# Patient Record
Sex: FEMALE | Race: WHITE | NOT HISPANIC OR LATINO
[De-identification: names, ages, dates, MRNs, and addresses within clinical notes are randomized per-mention and may not be internally consistent; named-entity substitution may affect disease eponyms.]

---

## 2017-01-23 ENCOUNTER — APPOINTMENT (OUTPATIENT)
Dept: ORTHOPEDIC SURGERY | Facility: CLINIC | Age: 77
End: 2017-01-23

## 2017-01-23 VITALS
SYSTOLIC BLOOD PRESSURE: 158 MMHG | HEART RATE: 90 BPM | HEIGHT: 66 IN | WEIGHT: 138 LBS | DIASTOLIC BLOOD PRESSURE: 69 MMHG | BODY MASS INDEX: 22.18 KG/M2

## 2017-01-23 DIAGNOSIS — Z00.00 ENCOUNTER FOR GENERAL ADULT MEDICAL EXAMINATION W/OUT ABNORMAL FINDINGS: ICD-10-CM

## 2017-01-23 DIAGNOSIS — M48.07 SPINAL STENOSIS, LUMBOSACRAL REGION: ICD-10-CM

## 2017-02-06 ENCOUNTER — RECORD ABSTRACTING (OUTPATIENT)
Age: 77
End: 2017-02-06

## 2017-02-07 ENCOUNTER — APPOINTMENT (OUTPATIENT)
Dept: NEUROLOGY | Facility: CLINIC | Age: 77
End: 2017-02-07

## 2017-02-07 VITALS
WEIGHT: 138 LBS | HEIGHT: 66 IN | SYSTOLIC BLOOD PRESSURE: 150 MMHG | HEART RATE: 73 BPM | DIASTOLIC BLOOD PRESSURE: 77 MMHG | BODY MASS INDEX: 22.18 KG/M2

## 2017-02-07 VITALS
SYSTOLIC BLOOD PRESSURE: 151 MMHG | HEIGHT: 66 IN | DIASTOLIC BLOOD PRESSURE: 77 MMHG | WEIGHT: 138 LBS | HEART RATE: 73 BPM | BODY MASS INDEX: 22.18 KG/M2

## 2017-02-07 RX ORDER — LORATADINE 10 MG
17 TABLET,DISINTEGRATING ORAL
Refills: 0 | Status: ACTIVE | COMMUNITY
Start: 2017-02-07

## 2017-07-10 ENCOUNTER — RX RENEWAL (OUTPATIENT)
Age: 77
End: 2017-07-10

## 2017-09-08 ENCOUNTER — RECORD ABSTRACTING (OUTPATIENT)
Age: 77
End: 2017-09-08

## 2017-09-18 ENCOUNTER — APPOINTMENT (OUTPATIENT)
Dept: NEUROLOGY | Facility: CLINIC | Age: 77
End: 2017-09-18
Payer: MEDICARE

## 2017-09-18 VITALS
SYSTOLIC BLOOD PRESSURE: 137 MMHG | BODY MASS INDEX: 22.18 KG/M2 | DIASTOLIC BLOOD PRESSURE: 77 MMHG | WEIGHT: 138 LBS | HEIGHT: 66 IN | HEART RATE: 87 BPM

## 2017-09-18 VITALS
HEIGHT: 66 IN | DIASTOLIC BLOOD PRESSURE: 77 MMHG | BODY MASS INDEX: 22.18 KG/M2 | HEART RATE: 87 BPM | SYSTOLIC BLOOD PRESSURE: 137 MMHG | WEIGHT: 138 LBS

## 2017-09-18 PROCEDURE — 99215 OFFICE O/P EST HI 40 MIN: CPT

## 2017-09-18 RX ORDER — BRIMONIDINE TARTRATE 1 MG/ML
0.1 SOLUTION/ DROPS OPHTHALMIC EVERY 8 HOURS
Refills: 0 | Status: ACTIVE | COMMUNITY

## 2017-11-28 ENCOUNTER — RECORD ABSTRACTING (OUTPATIENT)
Age: 77
End: 2017-11-28

## 2017-12-12 ENCOUNTER — APPOINTMENT (OUTPATIENT)
Dept: NEUROLOGY | Facility: CLINIC | Age: 77
End: 2017-12-12

## 2018-05-01 ENCOUNTER — APPOINTMENT (OUTPATIENT)
Dept: NEUROLOGY | Facility: CLINIC | Age: 78
End: 2018-05-01
Payer: MEDICARE

## 2018-05-01 VITALS
HEIGHT: 66 IN | HEART RATE: 61 BPM | BODY MASS INDEX: 22.18 KG/M2 | SYSTOLIC BLOOD PRESSURE: 126 MMHG | WEIGHT: 138 LBS | DIASTOLIC BLOOD PRESSURE: 72 MMHG

## 2018-05-01 VITALS
BODY MASS INDEX: 22.18 KG/M2 | HEIGHT: 66 IN | WEIGHT: 138 LBS | HEART RATE: 61 BPM | SYSTOLIC BLOOD PRESSURE: 125 MMHG | DIASTOLIC BLOOD PRESSURE: 70 MMHG

## 2018-05-01 PROCEDURE — 93892 TCD EMBOLI DETECT W/O INJ: CPT

## 2018-05-01 PROCEDURE — 93886 INTRACRANIAL COMPLETE STUDY: CPT

## 2018-05-01 PROCEDURE — 93880 EXTRACRANIAL BILAT STUDY: CPT

## 2018-05-01 PROCEDURE — 99215 OFFICE O/P EST HI 40 MIN: CPT

## 2018-10-02 ENCOUNTER — RX RENEWAL (OUTPATIENT)
Age: 78
End: 2018-10-02

## 2018-10-02 RX ORDER — LEVETIRACETAM 250 MG/1
250 TABLET, FILM COATED ORAL
Qty: 360 | Refills: 3 | Status: ACTIVE | COMMUNITY
Start: 2017-09-18 | End: 1900-01-01

## 2018-11-12 ENCOUNTER — APPOINTMENT (OUTPATIENT)
Dept: NEUROLOGY | Facility: CLINIC | Age: 78
End: 2018-11-12

## 2018-12-07 ENCOUNTER — MEDICATION RENEWAL (OUTPATIENT)
Age: 78
End: 2018-12-07

## 2018-12-07 RX ORDER — LEVOTHYROXINE, LIOTHYRONINE 19; 4.5 UG/1; UG/1
30 TABLET ORAL DAILY
Qty: 90 | Refills: 3 | Status: ACTIVE | COMMUNITY
Start: 1900-01-01 | End: 1900-01-01

## 2018-12-10 ENCOUNTER — APPOINTMENT (OUTPATIENT)
Dept: NEUROLOGY | Facility: CLINIC | Age: 78
End: 2018-12-10
Payer: MEDICARE

## 2018-12-10 VITALS
DIASTOLIC BLOOD PRESSURE: 95 MMHG | SYSTOLIC BLOOD PRESSURE: 165 MMHG | BODY MASS INDEX: 21.69 KG/M2 | HEART RATE: 71 BPM | HEIGHT: 66 IN | WEIGHT: 135 LBS

## 2018-12-10 DIAGNOSIS — M48.00 SPINAL STENOSIS, SITE UNSPECIFIED: ICD-10-CM

## 2018-12-10 DIAGNOSIS — G95.19 OTHER VASCULAR MYELOPATHIES: ICD-10-CM

## 2018-12-10 PROCEDURE — 99214 OFFICE O/P EST MOD 30 MIN: CPT

## 2019-01-02 ENCOUNTER — LABORATORY RESULT (OUTPATIENT)
Age: 79
End: 2019-01-02

## 2019-01-03 LAB
24R-OH-CALCIDIOL SERPL-MCNC: 39.7 PG/ML
25(OH)D3 SERPL-MCNC: 24.3 NG/ML
DEPRECATED KAPPA LC FREE/LAMBDA SER: 0.83 RATIO
FOLATE SERPL-MCNC: 4.9 NG/ML
HAV IGM SER QL: NONREACTIVE
HBA1C MFR BLD HPLC: 5.5 %
HBV CORE IGM SER QL: NONREACTIVE
HBV SURFACE AG SER QL: NONREACTIVE
HCV AB SER QL: NONREACTIVE
HCV S/CO RATIO: 0.11 S/CO
KAPPA LC CSF-MCNC: 1.63 MG/DL
KAPPA LC SERPL-MCNC: 1.35 MG/DL
T4 SERPL-MCNC: 6.4 UG/DL
TSH SERPL-ACNC: 4.03 UIU/ML
VIT B12 SERPL-MCNC: 469 PG/ML

## 2019-01-04 LAB
ALBUMIN MFR SERPL ELPH: 63.1 %
ALBUMIN SERPL-MCNC: 4 G/DL
ALBUMIN/GLOB SERPL: 1.7 RATIO
ALPHA1 GLOB MFR SERPL ELPH: 5.2 %
ALPHA1 GLOB SERPL ELPH-MCNC: 0.3 G/DL
ALPHA2 GLOB MFR SERPL ELPH: 11.2 %
ALPHA2 GLOB SERPL ELPH-MCNC: 0.7 G/DL
B-GLOBULIN MFR SERPL ELPH: 10.7 %
B-GLOBULIN SERPL ELPH-MCNC: 0.7 G/DL
GAMMA GLOB FLD ELPH-MCNC: 0.6 G/DL
GAMMA GLOB MFR SERPL ELPH: 9.8 %
INTERPRETATION SERPL IEP-IMP: NORMAL
M PROTEIN MFR SERPL ELPH: 3.3 %
M PROTEIN SPEC IFE-MCNC: NORMAL
MONOCLON BAND OBS SERPL: 0.2 G/DL
PROT SERPL-MCNC: 6.3 G/DL
PROT SERPL-MCNC: 6.3 G/DL

## 2019-01-07 LAB
COPPER SERPL-MCNC: 106 UG/DL
VIT B6 SERPL-MCNC: 4.2 UG/L

## 2019-01-08 LAB
ALBUPE: 17.2 %
ALPHA1UPE: 39.6 %
ALPHA2UPE: 24.8 %
BETAUPE: 17 %
CREAT 24H UR-MCNC: NORMAL G/24 H
CREATININE UR (MAYO): 100 MG/DL
GAMMAUPE: 23.8 %
IGA 24H UR QL IFE: NORMAL
KAPPA LC 24H UR QL: PRESENT
PROT PATTERN 24H UR ELPH-IMP: NORMAL
PROT UR-MCNC: 22 MG/DL
PROT UR-MCNC: 22 MG/DL
SPECIMEN VOL 24H UR: NORMAL

## 2019-01-09 LAB — SULFATIDE AB SER QL: NORMAL

## 2019-01-14 LAB — MAG AB SER QL: POSITIVE

## 2019-01-20 ENCOUNTER — RECORD ABSTRACTING (OUTPATIENT)
Age: 79
End: 2019-01-20

## 2019-01-22 ENCOUNTER — MEDICATION RENEWAL (OUTPATIENT)
Age: 79
End: 2019-01-22

## 2019-01-24 ENCOUNTER — OUTPATIENT (OUTPATIENT)
Dept: OUTPATIENT SERVICES | Facility: HOSPITAL | Age: 79
LOS: 1 days | Discharge: HOME | End: 2019-01-24

## 2019-02-04 ENCOUNTER — APPOINTMENT (OUTPATIENT)
Dept: HEMATOLOGY ONCOLOGY | Facility: CLINIC | Age: 79
End: 2019-02-04
Payer: MEDICARE

## 2019-02-04 VITALS
HEIGHT: 66 IN | HEART RATE: 67 BPM | OXYGEN SATURATION: 97 % | RESPIRATION RATE: 16 BRPM | WEIGHT: 137.57 LBS | BODY MASS INDEX: 22.11 KG/M2 | DIASTOLIC BLOOD PRESSURE: 79 MMHG | TEMPERATURE: 98.8 F | SYSTOLIC BLOOD PRESSURE: 143 MMHG

## 2019-02-04 DIAGNOSIS — Z87.891 PERSONAL HISTORY OF NICOTINE DEPENDENCE: ICD-10-CM

## 2019-02-04 PROCEDURE — 99205 OFFICE O/P NEW HI 60 MIN: CPT

## 2019-02-04 RX ORDER — DORZOLAMIDE HYDROCHLORIDE AND TIMOLOL MALEATE 20; 5 MG/ML; MG/ML
22.3-6.8 SOLUTION/ DROPS OPHTHALMIC
Refills: 0 | Status: ACTIVE | COMMUNITY

## 2019-02-04 NOTE — ASSESSMENT
[Palliative Care Plan] : not applicable at this time [FreeTextEntry1] : Marina Allred is a 78 year old female with an extensive past medical history presenting to the office for hematologic evaluation of abnormal protein electrophoresis (rule out MGUS). \par I discussed possibilities of protein elevation as seen in monoclonal gammopathy of uncertain significance, para proteins associated with rheumatology diseases, neurology autoimmune diseases and multiple myeloma. She does not meet the clinical criteria for a diagnosis of multiple myeloma.\par Her  asked me questions about thalassemia was recommended to complete lab blood and urine studies but she did not wish to pursue this at this time because it would not help address nor treat her peripheral neuropathy. Patient did not want to complete venipuncture and preferred to repeat additional lab work with her PCP in July 2019; a prescription was provided to patient.   \par I have seen this patient seen with A Reece WAGGONER

## 2019-02-04 NOTE — PHYSICAL EXAM
[Capable of only limited self care, confined to bed or chair more than 50% of waking hours] : Status 3- Capable of only limited self care, confined to bed or chair more than 50% of waking hours [Normal] : affect appropriate

## 2019-02-04 NOTE — REASON FOR VISIT
[Initial Consultation] : an initial consultation for [Blood Count Assessment] : blood count assessment [Other: _____] : [unfilled] [Spouse] : spouse [FreeTextEntry2] : I am here to discuss the blood protein and my neuropathy

## 2019-02-04 NOTE — HISTORY OF PRESENT ILLNESS
[Treatment Protocol] : Treatment Protocol [Cardiovascular] : Cardiovascular [Constitutional] : Constitutional [ENT] : ENT [Dermatologic] : Dermatologic [Endocrine] : Endocrine [Gastrointestinal] : Gastrointestinal [Genitourinary] : Genitourinary [Gynecologic] : Gynecologic [Infectious] : Infectious [Musculoskeletal] : Musculoskeletal [Neurologic] : Neurologic [Pain] : Pain [Pulmonary] : Pulmonary [Hematologic] : Hematologic [Disease:__________________________] : Disease: [unfilled] [de-identified] : Marina Allred is a 78 year old female presenting to the office for hematologic evaluation. She is referred here by Dr. Jose Ramirez (neurologist). Patient had lab studies done in 2019 that noted an abnormal protein electrophoresis in her urine. \par Past medical history includes TIA (approximately 25 years ago), idiopathic peripheral neuropathy, hypothyroidism, hypertension. \par Past surgical history includes tonsillectomy, appendectomy, and uterine fibroid removal. \par Social history: former cigarette smoker, former alcohol user (stopped in approximately ). \par Family history: MI (father  of at age 36), CVA (mother at age 75). [FreeTextEntry1] : initial evaluation

## 2019-02-04 NOTE — CONSULT LETTER
[Dear  ___] : Dear  [unfilled], [Consult Letter:] : I had the pleasure of evaluating your patient, [unfilled]. [Please see my note below.] : Please see my note below. [Consult Closing:] : Thank you very much for allowing me to participate in the care of this patient.  If you have any questions, please do not hesitate to contact me. [Sincerely,] : Sincerely, [DrTaisha  ___] : Dr. BARBA [DrTaisha ___] : Dr. BARBA [FreeTextEntry2] : Jose Ramirez MD\par [address] [facility], [address]\[place] [address] [FreeTextEntry3] : Ace Rajan MD

## 2019-02-04 NOTE — REVIEW OF SYSTEMS
[Fatigue] : fatigue [Difficulty Walking] : difficulty walking [Negative] : Allergic/Immunologic [Fever] : no fever [Chills] : no chills [Night Sweats] : no night sweats [Recent Change In Weight] : ~T no recent weight change [Eye Pain] : no eye pain [Red Eyes] : eyes not red [Dry Eyes] : no dryness of the eyes [Vision Problems] : no vision problems [Dysphagia] : no dysphagia [Loss of Hearing] : no loss of hearing [Nosebleeds] : no nosebleeds [Hoarseness] : no hoarseness [Odynophagia] : no odynophagia [Mucosal Pain] : no mucosal pain [Chest Pain] : no chest pain [Palpitations] : no palpitations [Leg Claudication] : no intermittent leg claudication [Lower Ext Edema] : no lower extremity edema [Shortness Of Breath] : no shortness of breath [Wheezing] : no wheezing [Cough] : no cough [SOB on Exertion] : no shortness of breath during exertion [Abdominal Pain] : no abdominal pain [Vomiting] : no vomiting [Constipation] : no constipation [Diarrhea] : no diarrhea [Dysuria] : no dysuria [Incontinence] : no incontinence [Vaginal Discharge] : no vaginal discharge [Dysmenorrhea/Abn Vaginal Bleeding] : no dysmenorrhea/abnormal vaginal bleeding [Joint Pain] : no joint pain [Joint Stiffness] : no joint stiffness [Muscle Pain] : no muscle pain [Skin Rash] : no skin rash [Skin Wound] : no skin wound [Confused] : no confusion [Dizziness] : no dizziness [Fainting] : no fainting [Suicidal] : not suicidal [Insomnia] : no insomnia [Anxiety] : no anxiety [Depression] : no depression [Proptosis] : no proptosis [Hot Flashes] : no hot flashes [Muscle Weakness] : no muscle weakness [Deepening Of The Voice] : no deepening of the voice [Easy Bleeding] : no tendency for easy bleeding [Easy Bruising] : no tendency for easy bruising [Swollen Glands] : no swollen glands

## 2019-03-07 ENCOUNTER — APPOINTMENT (OUTPATIENT)
Dept: NEUROLOGY | Facility: CLINIC | Age: 79
End: 2019-03-07

## 2019-03-12 ENCOUNTER — APPOINTMENT (OUTPATIENT)
Dept: NEUROLOGY | Facility: CLINIC | Age: 79
End: 2019-03-12
Payer: MEDICARE

## 2019-03-12 VITALS
SYSTOLIC BLOOD PRESSURE: 136 MMHG | WEIGHT: 134 LBS | HEIGHT: 66 IN | BODY MASS INDEX: 21.53 KG/M2 | DIASTOLIC BLOOD PRESSURE: 77 MMHG | HEART RATE: 80 BPM

## 2019-03-12 VITALS
SYSTOLIC BLOOD PRESSURE: 135 MMHG | DIASTOLIC BLOOD PRESSURE: 77 MMHG | WEIGHT: 134 LBS | HEIGHT: 66 IN | BODY MASS INDEX: 21.53 KG/M2 | HEART RATE: 80 BPM

## 2019-03-12 PROCEDURE — 93880 EXTRACRANIAL BILAT STUDY: CPT

## 2019-03-12 PROCEDURE — 99215 OFFICE O/P EST HI 40 MIN: CPT

## 2019-03-12 PROCEDURE — 93886 INTRACRANIAL COMPLETE STUDY: CPT

## 2019-03-12 PROCEDURE — 93892 TCD EMBOLI DETECT W/O INJ: CPT

## 2019-03-12 NOTE — REVIEW OF SYSTEMS
[Negative] : Musculoskeletal [FreeTextEntry3] : glaucoma and retinal problems which are being followed

## 2019-03-12 NOTE — HISTORY OF PRESENT ILLNESS
[FreeTextEntry1] :  78 year-old right handed white lady first evaluated on 12/8/05 for evaluation of an episode of probable transient aphasia in 11/05. \par \par Prior hx.\par  In late 10/04, she underwent resection of uterine fibroids.  Around that time, her blood pressure was probably low.  She might have had some fluctuating speech disturbance for a couple of days.  She then developed paresthesias affecting her right hand, left monocular visual disturbance, perhaps lasting one hour.  She was admitted to [facility] and diagnosed with "TIA".  MRI brain (10/04) was reportedly negative.  MRA and CTA neck (10/04) as well as carotid Doppler (10/04) (outside lab) all suggested left ICA occlusion.  She was evaluated by Dr. Wang Cleveland ([facility]) who considered her a candidate for the Carotid Occlusion Study (KALIE), a randomized trial of extracranial -- intracranial bypass versus medical management.  She declined participation.  On 11/8/05 she got out of bed during the night to go to the bathroom.  She felt "sick", diaphoretic, and was unable to speak for perhaps half an hour.  In addition to that episode, she feels that when her blood pressure is below 120 systolic, her left eye "goes wonky".  Repeat Carotid Doppler (11/11/05) done at an outside lab reportedly showed right carotid stenosis in the low range of 50 -- 70%, with the left ICA showing probable complete occlusion. Repeat Carotid Doppler (12/8/05) showed mild heterogeneous plaque on the right with approximately 45% stenosis, and mild increase in velocity.  On the left there was no flow in the ICA consistent with her known probable occlusion.  The extracranial vertebral arteries showed anterograde flow with normal resistance.  Transcranial Doppler hyperventilation study (12/8/05) showed a moderately impaired response to hyperventilation on the left (27%) compared to the right side (44%) consistent with impaired vasoreactivity.  She was reevaluated by Dr. Wang Cleveland.  Cerebral PET scan (2/06) did not show increased oxygen extraction fraction on the left side, and therefore she was not considered eligible for KALIE. She was then evaluated on 6/22/06, complaining of intermittent paresthesias affecting both of her hands, and episodic left monocular visual disturbance (fuzziness) on exposure to bright light.  She was then evaluated on 6/19/07 for an episode of loss of consciousness associated with hypotension and possible hypovolemia due to diarrhea. Repeat carotid Doppler (6/19/07) showed mild -- moderate heterogeneous plaque on the right with 40 -- 60% stenosis by velocity criteria, 45% diameter reduction. On the left there was no flow in the ICA consistent with her known probable occlusion.  This Doppler showed minimal progression of her right carotid stenosis compared to her study of 12/06.  Next evaluated on 12/10/08 complaining only of occasional paresthesias affecting her hands bilaterally (probably reflecting peripheral nerve entrapments or cervical radiculopathies).    Repeat carotid Doppler (12/10/07) showed mild -- moderate heterogeneous plaque on the right with 40 -- 60% stenosis by velocity criteria, 45% diameter reduction.. On the left there was no flow in the ICA consistent with her known probable occlusion.  This Doppler showed no significant change compared to her study of 6/07. Next evaluated on 6/30/08 reporting no new or recurrent focal neurologic symptoms.  Repeat carotid Doppler (6/30/08) showed mild heterogeneous plaque on the right with approximately 40% diameter reduction.. On the left there was no flow in the ICA consistent with her known probable occlusion.  Allowing for differences in operators, this Doppler showed no significant change compared to her study of.  12/07. \par \par Late 2013. She reports bilateral leg numbness. Repeat carotid Doppler (9/30/13) showed mild calcified plaque on the right with approximately 45% stenosis. On the left there was no flow in the ICA consistent with occlusion. Heart rate was noted to be irregular. Incidentally noted were bilateral thyroid nodules, the left sided nodule being complex. This Doppler showed no significant change compared to her study of 6/08, except that an irregular heart rate and bilateral thyroid nodules were detected on the current exam. Transcranial Doppler (9/30/13) showed bidirectional flow in the left ophthalmic artery consistent with collateral from the left ECA to the left ICA circulation. There was reversed flow in the left MAYA consistent with ACOM collateral from right to left side. There was increased velocity in the right MCA, possibly reflecting collateral high flow from right to left but stenosis could not be ruled out. The remainder of the [place] was normal with no sign of stenosis but the study was highly technically difficult. A brief interval of an irregular heart rate was detected.\par \par  9/8/14 .  Beginning late 6/14, she's been having episodes in which her R arm "twitches" and "spasms" involuntarily and then sometimes becomes weak for a minute or so. Mostly occurs when she stands up, and resolves quickly when she sits down. Her blood pressure fluctuates significantly during the day when she checks it at home, but she does not know whether her symptoms correlate with relative hypotension. Her last episode of right arm shaking occurred about 2 weeks ago and was very brief. She has also had an unsteady gait for some time, believed to be a sensory ataxia due to perhaps peripheral neuropathy  by Dr. Luna Magallanes.\par  Repeat MRI brain (7/21/14) to my eye showed small foci of hyperintensity in the subcortical white matter, probably consistent with mild chronic ischemic changes. Repeat MRA neck (7/21/14) showed no flow signal in the left ICA.  Repeat MRA head (7/21/14) to my eye showed no flow signal in the left petrous and cavernous ICA. There was distal right M1 stenosis, and right P1 and P2 stenoses. , MR NOVA (7/21/14) showed left MCA flow of 108 mL/minute, with right MCA flow being 126 mL/minute. The left MCA was applied by an aberrant vessel. There was no measurable flow in the left MAYA., MR perfusion study ()-not tolerated., Brain  SPECT  without Diamox (7/21/14) showed decreased uptake in the left frontoparietal and temporal lobes. Brain SPECT with Diamox (7/23/14) showed that the decreased uptake in the left inferior parietal and left temporal lobes was less prominent and there was preservation of uptake in the left high parietal region. EEG ().  Repeat  Carotid Doppler (8/25/14) showed mild calcified and heterogeneous plaque on the right with approximately 45% stenosis. On the left there was no flow in the ICA consistent with occlusion. Incidentally noted again were bilateral thyroid nodules, the left sided nodule being complex. Repeat transcranial Doppler (8/25/14) showed bidirectional flow in the left ophthalmic artery consistent with collateral from the left ECA to the left ICA circulation. There was reversed flow in the left MAYA consistent with ACOM collateral from right to left side. There was increased velocity in the right MCA and right PCA , consistent with stenosist. The remainder of the [place] was normal with no sign of stenosis. This TCD showed no significant change compared to her study of 9/13 except that right PCA stenosis  was also detected on the current exam.\par \par  10/6/14   EEG (10/2/14) was normal.\par \par 10/10/14. she called about EEG results. Called her back. Explained  EEG results. Encouraged her to pursue additional opinion with Dr. Cleveland regarding possible bypass surgery. She stated that she has not pursued this second opinion because she has made up her mind that she would not have surgery in any case. I encouraged her nonetheless to obtain Dr. Cleveland's opinion.\par \par 11/17/15. Since Her Last Visit she has had no new or recurrent focal neurologic symptoms. Her right-sided limb shaking resolved after about one month.\par \par 11/17/15. Patient called. (Message: "She is having tremors in her right arm"). Called patient. [phone]. \latanya Has had R arm shaking//tremor episodes for about 10 days this month. She thinks that her blood pressure runs around 140/80, sometimes a bit lower, with or without these episodes. She is taking amlodipine 5 mg daily.\par .\par 12/17/15. she called about "her bp meds". called her. [phone]. In the morning, her BP is lower, eg -130 and she has R arm shaking (limb shaking TIA). During afternoon and night, BP increases , eg up to 195/100 and she never has the episodes when her BP is elevated. When Her BP is that elevated, she takes clonidine 0.1 mg which lowers her blood pressure somewhat but without precipitating additional limb shaking episodes.\par \par 1/7/16. She called (message: "She has been having right arm shaking, primarily in the morning"). Called patient. [phone]. She continues to have right-sided shaking, promarily in the morning, while standing. When she feels an episode beginning, if she sits or lies down immediately, the limb shaking is frequently aborted.\par \par 7/1/16. Consultation report from Dr. Wang Cleveland (6/16/16). \par His impression. Twitching episodes could still be focal seizure versus limb shaking TIAs. She has been relatively asymptomatic recently and so an EEG would be of low yield (note-EEG in 2014 was negative for seizures). His suggestion. Try low dose amlodipine 2.5 mg daily in order to "gain some control of the blood pressure"; if symptoms recur, we'll then stop amlodipine; do ambulatory blood pressure monitoring in order to document blood pressure during the symptoms; start low dose trial of anticonvulsant such as levetiracetam 250 mg b.i.d. Note that flow failure could potentially be treated with relatively low risk EDAS (indirect bypass) but she was uncomfortable with respect to invasive procedures.\par \par 7/25/16. She Came to the Office Today accompanied by her . After several months of being asymptomatic, a right arm shaking episodes have recurred  but "milder and less frequently" than before. The episodes occur once again when she changes position, usually getting up from a seated position and last 30-60 seconds. She continues to refuse any type of cerebrovascular bypass procedure. \par \par She also complains of increasing gait difficulty which she attributes to sensory disturbances in her feet are "neuropathy".\par \par Repeat carotid Doppler (7/25/16) showed mild heterogeneous plaque in the CCA and mild heterogeneous and calcified plaque in the bulb and the ICA with approximately 45% stenosis. On the left there was no flow imaged in the left ICA consistent with occlusion. Incidentally noted again were bilateral thyroid nodules, but the right-sided nodule had become larger and complex. This Doppler showed no significant change compared to the study of 8/15, except that the right-sided thyroid nodule had increased in size and become complex.\par \par Repeat transcranial Doppler (7/25/16) showed bidirectional flow in the left ophthalmic artery consistent with collateral from the left ECA to the left ICA circulation. There was reversed flow in the left MAYA consistent with ACOM collateral from right to left side. The remainder of the [place] was normal with no sign of stenosis. This TCD showed no significant change compared to her study of 8/15.\par \par 10/10/16. Her R arm shaking has stopped on increased levetiracetam 500 mg BID. \par \par She has been evaluated by Dr. Benitez. She probably has severe spinal stenosis with or without peripheral neuropathy. She refuses, however, EMG because of fear of pain causing an increase in her blood pressure and the fear that she will have "another stroke". \par \par 2/7/17. She came to the office today. She stated that her ophthalmologist saw "a lesion on my left optic nerve". The nature of this is uncertain, but might possibly be ischemic and related to her left carotid occlusion. Despite this, she has only occasional brief episodes of right arm shaking, perhaps once every few weeks.\par She continues to complain of low back pain with gait difficulty and imbalance.\par \par 7/5/17. Called patient.  For the past year, she has had only a small number of limbs shaking episodes. For the past 2 weeks, the episodes have increased in frequency, 1-2 per day, usually in the morning, sporadic, not associated with postural changes. On Keppra 500 mg b.i.d.\par Impression. She has had an increased frequency of limb shaking episodes, reason unclear. Over her long history, she has had waxing and waning of these episodes, seemingly spontaneously. We'll therefore wait another week or 2. If the episodes persist or increase in frequency, will increase Keppra to 750 mg q.a.m.-500 mg q.h.s.\par \par Orthopedics consult (Dr. Dyson, 1/23/17) was consistent with lumbar spinal stenosis with neurogenic claudication.\par \par 9/18/17. She came to the office today accompanied by her . Over the past many weeks, she has had minimal or no right-sided limb shaking, although yesterday the  episodes recurred. 5 weeks ago, she fell without head trauma. Since then, she feels that her gait has slightly worsened.\par \par 5/1/18. She came to the office today accompanied by her . She notes only rare, brief episodes of right arm shaking. She has chronic difficulty with her gait and balance.\par \par For the past couple of months, she has noted moderate-severe bilateral proximal arm pain. This prompted stopping of her Livalo yesterday.\par \par Repeat carotid Doppler (5/1/18) showed moderate calcified heterogeneous plaque on the right with 40-60% stenosis by velocity criteria, 50% diameter reduction. On the left there was no flow imaged in the left ICA consistent with occlusion. Incidentally noted again were bilateral thyroid nodules, but the right-sided nodule had become larger. This Doppler showed mild progression of right carotid stenosis compared to her study of 7/16. In addition, the right-sided complex thyroid nodule had enlarged. Heart rate was noted to be irregular.\par \par Repeat transcranial Doppler (5/1/18) showed bidirectional flow in the left ophthalmic artery consistent with collateral from the left ECA to the left ICA circulation. There was reversed flow in the left MAYA consistent with ACOM collateral from right to left side. The remainder of the [place] was normal with no sign of stenosis. Heart Rate was noted to be irregular  This TCD showed no significant change compared to her study of  7/16, except that an irregular heart rate was noted..\par \par 3/12/19. She Came to the Office Today accompanied by her . She has persistent gait and balance difficulty, and uses a walker outside or at night. No New Focal Neurologic Symptoms.\par \par She was evaluated by Dr. 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 This Is Being followed up by Dr. Rajan (hematology).\par \par Repeat carotid Doppler (3/12/19) showed moderate calcified heterogeneous plaque on the right with 40-60% stenosis by velocity criteria, 50% diameter reduction. On the left there was no flow imaged in the left ICA consistent with occlusion. Incidentally noted again were bilateral thyroid nodules, Heart rate was noted to be irregular.. This Doppler showed no significant change compared to the prior study of 5/18. \par \par Repeat transcranial Doppler (3/12/19) showed bidirectional flow in the left ophthalmic artery consistent with collateral from the left ECA to the left ICA circulation. There was reversed flow in the left MAYA consistent with ACOM collateral from right to left side. The remainder of the [place] was normal with no sign of stenosis. Heart Rate was noted to be irregular  This TCD showed no significant change compared to her study of  5/18.\par \par \par \par \par \par \par

## 2019-03-12 NOTE — DISCUSSION/SUMMARY
[FreeTextEntry1] : Previous Summary.\par  Overall she is neurologically intact. In late 10/04 she had an episode of speech disturbance or aphasia, paresthesias of the right hand, and left monocular visual disturbance.  She was found to have left carotid occlusion.  On 11/8/05, when she developed an episode of inability to speak, probably aphasia, for about half an hour.  She believes that when her blood pressure is low, she develops fleeting left monocular visual symptoms.  Most likely, she had recurrent left hemispheric or retinal perfusion insufficiency due to her left carotid occlusion. Her  PET scan did not show increased oxygen extraction fraction on the left side and therefore she was not eligible for the Carotid Occlusion Study of extracranial -- intracranial bypass versus medical management. She also continued to have episodes of partial left monocular blindness with exposure to sunlight, most likely reflecting "retinal claudication", due to perfusion insufficiency from her occluded left carotid artery.  In late 2013 she had bilateral leg numbness.Lumbosacral radiculopathies versus peripheral neuropathy. Doubt myelopathy. \par \par  9/8/14. Beginning in  7/14 she had episodic involuntary of her R arm,. Some episodes appear to be related to postural changes such as getting out of bed.   Suspect limb-shaking TIAs due to perfusion insufficiency related to LICA occlusion.  The question of possible benefit of EC-IC bypass, especially considering the negative results of the  KALIE study, therefore, remains doubtful . She stated that her blood pressure tends to fluctuate significantly during the day. The KALIE data indicated that in the long term, patients with blood pressure less than 135/80 had a lower risk of stroke than did patients with higher blood pressures.  While I don't think that her right arm shaking is due to focal seizures, I again encouraged her to pursue an EEG.  She also now has right PCA stenosis which is asymptomatic, but may suggest some ongoing occlusive process, presumably atherosclerotic. \par \par  EEG (10/2/14) was normal. while of course not definitive in ruling out focal seizures , in conjunction with the clinical picture, this supports the diagnosis that her right-sided shaking or abnormal movements represent limb shaking TIAs rather than focal seizures.\par \par  She also has an unsteady gait possibly related to peripheral neuropathy (versus lumbosacral radiculopathies) causing sensory ataxia. The cause of peripheral neuropathy is uncertain,  althogh she has a remote history of alcohol abuse as well as 2 episodes of Lyme disease.\par \par 7/2/15.  She has been stable and doing well from the neurovascular standpoint.  Her Neurologic Exam is stable with postural instability and sensory deficits in her legs, presumably due to peripheral neuropathy.  She also complains of what sounds like  claudication.\par \par 11/17/15 She has been having R arm shaking//tremor episodes for about 10 days this month. She thinks that her blood pressure runs around 140/80, sometimes a bit lower, with or without these episodes. Almost Definitely these episodes represent recurrent limb shaking TIAs, related to perfusion insufficiency due to her left carotid occlusion .\par Plan. Hold  amlodipine for at least the next week and observe what happens to her episodes,  then may allow her blood pressure to be on the high side, perhaps indefinitely, or at least for the next few months.\par \par 12/17/15. She has been having recurrent right-sided limb shaking TIAs associated with relative hypotension, consistent with perfusion insufficiency.  This has been occurring despite having stopped amlodipine. \par Plan. Continue to hold amlodipine for another 2 months or so.\par \par 1/7/16.  She continues to have right-sided limb shaking TIAs in the morning, despite being off amlodipine, and taking clonidine at night. The episodes can be aborted by quickly sitting or lying down.\par Plan.  She will try to take a salt supplement in the mornings. I suggested another consultation with Dr. Wang Cleveland (vascular neurology at [facility]).\par \par 7/25/16. Overall she is neurologically stable, although now using a cane with increasing gait difficulty which she blames on her "neuropathy".\par After several months of being asymptomatic, her right-sided shaking episodes have recurred, albeit less frequently  while taking levetiracetam 250 mg b.i.d. While I believe that these episodes represent limb shaking TIAs, Dr. Cleveland raise the possibility of focal seizures being precipitated by hypoperfusion. I therefore suggested that she increase levetiracetam to 500 mg b.i.d. \par \par She complains of worsening gait disturbance, which she attributes to a "neuropathy" of uncertain cause.  For further evaluation, I referred her to Dr. Benitez (neuromuscular specialist).\par \par 10/10/16.\par  Her R arm shaking has stopped on increased levetiracetam 500 mg BID. This may suggest that her limb shaking might have have been due to focal seizures, possibly precipitated by ishemia related to LICA occlusion, as had been suggested by Dr. Cleveland. \par \par She has been evaluated by Dr. Benitez. She probably has severe spinal stenosis with or without peripheral neuropathy. She refuses, however, EMG because of fear of pain causing an increase in her blood pressure and the fear that she will have "another stroke".\par \par 2/7/17. She is neurologically stable. She has only rare, brief episodes of right arm shaking, and whether this improvement  is due to Keppra or to the natural history of her left carotid occlusion remains uncertain. 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 Overall she is neurologically stable. In general, her right-sided limb shaking appears to be adequately controlled on Keppra 500 mg b.i.d. Her mild bradykinesia may be due to Abilify but I am uncertain of this.\par \par 5/1/18. Overall she is neurologically stable.\par \par She has had some progression of asymptomatic right carotid stenosis, now in roughly the 50% range. This will have to be monitored carefully in the setting of her left carotid occlusion.\par \par She has had proximal arm pain for the past couple of months, thought possibly to be due to to her Livalo and the statin was stopped. Given some progression of right carotid stenosis, it may certainly be helpful to restart a statin if possible, or, as you had suggested to her, perhaps a PCSK9 inhibitor. I defer to your judgment as to whether the proximal arm pain might possibly represent polymyalgia rheumatica rather than a statin effect.\par \par She has persistent, severe low back pain. She may consider consultation with Dr. Erik Velarde for pain management.\par \par I defer to your judgment as to whether her irregular heart rate, as well as enlargement of her right thyroid nodule, both detected incidentally on Doppler, warrant further investigation.\par \par 3/12/19. Overall she is neurologically stable. She has mild bilateral foot drops, which I would assume are related to either peripheral neuropathy or L4/5 radiculopathies.\par \par She Has stable, moderate, asymptomatic right carotid stenosis, in the setting of her occasionally symptomatic left carotid occlusion.\par \par It's Unclear to Me whether she would benefit from psychiatric consultation for anxiety/depression.\par \par She can followup with me in approximately 6 months. I hope that she remains free of further serious trouble.\par \par

## 2019-03-25 ENCOUNTER — APPOINTMENT (OUTPATIENT)
Dept: NEUROLOGY | Facility: CLINIC | Age: 79
End: 2019-03-25
Payer: MEDICARE

## 2019-03-25 VITALS
DIASTOLIC BLOOD PRESSURE: 74 MMHG | WEIGHT: 134 LBS | BODY MASS INDEX: 21.53 KG/M2 | HEART RATE: 66 BPM | SYSTOLIC BLOOD PRESSURE: 159 MMHG | HEIGHT: 66 IN

## 2019-03-25 DIAGNOSIS — R80.9 PROTEINURIA, UNSPECIFIED: ICD-10-CM

## 2019-03-25 PROCEDURE — 99214 OFFICE O/P EST MOD 30 MIN: CPT

## 2019-03-25 NOTE — HISTORY OF PRESENT ILLNESS
[FreeTextEntry1] : 78 W who has seen Dr. Keenan in 2016 is here for first visit with me for numbness below her knees. She states this was a recent development. SHe initially had it in her feet and now it's progressing. Review of his last note reveals that her exam was significant for decreased pinprick to the mid calf. Now it's at the knee region. She was also noted to have romberg sign. She denies any exposure to chemo agents. Dr. SMITH had recommended repeat MRI of l spine which she had and she saw Dr. Dyson who recommended EMG. She declined since she thought the pain from the EMG may cause another TIA. We had a long conversation regarding this. She is still hesitant to scheduling an EMG.\par \par interval history: she saw Dr. Rajan but she didn't get the further studies that were ordered for her. I advised her to get that done right away. She made an appt from EMG but cancelled it. She wanted to talk first. We went over the reasons why I think further evaluation is necessary. She is "sick of all the tests." She was advised that without the evaluation, I'm not able to tell her the cause of her neuropathy.

## 2019-03-25 NOTE — DISCUSSION/SUMMARY
[FreeTextEntry1] : 78 W who is here for followup. She was advised to complete workup for Dr. Rajan. She was advised to return for EMG (LP was discussed previously and she refused). She will think about it. She was advised not to delay as once we have dx, we can work on treatment. She requested physical therapy for her neuropathy as she is having trouble with walking. \par \par Patient was advised to continue to monitor for neurologic symptoms and to notify my office or go to the nearest emergency room if there are any changes. Neurologic issues were discussed with the patient. All questions were answered to complete satisfaction. Education was provided to the patient during this encounter.\par Side effects of the above medications were discussed in detail including but not limited to applicable black box warning and teratogenicity as appropriate. \par Patient was advised to bring previous records to my office. \par \par

## 2019-03-25 NOTE — PHYSICAL EXAM
[General Appearance - Alert] : alert [Oriented To Time, Place, And Person] : oriented to person, place, and time [Person] : oriented to person [Place] : oriented to place [Time] : oriented to time [Span Intact] : the attention span was normal [Naming Objects] : no difficulty naming common objects [Fluency] : fluency intact [Current Events] : adequate knowledge of current events [Cranial Nerves Optic (II)] : visual acuity intact bilaterally,  visual fields full to confrontation, pupils equal round and reactive to light [Cranial Nerves Oculomotor (III)] : extraocular motion intact [Cranial Nerves Trigeminal (V)] : facial sensation intact symmetrically [Cranial Nerves Facial (VII)] : face symmetrical [Cranial Nerves Vestibulocochlear (VIII)] : hearing was intact bilaterally [Cranial Nerves Glossopharyngeal (IX)] : tongue and palate midline [Cranial Nerves Accessory (XI - Cranial And Spinal)] : head turning and shoulder shrug symmetric [Cranial Nerves Hypoglossal (XII)] : there was no tongue deviation with protrusion [Motor Strength] : muscle strength was normal in all four extremities [Romberg's Sign] : a positive Romberg's sign was present [1+] : Patella left 1+ [Extraocular Movements] : extraocular movements were intact [Hearing Threshold Finger Rub Not [place]] : hearing was normal [Full Pulse] : the pedal pulses are present [Motor Tone] : muscle strength and tone were normal [] : no rash [Coordination - Dysmetria Impaired Finger-to-Nose Bilateral] : not present [FreeTextEntry7] : decreased to the knees bilaterally.  [FreeTextEntry8] : wide based gait with her walker.

## 2019-05-20 ENCOUNTER — MEDICATION RENEWAL (OUTPATIENT)
Age: 79
End: 2019-05-20

## 2019-06-03 ENCOUNTER — MEDICATION RENEWAL (OUTPATIENT)
Age: 79
End: 2019-06-03

## 2019-06-24 ENCOUNTER — NON-APPOINTMENT (OUTPATIENT)
Age: 79
End: 2019-06-24

## 2019-06-24 ENCOUNTER — APPOINTMENT (OUTPATIENT)
Dept: INTERNAL MEDICINE | Facility: CLINIC | Age: 79
End: 2019-06-24
Payer: MEDICARE

## 2019-06-24 ENCOUNTER — RECORD ABSTRACTING (OUTPATIENT)
Age: 79
End: 2019-06-24

## 2019-06-24 VITALS
SYSTOLIC BLOOD PRESSURE: 150 MMHG | RESPIRATION RATE: 16 BRPM | BODY MASS INDEX: 21.53 KG/M2 | WEIGHT: 134 LBS | HEART RATE: 73 BPM | DIASTOLIC BLOOD PRESSURE: 75 MMHG | HEIGHT: 66 IN | OXYGEN SATURATION: 97 %

## 2019-06-24 DIAGNOSIS — I65.29 OCCLUSION AND STENOSIS OF UNSPECIFIED CAROTID ARTERY: ICD-10-CM

## 2019-06-24 DIAGNOSIS — E55.9 VITAMIN D DEFICIENCY, UNSPECIFIED: ICD-10-CM

## 2019-06-24 DIAGNOSIS — G90.9 DISORDER OF THE AUTONOMIC NERVOUS SYSTEM, UNSPECIFIED: ICD-10-CM

## 2019-06-24 DIAGNOSIS — M54.9 DORSALGIA, UNSPECIFIED: ICD-10-CM

## 2019-06-24 DIAGNOSIS — R26.9 UNSPECIFIED ABNORMALITIES OF GAIT AND MOBILITY: ICD-10-CM

## 2019-06-24 DIAGNOSIS — G62.9 POLYNEUROPATHY, UNSPECIFIED: ICD-10-CM

## 2019-06-24 DIAGNOSIS — Z87.440 PERSONAL HISTORY OF URINARY (TRACT) INFECTIONS: ICD-10-CM

## 2019-06-24 DIAGNOSIS — F41.9 ANXIETY DISORDER, UNSPECIFIED: ICD-10-CM

## 2019-06-24 PROCEDURE — 93000 ELECTROCARDIOGRAM COMPLETE: CPT

## 2019-06-24 PROCEDURE — 99214 OFFICE O/P EST MOD 30 MIN: CPT | Mod: 25

## 2019-06-24 PROCEDURE — 36415 COLL VENOUS BLD VENIPUNCTURE: CPT

## 2019-06-24 NOTE — PLAN
[FreeTextEntry1] : Titrate down on the Wellbutrin\par Add escitalopram 10 with plan to increase to 20 on RV in 4 w\par Continue Abilify in low dose \par R V  4 w\par \par Call in 2 d for BT reports

## 2019-06-24 NOTE — HISTORY OF PRESENT ILLNESS
[Spouse] : spouse [FreeTextEntry1] : Major limitatons with the neuro dysfunction\par Has become depressed and withdrawn with limited social contacts and restricted activitiews\par Feeling depressed more and more\par Using walker F/T\par Unable to comfortably do ADLs\par H a help

## 2019-06-25 ENCOUNTER — MEDICATION RENEWAL (OUTPATIENT)
Age: 79
End: 2019-06-25

## 2019-06-25 LAB
25(OH)D3 SERPL-MCNC: 34.2 NG/ML
ALBUMIN SERPL ELPH-MCNC: 4.2 G/DL
ALP BLD-CCNC: 98 U/L
ALT SERPL-CCNC: 11 U/L
ANION GAP SERPL CALC-SCNC: 12 MMOL/L
AST SERPL-CCNC: 11 U/L
BASOPHILS # BLD AUTO: 0.04 K/UL
BASOPHILS NFR BLD AUTO: 0.7 %
BILIRUB SERPL-MCNC: 0.2 MG/DL
BUN SERPL-MCNC: 13 MG/DL
CALCIUM SERPL-MCNC: 9.5 MG/DL
CHLORIDE SERPL-SCNC: 106 MMOL/L
CHOLEST SERPL-MCNC: 196 MG/DL
CHOLEST/HDLC SERPL: 2.3 RATIO
CO2 SERPL-SCNC: 26 MMOL/L
CREAT SERPL-MCNC: 1.02 MG/DL
EOSINOPHIL # BLD AUTO: 0.13 K/UL
EOSINOPHIL NFR BLD AUTO: 2.4 %
ESTIMATED AVERAGE GLUCOSE: 117 MG/DL
GLUCOSE SERPL-MCNC: 94 MG/DL
HBA1C MFR BLD HPLC: 5.7 %
HCT VFR BLD CALC: 43.3 %
HDLC SERPL-MCNC: 84 MG/DL
HGB BLD-MCNC: 13.2 G/DL
IMM GRANULOCYTES NFR BLD AUTO: 0.2 %
LDLC SERPL CALC-MCNC: 98 MG/DL
LYMPHOCYTES # BLD AUTO: 1.48 K/UL
LYMPHOCYTES NFR BLD AUTO: 26.8 %
MAN DIFF?: NORMAL
MCHC RBC-ENTMCNC: 28.6 PG
MCHC RBC-ENTMCNC: 30.5 GM/DL
MCV RBC AUTO: 93.9 FL
MONOCYTES # BLD AUTO: 0.43 K/UL
MONOCYTES NFR BLD AUTO: 7.8 %
NEUTROPHILS # BLD AUTO: 3.43 K/UL
NEUTROPHILS NFR BLD AUTO: 62.1 %
PLATELET # BLD AUTO: 381 K/UL
POTASSIUM SERPL-SCNC: 4 MMOL/L
PROT SERPL-MCNC: 6.2 G/DL
RBC # BLD: 4.61 M/UL
RBC # FLD: 13.3 %
SODIUM SERPL-SCNC: 144 MMOL/L
T4 FREE SERPL-MCNC: 1 NG/DL
TRIGL SERPL-MCNC: 72 MG/DL
TSH SERPL-ACNC: 3.19 UIU/ML
WBC # FLD AUTO: 5.52 K/UL

## 2019-07-01 ENCOUNTER — RX RENEWAL (OUTPATIENT)
Age: 79
End: 2019-07-01

## 2019-07-14 ENCOUNTER — RECORD ABSTRACTING (OUTPATIENT)
Age: 79
End: 2019-07-14

## 2019-07-14 NOTE — PHYSICAL EXAM
[General Appearance - Alert] : alert [General Appearance - In No Acute Distress] : in no acute distress [Oriented To Time, Place, And Person] : oriented to person, place, and time [Affect] : the affect was normal [Impaired Insight] : insight and judgment were intact [PERRL With Normal Accommodation] : pupils were equal in size, round, reactive to light, with normal accommodation [Sclera] : the sclera and conjunctiva were normal [Outer Ear] : the ears and nose were normal in appearance [Extraocular Movements] : extraocular movements were intact [Oropharynx] : the oropharynx was normal [Neck Appearance] : the appearance of the neck was normal [Neck Cervical Mass (___cm)] : no neck mass was observed [Jugular Venous Distention Increased] : there was no jugular-venous distention [Thyroid Nodule] : there were no palpable thyroid nodules [Thyroid Diffuse Enlargement] : the thyroid was not enlarged [Auscultation Breath Sounds / Voice Sounds] : lungs were clear to auscultation bilaterally [Heart Rate And Rhythm] : heart rate was normal and rhythm regular [Heart Sounds] : normal S1 and S2 [Heart Sounds Gallop] : no gallops [Heart Sounds Pericardial Friction Rub] : no pericardial rub [Edema] : there was no peripheral edema [Veins - Varicosity Changes] : there were no varicosital changes [No CVA Tenderness] : no ~M costovertebral angle tenderness [No Spinal Tenderness] : no spinal tenderness [Abnormal Walk] : normal gait [Musculoskeletal - Swelling] : no joint swelling seen [Nail Clubbing] : no clubbing  or cyanosis of the fingernails [Skin Color & Pigmentation] : normal skin color and pigmentation [Motor Tone] : muscle strength and tone were normal [Skin Turgor] : normal skin turgor [] : no rash [FreeTextEntry1] : soft right carotid bruit

## 2019-07-14 NOTE — HISTORY OF PRESENT ILLNESS
[FreeTextEntry1] :  79 year-old right handed white lady first evaluated on 12/8/05 for evaluation of an episode of probable transient aphasia in 11/05. \par \par Prior hx.\par  In late 10/04, she underwent resection of uterine fibroids.  Around that time, her blood pressure was probably low.  She might have had some fluctuating speech disturbance for a couple of days.  She then developed paresthesias affecting her right hand, left monocular visual disturbance, perhaps lasting one hour.  She was admitted to [facility] and diagnosed with "TIA".  MRI brain (10/04) was reportedly negative.  MRA and CTA neck (10/04) as well as carotid Doppler (10/04) (outside lab) all suggested left ICA occlusion.  She was evaluated by Dr. Wang Cleveland ([facility]) who considered her a candidate for the Carotid Occlusion Study (KALIE), a randomized trial of extracranial -- intracranial bypass versus medical management.  She declined participation.  On 11/8/05 she got out of bed during the night to go to the bathroom.  She felt "sick", diaphoretic, and was unable to speak for perhaps half an hour.  In addition to that episode, she feels that when her blood pressure is below 120 systolic, her left eye "goes wonky".  Repeat Carotid Doppler (11/11/05) done at an outside lab reportedly showed right carotid stenosis in the low range of 50 -- 70%, with the left ICA showing probable complete occlusion. Repeat Carotid Doppler (12/8/05) showed mild heterogeneous plaque on the right with approximately 45% stenosis, and mild increase in velocity.  On the left there was no flow in the ICA consistent with her known probable occlusion.  The extracranial vertebral arteries showed anterograde flow with normal resistance.  Transcranial Doppler hyperventilation study (12/8/05) showed a moderately impaired response to hyperventilation on the left (27%) compared to the right side (44%) consistent with impaired vasoreactivity.  She was reevaluated by Dr. Wang Cleveland.  Cerebral PET scan (2/06) did not show increased oxygen extraction fraction on the left side, and therefore she was not considered eligible for KALIE. She was then evaluated on 6/22/06, complaining of intermittent paresthesias affecting both of her hands, and episodic left monocular visual disturbance (fuzziness) on exposure to bright light.  She was then evaluated on 6/19/07 for an episode of loss of consciousness associated with hypotension and possible hypovolemia due to diarrhea. Repeat carotid Doppler (6/19/07) showed mild -- moderate heterogeneous plaque on the right with 40 -- 60% stenosis by velocity criteria, 45% diameter reduction. On the left there was no flow in the ICA consistent with her known probable occlusion.  This Doppler showed minimal progression of her right carotid stenosis compared to her study of 12/06.  Next evaluated on 12/10/08 complaining only of occasional paresthesias affecting her hands bilaterally (probably reflecting peripheral nerve entrapments or cervical radiculopathies).    Repeat carotid Doppler (12/10/07) showed mild -- moderate heterogeneous plaque on the right with 40 -- 60% stenosis by velocity criteria, 45% diameter reduction.. On the left there was no flow in the ICA consistent with her known probable occlusion.  This Doppler showed no significant change compared to her study of 6/07. Next evaluated on 6/30/08 reporting no new or recurrent focal neurologic symptoms.  Repeat carotid Doppler (6/30/08) showed mild heterogeneous plaque on the right with approximately 40% diameter reduction.. On the left there was no flow in the ICA consistent with her known probable occlusion.  Allowing for differences in operators, this Doppler showed no significant change compared to her study of.  12/07. \par \par Late 2013. She reports bilateral leg numbness. Repeat carotid Doppler (9/30/13) showed mild calcified plaque on the right with approximately 45% stenosis. On the left there was no flow in the ICA consistent with occlusion. Heart rate was noted to be irregular. Incidentally noted were bilateral thyroid nodules, the left sided nodule being complex. This Doppler showed no significant change compared to her study of 6/08, except that an irregular heart rate and bilateral thyroid nodules were detected on the current exam. Transcranial Doppler (9/30/13) showed bidirectional flow in the left ophthalmic artery consistent with collateral from the left ECA to the left ICA circulation. There was reversed flow in the left MAYA consistent with ACOM collateral from right to left side. There was increased velocity in the right MCA, possibly reflecting collateral high flow from right to left but stenosis could not be ruled out. The remainder of the [place] was normal with no sign of stenosis but the study was highly technically difficult. A brief interval of an irregular heart rate was detected.\par \par  9/8/14 .  Beginning late 6/14, she's been having episodes in which her R arm "twitches" and "spasms" involuntarily and then sometimes becomes weak for a minute or so. Mostly occurs when she stands up, and resolves quickly when she sits down. Her blood pressure fluctuates significantly during the day when she checks it at home, but she does not know whether her symptoms correlate with relative hypotension. Her last episode of right arm shaking occurred about 2 weeks ago and was very brief. She has also had an unsteady gait for some time, believed to be a sensory ataxia due to perhaps peripheral neuropathy  by Dr. Luna Magallanes.\par  Repeat MRI brain (7/21/14) to my eye showed small foci of hyperintensity in the subcortical white matter, probably consistent with mild chronic ischemic changes. Repeat MRA neck (7/21/14) showed no flow signal in the left ICA.  Repeat MRA head (7/21/14) to my eye showed no flow signal in the left petrous and cavernous ICA. There was distal right M1 stenosis, and right P1 and P2 stenoses. , MR NOVA (7/21/14) showed left MCA flow of 108 mL/minute, with right MCA flow being 126 mL/minute. The left MCA was applied by an aberrant vessel. There was no measurable flow in the left MAYA., MR perfusion study ()-not tolerated., Brain  SPECT  without Diamox (7/21/14) showed decreased uptake in the left frontoparietal and temporal lobes. Brain SPECT with Diamox (7/23/14) showed that the decreased uptake in the left inferior parietal and left temporal lobes was less prominent and there was preservation of uptake in the left high parietal region. EEG ().  Repeat  Carotid Doppler (8/25/14) showed mild calcified and heterogeneous plaque on the right with approximately 45% stenosis. On the left there was no flow in the ICA consistent with occlusion. Incidentally noted again were bilateral thyroid nodules, the left sided nodule being complex. Repeat transcranial Doppler (8/25/14) showed bidirectional flow in the left ophthalmic artery consistent with collateral from the left ECA to the left ICA circulation. There was reversed flow in the left MAYA consistent with ACOM collateral from right to left side. There was increased velocity in the right MCA and right PCA , consistent with stenosist. The remainder of the [place] was normal with no sign of stenosis. This TCD showed no significant change compared to her study of 9/13 except that right PCA stenosis  was also detected on the current exam.\par \par  10/6/14   EEG (10/2/14) was normal.\par \par 10/10/14. she called about EEG results. Called her back. Explained  EEG results. Encouraged her to pursue additional opinion with Dr. Cleveland regarding possible bypass surgery. She stated that she has not pursued this second opinion because she has made up her mind that she would not have surgery in any case. I encouraged her nonetheless to obtain Dr. Cleveland's opinion.\par \par 11/17/15. Since Her Last Visit she has had no new or recurrent focal neurologic symptoms. Her right-sided limb shaking resolved after about one month.\par \par 11/17/15. Patient called. (Message: "She is having tremors in her right arm"). Called patient. [phone]. \latanya Has had R arm shaking//tremor episodes for about 10 days this month. She thinks that her blood pressure runs around 140/80, sometimes a bit lower, with or without these episodes. She is taking amlodipine 5 mg daily.\par .\par 12/17/15. she called about "her bp meds". called her. [phone]. In the morning, her BP is lower, eg -130 and she has R arm shaking (limb shaking TIA). During afternoon and night, BP increases , eg up to 195/100 and she never has the episodes when her BP is elevated. When Her BP is that elevated, she takes clonidine 0.1 mg which lowers her blood pressure somewhat but without precipitating additional limb shaking episodes.\par \par 1/7/16. She called (message: "She has been having right arm shaking, primarily in the morning"). Called patient. [phone]. She continues to have right-sided shaking, promarily in the morning, while standing. When she feels an episode beginning, if she sits or lies down immediately, the limb shaking is frequently aborted.\par \par 7/1/16. Consultation report from Dr. Wang Cleveland (6/16/16). \par His impression. Twitching episodes could still be focal seizure versus limb shaking TIAs. She has been relatively asymptomatic recently and so an EEG would be of low yield (note-EEG in 2014 was negative for seizures). His suggestion. Try low dose amlodipine 2.5 mg daily in order to "gain some control of the blood pressure"; if symptoms recur, we'll then stop amlodipine; do ambulatory blood pressure monitoring in order to document blood pressure during the symptoms; start low dose trial of anticonvulsant such as levetiracetam 250 mg b.i.d. Note that flow failure could potentially be treated with relatively low risk EDAS (indirect bypass) but she was uncomfortable with respect to invasive procedures.\par \par 7/25/16. She Came to the Office Today accompanied by her . After several months of being asymptomatic, a right arm shaking episodes have recurred  but "milder and less frequently" than before. The episodes occur once again when she changes position, usually getting up from a seated position and last 30-60 seconds. She continues to refuse any type of cerebrovascular bypass procedure. \par \par She also complains of increasing gait difficulty which she attributes to sensory disturbances in her feet are "neuropathy".\par \par Repeat carotid Doppler (7/25/16) showed mild heterogeneous plaque in the CCA and mild heterogeneous and calcified plaque in the bulb and the ICA with approximately 45% stenosis. On the left there was no flow imaged in the left ICA consistent with occlusion. Incidentally noted again were bilateral thyroid nodules, but the right-sided nodule had become larger and complex. This Doppler showed no significant change compared to the study of 8/15, except that the right-sided thyroid nodule had increased in size and become complex.\par \par Repeat transcranial Doppler (7/25/16) showed bidirectional flow in the left ophthalmic artery consistent with collateral from the left ECA to the left ICA circulation. There was reversed flow in the left MAYA consistent with ACOM collateral from right to left side. The remainder of the [place] was normal with no sign of stenosis. This TCD showed no significant change compared to her study of 8/15.\par \par 10/10/16. Her R arm shaking has stopped on increased levetiracetam 500 mg BID. \par \par She has been evaluated by Dr. Benitze. She probably has severe spinal stenosis with or without peripheral neuropathy. She refuses, however, EMG because of fear of pain causing an increase in her blood pressure and the fear that she will have "another stroke". \par \par 2/7/17. She came to the office today. She stated that her ophthalmologist saw "a lesion on my left optic nerve". The nature of this is uncertain, but might possibly be ischemic and related to her left carotid occlusion. Despite this, she has only occasional brief episodes of right arm shaking, perhaps once every few weeks.\par She continues to complain of low back pain with gait difficulty and imbalance.\par \par 7/5/17. Called patient.  For the past year, she has had only a small number of limbs shaking episodes. For the past 2 weeks, the episodes have increased in frequency, 1-2 per day, usually in the morning, sporadic, not associated with postural changes. On Keppra 500 mg b.i.d.\par Impression. She has had an increased frequency of limb shaking episodes, reason unclear. Over her long history, she has had waxing and waning of these episodes, seemingly spontaneously. We'll therefore wait another week or 2. If the episodes persist or increase in frequency, will increase Keppra to 750 mg q.a.m.-500 mg q.h.s.\par \par Orthopedics consult (Dr. Dyson, 1/23/17) was consistent with lumbar spinal stenosis with neurogenic claudication.\par \par 9/18/17. She came to the office today accompanied by her . Over the past many weeks, she has had minimal or no right-sided limb shaking, although yesterday the  episodes recurred. 5 weeks ago, she fell without head trauma. Since then, she feels that her gait has slightly worsened.\par \par 5/1/18. She came to the office today accompanied by her . She notes only rare, brief episodes of right arm shaking. She has chronic difficulty with her gait and balance.\par \par For the past couple of months, she has noted moderate-severe bilateral proximal arm pain. This prompted stopping of her Livalo yesterday.\par \par Repeat carotid Doppler (5/1/18) showed moderate calcified heterogeneous plaque on the right with 40-60% stenosis by velocity criteria, 50% diameter reduction. On the left there was no flow imaged in the left ICA consistent with occlusion. Incidentally noted again were bilateral thyroid nodules, but the right-sided nodule had become larger. This Doppler showed mild progression of right carotid stenosis compared to her study of 7/16. In addition, the right-sided complex thyroid nodule had enlarged. Heart rate was noted to be irregular.\par \par Repeat transcranial Doppler (5/1/18) showed bidirectional flow in the left ophthalmic artery consistent with collateral from the left ECA to the left ICA circulation. There was reversed flow in the left MAYA consistent with ACOM collateral from right to left side. The remainder of the [place] was normal with no sign of stenosis. Heart Rate was noted to be irregular  This TCD showed no significant change compared to her study of  7/16, except that an irregular heart rate was noted..\par \par 3/12/19. She Came to the Office Today accompanied by her . She has persistent gait and balance difficulty, and uses a walker outside or at night. No New Focal Neurologic Symptoms.\par \par She was evaluated by Dr. 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 This Is Being followed up by Dr. Rajan (hematology).\par \par Repeat carotid Doppler (3/12/19) showed moderate calcified heterogeneous plaque on the right with 40-60% stenosis by velocity criteria, 50% diameter reduction. On the left there was no flow imaged in the left ICA consistent with occlusion. Incidentally noted again were bilateral thyroid nodules, Heart rate was noted to be irregular.. This Doppler showed no significant change compared to the prior study of 5/18. \par \par Repeat transcranial Doppler (3/12/19) showed bidirectional flow in the left ophthalmic artery consistent with collateral from the left ECA to the left ICA circulation. There was reversed flow in the left MAYA consistent with ACOM collateral from right to left side. The remainder of the [place] was normal with no sign of stenosis. Heart Rate was noted to be irregular  This TCD showed no significant change compared to her study of  5/18.\par \par 9/24/19.\par \par She Has been reevaluated by Dr. Ramirez who again suggested EMG and LP-refused.\par \par \par \par \par \par

## 2019-07-14 NOTE — REVIEW OF SYSTEMS
[As Noted in HPI] : as noted in HPI [Depression] : depression [Negative] : Heme/Lymph [FreeTextEntry3] : glaucoma and retinal problems which are being followed

## 2019-07-14 NOTE — DISCUSSION/SUMMARY
[FreeTextEntry1] : Previous Summary.\par  Overall she is neurologically intact. In late 10/04 she had an episode of speech disturbance or aphasia, paresthesias of the right hand, and left monocular visual disturbance.  She was found to have left carotid occlusion.  On 11/8/05, when she developed an episode of inability to speak, probably aphasia, for about half an hour.  She believes that when her blood pressure is low, she develops fleeting left monocular visual symptoms.  Most likely, she had recurrent left hemispheric or retinal perfusion insufficiency due to her left carotid occlusion. Her  PET scan did not show increased oxygen extraction fraction on the left side and therefore she was not eligible for the Carotid Occlusion Study of extracranial -- intracranial bypass versus medical management. She also continued to have episodes of partial left monocular blindness with exposure to sunlight, most likely reflecting "retinal claudication", due to perfusion insufficiency from her occluded left carotid artery.  In late 2013 she had bilateral leg numbness.Lumbosacral radiculopathies versus peripheral neuropathy. Doubt myelopathy. \par \par  9/8/14. Beginning in  7/14 she had episodic involuntary of her R arm,. Some episodes appear to be related to postural changes such as getting out of bed.   Suspect limb-shaking TIAs due to perfusion insufficiency related to LICA occlusion.  The question of possible benefit of EC-IC bypass, especially considering the negative results of the  KALIE study, therefore, remains doubtful . She stated that her blood pressure tends to fluctuate significantly during the day. The KALIE data indicated that in the long term, patients with blood pressure cisco the normal range  had a lower risk of stroke. While I don't think that her right arm shaking is due to focal seizures, I again encouraged her to pursue an EEG.  She also now has right PCA stenosis which is asymptomatic, but may suggest some ongoing occlusive process, presumably atherosclerotic. \par \par  EEG (10/2/14) was normal. while of course not definitive in ruling out focal seizures , in conjunction with the clinical picture, this supports the diagnosis that her right-sided shaking or abnormal movements represent limb shaking TIAs rather than focal seizures.\par \par  She also has an unsteady gait possibly related to peripheral neuropathy (versus lumbosacral radiculopathies) causing sensory ataxia. The cause of peripheral neuropathy is uncertain,  althogh she has a remote history of alcohol abuse as well as 2 episodes of Lyme disease.\par \par 7/2/15.  She has been stable and doing well from the neurovascular standpoint.  Her Neurologic Exam is stable with postural instability and sensory deficits in her legs, presumably due to peripheral neuropathy.  She also complains of what sounds like  claudication.\par \par 11/17/15 She has been having R arm shaking//tremor episodes for about 10 days this month. She thinks that her blood pressure runs around 140/80, sometimes a bit lower, with or without these episodes. Almost Definitely these episodes represent recurrent limb shaking TIAs, related to perfusion insufficiency due to her left carotid occlusion .\par Plan. Hold  amlodipine for at least the next week and observe what happens to her episodes,  then may allow her blood pressure to be on the high side, perhaps indefinitely, or at least for the next few months.\par \par 12/17/15. She has been having recurrent right-sided limb shaking TIAs associated with relative hypotension, consistent with perfusion insufficiency.  This has been occurring despite having stopped amlodipine. \par Plan. Continue to hold amlodipine for another 2 months or so.\par \par 1/7/16.  She continues to have right-sided limb shaking TIAs in the morning, despite being off amlodipine, and taking clonidine at night. The episodes can be aborted by quickly sitting or lying down.\par Plan.  She will try to take a salt supplement in the mornings. I suggested another consultation with Dr. Wang Cleveland (vascular neurology at [place]).\par \par 7/25/16. Overall she is neurologically stable, although now using a cane with increasing gait difficulty which she blames on her "neuropathy".\par After several months of being asymptomatic, her right-sided shaking episodes have recurred, albeit less frequently  while taking levetiracetam 250 mg b.i.d. While I believe that these episodes represent limb shaking TIAs, Dr. Cleveland raise the possibility of focal seizures being precipitated by hypoperfusion. Isuggested that she increase levetiracetam to 500 mg b.i.d. \par \par She complains of worsening gait disturbance, which she attributes to a "neuropathy" of uncertain cause.  For further evaluation, I referred her to Dr. Benitez (neuromuscular specialist).\par \par 10/10/16.\par  Her R arm shaking has stopped on increased levetiracetam 500 mg BID. This may suggest that her limb shaking might have have been due to focal seizures, possibly precipitated by ishemia related to LICA occlusion, as had been suggested by Dr. Cleveland. \par \par She has been evaluated by Dr. Benitez. She probably has severe spinal stenosis with or without peripheral neuropathy. She refuses, however, EMG because of fear of pain causing "another stroke".\par \par 2/7/17. She is neurologically stable. She has only rare, brief episodes of right arm shaking, and whether this improvement  is due to Keppra or to the natural history of her left carotid occlusion remains uncertain. 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 Overall she is neurologically stable. In general, her right-sided limb shaking appears to be adequately controlled on Keppra 500 mg b.i.d. Her mild bradykinesia may be due to Abilify but I am uncertain of this.\par \par 5/1/18. Overall she is neurologically stable.\par \par She has had some progression of asymptomatic right carotid stenosis, now in roughly the 50% range. This will have to be monitored carefully in the setting of her left carotid occlusion.\par \par She has had proximal arm pain for the past couple of months, thought possibly to be due to to her Livalo and the statin was stopped. Given some progression of right carotid stenosis, it may certainly be helpful to restart a statin if possible, or, perhaps a PCSK9 inhibitor. I defer to your judgment as to whether the proximal arm pain might possibly represent polymyalgia rheumatica rather than a statin effect.\par \par She has persistent, severe low back pain. She may consider consultation with Dr. Erik Vealrde for pain management.\par \par I defer to your judgment as to whether her irregular heart rate, as well as enlargement of her right thyroid nodule, both detected incidentally on Doppler, warrant further investigation.\par \par 3/12/19. Overall she is neurologically stable. She has mild bilateral foot drops, which I would assume are related to either peripheral neuropathy or L4/5 radiculopathies.\par \par She Has stable, moderate, asymptomatic right carotid stenosis, in the setting of her occasionally symptomatic left carotid occlusion.\par \par It's Unclear to Me whether she would benefit from psychiatric consultation for anxiety/depression.\par \par 9/24/19.\par \par She can followup with me \par in approximately 6 months. I hope that she remains free of further serious trouble.\par \par

## 2019-07-14 NOTE — CONSULT LETTER
[Dear  ___] : Dear  [unfilled], [Please see my note below.] : Please see my note below. [Consult Letter:] : I had the pleasure of evaluating your patient, [unfilled]. [Consult Closing:] : Thank you very much for allowing me to participate in the care of this patient.  If you have any questions, please do not hesitate to contact me. [DrTaisha  ___] : Dr. BARBA [___] : [unfilled] [Chief, Vascular Neurology] : Chief, Vascular Neurology [Richard B. Libman, MD, FRCP(C)] : Richard B. Libman, MD, FRCP(C) [Director of Stroke Service, [facility]] : Director of Stroke Service, [facility] [Professor of Neurology] : Professor of Neurology [[facility] [employer] [facility]] : [facility] [FreeTextEntry3] : Best Personal Regards [FreeTextEntry2] : Alvin Caruso M.D.

## 2019-07-23 ENCOUNTER — MEDICATION RENEWAL (OUTPATIENT)
Age: 79
End: 2019-07-23

## 2019-07-31 ENCOUNTER — MEDICATION RENEWAL (OUTPATIENT)
Age: 79
End: 2019-07-31

## 2019-07-31 ENCOUNTER — APPOINTMENT (OUTPATIENT)
Dept: INTERNAL MEDICINE | Facility: CLINIC | Age: 79
End: 2019-07-31
Payer: MEDICARE

## 2019-07-31 VITALS
RESPIRATION RATE: 16 BRPM | BODY MASS INDEX: 21.69 KG/M2 | OXYGEN SATURATION: 96 % | HEIGHT: 66 IN | HEART RATE: 75 BPM | DIASTOLIC BLOOD PRESSURE: 74 MMHG | WEIGHT: 135 LBS | SYSTOLIC BLOOD PRESSURE: 124 MMHG

## 2019-07-31 DIAGNOSIS — G60.9 HEREDITARY AND IDIOPATHIC NEUROPATHY, UNSPECIFIED: ICD-10-CM

## 2019-07-31 DIAGNOSIS — R06.00 DYSPNEA, UNSPECIFIED: ICD-10-CM

## 2019-07-31 DIAGNOSIS — F32.9 MAJOR DEPRESSIVE DISORDER, SINGLE EPISODE, UNSPECIFIED: ICD-10-CM

## 2019-07-31 DIAGNOSIS — E78.00 PURE HYPERCHOLESTEROLEMIA, UNSPECIFIED: ICD-10-CM

## 2019-07-31 DIAGNOSIS — E03.9 HYPOTHYROIDISM, UNSPECIFIED: ICD-10-CM

## 2019-07-31 PROCEDURE — 99214 OFFICE O/P EST MOD 30 MIN: CPT

## 2019-07-31 RX ORDER — ESCITALOPRAM OXALATE 20 MG/1
20 TABLET ORAL DAILY
Qty: 90 | Refills: 3 | Status: ACTIVE | COMMUNITY
Start: 2019-06-24 | End: 1900-01-01

## 2019-07-31 RX ORDER — BUPROPION HYDROCHLORIDE 150 MG/1
150 TABLET, EXTENDED RELEASE ORAL DAILY
Qty: 90 | Refills: 0 | Status: DISCONTINUED | COMMUNITY
Start: 2019-06-24 | End: 2019-07-31

## 2019-07-31 NOTE — PHYSICAL EXAM
[Well Nourished] : well nourished [No Acute Distress] : no acute distress [Well Developed] : well developed [Well-Appearing] : well-appearing [Normal Sclera/Conjunctiva] : normal sclera/conjunctiva [PERRL] : pupils equal round and reactive to light [EOMI] : extraocular movements intact [Normal Oropharynx] : the oropharynx was normal [Normal Outer Ear/Nose] : the outer ears and nose were normal in appearance [Supple] : supple [No JVD] : no jugular venous distention [No Lymphadenopathy] : no lymphadenopathy [Thyroid Normal, No Nodules] : the thyroid was normal and there were no nodules present [No Respiratory Distress] : no respiratory distress  [No Accessory Muscle Use] : no accessory muscle use [Clear to Auscultation] : lungs were clear to auscultation bilaterally [Regular Rhythm] : with a regular rhythm [Normal Rate] : normal rate  [No Murmur] : no murmur heard [No Carotid Bruits] : no carotid bruits [Normal S1, S2] : normal S1 and S2 [No Abdominal Bruit] : a ~M bruit was not heard ~T in the abdomen [No Varicosities] : no varicosities [No Edema] : there was no peripheral edema [Pedal Pulses Present] : the pedal pulses are present [No Palpable Aorta] : no palpable aorta [Soft] : abdomen soft [No Extremity Clubbing/Cyanosis] : no extremity clubbing/cyanosis [Non-distended] : non-distended [Non Tender] : non-tender [No HSM] : no HSM [Normal Bowel Sounds] : normal bowel sounds [No Masses] : no abdominal mass palpated [Normal Posterior Cervical Nodes] : no posterior cervical lymphadenopathy [Normal Anterior Cervical Nodes] : no anterior cervical lymphadenopathy [No CVA Tenderness] : no CVA  tenderness [No Spinal Tenderness] : no spinal tenderness [Grossly Normal Strength/Tone] : grossly normal strength/tone [No Joint Swelling] : no joint swelling [Coordination Grossly Intact] : coordination grossly intact [No Rash] : no rash [Normal Gait] : normal gait [No Focal Deficits] : no focal deficits [Normal Affect] : the affect was normal [Normal Insight/Judgement] : insight and judgment were intact [Deep Tendon Reflexes (DTR)] : deep tendon reflexes were 2+ and symmetric

## 2019-07-31 NOTE — HISTORY OF PRESENT ILLNESS
[FreeTextEntry1] : F/U on antidepressant treatment [de-identified] : Tolerating well\par ? any response / improvement\par \par Using walker F/T\par Neuro to see again\par \par Requiring progressively more assistance in daily functions

## 2019-09-23 ENCOUNTER — INPATIENT (INPATIENT)
Facility: HOSPITAL | Age: 79
LOS: 2 days | Discharge: NURSING, REHAB OR LONG-TERM CARE | End: 2019-09-26
Attending: FAMILY MEDICINE | Admitting: FAMILY MEDICINE
Payer: MEDICARE

## 2019-09-23 VITALS
HEART RATE: 62 BPM | HEIGHT: 66 IN | OXYGEN SATURATION: 98 % | RESPIRATION RATE: 18 BRPM | DIASTOLIC BLOOD PRESSURE: 55 MMHG | SYSTOLIC BLOOD PRESSURE: 102 MMHG | WEIGHT: 134.92 LBS | TEMPERATURE: 99 F

## 2019-09-23 DIAGNOSIS — H40.89 OTHER SPECIFIED GLAUCOMA: ICD-10-CM

## 2019-09-23 DIAGNOSIS — G40.909 EPILEPSY, UNSPECIFIED, NOT INTRACTABLE, WITHOUT STATUS EPILEPTICUS: ICD-10-CM

## 2019-09-23 DIAGNOSIS — S92.901A UNSPECIFIED FRACTURE OF RIGHT FOOT, INITIAL ENCOUNTER FOR CLOSED FRACTURE: ICD-10-CM

## 2019-09-23 DIAGNOSIS — E78.2 MIXED HYPERLIPIDEMIA: ICD-10-CM

## 2019-09-23 DIAGNOSIS — Z29.9 ENCOUNTER FOR PROPHYLACTIC MEASURES, UNSPECIFIED: ICD-10-CM

## 2019-09-23 DIAGNOSIS — I10 ESSENTIAL (PRIMARY) HYPERTENSION: ICD-10-CM

## 2019-09-23 DIAGNOSIS — E03.9 HYPOTHYROIDISM, UNSPECIFIED: ICD-10-CM

## 2019-09-23 DIAGNOSIS — F32.89 OTHER SPECIFIED DEPRESSIVE EPISODES: ICD-10-CM

## 2019-09-23 LAB
ALBUMIN SERPL ELPH-MCNC: 3.2 G/DL — LOW (ref 3.3–5)
ALP SERPL-CCNC: 90 U/L — SIGNIFICANT CHANGE UP (ref 40–120)
ALT FLD-CCNC: 15 U/L — SIGNIFICANT CHANGE UP (ref 12–78)
ANION GAP SERPL CALC-SCNC: 10 MMOL/L — SIGNIFICANT CHANGE UP (ref 5–17)
APTT BLD: 30.8 SEC — SIGNIFICANT CHANGE UP (ref 27.5–36.3)
AST SERPL-CCNC: 11 U/L — LOW (ref 15–37)
BASOPHILS # BLD AUTO: 0.03 K/UL — SIGNIFICANT CHANGE UP (ref 0–0.2)
BASOPHILS NFR BLD AUTO: 0.4 % — SIGNIFICANT CHANGE UP (ref 0–2)
BILIRUB SERPL-MCNC: 0.6 MG/DL — SIGNIFICANT CHANGE UP (ref 0.2–1.2)
BUN SERPL-MCNC: 23 MG/DL — SIGNIFICANT CHANGE UP (ref 7–23)
CALCIUM SERPL-MCNC: 8.4 MG/DL — LOW (ref 8.5–10.1)
CHLORIDE SERPL-SCNC: 108 MMOL/L — SIGNIFICANT CHANGE UP (ref 96–108)
CO2 SERPL-SCNC: 25 MMOL/L — SIGNIFICANT CHANGE UP (ref 22–31)
CREAT SERPL-MCNC: 0.74 MG/DL — SIGNIFICANT CHANGE UP (ref 0.5–1.3)
EOSINOPHIL # BLD AUTO: 0.13 K/UL — SIGNIFICANT CHANGE UP (ref 0–0.5)
EOSINOPHIL NFR BLD AUTO: 1.5 % — SIGNIFICANT CHANGE UP (ref 0–6)
GLUCOSE SERPL-MCNC: 97 MG/DL — SIGNIFICANT CHANGE UP (ref 70–99)
HCT VFR BLD CALC: 37.6 % — SIGNIFICANT CHANGE UP (ref 34.5–45)
HGB BLD-MCNC: 12.2 G/DL — SIGNIFICANT CHANGE UP (ref 11.5–15.5)
IMM GRANULOCYTES NFR BLD AUTO: 0.4 % — SIGNIFICANT CHANGE UP (ref 0–1.5)
INR BLD: 1.09 RATIO — SIGNIFICANT CHANGE UP (ref 0.88–1.16)
LYMPHOCYTES # BLD AUTO: 1.25 K/UL — SIGNIFICANT CHANGE UP (ref 1–3.3)
LYMPHOCYTES # BLD AUTO: 14.8 % — SIGNIFICANT CHANGE UP (ref 13–44)
MCHC RBC-ENTMCNC: 29.6 PG — SIGNIFICANT CHANGE UP (ref 27–34)
MCHC RBC-ENTMCNC: 32.4 GM/DL — SIGNIFICANT CHANGE UP (ref 32–36)
MCV RBC AUTO: 91.3 FL — SIGNIFICANT CHANGE UP (ref 80–100)
MONOCYTES # BLD AUTO: 0.74 K/UL — SIGNIFICANT CHANGE UP (ref 0–0.9)
MONOCYTES NFR BLD AUTO: 8.7 % — SIGNIFICANT CHANGE UP (ref 2–14)
NEUTROPHILS # BLD AUTO: 6.29 K/UL — SIGNIFICANT CHANGE UP (ref 1.8–7.4)
NEUTROPHILS NFR BLD AUTO: 74.2 % — SIGNIFICANT CHANGE UP (ref 43–77)
NRBC # BLD: 0 /100 WBCS — SIGNIFICANT CHANGE UP (ref 0–0)
PLATELET # BLD AUTO: 327 K/UL — SIGNIFICANT CHANGE UP (ref 150–400)
POTASSIUM SERPL-MCNC: 3.4 MMOL/L — LOW (ref 3.5–5.3)
POTASSIUM SERPL-SCNC: 3.4 MMOL/L — LOW (ref 3.5–5.3)
PROT SERPL-MCNC: 6.6 GM/DL — SIGNIFICANT CHANGE UP (ref 6–8.3)
PROTHROM AB SERPL-ACNC: 12.2 SEC — SIGNIFICANT CHANGE UP (ref 10–12.9)
RBC # BLD: 4.12 M/UL — SIGNIFICANT CHANGE UP (ref 3.8–5.2)
RBC # FLD: 12.7 % — SIGNIFICANT CHANGE UP (ref 10.3–14.5)
SODIUM SERPL-SCNC: 143 MMOL/L — SIGNIFICANT CHANGE UP (ref 135–145)
WBC # BLD: 8.47 K/UL — SIGNIFICANT CHANGE UP (ref 3.8–10.5)
WBC # FLD AUTO: 8.47 K/UL — SIGNIFICANT CHANGE UP (ref 3.8–10.5)

## 2019-09-23 PROCEDURE — 93010 ELECTROCARDIOGRAM REPORT: CPT

## 2019-09-23 PROCEDURE — 73630 X-RAY EXAM OF FOOT: CPT | Mod: 26,RT

## 2019-09-23 PROCEDURE — 99222 1ST HOSP IP/OBS MODERATE 55: CPT | Mod: AI

## 2019-09-23 PROCEDURE — 73700 CT LOWER EXTREMITY W/O DYE: CPT | Mod: 26,RT

## 2019-09-23 PROCEDURE — 71045 X-RAY EXAM CHEST 1 VIEW: CPT | Mod: 26

## 2019-09-23 PROCEDURE — 99285 EMERGENCY DEPT VISIT HI MDM: CPT

## 2019-09-23 PROCEDURE — 76377 3D RENDER W/INTRP POSTPROCES: CPT | Mod: 26

## 2019-09-23 PROCEDURE — 73610 X-RAY EXAM OF ANKLE: CPT | Mod: 26,RT

## 2019-09-23 PROCEDURE — 73562 X-RAY EXAM OF KNEE 3: CPT | Mod: 26,50

## 2019-09-23 RX ORDER — DORZOLAMIDE HYDROCHLORIDE TIMOLOL MALEATE 20; 5 MG/ML; MG/ML
1 SOLUTION/ DROPS OPHTHALMIC
Refills: 0 | Status: DISCONTINUED | OUTPATIENT
Start: 2019-09-23 | End: 2019-09-26

## 2019-09-23 RX ORDER — ESCITALOPRAM OXALATE 10 MG/1
20 TABLET, FILM COATED ORAL DAILY
Refills: 0 | Status: DISCONTINUED | OUTPATIENT
Start: 2019-09-23 | End: 2019-09-26

## 2019-09-23 RX ORDER — SODIUM CHLORIDE 9 MG/ML
3 INJECTION INTRAMUSCULAR; INTRAVENOUS; SUBCUTANEOUS EVERY 8 HOURS
Refills: 0 | Status: DISCONTINUED | OUTPATIENT
Start: 2019-09-23 | End: 2019-09-26

## 2019-09-23 RX ORDER — DORZOLAMIDE HYDROCHLORIDE TIMOLOL MALEATE 20; 5 MG/ML; MG/ML
1 SOLUTION/ DROPS OPHTHALMIC EVERY 12 HOURS
Refills: 0 | Status: DISCONTINUED | OUTPATIENT
Start: 2019-09-23 | End: 2019-09-23

## 2019-09-23 RX ORDER — LEVETIRACETAM 250 MG/1
500 TABLET, FILM COATED ORAL
Refills: 0 | Status: DISCONTINUED | OUTPATIENT
Start: 2019-09-23 | End: 2019-09-26

## 2019-09-23 RX ORDER — ASPIRIN/CALCIUM CARB/MAGNESIUM 324 MG
81 TABLET ORAL DAILY
Refills: 0 | Status: DISCONTINUED | OUTPATIENT
Start: 2019-09-23 | End: 2019-09-26

## 2019-09-23 RX ORDER — AMLODIPINE BESYLATE 2.5 MG/1
10 TABLET ORAL DAILY
Refills: 0 | Status: DISCONTINUED | OUTPATIENT
Start: 2019-09-23 | End: 2019-09-26

## 2019-09-23 RX ORDER — POTASSIUM CHLORIDE 20 MEQ
40 PACKET (EA) ORAL ONCE
Refills: 0 | Status: COMPLETED | OUTPATIENT
Start: 2019-09-23 | End: 2019-09-23

## 2019-09-23 RX ADMIN — LEVETIRACETAM 500 MILLIGRAM(S): 250 TABLET, FILM COATED ORAL at 19:01

## 2019-09-23 RX ADMIN — DORZOLAMIDE HYDROCHLORIDE TIMOLOL MALEATE 1 DROP(S): 20; 5 SOLUTION/ DROPS OPHTHALMIC at 19:01

## 2019-09-23 RX ADMIN — ESCITALOPRAM OXALATE 20 MILLIGRAM(S): 10 TABLET, FILM COATED ORAL at 19:01

## 2019-09-23 RX ADMIN — Medication 40 MILLIEQUIVALENT(S): at 20:30

## 2019-09-23 NOTE — ED PROVIDER NOTE - PHYSICAL EXAMINATION
Gen: Alert, NAD, well appearing, not toxic  Head: NC, AT, PERRL, EOMI, normal lids/conjunctiva  ENT: B TM WNL, normal hearing, patent oropharynx without erythema/exudate, uvula midline  Neck: +supple, no tenderness/meningismus/JVD, +Trachea midline  Pulm: Bilateral BS, normal resp effort, no wheeze/stridor/retractions  CV: RRR, no M/R/G, +dist pulses  Abd: soft, NT/ND, +BS, no hepatosplenomegaly  Mskel: no erythema/cyanosis +tenderness to dorsal forefoot with ecchymosis, limited rom due to pain, compartments soft, pos pulses, skin intact  Skin: no rash  Neuro: AAOx3, no sensory/motor deficits, CN 2-12 intact Gen: Alert, NAD, well appearing, not toxic  Head: NC, AT, PERRL, EOMI, normal lids/conjunctiva  ENT: B TM WNL, normal hearing, patent oropharynx without erythema/exudate, uvula midline  Neck: +supple, no tenderness/meningismus/JVD, +Trachea midline  Pulm: Bilateral BS, normal resp effort, no wheeze/stridor/retractions  CV: RRR, no M/R/G, +dist pulses  Abd: soft, NT/ND, +BS, no hepatosplenomegaly  Mskel: no erythema/cyanosis +tenderness to dorsal midfoot with ecchymosis and swelling, limited rom due to pain, compartments soft, pos pulses, skin intact, dec sensations (pt states this is baseline for her), no shortening, no hip tenderness,    Skin: no rash  Neuro: AAOx3, no sensory/motor deficits, CN 2-12 intact

## 2019-09-23 NOTE — ED PROVIDER NOTE - OBJECTIVE STATEMENT
78 y/o female with PMH HTN, HLD, glaucoma, hypothyoridism, depression, seizures, neuropathy, on daily ASA 81mg here c/o R foot pain and swelling s/p mechanical fall x last night at 9pm. pt states as she was walking from the bedroom to the bathroom, she tripped and fell onto her R foot. denies head strike or loc. pt's friend helped her up, placed ice and pt went to bed. this am pt was still in pain despite taking tylenol and she was unable to bear weight, so ems was called. pt states she occasionally walks with a walker, but wasn't using it at time of fall. no hip pain, no cp or sob. no n/v, dizziness    ROS: No fever/chills. No eye pain/changes in vision, No ear pain/sore throat/dysphagia, No chest pain/palpitations. No SOB/cough/. No abdominal pain, N/V/D, no black/bloody bm. No dysuria/frequency/discharge, No headache. No Dizziness.    No rashes or breaks in skin. No numbness/tingling/weakness.

## 2019-09-23 NOTE — CONSULT NOTE ADULT - ASSESSMENT
78yo F w/ right Lisfranc fx lateral deviation 2nd metatarsal off cuneiform  ·	Pt seen & evaluated  ·	Neurovascularly intact, although mild concern for PAD based off clinical appearance and 1/4 pulses  ·	Ordered CHADD to assess flow (may remove splint to perform)  ·	CT pending to observe intraosseous/articular extent  ·	Recommend admission for rehab placement, will likely need to perform procedure once swelling diminishes in 7-10 days, likely will perform as outpatient from rehab facility  ·	OR planning pending above workup for ORIF, please document medical optimization while admitted  ·	Pt with neuropathy 2/2 spinal issue (no hx of trauma?) is mildly sensate although diminished at baseline  ·	Will follow up digital appearance  ·	Posterior splint w/ webril applied, strict non weight bearing  ·	d/w attending

## 2019-09-23 NOTE — ED ADULT TRIAGE NOTE - CHIEF COMPLAINT QUOTE
pt BIBEMS with PMH of neuropathy complaining of mechanical fall yesterday @9pm with right foot swelling and pain, pt on ASA 81mg no LOC, did not hit her head during the fall, no headache, dizziness, or SOB.

## 2019-09-23 NOTE — ED PROVIDER NOTE - ATTENDING CONTRIBUTION TO CARE
78 yo female presents to ED for evaluation right foot pain s/p mechanical fall last night. Patient denies any other injury, head injury, LOC. Reports she is unable to bear weight.   On exam, +ttp over midfoot, decreased ROM, 1+DP pulses, no skin breaks  MDM - imaging, labs, consult podiatry

## 2019-09-23 NOTE — H&P ADULT - HISTORY OF PRESENT ILLNESS
78 y/o female with PMH HTN, HLD, glaucoma, hypothyoridism, depression, seizures, neuropathy, on daily ASA 81mg here c/o R foot pain and swelling s/p mechanical fall x last night at 9pm. pt states as she was walking from the bedroom to the bathroom, she tripped and fell onto her R foot. denies head strike or loc. pt's friend helped her up, placed ice and pt went to bed. this am pt was still in pain despite taking tylenol and she was unable to bear weight, so ems was called. pt states she occasionally walks with a walker, but wasn't using it at time of fall. no hip pain, no cp or sob. no n/v, dizziness    	ROS: No fever/chills. No eye pain/changes in vision, No ear pain/sore throat/dysphagia, No chest pain/palpitations. No SOB/cough/. No abdominal pain, N/V/D, no black/bloody bm. No dysuria/frequency/discharge, No headache. No Dizziness.    No rashes or breaks in skin. No numbness/tingling/weakness. 80 y/o female with PMH HTN, HLD, glaucoma, hypothyoridism, depression, seizures, neuropathy, on daily ASA 81mg here c/o R foot pain and swelling s/p mechanical fall x last night at 9pm. pt states as she was walking from the bedroom to the bathroom, she tripped and fell onto her R foot. denies head strike or loc. pt's friend helped her up, placed ice and pt went to bed. this am pt was still in pain despite taking tylenol and she was unable to bear weight, so ems was called. pt states she occasionally walks with a walker, but wasn't using it at time of fall. no hip pain, no cp or sob. no n/v, dizziness

## 2019-09-23 NOTE — H&P ADULT - NSHPREVIEWOFSYSTEMS_GEN_ALL_CORE
ROS: No fever/chills. No eye pain/changes in vision, No ear pain/sore throat/dysphagia, No chest pain/palpitations. No SOB/cough/. No abdominal pain, N/V/D, no black/bloody bm. No dysuria/frequency/discharge, No headache. No Dizziness.    No rashes or breaks in skin. No numbness/tingling/weakness.

## 2019-09-23 NOTE — H&P ADULT - NSHPLABSRESULTS_GEN_ALL_CORE
Vital Signs Last 24 Hrs  T(C): 37.1 (23 Sep 2019 12:39), Max: 37.1 (23 Sep 2019 12:39)  T(F): 98.7 (23 Sep 2019 12:39), Max: 98.7 (23 Sep 2019 12:39)  HR: 62 (23 Sep 2019 17:08) (62 - 62)  BP: 122/67 (23 Sep 2019 17:08) (102/55 - 122/67)  BP(mean): --  RR: 18 (23 Sep 2019 17:08) (18 - 18)  SpO2: 95% (23 Sep 2019 17:08) (95% - 98%)              LABS:                        12.2   8.47  )-----------( 327      ( 23 Sep 2019 15:34 )             37.6     09-23    143  |  108  |  23  ----------------------------<  97  3.4<L>   |  25  |  0.74    Ca    8.4<L>      23 Sep 2019 15:34    TPro  6.6  /  Alb  3.2<L>  /  TBili  0.6  /  DBili  x   /  AST  11<L>  /  ALT  15  /  AlkPhos  90  09-23    PT/INR - ( 23 Sep 2019 15:34 )   PT: 12.2 sec;   INR: 1.09 ratio         PTT - ( 23 Sep 2019 15:34 )  PTT:30.8 sec

## 2019-09-23 NOTE — H&P ADULT - PROBLEM SELECTOR PLAN 1
CHADD to assess flow (may remove splint to perform) as per podiatry  Admission for rehab placement, will likely need to perform procedure once swelling diminishes in 7-10 days, podiatry likely will perform as outpatient from rehab facility  OR planning pending above workup for ORIF  Medical optimization once OR date set.  Posterior splint w/ webril applied, strict non weight bearing

## 2019-09-23 NOTE — CONSULT NOTE ADULT - SUBJECTIVE AND OBJECTIVE BOX
Patient is a 79y old  Female who presents with a chief complaint of     HPI: 80 y/o female with PMH HTN, HLD, glaucoma, hypothyoridism, depression, seizures, neuropathy, on daily ASA 81mg here c/o R foot pain and swelling s/p mechanical fall x last night at 9pm. pt states as she was walking from the bedroom to the bathroom, she tripped and fell onto her R foot. denies head strike or loc. pt's friend helped her up, placed ice and pt went to bed. this am pt was still in pain despite taking tylenol and she was unable to bear weight, so ems was called. pt states she occasionally walks with a walker, but wasn't using it at time of fall. no hip pain, no cp or sob. no n/v, dizziness    	ROS: No fever/chills. No eye pain/changes in vision, No ear pain/sore throat/dysphagia, No chest pain/palpitations. No SOB/cough/. No abdominal pain, N/V/D, no black/bloody bm. No dysuria/frequency/discharge, No headache. No Dizziness.    No rashes or breaks in skin. No numbness/tingling/weakness.      PAST MEDICAL & SURGICAL HISTORY:      MEDICATIONS  (STANDING):    MEDICATIONS  (PRN):      Allergies    No Known Allergies    Intolerances        VITALS:    Vital Signs Last 24 Hrs  T(C): 37.1 (23 Sep 2019 12:39), Max: 37.1 (23 Sep 2019 12:39)  T(F): 98.7 (23 Sep 2019 12:39), Max: 98.7 (23 Sep 2019 12:39)  HR: 62 (23 Sep 2019 12:39) (62 - 62)  BP: 102/55 (23 Sep 2019 12:39) (102/55 - 102/55)  BP(mean): --  RR: 18 (23 Sep 2019 12:39) (18 - 18)  SpO2: 98% (23 Sep 2019 12:39) (98% - 98%)    LABS:                CAPILLARY BLOOD GLUCOSE              LOWER EXTREMITY PHYSICAL EXAM:    Vascular: DP/PT 1/4, B/L, CFT <3 seconds B/L, Temperature gradient warm to cool, B/L.   Neuro: Epicritic sensation diminished to forefoot b/l, sensation to digits protective sensation equivalent b/l although slightly diminished (hx of neuropathy)  Musculoskeletal/Ortho: mild pain with palpation midfoot @ area of chief complaint  Skin: diffuse ecchymosis & swelling to forefoot, no open lesions noted, no erythema, nonpitting edema      RADIOLOGY & ADDITIONAL STUDIES:

## 2019-09-23 NOTE — ED PROVIDER NOTE - CLINICAL SUMMARY MEDICAL DECISION MAKING FREE TEXT BOX
pt here with R foot pain s/p mechanical fall last night, no head strike, xray with fracture medial cuneiform and lisfranc injury, d/w podiatry, recommended ct foot and they will come see pt

## 2019-09-23 NOTE — H&P ADULT - NSHPPHYSICALEXAM_GEN_ALL_CORE
GENERAL: NAD, well-groomed, well-developed  HEAD:  Atraumatic, Normocephalic  EYES: EOMI, PERRLA, conjunctiva and sclera clear  ENMT: No tonsillar erythema, exudates, or enlargement; Moist mucous membranes, No lesions  NECK: Supple, No JVD, Normal thyroid  NERVOUS SYSTEM:  Alert & Oriented X3, Good concentration  CHEST/LUNG: Clear to percussion bilaterally; No rales, rhonchi, wheezing, or rubs  HEART: Regular rate and rhythm; No murmurs, rubs, or gallops  ABDOMEN: Soft, Nontender, Nondistended; Bowel sounds present  EXTREMITIES: LE limited mobility due to pain  SKIN: no rashes or lesions  PSYCH: normal affect and behavior

## 2019-09-24 ENCOUNTER — TRANSCRIPTION ENCOUNTER (OUTPATIENT)
Age: 79
End: 2019-09-24

## 2019-09-24 ENCOUNTER — APPOINTMENT (OUTPATIENT)
Dept: NEUROLOGY | Facility: CLINIC | Age: 79
End: 2019-09-24

## 2019-09-24 LAB
ALBUMIN SERPL ELPH-MCNC: 2.9 G/DL — LOW (ref 3.3–5)
ALP SERPL-CCNC: 89 U/L — SIGNIFICANT CHANGE UP (ref 40–120)
ALT FLD-CCNC: 14 U/L — SIGNIFICANT CHANGE UP (ref 12–78)
ANION GAP SERPL CALC-SCNC: 10 MMOL/L — SIGNIFICANT CHANGE UP (ref 5–17)
AST SERPL-CCNC: 13 U/L — LOW (ref 15–37)
BASOPHILS # BLD AUTO: 0.03 K/UL — SIGNIFICANT CHANGE UP (ref 0–0.2)
BASOPHILS NFR BLD AUTO: 0.4 % — SIGNIFICANT CHANGE UP (ref 0–2)
BILIRUB SERPL-MCNC: 0.6 MG/DL — SIGNIFICANT CHANGE UP (ref 0.2–1.2)
BUN SERPL-MCNC: 14 MG/DL — SIGNIFICANT CHANGE UP (ref 7–23)
CALCIUM SERPL-MCNC: 8.7 MG/DL — SIGNIFICANT CHANGE UP (ref 8.5–10.1)
CHLORIDE SERPL-SCNC: 111 MMOL/L — HIGH (ref 96–108)
CO2 SERPL-SCNC: 22 MMOL/L — SIGNIFICANT CHANGE UP (ref 22–31)
CREAT SERPL-MCNC: 0.63 MG/DL — SIGNIFICANT CHANGE UP (ref 0.5–1.3)
EOSINOPHIL # BLD AUTO: 0.13 K/UL — SIGNIFICANT CHANGE UP (ref 0–0.5)
EOSINOPHIL NFR BLD AUTO: 1.7 % — SIGNIFICANT CHANGE UP (ref 0–6)
GLUCOSE SERPL-MCNC: 72 MG/DL — SIGNIFICANT CHANGE UP (ref 70–99)
HCT VFR BLD CALC: 36.9 % — SIGNIFICANT CHANGE UP (ref 34.5–45)
HGB BLD-MCNC: 11.9 G/DL — SIGNIFICANT CHANGE UP (ref 11.5–15.5)
IMM GRANULOCYTES NFR BLD AUTO: 0.4 % — SIGNIFICANT CHANGE UP (ref 0–1.5)
LYMPHOCYTES # BLD AUTO: 1.53 K/UL — SIGNIFICANT CHANGE UP (ref 1–3.3)
LYMPHOCYTES # BLD AUTO: 20.5 % — SIGNIFICANT CHANGE UP (ref 13–44)
MCHC RBC-ENTMCNC: 29.5 PG — SIGNIFICANT CHANGE UP (ref 27–34)
MCHC RBC-ENTMCNC: 32.2 GM/DL — SIGNIFICANT CHANGE UP (ref 32–36)
MCV RBC AUTO: 91.3 FL — SIGNIFICANT CHANGE UP (ref 80–100)
MONOCYTES # BLD AUTO: 0.64 K/UL — SIGNIFICANT CHANGE UP (ref 0–0.9)
MONOCYTES NFR BLD AUTO: 8.6 % — SIGNIFICANT CHANGE UP (ref 2–14)
NEUTROPHILS # BLD AUTO: 5.12 K/UL — SIGNIFICANT CHANGE UP (ref 1.8–7.4)
NEUTROPHILS NFR BLD AUTO: 68.4 % — SIGNIFICANT CHANGE UP (ref 43–77)
NRBC # BLD: 0 /100 WBCS — SIGNIFICANT CHANGE UP (ref 0–0)
PLATELET # BLD AUTO: 315 K/UL — SIGNIFICANT CHANGE UP (ref 150–400)
POTASSIUM SERPL-MCNC: 3.6 MMOL/L — SIGNIFICANT CHANGE UP (ref 3.5–5.3)
POTASSIUM SERPL-SCNC: 3.6 MMOL/L — SIGNIFICANT CHANGE UP (ref 3.5–5.3)
PROT SERPL-MCNC: 6.1 GM/DL — SIGNIFICANT CHANGE UP (ref 6–8.3)
RBC # BLD: 4.04 M/UL — SIGNIFICANT CHANGE UP (ref 3.8–5.2)
RBC # FLD: 12.7 % — SIGNIFICANT CHANGE UP (ref 10.3–14.5)
SODIUM SERPL-SCNC: 143 MMOL/L — SIGNIFICANT CHANGE UP (ref 135–145)
WBC # BLD: 7.48 K/UL — SIGNIFICANT CHANGE UP (ref 3.8–10.5)
WBC # FLD AUTO: 7.48 K/UL — SIGNIFICANT CHANGE UP (ref 3.8–10.5)

## 2019-09-24 PROCEDURE — 99232 SBSQ HOSP IP/OBS MODERATE 35: CPT

## 2019-09-24 PROCEDURE — 93923 UPR/LXTR ART STDY 3+ LVLS: CPT | Mod: 26

## 2019-09-24 RX ADMIN — ESCITALOPRAM OXALATE 20 MILLIGRAM(S): 10 TABLET, FILM COATED ORAL at 11:49

## 2019-09-24 RX ADMIN — SODIUM CHLORIDE 3 MILLILITER(S): 9 INJECTION INTRAMUSCULAR; INTRAVENOUS; SUBCUTANEOUS at 13:24

## 2019-09-24 RX ADMIN — AMLODIPINE BESYLATE 10 MILLIGRAM(S): 2.5 TABLET ORAL at 06:22

## 2019-09-24 RX ADMIN — DORZOLAMIDE HYDROCHLORIDE TIMOLOL MALEATE 1 DROP(S): 20; 5 SOLUTION/ DROPS OPHTHALMIC at 06:23

## 2019-09-24 RX ADMIN — Medication 81 MILLIGRAM(S): at 11:49

## 2019-09-24 RX ADMIN — LEVETIRACETAM 500 MILLIGRAM(S): 250 TABLET, FILM COATED ORAL at 06:22

## 2019-09-24 NOTE — DISCHARGE NOTE PROVIDER - NSDCFUSCHEDAPPT_GEN_ALL_CORE_FT
JUAREZ GTZ ; 10/31/2019 ; [facility] [address] JUAREZ GTZ ; 10/31/2019 ; [facility] [address]

## 2019-09-24 NOTE — DISCHARGE NOTE PROVIDER - CARE PROVIDER_API CALL
Cora Schrodeer (DPSTEPHANIE)  Foot and Ankle Surgery; Podiatric Medicine  [address], [address]  [place] [address]  Phone: [phone]  Fax: [phone]  Follow Up Time: 1 week

## 2019-09-24 NOTE — DISCHARGE NOTE PROVIDER - HOSPITAL COURSE
HPI as per admission:    78 y/o female with PMH HTN, HLD, glaucoma, hypothyoridism, depression, seizures, neuropathy, on daily ASA 81mg here c/o R foot pain and swelling s/p mechanical fall x last night at 9pm. pt states as she was walking from the bedroom to the bathroom, she tripped and fell onto her R foot. denies head strike or loc. pt's friend helped her up, placed ice and pt went to bed. this am pt was still in pain despite taking tylenol and she was unable to bear weight, so ems was called. pt states she occasionally walks with a walker, but wasn't using it at time of fall. no hip pain, no cp or sob. no n/v, dizziness.        Pt admitted to hospital, CHADD done, normal.  Podiatry consult appreciated.    Patient to be discharge to [facility] for procedure as per podiatry.        As per podiatry recommendation:    Recommend admission for rehab placement, will likely need to perform procedure once swelling diminishes in 7-10 days from admission, likely will perform as outpatient from rehab facility

## 2019-09-25 LAB
ANION GAP SERPL CALC-SCNC: 7 MMOL/L — SIGNIFICANT CHANGE UP (ref 5–17)
BASOPHILS # BLD AUTO: 0.03 K/UL — SIGNIFICANT CHANGE UP (ref 0–0.2)
BASOPHILS NFR BLD AUTO: 0.5 % — SIGNIFICANT CHANGE UP (ref 0–2)
BUN SERPL-MCNC: 16 MG/DL — SIGNIFICANT CHANGE UP (ref 7–23)
CALCIUM SERPL-MCNC: 8.1 MG/DL — LOW (ref 8.5–10.1)
CHLORIDE SERPL-SCNC: 111 MMOL/L — HIGH (ref 96–108)
CO2 SERPL-SCNC: 24 MMOL/L — SIGNIFICANT CHANGE UP (ref 22–31)
CREAT SERPL-MCNC: 0.56 MG/DL — SIGNIFICANT CHANGE UP (ref 0.5–1.3)
EOSINOPHIL # BLD AUTO: 0.15 K/UL — SIGNIFICANT CHANGE UP (ref 0–0.5)
EOSINOPHIL NFR BLD AUTO: 2.5 % — SIGNIFICANT CHANGE UP (ref 0–6)
GLUCOSE SERPL-MCNC: 95 MG/DL — SIGNIFICANT CHANGE UP (ref 70–99)
HCT VFR BLD CALC: 36.7 % — SIGNIFICANT CHANGE UP (ref 34.5–45)
HGB BLD-MCNC: 11.6 G/DL — SIGNIFICANT CHANGE UP (ref 11.5–15.5)
IMM GRANULOCYTES NFR BLD AUTO: 0.3 % — SIGNIFICANT CHANGE UP (ref 0–1.5)
LYMPHOCYTES # BLD AUTO: 1.43 K/UL — SIGNIFICANT CHANGE UP (ref 1–3.3)
LYMPHOCYTES # BLD AUTO: 23.4 % — SIGNIFICANT CHANGE UP (ref 13–44)
MCHC RBC-ENTMCNC: 28.6 PG — SIGNIFICANT CHANGE UP (ref 27–34)
MCHC RBC-ENTMCNC: 31.6 GM/DL — LOW (ref 32–36)
MCV RBC AUTO: 90.6 FL — SIGNIFICANT CHANGE UP (ref 80–100)
MONOCYTES # BLD AUTO: 0.53 K/UL — SIGNIFICANT CHANGE UP (ref 0–0.9)
MONOCYTES NFR BLD AUTO: 8.7 % — SIGNIFICANT CHANGE UP (ref 2–14)
NEUTROPHILS # BLD AUTO: 3.94 K/UL — SIGNIFICANT CHANGE UP (ref 1.8–7.4)
NEUTROPHILS NFR BLD AUTO: 64.6 % — SIGNIFICANT CHANGE UP (ref 43–77)
NRBC # BLD: 0 /100 WBCS — SIGNIFICANT CHANGE UP (ref 0–0)
PLATELET # BLD AUTO: 315 K/UL — SIGNIFICANT CHANGE UP (ref 150–400)
POTASSIUM SERPL-MCNC: 3.4 MMOL/L — LOW (ref 3.5–5.3)
POTASSIUM SERPL-SCNC: 3.4 MMOL/L — LOW (ref 3.5–5.3)
RBC # BLD: 4.05 M/UL — SIGNIFICANT CHANGE UP (ref 3.8–5.2)
RBC # FLD: 12.5 % — SIGNIFICANT CHANGE UP (ref 10.3–14.5)
SODIUM SERPL-SCNC: 142 MMOL/L — SIGNIFICANT CHANGE UP (ref 135–145)
WBC # BLD: 6.1 K/UL — SIGNIFICANT CHANGE UP (ref 3.8–10.5)
WBC # FLD AUTO: 6.1 K/UL — SIGNIFICANT CHANGE UP (ref 3.8–10.5)

## 2019-09-25 PROCEDURE — 99232 SBSQ HOSP IP/OBS MODERATE 35: CPT

## 2019-09-25 RX ORDER — POTASSIUM CHLORIDE 20 MEQ
40 PACKET (EA) ORAL ONCE
Refills: 0 | Status: COMPLETED | OUTPATIENT
Start: 2019-09-25 | End: 2019-09-25

## 2019-09-25 RX ADMIN — ESCITALOPRAM OXALATE 20 MILLIGRAM(S): 10 TABLET, FILM COATED ORAL at 13:04

## 2019-09-25 RX ADMIN — SODIUM CHLORIDE 3 MILLILITER(S): 9 INJECTION INTRAMUSCULAR; INTRAVENOUS; SUBCUTANEOUS at 14:15

## 2019-09-25 RX ADMIN — Medication 40 MILLIEQUIVALENT(S): at 09:56

## 2019-09-25 RX ADMIN — DORZOLAMIDE HYDROCHLORIDE TIMOLOL MALEATE 1 DROP(S): 20; 5 SOLUTION/ DROPS OPHTHALMIC at 06:29

## 2019-09-25 RX ADMIN — LEVETIRACETAM 500 MILLIGRAM(S): 250 TABLET, FILM COATED ORAL at 06:30

## 2019-09-25 RX ADMIN — Medication 81 MILLIGRAM(S): at 13:04

## 2019-09-25 RX ADMIN — DORZOLAMIDE HYDROCHLORIDE TIMOLOL MALEATE 1 DROP(S): 20; 5 SOLUTION/ DROPS OPHTHALMIC at 18:06

## 2019-09-25 RX ADMIN — LEVETIRACETAM 500 MILLIGRAM(S): 250 TABLET, FILM COATED ORAL at 18:06

## 2019-09-25 NOTE — PHYSICAL THERAPY INITIAL EVALUATION ADULT - ADDITIONAL COMMENTS
Pt lives in an apt c 5 steps, jose carlos rails,  far apart and unable to be reached simultaneously, to get into the building. There is elevator in the building and no need for stairs negotiation. Pt also has a straight cane but she needed cg to ambulate c a straight cane prior to L foot fx.

## 2019-09-25 NOTE — PHYSICAL THERAPY INITIAL EVALUATION ADULT - BALANCE TRAINING, PT EVAL
Pt will increase static/dynamic sitting balance to good and static/dynamic standing balance to good to perform all functional mobility without LOB, c NWB to RLE, by 2weeks.

## 2019-09-25 NOTE — PHYSICAL THERAPY INITIAL EVALUATION ADULT - TRANSFER SAFETY CONCERNS NOTED: SIT/STAND, REHAB EVAL
decreased sequencing ability/inability to maintain weight-bearing restrictions w/o assist/decreased weight-shifting ability

## 2019-09-25 NOTE — PHYSICAL THERAPY INITIAL EVALUATION ADULT - GENERAL OBSERVATIONS, REHAB EVAL
Pt was seen in supine c R lower leg splint and ace bandage donned, alert and Ox4. Pt was instructed strict  NWB to RLE and pt c verbal understanding. Sensation was impaired in jose carlos hands, jose carlos lower legs, L dorsal of the foot and absent at the L bottom of the foot.

## 2019-09-25 NOTE — PHYSICAL THERAPY INITIAL EVALUATION ADULT - TRANSFER TRAINING, PT EVAL
Pt will independently perform sit to stand to sit transfers without LOB using rolling walker by 2 weeks c NWB to RLE.

## 2019-09-25 NOTE — PHYSICAL THERAPY INITIAL EVALUATION ADULT - IMPAIRED TRANSFERS: SIT/STAND, REHAB EVAL
decreased sensation/decreased flexibility/impaired postural control/impaired sensory feedback/decreased strength/impaired balance

## 2019-09-25 NOTE — PHYSICAL THERAPY INITIAL EVALUATION ADULT - GROSSLY INTACT, SENSORY
Sensation was impaired in jose carlos hands, jose carlos lower legs, L dorsal of the foot and absent at the L bottom of the foot.

## 2019-09-25 NOTE — PHYSICAL THERAPY INITIAL EVALUATION ADULT - CRITERIA FOR SKILLED THERAPEUTIC INTERVENTIONS
predicted duration of therapy intervention/anticipated discharge recommendation/anticipated equipment needs at discharge/functional limitations in following categories/risk reduction/prevention/therapy frequency/rehab potential/impairments found

## 2019-09-26 ENCOUNTER — TRANSCRIPTION ENCOUNTER (OUTPATIENT)
Age: 79
End: 2019-09-26

## 2019-09-26 VITALS
DIASTOLIC BLOOD PRESSURE: 70 MMHG | TEMPERATURE: 98 F | OXYGEN SATURATION: 95 % | RESPIRATION RATE: 17 BRPM | SYSTOLIC BLOOD PRESSURE: 129 MMHG | HEART RATE: 71 BPM

## 2019-09-26 PROCEDURE — 99238 HOSP IP/OBS DSCHRG MGMT 30/<: CPT

## 2019-09-26 RX ORDER — ASPIRIN/CALCIUM CARB/MAGNESIUM 324 MG
1 TABLET ORAL
Qty: 0 | Refills: 0 | DISCHARGE
Start: 2019-09-26

## 2019-09-26 RX ORDER — DORZOLAMIDE HYDROCHLORIDE TIMOLOL MALEATE 20; 5 MG/ML; MG/ML
1 SOLUTION/ DROPS OPHTHALMIC
Qty: 0 | Refills: 0 | DISCHARGE
Start: 2019-09-26

## 2019-09-26 RX ORDER — THYROID 120 MG
0 TABLET ORAL
Qty: 0 | Refills: 0 | DISCHARGE

## 2019-09-26 RX ORDER — ESCITALOPRAM OXALATE 10 MG/1
1 TABLET, FILM COATED ORAL
Qty: 0 | Refills: 0 | DISCHARGE
Start: 2019-09-26

## 2019-09-26 RX ORDER — LEVETIRACETAM 250 MG/1
1 TABLET, FILM COATED ORAL
Qty: 0 | Refills: 0 | DISCHARGE

## 2019-09-26 RX ORDER — ASPIRIN/CALCIUM CARB/MAGNESIUM 324 MG
1 TABLET ORAL
Qty: 0 | Refills: 0 | DISCHARGE

## 2019-09-26 RX ORDER — DORZOLAMIDE HYDROCHLORIDE TIMOLOL MALEATE 20; 5 MG/ML; MG/ML
1 SOLUTION/ DROPS OPHTHALMIC
Qty: 0 | Refills: 0 | DISCHARGE

## 2019-09-26 RX ORDER — LEVETIRACETAM 250 MG/1
1 TABLET, FILM COATED ORAL
Qty: 0 | Refills: 0 | DISCHARGE
Start: 2019-09-26

## 2019-09-26 RX ORDER — ESCITALOPRAM OXALATE 10 MG/1
0 TABLET, FILM COATED ORAL
Qty: 0 | Refills: 0 | DISCHARGE

## 2019-09-26 RX ORDER — AMLODIPINE BESYLATE 2.5 MG/1
1 TABLET ORAL
Qty: 0 | Refills: 0 | DISCHARGE
Start: 2019-09-26

## 2019-09-26 RX ADMIN — Medication 81 MILLIGRAM(S): at 12:47

## 2019-09-26 RX ADMIN — DORZOLAMIDE HYDROCHLORIDE TIMOLOL MALEATE 1 DROP(S): 20; 5 SOLUTION/ DROPS OPHTHALMIC at 05:39

## 2019-09-26 RX ADMIN — AMLODIPINE BESYLATE 10 MILLIGRAM(S): 2.5 TABLET ORAL at 05:40

## 2019-09-26 RX ADMIN — LEVETIRACETAM 500 MILLIGRAM(S): 250 TABLET, FILM COATED ORAL at 05:40

## 2019-09-26 RX ADMIN — LEVETIRACETAM 500 MILLIGRAM(S): 250 TABLET, FILM COATED ORAL at 17:13

## 2019-09-26 RX ADMIN — DORZOLAMIDE HYDROCHLORIDE TIMOLOL MALEATE 1 DROP(S): 20; 5 SOLUTION/ DROPS OPHTHALMIC at 17:09

## 2019-09-26 RX ADMIN — ESCITALOPRAM OXALATE 20 MILLIGRAM(S): 10 TABLET, FILM COATED ORAL at 12:47

## 2019-09-26 NOTE — PROGRESS NOTE ADULT - PROBLEM SELECTOR PROBLEM 1
Foot fracture, right, closed, initial encounter

## 2019-09-26 NOTE — PROGRESS NOTE ADULT - ASSESSMENT
78 y/o female with PMH HTN, HLD, glaucoma, hypothyoridism, depression, seizures, neuropathy, on daily ASA 81mg here c/o R foot pain and swelling s/p mechanical fall.  Pt admitted for further management.
78yo F w/ right Lisfranc fx lateral deviation 2nd metatarsal off cuneiform  ·	Pt seen & evaluated  ·	CFT to toes < 5 s, toes are warm  ·	Recommend admission for rehab placement, will need surgery in 7-14 days, as outpatient from rehab facility  ·	No podiatric plan this admission  ·	Pt will need to follow up with Dr. Schroeder or Dr. Alexandre w/i 1 week to begin pre-op planning, call [phone]  ·	Posterior splint w/ webril applied, strict non weight bearing  ·	d/w attending
80 y/o female with PMH HTN, HLD, glaucoma, hypothyoridism, depression, seizures, neuropathy, on daily ASA 81mg here c/o R foot pain and swelling s/p mechanical fall.  Pt admitted for further management.
80yo F w/ right Lisfranc fx lateral deviation 2nd metatarsal off cuneiform  ·	Pt seen & evaluated  ·	CFT to toes < 5 s, toes are warm  ·	Planning for d/c to rehab today  ·	Will follow up w/ pt to book for Lisfranc repair as outpt  ·	No podiatric plan this admission  ·	Pt will need to follow up with Dr. Schroeder or Dr. Alexandre w/i 1 week to begin pre-op planning, call [phone]  ·	Posterior splint w/ webril applied, strict non weight bearing  ·	Seen w/ attending
80 y/o female with PMH HTN, HLD, glaucoma, hypothyoridism, depression, seizures, neuropathy, on daily ASA 81mg here c/o R foot pain and swelling s/p mechanical fall.  Pt admitted for further management.

## 2019-09-26 NOTE — DISCHARGE NOTE NURSING/CASE MANAGEMENT/SOCIAL WORK - NSDCPEEMAIL_GEN_ALL_CORE
[facility] email [email or web address]

## 2019-09-26 NOTE — PROGRESS NOTE ADULT - SUBJECTIVE AND OBJECTIVE BOX
80 y/o female with PMH HTN, HLD, glaucoma, hypothyoridism, depression, seizures, neuropathy, on daily ASA 81mg here c/o R foot pain and swelling s/p mechanical fall.  Pt admitted for further management.    Overnight:  No acute events.  CHADD unremarkable.        REVIEW OF SYSTEMS:  + right foot pain      MEDICATIONS  (STANDING):  amLODIPine   Tablet 10 milliGRAM(s) Oral daily  aspirin  chewable 81 milliGRAM(s) Oral daily  dorzolamide 2%/timolol 0.5% Ophthalmic Solution 1 Drop(s) Both EYES two times a day  escitalopram 20 milliGRAM(s) Oral daily  levETIRAcetam 500 milliGRAM(s) Oral two times a day  sodium chloride 0.9% lock flush 3 milliLiter(s) IV Push every 8 hours    MEDICATIONS  (PRN):      Allergies    No Known Allergies      Vital Signs Last 24 Hrs  T(C): 36.9 (24 Sep 2019 11:38), Max: 37.2 (23 Sep 2019 23:50)  T(F): 98.5 (24 Sep 2019 11:38), Max: 98.9 (23 Sep 2019 23:50)  HR: 77 (24 Sep 2019 11:38) (62 - 79)  BP: 114/55 (24 Sep 2019 11:38) (114/55 - 157/64)  BP(mean): --  RR: 18 (24 Sep 2019 11:38) (18 - 18)  SpO2: 97% (24 Sep 2019 11:38) (95% - 99%)    PHYSICAL EXAM:    GENERAL: NAD, well-groomed, well-developed  HEAD:  Atraumatic, Normocephalic  EYES: EOMI, PERRLA, conjunctiva and sclera clear  ENMT: No tonsillar erythema, exudates, or enlargement; Moist mucous membranes, Good dentition, No lesions  NECK: Supple, No JVD, Normal thyroid  NERVOUS SYSTEM:  Alert & Oriented X3, Good concentration  CHEST/LUNG: Clear to percussion bilaterally; No rales, rhonchi, wheezing, or rubs  HEART: Regular rate and rhythm; No murmurs, rubs, or gallops  ABDOMEN: Soft, Nontender, Nondistended; Bowel sounds present  EXTREMITIES:  right LE bandaged      LABS:                        11.9   7.48  )-----------( 315      ( 24 Sep 2019 08:22 )             36.9     09-24    143  |  111<H>  |  14  ----------------------------<  72  3.6   |  22  |  0.63    Ca    8.7      24 Sep 2019 08:22    TPro  6.1  /  Alb  2.9<L>  /  TBili  0.6  /  DBili  x   /  AST  13<L>  /  ALT  14  /  AlkPhos  89  09-24    PT/INR - ( 23 Sep 2019 15:34 )   PT: 12.2 sec;   INR: 1.09 ratio         PTT - ( 23 Sep 2019 15:34 )  PTT:30.8 sec
INTERVAL HPI/OVERNIGHT EVENTS:   Patient seen and examined. c/o rt foot pain    MEDICATIONS  (STANDING):  amLODIPine   Tablet 10 milliGRAM(s) Oral daily  aspirin  chewable 81 milliGRAM(s) Oral daily  dorzolamide 2%/timolol 0.5% Ophthalmic Solution 1 Drop(s) Both EYES two times a day  escitalopram 20 milliGRAM(s) Oral daily  levETIRAcetam 500 milliGRAM(s) Oral two times a day  sodium chloride 0.9% lock flush 3 milliLiter(s) IV Push every 8 hours    MEDICATIONS  (PRN):      REVIEW OF SYSTEMS:  See HPI,  all others negative    PHYSICAL EXAM:  Vital Signs Last 24 Hrs  T(C): 36.8 (25 Sep 2019 11:53), Max: 37.3 (24 Sep 2019 17:35)  T(F): 98.2 (25 Sep 2019 11:53), Max: 99.1 (24 Sep 2019 17:35)  HR: 74 (25 Sep 2019 11:53) (73 - 79)  BP: 107/58 (25 Sep 2019 11:53) (106/57 - 118/62)  BP(mean): --  RR: 16 (25 Sep 2019 11:53) (16 - 18)  SpO2: 96% (25 Sep 2019 11:53) (95% - 97%)    GENERAL: NAD, well-groomed, well-developed, awake, alert, oriented x 3, fluent and coherent speech  HEAD:  Atraumatic, Normocephalic  EYES: EOMI, PERRLA, conjunctiva and sclera clear  ENMT: No tonsillar erythema, exudates, or enlargement; Moist mucous membranes, Good dentition, No lesions  NECK: Supple, No JVD, No Cervical LAD, No thyromegaly, No thyroid nodules felt  NERVOUS SYSTEM:  Good concentration; Moving all 4 extremities; No gross sensory deficits, No facial droop  CHEST WALL: No masses  CHEST/LUNG: Clear to auscultation bilaterally; No rales, rhonchi, wheezing, or rubs  HEART: Regular rate and rhythm; No murmurs, rubs, or gallops  ABDOMEN: Soft, Nontender, Nondistended, Bowel sounds present, No palpable masses or organomegaly, No bruits  EXTREMITIES:  rt foot pain  LYMPH: No lymphadenopathy  SKIN: No rashes or lesions    LABS:                        11.6   6.10  )-----------( 315      ( 25 Sep 2019 08:12 )             36.7     25 Sep 2019 08:12    142    |  111    |  16     ----------------------------<  95     3.4     |  24     |  0.56     Ca    8.1        25 Sep 2019 08:12      PT/INR - ( 23 Sep 2019 15:34 )   PT: 12.2 sec;   INR: 1.09 ratio         PTT - ( 23 Sep 2019 15:34 )  PTT:30.8 sec       RADIOLOGY & ADDITIONAL TESTS:
Patient is a 79y old  Female who presents with a chief complaint of foot fracture (24 Sep 2019 15:18)       INTERVAL HPI/OVERNIGHT EVENTS:  Patient seen and evaluated at bedside.  Pt is resting comfortable in NAD. Denies N/V/F/C.    Allergies    No Known Allergies    Intolerances        Vital Signs Last 24 Hrs  T(C): 36.9 (24 Sep 2019 11:38), Max: 37.2 (23 Sep 2019 23:50)  T(F): 98.5 (24 Sep 2019 11:38), Max: 98.9 (23 Sep 2019 23:50)  HR: 77 (24 Sep 2019 11:38) (62 - 79)  BP: 114/55 (24 Sep 2019 11:38) (114/55 - 157/64)  BP(mean): --  RR: 18 (24 Sep 2019 11:38) (18 - 18)  SpO2: 97% (24 Sep 2019 11:38) (95% - 99%)    LABS:                        11.9   7.48  )-----------( 315      ( 24 Sep 2019 08:22 )             36.9     09-24    143  |  111<H>  |  14  ----------------------------<  72  3.6   |  22  |  0.63    Ca    8.7      24 Sep 2019 08:22    TPro  6.1  /  Alb  2.9<L>  /  TBili  0.6  /  DBili  x   /  AST  13<L>  /  ALT  14  /  AlkPhos  89  09-24    PT/INR - ( 23 Sep 2019 15:34 )   PT: 12.2 sec;   INR: 1.09 ratio         PTT - ( 23 Sep 2019 15:34 )  PTT:30.8 sec    CAPILLARY BLOOD GLUCOSE          Lower Extremity Physical Exam:    Vascular: DP/PT 1/4, B/L, CFT <3 seconds B/L, Temperature gradient warm to cool, B/L.   Neuro: Epicritic sensation diminished to forefoot b/l, sensation to digits protective sensation equivalent b/l although slightly diminished (hx of neuropathy)  Musculoskeletal/Ortho: mild pain with palpation midfoot @ area of chief complaint  Skin: diffuse ecchymosis & swelling to forefoot, no open lesions noted, no erythema, nonpitting edema    RADIOLOGY & ADDITIONAL TESTS:  < from: CT Foot No Cont, Right (09.23.19 @ 16:25) >  EXAM:  CT FOOT ONLY RT                          EXAM:  CT 3D RECONSTRUCT W SYLVIA                            PROCEDURE DATE:  09/23/2019          INTERPRETATION:  HISTORY: Status post fall with midfoot fracture.    Helical CT imaging of the right foot was performed without intravenous   contrast. Sagittal and coronal reformats were provided. 3-D reformats   were performed on a separate workstation.    Correlation is made with radiographs from same day.    Findings:    Studies limited by diffuse osseous demineralization.    There is an acute comminuted fracture involving the distal aspect of the   medial cuneiform with intra-articular extension. There is avulsion along   the lateral aspect of the joint in the region of the Lisfranc ligamentous   insertion consistent with Lisfranc injury. There is plantar subluxation   of the first metatarsal relative to the medial cuneiform.    There are acute avulsion fractures about the second tarsometatarsal   articulation with avulsion fractures along the dorsal joint capsule of   the middle cuneiform and second metatarsal. The second metatarsal is   subluxed dorsally and laterally.    There are acute avulsion fractures about the third tarsometatarsal   articulation with avulsion along the dorsal lateral aspect of the lateral   cuneiform. There is slight dorsal subluxation of the third metatarsal   relative to the lateral cuneiform.    There is an acute curvilinear avulsion fracture along the lateral wall of   the cuboid which spans the entire anteroposterior extent. This fragment   is displaced laterally by approximately 0.7 cm. There is an impacted   fracture at the base of the fourth metatarsal. The fourth metatarsal is   subluxed laterally and dorsally. The fifth metatarsal is subluxed   laterally.    There is evidence of a healed chronic tibial plafond fracture with mild   offset along the subchondral bone plate and posterior medical subchondral   cystic change related to overlying chondral wear.    There is diffuse subcutaneous edema noted about the dorsum of the foot   which is asymmetric to the lateral aspect. There is diffuse fatty   infiltration of the visualized musculature.    Impression:    Acute Lisfranc fracture involving the first through fifth tarsometatarsal   articulations as outlined above. There is an avulsion along the distal   lateral aspect of the medial cuneiform at the expected insertion of the   Lisfranc ligament consistent with ligamentous avulsion.                LOUISA HAMEED M.D., ATTENDING RADIOLOGIST  This document has been electronically signed. Sep 23 2019  4:51PM        < end of copied text >
Patient is a 79y old  Female who presents with a chief complaint of right foot fracture (26 Sep 2019 13:39)       INTERVAL HPI/OVERNIGHT EVENTS:  Patient seen and evaluated at bedside.  Pt is resting comfortable in NAD. Denies N/V/F/C.     Allergies    No Known Allergies    Intolerances        Vital Signs Last 24 Hrs  T(C): 36.6 (26 Sep 2019 11:10), Max: 37 (25 Sep 2019 18:14)  T(F): 97.8 (26 Sep 2019 11:10), Max: 98.6 (25 Sep 2019 18:14)  HR: 76 (26 Sep 2019 11:10) (68 - 78)  BP: 92/51 (26 Sep 2019 11:10) (92/51 - 141/62)  BP(mean): --  RR: 18 (26 Sep 2019 11:10) (17 - 18)  SpO2: 99% (26 Sep 2019 11:10) (97% - 99%)    LABS:                        11.6   6.10  )-----------( 315      ( 25 Sep 2019 08:12 )             36.7     09-25    142  |  111<H>  |  16  ----------------------------<  95  3.4<L>   |  24  |  0.56    Ca    8.1<L>      25 Sep 2019 08:12          CAPILLARY BLOOD GLUCOSE          Lower Extremity Physical Exam:  R posterior splint left intact
INTERVAL HPI/OVERNIGHT EVENTS:   Patient seen and examined. doingbetter. shonda placement pending.    MEDICATIONS  (STANDING):  amLODIPine   Tablet 10 milliGRAM(s) Oral daily  aspirin  chewable 81 milliGRAM(s) Oral daily  dorzolamide 2%/timolol 0.5% Ophthalmic Solution 1 Drop(s) Both EYES two times a day  escitalopram 20 milliGRAM(s) Oral daily  levETIRAcetam 500 milliGRAM(s) Oral two times a day  sodium chloride 0.9% lock flush 3 milliLiter(s) IV Push every 8 hours    MEDICATIONS  (PRN):      REVIEW OF SYSTEMS:  See HPI,  all others negative    PHYSICAL EXAM:  Vital Signs Last 24 Hrs  T(C): 36.6 (26 Sep 2019 11:10), Max: 37 (25 Sep 2019 18:14)  T(F): 97.8 (26 Sep 2019 11:10), Max: 98.6 (25 Sep 2019 18:14)  HR: 76 (26 Sep 2019 11:10) (68 - 78)  BP: 92/51 (26 Sep 2019 11:10) (92/51 - 141/62)  BP(mean): --  RR: 18 (26 Sep 2019 11:10) (17 - 18)  SpO2: 99% (26 Sep 2019 11:10) (97% - 99%)    GENERAL: NAD, well-groomed, well-developed, awake, alert, oriented x 3, fluent and coherent speech  HEAD:  Atraumatic, Normocephalic  EYES: EOMI, PERRLA, conjunctiva and sclera clear  ENMT: No tonsillar erythema, exudates, or enlargement; Moist mucous membranes, Good dentition, No lesions  NECK: Supple, No JVD, No Cervical LAD, No thyromegaly, No thyroid nodules felt  NERVOUS SYSTEM:  Good concentration; Moving all 4 extremities; No gross sensory deficits, No facial droop  CHEST WALL: No masses  CHEST/LUNG: Clear to auscultation bilaterally; No rales, rhonchi, wheezing, or rubs  HEART: Regular rate and rhythm; No murmurs, rubs, or gallops  ABDOMEN: Soft, Nontender, Nondistended, Bowel sounds present, No palpable masses or organomegaly, No bruits  EXTREMITIES: rt foot pain  LYMPH: No lymphadenopathy  SKIN: No rashes or lesions    LABS:      Ca    8.1        25 Sep 2019 08:12             RADIOLOGY & ADDITIONAL TESTS:

## 2019-09-26 NOTE — DISCHARGE NOTE NURSING/CASE MANAGEMENT/SOCIAL WORK - NSDCPEWEB_GEN_ALL_CORE
[place] website --- [email or web address] Tobacco [email or web address] website --- [email or web address]

## 2019-09-26 NOTE — PROGRESS NOTE ADULT - PROBLEM SELECTOR PLAN 6
Maritza, patient will need to bring own

## 2019-09-26 NOTE — PROGRESS NOTE ADULT - PROBLEM SELECTOR PLAN 8
Heparin 5000 units SC q8h for DVT prophylaxis  General precautions

## 2019-09-26 NOTE — PROGRESS NOTE ADULT - PROBLEM SELECTOR PLAN 1
Admission for rehab placement, podiatry will likely need to perform procedure once swelling diminishes in 7-10 days, podiatry likely will be performed as outpatient from rehab facility  OR planning pending above workup for ORIF  Medical optimization once OR date set.  Posterior splint w/ webril applied, strict non weight bearing

## 2019-09-26 NOTE — DISCHARGE NOTE NURSING/CASE MANAGEMENT/SOCIAL WORK - PATIENT PORTAL LINK FT
You can access the FollowMyHealth Patient Portal offered by [facility] by registering at the following website: [email or web address]. By joining [email or web address] FollowMyHealth portal, you will also be able to view your health information using other applications (apps) compatible with our system.

## 2019-09-26 NOTE — PROGRESS NOTE ADULT - PROBLEM SELECTOR PLAN 3
On [place] thyroid, will need to bring home meds, discussed with patient
On [place] thyroid, will need to bring home meds, discussed with patient
On [place] thyroid, will need to bring home meds, discussed with patient

## 2019-09-26 NOTE — PROGRESS NOTE ADULT - PROBLEM SELECTOR PROBLEM 7
Other specified glaucoma, unspecified laterality

## 2019-09-30 ENCOUNTER — OUTPATIENT (OUTPATIENT)
Dept: OUTPATIENT SERVICES | Facility: HOSPITAL | Age: 79
LOS: 1 days | Discharge: HOME | End: 2019-09-30
Payer: MEDICARE

## 2019-09-30 DIAGNOSIS — H40.9 UNSPECIFIED GLAUCOMA: ICD-10-CM
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
## 2019-09-30 DIAGNOSIS — F32.9 MAJOR DEPRESSIVE DISORDER, SINGLE EPISODE, UNSPECIFIED: ICD-10-CM

## 2019-09-30 DIAGNOSIS — E78.2 MIXED HYPERLIPIDEMIA: ICD-10-CM

## 2019-09-30 DIAGNOSIS — S92.901A UNSPECIFIED FRACTURE OF RIGHT FOOT, INITIAL ENCOUNTER FOR CLOSED FRACTURE: ICD-10-CM

## 2019-09-30 DIAGNOSIS — G40.909 EPILEPSY, UNSPECIFIED, NOT INTRACTABLE, WITHOUT STATUS EPILEPTICUS: ICD-10-CM

## 2019-09-30 DIAGNOSIS — I10 ESSENTIAL (PRIMARY) HYPERTENSION: ICD-10-CM

## 2019-09-30 DIAGNOSIS — S92.321A DISPLACED FRACTURE OF SECOND METATARSAL BONE, RIGHT FOOT, INITIAL ENCOUNTER FOR CLOSED FRACTURE: ICD-10-CM
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
## 2019-09-30 DIAGNOSIS — E03.9 HYPOTHYROIDISM, UNSPECIFIED: ICD-10-CM

## 2019-09-30 DIAGNOSIS — G62.9 POLYNEUROPATHY, UNSPECIFIED: ICD-10-CM

## 2019-09-30 PROCEDURE — 71045 X-RAY EXAM CHEST 1 VIEW: CPT | Mod: 26

## 2019-10-02 ENCOUNTER — TRANSCRIPTION ENCOUNTER (OUTPATIENT)
Age: 79
End: 2019-10-02

## 2019-10-03 ENCOUNTER — OUTPATIENT (OUTPATIENT)
Dept: OUTPATIENT SERVICES | Facility: HOSPITAL | Age: 79
LOS: 1 days | Discharge: HOME | End: 2019-10-03

## 2019-10-03 VITALS
OXYGEN SATURATION: 98 % | WEIGHT: 134.92 LBS | DIASTOLIC BLOOD PRESSURE: 74 MMHG | HEIGHT: 66 IN | TEMPERATURE: 98 F | SYSTOLIC BLOOD PRESSURE: 146 MMHG

## 2019-10-03 VITALS
HEART RATE: 75 BPM | SYSTOLIC BLOOD PRESSURE: 128 MMHG | RESPIRATION RATE: 14 BRPM | OXYGEN SATURATION: 97 % | DIASTOLIC BLOOD PRESSURE: 72 MMHG | TEMPERATURE: 98 F

## 2019-10-03 DIAGNOSIS — Z90.49 ACQUIRED ABSENCE OF OTHER SPECIFIED PARTS OF DIGESTIVE TRACT: Chronic | ICD-10-CM

## 2019-10-03 RX ORDER — ACETAMINOPHEN 500 MG
1000 TABLET ORAL ONCE
Refills: 0 | Status: COMPLETED | OUTPATIENT
Start: 2019-10-03 | End: 2019-10-03

## 2019-10-03 RX ORDER — HYDROMORPHONE HYDROCHLORIDE 2 MG/ML
1 INJECTION INTRAMUSCULAR; INTRAVENOUS; SUBCUTANEOUS
Refills: 0 | Status: DISCONTINUED | OUTPATIENT
Start: 2019-10-03 | End: 2019-10-03

## 2019-10-03 RX ORDER — ONDANSETRON 8 MG/1
4 TABLET, FILM COATED ORAL ONCE
Refills: 0 | Status: DISCONTINUED | OUTPATIENT
Start: 2019-10-03 | End: 2019-10-03

## 2019-10-03 RX ORDER — HYDROMORPHONE HYDROCHLORIDE 2 MG/ML
0.5 INJECTION INTRAMUSCULAR; INTRAVENOUS; SUBCUTANEOUS
Refills: 0 | Status: DISCONTINUED | OUTPATIENT
Start: 2019-10-03 | End: 2019-10-03

## 2019-10-03 RX ADMIN — Medication 1000 MILLIGRAM(S): at 19:03

## 2019-10-03 RX ADMIN — HYDROMORPHONE HYDROCHLORIDE 0.5 MILLIGRAM(S): 2 INJECTION INTRAMUSCULAR; INTRAVENOUS; SUBCUTANEOUS at 18:45

## 2019-10-03 RX ADMIN — HYDROMORPHONE HYDROCHLORIDE 0.5 MILLIGRAM(S): 2 INJECTION INTRAMUSCULAR; INTRAVENOUS; SUBCUTANEOUS at 19:03

## 2019-10-03 RX ADMIN — Medication 400 MILLIGRAM(S): at 18:33

## 2019-10-03 NOTE — ASU DISCHARGE PLAN (ADULT/PEDIATRIC) - ASU DC SPECIAL INSTRUCTIONSFT
- keep dressing clean dry and intact   - non weight bear to the right foot in cast   - follow up with Dr. Alexandre within one week

## 2019-10-03 NOTE — ASU PATIENT PROFILE, ADULT - PMH
Blood clot of neck vein  left side  Glaucoma    HTN (hypertension)    Seizure    Thyroid disease    TIA (transient ischemic attack)  20 years ago

## 2019-10-03 NOTE — ASU DISCHARGE PLAN (ADULT/PEDIATRIC) - CARE PROVIDER_API CALL
Wiliam Alexandre (DPM)  Surgery  [address] [place] [address]  Phone: [phone]  Fax: [phone]  Follow Up Time:

## 2019-10-03 NOTE — ASU DISCHARGE PLAN (ADULT/PEDIATRIC) - CALL YOUR DOCTOR IF YOU HAVE ANY OF THE FOLLOWING:
Pain not relieved by Medications/Bleeding that does not stop/Nausea and vomiting that does not stop/Swelling that gets worse/Fever greater than (need to indicate Fahrenheit or Celsius)/Inability to tolerate liquids or foods Fever greater than (need to indicate Fahrenheit or Celsius)/Pain not relieved by Medications/Numbness, tingling, color or temperature change to extremity/Inability to tolerate liquids or foods/Nausea and vomiting that does not stop/Bleeding that does not stop/Swelling that gets worse

## 2019-10-03 NOTE — H&P PST ADULT - NSICDXPASTMEDICALHX_GEN_ALL_CORE_FT
PAST MEDICAL HISTORY:  Blood clot of neck vein left side    Glaucoma     HTN (hypertension)     Seizure     Thyroid disease     TIA (transient ischemic attack) 20 years ago

## 2019-10-03 NOTE — H&P PST ADULT - ASSESSMENT
Lisfranc and TMTJ dislocations Lisfranc and TMTJ dislocations    CXR at 9/30 reviewed with negative cardiothoracic abnormality, medically optimized by medicine/[facility] facility documented in chart provided by rehab on 10/2
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
## 2019-10-15 DIAGNOSIS — E03.9 HYPOTHYROIDISM, UNSPECIFIED: ICD-10-CM

## 2019-10-15 DIAGNOSIS — G40.909 EPILEPSY, UNSPECIFIED, NOT INTRACTABLE, WITHOUT STATUS EPILEPTICUS: ICD-10-CM

## 2019-10-15 DIAGNOSIS — I10 ESSENTIAL (PRIMARY) HYPERTENSION: ICD-10-CM

## 2019-10-15 DIAGNOSIS — F32.9 MAJOR DEPRESSIVE DISORDER, SINGLE EPISODE, UNSPECIFIED: ICD-10-CM

## 2019-10-15 DIAGNOSIS — Z86.73 PERSONAL HISTORY OF TRANSIENT ISCHEMIC ATTACK (TIA), AND CEREBRAL INFARCTION WITHOUT RESIDUAL DEFICITS: ICD-10-CM

## 2019-10-15 DIAGNOSIS — S93.326A DISLOCATION OF TARSOMETATARSAL JOINT OF UNSPECIFIED FOOT, INITIAL ENCOUNTER: ICD-10-CM

## 2019-10-15 DIAGNOSIS — Z79.4 LONG TERM (CURRENT) USE OF INSULIN: ICD-10-CM
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
## 2019-10-31 ENCOUNTER — APPOINTMENT (OUTPATIENT)
Dept: INTERNAL MEDICINE | Facility: CLINIC | Age: 79
End: 2019-10-31

## 2019-12-05 PROBLEM — I82.90 ACUTE EMBOLISM AND THROMBOSIS OF UNSPECIFIED VEIN: Chronic | Status: ACTIVE | Noted: 2019-10-03

## 2019-12-05 PROBLEM — G45.9 TRANSIENT CEREBRAL ISCHEMIC ATTACK, UNSPECIFIED: Chronic | Status: ACTIVE | Noted: 2019-10-03

## 2019-12-05 PROBLEM — R56.9 UNSPECIFIED CONVULSIONS: Chronic | Status: ACTIVE | Noted: 2019-10-03

## 2019-12-05 PROBLEM — H40.9 UNSPECIFIED GLAUCOMA: Chronic | Status: ACTIVE | Noted: 2019-10-03

## 2019-12-05 PROBLEM — I10 ESSENTIAL (PRIMARY) HYPERTENSION: Chronic | Status: ACTIVE | Noted: 2019-10-03

## 2020-01-10 ENCOUNTER — RX RENEWAL (OUTPATIENT)
Age: 80
End: 2020-01-10

## 2020-01-10 RX ORDER — LEVETIRACETAM 250 MG/1
250 TABLET, FILM COATED ORAL TWICE DAILY
Qty: 120 | Refills: 1 | Status: DISCONTINUED | COMMUNITY
Start: 2020-01-10 | End: 2020-01-10

## 2020-01-14 ENCOUNTER — APPOINTMENT (OUTPATIENT)
Dept: VASCULAR SURGERY | Facility: CLINIC | Age: 80
End: 2020-01-14
Payer: MEDICARE

## 2020-01-14 VITALS
HEART RATE: 75 BPM | WEIGHT: 140 LBS | HEIGHT: 66 IN | OXYGEN SATURATION: 98 % | DIASTOLIC BLOOD PRESSURE: 65 MMHG | BODY MASS INDEX: 22.5 KG/M2 | SYSTOLIC BLOOD PRESSURE: 126 MMHG | TEMPERATURE: 98.1 F

## 2020-01-14 DIAGNOSIS — R60.9 EDEMA, UNSPECIFIED: ICD-10-CM

## 2020-01-14 DIAGNOSIS — L85.3 XEROSIS CUTIS: ICD-10-CM

## 2020-01-14 PROCEDURE — 93970 EXTREMITY STUDY: CPT

## 2020-01-14 PROCEDURE — 99203 OFFICE O/P NEW LOW 30 MIN: CPT

## 2020-01-14 RX ORDER — AMMONIUM LACTATE 12 %
12 CREAM (GRAM) TOPICAL TWICE DAILY
Qty: 280 | Refills: 6 | Status: ACTIVE | COMMUNITY
Start: 2020-01-14 | End: 1900-01-01

## 2020-01-23 ENCOUNTER — RX RENEWAL (OUTPATIENT)
Age: 80
End: 2020-01-23

## 2020-05-22 RX ORDER — LEVETIRACETAM 500 MG/1
500 TABLET, FILM COATED ORAL TWICE DAILY
Qty: 180 | Refills: 3 | Status: ACTIVE | COMMUNITY
Start: 2020-01-10 | End: 1900-01-01

## 2020-05-24 ENCOUNTER — RX RENEWAL (OUTPATIENT)
Age: 80
End: 2020-05-24

## 2020-07-23 ENCOUNTER — RX RENEWAL (OUTPATIENT)
Age: 80
End: 2020-07-23

## 2020-10-20 RX ORDER — EZETIMIBE 10 MG/1
10 TABLET ORAL DAILY
Qty: 90 | Refills: 0 | Status: ACTIVE | COMMUNITY
Start: 1900-01-01 | End: 1900-01-01

## 2020-10-20 RX ORDER — ESCITALOPRAM OXALATE 10 MG/1
10 TABLET ORAL
Qty: 90 | Refills: 0 | Status: ACTIVE | COMMUNITY
Start: 2020-01-23 | End: 1900-01-01

## 2021-05-16 NOTE — H&P PST ADULT - ALCOHOL USE HISTORY SINGLE SELECT
Patient Seen in: [facility] Emergency Department      History   Patient presents with:  Abdomen/Flank Pain    Stated Complaint: From [facility], [facility] pain x2 weeks     HPI/Subjective:   HPI    68-year-old patient presents to emergency room from [facility] Procedure Laterality Date   • ADENOIDECTOMY     • BENIGN TUMOR EXC > 1.25 CM  back   • REMV PILONIDAL LESION SIMPLE                  Social History    Tobacco Use      Smoking status: Current Every Day Smoker        Packs/day: 1.00        Years: 1.00 no rigidity, no guarding. no pulsatile masses. No CVA tenderness  EXTREMITIES: No peripheral edema, no calf tenderness, dorsal pedal pulses present and equal bilaterally. SKIN: Warm, dry, intact, no rashes.   NEUROLOGIC EXAM: Tongue midline, no facial droop pancreatitis, and other. Ultrasound was unremarkable other than fatty infiltration of the liver. Chest x-ray performed no acute cardiopulmonary process, no acute findings.   Urinalysis did reveal large leukocyte esterase with 2050 WBCs urine pregnant test never

## 2021-12-22 ENCOUNTER — INPATIENT (INPATIENT)
Facility: HOSPITAL | Age: 81
LOS: 50 days | Discharge: NURSING, REHAB OR LONG-TERM CARE | End: 2022-02-11
Attending: HOSPITALIST | Admitting: HOSPITALIST
Payer: MEDICARE

## 2021-12-22 VITALS
WEIGHT: 169.98 LBS | SYSTOLIC BLOOD PRESSURE: 161 MMHG | RESPIRATION RATE: 20 BRPM | HEIGHT: 66 IN | HEART RATE: 88 BPM | OXYGEN SATURATION: 94 % | DIASTOLIC BLOOD PRESSURE: 75 MMHG | TEMPERATURE: 99 F
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
## 2021-12-22 DIAGNOSIS — Z90.49 ACQUIRED ABSENCE OF OTHER SPECIFIED PARTS OF DIGESTIVE TRACT: Chronic | ICD-10-CM

## 2021-12-22 LAB
ALBUMIN SERPL ELPH-MCNC: 3.3 G/DL — SIGNIFICANT CHANGE UP (ref 3.3–5)
ALP SERPL-CCNC: 106 U/L — SIGNIFICANT CHANGE UP (ref 40–120)
ALT FLD-CCNC: 21 U/L — SIGNIFICANT CHANGE UP (ref 12–78)
ANION GAP SERPL CALC-SCNC: 12 MMOL/L — SIGNIFICANT CHANGE UP (ref 5–17)
ANISOCYTOSIS BLD QL: SLIGHT — SIGNIFICANT CHANGE UP
APPEARANCE UR: CLEAR — SIGNIFICANT CHANGE UP
AST SERPL-CCNC: 23 U/L — SIGNIFICANT CHANGE UP (ref 15–37)
BACTERIA # UR AUTO: ABNORMAL
BASE EXCESS BLDV CALC-SCNC: 0.5 MMOL/L — SIGNIFICANT CHANGE UP (ref -2–3)
BASOPHILS # BLD AUTO: 0 K/UL — SIGNIFICANT CHANGE UP (ref 0–0.2)
BASOPHILS NFR BLD AUTO: 0 % — SIGNIFICANT CHANGE UP (ref 0–2)
BILIRUB SERPL-MCNC: 0.6 MG/DL — SIGNIFICANT CHANGE UP (ref 0.2–1.2)
BILIRUB UR-MCNC: NEGATIVE — SIGNIFICANT CHANGE UP
BLOOD GAS COMMENTS, VENOUS: SIGNIFICANT CHANGE UP
BLOOD GAS COMMENTS, VENOUS: SIGNIFICANT CHANGE UP
BUN SERPL-MCNC: 23 MG/DL — SIGNIFICANT CHANGE UP (ref 7–23)
CALCIUM SERPL-MCNC: 9.2 MG/DL — SIGNIFICANT CHANGE UP (ref 8.5–10.1)
CHLORIDE BLDV-SCNC: 103 MMOL/L — SIGNIFICANT CHANGE UP (ref 98–107)
CHLORIDE SERPL-SCNC: 100 MMOL/L — SIGNIFICANT CHANGE UP (ref 96–108)
CO2 BLDV-SCNC: 27 MMOL/L — HIGH (ref 22–26)
CO2 SERPL-SCNC: 25 MMOL/L — SIGNIFICANT CHANGE UP (ref 22–31)
COLOR SPEC: YELLOW — SIGNIFICANT CHANGE UP
CREAT SERPL-MCNC: 0.83 MG/DL — SIGNIFICANT CHANGE UP (ref 0.5–1.3)
DIFF PNL FLD: ABNORMAL
EOSINOPHIL # BLD AUTO: 0 K/UL — SIGNIFICANT CHANGE UP (ref 0–0.5)
EOSINOPHIL NFR BLD AUTO: 0 % — SIGNIFICANT CHANGE UP (ref 0–6)
EPI CELLS # UR: SIGNIFICANT CHANGE UP
FLUAV AG NPH QL: SIGNIFICANT CHANGE UP
FLUBV AG NPH QL: SIGNIFICANT CHANGE UP
GAS PNL BLDV: 134 MMOL/L — LOW (ref 136–145)
GAS PNL BLDV: SIGNIFICANT CHANGE UP
GLUCOSE BLDV-MCNC: 175 MG/DL — HIGH (ref 65–95)
GLUCOSE SERPL-MCNC: 154 MG/DL — HIGH (ref 70–99)
GLUCOSE UR QL: NEGATIVE MG/DL — SIGNIFICANT CHANGE UP
HCO3 BLDV-SCNC: 25 MMOL/L — SIGNIFICANT CHANGE UP (ref 22–28)
HCT VFR BLD CALC: 39.3 % — SIGNIFICANT CHANGE UP (ref 34.5–45)
HCT VFR BLDA CALC: 37 % — SIGNIFICANT CHANGE UP (ref 37–47)
HGB BLD-MCNC: 13 G/DL — SIGNIFICANT CHANGE UP (ref 11.5–15.5)
KETONES UR-MCNC: ABNORMAL
LACTATE BLDV-MCNC: 1 MMOL/L — SIGNIFICANT CHANGE UP (ref 0.56–1.39)
LEUKOCYTE ESTERASE UR-ACNC: ABNORMAL
LIDOCAIN IGE QN: 61 U/L — LOW (ref 73–393)
LYMPHOCYTES # BLD AUTO: 1.74 K/UL — SIGNIFICANT CHANGE UP (ref 1–3.3)
LYMPHOCYTES # BLD AUTO: 7 % — LOW (ref 13–44)
MAGNESIUM SERPL-MCNC: 2.4 MG/DL — SIGNIFICANT CHANGE UP (ref 1.6–2.6)
MANUAL SMEAR VERIFICATION: SIGNIFICANT CHANGE UP
MCHC RBC-ENTMCNC: 29.1 PG — SIGNIFICANT CHANGE UP (ref 27–34)
MCHC RBC-ENTMCNC: 33.1 GM/DL — SIGNIFICANT CHANGE UP (ref 32–36)
MCV RBC AUTO: 87.9 FL — SIGNIFICANT CHANGE UP (ref 80–100)
MONOCYTES # BLD AUTO: 0.5 K/UL — SIGNIFICANT CHANGE UP (ref 0–0.9)
MONOCYTES NFR BLD AUTO: 2 % — SIGNIFICANT CHANGE UP (ref 2–14)
NEUTROPHILS # BLD AUTO: 22.6 K/UL — HIGH (ref 1.8–7.4)
NEUTROPHILS NFR BLD AUTO: 88 % — HIGH (ref 43–77)
NEUTS BAND # BLD: 3 % — SIGNIFICANT CHANGE UP (ref 0–8)
NITRITE UR-MCNC: NEGATIVE — SIGNIFICANT CHANGE UP
NRBC # BLD: 0 /100 — SIGNIFICANT CHANGE UP (ref 0–0)
NRBC # BLD: SIGNIFICANT CHANGE UP /100 WBCS (ref 0–0)
PCO2 BLDV: 41 MMHG — LOW (ref 42–55)
PH BLDV: 7.4 — SIGNIFICANT CHANGE UP (ref 7.32–7.43)
PH UR: 6.5 — SIGNIFICANT CHANGE UP (ref 5–8)
PLAT MORPH BLD: NORMAL — SIGNIFICANT CHANGE UP
PLATELET # BLD AUTO: 425 K/UL — HIGH (ref 150–400)
PO2 BLDV: 81 MMHG — HIGH (ref 25–45)
POTASSIUM BLDV-SCNC: 3.6 MMOL/L — SIGNIFICANT CHANGE UP (ref 3.5–5.1)
POTASSIUM SERPL-MCNC: 2.8 MMOL/L — CRITICAL LOW (ref 3.5–5.3)
POTASSIUM SERPL-SCNC: 2.8 MMOL/L — CRITICAL LOW (ref 3.5–5.3)
PROT SERPL-MCNC: 7.7 GM/DL — SIGNIFICANT CHANGE UP (ref 6–8.3)
PROT UR-MCNC: 30 MG/DL
RBC # BLD: 4.47 M/UL — SIGNIFICANT CHANGE UP (ref 3.8–5.2)
RBC # FLD: 13.5 % — SIGNIFICANT CHANGE UP (ref 10.3–14.5)
RBC BLD AUTO: ABNORMAL
RBC CASTS # UR COMP ASSIST: ABNORMAL /HPF (ref 0–4)
SARS-COV-2 RNA SPEC QL NAA+PROBE: SIGNIFICANT CHANGE UP
SODIUM SERPL-SCNC: 137 MMOL/L — SIGNIFICANT CHANGE UP (ref 135–145)
SP GR SPEC: 1.01 — SIGNIFICANT CHANGE UP (ref 1.01–1.02)
UROBILINOGEN FLD QL: NEGATIVE MG/DL — SIGNIFICANT CHANGE UP
WBC # BLD: 24.83 K/UL — HIGH (ref 3.8–10.5)
WBC # FLD AUTO: 24.83 K/UL — HIGH (ref 3.8–10.5)
WBC UR QL: ABNORMAL

## 2021-12-22 PROCEDURE — 71045 X-RAY EXAM CHEST 1 VIEW: CPT | Mod: 26

## 2021-12-22 PROCEDURE — 93010 ELECTROCARDIOGRAM REPORT: CPT

## 2021-12-22 PROCEDURE — 74177 CT ABD & PELVIS W/CONTRAST: CPT | Mod: 26,MA

## 2021-12-22 PROCEDURE — 99285 EMERGENCY DEPT VISIT HI MDM: CPT

## 2021-12-22 RX ORDER — SODIUM CHLORIDE 9 MG/ML
1000 INJECTION INTRAMUSCULAR; INTRAVENOUS; SUBCUTANEOUS ONCE
Refills: 0 | Status: COMPLETED | OUTPATIENT
Start: 2021-12-22 | End: 2021-12-22

## 2021-12-22 RX ORDER — POTASSIUM CHLORIDE 20 MEQ
10 PACKET (EA) ORAL
Refills: 0 | Status: COMPLETED | OUTPATIENT
Start: 2021-12-22 | End: 2021-12-22

## 2021-12-22 RX ORDER — MORPHINE SULFATE 50 MG/1
4 CAPSULE, EXTENDED RELEASE ORAL ONCE
Refills: 0 | Status: DISCONTINUED | OUTPATIENT
Start: 2021-12-22 | End: 2021-12-22

## 2021-12-22 RX ORDER — PIPERACILLIN AND TAZOBACTAM 4; .5 G/20ML; G/20ML
3.38 INJECTION, POWDER, LYOPHILIZED, FOR SOLUTION INTRAVENOUS ONCE
Refills: 0 | Status: COMPLETED | OUTPATIENT
Start: 2021-12-22 | End: 2021-12-22

## 2021-12-22 RX ORDER — ENOXAPARIN SODIUM 100 MG/ML
40 INJECTION SUBCUTANEOUS DAILY
Refills: 0 | Status: DISCONTINUED | OUTPATIENT
Start: 2021-12-22 | End: 2021-12-24

## 2021-12-22 RX ORDER — ONDANSETRON 8 MG/1
4 TABLET, FILM COATED ORAL ONCE
Refills: 0 | Status: COMPLETED | OUTPATIENT
Start: 2021-12-22 | End: 2021-12-22

## 2021-12-22 RX ADMIN — Medication 100 MILLIEQUIVALENT(S): at 22:29

## 2021-12-22 RX ADMIN — PIPERACILLIN AND TAZOBACTAM 200 GRAM(S): 4; .5 INJECTION, POWDER, LYOPHILIZED, FOR SOLUTION INTRAVENOUS at 19:28

## 2021-12-22 RX ADMIN — ONDANSETRON 4 MILLIGRAM(S): 8 TABLET, FILM COATED ORAL at 17:13

## 2021-12-22 RX ADMIN — MORPHINE SULFATE 4 MILLIGRAM(S): 50 CAPSULE, EXTENDED RELEASE ORAL at 17:37

## 2021-12-22 RX ADMIN — SODIUM CHLORIDE 1000 MILLILITER(S): 9 INJECTION INTRAMUSCULAR; INTRAVENOUS; SUBCUTANEOUS at 17:13

## 2021-12-22 RX ADMIN — Medication 100 MILLIEQUIVALENT(S): at 21:23

## 2021-12-22 RX ADMIN — MORPHINE SULFATE 4 MILLIGRAM(S): 50 CAPSULE, EXTENDED RELEASE ORAL at 17:13

## 2021-12-22 RX ADMIN — Medication 100 MILLIEQUIVALENT(S): at 20:13

## 2021-12-22 RX ADMIN — Medication 10 MILLIEQUIVALENT(S): at 21:00

## 2021-12-22 RX ADMIN — MORPHINE SULFATE 4 MILLIGRAM(S): 50 CAPSULE, EXTENDED RELEASE ORAL at 21:22

## 2021-12-22 NOTE — ED PROVIDER NOTE - NS ED ROS FT
ROS:  GENERAL: No fever, no chills  EYES: no change in vision  HEENT: no trouble swallowing, no trouble speaking  CARDIAC: no chest pain  PULMONARY: no cough, no shortness of breath  GI: +abdominal pain, + nausea, + vomiting, no diarrhea, no constipation  : No dysuria, no frequency, no change in appearance, or odor of urine  SKIN: no rashes  NEURO: no headache, no weakness  MSK: No joint pain  ~Victorino Almaraz D.O. -ED Attending

## 2021-12-22 NOTE — ED PROVIDER NOTE - PHYSICAL EXAMINATION
Physical Exam:  Gen: NAD, AOx3, non-toxic appearing  Head: normal appearing  HEENT: normal conjunctiva, oral mucosa moist  Lung:  no respiratory distress, speaking in full sentences, clear to ascultation bilaterally     CV: regular rate and rhythm   Abd: soft, mild distention diffusely ttp with most significant ttp in rlq llq, no rt no murphys no cva ttp   MSK: no visible deformities  Neuro: No focal deficits  Skin: Warm  Psych: normal affect  ~Victorino Almaraz D.O. -ED Attending

## 2021-12-22 NOTE — ED ADULT NURSE NOTE - ED STAT RN HANDOFF DETAILS 2
Report endorsed to hold Juan RN. Safety checks completed this shift. Safety rounds completed hourly.  IV sites checked Q2+remains WDL. Medications administered as ordered with no signs/symptoms of adverse reactions. Fall & skin precautions in place. Any issues endorsed to oncoming RN for follow up.

## 2021-12-22 NOTE — ED PROVIDER NOTE - OBJECTIVE STATEMENT
pettet attending- 80yo f PMHx HTN, HLD, glaucoma, hypothyroidism, depression, seizures, neuropathy, on daily ASA 81mg pshx appendectomy pw rlq abd pain diarrhea x2 days. reports diffuse abd pain now rlq. some nausea and 1 episode of vomiting pta. Denies recent trauma, fevers, chills, headache, dizziness, dysuria, freq, hematuria, diarrhea, constipation, chest pain, shortness of breath, cough.

## 2021-12-22 NOTE — ED PROVIDER NOTE - CLINICAL SUMMARY MEDICAL DECISION MAKING FREE TEXT BOX
pettet attending- 82yo f PMHx HTN, HLD, glaucoma, hypothyroidism, depression, seizures, neuropathy, on daily ASA 81mg pshx appendectomy pw rlq abd pain diarrhea x2 days. concern for significant ttp on my exam otherwise vs wnl, will obtain screening labs, ctap r/o perf analgesia, antiemetics, less likely sbo with diarrhea possible gastroenteritis, dispo pending w/u and reeval

## 2021-12-23 ENCOUNTER — TRANSCRIPTION ENCOUNTER (OUTPATIENT)
Age: 81
End: 2021-12-23

## 2021-12-23 DIAGNOSIS — Z29.9 ENCOUNTER FOR PROPHYLACTIC MEASURES, UNSPECIFIED: ICD-10-CM

## 2021-12-23 DIAGNOSIS — F32.9 MAJOR DEPRESSIVE DISORDER, SINGLE EPISODE, UNSPECIFIED: ICD-10-CM

## 2021-12-23 DIAGNOSIS — K57.92 DIVERTICULITIS OF INTESTINE, PART UNSPECIFIED, WITHOUT PERFORATION OR ABSCESS WITHOUT BLEEDING: ICD-10-CM

## 2021-12-23 DIAGNOSIS — G40.909 EPILEPSY, UNSPECIFIED, NOT INTRACTABLE, WITHOUT STATUS EPILEPTICUS: ICD-10-CM

## 2021-12-23 DIAGNOSIS — E87.6 HYPOKALEMIA: ICD-10-CM

## 2021-12-23 DIAGNOSIS — E78.5 HYPERLIPIDEMIA, UNSPECIFIED: ICD-10-CM

## 2021-12-23 LAB
ANION GAP SERPL CALC-SCNC: 10 MMOL/L — SIGNIFICANT CHANGE UP (ref 5–17)
BUN SERPL-MCNC: 18 MG/DL — SIGNIFICANT CHANGE UP (ref 7–23)
CALCIUM SERPL-MCNC: 8.5 MG/DL — SIGNIFICANT CHANGE UP (ref 8.5–10.1)
CHLORIDE SERPL-SCNC: 98 MMOL/L — SIGNIFICANT CHANGE UP (ref 96–108)
CO2 SERPL-SCNC: 25 MMOL/L — SIGNIFICANT CHANGE UP (ref 22–31)
CREAT SERPL-MCNC: 0.9 MG/DL — SIGNIFICANT CHANGE UP (ref 0.5–1.3)
GLUCOSE SERPL-MCNC: 135 MG/DL — HIGH (ref 70–99)
HCT VFR BLD CALC: 37.2 % — SIGNIFICANT CHANGE UP (ref 34.5–45)
HGB BLD-MCNC: 12.1 G/DL — SIGNIFICANT CHANGE UP (ref 11.5–15.5)
MAGNESIUM SERPL-MCNC: 2.4 MG/DL — SIGNIFICANT CHANGE UP (ref 1.6–2.6)
MCHC RBC-ENTMCNC: 29.1 PG — SIGNIFICANT CHANGE UP (ref 27–34)
MCHC RBC-ENTMCNC: 32.5 GM/DL — SIGNIFICANT CHANGE UP (ref 32–36)
MCV RBC AUTO: 89.4 FL — SIGNIFICANT CHANGE UP (ref 80–100)
NRBC # BLD: 0 /100 WBCS — SIGNIFICANT CHANGE UP (ref 0–0)
PHOSPHATE SERPL-MCNC: 1.6 MG/DL — LOW (ref 2.5–4.5)
PLATELET # BLD AUTO: 378 K/UL — SIGNIFICANT CHANGE UP (ref 150–400)
POTASSIUM SERPL-MCNC: 2.8 MMOL/L — CRITICAL LOW (ref 3.5–5.3)
POTASSIUM SERPL-SCNC: 2.8 MMOL/L — CRITICAL LOW (ref 3.5–5.3)
RBC # BLD: 4.16 M/UL — SIGNIFICANT CHANGE UP (ref 3.8–5.2)
RBC # FLD: 13.7 % — SIGNIFICANT CHANGE UP (ref 10.3–14.5)
SODIUM SERPL-SCNC: 133 MMOL/L — LOW (ref 135–145)
WBC # BLD: 28.24 K/UL — HIGH (ref 3.8–10.5)
WBC # FLD AUTO: 28.24 K/UL — HIGH (ref 3.8–10.5)

## 2021-12-23 PROCEDURE — 99222 1ST HOSP IP/OBS MODERATE 55: CPT

## 2021-12-23 RX ORDER — SODIUM CHLORIDE 9 MG/ML
1000 INJECTION, SOLUTION INTRAVENOUS
Refills: 0 | Status: DISCONTINUED | OUTPATIENT
Start: 2021-12-23 | End: 2021-12-24

## 2021-12-23 RX ORDER — AMLODIPINE BESYLATE 2.5 MG/1
10 TABLET ORAL DAILY
Refills: 0 | Status: DISCONTINUED | OUTPATIENT
Start: 2021-12-23 | End: 2021-12-24

## 2021-12-23 RX ORDER — ASPIRIN/CALCIUM CARB/MAGNESIUM 324 MG
81 TABLET ORAL DAILY
Refills: 0 | Status: DISCONTINUED | OUTPATIENT
Start: 2021-12-23 | End: 2021-12-24

## 2021-12-23 RX ORDER — SODIUM CHLORIDE 9 MG/ML
1000 INJECTION, SOLUTION INTRAVENOUS
Refills: 0 | Status: COMPLETED | OUTPATIENT
Start: 2021-12-23 | End: 2021-12-23

## 2021-12-23 RX ORDER — METRONIDAZOLE 500 MG
500 TABLET ORAL EVERY 8 HOURS
Refills: 0 | Status: DISCONTINUED | OUTPATIENT
Start: 2021-12-23 | End: 2021-12-23

## 2021-12-23 RX ORDER — PIPERACILLIN AND TAZOBACTAM 4; .5 G/20ML; G/20ML
3.38 INJECTION, POWDER, LYOPHILIZED, FOR SOLUTION INTRAVENOUS EVERY 8 HOURS
Refills: 0 | Status: DISCONTINUED | OUTPATIENT
Start: 2021-12-23 | End: 2021-12-23

## 2021-12-23 RX ORDER — PIPERACILLIN AND TAZOBACTAM 4; .5 G/20ML; G/20ML
3.38 INJECTION, POWDER, LYOPHILIZED, FOR SOLUTION INTRAVENOUS EVERY 8 HOURS
Refills: 0 | Status: DISCONTINUED | OUTPATIENT
Start: 2021-12-24 | End: 2021-12-24

## 2021-12-23 RX ORDER — POTASSIUM CHLORIDE 20 MEQ
40 PACKET (EA) ORAL EVERY 4 HOURS
Refills: 0 | Status: COMPLETED | OUTPATIENT
Start: 2021-12-23 | End: 2021-12-23

## 2021-12-23 RX ORDER — POTASSIUM PHOSPHATE, MONOBASIC POTASSIUM PHOSPHATE, DIBASIC 236; 224 MG/ML; MG/ML
15 INJECTION, SOLUTION INTRAVENOUS ONCE
Refills: 0 | Status: COMPLETED | OUTPATIENT
Start: 2021-12-23 | End: 2021-12-23

## 2021-12-23 RX ORDER — SIMVASTATIN 20 MG/1
20 TABLET, FILM COATED ORAL AT BEDTIME
Refills: 0 | Status: DISCONTINUED | OUTPATIENT
Start: 2021-12-23 | End: 2021-12-24

## 2021-12-23 RX ORDER — LEVETIRACETAM 250 MG/1
500 TABLET, FILM COATED ORAL
Refills: 0 | Status: DISCONTINUED | OUTPATIENT
Start: 2021-12-23 | End: 2021-12-24

## 2021-12-23 RX ORDER — MORPHINE SULFATE 50 MG/1
2 CAPSULE, EXTENDED RELEASE ORAL EVERY 6 HOURS
Refills: 0 | Status: DISCONTINUED | OUTPATIENT
Start: 2021-12-23 | End: 2021-12-24

## 2021-12-23 RX ORDER — PIPERACILLIN AND TAZOBACTAM 4; .5 G/20ML; G/20ML
3.38 INJECTION, POWDER, LYOPHILIZED, FOR SOLUTION INTRAVENOUS ONCE
Refills: 0 | Status: COMPLETED | OUTPATIENT
Start: 2021-12-23 | End: 2021-12-23

## 2021-12-23 RX ORDER — ARIPIPRAZOLE 15 MG/1
2 TABLET ORAL DAILY
Refills: 0 | Status: DISCONTINUED | OUTPATIENT
Start: 2021-12-23 | End: 2021-12-23

## 2021-12-23 RX ORDER — ESCITALOPRAM OXALATE 10 MG/1
20 TABLET, FILM COATED ORAL DAILY
Refills: 0 | Status: DISCONTINUED | OUTPATIENT
Start: 2021-12-23 | End: 2021-12-24

## 2021-12-23 RX ORDER — POTASSIUM CHLORIDE 20 MEQ
10 PACKET (EA) ORAL ONCE
Refills: 0 | Status: COMPLETED | OUTPATIENT
Start: 2021-12-23 | End: 2021-12-23

## 2021-12-23 RX ORDER — POTASSIUM CHLORIDE 20 MEQ
10 PACKET (EA) ORAL
Refills: 0 | Status: DISCONTINUED | OUTPATIENT
Start: 2021-12-23 | End: 2021-12-23

## 2021-12-23 RX ORDER — CIPROFLOXACIN LACTATE 400MG/40ML
400 VIAL (ML) INTRAVENOUS EVERY 12 HOURS
Refills: 0 | Status: DISCONTINUED | OUTPATIENT
Start: 2021-12-23 | End: 2021-12-23

## 2021-12-23 RX ORDER — LEVOTHYROXINE SODIUM 125 MCG
50 TABLET ORAL DAILY
Refills: 0 | Status: DISCONTINUED | OUTPATIENT
Start: 2021-12-23 | End: 2021-12-24

## 2021-12-23 RX ADMIN — Medication 40 MILLIEQUIVALENT(S): at 18:45

## 2021-12-23 RX ADMIN — LEVETIRACETAM 500 MILLIGRAM(S): 250 TABLET, FILM COATED ORAL at 17:12

## 2021-12-23 RX ADMIN — PIPERACILLIN AND TAZOBACTAM 25 GRAM(S): 4; .5 INJECTION, POWDER, LYOPHILIZED, FOR SOLUTION INTRAVENOUS at 07:01

## 2021-12-23 RX ADMIN — SODIUM CHLORIDE 75 MILLILITER(S): 9 INJECTION, SOLUTION INTRAVENOUS at 01:03

## 2021-12-23 RX ADMIN — MORPHINE SULFATE 2 MILLIGRAM(S): 50 CAPSULE, EXTENDED RELEASE ORAL at 06:53

## 2021-12-23 RX ADMIN — PIPERACILLIN AND TAZOBACTAM 200 GRAM(S): 4; .5 INJECTION, POWDER, LYOPHILIZED, FOR SOLUTION INTRAVENOUS at 17:12

## 2021-12-23 RX ADMIN — SIMVASTATIN 20 MILLIGRAM(S): 20 TABLET, FILM COATED ORAL at 21:20

## 2021-12-23 RX ADMIN — Medication 100 MILLIEQUIVALENT(S): at 18:45

## 2021-12-23 RX ADMIN — Medication 81 MILLIGRAM(S): at 11:30

## 2021-12-23 RX ADMIN — ENOXAPARIN SODIUM 40 MILLIGRAM(S): 100 INJECTION SUBCUTANEOUS at 11:30

## 2021-12-23 RX ADMIN — Medication 100 MILLIGRAM(S): at 15:02

## 2021-12-23 RX ADMIN — Medication 200 MILLIGRAM(S): at 13:31

## 2021-12-23 RX ADMIN — MORPHINE SULFATE 2 MILLIGRAM(S): 50 CAPSULE, EXTENDED RELEASE ORAL at 03:53

## 2021-12-23 RX ADMIN — MORPHINE SULFATE 2 MILLIGRAM(S): 50 CAPSULE, EXTENDED RELEASE ORAL at 19:47

## 2021-12-23 RX ADMIN — MORPHINE SULFATE 2 MILLIGRAM(S): 50 CAPSULE, EXTENDED RELEASE ORAL at 10:38

## 2021-12-23 RX ADMIN — MORPHINE SULFATE 2 MILLIGRAM(S): 50 CAPSULE, EXTENDED RELEASE ORAL at 10:55

## 2021-12-23 RX ADMIN — ARIPIPRAZOLE 2 MILLIGRAM(S): 15 TABLET ORAL at 11:30

## 2021-12-23 RX ADMIN — Medication 50 MICROGRAM(S): at 07:01

## 2021-12-23 RX ADMIN — ESCITALOPRAM OXALATE 20 MILLIGRAM(S): 10 TABLET, FILM COATED ORAL at 11:30

## 2021-12-23 RX ADMIN — AMLODIPINE BESYLATE 10 MILLIGRAM(S): 2.5 TABLET ORAL at 07:01

## 2021-12-23 RX ADMIN — Medication 40 MILLIEQUIVALENT(S): at 21:25

## 2021-12-23 RX ADMIN — LEVETIRACETAM 500 MILLIGRAM(S): 250 TABLET, FILM COATED ORAL at 07:01

## 2021-12-23 RX ADMIN — MORPHINE SULFATE 2 MILLIGRAM(S): 50 CAPSULE, EXTENDED RELEASE ORAL at 20:18

## 2021-12-23 NOTE — CONSULT NOTE ADULT - ASSESSMENT
HPI:  Patient is a 81F with a PMH of HTN, HLD, hypothyroidism, depression, seizures who presents to the ED for abd pain.  Patient states that over the last two days she had developed significant abdominal pain and multiple episodes of watery diarrhea.  Reports one episode of nausea and vomiting earlier today.  Patient has no other complaints.  Vitals stable,  labs show leukocytosis and hypokalemia.  CT abdomen significant for diverticulitis.  Will admit to med surg.     (23 Dec 2021 00:17)  ---- As Above ---- Patient is a poor historian. Son's phone number not available ( Aid / patient does not have the number )  The patient appears sluggish at the present time. Presents with N/V, abdominal pain, and diarrhea. Started a few days ago  Patient denies having a colonoscopy or diverticulitis. Patient is unsure if she is getting better.     Patient has a tray of solid food but does not want to eat it ( no appetite )      Diverticulitis - Patient appears sluggish ( baseline ? ) Never had a colonoscopy. See CT scan . WBC 24.83 HPI:  Patient is a 81F with a PMH of HTN, HLD, hypothyroidism, depression, seizures who presents to the ED for abd pain.  Patient states that over the last two days she had developed significant abdominal pain and multiple episodes of watery diarrhea.  Reports one episode of nausea and vomiting earlier today.  Patient has no other complaints.  Vitals stable,  labs show leukocytosis and hypokalemia.  CT abdomen significant for diverticulitis.  Will admit to med surg.     (23 Dec 2021 00:17)  ---- As Above ---- Patient is a poor historian. Son's phone number not available ( Aid / patient does not have the number )  The patient appears sluggish at the present time. Presents with N/V, abdominal pain, and diarrhea. Started a few days ago  Patient denies having a colonoscopy or diverticulitis. Patient is unsure if she is getting better.     Patient has a tray of solid food but does not want to eat it ( no appetite )      Diverticulitis - Patient appears sluggish ( baseline ? ) Never had a colonoscopy. See CT scan . WBC 24.83 On Cipro / Flagyl 1) Clear liquid diet 2) F/U WBC 3) CRP 4) IV Fluids 5) Colonoscopy contraindicated at the present time.    ==== Asked patient to tell her son the next time he calls to give his number to the nurse ( Spoke with nurse ) HPI:  Patient is a 81F with a PMH of HTN, HLD, hypothyroidism, depression, seizures who presents to the ED for abd pain.  Patient states that over the last two days she had developed significant abdominal pain and multiple episodes of watery diarrhea.  Reports one episode of nausea and vomiting earlier today.  Patient has no other complaints.  Vitals stable,  labs show leukocytosis and hypokalemia.  CT abdomen significant for diverticulitis.  Will admit to med surg.     (23 Dec 2021 00:17)  ---- As Above ---- Patient is a poor historian. Son's phone number not available ( Aid / patient does not have the number )  The patient appears sluggish at the present time. Presents with N/V, abdominal pain, and diarrhea. Started a few days ago  Patient denies having a colonoscopy or diverticulitis. Patient is unsure if she is getting better.     Patient has a tray of solid food but does not want to eat it ( no appetite )      Diverticulitis - Patient appears sluggish ( baseline ? ) Never had a colonoscopy. See CT scan . WBC 24.83 On Cipro / Flagyl 1) Clear liquid diet 2) F/U WBC 3) CRP 4) IV Fluids 5) Colonoscopy contraindicated at the present time.    ==== Asked patient to tell her son the next time he calls to give his number to the nurse ( Spoke with nurse )    Addendum : Spoke  with son  ( [phone] ) and nurse 1) Patients MS is usually much better than what she is revealing right now. 2) No history of colonoscopy  3) Patient experiencing constipation / diarrhea , lower abdominal pain, nausea for 3 days 4) History of alternating constipation / diarrhea in the past

## 2021-12-23 NOTE — PATIENT PROFILE ADULT - FALL HARM RISK - HARM RISK INTERVENTIONS

## 2021-12-23 NOTE — PATIENT PROFILE ADULT - HAS THE PATIENT RECEIVED THE INFLUENZA VACCINE THIS SEASON?
Interval History:feels much better,  right flank pain improved ,  c/o pain with urination but improved, some bleeding in urine   Review of Systems   Constitutional: Negative for chills and fever.   HENT: Negative for sinus pain and trouble swallowing.    Respiratory: Negative for cough and shortness of breath.    Cardiovascular: Negative for chest pain and leg swelling.   Gastrointestinal: Negative for abdominal pain, blood in stool, constipation, diarrhea, nausea and vomiting.   Genitourinary: Positive for flank pain and hematuria. Negative for dysuria.   Musculoskeletal: Negative for gait problem.   Skin: Negative for rash.   Neurological: Negative for headaches.   Psychiatric/Behavioral: Negative for confusion.     Objective:     Vital Signs (Most Recent):  Temp: 98.3 °F (36.8 °C) (12/12/19 1507)  Pulse: 102 (12/12/19 1507)  Resp: 18 (12/12/19 1507)  BP: 115/75 (12/12/19 1507)  SpO2: 98 % (12/12/19 1507) Vital Signs (24h Range):  Temp:  [97.7 °F (36.5 °C)-98.5 °F (36.9 °C)] 98.3 °F (36.8 °C)  Pulse:  [] 102  Resp:  [18-20] 18  SpO2:  [96 %-98 %] 98 %  BP: (103-118)/(60-76) 115/75     Weight: 56.7 kg (125 lb)  Body mass index is 22.14 kg/m².    Intake/Output Summary (Last 24 hours) at 12/12/2019 1630  Last data filed at 12/12/2019 0600  Gross per 24 hour   Intake 3290 ml   Output 1400 ml   Net 1890 ml      Physical Exam   Constitutional: She is oriented to person, place, and time. No distress.   HENT:   Head: Normocephalic and atraumatic.   Eyes: Pupils are equal, round, and reactive to light. EOM are normal.   Neck: Normal range of motion. Neck supple.   Cardiovascular: Normal rate and regular rhythm.   Pulmonary/Chest: Effort normal and breath sounds normal. No respiratory distress.   Abdominal: Soft. Bowel sounds are normal. She exhibits no distension.   Mild Right cva tenderness   Musculoskeletal: Normal range of motion. She exhibits no edema.   Neurological: She is alert and oriented to person, place,  and time. No cranial nerve deficit.   Skin: Skin is warm.   Psychiatric: She has a normal mood and affect.   Vitals reviewed.      Significant Labs:   BMP:   Recent Labs   Lab 12/12/19 0419   GLU 95      K 3.2*   *   CO2 22*   BUN 10   CREATININE 0.6   CALCIUM 7.5*   MG 1.8     CBC:   Recent Labs   Lab 12/11/19 0417 12/12/19 0419   WBC 19.86*  19.86* 10.40   HGB 11.2*  11.2* 9.6*   HCT 32.8*  32.8* 28.7*     194 155       Significant Imaging: I have reviewed all pertinent imaging results/findings within the past 24 hours.   yes...

## 2021-12-23 NOTE — H&P ADULT - ATTENDING COMMENTS
12/24: Attending Surgeon  Pt noted to be in critical condition with sepsis from perforated sigmoid diverticulitis.  Pt will need to go to the operating room emergently for hartmans procedure and washout. Pt is in high risk for morbidity and mortality from this operative intervention. The health care proxy was contacted and made aware of the risk.

## 2021-12-23 NOTE — CONSULT NOTE ADULT - SUBJECTIVE AND OBJECTIVE BOX
HPI:  Patient is a 81F with a PMH of HTN, HLD, hypothyroidism, depression, seizures who presents to the ED for abd pain.  Patient states that over the last two days she had developed significant abdominal pain and multiple episodes of watery diarrhea.  Reports one episode of nausea and vomiting earlier today.  Patient has no other complaints.  Vitals stable,  labs show leukocytosis and hypokalemia.  CT abdomen significant for diverticulitis.  Will admit to med surg.     (23 Dec 2021 00:17)  ---- As Above ---- Patient is a poor historian      PAST MEDICAL & SURGICAL HISTORY:  Seizure    HTN (hypertension)    Glaucoma    Thyroid disease    Blood clot of neck vein  left side    TIA (transient ischemic attack)  20 years ago    S/P appendectomy        MEDICATIONS  (STANDING):  amLODIPine   Tablet 10 milliGRAM(s) Oral daily  ARIPiprazole 2 milliGRAM(s) Oral daily  aspirin  chewable 81 milliGRAM(s) Oral daily  ciprofloxacin   IVPB 400 milliGRAM(s) IV Intermittent every 12 hours  enoxaparin Injectable 40 milliGRAM(s) SubCutaneous daily  escitalopram 20 milliGRAM(s) Oral daily  levETIRAcetam 500 milliGRAM(s) Oral two times a day  levothyroxine 50 MICROGram(s) Oral daily  metroNIDAZOLE  IVPB 500 milliGRAM(s) IV Intermittent every 8 hours  simvastatin 20 milliGRAM(s) Oral at bedtime    MEDICATIONS  (PRN):  morphine  - Injectable 2 milliGRAM(s) IV Push every 6 hours PRN Moderate Pain (4 - 6)      Allergies    No Known Allergies    Intolerances        FAMILY HISTORY:  FH: HTN (hypertension)        REVIEW OF SYSTEMS:    CONSTITUTIONAL: No fever, weight loss, or fatigue  EYES: No eye pain, visual disturbances, or discharge  ENMT:  No difficulty hearing, tinnitus, vertigo; No sinus or throat pain  NECK: No pain or stiffness  BREASTS: No pain, masses, or nipple discharge  RESPIRATORY: No cough, wheezing, chills or hemoptysis; No shortness of breath  CARDIOVASCULAR: No chest pain, palpitations, dizziness, or leg swelling  GASTROINTESTINAL: No abdominal or epigastric pain. No nausea, vomiting, or hematemesis; No diarrhea or constipation. No melena or hematochezia.  GENITOURINARY: No dysuria, frequency, hematuria, or incontinence  NEUROLOGICAL: No headaches, memory loss, loss of strength, numbness, or tremors  SKIN: No itching, burning, rashes, or lesions   LYMPH NODES: No enlarged glands  ENDOCRINE: No heat or cold intolerance; No hair loss  MUSCULOSKELETAL: No joint pain or swelling; No muscle, back, or extremity pain  PSYCHIATRIC: No depression, anxiety, mood swings, or difficulty sleeping  HEME/LYMPH: No easy bruising, or bleeding gums  ALLERGY AND IMMUNOLOGIC: No hives or eczema          SOCIAL HISTORY:    FAMILY HISTORY:  FH: HTN (hypertension)        Vital Signs Last 24 Hrs  T(C): 37.3 (23 Dec 2021 12:40), Max: 38.2 (23 Dec 2021 01:53)  T(F): 99.1 (23 Dec 2021 12:40), Max: 100.7 (23 Dec 2021 01:53)  HR: 99 (23 Dec 2021 12:40) (85 - 99)  BP: 160/71 (23 Dec 2021 12:40) (139/79 - 161/75)  BP(mean): --  RR: 17 (23 Dec 2021 12:40) (15 - 22)  SpO2: 92% (23 Dec 2021 12:40) (88% - 96%)    PHYSICAL EXAM:    GENERAL: NAD, well-groomed, well-developed  HEAD:  Atraumatic, Normocephalic  EYES: EOMI, PERRLA, conjunctiva and sclera clear  ENMT: No tonsillar erythema, exudates, or enlargement; Moist mucous membranes, Good dentition, No lesions  NECK: Supple, No JVD, Normal thyroid  NERVOUS SYSTEM:  Alert & Oriented X3, Good concentration; Motor Strength 5/5 B/L upper and lower extremities; DTRs 2+ intact and symmetric  CHEST/LUNG: Clear to percussion bilaterally; No rales, rhonchi, wheezing, or rubs  HEART: Regular rate and rhythm; No murmurs, rubs, or gallops  ABDOMEN: Soft, Nontender, Nondistended; Bowel sounds present  EXTREMITIES:  2+ Peripheral Pulses, No clubbing, cyanosis, or edema  LYMPH: No lymphadenopathy noted   RECTAL: No masses, prostate normal size and smooth, Guaiaci negative   BREAST: No palpable masses, skin no lesions no retractions, no discharges. adnexal no palpable masses noted   GYN: uterus normal size, adnexal, no palpable masses, no CMT, no uterine discharge   SKIN: No rashes or lesions    LABS:                        13.0   24.83 )-----------( 425      ( 22 Dec 2021 17:43 )             39.3       CBC:   @ 17:43  WBC  24.83  HGB 13.0  HCT 39.3 Plate 425  MCV 87.9           22 Dec 2021 17:43    137    |  100    |  23     ----------------------------<  154    2.8     |  25     |  0.83     Ca    9.2        22 Dec 2021 17:43  Mg     2.4       22 Dec 2021 22:54    TPro  7.7    /  Alb  3.3    /  TBili  0.6    /  DBili  x      /  AST  23     /  ALT  21     /  AlkPhos  106    22 Dec 2021 17:43      Urinalysis Basic - ( 22 Dec 2021 20:35 )    Color: Yellow / Appearance: Clear / S.010 / pH: x  Gluc: x / Ketone: Trace  / Bili: Negative / Urobili: Negative mg/dL   Blood: x / Protein: 30 mg/dL / Nitrite: Negative   Leuk Esterase: Trace / RBC: 6-10 /HPF / WBC 6-10   Sq Epi: x / Non Sq Epi: Few / Bacteria: Few          RADIOLOGY & ADDITIONAL STUDIES: HPI:  Patient is a 81F with a PMH of HTN, HLD, hypothyroidism, depression, seizures who presents to the ED for abd pain.  Patient states that over the last two days she had developed significant abdominal pain and multiple episodes of watery diarrhea.  Reports one episode of nausea and vomiting earlier today.  Patient has no other complaints.  Vitals stable,  labs show leukocytosis and hypokalemia.  CT abdomen significant for diverticulitis.  Will admit to med surg.     (23 Dec 2021 00:17)  ---- As Above ---- Patient is a poor historian. Son's phone number not available ( Aid / patient does not have the number )  The patient appears sluggish at the present time. Presents with N/V, abdominal pain, and diarrhea. Started a few days ago  Patient denies having a colonoscopy or diverticulitis. Patient is unsure if she is getting better.     Patient has a tray of solid food but does not want to eat it ( no appetite )      PAST MEDICAL & SURGICAL HISTORY:  Seizure    HTN (hypertension)    Glaucoma    Thyroid disease    Blood clot of neck vein  left side    TIA (transient ischemic attack)  20 years ago    S/P appendectomy        MEDICATIONS  (STANDING):  amLODIPine   Tablet 10 milliGRAM(s) Oral daily  ARIPiprazole 2 milliGRAM(s) Oral daily  aspirin  chewable 81 milliGRAM(s) Oral daily  ciprofloxacin   IVPB 400 milliGRAM(s) IV Intermittent every 12 hours  enoxaparin Injectable 40 milliGRAM(s) SubCutaneous daily  escitalopram 20 milliGRAM(s) Oral daily  levETIRAcetam 500 milliGRAM(s) Oral two times a day  levothyroxine 50 MICROGram(s) Oral daily  metroNIDAZOLE  IVPB 500 milliGRAM(s) IV Intermittent every 8 hours  simvastatin 20 milliGRAM(s) Oral at bedtime    MEDICATIONS  (PRN):  morphine  - Injectable 2 milliGRAM(s) IV Push every 6 hours PRN Moderate Pain (4 - 6)      Allergies    No Known Allergies    Intolerances        FAMILY HISTORY:  FH: HTN (hypertension)        REVIEW OF SYSTEMS:    CONSTITUTIONAL: No fever, weight loss, or fatigue  EYES: No eye pain, visual disturbances, or discharge  ENMT:  No difficulty hearing, tinnitus, vertigo; No sinus or throat pain  NECK: No pain or stiffness  BREASTS: No pain, masses, or nipple discharge  RESPIRATORY: No cough, wheezing, chills or hemoptysis; No shortness of breath  CARDIOVASCULAR: No chest pain, palpitations, dizziness, or leg swelling  GASTROINTESTINAL: See above   GENITOURINARY: No dysuria, frequency, hematuria, or incontinence  NEUROLOGICAL: No headaches,  numbness, or tremors  SKIN: No itching, burning, rashes, or lesions   LYMPH NODES: No enlarged glands  ENDOCRINE: No heat or cold intolerance; No hair loss  MUSCULOSKELETAL: No joint pain or swelling; No muscle, back, or extremity pain  PSYCHIATRIC: No depression, anxiety, mood swings, or difficulty sleeping  HEME/LYMPH: No easy bruising, or bleeding gums  ALLERGY AND IMMUNOLOGIC: No hives or eczema          SOCIAL HISTORY:    FAMILY HISTORY:  FH: HTN (hypertension)        Vital Signs Last 24 Hrs  T(C): 37.3 (23 Dec 2021 12:40), Max: 38.2 (23 Dec 2021 01:53)  T(F): 99.1 (23 Dec 2021 12:40), Max: 100.7 (23 Dec 2021 01:53)  HR: 99 (23 Dec 2021 12:40) (85 - 99)  BP: 160/71 (23 Dec 2021 12:40) (139/79 - 161/75)  BP(mean): --  RR: 17 (23 Dec 2021 12:40) (15 - 22)  SpO2: 92% (23 Dec 2021 12:40) (88% - 96%)    PHYSICAL EXAM:    GENERAL: NAD, well-groomed, well-developed  HEAD:  Atraumatic, Normocephalic  EYES: EOMI, PERRLA, conjunctiva and sclera clear  NECK: Supple, No JVD, Normal thyroid  NERVOUS SYSTEM:  Alert & Oriented X1, Fair concentration;   CHEST/LUNG: Clear to percussion bilaterally; No rales, rhonchi, wheezing, or rubs  HEART: Regular rate and rhythm; No murmurs, rubs, or gallops  ABDOMEN: Soft, Mild RLQ tenderness (+), Nondistended; Bowel sounds present  EXTREMITIES:  2+ Peripheral Pulses, No clubbing, cyanosis, or edema  LYMPH: No lymphadenopathy noted   RECTAL: Deferred   SKIN: No rashes or lesions    LABS:                        13.0   24.83 )-----------( 425      ( 22 Dec 2021 17:43 )             39.3       CBC:   @ 17:43  WBC  24.83  HGB 13.0  HCT 39.3 Plate 425  MCV 87.9           22 Dec 2021 17:43    137    |  100    |  23     ----------------------------<  154    2.8     |  25     |  0.83     Ca    9.2        22 Dec 2021 17:43  Mg     2.4       22 Dec 2021 22:54    TPro  7.7    /  Alb  3.3    /  TBili  0.6    /  DBili  x      /  AST  23     /  ALT  21     /  AlkPhos  106    22 Dec 2021 17:43      Urinalysis Basic - ( 22 Dec 2021 20:35 )    Color: Yellow / Appearance: Clear / S.010 / pH: x  Gluc: x / Ketone: Trace  / Bili: Negative / Urobili: Negative mg/dL   Blood: x / Protein: 30 mg/dL / Nitrite: Negative   Leuk Esterase: Trace / RBC: 6-10 /HPF / WBC 6-10   Sq Epi: x / Non Sq Epi: Few / Bacteria: Few          RADIOLOGY & ADDITIONAL STUDIES:  < from: CT Abdomen and Pelvis w/ IV Cont (21 @ 19:19) >    ACC: [id number] EXAM:  CT ABDOMEN AND PELVIS IC                          PROCEDURE DATE:  2021          INTERPRETATION:  CLINICAL INFORMATION: Diffuse abdominal pain and   diarrhea. History of appendectomy.    COMPARISON: None.    CONTRAST/COMPLICATIONS:  IV Contrast: Omnipaque 350  95 cc administered   5 cc discarded  Oral Contrast: NONE  Complications: None reported at time of study completion    PROCEDURE:  CT of the Abdomen and Pelvis was performed.  Sagittal and coronal reformats were performed.    FINDINGS:  LOWER CHEST: Small bilateral atelectasis.    LIVER: Within normal limits.  BILE DUCTS: Normal caliber.  GALLBLADDER: Small gallstones in the distended gallbladder. No evidence   for thickened gallbladder wall or pericholecystic fluid.  SPLEEN: Within normal limits.  PANCREAS: Within normal limits.  ADRENALS: Within normal limits.  KIDNEYS/URETERS: Within normal limits. No evidence for a ureteral   calculus. No hydronephrosis.    BLADDER: Within normal limits.  REPRODUCTIVEORGANS: A few small enhancing lesions and a small   calcification in the uterus, likely representing fibroids. The adnexa   appear grossly unremarkable.    BOWEL: No bowel obstruction. Appendix surgically absent. Sigmoid   diverticulosis with adjacent stranding, compatible with diverticulitis.   No associated abscess is identified. Trace adjacent pelvic free fluid.   Small hiatal hernia.  PERITONEUM: No free air.  VESSELS: Calcified atherosclerotic disease.  RETROPERITONEUM/LYMPH NODES: No lymphadenopathy.  ABDOMINAL WALL: Small fat-containing umbilical hernia.  BONES: Degenerative spondylosis. Grade 1 anterolisthesis at L4-5 and L3-4.    IMPRESSION:  Sigmoid diverticulosis with adjacent stranding, compatible with   diverticulitis. No associated abscess is identified. Trace adjacent   pelvic free fluid. If clinically indicated, follow-up colonoscopy after   treatment/resolution of acute disease may be pursued to rule out colon   cancer.    Small gallstones in the distended gallbladder. Noevidence for thickened   gallbladder wall or pericholecystic fluid.. If there is a clinical   suspicion for acute cholecystitis, gallbladder ultrasound may be pursued   for further evaluation.    --- End of Report ---            RJ JESSICA MD; Attending Radiologist  This document has been electronically signed. Dec 22 2021  7:42PM    < end of copied text >

## 2021-12-23 NOTE — H&P ADULT - ASSESSMENT
Patient is a 81F with a PMH of HTN, HLD, hypothyroidism, depression, seizures who presents to the ED for abd pain.  Patient states that over the last two days she had developed significant abdominal pain and multiple episodes of watery diarrhea.  Reports one episode of nuasea and vomiting earlier today.  Patient has no other complaints.  Vitals stable, (hypoxix?) labs show leukocytosis and hypokalemia.  CT abdomen significant for diverticulitis.  Will admit to med surg.      IMPROVE VTE Individual Risk Assessment          RISK                                                          Points  [  ] Previous VTE                                                3  [  ] Thrombophilia                                             2  [  ] Lower limb paralysis                                    2        (unable to hold up >15 seconds)    [  ] Current Cancer                                             2         (within 6 months)  [  ] Immobilization > 24 hrs                              1  [  ] ICU/CCU stay > 24 hours                            1  [  ] Age > 60                                                    1    IMPROVE VTE Score - 1

## 2021-12-23 NOTE — PROGRESS NOTE ADULT - SUBJECTIVE AND OBJECTIVE BOX
HPI:  Patient is a 81F with a PMH of HTN, HLD, hypothyroidism, depression, seizures who presents to the ED for abd pain.  Patient states that over the last two days she had developed significant abdominal pain and multiple episodes of watery diarrhea.  Reports one episode of nausea and vomiting earlier today.  Patient has no other complaints.  Vitals stable,  labs show leukocytosis and hypokalemia.  CT abdomen significant for diverticulitis.  Will admit to med surg.     (23 Dec 2021 00:17)      SUBJECTIVE & OBJECTIVE:   Pt seen and examined at bedside.   no overnight events.   patient slightly confused , per staff MS was better this AM  complaining of abd pain     Denies fever, chills, N/V, dizziness, HA, cough, CP, palpitations, SOB,  dysuria, diarrhea, constipation.         PHYSICAL EXAM:  T(C): 36.8 (21 @ 18:50), Max: 38.2 (21 @ 01:53)  HR: 98 (21 @ 22:11) (88 - 109)  BP: 158/80 (21 @ 18:50) (139/79 - 160/71)  RR: 19 (21 @ 22:11) (17 - 20)  SpO2: 94% (21 @ 22:11) (92% - 96%)  Wt(kg): --   Weight (kg): 81 ( @ 03:44)  I&O's Detail    23 Dec 2021 07:01  -  23 Dec 2021 23:43  --------------------------------------------------------  IN:    Oral Fluid: 790 mL  Total IN: 790 mL    OUT:    Voided (mL): 650 mL  Total OUT: 650 mL    Total NET: 140 mL        GENERAL: NAD,  no increased WOB  HEAD:  Atraumatic, Normocephalic  EYES: EOMI, PERRLA, conjunctiva and sclera clear  ENMT: Moist mucous membranes  NECK: Supple, No JVD  NERVOUS SYSTEM:  Alert & Oriented X2, no focal neuro deficits , intermittently confused   CHEST/LUNG: Clear to auscultation bilaterally; No rales, rhonchi, wheezing, or rubs  HEART: Regular rate and rhythm; No murmurs, rubs, or gallops  ABDOMEN: Soft, TTP mostly RLQ; Bowel sounds present, no guarding or rigidity   EXTREMITIES:  2+ Peripheral Pulses b/l, No clubbing, cyanosis, calf tenderness or edema b/l    MEDICATIONS  (STANDING):  amLODIPine   Tablet 10 milliGRAM(s) Oral daily  ARIPiprazole 2 milliGRAM(s) Oral daily  aspirin  chewable 81 milliGRAM(s) Oral daily  enoxaparin Injectable 40 milliGRAM(s) SubCutaneous daily  escitalopram 20 milliGRAM(s) Oral daily  lactated ringers. 1000 milliLiter(s) (75 mL/Hr) IV Continuous <Continuous>  levETIRAcetam 500 milliGRAM(s) Oral two times a day  levothyroxine 50 MICROGram(s) Oral daily  piperacillin/tazobactam IVPB.. 3.375 Gram(s) IV Intermittent every 8 hours  potassium phosphate IVPB 15 milliMole(s) IV Intermittent once  simvastatin 20 milliGRAM(s) Oral at bedtime    MEDICATIONS  (PRN):  morphine  - Injectable 2 milliGRAM(s) IV Push every 6 hours PRN Moderate Pain (4 - 6)      LABS:                        12.1   28.24 )-----------( 378      ( 23 Dec 2021 17:33 )             37.2         133<L>  |  98  |  18  ----------------------------<  135<H>  2.8<LL>   |  25  |  0.90    Ca    8.5      23 Dec 2021 17:33  Phos  1.6       Mg     2.4         TPro  7.7  /  Alb  3.3  /  TBili  0.6  /  DBili  x   /  AST  23  /  ALT  21  /  AlkPhos  106  12-      Urinalysis Basic - ( 22 Dec 2021 20:35 )    Color: Yellow / Appearance: Clear / S.010 / pH: x  Gluc: x / Ketone: Trace  / Bili: Negative / Urobili: Negative mg/dL   Blood: x / Protein: 30 mg/dL / Nitrite: Negative   Leuk Esterase: Trace / RBC: 6-10 /HPF / WBC 6-10   Sq Epi: x / Non Sq Epi: Few / Bacteria: Few      Magnesium, Serum: 2.4 mg/dL ( @ 17:33)    CAPILLARY BLOOD GLUCOSE                RECENT CULTURES:      RADIOLOGY & ADDITIONAL TESTS:  < from: CT Abdomen and Pelvis w/ IV Cont (21 @ 19:19) >    ACC: [id number] EXAM:  CT ABDOMEN AND PELVIS IC                          PROCEDURE DATE:  2021          INTERPRETATION:  CLINICAL INFORMATION: Diffuse abdominal pain and   diarrhea. History of appendectomy.    COMPARISON: None.    CONTRAST/COMPLICATIONS:  IV Contrast: Omnipaque 350  95 cc administered   5 cc discarded  Oral Contrast: NONE  Complications: None reported at time of study completion    PROCEDURE:  CT of the Abdomen and Pelvis was performed.  Sagittal and coronal reformats were performed.    FINDINGS:  LOWER CHEST: Small bilateral atelectasis.    LIVER: Within normal limits.  BILE DUCTS: Normal caliber.  GALLBLADDER: Small gallstones in the distended gallbladder. No evidence   for thickened gallbladder wall or pericholecystic fluid.  SPLEEN: Within normal limits.  PANCREAS: Within normal limits.  ADRENALS: Within normal limits.  KIDNEYS/URETERS: Within normal limits. No evidence for a ureteral   calculus. No hydronephrosis.    BLADDER: Within normal limits.  REPRODUCTIVEORGANS: A few small enhancing lesions and a small   calcification in the uterus, likely representing fibroids. The adnexa   appear grossly unremarkable.    BOWEL: No bowel obstruction. Appendix surgically absent. Sigmoid   diverticulosis with adjacent stranding, compatible with diverticulitis.   No associated abscess is identified. Trace adjacent pelvic free fluid.   Small hiatal hernia.  PERITONEUM: No free air.  VESSELS: Calcified atherosclerotic disease.  RETROPERITONEUM/LYMPH NODES: No lymphadenopathy.  ABDOMINAL WALL: Small fat-containing umbilical hernia.  BONES: Degenerative spondylosis. Grade 1 anterolisthesis at L4-5 and L3-4.    IMPRESSION:  Sigmoid diverticulosis with adjacent stranding, compatible with   diverticulitis. No associated abscess is identified. Trace adjacent   pelvic free fluid. If clinically indicated, follow-up colonoscopy after   treatment/resolution of acute disease may be pursued to rule out colon   cancer.    Small gallstones in the distended gallbladder. Noevidence for thickened   gallbladder wall or pericholecystic fluid.. If there is a clinical   suspicion for acute cholecystitis, gallbladder ultrasound may be pursued   for further evaluation.    --- End of Report ---            Imaging Personally Reviewed:  [ ] YES  [ ] NO    Consultant(s) Notes Reviewed:  [x ] YES  [ ] NO    Care Discussed with Consultants/Other Providers [x ] YES  [ ] NO  Care discussed in detail with patient.  All questions and concerns addressed

## 2021-12-23 NOTE — H&P ADULT - NSHPREVIEWOFSYSTEMS_GEN_ALL_CORE
Constitutional: no fever, chills, night sweats  Ears: no hearing changes or ear pain,   Nose: no nasal congestion, sinus pain, or rhinorrhea  Cardio: no chest pain, orthopnea, edema, or palpitations  Resp: no dyspnea, cough, wheezing  GI: nausea, vomiting, diarrhea positive.  No constipation, hematochezia, or melena  : no dysuria, urinary frequency, hematuria  MSK: no back pain, neck pain  Skin: no rash, pruritis   Neuro: no weakness, dizziness, lightheadedness, syncope   Heme/Lymph: no bruising or bleeding

## 2021-12-23 NOTE — H&P ADULT - NSHPPHYSICALEXAM_GEN_ALL_CORE
Physical exam:  General: patient in no acute distress, resting comfortably  Head:  Atraumatic, Normocephalic  Eyes: EOMI, PERRLA, clear sclera  Neck: Supple, thyroid nontender, non enlarged  Cardio: S1/S2 +ve, regular rate and rhythm, no M/G/R  Resp: clear to ausculation bilaterally, no rales or wheezes  GI: abdomen soft, mild tenderness to RLQ, non distended, no guarding, BS +ve x 4  Ext: no significant pedal edema  Neuro: CN 2-12 intact, no significant motor or sensory deficits.  Skin: No rashes or lesions

## 2021-12-23 NOTE — H&P ADULT - HISTORY OF PRESENT ILLNESS
Patient is a 81F with a PMH of HTN, HLD, hypothyroidism, depression, seizures who presents to the ED for abd pain.  Patient states that over the last two days she had developed significant abdominal pain and multiple episodes of watery diarrhea.  Reports one episode of nausea and vomiting earlier today.  Patient has no other complaints.  Vitals stable,  labs show leukocytosis and hypokalemia.  CT abdomen significant for diverticulitis.  Will admit to med surg.

## 2021-12-23 NOTE — H&P ADULT - NSHPLABSRESULTS_GEN_ALL_CORE
Recent Vitals  T(C): 36.9 (21 @ 20:01), Max: 37.1 (21 @ 16:00)  HR: 91 (21 @ 22:17) (85 - 91)  BP: 159/79 (21 @ 22:17) (153/75 - 161/75)  RR: 22 (21 @ 22:17) (15 - 22)  SpO2: 91% (21 @ 22:17) (88% - 95%)                        13.0   24.83 )-----------( 425      ( 22 Dec 2021 17:43 )             39.3         137  |  100  |  23  ----------------------------<  154<H>  2.8<LL>   |  25  |  0.83    Ca    9.2      22 Dec 2021 17:43  Mg     2.4         TPro  7.7  /  Alb  3.3  /  TBili  0.6  /  DBili  x   /  AST  23  /  ALT  21  /  AlkPhos  106        LIVER FUNCTIONS - ( 22 Dec 2021 17:43 )  Alb: 3.3 g/dL / Pro: 7.7 gm/dL / ALK PHOS: 106 U/L / ALT: 21 U/L / AST: 23 U/L / GGT: x           Urinalysis Basic - ( 22 Dec 2021 20:35 )    Color: Yellow / Appearance: Clear / S.010 / pH: x  Gluc: x / Ketone: Trace  / Bili: Negative / Urobili: Negative mg/dL   Blood: x / Protein: 30 mg/dL / Nitrite: Negative   Leuk Esterase: Trace / RBC: 6-10 /HPF / WBC 6-10   Sq Epi: x / Non Sq Epi: Few / Bacteria: Few        Home Medications:  amLODIPine 10 mg oral tablet: 1 tab(s) orally once a day (22 Dec 2021 16:46)  aspirin 81 mg oral tablet, chewable: 1 tab(s) orally once a day (22 Dec 2021 16:46)  escitalopram 20 mg oral tablet: 1 tab(s) orally once a day (22 Dec 2021 16:46)  keppera:  (22 Dec 2021 16:46)  levETIRAcetam 500 mg oral tablet: 1 tab(s) orally 2 times a day (22 Dec 2021 16:46)  timolol-dorzolamide 0.5%-2% preservative-free ophthalmic solution: 1 drop(s) to each affected eye 2 times a day (22 Dec 2021 16:46)  Zetia 10 mg oral tablet: 1 tab(s) orally once a day (22 Dec 2021 16:46)

## 2021-12-23 NOTE — PROGRESS NOTE ADULT - ASSESSMENT
81F with a PMH of HTN, HLD, hypothyroidism, depression, seizures p/w abd pain, found to have acute diverticulitis .     Assessment and Plan:   #Acute diverticulitis   acute metabolic encephalopathy sec to above   GI following   abx changed to zosyn   follow cultures       #: Hypokalemia, hypophosphatemia   -  likely related to diarrhea,  replace and monitor     # Major depression.   denies SI/HI   lexapro     # Hyperlipidemia.   ·  Plan: simvastatin.    # Seizure disorder.   continue with keppra.    #Hypothyroidism --c/w synthroid     Microscopic hematuria   patient denies any urinary complaints   will need outpatient repeat UA and follow-up     # Preventive measure.    DVT prop: Lovenox 40 daily   fall precautions   PT

## 2021-12-24 LAB
A1C WITH ESTIMATED AVERAGE GLUCOSE RESULT: 5.6 % — SIGNIFICANT CHANGE UP (ref 4–5.6)
ALBUMIN SERPL ELPH-MCNC: 2.4 G/DL — LOW (ref 3.3–5)
ALP SERPL-CCNC: 132 U/L — HIGH (ref 40–120)
ALT FLD-CCNC: 32 U/L — SIGNIFICANT CHANGE UP (ref 12–78)
ANION GAP SERPL CALC-SCNC: 11 MMOL/L — SIGNIFICANT CHANGE UP (ref 5–17)
ANION GAP SERPL CALC-SCNC: 12 MMOL/L — SIGNIFICANT CHANGE UP (ref 5–17)
APTT BLD: 34.2 SEC — SIGNIFICANT CHANGE UP (ref 27.5–35.5)
AST SERPL-CCNC: 38 U/L — HIGH (ref 15–37)
BASE EXCESS BLDA CALC-SCNC: -5.5 MMOL/L — LOW (ref -2–3)
BILIRUB DIRECT SERPL-MCNC: 0.3 MG/DL — SIGNIFICANT CHANGE UP (ref 0–0.3)
BILIRUB INDIRECT FLD-MCNC: 0.4 MG/DL — SIGNIFICANT CHANGE UP (ref 0.2–1)
BILIRUB SERPL-MCNC: 0.7 MG/DL — SIGNIFICANT CHANGE UP (ref 0.2–1.2)
BLD GP AB SCN SERPL QL: SIGNIFICANT CHANGE UP
BLOOD GAS COMMENTS: SIGNIFICANT CHANGE UP
BLOOD GAS COMMENTS: SIGNIFICANT CHANGE UP
BLOOD GAS SOURCE: SIGNIFICANT CHANGE UP
BUN SERPL-MCNC: 15 MG/DL — SIGNIFICANT CHANGE UP (ref 7–23)
BUN SERPL-MCNC: 15 MG/DL — SIGNIFICANT CHANGE UP (ref 7–23)
CALCIUM SERPL-MCNC: 7.7 MG/DL — LOW (ref 8.5–10.1)
CALCIUM SERPL-MCNC: 8.5 MG/DL — SIGNIFICANT CHANGE UP (ref 8.5–10.1)
CHLORIDE SERPL-SCNC: 97 MMOL/L — SIGNIFICANT CHANGE UP (ref 96–108)
CHLORIDE SERPL-SCNC: 98 MMOL/L — SIGNIFICANT CHANGE UP (ref 96–108)
CK SERPL-CCNC: 237 U/L — HIGH (ref 26–192)
CK SERPL-CCNC: 237 U/L — HIGH (ref 26–192)
CO2 BLDA-SCNC: 18 MMOL/L — LOW (ref 19–24)
CO2 SERPL-SCNC: 19 MMOL/L — LOW (ref 22–31)
CO2 SERPL-SCNC: 20 MMOL/L — LOW (ref 22–31)
CREAT SERPL-MCNC: 0.8 MG/DL — SIGNIFICANT CHANGE UP (ref 0.5–1.3)
CREAT SERPL-MCNC: 0.86 MG/DL — SIGNIFICANT CHANGE UP (ref 0.5–1.3)
CRP SERPL-MCNC: 369 MG/L — HIGH
D DIMER BLD IA.RAPID-MCNC: 788 NG/ML DDU — HIGH
ERYTHROCYTE [SEDIMENTATION RATE] IN BLOOD: 77 MM/HR — HIGH (ref 0–20)
ESTIMATED AVERAGE GLUCOSE: 114 MG/DL — SIGNIFICANT CHANGE UP (ref 68–114)
GLUCOSE SERPL-MCNC: 133 MG/DL — HIGH (ref 70–99)
GLUCOSE SERPL-MCNC: 142 MG/DL — HIGH (ref 70–99)
HCO3 BLDA-SCNC: 18 MMOL/L — LOW (ref 21–28)
HCT VFR BLD CALC: 38 % — SIGNIFICANT CHANGE UP (ref 34.5–45)
HCT VFR BLD CALC: 40.6 % — SIGNIFICANT CHANGE UP (ref 34.5–45)
HGB BLD-MCNC: 12.6 G/DL — SIGNIFICANT CHANGE UP (ref 11.5–15.5)
HGB BLD-MCNC: 13.3 G/DL — SIGNIFICANT CHANGE UP (ref 11.5–15.5)
HOROWITZ INDEX BLDA+IHG-RTO: 36 — SIGNIFICANT CHANGE UP
INR BLD: 1.37 RATIO — HIGH (ref 0.88–1.16)
LACTATE SERPL-SCNC: 1.7 MMOL/L — SIGNIFICANT CHANGE UP (ref 0.7–2)
LACTATE SERPL-SCNC: 2.2 MMOL/L — HIGH (ref 0.7–2)
LACTATE SERPL-SCNC: 2.6 MMOL/L — HIGH (ref 0.7–2)
MAGNESIUM SERPL-MCNC: 1.8 MG/DL — SIGNIFICANT CHANGE UP (ref 1.6–2.6)
MAGNESIUM SERPL-MCNC: 2.1 MG/DL — SIGNIFICANT CHANGE UP (ref 1.6–2.6)
MCHC RBC-ENTMCNC: 28.4 PG — SIGNIFICANT CHANGE UP (ref 27–34)
MCHC RBC-ENTMCNC: 29.1 PG — SIGNIFICANT CHANGE UP (ref 27–34)
MCHC RBC-ENTMCNC: 32.8 GM/DL — SIGNIFICANT CHANGE UP (ref 32–36)
MCHC RBC-ENTMCNC: 33.2 GM/DL — SIGNIFICANT CHANGE UP (ref 32–36)
MCV RBC AUTO: 86.8 FL — SIGNIFICANT CHANGE UP (ref 80–100)
MCV RBC AUTO: 87.8 FL — SIGNIFICANT CHANGE UP (ref 80–100)
NRBC # BLD: 0 /100 WBCS — SIGNIFICANT CHANGE UP (ref 0–0)
NRBC # BLD: 0 /100 WBCS — SIGNIFICANT CHANGE UP (ref 0–0)
PCO2 BLDA: 27 MMHG — LOW (ref 32–46)
PH BLD: 7.42 — SIGNIFICANT CHANGE UP (ref 7.35–7.45)
PHOSPHATE SERPL-MCNC: 1.9 MG/DL — LOW (ref 2.5–4.5)
PHOSPHATE SERPL-MCNC: 2.2 MG/DL — LOW (ref 2.5–4.5)
PLATELET # BLD AUTO: 400 K/UL — SIGNIFICANT CHANGE UP (ref 150–400)
PLATELET # BLD AUTO: 449 K/UL — HIGH (ref 150–400)
PO2 BLDA: 60 MMHG — LOW (ref 83–108)
POTASSIUM SERPL-MCNC: 2.9 MMOL/L — CRITICAL LOW (ref 3.5–5.3)
POTASSIUM SERPL-MCNC: 3.3 MMOL/L — LOW (ref 3.5–5.3)
POTASSIUM SERPL-SCNC: 2.9 MMOL/L — CRITICAL LOW (ref 3.5–5.3)
POTASSIUM SERPL-SCNC: 3.3 MMOL/L — LOW (ref 3.5–5.3)
PROCALCITONIN SERPL-MCNC: 1.97 NG/ML — HIGH (ref 0.02–0.1)
PROT SERPL-MCNC: 6.8 GM/DL — SIGNIFICANT CHANGE UP (ref 6–8.3)
PROTHROM AB SERPL-ACNC: 15.7 SEC — HIGH (ref 10.6–13.6)
RAPID RVP RESULT: SIGNIFICANT CHANGE UP
RAPID RVP RESULT: SIGNIFICANT CHANGE UP
RBC # BLD: 4.33 M/UL — SIGNIFICANT CHANGE UP (ref 3.8–5.2)
RBC # BLD: 4.68 M/UL — SIGNIFICANT CHANGE UP (ref 3.8–5.2)
RBC # FLD: 13.4 % — SIGNIFICANT CHANGE UP (ref 10.3–14.5)
RBC # FLD: 13.6 % — SIGNIFICANT CHANGE UP (ref 10.3–14.5)
SAO2 % BLDA: 92.3 % — LOW (ref 94–98)
SARS-COV-2 RNA SPEC QL NAA+PROBE: SIGNIFICANT CHANGE UP
SARS-COV-2 RNA SPEC QL NAA+PROBE: SIGNIFICANT CHANGE UP
SODIUM SERPL-SCNC: 127 MMOL/L — LOW (ref 135–145)
SODIUM SERPL-SCNC: 130 MMOL/L — LOW (ref 135–145)
TROPONIN I, HIGH SENSITIVITY RESULT: 42.7 NG/L — SIGNIFICANT CHANGE UP
WBC # BLD: 28.13 K/UL — HIGH (ref 3.8–10.5)
WBC # BLD: 7.29 K/UL — SIGNIFICANT CHANGE UP (ref 3.8–10.5)
WBC # FLD AUTO: 28.13 K/UL — HIGH (ref 3.8–10.5)
WBC # FLD AUTO: 7.29 K/UL — SIGNIFICANT CHANGE UP (ref 3.8–10.5)

## 2021-12-24 PROCEDURE — 74177 CT ABD & PELVIS W/CONTRAST: CPT | Mod: 26

## 2021-12-24 PROCEDURE — 71045 X-RAY EXAM CHEST 1 VIEW: CPT | Mod: 26

## 2021-12-24 PROCEDURE — 99223 1ST HOSP IP/OBS HIGH 75: CPT

## 2021-12-24 PROCEDURE — 71260 CT THORAX DX C+: CPT | Mod: 26

## 2021-12-24 PROCEDURE — 93010 ELECTROCARDIOGRAM REPORT: CPT | Mod: 76

## 2021-12-24 PROCEDURE — 99233 SBSQ HOSP IP/OBS HIGH 50: CPT

## 2021-12-24 PROCEDURE — 44143 PARTIAL REMOVAL OF COLON: CPT | Mod: 80

## 2021-12-24 PROCEDURE — 44143 PARTIAL REMOVAL OF COLON: CPT

## 2021-12-24 RX ORDER — POTASSIUM CHLORIDE 20 MEQ
40 PACKET (EA) ORAL ONCE
Refills: 0 | Status: COMPLETED | OUTPATIENT
Start: 2021-12-24 | End: 2021-12-24

## 2021-12-24 RX ORDER — METOPROLOL TARTRATE 50 MG
5 TABLET ORAL ONCE
Refills: 0 | Status: COMPLETED | OUTPATIENT
Start: 2021-12-24 | End: 2021-12-24

## 2021-12-24 RX ORDER — ACETAMINOPHEN 500 MG
1000 TABLET ORAL ONCE
Refills: 0 | Status: COMPLETED | OUTPATIENT
Start: 2021-12-24 | End: 2021-12-24

## 2021-12-24 RX ORDER — POTASSIUM CHLORIDE 20 MEQ
10 PACKET (EA) ORAL
Refills: 0 | Status: DISCONTINUED | OUTPATIENT
Start: 2021-12-24 | End: 2021-12-24

## 2021-12-24 RX ORDER — ACETAMINOPHEN 500 MG
650 TABLET ORAL EVERY 8 HOURS
Refills: 0 | Status: DISCONTINUED | OUTPATIENT
Start: 2021-12-24 | End: 2021-12-24

## 2021-12-24 RX ORDER — VANCOMYCIN HCL 1 G
1000 VIAL (EA) INTRAVENOUS EVERY 12 HOURS
Refills: 0 | Status: DISCONTINUED | OUTPATIENT
Start: 2021-12-24 | End: 2021-12-24

## 2021-12-24 RX ORDER — SODIUM CHLORIDE 9 MG/ML
1000 INJECTION INTRAMUSCULAR; INTRAVENOUS; SUBCUTANEOUS
Refills: 0 | Status: DISCONTINUED | OUTPATIENT
Start: 2021-12-24 | End: 2021-12-24

## 2021-12-24 RX ORDER — FUROSEMIDE 40 MG
40 TABLET ORAL ONCE
Refills: 0 | Status: COMPLETED | OUTPATIENT
Start: 2021-12-24 | End: 2021-12-24

## 2021-12-24 RX ORDER — ACETAMINOPHEN 500 MG
1000 TABLET ORAL ONCE
Refills: 0 | Status: DISCONTINUED | OUTPATIENT
Start: 2021-12-24 | End: 2021-12-24

## 2021-12-24 RX ORDER — SODIUM CHLORIDE 9 MG/ML
1000 INJECTION INTRAMUSCULAR; INTRAVENOUS; SUBCUTANEOUS ONCE
Refills: 0 | Status: COMPLETED | OUTPATIENT
Start: 2021-12-24 | End: 2021-12-24

## 2021-12-24 RX ORDER — SODIUM CHLORIDE 9 MG/ML
1000 INJECTION, SOLUTION INTRAVENOUS ONCE
Refills: 0 | Status: COMPLETED | OUTPATIENT
Start: 2021-12-24 | End: 2021-12-24

## 2021-12-24 RX ORDER — POTASSIUM PHOSPHATE, MONOBASIC POTASSIUM PHOSPHATE, DIBASIC 236; 224 MG/ML; MG/ML
30 INJECTION, SOLUTION INTRAVENOUS ONCE
Refills: 0 | Status: COMPLETED | OUTPATIENT
Start: 2021-12-24 | End: 2021-12-24

## 2021-12-24 RX ORDER — POTASSIUM PHOSPHATE, MONOBASIC POTASSIUM PHOSPHATE, DIBASIC 236; 224 MG/ML; MG/ML
15 INJECTION, SOLUTION INTRAVENOUS ONCE
Refills: 0 | Status: COMPLETED | OUTPATIENT
Start: 2021-12-24 | End: 2021-12-24

## 2021-12-24 RX ADMIN — PIPERACILLIN AND TAZOBACTAM 25 GRAM(S): 4; .5 INJECTION, POWDER, LYOPHILIZED, FOR SOLUTION INTRAVENOUS at 00:10

## 2021-12-24 RX ADMIN — POTASSIUM PHOSPHATE, MONOBASIC POTASSIUM PHOSPHATE, DIBASIC 62.5 MILLIMOLE(S): 236; 224 INJECTION, SOLUTION INTRAVENOUS at 00:09

## 2021-12-24 RX ADMIN — LEVETIRACETAM 500 MILLIGRAM(S): 250 TABLET, FILM COATED ORAL at 17:35

## 2021-12-24 RX ADMIN — SODIUM CHLORIDE 1000 MILLILITER(S): 9 INJECTION INTRAMUSCULAR; INTRAVENOUS; SUBCUTANEOUS at 19:51

## 2021-12-24 RX ADMIN — Medication 40 MILLIGRAM(S): at 11:56

## 2021-12-24 RX ADMIN — Medication 400 MILLIGRAM(S): at 12:32

## 2021-12-24 RX ADMIN — SODIUM CHLORIDE 75 MILLILITER(S): 9 INJECTION INTRAMUSCULAR; INTRAVENOUS; SUBCUTANEOUS at 21:02

## 2021-12-24 RX ADMIN — Medication 50 MICROGRAM(S): at 05:33

## 2021-12-24 RX ADMIN — SODIUM CHLORIDE 75 MILLILITER(S): 9 INJECTION, SOLUTION INTRAVENOUS at 00:08

## 2021-12-24 RX ADMIN — AMLODIPINE BESYLATE 10 MILLIGRAM(S): 2.5 TABLET ORAL at 05:33

## 2021-12-24 RX ADMIN — SODIUM CHLORIDE 1000 MILLILITER(S): 9 INJECTION, SOLUTION INTRAVENOUS at 19:07

## 2021-12-24 RX ADMIN — LEVETIRACETAM 500 MILLIGRAM(S): 250 TABLET, FILM COATED ORAL at 05:33

## 2021-12-24 RX ADMIN — Medication 100 MILLIEQUIVALENT(S): at 19:49

## 2021-12-24 RX ADMIN — SODIUM CHLORIDE 1000 MILLILITER(S): 9 INJECTION INTRAMUSCULAR; INTRAVENOUS; SUBCUTANEOUS at 15:09

## 2021-12-24 RX ADMIN — POTASSIUM PHOSPHATE, MONOBASIC POTASSIUM PHOSPHATE, DIBASIC 62.5 MILLIMOLE(S): 236; 224 INJECTION, SOLUTION INTRAVENOUS at 12:31

## 2021-12-24 RX ADMIN — ENOXAPARIN SODIUM 40 MILLIGRAM(S): 100 INJECTION SUBCUTANEOUS at 11:55

## 2021-12-24 RX ADMIN — Medication 40 MILLIEQUIVALENT(S): at 11:56

## 2021-12-24 RX ADMIN — PIPERACILLIN AND TAZOBACTAM 25 GRAM(S): 4; .5 INJECTION, POWDER, LYOPHILIZED, FOR SOLUTION INTRAVENOUS at 08:13

## 2021-12-24 RX ADMIN — PIPERACILLIN AND TAZOBACTAM 25 GRAM(S): 4; .5 INJECTION, POWDER, LYOPHILIZED, FOR SOLUTION INTRAVENOUS at 17:34

## 2021-12-24 RX ADMIN — Medication 250 MILLIGRAM(S): at 17:29

## 2021-12-24 RX ADMIN — Medication 5 MILLIGRAM(S): at 20:23

## 2021-12-24 RX ADMIN — Medication 650 MILLIGRAM(S): at 12:31

## 2021-12-24 RX ADMIN — ESCITALOPRAM OXALATE 20 MILLIGRAM(S): 10 TABLET, FILM COATED ORAL at 11:56

## 2021-12-24 RX ADMIN — POTASSIUM PHOSPHATE, MONOBASIC POTASSIUM PHOSPHATE, DIBASIC 83.33 MILLIMOLE(S): 236; 224 INJECTION, SOLUTION INTRAVENOUS at 21:01

## 2021-12-24 RX ADMIN — Medication 81 MILLIGRAM(S): at 11:56

## 2021-12-24 NOTE — CONSULT NOTE ADULT - ASSESSMENT
80yo F with PMH of HTN, HLD, hypothyroidism, depression, and seizures, presented to ED yesterday with abdominal pain and diarrhea, admitted with acute sigmoid diverticulitis without perforation or abscess. Pt with worsening clinical condition last night; repeat CT A/P today with interval perforated sigmoid diverticulitis with developing abscess. Plan for OR shortly for ex lap, possible Rosales's. ICU consulted for worsening clinical condition.    Acute sigmoid diverticulitis with interval perforation and developing abscess  Afib with RVR  Hypokalemia  Lactic acidosis    -Neuro: Per floor team, pt more confused now; talking about her  Jeffrey who is not here. Likely metabolic encephalopathy in setting of sepsis. ? component of dementia. Continue home keppra.   -Cardio: Tachycardic to 150-160s on exam; EKG at bedside shows AFib with RVR; pt with no history of afib. Given Lopressor IVP. Rate control prn. Hold home antihypertensives for now.   -Resp: On 100% NRB, saturating well. No signs of respiratory distress. Intubation for OR.   -GI: Perforated sigmoid diverticulitis with developing abscess. OR today for ex lap with Rosales's. NPO, monitor for bowel function. Trend lactate. PPI for GI prophylaxis. GI and surgery following.   -Renal: Normal renal function, mc for OR. Trend BUN/Cr, monitor UO. Hypokalemia, likely 2/2 diarrhea. IV repletions given, f/u BMP.   -ID: Leukocytosis, sepsis 2/2 perforated sigmoid diverticulitis. Continue empiric vancomycin and zosyn, will add antifungal coverage. Blood and urine cultures pending.   -Heme: Lovenox for DVT ppx. Trend H/H; transfuse prn to maintain Hgb >7.   -Endocrine: Monitor FS q6 while NPO. Normal A1C. Continue synthroid for hypothyroid.   -Dispo: Transfer to ICU post-op    Discussed with eICU attending Dr. Gorman.  82yo F with PMH of HTN, HLD, hypothyroidism, depression, and seizures, presented to ED yesterday with abdominal pain and diarrhea, admitted with acute sigmoid diverticulitis without perforation or abscess. Pt with worsening clinical condition last night; repeat CT A/P today with interval perforated sigmoid diverticulitis with developing abscess. Plan for OR shortly for ex lap, possible Rosales's. ICU consulted for worsening clinical condition.    Acute sigmoid diverticulitis with interval perforation and developing abscess  Afib with RVR  Hypokalemia, hypophosphatemia  Lactic acidosis    -Neuro: Per floor team, pt more confused now; talking about her  Jeffrey who is not here. Likely metabolic encephalopathy in setting of sepsis. ? component of dementia. Continue home keppra.   -Cardio: Tachycardic to 150-160s on exam; EKG at bedside shows AFib with RVR; pt with no history of afib. Given Lopressor IVP. F/u TTE. Hold home antihypertensives for now.   -Resp: On 100% NRB, saturating well. No signs of respiratory distress. Intubation for OR.   -GI: Perforated sigmoid diverticulitis with developing abscess. OR today for ex lap with Rosales's. NPO, monitor for bowel function. Trend lactate. PPI for GI prophylaxis. GI and surgery following.   -Renal: Normal renal function, mc for OR. Trend BUN/Cr, monitor UO. Hypokalemia, likely 2/2 diarrhea. IV repletions given, f/u BMP.   -ID: Leukocytosis, sepsis 2/2 perforated sigmoid diverticulitis. Continue empiric vancomycin and zosyn, will add antifungal coverage. Blood and urine cultures pending.   -Heme: Lovenox for DVT ppx. Trend H/H; transfuse prn to maintain Hgb >7.   -Endocrine: Monitor FS q6 while NPO. Normal A1C. Continue synthroid for hypothyroid.   -Dispo: Transfer to ICU post-op    Discussed with eICU attending Dr. Gorman.  80yo F with PMH of HTN, HLD, hypothyroidism, depression, and seizures, presented to ED yesterday with abdominal pain and diarrhea, admitted with acute sigmoid diverticulitis without perforation or abscess. Pt with worsening clinical condition last night; repeat CT A/P today with interval perforated sigmoid diverticulitis with developing abscess. Plan for OR shortly for ex lap, possible Rosales's. ICU consulted for worsening clinical condition.    Acute sigmoid diverticulitis with interval perforation and developing abscess  Afib with RVR  Hypokalemia, hypophosphatemia  Lactic acidosis    -Neuro: Per floor team, pt more confused now; talking about her  Jeffrey who is not here. Likely metabolic encephalopathy in setting of sepsis. ? component of dementia. Continue home keppra.   -Cardio: Tachycardic to 150-160s on exam; EKG at bedside shows AFib with RVR; pt with no history of afib. Given Lopressor IVP. F/u TTE. Hold home antihypertensives for now. Cont IVF  -Resp: On 100% NRB, saturating well. No signs of respiratory distress. Intubation for OR.   -GI: Perforated sigmoid diverticulitis with developing abscess. OR today for ex lap with Rosales's. NPO, monitor for bowel function. Trend lactate. PPI for GI prophylaxis. GI and surgery following.   -Renal: Normal renal function, mc for OR. Trend BUN/Cr, monitor UO. Hypokalemia, likely 2/2 diarrhea. IV repletions given, f/u BMP.   -ID: Leukocytosis, sepsis 2/2 perforated sigmoid diverticulitis. Continue empiric vancomycin and zosyn, will add antifungal coverage. Blood and urine cultures pending.   -Heme: Lovenox for DVT ppx. Trend H/H; transfuse prn to maintain Hgb >7.   -Endocrine: Monitor FS q6 while NPO. Normal A1C. Continue synthroid for hypothyroid.   -Dispo: Transfer to ICU post-op    Discussed with eICU attending Dr. Gorman.

## 2021-12-24 NOTE — PROGRESS NOTE ADULT - ASSESSMENT
81F with a PMH of HTN, HLD, hypothyroidism, depression, seizures p/w abd pain, found to have acute diverticulitis .     Assessment and Plan:   #Acute diverticulitis   acute metabolic encephalopathy sec to above   GI following   continue with zosyn   labs noted   ABG noted consistent with tachypnea   CXR clear   EKG: sinus tachy     replace electrolytes and monitor   obtain lactate, repeat RVP  urgent CTAP and CT chest with IV contrast   upgrade to tele   place on venti for now to maintain SpO2 >92%  continue with IV abx   ID consult        #: Hypokalemia, hypophosphatemia   -  likely related to diarrhea,  replace and monitor     # Major depression.   denies SI/HI   lexapro     # Hyperlipidemia.   ·  Plan: simvastatin.    # Seizure disorder.   continue with keppra.    #Hypothyroidism --c/w synthroid     Microscopic hematuria   patient denies any urinary complaints   will need outpatient repeat UA and follow-up     # Preventive measure.    DVT prop: Lovenox 40 daily   fall precautions   PT      81F with a PMH of HTN, HLD, hypothyroidism, depression, seizures p/w abd pain, found to have acute diverticulitis .     Assessment and Plan:   #Acute diverticulitis   acute metabolic encephalopathy sec to above   GI following   continue with zosyn   labs noted   ABG noted consistent with tachypnea   CXR clear   EKG: sinus tachy     replace electrolytes and monitor   obtain lactate, repeat RVP  urgent CTAP and CT chest with IV contrast   upgrade to tele   place on venti for now to maintain SpO2 >92%  continue with IV abx   ID consult        #: Hypokalemia, hypophosphatemia   -  likely related to diarrhea,  replace and monitor     # Major depression.   denies SI/HI   lexapro     # Hyperlipidemia.   ·  Plan: simvastatin.    # Seizure disorder.   continue with keppra.    #Hypothyroidism --c/w synthroid     Microscopic hematuria   patient denies any urinary complaints   will need outpatient repeat UA and follow-up     # Preventive measure.    DVT prop: Lovenox 40 daily   fall precautions   PT     son lei updated of findings and care plan, all questions and concerns addressed

## 2021-12-24 NOTE — CONSULT NOTE ADULT - SUBJECTIVE AND OBJECTIVE BOX
JUAREZ GTZ  MRN-[id number]        Patient is a 81y old  Female who presents with a chief complaint of diverticulitis (24 Dec 2021 12:38)      HPI:  Patient is a 81F with a PMH of HTN, HLD, hypothyroidism, depression, seizures who presents to the ED for abd pain.  Patient states that over the last two days she had developed significant abdominal pain and multiple episodes of watery diarrhea.  Reports one episode of nausea and vomiting earlier today.  Patient has no other complaints.  Vitals stable,  labs show leukocytosis and hypokalemia.  CT abdomen significant for diverticulitis.  Will admit to med surg.     (23 Dec 2021 00:17)    patient admitted with diverticultits. now increased in wbc and requiring large amount of oxygen, has abdominal pain, febrile, reports feeling unwell   ID consulted for workup and antibiotic management     PAST MEDICAL & SURGICAL HISTORY:  Seizure    HTN (hypertension)    Glaucoma    Thyroid disease    Blood clot of neck vein  left side    TIA (transient ischemic attack)  20 years ago    S/P appendectomy        Allergies  No Known Allergies        ANTIMICROBIALS:  piperacillin/tazobactam IVPB.. 3.375 every 8 hours      MEDICATIONS  (STANDING):  ciprofloxacin   IVPB   200 mL/Hr IV Intermittent (21 @ 13:31)    metroNIDAZOLE  IVPB   100 mL/Hr IV Intermittent (21 @ 15:02)    piperacillin/tazobactam IVPB.   200 mL/Hr IV Intermittent (21 @ 17:12)    piperacillin/tazobactam IVPB..   25 mL/Hr IV Intermittent (21 @ 08:13)   25 mL/Hr IV Intermittent (21 @ 00:10)    piperacillin/tazobactam IVPB..   25 mL/Hr IV Intermittent (21 @ 07:01)    piperacillin/tazobactam IVPB...   200 mL/Hr IV Intermittent (21 @ 19:28)        OTHER MEDS: MEDICATIONS  (STANDING):  acetaminophen     Tablet .. 650 every 8 hours PRN  amLODIPine   Tablet 10 daily  aspirin  chewable 81 daily  enoxaparin Injectable 40 daily  escitalopram 20 daily  levETIRAcetam 500 two times a day  levothyroxine 50 daily  morphine  - Injectable 2 every 6 hours PRN  simvastatin 20 at bedtime      SOCIAL HISTORY:     former smoker    FAMILY HISTORY:  FH: HTN (hypertension)        REVIEW OF SYSTEMS  [  ] ROS unobtainable because:    [ X ] All other systems negative except as noted below:	    Constitutional:  [ X] fever [ ] chills  [ ] weight loss  [x ] weakness  Skin:  [ ] rash [ ] phlebitis	  Eyes: [ ] icterus [ ] pain  [ ] discharge	  ENMT: [ ] sore throat  [ ] thrush [ ] ulcers [ ] exudates  Respiratory: [x ] dyspnea [ ] hemoptysis [ ] cough [ ] sputum	  Cardiovascular:  [ ] chest pain [ ] palpitations [ ] edema	  Gastrointestinal:  [ ] nausea [ ] vomiting [ ] diarrhea [ ] constipation [ x] pain	  Genitourinary:  [ ] dysuria [ ] frequency [ ] hematuria [ ] discharge [ ] flank pain  [ ] incontinence  Musculoskeletal:  [ ] myalgias [ ] arthralgias [ ] arthritis  [ ] back pain  Neurological:  [ ] headache [ ] seizures  [ ] confusion/altered mental status  Psychiatric:  [ ] anxiety [ ] depression	  Hematology/Lymphatics:  [ ] lymphadenopathy  Endocrine:  [ ] adrenal [ ] thyroid  Allergic/Immunologic:	 [ ] transplant [ ] seasonal    Vital Signs Last 24 Hrs  T(F): 99 (21 @ 12:00), Max: 100.7 (21 @ 01:53)    Vital Signs Last 24 Hrs  HR: 117 (21 @ 12:00) (98 - 117)  BP: 155/88 (21 @ 12:00) (144/78 - 158/80)  RR: 20 (21 @ 12:00)  SpO2: 93% (21 @ 12:00) (90% - 95%)  Wt(kg): --    PHYSICAL EXAM:  Constitutional: non-toxic, no distress, on nasal cannula   HEAD/EYES: anicteric, no conjunctival injection  ENT:  supple, no thrush  Cardiovascular:   normal S1, S2, + murmur, 2+ edema lower extremity   Respiratory:  occasional rales BS bilaterally, no wheezes,  GI:  soft, +tender especially on the right side, normal bowel sounds  :  no mc, no CVA tenderness  Musculoskeletal:  no synovitis, decreased ROM   Neurologic: awake and alert, diminished strength, no focal findings  Skin:  no rash, no erythema, no phlebitis  Heme/Onc: no lymphadenopathy   Psychiatric:  awake, alert, appropriate mood          WBC Count: 28.13 K/uL ( @ 08:30)  WBC Count: 28.24 K/uL ( @ 17:33)  WBC Count: 24.83 K/uL ( @ 17:43)      Auto Neutrophil %: 88.0 % *H* (21 @ 17:43)  Auto Neutrophil #: 22.60 K/uL *H* (21 @ 17:43)                            12.6   28.13 )-----------( 400      ( 24 Dec 2021 08:30 )             38.0           127<L>  |  97  |  15  ----------------------------<  133<H>  3.3<L>   |  19<L>  |  0.80    Ca    8.5      24 Dec 2021 08:30  Phos  2.2       Mg     1.8         TPro  6.8  /  Alb  2.4<L>  /  TBili  0.7  /  DBili  0.3  /  AST  38<H>  /  ALT  32  /  AlkPhos  132<H>        Creatinine Trend: 0.80<--, 0.90<--, 0.83<--    Urinalysis Basic - ( 22 Dec 2021 20:35 )    Color: Yellow / Appearance: Clear / S.010 / pH: x  Gluc: x / Ketone: Trace  / Bili: Negative / Urobili: Negative mg/dL   Blood: x / Protein: 30 mg/dL / Nitrite: Negative   Leuk Esterase: Trace / RBC: 6-10 /HPF / WBC 6-10   Sq Epi: x / Non Sq Epi: Few / Bacteria: Few        MICROBIOLOGY:    SARS-CoV-2: NotDetec (24 Dec 2021 00:03)      Rapid RVP Result: NotDetec ( @ 00:03)      C-Reactive Protein, Serum: 369 ()        D-Dimer Assay, Quantitative: 788 ()    Procalcitonin, Serum: 1.97 (21 @ 10:38)        RADIOLOGY:  < from: CT Abdomen and Pelvis w/ IV Cont (21 @ 19:19) >  IMPRESSION:  Sigmoid diverticulosis with adjacent stranding, compatible with   diverticulitis. No associated abscess is identified. Trace adjacent   pelvic free fluid. If clinically indicated, follow-up colonoscopy after   treatment/resolution of acute disease may be pursued to rule out colon   cancer.    Small gallstones in the distended gallbladder. Noevidence for thickened   gallbladder wall or pericholecystic fluid.. If there is a clinical   suspicion for acute cholecystitis, gallbladder ultrasound may be pursued   for further evaluation    < end of copied text >

## 2021-12-24 NOTE — PROGRESS NOTE ADULT - SUBJECTIVE AND OBJECTIVE BOX
Patient is a 81y old  Female who presents with a chief complaint of diverticulitis (23 Dec 2021 23:43)      HPI:  Patient is a 81F with a PMH of HTN, HLD, hypothyroidism, depression, seizures who presents to the ED for abd pain.  Patient states that over the last two days she had developed significant abdominal pain and multiple episodes of watery diarrhea.  Reports one episode of nausea and vomiting earlier today.  Patient has no other complaints.  Vitals stable,  labs show leukocytosis and hypokalemia.  CT abdomen significant for diverticulitis.  Will admit to med surg.     (23 Dec 2021 00:17)      INTERVAL HPI/OVERNIGHT EVENTS: Patient seen earlier today  MS slightly better. C/O or RLQ pain. No N/V or diarrhea On clear liquids    MEDICATIONS  (STANDING):  amLODIPine   Tablet 10 milliGRAM(s) Oral daily  aspirin  chewable 81 milliGRAM(s) Oral daily  enoxaparin Injectable 40 milliGRAM(s) SubCutaneous daily  escitalopram 20 milliGRAM(s) Oral daily  levETIRAcetam 500 milliGRAM(s) Oral two times a day  levothyroxine 50 MICROGram(s) Oral daily  piperacillin/tazobactam IVPB.. 3.375 Gram(s) IV Intermittent every 8 hours  simvastatin 20 milliGRAM(s) Oral at bedtime    MEDICATIONS  (PRN):  acetaminophen     Tablet .. 650 milliGRAM(s) Oral every 8 hours PRN Temp greater or equal to 38.5C (101.3F), Mild Pain (1 - 3)  morphine  - Injectable 2 milliGRAM(s) IV Push every 6 hours PRN Moderate Pain (4 - 6)      FAMILY HISTORY:  FH: HTN (hypertension)        Allergies    No Known Allergies    Intolerances        PMH/PSH:  Seizure    HTN (hypertension)    Glaucoma    Thyroid disease    Blood clot of neck vein    TIA (transient ischemic attack)    S/P appendectomy          REVIEW OF SYSTEMS:  CONSTITUTIONAL: No fever, weight loss,   EYES: No eye pain, visual disturbances, or discharge  ENMT:  No difficulty hearing, tinnitus, vertigo; No sinus or throat pain  NECK: No pain or stiffness  BREASTS: No pain, masses, or nipple discharge  RESPIRATORY: No cough, wheezing, chills or hemoptysis; No shortness of breath  CARDIOVASCULAR: No chest pain, palpitations, dizziness, or leg swelling  GASTROINTESTINAL: See above  GENITOURINARY: No dysuria, frequency, hematuria, or incontinence  NEUROLOGICAL: No headaches, memory loss, loss of strength, numbness, or tremors  SKIN: No itching, burning, rashes, or lesions   LYMPH NODES: No enlarged glands  ENDOCRINE: No heat or cold intolerance; No hair loss  MUSCULOSKELETAL: No joint pain or swelling; No muscle, back, or extremity pain  PSYCHIATRIC: No depression, anxiety, mood swings, or difficulty sleeping  HEME/LYMPH: No easy bruising, or bleeding gums  ALLERGY AND IMMUNOLOGIC: No hives or eczema    Vital Signs Last 24 Hrs  T(C): 37.2 (24 Dec 2021 12:00), Max: 37.2 (24 Dec 2021 12:00)  T(F): 99 (24 Dec 2021 12:00), Max: 99 (24 Dec 2021 12:00)  HR: 117 (24 Dec 2021 12:00) (98 - 117)  BP: 155/88 (24 Dec 2021 12:00) (144/78 - 158/80)  BP(mean): --  RR: 20 (24 Dec 2021 12:00) (18 - 20)  SpO2: 93% (24 Dec 2021 12:00) (90% - 95%)    PHYSICAL EXAM:  GENERAL: NAD, well-groomed, well-developed  HEAD:  Atraumatic, Normocephalic  EYES: EOMI, PERRLA, conjunctiva and sclera clear  NECK: Supple, No JVD, Normal thyroid  NERVOUS SYSTEM:  Alert & Oriented x 1, Good concentration;   CHEST/LUNG: Clear to percussion bilaterally; No rales, rhonchi, wheezing, or rubs  HEART: Regular rate and rhythm; No murmurs, rubs, or gallops  ABDOMEN: Soft, Nontender, Nondistended; Bowel sounds present  EXTREMITIES:  2+ Peripheral Pulses, No clubbing, cyanosis, or edema  LYMPH: No lymphadenopathy noted  SKIN: No rashes or lesions    LAB   @ 17:43  amylase --   lipase 61                           12.6   28.13 )-----------( 400      ( 24 Dec 2021 08:30 )             38.0       CBC:   @ 08:30  WBC 28.13   Hgb 12.6   Hct 38.0   Plts 400  MCV 87.8   @ 17:33  WBC 28.24   Hgb 12.1   Hct 37.2   Plts 378  MCV 89.4   @ 17:43  WBC 24.83   Hgb 13.0   Hct 39.3   Plts 425  MCV 87.9      Chemistry:   @ 08:30  Na+ 127  K+ 3.3  Cl- 97  CO2 19  BUN 15  Cr 0.80      @ 17:33  Na+ 133  K+ 2.8  Cl- 98  CO2 25  BUN 18  Cr 0.90      17:43  Na+ 137  K+ 2.8  Cl- 100  CO2 25  BUN 23  Cr 0.83         Glucose, Serum: 133 mg/dL ( @ 08:30)  Glucose, Serum: 135 mg/dL ( @ 17:33)  Glucose, Serum: 154 mg/dL ( @ 17:43)      24 Dec 2021 08:30    127    |  97     |  15     ----------------------------<  133    3.3     |  19     |  0.80   23 Dec 2021 17:33    133    |  98     |  18     ----------------------------<  135    2.8     |  25     |  0.90   22 Dec 2021 17:43    137    |  100    |  23     ----------------------------<  154    2.8     |  25     |  0.83     Ca    8.5        24 Dec 2021 08:30  Ca    8.5        23 Dec 2021 17:33  Ca    9.2        22 Dec 2021 17:43  Phos  2.2       24 Dec 2021 08:30  Phos  1.6       23 Dec 2021 17:33  Mg     1.8       24 Dec 2021 08:30  Mg     2.4       23 Dec 2021 17:33  Mg     2.4       22 Dec 2021 22:54    TPro  6.8    /  Alb  2.4    /  TBili  0.7    /  DBili  0.3    /  AST  38     /  ALT  32     /  AlkPhos  132    24 Dec 2021 08:30  TPro  7.7    /  Alb  3.3    /  TBili  0.6    /  DBili  x      /  AST  23     /  ALT  21     /  AlkPhos  106    22 Dec 2021 17:43          Urinalysis Basic - ( 22 Dec 2021 20:35 )    Color: Yellow / Appearance: Clear / S.010 / pH: x  Gluc: x / Ketone: Trace  / Bili: Negative / Urobili: Negative mg/dL   Blood: x / Protein: 30 mg/dL / Nitrite: Negative   Leuk Esterase: Trace / RBC: 6-10 /HPF / WBC 6-10   Sq Epi: x / Non Sq Epi: Few / Bacteria: Few        CAPILLARY BLOOD GLUCOSE          C-Reactive Protein, Serum: 369 mg/L (12-24 @ 10:38)      RADIOLOGY & ADDITIONAL TESTS:    Imaging Personally Reviewed:  [ ] YES  [ ] NO    Consultant(s) Notes Reviewed:  [ ] YES  [ ] NO    Care Discussed with Consultants/Other Providers [ ] YES  [ ] NO Patient is a 81y old  Female who presents with a chief complaint of diverticulitis (23 Dec 2021 23:43)      HPI:  Patient is a 81F with a PMH of HTN, HLD, hypothyroidism, depression, seizures who presents to the ED for abd pain.  Patient states that over the last two days she had developed significant abdominal pain and multiple episodes of watery diarrhea.  Reports one episode of nausea and vomiting earlier today.  Patient has no other complaints.  Vitals stable,  labs show leukocytosis and hypokalemia.  CT abdomen significant for diverticulitis.  Will admit to med surg.     (23 Dec 2021 00:17)      INTERVAL HPI/OVERNIGHT EVENTS: Patient seen earlier today  MS slightly better. C/O or RLQ pain. No N/V or diarrhea On clear liquids    MEDICATIONS  (STANDING):  amLODIPine   Tablet 10 milliGRAM(s) Oral daily  aspirin  chewable 81 milliGRAM(s) Oral daily  enoxaparin Injectable 40 milliGRAM(s) SubCutaneous daily  escitalopram 20 milliGRAM(s) Oral daily  levETIRAcetam 500 milliGRAM(s) Oral two times a day  levothyroxine 50 MICROGram(s) Oral daily  piperacillin/tazobactam IVPB.. 3.375 Gram(s) IV Intermittent every 8 hours  simvastatin 20 milliGRAM(s) Oral at bedtime    MEDICATIONS  (PRN):  acetaminophen     Tablet .. 650 milliGRAM(s) Oral every 8 hours PRN Temp greater or equal to 38.5C (101.3F), Mild Pain (1 - 3)  morphine  - Injectable 2 milliGRAM(s) IV Push every 6 hours PRN Moderate Pain (4 - 6)      FAMILY HISTORY:  FH: HTN (hypertension)        Allergies    No Known Allergies    Intolerances        PMH/PSH:  Seizure    HTN (hypertension)    Glaucoma    Thyroid disease    Blood clot of neck vein    TIA (transient ischemic attack)    S/P appendectomy          REVIEW OF SYSTEMS:  CONSTITUTIONAL: No fever, weight loss,   EYES: No eye pain, visual disturbances, or discharge  ENMT:  No difficulty hearing, tinnitus, vertigo; No sinus or throat pain  NECK: No pain or stiffness  BREASTS: No pain, masses, or nipple discharge  RESPIRATORY: No cough, wheezing, chills or hemoptysis;   CARDIOVASCULAR: No chest pain, palpitations, dizziness, or leg swelling  GASTROINTESTINAL: See above  GENITOURINARY: No dysuria, frequency, hematuria, or incontinence  NEUROLOGICAL: No headaches, memory loss, loss of strength, numbness, or tremors  SKIN: No itching, burning, rashes, or lesions   LYMPH NODES: No enlarged glands  ENDOCRINE: No heat or cold intolerance; No hair loss  MUSCULOSKELETAL: No joint pain or swelling; No muscle, back, or extremity pain  PSYCHIATRIC: No depression, anxiety, mood swings, or difficulty sleeping  HEME/LYMPH: No easy bruising, or bleeding gums  ALLERGY AND IMMUNOLOGIC: No hives or eczema    Vital Signs Last 24 Hrs  T(C): 37.2 (24 Dec 2021 12:00), Max: 37.2 (24 Dec 2021 12:00)  T(F): 99 (24 Dec 2021 12:00), Max: 99 (24 Dec 2021 12:00)  HR: 117 (24 Dec 2021 12:) (98 - 117)  BP: 155/88 (24 Dec 2021 12:00) (144/78 - 158/80)  BP(mean): --  RR: 20 (24 Dec 2021 12:00) (18 - 20)  SpO2: 93% (24 Dec 2021 12:00) (90% - 95%)    PHYSICAL EXAM:  GENERAL: NAD, well-groomed, well-developed  HEAD:  Atraumatic, Normocephalic  EYES: EOMI, PERRLA, conjunctiva and sclera clear  NECK: Supple, No JVD, Normal thyroid  NERVOUS SYSTEM:  Alert & Oriented x 1, Good concentration;   CHEST/LUNG: Clear to percussion bilaterally; No rales, rhonchi, wheezing, or rubs  HEART: Regular rate and rhythm; No murmurs, rubs, or gallops  ABDOMEN: Soft, Minimal RLQ tenderness, Nondistended; Bowel sounds present  EXTREMITIES:  2+ Peripheral Pulses, No clubbing, cyanosis, or edema  LYMPH: No lymphadenopathy noted  SKIN: No rashes or lesions    LAB   @ 17:43  amylase --   lipase 61                           12.6   28.13 )-----------( 400      ( 24 Dec 2021 08:30 )             38.0       CBC:   @ 08:30  WBC 28.13   Hgb 12.6   Hct 38.0   Plts 400  MCV 87.8   @ 17:33  WBC 28.24   Hgb 12.1   Hct 37.2   Plts 378  MCV 89.4   @ 17:43  WBC 24.83   Hgb 13.0   Hct 39.3   Plts 425  MCV 87.9      Chemistry:   @ 08:30  Na+ 127  K+ 3.3  Cl- 97  CO2 19  BUN 15  Cr 0.80      @ 17:33  Na+ 133  K+ 2.8  Cl- 98  CO2 25  BUN 18  Cr 0.90      17:43  Na+ 137  K+ 2.8  Cl- 100  CO2 25  BUN 23  Cr 0.83         Glucose, Serum: 133 mg/dL ( @ 08:30)  Glucose, Serum: 135 mg/dL ( @ 17:33)  Glucose, Serum: 154 mg/dL ( @ 17:43)      24 Dec 2021 08:30    127    |  97     |  15     ----------------------------<  133    3.3     |  19     |  0.80   23 Dec 2021 17:33    133    |  98     |  18     ----------------------------<  135    2.8     |  25     |  0.90   22 Dec 2021 17:43    137    |  100    |  23     ----------------------------<  154    2.8     |  25     |  0.83     Ca    8.5        24 Dec 2021 08:30  Ca    8.5        23 Dec 2021 17:33  Ca    9.2        22 Dec 2021 17:43  Phos  2.2       24 Dec 2021 08:30  Phos  1.6       23 Dec 2021 17:33  Mg     1.8       24 Dec 2021 08:30  Mg     2.4       23 Dec 2021 17:33  Mg     2.4       22 Dec 2021 22:54    TPro  6.8    /  Alb  2.4    /  TBili  0.7    /  DBili  0.3    /  AST  38     /  ALT  32     /  AlkPhos  132    24 Dec 2021 08:30  TPro  7.7    /  Alb  3.3    /  TBili  0.6    /  DBili  x      /  AST  23     /  ALT  21     /  AlkPhos  106    22 Dec 2021 17:43          Urinalysis Basic - ( 22 Dec 2021 20:35 )    Color: Yellow / Appearance: Clear / S.010 / pH: x  Gluc: x / Ketone: Trace  / Bili: Negative / Urobili: Negative mg/dL   Blood: x / Protein: 30 mg/dL / Nitrite: Negative   Leuk Esterase: Trace / RBC: 6-10 /HPF / WBC 6-10   Sq Epi: x / Non Sq Epi: Few / Bacteria: Few        CAPILLARY BLOOD GLUCOSE          C-Reactive Protein, Serum: 369 mg/L (12-24 @ 10:38)      RADIOLOGY & ADDITIONAL TESTS:    Imaging Personally Reviewed:  [ ] YES  [ ] NO    Consultant(s) Notes Reviewed:  [ ] YES  [ ] NO    Care Discussed with Consultants/Other Providers [ ] YES  [ ] NO

## 2021-12-24 NOTE — PROGRESS NOTE ADULT - ASSESSMENT
HPI:  Patient is a 81F with a PMH of HTN, HLD, hypothyroidism, depression, seizures who presents to the ED for abd pain.  Patient states that over the last two days she had developed significant abdominal pain and multiple episodes of watery diarrhea.  Reports one episode of nausea and vomiting earlier today.  Patient has no other complaints.  Vitals stable,  labs show leukocytosis and hypokalemia.  CT abdomen significant for diverticulitis.  Will admit to med surg.     (23 Dec 2021 00:17)  ---- As Above ---- Patient is a poor historian. Son's phone number not available ( Aid / patient does not have the number )  The patient appears sluggish at the present time. Presents with N/V, abdominal pain, and diarrhea. Started a few days ago  Patient denies having a colonoscopy or diverticulitis. Patient is unsure if she is getting better.     Patient has a tray of solid food but does not want to eat it ( no appetite )    [id number]  ---------  Diverticulitis - MS appears slightly better today.  Never had a colonoscopy. See CT scan . Afebrile, minimal tenderness on exam, WBC 24.83 --> 28.13 On Zosyn  1) Continue Clear liquid diet 2) F/U WBC 3) CRP 4) IV Fluids 5) Check repeat CT scan 6) Colonoscopy contraindicated at the present time.      ( 12/23/21 ===Spoke  with son  ( [phone] ) and nurse 1) Patients MS is usually much better than what she is revealing right now. 2) No history of colonoscopy  3) Patient experiencing constipation / diarrhea , lower abdominal pain, nausea for 3 days 4) History of alternating constipation / diarrhea in the past )

## 2021-12-24 NOTE — CHART NOTE - NSCHARTNOTEFT_GEN_A_CORE
CT results noted, perforated diverticulitis with early abscess formation   spoke with surgery , consult placed   ID updated

## 2021-12-24 NOTE — CONSULT NOTE ADULT - SUBJECTIVE AND OBJECTIVE BOX
GENERAL SURGERY CONSULT NOTE    Patient is a 81y old  Female who presents with a chief complaint of diverticulitis (24 Dec 2021 15:39)    HPI:  Patient is a 81F with a PMH of HTN, HLD, hypothyroidism, depression, seizures who presents to the ED for abd pain.  Patient states that over the last two days she had developed significant abdominal pain and multiple episodes of watery diarrhea.  Reports one episode of nausea and vomiting earlier today.  Patient has no other complaints.  Vitals stable,  labs show leukocytosis and hypokalemia.  CT abdomen significant for diverticulitis.  Will admit to med surg.     (23 Dec 2021 00:17)      PAST MEDICAL & SURGICAL HISTORY:  Seizure  HTN (hypertension)  Glaucoma  Thyroid disease  Blood clot of neck vein  left side  TIA (transient ischemic attack)  20 years ago  S/P appendectomy    Review of Systems:  Contained within HPI.    MEDICATIONS  (STANDING):  amLODIPine   Tablet 10 milliGRAM(s) Oral daily  aspirin  chewable 81 milliGRAM(s) Oral daily  enoxaparin Injectable 40 milliGRAM(s) SubCutaneous daily  escitalopram 20 milliGRAM(s) Oral daily  levETIRAcetam 500 milliGRAM(s) Oral two times a day  levothyroxine 50 MICROGram(s) Oral daily  piperacillin/tazobactam IVPB.. 3.375 Gram(s) IV Intermittent every 8 hours  simvastatin 20 milliGRAM(s) Oral at bedtime  sodium chloride 0.9%. 1000 milliLiter(s) (75 mL/Hr) IV Continuous <Continuous>  vancomycin  IVPB 1000 milliGRAM(s) IV Intermittent every 12 hours    MEDICATIONS  (PRN):  acetaminophen     Tablet .. 650 milliGRAM(s) Oral every 8 hours PRN Temp greater or equal to 38.5C (101.3F), Mild Pain (1 - 3)  morphine  - Injectable 2 milliGRAM(s) IV Push every 6 hours PRN Moderate Pain (4 - 6)      Allergies  No Known Allergies    SOCIAL HISTORY   Denies tobacco, or etoh use.     FAMILY HISTORY:  FH: HTN (hypertension)    Vital Signs Last 24 Hrs  T(C): 36.4 (24 Dec 2021 16:00), Max: 37.2 (24 Dec 2021 12:00)  T(F): 97.6 (24 Dec 2021 16:00), Max: 99 (24 Dec 2021 12:00)  HR: 104 (24 Dec 2021 16:00) (98 - 117)  BP: 125/68 (24 Dec 2021 16:00) (125/68 - 158/80)  RR: 20 (24 Dec 2021 16:00) (18 - 20)  SpO2: 90% (24 Dec 2021 16:00) (90% - 95%)    Physical Exam:    General: NAD. Lethargic.  Eyes : ALDAIR  HENT: WNL, no JVD  Chest:  clear breath sounds b/l  Cardiovascular: Tachycardic, regular, +S1S2  Abdomen: Mildly distended, +BS, soft, +TTP to lower abdomen R>L and LUQ with voluntary guarding, no rebound   Extremities: Calf nontender b/l.   Skin:  No rash  Musculoskeletal:  normal strength  Neuro/Psych:  Alert, oriented to time, place and person       LABS:                        12.   28.13 )-----------( 400      ( 24 Dec 2021 08:30 )             38.0     12-24    127<L>  |  97  |  15  ----------------------------<  133<H>  3.3<L>   |  19<L>  |  0.80    Ca    8.5      24 Dec 2021 08:30  Phos  2.2     12-24  Mg     1.8     12-24    TPro  6.8  /  Alb  2.4<L>  /  TBili  0.7  /  DBili  0.3  /  AST  38<H>  /  ALT  32  /  AlkPhos  132<H>  12-24      Urinalysis Basic - ( 22 Dec 2021 20:35 )    Color: Yellow / Appearance: Clear / S.010 / pH: x  Gluc: x / Ketone: Trace  / Bili: Negative / Urobili: Negative mg/dL   Blood: x / Protein: 30 mg/dL / Nitrite: Negative   Leuk Esterase: Trace / RBC: 6-10 /HPF / WBC 6-10   Sq Epi: x / Non Sq Epi: Few / Bacteria: Few      RADIOLOGY & ADDITIONAL STUDIES:  < from: CT Abdomen and Pelvis w/ IV Cont (21 @ 14:44) >  IMPRESSION: Interval perforation of previously seen sigmoid colon   diverticulitis with localized fluid collection seen between the uterus   and the sigmoid colon in the pelvis suggesting early abscess formation.  Linear atelectasis versus infiltrates at the lungbases.  Minimal bilateral pleural effusions.  Distended gallbladder with gallstones without CT evidence of acute   cholecystitis  Results were discussed with the patient's nurse, Livan at 4:20 PM on   2021    --- End of Report ---      ROSAURA DOOLEY MD; Attending Radiologist  This document has been electronically signed. Dec 24 2021  4:21PM    < end of copied text >    < from: CT Chest w/ IV Cont (21 @ 14:44) >  PROCEDURE DATE:  2021          INTERPRETATION:  CLINICAL INFORMATION: Fever. Hypoxia. Follow-up    COMPARISON: 2021, CT scan of the abdomen and pelvis    CONTRAST/COMPLICATIONS:  IV Contrast: Omnipaque 350  90 cc administered   10 cc discarded  Oral Contrast: NONE  Complications: None reported at time of study completion    PROCEDURE:  CT of the Chest, Abdomen and Pelvis was performed.  Sagittal and coronal reformats were performed. Patient was unable to   raise the arms above the head for the study    FINDINGS:  CHEST:  LUNGS AND LARGE AIRWAYS: Patent central airways. No pulmonary nodules.   Bibasilar linear atelectasis versus infiltrates  PLEURA: Minimal bilateral pleural effusions.  VESSELS: Atherosclerotic changes of the aorta and coronary arteries.  HEART: Heart size is normal. No pericardial effusion.  MEDIASTINUM AND SARAH: No lymphadenopathy.  CHEST WALL AND LOWER NECK: Within normal limits.    ABDOMEN AND PELVIS:  LIVER: Within normal limits.  BILE DUCTS: Normal caliber.  GALLBLADDER: Distended with multiple small stones  SPLEEN: Within normal limits.  PANCREAS: Within normal limits.  ADRENALS: Within normal limits.  KIDNEYS/URETERS: Within normal limits.    BLADDER: Within normal limits.  REPRODUCTIVE ORGANS: Uterus and adnexa within normal limits.    BOWEL: No bowel obstruction. Appendix is not visualized. No evidence of   inflammation in the pericecal region.  PERITONEUM: Free intraperitoneal air is new from the prior study   suggesting perforation of diverticulitis. There is also a localized fluid   and air collection in the pelvis between the uterus and sigmoid colon   consistent with phlegmon/developing abscess measuring 6.5 cm transverse   by 4.0 cm in length by 6.9 cm AP. Inflammatory changes of the sigmoid   extend to adjacent small bowel loops in the right lower quadrant. Small   hiatal hernia is again seen  VESSELS:Atherosclerotic changes. Incidental note is made of retroaortic   left renal vein, a normal variant  RETROPERITONEUM/LYMPH NODES: No lymphadenopathy.  ABDOMINAL WALL: Tiny fat-containing umbilical hernia.  BONES: Degenerative changes.    IMPRESSION: Interval perforation of previously seen sigmoid colon   diverticulitis with localized fluid collection seen between the uterus   and the sigmoid colon in the pelvis suggesting early abscess formation.  Linear atelectasis versus infiltrates at the lungbases.  Minimal bilateral pleural effusions.  Distended gallbladder with gallstones without CT evidence of acute   cholecystitis  Results were discussed with the patient's nurseLivan at 4:20 PM on   2021    --- End of Report ---        ROSAURA DOOLEY MD; Attending Radiologist  This document has been electronically signed. Dec 24 2021  4:21PM    < end of copied text >

## 2021-12-24 NOTE — PROGRESS NOTE ADULT - SUBJECTIVE AND OBJECTIVE BOX
HPI:  Patient is a 81F with a PMH of HTN, HLD, hypothyroidism, depression, seizures who presents to the ED for abd pain.  Patient states that over the last two days she had developed significant abdominal pain and multiple episodes of watery diarrhea.  Reports one episode of nausea and vomiting earlier today.  Patient has no other complaints.  Vitals stable,  labs show leukocytosis and hypokalemia.  CT abdomen significant for diverticulitis.  Will admit to med surg.     (23 Dec 2021 00:17)      SUBJECTIVE & OBJECTIVE:   Pt seen and examined at bedside.   overnight patient became hypoxic, was placed on NRB by nursing staff   see chart note from : today       PHYSICAL EXAM:  HR: 117 (21 @ 12:00) (98 - 117)  BP: 155/88 (21 @ 12:00) (144/78 - 158/80)  RR: 20 (21 @ 12:00)  SpO2: 93% (21 @ 12:00) (90% - 95%)  Wt(kg): --        GENERAL: no increased WOB, not using accessory muscles, on 5L NC SpO2 91-92%   HEAD:  Atraumatic, Normocephalic  EYES: EOMI, PERRLA, conjunctiva and sclera clear  ENMT: Moist mucous membranes  NECK: Supple, No JVD  NERVOUS SYSTEM:  Alert & Oriented X1-2, moving all extremities, no facial droop, not slurring words , confused   CHEST/LUNG: Clear to auscultation bilaterally; No rales, rhonchi, wheezing, or rubs  HEART: Regular rate and rhythm; No murmurs, rubs, or gallops  ABDOMEN: Soft, TTP mostly RLQ; Bowel sounds present, no guarding or rigidity   EXTREMITIES:  2+ Peripheral Pulses b/l, No clubbing, cyanosis, calf tenderness or edema b/l    MEDICATIONS  (STANDING):  amLODIPine   Tablet 10 milliGRAM(s) Oral daily  ARIPiprazole 2 milliGRAM(s) Oral daily  aspirin  chewable 81 milliGRAM(s) Oral daily  enoxaparin Injectable 40 milliGRAM(s) SubCutaneous daily  escitalopram 20 milliGRAM(s) Oral daily  lactated ringers. 1000 milliLiter(s) (75 mL/Hr) IV Continuous <Continuous>  levETIRAcetam 500 milliGRAM(s) Oral two times a day  levothyroxine 50 MICROGram(s) Oral daily  piperacillin/tazobactam IVPB.. 3.375 Gram(s) IV Intermittent every 8 hours  potassium phosphate IVPB 15 milliMole(s) IV Intermittent once  simvastatin 20 milliGRAM(s) Oral at bedtime    MEDICATIONS  (PRN):  morphine  - Injectable 2 milliGRAM(s) IV Push every 6 hours PRN Moderate Pain (4 - 6)      LABS:                                     12.6   28.13 )-----------( 400      ( 24 Dec 2021 08:30 )             38.0       12-24    127<L>  |  97  |  15  ----------------------------<  133<H>  3.3<L>   |  19<L>  |  0.80    Ca    8.5      24 Dec 2021 08:30  Phos  2.2     12-24  Mg     1.8     12-24    TPro  6.8  /  Alb  2.4<L>  /  TBili  0.7  /  DBili  0.3  /  AST  38<H>  /  ALT  32  /  AlkPhos  132<H>  12-24            Urinalysis Basic - ( 22 Dec 2021 20:35 )    Color: Yellow / Appearance: Clear / S.010 / pH: x  Gluc: x / Ketone: Trace  / Bili: Negative / Urobili: Negative mg/dL   Blood: x / Protein: 30 mg/dL / Nitrite: Negative   Leuk Esterase: Trace / RBC: 6-10 /HPF / WBC 6-10   Sq Epi: x / Non Sq Epi: Few / Bacteria: Few      Magnesium, Serum: 2.4 mg/dL ( @ 17:33)    CAPILLARY BLOOD GLUCOSE                RECENT CULTURES:      RADIOLOGY & ADDITIONAL TESTS:  < from: CT Abdomen and Pelvis w/ IV Cont (21 @ 19:19) >    ACC: [id number] EXAM:  CT ABDOMEN AND PELVIS IC                          PROCEDURE DATE:  2021          INTERPRETATION:  CLINICAL INFORMATION: Diffuse abdominal pain and   diarrhea. History of appendectomy.    COMPARISON: None.    CONTRAST/COMPLICATIONS:  IV Contrast: Omnipaque 350  95 cc administered   5 cc discarded  Oral Contrast: NONE  Complications: None reported at time of study completion    PROCEDURE:  CT of the Abdomen and Pelvis was performed.  Sagittal and coronal reformats were performed.    FINDINGS:  LOWER CHEST: Small bilateral atelectasis.    LIVER: Within normal limits.  BILE DUCTS: Normal caliber.  GALLBLADDER: Small gallstones in the distended gallbladder. No evidence   for thickened gallbladder wall or pericholecystic fluid.  SPLEEN: Within normal limits.  PANCREAS: Within normal limits.  ADRENALS: Within normal limits.  KIDNEYS/URETERS: Within normal limits. No evidence for a ureteral   calculus. No hydronephrosis.    BLADDER: Within normal limits.  REPRODUCTIVEORGANS: A few small enhancing lesions and a small   calcification in the uterus, likely representing fibroids. The adnexa   appear grossly unremarkable.    BOWEL: No bowel obstruction. Appendix surgically absent. Sigmoid   diverticulosis with adjacent stranding, compatible with diverticulitis.   No associated abscess is identified. Trace adjacent pelvic free fluid.   Small hiatal hernia.  PERITONEUM: No free air.  VESSELS: Calcified atherosclerotic disease.  RETROPERITONEUM/LYMPH NODES: No lymphadenopathy.  ABDOMINAL WALL: Small fat-containing umbilical hernia.  BONES: Degenerative spondylosis. Grade 1 anterolisthesis at L4-5 and L3-4.    IMPRESSION:  Sigmoid diverticulosis with adjacent stranding, compatible with   diverticulitis. No associated abscess is identified. Trace adjacent   pelvic free fluid. If clinically indicated, follow-up colonoscopy after   treatment/resolution of acute disease may be pursued to rule out colon   cancer.    Small gallstones in the distended gallbladder. Noevidence for thickened   gallbladder wall or pericholecystic fluid.. If there is a clinical   suspicion for acute cholecystitis, gallbladder ultrasound may be pursued   for further evaluation.    --- End of Report ---            Imaging Personally Reviewed:  [ ] YES  [ ] NO    Consultant(s) Notes Reviewed:  [x ] YES  [ ] NO    Care Discussed with Consultants/Other Providers [x ] YES  [ ] NO  Care discussed in detail with patient.  All questions and concerns addressed

## 2021-12-24 NOTE — CHART NOTE - NSCHARTNOTEFT_GEN_A_CORE
Patient seen at bedside for a follow up. C/o abdominal pain and SOB. Denies nausea/vomiting, chest pain, dizziness.    Vitals:  Temp: 97.3  HR: 160-170s  BP: 126/78  RR: 20  91% on 6L NC    PHYSICAL EXAM:  GENERAL: NAD  HEAD:  Atraumatic, Normocephalic  CHEST/LUNG: Clear to ausculation, bilaterally   HEART: RRR S1S2  ABDOMEN: softly distended, hypoactive BS, soft, lower abdominal tenderness  : Boucher indwelling with clear yellow urine, 700cc since placement at around 1830  EXTREMITIES: calf soft, non tender b/l                          13.3  7.29 )-----------( 449      ( 24 Dec 2021 18:28 )             40.6     12-24    130<L>  |  98  |  15  ----------------------------<  142<H>  2.9<LL>   |  20<L>  |  0.86    Ca    7.7<L>      24 Dec 2021 18:28  Phos  1.9     12-24  Mg     2.1     12-24    TPro  6.8  /  Alb  2.4<L>  /  TBili  0.7  /  DBili  0.3  /  AST  38<H>  /  ALT  32  /  AlkPhos  132<H>  12-24    Lactate: 2.2      < from: CT Abdomen and Pelvis w/ IV Cont (12.24.21 @ 14:44) >  IMPRESSION: Interval perforation of previously seen sigmoid colon   diverticulitis with localized fluid collection seen between the uterus   and the sigmoid colon in the pelvis suggesting early abscess formation.  Linear atelectasis versus infiltrates at the lungbases.  Minimal bilateral pleural effusions.  Distended gallbladder with gallstones without CT evidence of acute   cholecystitis  < end of copied text >        A/P: 81F with PMH of HTN, HLD, hypothyroidism, depression, seizures who presents to the ED for abd pain, admitted with Acute Sigmoid Diverticulitis.  Surgery consulted for interval perforated sigmoid diverticulitis with developing abscess.     -Emergent OR booked with nursing supervisor  -Pre-op labs, NPO, NGT, IV hydration  -On Zosyn/Vanco  -Boucher  -GI ppx, DVT ppx  -2 units of PRBC on hold for the OR  -CXR, EKG  -ICU consulted  -Electrolytes repleted  -COVID negative  -Discussed with Dr. Castro Patient seen at bedside for a follow up. C/o abdominal pain and SOB. Denies nausea/vomiting, chest pain, dizziness.    Vitals:  Temp: 97.3  HR: 160-170s  BP: 126/78  RR: 20  91% on 6L NC    PHYSICAL EXAM:  GENERAL: NAD  HEAD:  Atraumatic, Normocephalic  CHEST/LUNG: Clear to ausculation, bilaterally   HEART: RRR S1S2  ABDOMEN: softly distended, hypoactive BS, soft, lower abdominal tenderness  : Boucher indwelling with clear yellow urine, 700cc since placement at around 1830  EXTREMITIES: calf soft, non tender b/l                          13.3  7.29 )-----------( 449      ( 24 Dec 2021 18:28 )             40.6     12-24    130<L>  |  98  |  15  ----------------------------<  142<H>  2.9<LL>   |  20<L>  |  0.86    Ca    7.7<L>      24 Dec 2021 18:28  Phos  1.9     12-24  Mg     2.1     12-24    TPro  6.8  /  Alb  2.4<L>  /  TBili  0.7  /  DBili  0.3  /  AST  38<H>  /  ALT  32  /  AlkPhos  132<H>  12-24    Lactate: 2.2      < from: CT Abdomen and Pelvis w/ IV Cont (12.24.21 @ 14:44) >  IMPRESSION: Interval perforation of previously seen sigmoid colon   diverticulitis with localized fluid collection seen between the uterus   and the sigmoid colon in the pelvis suggesting early abscess formation.  Linear atelectasis versus infiltrates at the lungbases.  Minimal bilateral pleural effusions.  Distended gallbladder with gallstones without CT evidence of acute   cholecystitis  < end of copied text >        A/P: 81F with PMH of HTN, HLD, hypothyroidism, depression, seizures who presents to the ED for abd pain, admitted with Acute Sigmoid Diverticulitis.  Surgery consulted for interval perforated sigmoid diverticulitis with developing abscess.     -Emergent OR booked with nursing supervisor  -Pre-op labs, NPO, NGT, IV hydration  -STAT EKG: Afib with RVR. Lopressor 5mg given  -On Zosyn/Vanco  -Boucher  -GI ppx, DVT ppx  -2 units of PRBC on hold for the OR  -CXR, EKG  -ICU consulted  -Electrolytes repleted  -COVID negative  -Discussed with pt's son Blake Allred ([phone]) and pt's significant other Reece ([phone])  -Discussed with Dr. Castro Patient seen at bedside for a follow up. C/o abdominal pain and SOB. Denies nausea/vomiting, chest pain, dizziness.    Vitals:  Temp: 97.3  HR: 160-170s  BP: 126/78  RR: 20  91% on 6L NC    PHYSICAL EXAM:  GENERAL: NAD  HEAD:  Atraumatic, Normocephalic  CHEST/LUNG: Clear to ausculation, bilaterally   HEART: RRR S1S2  ABDOMEN: softly distended, hypoactive BS, lower abdominal tenderness  : Boucher indwelling with clear yellow urine, 700cc since placement at around 1830  EXTREMITIES: calf soft, non tender b/l                          13.3  7.29 )-----------( 449      ( 24 Dec 2021 18:28 )             40.6     12-24    130<L>  |  98  |  15  ----------------------------<  142<H>  2.9<LL>   |  20<L>  |  0.86    Ca    7.7<L>      24 Dec 2021 18:28  Phos  1.9     12-24  Mg     2.1     12-24    TPro  6.8  /  Alb  2.4<L>  /  TBili  0.7  /  DBili  0.3  /  AST  38<H>  /  ALT  32  /  AlkPhos  132<H>  12-24    Lactate: 2.2      < from: CT Abdomen and Pelvis w/ IV Cont (12.24.21 @ 14:44) >  IMPRESSION: Interval perforation of previously seen sigmoid colon   diverticulitis with localized fluid collection seen between the uterus   and the sigmoid colon in the pelvis suggesting early abscess formation.  Linear atelectasis versus infiltrates at the lungbases.  Minimal bilateral pleural effusions.  Distended gallbladder with gallstones without CT evidence of acute   cholecystitis  < end of copied text >        A/P: 81F with PMH of HTN, HLD, hypothyroidism, depression, seizures who presents to the ED for abd pain, admitted with Acute Sigmoid Diverticulitis.  Surgery consulted for interval perforated sigmoid diverticulitis with developing abscess.     -Emergent OR booked with nursing supervisor  -Pre-op labs, NPO, NGT, IV hydration  -STAT EKG: Afib with RVR. Lopressor 5mg given  -On Zosyn/Vanco  -Boucher  -GI ppx, DVT ppx  -2 units of PRBC on hold for the OR  -CXR, EKG  -ICU consulted  -Electrolytes repleted  -COVID negative  -Discussed with pt's son Blake Allred ([phone]) and pt's significant other Reece ([phone])  -Discussed with Dr. Castro

## 2021-12-24 NOTE — CONSULT NOTE ADULT - SUBJECTIVE AND OBJECTIVE BOX
Patient is a 81y old  Female who presents with a chief complaint of diverticulitis (24 Dec 2021 17:12)    HPI:  Patient is a 81F with a PMH of HTN, HLD, hypothyroidism, depression, seizures who presents to the ED for abd pain.  Patient states that over the last two days she had developed significant abdominal pain and multiple episodes of watery diarrhea.  Reports one episode of nausea and vomiting earlier today.  Patient has no other complaints.  Vitals stable,  labs show leukocytosis and hypokalemia.  CT abdomen significant for diverticulitis.  Will admit to med surg.    (23 Dec 2021 00:17)    Allergies  No Known Allergies    MEDICATIONS  Meds: acetaminophen     Tablet .. 650 milliGRAM(s) Oral every 8 hours PRN Temp greater or equal to 38.5C (101.3F), Mild Pain (1 - 3)  acetaminophen   IVPB .. 1000 milliGRAM(s) IV Intermittent once PRN Moderate Pain (4 - 6)  escitalopram 20 milliGRAM(s) Oral daily  levETIRAcetam 500 milliGRAM(s) Oral two times a day  morphine  - Injectable 2 milliGRAM(s) IV Push every 6 hours PRN Moderate Pain (4 - 6)    RESPIRATORY  ABG - ( 24 Dec 2021 18:28 )  pH: x     /  pCO2: x     /  pO2: x     / HCO3: x     / Base Excess: x     /  SaO2: x       Lactate: 2.2      CARDIOVASCULAR  Meds: amLODIPine   Tablet 10 milliGRAM(s) Oral daily    GI/NUTRITION  Meds:   GENITOURINARY  Meds: potassium chloride  10 mEq/100 mL IVPB 10 milliEquivalent(s) IV Intermittent every 1 hour  potassium phosphate IVPB 30 milliMole(s) IV Intermittent once  sodium chloride 0.9%. 1000 milliLiter(s) IV Continuous <Continuous>    HEMATOLOGIC  Meds: aspirin  chewable 81 milliGRAM(s) Oral daily  enoxaparin Injectable 40 milliGRAM(s) SubCutaneous daily    [x] VTE Prophylaxis  INFECTIOUS DISEASES  Meds: piperacillin/tazobactam IVPB.. 3.375 Gram(s) IV Intermittent every 8 hours  vancomycin  IVPB 1000 milliGRAM(s) IV Intermittent every 12 hours    ENDOCRINE  CAPILLARY BLOOD GLUCOSE    Drug Dosing Weight  Height (cm): 167.6 (22 Dec 2021 16:00)  Weight (kg): 81 (23 Dec 2021 03:44)  BMI (kg/m2): 28.8 (23 Dec 2021 03:44)  BSA (m2): 1.91 (23 Dec 2021 03:44)    PAST MEDICAL & SURGICAL HISTORY:  Seizure  HTN (hypertension)  Glaucoma  Thyroid disease  Blood clot of neck vein, left side  TIA (transient ischemic attack) 20 years ago  S/P appendectomy    ADVANCE DIRECTIVES: Full Code    REVIEW OF SYSTEMS: Endorses abdominal pain. Endorses anxiety regarding OR. Denies CP, SOB, or other complaints.     Vital Signs Last 24 Hrs  T(C): 36.4 (24 Dec 2021 16:00), Max: 37.2 (24 Dec 2021 12:00)  T(F): 97.6 (24 Dec 2021 16:00), Max: 99 (24 Dec 2021 12:00)  HR: 104 (24 Dec 2021 16:00) (98 - 117)  BP: 125/68 (24 Dec 2021 16:00) (125/68 - 155/88)  RR: 20 (24 Dec 2021 16:00) (18 - 20)  SpO2: 90% (24 Dec 2021 16:00) (90% - 95%)    ABG - ( 24 Dec 2021 12:16 )  pH, Arterial: x     pH, Blood: 7.42  /  pCO2: 27    /  pO2: 60    / HCO3: 18    / Base Excess: -5.5  /  SaO2: 92.3      I&O's Detail    23 Dec 2021 07:01  -  24 Dec 2021 07:00  --------------------------------------------------------  IN:    Oral Fluid: 790 mL  Total IN: 790 mL    OUT:    Voided (mL): 650 mL  Total OUT: 650 mL    Total NET: 140 mL    24 Dec 2021 07:01  -  24 Dec 2021 21:01  --------------------------------------------------------  IN:    Oral Fluid: 360 mL  Total IN: 360 mL    OUT:    Voided (mL): 500 mL  Total OUT: 500 mL    Total NET: -140 mL    PHYSICAL EXAM  GENERAL: NAD, well-groomed, well-developed  HEAD:  Atraumatic, Normocephalic  EYES: EOMI, PERRL, conjunctiva and sclera clear  ENMT: No tonsillar erythema or exudates; Dry mucous membranes, normal dentition.  NECK: Supple, No JVD  NERVOUS SYSTEM: AAOx2, confusion (talking about her  who is not here). Moving all extremities, no focal neurological deficits.   CHEST/LUNG: CTABL, no wheezes/rales/rhonchi  HEART: Tachycardic, S1S2, no murmurs/rubs/gallops  ABDOMEN: Tender to palpation RLQ, no rigidity or guarding; hypoactive bowel sounds  EXTREMITIES:  2+ Peripheral Pulses, no clubbing/cyanosis/edema  SKIN: No rashes or lesions    LABS:                        13.3   7.29  )-----------( 449      ( 24 Dec 2021 18:28 )             40.6   12-24    130<L>  |  98  |  15  ----------------------------<  142<H>  2.9<LL>   |  20<L>  |  0.86    Ca    7.7<L>      24 Dec 2021 18:28  Phos  1.9     12-24  Mg     2.1     12-24    TPro  6.8  /  Alb  2.4<L>  /  TBili  0.7  /  DBili  0.3  /  AST  38<H>  /  ALT  32  /  AlkPhos  132<H>  12-24    PT/INR - ( 24 Dec 2021 18:28 )   PT: 15.7 sec;   INR: 1.37 ratio    PTT - ( 24 Dec 2021 18:28 )  PTT:34.2 sec    EKG: AFib RVR, , no STT changes    RADIOLOGY:  < from: CT Abdomen and Pelvis w/ IV Cont (12.22.21 @ 19:19) >  IMPRESSION:  Sigmoid diverticulosis with adjacent stranding, compatible with   diverticulitis. No associated abscess is identified. Trace adjacent   pelvic free fluid. If clinically indicated, follow-up colonoscopy after   treatment/resolution of acute disease may be pursued to rule out colon   cancer.  Small gallstones in the distended gallbladder. Noevidence for thickened   gallbladder wall or pericholecystic fluid.. If there is a clinical   suspicion for acute cholecystitis, gallbladder ultrasound may be pursued   for further evaluation.  < end of copied text >    < from: CT Abdomen and Pelvis w/ IV Cont (12.24.21 @ 14:44) >  IMPRESSION: Interval perforation of previously seen sigmoid colon   diverticulitis with localized fluid collection seen between the uterus   and the sigmoid colon in the pelvis suggesting early abscess formation.  Linear atelectasis versus infiltrates at the lungbases.  Minimal bilateral pleural effusions.  Distended gallbladder with gallstones without CT evidence of acute   cholecystitis  < end of copied text >

## 2021-12-24 NOTE — CONSULT NOTE ADULT - ASSESSMENT
A/P: 81F with PMH of HTN, HLD, hypothyroidism, depression, seizures who presents to the ED for abd pain, admitted with Acute Sigmoid Diverticulitis.  Surgery consulted for interval perforated sigmoid diverticulitis with developing abscess. WBC 24>28, Lactate 1.7>2.6. Tachycardic.    -IVF resuscitation, repeat lactate and labs STAT, replete lytes if needed  -Continue broad spectrum IV ABX  -Preop labs ordered, f/u blood cx x2  -Serial abd exams  -Boucher ordered for strict I/Os, monitor all urine output  -DVT ppx  -Medical optimization   -Low threshold for OR, Patient may need Ex rashawn, Hartmans tonight   -Discussed with Dr. Castro A/P: 81F with PMH of HTN, HLD, hypothyroidism, depression, seizures who presents to the ED for abd pain, admitted with Acute Sigmoid Diverticulitis.  Surgery consulted for interval perforated sigmoid diverticulitis with developing abscess. WBC 24>28, Lactate 1.7>2.6. Tachycardic.    -NPO, IVF resuscitation, repeat lactate and labs STAT, replete lytes if needed  -Continue broad spectrum IV ABX  -Preop labs ordered, f/u blood cx x2  -Serial abd exams  -Boucher ordered for strict I/Os, monitor all urine output  -DVT ppx  -Medical optimization   -Low threshold for OR, Patient may need Ex rashawn, Hartmans tonight   -Discussed with Dr. Castro

## 2021-12-24 NOTE — CONSULT NOTE ADULT - ASSESSMENT
HPI:  Patient is a 81F with a PMH of HTN, HLD, hypothyroidism, depression, seizures who presents to the ED for abd pain found to have diverticulitis   has rising leukocytosis   had fever yesterday   tachycardic and now hypoxic   CXR (I personally reviewed) low lung volumes   origincal CT (I personally reviewed) with diverticulitis   she has a lot of pain in the abdomen on exam       Plan:  continue zosyn  blood cultures x2  repeat covid swab   CT C/A/P with iv and contrast   o2 supplementation   chest PT   consider HIDA to look for acute ronni     Discussed with Dr. Jessica Dunn DO  Infectious Disease Attending  Pager [phone]  After 5pm/weekends please call [phone] for all inquiries and new consults

## 2021-12-24 NOTE — CHART NOTE - NSCHARTNOTEFT_GEN_A_CORE
Patient was evaluated at bedside.   patient was placed on NRB overnight 2/2 hypoxia and SOB    Vitals:   90% SpO2 on 5L NC   patient is AMS, protecting airway, opening eyes and responding but confused   not using accessory muscles  BP 140s/70s     Temp rectal 101F     No wheezing, crackles or rales b/l   no JVD   EOMI, PERRLA, conjunctiva clear   sinus tachy    abd obese, soft, mildly distended, TTP on RLQ, +BS, no guarding or rigidity   no edema or calf tenderness b/l     labs noted   ABG noted consistent with tachypnea   CXR clear   EKG: sinus tachy     replace electrolytes   obtain lactate, repeat RVP  CTAP and CT chest with IV contrast   upgrade to tele   place on venti for now   continue with IV abx   ID consult Patient was evaluated at bedside.   patient was placed on NRB overnight 2/2 hypoxia and SOB    Vitals:   90% SpO2 on 5L NC   patient is AMS, protecting airway, opening eyes and responding but confused   not using accessory muscles  BP 140s/70s     Temp rectal 101F     No wheezing, crackles or rales b/l   no JVD   EOMI, PERRLA, conjunctiva clear   sinus tachy    abd obese, soft, mildly distended, TTP on RLQ, +BS, no guarding or rigidity   no edema or calf tenderness b/l     labs noted   ABG noted consistent with tachypnea   CXR clear   EKG: sinus tachy     replace electrolytes   obtain lactate, repeat RVP  CTAP and CT chest with IV contrast   upgrade to tele   place on venti for now   continue with IV abx   ID consult    son--Blake  ( [phone] ) lives in [place]

## 2021-12-25 ENCOUNTER — TRANSCRIPTION ENCOUNTER (OUTPATIENT)
Age: 81
End: 2021-12-25

## 2021-12-25 LAB
ALBUMIN SERPL ELPH-MCNC: 1.1 G/DL — LOW (ref 3.3–5)
ALBUMIN SERPL ELPH-MCNC: 1.1 G/DL — LOW (ref 3.3–5)
ALP SERPL-CCNC: 63 U/L — SIGNIFICANT CHANGE UP (ref 40–120)
ALP SERPL-CCNC: 66 U/L — SIGNIFICANT CHANGE UP (ref 40–120)
ALT FLD-CCNC: 19 U/L — SIGNIFICANT CHANGE UP (ref 12–78)
ALT FLD-CCNC: 22 U/L — SIGNIFICANT CHANGE UP (ref 12–78)
ANION GAP SERPL CALC-SCNC: 9 MMOL/L — SIGNIFICANT CHANGE UP (ref 5–17)
ANION GAP SERPL CALC-SCNC: 9 MMOL/L — SIGNIFICANT CHANGE UP (ref 5–17)
APTT BLD: 35.4 SEC — SIGNIFICANT CHANGE UP (ref 27.5–35.5)
AST SERPL-CCNC: 21 U/L — SIGNIFICANT CHANGE UP (ref 15–37)
AST SERPL-CCNC: 22 U/L — SIGNIFICANT CHANGE UP (ref 15–37)
BASE EXCESS BLDA CALC-SCNC: -5 MMOL/L — LOW (ref -2–3)
BASE EXCESS BLDA CALC-SCNC: -8.3 MMOL/L — LOW (ref -2–3)
BILIRUB SERPL-MCNC: 0.3 MG/DL — SIGNIFICANT CHANGE UP (ref 0.2–1.2)
BILIRUB SERPL-MCNC: 0.4 MG/DL — SIGNIFICANT CHANGE UP (ref 0.2–1.2)
BLOOD GAS COMMENTS: SIGNIFICANT CHANGE UP
BLOOD GAS SOURCE: SIGNIFICANT CHANGE UP
BUN SERPL-MCNC: 13 MG/DL — SIGNIFICANT CHANGE UP (ref 7–23)
BUN SERPL-MCNC: 13 MG/DL — SIGNIFICANT CHANGE UP (ref 7–23)
CALCIUM SERPL-MCNC: 6.6 MG/DL — LOW (ref 8.5–10.1)
CALCIUM SERPL-MCNC: 7 MG/DL — LOW (ref 8.5–10.1)
CHLORIDE SERPL-SCNC: 102 MMOL/L — SIGNIFICANT CHANGE UP (ref 96–108)
CHLORIDE SERPL-SCNC: 106 MMOL/L — SIGNIFICANT CHANGE UP (ref 96–108)
CO2 BLDA-SCNC: 19 MMOL/L — SIGNIFICANT CHANGE UP (ref 19–24)
CO2 BLDA-SCNC: 20 MMOL/L — SIGNIFICANT CHANGE UP (ref 19–24)
CO2 SERPL-SCNC: 18 MMOL/L — LOW (ref 22–31)
CO2 SERPL-SCNC: 18 MMOL/L — LOW (ref 22–31)
CREAT ?TM UR-MCNC: 107 MG/DL — SIGNIFICANT CHANGE UP
CREAT SERPL-MCNC: 0.6 MG/DL — SIGNIFICANT CHANGE UP (ref 0.5–1.3)
CREAT SERPL-MCNC: 0.66 MG/DL — SIGNIFICANT CHANGE UP (ref 0.5–1.3)
CULTURE RESULTS: SIGNIFICANT CHANGE UP
GLUCOSE BLDC GLUCOMTR-MCNC: 109 MG/DL — HIGH (ref 70–99)
GLUCOSE BLDC GLUCOMTR-MCNC: 136 MG/DL — HIGH (ref 70–99)
GLUCOSE BLDC GLUCOMTR-MCNC: 153 MG/DL — HIGH (ref 70–99)
GLUCOSE SERPL-MCNC: 142 MG/DL — HIGH (ref 70–99)
GLUCOSE SERPL-MCNC: 149 MG/DL — HIGH (ref 70–99)
GRAM STN FLD: SIGNIFICANT CHANGE UP
HCO3 BLDA-SCNC: 18 MMOL/L — LOW (ref 21–28)
HCO3 BLDA-SCNC: 18 MMOL/L — LOW (ref 21–28)
HCT VFR BLD CALC: 35.2 % — SIGNIFICANT CHANGE UP (ref 34.5–45)
HCT VFR BLD CALC: 35.9 % — SIGNIFICANT CHANGE UP (ref 34.5–45)
HGB BLD-MCNC: 11.6 G/DL — SIGNIFICANT CHANGE UP (ref 11.5–15.5)
HGB BLD-MCNC: 11.9 G/DL — SIGNIFICANT CHANGE UP (ref 11.5–15.5)
HOROWITZ INDEX BLDA+IHG-RTO: 100 — SIGNIFICANT CHANGE UP
HOROWITZ INDEX BLDA+IHG-RTO: 100 — SIGNIFICANT CHANGE UP
INR BLD: 1.44 RATIO — HIGH (ref 0.88–1.16)
LACTATE SERPL-SCNC: 1.8 MMOL/L — SIGNIFICANT CHANGE UP (ref 0.7–2)
LACTATE SERPL-SCNC: 2.7 MMOL/L — HIGH (ref 0.7–2)
MAGNESIUM SERPL-MCNC: 1.2 MG/DL — LOW (ref 1.6–2.6)
MAGNESIUM SERPL-MCNC: 3.5 MG/DL — HIGH (ref 1.6–2.6)
MCHC RBC-ENTMCNC: 29.2 PG — SIGNIFICANT CHANGE UP (ref 27–34)
MCHC RBC-ENTMCNC: 29.2 PG — SIGNIFICANT CHANGE UP (ref 27–34)
MCHC RBC-ENTMCNC: 33 GM/DL — SIGNIFICANT CHANGE UP (ref 32–36)
MCHC RBC-ENTMCNC: 33.1 GM/DL — SIGNIFICANT CHANGE UP (ref 32–36)
MCV RBC AUTO: 88 FL — SIGNIFICANT CHANGE UP (ref 80–100)
MCV RBC AUTO: 88.7 FL — SIGNIFICANT CHANGE UP (ref 80–100)
NRBC # BLD: 0 /100 WBCS — SIGNIFICANT CHANGE UP (ref 0–0)
NRBC # BLD: 0 /100 WBCS — SIGNIFICANT CHANGE UP (ref 0–0)
OSMOLALITY UR: 517 MOSM/KG — SIGNIFICANT CHANGE UP (ref 50–1200)
PCO2 BLDA: 29 MMHG — LOW (ref 32–46)
PCO2 BLDA: 41 MMHG — SIGNIFICANT CHANGE UP (ref 32–46)
PH BLD: 7.26 — LOW (ref 7.35–7.45)
PH BLD: 7.41 — SIGNIFICANT CHANGE UP (ref 7.35–7.45)
PHOSPHATE SERPL-MCNC: 2.5 MG/DL — SIGNIFICANT CHANGE UP (ref 2.5–4.5)
PHOSPHATE SERPL-MCNC: 2.6 MG/DL — SIGNIFICANT CHANGE UP (ref 2.5–4.5)
PLATELET # BLD AUTO: 389 K/UL — SIGNIFICANT CHANGE UP (ref 150–400)
PLATELET # BLD AUTO: 426 K/UL — HIGH (ref 150–400)
PO2 BLDA: 194 MMHG — HIGH (ref 83–108)
PO2 BLDA: 72 MMHG — LOW (ref 83–108)
POTASSIUM SERPL-MCNC: 3.4 MMOL/L — LOW (ref 3.5–5.3)
POTASSIUM SERPL-MCNC: 4.3 MMOL/L — SIGNIFICANT CHANGE UP (ref 3.5–5.3)
POTASSIUM SERPL-SCNC: 3.4 MMOL/L — LOW (ref 3.5–5.3)
POTASSIUM SERPL-SCNC: 4.3 MMOL/L — SIGNIFICANT CHANGE UP (ref 3.5–5.3)
PROT SERPL-MCNC: 3.9 GM/DL — LOW (ref 6–8.3)
PROT SERPL-MCNC: 4.1 GM/DL — LOW (ref 6–8.3)
PROTHROM AB SERPL-ACNC: 16.4 SEC — HIGH (ref 10.6–13.6)
RBC # BLD: 3.97 M/UL — SIGNIFICANT CHANGE UP (ref 3.8–5.2)
RBC # BLD: 4.08 M/UL — SIGNIFICANT CHANGE UP (ref 3.8–5.2)
RBC # FLD: 13.7 % — SIGNIFICANT CHANGE UP (ref 10.3–14.5)
RBC # FLD: 13.8 % — SIGNIFICANT CHANGE UP (ref 10.3–14.5)
SAO2 % BLDA: 93.7 % — LOW (ref 94–98)
SAO2 % BLDA: 99.5 % — HIGH (ref 94–98)
SODIUM SERPL-SCNC: 129 MMOL/L — LOW (ref 135–145)
SODIUM SERPL-SCNC: 133 MMOL/L — LOW (ref 135–145)
SODIUM UR-SCNC: <20 MMOL/L — SIGNIFICANT CHANGE UP
SPECIMEN SOURCE: SIGNIFICANT CHANGE UP
SPECIMEN SOURCE: SIGNIFICANT CHANGE UP
UUN UR-MCNC: 489 MG/DL — SIGNIFICANT CHANGE UP
WBC # BLD: 11.48 K/UL — HIGH (ref 3.8–10.5)
WBC # BLD: 6.75 K/UL — SIGNIFICANT CHANGE UP (ref 3.8–10.5)
WBC # FLD AUTO: 11.48 K/UL — HIGH (ref 3.8–10.5)
WBC # FLD AUTO: 6.75 K/UL — SIGNIFICANT CHANGE UP (ref 3.8–10.5)

## 2021-12-25 PROCEDURE — 99291 CRITICAL CARE FIRST HOUR: CPT

## 2021-12-25 PROCEDURE — 93306 TTE W/DOPPLER COMPLETE: CPT | Mod: 26

## 2021-12-25 PROCEDURE — 99233 SBSQ HOSP IP/OBS HIGH 50: CPT

## 2021-12-25 PROCEDURE — 71045 X-RAY EXAM CHEST 1 VIEW: CPT | Mod: 26

## 2021-12-25 RX ORDER — MAGNESIUM SULFATE 500 MG/ML
2 VIAL (ML) INJECTION EVERY 4 HOURS
Refills: 0 | Status: COMPLETED | OUTPATIENT
Start: 2021-12-25 | End: 2021-12-25

## 2021-12-25 RX ORDER — DEXTROSE 50 % IN WATER 50 %
25 SYRINGE (ML) INTRAVENOUS ONCE
Refills: 0 | Status: DISCONTINUED | OUTPATIENT
Start: 2021-12-25 | End: 2021-12-26

## 2021-12-25 RX ORDER — PANTOPRAZOLE SODIUM 20 MG/1
40 TABLET, DELAYED RELEASE ORAL DAILY
Refills: 0 | Status: DISCONTINUED | OUTPATIENT
Start: 2021-12-25 | End: 2021-12-26

## 2021-12-25 RX ORDER — SODIUM CHLORIDE 9 MG/ML
1000 INJECTION, SOLUTION INTRAVENOUS
Refills: 0 | Status: DISCONTINUED | OUTPATIENT
Start: 2021-12-25 | End: 2021-12-26

## 2021-12-25 RX ORDER — CHLORHEXIDINE GLUCONATE 213 G/1000ML
1 SOLUTION TOPICAL
Refills: 0 | Status: DISCONTINUED | OUTPATIENT
Start: 2021-12-25 | End: 2021-12-26

## 2021-12-25 RX ORDER — POTASSIUM CHLORIDE 20 MEQ
10 PACKET (EA) ORAL
Refills: 0 | Status: COMPLETED | OUTPATIENT
Start: 2021-12-25 | End: 2021-12-25

## 2021-12-25 RX ORDER — LEVOTHYROXINE SODIUM 125 MCG
25 TABLET ORAL AT BEDTIME
Refills: 0 | Status: DISCONTINUED | OUTPATIENT
Start: 2021-12-25 | End: 2021-12-26

## 2021-12-25 RX ORDER — SODIUM CHLORIDE 9 MG/ML
1000 INJECTION, SOLUTION INTRAVENOUS ONCE
Refills: 0 | Status: COMPLETED | OUTPATIENT
Start: 2021-12-25 | End: 2021-12-25

## 2021-12-25 RX ORDER — DEXTROSE 50 % IN WATER 50 %
12.5 SYRINGE (ML) INTRAVENOUS ONCE
Refills: 0 | Status: DISCONTINUED | OUTPATIENT
Start: 2021-12-25 | End: 2021-12-26

## 2021-12-25 RX ORDER — GLUCAGON INJECTION, SOLUTION 0.5 MG/.1ML
1 INJECTION, SOLUTION SUBCUTANEOUS ONCE
Refills: 0 | Status: DISCONTINUED | OUTPATIENT
Start: 2021-12-25 | End: 2021-12-26

## 2021-12-25 RX ORDER — FLUCONAZOLE 150 MG/1
200 TABLET ORAL EVERY 24 HOURS
Refills: 0 | Status: DISCONTINUED | OUTPATIENT
Start: 2021-12-26 | End: 2021-12-26

## 2021-12-25 RX ORDER — PROPOFOL 10 MG/ML
10 INJECTION, EMULSION INTRAVENOUS
Qty: 1000 | Refills: 0 | Status: DISCONTINUED | OUTPATIENT
Start: 2021-12-25 | End: 2021-12-26

## 2021-12-25 RX ORDER — NOREPINEPHRINE BITARTRATE/D5W 8 MG/250ML
0.05 PLASTIC BAG, INJECTION (ML) INTRAVENOUS
Qty: 8 | Refills: 0 | Status: DISCONTINUED | OUTPATIENT
Start: 2021-12-25 | End: 2021-12-26

## 2021-12-25 RX ORDER — PHENYLEPHRINE HYDROCHLORIDE 10 MG/ML
0.1 INJECTION INTRAVENOUS
Qty: 40 | Refills: 0 | Status: DISCONTINUED | OUTPATIENT
Start: 2021-12-25 | End: 2021-12-26

## 2021-12-25 RX ORDER — FLUCONAZOLE 150 MG/1
400 TABLET ORAL ONCE
Refills: 0 | Status: COMPLETED | OUTPATIENT
Start: 2021-12-25 | End: 2021-12-25

## 2021-12-25 RX ORDER — VANCOMYCIN HCL 1 G
1250 VIAL (EA) INTRAVENOUS EVERY 12 HOURS
Refills: 0 | Status: DISCONTINUED | OUTPATIENT
Start: 2021-12-25 | End: 2021-12-25

## 2021-12-25 RX ORDER — ENOXAPARIN SODIUM 100 MG/ML
40 INJECTION SUBCUTANEOUS DAILY
Refills: 0 | Status: DISCONTINUED | OUTPATIENT
Start: 2021-12-25 | End: 2021-12-26

## 2021-12-25 RX ORDER — DORZOLAMIDE HYDROCHLORIDE TIMOLOL MALEATE 20; 5 MG/ML; MG/ML
1 SOLUTION/ DROPS OPHTHALMIC
Refills: 0 | Status: DISCONTINUED | OUTPATIENT
Start: 2021-12-25 | End: 2021-12-26

## 2021-12-25 RX ORDER — DEXTROSE 50 % IN WATER 50 %
15 SYRINGE (ML) INTRAVENOUS ONCE
Refills: 0 | Status: DISCONTINUED | OUTPATIENT
Start: 2021-12-25 | End: 2021-12-26

## 2021-12-25 RX ORDER — VANCOMYCIN HCL 1 G
1000 VIAL (EA) INTRAVENOUS EVERY 12 HOURS
Refills: 0 | Status: DISCONTINUED | OUTPATIENT
Start: 2021-12-25 | End: 2021-12-25

## 2021-12-25 RX ORDER — ALBUMIN HUMAN 25 %
100 VIAL (ML) INTRAVENOUS ONCE
Refills: 0 | Status: COMPLETED | OUTPATIENT
Start: 2021-12-25 | End: 2021-12-25

## 2021-12-25 RX ORDER — LEVETIRACETAM 250 MG/1
500 TABLET, FILM COATED ORAL EVERY 12 HOURS
Refills: 0 | Status: DISCONTINUED | OUTPATIENT
Start: 2021-12-25 | End: 2021-12-26

## 2021-12-25 RX ORDER — CHLORHEXIDINE GLUCONATE 213 G/1000ML
15 SOLUTION TOPICAL EVERY 12 HOURS
Refills: 0 | Status: DISCONTINUED | OUTPATIENT
Start: 2021-12-25 | End: 2021-12-26

## 2021-12-25 RX ORDER — FENTANYL CITRATE 50 UG/ML
0.5 INJECTION INTRAVENOUS
Qty: 2500 | Refills: 0 | Status: DISCONTINUED | OUTPATIENT
Start: 2021-12-25 | End: 2021-12-26

## 2021-12-25 RX ORDER — PIPERACILLIN AND TAZOBACTAM 4; .5 G/20ML; G/20ML
3.38 INJECTION, POWDER, LYOPHILIZED, FOR SOLUTION INTRAVENOUS EVERY 8 HOURS
Refills: 0 | Status: DISCONTINUED | OUTPATIENT
Start: 2021-12-25 | End: 2021-12-26

## 2021-12-25 RX ADMIN — Medication 25 GRAM(S): at 02:27

## 2021-12-25 RX ADMIN — PIPERACILLIN AND TAZOBACTAM 25 GRAM(S): 4; .5 INJECTION, POWDER, LYOPHILIZED, FOR SOLUTION INTRAVENOUS at 16:05

## 2021-12-25 RX ADMIN — Medication 25 GRAM(S): at 05:01

## 2021-12-25 RX ADMIN — Medication 166.67 MILLIGRAM(S): at 04:04

## 2021-12-25 RX ADMIN — SODIUM CHLORIDE 100 MILLILITER(S): 9 INJECTION, SOLUTION INTRAVENOUS at 16:18

## 2021-12-25 RX ADMIN — Medication 100 MILLIEQUIVALENT(S): at 03:01

## 2021-12-25 RX ADMIN — PANTOPRAZOLE SODIUM 40 MILLIGRAM(S): 20 TABLET, DELAYED RELEASE ORAL at 11:34

## 2021-12-25 RX ADMIN — Medication 100 MILLIEQUIVALENT(S): at 02:36

## 2021-12-25 RX ADMIN — ENOXAPARIN SODIUM 40 MILLIGRAM(S): 100 INJECTION SUBCUTANEOUS at 11:34

## 2021-12-25 RX ADMIN — PIPERACILLIN AND TAZOBACTAM 25 GRAM(S): 4; .5 INJECTION, POWDER, LYOPHILIZED, FOR SOLUTION INTRAVENOUS at 09:07

## 2021-12-25 RX ADMIN — PIPERACILLIN AND TAZOBACTAM 25 GRAM(S): 4; .5 INJECTION, POWDER, LYOPHILIZED, FOR SOLUTION INTRAVENOUS at 01:00

## 2021-12-25 RX ADMIN — CHLORHEXIDINE GLUCONATE 15 MILLILITER(S): 213 SOLUTION TOPICAL at 17:04

## 2021-12-25 RX ADMIN — CHLORHEXIDINE GLUCONATE 15 MILLILITER(S): 213 SOLUTION TOPICAL at 05:01

## 2021-12-25 RX ADMIN — Medication 1000 MILLIGRAM(S): at 10:27

## 2021-12-25 RX ADMIN — Medication 100 MILLIEQUIVALENT(S): at 02:27

## 2021-12-25 RX ADMIN — DORZOLAMIDE HYDROCHLORIDE TIMOLOL MALEATE 1 DROP(S): 20; 5 SOLUTION/ DROPS OPHTHALMIC at 17:04

## 2021-12-25 RX ADMIN — LEVETIRACETAM 420 MILLIGRAM(S): 250 TABLET, FILM COATED ORAL at 06:48

## 2021-12-25 RX ADMIN — CHLORHEXIDINE GLUCONATE 1 APPLICATION(S): 213 SOLUTION TOPICAL at 05:01

## 2021-12-25 RX ADMIN — SODIUM CHLORIDE 100 MILLILITER(S): 9 INJECTION, SOLUTION INTRAVENOUS at 02:29

## 2021-12-25 RX ADMIN — FLUCONAZOLE 100 MILLIGRAM(S): 150 TABLET ORAL at 00:33

## 2021-12-25 RX ADMIN — PROPOFOL 4.86 MICROGRAM(S)/KG/MIN: 10 INJECTION, EMULSION INTRAVENOUS at 00:34

## 2021-12-25 RX ADMIN — PROPOFOL 4.86 MICROGRAM(S)/KG/MIN: 10 INJECTION, EMULSION INTRAVENOUS at 13:54

## 2021-12-25 RX ADMIN — FENTANYL CITRATE 4.05 MICROGRAM(S)/KG/HR: 50 INJECTION INTRAVENOUS at 00:33

## 2021-12-25 RX ADMIN — PHENYLEPHRINE HYDROCHLORIDE 3.04 MICROGRAM(S)/KG/MIN: 10 INJECTION INTRAVENOUS at 02:27

## 2021-12-25 RX ADMIN — Medication 7.59 MICROGRAM(S)/KG/MIN: at 11:01

## 2021-12-25 RX ADMIN — SODIUM CHLORIDE 1000 MILLILITER(S): 9 INJECTION, SOLUTION INTRAVENOUS at 00:40

## 2021-12-25 RX ADMIN — Medication 25 MICROGRAM(S): at 21:13

## 2021-12-25 RX ADMIN — Medication 50 MILLILITER(S): at 11:01

## 2021-12-25 RX ADMIN — FENTANYL CITRATE 4.05 MICROGRAM(S)/KG/HR: 50 INJECTION INTRAVENOUS at 16:12

## 2021-12-25 RX ADMIN — Medication 650 MILLIGRAM(S): at 09:08

## 2021-12-25 RX ADMIN — LEVETIRACETAM 420 MILLIGRAM(S): 250 TABLET, FILM COATED ORAL at 17:04

## 2021-12-25 NOTE — CHART NOTE - NSCHARTNOTEFT_GEN_A_CORE
Patient is booked for abdominal wound closure and creation of colostomy tomorrow at 8am with Dr Castro and Dr Fairbanks.

## 2021-12-25 NOTE — PROGRESS NOTE ADULT - ASSESSMENT
82yo F with PMH of HTN, HLD, hypothyroidism, depression, and seizures, presented to ED 12/23 with abdominal pain and diarrhea, admitted with acute sigmoid diverticulitis. Pt with worsening clinical condition yesterday; repeat CT A/P with interval perforated sigmoid diverticulitis with developing abscess. Now POD #0 s/p ex lap with large bowel resection, abdominal washout and wound vac placement.     Acute sigmoid diverticulitis with interval perforation and developing abscess  Severe sepsis with septic shock  Afib with RVR  Hypokalemia, hypophosphatemia  Lactic acidosis    -Neuro: Encephalopathic prior to OR, likely toxic/metabolic encephalopathy in setting of sepsis. Now intubated, continue fentanyl and propofol for analgesia and sedation; titrate to RASS 0 to -1. Continue home keppra.   -Cardio: AFib with RVR prior to operative procedure; received lopressor 10 for rate control. Now sinus rhythm with HR 80s. Hypotensive requiring pressors intra-op, likely septic shock. Continue phenylephrine, maintain MAP>65. Will give additional 1L LR bolus. F/u TTE. Hold home antihypertensives for now.   -Resp: Intubated for OR; continue mechanical ventilation. F/u ABG and CXR.  -GI: Perforated sigmoid diverticulitis with developing abscess, POD#0 s/p ex lap with LBR, washout and wound vac. Plan for RTOR on 12/27 for washout and closure. NPO, monitor for bowel function. Will repeat lactate now. PPI for GI prophylaxis. GI and surgery following.   -Renal: Repeat BMP now. Trend BUN/Cr, monitor UO. Monitor lytes, hypokalemia likely 2/2 diarrhea.   -ID: Sepsis 2/2 perforated sigmoid diverticulitis. Continue empiric vancomycin and zosyn, will give diflucan x1 dose now. Blood and urine cultures pending. ID following.   -Heme: Lovenox for DVT ppx. EBL 100cc; will repeat CBC and coags now. Trend H/H; transfuse prn to maintain Hgb >7.   -Endocrine: FS q6 while NPO. Normal A1C. Continue synthroid for hypothyroid.   -Dispo: ICU   80yo F with PMH of HTN, HLD, hypothyroidism, depression, and seizures, presented to ED 12/23 with abdominal pain and diarrhea, admitted with acute sigmoid diverticulitis. Pt with worsening clinical condition yesterday; repeat CT A/P with interval perforated sigmoid diverticulitis with developing abscess. Now POD #0 s/p ex lap with large bowel resection, abdominal washout and wound vac placement.     Acute sigmoid diverticulitis with interval perforation and developing abscess  Severe sepsis with septic shock  Afib with RVR  Hypokalemia, hypophosphatemia  Lactic acidosis    -Neuro: Encephalopathic prior to OR, likely toxic/metabolic encephalopathy in setting of sepsis. Now intubated, continue fentanyl and propofol for analgesia and sedation; titrate to RASS 0 to -1. Continue home keppra.   -Cardio: AFib with RVR prior to operative procedure; received lopressor 10 for rate control. Now sinus rhythm with HR 80s. Hypotensive requiring pressors intra-op, likely septic shock. Continue phenylephrine, maintain MAP>65. Will give additional 1L LR bolus. F/u TTE. Hold home antihypertensives for now.   -Resp: Intubated for OR; continue mechanical ventilation. F/u ABG and CXR.  -GI: Perforated sigmoid diverticulitis with developing abscess, POD#0 s/p ex lap with LBR, washout and wound vac. Plan for RTOR on 12/27 for washout and closure. NPO, monitor for bowel function. Will repeat lactate now. PPI for GI prophylaxis. GI and surgery following.   -Renal: Repeat BMP now. Trend BUN/Cr, monitor UO. Monitor lytes, hypokalemia likely 2/2 diarrhea.   -ID: Sepsis 2/2 perforated sigmoid diverticulitis. Continue empiric vancomycin and zosyn, will give diflucan x1 dose now. F/u blood, urine and surgical cultures. ID following.   -Heme: Lovenox for DVT ppx. EBL 100cc; will repeat CBC and coags now. Trend H/H; transfuse prn to maintain Hgb >7.   -Endocrine: FS q6 while NPO. Normal A1C. Continue synthroid for hypothyroid.   -Dispo: ICU

## 2021-12-25 NOTE — CHART NOTE - NSCHARTNOTEFT_GEN_A_CORE
Limited TTE showed what appeared to be overall normal LV function, normal LV/RV size ratio, no pericardial effusions. Unable to visualize IVC in post-op state. Shock state likely vasoplegic / distributive. Continue w/ IVF for now and titrate norepi to MAP>/65

## 2021-12-25 NOTE — PROGRESS NOTE ADULT - SUBJECTIVE AND OBJECTIVE BOX
JUAREZ GTZ  MRN-[id number]      Follow Up:      Interval History:    ROS:    [ ] Unobtainable because:  [ ] All other systems negative except as noted    Constitutional: no fever, no chills  Head: no trauma  Eyes: no vision changes, no eye pain  ENT:  no sore throat, no rhinorrhea  Cardiovascular:  no chest pain, no palpitation  Respiratory:  no SOB, no cough  GI:  no abd pain, no vomiting, no diarrhea  urinary: no dysuria, no hematuria, no flank pain  musculoskeletal:  no joint pain, no joint swelling  skin:  no rash  neurology:  no headache, no seizure, no change in mental status  psych: no anxiety, no depression         Allergies  No Known Allergies        ANTIMICROBIALS:  piperacillin/tazobactam IVPB.. 3.375 every 8 hours      OTHER MEDS:  chlorhexidine 0.12% Liquid 15 milliLiter(s) Oral Mucosa every 12 hours  chlorhexidine 2% Cloths 1 Application(s) Topical <User Schedule>  dextrose 40% Gel 15 Gram(s) Oral once  dextrose 5%. 1000 milliLiter(s) IV Continuous <Continuous>  dextrose 5%. 1000 milliLiter(s) IV Continuous <Continuous>  dextrose 50% Injectable 25 Gram(s) IV Push once  dextrose 50% Injectable 12.5 Gram(s) IV Push once  dextrose 50% Injectable 25 Gram(s) IV Push once  dorzolamide 2%/timolol 0.5% Ophthalmic Solution 1 Drop(s) Both EYES two times a day  enoxaparin Injectable 40 milliGRAM(s) SubCutaneous daily  fentaNYL   Infusion. 0.5 MICROgram(s)/kG/Hr IV Continuous <Continuous>  glucagon  Injectable 1 milliGRAM(s) IntraMuscular once  lactated ringers. 1000 milliLiter(s) IV Continuous <Continuous>  levETIRAcetam  IVPB 500 milliGRAM(s) IV Intermittent every 12 hours  levothyroxine Injectable 25 MICROGram(s) IV Push at bedtime  norepinephrine Infusion 0.05 MICROgram(s)/kG/Min IV Continuous <Continuous>  pantoprazole  Injectable 40 milliGRAM(s) IV Push daily  phenylephrine    Infusion 0.1 MICROgram(s)/kG/Min IV Continuous <Continuous>  propofol Infusion 10 MICROgram(s)/kG/Min IV Continuous <Continuous>      Physical Exam:  Vital Signs Last 24 Hrs  T(C): 36.6 (25 Dec 2021 16:00), Max: 36.7 (25 Dec 2021 05:00)  T(F): 97.9 (25 Dec 2021 16:00), Max: 98.1 (25 Dec 2021 12:00)  HR: 88 (25 Dec 2021 21:18) (84 - 101)  BP: 119/51 (25 Dec 2021 00:31) (119/51 - 119/51)  BP(mean): --  RR: 18 (25 Dec 2021 21:00) (17 - 22)  SpO2: 95% (25 Dec 2021 21:18) (93% - 97%)    General:    NAD,  non toxic  Head: atraumatic, normocephalic  Eye: normal sclera and conjunctiva  ENT:    no oral lesions, neck supple  Cardio:     regular S1, S2,  no murmur  Respiratory:    clear b/l,    no wheezing  abd:     soft,   BS +,   no tenderness  :   no CVAT,  no suprapubic tenderness,   no  mc  Musculoskeletal:   no joint swelling,   no edema  vascular: no central lines, +PIV   Skin:    no rash  Neurologic:     no focal deficit  psych: normal affect    WBC Count: 11.48 K/uL (12-25 @ 07:48)  WBC Count: 6.75 K/uL (12-25 @ 00:52)  WBC Count: 7.29 K/uL (12-24 @ 18:28)  WBC Count: 28.13 K/uL (12-24 @ 08:30)  WBC Count: 28.24 K/uL (12-23 @ 17:33)  WBC Count: 24.83 K/uL (12-22 @ 17:43)                            11.9   11.48 )-----------( 389      ( 25 Dec 2021 07:48 )             35.9       12-25    129<L>  |  102  |  13  ----------------------------<  149<H>  4.3   |  18<L>  |  0.66    Ca    7.0<L>      25 Dec 2021 07:48  Phos  2.6     12-25  Mg     3.5     12-25    TPro  4.1<L>  /  Alb  1.1<L>  /  TBili  0.3  /  DBili  x   /  AST  22  /  ALT  19  /  AlkPhos  63  12-25          Creatinine Trend: 0.66<--, 0.60<--, 0.86<--, 0.80<--, 0.90<--, 0.83<--  Lactate, Blood: 1.8 mmol/L (12-25-21 @ 07:48)  Lactate, Blood: 2.7 mmol/L (12-25-21 @ 01:39)  Lactate, Blood: 2.2 mmol/L (12-24-21 @ 18:28)  Lactate, Blood: 2.6 mmol/L (12-24-21 @ 12:39)  Lactate, Blood: 1.7 mmol/L (12-24-21 @ 08:30)      MICROBIOLOGY:  v  .Body Fluid INTRA -ABDOMINAL FLUID FOR CULTURE  12-25-21 --  --    Few polymorphonuclear leukocytes per low power field  Rare Gram Variable Rods per oil power field      .Blood Blood-Peripheral  12-24-21   No growth to date.  --  --      Rapid RVP Result: NotDetec (12-24 @ 14:38)  Rapid RVP Result: NotDetec (12-24 @ 00:03)        C-Reactive Protein, Serum: 369 (12-24)        D-Dimer Assay, Quantitative: 788 (12-24)    Procalcitonin, Serum: 1.97 (12-24-21 @ 10:38)        RADIOLOGY:  Xray Chest 1 View- PORTABLE-Urgent (Xray Chest 1 View- PORTABLE-Urgent .) (12.25.21 @ 01:27)   Findings/  Impression: ETT tip above alex. NG tip below diaphragm. Trace left   pleural effusion with left lower lobe opacity, representing pneumonia   versus atelectasis.    < from: CT Chest w/ IV Cont (12.24.21 @ 14:44) >  IMPRESSION: Interval perforation of previously seen sigmoid colon   diverticulitis with localized fluid collection seen between the uterus   and the sigmoid colon in the pelvis suggesting early abscess formation.  Linear atelectasis versus infiltrates at the lungbases.  Minimal bilateral pleural effusions.  Distended gallbladder with gallstones without CT evidence of acute   cholecystitis  Results were discussed with the patient's nurseLivan at 4:20 PM on   12/24/2021    < end of copied text >

## 2021-12-25 NOTE — PROGRESS NOTE ADULT - ASSESSMENT
HPI:  Patient is a 81F with a PMH of HTN, HLD, hypothyroidism, depression, seizures who presents to the ED for abd pain.  Patient states that over the last two days she had developed significant abdominal pain and multiple episodes of watery diarrhea.  Reports one episode of nausea and vomiting earlier today.  Patient has no other complaints.  Vitals stable,  labs show leukocytosis and hypokalemia.  CT abdomen significant for diverticulitis.  Will admit to med surg.     (23 Dec 2021 00:17)  ---- As Above ---- Patient is a poor historian. Son's phone number not available ( Aid / patient does not have the number )  The patient appears sluggish at the present time. Presents with N/V, abdominal pain, and diarrhea. Started a few days ago  Patient denies having a colonoscopy or diverticulitis. Patient is unsure if she is getting better.     Patient has a tray of solid food but does not want to eat it ( no appetite )    [id number]  ---------  Diverticulitis - Repeat Ct scan reveals perforation s/p surgery.  ( Never had a colonoscopy. See CT scan . Afebrile, minimal tenderness on exam, WBC 24.83 --> 28.13 --> 11.48  On Zosyn  1) NPO 2) F/U WBC 3) CRP 4) IV Fluids 5) May need TPN in very near future   Son  ( [phone] )

## 2021-12-25 NOTE — PROGRESS NOTE ADULT - SUBJECTIVE AND OBJECTIVE BOX
Patient is a 81y old  Female who presents with a chief complaint of diverticulitis (25 Dec 2021 04:35)      HPI:  Patient is a 81F with a PMH of HTN, HLD, hypothyroidism, depression, seizures who presents to the ED for abd pain.  Patient states that over the last two days she had developed significant abdominal pain and multiple episodes of watery diarrhea.  Reports one episode of nausea and vomiting earlier today.  Patient has no other complaints.  Vitals stable,  labs show leukocytosis and hypokalemia.  CT abdomen significant for diverticulitis.  Will admit to med surg.     (23 Dec 2021 00:17)      INTERVAL HPI/OVERNIGHT EVENTS: Patient seen earlier today. ICU #6. intubated  Repeat CT scan revealed perforation s/p surgery.       MEDICATIONS  (STANDING):  chlorhexidine 0.12% Liquid 15 milliLiter(s) Oral Mucosa every 12 hours  chlorhexidine 2% Cloths 1 Application(s) Topical <User Schedule>  dextrose 40% Gel 15 Gram(s) Oral once  dextrose 5%. 1000 milliLiter(s) (50 mL/Hr) IV Continuous <Continuous>  dextrose 5%. 1000 milliLiter(s) (100 mL/Hr) IV Continuous <Continuous>  dextrose 50% Injectable 25 Gram(s) IV Push once  dextrose 50% Injectable 12.5 Gram(s) IV Push once  dextrose 50% Injectable 25 Gram(s) IV Push once  dorzolamide 2%/timolol 0.5% Ophthalmic Solution 1 Drop(s) Both EYES two times a day  enoxaparin Injectable 40 milliGRAM(s) SubCutaneous daily  fentaNYL   Infusion. 0.5 MICROgram(s)/kG/Hr (4.05 mL/Hr) IV Continuous <Continuous>  glucagon  Injectable 1 milliGRAM(s) IntraMuscular once  lactated ringers. 1000 milliLiter(s) (100 mL/Hr) IV Continuous <Continuous>  levETIRAcetam  IVPB 500 milliGRAM(s) IV Intermittent every 12 hours  levothyroxine Injectable 25 MICROGram(s) IV Push at bedtime  norepinephrine Infusion 0.05 MICROgram(s)/kG/Min (7.59 mL/Hr) IV Continuous <Continuous>  pantoprazole  Injectable 40 milliGRAM(s) IV Push daily  phenylephrine    Infusion 0.1 MICROgram(s)/kG/Min (3.04 mL/Hr) IV Continuous <Continuous>  piperacillin/tazobactam IVPB.. 3.375 Gram(s) IV Intermittent every 8 hours  propofol Infusion 10 MICROgram(s)/kG/Min (4.86 mL/Hr) IV Continuous <Continuous>    MEDICATIONS  (PRN):      FAMILY HISTORY:  FH: HTN (hypertension)        Allergies    No Known Allergies    Intolerances        PMH/PSH:  Seizure    HTN (hypertension)    Glaucoma    Thyroid disease    Blood clot of neck vein    TIA (transient ischemic attack)    S/P appendectomy          REVIEW OF SYSTEMS:  Uncommunicative   CONSTITUTIONAL: No fever, weight loss, or fatigue  EYES: No eye pain, visual disturbances, or discharge  ENMT:  No difficulty hearing, tinnitus, vertigo; No sinus or throat pain  NECK: No pain or stiffness  BREASTS: No pain, masses, or nipple discharge  RESPIRATORY: No cough, wheezing, chills or hemoptysis; No shortness of breath  CARDIOVASCULAR: No chest pain, palpitations, dizziness, or leg swelling  GASTROINTESTINAL: See above   GENITOURINARY: No dysuria, frequency, hematuria, or incontinence  NEUROLOGICAL: Uncommunicative   SKIN: No itching, burning, rashes, or lesions   LYMPH NODES: No enlarged glands  ENDOCRINE: No heat or cold intolerance; No hair loss  MUSCULOSKELETAL: No joint pain or swelling; No muscle, back, or extremity pain  PSYCHIATRIC: No depression, anxiety, mood swings, or difficulty sleeping  HEME/LYMPH: No easy bruising, or bleeding gums  ALLERGY AND IMMUNOLOGIC: No hives or eczema    Vital Signs Last 24 Hrs  T(C): 36.6 (25 Dec 2021 16:00), Max: 36.7 (25 Dec 2021 05:00)  T(F): 97.9 (25 Dec 2021 16:00), Max: 98.1 (25 Dec 2021 12:00)  HR: 87 (25 Dec 2021 19:01) (84 - 158)  BP: 119/51 (25 Dec 2021 00:31) (119/51 - 126/78)  BP(mean): --  RR: 18 (25 Dec 2021 19:01) (17 - 22)  SpO2: 95% (25 Dec 2021 19:01) (92% - 97%)    PHYSICAL EXAM:  GENERAL: NAD, well-groomed, well-developed  HEAD:  Atraumatic, Normocephalic  EYES: EOMI, PERRLA, conjunctiva and sclera clear  NECK: Supple, No JVD, Normal thyroid  NERVOUS SYSTEM:  Uncommunicative   CHEST/LUNG: Clear to percussion bilaterally; No rales, rhonchi, wheezing, or rubs  HEART: Regular rate and rhythm; No murmurs, rubs, or gallops  ABDOMEN: Soft, Nontender, Nondistended; Bowel sounds present  EXTREMITIES:  2+ Peripheral Pulses, No clubbing, cyanosis, or edema  LYMPH: No lymphadenopathy noted  SKIN: No rashes or lesions    LAB  12-22 @ 17:43  amylase --   lipase 61                           11.9   11.48 )-----------( 389      ( 25 Dec 2021 07:48 )             35.9       CBC:  12-25 @ 07:48  WBC 11.48   Hgb 11.9   Hct 35.9   Plts 389  MCV 88.0  12-25 @ 00:52  WBC 6.75   Hgb 11.6   Hct 35.2   Plts 426  MCV 88.7  12-24 @ 18:28  WBC 7.29   Hgb 13.3   Hct 40.6   Plts 449  MCV 86.8  12-24 @ 08:30  WBC 28.13   Hgb 12.6   Hct 38.0   Plts 400  MCV 87.8  12-23 @ 17:33  WBC 28.24   Hgb 12.1   Hct 37.2   Plts 378  MCV 89.4  12-22 @ 17:43  WBC 24.83   Hgb 13.0   Hct 39.3   Plts 425  MCV 87.9      Chemistry:  12-25 @ 07:48  Na+ 129  K+ 4.3  Cl- 102  CO2 18  BUN 13  Cr 0.66     12-25 @ 00:52  Na+ 133  K+ 3.4  Cl- 106  CO2 18  BUN 13  Cr 0.60     12-24 @ 18:28  Na+ 130  K+ 2.9  Cl- 98  CO2 20  BUN 15  Cr 0.86     12-24 @ 08:30  Na+ 127  K+ 3.3  Cl- 97  CO2 19  BUN 15  Cr 0.80     12-23 @ 17:33  Na+ 133  K+ 2.8  Cl- 98  CO2 25  BUN 18  Cr 0.90     12-22 @ 17:43  Na+ 137  K+ 2.8  Cl- 100  CO2 25  BUN 23  Cr 0.83         Glucose, Serum: 149 mg/dL (12-25 @ 07:48)  Glucose, Serum: 142 mg/dL (12-25 @ 00:52)  Glucose, Serum: 142 mg/dL (12-24 @ 18:28)  Glucose, Serum: 133 mg/dL (12-24 @ 08:30)  Glucose, Serum: 135 mg/dL (12-23 @ 17:33)  Glucose, Serum: 154 mg/dL (12-22 @ 17:43)      25 Dec 2021 07:48    129    |  102    |  13     ----------------------------<  149    4.3     |  18     |  0.66   25 Dec 2021 00:52    133    |  106    |  13     ----------------------------<  142    3.4     |  18     |  0.60   24 Dec 2021 18:28    130    |  98     |  15     ----------------------------<  142    2.9     |  20     |  0.86   24 Dec 2021 08:30    127    |  97     |  15     ----------------------------<  133    3.3     |  19     |  0.80   23 Dec 2021 17:33    133    |  98     |  18     ----------------------------<  135    2.8     |  25     |  0.90   22 Dec 2021 17:43    137    |  100    |  23     ----------------------------<  154    2.8     |  25     |  0.83     Ca    7.0        25 Dec 2021 07:48  Ca    6.6        25 Dec 2021 00:52  Ca    7.7        24 Dec 2021 18:28  Ca    8.5        24 Dec 2021 08:30  Ca    8.5        23 Dec 2021 17:33  Ca    9.2        22 Dec 2021 17:43  Phos  2.6       25 Dec 2021 07:48  Phos  2.5       25 Dec 2021 00:52  Phos  1.9       24 Dec 2021 18:28  Phos  2.2       24 Dec 2021 08:30  Phos  1.6       23 Dec 2021 17:33  Mg     3.5       25 Dec 2021 07:48  Mg     1.2       25 Dec 2021 00:52  Mg     2.1       24 Dec 2021 18:28  Mg     1.8       24 Dec 2021 08:30  Mg     2.4       23 Dec 2021 17:33  Mg     2.4       22 Dec 2021 22:54    TPro  4.1    /  Alb  1.1    /  TBili  0.3    /  DBili  x      /  AST  22     /  ALT  19     /  AlkPhos  63     25 Dec 2021 07:48  TPro  3.9    /  Alb  1.1    /  TBili  0.4    /  DBili  x      /  AST  21     /  ALT  22     /  AlkPhos  66     25 Dec 2021 00:52  TPro  6.8    /  Alb  2.4    /  TBili  0.7    /  DBili  0.3    /  AST  38     /  ALT  32     /  AlkPhos  132    24 Dec 2021 08:30  TPro  7.7    /  Alb  3.3    /  TBili  0.6    /  DBili  x      /  AST  23     /  ALT  21     /  AlkPhos  106    22 Dec 2021 17:43      PT/INR - ( 25 Dec 2021 00:52 )   PT: 16.4 sec;   INR: 1.44 ratio         PTT - ( 25 Dec 2021 00:52 )  PTT:35.4 sec        CAPILLARY BLOOD GLUCOSE      POCT Blood Glucose.: 109 mg/dL (25 Dec 2021 18:52)  POCT Blood Glucose.: 153 mg/dL (25 Dec 2021 11:45)  POCT Blood Glucose.: 136 mg/dL (25 Dec 2021 05:18)      C-Reactive Protein, Serum: 369 mg/L (12-24 @ 10:38)      RADIOLOGY & ADDITIONAL TESTS:    Imaging Personally Reviewed:  [ ] YES  [ ] NO    Consultant(s) Notes Reviewed:  [ ] YES  [ ] NO    Care Discussed with Consultants/Other Providers [ ] YES  [ ] NO

## 2021-12-25 NOTE — PROGRESS NOTE ADULT - SUBJECTIVE AND OBJECTIVE BOX
Post-op check    S/P ex lap, sigmoid resection, peritoneal lavage POD#0  81 year old Female seen and examined at bedside resting comfortably in the ICU, intubated.    Vital Signs Last 24 Hrs  T(F): 97.8 (12-25-21 @ 00:31), Max: 99 (12-24-21 @ 12:00)  HR: 86 (12-25-21 @ 03:00)  BP: 119/51 (12-25-21 @ 00:31)  RR: 18 (12-25-21 @ 03:00)  SpO2: 95% (12-25-21 @ 03:00)      GENERAL: NAD  HEENT: NGT in place  CHEST/LUNG: Clear to auscultation bilaterally, respirations nonlabored  HEART: S1S2, Regular rate and rhythm  ABDOMEN: Abthera vac intact and functioning well with good seal with serosanguinous output, softly distended, hypoactive bowel sounds, soft, nontender  : Boucher indwelling with clear yellow urine  EXTREMITIES: no calf tenderness, No edema, intermittent compression devices in place bilaterally                          11.6   6.75  )-----------( 426      ( 25 Dec 2021 00:52 )             35.2   12-25    133<L>  |  106  |  13  ----------------------------<  142<H>  3.4<L>   |  18<L>  |  0.60    Ca    6.6<L>      25 Dec 2021 00:52  Phos  2.5     12-25  Mg     1.2     12-25    TPro  3.9<L>  /  Alb  1.1<L>  /  TBili  0.4  /  DBili  x   /  AST  21  /  ALT  22  /  AlkPhos  66  12-25      Assessment: 81F with PMH of HTN, HLD, hypothyroidism, depression, seizures who presents to the ED for abd pain, admitted with Acute Sigmoid Diverticulitis.  Surgery consulted for interval perforated sigmoid diverticulitis with developing abscess. S/P ex lap, sigmoid resection, peritoneal lavage POD#0  Intubated, on phenylephrine.    Plan:  - RTOR Sunday or Monday  - NPO, NGT, IVF  - local wound care with abthera vac  - DVT prophylaxis, GI ppx, pain control  - Continue antibiotics per ID  - Boucher, monitor urine output  - f/u AM labs. Follow up OR cx  - continue current management per ICU  - will discuss with surgical attending

## 2021-12-25 NOTE — PROGRESS NOTE ADULT - SUBJECTIVE AND OBJECTIVE BOX
INTERVAL HPI/OVERNIGHT EVENTS: Received from OR s/p ex lap with large bowel resection and abthera wound vac placement. Given lopressor 10 in OR for Afib with RVR. 5L IVF given intra-op. EBL 100cc. Received intubated, sedated, on phenylephrine.      CENTRAL LINE: [ ] YES [x] NO  LOCATION:       [place]: [x] YES [ ] NO        A-LINE:  [x] YES [ ] NO  LOCATION: Lt radial    GLOBAL ISSUE/BEST PRACTICE  Analgesia: Fentanyl  Sedation: Propofol  HOB elevation: yes  Stress ulcer prophylaxis: Protonix  VTE prophylaxis: Lovenox  Oral Care: Chlorhexidine  Glycemic control: FS q6h  Nutrition: NPO    REVIEW OF SYSTEMS: [] Unable to obtain because: intubated & sedated    PHYSICAL EXAM  General: No acute distress, intubated  Lungs: CTABL  Heart: RRR, S1S2, no m/r/g  Abdomen: Distended, midline incision with wound vac in place, no drainage  Neuro: Sedated  Extremities: Cool to touch, 2+ pulses    Vital Signs Last 24 Hrs  T(C): 36.3 (24 Dec 2021 21:08), Max: 37.2 (24 Dec 2021 12:00)  T(F): 97.3 (24 Dec 2021 21:08), Max: 99 (24 Dec 2021 12:00)  HR: 158 (24 Dec 2021 21:08) (102 - 158)  BP: 126/78 (24 Dec 2021 21:08) (125/68 - 155/88)  RR: 18 (24 Dec 2021 21:08) (18 - 20)  SpO2: 92% (24 Dec 2021 21:08) (90% - 95%)    I&O's Detail    23 Dec 2021 07:01  -  24 Dec 2021 07:00  --------------------------------------------------------  IN:    Oral Fluid: 790 mL  Total IN: 790 mL    OUT:    Voided (mL): 650 mL  Total OUT: 650 mL    Total NET: 140 mL      24 Dec 2021 07:01  -  25 Dec 2021 00:26  --------------------------------------------------------  IN:    Oral Fluid: 360 mL  Total IN: 360 mL    OUT:    Voided (mL): 500 mL  Total OUT: 500 mL    Total NET: -140 mL    MEDICATIONS  NEURO  Meds: fentaNYL   Infusion. 0.5 MICROgram(s)/kG/Hr (4.05 mL/Hr) IV Continuous <Continuous>  propofol Infusion 10 MICROgram(s)/kG/Min (4.86 mL/Hr) IV Continuous <Continuous>    RESPIRATORY  ABG - ( 24 Dec 2021 18:28 )  pH: x     /  pCO2: x     /  pO2: x     / HCO3: x     / Base Excess: x     /  SaO2: x       Lactate: 2.2      Meds:   CARDIOVASCULAR  Meds: phenylephrine    Infusion 0.1 MICROgram(s)/kG/Min (3.04 mL/Hr) IV Continuous <Continuous>    Meds:   [x] VTE Prophylaxis  INFECTIOUS DISEASES  Meds: fluconAZOLE IVPB 400 milliGRAM(s) IV Intermittent once    ENDOCRINE  CAPILLARY BLOOD GLUCOSE    Meds:   OTHER MEDICATIONS:  chlorhexidine 0.12% Liquid 15 milliLiter(s) Oral Mucosa every 12 hours  chlorhexidine 2% Cloths 1 Application(s) Topical <User Schedule>    LABS:                        13.3   7.29  )-----------( 449      ( 24 Dec 2021 18:28 )             40.6      12-24    130<L>  |  98  |  15  ----------------------------<  142<H>  2.9<LL>   |  20<L>  |  0.86    Ca    7.7<L>      24 Dec 2021 18:28  Phos  1.9     12-24  Mg     2.1     12-24    TPro  6.8  /  Alb  2.4<L>  /  TBili  0.7  /  DBili  0.3  /  AST  38<H>  /  ALT  32  /  AlkPhos  132<H>  12-24    PT/INR - ( 24 Dec 2021 18:28 )   PT: 15.7 sec;   INR: 1.37 ratio    PTT - ( 24 Dec 2021 18:28 )  PTT:34.2 sec    D-Dimer Assay, Quantitative: 788 ng/mL DDU (12-24-21 @ 12:39)  Procalcitonin, Serum: 1.97 ng/mL (12-24-21 @ 10:38)  C-Reactive Protein, Serum: 369 mg/L (12-24-21 @ 10:38)  Sedimentation Rate, Erythrocyte: 77 mm/hr (12-24-21 @ 08:30)    Lactate, Blood (12.24.21 @ 18:28)    Lactate, Blood: 2.2

## 2021-12-25 NOTE — PROGRESS NOTE ADULT - ASSESSMENT
Patient is a 81F with a PMH of HTN, HLD, hypothyroidism, depression, seizures who presents to the ED for abd pain found to have diverticulitis   has rising leukocytosis   had fever yesterday   tachycardic and now hypoxic   CXR (I personally reviewed) low lung volumes   origincal CT (I personally reviewed) with diverticulitis   she had a lot of pain in the abdomen on exam     12/25: s/p surgery for diverticulitis with perforation and abscess and went to the OR. intubated in ICU with vac and needs to go back to the OR, currently on zosyn, s/p one dose of diflucan last night     Perforated Diverticulitis   ARF requiring intubation   hyponatremia   Fever    Plan:  continue zosyn  add fluconazole   follow all cultures   f/u blood cultures  covid swab negative   RTOR for wound closure   wean off ventilator post surgery   frequent turns, offloading, and nutrition optimization to avoid bedsores  trend wbc   correct hyponatremia      D/W ICU NP    Manolo Dunn DO  Infectious Disease Attending  Pager [phone]  After 5pm/weekends please call [phone] for all inquiries and new consults

## 2021-12-26 ENCOUNTER — RESULT REVIEW (OUTPATIENT)
Age: 81
End: 2021-12-26

## 2021-12-26 LAB
ALBUMIN SERPL ELPH-MCNC: 1.2 G/DL — LOW (ref 3.3–5)
ALP SERPL-CCNC: 67 U/L — SIGNIFICANT CHANGE UP (ref 40–120)
ALT FLD-CCNC: 16 U/L — SIGNIFICANT CHANGE UP (ref 12–78)
ANION GAP SERPL CALC-SCNC: 8 MMOL/L — SIGNIFICANT CHANGE UP (ref 5–17)
AST SERPL-CCNC: 22 U/L — SIGNIFICANT CHANGE UP (ref 15–37)
BASE EXCESS BLDA CALC-SCNC: -4.9 MMOL/L — LOW (ref -2–3)
BILIRUB SERPL-MCNC: 0.2 MG/DL — SIGNIFICANT CHANGE UP (ref 0.2–1.2)
BLOOD GAS COMMENTS: SIGNIFICANT CHANGE UP
BLOOD GAS COMMENTS: SIGNIFICANT CHANGE UP
BUN SERPL-MCNC: 15 MG/DL — SIGNIFICANT CHANGE UP (ref 7–23)
CALCIUM SERPL-MCNC: 7.1 MG/DL — LOW (ref 8.5–10.1)
CHLORIDE SERPL-SCNC: 103 MMOL/L — SIGNIFICANT CHANGE UP (ref 96–108)
CO2 BLDA-SCNC: 22 MMOL/L — SIGNIFICANT CHANGE UP (ref 19–24)
CO2 SERPL-SCNC: 20 MMOL/L — LOW (ref 22–31)
CREAT SERPL-MCNC: 0.7 MG/DL — SIGNIFICANT CHANGE UP (ref 0.5–1.3)
GLUCOSE BLDC GLUCOMTR-MCNC: 78 MG/DL — SIGNIFICANT CHANGE UP (ref 70–99)
GLUCOSE BLDC GLUCOMTR-MCNC: 78 MG/DL — SIGNIFICANT CHANGE UP (ref 70–99)
GLUCOSE BLDC GLUCOMTR-MCNC: 86 MG/DL — SIGNIFICANT CHANGE UP (ref 70–99)
GLUCOSE BLDC GLUCOMTR-MCNC: 89 MG/DL — SIGNIFICANT CHANGE UP (ref 70–99)
GLUCOSE SERPL-MCNC: 88 MG/DL — SIGNIFICANT CHANGE UP (ref 70–99)
HCO3 BLDA-SCNC: 21 MMOL/L — SIGNIFICANT CHANGE UP (ref 21–28)
HCT VFR BLD CALC: 28 % — LOW (ref 34.5–45)
HCT VFR BLD CALC: 30.7 % — LOW (ref 34.5–45)
HGB BLD-MCNC: 10.1 G/DL — LOW (ref 11.5–15.5)
HGB BLD-MCNC: 9.2 G/DL — LOW (ref 11.5–15.5)
HOROWITZ INDEX BLDA+IHG-RTO: 40 — SIGNIFICANT CHANGE UP
INR BLD: 1.28 RATIO — HIGH (ref 0.88–1.16)
MAGNESIUM SERPL-MCNC: 2.8 MG/DL — HIGH (ref 1.6–2.6)
MCHC RBC-ENTMCNC: 28.9 PG — SIGNIFICANT CHANGE UP (ref 27–34)
MCHC RBC-ENTMCNC: 29 PG — SIGNIFICANT CHANGE UP (ref 27–34)
MCHC RBC-ENTMCNC: 32.9 GM/DL — SIGNIFICANT CHANGE UP (ref 32–36)
MCHC RBC-ENTMCNC: 32.9 GM/DL — SIGNIFICANT CHANGE UP (ref 32–36)
MCV RBC AUTO: 88.1 FL — SIGNIFICANT CHANGE UP (ref 80–100)
MCV RBC AUTO: 88.2 FL — SIGNIFICANT CHANGE UP (ref 80–100)
NRBC # BLD: 0 /100 WBCS — SIGNIFICANT CHANGE UP (ref 0–0)
NRBC # BLD: 0 /100 WBCS — SIGNIFICANT CHANGE UP (ref 0–0)
PCO2 BLDA: 39 MMHG — SIGNIFICANT CHANGE UP (ref 32–46)
PH BLD: 7.33 — LOW (ref 7.35–7.45)
PHOSPHATE SERPL-MCNC: 2.4 MG/DL — LOW (ref 2.5–4.5)
PLATELET # BLD AUTO: 282 K/UL — SIGNIFICANT CHANGE UP (ref 150–400)
PLATELET # BLD AUTO: 302 K/UL — SIGNIFICANT CHANGE UP (ref 150–400)
PO2 BLDA: 63 MMHG — LOW (ref 83–108)
POTASSIUM SERPL-MCNC: 3.8 MMOL/L — SIGNIFICANT CHANGE UP (ref 3.5–5.3)
POTASSIUM SERPL-SCNC: 3.8 MMOL/L — SIGNIFICANT CHANGE UP (ref 3.5–5.3)
PROT SERPL-MCNC: 4.4 GM/DL — LOW (ref 6–8.3)
PROTHROM AB SERPL-ACNC: 14.7 SEC — HIGH (ref 10.6–13.6)
RBC # BLD: 3.18 M/UL — LOW (ref 3.8–5.2)
RBC # BLD: 3.48 M/UL — LOW (ref 3.8–5.2)
RBC # FLD: 14.1 % — SIGNIFICANT CHANGE UP (ref 10.3–14.5)
RBC # FLD: 14.4 % — SIGNIFICANT CHANGE UP (ref 10.3–14.5)
SAO2 % BLDA: 92.1 % — LOW (ref 94–98)
SODIUM SERPL-SCNC: 131 MMOL/L — LOW (ref 135–145)
WBC # BLD: 12.37 K/UL — HIGH (ref 3.8–10.5)
WBC # BLD: 16.51 K/UL — HIGH (ref 3.8–10.5)
WBC # FLD AUTO: 12.37 K/UL — HIGH (ref 3.8–10.5)
WBC # FLD AUTO: 16.51 K/UL — HIGH (ref 3.8–10.5)

## 2021-12-26 PROCEDURE — 88307 TISSUE EXAM BY PATHOLOGIST: CPT | Mod: 26

## 2021-12-26 PROCEDURE — 99291 CRITICAL CARE FIRST HOUR: CPT

## 2021-12-26 PROCEDURE — 44320 COLOSTOMY: CPT | Mod: 58

## 2021-12-26 RX ORDER — DEXTROSE 50 % IN WATER 50 %
12.5 SYRINGE (ML) INTRAVENOUS ONCE
Refills: 0 | Status: DISCONTINUED | OUTPATIENT
Start: 2021-12-26 | End: 2022-01-07

## 2021-12-26 RX ORDER — LEVETIRACETAM 250 MG/1
500 TABLET, FILM COATED ORAL EVERY 12 HOURS
Refills: 0 | Status: DISCONTINUED | OUTPATIENT
Start: 2021-12-26 | End: 2022-01-07

## 2021-12-26 RX ORDER — DORZOLAMIDE HYDROCHLORIDE TIMOLOL MALEATE 20; 5 MG/ML; MG/ML
1 SOLUTION/ DROPS OPHTHALMIC
Refills: 0 | Status: DISCONTINUED | OUTPATIENT
Start: 2021-12-26 | End: 2022-01-07

## 2021-12-26 RX ORDER — LEVOTHYROXINE SODIUM 125 MCG
25 TABLET ORAL AT BEDTIME
Refills: 0 | Status: DISCONTINUED | OUTPATIENT
Start: 2021-12-26 | End: 2022-01-07

## 2021-12-26 RX ORDER — SODIUM CHLORIDE 9 MG/ML
1000 INJECTION, SOLUTION INTRAVENOUS
Refills: 0 | Status: DISCONTINUED | OUTPATIENT
Start: 2021-12-26 | End: 2022-01-07

## 2021-12-26 RX ORDER — PANTOPRAZOLE SODIUM 20 MG/1
40 TABLET, DELAYED RELEASE ORAL DAILY
Refills: 0 | Status: DISCONTINUED | OUTPATIENT
Start: 2021-12-26 | End: 2022-01-07

## 2021-12-26 RX ORDER — GLUCAGON INJECTION, SOLUTION 0.5 MG/.1ML
1 INJECTION, SOLUTION SUBCUTANEOUS ONCE
Refills: 0 | Status: DISCONTINUED | OUTPATIENT
Start: 2021-12-26 | End: 2022-01-07

## 2021-12-26 RX ORDER — FENTANYL CITRATE 50 UG/ML
0.5 INJECTION INTRAVENOUS
Qty: 2500 | Refills: 0 | Status: DISCONTINUED | OUTPATIENT
Start: 2021-12-26 | End: 2021-12-31

## 2021-12-26 RX ORDER — FLUCONAZOLE 150 MG/1
200 TABLET ORAL EVERY 24 HOURS
Refills: 0 | Status: DISCONTINUED | OUTPATIENT
Start: 2021-12-26 | End: 2022-01-06

## 2021-12-26 RX ORDER — PROPOFOL 10 MG/ML
10 INJECTION, EMULSION INTRAVENOUS
Qty: 1000 | Refills: 0 | Status: DISCONTINUED | OUTPATIENT
Start: 2021-12-26 | End: 2022-01-01

## 2021-12-26 RX ORDER — CHLORHEXIDINE GLUCONATE 213 G/1000ML
1 SOLUTION TOPICAL
Refills: 0 | Status: DISCONTINUED | OUTPATIENT
Start: 2021-12-26 | End: 2022-01-07

## 2021-12-26 RX ORDER — POTASSIUM PHOSPHATE, MONOBASIC POTASSIUM PHOSPHATE, DIBASIC 236; 224 MG/ML; MG/ML
15 INJECTION, SOLUTION INTRAVENOUS ONCE
Refills: 0 | Status: COMPLETED | OUTPATIENT
Start: 2021-12-26 | End: 2021-12-26

## 2021-12-26 RX ORDER — PIPERACILLIN AND TAZOBACTAM 4; .5 G/20ML; G/20ML
3.38 INJECTION, POWDER, LYOPHILIZED, FOR SOLUTION INTRAVENOUS EVERY 8 HOURS
Refills: 0 | Status: COMPLETED | OUTPATIENT
Start: 2021-12-26 | End: 2022-01-01

## 2021-12-26 RX ORDER — DEXTROSE 50 % IN WATER 50 %
15 SYRINGE (ML) INTRAVENOUS ONCE
Refills: 0 | Status: DISCONTINUED | OUTPATIENT
Start: 2021-12-26 | End: 2022-01-07

## 2021-12-26 RX ORDER — DEXTROSE 50 % IN WATER 50 %
25 SYRINGE (ML) INTRAVENOUS ONCE
Refills: 0 | Status: DISCONTINUED | OUTPATIENT
Start: 2021-12-26 | End: 2022-01-07

## 2021-12-26 RX ORDER — CHLORHEXIDINE GLUCONATE 213 G/1000ML
15 SOLUTION TOPICAL EVERY 12 HOURS
Refills: 0 | Status: DISCONTINUED | OUTPATIENT
Start: 2021-12-26 | End: 2022-01-07

## 2021-12-26 RX ORDER — ENOXAPARIN SODIUM 100 MG/ML
40 INJECTION SUBCUTANEOUS DAILY
Refills: 0 | Status: DISCONTINUED | OUTPATIENT
Start: 2021-12-26 | End: 2022-01-07

## 2021-12-26 RX ORDER — SODIUM CHLORIDE 9 MG/ML
1000 INJECTION, SOLUTION INTRAVENOUS
Refills: 0 | Status: DISCONTINUED | OUTPATIENT
Start: 2021-12-26 | End: 2021-12-29

## 2021-12-26 RX ADMIN — PIPERACILLIN AND TAZOBACTAM 25 GRAM(S): 4; .5 INJECTION, POWDER, LYOPHILIZED, FOR SOLUTION INTRAVENOUS at 05:30

## 2021-12-26 RX ADMIN — LEVETIRACETAM 420 MILLIGRAM(S): 250 TABLET, FILM COATED ORAL at 17:31

## 2021-12-26 RX ADMIN — POTASSIUM PHOSPHATE, MONOBASIC POTASSIUM PHOSPHATE, DIBASIC 62.5 MILLIMOLE(S): 236; 224 INJECTION, SOLUTION INTRAVENOUS at 07:18

## 2021-12-26 RX ADMIN — FLUCONAZOLE 100 MILLIGRAM(S): 150 TABLET ORAL at 00:57

## 2021-12-26 RX ADMIN — PANTOPRAZOLE SODIUM 40 MILLIGRAM(S): 20 TABLET, DELAYED RELEASE ORAL at 12:21

## 2021-12-26 RX ADMIN — PIPERACILLIN AND TAZOBACTAM 25 GRAM(S): 4; .5 INJECTION, POWDER, LYOPHILIZED, FOR SOLUTION INTRAVENOUS at 17:29

## 2021-12-26 RX ADMIN — ENOXAPARIN SODIUM 40 MILLIGRAM(S): 100 INJECTION SUBCUTANEOUS at 12:21

## 2021-12-26 RX ADMIN — FENTANYL CITRATE 4.05 MICROGRAM(S)/KG/HR: 50 INJECTION INTRAVENOUS at 10:52

## 2021-12-26 RX ADMIN — LEVETIRACETAM 420 MILLIGRAM(S): 250 TABLET, FILM COATED ORAL at 05:58

## 2021-12-26 RX ADMIN — DORZOLAMIDE HYDROCHLORIDE TIMOLOL MALEATE 1 DROP(S): 20; 5 SOLUTION/ DROPS OPHTHALMIC at 05:58

## 2021-12-26 RX ADMIN — FENTANYL CITRATE 4.05 MICROGRAM(S)/KG/HR: 50 INJECTION INTRAVENOUS at 23:49

## 2021-12-26 RX ADMIN — DORZOLAMIDE HYDROCHLORIDE TIMOLOL MALEATE 1 DROP(S): 20; 5 SOLUTION/ DROPS OPHTHALMIC at 17:30

## 2021-12-26 RX ADMIN — SODIUM CHLORIDE 100 MILLILITER(S): 9 INJECTION, SOLUTION INTRAVENOUS at 14:23

## 2021-12-26 RX ADMIN — CHLORHEXIDINE GLUCONATE 15 MILLILITER(S): 213 SOLUTION TOPICAL at 05:57

## 2021-12-26 RX ADMIN — CHLORHEXIDINE GLUCONATE 15 MILLILITER(S): 213 SOLUTION TOPICAL at 17:29

## 2021-12-26 RX ADMIN — FLUCONAZOLE 100 MILLIGRAM(S): 150 TABLET ORAL at 23:49

## 2021-12-26 RX ADMIN — Medication 25 MICROGRAM(S): at 21:26

## 2021-12-26 RX ADMIN — CHLORHEXIDINE GLUCONATE 1 APPLICATION(S): 213 SOLUTION TOPICAL at 12:18

## 2021-12-26 RX ADMIN — PROPOFOL 4.86 MICROGRAM(S)/KG/MIN: 10 INJECTION, EMULSION INTRAVENOUS at 12:21

## 2021-12-26 NOTE — PROGRESS NOTE ADULT - ASSESSMENT
81F w/ HTN, HLD, hypothyroidism, depression, seizures. Presented w/ abdominal pain found to have acute sigmoid diverticulitis. Complicated by perforated sigmoid diverticulitis w/ abscess s/p ex-lap w/ large bowel resection, abdominal washout and wound vac placement. Now s/p RTOR for abdominal closure and ostomy creation    #Neuro - sedated w/ propofol and fentanyl; lighten sedation post-op; continue w/ keppra IV; anti-depressants on hold as pt is NPO  #CV - off pressors, with acceptable BP; pt had episode of afib w/ RVR prior to OR but no recurrence; TTE within normal limits   #Pulm - remains intubated post-op, titrate down FiO2 as tolerated; weaning post-op and try to extubate  #ID- abdominal cultures growing e.coli and candida continue w/ zosyn and diflucan; all other cx NGTD; ID following  #Renal/metabolic - normal renal function, acceptable electrolytes and UOP, lactate cleared; check urine lytes for mild hypoNa; LR @ 100 cc/hr today, will give albumin x1 today as well  #GI- POD1 for perforated sigmoid diverticulitis s/p ex-lap w/ large bowel resection, abdominal washout and wound vac placement and now POD0 for closure of abdomen and ostomy creation; monitor BABAK drain output - initially seen to be serosanginuous; NGT to ILWS; NPO; protonix  #Heme -hgb dropped post-op but no active bleeding; check CBC this afternoon, keep active type and screen  #Endo - continue w/ levothyroxine, FS q6 while NPO  #PPx - lovenox for ppx  #Dispo- remains intubated in ICU; prognosis guarded; full code .

## 2021-12-26 NOTE — PROGRESS NOTE ADULT - SUBJECTIVE AND OBJECTIVE BOX
Patient is a 81y old  Female who presents with a chief complaint of diverticulitis (26 Dec 2021 07:47)      HPI:  Patient is a 81F with a PMH of HTN, HLD, hypothyroidism, depression, seizures who presents to the ED for abd pain.  Patient states that over the last two days she had developed significant abdominal pain and multiple episodes of watery diarrhea.  Reports one episode of nausea and vomiting earlier today.  Patient has no other complaints.  Vitals stable,  labs show leukocytosis and hypokalemia.  CT abdomen significant for diverticulitis.  Will admit to med surg.     (23 Dec 2021 00:17)      INTERVAL HPI/OVERNIGHT EVENTS: ICU #6, Intubated  S/P Repair  / completion of colonic perforation     MEDICATIONS  (STANDING):  chlorhexidine 0.12% Liquid 15 milliLiter(s) Oral Mucosa every 12 hours  chlorhexidine 2% Cloths 1 Application(s) Topical <User Schedule>  dextrose 40% Gel 15 Gram(s) Oral once  dextrose 5%. 1000 milliLiter(s) (50 mL/Hr) IV Continuous <Continuous>  dextrose 5%. 1000 milliLiter(s) (100 mL/Hr) IV Continuous <Continuous>  dextrose 50% Injectable 25 Gram(s) IV Push once  dextrose 50% Injectable 12.5 Gram(s) IV Push once  dextrose 50% Injectable 25 Gram(s) IV Push once  dorzolamide 2%/timolol 0.5% Ophthalmic Solution 1 Drop(s) Both EYES two times a day  enoxaparin Injectable 40 milliGRAM(s) SubCutaneous daily  fentaNYL   Infusion. 0.5 MICROgram(s)/kG/Hr (4.05 mL/Hr) IV Continuous <Continuous>  fluconAZOLE IVPB 200 milliGRAM(s) IV Intermittent every 24 hours  glucagon  Injectable 1 milliGRAM(s) IntraMuscular once  lactated ringers. 1000 milliLiter(s) (100 mL/Hr) IV Continuous <Continuous>  levETIRAcetam  IVPB 500 milliGRAM(s) IV Intermittent every 12 hours  levothyroxine Injectable 25 MICROGram(s) IV Push at bedtime  pantoprazole  Injectable 40 milliGRAM(s) IV Push daily  piperacillin/tazobactam IVPB.. 3.375 Gram(s) IV Intermittent every 8 hours  propofol Infusion 10 MICROgram(s)/kG/Min (4.86 mL/Hr) IV Continuous <Continuous>    MEDICATIONS  (PRN):      FAMILY HISTORY:  FH: HTN (hypertension)        Allergies    No Known Allergies    Intolerances        PMH/PSH:  Seizure    HTN (hypertension)    Glaucoma    Thyroid disease    Blood clot of neck vein    TIA (transient ischemic attack)    S/P appendectomy          REVIEW OF SYSTEMS:  Intubated (+)  Unresponsive   CONSTITUTIONAL: No fever, weight loss,   RESPIRATORY: No cough, wheezing, chills or hemoptysis;   CARDIOVASCULAR: No chest pain, palpitations, dizziness, or leg swelling  GASTROINTESTINAL: See above       Vital Signs Last 24 Hrs  T(C): 37 (26 Dec 2021 11:00), Max: 37 (26 Dec 2021 11:00)  T(F): 98.6 (26 Dec 2021 11:00), Max: 98.6 (26 Dec 2021 11:00)  HR: 92 (26 Dec 2021 11:00) (81 - 101)  BP: --  BP(mean): --  RR: 18 (26 Dec 2021 07:24) (17 - 18)  SpO2: 92% (26 Dec 2021 10:40) (92% - 96%)    PHYSICAL EXAM: Intubated (+)  GENERAL: NAD, well-groomed, well-developed  CHEST/LUNG: Clear to percussion bilaterally; No rales, rhonchi, wheezing, or rubs  HEART: Regular rate and rhythm; No murmurs, rubs, or gallops  ABDOMEN: Soft, Nontender, Nondistended; Bowel sounds absent       LAB  12-22 @ 17:43  amylase --   lipase 61                           9.2    12.37 )-----------( 282      ( 26 Dec 2021 05:40 )             28.0       CBC:  12-26 @ 05:40  WBC 12.37   Hgb 9.2   Hct 28.0   Plts 282  MCV 88.1  12-25 @ 07:48  WBC 11.48   Hgb 11.9   Hct 35.9   Plts 389  MCV 88.0  12-25 @ 00:52  WBC 6.75   Hgb 11.6   Hct 35.2   Plts 426  MCV 88.7  12-24 @ 18:28  WBC 7.29   Hgb 13.3   Hct 40.6   Plts 449  MCV 86.8  12-24 @ 08:30  WBC 28.13   Hgb 12.6   Hct 38.0   Plts 400  MCV 87.8  12-23 @ 17:33  WBC 28.24   Hgb 12.1   Hct 37.2   Plts 378  MCV 89.4  12-22 @ 17:43  WBC 24.83   Hgb 13.0   Hct 39.3   Plts 425  MCV 87.9      Chemistry:  12-26 @ 05:40  Na+ 131  K+ 3.8  Cl- 103  CO2 20  BUN 15  Cr 0.70     12-25 @ 07:48  Na+ 129  K+ 4.3  Cl- 102  CO2 18  BUN 13  Cr 0.66     12-25 @ 00:52  Na+ 133  K+ 3.4  Cl- 106  CO2 18  BUN 13  Cr 0.60     12-24 @ 18:28  Na+ 130  K+ 2.9  Cl- 98  CO2 20  BUN 15  Cr 0.86     12-24 @ 08:30  Na+ 127  K+ 3.3  Cl- 97  CO2 19  BUN 15  Cr 0.80     12-23 @ 17:33  Na+ 133  K+ 2.8  Cl- 98  CO2 25  BUN 18  Cr 0.90     12-22 @ 17:43  Na+ 137  K+ 2.8  Cl- 100  CO2 25  BUN 23  Cr 0.83         Glucose, Serum: 88 mg/dL (12-26 @ 05:40)  Glucose, Serum: 149 mg/dL (12-25 @ 07:48)  Glucose, Serum: 142 mg/dL (12-25 @ 00:52)  Glucose, Serum: 142 mg/dL (12-24 @ 18:28)  Glucose, Serum: 133 mg/dL (12-24 @ 08:30)  Glucose, Serum: 135 mg/dL (12-23 @ 17:33)  Glucose, Serum: 154 mg/dL (12-22 @ 17:43)      26 Dec 2021 05:40    131    |  103    |  15     ----------------------------<  88     3.8     |  20     |  0.70   25 Dec 2021 07:48    129    |  102    |  13     ----------------------------<  149    4.3     |  18     |  0.66   25 Dec 2021 00:52    133    |  106    |  13     ----------------------------<  142    3.4     |  18     |  0.60   24 Dec 2021 18:28    130    |  98     |  15     ----------------------------<  142    2.9     |  20     |  0.86   24 Dec 2021 08:30    127    |  97     |  15     ----------------------------<  133    3.3     |  19     |  0.80   23 Dec 2021 17:33    133    |  98     |  18     ----------------------------<  135    2.8     |  25     |  0.90   22 Dec 2021 17:43    137    |  100    |  23     ----------------------------<  154    2.8     |  25     |  0.83     Ca    7.1        26 Dec 2021 05:40  Ca    7.0        25 Dec 2021 07:48  Ca    6.6        25 Dec 2021 00:52  Ca    7.7        24 Dec 2021 18:28  Ca    8.5        24 Dec 2021 08:30  Ca    8.5        23 Dec 2021 17:33  Ca    9.2        22 Dec 2021 17:43  Phos  2.4       26 Dec 2021 05:40  Phos  2.6       25 Dec 2021 07:48  Phos  2.5       25 Dec 2021 00:52  Phos  1.9       24 Dec 2021 18:28  Phos  2.2       24 Dec 2021 08:30  Phos  1.6       23 Dec 2021 17:33  Mg     2.8       26 Dec 2021 05:40  Mg     3.5       25 Dec 2021 07:48  Mg     1.2       25 Dec 2021 00:52  Mg     2.1       24 Dec 2021 18:28  Mg     1.8       24 Dec 2021 08:30  Mg     2.4       23 Dec 2021 17:33  Mg     2.4       22 Dec 2021 22:54    TPro  4.4    /  Alb  1.2    /  TBili  0.2    /  DBili  x      /  AST  22     /  ALT  16     /  AlkPhos  67     26 Dec 2021 05:40  TPro  4.1    /  Alb  1.1    /  TBili  0.3    /  DBili  x      /  AST  22     /  ALT  19     /  AlkPhos  63     25 Dec 2021 07:48  TPro  3.9    /  Alb  1.1    /  TBili  0.4    /  DBili  x      /  AST  21     /  ALT  22     /  AlkPhos  66     25 Dec 2021 00:52  TPro  6.8    /  Alb  2.4    /  TBili  0.7    /  DBili  0.3    /  AST  38     /  ALT  32     /  AlkPhos  132    24 Dec 2021 08:30  TPro  7.7    /  Alb  3.3    /  TBili  0.6    /  DBili  x      /  AST  23     /  ALT  21     /  AlkPhos  106    22 Dec 2021 17:43      PT/INR - ( 26 Dec 2021 05:40 )   PT: 14.7 sec;   INR: 1.28 ratio         PTT - ( 25 Dec 2021 00:52 )  PTT:35.4 sec        CAPILLARY BLOOD GLUCOSE      POCT Blood Glucose.: 86 mg/dL (26 Dec 2021 07:20)  POCT Blood Glucose.: 109 mg/dL (25 Dec 2021 18:52)      C-Reactive Protein, Serum: 369 mg/L (12-24 @ 10:38)      RADIOLOGY & ADDITIONAL TESTS:    Imaging Personally Reviewed:  [ ] YES  [ ] NO    Consultant(s) Notes Reviewed:  [ ] YES  [ ] NO    Care Discussed with Consultants/Other Providers [ ] YES  [ ] NO

## 2021-12-26 NOTE — PROGRESS NOTE ADULT - SUBJECTIVE AND OBJECTIVE BOX
Patient seen for post operative check  POD #0 s/p Irrigation of abdominal cavity with closure and creation of colostomy.    Patient seen and examined at bedside in ICU, intubated on vent      MEDICATIONS  (STANDING):  chlorhexidine 0.12% Liquid 15 milliLiter(s) Oral Mucosa every 12 hours  chlorhexidine 2% Cloths 1 Application(s) Topical <User Schedule>  dextrose 40% Gel 15 Gram(s) Oral once  dextrose 5%. 1000 milliLiter(s) (50 mL/Hr) IV Continuous <Continuous>  dextrose 5%. 1000 milliLiter(s) (100 mL/Hr) IV Continuous <Continuous>  dextrose 50% Injectable 25 Gram(s) IV Push once  dextrose 50% Injectable 12.5 Gram(s) IV Push once  dextrose 50% Injectable 25 Gram(s) IV Push once  dorzolamide 2%/timolol 0.5% Ophthalmic Solution 1 Drop(s) Both EYES two times a day  enoxaparin Injectable 40 milliGRAM(s) SubCutaneous daily  fentaNYL   Infusion. 0.5 MICROgram(s)/kG/Hr (4.05 mL/Hr) IV Continuous <Continuous>  fluconAZOLE IVPB 200 milliGRAM(s) IV Intermittent every 24 hours  glucagon  Injectable 1 milliGRAM(s) IntraMuscular once  lactated ringers. 1000 milliLiter(s) (100 mL/Hr) IV Continuous <Continuous>  levETIRAcetam  IVPB 500 milliGRAM(s) IV Intermittent every 12 hours  levothyroxine Injectable 25 MICROGram(s) IV Push at bedtime  pantoprazole  Injectable 40 milliGRAM(s) IV Push daily  piperacillin/tazobactam IVPB.. 3.375 Gram(s) IV Intermittent every 8 hours  propofol Infusion 10 MICROgram(s)/kG/Min (4.86 mL/Hr) IV Continuous <Continuous>    Vital Signs Last 24 Hrs  T(C): 37.2 (26 Dec 2021 14:00), Max: 37.2 (26 Dec 2021 14:00)  T(F): 99 (26 Dec 2021 14:00), Max: 99 (26 Dec 2021 14:00)  HR: 91 (26 Dec 2021 19:00) (81 - 99)  RR: 18 (26 Dec 2021 19:00) (18 - 20)  SpO2: 93% (26 Dec 2021 19:00) (92% - 96%)    PHYSICAL EXAM:  General: NAD, intubated on vent  Neuro:  Sedated  HEENT: NCAT, EOMI, conjunctiva clear  CV: +S1+S2 regular rate and rhythm  Lung: clear to ausculation bilaterally, respirations nonlabored, good inspiratory effort  Abdomen: Dressing in place C/D/I. BABAK with serous output. Ostomy pink and viable  : Boucher in place, clear yellow urine  Extremities: no pedal edema or calf tenderness noted     I&O's Detail    25 Dec 2021 07:01  -  26 Dec 2021 07:00  --------------------------------------------------------  IN:    FentaNYL: 390.6 mL    IV PiggyBack: 400 mL    Lactated Ringers: 2400 mL    Norepinephrine: 17.5 mL    Phenylephrine: 18.3 mL    Propofol: 115 mL  Total IN: 3341.4 mL    OUT:    Indwelling Catheter - Urethral (mL): 555 mL    VAC (Vacuum Assisted Closure) System (mL): 375 mL  Total OUT: 930 mL    Total NET: 2411.4 mL      26 Dec 2021 07:01  -  26 Dec 2021 19:28  --------------------------------------------------------  IN:    FentaNYL: 186.4 mL    IV PiggyBack: 100 mL    IV PiggyBack: 355 mL    Lactated Ringers: 900 mL    Propofol: 29.2 mL  Total IN: 1570.6 mL    OUT:    Bulb (mL): 160 mL    Bulb (mL): 120 mL    Nasogastric/Oral tube (mL): 5 mL    Ureteral Catheter (mL): 455 mL  Total OUT: 740 mL    Total NET: 830.6 mL          LABS:                        10.1   16.51 )-----------( 302      ( 26 Dec 2021 14:46 )             30.7     12-26    131<L>  |  103  |  15  ----------------------------<  88  3.8   |  20<L>  |  0.70    Ca    7.1<L>      26 Dec 2021 05:40  Phos  2.4     12-26  Mg     2.8     12-26    TPro  4.4<L>  /  Alb  1.2<L>  /  TBili  0.2  /  DBili  x   /  AST  22  /  ALT  16  /  AlkPhos  67  12-26    PT/INR - ( 26 Dec 2021 05:40 )   PT: 14.7 sec;   INR: 1.28 ratio         PTT - ( 25 Dec 2021 00:52 )  PTT:35.4 sec

## 2021-12-26 NOTE — PROGRESS NOTE ADULT - SUBJECTIVE AND OBJECTIVE BOX
Pre-operative Note    - Pre-operative Diagnosis: Perforated sigmoid diverticulitis, open abdominal wound    - Procedure: Closure of abdominal wound, possible creation of colostomy     - Labs:                        11.9   11.48 )-----------( 389      ( 25 Dec 2021 07:48 )             35.9     12-25    129<L>  |  102  |  13  ----------------------------<  149<H>  4.3   |  18<L>  |  0.66    Ca    7.0<L>      25 Dec 2021 07:48  Phos  2.6     12-25  Mg     3.5     12-25    TPro  4.1<L>  /  Alb  1.1<L>  /  TBili  0.3  /  DBili  x   /  AST  22  /  ALT  19  /  AlkPhos  63  12-25    PT/INR - ( 25 Dec 2021 00:52 )   PT: 16.4 sec;   INR: 1.44 ratio      PTT - ( 25 Dec 2021 00:52 )  PTT:35.4 sec    Type & Screen: A POS    - Orders:  > NPO   > On zosyn and diflucan  > Morning Labs: CBC, BMP, coags, type & screen    - Permits:  > Unable to obtain consent from family, will re-attempt   > Case scheduled with OR  > Discussed with ICU PA

## 2021-12-26 NOTE — PROGRESS NOTE ADULT - ASSESSMENT
81F with PMH of HTN, HLD, hypothyroidism, depression, seizures who presents to the ED for abd pain, admitted with Acute Sigmoid Diverticulitis.  Surgery consulted for interval perforated sigmoid diverticulitis with developing abscess. S/P ex lap, sigmoid resection, peritoneal lavage 12/25 and RTOR 12/26 for Irrigation of abdominal cavity with closure and creation of colostomy. Intubated    - Continue local wound care  - Ostomy/Boucher care  - Antibiotics per ID  - Follow up AM labs  - DVT prophylaxis, pain control  - Wean off ventilator per ICU  - Discussed with Dr. Fairbanks

## 2021-12-26 NOTE — PROGRESS NOTE ADULT - SUBJECTIVE AND OBJECTIVE BOX
CHIEF COMPLAINT:    Interval Events:    REVIEW OF SYSTEMS:  Constitutional: [ ] fevers [ ] chills [ ] weight loss [ ] weight gain  HEENT: [ ] dry eyes [ ] eye irritation [ ] postnasal drip [ ] nasal congestion  CV: [ ] chest pain [ ] orthopnea [ ] palpitations [ ] murmur  Resp: [ ] cough [ ] shortness of breath [ ] dyspnea [ ] wheezing [ ] sputum [ ] hemoptysis  GI: [ ] nausea [ ] vomiting [ ] diarrhea [ ] constipation [ ] abd pain [ ] dysphagia   : [ ] dysuria [ ] nocturia [ ] hematuria [ ] increased urinary frequency  Musculoskeletal: [ ] back pain [ ] myalgias [ ] arthralgias [ ] fracture  Skin: [ ] rash [ ] itch  Neurological: [ ] headache [ ] dizziness [ ] syncope [ ] weakness [ ] numbness  Hematologic/Lymphatic: [ ] anemia [ ] bleeding problem  Allergic/Immunologic: [ ] itchy eyes [ ] nasal discharge [ ] hives [ ] angioedema  [ ] All other systems negative  [ ] Unable to assess ROS because ________    OBJECTIVE:  ICU Vital Signs Last 24 Hrs  T(C): 36.8 (26 Dec 2021 05:15), Max: 36.8 (26 Dec 2021 05:15)  T(F): 98.3 (26 Dec 2021 05:15), Max: 98.3 (26 Dec 2021 05:15)  HR: 81 (26 Dec 2021 07:24) (81 - 101)  BP: --  BP(mean): --  ABP: 103/42 (26 Dec 2021 07:24) (90/78 - 135/56)  ABP(mean): 63 (26 Dec 2021 07:24) (63 - 86)  RR: 18 (26 Dec 2021 07:24) (17 - 19)  SpO2: 95% (26 Dec 2021 07:24) (93% - 96%)    Mode: AC/ CMV (Assist Control/ Continuous Mandatory Ventilation), RR (machine): 18, TV (machine): 150, FiO2: 40, PEEP: 8, ITime: 0.9, MAP: 12, PIP: 26    12-25 @ 07:01  -  12-26 @ 07:00  --------------------------------------------------------  IN: 3341.4 mL / OUT: 930 mL / NET: 2411.4 mL      CAPILLARY BLOOD GLUCOSE      POCT Blood Glucose.: 86 mg/dL (26 Dec 2021 07:20)      PHYSICAL EXAM:  General:   HEENT:   Neck:   Respiratory:   Cardiovascular:   Abdomen:   Extremities:   Skin:   Neurological:  Psychiatry:    LINES:    HOSPITAL MEDICATIONS:  MEDICATIONS  (STANDING):  chlorhexidine 0.12% Liquid 15 milliLiter(s) Oral Mucosa every 12 hours  chlorhexidine 2% Cloths 1 Application(s) Topical <User Schedule>  dextrose 40% Gel 15 Gram(s) Oral once  dextrose 5%. 1000 milliLiter(s) (50 mL/Hr) IV Continuous <Continuous>  dextrose 5%. 1000 milliLiter(s) (100 mL/Hr) IV Continuous <Continuous>  dextrose 50% Injectable 25 Gram(s) IV Push once  dextrose 50% Injectable 12.5 Gram(s) IV Push once  dextrose 50% Injectable 25 Gram(s) IV Push once  dorzolamide 2%/timolol 0.5% Ophthalmic Solution 1 Drop(s) Both EYES two times a day  enoxaparin Injectable 40 milliGRAM(s) SubCutaneous daily  fentaNYL   Infusion. 0.5 MICROgram(s)/kG/Hr (4.05 mL/Hr) IV Continuous <Continuous>  fluconAZOLE IVPB 200 milliGRAM(s) IV Intermittent every 24 hours  glucagon  Injectable 1 milliGRAM(s) IntraMuscular once  lactated ringers. 1000 milliLiter(s) (100 mL/Hr) IV Continuous <Continuous>  levETIRAcetam  IVPB 500 milliGRAM(s) IV Intermittent every 12 hours  levothyroxine Injectable 25 MICROGram(s) IV Push at bedtime  pantoprazole  Injectable 40 milliGRAM(s) IV Push daily  phenylephrine    Infusion 0.1 MICROgram(s)/kG/Min (3.04 mL/Hr) IV Continuous <Continuous>  piperacillin/tazobactam IVPB.. 3.375 Gram(s) IV Intermittent every 8 hours  propofol Infusion 10 MICROgram(s)/kG/Min (4.86 mL/Hr) IV Continuous <Continuous>    MEDICATIONS  (PRN):      LABS:                        9.2    12.37 )-----------( 282      ( 26 Dec 2021 05:40 )             28.0     Hgb Trend: 9.2<--, 11.9<--, 11.6<--, 13.3<--, 12.6<--  12-26    131<L>  |  103  |  15  ----------------------------<  88  3.8   |  20<L>  |  0.70    Ca    7.1<L>      26 Dec 2021 05:40  Phos  2.4     12-26  Mg     2.8     12-26    TPro  4.4<L>  /  Alb  1.2<L>  /  TBili  0.2  /  DBili  x   /  AST  22  /  ALT  16  /  AlkPhos  67  12-26    PT/INR - ( 26 Dec 2021 05:40 )   PT: 14.7 sec;   INR: 1.28 ratio         PTT - ( 25 Dec 2021 00:52 )  PTT:35.4 sec    Arterial Blood Gas:  12-25 @ 07:54  --/29/194/18/99.5/-5.0  ABG lactate: --  Arterial Blood Gas:  12-25 @ 00:23  --/41/72/18/93.7/-8.3  ABG lactate: --  Arterial Blood Gas:  12-24 @ 12:16  --/27/60/18/92.3/-5.5  ABG lactate: --        MICROBIOLOGY:     RADIOLOGY:  [ ] Reviewed and interpreted by me CHIEF COMPLAINT:    Interval Events:  s/p OR this morning    REVIEW OF SYSTEMS:  [x ] Unable to assess ROS because intubated/sedated    OBJECTIVE:  ICU Vital Signs Last 24 Hrs  T(C): 36.8 (26 Dec 2021 05:15), Max: 36.8 (26 Dec 2021 05:15)  T(F): 98.3 (26 Dec 2021 05:15), Max: 98.3 (26 Dec 2021 05:15)  HR: 81 (26 Dec 2021 07:24) (81 - 101)  BP: --  BP(mean): --  ABP: 103/42 (26 Dec 2021 07:24) (90/78 - 135/56)  ABP(mean): 63 (26 Dec 2021 07:24) (63 - 86)  RR: 18 (26 Dec 2021 07:24) (17 - 19)  SpO2: 95% (26 Dec 2021 07:24) (93% - 96%)    Mode: AC/ CMV (Assist Control/ Continuous Mandatory Ventilation), RR (machine): 18, TV (machine): 150, FiO2: 40, PEEP: 8, ITime: 0.9, MAP: 12, PIP: 26    12-25 @ 07:01  -  12-26 @ 07:00  --------------------------------------------------------  IN: 3341.4 mL / OUT: 930 mL / NET: 2411.4 mL      CAPILLARY BLOOD GLUCOSE      POCT Blood Glucose.: 86 mg/dL (26 Dec 2021 07:20)      PHYSICAL EXAM:  General: NAD, non toxic appearing  HEENT: ETT and NGT in place  Neck: supple  Respiratory: mechanical BS b/l  Cardiovascular: s1s2 RRR  Abdomen: soft, non distended, +ostomy, +BABAK drains x2  Extremities: warm, trace edema b/l  Skin: midline incision - dressing CDI  Neurological: unable to assess    LINES:  PIV    HOSPITAL MEDICATIONS:  MEDICATIONS  (STANDING):  chlorhexidine 0.12% Liquid 15 milliLiter(s) Oral Mucosa every 12 hours  chlorhexidine 2% Cloths 1 Application(s) Topical <User Schedule>  dextrose 40% Gel 15 Gram(s) Oral once  dextrose 5%. 1000 milliLiter(s) (50 mL/Hr) IV Continuous <Continuous>  dextrose 5%. 1000 milliLiter(s) (100 mL/Hr) IV Continuous <Continuous>  dextrose 50% Injectable 25 Gram(s) IV Push once  dextrose 50% Injectable 12.5 Gram(s) IV Push once  dextrose 50% Injectable 25 Gram(s) IV Push once  dorzolamide 2%/timolol 0.5% Ophthalmic Solution 1 Drop(s) Both EYES two times a day  enoxaparin Injectable 40 milliGRAM(s) SubCutaneous daily  fentaNYL   Infusion. 0.5 MICROgram(s)/kG/Hr (4.05 mL/Hr) IV Continuous <Continuous>  fluconAZOLE IVPB 200 milliGRAM(s) IV Intermittent every 24 hours  glucagon  Injectable 1 milliGRAM(s) IntraMuscular once  lactated ringers. 1000 milliLiter(s) (100 mL/Hr) IV Continuous <Continuous>  levETIRAcetam  IVPB 500 milliGRAM(s) IV Intermittent every 12 hours  levothyroxine Injectable 25 MICROGram(s) IV Push at bedtime  pantoprazole  Injectable 40 milliGRAM(s) IV Push daily  phenylephrine    Infusion 0.1 MICROgram(s)/kG/Min (3.04 mL/Hr) IV Continuous <Continuous>  piperacillin/tazobactam IVPB.. 3.375 Gram(s) IV Intermittent every 8 hours  propofol Infusion 10 MICROgram(s)/kG/Min (4.86 mL/Hr) IV Continuous <Continuous>    MEDICATIONS  (PRN):      LABS:                        9.2    12.37 )-----------( 282      ( 26 Dec 2021 05:40 )             28.0     Hgb Trend: 9.2<--, 11.9<--, 11.6<--, 13.3<--, 12.6<--  12-26    131<L>  |  103  |  15  ----------------------------<  88  3.8   |  20<L>  |  0.70    Ca    7.1<L>      26 Dec 2021 05:40  Phos  2.4     12-26  Mg     2.8     12-26    TPro  4.4<L>  /  Alb  1.2<L>  /  TBili  0.2  /  DBili  x   /  AST  22  /  ALT  16  /  AlkPhos  67  12-26    PT/INR - ( 26 Dec 2021 05:40 )   PT: 14.7 sec;   INR: 1.28 ratio         PTT - ( 25 Dec 2021 00:52 )  PTT:35.4 sec    Arterial Blood Gas:  12-25 @ 07:54  --/29/194/18/99.5/-5.0  ABG lactate: --  Arterial Blood Gas:  12-25 @ 00:23  --/41/72/18/93.7/-8.3  ABG lactate: --  Arterial Blood Gas:  12-24 @ 12:16  --/27/60/18/92.3/-5.5  ABG lactate: --    < from: TTE Echo Complete w/o Contrast w/ Doppler (12.25.21 @ 10:02) >  Summary:   1. Left ventricular ejection fraction, by visual estimation, is 55 to   60%.   2. Normal global left ventricular systolic function.   3. Trace mitral valve regurgitation.   4. Consider contrast echocardiogram, if clinically warranted,to better   evauate left ventricular function and wall motion.    < end of copied text >      MICROBIOLOGY:     Culture - Body Fluid with Gram Stain (12.25.21 @ 12:31)    Gram Stain:   Few polymorphonuclear leukocytes per low power field  Rare Gram Variable Rods per oil power field    Specimen Source: .Body Fluid INTRA -ABDOMINAL FLUID FOR CULTURE    Culture Results:   Few Escherichia coli Susceptibility to follow.  Few Francy albicans "Susceptibilities not performed"        RADIOLOGY:  [ ] Reviewed and interpreted by me

## 2021-12-26 NOTE — PROGRESS NOTE ADULT - ASSESSMENT
HPI:  Patient is a 81F with a PMH of HTN, HLD, hypothyroidism, depression, seizures who presents to the ED for abd pain.  Patient states that over the last two days she had developed significant abdominal pain and multiple episodes of watery diarrhea.  Reports one episode of nausea and vomiting earlier today.  Patient has no other complaints.  Vitals stable,  labs show leukocytosis and hypokalemia.  CT abdomen significant for diverticulitis.  Will admit to med surg.     (23 Dec 2021 00:17)  ---- As Above ---- Patient is a poor historian. Son's phone number not available ( Aid / patient does not have the number )  The patient appears sluggish at the present time. Presents with N/V, abdominal pain, and diarrhea. Started a few days ago  Patient denies having a colonoscopy or diverticulitis. Patient is unsure if she is getting better.     Patient has a tray of solid food but does not want to eat it ( no appetite )    [id number]  ---------  Diverticulitis - Repeat Ct scan reveals perforation s/p surgery.  ( Never had a colonoscopy. See CT scan . Afebrile, minimal tenderness on exam, WBC 24.83 --> 28.13 --> 11.48  On Zosyn  1) NPO 2) F/U WBC 3) CRP 4) IV Fluids 5) May need TPN in very near future   Son  ( [phone] )     [id number]  -----------  S/P repair w/ colostomy. - Continue as per surgery. Too early to implement TPN

## 2021-12-27 LAB
-  AMIKACIN: SIGNIFICANT CHANGE UP
-  AMIKACIN: SIGNIFICANT CHANGE UP
-  AMOXICILLIN/CLAVULANIC ACID: SIGNIFICANT CHANGE UP
-  AMOXICILLIN/CLAVULANIC ACID: SIGNIFICANT CHANGE UP
-  AMPICILLIN/SULBACTAM: SIGNIFICANT CHANGE UP
-  AMPICILLIN/SULBACTAM: SIGNIFICANT CHANGE UP
-  AMPICILLIN: SIGNIFICANT CHANGE UP
-  AMPICILLIN: SIGNIFICANT CHANGE UP
-  AZTREONAM: SIGNIFICANT CHANGE UP
-  AZTREONAM: SIGNIFICANT CHANGE UP
-  CEFAZOLIN: SIGNIFICANT CHANGE UP
-  CEFAZOLIN: SIGNIFICANT CHANGE UP
-  CEFEPIME: SIGNIFICANT CHANGE UP
-  CEFEPIME: SIGNIFICANT CHANGE UP
-  CEFOXITIN: SIGNIFICANT CHANGE UP
-  CEFOXITIN: SIGNIFICANT CHANGE UP
-  CEFTRIAXONE: SIGNIFICANT CHANGE UP
-  CEFTRIAXONE: SIGNIFICANT CHANGE UP
-  CIPROFLOXACIN: SIGNIFICANT CHANGE UP
-  CIPROFLOXACIN: SIGNIFICANT CHANGE UP
-  ERTAPENEM: SIGNIFICANT CHANGE UP
-  ERTAPENEM: SIGNIFICANT CHANGE UP
-  GENTAMICIN: SIGNIFICANT CHANGE UP
-  GENTAMICIN: SIGNIFICANT CHANGE UP
-  IMIPENEM: SIGNIFICANT CHANGE UP
-  IMIPENEM: SIGNIFICANT CHANGE UP
-  LEVOFLOXACIN: SIGNIFICANT CHANGE UP
-  LEVOFLOXACIN: SIGNIFICANT CHANGE UP
-  MEROPENEM: SIGNIFICANT CHANGE UP
-  MEROPENEM: SIGNIFICANT CHANGE UP
-  PIPERACILLIN/TAZOBACTAM: SIGNIFICANT CHANGE UP
-  PIPERACILLIN/TAZOBACTAM: SIGNIFICANT CHANGE UP
-  TOBRAMYCIN: SIGNIFICANT CHANGE UP
-  TOBRAMYCIN: SIGNIFICANT CHANGE UP
-  TRIMETHOPRIM/SULFAMETHOXAZOLE: SIGNIFICANT CHANGE UP
-  TRIMETHOPRIM/SULFAMETHOXAZOLE: SIGNIFICANT CHANGE UP
ALBUMIN SERPL ELPH-MCNC: 1 G/DL — LOW (ref 3.3–5)
ALP SERPL-CCNC: 71 U/L — SIGNIFICANT CHANGE UP (ref 40–120)
ALT FLD-CCNC: 18 U/L — SIGNIFICANT CHANGE UP (ref 12–78)
ANION GAP SERPL CALC-SCNC: 8 MMOL/L — SIGNIFICANT CHANGE UP (ref 5–17)
AST SERPL-CCNC: 27 U/L — SIGNIFICANT CHANGE UP (ref 15–37)
BILIRUB SERPL-MCNC: 0.2 MG/DL — SIGNIFICANT CHANGE UP (ref 0.2–1.2)
BUN SERPL-MCNC: 16 MG/DL — SIGNIFICANT CHANGE UP (ref 7–23)
CALCIUM SERPL-MCNC: 7 MG/DL — LOW (ref 8.5–10.1)
CHLORIDE SERPL-SCNC: 104 MMOL/L — SIGNIFICANT CHANGE UP (ref 96–108)
CO2 SERPL-SCNC: 20 MMOL/L — LOW (ref 22–31)
CREAT SERPL-MCNC: 0.66 MG/DL — SIGNIFICANT CHANGE UP (ref 0.5–1.3)
CULTURE RESULTS: SIGNIFICANT CHANGE UP
GLUCOSE BLDC GLUCOMTR-MCNC: 103 MG/DL — HIGH (ref 70–99)
GLUCOSE BLDC GLUCOMTR-MCNC: 108 MG/DL — HIGH (ref 70–99)
GLUCOSE BLDC GLUCOMTR-MCNC: 80 MG/DL — SIGNIFICANT CHANGE UP (ref 70–99)
GLUCOSE BLDC GLUCOMTR-MCNC: 97 MG/DL — SIGNIFICANT CHANGE UP (ref 70–99)
GLUCOSE SERPL-MCNC: 73 MG/DL — SIGNIFICANT CHANGE UP (ref 70–99)
GRAM STN FLD: SIGNIFICANT CHANGE UP
HCT VFR BLD CALC: 29.5 % — LOW (ref 34.5–45)
HGB BLD-MCNC: 9.5 G/DL — LOW (ref 11.5–15.5)
MAGNESIUM SERPL-MCNC: 2.8 MG/DL — HIGH (ref 1.6–2.6)
MCHC RBC-ENTMCNC: 28.6 PG — SIGNIFICANT CHANGE UP (ref 27–34)
MCHC RBC-ENTMCNC: 32.2 GM/DL — SIGNIFICANT CHANGE UP (ref 32–36)
MCV RBC AUTO: 88.9 FL — SIGNIFICANT CHANGE UP (ref 80–100)
METHOD TYPE: SIGNIFICANT CHANGE UP
METHOD TYPE: SIGNIFICANT CHANGE UP
NRBC # BLD: 0 /100 WBCS — SIGNIFICANT CHANGE UP (ref 0–0)
ORGANISM # SPEC MICROSCOPIC CNT: SIGNIFICANT CHANGE UP
PHOSPHATE SERPL-MCNC: 3 MG/DL — SIGNIFICANT CHANGE UP (ref 2.5–4.5)
PLATELET # BLD AUTO: 282 K/UL — SIGNIFICANT CHANGE UP (ref 150–400)
POTASSIUM SERPL-MCNC: 3.6 MMOL/L — SIGNIFICANT CHANGE UP (ref 3.5–5.3)
POTASSIUM SERPL-SCNC: 3.6 MMOL/L — SIGNIFICANT CHANGE UP (ref 3.5–5.3)
PROT SERPL-MCNC: 4.3 GM/DL — LOW (ref 6–8.3)
RBC # BLD: 3.32 M/UL — LOW (ref 3.8–5.2)
RBC # FLD: 14.3 % — SIGNIFICANT CHANGE UP (ref 10.3–14.5)
SODIUM SERPL-SCNC: 132 MMOL/L — LOW (ref 135–145)
SPECIMEN SOURCE: SIGNIFICANT CHANGE UP
WBC # BLD: 19.48 K/UL — HIGH (ref 3.8–10.5)
WBC # FLD AUTO: 19.48 K/UL — HIGH (ref 3.8–10.5)

## 2021-12-27 PROCEDURE — 99233 SBSQ HOSP IP/OBS HIGH 50: CPT

## 2021-12-27 PROCEDURE — 99291 CRITICAL CARE FIRST HOUR: CPT

## 2021-12-27 RX ORDER — POTASSIUM CHLORIDE 20 MEQ
10 PACKET (EA) ORAL
Refills: 0 | Status: COMPLETED | OUTPATIENT
Start: 2021-12-27 | End: 2021-12-27

## 2021-12-27 RX ORDER — AMIODARONE HYDROCHLORIDE 400 MG/1
1 TABLET ORAL
Qty: 900 | Refills: 0 | Status: DISCONTINUED | OUTPATIENT
Start: 2021-12-27 | End: 2021-12-30

## 2021-12-27 RX ORDER — FUROSEMIDE 40 MG
40 TABLET ORAL ONCE
Refills: 0 | Status: COMPLETED | OUTPATIENT
Start: 2021-12-27 | End: 2021-12-27

## 2021-12-27 RX ORDER — AMIODARONE HYDROCHLORIDE 400 MG/1
0.5 TABLET ORAL
Qty: 900 | Refills: 0 | Status: DISCONTINUED | OUTPATIENT
Start: 2021-12-27 | End: 2021-12-30

## 2021-12-27 RX ORDER — AMIODARONE HYDROCHLORIDE 400 MG/1
150 TABLET ORAL ONCE
Refills: 0 | Status: COMPLETED | OUTPATIENT
Start: 2021-12-27 | End: 2021-12-27

## 2021-12-27 RX ADMIN — PIPERACILLIN AND TAZOBACTAM 25 GRAM(S): 4; .5 INJECTION, POWDER, LYOPHILIZED, FOR SOLUTION INTRAVENOUS at 17:28

## 2021-12-27 RX ADMIN — Medication 100 MILLIEQUIVALENT(S): at 06:06

## 2021-12-27 RX ADMIN — DORZOLAMIDE HYDROCHLORIDE TIMOLOL MALEATE 1 DROP(S): 20; 5 SOLUTION/ DROPS OPHTHALMIC at 05:24

## 2021-12-27 RX ADMIN — Medication 100 MILLIEQUIVALENT(S): at 07:00

## 2021-12-27 RX ADMIN — CHLORHEXIDINE GLUCONATE 15 MILLILITER(S): 213 SOLUTION TOPICAL at 17:28

## 2021-12-27 RX ADMIN — FENTANYL CITRATE 4.05 MICROGRAM(S)/KG/HR: 50 INJECTION INTRAVENOUS at 13:35

## 2021-12-27 RX ADMIN — PANTOPRAZOLE SODIUM 40 MILLIGRAM(S): 20 TABLET, DELAYED RELEASE ORAL at 11:16

## 2021-12-27 RX ADMIN — PIPERACILLIN AND TAZOBACTAM 25 GRAM(S): 4; .5 INJECTION, POWDER, LYOPHILIZED, FOR SOLUTION INTRAVENOUS at 08:50

## 2021-12-27 RX ADMIN — Medication 40 MILLIGRAM(S): at 13:32

## 2021-12-27 RX ADMIN — LEVETIRACETAM 420 MILLIGRAM(S): 250 TABLET, FILM COATED ORAL at 05:24

## 2021-12-27 RX ADMIN — SODIUM CHLORIDE 100 MILLILITER(S): 9 INJECTION, SOLUTION INTRAVENOUS at 12:03

## 2021-12-27 RX ADMIN — PROPOFOL 4.86 MICROGRAM(S)/KG/MIN: 10 INJECTION, EMULSION INTRAVENOUS at 19:05

## 2021-12-27 RX ADMIN — CHLORHEXIDINE GLUCONATE 15 MILLILITER(S): 213 SOLUTION TOPICAL at 05:04

## 2021-12-27 RX ADMIN — Medication 25 MICROGRAM(S): at 21:19

## 2021-12-27 RX ADMIN — AMIODARONE HYDROCHLORIDE 33.3 MG/MIN: 400 TABLET ORAL at 15:44

## 2021-12-27 RX ADMIN — AMIODARONE HYDROCHLORIDE 618 MILLIGRAM(S): 400 TABLET ORAL at 14:55

## 2021-12-27 RX ADMIN — AMIODARONE HYDROCHLORIDE 16.7 MG/MIN: 400 TABLET ORAL at 21:44

## 2021-12-27 RX ADMIN — ENOXAPARIN SODIUM 40 MILLIGRAM(S): 100 INJECTION SUBCUTANEOUS at 11:16

## 2021-12-27 RX ADMIN — DORZOLAMIDE HYDROCHLORIDE TIMOLOL MALEATE 1 DROP(S): 20; 5 SOLUTION/ DROPS OPHTHALMIC at 17:28

## 2021-12-27 RX ADMIN — PIPERACILLIN AND TAZOBACTAM 25 GRAM(S): 4; .5 INJECTION, POWDER, LYOPHILIZED, FOR SOLUTION INTRAVENOUS at 02:52

## 2021-12-27 RX ADMIN — Medication 100 MILLIEQUIVALENT(S): at 05:04

## 2021-12-27 RX ADMIN — CHLORHEXIDINE GLUCONATE 1 APPLICATION(S): 213 SOLUTION TOPICAL at 05:04

## 2021-12-27 RX ADMIN — LEVETIRACETAM 420 MILLIGRAM(S): 250 TABLET, FILM COATED ORAL at 17:27

## 2021-12-27 NOTE — PROGRESS NOTE ADULT - SUBJECTIVE AND OBJECTIVE BOX
24 hr events:  sedated on mechanical ventilation  failed weaning today: immediately became hypoxic to 83% within seconds of being placed in pressure support  s/p RTOR yesterday for abdominal closure and colstomy  this afternoon with a-fib RVR   remains off vasopressor support off levo    ## ROS:  [x ] unable to obtain due to intubation/sedation     ## Labs:  CBC:                        9.5    19.48 )-----------( 282      ( 27 Dec 2021 03:26 )             29.5     Chem:  12-27    132<L>  |  104  |  16  ----------------------------<  73  3.6   |  20<L>  |  0.66    Ca    7.0<L>      27 Dec 2021 03:26  Phos  3.0     12-27  Mg     2.8     12-27    TPro  4.3<L>  /  Alb  1.0<L>  /  TBili  0.2  /  DBili  x   /  AST  27  /  ALT  18  /  AlkPhos  71  12-27    Coags:  PT/INR - ( 26 Dec 2021 05:40 )   PT: 14.7 sec;   INR: 1.28 ratio         Culture - Body Fluid with Gram Stain (12.25.21 @ 12:31)    Specimen Source: .Body Fluid INTRA -ABDOMINAL FLUID FOR CULTURE    Culture Results: Few Escherichia coli                           Rare Escherichia coli #2                           Few Francy albicans "Susceptibilities not performed"                           Moderate Bacteroides thetaiotamcron group "Susceptibilities not performed"                           Moderate Bacteroides uniformis "Susceptibilities not performed"      Culture - Blood (12.25.21 @ 00:40)    Specimen Source: .Blood Blood-Peripheral    Culture Results: No growth to date.    Culture - Blood (12.24.21 @ 09:03)    Specimen Source: .Blood Blood-Peripheral    Culture Results: No growth to date.    Culture - Urine (12.23.21 @ 08:59)    Specimen Source: Clean Catch Clean Catch (Midstream)    Culture Results: Normal Urogenital yoly present      ## Imaging:  CXR < from: Xray Chest 1 View- PORTABLE-Urgent (Xray Chest 1 View- PORTABLE-Urgent .) (12.25.21 @ 01:27) >  ETT tip above alex. NG tip below diaphragm. Trace left pleural effusion with left lower lobe opacity, representing pneumonia   versus atelectasis.    CT abdomen < from: CT Abdomen and Pelvis w/ IV Cont (12.24.21 @ 14:44) >  CHEST:  LUNGS AND LARGE AIRWAYS: Patent central airways. No pulmonary nodules.   Bibasilar linear atelectasis versus infiltrates  PLEURA: Minimal bilateral pleural effusions.  VESSELS: Atherosclerotic changes of the aorta and coronary arteries.  HEART: Heart size is normal. No pericardial effusion.  MEDIASTINUM AND SARAH: No lymphadenopathy.  CHEST WALL AND LOWER NECK: Within normal limits.    ABDOMEN AND PELVIS:  LIVER: Within normal limits.  BILE DUCTS: Normal caliber.  GALLBLADDER: Distended with multiple small stones  SPLEEN: Within normal limits.  PANCREAS: Within normal limits.  ADRENALS: Within normal limits.  KIDNEYS/URETERS: Within normal limits.    BLADDER: Within normal limits.  REPRODUCTIVE ORGANS: Uterus and adnexa within normal limits.    BOWEL: No bowel obstruction. Appendix is not visualized. No evidence of   inflammation in the pericecal region.  PERITONEUM: Free intraperitoneal air is new from the prior study   suggesting perforation of diverticulitis. There is also a localized fluid   and air collection in the pelvis between the uterus and sigmoid colon   consistent with phlegmon/developing abscess measuring 6.5 cm transverse   by 4.0 cm in length by 6.9 cm AP. Inflammatory changes of the sigmoid   extend to adjacent small bowel loops in the right lower quadrant. Small   hiatal hernia is again seen  VESSELS:Atherosclerotic changes. Incidental note is made of retroaortic   left renal vein, a normal variant  RETROPERITONEUM/LYMPH NODES: No lymphadenopathy.  ABDOMINAL WALL: Tiny fat-containing umbilical hernia.  BONES: Degenerative changes.    IMPRESSION: Interval perforation of previously seen sigmoid colon   diverticulitis with localized fluid collection seen between the uterus   and the sigmoid colon in the pelvis suggesting early abscess formation.  Linear atelectasis versus infiltrates at the lungbases.  Minimal bilateral pleural effusions.  Distended gallbladder with gallstones without CT evidence of acute   cholecystitis          ## Medications:  fluconAZOLE IVPB 200 milliGRAM(s) IV Intermittent every 24 hours  piperacillin/tazobactam IVPB.. 3.375 Gram(s) IV Intermittent every 8 hours    aMIOdarone Infusion 1 mG/Min IV Continuous <Continuous>  aMIOdarone Infusion 0.5 mG/Min IV Continuous <Continuous>      dextrose 40% Gel 15 Gram(s) Oral once  dextrose 50% Injectable 25 Gram(s) IV Push once  dextrose 50% Injectable 25 Gram(s) IV Push once  dextrose 50% Injectable 12.5 Gram(s) IV Push once  glucagon  Injectable 1 milliGRAM(s) IntraMuscular once  levothyroxine Injectable 25 MICROGram(s) IV Push at bedtime    enoxaparin Injectable 40 milliGRAM(s) SubCutaneous daily    pantoprazole  Injectable 40 milliGRAM(s) IV Push daily    fentaNYL   Infusion. 0.5 MICROgram(s)/kG/Hr IV Continuous <Continuous>  levETIRAcetam  IVPB 500 milliGRAM(s) IV Intermittent every 12 hours  propofol Infusion 10 MICROgram(s)/kG/Min IV Continuous <Continuous>      ## Vitals:  T(C): 36.8 (12-27-21 @ 12:00), Max: 37.1 (12-27-21 @ 04:00)  HR: 80 (12-27-21 @ 22:00) (80 - 135)  BP: 104/42  RR: 18 (12-27-21 @ 22:00) (16 - 20)  SpO2: 99% (12-27-21 @ 22:00) (91% - 100%)    Vent: Mode: AC/ CMV (Assist Control/ Continuous Mandatory Ventilation), RR (machine): 18, RR (patient): 18, TV (machine): 450, FiO2: 40, PEEP: 8, PIP: 32    ABG: ABG - ( 26 Dec 2021 11:06 )  pH, Arterial: x     pH, Blood: 7.33  /  pCO2: 39    /  pO2: 63    / HCO3: 21    / Base Excess: -4.9  /  SaO2: 92.1          12-26 @ 07:01  -  12-27 @ 07:00  --------------------------------------------------------  IN: 3293.8 mL / OUT: 1290 mL / NET: 2003.8 mL    12-27 @ 07:01  -  12-27 @ 22:54  --------------------------------------------------------  IN: 1964.2 mL / OUT: 1240 mL / NET: 724.2 mL          ## P/E:  Gen: lying comfortably in bed in no apparent distress, sedated, orally intubated  HEENT: ETT in place  Resp: CTA B/L , mechanical breath sounds  CVS: RRR  Abd: soft ND -BS, bilateral abdominal BABAK drain with serous drainage, ileostomy with serosanguinous drainage, dressing over abdominal incision site clean and dry/intact  Ext: bilateral upper and lower extremity pitting edema  Neuro: sedated    CENTRAL LINE: [ ] YES [ ] NO  LOCATION:   DATE INSERTED:  REMOVE: [ ] YES [ ] NO      BARKLEY: [x ] YES [ ] NO    DATE INSERTED:  REMOVE:  [ ] YES [ ] NO      A-LINE:  [x ] YES [ ] NO  LOCATION:   DATE INSERTED:  REMOVE:  [ ] YES [ ] NO  EXPLAIN:    GLOBAL ISSUE/BEST PRACTICE:  Analgesia: fentanyl  Sedation: fent/propofol  HOB elevation: yes  Stress ulcer prophylaxis: protonix  VTE prophylaxis: lovenox  Oral Care: chlorhexidine   Glycemic control: n/a  Nutrition: npo await bowel function recovery    CODE STATUS: [x ] full code  [ ] DNR  [ ] DNI  [ ] MOLST  Goals of care discussion: [ ] yes

## 2021-12-27 NOTE — DIETITIAN INITIAL EVALUATION ADULT. - PERTINENT MEDS FT
MEDICATIONS  (STANDING):  chlorhexidine 0.12% Liquid 15 milliLiter(s) Oral Mucosa every 12 hours  chlorhexidine 2% Cloths 1 Application(s) Topical <User Schedule>  dextrose 40% Gel 15 Gram(s) Oral once  dextrose 5%. 1000 milliLiter(s) (50 mL/Hr) IV Continuous <Continuous>  dextrose 5%. 1000 milliLiter(s) (100 mL/Hr) IV Continuous <Continuous>  dextrose 50% Injectable 25 Gram(s) IV Push once  dextrose 50% Injectable 12.5 Gram(s) IV Push once  dextrose 50% Injectable 25 Gram(s) IV Push once  dorzolamide 2%/timolol 0.5% Ophthalmic Solution 1 Drop(s) Both EYES two times a day  enoxaparin Injectable 40 milliGRAM(s) SubCutaneous daily  fentaNYL   Infusion. 0.5 MICROgram(s)/kG/Hr (4.05 mL/Hr) IV Continuous <Continuous>  fluconAZOLE IVPB 200 milliGRAM(s) IV Intermittent every 24 hours  glucagon  Injectable 1 milliGRAM(s) IntraMuscular once  lactated ringers. 1000 milliLiter(s) (75 mL/Hr) IV Continuous <Continuous>  levETIRAcetam  IVPB 500 milliGRAM(s) IV Intermittent every 12 hours  levothyroxine Injectable 25 MICROGram(s) IV Push at bedtime  pantoprazole  Injectable 40 milliGRAM(s) IV Push daily  piperacillin/tazobactam IVPB.. 3.375 Gram(s) IV Intermittent every 8 hours  propofol Infusion 10 MICROgram(s)/kG/Min (4.86 mL/Hr) IV Continuous <Continuous>    MEDICATIONS  (PRN):

## 2021-12-27 NOTE — PROGRESS NOTE ADULT - SUBJECTIVE AND OBJECTIVE BOX
JUAREZ GTZ  MRN-[id number]      Follow Up:  perf diverticulitis     Interval History: patient seen and examined. s/p repair in the OR with ostomy placement, remains intubated, wbc elevated    ROS:    [ X] Unobtainable because:  [ ] All other systems negative except as noted    Constitutional: no fever, no chills  Head: no trauma  Eyes: no vision changes, no eye pain  ENT:  no sore throat, no rhinorrhea  Cardiovascular:  no chest pain, no palpitation  Respiratory:  no SOB, no cough  GI:  no abd pain, no vomiting, no diarrhea  urinary: no dysuria, no hematuria, no flank pain  musculoskeletal:  no joint pain, no joint swelling  skin:  no rash  neurology:  no headache, no seizure, no change in mental status  psych: no anxiety, no depression         Allergies  No Known Allergies        ANTIMICROBIALS:   fluconAZOLE IVPB 200 every 24 hours  piperacillin/tazobactam IVPB.. 3.375 every 8 hours      MEDICATIONS  (STANDING):  aMIOdarone Infusion 1 <Continuous>  aMIOdarone Infusion 0.5 <Continuous>  dextrose 40% Gel 15 once  dextrose 50% Injectable 25 once  dextrose 50% Injectable 25 once  dextrose 50% Injectable 12.5 once  enoxaparin Injectable 40 daily  fentaNYL   Infusion. 0.5 <Continuous>  glucagon  Injectable 1 once  levETIRAcetam  IVPB 500 every 12 hours  levothyroxine Injectable 25 at bedtime  pantoprazole  Injectable 40 daily  propofol Infusion 10 <Continuous>      PRN      Physical Exam:  Vital Signs Last 24 Hrs  T(F): 98.2 (12-27-21 @ 12:00), Max: 98.8 (12-27-21 @ 04:00)  HR: 80 (12-27-21 @ 22:00)  BP: --  RR: 18 (12-27-21 @ 22:00)  SpO2: 99% (12-27-21 @ 22:00) (91% - 100%)  Wt(kg): --    General:    NAD,  non toxic, intubated   Head: atraumatic, normocephalic  Eye: normal sclera and conjunctiva  ENT:    no oral lesions, , +ett   Cardio:     regular S1, S2,  no murmur  Respiratory:    clear b/l,    no wheezing  abd:     soft,   BS +,   hard to assess tenderness, new ostomy   :   no CVAT,  no suprapubic tenderness,    Musculoskeletal:   no joint swelling,   no edema  vascular: no central lines, +PIV   Skin:    no rash  Neurologic:     intubated and sedated  psych: sedated     WBC Count: 19.48 K/uL (12-27 @ 03:26)  WBC Count: 16.51 K/uL (12-26 @ 14:46)  WBC Count: 12.37 K/uL (12-26 @ 05:40)  WBC Count: 11.48 K/uL (12-25 @ 07:48)  WBC Count: 6.75 K/uL (12-25 @ 00:52)  WBC Count: 7.29 K/uL (12-24 @ 18:28)  WBC Count: 28.13 K/uL (12-24 @ 08:30)                            9.5    19.48 )-----------( 282      ( 27 Dec 2021 03:26 )             29.5       12-27    132<L>  |  104  |  16  ----------------------------<  73  3.6   |  20<L>  |  0.66    Ca    7.0<L>      27 Dec 2021 03:26  Phos  3.0     12-27  Mg     2.8     12-27    TPro  4.3<L>  /  Alb  1.0<L>  /  TBili  0.2  /  DBili  x   /  AST  27  /  ALT  18  /  AlkPhos  71  12-27      Creatinine Trend: 0.66<--, 0.70<--, 0.66<--, 0.60<--, 0.86<--, 0.80<--        MICROBIOLOGY:  Culture - Body Fluid with Gram Stain (12.25.21 @ 12:31)    Gram Stain:   Few polymorphonuclear leukocytes per low power field  Rare Gram Variable Rods per oil power field    -  Amikacin: S <=16    -  Amikacin: S <=16    -  Amoxicillin/Clavulanic Acid: S <=8/4    -  Amoxicillin/Clavulanic Acid: S <=8/4    -  Ampicillin: R >16 These ampicillin results predict results for amoxicillin    -  Ampicillin: S <=8 These ampicillin results predict results for amoxicillin    -  Ampicillin/Sulbactam: S <=4/2 Enterobacter, Klebsiella aerogenes, Citrobacter, and Serratia may develop resistance during prolonged therapy (3-4 days)    -  Ampicillin/Sulbactam: I 16/8 Enterobacter, Klebsiella aerogenes, Citrobacter, and Serratia may develop resistance during prolonged therapy (3-4 days)    -  Aztreonam: S <=4    -  Aztreonam: S <=4    -  Cefazolin: S <=2 Enterobacter, Klebsiella aerogenes, Citrobacter, and Serratia may develop resistance during prolonged therapy (3-4 days)    -  Cefazolin: I 4 Enterobacter, Klebsiella aerogenes, Citrobacter, and Serratia may develop resistance during prolonged therapy (3-4 days)    -  Cefepime: S <=2    -  Cefepime: S <=2    -  Cefoxitin: S <=8    -  Cefoxitin: S <=8    -  Ceftriaxone: S <=1 Enterobacter, Klebsiella aerogenes, Citrobacter, and Serratia may develop resistance during prolonged therapy    -  Ceftriaxone: S <=1 Enterobacter, Klebsiella aerogenes, Citrobacter, and Serratia may develop resistance during prolonged therapy    -  Ciprofloxacin: S <=0.25    -  Ciprofloxacin: S <=0.25    -  Ertapenem: S <=0.5    -  Ertapenem: S <=0.5    -  Gentamicin: S <=2    -  Gentamicin: S <=2    -  Imipenem: S <=1    -  Imipenem: S <=1    -  Levofloxacin: S <=0.5    -  Levofloxacin: S <=0.5    -  Meropenem: S <=1    -  Meropenem: S <=1    -  Piperacillin/Tazobactam: S <=8    -  Piperacillin/Tazobactam: S <=8    -  Tobramycin: S <=2    -  Tobramycin: S <=2    -  Trimethoprim/Sulfamethoxazole: R >2/38    -  Trimethoprim/Sulfamethoxazole: S <=0.5/9.5    Specimen Source: .Body Fluid INTRA -ABDOMINAL FLUID FOR CULTURE    Culture Results:   Few Escherichia coli  Rare Escherichia coli #2  Few Candida albicans "Susceptibilities not performed"  Moderate Bacteroides thetaiotamcron group "Susceptibilities not performed"  Moderate Bacteroides uniformis "Susceptibilities not performed"    Organism Identification: Escherichia coli  Escherichia coli    Organism: Escherichia coli    Organism: Escherichia coli    Method Type: TONYA    Method Type: TONYA      .Blood Blood-Peripheral  12-24-21   No growth to date.  --  --      Rapid RVP Result: Ginote (12-24 @ 14:38)  Rapid RVP Result: NotDetec (12-24 @ 00:03)        C-Reactive Protein, Serum: 369 (12-24)        D-Dimer Assay, Quantitative: 788 (12-24)    Procalcitonin, Serum: 1.97 (12-24-21 @ 10:38)        RADIOLOGY:  Xray Chest 1 View- PORTABLE-Urgent (Xray Chest 1 View- PORTABLE-Urgent .) (12.25.21 @ 01:27)   Findings/  Impression: ETT tip above alex. NG tip below diaphragm. Trace left   pleural effusion with left lower lobe opacity, representing pneumonia   versus atelectasis.    < from: CT Chest w/ IV Cont (12.24.21 @ 14:44) >  IMPRESSION: Interval perforation of previously seen sigmoid colon   diverticulitis with localized fluid collection seen between the uterus   and the sigmoid colon in the pelvis suggesting early abscess formation.  Linear atelectasis versus infiltrates at the lungbases.  Minimal bilateral pleural effusions.  Distended gallbladder with gallstones without CT evidence of acute   cholecystitis  Results were discussed with the patient's nurseLivan at 4:20 PM on   12/24/2021    < end of copied text >

## 2021-12-27 NOTE — PROGRESS NOTE ADULT - ASSESSMENT
HPI:  Patient is a 81F with a PMH of HTN, HLD, hypothyroidism, depression, seizures who presents to the ED for abd pain.  Patient states that over the last two days she had developed significant abdominal pain and multiple episodes of watery diarrhea.  Reports one episode of nausea and vomiting earlier today.  Patient has no other complaints.  Vitals stable,  labs show leukocytosis and hypokalemia.  CT abdomen significant for diverticulitis.  Will admit to med surg.     (23 Dec 2021 00:17)  ---- As Above ---- Patient is a poor historian. Son's phone number not available ( Aid / patient does not have the number )  The patient appears sluggish at the present time. Presents with N/V, abdominal pain, and diarrhea. Started a few days ago  Patient denies having a colonoscopy or diverticulitis. Patient is unsure if she is getting better.     Patient has a tray of solid food but does not want to eat it ( no appetite )    [id number]  ---------  Diverticulitis - Repeat Ct scan reveals perforation s/p surgery.  ( Never had a colonoscopy. See CT scan . Afebrile, minimal tenderness on exam, WBC 24.83 --> 28.13 --> 11.48  On Zosyn  1) NPO 2) F/U WBC 3) CRP 4) IV Fluids 5) May need TPN in very near future   Son  ( [phone] )     [id number]  -----------  S/P repair w/ colostomy. - Continue as per surgery. Too early to implement TPN    [id number]  -----------  S/P repair w/ colostomy. - Continue as per surgery. Patient NPO / clear liquids since 12/23/21. Will reassess in AM

## 2021-12-27 NOTE — DIETITIAN INITIAL EVALUATION ADULT. - OTHER INFO
Pt adm w/diverticulitis. Pt s/p Ex Lap, open sigmoid colon resection (12/25). Pt s/p Irrigation of abdominal cavity w/closure; creation of colostomy (12/26). Currently, pt is sedated (Propofol 12/27= 58 ml= 63.8 kcal), on vent. Pt is NPO/Clear fluids x 4 days, since (12/23). +NGT--LWS. Per GI note pt may need TPN. Per chart review PTA pt had significant abdominal pain & multiple water diarrhea; +N/V. No previous adm records found; no weight/nutritional histories available.

## 2021-12-27 NOTE — DIETITIAN INITIAL EVALUATION ADULT. - PERTINENT LABORATORY DATA
12-27 Na132 mmol/L<L> Glu 73 mg/dL K+ 3.6 mmol/L Cr  0.66 mg/dL BUN 16 mg/dL 12-27 Phos 3.0 mg/dL 12-27 Alb 1.0 g/dL<L>    12-24-21 @ 10:29A1C 5.6

## 2021-12-27 NOTE — PROGRESS NOTE ADULT - SUBJECTIVE AND OBJECTIVE BOX
Patient is a 81y old  Female who presents with a chief complaint of diverticulitis (27 Dec 2021 13:45)      HPI:  Patient is a 81F with a PMH of HTN, HLD, hypothyroidism, depression, seizures who presents to the ED for abd pain.  Patient states that over the last two days she had developed significant abdominal pain and multiple episodes of watery diarrhea.  Reports one episode of nausea and vomiting earlier today.  Patient has no other complaints.  Vitals stable,  labs show leukocytosis and hypokalemia.  CT abdomen significant for diverticulitis.  Will admit to med surg.     (23 Dec 2021 00:17)      INTERVAL HPI/OVERNIGHT EVENTS: ICU #6, Intubated (+)   NPO. No new GI issues.    MEDICATIONS  (STANDING):  aMIOdarone Infusion 1 mG/Min (33.3 mL/Hr) IV Continuous <Continuous>  aMIOdarone Infusion 0.5 mG/Min (16.7 mL/Hr) IV Continuous <Continuous>  chlorhexidine 0.12% Liquid 15 milliLiter(s) Oral Mucosa every 12 hours  chlorhexidine 2% Cloths 1 Application(s) Topical <User Schedule>  dextrose 40% Gel 15 Gram(s) Oral once  dextrose 5%. 1000 milliLiter(s) (50 mL/Hr) IV Continuous <Continuous>  dextrose 5%. 1000 milliLiter(s) (100 mL/Hr) IV Continuous <Continuous>  dextrose 50% Injectable 25 Gram(s) IV Push once  dextrose 50% Injectable 12.5 Gram(s) IV Push once  dextrose 50% Injectable 25 Gram(s) IV Push once  dorzolamide 2%/timolol 0.5% Ophthalmic Solution 1 Drop(s) Both EYES two times a day  enoxaparin Injectable 40 milliGRAM(s) SubCutaneous daily  fentaNYL   Infusion. 0.5 MICROgram(s)/kG/Hr (4.05 mL/Hr) IV Continuous <Continuous>  fluconAZOLE IVPB 200 milliGRAM(s) IV Intermittent every 24 hours  glucagon  Injectable 1 milliGRAM(s) IntraMuscular once  lactated ringers. 1000 milliLiter(s) (75 mL/Hr) IV Continuous <Continuous>  levETIRAcetam  IVPB 500 milliGRAM(s) IV Intermittent every 12 hours  levothyroxine Injectable 25 MICROGram(s) IV Push at bedtime  pantoprazole  Injectable 40 milliGRAM(s) IV Push daily  piperacillin/tazobactam IVPB.. 3.375 Gram(s) IV Intermittent every 8 hours  propofol Infusion 10 MICROgram(s)/kG/Min (4.86 mL/Hr) IV Continuous <Continuous>    MEDICATIONS  (PRN):      FAMILY HISTORY:  FH: HTN (hypertension)        Allergies    No Known Allergies    Intolerances        PMH/PSH:  Seizure    HTN (hypertension)    Glaucoma    Thyroid disease    Blood clot of neck vein    TIA (transient ischemic attack)    S/P appendectomy          REVIEW OF SYSTEMS:  CONSTITUTIONAL: No fever, weight loss,   EYES: No eye pain, visual disturbances, or discharge  ENMT:  No difficulty hearing, tinnitus, vertigo; No sinus or throat pain  NECK: No pain or stiffness  BREASTS: No pain, masses, or nipple discharge  RESPIRATORY: No cough, wheezing, chills or hemoptysis;   CARDIOVASCULAR: No chest pain, palpitations, dizziness, or leg swelling  GASTROINTESTINAL:  see above   GENITOURINARY: No dysuria, frequency, hematuria, or incontinence  NEUROLOGICAL: No headaches, memory loss, loss of strength, numbness, or tremors  SKIN: No itching, burning, rashes, or lesions   LYMPH NODES: No enlarged glands  ENDOCRINE: No heat or cold intolerance; No hair loss  MUSCULOSKELETAL: No joint pain or swelling; No muscle, back, or extremity pain  PSYCHIATRIC: No depression, anxiety, mood swings, or difficulty sleeping  HEME/LYMPH: No easy bruising, or bleeding gums  ALLERGY AND IMMUNOLOGIC: No hives or eczema    Vital Signs Last 24 Hrs  T(C): 36.8 (27 Dec 2021 12:00), Max: 37.1 (27 Dec 2021 04:00)  T(F): 98.2 (27 Dec 2021 12:00), Max: 98.8 (27 Dec 2021 04:00)  HR: 117 (27 Dec 2021 16:08) (83 - 135)  BP: --  BP(mean): --  RR: 17 (27 Dec 2021 16:00) (16 - 20)  SpO2: 96% (27 Dec 2021 16:08) (91% - 98%)    PHYSICAL EXAM:  GENERAL: NAD, well-groomed, well-developed  HEAD:  Atraumatic, Normocephalic  EYES: EOMI, PERRLA, conjunctiva and sclera clear  NECK: Supple, No JVD, Normal thyroid  NERVOUS SYSTEM:  Uncommunicative   CHEST/LUNG: Clear to percussion bilaterally; No rales, rhonchi, wheezing, or rubs  HEART: Regular rate and rhythm; No murmurs, rubs, or gallops  ABDOMEN: Soft, Nontender, Nondistended; Bowel sounds diminished  EXTREMITIES:  2+ Peripheral Pulses, No clubbing, cyanosis, or edema  LYMPH: No lymphadenopathy noted  SKIN: No rashes or lesions    LAB  12-22 @ 17:43  amylase --   lipase 61                           9.5    19.48 )-----------( 282      ( 27 Dec 2021 03:26 )             29.5       CBC:  12-27 @ 03:26  WBC 19.48   Hgb 9.5   Hct 29.5   Plts 282  MCV 88.9  12-26 @ 14:46  WBC 16.51   Hgb 10.1   Hct 30.7   Plts 302  MCV 88.2  12-26 @ 05:40  WBC 12.37   Hgb 9.2   Hct 28.0   Plts 282  MCV 88.1  12-25 @ 07:48  WBC 11.48   Hgb 11.9   Hct 35.9   Plts 389  MCV 88.0  12-25 @ 00:52  WBC 6.75   Hgb 11.6   Hct 35.2   Plts 426  MCV 88.7  12-24 @ 18:28  WBC 7.29   Hgb 13.3   Hct 40.6   Plts 449  MCV 86.8  12-24 @ 08:30  WBC 28.13   Hgb 12.6   Hct 38.0   Plts 400  MCV 87.8  12-23 @ 17:33  WBC 28.24   Hgb 12.1   Hct 37.2   Plts 378  MCV 89.4  12-22 @ 17:43  WBC 24.83   Hgb 13.0   Hct 39.3   Plts 425  MCV 87.9      Chemistry:  12-27 @ 03:26  Na+ 132  K+ 3.6  Cl- 104  CO2 20  BUN 16  Cr 0.66     12-26 @ 05:40  Na+ 131  K+ 3.8  Cl- 103  CO2 20  BUN 15  Cr 0.70     12-25 @ 07:48  Na+ 129  K+ 4.3  Cl- 102  CO2 18  BUN 13  Cr 0.66     12-25 @ 00:52  Na+ 133  K+ 3.4  Cl- 106  CO2 18  BUN 13  Cr 0.60     12-24 @ 18:28  Na+ 130  K+ 2.9  Cl- 98  CO2 20  BUN 15  Cr 0.86     12-24 @ 08:30  Na+ 127  K+ 3.3  Cl- 97  CO2 19  BUN 15  Cr 0.80     12-23 @ 17:33  Na+ 133  K+ 2.8  Cl- 98  CO2 25  BUN 18  Cr 0.90     12-22 @ 17:43  Na+ 137  K+ 2.8  Cl- 100  CO2 25  BUN 23  Cr 0.83         Glucose, Serum: 73 mg/dL (12-27 @ 03:26)  Glucose, Serum: 88 mg/dL (12-26 @ 05:40)  Glucose, Serum: 149 mg/dL (12-25 @ 07:48)  Glucose, Serum: 142 mg/dL (12-25 @ 00:52)  Glucose, Serum: 142 mg/dL (12-24 @ 18:28)  Glucose, Serum: 133 mg/dL (12-24 @ 08:30)  Glucose, Serum: 135 mg/dL (12-23 @ 17:33)  Glucose, Serum: 154 mg/dL (12-22 @ 17:43)      27 Dec 2021 03:26    132    |  104    |  16     ----------------------------<  73     3.6     |  20     |  0.66   26 Dec 2021 05:40    131    |  103    |  15     ----------------------------<  88     3.8     |  20     |  0.70   25 Dec 2021 07:48    129    |  102    |  13     ----------------------------<  149    4.3     |  18     |  0.66   25 Dec 2021 00:52    133    |  106    |  13     ----------------------------<  142    3.4     |  18     |  0.60   24 Dec 2021 18:28    130    |  98     |  15     ----------------------------<  142    2.9     |  20     |  0.86   24 Dec 2021 08:30    127    |  97     |  15     ----------------------------<  133    3.3     |  19     |  0.80   23 Dec 2021 17:33    133    |  98     |  18     ----------------------------<  135    2.8     |  25     |  0.90     Ca    7.0        27 Dec 2021 03:26  Ca    7.1        26 Dec 2021 05:40  Ca    7.0        25 Dec 2021 07:48  Ca    6.6        25 Dec 2021 00:52  Ca    7.7        24 Dec 2021 18:28  Ca    8.5        24 Dec 2021 08:30  Ca    8.5        23 Dec 2021 17:33  Phos  3.0       27 Dec 2021 03:26  Phos  2.4       26 Dec 2021 05:40  Phos  2.6       25 Dec 2021 07:48  Phos  2.5       25 Dec 2021 00:52  Phos  1.9       24 Dec 2021 18:28  Phos  2.2       24 Dec 2021 08:30  Phos  1.6       23 Dec 2021 17:33  Mg     2.8       27 Dec 2021 03:26  Mg     2.8       26 Dec 2021 05:40  Mg     3.5       25 Dec 2021 07:48  Mg     1.2       25 Dec 2021 00:52  Mg     2.1       24 Dec 2021 18:28  Mg     1.8       24 Dec 2021 08:30  Mg     2.4       23 Dec 2021 17:33    TPro  4.3    /  Alb  1.0    /  TBili  0.2    /  DBili  x      /  AST  27     /  ALT  18     /  AlkPhos  71     27 Dec 2021 03:26  TPro  4.4    /  Alb  1.2    /  TBili  0.2    /  DBili  x      /  AST  22     /  ALT  16     /  AlkPhos  67     26 Dec 2021 05:40  TPro  4.1    /  Alb  1.1    /  TBili  0.3    /  DBili  x      /  AST  22     /  ALT  19     /  AlkPhos  63     25 Dec 2021 07:48  TPro  3.9    /  Alb  1.1    /  TBili  0.4    /  DBili  x      /  AST  21     /  ALT  22     /  AlkPhos  66     25 Dec 2021 00:52  TPro  6.8    /  Alb  2.4    /  TBili  0.7    /  DBili  0.3    /  AST  38     /  ALT  32     /  AlkPhos  132    24 Dec 2021 08:30  TPro  7.7    /  Alb  3.3    /  TBili  0.6    /  DBili  x      /  AST  23     /  ALT  21     /  AlkPhos  106    22 Dec 2021 17:43      PT/INR - ( 26 Dec 2021 05:40 )   PT: 14.7 sec;   INR: 1.28 ratio                 CAPILLARY BLOOD GLUCOSE      POCT Blood Glucose.: 80 mg/dL (27 Dec 2021 11:37)  POCT Blood Glucose.: 97 mg/dL (27 Dec 2021 05:09)  POCT Blood Glucose.: 78 mg/dL (26 Dec 2021 22:28)  POCT Blood Glucose.: 78 mg/dL (26 Dec 2021 17:28)      C-Reactive Protein, Serum: 369 mg/L (12-24 @ 10:38)      RADIOLOGY & ADDITIONAL TESTS:    Imaging Personally Reviewed:  [ ] YES  [ ] NO    Consultant(s) Notes Reviewed:  [ ] YES  [ ] NO    Care Discussed with Consultants/Other Providers [ ] YES  [ ] NO

## 2021-12-27 NOTE — PROGRESS NOTE ADULT - ASSESSMENT
81F PMH HTN, HLD, hypothyroidism, depression, seizures presents for abdominal pain found to have acute sigmoid diverticulitis complicated by perforated sigmoid diverticulitis/peritonitis with abscess formation s/p ex-lap with large bowel resection / abdominal washout / wound vac placement 12/25. Brought back to OR yesterday 12/26 for abdominal closure and colostomy creation. Sepsis with septic shock requiring levophed. Remains intubated for post procedural acute hypoxic respiratory failure. Post op course with a-fib RVR.     NEURO  daily sedation vacation as tolerates    CV  out of shock state  this afternoon with a-fib RVR s/p amio bolus started on amio drip  converted back to sinus rhythm with improvement in HR  overall volume overloaded, approximately 6L net positive balance, anasarca with pitting edema on exam  will diurese with lasix 40mg IVP x1  will decrease IVF from 100cc to 75cc/hr as pt remains NPO    PULM  failed weaning today due to immediate hypoxemia  will diurese and today  attempt daily weaning as tolerates  repeat CXR in AM    GI  NPO  await bowel function recovery  POD #2 with RTOR for abdominal closure 12/26  retention sutures in place  surgical follow up appreciated  monitor I/O from BABAK drain and output from colostomy    ID  sepsis present on admission with septic shock due to GI source from perforated diverticulitis   cont with fluconazole and zosyn for abdominal cultures growing candida and bacteroides and e-coli  monitor WBC and fevers  blood cx negative to date    RENAL  Cr within normal limits  monitor UO  mc in place  monitor electrolytes    ENDO  cont synthroid IV for underlying hypothyroidism     GEN  full code  DVT prophylaxis: lovenox

## 2021-12-27 NOTE — PROGRESS NOTE ADULT - ASSESSMENT
Patient is a 81F with a PMH of HTN, HLD, hypothyroidism, depression, seizures who presents to the ED for abd pain found to have diverticulitis   has rising leukocytosis   had fever yesterday   tachycardic and now hypoxic   CXR (I personally reviewed) low lung volumes   origincal CT (I personally reviewed) with diverticulitis   she had a lot of pain in the abdomen on exam     12/25: s/p surgery for diverticulitis with perforation and abscess and went to the OR. intubated in ICU with vac and needs to go back to the OR, currently on zosyn, s/p one dose of diflucan last night   12/27: s/p repair and ostomy creation yesterday, wbc elevated, cultures sensitive to zosyn and candida albicans is notoriously sensitive to azoles such as fluconazole, wean of pressors and sedation, extubate when ready     Perforated Diverticulitis   ARF requiring intubation   hyponatremia   Fever    Plan:  continue (Zosyn) Piperacillin/tazobactam 3.375 grams every 8 hours   continue fluconazole 200mg q24hrs   follow all cultures   f/u blood cultures  covid swab negative   wean off ventilator post surgery when ready    frequent turns, offloading, and nutrition optimization to avoid bedsores  trend wbc   correct hyponatremia   nutrition/TPN per surgery and GI      D/W ICU team     Manolo Dunn DO  Infectious Disease Attending  Pager [phone]  After 5pm/weekends please call [phone] for all inquiries and new consults

## 2021-12-27 NOTE — PROGRESS NOTE ADULT - SUBJECTIVE AND OBJECTIVE BOX
SURGERY PROGRESS HPI:  Pt seen and examined at bedside resting comfortably in the ICU, intubated.       Vital Signs Last 24 Hrs  T(C): 37.1 (27 Dec 2021 04:00), Max: 37.2 (26 Dec 2021 14:00)  T(F): 98.8 (27 Dec 2021 04:00), Max: 99 (26 Dec 2021 14:00)  HR: 88 (27 Dec 2021 05:00) (81 - 99)  BP: --  BP(mean): --  RR: 17 (27 Dec 2021 05:00) (17 - 20)  SpO2: 92% (27 Dec 2021 05:00) (92% - 96%)      PHYSICAL EXAM:    GENERAL: NAD, intubated, sedated  HEENT: NGT in place with minimal gastric content  CHEST/LUNG: Intubated. Clear to ausculation, bilaterally   HEART: RRR S1S2  ABDOMEN: Ostomy pink and viable without any air/stool in the bag. Dressing clean/dry/intact, removed. Retention sutures intact, packing removed. Wound irrigated with NS. New iodoform packing and dressing placed. Pt tolerated well. JPs x 2 with serous output. non distended, hypoactive BS, soft, non tender, no guarding  : Boucher indwelling with clear yellow urine  EXTREMITIES:  calf soft, non tender b/l    I&O's Detail    25 Dec 2021 07:01  -  26 Dec 2021 07:00  --------------------------------------------------------  IN:    FentaNYL: 390.6 mL    IV PiggyBack: 400 mL    Lactated Ringers: 2400 mL    Norepinephrine: 17.5 mL    Phenylephrine: 18.3 mL    Propofol: 115 mL  Total IN: 3341.4 mL    OUT:    Indwelling Catheter - Urethral (mL): 555 mL    VAC (Vacuum Assisted Closure) System (mL): 375 mL  Total OUT: 930 mL    Total NET: 2411.4 mL      26 Dec 2021 07:01  -  27 Dec 2021 05:08  --------------------------------------------------------  IN:    FentaNYL: 316 mL    IV PiggyBack: 100 mL    IV PiggyBack: 355 mL    Lactated Ringers: 1700 mL    Propofol: 48.4 mL  Total IN: 2519.4 mL    OUT:    Bulb (mL): 160 mL    Bulb (mL): 120 mL    Nasogastric/Oral tube (mL): 5 mL    Ureteral Catheter (mL): 455 mL  Total OUT: 740 mL    Total NET: 1779.4 mL      LABS:                        9.5    19.48 )-----------( 282      ( 27 Dec 2021 03:26 )             29.5     12-27    132<L>  |  104  |  16  ----------------------------<  73  3.6   |  20<L>  |  0.66    Ca    7.0<L>      27 Dec 2021 03:26  Phos  3.0     12-27  Mg     2.8     12-27    TPro  4.3<L>  /  Alb  1.0<L>  /  TBili  0.2  /  DBili  x   /  AST  27  /  ALT  18  /  AlkPhos  71  12-27    PT/INR - ( 26 Dec 2021 05:40 )   PT: 14.7 sec;   INR: 1.28 ratio      Culture Results:   Few Escherichia coli Susceptibility to follow.  Few Francy albicans "Susceptibilities not performed" (12-25 @ 12:31)  Culture Results:   No growth to date. (12-25 @ 00:40)  Culture Results:   No growth to date. (12-25 @ 00:40)  Culture Results:   No growth to date. (12-24 @ 09:03)  Culture Results:   No growth to date. (12-24 @ 09:03)      81F with PMH of HTN, HLD, hypothyroidism, depression, seizures admitted with Acute Sigmoid Diverticulitis. Surgery consulted for interval perforated sigmoid diverticulitis. S/P ex lap, sigmoid resection, peritoneal lavage 12/25 and RTOR 12/26 for Irrigation of abdominal cavity with closure and creation of colostomy. Intubated. Off pressors.    - NPO, NGT to LWS, IV hydration  - Continue local wound care, iodoform packing  - Ostomy care, monitor output  - BABAK care, monitor outputs  - Antibiotics per ID  - Boucher, monitor urine output  - DVT prophylaxis, pain control  - Wean off ventilator per ICU  - Will d/w attending

## 2021-12-28 LAB
ALBUMIN SERPL ELPH-MCNC: 0.9 G/DL — LOW (ref 3.3–5)
ALP SERPL-CCNC: 78 U/L — SIGNIFICANT CHANGE UP (ref 40–120)
ALT FLD-CCNC: 18 U/L — SIGNIFICANT CHANGE UP (ref 12–78)
ANION GAP SERPL CALC-SCNC: 10 MMOL/L — SIGNIFICANT CHANGE UP (ref 5–17)
AST SERPL-CCNC: 27 U/L — SIGNIFICANT CHANGE UP (ref 15–37)
BILIRUB SERPL-MCNC: 0.3 MG/DL — SIGNIFICANT CHANGE UP (ref 0.2–1.2)
BUN SERPL-MCNC: 17 MG/DL — SIGNIFICANT CHANGE UP (ref 7–23)
CALCIUM SERPL-MCNC: 7.2 MG/DL — LOW (ref 8.5–10.1)
CHLORIDE SERPL-SCNC: 104 MMOL/L — SIGNIFICANT CHANGE UP (ref 96–108)
CO2 SERPL-SCNC: 19 MMOL/L — LOW (ref 22–31)
CREAT SERPL-MCNC: 0.74 MG/DL — SIGNIFICANT CHANGE UP (ref 0.5–1.3)
GLUCOSE BLDC GLUCOMTR-MCNC: 108 MG/DL — HIGH (ref 70–99)
GLUCOSE BLDC GLUCOMTR-MCNC: 115 MG/DL — HIGH (ref 70–99)
GLUCOSE BLDC GLUCOMTR-MCNC: 125 MG/DL — HIGH (ref 70–99)
GLUCOSE SERPL-MCNC: 109 MG/DL — HIGH (ref 70–99)
GRAM STN FLD: SIGNIFICANT CHANGE UP
HCT VFR BLD CALC: 27.4 % — LOW (ref 34.5–45)
HGB BLD-MCNC: 8.9 G/DL — LOW (ref 11.5–15.5)
MAGNESIUM SERPL-MCNC: 2.7 MG/DL — HIGH (ref 1.6–2.6)
MCHC RBC-ENTMCNC: 28.8 PG — SIGNIFICANT CHANGE UP (ref 27–34)
MCHC RBC-ENTMCNC: 32.5 GM/DL — SIGNIFICANT CHANGE UP (ref 32–36)
MCV RBC AUTO: 88.7 FL — SIGNIFICANT CHANGE UP (ref 80–100)
NRBC # BLD: 0 /100 WBCS — SIGNIFICANT CHANGE UP (ref 0–0)
PHOSPHATE SERPL-MCNC: 3.2 MG/DL — SIGNIFICANT CHANGE UP (ref 2.5–4.5)
PLATELET # BLD AUTO: 311 K/UL — SIGNIFICANT CHANGE UP (ref 150–400)
POTASSIUM SERPL-MCNC: 3.5 MMOL/L — SIGNIFICANT CHANGE UP (ref 3.5–5.3)
POTASSIUM SERPL-SCNC: 3.5 MMOL/L — SIGNIFICANT CHANGE UP (ref 3.5–5.3)
PROT SERPL-MCNC: 4.7 GM/DL — LOW (ref 6–8.3)
RBC # BLD: 3.09 M/UL — LOW (ref 3.8–5.2)
RBC # FLD: 14.6 % — HIGH (ref 10.3–14.5)
SODIUM SERPL-SCNC: 133 MMOL/L — LOW (ref 135–145)
SPECIMEN SOURCE: SIGNIFICANT CHANGE UP
WBC # BLD: 28.04 K/UL — HIGH (ref 3.8–10.5)
WBC # FLD AUTO: 28.04 K/UL — HIGH (ref 3.8–10.5)

## 2021-12-28 PROCEDURE — 99291 CRITICAL CARE FIRST HOUR: CPT

## 2021-12-28 PROCEDURE — 71045 X-RAY EXAM CHEST 1 VIEW: CPT | Mod: 26

## 2021-12-28 PROCEDURE — 99233 SBSQ HOSP IP/OBS HIGH 50: CPT

## 2021-12-28 RX ORDER — FUROSEMIDE 40 MG
40 TABLET ORAL ONCE
Refills: 0 | Status: COMPLETED | OUTPATIENT
Start: 2021-12-28 | End: 2021-12-28

## 2021-12-28 RX ORDER — ACETAMINOPHEN 500 MG
1000 TABLET ORAL ONCE
Refills: 0 | Status: COMPLETED | OUTPATIENT
Start: 2021-12-28 | End: 2021-12-28

## 2021-12-28 RX ORDER — POTASSIUM CHLORIDE 20 MEQ
10 PACKET (EA) ORAL
Refills: 0 | Status: COMPLETED | OUTPATIENT
Start: 2021-12-28 | End: 2021-12-28

## 2021-12-28 RX ADMIN — Medication 40 MILLIGRAM(S): at 10:24

## 2021-12-28 RX ADMIN — Medication 100 MILLIEQUIVALENT(S): at 06:20

## 2021-12-28 RX ADMIN — SODIUM CHLORIDE 75 MILLILITER(S): 9 INJECTION, SOLUTION INTRAVENOUS at 01:14

## 2021-12-28 RX ADMIN — PROPOFOL 4.86 MICROGRAM(S)/KG/MIN: 10 INJECTION, EMULSION INTRAVENOUS at 15:28

## 2021-12-28 RX ADMIN — LEVETIRACETAM 420 MILLIGRAM(S): 250 TABLET, FILM COATED ORAL at 05:03

## 2021-12-28 RX ADMIN — SODIUM CHLORIDE 75 MILLILITER(S): 9 INJECTION, SOLUTION INTRAVENOUS at 05:03

## 2021-12-28 RX ADMIN — DORZOLAMIDE HYDROCHLORIDE TIMOLOL MALEATE 1 DROP(S): 20; 5 SOLUTION/ DROPS OPHTHALMIC at 17:35

## 2021-12-28 RX ADMIN — FLUCONAZOLE 100 MILLIGRAM(S): 150 TABLET ORAL at 00:50

## 2021-12-28 RX ADMIN — LEVETIRACETAM 420 MILLIGRAM(S): 250 TABLET, FILM COATED ORAL at 17:34

## 2021-12-28 RX ADMIN — Medication 25 MICROGRAM(S): at 21:49

## 2021-12-28 RX ADMIN — ENOXAPARIN SODIUM 40 MILLIGRAM(S): 100 INJECTION SUBCUTANEOUS at 11:37

## 2021-12-28 RX ADMIN — DORZOLAMIDE HYDROCHLORIDE TIMOLOL MALEATE 1 DROP(S): 20; 5 SOLUTION/ DROPS OPHTHALMIC at 05:03

## 2021-12-28 RX ADMIN — Medication 100 MILLIEQUIVALENT(S): at 05:03

## 2021-12-28 RX ADMIN — CHLORHEXIDINE GLUCONATE 15 MILLILITER(S): 213 SOLUTION TOPICAL at 17:34

## 2021-12-28 RX ADMIN — Medication 1000 MILLIGRAM(S): at 11:41

## 2021-12-28 RX ADMIN — Medication 100 MILLIEQUIVALENT(S): at 07:42

## 2021-12-28 RX ADMIN — PIPERACILLIN AND TAZOBACTAM 25 GRAM(S): 4; .5 INJECTION, POWDER, LYOPHILIZED, FOR SOLUTION INTRAVENOUS at 17:35

## 2021-12-28 RX ADMIN — CHLORHEXIDINE GLUCONATE 15 MILLILITER(S): 213 SOLUTION TOPICAL at 06:19

## 2021-12-28 RX ADMIN — PIPERACILLIN AND TAZOBACTAM 25 GRAM(S): 4; .5 INJECTION, POWDER, LYOPHILIZED, FOR SOLUTION INTRAVENOUS at 01:14

## 2021-12-28 RX ADMIN — CHLORHEXIDINE GLUCONATE 1 APPLICATION(S): 213 SOLUTION TOPICAL at 09:14

## 2021-12-28 RX ADMIN — FLUCONAZOLE 100 MILLIGRAM(S): 150 TABLET ORAL at 23:00

## 2021-12-28 RX ADMIN — PIPERACILLIN AND TAZOBACTAM 25 GRAM(S): 4; .5 INJECTION, POWDER, LYOPHILIZED, FOR SOLUTION INTRAVENOUS at 08:06

## 2021-12-28 RX ADMIN — PANTOPRAZOLE SODIUM 40 MILLIGRAM(S): 20 TABLET, DELAYED RELEASE ORAL at 11:36

## 2021-12-28 RX ADMIN — SODIUM CHLORIDE 75 MILLILITER(S): 9 INJECTION, SOLUTION INTRAVENOUS at 14:39

## 2021-12-28 RX ADMIN — FENTANYL CITRATE 4.05 MICROGRAM(S)/KG/HR: 50 INJECTION INTRAVENOUS at 06:45

## 2021-12-28 RX ADMIN — Medication 400 MILLIGRAM(S): at 10:24

## 2021-12-28 NOTE — PROGRESS NOTE ADULT - SUBJECTIVE AND OBJECTIVE BOX
[facility]  Division of Infectious Diseases  [phone]    Name: JUAREZ GTZ  Age: 81y  Gender: Female  MRN: [id number]    Interval History--  Notes reviewed.     Past Medical History--  Seizure    HTN (hypertension)    Glaucoma    Thyroid disease    Blood clot of neck vein    TIA (transient ischemic attack)    S/P appendectomy        For details regarding the patient's social history, family history, and other miscellaneous elements, please refer the initial infectious diseases consultation and/or the admitting history and physical examination for this admission.    Allergies    No Known Allergies    Intolerances        Medications--  Antibiotics:  fluconAZOLE IVPB 200 milliGRAM(s) IV Intermittent every 24 hours  piperacillin/tazobactam IVPB.. 3.375 Gram(s) IV Intermittent every 8 hours    Immunologic:    Other:  aMIOdarone Infusion  aMIOdarone Infusion  chlorhexidine 0.12% Liquid  chlorhexidine 2% Cloths  dextrose 40% Gel  dextrose 5%.  dextrose 5%.  dextrose 50% Injectable  dextrose 50% Injectable  dextrose 50% Injectable  dorzolamide 2%/timolol 0.5% Ophthalmic Solution  enoxaparin Injectable  fentaNYL   Infusion.  glucagon  Injectable  lactated ringers.  levETIRAcetam  IVPB  levothyroxine Injectable  pantoprazole  Injectable  propofol Infusion      Review of Systems--  A 10-point review of systems was obtained.     Pertinent positives and negatives--  Constitutional: No fevers. No Chills. No Rigors.   Cardiovascular: No chest pain. No palpitations.  Respiratory: No shortness of breath. No cough.  Gastrointestinal: No nausea or vomiting. No diarrhea or constipation.   Psychiatric: Pleasant. Appropriate affect.    Review of systems otherwise negative except as previously noted.    Physical Examination--  Vital Signs: T(F): 99.8 (12-28-21 @ 08:00), Max: 99.8 (12-28-21 @ 08:00)  HR: 79 (12-28-21 @ 08:30)  BP: --  RR: 14 (12-28-21 @ 08:00)  SpO2: 99% (12-28-21 @ 08:30)  Wt(kg): --  General: Nontoxic-appearing Female in no acute distress.  HEENT: AT/NC. PERRL. EOMI. Anicteric. Conjunctiva pink and moist. Oropharynx clear. Dentition fair.  Neck: Not rigid. No sense of mass.  Nodes: None palpable.  Lungs: Clear bilaterally without rales, wheezing or rhonchi  Heart: Regular rate and rhythm. No Murmur. No rub. No gallop. No palpable thrill.  Abdomen: Bowel sounds present and normoactive. Soft. Nondistended. Nontender. No sense of mass. No organomegaly.  Back: No spinal tenderness. No costovertebral angle tenderness.   Extremities: No cyanosis or clubbing. No edema.   Skin: Warm. Dry. Good turgor. No rash. No vasculitic stigmata.  Psychiatric: Appropriate affect and mood for situation.         Laboratory Studies--  CBC                        8.9    28.04 )-----------( 311      ( 28 Dec 2021 03:36 )             27.4       Chemistries  12-28    133<L>  |  104  |  17  ----------------------------<  109<H>  3.5   |  19<L>  |  0.74    Ca    7.2<L>      28 Dec 2021 03:36  Phos  3.2     12-28  Mg     2.7     12-28    TPro  4.7<L>  /  Alb  0.9<L>  /  TBili  0.3  /  DBili  x   /  AST  27  /  ALT  18  /  AlkPhos  78  12-28      Culture Data    Culture - Body Fluid with Gram Stain (collected 25 Dec 2021 12:31)  Source: .Body Fluid INTRA -ABDOMINAL FLUID FOR CULTURE  Gram Stain (27 Dec 2021 09:49):    Few polymorphonuclear leukocytes per low power field    Rare Gram Variable Rods per oil power field  Final Report (27 Dec 2021 09:49):    Few Escherichia coli    Rare Escherichia coli #2    Few Candida albicans "Susceptibilities not performed"    Moderate Bacteroides thetaiotamcron group "Susceptibilities not performed"    Moderate Bacteroides uniformis "Susceptibilities not performed"  Organism: Escherichia coli  Escherichia coli (27 Dec 2021 09:49)  Organism: Escherichia coli (27 Dec 2021 09:49)  Organism: Escherichia coli (27 Dec 2021 09:49)    Culture - Blood (collected 25 Dec 2021 00:40)  Source: .Blood Blood-Peripheral  Preliminary Report (26 Dec 2021 01:02):    No growth to date.    Culture - Blood (collected 25 Dec 2021 00:40)  Source: .Blood Blood-Peripheral  Preliminary Report (26 Dec 2021 01:02):    No growth to date.    Culture - Blood (collected 24 Dec 2021 09:03)  Source: .Blood Blood-Peripheral  Preliminary Report (25 Dec 2021 10:01):    No growth to date.    Culture - Blood (collected 24 Dec 2021 09:03)  Source: .Blood Blood-Peripheral  Preliminary Report (25 Dec 2021 10:01):    No growth to date.    Culture - Urine (collected 23 Dec 2021 08:59)  Source: Clean Catch Clean Catch (Midstream)  Final Report (25 Dec 2021 22:00):    Normal Urogenital yoly present             [facility]  Division of Infectious Diseases  [phone]    Name: JUAREZ GTZ  Age: 81y  Gender: Female  MRN: [id number]    Interval History--  Notes reviewed. Seen earlier today.  Case discussed with the critical care team.  Status post washout and closure on 12/26.  Patient doing well with less sedation.  Still has pain, managed with IV fentanyl drip.  Remains intubated in ICU.    Past Medical History--  Seizure    HTN (hypertension)    Glaucoma    Thyroid disease    Blood clot of neck vein    TIA (transient ischemic attack)    S/P appendectomy        For details regarding the patient's social history, family history, and other miscellaneous elements, please refer the initial infectious diseases consultation and/or the admitting history and physical examination for this admission.    Allergies    No Known Allergies    Intolerances        Medications--  Antibiotics:  fluconAZOLE IVPB 200 milliGRAM(s) IV Intermittent every 24 hours  piperacillin/tazobactam IVPB.. 3.375 Gram(s) IV Intermittent every 8 hours    Immunologic:    Other:  aMIOdarone Infusion  aMIOdarone Infusion  chlorhexidine 0.12% Liquid  chlorhexidine 2% Cloths  dextrose 40% Gel  dextrose 5%.  dextrose 5%.  dextrose 50% Injectable  dextrose 50% Injectable  dextrose 50% Injectable  dorzolamide 2%/timolol 0.5% Ophthalmic Solution  enoxaparin Injectable  fentaNYL   Infusion.  glucagon  Injectable  lactated ringers.  levETIRAcetam  IVPB  levothyroxine Injectable  pantoprazole  Injectable  propofol Infusion      Review of Systems--  A 10-point review of systems was obtained.   Review of systems otherwise negative except as previously noted.    Physical Examination--  Vital Signs: T(F): 99.8 (12-28-21 @ 08:00), Max: 99.8 (12-28-21 @ 08:00)  HR: 79 (12-28-21 @ 08:30)  BP: --  RR: 14 (12-28-21 @ 08:00)  SpO2: 99% (12-28-21 @ 08:30)  Wt(kg): --  General: Nontoxic-appearing Female in no acute distress.  HEENT: AT/NC. Pupils/EOM unable secondary to patient compliance. Oropharynx/dentition unable secondary to patient compliance. OETT.   Neck: Not rigid. No sense of mass.  Nodes: None palpable.  Lungs: Diminished BS bilaterally without rales, wheezing or rhonchi  Heart: Regular rate and rhythm. No Murmur. No rub. No gallop. No palpable thrill.  Abdomen: Bowel sounds absent.  Soft. Mildly distended.  Incision dressed.  Ostomy pink with reddish effluent.  No stool or gas in bag.  No reaction to palpation.  Drains x2 serous fluid.  Back: Unable  Extremities: No cyanosis or clubbing. 3+ pitting edema.   Skin: Warm. Dry. Good turgor. No rash. No vasculitic stigmata.  Psychiatric: Unable        Laboratory Studies--  CBC                        8.9    28.04 )-----------( 311      ( 28 Dec 2021 03:36 )             27.4     WBC Count: 19.48 K/uL (12-27-21 @ 03:26)  WBC Count: 16.51 K/uL (12-26-21 @ 14:46)  WBC Count: 12.37 K/uL (12-26-21 @ 05:40)  WBC Count: 11.48 K/uL (12-25-21 @ 07:48)  WBC Count: 6.75 K/uL (12-25-21 @ 00:52)  WBC Count: 7.29 K/uL (12-24-21 @ 18:28)  WBC Count: 28.13 K/uL (12-24-21 @ 08:30)  WBC Count: 28.24 K/uL (12-23-21 @ 17:33)    Chemistries  12-28    133<L>  |  104  |  17  ----------------------------<  109<H>  3.5   |  19<L>  |  0.74    Ca    7.2<L>      28 Dec 2021 03:36  Phos  3.2     12-28  Mg     2.7     12-28    TPro  4.7<L>  /  Alb  0.9<L>  /  TBili  0.3  /  DBili  x   /  AST  27  /  ALT  18  /  AlkPhos  78  12-28      Culture Data    Culture - Body Fluid with Gram Stain (collected 25 Dec 2021 12:31)  Source: .Body Fluid INTRA -ABDOMINAL FLUID FOR CULTURE  Gram Stain (27 Dec 2021 09:49):    Few polymorphonuclear leukocytes per low power field    Rare Gram Variable Rods per oil power field  Final Report (27 Dec 2021 09:49):    Few Escherichia coli    Rare Escherichia coli #2    Few Candida albicans "Susceptibilities not performed"    Moderate Bacteroides thetaiotamcron group "Susceptibilities not performed"    Moderate Bacteroides uniformis "Susceptibilities not performed"  Organism: Escherichia coli  Escherichia coli (27 Dec 2021 09:49)  Organism: Escherichia coli (27 Dec 2021 09:49)  Organism: Escherichia coli (27 Dec 2021 09:49)    Culture - Blood (collected 25 Dec 2021 00:40)  Source: .Blood Blood-Peripheral  Preliminary Report (26 Dec 2021 01:02):    No growth to date.    Culture - Blood (collected 25 Dec 2021 00:40)  Source: .Blood Blood-Peripheral  Preliminary Report (26 Dec 2021 01:02):    No growth to date.    Culture - Blood (collected 24 Dec 2021 09:03)  Source: .Blood Blood-Peripheral  Preliminary Report (25 Dec 2021 10:01):    No growth to date.    Culture - Blood (collected 24 Dec 2021 09:03)  Source: .Blood Blood-Peripheral  Preliminary Report (25 Dec 2021 10:01):    No growth to date.    Culture - Urine (collected 23 Dec 2021 08:59)  Source: Clean Catch Clean Catch (Midstream)  Final Report (25 Dec 2021 22:00):    Normal Urogenital yoly present

## 2021-12-28 NOTE — PROGRESS NOTE ADULT - ASSESSMENT
81F PMH HTN, HLD, hypothyroidism, depression, seizures presents for abdominal pain found to have acute sigmoid diverticulitis complicated by perforated sigmoid diverticulitis/peritonitis with abscess formation s/p ex-lap with large bowel resection / abdominal washout / wound vac placement 12/25. Brought back to OR yesterday 12/26 for abdominal closure and colostomy creation. Sepsis with septic shock requiring levophed. Remains intubated for post procedural acute hypoxic respiratory failure. Post op course with a-fib RVR, volume overload    NEURO  daily sedation vacation as tolerates    CV  out of shock state  a-fib RVR s/p amio converted to sinus rhythm  can hold on further amio as pt back in sinus rhythm    overall volume overloaded, 6L net positive balance, anasarca with pitting edema on exam  will diurese with lasix again today  IVF decreased yesterday from 100cc to 75cc/hr as pt remains NPO    PULM  tolerating weaning today for first time   cont daily wean as tolerates    GI  NPO  await bowel function recovery  POD #3 with RTOR for abdominal closure 12/26  retention sutures in place  surgical follow up appreciated  monitor I/O from BABAK drain and output from colostomy    ID  sepsis present on admission with septic shock due to GI source from perforated diverticulitis   cont with fluconazole and zosyn for abdominal cultures growing candida and bacteroides and e-coli  monitor WBC and fevers  elevate WBC ?reactive due to recent surgery vs brewing infection  check blood cx and sputum cx  blood cx negative to date    RENAL  Cr within normal limits  monitor UO  mc in place  monitor electrolytes    ENDO  cont synthroid IV for underlying hypothyroidism     GEN  full code  DVT prophylaxis: lovenox

## 2021-12-28 NOTE — PROGRESS NOTE ADULT - ASSESSMENT
Patient is a 81F with a PMH of HTN, HLD, hypothyroidism, depression, seizures who presents to the ED for abd pain found to have diverticulitis   has rising leukocytosis   had fever yesterday   tachycardic and now hypoxic   CXR (I personally reviewed) low lung volumes   origincal CT (I personally reviewed) with diverticulitis   she had a lot of pain in the abdomen on exam     12/25: s/p surgery for diverticulitis with perforation and abscess and went to the OR. intubated in ICU with vac and needs to go back to the OR, currently on zosyn, s/p one dose of diflucan last night   12/27: s/p repair and ostomy creation yesterday, wbc elevated, cultures sensitive to zosyn and candida albicans is notoriously sensitive to azoles such as fluconazole, wean of pressors and sedation, extubate when ready   12/28: Further increase in white blood cell count but overall the patient appears to be reasonably stable.  No concern of C. difficile at this juncture.  Current antibiotics are reasonable.  Leukocytosis like related to recent surgery, no concern of other superimposed process on the basis of exam.  I do not see a role to alter her antimicrobials.    Plan:  Trend CBC  Continue Zosyn  Continue Diflucan  Follow-up culture data  Await return of bowel function  Remove catheters/TPN as able  Enteral feeding when able  Diuresis per critical care  Offloading     Won Hector MD  Attending Physician  [facility]  Division of Infectious Diseases  [phone]    > 35 mins total time spent

## 2021-12-28 NOTE — PROGRESS NOTE ADULT - SUBJECTIVE AND OBJECTIVE BOX
POD #1  Patient seen and examined at bedside, resting comfortably in ICU, intubated and sedated.      MEDICATIONS  (STANDING):  aMIOdarone Infusion 1 mG/Min (33.3 mL/Hr) IV Continuous <Continuous>  aMIOdarone Infusion 0.5 mG/Min (16.7 mL/Hr) IV Continuous <Continuous>  chlorhexidine 0.12% Liquid 15 milliLiter(s) Oral Mucosa every 12 hours  chlorhexidine 2% Cloths 1 Application(s) Topical <User Schedule>  dextrose 40% Gel 15 Gram(s) Oral once  dextrose 5%. 1000 milliLiter(s) (50 mL/Hr) IV Continuous <Continuous>  dextrose 5%. 1000 milliLiter(s) (100 mL/Hr) IV Continuous <Continuous>  dextrose 50% Injectable 25 Gram(s) IV Push once  dextrose 50% Injectable 25 Gram(s) IV Push once  dextrose 50% Injectable 12.5 Gram(s) IV Push once  dorzolamide 2%/timolol 0.5% Ophthalmic Solution 1 Drop(s) Both EYES two times a day  enoxaparin Injectable 40 milliGRAM(s) SubCutaneous daily  fentaNYL   Infusion. 0.5 MICROgram(s)/kG/Hr (4.05 mL/Hr) IV Continuous <Continuous>  fluconAZOLE IVPB 200 milliGRAM(s) IV Intermittent every 24 hours  glucagon  Injectable 1 milliGRAM(s) IntraMuscular once  lactated ringers. 1000 milliLiter(s) (75 mL/Hr) IV Continuous <Continuous>  levETIRAcetam  IVPB 500 milliGRAM(s) IV Intermittent every 12 hours  levothyroxine Injectable 25 MICROGram(s) IV Push at bedtime  pantoprazole  Injectable 40 milliGRAM(s) IV Push daily  piperacillin/tazobactam IVPB.. 3.375 Gram(s) IV Intermittent every 8 hours  potassium chloride  10 mEq/100 mL IVPB 10 milliEquivalent(s) IV Intermittent every 1 hour  propofol Infusion 10 MICROgram(s)/kG/Min (4.86 mL/Hr) IV Continuous <Continuous>      Vital Signs Last 24 Hrs  T(C): 36.9 (28 Dec 2021 04:12), Max: 36.9 (28 Dec 2021 04:12)  T(F): 98.5 (28 Dec 2021 04:12), Max: 98.5 (28 Dec 2021 04:12)  HR: 77 (28 Dec 2021 04:51) (77 - 135)  BP: --  RR: 18 (28 Dec 2021 04:00) (16 - 20)  SpO2: 100% (28 Dec 2021 04:51) (91% - 100%)    PHYSICAL EXAM:  GENERAL: NAD, intubated, sedated  HEENT: NGT in place with minimal gastric content  CHEST/LUNG: Intubated. Clear to ausculation, bilaterally   HEART: RRR S1S2  ABDOMEN: Ostomy pink and viable without any air/stool in the bag. Dressing clean/dry/intact, removed. Retention sutures intact, packing removed. Wound irrigated with NS. New iodoform packing and dressing placed. Pt tolerated well. JPs x 2 with serous output. non distended, hypoactive BS, soft, non tender, no guarding  : Boucher indwelling with clear yellow urine  EXTREMITIES:  calf soft, non tender b/l      I&O's Detail    26 Dec 2021 07:01  -  27 Dec 2021 07:00  --------------------------------------------------------  IN:    FentaNYL: 380.8 mL    IV PiggyBack: 655 mL    IV PiggyBack: 100 mL    Lactated Ringers: 2100 mL    Propofol: 58 mL  Total IN: 3293.8 mL    OUT:    Bulb (mL): 170 mL    Bulb (mL): 210 mL    Indwelling Catheter - Urethral (mL): 350 mL    Nasogastric/Oral tube (mL): 105 mL    Ureteral Catheter (mL): 455 mL  Total OUT: 1290 mL    Total NET: 2003.8 mL      27 Dec 2021 07:01  -  28 Dec 2021 06:13  --------------------------------------------------------  IN:    Amiodarone: 150 mL    Amiodarone: 283 mL    FentaNYL: 275.8 mL    IV PiggyBack: 100 mL    Lactated Ringers: 1525 mL    Propofol: 65.6 mL  Total IN: 2399.4 mL    OUT:    Bulb (mL): 25 mL    Bulb (mL): 60 mL    Indwelling Catheter - Urethral (mL): 1435 mL  Total OUT: 1520 mL    Total NET: 879.4 mL          LABS:                        8.9    28.04 )-----------( 311      ( 28 Dec 2021 03:36 )             27.4     12-28    133<L>  |  104  |  17  ----------------------------<  109<H>  3.5   |  19<L>  |  0.74    Ca    7.2<L>      28 Dec 2021 03:36  Phos  3.2     12-28  Mg     2.7     12-28    TPro  4.7<L>  /  Alb  0.9<L>  /  TBili  0.3  /  DBili  x   /  AST  27  /  ALT  18  /  AlkPhos  78  12-28

## 2021-12-28 NOTE — PROGRESS NOTE ADULT - ASSESSMENT
81F with PMH of HTN, HLD, hypothyroidism, depression, seizures admitted with Acute Sigmoid Diverticulitis. Surgery consulted for interval perforated sigmoid diverticulitis. S/P ex lap, sigmoid resection, peritoneal lavage 12/25 and RTOR 12/26 for Irrigation of abdominal cavity with closure and creation of colostomy. Intubated. Off pressors. WBC increased to 28    - NPO, NGT to LWS, IV hydration  - Continue local wound care, iodoform packing  - Ostomy care, monitor output  - BABAK care, monitor outputs  - Antibiotics per ID  - Boucher, monitor urine output  - DVT prophylaxis, pain control  - Wean off ventilator per ICU  - Will d/w attending

## 2021-12-28 NOTE — PROGRESS NOTE ADULT - SUBJECTIVE AND OBJECTIVE BOX
24 hr events:  diuresed well with lasix yesterday  failed wean yesterday due to hypoxia  tolerating weaning today pressure support 10/8  a-fib RVR yesterday started on amio drip which is to complete this afternoon  back in sinus rhythm      ## ROS:  [ x] unable to obtain      ## Labs:  CBC:                        8.9    28.04 )-----------( 311      ( 28 Dec 2021 03:36 )             27.4     Chem:  12-28    133<L>  |  104  |  17  ----------------------------<  109<H>  3.5   |  19<L>  |  0.74    Ca    7.2<L>      28 Dec 2021 03:36  Phos  3.2     12-28  Mg     2.7     12-28    TPro  4.7<L>  /  Alb  0.9<L>  /  TBili  0.3  /  DBili  x   /  AST  27  /  ALT  18  /  AlkPhos  78  12-28    Culture - Body Fluid with Gram Stain (12.25.21 @ 12:31)    Specimen Source: .Body Fluid INTRA -ABDOMINAL FLUID FOR CULTURE    Culture Results:   Few Escherichia coli  Rare Escherichia coli #2  Few Candida albicans "Susceptibilities not performed"  Moderate Bacteroides thetaiotamcron group "Susceptibilities not performed"  Moderate Bacteroides uniformis "Susceptibilities not performed"        ## Imaging:  CXR (my read)  no significant change from prior film 12/25, still with L pl effusion      ## Medications:  fluconAZOLE IVPB 200 milliGRAM(s) IV Intermittent every 24 hours  piperacillin/tazobactam IVPB.. 3.375 Gram(s) IV Intermittent every 8 hours    aMIOdarone Infusion 1 mG/Min IV Continuous <Continuous>  aMIOdarone Infusion 0.5 mG/Min IV Continuous <Continuous>      dextrose 40% Gel 15 Gram(s) Oral once  dextrose 50% Injectable 25 Gram(s) IV Push once  dextrose 50% Injectable 12.5 Gram(s) IV Push once  dextrose 50% Injectable 25 Gram(s) IV Push once  glucagon  Injectable 1 milliGRAM(s) IntraMuscular once  levothyroxine Injectable 25 MICROGram(s) IV Push at bedtime    enoxaparin Injectable 40 milliGRAM(s) SubCutaneous daily    pantoprazole  Injectable 40 milliGRAM(s) IV Push daily    fentaNYL   Infusion. 0.5 MICROgram(s)/kG/Hr IV Continuous <Continuous>  levETIRAcetam  IVPB 500 milliGRAM(s) IV Intermittent every 12 hours  propofol Infusion 10 MICROgram(s)/kG/Min IV Continuous <Continuous>      ## Vitals:  T(C): 36.9  Max: 37.7 (12-28-21 @ 08:00)  HR: 84 (75 - 88)  BP: 131/61  (118/53 - 140/59)  BP(mean): 83 (73 - 83)  RR: 19 (8 - 21)  SpO2: 97% (93% - 100%)    Vent: Mode: AC/ CMV (Assist Control/ Continuous Mandatory Ventilation), RR (machine): 18, RR (patient): 22, TV (machine): 450, FiO2: 40, PEEP: 5, PIP: 31         12-27 @ 07:01  -  12-28 @ 07:00  --------------------------------------------------------  IN: 2806.2 mL / OUT: 2150 mL / NET: 656.2 mL        ## P/E:  Gen: lying comfortably in bed in no apparent distress  HEENT: ETT in place, NGT in place  Resp: CTA B/L , mechanical breath sounds  CVS: RRR  Abd: soft, no BS, bilateral abdominal BABAK with serous drainage colostomy with serosanguinous drainage  Ext: anasarca  Neuro: arousable, attempting to follow commands    CENTRAL LINE: [ ] YES [x ] NO  LOCATION:   DATE INSERTED:  REMOVE: [ ] YES [ ] NO      BARKLEY: [x ] YES [ ] NO    DATE INSERTED:  REMOVE:  [ ] YES [ ] NO      A-LINE:  [x ] YES [ ] NO  LOCATION:   DATE INSERTED:  REMOVE:  [ ] YES [ ] NO  EXPLAIN:    GLOBAL ISSUE/BEST PRACTICE:  Analgesia: fentanyl  Sedation: propofol  HOB elevation: yes  Stress ulcer prophylaxis: protonix  VTE prophylaxis: lovenox  Oral Care: chlorhexidine   Glycemic control: n/a  Nutrition: npo, starting ppn    CODE STATUS: [x ] full code  [ ] DNR  [ ] DNI  [ ] MOLST  Goals of care discussion: [ ] yes

## 2021-12-28 NOTE — PROGRESS NOTE ADULT - ASSESSMENT
HPI:  Patient is a 81F with a PMH of HTN, HLD, hypothyroidism, depression, seizures who presents to the ED for abd pain.  Patient states that over the last two days she had developed significant abdominal pain and multiple episodes of watery diarrhea.  Reports one episode of nausea and vomiting earlier today.  Patient has no other complaints.  Vitals stable,  labs show leukocytosis and hypokalemia.  CT abdomen significant for diverticulitis.  Will admit to med surg.     (23 Dec 2021 00:17)  ---- As Above ---- Patient is a poor historian. Son's phone number not available ( Aid / patient does not have the number )  The patient appears sluggish at the present time. Presents with N/V, abdominal pain, and diarrhea. Started a few days ago  Patient denies having a colonoscopy or diverticulitis. Patient is unsure if she is getting better.     Patient has a tray of solid food but does not want to eat it ( no appetite )    [id number]  ---------  Diverticulitis - Repeat Ct scan reveals perforation s/p surgery.  ( Never had a colonoscopy. See CT scan . Afebrile, minimal tenderness on exam, W  On Zosyn  1) Will need PPN/ TPN starting tomorrow if remains NPO    Son  ( [phone] )     [id number]  -----------  S/P repair w/ colostomy. - Continue as per surgery. Too early to implement TPN    [id number]  -----------  S/P repair w/ colostomy. - Continue as per surgery. Patient NPO / clear liquids since 12/23/21. Will reassess in AM

## 2021-12-29 LAB
ALBUMIN SERPL ELPH-MCNC: 0.9 G/DL — LOW (ref 3.3–5)
ALP SERPL-CCNC: 114 U/L — SIGNIFICANT CHANGE UP (ref 40–120)
ALT FLD-CCNC: 19 U/L — SIGNIFICANT CHANGE UP (ref 12–78)
ANION GAP SERPL CALC-SCNC: 11 MMOL/L — SIGNIFICANT CHANGE UP (ref 5–17)
ANION GAP SERPL CALC-SCNC: 9 MMOL/L — SIGNIFICANT CHANGE UP (ref 5–17)
AST SERPL-CCNC: 20 U/L — SIGNIFICANT CHANGE UP (ref 15–37)
BILIRUB SERPL-MCNC: 0.4 MG/DL — SIGNIFICANT CHANGE UP (ref 0.2–1.2)
BUN SERPL-MCNC: 11 MG/DL — SIGNIFICANT CHANGE UP (ref 7–23)
BUN SERPL-MCNC: 11 MG/DL — SIGNIFICANT CHANGE UP (ref 7–23)
CALCIUM SERPL-MCNC: 7 MG/DL — LOW (ref 8.5–10.1)
CALCIUM SERPL-MCNC: 7.6 MG/DL — LOW (ref 8.5–10.1)
CHLORIDE SERPL-SCNC: 103 MMOL/L — SIGNIFICANT CHANGE UP (ref 96–108)
CHLORIDE SERPL-SCNC: 106 MMOL/L — SIGNIFICANT CHANGE UP (ref 96–108)
CO2 SERPL-SCNC: 23 MMOL/L — SIGNIFICANT CHANGE UP (ref 22–31)
CO2 SERPL-SCNC: 23 MMOL/L — SIGNIFICANT CHANGE UP (ref 22–31)
CREAT SERPL-MCNC: 0.54 MG/DL — SIGNIFICANT CHANGE UP (ref 0.5–1.3)
CREAT SERPL-MCNC: 0.63 MG/DL — SIGNIFICANT CHANGE UP (ref 0.5–1.3)
CULTURE RESULTS: SIGNIFICANT CHANGE UP
CULTURE RESULTS: SIGNIFICANT CHANGE UP
GLUCOSE BLDC GLUCOMTR-MCNC: 82 MG/DL — SIGNIFICANT CHANGE UP (ref 70–99)
GLUCOSE BLDC GLUCOMTR-MCNC: 84 MG/DL — SIGNIFICANT CHANGE UP (ref 70–99)
GLUCOSE BLDC GLUCOMTR-MCNC: 85 MG/DL — SIGNIFICANT CHANGE UP (ref 70–99)
GLUCOSE BLDC GLUCOMTR-MCNC: 86 MG/DL — SIGNIFICANT CHANGE UP (ref 70–99)
GLUCOSE SERPL-MCNC: 80 MG/DL — SIGNIFICANT CHANGE UP (ref 70–99)
GLUCOSE SERPL-MCNC: 88 MG/DL — SIGNIFICANT CHANGE UP (ref 70–99)
HCT VFR BLD CALC: 26.8 % — LOW (ref 34.5–45)
HGB BLD-MCNC: 8.8 G/DL — LOW (ref 11.5–15.5)
MAGNESIUM SERPL-MCNC: 2.1 MG/DL — SIGNIFICANT CHANGE UP (ref 1.6–2.6)
MCHC RBC-ENTMCNC: 29.1 PG — SIGNIFICANT CHANGE UP (ref 27–34)
MCHC RBC-ENTMCNC: 32.8 GM/DL — SIGNIFICANT CHANGE UP (ref 32–36)
MCV RBC AUTO: 88.7 FL — SIGNIFICANT CHANGE UP (ref 80–100)
NRBC # BLD: 0 /100 WBCS — SIGNIFICANT CHANGE UP (ref 0–0)
PHOSPHATE SERPL-MCNC: 2.4 MG/DL — LOW (ref 2.5–4.5)
PLATELET # BLD AUTO: 313 K/UL — SIGNIFICANT CHANGE UP (ref 150–400)
POTASSIUM SERPL-MCNC: 2.7 MMOL/L — CRITICAL LOW (ref 3.5–5.3)
POTASSIUM SERPL-MCNC: 3.6 MMOL/L — SIGNIFICANT CHANGE UP (ref 3.5–5.3)
POTASSIUM SERPL-SCNC: 2.7 MMOL/L — CRITICAL LOW (ref 3.5–5.3)
POTASSIUM SERPL-SCNC: 3.6 MMOL/L — SIGNIFICANT CHANGE UP (ref 3.5–5.3)
PROT SERPL-MCNC: 4.8 GM/DL — LOW (ref 6–8.3)
RBC # BLD: 3.02 M/UL — LOW (ref 3.8–5.2)
RBC # FLD: 14.7 % — HIGH (ref 10.3–14.5)
SODIUM SERPL-SCNC: 137 MMOL/L — SIGNIFICANT CHANGE UP (ref 135–145)
SODIUM SERPL-SCNC: 138 MMOL/L — SIGNIFICANT CHANGE UP (ref 135–145)
SPECIMEN SOURCE: SIGNIFICANT CHANGE UP
SPECIMEN SOURCE: SIGNIFICANT CHANGE UP
SURGICAL PATHOLOGY STUDY: SIGNIFICANT CHANGE UP
WBC # BLD: 32.37 K/UL — HIGH (ref 3.8–10.5)
WBC # FLD AUTO: 32.37 K/UL — HIGH (ref 3.8–10.5)

## 2021-12-29 PROCEDURE — 99233 SBSQ HOSP IP/OBS HIGH 50: CPT

## 2021-12-29 PROCEDURE — 99291 CRITICAL CARE FIRST HOUR: CPT

## 2021-12-29 RX ORDER — POTASSIUM CHLORIDE 20 MEQ
10 PACKET (EA) ORAL
Refills: 0 | Status: COMPLETED | OUTPATIENT
Start: 2021-12-29 | End: 2021-12-29

## 2021-12-29 RX ORDER — FUROSEMIDE 40 MG
40 TABLET ORAL ONCE
Refills: 0 | Status: COMPLETED | OUTPATIENT
Start: 2021-12-29 | End: 2021-12-29

## 2021-12-29 RX ORDER — ELECTROLYTE SOLUTION,INJ
1 VIAL (ML) INTRAVENOUS
Refills: 0 | Status: DISCONTINUED | OUTPATIENT
Start: 2021-12-29 | End: 2021-12-29

## 2021-12-29 RX ORDER — POTASSIUM PHOSPHATE, MONOBASIC POTASSIUM PHOSPHATE, DIBASIC 236; 224 MG/ML; MG/ML
15 INJECTION, SOLUTION INTRAVENOUS ONCE
Refills: 0 | Status: COMPLETED | OUTPATIENT
Start: 2021-12-29 | End: 2021-12-29

## 2021-12-29 RX ORDER — I.V. FAT EMULSION 20 G/100ML
0.62 EMULSION INTRAVENOUS
Qty: 50 | Refills: 0 | Status: DISCONTINUED | OUTPATIENT
Start: 2021-12-29 | End: 2021-12-29

## 2021-12-29 RX ADMIN — Medication 100 MILLIEQUIVALENT(S): at 21:54

## 2021-12-29 RX ADMIN — CHLORHEXIDINE GLUCONATE 15 MILLILITER(S): 213 SOLUTION TOPICAL at 05:18

## 2021-12-29 RX ADMIN — Medication 100 MILLIEQUIVALENT(S): at 05:18

## 2021-12-29 RX ADMIN — FENTANYL CITRATE 4.05 MICROGRAM(S)/KG/HR: 50 INJECTION INTRAVENOUS at 07:23

## 2021-12-29 RX ADMIN — LEVETIRACETAM 420 MILLIGRAM(S): 250 TABLET, FILM COATED ORAL at 05:19

## 2021-12-29 RX ADMIN — Medication 100 MILLIEQUIVALENT(S): at 06:08

## 2021-12-29 RX ADMIN — Medication 100 MILLIEQUIVALENT(S): at 10:03

## 2021-12-29 RX ADMIN — PIPERACILLIN AND TAZOBACTAM 25 GRAM(S): 4; .5 INJECTION, POWDER, LYOPHILIZED, FOR SOLUTION INTRAVENOUS at 08:38

## 2021-12-29 RX ADMIN — DORZOLAMIDE HYDROCHLORIDE TIMOLOL MALEATE 1 DROP(S): 20; 5 SOLUTION/ DROPS OPHTHALMIC at 17:01

## 2021-12-29 RX ADMIN — Medication 25 MICROGRAM(S): at 21:12

## 2021-12-29 RX ADMIN — CHLORHEXIDINE GLUCONATE 15 MILLILITER(S): 213 SOLUTION TOPICAL at 17:01

## 2021-12-29 RX ADMIN — CHLORHEXIDINE GLUCONATE 1 APPLICATION(S): 213 SOLUTION TOPICAL at 05:20

## 2021-12-29 RX ADMIN — Medication 100 MILLIEQUIVALENT(S): at 12:48

## 2021-12-29 RX ADMIN — Medication 40 MILLIGRAM(S): at 12:48

## 2021-12-29 RX ADMIN — Medication 100 MILLIEQUIVALENT(S): at 20:50

## 2021-12-29 RX ADMIN — Medication 100 MILLIEQUIVALENT(S): at 13:25

## 2021-12-29 RX ADMIN — SODIUM CHLORIDE 75 MILLILITER(S): 9 INJECTION, SOLUTION INTRAVENOUS at 03:46

## 2021-12-29 RX ADMIN — PIPERACILLIN AND TAZOBACTAM 25 GRAM(S): 4; .5 INJECTION, POWDER, LYOPHILIZED, FOR SOLUTION INTRAVENOUS at 00:18

## 2021-12-29 RX ADMIN — PIPERACILLIN AND TAZOBACTAM 25 GRAM(S): 4; .5 INJECTION, POWDER, LYOPHILIZED, FOR SOLUTION INTRAVENOUS at 16:31

## 2021-12-29 RX ADMIN — FENTANYL CITRATE 4.05 MICROGRAM(S)/KG/HR: 50 INJECTION INTRAVENOUS at 17:58

## 2021-12-29 RX ADMIN — Medication 100 MILLIEQUIVALENT(S): at 15:25

## 2021-12-29 RX ADMIN — Medication 100 MILLIEQUIVALENT(S): at 19:45

## 2021-12-29 RX ADMIN — POTASSIUM PHOSPHATE, MONOBASIC POTASSIUM PHOSPHATE, DIBASIC 62.5 MILLIMOLE(S): 236; 224 INJECTION, SOLUTION INTRAVENOUS at 05:25

## 2021-12-29 RX ADMIN — PANTOPRAZOLE SODIUM 40 MILLIGRAM(S): 20 TABLET, DELAYED RELEASE ORAL at 11:25

## 2021-12-29 RX ADMIN — PROPOFOL 4.86 MICROGRAM(S)/KG/MIN: 10 INJECTION, EMULSION INTRAVENOUS at 17:58

## 2021-12-29 RX ADMIN — PROPOFOL 4.86 MICROGRAM(S)/KG/MIN: 10 INJECTION, EMULSION INTRAVENOUS at 06:10

## 2021-12-29 RX ADMIN — DORZOLAMIDE HYDROCHLORIDE TIMOLOL MALEATE 1 DROP(S): 20; 5 SOLUTION/ DROPS OPHTHALMIC at 05:21

## 2021-12-29 RX ADMIN — LEVETIRACETAM 420 MILLIGRAM(S): 250 TABLET, FILM COATED ORAL at 17:01

## 2021-12-29 RX ADMIN — Medication 100 MILLIEQUIVALENT(S): at 09:06

## 2021-12-29 RX ADMIN — Medication 100 MILLIEQUIVALENT(S): at 08:32

## 2021-12-29 RX ADMIN — Medication 100 MILLIEQUIVALENT(S): at 08:36

## 2021-12-29 RX ADMIN — ENOXAPARIN SODIUM 40 MILLIGRAM(S): 100 INJECTION SUBCUTANEOUS at 11:25

## 2021-12-29 RX ADMIN — Medication 1 EACH: at 22:01

## 2021-12-29 RX ADMIN — FLUCONAZOLE 100 MILLIGRAM(S): 150 TABLET ORAL at 23:16

## 2021-12-29 RX ADMIN — I.V. FAT EMULSION 10.4 GM/KG/DAY: 20 EMULSION INTRAVENOUS at 17:01

## 2021-12-29 NOTE — PROGRESS NOTE ADULT - SUBJECTIVE AND OBJECTIVE BOX
SURGERY PROGRESS HPI:  Pt seen and examined at bedside resting comfortably in the ICU, intubated and sedated.       Vital Signs Last 24 Hrs  T(C): 36.9 (29 Dec 2021 00:20), Max: 37.7 (28 Dec 2021 08:00)  T(F): 98.5 (29 Dec 2021 00:20), Max: 99.8 (28 Dec 2021 08:00)  HR: 83 (29 Dec 2021 04:39) (75 - 88)  BP: 124/51 (29 Dec 2021 04:00) (118/53 - 140/59)  BP(mean): 73 (29 Dec 2021 04:00) (73 - 83)  RR: 19 (29 Dec 2021 04:00) (8 - 21)  SpO2: 100% (29 Dec 2021 04:39) (93% - 100%)      PHYSICAL EXAM:  GENERAL: NAD, intubated, sedated  HEENT: NGT in place with minimal gastric content  CHEST/LUNG: Intubated. Clear to ausculation, bilaterally   HEART: RRR S1S2  ABDOMEN: Ostomy pink and viable without any air/stool in the bag. Dressing clean/dry/intact, removed. Retention sutures intact, packing removed. Wound irrigated with NS. New iodoform packing and dressing placed. Pt tolerated well. JPs x 2 with serous output. non distended, hypoactive BS, soft, non tender, no guarding  : Boucher indwelling with clear yellow urine  EXTREMITIES:  calf soft, non tender b/l    I&O's Detail    27 Dec 2021 07:01  -  28 Dec 2021 07:00  --------------------------------------------------------  IN:    Amiodarone: 349.8 mL    Amiodarone: 150 mL    FentaNYL: 296.2 mL    IV PiggyBack: 100 mL    Lactated Ringers: 1825 mL    Propofol: 85.2 mL  Total IN: 2806.2 mL    OUT:    Bulb (mL): 85 mL    Bulb (mL): 40 mL    Indwelling Catheter - Urethral (mL): 1965 mL    Nasogastric/Oral tube (mL): 60 mL  Total OUT: 2150 mL    Total NET: 656.2 mL      28 Dec 2021 07:01  -  29 Dec 2021 05:08  --------------------------------------------------------  IN:    Amiodarone: 150.3 mL    FentaNYL: 211.1 mL    IV PiggyBack: 300 mL    Lactated Ringers: 1575 mL    Propofol: 78.1 mL  Total IN: 2314.5 mL    OUT:    Bulb (mL): 5 mL    Bulb (mL): 15 mL    Indwelling Catheter - Urethral (mL): 875 mL    Nasogastric/Oral tube (mL): 50 mL    Ureteral Catheter (mL): 2500 mL  Total OUT: 3445 mL    Total NET: -1130.5 mL          LABS:                        8.8    32.37 )-----------( 313      ( 29 Dec 2021 03:51 )             26.8     12-29    137  |  103  |  11  ----------------------------<  88  2.7<LL>   |  23  |  0.63    Ca    7.0<L>      29 Dec 2021 03:51  Phos  2.4     12-29  Mg     2.1     12-29    TPro  4.8<L>  /  Alb  0.9<L>  /  TBili  0.4  /  DBili  x   /  AST  20  /  ALT  19  /  AlkPhos  114  12-29        81F with PMH of HTN, HLD, hypothyroidism, depression, seizures admitted with Acute Sigmoid Diverticulitis. Surgery consulted for interval perforated sigmoid diverticulitis. S/P ex lap, sigmoid resection, peritoneal lavage 12/25 and RTOR 12/26 for Irrigation of abdominal cavity with closure and creation of colostomy. Intubated. Off pressors. A-fib RVR s/p amio converted to sinus rhythm. Leukocytosis increasing.    - NPO, NGT to LWS, IV hydration  - Continue local wound care, iodoform packing  - Ostomy care, monitor output  - BABAK care, monitor outputs  - Antibiotics per ID  - Boucher, monitor urine output  - DVT prophylaxis, pain control  - Wean off ventilator per ICU  - Will d/w attending

## 2021-12-29 NOTE — PROGRESS NOTE ADULT - SUBJECTIVE AND OBJECTIVE BOX
24 hr events:  weaning well today  still grossly volume overloaded  responding well with diuresis  more awake and alert today with sedation vacation    ## ROS:  [x ] unable to obtain due to intubation/sedation      ## Labs:  CBC:                        8.8    32.37 )-----------( 313      ( 29 Dec 2021 03:51 )             26.8     12-29    137  |  103  |  11  ----------------------------<  88  2.7<LL>   |  23  |  0.63    Ca    7.0<L>      29 Dec 2021 03:51  Phos  2.4     12-29  Mg     2.1     12-29    TPro  4.8<L>  /  Alb  0.9<L>  /  TBili  0.4  /  DBili  x   /  AST  20  /  ALT  19  /  AlkPhos  114  12-29                   Culture - Sputum . (12.28.21 @ 18:54)    Gram Stain:   Moderate polymorphonuclear leukocytes per low power field  Numerous Squamous epithelial cells per low power field  Few Gram positive cocci in pairs per oil power field  Rare Gram Positive Rods per oil power field  Rare Yeast like cells per oil power field  Results consistent with oropharyngeal contamination    Specimen Source: .Sputum Sputum    Culture - Blood (12.28.21 @ 15:04)    Specimen Source: .Blood Blood    Culture Results: No growth to date.      Culture - Body Fluid with Gram Stain (12.25.21 @ 12:31)    Specimen Source: .Body Fluid INTRA -ABDOMINAL FLUID FOR CULTURE    Culture Results:   Few Escherichia coli  Rare Escherichia coli #2  Few Candida albicans "Susceptibilities not performed"  Moderate Bacteroides thetaiotamcron group "Susceptibilities not performed"  Moderate Bacteroides uniformis "Susceptibilities not performed"       ## Imaging:  CXR < from: Xray Chest 1 View- PORTABLE-Routine (Xray Chest 1 View- PORTABLE-Routine in AM.) (12.28.21 @ 08:00) >  Small left pleural effusion, unchanged      ## Medications:  fluconAZOLE IVPB 200 milliGRAM(s) IV Intermittent every 24 hours  piperacillin/tazobactam IVPB.. 3.375 Gram(s) IV Intermittent every 8 hours    aMIOdarone Infusion 1 mG/Min IV Continuous <Continuous>  aMIOdarone Infusion 0.5 mG/Min IV Continuous <Continuous>      dextrose 40% Gel 15 Gram(s) Oral once  dextrose 50% Injectable 25 Gram(s) IV Push once  dextrose 50% Injectable 12.5 Gram(s) IV Push once  dextrose 50% Injectable 25 Gram(s) IV Push once  glucagon  Injectable 1 milliGRAM(s) IntraMuscular once  levothyroxine Injectable 25 MICROGram(s) IV Push at bedtime    enoxaparin Injectable 40 milliGRAM(s) SubCutaneous daily    pantoprazole  Injectable 40 milliGRAM(s) IV Push daily    fentaNYL   Infusion. 0.5 MICROgram(s)/kG/Hr IV Continuous <Continuous>  levETIRAcetam  IVPB 500 milliGRAM(s) IV Intermittent every 12 hours  propofol Infusion 10 MICROgram(s)/kG/Min IV Continuous <Continuous>      ## Vitals:  T(F): 99.6 (12-29-21 @ 23:18), Max: 99.7 (12-29-21 @ 04:00)  HR: 94 (12-30-21 @ 01:09)  BP: 150/55 (12-30-21 @ 00:00)  RR: 16 (12-30-21 @ 00:00)  SpO2: 100% (12-30-21 @ 01:09) (91% - 100%)    Vent: Mode: CPAP with PS, RR (patient): 18, FiO2: 40, PEEP: 5, PS: 5  ABG:         ## P/E:  Gen: lying comfortably in bed in no apparent distress  HEENT: ETT In place, NGT In place  Resp: CTA B/L , mechanical breath sounds  CVS: RRR  Abd: soft -BS bilateral BABAK drains with serous drainage, colostomy bag without air, serosanguinous drainage, incision with retention sutures and packing noted with a little purulence in serosanguinous drainage from bottom of incision  Ext: bilateral UE and LE edema  Neuro: arousable, more alert    CENTRAL LINE: [ ] YES [ ] NO  LOCATION:   DATE INSERTED:  REMOVE: [ ] YES [ ] NO      BARKLEY: [x ] YES [ ] NO    DATE INSERTED:  REMOVE:  [ ] YES [ ] NO      A-LINE:  [ ] YES [x ] NO  LOCATION:   DATE INSERTED:  REMOVE:  [ ] YES [ ] NO  EXPLAIN:    GLOBAL ISSUE/BEST PRACTICE:  Analgesia: fentanyl  Sedation: fentanyl  HOB elevation: yes  Stress ulcer prophylaxis: protonix  VTE prophylaxis: lovenox  Oral Care: yes  Glycemic control: yes  Nutrition: npo, to start PPN    CODE STATUS: [x ] full code  [ ] DNR  [ ] DNI  [ ] MOLST  Goals of care discussion: [ ] yes

## 2021-12-29 NOTE — PROGRESS NOTE ADULT - SUBJECTIVE AND OBJECTIVE BOX
[facility]  Division of Infectious Diseases  [phone]    Name: JUAREZ GTZ  Age: 81y  Gender: Female  MRN: [id number]    Interval History--  Notes reviewed. Seen earlier today.  Case discussed with the critical care team.   Remains intubated in ICU. still quite edematous and net positive, WBC climbing, small wound dehiscence at bottom of incision, plan for initiation of TPN today, remains on antibiotics      Past Medical History--  Seizure    HTN (hypertension)    Glaucoma    Thyroid disease    Blood clot of neck vein    TIA (transient ischemic attack)    S/P appendectomy        For details regarding the patient's social history, family history, and other miscellaneous elements, please refer the initial infectious diseases consultation and/or the admitting history and physical examination for this admission.    Allergies    No Known Allergies    Intolerances      Review of Systems--  A 10-point review of systems unable due to being intubated and sedated      Medications--  Antibiotics:  fluconAZOLE IVPB 200 every 24 hours  piperacillin/tazobactam IVPB.. 3.375 every 8 hours      MEDICATIONS  (STANDING):  aMIOdarone Infusion 1 <Continuous>  aMIOdarone Infusion 0.5 <Continuous>  dextrose 40% Gel 15 once  dextrose 50% Injectable 25 once  dextrose 50% Injectable 12.5 once  dextrose 50% Injectable 25 once  enoxaparin Injectable 40 daily  fentaNYL   Infusion. 0.5 <Continuous>  glucagon  Injectable 1 once  levETIRAcetam  IVPB 500 every 12 hours  levothyroxine Injectable 25 at bedtime  pantoprazole  Injectable 40 daily  propofol Infusion 10 <Continuous>      PRN      Physical Exam:  Vital Signs Last 24 Hrs  T(F): 99.6 (12-29-21 @ 23:18), Max: 99.7 (12-29-21 @ 04:00)  HR: 94 (12-30-21 @ 01:09)  BP: 150/55 (12-30-21 @ 00:00)  RR: 16 (12-30-21 @ 00:00)  SpO2: 100% (12-30-21 @ 01:09) (91% - 100%)  Wt(kg): --      General: Nontoxic-appearing Female in no acute distress. intubated   HEENT: AT/NC. Pupils/EOM unable secondary to patient compliance. Oropharynx/dentition unable secondary to patient compliance. OETT.   Neck: Not rigid. No sense of mass.  Nodes: None palpable.  Lungs: Diminished BS bilaterally with rales, +wheezing   Heart: Regular rate and rhythm. No Murmur. No rub. No gallop. No palpable thrill.  Abdomen: Bowel sounds absent.  Soft. Mildly distended.  Incision dressed, distal incision with drainage and wound dehiscence  Ostomy pink with reddish effluent.  No stool or gas in bag.  No reaction to palpation.  Drains x2 serous fluid.  Back: Unable  Extremities: No cyanosis or clubbing. 3+ pitting edema.   Skin: Warm. Dry. Good turgor. No rash. No vasculitic stigmata.  Psychiatric: Unable        Laboratory Studies--  CBC                        8.9    28.04 )-----------( 311      ( 28 Dec 2021 03:36 )             27.4     WBC Count: 19.48 K/uL (12-27-21 @ 03:26)  WBC Count: 16.51 K/uL (12-26-21 @ 14:46)  WBC Count: 12.37 K/uL (12-26-21 @ 05:40)  WBC Count: 11.48 K/uL (12-25-21 @ 07:48)  WBC Count: 6.75 K/uL (12-25-21 @ 00:52)  WBC Count: 7.29 K/uL (12-24-21 @ 18:28)  WBC Count: 28.13 K/uL (12-24-21 @ 08:30)  WBC Count: 28.24 K/uL (12-23-21 @ 17:33)    Chemistries  12-28    133<L>  |  104  |  17  ----------------------------<  109<H>  3.5   |  19<L>  |  0.74    Ca    7.2<L>      28 Dec 2021 03:36  Phos  3.2     12-28  Mg     2.7     12-28    TPro  4.7<L>  /  Alb  0.9<L>  /  TBili  0.3  /  DBili  x   /  AST  27  /  ALT  18  /  AlkPhos  78  12-28      Culture Data    Culture - Body Fluid with Gram Stain (collected 25 Dec 2021 12:31)  Source: .Body Fluid INTRA -ABDOMINAL FLUID FOR CULTURE  Gram Stain (27 Dec 2021 09:49):    Few polymorphonuclear leukocytes per low power field    Rare Gram Variable Rods per oil power field  Final Report (27 Dec 2021 09:49):    Few Escherichia coli    Rare Escherichia coli #2    Few Candida albicans "Susceptibilities not performed"    Moderate Bacteroides thetaiotamcron group "Susceptibilities not performed"    Moderate Bacteroides uniformis "Susceptibilities not performed"  Organism: Escherichia coli  Escherichia coli (27 Dec 2021 09:49)  Organism: Escherichia coli (27 Dec 2021 09:49)  Organism: Escherichia coli (27 Dec 2021 09:49)    Culture - Blood (collected 25 Dec 2021 00:40)  Source: .Blood Blood-Peripheral  Preliminary Report (26 Dec 2021 01:02):    No growth to date.    Culture - Blood (collected 25 Dec 2021 00:40)  Source: .Blood Blood-Peripheral  Preliminary Report (26 Dec 2021 01:02):    No growth to date.    Culture - Blood (collected 24 Dec 2021 09:03)  Source: .Blood Blood-Peripheral  Preliminary Report (25 Dec 2021 10:01):    No growth to date.    Culture - Blood (collected 24 Dec 2021 09:03)  Source: .Blood Blood-Peripheral  Preliminary Report (25 Dec 2021 10:01):    No growth to date.    Culture - Urine (collected 23 Dec 2021 08:59)  Source: Clean Catch Clean Catch (Midstream)  Final Report (25 Dec 2021 22:00):    Normal Urogenital yoly present

## 2021-12-29 NOTE — PROGRESS NOTE ADULT - ASSESSMENT
HPI:  Patient is a 81F with a PMH of HTN, HLD, hypothyroidism, depression, seizures who presents to the ED for abd pain.  Patient states that over the last two days she had developed significant abdominal pain and multiple episodes of watery diarrhea.  Reports one episode of nausea and vomiting earlier today.  Patient has no other complaints.  Vitals stable,  labs show leukocytosis and hypokalemia.  CT abdomen significant for diverticulitis.  Will admit to med surg.     (23 Dec 2021 00:17)  ---- As Above ---- Patient is a poor historian. Son's phone number not available ( Aid / patient does not have the number )  The patient appears sluggish at the present time. Presents with N/V, abdominal pain, and diarrhea. Started a few days ago  Patient denies having a colonoscopy or diverticulitis. Patient is unsure if she is getting better.     Patient has a tray of solid food but does not want to eat it ( no appetite )    [id number]  ---------  Diverticulitis - Repeat Ct scan reveals perforation s/p surgery.  ( Never had a colonoscopy. See CT scan . Afebrile, minimal tenderness on exam, W  On Zosyn  1) Will need PPN/ TPN starting tomorrow if remains NPO    Son  ( [phone] )     [id number]  -----------  S/P repair w/ colostomy. - Continue as per surgery. Too early to implement TPN    [id number]  -----------  S/P repair w/ colostomy. - Continue as per surgery. Patient NPO / clear liquids since 12/23/21. Will reassess in AM    [id number]  ----------  S/P repair w/ colostomy. - Continue as per surgery. Patient NPO / clear liquids since 12/23/21. Will start PPN @ 42 ml / hr

## 2021-12-29 NOTE — PROGRESS NOTE ADULT - ASSESSMENT
Patient is a 81F with a PMH of HTN, HLD, hypothyroidism, depression, seizures who presents to the ED for abd pain found to have diverticulitis   has rising leukocytosis   had fever yesterday   tachycardic and now hypoxic   CXR (I personally reviewed) low lung volumes   origincal CT (I personally reviewed) with diverticulitis   she had a lot of pain in the abdomen on exam     12/25: s/p surgery for diverticulitis with perforation and abscess and went to the OR. intubated in ICU with vac and needs to go back to the OR, currently on zosyn, s/p one dose of diflucan last night   12/27: s/p repair and ostomy creation yesterday, wbc elevated, cultures sensitive to zosyn and candida albicans is notoriously sensitive to azoles such as fluconazole, wean of pressors and sedation, extubate when ready   12/28: Further increase in white blood cell count but overall the patient appears to be reasonably stable.  No concern of C. difficile at this juncture.  Current antibiotics are reasonable.  Leukocytosis like related to recent surgery, no concern of other superimposed process on the basis of exam.  I do not see a role to alter her antimicrobials.  12/29:  Remains intubated in ICU. still quite edematous and net positive, WBC climbing, small wound dehiscence at bottom of incision, plan for initiation of TPN today, remains on antibiotics      Perforated diverticulitis   abdominal abscess  ARF requiring intubation   Leukocytosis     Plan:  Trend CBC  Continue Zosyn  Continue Diflucan  Follow-up culture data  Await return of bowel function  Remove catheters/TPN as able  Enteral feeding when able  Diuresis per critical care  if wbc continues to rise would suggest repeat CT abd/pelvis to ensure no leak, no abscess, no obstruction   frequent turns, offloading, and nutrition optimization to avoid bedsores-consider protective heel/leg protectors       D/W Dr. Ashley Dunn,   Infectious Disease Attending  Pager [phone]  After 5pm/weekends please call [phone] for all inquiries and new consults      > 35 mins total time spent

## 2021-12-29 NOTE — PROGRESS NOTE ADULT - ASSESSMENT
81F PMH HTN, HLD, hypothyroidism, depression, seizures presents for abdominal pain found to have acute sigmoid diverticulitis complicated by perforated sigmoid diverticulitis/peritonitis with abscess formation s/p ex-lap with large bowel resection / abdominal washout / wound vac placement 12/25. Brought back to OR yesterday 12/26 for abdominal closure and colostomy creation. Sepsis with septic shock requiring levophed. Remains intubated for post procedural acute hypoxic respiratory failure. Post op course with a-fib RVR, volume overload    NEURO  daily sedation vacation as tolerates    CV  out of shock state  a-fib RVR s/p amio converted to sinus rhythm  can hold on further amio as pt back in sinus rhythm    overall volume overloaded, 6L net positive balance, anasarca with pitting edema on exam  will diurese with lasix again today  goal to get pt to net 00 to 1L negative balance  d/c IVF     PULM  tolerating weaning today  no episodes of desaturation   cont daily wean as tolerates  cont to optimize and diurese before extubating    GI  NPO  await bowel function recovery  POD #4 with RTOR for abdominal closure 12/26  retention sutures in place  monitor I/O from BABAK drain and output from colostomy  mild drainage noted at bottom incision site  dressing changed  surgical follow up    ID  sepsis present on admission with septic shock due to GI source from perforated diverticulitis   cont with fluconazole and zosyn for abdominal cultures growing candida and bacteroides and e-coli  monitor WBC and fevers  elevate WBC ?reactive due to recent surgery vs brewing infection, WBC increasing  blood cx negative, sputum cx pending      RENAL  Cr within normal limits  monitor UO  mc in place  hypokalemia: likely from diuresis, supplement for low K and hypophosphatemia      ENDO  cont synthroid IV for underlying hypothyroidism     GEN  full code  DVT prophylaxis: lovenox

## 2021-12-29 NOTE — PROGRESS NOTE ADULT - SUBJECTIVE AND OBJECTIVE BOX
Patient is a 81y old  Female who presents with a chief complaint of diverticulitis (29 Dec 2021 05:08)      HPI:  Patient is a 81F with a PMH of HTN, HLD, hypothyroidism, depression, seizures who presents to the ED for abd pain.  Patient states that over the last two days she had developed significant abdominal pain and multiple episodes of watery diarrhea.  Reports one episode of nausea and vomiting earlier today.  Patient has no other complaints.  Vitals stable,  labs show leukocytosis and hypokalemia.  CT abdomen significant for diverticulitis.  Will admit to med surg.     (23 Dec 2021 00:17)      INTERVAL HPI/OVERNIGHT EVENTS: ICU #6 Intubated (+)  Unresponsive. No ostomy output except for a small amount of serosanguineous liquid. No N/V      MEDICATIONS  (STANDING):  aMIOdarone Infusion 1 mG/Min (33.3 mL/Hr) IV Continuous <Continuous>  aMIOdarone Infusion 0.5 mG/Min (16.7 mL/Hr) IV Continuous <Continuous>  chlorhexidine 0.12% Liquid 15 milliLiter(s) Oral Mucosa every 12 hours  chlorhexidine 2% Cloths 1 Application(s) Topical <User Schedule>  dextrose 40% Gel 15 Gram(s) Oral once  dextrose 5%. 1000 milliLiter(s) (50 mL/Hr) IV Continuous <Continuous>  dextrose 5%. 1000 milliLiter(s) (100 mL/Hr) IV Continuous <Continuous>  dextrose 50% Injectable 25 Gram(s) IV Push once  dextrose 50% Injectable 12.5 Gram(s) IV Push once  dextrose 50% Injectable 25 Gram(s) IV Push once  dorzolamide 2%/timolol 0.5% Ophthalmic Solution 1 Drop(s) Both EYES two times a day  enoxaparin Injectable 40 milliGRAM(s) SubCutaneous daily  fentaNYL   Infusion. 0.5 MICROgram(s)/kG/Hr (4.05 mL/Hr) IV Continuous <Continuous>  fluconAZOLE IVPB 200 milliGRAM(s) IV Intermittent every 24 hours  furosemide   Injectable 40 milliGRAM(s) IV Push once  glucagon  Injectable 1 milliGRAM(s) IntraMuscular once  levETIRAcetam  IVPB 500 milliGRAM(s) IV Intermittent every 12 hours  levothyroxine Injectable 25 MICROGram(s) IV Push at bedtime  pantoprazole  Injectable 40 milliGRAM(s) IV Push daily  piperacillin/tazobactam IVPB.. 3.375 Gram(s) IV Intermittent every 8 hours  potassium chloride  10 mEq/100 mL IVPB 10 milliEquivalent(s) IV Intermittent every 1 hour  propofol Infusion 10 MICROgram(s)/kG/Min (4.86 mL/Hr) IV Continuous <Continuous>    MEDICATIONS  (PRN):      FAMILY HISTORY:  FH: HTN (hypertension)        Allergies    No Known Allergies    Intolerances        PMH/PSH:  Seizure    HTN (hypertension)    Glaucoma    Thyroid disease    Blood clot of neck vein    TIA (transient ischemic attack)    S/P appendectomy          REVIEW OF SYSTEMS:  CONSTITUTIONAL: No fever, weight loss,  EYES: No eye pain, visual disturbances, or discharge  ENMT:  No difficulty hearing, tinnitus, vertigo; No sinus or throat pain  NECK: No pain or stiffness  BREASTS: No pain, masses, or nipple discharge  RESPIRATORY: No cough, wheezing, chills or hemoptysis;   CARDIOVASCULAR: No chest pain, palpitations, dizziness, or leg swelling  GASTROINTESTINAL: See above   GENITOURINARY: No dysuria, frequency, hematuria, or incontinence  NEUROLOGICAL: No headaches,  numbness, or tremors  SKIN: No itching, burning, rashes, or lesions   LYMPH NODES: No enlarged glands  ENDOCRINE: No heat or cold intolerance; No hair loss  MUSCULOSKELETAL: No joint pain or swelling; No muscle, back, or extremity pain  PSYCHIATRIC: No depression, anxiety, mood swings, or difficulty sleeping  HEME/LYMPH: No easy bruising, or bleeding gums  ALLERGY AND IMMUNOLOGIC: No hives or eczema    Vital Signs Last 24 Hrs  T(C): 36.5 (29 Dec 2021 08:00), Max: 37.6 (29 Dec 2021 04:00)  T(F): 97.7 (29 Dec 2021 08:00), Max: 99.7 (29 Dec 2021 04:00)  HR: 95 (29 Dec 2021 10:00) (76 - 103)  BP: 151/67 (29 Dec 2021 09:00) (118/53 - 153/72)  BP(mean): 95 (29 Dec 2021 10:00) (73 - 95)  RR: 20 (29 Dec 2021 10:00) (8 - 21)  SpO2: 98% (29 Dec 2021 10:00) (94% - 100%)    PHYSICAL EXAM:  GENERAL: NAD, well-groomed, well-developed  HEAD:  Atraumatic, Normocephalic  EYES: EOMI, PERRLA, conjunctiva and sclera clear  NECK: Supple, No JVD, Normal thyroid  NERVOUS SYSTEM:  Uncommunicative  CHEST/LUNG: Clear to percussion bilaterally; No rales, rhonchi, wheezing, or rubs  HEART: Regular rate and rhythm; No murmurs, rubs, or gallops  ABDOMEN: Soft, Nontender, Nondistended; Bowel sounds absent  EXTREMITIES:  2+ Peripheral Pulses, No clubbing, cyanosis, or edema  LYMPH: No lymphadenopathy noted  SKIN: No rashes or lesions    LAB                          8.8    32.37 )-----------( 313      ( 29 Dec 2021 03:51 )             26.8       CBC:  12-29 @ 03:51  WBC 32.37   Hgb 8.8   Hct 26.8   Plts 313  MCV 88.7  12-28 @ 03:36  WBC 28.04   Hgb 8.9   Hct 27.4   Plts 311  MCV 88.7  12-27 @ 03:26  WBC 19.48   Hgb 9.5   Hct 29.5   Plts 282  MCV 88.9  12-26 @ 14:46  WBC 16.51   Hgb 10.1   Hct 30.7   Plts 302  MCV 88.2  12-26 @ 05:40  WBC 12.37   Hgb 9.2   Hct 28.0   Plts 282  MCV 88.1  12-25 @ 07:48  WBC 11.48   Hgb 11.9   Hct 35.9   Plts 389  MCV 88.0  12-25 @ 00:52  WBC 6.75   Hgb 11.6   Hct 35.2   Plts 426  MCV 88.7  12-24 @ 18:28  WBC 7.29   Hgb 13.3   Hct 40.6   Plts 449  MCV 86.8  12-24 @ 08:30  WBC 28.13   Hgb 12.6   Hct 38.0   Plts 400  MCV 87.8  12-23 @ 17:33  WBC 28.24   Hgb 12.1   Hct 37.2   Plts 378  MCV 89.4      Chemistry:  12-29 @ 03:51  Na+ 137  K+ 2.7  Cl- 103  CO2 23  BUN 11  Cr 0.63     12-28 @ 03:36  Na+ 133  K+ 3.5  Cl- 104  CO2 19  BUN 17  Cr 0.74     12-27 @ 03:26  Na+ 132  K+ 3.6  Cl- 104  CO2 20  BUN 16  Cr 0.66     12-26 @ 05:40  Na+ 131  K+ 3.8  Cl- 103  CO2 20  BUN 15  Cr 0.70     12-25 @ 07:48  Na+ 129  K+ 4.3  Cl- 102  CO2 18  BUN 13  Cr 0.66     12-25 @ 00:52  Na+ 133  K+ 3.4  Cl- 106  CO2 18  BUN 13  Cr 0.60     12-24 @ 18:28  Na+ 130  K+ 2.9  Cl- 98  CO2 20  BUN 15  Cr 0.86     12-24 @ 08:30  Na+ 127  K+ 3.3  Cl- 97  CO2 19  BUN 15  Cr 0.80     12-23 @ 17:33  Na+ 133  K+ 2.8  Cl- 98  CO2 25  BUN 18  Cr 0.90     12-22 @ 17:43  Na+ 137  K+ 2.8  Cl- 100  CO2 25  BUN 23  Cr 0.83         Glucose, Serum: 88 mg/dL (12-29 @ 03:51)  Glucose, Serum: 109 mg/dL (12-28 @ 03:36)  Glucose, Serum: 73 mg/dL (12-27 @ 03:26)  Glucose, Serum: 88 mg/dL (12-26 @ 05:40)  Glucose, Serum: 149 mg/dL (12-25 @ 07:48)  Glucose, Serum: 142 mg/dL (12-25 @ 00:52)  Glucose, Serum: 142 mg/dL (12-24 @ 18:28)  Glucose, Serum: 133 mg/dL (12-24 @ 08:30)  Glucose, Serum: 135 mg/dL (12-23 @ 17:33)  Glucose, Serum: 154 mg/dL (12-22 @ 17:43)      29 Dec 2021 03:51    137    |  103    |  11     ----------------------------<  88     2.7     |  23     |  0.63   28 Dec 2021 03:36    133    |  104    |  17     ----------------------------<  109    3.5     |  19     |  0.74   27 Dec 2021 03:26    132    |  104    |  16     ----------------------------<  73     3.6     |  20     |  0.66   26 Dec 2021 05:40    131    |  103    |  15     ----------------------------<  88     3.8     |  20     |  0.70   25 Dec 2021 07:48    129    |  102    |  13     ----------------------------<  149    4.3     |  18     |  0.66   25 Dec 2021 00:52    133    |  106    |  13     ----------------------------<  142    3.4     |  18     |  0.60   24 Dec 2021 18:28    130    |  98     |  15     ----------------------------<  142    2.9     |  20     |  0.86     Ca    7.0        29 Dec 2021 03:51  Ca    7.2        28 Dec 2021 03:36  Ca    7.0        27 Dec 2021 03:26  Ca    7.1        26 Dec 2021 05:40  Ca    7.0        25 Dec 2021 07:48  Ca    6.6        25 Dec 2021 00:52  Ca    7.7        24 Dec 2021 18:28  Phos  2.4       29 Dec 2021 03:51  Phos  3.2       28 Dec 2021 03:36  Phos  3.0       27 Dec 2021 03:26  Phos  2.4       26 Dec 2021 05:40  Phos  2.6       25 Dec 2021 07:48  Phos  2.5       25 Dec 2021 00:52  Phos  1.9       24 Dec 2021 18:28  Mg     2.1       29 Dec 2021 03:51  Mg     2.7       28 Dec 2021 03:36  Mg     2.8       27 Dec 2021 03:26  Mg     2.8       26 Dec 2021 05:40  Mg     3.5       25 Dec 2021 07:48  Mg     1.2       25 Dec 2021 00:52  Mg     2.1       24 Dec 2021 18:28    TPro  4.8    /  Alb  0.9    /  TBili  0.4    /  DBili  x      /  AST  20     /  ALT  19     /  AlkPhos  114    29 Dec 2021 03:51  TPro  4.7    /  Alb  0.9    /  TBili  0.3    /  DBili  x      /  AST  27     /  ALT  18     /  AlkPhos  78     28 Dec 2021 03:36  TPro  4.3    /  Alb  1.0    /  TBili  0.2    /  DBili  x      /  AST  27     /  ALT  18     /  AlkPhos  71     27 Dec 2021 03:26  TPro  4.4    /  Alb  1.2    /  TBili  0.2    /  DBili  x      /  AST  22     /  ALT  16     /  AlkPhos  67     26 Dec 2021 05:40  TPro  4.1    /  Alb  1.1    /  TBili  0.3    /  DBili  x      /  AST  22     /  ALT  19     /  AlkPhos  63     25 Dec 2021 07:48  TPro  3.9    /  Alb  1.1    /  TBili  0.4    /  DBili  x      /  AST  21     /  ALT  22     /  AlkPhos  66     25 Dec 2021 00:52  TPro  6.8    /  Alb  2.4    /  TBili  0.7    /  DBili  0.3    /  AST  38     /  ALT  32     /  AlkPhos  132    24 Dec 2021 08:30              CAPILLARY BLOOD GLUCOSE      POCT Blood Glucose.: 82 mg/dL (29 Dec 2021 11:13)  POCT Blood Glucose.: 85 mg/dL (29 Dec 2021 05:35)  POCT Blood Glucose.: 86 mg/dL (28 Dec 2021 23:51)  POCT Blood Glucose.: 108 mg/dL (28 Dec 2021 17:32)      C-Reactive Protein, Serum: 369 mg/L (12-24 @ 10:38)      RADIOLOGY & ADDITIONAL TESTS:    Imaging Personally Reviewed:  [ ] YES  [ ] NO    Consultant(s) Notes Reviewed:  [ ] YES  [ ] NO    Care Discussed with Consultants/Other Providers [ ] YES  [ ] NO

## 2021-12-30 LAB
ALBUMIN SERPL ELPH-MCNC: 1 G/DL — LOW (ref 3.3–5)
ALP SERPL-CCNC: 131 U/L — HIGH (ref 40–120)
ALT FLD-CCNC: 16 U/L — SIGNIFICANT CHANGE UP (ref 12–78)
ANION GAP SERPL CALC-SCNC: 12 MMOL/L — SIGNIFICANT CHANGE UP (ref 5–17)
ANION GAP SERPL CALC-SCNC: 9 MMOL/L — SIGNIFICANT CHANGE UP (ref 5–17)
AST SERPL-CCNC: 22 U/L — SIGNIFICANT CHANGE UP (ref 15–37)
BILIRUB SERPL-MCNC: 0.3 MG/DL — SIGNIFICANT CHANGE UP (ref 0.2–1.2)
BUN SERPL-MCNC: 10 MG/DL — SIGNIFICANT CHANGE UP (ref 7–23)
BUN SERPL-MCNC: 10 MG/DL — SIGNIFICANT CHANGE UP (ref 7–23)
CA-I BLD-SCNC: 1 MMOL/L — LOW (ref 1.15–1.33)
CALCIUM SERPL-MCNC: 7.1 MG/DL — LOW (ref 8.5–10.1)
CALCIUM SERPL-MCNC: 7.2 MG/DL — LOW (ref 8.5–10.1)
CHLORIDE SERPL-SCNC: 101 MMOL/L — SIGNIFICANT CHANGE UP (ref 96–108)
CHLORIDE SERPL-SCNC: 104 MMOL/L — SIGNIFICANT CHANGE UP (ref 96–108)
CO2 SERPL-SCNC: 23 MMOL/L — SIGNIFICANT CHANGE UP (ref 22–31)
CO2 SERPL-SCNC: 24 MMOL/L — SIGNIFICANT CHANGE UP (ref 22–31)
CREAT SERPL-MCNC: 0.49 MG/DL — LOW (ref 0.5–1.3)
CREAT SERPL-MCNC: 0.52 MG/DL — SIGNIFICANT CHANGE UP (ref 0.5–1.3)
CULTURE RESULTS: SIGNIFICANT CHANGE UP
GLUCOSE BLDC GLUCOMTR-MCNC: 118 MG/DL — HIGH (ref 70–99)
GLUCOSE BLDC GLUCOMTR-MCNC: 129 MG/DL — HIGH (ref 70–99)
GLUCOSE BLDC GLUCOMTR-MCNC: 87 MG/DL — SIGNIFICANT CHANGE UP (ref 70–99)
GLUCOSE SERPL-MCNC: 105 MG/DL — HIGH (ref 70–99)
GLUCOSE SERPL-MCNC: 135 MG/DL — HIGH (ref 70–99)
GRAM STN FLD: SIGNIFICANT CHANGE UP
HCT VFR BLD CALC: 30.1 % — LOW (ref 34.5–45)
HGB BLD-MCNC: 9.9 G/DL — LOW (ref 11.5–15.5)
MAGNESIUM SERPL-MCNC: 1.5 MG/DL — LOW (ref 1.6–2.6)
MAGNESIUM SERPL-MCNC: 1.9 MG/DL — SIGNIFICANT CHANGE UP (ref 1.6–2.6)
MCHC RBC-ENTMCNC: 28.9 PG — SIGNIFICANT CHANGE UP (ref 27–34)
MCHC RBC-ENTMCNC: 32.9 GM/DL — SIGNIFICANT CHANGE UP (ref 32–36)
MCV RBC AUTO: 88 FL — SIGNIFICANT CHANGE UP (ref 80–100)
NRBC # BLD: 0 /100 WBCS — SIGNIFICANT CHANGE UP (ref 0–0)
PHOSPHATE SERPL-MCNC: 2.3 MG/DL — LOW (ref 2.5–4.5)
PHOSPHATE SERPL-MCNC: 2.4 MG/DL — LOW (ref 2.5–4.5)
PLATELET # BLD AUTO: 274 K/UL — SIGNIFICANT CHANGE UP (ref 150–400)
POTASSIUM SERPL-MCNC: 3.2 MMOL/L — LOW (ref 3.5–5.3)
POTASSIUM SERPL-MCNC: 3.3 MMOL/L — LOW (ref 3.5–5.3)
POTASSIUM SERPL-SCNC: 3.2 MMOL/L — LOW (ref 3.5–5.3)
POTASSIUM SERPL-SCNC: 3.3 MMOL/L — LOW (ref 3.5–5.3)
PROT SERPL-MCNC: 5.2 GM/DL — LOW (ref 6–8.3)
RBC # BLD: 3.42 M/UL — LOW (ref 3.8–5.2)
RBC # FLD: 14.6 % — HIGH (ref 10.3–14.5)
SODIUM SERPL-SCNC: 136 MMOL/L — SIGNIFICANT CHANGE UP (ref 135–145)
SODIUM SERPL-SCNC: 137 MMOL/L — SIGNIFICANT CHANGE UP (ref 135–145)
SPECIMEN SOURCE: SIGNIFICANT CHANGE UP
WBC # BLD: 36.58 K/UL — HIGH (ref 3.8–10.5)
WBC # FLD AUTO: 36.58 K/UL — HIGH (ref 3.8–10.5)

## 2021-12-30 PROCEDURE — 99291 CRITICAL CARE FIRST HOUR: CPT

## 2021-12-30 PROCEDURE — 71260 CT THORAX DX C+: CPT | Mod: 26

## 2021-12-30 PROCEDURE — 74177 CT ABD & PELVIS W/CONTRAST: CPT | Mod: 26

## 2021-12-30 PROCEDURE — 71045 X-RAY EXAM CHEST 1 VIEW: CPT | Mod: 26

## 2021-12-30 PROCEDURE — 99233 SBSQ HOSP IP/OBS HIGH 50: CPT

## 2021-12-30 RX ORDER — ELECTROLYTE SOLUTION,INJ
1 VIAL (ML) INTRAVENOUS
Refills: 0 | Status: DISCONTINUED | OUTPATIENT
Start: 2021-12-30 | End: 2021-12-30

## 2021-12-30 RX ORDER — MAGNESIUM SULFATE 500 MG/ML
2 VIAL (ML) INJECTION ONCE
Refills: 0 | Status: COMPLETED | OUTPATIENT
Start: 2021-12-30 | End: 2021-12-30

## 2021-12-30 RX ORDER — POTASSIUM PHOSPHATE, MONOBASIC POTASSIUM PHOSPHATE, DIBASIC 236; 224 MG/ML; MG/ML
15 INJECTION, SOLUTION INTRAVENOUS ONCE
Refills: 0 | Status: COMPLETED | OUTPATIENT
Start: 2021-12-30 | End: 2021-12-30

## 2021-12-30 RX ORDER — POTASSIUM CHLORIDE 20 MEQ
10 PACKET (EA) ORAL
Refills: 0 | Status: COMPLETED | OUTPATIENT
Start: 2021-12-30 | End: 2021-12-30

## 2021-12-30 RX ADMIN — CHLORHEXIDINE GLUCONATE 15 MILLILITER(S): 213 SOLUTION TOPICAL at 17:56

## 2021-12-30 RX ADMIN — FENTANYL CITRATE 4.05 MICROGRAM(S)/KG/HR: 50 INJECTION INTRAVENOUS at 08:33

## 2021-12-30 RX ADMIN — Medication 100 MILLIEQUIVALENT(S): at 05:18

## 2021-12-30 RX ADMIN — PANTOPRAZOLE SODIUM 40 MILLIGRAM(S): 20 TABLET, DELAYED RELEASE ORAL at 11:31

## 2021-12-30 RX ADMIN — LEVETIRACETAM 420 MILLIGRAM(S): 250 TABLET, FILM COATED ORAL at 17:56

## 2021-12-30 RX ADMIN — Medication 25 GRAM(S): at 15:04

## 2021-12-30 RX ADMIN — CHLORHEXIDINE GLUCONATE 15 MILLILITER(S): 213 SOLUTION TOPICAL at 05:19

## 2021-12-30 RX ADMIN — FLUCONAZOLE 100 MILLIGRAM(S): 150 TABLET ORAL at 23:09

## 2021-12-30 RX ADMIN — LEVETIRACETAM 420 MILLIGRAM(S): 250 TABLET, FILM COATED ORAL at 05:18

## 2021-12-30 RX ADMIN — DORZOLAMIDE HYDROCHLORIDE TIMOLOL MALEATE 1 DROP(S): 20; 5 SOLUTION/ DROPS OPHTHALMIC at 05:19

## 2021-12-30 RX ADMIN — PIPERACILLIN AND TAZOBACTAM 25 GRAM(S): 4; .5 INJECTION, POWDER, LYOPHILIZED, FOR SOLUTION INTRAVENOUS at 08:39

## 2021-12-30 RX ADMIN — Medication 100 MILLIEQUIVALENT(S): at 07:24

## 2021-12-30 RX ADMIN — POTASSIUM PHOSPHATE, MONOBASIC POTASSIUM PHOSPHATE, DIBASIC 62.5 MILLIMOLE(S): 236; 224 INJECTION, SOLUTION INTRAVENOUS at 05:00

## 2021-12-30 RX ADMIN — Medication 25 MICROGRAM(S): at 21:22

## 2021-12-30 RX ADMIN — PIPERACILLIN AND TAZOBACTAM 25 GRAM(S): 4; .5 INJECTION, POWDER, LYOPHILIZED, FOR SOLUTION INTRAVENOUS at 00:08

## 2021-12-30 RX ADMIN — CHLORHEXIDINE GLUCONATE 1 APPLICATION(S): 213 SOLUTION TOPICAL at 05:20

## 2021-12-30 RX ADMIN — DORZOLAMIDE HYDROCHLORIDE TIMOLOL MALEATE 1 DROP(S): 20; 5 SOLUTION/ DROPS OPHTHALMIC at 17:57

## 2021-12-30 RX ADMIN — Medication 1 EACH: at 22:01

## 2021-12-30 RX ADMIN — Medication 100 MILLIEQUIVALENT(S): at 06:10

## 2021-12-30 RX ADMIN — ENOXAPARIN SODIUM 40 MILLIGRAM(S): 100 INJECTION SUBCUTANEOUS at 11:31

## 2021-12-30 RX ADMIN — PIPERACILLIN AND TAZOBACTAM 25 GRAM(S): 4; .5 INJECTION, POWDER, LYOPHILIZED, FOR SOLUTION INTRAVENOUS at 17:56

## 2021-12-30 NOTE — CHART NOTE - NSCHARTNOTEFT_GEN_A_CORE
Assessment:  Pt seen on vent in ICU unit.  As per chart review, pt adm c dx of diverticulitis, perforated diverticulitis, s/p exploratory laparotomy, peritoneal lavage, colostomy, pt is tolerating weaning today, awaiting bowel function recovery, sepsis present on adm c septic shock due to GI source from perforated diverticulitis,   PMH include HTN, HLD, hypothyroidism, depression.    Factors impacting intake: [ ] none [ ] nausea  [ ] vomiting [ ] diarrhea [ ] constipation  [ ]chewing problems [ ] swallowing issues  [x ] other: acute illness, alteration in GI function as per above     Diet Prescription: Diet, NPO (12-26-21 @ 10:54)    Intake: NPO/Clear fluid x 7 days   PPN was initiated on 12/29.  Pt currently on 63 grams amino acid, 75 grams dextrose, 1512 ml @ 63 ml/hl=942 calories  + 20 % lipids, 250 ml 3 x/week= 500 calories/average of 214 calories   + received 113.3 calories on 12/29(from 103 ml propofol on 12/29)=Total of 843.4 calories daily     Current Weight: 12/30, 95.1 kg, 12/23, 81 kg, c wt. gain of 14.1 kg  % Weight Change: 17.4%  12/29, 2+edema of arms, 3+ edema of legs noted   I/O: 3281/4190(-909)    Pertinent Medications: MEDICATIONS  (STANDING):  aMIOdarone Infusion 1 mG/Min (33.3 mL/Hr) IV Continuous <Continuous>  aMIOdarone Infusion 0.5 mG/Min (16.7 mL/Hr) IV Continuous <Continuous>  chlorhexidine 0.12% Liquid 15 milliLiter(s) Oral Mucosa every 12 hours  chlorhexidine 2% Cloths 1 Application(s) Topical <User Schedule>  dextrose 40% Gel 15 Gram(s) Oral once  dextrose 5%. 1000 milliLiter(s) (50 mL/Hr) IV Continuous <Continuous>  dextrose 5%. 1000 milliLiter(s) (100 mL/Hr) IV Continuous <Continuous>  dextrose 50% Injectable 25 Gram(s) IV Push once  dextrose 50% Injectable 12.5 Gram(s) IV Push once  dextrose 50% Injectable 25 Gram(s) IV Push once  dorzolamide 2%/timolol 0.5% Ophthalmic Solution 1 Drop(s) Both EYES two times a day  enoxaparin Injectable 40 milliGRAM(s) SubCutaneous daily  fat emulsion (Plant Based) 20% Infusion 0.6173 Gm/kG/Day (10.4 mL/Hr) IV Continuous <Continuous>  fentaNYL   Infusion. 0.5 MICROgram(s)/kG/Hr (4.05 mL/Hr) IV Continuous <Continuous>  fluconAZOLE IVPB 200 milliGRAM(s) IV Intermittent every 24 hours  glucagon  Injectable 1 milliGRAM(s) IntraMuscular once  levETIRAcetam  IVPB 500 milliGRAM(s) IV Intermittent every 12 hours  levothyroxine Injectable 25 MICROGram(s) IV Push at bedtime  pantoprazole  Injectable 40 milliGRAM(s) IV Push daily  Parenteral Nutrition - Adult 1 Each (42 mL/Hr) TPN Continuous <Continuous>  Parenteral Nutrition - Adult 1 Each (63 mL/Hr) TPN Continuous <Continuous>  piperacillin/tazobactam IVPB.. 3.375 Gram(s) IV Intermittent every 8 hours  propofol Infusion 10 MICROgram(s)/kG/Min (4.86 mL/Hr) IV Continuous <Continuous>=103 ml    MEDICATIONS  (PRN):    Pertinent Labs: 12-30 Na137 mmol/L Glu 135 mg/dL<H> K+ 3.3 mmol/L<L> Cr  0.49 mg/dL<L> BUN 10 mg/dL 12-30 Phos 2.4 mg/dL<L> 12-30 Alb 1.0 g/dL<L>12-30 ALT 16 U/L AST 22 U/L Alkaline Phosphatase 131 U/L<H>  12-24-21 @ 10:29 a1c 5.6   CAPILLARY BLOOD GLUCOSE      POCT Blood Glucose.: 129 mg/dL (30 Dec 2021 11:13)  POCT Blood Glucose.: 118 mg/dL (30 Dec 2021 06:48)  POCT Blood Glucose.: 87 mg/dL (30 Dec 2021 00:14)  POCT Blood Glucose.: 84 mg/dL (29 Dec 2021 16:56)    Skin: WDL except swollen, surgical incision noted     Estimated Needs:   [x ] no change since previous assessment(12/27)  [ ] recalculated:     Previous Nutrition Diagnosis:   Inadequate Oral Intake.     Etiology decreased ability to consume sufficient energy.     Signs/Symptoms Pt is currently NPO.     Goal/Expected Outcome Pt will consume & tolerate >/=75% nutritional needs via tolerated route; not met     Nutrition Diagnosis is [x ] ongoing  [ ] resolved [ ] not applicable       New Nutrition Diagnosis: [x ] not applicable     Interventions:   Recommend  [ ] Change Diet To:  [ ] Nutrition Supplement  [x ] Nutrition Support; PPN as ordered @ present;   if NPO anticipated for prolonged periods of time, recommend TPN  [ ] Other:     Monitoring and Evaluation:   [ ] PO intake [ x ] Tolerance to diet prescription [ x ] weights [ x ] labs as per PPN/TPN protocol [ x ] follow up per protocol  [ ] other:

## 2021-12-30 NOTE — PROGRESS NOTE ADULT - ASSESSMENT
Patient is a 81F with a PMH of HTN, HLD, hypothyroidism, depression, seizures who presents to the ED for abd pain found to have diverticulitis   has rising leukocytosis   had fever yesterday   tachycardic and now hypoxic   CXR (I personally reviewed) low lung volumes   origincal CT (I personally reviewed) with diverticulitis   she had a lot of pain in the abdomen on exam     12/25: s/p surgery for diverticulitis with perforation and abscess and went to the OR. intubated in ICU with vac and needs to go back to the OR, currently on zosyn, s/p one dose of diflucan last night   12/27: s/p repair and ostomy creation yesterday, wbc elevated, cultures sensitive to zosyn and candida albicans is notoriously sensitive to azoles such as fluconazole, wean of pressors and sedation, extubate when ready   12/28: Further increase in white blood cell count but overall the patient appears to be reasonably stable.  No concern of C. difficile at this juncture.  Current antibiotics are reasonable.  Leukocytosis like related to recent surgery, no concern of other superimposed process on the basis of exam.  I do not see a role to alter her antimicrobials.  12/29:  Remains intubated in ICU. still quite edematous and net positive, WBC climbing, small wound dehiscence at bottom of incision, plan for initiation of TPN today, remains on antibiotics    12/30: rising WBC, ostomy not functioning yet, very edematous CT today, may be source control issue so for now can continue same antibiotics and antifungal but may need to change if findings on the CT are worrisome or change in hemodynamics      Perforated diverticulitis   abdominal abscess  ARF requiring intubation   Leukocytosis     Plan:  Trend CBC  Continue Zosyn for now   Continue Diflucan now   Follow-up culture data   consider repeating blood cultures again   Await return of bowel function  Remove catheters/TPN as able  Enteral feeding when able  Diuresis per critical care  repeat CT abd/pelvis to ensure no leak, no abscess, no obstruction etc   frequent turns, offloading, and nutrition optimization to avoid bedsores-consider protective heel/leg protectors       D/W Dr. Ramirez and team     Dr. Hector will be covering this weekend. To reach him, please call [phone]     Manolo Dunn DO  Infectious Disease Attending  Pager [phone]  After 5pm/weekends please call [phone] for all inquiries and new consults      > 35 mins total time spent

## 2021-12-30 NOTE — PROGRESS NOTE ADULT - SUBJECTIVE AND OBJECTIVE BOX
24 hr events:  more alert  profound generalized weakness  still with generalized anasarca  diuresing well last few days  tolerating weaning   remains with leukocytosis despite antibx  not on pressors      ## ROS:  [x ] unable to obtain due to intubation      ## Labs:  CBC:                        9.9    36.58 )-----------( 274      ( 30 Dec 2021 03:59 )             30.1     Chem:    136    |  101    |  10     ----------------------------<  105    3.2     |  23     |  0.52     Ca    7.1<L>      30 Dec 2021 13:23  Phos  2.4     12-30  Mg     1.5     12-30    TPro  5.2<L>  /  Alb  1.0<L>  /  TBili  0.3  /  DBili  x   /  AST  22  /  ALT  16  /  AlkPhos  131<H>  12-30    Culture - Sputum . (12.28.21 @ 18:54)    Specimen Source: .Sputum Sputum    Culture Results: ormal Respiratory Sendy present    Culture - Blood (12.28.21 @ 15:04)    Specimen Source: .Blood Blood    Culture Results: No growth to date.      Culture - Body Fluid with Gram Stain (12.25.21 @ 12:31)    Specimen Source: .Body Fluid INTRA -ABDOMINAL FLUID FOR CULTURE    Culture Results:   Few Escherichia coli  Rare Escherichia coli #2  Few Candida albicans "Susceptibilities not performed"  Moderate Bacteroides thetaiotamcron group "Susceptibilities not performed"  Moderate Bacteroides uniformis "Susceptibilities not performed"        ## Imaging:  CT chest/abdomen/pelvis < from: CT Abdomen and Pelvis w/ IV Cont (12.30.21 @ 15:35) >  Endotracheal tube enters right mainstem bronchus. Recommend repositioning   by pulling back the tube by approximately 2 to 3 cm with subsequent   confirmatory chest radiograph.    Enteric tube coiled within stomach, tip terminates within the fundus.   Consider repositioning    New multifocal small peripheral groundglass infiltrates suspicious for   pneumonia. Small pleural effusions.    Several small loculated fluid collections along the paracolic gutters and   lower pelvis with enhancement of the peritoneal reflections which may   indicate peritonitis.    Nonspecific mild diffuse gallbladder wall thickening with similar   distention      ## Medications:  aMIOdarone Infusion 1 mG/Min (33.3 mL/Hr) IV Continuous <Continuous>  aMIOdarone Infusion 0.5 mG/Min (16.7 mL/Hr) IV Continuous <Continuous>  chlorhexidine 0.12% Liquid 15 milliLiter(s) Oral Mucosa every 12 hours  chlorhexidine 2% Cloths 1 Application(s) Topical <User Schedule>  dextrose 40% Gel 15 Gram(s) Oral once  dextrose 5%. 1000 milliLiter(s) (50 mL/Hr) IV Continuous <Continuous>  dextrose 5%. 1000 milliLiter(s) (100 mL/Hr) IV Continuous <Continuous>  dextrose 50% Injectable 25 Gram(s) IV Push once  dextrose 50% Injectable 12.5 Gram(s) IV Push once  dorzolamide 2%/timolol 0.5% Ophthalmic Solution 1 Drop(s) Both EYES two times a day  enoxaparin Injectable 40 milliGRAM(s) SubCutaneous daily  fat emulsion (Plant Based) 20% Infusion 0.6173 Gm/kG/Day (10.4 mL/Hr) IV Continuous <Continuous>  fentaNYL   Infusion. 0.5 MICROgram(s)/kG/Hr (4.05 mL/Hr) IV Continuous <Continuous>  fluconAZOLE IVPB 200 milliGRAM(s) IV Intermittent every 24 hours  glucagon  Injectable 1 milliGRAM(s) IntraMuscular once  levETIRAcetam  IVPB 500 milliGRAM(s) IV Intermittent every 12 hours  levothyroxine Injectable 25 MICROGram(s) IV Push at bedtime  pantoprazole  Injectable 40 milliGRAM(s) IV Push daily  Parenteral Nutrition - Adult 1 Each (42 mL/Hr) TPN Continuous <Continuous>  piperacillin/tazobactam IVPB.. 3.375 Gram(s) IV Intermittent every 8 hours  propofol Infusion 10 MICROgram(s)/kG/Min (4.86 mL/Hr) IV Continuous <Continuous>          ## Vitals:  T(F): 99.9 (12-30-21 @ 15:49), Max: 99.9 (12-30-21 @ 15:49)  HR: 102 (12-30-21 @ 12:28)  BP: 155/67 (12-30-21 @ 12:00)  RR: 25 (12-30-21 @ 12:00)  SpO2: 100% (12-30-21 @ 12:28) (91% - 100%)    Vent: Mode: AC/ CMV (Assist Control/ Continuous Mandatory Ventilation), RR (machine): 18, RR (patient): 21, TV (machine): 450, FiO2: 35, PEEP: 5, PIP: 13  ABG:         ## P/E:  Gen: lying comfortably in bed in no apparent distress, orally intubated  HEENT: ETT in place, NGT in place  Resp: CTA B/L , mechanical breath sounds  CVS: RRR  Abd: soft ND -BS, bilateral BABAK drain with serous output, colostomy with serosanguinous output (no gas or stool in ostomy bag) retention sutures in place, incision with packing and dressing intact  Ext: anasarca, generalized body pitting edema  Neuro: more arousable, significant generalized weakness, opens eyes to name call, blinks on command    CENTRAL LINE: [ ] YES [x ] NO  LOCATION:   DATE INSERTED:  REMOVE: [ ] YES [ ] NO      BARKLEY: [x ] YES [ ] NO    DATE INSERTED:  REMOVE:  [ ] YES [ ] NO      A-LINE:  [ ] YES [x ] NO  LOCATION:   DATE INSERTED:  REMOVE:  [ ] YES [ ] NO  EXPLAIN:    GLOBAL ISSUE/BEST PRACTICE:  Analgesia: fentanyl  Sedation: yes  HOB elevation: yes  Stress ulcer prophylaxis: protonix  VTE prophylaxis: lovenox  Oral Care: yes  Glycemic control: yes  Nutrition: PPN    CODE STATUS: [x ] full code  [ ] DNR  [ ] DNI  [ ] [facility]  Goals of care discussion: [ ] yes

## 2021-12-30 NOTE — PROGRESS NOTE ADULT - SUBJECTIVE AND OBJECTIVE BOX
Patient is a 81y old  Female who presents with a chief complaint of diverticulitis (30 Dec 2021 04:58)      HPI:  Patient is a 81F with a PMH of HTN, HLD, hypothyroidism, depression, seizures who presents to the ED for abd pain.  Patient states that over the last two days she had developed significant abdominal pain and multiple episodes of watery diarrhea.  Reports one episode of nausea and vomiting earlier today.  Patient has no other complaints.  Vitals stable,  labs show leukocytosis and hypokalemia.  CT abdomen significant for diverticulitis.  Will admit to med surg.     (23 Dec 2021 00:17)      INTERVAL HPI/OVERNIGHT EVENTS:  ICU #6 , Intubated (+)  Small amount of serosanguinous from ostomy. No N/V. Good bowel sounds    MEDICATIONS  (STANDING):  aMIOdarone Infusion 1 mG/Min (33.3 mL/Hr) IV Continuous <Continuous>  aMIOdarone Infusion 0.5 mG/Min (16.7 mL/Hr) IV Continuous <Continuous>  chlorhexidine 0.12% Liquid 15 milliLiter(s) Oral Mucosa every 12 hours  chlorhexidine 2% Cloths 1 Application(s) Topical <User Schedule>  dextrose 40% Gel 15 Gram(s) Oral once  dextrose 5%. 1000 milliLiter(s) (50 mL/Hr) IV Continuous <Continuous>  dextrose 5%. 1000 milliLiter(s) (100 mL/Hr) IV Continuous <Continuous>  dextrose 50% Injectable 25 Gram(s) IV Push once  dextrose 50% Injectable 12.5 Gram(s) IV Push once  dextrose 50% Injectable 25 Gram(s) IV Push once  dorzolamide 2%/timolol 0.5% Ophthalmic Solution 1 Drop(s) Both EYES two times a day  enoxaparin Injectable 40 milliGRAM(s) SubCutaneous daily  fat emulsion (Plant Based) 20% Infusion 0.6173 Gm/kG/Day (10.4 mL/Hr) IV Continuous <Continuous>  fentaNYL   Infusion. 0.5 MICROgram(s)/kG/Hr (4.05 mL/Hr) IV Continuous <Continuous>  fluconAZOLE IVPB 200 milliGRAM(s) IV Intermittent every 24 hours  glucagon  Injectable 1 milliGRAM(s) IntraMuscular once  levETIRAcetam  IVPB 500 milliGRAM(s) IV Intermittent every 12 hours  levothyroxine Injectable 25 MICROGram(s) IV Push at bedtime  pantoprazole  Injectable 40 milliGRAM(s) IV Push daily  Parenteral Nutrition - Adult 1 Each (42 mL/Hr) TPN Continuous <Continuous>  piperacillin/tazobactam IVPB.. 3.375 Gram(s) IV Intermittent every 8 hours  propofol Infusion 10 MICROgram(s)/kG/Min (4.86 mL/Hr) IV Continuous <Continuous>    MEDICATIONS  (PRN):      FAMILY HISTORY:  FH: HTN (hypertension)        Allergies    No Known Allergies    Intolerances        PMH/PSH:  Seizure    HTN (hypertension)    Glaucoma    Thyroid disease    Blood clot of neck vein    TIA (transient ischemic attack)    S/P appendectomy          REVIEW OF SYSTEMS: Uncommunicative   CONSTITUTIONAL: No fever, weight loss, or fatigue  EYES: No eye pain, visual disturbances, or discharge  ENMT:  No difficulty hearing, tinnitus, vertigo; No sinus or throat pain  NECK: No pain or stiffness  BREASTS: No pain, masses, or nipple discharge  RESPIRATORY: No cough, wheezing, chills or hemoptysis;   CARDIOVASCULAR: No chest pain, palpitations, dizziness, or leg swelling  GASTROINTESTINAL:  See above   GENITOURINARY: No dysuria, frequency, hematuria, or incontinence  NEUROLOGICAL: No headaches, memory loss, loss of strength, numbness, or tremors  SKIN: No itching, burning, rashes, or lesions   LYMPH NODES: No enlarged glands  ENDOCRINE: No heat or cold intolerance; No hair loss  MUSCULOSKELETAL: No joint pain or swelling; No muscle, back, or extremity pain  PSYCHIATRIC: No depression, anxiety, mood swings, or difficulty sleeping  HEME/LYMPH: No easy bruising, or bleeding gums  ALLERGY AND IMMUNOLOGIC: No hives or eczema    Vital Signs Last 24 Hrs  T(C): 37.3 (30 Dec 2021 08:01), Max: 37.6 (29 Dec 2021 23:18)  T(F): 99.1 (30 Dec 2021 08:01), Max: 99.6 (29 Dec 2021 23:18)  HR: 104 (30 Dec 2021 09:00) (82 - 110)  BP: 159/71 (30 Dec 2021 09:00) (109/52 - 183/78)  BP(mean): 97 (30 Dec 2021 09:00) (68 - 109)  RR: 24 (30 Dec 2021 09:00) (11 - 30)  SpO2: 96% (30 Dec 2021 09:00) (91% - 100%)    PHYSICAL EXAM: Uncommunicative   GENERAL: NAD, well-groomed, well-developed  HEAD:  Atraumatic, Normocephalic  EYES: EOMI, PERRLA, conjunctiva and sclera clear  NECK: Supple, No JVD, Normal thyroid  NERVOUS SYSTEM:  Uncommunicative  CHEST/LUNG: Clear to percussion bilaterally; No rales, rhonchi, wheezing, or rubs  HEART: Regular rate and rhythm; No murmurs, rubs, or gallops  ABDOMEN: Soft, Nontender, Nondistended; Bowel sounds present  EXTREMITIES:  2+ Peripheral Pulses, No clubbing, cyanosis. Edema (+)  LYMPH: No lymphadenopathy noted  SKIN: No rashes or lesions    LAB                          9.9    36.58 )-----------( 274      ( 30 Dec 2021 03:59 )             30.1       CBC:  12-30 @ 03:59  WBC 36.58   Hgb 9.9   Hct 30.1   Plts 274  MCV 88.0  12-29 @ 03:51  WBC 32.37   Hgb 8.8   Hct 26.8   Plts 313  MCV 88.7  12-28 @ 03:36  WBC 28.04   Hgb 8.9   Hct 27.4   Plts 311  MCV 88.7  12-27 @ 03:26  WBC 19.48   Hgb 9.5   Hct 29.5   Plts 282  MCV 88.9  12-26 @ 14:46  WBC 16.51   Hgb 10.1   Hct 30.7   Plts 302  MCV 88.2  12-26 @ 05:40  WBC 12.37   Hgb 9.2   Hct 28.0   Plts 282  MCV 88.1  12-25 @ 07:48  WBC 11.48   Hgb 11.9   Hct 35.9   Plts 389  MCV 88.0  12-25 @ 00:52  WBC 6.75   Hgb 11.6   Hct 35.2   Plts 426  MCV 88.7  12-24 @ 18:28  WBC 7.29   Hgb 13.3   Hct 40.6   Plts 449  MCV 86.8  12-24 @ 08:30  WBC 28.13   Hgb 12.6   Hct 38.0   Plts 400  MCV 87.8      Chemistry:  12-30 @ 03:59  Na+ 136  K+ 3.2  Cl- 101  CO2 23  BUN 10  Cr 0.52     12-29 @ 12:28  Na+ 138  K+ 3.6  Cl- 106  CO2 23  BUN 11  Cr 0.54     12-29 @ 03:51  Na+ 137  K+ 2.7  Cl- 103  CO2 23  BUN 11  Cr 0.63     12-28 @ 03:36  Na+ 133  K+ 3.5  Cl- 104  CO2 19  BUN 17  Cr 0.74     12-27 @ 03:26  Na+ 132  K+ 3.6  Cl- 104  CO2 20  BUN 16  Cr 0.66     12-26 @ 05:40  Na+ 131  K+ 3.8  Cl- 103  CO2 20  BUN 15  Cr 0.70     12-25 @ 07:48  Na+ 129  K+ 4.3  Cl- 102  CO2 18  BUN 13  Cr 0.66     12-25 @ 00:52  Na+ 133  K+ 3.4  Cl- 106  CO2 18  BUN 13  Cr 0.60     12-24 @ 18:28  Na+ 130  K+ 2.9  Cl- 98  CO2 20  BUN 15  Cr 0.86     12-24 @ 08:30  Na+ 127  K+ 3.3  Cl- 97  CO2 19  BUN 15  Cr 0.80         Glucose, Serum: 105 mg/dL (12-30 @ 03:59)  Glucose, Serum: 80 mg/dL (12-29 @ 12:28)  Glucose, Serum: 88 mg/dL (12-29 @ 03:51)  Glucose, Serum: 109 mg/dL (12-28 @ 03:36)  Glucose, Serum: 73 mg/dL (12-27 @ 03:26)  Glucose, Serum: 88 mg/dL (12-26 @ 05:40)  Glucose, Serum: 149 mg/dL (12-25 @ 07:48)  Glucose, Serum: 142 mg/dL (12-25 @ 00:52)  Glucose, Serum: 142 mg/dL (12-24 @ 18:28)  Glucose, Serum: 133 mg/dL (12-24 @ 08:30)      30 Dec 2021 03:59    136    |  101    |  10     ----------------------------<  105    3.2     |  23     |  0.52   29 Dec 2021 12:28    138    |  106    |  11     ----------------------------<  80     3.6     |  23     |  0.54   29 Dec 2021 03:51    137    |  103    |  11     ----------------------------<  88     2.7     |  23     |  0.63   28 Dec 2021 03:36    133    |  104    |  17     ----------------------------<  109    3.5     |  19     |  0.74   27 Dec 2021 03:26    132    |  104    |  16     ----------------------------<  73     3.6     |  20     |  0.66   26 Dec 2021 05:40    131    |  103    |  15     ----------------------------<  88     3.8     |  20     |  0.70   25 Dec 2021 07:48    129    |  102    |  13     ----------------------------<  149    4.3     |  18     |  0.66     Ca    7.2        30 Dec 2021 03:59  Ca    7.6        29 Dec 2021 12:28  Ca    7.0        29 Dec 2021 03:51  Ca    7.2        28 Dec 2021 03:36  Ca    7.0        27 Dec 2021 03:26  Ca    7.1        26 Dec 2021 05:40  Ca    7.0        25 Dec 2021 07:48  Phos  2.3       30 Dec 2021 03:59  Phos  2.4       29 Dec 2021 03:51  Phos  3.2       28 Dec 2021 03:36  Phos  3.0       27 Dec 2021 03:26  Phos  2.4       26 Dec 2021 05:40  Phos  2.6       25 Dec 2021 07:48  Phos  2.5       25 Dec 2021 00:52  Mg     1.9       30 Dec 2021 03:59  Mg     2.1       29 Dec 2021 03:51  Mg     2.7       28 Dec 2021 03:36  Mg     2.8       27 Dec 2021 03:26  Mg     2.8       26 Dec 2021 05:40  Mg     3.5       25 Dec 2021 07:48  Mg     1.2       25 Dec 2021 00:52    TPro  5.2    /  Alb  1.0    /  TBili  0.3    /  DBili  x      /  AST  22     /  ALT  16     /  AlkPhos  131    30 Dec 2021 03:59  TPro  4.8    /  Alb  0.9    /  TBili  0.4    /  DBili  x      /  AST  20     /  ALT  19     /  AlkPhos  114    29 Dec 2021 03:51  TPro  4.7    /  Alb  0.9    /  TBili  0.3    /  DBili  x      /  AST  27     /  ALT  18     /  AlkPhos  78     28 Dec 2021 03:36  TPro  4.3    /  Alb  1.0    /  TBili  0.2    /  DBili  x      /  AST  27     /  ALT  18     /  AlkPhos  71     27 Dec 2021 03:26  TPro  4.4    /  Alb  1.2    /  TBili  0.2    /  DBili  x      /  AST  22     /  ALT  16     /  AlkPhos  67     26 Dec 2021 05:40  TPro  4.1    /  Alb  1.1    /  TBili  0.3    /  DBili  x      /  AST  22     /  ALT  19     /  AlkPhos  63     25 Dec 2021 07:48  TPro  3.9    /  Alb  1.1    /  TBili  0.4    /  DBili  x      /  AST  21     /  ALT  22     /  AlkPhos  66     25 Dec 2021 00:52              CAPILLARY BLOOD GLUCOSE      POCT Blood Glucose.: 118 mg/dL (30 Dec 2021 06:48)  POCT Blood Glucose.: 87 mg/dL (30 Dec 2021 00:14)  POCT Blood Glucose.: 84 mg/dL (29 Dec 2021 16:56)  POCT Blood Glucose.: 82 mg/dL (29 Dec 2021 11:13)      C-Reactive Protein, Serum: 369 mg/L (12-24 @ 10:38)      RADIOLOGY & ADDITIONAL TESTS:    Imaging Personally Reviewed:  [ ] YES  [ ] NO    Consultant(s) Notes Reviewed:  [ ] YES  [ ] NO    Care Discussed with Consultants/Other Providers [ ] YES  [ ] NO

## 2021-12-30 NOTE — PROGRESS NOTE ADULT - ASSESSMENT
81F PMH HTN, HLD, hypothyroidism, depression, seizures presents for abdominal pain found to have acute sigmoid diverticulitis complicated by perforated sigmoid diverticulitis/peritonitis with abscess formation s/p ex-lap with large bowel resection / abdominal washout / wound vac placement 12/25. Brought back to OR yesterday 12/26 for abdominal closure and colostomy creation. Sepsis with septic shock requiring levophed. Remains intubated for post procedural acute hypoxic respiratory failure. Post op course with a-fib RVR, volume overload, persistent leukocytosis.    NEURO  daily sedation vacation as tolerates  significant generalized weakness    CV  out of shock state  a-fib RVR s/p amio converted to sinus rhythm  can hold on further amio as pt back in sinus rhythm    overall volume overloaded, anasarca with pitting edema on exam  has been getting disuresed with lasix daily for last 3 days  will diurese with lasix again today with albumin  goal to get pt to net 500mL to 1L negative balance       PULM  tolerating weaning   no episodes of desaturation   cont daily wean as tolerates  cont to optimize and diurese before extubating    GI  NPO  await bowel function recovery  started on PPN  POD #5 with RTOR for abdominal closure 12/26  retention sutures in place  monitor I/O from BABAK drain and output from colostomy  mild drainage noted at bottom incision site yesterday, none today  with persistent leukocytosis pt was sent for CT chest/abd/pelvis today  CT abdomen with 6 to 10 small fluid collection in the paracolic gutter per verbal discussion with radiologist along with findings suspicious for peritonitis with enhancement of peritoneum. Collections are small and likely not amendable for IR  CT results d/w surgical team, no surgical intervention at present time  surgical follow up appreciated    ID  sepsis present on admission with septic shock due to GI source from perforated diverticulitis   cont with fluconazole and zosyn for abdominal cultures growing candida and bacteroides and e-coli  CT chest also with bilateral opacities likely PNA, however antibx zosyn should be sufficient coverage for PNA  monitor WBC and fevers  repeat blood cx negative  sputum cx with normal respiratory yoly  ID follow up      RENAL  Cr within normal limits  monitor UO  mc in place  hypokalemia: likely from diuresis, supplement for low K and hypophosphatemia and hypomagnesemia      ENDO  cont synthroid IV for underlying hypothyroidism     GEN  full code  DVT prophylaxis: lovenox

## 2021-12-30 NOTE — PROGRESS NOTE ADULT - SUBJECTIVE AND OBJECTIVE BOX
[facility]  Division of Infectious Diseases  [phone]    Name: JUAREZ GTZ  Age: 81y  Gender: Female  MRN: [id number]    Interval History--  Notes reviewed. Seen earlier today.  Case discussed with the critical care team.   Remains intubated in ICU. still quite edematous, WBC climbing, small wound dehiscence at bottom of incision, on TPN , remains on antibiotics , going for CT today    Past Medical History--  Seizure    HTN (hypertension)    Glaucoma    Thyroid disease    Blood clot of neck vein    TIA (transient ischemic attack)    S/P appendectomy        For details regarding the patient's social history, family history, and other miscellaneous elements, please refer the initial infectious diseases consultation and/or the admitting history and physical examination for this admission.    Allergies    No Known Allergies    Intolerances      Review of Systems--  A 10-point review of systems unable due to being intubated and sedated      Medications--  Antibiotics:  fluconAZOLE IVPB 200 every 24 hours  piperacillin/tazobactam IVPB.. 3.375 every 8 hours      MEDICATIONS  (STANDING):  dextrose 40% Gel 15 once  dextrose 50% Injectable 25 once  dextrose 50% Injectable 12.5 once  dextrose 50% Injectable 25 once  enoxaparin Injectable 40 daily  fentaNYL   Infusion. 0.5 <Continuous>  glucagon  Injectable 1 once  levETIRAcetam  IVPB 500 every 12 hours  levothyroxine Injectable 25 at bedtime  pantoprazole  Injectable 40 daily  propofol Infusion 10 <Continuous>      PRN      Physical Exam:  Vital Signs Last 24 Hrs  T(F): 99.9 (12-30-21 @ 15:49), Max: 99.9 (12-30-21 @ 15:49)  HR: 102 (12-30-21 @ 12:28)  BP: 155/67 (12-30-21 @ 12:00)  RR: 25 (12-30-21 @ 12:00)  SpO2: 100% (12-30-21 @ 12:28) (91% - 100%)  Wt(kg): --      General: Nontoxic-appearing Female in no acute distress. intubated   HEENT: AT/NC. Pupils/EOM unable secondary to patient compliance. Oropharynx/dentition unable secondary to patient compliance. OETT.   Neck: Not rigid. No sense of mass.  Nodes: None palpable.  Lungs: Diminished BS bilaterally with rales, +wheezing   Heart: Regular rate and rhythm. No Murmur. No rub. No gallop. No palpable thrill.  Abdomen: Bowel sounds mostly absent.  Soft. Mildly distended.  Incision dressed, distal incision with drainage and wound dehiscence  Ostomy pink with reddish effluent.  No stool or gas in bag.  No reaction to palpation.  Drains x2 serous fluid.  Back: Unable  Extremities: No cyanosis or clubbing. 3+ pitting edema/anasarca  Skin: Warm. Dry. Good turgor. No rash. No vasculitic stigmata.  Psychiatric: Unable        Laboratory Studies--  WBC Count: 36.58 K/uL (12-30 @ 03:59)  WBC Count: 32.37 K/uL (12-29 @ 03:51)  WBC Count: 28.04 K/uL (12-28 @ 03:36)  WBC Count: 19.48 K/uL (12-27 @ 03:26)  WBC Count: 16.51 K/uL (12-26 @ 14:46)  WBC Count: 12.37 K/uL (12-26 @ 05:40)  WBC Count: 11.48 K/uL (12-25 @ 07:48)                            9.9    36.58 )-----------( 274      ( 30 Dec 2021 03:59 )             30.1       12-30    137  |  104  |  10  ----------------------------<  135<H>  3.3<L>   |  24  |  0.49<L>    Ca    7.1<L>      30 Dec 2021 13:23  Phos  2.4     12-30  Mg     1.5     12-30    TPro  5.2<L>  /  Alb  1.0<L>  /  TBili  0.3  /  DBili  x   /  AST  22  /  ALT  16  /  AlkPhos  131<H>  12-30      Creatinine Trend: 0.49<--, 0.52<--, 0.54<--, 0.63<--, 0.74<--, 0.66<--        Culture Data    Culture - Body Fluid with Gram Stain (collected 25 Dec 2021 12:31)  Source: .Body Fluid INTRA -ABDOMINAL FLUID FOR CULTURE  Gram Stain (27 Dec 2021 09:49):    Few polymorphonuclear leukocytes per low power field    Rare Gram Variable Rods per oil power field  Final Report (27 Dec 2021 09:49):    Few Escherichia coli    Rare Escherichia coli #2    Few Candida albicans "Susceptibilities not performed"    Moderate Bacteroides thetaiotamcron group "Susceptibilities not performed"    Moderate Bacteroides uniformis "Susceptibilities not performed"  Organism: Escherichia coli  Escherichia coli (27 Dec 2021 09:49)  Organism: Escherichia coli (27 Dec 2021 09:49)  Organism: Escherichia coli (27 Dec 2021 09:49)    Culture - Blood (collected 25 Dec 2021 00:40)  Source: .Blood Blood-Peripheral  Preliminary Report (26 Dec 2021 01:02):    No growth to date.    Culture - Blood (collected 25 Dec 2021 00:40)  Source: .Blood Blood-Peripheral  Preliminary Report (26 Dec 2021 01:02):    No growth to date.    Culture - Blood (collected 24 Dec 2021 09:03)  Source: .Blood Blood-Peripheral  Preliminary Report (25 Dec 2021 10:01):    No growth to date.    Culture - Blood (collected 24 Dec 2021 09:03)  Source: .Blood Blood-Peripheral  Preliminary Report (25 Dec 2021 10:01):    No growth to date.    Culture - Urine (collected 23 Dec 2021 08:59)  Source: Clean Catch Clean Catch (Midstream)  Final Report (25 Dec 2021 22:00):    Normal Urogenital yoly present

## 2021-12-30 NOTE — PROGRESS NOTE ADULT - ASSESSMENT
HPI:  Patient is a 81F with a PMH of HTN, HLD, hypothyroidism, depression, seizures who presents to the ED for abd pain.  Patient states that over the last two days she had developed significant abdominal pain and multiple episodes of watery diarrhea.  Reports one episode of nausea and vomiting earlier today.  Patient has no other complaints.  Vitals stable,  labs show leukocytosis and hypokalemia.  CT abdomen significant for diverticulitis.  Will admit to med surg.     (23 Dec 2021 00:17)  ---- As Above ---- Patient is a poor historian. Son's phone number not available ( Aid / patient does not have the number )  The patient appears sluggish at the present time. Presents with N/V, abdominal pain, and diarrhea. Started a few days ago  Patient denies having a colonoscopy or diverticulitis. Patient is unsure if she is getting better.     Patient has a tray of solid food but does not want to eat it ( no appetite )    [id number]  ---------  Diverticulitis - Repeat Ct scan reveals perforation s/p surgery.  ( Never had a colonoscopy. See CT scan . Afebrile, minimal tenderness on exam, W  On Zosyn  1) Will need PPN/ TPN starting tomorrow if remains NPO    Son  ( [phone] )     [id number]  -----------  S/P repair w/ colostomy. - Continue as per surgery. Too early to implement TPN    [id number]  -----------  S/P repair w/ colostomy. - Continue as per surgery. Patient NPO / clear liquids since 12/23/21. Will reassess in AM    [id number]  ----------  S/P repair w/ colostomy. - Continue as per surgery. Patient NPO / clear liquids since 12/23/21. Will start PPN @ 42 ml / hr    [id number]  ----------  S/P repair w/ colostomy. - Continue as per surgery. Patient NPO / clear liquids since 12/23/21. Will increase PPN to 63 ml / hr. Will change to TPN once patient had dedicated PICC line / central line

## 2021-12-30 NOTE — PROGRESS NOTE ADULT - SUBJECTIVE AND OBJECTIVE BOX
SURGERY PROGRESS HPI:  Pt seen and examined at bedside resting comfortably in the ICU, intubated and sedated.     Vital Signs Last 24 Hrs  T(C): 37.6 (29 Dec 2021 23:18), Max: 37.6 (29 Dec 2021 23:18)  T(F): 99.6 (29 Dec 2021 23:18), Max: 99.6 (29 Dec 2021 23:18)  HR: 101 (30 Dec 2021 04:00) (82 - 110)  BP: 147/66 (30 Dec 2021 04:00) (118/53 - 183/78)  BP(mean): 87 (30 Dec 2021 04:00) (72 - 109)  RR: 22 (30 Dec 2021 04:00) (11 - 30)  SpO2: 99% (30 Dec 2021 04:00) (91% - 100%)      PHYSICAL EXAM:  GENERAL: NAD, intubated, sedated  HEENT: NGT in place with minimal bilious content  CHEST/LUNG: Intubated. Clear to ausculation, bilaterally   HEART: RRR S1S2  ABDOMEN: Ostomy pink and viable without any air/stool in the bag. Dressing clean/dry/intact, removed. Retention sutures intact, packing removed. Wound irrigated with NS. New iodoform packing and dressing placed. Pt tolerated well. JPs x 2 with serous output. non distended, hypoactive BS, soft, non tender, no guarding  : Boucher indwelling with clear yellow urine  EXTREMITIES:  calf soft, non tender b/l    I&O's Detail    28 Dec 2021 07:01  -  29 Dec 2021 07:00  --------------------------------------------------------  IN:    Amiodarone: 150.3 mL    FentaNYL: 217.1 mL    IV PiggyBack: 600 mL    Lactated Ringers: 1800 mL    Propofol: 92.8 mL  Total IN: 2860.2 mL    OUT:    Bulb (mL): 40 mL    Bulb (mL): 25 mL    Indwelling Catheter - Urethral (mL): 1100 mL    Nasogastric/Oral tube (mL): 100 mL    Ureteral Catheter (mL): 2500 mL  Total OUT: 3765 mL    Total NET: -904.8 mL      29 Dec 2021 07:01  -  30 Dec 2021 04:59  --------------------------------------------------------  IN:    Fat Emulsion (Plant Based) 20%: 135.2 mL    FentaNYL: 369.5 mL    IV PiggyBack: 1400 mL    Lactated Ringers: 375 mL    PPN (Peripheral Parenteral Nutrition): 336 mL    Propofol: 102.6 mL  Total IN: 2718.3 mL    OUT:    Indwelling Catheter - Urethral (mL): 825 mL    Ureteral Catheter (mL): 3090 mL  Total OUT: 3915 mL    Total NET: -1196.7 mL          LABS:                        9.9    36.58 )-----------( 274      ( 30 Dec 2021 03:59 )             30.1     12-30    136  |  101  |  10  ----------------------------<  105<H>  3.2<L>   |  23  |  0.52    Ca    7.2<L>      30 Dec 2021 03:59  Phos  2.3     12-30  Mg     1.9     12-30    TPro  5.2<L>  /  Alb  1.0<L>  /  TBili  0.3  /  DBili  x   /  AST  22  /  ALT  16  /  AlkPhos  131<H>  12-30      Culture Results:   Normal Respiratory Sendy present (12-28 @ 18:54)  Culture Results:   No growth to date. (12-28 @ 15:04)  Culture Results:   No growth to date. (12-28 @ 15:04)        81F with PMH of HTN, HLD, hypothyroidism, depression, seizures admitted with Acute Sigmoid Diverticulitis. Surgery consulted for interval perforated sigmoid diverticulitis. S/P ex lap, sigmoid resection, peritoneal lavage 12/25 and RTOR 12/26 for Irrigation of abdominal cavity with closure and creation of colostomy. Intubated. Off pressors. A-fib RVR s/p amio converted to sinus rhythm. Leukocytosis increasing.    - NPO, NGT to LWS, IV hydration  - Continue local wound care, iodoform packing  - Ostomy care, monitor output  - BABAK care, monitor outputs  - Antibiotics per ID  - Boucher, monitor urine output  - DVT prophylaxis, pain control  - PPN per GI  - Wean off ventilator per ICU  - Possible CT today. Will d/w attending SURGERY PROGRESS HPI:  Pt seen and examined at bedside resting comfortably in the ICU, intubated and sedated.     Vital Signs Last 24 Hrs  T(C): 37.6 (29 Dec 2021 23:18), Max: 37.6 (29 Dec 2021 23:18)  T(F): 99.6 (29 Dec 2021 23:18), Max: 99.6 (29 Dec 2021 23:18)  HR: 101 (30 Dec 2021 04:00) (82 - 110)  BP: 147/66 (30 Dec 2021 04:00) (118/53 - 183/78)  BP(mean): 87 (30 Dec 2021 04:00) (72 - 109)  RR: 22 (30 Dec 2021 04:00) (11 - 30)  SpO2: 99% (30 Dec 2021 04:00) (91% - 100%)      PHYSICAL EXAM:  GENERAL: NAD, intubated, sedated  HEENT: NGT in place with minimal bilious content  CHEST/LUNG: Intubated. Clear to ausculation, bilaterally   HEART: RRR S1S2  ABDOMEN: Ostomy pink and viable without any air/stool in the bag. Dressing clean/dry/intact, removed. Retention sutures intact, packing removed. Wound irrigated with NS. New iodoform packing and dressing placed. Pt tolerated well. JPs x 2 with serous output. non distended, hypoactive BS, soft, non tender, no guarding  : Boucher indwelling with clear yellow urine  EXTREMITIES:  calf soft, non tender b/l    I&O's Detail    28 Dec 2021 07:01  -  29 Dec 2021 07:00  --------------------------------------------------------  IN:    Amiodarone: 150.3 mL    FentaNYL: 217.1 mL    IV PiggyBack: 600 mL    Lactated Ringers: 1800 mL    Propofol: 92.8 mL  Total IN: 2860.2 mL    OUT:    Bulb (mL): 40 mL    Bulb (mL): 25 mL    Indwelling Catheter - Urethral (mL): 1100 mL    Nasogastric/Oral tube (mL): 100 mL    Ureteral Catheter (mL): 2500 mL  Total OUT: 3765 mL    Total NET: -904.8 mL      29 Dec 2021 07:01  -  30 Dec 2021 04:59  --------------------------------------------------------  IN:    Fat Emulsion (Plant Based) 20%: 135.2 mL    FentaNYL: 369.5 mL    IV PiggyBack: 1400 mL    Lactated Ringers: 375 mL    PPN (Peripheral Parenteral Nutrition): 336 mL    Propofol: 102.6 mL  Total IN: 2718.3 mL    OUT:    Indwelling Catheter - Urethral (mL): 825 mL    Ureteral Catheter (mL): 3090 mL  Total OUT: 3915 mL    Total NET: -1196.7 mL          LABS:                        9.9    36.58 )-----------( 274      ( 30 Dec 2021 03:59 )             30.1     12-30    136  |  101  |  10  ----------------------------<  105<H>  3.2<L>   |  23  |  0.52    Ca    7.2<L>      30 Dec 2021 03:59  Phos  2.3     12-30  Mg     1.9     12-30    TPro  5.2<L>  /  Alb  1.0<L>  /  TBili  0.3  /  DBili  x   /  AST  22  /  ALT  16  /  AlkPhos  131<H>  12-30      Culture Results:   Normal Respiratory Sendy present (12-28 @ 18:54)  Culture Results:   No growth to date. (12-28 @ 15:04)  Culture Results:   No growth to date. (12-28 @ 15:04)        81F with PMH of HTN, HLD, hypothyroidism, depression, seizures admitted with Acute Sigmoid Diverticulitis. Surgery consulted for interval perforated sigmoid diverticulitis. S/P ex lap, sigmoid resection, peritoneal lavage 12/25 and RTOR 12/26 for Irrigation of abdominal cavity with closure and creation of colostomy. Intubated. Off pressors. A-fib RVR s/p amio converted to sinus rhythm. Leukocytosis increasing.    - NPO, NGT to LWS, IV hydration  - Continue local wound care, iodoform packing  - Ostomy care, monitor output  - BABAK care, monitor outputs  - Antibiotics per ID  - Boucher, monitor urine output  - DVT prophylaxis, pain control  - PPN per GI  - Wean off ventilator per ICU  - Possible CT. Will d/w attending

## 2021-12-31 LAB
ALBUMIN SERPL ELPH-MCNC: 1 G/DL — LOW (ref 3.3–5)
ALP SERPL-CCNC: 118 U/L — SIGNIFICANT CHANGE UP (ref 40–120)
ALT FLD-CCNC: 17 U/L — SIGNIFICANT CHANGE UP (ref 12–78)
ANION GAP SERPL CALC-SCNC: 7 MMOL/L — SIGNIFICANT CHANGE UP (ref 5–17)
AST SERPL-CCNC: 26 U/L — SIGNIFICANT CHANGE UP (ref 15–37)
BILIRUB SERPL-MCNC: 0.3 MG/DL — SIGNIFICANT CHANGE UP (ref 0.2–1.2)
BUN SERPL-MCNC: 10 MG/DL — SIGNIFICANT CHANGE UP (ref 7–23)
CALCIUM SERPL-MCNC: 7.1 MG/DL — LOW (ref 8.5–10.1)
CHLORIDE SERPL-SCNC: 101 MMOL/L — SIGNIFICANT CHANGE UP (ref 96–108)
CO2 SERPL-SCNC: 27 MMOL/L — SIGNIFICANT CHANGE UP (ref 22–31)
CREAT SERPL-MCNC: 0.49 MG/DL — LOW (ref 0.5–1.3)
GLUCOSE BLDC GLUCOMTR-MCNC: 127 MG/DL — HIGH (ref 70–99)
GLUCOSE BLDC GLUCOMTR-MCNC: 135 MG/DL — HIGH (ref 70–99)
GLUCOSE BLDC GLUCOMTR-MCNC: 138 MG/DL — HIGH (ref 70–99)
GLUCOSE BLDC GLUCOMTR-MCNC: 163 MG/DL — HIGH (ref 70–99)
GLUCOSE SERPL-MCNC: 127 MG/DL — HIGH (ref 70–99)
HCT VFR BLD CALC: 30 % — LOW (ref 34.5–45)
HGB BLD-MCNC: 10.4 G/DL — LOW (ref 11.5–15.5)
MAGNESIUM SERPL-MCNC: 1.8 MG/DL — SIGNIFICANT CHANGE UP (ref 1.6–2.6)
MCHC RBC-ENTMCNC: 29.7 PG — SIGNIFICANT CHANGE UP (ref 27–34)
MCHC RBC-ENTMCNC: 34.7 GM/DL — SIGNIFICANT CHANGE UP (ref 32–36)
MCV RBC AUTO: 85.7 FL — SIGNIFICANT CHANGE UP (ref 80–100)
NRBC # BLD: 0 /100 WBCS — SIGNIFICANT CHANGE UP (ref 0–0)
PHOSPHATE SERPL-MCNC: 2.2 MG/DL — LOW (ref 2.5–4.5)
PLATELET # BLD AUTO: 369 K/UL — SIGNIFICANT CHANGE UP (ref 150–400)
POTASSIUM SERPL-MCNC: 3.1 MMOL/L — LOW (ref 3.5–5.3)
POTASSIUM SERPL-SCNC: 3.1 MMOL/L — LOW (ref 3.5–5.3)
PROT SERPL-MCNC: 5.1 GM/DL — LOW (ref 6–8.3)
RBC # BLD: 3.5 M/UL — LOW (ref 3.8–5.2)
RBC # FLD: 14.5 % — SIGNIFICANT CHANGE UP (ref 10.3–14.5)
SODIUM SERPL-SCNC: 135 MMOL/L — SIGNIFICANT CHANGE UP (ref 135–145)
TRIGL SERPL-MCNC: 343 MG/DL — HIGH
WBC # BLD: 28.69 K/UL — HIGH (ref 3.8–10.5)
WBC # FLD AUTO: 28.69 K/UL — HIGH (ref 3.8–10.5)

## 2021-12-31 PROCEDURE — 99291 CRITICAL CARE FIRST HOUR: CPT

## 2021-12-31 PROCEDURE — 99233 SBSQ HOSP IP/OBS HIGH 50: CPT

## 2021-12-31 RX ORDER — MAGNESIUM SULFATE 500 MG/ML
2 VIAL (ML) INJECTION ONCE
Refills: 0 | Status: COMPLETED | OUTPATIENT
Start: 2021-12-31 | End: 2021-12-31

## 2021-12-31 RX ORDER — FUROSEMIDE 40 MG
40 TABLET ORAL ONCE
Refills: 0 | Status: COMPLETED | OUTPATIENT
Start: 2021-12-31 | End: 2021-12-31

## 2021-12-31 RX ORDER — FENTANYL CITRATE 50 UG/ML
50 INJECTION INTRAVENOUS EVERY 4 HOURS
Refills: 0 | Status: DISCONTINUED | OUTPATIENT
Start: 2021-12-31 | End: 2022-01-04

## 2021-12-31 RX ORDER — ELECTROLYTE SOLUTION,INJ
1 VIAL (ML) INTRAVENOUS
Refills: 0 | Status: DISCONTINUED | OUTPATIENT
Start: 2021-12-31 | End: 2021-12-31

## 2021-12-31 RX ORDER — ACETAMINOPHEN 500 MG
1000 TABLET ORAL ONCE
Refills: 0 | Status: COMPLETED | OUTPATIENT
Start: 2021-12-31 | End: 2021-12-31

## 2021-12-31 RX ORDER — POTASSIUM PHOSPHATE, MONOBASIC POTASSIUM PHOSPHATE, DIBASIC 236; 224 MG/ML; MG/ML
15 INJECTION, SOLUTION INTRAVENOUS ONCE
Refills: 0 | Status: COMPLETED | OUTPATIENT
Start: 2021-12-31 | End: 2021-12-31

## 2021-12-31 RX ORDER — I.V. FAT EMULSION 20 G/100ML
0.55 EMULSION INTRAVENOUS
Qty: 50 | Refills: 0 | Status: DISCONTINUED | OUTPATIENT
Start: 2021-12-31 | End: 2021-12-31

## 2021-12-31 RX ORDER — POTASSIUM CHLORIDE 20 MEQ
10 PACKET (EA) ORAL
Refills: 0 | Status: COMPLETED | OUTPATIENT
Start: 2021-12-31 | End: 2021-12-31

## 2021-12-31 RX ORDER — FENTANYL CITRATE 50 UG/ML
25 INJECTION INTRAVENOUS EVERY 4 HOURS
Refills: 0 | Status: DISCONTINUED | OUTPATIENT
Start: 2021-12-31 | End: 2022-01-04

## 2021-12-31 RX ADMIN — CHLORHEXIDINE GLUCONATE 15 MILLILITER(S): 213 SOLUTION TOPICAL at 18:19

## 2021-12-31 RX ADMIN — FENTANYL CITRATE 50 MICROGRAM(S): 50 INJECTION INTRAVENOUS at 18:13

## 2021-12-31 RX ADMIN — Medication 1 EACH: at 22:01

## 2021-12-31 RX ADMIN — Medication 100 MILLIEQUIVALENT(S): at 03:55

## 2021-12-31 RX ADMIN — DORZOLAMIDE HYDROCHLORIDE TIMOLOL MALEATE 1 DROP(S): 20; 5 SOLUTION/ DROPS OPHTHALMIC at 05:47

## 2021-12-31 RX ADMIN — I.V. FAT EMULSION 9.28 GM/KG/DAY: 20 EMULSION INTRAVENOUS at 17:01

## 2021-12-31 RX ADMIN — LEVETIRACETAM 420 MILLIGRAM(S): 250 TABLET, FILM COATED ORAL at 05:45

## 2021-12-31 RX ADMIN — PIPERACILLIN AND TAZOBACTAM 25 GRAM(S): 4; .5 INJECTION, POWDER, LYOPHILIZED, FOR SOLUTION INTRAVENOUS at 00:13

## 2021-12-31 RX ADMIN — FLUCONAZOLE 100 MILLIGRAM(S): 150 TABLET ORAL at 23:09

## 2021-12-31 RX ADMIN — PANTOPRAZOLE SODIUM 40 MILLIGRAM(S): 20 TABLET, DELAYED RELEASE ORAL at 11:03

## 2021-12-31 RX ADMIN — FENTANYL CITRATE 4.05 MICROGRAM(S)/KG/HR: 50 INJECTION INTRAVENOUS at 07:51

## 2021-12-31 RX ADMIN — DORZOLAMIDE HYDROCHLORIDE TIMOLOL MALEATE 1 DROP(S): 20; 5 SOLUTION/ DROPS OPHTHALMIC at 18:18

## 2021-12-31 RX ADMIN — Medication 100 MILLIEQUIVALENT(S): at 05:45

## 2021-12-31 RX ADMIN — FENTANYL CITRATE 25 MICROGRAM(S): 50 INJECTION INTRAVENOUS at 15:55

## 2021-12-31 RX ADMIN — FENTANYL CITRATE 25 MICROGRAM(S): 50 INJECTION INTRAVENOUS at 15:40

## 2021-12-31 RX ADMIN — PIPERACILLIN AND TAZOBACTAM 25 GRAM(S): 4; .5 INJECTION, POWDER, LYOPHILIZED, FOR SOLUTION INTRAVENOUS at 08:51

## 2021-12-31 RX ADMIN — Medication 1000 MILLIGRAM(S): at 17:14

## 2021-12-31 RX ADMIN — POTASSIUM PHOSPHATE, MONOBASIC POTASSIUM PHOSPHATE, DIBASIC 62.5 MILLIMOLE(S): 236; 224 INJECTION, SOLUTION INTRAVENOUS at 04:30

## 2021-12-31 RX ADMIN — PIPERACILLIN AND TAZOBACTAM 25 GRAM(S): 4; .5 INJECTION, POWDER, LYOPHILIZED, FOR SOLUTION INTRAVENOUS at 17:00

## 2021-12-31 RX ADMIN — CHLORHEXIDINE GLUCONATE 15 MILLILITER(S): 213 SOLUTION TOPICAL at 05:46

## 2021-12-31 RX ADMIN — Medication 40 MILLIGRAM(S): at 11:02

## 2021-12-31 RX ADMIN — ENOXAPARIN SODIUM 40 MILLIGRAM(S): 100 INJECTION SUBCUTANEOUS at 11:03

## 2021-12-31 RX ADMIN — LEVETIRACETAM 420 MILLIGRAM(S): 250 TABLET, FILM COATED ORAL at 15:58

## 2021-12-31 RX ADMIN — Medication 25 GRAM(S): at 03:54

## 2021-12-31 RX ADMIN — CHLORHEXIDINE GLUCONATE 1 APPLICATION(S): 213 SOLUTION TOPICAL at 05:46

## 2021-12-31 RX ADMIN — FENTANYL CITRATE 50 MICROGRAM(S): 50 INJECTION INTRAVENOUS at 18:28

## 2021-12-31 RX ADMIN — Medication 400 MILLIGRAM(S): at 16:59

## 2021-12-31 RX ADMIN — Medication 100 MILLIEQUIVALENT(S): at 04:32

## 2021-12-31 RX ADMIN — Medication 25 MICROGRAM(S): at 21:38

## 2021-12-31 NOTE — PROGRESS NOTE ADULT - SUBJECTIVE AND OBJECTIVE BOX
Patient is a 81y old  Female who presents with a chief complaint of diverticulitis (31 Dec 2021 09:34)      HPI:  Patient is a 81F with a PMH of HTN, HLD, hypothyroidism, depression, seizures who presents to the ED for abd pain.  Patient states that over the last two days she had developed significant abdominal pain and multiple episodes of watery diarrhea.  Reports one episode of nausea and vomiting earlier today.  Patient has no other complaints.  Vitals stable,  labs show leukocytosis and hypokalemia.  CT abdomen significant for diverticulitis.  Will admit to med surg.     (23 Dec 2021 00:17)      INTERVAL HPI/OVERNIGHT EVENTS: patient sen earlier today ICU #6, Intubated (+)  No N/V. NPO. Tolerating TPN      MEDICATIONS  (STANDING):  chlorhexidine 0.12% Liquid 15 milliLiter(s) Oral Mucosa every 12 hours  chlorhexidine 2% Cloths 1 Application(s) Topical <User Schedule>  dextrose 40% Gel 15 Gram(s) Oral once  dextrose 5%. 1000 milliLiter(s) (50 mL/Hr) IV Continuous <Continuous>  dextrose 5%. 1000 milliLiter(s) (100 mL/Hr) IV Continuous <Continuous>  dextrose 50% Injectable 25 Gram(s) IV Push once  dextrose 50% Injectable 12.5 Gram(s) IV Push once  dextrose 50% Injectable 25 Gram(s) IV Push once  dorzolamide 2%/timolol 0.5% Ophthalmic Solution 1 Drop(s) Both EYES two times a day  enoxaparin Injectable 40 milliGRAM(s) SubCutaneous daily  fat emulsion (Plant Based) 20% Infusion 0.55 Gm/kG/Day (9.28 mL/Hr) IV Continuous <Continuous>  fentaNYL   Infusion. 0.5 MICROgram(s)/kG/Hr (4.05 mL/Hr) IV Continuous <Continuous>  fluconAZOLE IVPB 200 milliGRAM(s) IV Intermittent every 24 hours  glucagon  Injectable 1 milliGRAM(s) IntraMuscular once  levETIRAcetam  IVPB 500 milliGRAM(s) IV Intermittent every 12 hours  levothyroxine Injectable 25 MICROGram(s) IV Push at bedtime  pantoprazole  Injectable 40 milliGRAM(s) IV Push daily  Parenteral Nutrition - Adult 1 Each (63 mL/Hr) TPN Continuous <Continuous>  Parenteral Nutrition - Adult 1 Each (63 mL/Hr) TPN Continuous <Continuous>  piperacillin/tazobactam IVPB.. 3.375 Gram(s) IV Intermittent every 8 hours  propofol Infusion 10 MICROgram(s)/kG/Min (4.86 mL/Hr) IV Continuous <Continuous>    MEDICATIONS  (PRN):      FAMILY HISTORY:  FH: HTN (hypertension)        Allergies    No Known Allergies    Intolerances        PMH/PSH:  Seizure    HTN (hypertension)    Glaucoma    Thyroid disease    Blood clot of neck vein    TIA (transient ischemic attack)    S/P appendectomy          REVIEW OF SYSTEMS: Uncommunicative  CONSTITUTIONAL: No fever, weight loss,   EYES: No eye pain, visual disturbances, or discharge  ENMT:  No difficulty hearing, tinnitus, vertigo; No sinus or throat pain  NECK: No pain or stiffness  BREASTS: No pain, masses, or nipple discharge  RESPIRATORY: No cough, wheezing, chills or hemoptysis; No shortness of breath  CARDIOVASCULAR: No chest pain, palpitations, dizziness, or leg swelling  GASTROINTESTINAL:  see above   GENITOURINARY: No dysuria, frequency, hematuria, or incontinence  NEUROLOGICAL: No headaches,  numbness, or tremors  SKIN: No itching, burning, rashes, or lesions   LYMPH NODES: No enlarged glands  ENDOCRINE: No heat or cold intolerance; No hair loss  MUSCULOSKELETAL: No joint pain or swelling; No muscle, back, or extremity pain  PSYCHIATRIC: No depression, anxiety, mood swings, or difficulty sleeping  HEME/LYMPH: No easy bruising, or bleeding gums  ALLERGY AND IMMUNOLOGIC: No hives or eczema    Vital Signs Last 24 Hrs  T(C): 37.3 (31 Dec 2021 12:00), Max: 37.7 (30 Dec 2021 15:49)  T(F): 99.2 (31 Dec 2021 12:00), Max: 99.9 (30 Dec 2021 15:49)  HR: 89 (31 Dec 2021 12:38) (71 - 102)  BP: 103/93 (31 Dec 2021 12:00) (103/93 - 179/62)  BP(mean): 98 (31 Dec 2021 12:00) (66 - 102)  RR: 21 (31 Dec 2021 12:00) (18 - 27)  SpO2: 100% (31 Dec 2021 12:38) (94% - 100%)    PHYSICAL EXAM:  GENERAL: NAD, well-groomed, well-developed  HEAD:  Atraumatic, Normocephalic  EYES: EOMI, PERRLA, conjunctiva and sclera clear  NECK: Supple, No JVD, Normal thyroid  NERVOUS SYSTEM:  Unresponsive  CHEST/LUNG: Clear to percussion bilaterally; No rales, rhonchi, wheezing, or rubs  HEART: Regular rate and rhythm; No murmurs, rubs, or gallops  ABDOMEN: Soft, Nontender, Nondistended; Bowel sounds absent. Serosanguineous liquid in colostomy bag  EXTREMITIES:  2+ Peripheral Pulses, No clubbing, cyanosis, or edema  LYMPH: No lymphadenopathy noted  SKIN: No rashes or lesions    LAB                          10.4   28.69 )-----------( 369      ( 31 Dec 2021 02:30 )             30.0       CBC:  12-31 @ 02:30  WBC 28.69   Hgb 10.4   Hct 30.0   Plts 369  MCV 85.7  12-30 @ 03:59  WBC 36.58   Hgb 9.9   Hct 30.1   Plts 274  MCV 88.0  12-29 @ 03:51  WBC 32.37   Hgb 8.8   Hct 26.8   Plts 313  MCV 88.7  12-28 @ 03:36  WBC 28.04   Hgb 8.9   Hct 27.4   Plts 311  MCV 88.7  12-27 @ 03:26  WBC 19.48   Hgb 9.5   Hct 29.5   Plts 282  MCV 88.9  12-26 @ 14:46  WBC 16.51   Hgb 10.1   Hct 30.7   Plts 302  MCV 88.2  12-26 @ 05:40  WBC 12.37   Hgb 9.2   Hct 28.0   Plts 282  MCV 88.1  12-25 @ 07:48  WBC 11.48   Hgb 11.9   Hct 35.9   Plts 389  MCV 88.0  12-25 @ 00:52  WBC 6.75   Hgb 11.6   Hct 35.2   Plts 426  MCV 88.7  12-24 @ 18:28  WBC 7.29   Hgb 13.3   Hct 40.6   Plts 449  MCV 86.8      Chemistry:  12-31 @ 02:30  Na+ 135  K+ 3.1  Cl- 101  CO2 27  BUN 10  Cr 0.49     12-30 @ 13:23  Na+ 137  K+ 3.3  Cl- 104  CO2 24  BUN 10  Cr 0.49     12-30 @ 03:59  Na+ 136  K+ 3.2  Cl- 101  CO2 23  BUN 10  Cr 0.52     12-29 @ 12:28  Na+ 138  K+ 3.6  Cl- 106  CO2 23  BUN 11  Cr 0.54     12-29 @ 03:51  Na+ 137  K+ 2.7  Cl- 103  CO2 23  BUN 11  Cr 0.63     12-28 @ 03:36  Na+ 133  K+ 3.5  Cl- 104  CO2 19  BUN 17  Cr 0.74     12-27 @ 03:26  Na+ 132  K+ 3.6  Cl- 104  CO2 20  BUN 16  Cr 0.66     12-26 @ 05:40  Na+ 131  K+ 3.8  Cl- 103  CO2 20  BUN 15  Cr 0.70     12-25 @ 07:48  Na+ 129  K+ 4.3  Cl- 102  CO2 18  BUN 13  Cr 0.66     12-25 @ 00:52  Na+ 133  K+ 3.4  Cl- 106  CO2 18  BUN 13  Cr 0.60         Glucose, Serum: 127 mg/dL (12-31 @ 02:30)  Glucose, Serum: 135 mg/dL (12-30 @ 13:23)  Glucose, Serum: 105 mg/dL (12-30 @ 03:59)  Glucose, Serum: 80 mg/dL (12-29 @ 12:28)  Glucose, Serum: 88 mg/dL (12-29 @ 03:51)  Glucose, Serum: 109 mg/dL (12-28 @ 03:36)  Glucose, Serum: 73 mg/dL (12-27 @ 03:26)  Glucose, Serum: 88 mg/dL (12-26 @ 05:40)  Glucose, Serum: 149 mg/dL (12-25 @ 07:48)  Glucose, Serum: 142 mg/dL (12-25 @ 00:52)      31 Dec 2021 02:30    135    |  101    |  10     ----------------------------<  127    3.1     |  27     |  0.49   30 Dec 2021 13:23    137    |  104    |  10     ----------------------------<  135    3.3     |  24     |  0.49   30 Dec 2021 03:59    136    |  101    |  10     ----------------------------<  105    3.2     |  23     |  0.52   29 Dec 2021 12:28    138    |  106    |  11     ----------------------------<  80     3.6     |  23     |  0.54   29 Dec 2021 03:51    137    |  103    |  11     ----------------------------<  88     2.7     |  23     |  0.63   28 Dec 2021 03:36    133    |  104    |  17     ----------------------------<  109    3.5     |  19     |  0.74   27 Dec 2021 03:26    132    |  104    |  16     ----------------------------<  73     3.6     |  20     |  0.66     Ca    7.1        31 Dec 2021 02:30  Ca    7.1        30 Dec 2021 13:23  Ca    7.2        30 Dec 2021 03:59  Ca    7.6        29 Dec 2021 12:28  Ca    7.0        29 Dec 2021 03:51  Ca    7.2        28 Dec 2021 03:36  Ca    7.0        27 Dec 2021 03:26  Phos  2.2       31 Dec 2021 02:30  Phos  2.4       30 Dec 2021 13:23  Phos  2.3       30 Dec 2021 03:59  Phos  2.4       29 Dec 2021 03:51  Phos  3.2       28 Dec 2021 03:36  Phos  3.0       27 Dec 2021 03:26  Phos  2.4       26 Dec 2021 05:40  Mg     1.8       31 Dec 2021 02:30  Mg     1.5       30 Dec 2021 13:23  Mg     1.9       30 Dec 2021 03:59  Mg     2.1       29 Dec 2021 03:51  Mg     2.7       28 Dec 2021 03:36  Mg     2.8       27 Dec 2021 03:26  Mg     2.8       26 Dec 2021 05:40    TPro  5.1    /  Alb  1.0    /  TBili  0.3    /  DBili  x      /  AST  26     /  ALT  17     /  AlkPhos  118    31 Dec 2021 02:30  TPro  5.2    /  Alb  1.0    /  TBili  0.3    /  DBili  x      /  AST  22     /  ALT  16     /  AlkPhos  131    30 Dec 2021 03:59  TPro  4.8    /  Alb  0.9    /  TBili  0.4    /  DBili  x      /  AST  20     /  ALT  19     /  AlkPhos  114    29 Dec 2021 03:51  TPro  4.7    /  Alb  0.9    /  TBili  0.3    /  DBili  x      /  AST  27     /  ALT  18     /  AlkPhos  78     28 Dec 2021 03:36  TPro  4.3    /  Alb  1.0    /  TBili  0.2    /  DBili  x      /  AST  27     /  ALT  18     /  AlkPhos  71     27 Dec 2021 03:26  TPro  4.4    /  Alb  1.2    /  TBili  0.2    /  DBili  x      /  AST  22     /  ALT  16     /  AlkPhos  67     26 Dec 2021 05:40  TPro  4.1    /  Alb  1.1    /  TBili  0.3    /  DBili  x      /  AST  22     /  ALT  19     /  AlkPhos  63     25 Dec 2021 07:48              CAPILLARY BLOOD GLUCOSE      POCT Blood Glucose.: 138 mg/dL (31 Dec 2021 11:30)  POCT Blood Glucose.: 127 mg/dL (31 Dec 2021 05:53)  POCT Blood Glucose.: 163 mg/dL (31 Dec 2021 00:41)          RADIOLOGY & ADDITIONAL TESTS:    Imaging Personally Reviewed:  [ ] YES  [ ] NO    Consultant(s) Notes Reviewed:  [ ] YES  [ ] NO    Care Discussed with Consultants/Other Providers [ ] YES  [ ] NO

## 2021-12-31 NOTE — PROGRESS NOTE ADULT - SUBJECTIVE AND OBJECTIVE BOX
[facility]  Division of Infectious Diseases  [phone]    Name: JUAREZ GTZ  Age: 81y  Gender: Female  MRN: [id number]    Interval History--  Notes reviewed.     Past Medical History--  Seizure    HTN (hypertension)    Glaucoma    Thyroid disease    Blood clot of neck vein    TIA (transient ischemic attack)    S/P appendectomy        For details regarding the patient's social history, family history, and other miscellaneous elements, please refer the initial infectious diseases consultation and/or the admitting history and physical examination for this admission.    Allergies    No Known Allergies    Intolerances        Medications--  Antibiotics:  fluconAZOLE IVPB 200 milliGRAM(s) IV Intermittent every 24 hours  piperacillin/tazobactam IVPB.. 3.375 Gram(s) IV Intermittent every 8 hours    Immunologic:    Other:  chlorhexidine 0.12% Liquid  chlorhexidine 2% Cloths  dextrose 40% Gel  dextrose 5%.  dextrose 5%.  dextrose 50% Injectable  dextrose 50% Injectable  dextrose 50% Injectable  dorzolamide 2%/timolol 0.5% Ophthalmic Solution  enoxaparin Injectable  fentaNYL   Infusion.  glucagon  Injectable  levETIRAcetam  IVPB  levothyroxine Injectable  pantoprazole  Injectable  Parenteral Nutrition - Adult  propofol Infusion      Review of Systems--  A 10-point review of systems was obtained.     Pertinent positives and negatives--  Constitutional: No fevers. No Chills. No Rigors.   Cardiovascular: No chest pain. No palpitations.  Respiratory: No shortness of breath. No cough.  Gastrointestinal: No nausea or vomiting. No diarrhea or constipation.   Psychiatric: Pleasant. Appropriate affect.    Review of systems otherwise negative except as previously noted.    Physical Examination--  Vital Signs: T(F): 99.6 (12-31-21 @ 03:00), Max: 99.9 (12-30-21 @ 15:49)  HR: 83 (12-31-21 @ 08:03)  BP: 133/79 (12-31-21 @ 08:00)  RR: 26 (12-31-21 @ 08:00)  SpO2: 98% (12-31-21 @ 08:03)  Wt(kg): --  General: Nontoxic-appearing Female in no acute distress.  HEENT: AT/NC. PERRL. EOMI. Anicteric. Conjunctiva pink and moist. Oropharynx clear. Dentition fair.  Neck: Not rigid. No sense of mass.  Nodes: None palpable.  Lungs: Clear bilaterally without rales, wheezing or rhonchi  Heart: Regular rate and rhythm. No Murmur. No rub. No gallop. No palpable thrill.  Abdomen: Bowel sounds present and normoactive. Soft. Nondistended. Nontender. No sense of mass. No organomegaly.  Back: No spinal tenderness. No costovertebral angle tenderness.   Extremities: No cyanosis or clubbing. No edema.   Skin: Warm. Dry. Good turgor. No rash. No vasculitic stigmata.  Psychiatric: Appropriate affect and mood for situation.         Laboratory Studies--  CBC                        10.4   28.69 )-----------( 369      ( 31 Dec 2021 02:30 )             30.0       Chemistries  12-31    135  |  101  |  10  ----------------------------<  127<H>  3.1<L>   |  27  |  0.49<L>    Ca    7.1<L>      31 Dec 2021 02:30  Phos  2.2     12-31  Mg     1.8     12-31    TPro  5.1<L>  /  Alb  1.0<L>  /  TBili  0.3  /  DBili  x   /  AST  26  /  ALT  17  /  AlkPhos  118  12-31      Culture Data    Culture - Sputum (collected 28 Dec 2021 18:54)  Source: .Sputum Sputum  Gram Stain (30 Dec 2021 18:49):    Moderate polymorphonuclear leukocytes per low power field    Numerous Squamous epithelial cells per low power field    Few Gram positive cocci in pairs per oil power field    Rare Gram Positive Rods per oil power field    Rare Yeast like cells per oil power field    Results consistent with oropharyngeal contamination  Final Report (30 Dec 2021 18:49):    Normal Respiratory Sendy present    Culture - Blood (collected 28 Dec 2021 15:04)  Source: .Blood Blood  Preliminary Report (29 Dec 2021 15:06):    No growth to date.    Culture - Blood (collected 28 Dec 2021 15:04)  Source: .Blood Blood  Preliminary Report (29 Dec 2021 15:06):    No growth to date.    Culture - Body Fluid with Gram Stain (collected 25 Dec 2021 12:31)  Source: .Body Fluid INTRA -ABDOMINAL FLUID FOR CULTURE  Gram Stain (27 Dec 2021 09:49):    Few polymorphonuclear leukocytes per low power field    Rare Gram Variable Rods per oil power field  Final Report (27 Dec 2021 09:49):    Few Escherichia coli    Rare Escherichia coli #2    Few Candida albicans "Susceptibilities not performed"    Moderate Bacteroides thetaiotamcron group "Susceptibilities not performed"    Moderate Bacteroides uniformis "Susceptibilities not performed"  Organism: Escherichia coli  Escherichia coli (27 Dec 2021 09:49)  Organism: Escherichia coli (27 Dec 2021 09:49)  Organism: Escherichia coli (27 Dec 2021 09:49)    Culture - Blood (collected 25 Dec 2021 00:40)  Source: .Blood Blood-Peripheral  Final Report (30 Dec 2021 01:00):    No Growth Final    Culture - Blood (collected 25 Dec 2021 00:40)  Source: .Blood Blood-Peripheral  Final Report (30 Dec 2021 01:00):    No Growth Final             [facility]  Division of Infectious Diseases  [phone]    Name: JUAREZ GTZ  Age: 81y  Gender: Female  MRN: [id number]    Interval History--  Notes reviewed. Seen earlier today.  Remains on ventilator, but on pressure support 5.  Still on TPN still with leukocytosis, but off pressors.  Sedated.     Past Medical History--  Seizure    HTN (hypertension)    Glaucoma    Thyroid disease    Blood clot of neck vein    TIA (transient ischemic attack)    S/P appendectomy        For details regarding the patient's social history, family history, and other miscellaneous elements, please refer the initial infectious diseases consultation and/or the admitting history and physical examination for this admission.    Allergies    No Known Allergies    Intolerances        Medications--  Antibiotics:  fluconAZOLE IVPB 200 milliGRAM(s) IV Intermittent every 24 hours  piperacillin/tazobactam IVPB.. 3.375 Gram(s) IV Intermittent every 8 hours    Immunologic:    Other:  chlorhexidine 0.12% Liquid  chlorhexidine 2% Cloths  dextrose 40% Gel  dextrose 5%.  dextrose 5%.  dextrose 50% Injectable  dextrose 50% Injectable  dextrose 50% Injectable  dorzolamide 2%/timolol 0.5% Ophthalmic Solution  enoxaparin Injectable  fentaNYL   Infusion.  glucagon  Injectable  levETIRAcetam  IVPB  levothyroxine Injectable  pantoprazole  Injectable  Parenteral Nutrition - Adult  propofol Infusion      Review of Systems--  Review of systems unable secondary to clinical condition.     Physical Examination--  Vital Signs: T(F): 99.6 (12-31-21 @ 03:00), Max: 99.9 (12-30-21 @ 15:49)  HR: 83 (12-31-21 @ 08:03)  BP: 133/79 (12-31-21 @ 08:00)  RR: 26 (12-31-21 @ 08:00)  SpO2: 98% (12-31-21 @ 08:03)  Wt(kg): --  General: Nontoxic-appearing Female in no acute distress.  HEENT: AT/NC. Pupils/EOM unable secondary to patient compliance. Oropharynx/dentition unable secondary to patient compliance. OETT.   Neck: Not rigid. No sense of mass.  Nodes: None palpable.  Lungs: Diminished BS bilaterally without rales, wheezing or rhonchi  Heart: Regular rate and rhythm. No Murmur. No rub. No gallop. No palpable thrill.  Abdomen: Bowel sounds absent.  Soft. Mildly distended.  Incision dressed.  Ostomy pink. No reaction to palpation.    Extremities: No cyanosis or clubbing. 3+ pitting edema.   Skin: Warm. Dry. Good turgor. No rash. No vasculitic stigmata.  Psychiatric: Unable        Laboratory Studies--  CBC                        10.4   28.69 )-----------( 369      ( 31 Dec 2021 02:30 )             30.0     WBC trend  WBC Count: 28.69 K/uL (12-31-21 @ 02:30)  WBC Count: 36.58 K/uL (12-30-21 @ 03:59)  WBC Count: 32.37 K/uL (12-29-21 @ 03:51)  WBC Count: 28.04 K/uL (12-28-21 @ 03:36)  WBC Count: 19.48 K/uL (12-27-21 @ 03:26)  WBC Count: 16.51 K/uL (12-26-21 @ 14:46)  WBC Count: 12.37 K/uL (12-26-21 @ 05:40)  WBC Count: 11.48 K/uL (12-25-21 @ 07:48)  WBC Count: 6.75 K/uL (12-25-21 @ 00:52)  WBC Count: 7.29 K/uL (12-24-21 @ 18:28)  WBC Count: 28.13 K/uL (12-24-21 @ 08:30)  WBC Count: 28.24 K/uL (12-23-21 @ 17:33)  WBC Count: 24.83 K/uL (12-22-21 @ 17:43)      Chemistries  12-31    135  |  101  |  10  ----------------------------<  127<H>  3.1<L>   |  27  |  0.49<L>    Ca    7.1<L>      31 Dec 2021 02:30  Phos  2.2     12-31  Mg     1.8     12-31    TPro  5.1<L>  /  Alb  1.0<L>  /  TBili  0.3  /  DBili  x   /  AST  26  /  ALT  17  /  AlkPhos  118  12-31      Culture Data    Culture - Sputum (collected 28 Dec 2021 18:54)  Source: .Sputum Sputum  Gram Stain (30 Dec 2021 18:49):    Moderate polymorphonuclear leukocytes per low power field    Numerous Squamous epithelial cells per low power field    Few Gram positive cocci in pairs per oil power field    Rare Gram Positive Rods per oil power field    Rare Yeast like cells per oil power field    Results consistent with oropharyngeal contamination  Final Report (30 Dec 2021 18:49):    Normal Respiratory Sendy present    Culture - Blood (collected 28 Dec 2021 15:04)  Source: .Blood Blood  Preliminary Report (29 Dec 2021 15:06):    No growth to date.    Culture - Blood (collected 28 Dec 2021 15:04)  Source: .Blood Blood  Preliminary Report (29 Dec 2021 15:06):    No growth to date.    Culture - Body Fluid with Gram Stain (collected 25 Dec 2021 12:31)  Source: .Body Fluid INTRA -ABDOMINAL FLUID FOR CULTURE  Gram Stain (27 Dec 2021 09:49):    Few polymorphonuclear leukocytes per low power field    Rare Gram Variable Rods per oil power field  Final Report (27 Dec 2021 09:49):    Few Escherichia coli    Rare Escherichia coli #2    Few Candida albicans "Susceptibilities not performed"    Moderate Bacteroides thetaiotamcron group "Susceptibilities not performed"    Moderate Bacteroides uniformis "Susceptibilities not performed"  Organism: Escherichia coli  Escherichia coli (27 Dec 2021 09:49)  Organism: Escherichia coli (27 Dec 2021 09:49)  Organism: Escherichia coli (27 Dec 2021 09:49)    Culture - Blood (collected 25 Dec 2021 00:40)  Source: .Blood Blood-Peripheral  Final Report (30 Dec 2021 01:00):    No Growth Final    Culture - Blood (collected 25 Dec 2021 00:40)  Source: .Blood Blood-Peripheral  Final Report (30 Dec 2021 01:00):    No Growth Final    < from: CT Abdomen and Pelvis w/ IV Cont (12.30.21 @ 15:35) >    ACC: [id number] EXAM:  CT CHEST IC                        ACC: [id number] EXAM:  CT ABDOMEN AND PELVIS IC                          PROCEDURE DATE:  12/30/2021          INTERPRETATION:  CLINICAL INFORMATION: Leukocytosis    COMPARISON: CT chest abdomen and pelvis 12/24/2021    CONTRAST/COMPLICATIONS:  IV Contrast: IV contrast documented in associated exam (accession   [id number]), Omnipaque 350 (accession [id number])  90 cc administered   10 cc   discarded  Oral Contrast: NONE  Complications: None reported at time of study completion    PROCEDURE:  CT of the Chest, Abdomen and Pelvis was performed.  Sagittal and coronal reformats were performed.    FINDINGS: Some images are degraded by artifacts from the patient's arms   within the scanning field ofview.  CHEST:  LUNGS AND LARGE AIRWAYS: Endotracheal tube enters right mainstem bronchus.  Multifocal peripheral mostly groundglass infiltrates throughout the upper   and midlung fields bilaterally.  PLEURA: Small pleural effusions with complete leftlower lobe and partial   right lower lobe atelectasis.  VESSELS: Mild calcified arch atherosclerosis.  HEART: Heart size is normal. No pericardial effusion. Aortic valve and   multivessel coronary artery calcifications  MEDIASTINUM AND SARAH: No lymphadenopathy.  CHEST WALL AND LOWER NECK: Within normal limits.    ABDOMEN AND PELVIS:  LIVER: Trace volume loculated perihepatic fluid  BILE DUCTS: Normal caliber.  GALLBLADDER: Distended probably secondary to prolonged n.p.o. status.   Nonspecific mild diffuse wall thickening. Multiple small dependent   calcified gallstones  SPLEEN: Trace perisplenic fluid  PANCREAS: Fatty infiltrated. No duct dilation  ADRENALS: Within normal limits.  KIDNEYS/URETERS: Small areas of cortical scarring bilaterally.Symmetric   renal enhancement without hydronephrosis.    BLADDER: Decompressed. Contains air and small volume of excreted   contrast. Indwelling Boucher catheter  REPRODUCTIVE ORGANS: Leiomyomatous uterus. No adnexal mass    BOWEL: Enteric tube is looped within the stomach, tip terminates within   fundus  Rectal and distal sigmoid wall thickening. Partial sigmoidectomy with   left pelvic end colostomy.  Mild dilation of small bowel containing air fluid levels likely secondary   to prolonged postoperative ileus.  PERITONEUM: Small volume loculated fluid along both paracolic gutters,   greater on the right with largest pocket measuring 5 x 5 x 2.4 cm (5, 116)  Loculated small pockets of fluid along the cecum and ascending colon.  Small bowel mesenteric edema. Thickened enhancing pelvic peritoneal   reflection with small volume of loculated pelvic fluid. Resolved   previously seen pelvic abscess. Presacral edema.  Right abdominal surgical drain loops within the anterior abdomen and   pelvis.  Left abdominal surgical drain loops inferiorly within the pelvis  VESSELS: Moderate calcified atherosclerosis. Normal caliber aorta.   Flattened IVC.  RETROPERITONEUM/LYMPH NODES: No lymphadenopathy.  ABDOMINAL WALL: Anasarca. Partially opened ventralabdominal incision   contains packing material  BONES: Multilevel degenerative changes throughout the spine including   mild multilevel malalignment of the lower lumbar spine.    IMPRESSION:    Endotracheal tube enters right mainstem bronchus. Recommend repositioning   by pulling back the tube by approximately 2 to 3 cm with subsequent   confirmatory chest radiograph.    Enteric tube coiled within stomach, tip terminates within the fundus.   Consider repositioning    New multifocal small peripheral groundglass infiltrates suspicious for   pneumonia. Small pleural effusions.    Several small loculated fluid collections along the paracolic gutters and   lower pelvis with enhancement of the peritoneal reflections which may   indicate peritonitis.    Nonspecific mild diffuse gallbladder wall thickening with similar   distention    Findings were discussed with  Dr Ramirez 12/30/2021 4:41 PM by Dr. Chacon   with read back confirmation.    --- End of Report ---    JESS CHACON MD; AttendingRadiologist  This document has been electronically signed. Dec 30 2021  4:48PM    < end of copied text >

## 2021-12-31 NOTE — PROGRESS NOTE ADULT - SUBJECTIVE AND OBJECTIVE BOX
HPI:  Patient is a 81F with a PMH of HTN, HLD, hypothyroidism, depression, seizures who presents to the ED for abd pain.  Patient states that over the last two days she had developed significant abdominal pain and multiple episodes of watery diarrhea.  Reports one episode of nausea and vomiting earlier today.  Patient has no other complaints.  Vitals stable,  labs show leukocytosis and hypokalemia.  CT abdomen significant for diverticulitis.  Will admit to med surg.     (23 Dec 2021 00:17)      24 hr events:  remains on ps trial  mild secretions    ## Labs:  CBC:                        10.4   28.69 )-----------( 369      ( 31 Dec 2021 02:30 )             30.0     Chem:      135  |  101  |  10  ----------------------------<  127<H>  3.1<L>   |  27  |  0.49<L>    Ca    7.1<L>      31 Dec 2021 02:30  Phos  2.2       Mg     1.8         TPro  5.1<L>  /  Alb  1.0<L>  /  TBili  0.3  /  DBili  x   /  AST  26  /  ALT  17  /  AlkPhos  118              ## Medications:  fluconAZOLE IVPB 200 milliGRAM(s) IV Intermittent every 24 hours  piperacillin/tazobactam IVPB.. 3.375 Gram(s) IV Intermittent every 8 hours        dextrose 40% Gel 15 Gram(s) Oral once  dextrose 50% Injectable 25 Gram(s) IV Push once  dextrose 50% Injectable 12.5 Gram(s) IV Push once  dextrose 50% Injectable 25 Gram(s) IV Push once  glucagon  Injectable 1 milliGRAM(s) IntraMuscular once  levothyroxine Injectable 25 MICROGram(s) IV Push at bedtime    enoxaparin Injectable 40 milliGRAM(s) SubCutaneous daily    pantoprazole  Injectable 40 milliGRAM(s) IV Push daily    fentaNYL    Injectable 25 MICROGram(s) IV Push every 4 hours PRN  levETIRAcetam  IVPB 500 milliGRAM(s) IV Intermittent every 12 hours  propofol Infusion 10 MICROgram(s)/kG/Min IV Continuous <Continuous>      ## Vitals:  T(C): 37.6 (21 @ 15:35), Max: 37.6 (21 @ 03:00)  HR: 97 (21 @ 16:00) (71 - 98)  BP: 161/91 (21 @ 16:00) (103/93 - 179/62)  BP(mean): 112 (21 @ 16:00) (66 - 112)  RR: 23 (21 @ 16:00) (18 - 27)  SpO2: 100% (21 @ 16:00) (94% - 100%)  Wt(kg): --  Vent: Mode: CPAP with PS, RR (patient): 20, FiO2: 35, PEEP: 5, PIP: 11  AB-30 @ 07:01  -   @ 07:00  --------------------------------------------------------  IN: 2631.4 mL / OUT: 2765 mL / NET: -133.6 mL     @ 07:01  -   @ 16:28  --------------------------------------------------------  IN: 870.3 mL / OUT: 1675 mL / NET: -804.7 mL          ## P/E:  Gen: NAD, intubated  Mouth: mmm  Neck: no accessory muscle use  Lungs: b/l rhonchi  Heart: s1s2 reg no murmur  Abd: +BS soft nontender, midline incision, +JPs  Ext: +edema LE  Neuro: following commands appropriately, weakness equally to all extremities      < from: Xray Chest 1 View- PORTABLE-Urgent (Xray Chest 1 View- PORTABLE-Urgent .) (21 @ 18:43) >    ACC: [id number] EXAM:  XR CHEST PORTABLE URGENT 1V                          PROCEDURE DATE:  2021          INTERPRETATION:  Portable chest radiograph    CLINICAL INFORMATION: ET tube assessment    TECHNIQUE:  Portable  AP view of the chest.    COMPARISON: 2021 chest available for review.    FINDINGS: ET tube tip above tracheal bifurcation.  NG tube tip beyond GE junction.       LEFT greater than RIGHT pleural effusions and/or LEFT basilar airspace   disease obscuring LEFT diaphragm contour. Upper zones clear.  Plate.   No pneumothorax.    The heart and mediastinum size and configuration are within normal limits.    Visualized osseous structures are intact.    IMPRESSION:   No significant interval change.    --- End of Report ---            DAR NAVARRO MD; Attending Radiologist  This document has been electronically signed. Dec 31 2021 12:55PM    < end of copied text >

## 2021-12-31 NOTE — PROGRESS NOTE ADULT - ASSESSMENT
HPI:  Patient is a 81F with a PMH of HTN, HLD, hypothyroidism, depression, seizures who presents to the ED for abd pain.  Patient states that over the last two days she had developed significant abdominal pain and multiple episodes of watery diarrhea.  Reports one episode of nausea and vomiting earlier today.  Patient has no other complaints.  Vitals stable,  labs show leukocytosis and hypokalemia.  CT abdomen significant for diverticulitis.  Will admit to med surg.     (23 Dec 2021 00:17)  ---- As Above ---- Patient is a poor historian. Son's phone number not available ( Aid / patient does not have the number )  The patient appears sluggish at the present time. Presents with N/V, abdominal pain, and diarrhea. Started a few days ago  Patient denies having a colonoscopy or diverticulitis. Patient is unsure if she is getting better.     Patient has a tray of solid food but does not want to eat it ( no appetite )    [id number]  ---------  Diverticulitis - Repeat Ct scan reveals perforation s/p surgery.  ( Never had a colonoscopy. See CT scan . Afebrile, minimal tenderness on exam, W  On Zosyn  1) Will need PPN/ TPN starting tomorrow if remains NPO    Son  ( [phone] )     [id number]  -----------  S/P repair w/ colostomy. - Continue as per surgery. Too early to implement TPN    [id number]  -----------  S/P repair w/ colostomy. - Continue as per surgery. Patient NPO / clear liquids since 12/23/21. Will reassess in AM    [id number]  ----------  S/P repair w/ colostomy. - Continue as per surgery. Patient NPO / clear liquids since 12/23/21. Will start PPN @ 42 ml / hr    [id number]  ----------  S/P repair w/ colostomy. - Continue as per surgery. Patient NPO / clear liquids since 12/23/21. Will increase PPN to 63 ml / hr. Will change to TPN once patient had dedicated PICC line / central line     [id number]  ----------  S/P repair w/ colostomy. - Continue as per surgery. Patient NPO / clear liquids since 12/23/21. Will increase PPN to 63 ml / hr. Will change to TPN once patient had dedicated PICC line / central line     [id number]  ----------  S/P repair w/ colostomy. - Continue as per surgery. Patient NPO / clear liquids since 12/23/21. Will maintain PPN @ 63 ml / hr. Will change to TPN once patient had dedicated PICC line / central line

## 2021-12-31 NOTE — PROGRESS NOTE ADULT - ASSESSMENT
Patient is a 81F with a PMH of HTN, HLD, hypothyroidism, depression, seizures who presents to the ED for abd pain found to have diverticulitis   has rising leukocytosis   had fever yesterday   tachycardic and now hypoxic   CXR (I personally reviewed) low lung volumes   origincal CT (I personally reviewed) with diverticulitis   she had a lot of pain in the abdomen on exam     12/25: s/p surgery for diverticulitis with perforation and abscess and went to the OR. intubated in ICU with vac and needs to go back to the OR, currently on zosyn, s/p one dose of diflucan last night   12/27: s/p repair and ostomy creation yesterday, wbc elevated, cultures sensitive to zosyn and candida albicans is notoriously sensitive to azoles such as fluconazole, wean of pressors and sedation, extubate when ready   12/28: Further increase in white blood cell count but overall the patient appears to be reasonably stable.  No concern of C. difficile at this juncture.  Current antibiotics are reasonable.  Leukocytosis like related to recent surgery, no concern of other superimposed process on the basis of exam.  I do not see a role to alter her antimicrobials.  12/29:  Remains intubated in ICU. still quite edematous and net positive, WBC climbing, small wound dehiscence at bottom of incision, plan for initiation of TPN today, remains on antibiotics    12/30: rising WBC, ostomy not functioning yet, very edematous CT today, may be source control issue so for now can continue same antibiotics and antifungal but may need to change if findings on the CT are worrisome or change in hemodynamics  12/31:Leukocytosis, with collections noted on CT.  However this is being done postoperative day 5, there is some possibility that this could represent postop changes but given the leukocytosis, concur with plans for attempts at percutaneous drainage.  White blood cell count down slightly compared to prior peak, will need to be trended.  Gallbladder is also distended.  Abdominal exam was benign this morning, but a calculus cholecystitis could be the differential diagnosis here as well (though n.p.o. status certainly could explain distended gallbladder, there is also report of gallbladder wall thickening).  Would modify antibiotics based on cultures from drainage, I do not believe that antibiotics and other the cells would be adequate here.  Fortunately she is off pressors, with preserved renal function, and tolerating pressure support.    Suggestions  Trend CBC  Concur with plans for IR drainage  Please send for routine culture Gram stain  Once cultures obtained, may consider altering Zosyn and fluconazole  Would send surveillance blood cultures, patient is at high risk for fungal infection given central catheter, TPN, abdominal process  Enteral feeding when able  Diuresis as able    If any ID input is needed over the long weekend, please call [phone]. Thanks.    Won Hector MD  Attending Physician  [facility]  Division of Infectious Diseases  [phone]

## 2021-12-31 NOTE — PROGRESS NOTE ADULT - SUBJECTIVE AND OBJECTIVE BOX
Patient seen and examined at bedside resting. No acute events overnight.  Remains intubated, opens eyes to name.   Afebrile.    Vital Signs Last 24 Hrs  T(F): 99.6 (12-31-21 @ 03:00), Max: 99.9 (12-30-21 @ 15:49)  HR: 83 (12-31-21 @ 08:03)  BP: 105/49 (12-31-21 @ 07:00)  RR: 19 (12-31-21 @ 07:00)  SpO2: 98% (12-31-21 @ 08:03)  POCT Blood Glucose.: 127 mg/dL (31 Dec 2021 05:53)    PHYSICAL EXAM:  GENERAL: intubated, opens eyes to name  CHEST/LUNG: intubated, breathing synchronous with vent  HEART: Regular rate and rhythm; S1 & S2 appreciated  ABDOMEN: soft, non tender, non distended. Ostomy pink and viable, no function as of yet. Midline incision with retention sutures, tissue pink with no active drainage. Repacked with iodoform and covered with gauze. BABAK x2 with serous output  EXTREMITIES:  no calf tenderness, 3+ pitting edema    I&O's Detail    30 Dec 2021 07:01  -  31 Dec 2021 07:00  --------------------------------------------------------  IN:    Fat Emulsion (Plant Based) 20%: 93.6 mL    FentaNYL: 129.8 mL    IV PiggyBack: 800 mL    IV PiggyBack: 600 mL    PPN (Peripheral Parenteral Nutrition): 1008 mL  Total IN: 2631.4 mL    OUT:    Amiodarone: 0 mL    Amiodarone: 0 mL    Bulb (mL): 45 mL    Bulb (mL): 15 mL    Indwelling Catheter - Urethral (mL): 2675 mL    Nasogastric/Oral tube (mL): 30 mL    Propofol: 0 mL  Total OUT: 2765 mL    Total NET: -133.6 mL      31 Dec 2021 07:01  -  31 Dec 2021 08:35  --------------------------------------------------------  IN:    FentaNYL: 8.1 mL    PPN (Peripheral Parenteral Nutrition): 42 mL  Total IN: 50.1 mL    OUT:    Indwelling Catheter - Urethral (mL): 100 mL  Total OUT: 100 mL    Total NET: -49.9 mL    LABS:                        10.4   28.69 )-----------( 369      ( 31 Dec 2021 02:30 )             30.0     12-31    135  |  101  |  10  ----------------------------<  127<H>  3.1<L>   |  27  |  0.49<L>    Ca    7.1<L>      31 Dec 2021 02:30  Phos  2.2     12-31  Mg     1.8     12-31    TPro  5.1<L>  /  Alb  1.0<L>  /  TBili  0.3  /  DBili  x   /  AST  26  /  ALT  17  /  AlkPhos  118  12-31    RADIOLOGY & ADDITIONAL STUDIES:  < from: CT Abdomen and Pelvis w/ IV Cont (12.30.21 @ 15:35) >    ACC: [id number] EXAM:  CT CHEST IC                        ACC: [id number] EXAM:  CT ABDOMEN AND PELVIS IC                          PROCEDURE DATE:  12/30/2021          INTERPRETATION:  CLINICAL INFORMATION: Leukocytosis    COMPARISON: CT chest abdomen and pelvis 12/24/2021    CONTRAST/COMPLICATIONS:  IV Contrast: IV contrast documented in associated exam (accession   [id number]), Omnipaque 350 (accession [id number])  90 cc administered   10 cc   discarded  Oral Contrast: NONE  Complications: None reported at time of study completion    PROCEDURE:  CT of the Chest, Abdomen and Pelvis was performed.  Sagittal and coronal reformats were performed.    FINDINGS: Some images are degraded by artifacts from the patient's arms   within the scanning field ofview.  CHEST:  LUNGS AND LARGE AIRWAYS: Endotracheal tube enters right mainstem bronchus.  Multifocal peripheral mostly groundglass infiltrates throughout the upper   and midlung fields bilaterally.  PLEURA: Small pleural effusions with complete leftlower lobe and partial   right lower lobe atelectasis.  VESSELS: Mild calcified arch atherosclerosis.  HEART: Heart size is normal. No pericardial effusion. Aortic valve and   multivessel coronary artery calcifications  MEDIASTINUM AND SARAH: No lymphadenopathy.  CHEST WALL AND LOWER NECK: Within normal limits.    ABDOMEN AND PELVIS:  LIVER: Trace volume loculated perihepatic fluid  BILE DUCTS: Normal caliber.  GALLBLADDER: Distended probably secondary to prolonged n.p.o. status.   Nonspecific mild diffuse wall thickening. Multiple small dependent   calcified gallstones  SPLEEN: Trace perisplenic fluid  PANCREAS: Fatty infiltrated. No duct dilation  ADRENALS: Within normal limits.  KIDNEYS/URETERS: Small areas of cortical scarring bilaterally.Symmetric   renal enhancement without hydronephrosis.    BLADDER: Decompressed. Contains air and small volume of excreted   contrast. Indwelling Boucher catheter  REPRODUCTIVE ORGANS: Leiomyomatous uterus. No adnexal mass    BOWEL: Enteric tube is looped within the stomach, tip terminates within   fundus  Rectal and distal sigmoid wall thickening. Partial sigmoidectomy with   left pelvic end colostomy.  Mild dilation of small bowel containing air fluid levels likely secondary   to prolonged postoperative ileus.  PERITONEUM: Small volume loculated fluid along both paracolic gutters,   greater on the right with largest pocket measuring 5 x 5 x 2.4 cm (5, 116)  Loculated small pockets of fluid along the cecum and ascending colon.  Small bowel mesenteric edema. Thickened enhancing pelvic peritoneal   reflection with small volume of loculated pelvic fluid. Resolved   previously seen pelvic abscess. Presacral edema.  Right abdominal surgical drain loops within the anterior abdomen and   pelvis.  Left abdominal surgical drain loops inferiorly within the pelvis  VESSELS: Moderate calcified atherosclerosis. Normal caliber aorta.   Flattened IVC.  RETROPERITONEUM/LYMPH NODES: No lymphadenopathy.  ABDOMINAL WALL: Anasarca. Partially opened ventralabdominal incision   contains packing material  BONES: Multilevel degenerative changes throughout the spine including   mild multilevel malalignment of the lower lumbar spine.    IMPRESSION:    Endotracheal tube enters right mainstem bronchus. Recommend repositioning   by pulling back the tube by approximately 2 to 3 cm with subsequent   confirmatory chest radiograph.    Enteric tube coiled within stomach, tip terminates within the fundus.   Consider repositioning    New multifocal small peripheral groundglass infiltrates suspicious for   pneumonia. Small pleural effusions.    Several small loculated fluid collections along the paracolic gutters and   lower pelvis with enhancement of the peritoneal reflections which may   indicate peritonitis.    Nonspecific mild diffuse gallbladder wall thickening with similar   distention    Findings were discussed with  Dr Ramirez 12/30/2021 4:41 PM by Dr. Chacon   with read back confirmation.    A/P  81F with PMH of HTN, HLD, hypothyroidism, depression, seizures admitted with Acute Sigmoid Diverticulitis. Surgery consulted for interval perforated sigmoid diverticulitis. S/P ex lap, sigmoid resection, peritoneal lavage 12/25 and RTOR 12/26 for Irrigation of abdominal cavity with closure and creation of colostomy. Intubated. Off pressors. A-fib RVR s/p amio converted to sinus rhythm.   CT C/A/P 12/30: Pneumonia and small pleural effusions, several loculated fluid collections along paracolic gutters and lower pelvis, enhancement of peritoneal reflections which may indication peritonitis  Leukocytosis downtrending    - NPO, PPN per GI  - NGT to LCWS  - analgesia prn  - local wound care per surgery  - monitor I&Os, BABAK care  - abx per ID  - consider IR eval for 5 x 5 x 2.4cm loculated fluid collections  - continue DVT ppx  - wean vent as tolerated per ICU  - continue ICU management and supportive care  - no indication to return to OR at this present time  - d/w surgical attending

## 2022-01-01 DIAGNOSIS — D72.829 ELEVATED WHITE BLOOD CELL COUNT, UNSPECIFIED: ICD-10-CM

## 2022-01-01 DIAGNOSIS — K65.9 PERITONITIS, UNSPECIFIED: ICD-10-CM

## 2022-01-01 DIAGNOSIS — R60.0 LOCALIZED EDEMA: ICD-10-CM

## 2022-01-01 DIAGNOSIS — K57.20 DIVERTICULITIS OF LARGE INTESTINE WITH PERFORATION AND ABSCESS WITHOUT BLEEDING: ICD-10-CM

## 2022-01-01 LAB
ALBUMIN SERPL ELPH-MCNC: 1 G/DL — LOW (ref 3.3–5)
ALP SERPL-CCNC: 120 U/L — SIGNIFICANT CHANGE UP (ref 40–120)
ALT FLD-CCNC: 19 U/L — SIGNIFICANT CHANGE UP (ref 12–78)
ANION GAP SERPL CALC-SCNC: 10 MMOL/L — SIGNIFICANT CHANGE UP (ref 5–17)
AST SERPL-CCNC: 32 U/L — SIGNIFICANT CHANGE UP (ref 15–37)
BILIRUB SERPL-MCNC: 0.3 MG/DL — SIGNIFICANT CHANGE UP (ref 0.2–1.2)
BUN SERPL-MCNC: 12 MG/DL — SIGNIFICANT CHANGE UP (ref 7–23)
CALCIUM SERPL-MCNC: 7 MG/DL — LOW (ref 8.5–10.1)
CHLORIDE SERPL-SCNC: 100 MMOL/L — SIGNIFICANT CHANGE UP (ref 96–108)
CO2 SERPL-SCNC: 25 MMOL/L — SIGNIFICANT CHANGE UP (ref 22–31)
CREAT SERPL-MCNC: 0.45 MG/DL — LOW (ref 0.5–1.3)
GLUCOSE BLDC GLUCOMTR-MCNC: 123 MG/DL — HIGH (ref 70–99)
GLUCOSE BLDC GLUCOMTR-MCNC: 135 MG/DL — HIGH (ref 70–99)
GLUCOSE SERPL-MCNC: 123 MG/DL — HIGH (ref 70–99)
HCT VFR BLD CALC: 31.9 % — LOW (ref 34.5–45)
HGB BLD-MCNC: 10.4 G/DL — LOW (ref 11.5–15.5)
MAGNESIUM SERPL-MCNC: 1.8 MG/DL — SIGNIFICANT CHANGE UP (ref 1.6–2.6)
MCHC RBC-ENTMCNC: 28.6 PG — SIGNIFICANT CHANGE UP (ref 27–34)
MCHC RBC-ENTMCNC: 32.6 GM/DL — SIGNIFICANT CHANGE UP (ref 32–36)
MCV RBC AUTO: 87.6 FL — SIGNIFICANT CHANGE UP (ref 80–100)
NRBC # BLD: 0 /100 WBCS — SIGNIFICANT CHANGE UP (ref 0–0)
PHOSPHATE SERPL-MCNC: 2.5 MG/DL — SIGNIFICANT CHANGE UP (ref 2.5–4.5)
PLATELET # BLD AUTO: 365 K/UL — SIGNIFICANT CHANGE UP (ref 150–400)
POTASSIUM SERPL-MCNC: 3.2 MMOL/L — LOW (ref 3.5–5.3)
POTASSIUM SERPL-SCNC: 3.2 MMOL/L — LOW (ref 3.5–5.3)
PROT SERPL-MCNC: 4.9 GM/DL — LOW (ref 6–8.3)
RBC # BLD: 3.64 M/UL — LOW (ref 3.8–5.2)
RBC # FLD: 14.6 % — HIGH (ref 10.3–14.5)
SODIUM SERPL-SCNC: 135 MMOL/L — SIGNIFICANT CHANGE UP (ref 135–145)
WBC # BLD: 23.24 K/UL — HIGH (ref 3.8–10.5)
WBC # FLD AUTO: 23.24 K/UL — HIGH (ref 3.8–10.5)

## 2022-01-01 PROCEDURE — 99233 SBSQ HOSP IP/OBS HIGH 50: CPT

## 2022-01-01 PROCEDURE — 99291 CRITICAL CARE FIRST HOUR: CPT

## 2022-01-01 RX ORDER — FUROSEMIDE 40 MG
40 TABLET ORAL ONCE
Refills: 0 | Status: COMPLETED | OUTPATIENT
Start: 2022-01-01 | End: 2022-01-01

## 2022-01-01 RX ORDER — POTASSIUM CHLORIDE 20 MEQ
10 PACKET (EA) ORAL
Refills: 0 | Status: COMPLETED | OUTPATIENT
Start: 2022-01-01 | End: 2022-01-01

## 2022-01-01 RX ORDER — MAGNESIUM SULFATE 500 MG/ML
2 VIAL (ML) INJECTION ONCE
Refills: 0 | Status: COMPLETED | OUTPATIENT
Start: 2022-01-01 | End: 2022-01-01

## 2022-01-01 RX ORDER — PIPERACILLIN AND TAZOBACTAM 4; .5 G/20ML; G/20ML
3.38 INJECTION, POWDER, LYOPHILIZED, FOR SOLUTION INTRAVENOUS ONCE
Refills: 0 | Status: COMPLETED | OUTPATIENT
Start: 2022-01-02 | End: 2022-01-02

## 2022-01-01 RX ORDER — DEXMEDETOMIDINE HYDROCHLORIDE IN 0.9% SODIUM CHLORIDE 4 UG/ML
0.2 INJECTION INTRAVENOUS
Qty: 200 | Refills: 0 | Status: DISCONTINUED | OUTPATIENT
Start: 2022-01-01 | End: 2022-01-07

## 2022-01-01 RX ORDER — ELECTROLYTE SOLUTION,INJ
1 VIAL (ML) INTRAVENOUS
Refills: 0 | Status: DISCONTINUED | OUTPATIENT
Start: 2022-01-01 | End: 2022-01-01

## 2022-01-01 RX ADMIN — CHLORHEXIDINE GLUCONATE 15 MILLILITER(S): 213 SOLUTION TOPICAL at 17:35

## 2022-01-01 RX ADMIN — Medication 40 MILLIGRAM(S): at 11:19

## 2022-01-01 RX ADMIN — Medication 100 MILLIEQUIVALENT(S): at 08:06

## 2022-01-01 RX ADMIN — Medication 100 MILLIEQUIVALENT(S): at 09:21

## 2022-01-01 RX ADMIN — Medication 100 MILLIEQUIVALENT(S): at 07:01

## 2022-01-01 RX ADMIN — PIPERACILLIN AND TAZOBACTAM 25 GRAM(S): 4; .5 INJECTION, POWDER, LYOPHILIZED, FOR SOLUTION INTRAVENOUS at 08:06

## 2022-01-01 RX ADMIN — FENTANYL CITRATE 50 MICROGRAM(S): 50 INJECTION INTRAVENOUS at 11:19

## 2022-01-01 RX ADMIN — LEVETIRACETAM 420 MILLIGRAM(S): 250 TABLET, FILM COATED ORAL at 17:34

## 2022-01-01 RX ADMIN — LEVETIRACETAM 420 MILLIGRAM(S): 250 TABLET, FILM COATED ORAL at 04:59

## 2022-01-01 RX ADMIN — DEXMEDETOMIDINE HYDROCHLORIDE IN 0.9% SODIUM CHLORIDE 4.05 MICROGRAM(S)/KG/HR: 4 INJECTION INTRAVENOUS at 14:35

## 2022-01-01 RX ADMIN — DORZOLAMIDE HYDROCHLORIDE TIMOLOL MALEATE 1 DROP(S): 20; 5 SOLUTION/ DROPS OPHTHALMIC at 17:36

## 2022-01-01 RX ADMIN — PANTOPRAZOLE SODIUM 40 MILLIGRAM(S): 20 TABLET, DELAYED RELEASE ORAL at 11:20

## 2022-01-01 RX ADMIN — CHLORHEXIDINE GLUCONATE 15 MILLILITER(S): 213 SOLUTION TOPICAL at 04:59

## 2022-01-01 RX ADMIN — ENOXAPARIN SODIUM 40 MILLIGRAM(S): 100 INJECTION SUBCUTANEOUS at 11:19

## 2022-01-01 RX ADMIN — CHLORHEXIDINE GLUCONATE 1 APPLICATION(S): 213 SOLUTION TOPICAL at 04:22

## 2022-01-01 RX ADMIN — PIPERACILLIN AND TAZOBACTAM 25 GRAM(S): 4; .5 INJECTION, POWDER, LYOPHILIZED, FOR SOLUTION INTRAVENOUS at 00:10

## 2022-01-01 RX ADMIN — DORZOLAMIDE HYDROCHLORIDE TIMOLOL MALEATE 1 DROP(S): 20; 5 SOLUTION/ DROPS OPHTHALMIC at 05:13

## 2022-01-01 RX ADMIN — Medication 25 MICROGRAM(S): at 21:35

## 2022-01-01 RX ADMIN — Medication 1 EACH: at 22:01

## 2022-01-01 RX ADMIN — PIPERACILLIN AND TAZOBACTAM 25 GRAM(S): 4; .5 INJECTION, POWDER, LYOPHILIZED, FOR SOLUTION INTRAVENOUS at 17:35

## 2022-01-01 RX ADMIN — Medication 50 GRAM(S): at 07:01

## 2022-01-01 RX ADMIN — FENTANYL CITRATE 50 MICROGRAM(S): 50 INJECTION INTRAVENOUS at 12:00

## 2022-01-01 NOTE — PROGRESS NOTE ADULT - ASSESSMENT
Patient is a 81F with a PMH of HTN, HLD, hypothyroidism, depression, seizures who presents to the ED for abd pain found to have diverticulitis   has rising leukocytosis   had fever yesterday   tachycardic and now hypoxic   CXR (I personally reviewed) low lung volumes   origincal CT (I personally reviewed) with diverticulitis   she had a lot of pain in the abdomen on exam     12/25: s/p surgery for diverticulitis with perforation and abscess and went to the OR. intubated in ICU with vac and needs to go back to the OR, currently on zosyn, s/p one dose of diflucan last night   12/27: s/p repair and ostomy creation yesterday, wbc elevated, cultures sensitive to zosyn and candida albicans is notoriously sensitive to azoles such as fluconazole, wean of pressors and sedation, extubate when ready   12/28: Further increase in white blood cell count but overall the patient appears to be reasonably stable.  No concern of C. difficile at this juncture.  Current antibiotics are reasonable.  Leukocytosis like related to recent surgery, no concern of other superimposed process on the basis of exam.  I do not see a role to alter her antimicrobials.  12/29:  Remains intubated in ICU. still quite edematous and net positive, WBC climbing, small wound dehiscence at bottom of incision, plan for initiation of TPN today, remains on antibiotics    12/30: rising WBC, ostomy not functioning yet, very edematous CT today, may be source control issue so for now can continue same antibiotics and antifungal but may need to change if findings on the CT are worrisome or change in hemodynamics  12/31:Leukocytosis, with collections noted on CT.  However this is being done postoperative day 5, there is some possibility that this could represent postop changes but given the leukocytosis, concur with plans for attempts at percutaneous drainage.  White blood cell count down slightly compared to prior peak, will need to be trended.  Gallbladder is also distended.  Abdominal exam was benign this morning, but a calculus cholecystitis could be the differential diagnosis here as well (though n.p.o. status certainly could explain distended gallbladder, there is also report of gallbladder wall thickening).  Would modify antibiotics based on cultures from drainage, I do not believe that antibiotics and other the cells would be adequate here.  Fortunately she is off pressors, with preserved renal function, and tolerating pressure support.  1/1/22: Postop day 6.  I have some concerns with the appearance of the ostomy, though the abdominal exam overall is otherwise unchanged.  White blood cell count remains elevated, but is lower compared with prior values.  This is despite no change in antibiotic therapy.  Patient also has asymmetric edema of the upper extremities, right greater than left.  Low threshold for duplex ultrasound to exclude DVT.No new surveillance culture data.

## 2022-01-01 NOTE — PROGRESS NOTE ADULT - SUBJECTIVE AND OBJECTIVE BOX
[facility]  Division of Infectious Diseases  [phone]    Name: JUAREZ GTZ  Age: 81y  Gender: Female  MRN: [id number]    Covering Dr. NIDIA Dunn.     Interval History--  Notes reviewed. Remains intubated in ICU, less sedated, still not interactive.     Past Medical History--  Seizure    HTN (hypertension)    Glaucoma    Thyroid disease    Blood clot of neck vein    TIA (transient ischemic attack)    S/P appendectomy        For details regarding the patient's social history, family history, and other miscellaneous elements, please refer the initial infectious diseases consultation and/or the admitting history and physical examination for this admission.    Allergies    No Known Allergies    Intolerances        Medications--  Antibiotics:  fluconAZOLE IVPB 200 milliGRAM(s) IV Intermittent every 24 hours  piperacillin/tazobactam IVPB.. 3.375 Gram(s) IV Intermittent every 8 hours    Immunologic:    Other:  chlorhexidine 0.12% Liquid  chlorhexidine 2% Cloths  dextrose 40% Gel  dextrose 5%.  dextrose 5%.  dextrose 50% Injectable  dextrose 50% Injectable  dextrose 50% Injectable  dorzolamide 2%/timolol 0.5% Ophthalmic Solution  enoxaparin Injectable  fat emulsion (Plant Based) 20% Infusion  fentaNYL    Injectable PRN  fentaNYL    Injectable PRN  glucagon  Injectable  levETIRAcetam  IVPB  levothyroxine Injectable  pantoprazole  Injectable  Parenteral Nutrition - Adult  propofol Infusion      Review of Systems--  Review of systems unable secondary to clinical condition.     Physical Examination--  Vital Signs: T(F): 99.2 (01-01-22 @ 08:01), Max: 99.6 (12-31-21 @ 15:35)  HR: 89 (01-01-22 @ 08:50)  BP: 153/68 (01-01-22 @ 07:00)  RR: 25 (01-01-22 @ 07:15)  SpO2: 100% (01-01-22 @ 08:50)  Wt(kg): --  General: Nontoxic-appearing Female in no acute distress.  HEENT: AT/NC. Pupils/EOM unable secondary to patient compliance. Oropharynx/dentition unable secondary to patient compliance. OETT.   Neck: Not rigid. No sense of mass.  Nodes: None palpable.  Lungs: Diminished BS bilaterally without rales, wheezing or rhonchi  Heart: Regular rate and rhythm. No Murmur. No rub. No gallop. No palpable thrill.  Abdomen: Bowel sounds absent.  Soft. Mildly distended.  Incision dressed.  Ostomy retracted, tannish looking- not clear that this is fibrinous exudate overlying. Reddish effluent in bag. Drains scant output, serous x2. . No reaction to palpation.    Extremities: No cyanosis or clubbing. 2-3+ pitting edema. LE less edematous. Asymmetric edema UE, R >> L. PIV's ok.  Skin: Warm. Dry. Good turgor. No rash. No vasculitic stigmata.  Psychiatric: Unable        Laboratory Studies--  CBC                        10.4   23.24 )-----------( 365      ( 01 Jan 2022 05:27 )             31.9     WBC Count: 23.24 K/uL (01-01-22 @ 05:27)  WBC Count: 28.69 K/uL (12-31-21 @ 02:30)  WBC Count: 36.58 K/uL (12-30-21 @ 03:59)  WBC Count: 32.37 K/uL (12-29-21 @ 03:51)  WBC Count: 28.04 K/uL (12-28-21 @ 03:36)  WBC Count: 19.48 K/uL (12-27-21 @ 03:26)  WBC Count: 16.51 K/uL (12-26-21 @ 14:46)  WBC Count: 12.37 K/uL (12-26-21 @ 05:40)  WBC Count: 11.48 K/uL (12-25-21 @ 07:48)  WBC Count: 6.75 K/uL (12-25-21 @ 00:52)  WBC Count: 7.29 K/uL (12-24-21 @ 18:28)  WBC Count: 28.13 K/uL (12-24-21 @ 08:30)  WBC Count: 28.24 K/uL (12-23-21 @ 17:33)  WBC Count: 24.83 K/uL (12-22-21 @ 17:43)      Chemistries  01-01    135  |  100  |  12  ----------------------------<  123<H>  3.2<L>   |  25  |  0.45<L>    Ca    7.0<L>      01 Jan 2022 05:27  Phos  2.5     01-01  Mg     1.8     01-01    TPro  4.9<L>  /  Alb  1.0<L>  /  TBili  0.3  /  DBili  x   /  AST  32  /  ALT  19  /  AlkPhos  120  01-01      Culture Data    Culture - Sputum (collected 28 Dec 2021 18:54)  Source: .Sputum Sputum  Gram Stain (30 Dec 2021 18:49):    Moderate polymorphonuclear leukocytes per low power field    Numerous Squamous epithelial cells per low power field    Few Gram positive cocci in pairs per oil power field    Rare Gram Positive Rods per oil power field    Rare Yeast like cells per oil power field    Results consistent with oropharyngeal contamination  Final Report (30 Dec 2021 18:49):    Normal Respiratory Sendy present    Culture - Blood (collected 28 Dec 2021 15:04)  Source: .Blood Blood  Preliminary Report (29 Dec 2021 15:06):    No growth to date.    Culture - Blood (collected 28 Dec 2021 15:04)  Source: .Blood Blood  Preliminary Report (29 Dec 2021 15:06):    No growth to date.    Culture - Body Fluid with Gram Stain (collected 25 Dec 2021 12:31)  Source: .Body Fluid INTRA -ABDOMINAL FLUID FOR CULTURE  Gram Stain (27 Dec 2021 09:49):    Few polymorphonuclear leukocytes per low power field    Rare Gram Variable Rods per oil power field  Final Report (27 Dec 2021 09:49):    Few Escherichia coli    Rare Escherichia coli #2    Few Candida albicans "Susceptibilities not performed"    Moderate Bacteroides thetaiotamcron group "Susceptibilities not performed"    Moderate Bacteroides uniformis "Susceptibilities not performed"  Organism: Escherichia coli  Escherichia coli (27 Dec 2021 09:49)  Organism: Escherichia coli (27 Dec 2021 09:49)  Organism: Escherichia coli (27 Dec 2021 09:49)

## 2022-01-01 NOTE — PROGRESS NOTE ADULT - SUBJECTIVE AND OBJECTIVE BOX
Patient seen and examined bedside in ICU  No overnight events  Remains on full vent support.  PPN running    T(F): 98.4 (01-01-22 @ 04:00), Max: 99.6 (12-31-21 @ 15:35)  HR: 95 (01-01-22 @ 07:00) (68 - 97)  BP: 138/56 (01-01-22 @ 06:00) (92/49 - 170/84)  RR: 26 (01-01-22 @ 07:00) (18 - 28)  SpO2: 99% (01-01-22 @ 07:00) (94% - 100%)  Wt(kg): --      POCT Blood Glucose.: 123 mg/dL (01 Jan 2022 05:54)  POCT Blood Glucose.: 135 mg/dL (01 Jan 2022 00:23)  POCT Blood Glucose.: 135 mg/dL (31 Dec 2021 17:23)  POCT Blood Glucose.: 138 mg/dL (31 Dec 2021 11:30)      PHYSICAL EXAM:  General: awake  HEENT: orally intubated, NGT to suction with minimal bilious output  CV: +S1+S2   Lung: mechanically vented  Abdomen: soft, midline wound retention sutures intact, skin openings irrigated with saline and repacked with iodoform packing, dry dressing applied. Granulating. No gross drainage. Inferior aspect of wound with moderate adherent slough. Colostomy pink and viable, no air or stool in bag, minimal bowel sweat noted. B/l BABAK drains with scant serous drainage. Dressings changed.  Extremities: B/l LE SCDs. RUE edematous, PPN via PIV R upper arm. Trace LE edema  : mc catheter with clear urine output, 4725cc    LABS:                        10.4   23.24 )-----------( 365      ( 01 Jan 2022 05:27 )             31.9     01-01    135  |  100  |  12  ----------------------------<  123<H>  3.2<L>   |  25  |  0.45<L>    Ca    7.0<L>      01 Jan 2022 05:27  Phos  2.5     01-01  Mg     1.8     01-01    TPro  4.9<L>  /  Alb  1.0<L>  /  TBili  0.3  /  DBili  x   /  AST  32  /  ALT  19  /  AlkPhos  120  01-01        Culture Results:   Normal Respiratory Sendy present (12-28 @ 18:54)  Culture Results:   No growth to date. (12-28 @ 15:04)  Culture Results:   No growth to date. (12-28 @ 15:04)  Culture Results:   Few Escherichia coli  Rare Escherichia coli #2  Few Candida albicans "Susceptibilities not performed"  Moderate Bacteroides thetaiotamcron group "Susceptibilities not performed"  Moderate Bacteroides uniformis "Susceptibilities not performed" (12-25 @ 12:31)      A/P: 81F PMH HTN, HLD, hypothyroidism, depression, seizures admitted with Acute Sigmoid Diverticulitis, Surgery consulted for interval perforated sigmoid diverticulitis. S/P ex lap, sigmoid resection, peritoneal lavage 12/25 and RTOR 12/26 for Irrigation of abdominal cavity with closure and creation of colostomy. Intubated. Off pressors. A-fib RVR s/p amio converted to sinus rhythm. Leukocytosis.   CT 12/30 shows several loculated IA fluid collections  - continue ICU management and vent support  - NPO, PPN per GI, NGT to LCWS  - analgesia prn  - local wound care per surgery, local drain care  - continue abx per ID  - for IR consult for drainage of collection

## 2022-01-01 NOTE — PROGRESS NOTE ADULT - SUBJECTIVE AND OBJECTIVE BOX
HPI:  Patient is a 81F with a PMH of HTN, HLD, hypothyroidism, depression, seizures who presents to the ED for abd pain.  Patient states that over the last two days she had developed significant abdominal pain and multiple episodes of watery diarrhea.  Reports one episode of nausea and vomiting earlier today.  Patient has no other complaints.  Vitals stable,  labs show leukocytosis and hypokalemia.  CT abdomen significant for diverticulitis.  Will admit to med surg.     (23 Dec 2021 00:17)      24 hr events:  doing ok on PS trial occ having periods of dyspnea and tachypnea  mild secretions    ## Labs:                         10.4   23.24 )-----------( 365      ( 01 Jan 2022 05:27 )             31.9   01-01    135  |  100  |  12  ----------------------------<  123<H>  3.2<L>   |  25  |  0.45<L>    Ca    7.0<L>      01 Jan 2022 05:27  Phos  2.5     01-01  Mg     1.8     01-01    TPro  4.9<L>  /  Alb  1.0<L>  /  TBili  0.3  /  DBili  x   /  AST  32  /  ALT  19  /  AlkPhos  120  01-01      MEDICATIONS  (STANDING):  chlorhexidine 0.12% Liquid 15 milliLiter(s) Oral Mucosa every 12 hours  chlorhexidine 2% Cloths 1 Application(s) Topical <User Schedule>  dexMEDEtomidine Infusion 0.2 MICROgram(s)/kG/Hr (4.05 mL/Hr) IV Continuous <Continuous>  dextrose 40% Gel 15 Gram(s) Oral once  dextrose 5%. 1000 milliLiter(s) (100 mL/Hr) IV Continuous <Continuous>  dextrose 5%. 1000 milliLiter(s) (50 mL/Hr) IV Continuous <Continuous>  dextrose 50% Injectable 25 Gram(s) IV Push once  dextrose 50% Injectable 12.5 Gram(s) IV Push once  dextrose 50% Injectable 25 Gram(s) IV Push once  dorzolamide 2%/timolol 0.5% Ophthalmic Solution 1 Drop(s) Both EYES two times a day  enoxaparin Injectable 40 milliGRAM(s) SubCutaneous daily  fat emulsion (Plant Based) 20% Infusion 0.55 Gm/kG/Day (9.28 mL/Hr) IV Continuous <Continuous>  fluconAZOLE IVPB 200 milliGRAM(s) IV Intermittent every 24 hours  glucagon  Injectable 1 milliGRAM(s) IntraMuscular once  levETIRAcetam  IVPB 500 milliGRAM(s) IV Intermittent every 12 hours  levothyroxine Injectable 25 MICROGram(s) IV Push at bedtime  pantoprazole  Injectable 40 milliGRAM(s) IV Push daily  Parenteral Nutrition - Adult 1 Each (63 mL/Hr) TPN Continuous <Continuous>  Parenteral Nutrition - Adult 1 Each (63 mL/Hr) TPN Continuous <Continuous>  piperacillin/tazobactam IVPB.. 3.375 Gram(s) IV Intermittent every 8 hours      ICU Vital Signs Last 24 Hrs  T(C): 37.3 (01 Jan 2022 08:01), Max: 37.3 (31 Dec 2021 23:31)  T(F): 99.2 (01 Jan 2022 08:01), Max: 99.2 (01 Jan 2022 08:01)  HR: 91 (01 Jan 2022 13:00) (68 - 98)  BP: 165/84 (01 Jan 2022 13:00) (92/49 - 172/133)  BP(mean): 100 (01 Jan 2022 13:00) (63 - 147)  ABP: --  ABP(mean): --  RR: 21 (01 Jan 2022 13:00) (18 - 29)  SpO2: 97% (01 Jan 2022 13:00) (95% - 100%)        ## P/E:  Gen: NAD, intubated  Mouth: mmm  Neck: no accessory muscle use  Lungs: b/l rhonchi  Heart: s1s2 reg no murmur  Abd: +BS soft nontender, midline incision, +JPs  Ext: +edema LE, +LUE swelling  Neuro: following commands appropriately, weakness equally to all extremities but improved from yesterday now 3/5      < from: Xray Chest 1 View- PORTABLE-Urgent (Xray Chest 1 View- PORTABLE-Urgent .) (12.30.21 @ 18:43) >    ACC: [id number] EXAM:  XR CHEST PORTABLE URGENT 1V                          PROCEDURE DATE:  12/30/2021          INTERPRETATION:  Portable chest radiograph    CLINICAL INFORMATION: ET tube assessment    TECHNIQUE:  Portable  AP view of the chest.    COMPARISON: 12/28/2021 chest available for review.    FINDINGS: ET tube tip above tracheal bifurcation.  NG tube tip beyond GE junction.       LEFT greater than RIGHT pleural effusions and/or LEFT basilar airspace   disease obscuring LEFT diaphragm contour. Upper zones clear.  Plate.   No pneumothorax.    The heart and mediastinum size and configuration are within normal limits.    Visualized osseous structures are intact.    IMPRESSION:   No significant interval change.    --- End of Report ---            DAR NAVARRO MD; Attending Radiologist  This document has been electronically signed. Dec 31 2021 12:55PM    < end of copied text >

## 2022-01-01 NOTE — PROGRESS NOTE ADULT - ASSESSMENT
81F PMH HTN, HLD, hypothyroidism, depression, seizures presents for abdominal pain found to have acute sigmoid diverticulitis complicated by perforated sigmoid diverticulitis/peritonitis with abscess formation s/p ex-lap with large bowel resection / abdominal washout / wound vac placement 12/25. Brought back to OR yesterday 12/26 for abdominal closure and colostomy creation. Sepsis with septic shock requiring levophed. Remains intubated for post procedural acute hypoxic respiratory failure. Post op course with a-fib RVR     NEURO  maintain off sedation as tolerated  cont pain control for postop state  remains severely weak to all extremities but is improved moderately today    CV  has not been on pressors for past 3 days  maintain map>65  a-fib RVR s/p amio converted to sinus rhythm  cont diurese with lasix again today     PULM  tolerating weaning today, cont daily PS trial  episodes of dyspnea/tachypnea during trial  LUE duplex pending      GI  NPO  await bowel function recovery  s/p abdominal closure    monitor I/O from BABAK drain and output from colostomy  minimal drainage so far  cont PPN  surgical follow up appreciated    ID  cont with fluconazole and zosyn   abd cx+ candida and bacteroides and e-coli  f/u ID reccs      ENDO  cont synthroid IV for underlying hypothyroidism     GEN  full code  DVT prophylaxis: lovenox

## 2022-01-02 LAB
ALBUMIN SERPL ELPH-MCNC: 1 G/DL — LOW (ref 3.3–5)
ALP SERPL-CCNC: 118 U/L — SIGNIFICANT CHANGE UP (ref 40–120)
ALT FLD-CCNC: 20 U/L — SIGNIFICANT CHANGE UP (ref 12–78)
ANION GAP SERPL CALC-SCNC: 7 MMOL/L — SIGNIFICANT CHANGE UP (ref 5–17)
AST SERPL-CCNC: 40 U/L — HIGH (ref 15–37)
BILIRUB SERPL-MCNC: 0.4 MG/DL — SIGNIFICANT CHANGE UP (ref 0.2–1.2)
BUN SERPL-MCNC: 16 MG/DL — SIGNIFICANT CHANGE UP (ref 7–23)
CALCIUM SERPL-MCNC: 7 MG/DL — LOW (ref 8.5–10.1)
CHLORIDE SERPL-SCNC: 103 MMOL/L — SIGNIFICANT CHANGE UP (ref 96–108)
CO2 SERPL-SCNC: 26 MMOL/L — SIGNIFICANT CHANGE UP (ref 22–31)
CREAT SERPL-MCNC: 0.56 MG/DL — SIGNIFICANT CHANGE UP (ref 0.5–1.3)
CULTURE RESULTS: SIGNIFICANT CHANGE UP
CULTURE RESULTS: SIGNIFICANT CHANGE UP
GLUCOSE BLDC GLUCOMTR-MCNC: 121 MG/DL — HIGH (ref 70–99)
GLUCOSE BLDC GLUCOMTR-MCNC: 132 MG/DL — HIGH (ref 70–99)
GLUCOSE BLDC GLUCOMTR-MCNC: 133 MG/DL — HIGH (ref 70–99)
GLUCOSE BLDC GLUCOMTR-MCNC: 137 MG/DL — HIGH (ref 70–99)
GLUCOSE SERPL-MCNC: 114 MG/DL — HIGH (ref 70–99)
HCT VFR BLD CALC: 25.6 % — LOW (ref 34.5–45)
HGB BLD-MCNC: 8.6 G/DL — LOW (ref 11.5–15.5)
MAGNESIUM SERPL-MCNC: 2.5 MG/DL — SIGNIFICANT CHANGE UP (ref 1.6–2.6)
MCHC RBC-ENTMCNC: 29.2 PG — SIGNIFICANT CHANGE UP (ref 27–34)
MCHC RBC-ENTMCNC: 33.6 GM/DL — SIGNIFICANT CHANGE UP (ref 32–36)
MCV RBC AUTO: 86.8 FL — SIGNIFICANT CHANGE UP (ref 80–100)
NRBC # BLD: 0 /100 WBCS — SIGNIFICANT CHANGE UP (ref 0–0)
PHOSPHATE SERPL-MCNC: 3.4 MG/DL — SIGNIFICANT CHANGE UP (ref 2.5–4.5)
PLATELET # BLD AUTO: 341 K/UL — SIGNIFICANT CHANGE UP (ref 150–400)
POTASSIUM SERPL-MCNC: 4 MMOL/L — SIGNIFICANT CHANGE UP (ref 3.5–5.3)
POTASSIUM SERPL-SCNC: 4 MMOL/L — SIGNIFICANT CHANGE UP (ref 3.5–5.3)
PROT SERPL-MCNC: 5.2 GM/DL — LOW (ref 6–8.3)
RBC # BLD: 2.95 M/UL — LOW (ref 3.8–5.2)
RBC # FLD: 14.5 % — SIGNIFICANT CHANGE UP (ref 10.3–14.5)
SODIUM SERPL-SCNC: 136 MMOL/L — SIGNIFICANT CHANGE UP (ref 135–145)
SPECIMEN SOURCE: SIGNIFICANT CHANGE UP
SPECIMEN SOURCE: SIGNIFICANT CHANGE UP
WBC # BLD: 22.98 K/UL — HIGH (ref 3.8–10.5)
WBC # FLD AUTO: 22.98 K/UL — HIGH (ref 3.8–10.5)

## 2022-01-02 PROCEDURE — 99232 SBSQ HOSP IP/OBS MODERATE 35: CPT

## 2022-01-02 PROCEDURE — 93931 UPPER EXTREMITY STUDY: CPT | Mod: 26

## 2022-01-02 PROCEDURE — 99291 CRITICAL CARE FIRST HOUR: CPT

## 2022-01-02 RX ORDER — PIPERACILLIN AND TAZOBACTAM 4; .5 G/20ML; G/20ML
3.38 INJECTION, POWDER, LYOPHILIZED, FOR SOLUTION INTRAVENOUS EVERY 8 HOURS
Refills: 0 | Status: DISCONTINUED | OUTPATIENT
Start: 2022-01-02 | End: 2022-01-06

## 2022-01-02 RX ORDER — FUROSEMIDE 40 MG
40 TABLET ORAL ONCE
Refills: 0 | Status: COMPLETED | OUTPATIENT
Start: 2022-01-02 | End: 2022-01-02

## 2022-01-02 RX ORDER — ELECTROLYTE SOLUTION,INJ
1 VIAL (ML) INTRAVENOUS
Refills: 0 | Status: DISCONTINUED | OUTPATIENT
Start: 2022-01-02 | End: 2022-01-02

## 2022-01-02 RX ADMIN — CHLORHEXIDINE GLUCONATE 15 MILLILITER(S): 213 SOLUTION TOPICAL at 05:22

## 2022-01-02 RX ADMIN — PANTOPRAZOLE SODIUM 40 MILLIGRAM(S): 20 TABLET, DELAYED RELEASE ORAL at 11:28

## 2022-01-02 RX ADMIN — FENTANYL CITRATE 50 MICROGRAM(S): 50 INJECTION INTRAVENOUS at 17:55

## 2022-01-02 RX ADMIN — DORZOLAMIDE HYDROCHLORIDE TIMOLOL MALEATE 1 DROP(S): 20; 5 SOLUTION/ DROPS OPHTHALMIC at 17:14

## 2022-01-02 RX ADMIN — PIPERACILLIN AND TAZOBACTAM 25 GRAM(S): 4; .5 INJECTION, POWDER, LYOPHILIZED, FOR SOLUTION INTRAVENOUS at 20:23

## 2022-01-02 RX ADMIN — Medication 25 MICROGRAM(S): at 21:26

## 2022-01-02 RX ADMIN — FLUCONAZOLE 100 MILLIGRAM(S): 150 TABLET ORAL at 01:44

## 2022-01-02 RX ADMIN — DORZOLAMIDE HYDROCHLORIDE TIMOLOL MALEATE 1 DROP(S): 20; 5 SOLUTION/ DROPS OPHTHALMIC at 05:22

## 2022-01-02 RX ADMIN — FENTANYL CITRATE 50 MICROGRAM(S): 50 INJECTION INTRAVENOUS at 22:58

## 2022-01-02 RX ADMIN — FENTANYL CITRATE 50 MICROGRAM(S): 50 INJECTION INTRAVENOUS at 17:29

## 2022-01-02 RX ADMIN — CHLORHEXIDINE GLUCONATE 1 APPLICATION(S): 213 SOLUTION TOPICAL at 05:15

## 2022-01-02 RX ADMIN — DEXMEDETOMIDINE HYDROCHLORIDE IN 0.9% SODIUM CHLORIDE 4.05 MICROGRAM(S)/KG/HR: 4 INJECTION INTRAVENOUS at 11:29

## 2022-01-02 RX ADMIN — Medication 1 EACH: at 22:01

## 2022-01-02 RX ADMIN — FENTANYL CITRATE 50 MICROGRAM(S): 50 INJECTION INTRAVENOUS at 23:50

## 2022-01-02 RX ADMIN — LEVETIRACETAM 420 MILLIGRAM(S): 250 TABLET, FILM COATED ORAL at 05:22

## 2022-01-02 RX ADMIN — Medication 40 MILLIGRAM(S): at 10:41

## 2022-01-02 RX ADMIN — CHLORHEXIDINE GLUCONATE 15 MILLILITER(S): 213 SOLUTION TOPICAL at 17:14

## 2022-01-02 RX ADMIN — PIPERACILLIN AND TAZOBACTAM 25 GRAM(S): 4; .5 INJECTION, POWDER, LYOPHILIZED, FOR SOLUTION INTRAVENOUS at 08:59

## 2022-01-02 RX ADMIN — LEVETIRACETAM 420 MILLIGRAM(S): 250 TABLET, FILM COATED ORAL at 16:39

## 2022-01-02 RX ADMIN — ENOXAPARIN SODIUM 40 MILLIGRAM(S): 100 INJECTION SUBCUTANEOUS at 11:28

## 2022-01-02 RX ADMIN — FLUCONAZOLE 100 MILLIGRAM(S): 150 TABLET ORAL at 23:15

## 2022-01-02 RX ADMIN — DEXMEDETOMIDINE HYDROCHLORIDE IN 0.9% SODIUM CHLORIDE 4.05 MICROGRAM(S)/KG/HR: 4 INJECTION INTRAVENOUS at 19:26

## 2022-01-02 NOTE — PROGRESS NOTE ADULT - ASSESSMENT
Patient is a 81F with a PMH of HTN, HLD, hypothyroidism, depression, seizures who presents to the ED for abd pain found to have diverticulitis   has rising leukocytosis   had fever yesterday   tachycardic and now hypoxic   CXR (I personally reviewed) low lung volumes   origincal CT (I personally reviewed) with diverticulitis   she had a lot of pain in the abdomen on exam     12/25: s/p surgery for diverticulitis with perforation and abscess and went to the OR. intubated in ICU with vac and needs to go back to the OR, currently on zosyn, s/p one dose of diflucan last night   12/27: s/p repair and ostomy creation yesterday, wbc elevated, cultures sensitive to zosyn and candida albicans is notoriously sensitive to azoles such as fluconazole, wean of pressors and sedation, extubate when ready   12/28: Further increase in white blood cell count but overall the patient appears to be reasonably stable.  No concern of C. difficile at this juncture.  Current antibiotics are reasonable.  Leukocytosis like related to recent surgery, no concern of other superimposed process on the basis of exam.  I do not see a role to alter her antimicrobials.  12/29:  Remains intubated in ICU. still quite edematous and net positive, WBC climbing, small wound dehiscence at bottom of incision, plan for initiation of TPN today, remains on antibiotics    12/30: rising WBC, ostomy not functioning yet, very edematous CT today, may be source control issue so for now can continue same antibiotics and antifungal but may need to change if findings on the CT are worrisome or change in hemodynamics  12/31:Leukocytosis, with collections noted on CT.  However this is being done postoperative day 5, there is some possibility that this could represent postop changes but given the leukocytosis, concur with plans for attempts at percutaneous drainage.  White blood cell count down slightly compared to prior peak, will need to be trended.  Gallbladder is also distended.  Abdominal exam was benign this morning, but a calculus cholecystitis could be the differential diagnosis here as well (though n.p.o. status certainly could explain distended gallbladder, there is also report of gallbladder wall thickening).  Would modify antibiotics based on cultures from drainage, I do not believe that antibiotics and other the cells would be adequate here.  Fortunately she is off pressors, with preserved renal function, and tolerating pressure support.  1/1/22: Postop day 6.  I have some concerns with the appearance of the ostomy, though the abdominal exam overall is otherwise unchanged.  White blood cell count remains elevated, but is lower compared with prior values.  This is despite no change in antibiotic therapy.  Patient also has asymmetric edema of the upper extremities, right greater than left.  Low threshold for duplex ultrasound to exclude DVT.No new surveillance culture data.  1/2: POD7 Ostomy looks better today, UE symmetric. WBC elevated, some slight downward trend overall. Temps <= 100F.

## 2022-01-02 NOTE — PROGRESS NOTE ADULT - ASSESSMENT
81F PMH HTN, HLD, hypothyroidism, depression, seizures presents for abdominal pain found to have acute sigmoid diverticulitis complicated by perforated sigmoid diverticulitis/peritonitis with abscess formation s/p ex-lap with large bowel resection / abdominal washout / wound vac placement 12/25. Brought back to OR yesterday 12/26 for abdominal closure and colostomy creation. Sepsis with septic shock requiring levophed. Remains intubated for post procedural acute hypoxic respiratory failure. Post op course with a-fib RVR   Doing well on PS trials but pt remains severely deconditioned and weak w/ hi risk for extubation failure. Cont daily reassessment    NEURO  maintain off sedation as tolerated  cont pain control for postop state  remains severely weak to all extremities but is improved moderately today  cont PT     CV  has not been on pressors for past 4 days  maintain map>65  a-fib RVR s/p amio converted to sinus rhythm  s/p diurese with lasix, monitor volume status for repeat lasix     PULM  tolerating weaning today, cont daily PS trial  episodes of dyspnea/tachypnea during trial  LUE duplex pending      GI  NPO  await bowel function recovery  monitor I/O from BABAK drain and output from colostomy, slight serosanguinous drainage  cont PPN  surgical follow up appreciated  recc for IR eval for loculation drainage -will discuss feasibility    ID  cont with fluconazole and zosyn   abd cx+ candida and bacteroides and e-coli  f/u ID reccs      ENDO  cont synthroid IV for underlying hypothyroidism     GEN  full code  DVT prophylaxis: lovenox

## 2022-01-02 NOTE — PROGRESS NOTE ADULT - SUBJECTIVE AND OBJECTIVE BOX
[facility]  Division of Infectious Diseases  [phone]    Name: JUAREZ GTZ  Age: 81y  Gender: Female  MRN: [id number]    Interval History--  Notes reviewed.     Past Medical History--  Seizure    HTN (hypertension)    Glaucoma    Thyroid disease    Blood clot of neck vein    TIA (transient ischemic attack)    S/P appendectomy        For details regarding the patient's social history, family history, and other miscellaneous elements, please refer the initial infectious diseases consultation and/or the admitting history and physical examination for this admission.    Allergies    No Known Allergies    Intolerances        Medications--  Antibiotics:  fluconAZOLE IVPB 200 milliGRAM(s) IV Intermittent every 24 hours    Immunologic:    Other:  chlorhexidine 0.12% Liquid  chlorhexidine 2% Cloths  dexMEDEtomidine Infusion  dextrose 40% Gel  dextrose 5%.  dextrose 5%.  dextrose 50% Injectable  dextrose 50% Injectable  dextrose 50% Injectable  dorzolamide 2%/timolol 0.5% Ophthalmic Solution  enoxaparin Injectable  fentaNYL    Injectable PRN  fentaNYL    Injectable PRN  glucagon  Injectable  levETIRAcetam  IVPB  levothyroxine Injectable  pantoprazole  Injectable  Parenteral Nutrition - Adult  Parenteral Nutrition - Adult      Review of Systems--  A 10-point review of systems was obtained.     Pertinent positives and negatives--  Constitutional: No fevers. No Chills. No Rigors.   Cardiovascular: No chest pain. No palpitations.  Respiratory: No shortness of breath. No cough.  Gastrointestinal: No nausea or vomiting. No diarrhea or constipation.   Psychiatric: Pleasant. Appropriate affect.    Review of systems otherwise negative except as previously noted.    Physical Examination--  Vital Signs: T(F): 100 (01-02-22 @ 08:00), Max: 100 (01-01-22 @ 15:45)  HR: 64 (01-02-22 @ 10:00)  BP: 108/47 (01-02-22 @ 10:00)  RR: 23 (01-02-22 @ 10:00)  SpO2: 98% (01-02-22 @ 10:00)  Wt(kg): --  General: Nontoxic-appearing Female in no acute distress.  HEENT: AT/NC. PERRL. EOMI. Anicteric. Conjunctiva pink and moist. Oropharynx clear. Dentition fair.  Neck: Not rigid. No sense of mass.  Nodes: None palpable.  Lungs: Clear bilaterally without rales, wheezing or rhonchi  Heart: Regular rate and rhythm. No Murmur. No rub. No gallop. No palpable thrill.  Abdomen: Bowel sounds present and normoactive. Soft. Nondistended. Nontender. No sense of mass. No organomegaly.  Back: No spinal tenderness. No costovertebral angle tenderness.   Extremities: No cyanosis or clubbing. No edema.   Skin: Warm. Dry. Good turgor. No rash. No vasculitic stigmata.  Psychiatric: Appropriate affect and mood for situation.         Laboratory Studies--  CBC                        8.6    22.98 )-----------( 341      ( 02 Jan 2022 02:11 )             25.6       Chemistries  01-02    136  |  103  |  16  ----------------------------<  114<H>  4.0   |  26  |  0.56    Ca    7.0<L>      02 Jan 2022 02:11  Phos  3.4     01-02  Mg     2.5     01-02    TPro  5.2<L>  /  Alb  1.0<L>  /  TBili  0.4  /  DBili  x   /  AST  40<H>  /  ALT  20  /  AlkPhos  118  01-02      Culture Data    Culture - Sputum (collected 28 Dec 2021 18:54)  Source: .Sputum Sputum  Gram Stain (30 Dec 2021 18:49):    Moderate polymorphonuclear leukocytes per low power field    Numerous Squamous epithelial cells per low power field    Few Gram positive cocci in pairs per oil power field    Rare Gram Positive Rods per oil power field    Rare Yeast like cells per oil power field    Results consistent with oropharyngeal contamination  Final Report (30 Dec 2021 18:49):    Normal Respiratory Sendy present    Culture - Blood (collected 28 Dec 2021 15:04)  Source: .Blood Blood  Preliminary Report (29 Dec 2021 15:06):    No growth to date.    Culture - Blood (collected 28 Dec 2021 15:04)  Source: .Blood Blood  Preliminary Report (29 Dec 2021 15:06):    No growth to date.             [facility]  Division of Infectious Diseases  [phone]    Name: JUAREZ GTZ  Age: 81y  Gender: Female  MRN: [id number]    Interval History--  Notes reviewed. Remains vent dependent in ICU, awake but not interactive.     Past Medical History--  Seizure    HTN (hypertension)    Glaucoma    Thyroid disease    Blood clot of neck vein    TIA (transient ischemic attack)    S/P appendectomy        For details regarding the patient's social history, family history, and other miscellaneous elements, please refer the initial infectious diseases consultation and/or the admitting history and physical examination for this admission.    Allergies    No Known Allergies    Intolerances        Medications--  Antibiotics:  fluconAZOLE IVPB 200 milliGRAM(s) IV Intermittent every 24 hours    Immunologic:    Other:  chlorhexidine 0.12% Liquid  chlorhexidine 2% Cloths  dexMEDEtomidine Infusion  dextrose 40% Gel  dextrose 5%.  dextrose 5%.  dextrose 50% Injectable  dextrose 50% Injectable  dextrose 50% Injectable  dorzolamide 2%/timolol 0.5% Ophthalmic Solution  enoxaparin Injectable  fentaNYL    Injectable PRN  fentaNYL    Injectable PRN  glucagon  Injectable  levETIRAcetam  IVPB  levothyroxine Injectable  pantoprazole  Injectable  Parenteral Nutrition - Adult  Parenteral Nutrition - Adult      Review of Systems--  Review of systems unable secondary to clinical condition.     Physical Examination--  Vital Signs: T(F): 100 (01-02-22 @ 08:00), Max: 100 (01-01-22 @ 15:45)  HR: 64 (01-02-22 @ 10:00)  BP: 108/47 (01-02-22 @ 10:00)  RR: 23 (01-02-22 @ 10:00)  SpO2: 98% (01-02-22 @ 10:00)  Wt(kg): --  General: Nontoxic-appearing Female in no acute distress.  HEENT: AT/NC. Pupils/EOM unable secondary to patient compliance. Oropharynx/dentition unable secondary to patient compliance. OETT.   Neck: Not rigid. No sense of mass.  Nodes: None palpable.  Lungs: Diminished BS bilaterally without rales, wheezing or rhonchi  Heart: Regular rate and rhythm. No Murmur. No rub. No gallop. No palpable thrill.  Abdomen: Bowel sounds absent.  Soft. Mildly distended.  Incision dressed.  Ostomy pink with stool output. Drains scant output, serous x2. . No reaction to palpation.    Extremities: No cyanosis or clubbing. 2-3+ pitting edema. UE fairly symmetric today.  Skin: Warm. Dry. Good turgor. No rash. No vasculitic stigmata.  Psychiatric: Unable      Laboratory Studies--  CBC                        8.6    22.98 )-----------( 341      ( 02 Jan 2022 02:11 )             25.6   WBC trend  WBC Count: 22.98 K/uL (01-02-22 @ 02:11)  WBC Count: 23.24 K/uL (01-01-22 @ 05:27)  WBC Count: 28.69 K/uL (12-31-21 @ 02:30)  WBC Count: 36.58 K/uL (12-30-21 @ 03:59)  WBC Count: 32.37 K/uL (12-29-21 @ 03:51)      Chemistries  01-02    136  |  103  |  16  ----------------------------<  114<H>  4.0   |  26  |  0.56    Ca    7.0<L>      02 Jan 2022 02:11  Phos  3.4     01-02  Mg     2.5     01-02    TPro  5.2<L>  /  Alb  1.0<L>  /  TBili  0.4  /  DBili  x   /  AST  40<H>  /  ALT  20  /  AlkPhos  118  01-02      Culture Data    Culture - Sputum (collected 28 Dec 2021 18:54)  Source: .Sputum Sputum  Gram Stain (30 Dec 2021 18:49):    Moderate polymorphonuclear leukocytes per low power field    Numerous Squamous epithelial cells per low power field    Few Gram positive cocci in pairs per oil power field    Rare Gram Positive Rods per oil power field    Rare Yeast like cells per oil power field    Results consistent with oropharyngeal contamination  Final Report (30 Dec 2021 18:49):    Normal Respiratory Sendy present    Culture - Blood (collected 28 Dec 2021 15:04)  Source: .Blood Blood  Preliminary Report (29 Dec 2021 15:06):    No growth to date.    Culture - Blood (collected 28 Dec 2021 15:04)  Source: .Blood Blood  Preliminary Report (29 Dec 2021 15:06):    No growth to date.

## 2022-01-02 NOTE — PROGRESS NOTE ADULT - SUBJECTIVE AND OBJECTIVE BOX
HPI:  Patient is a 81F with a PMH of HTN, HLD, hypothyroidism, depression, seizures who presents to the ED for abd pain.  Patient states that over the last two days she had developed significant abdominal pain and multiple episodes of watery diarrhea.  Reports one episode of nausea and vomiting earlier today.  Patient has no other complaints.  Vitals stable,  labs show leukocytosis and hypokalemia.  CT abdomen significant for diverticulitis.  Will admit to med surg.     (23 Dec 2021 00:17)      24 hr events:  on PS trial today  mild secretions    ## Labs:                                8.6    22.98 )-----------( 341      ( 02 Jan 2022 02:11 )             25.6   01-02    136  |  103  |  16  ----------------------------<  114<H>  4.0   |  26  |  0.56    Ca    7.0<L>      02 Jan 2022 02:11  Phos  3.4     01-02  Mg     2.5     01-02    TPro  5.2<L>  /  Alb  1.0<L>  /  TBili  0.4  /  DBili  x   /  AST  40<H>  /  ALT  20  /  AlkPhos  118  01-02     MEDICATIONS  (STANDING):  chlorhexidine 0.12% Liquid 15 milliLiter(s) Oral Mucosa every 12 hours  chlorhexidine 2% Cloths 1 Application(s) Topical <User Schedule>  dexMEDEtomidine Infusion 0.2 MICROgram(s)/kG/Hr (4.05 mL/Hr) IV Continuous <Continuous>  dextrose 40% Gel 15 Gram(s) Oral once  dextrose 5%. 1000 milliLiter(s) (50 mL/Hr) IV Continuous <Continuous>  dextrose 5%. 1000 milliLiter(s) (100 mL/Hr) IV Continuous <Continuous>  dextrose 50% Injectable 25 Gram(s) IV Push once  dextrose 50% Injectable 12.5 Gram(s) IV Push once  dextrose 50% Injectable 25 Gram(s) IV Push once  dorzolamide 2%/timolol 0.5% Ophthalmic Solution 1 Drop(s) Both EYES two times a day  enoxaparin Injectable 40 milliGRAM(s) SubCutaneous daily  fluconAZOLE IVPB 200 milliGRAM(s) IV Intermittent every 24 hours  glucagon  Injectable 1 milliGRAM(s) IntraMuscular once  levETIRAcetam  IVPB 500 milliGRAM(s) IV Intermittent every 12 hours  levothyroxine Injectable 25 MICROGram(s) IV Push at bedtime  pantoprazole  Injectable 40 milliGRAM(s) IV Push daily  Parenteral Nutrition - Adult 1 Each (63 mL/Hr) TPN Continuous <Continuous>  Parenteral Nutrition - Adult 1 Each (63 mL/Hr) TPN Continuous <Continuous>     ICU Vital Signs Last 24 Hrs  T(C): 37.8 (02 Jan 2022 12:00), Max: 37.8 (01 Jan 2022 15:45)  T(F): 100 (02 Jan 2022 12:00), Max: 100 (01 Jan 2022 15:45)  HR: 62 (02 Jan 2022 14:00) (58 - 94)  BP: 90/48 (02 Jan 2022 14:00) (85/36 - 159/71)  BP(mean): 60 (02 Jan 2022 14:00) (52 - 97)  ABP: --  ABP(mean): --  RR: 22 (02 Jan 2022 14:00) (17 - 25)  SpO2: 98% (02 Jan 2022 14:00) (93% - 100%)        ## P/E:  Gen: NAD, intubated  Mouth: mmm  Neck: no accessory muscle use  Lungs: b/l rhonchi  Heart: s1s2 reg no murmur  Abd: +BS soft nontender, midline incision, +JPs  Ext: +edema LE   Neuro: following commands appropriately, weakness equally to all extremities   now 3/5      < from: Xray Chest 1 View- PORTABLE-Urgent (Xray Chest 1 View- PORTABLE-Urgent .) (12.30.21 @ 18:43) >    ACC: [id number] EXAM:  XR CHEST PORTABLE URGENT 1V                          PROCEDURE DATE:  12/30/2021          INTERPRETATION:  Portable chest radiograph    CLINICAL INFORMATION: ET tube assessment    TECHNIQUE:  Portable  AP view of the chest.    COMPARISON: 12/28/2021 chest available for review.    FINDINGS: ET tube tip above tracheal bifurcation.  NG tube tip beyond GE junction.       LEFT greater than RIGHT pleural effusions and/or LEFT basilar airspace   disease obscuring LEFT diaphragm contour. Upper zones clear.  Plate.   No pneumothorax.    The heart and mediastinum size and configuration are within normal limits.    Visualized osseous structures are intact.    IMPRESSION:   No significant interval change.    --- End of Report ---            DAR NAVARRO MD; Attending Radiologist  This document has been electronically signed. Dec 31 2021 12:55PM    < end of copied text >

## 2022-01-02 NOTE — PROGRESS NOTE ADULT - SUBJECTIVE AND OBJECTIVE BOX
Chief Complaint:  Patient is a 81y old  Female who presents with a chief complaint of diverticulitis (2022 11:10)      Interval Events:   Remains intubated.  Small brown stool via ostomy.  ~150 cc via NGT in past 24 hours.    Allergies:  No Known Allergies      Hospital Medications:  chlorhexidine 0.12% Liquid 15 milliLiter(s) Oral Mucosa every 12 hours  chlorhexidine 2% Cloths 1 Application(s) Topical <User Schedule>  dexMEDEtomidine Infusion 0.2 MICROgram(s)/kG/Hr IV Continuous <Continuous>  dextrose 40% Gel 15 Gram(s) Oral once  dextrose 5%. 1000 milliLiter(s) IV Continuous <Continuous>  dextrose 5%. 1000 milliLiter(s) IV Continuous <Continuous>  dextrose 50% Injectable 25 Gram(s) IV Push once  dextrose 50% Injectable 12.5 Gram(s) IV Push once  dextrose 50% Injectable 25 Gram(s) IV Push once  dorzolamide 2%/timolol 0.5% Ophthalmic Solution 1 Drop(s) Both EYES two times a day  enoxaparin Injectable 40 milliGRAM(s) SubCutaneous daily  fentaNYL    Injectable 25 MICROGram(s) IV Push every 4 hours PRN  fentaNYL    Injectable 50 MICROGram(s) IV Push every 4 hours PRN  fluconAZOLE IVPB 200 milliGRAM(s) IV Intermittent every 24 hours  glucagon  Injectable 1 milliGRAM(s) IntraMuscular once  levETIRAcetam  IVPB 500 milliGRAM(s) IV Intermittent every 12 hours  levothyroxine Injectable 25 MICROGram(s) IV Push at bedtime  pantoprazole  Injectable 40 milliGRAM(s) IV Push daily  Parenteral Nutrition - Adult 1 Each TPN Continuous <Continuous>  Parenteral Nutrition - Adult 1 Each TPN Continuous <Continuous>      PMHX/PSHX:  Seizure    HTN (hypertension)    Glaucoma    Thyroid disease    Blood clot of neck vein    TIA (transient ischemic attack)    S/P appendectomy          ROS:   General:  No fevers, chills  Eyes:  Good vision  ENT:  No odynophagia, dysphagia  CV:  No pain, palpitations  Resp:  No dyspnea, cough, tachypnea, wheezing  GI:  See HPI  Muscle:  No pain, weakness  Skin:  No rash, edema    Vital Signs:  Vital Signs Last 24 Hrs  T(C): 37.8 (2022 08:00), Max: 37.8 (2022 15:45)  T(F): 100 (2022 08:00), Max: 100 (2022 15:45)  HR: 85 (2022 13:14) (58 - 94)  BP: 110/51 (2022 11:00) (85/36 - 161/91)  BP(mean): 69 (2022 11:00) (52 - 109)  RR: 23 (2022 11:00) (18 - 24)  SpO2: 94% (2022 13:14) (94% - 100%)  Daily     Daily Weight in k.5 (2022 05:00)    PHYSICAL EXAM:   GENERAL:  No distress  HEENT: conjunctivae clear  CHEST: intubated  HEART:  Regular rhythm  ABDOMEN:  Soft, ostomy with brown stool  EXTREMITIES:  2+ edema  SKIN:  Dry/warm  NEURO:  sedated      LABS:                        8.6    22.98 )-----------( 341      ( 2022 02:11 )             25.6     01-02    136  |  103  |  16  ----------------------------<  114<H>  4.0   |  26  |  0.56    Ca    7.0<L>      2022 02:11  Phos  3.4     01-02  Mg     2.5     01-02    TPro  5.2<L>  /  Alb  1.0<L>  /  TBili  0.4  /  DBili  x   /  AST  40<H>  /  ALT  20  /  AlkPhos  118  01-02    LIVER FUNCTIONS - ( 2022 02:11 )  Alb: 1.0 g/dL / Pro: 5.2 gm/dL / ALK PHOS: 118 U/L / ALT: 20 U/L / AST: 40 U/L / GGT: x                   Imaging:  < from: CT Abdomen and Pelvis w/ IV Cont (12.30.21 @ 15:35) >  IMPRESSION:    Endotracheal tube enters right mainstem bronchus. Recommend repositioning   by pulling back the tube by approximately 2 to 3 cm with subsequent   confirmatory chest radiograph.    Enteric tube coiled within stomach, tip terminates within the fundus.   Consider repositioning    New multifocal small peripheral groundglass infiltrates suspicious for   pneumonia. Small pleural effusions.    Several small loculated fluid collections along the paracolic gutters and   lower pelvis with enhancement of the peritoneal reflections which may   indicate peritonitis.    Nonspecific mild diffuse gallbladder wall thickening with similar   distention    Findings were discussed with  Dr Ramirez 2021 4:41 PM by Dr. Chacon   with read back confirmation.    --- End of Report ---            JESS CHACON MD; AttendingRadiologist  This document has been electronically signed. Dec 30 2021  4:48PM    < end of copied text >

## 2022-01-02 NOTE — PROGRESS NOTE ADULT - ASSESSMENT
A/P: 81F PMH HTN, HLD, hypothyroidism, depression, seizures admitted with Acute Sigmoid Diverticulitis, Surgery consulted for interval perforated sigmoid diverticulitis. S/P ex lap, sigmoid resection, peritoneal lavage 12/25 and RTOR 12/26 for Irrigation of abdominal cavity with closure and creation of colostomy. Intubated. Off pressors. A-fib RVR s/p amio converted to sinus rhythm. Leukocytosis, slowly improving   CT 12/30 shows several loculated IA fluid collections  - continue ICU management and vent support  - NPO, PPN per GI, NGT to LCWS  - analgesia prn  - local wound care per surgery, local drain care  - continue abx per ID  - for IR consult for drainage of collection

## 2022-01-02 NOTE — PROGRESS NOTE ADULT - ASSESSMENT
HPI:  Patient is a 81F with a PMH of HTN, HLD, hypothyroidism, depression, seizures who presents to the ED for abd pain.  Patient states that over the last two days she had developed significant abdominal pain and multiple episodes of watery diarrhea.  Reports one episode of nausea and vomiting earlier today.  Patient has no other complaints.  Vitals stable,  labs show leukocytosis and hypokalemia.  CT abdomen significant for diverticulitis.  Will admit to med surg.        1.  Complicated diverticulitis status post repair with colostomy    Recs:  - Monitor CBC, CMP, Mag, Phos  - Continue antibiotics per ID  - Continue PPN for prolonged NPO status, critical illness.

## 2022-01-02 NOTE — PROGRESS NOTE ADULT - SUBJECTIVE AND OBJECTIVE BOX
Patient seen and examined bedside in ICU  No overnight events  Intubated, Currently on pressure support  PPN running      MEDICATIONS  (STANDING):  chlorhexidine 0.12% Liquid 15 milliLiter(s) Oral Mucosa every 12 hours  chlorhexidine 2% Cloths 1 Application(s) Topical <User Schedule>  dexMEDEtomidine Infusion 0.2 MICROgram(s)/kG/Hr (4.05 mL/Hr) IV Continuous <Continuous>  dextrose 40% Gel 15 Gram(s) Oral once  dextrose 5%. 1000 milliLiter(s) (50 mL/Hr) IV Continuous <Continuous>  dextrose 5%. 1000 milliLiter(s) (100 mL/Hr) IV Continuous <Continuous>  dextrose 50% Injectable 25 Gram(s) IV Push once  dextrose 50% Injectable 12.5 Gram(s) IV Push once  dextrose 50% Injectable 25 Gram(s) IV Push once  dorzolamide 2%/timolol 0.5% Ophthalmic Solution 1 Drop(s) Both EYES two times a day  enoxaparin Injectable 40 milliGRAM(s) SubCutaneous daily  fluconAZOLE IVPB 200 milliGRAM(s) IV Intermittent every 24 hours  glucagon  Injectable 1 milliGRAM(s) IntraMuscular once  levETIRAcetam  IVPB 500 milliGRAM(s) IV Intermittent every 12 hours  levothyroxine Injectable 25 MICROGram(s) IV Push at bedtime  pantoprazole  Injectable 40 milliGRAM(s) IV Push daily  Parenteral Nutrition - Adult 1 Each (63 mL/Hr) TPN Continuous <Continuous>  piperacillin/tazobactam IVPB.. 3.375 Gram(s) IV Intermittent once    MEDICATIONS  (PRN):  fentaNYL    Injectable 25 MICROGram(s) IV Push every 4 hours PRN Moderate Pain (4 - 6)  fentaNYL    Injectable 50 MICROGram(s) IV Push every 4 hours PRN Severe Pain (7 - 10)      Vital Signs Last 24 Hrs  T(C): 37.4 (02 Jan 2022 05:00), Max: 37.8 (01 Jan 2022 15:45)  T(F): 99.3 (02 Jan 2022 05:00), Max: 100 (01 Jan 2022 15:45)  HR: 66 (02 Jan 2022 06:00) (60 - 98)  BP: 94/47 (02 Jan 2022 06:00) (85/36 - 172/133)  BP(mean): 61 (02 Jan 2022 06:00) (52 - 147)  RR: 18 (02 Jan 2022 06:00) (18 - 29)  SpO2: 99% (02 Jan 2022 06:00) (96% - 100%)    PHYSICAL EXAM:  General: NAD, WDWN  Neuro:  Alert & oriented x 3  HEENT: NCAT, EOMI, conjunctiva clear  CV: +S1+S2 regular rate and rhythm  Lung: clear to ausculation bilaterally, respirations nonlabored, good inspiratory effort  Abdomen: soft, midline wound retention sutures intact, skin openings irrigated with saline and repacked with iodoform packing, dry dressing applied. Granulating. No gross drainage. Inferior aspect of wound with moderate adherent slough. Colostomy pink and viable, +stool in bag, leaking around ostomy appliance. New appliance fitted and placed. B/l BABAK drains with scant serous drainage. Tubing stripped. Dressings changed.  Extremities: no pedal edema or calf tenderness noted     I&O's Detail    31 Dec 2021 07:01  -  01 Jan 2022 07:00  --------------------------------------------------------  IN:    Fat Emulsion (Plant Based) 20%: 139.5 mL    FentaNYL: 24.3 mL    IV PiggyBack: 150 mL    IV PiggyBack: 700 mL    PPN (Peripheral Parenteral Nutrition): 1491 mL  Total IN: 2504.8 mL    OUT:    Bulb (mL): 0 mL    Bulb (mL): 0 mL    Colostomy (mL): 30 mL    Indwelling Catheter - Urethral (mL): 5025 mL    Nasogastric/Oral tube (mL): 0 mL  Total OUT: 5055 mL    Total NET: -2550.2 mL      01 Jan 2022 07:01  -  02 Jan 2022 06:37  --------------------------------------------------------  IN:    Dexmedetomidine: 170 mL    Fat Emulsion (Plant Based) 20%: 148.8 mL    IV PiggyBack: 400 mL    IV PiggyBack: 110 mL    PPN (Peripheral Parenteral Nutrition): 831.6 mL  Total IN: 1660.4 mL    OUT:    Bulb (mL): 10 mL    Bulb (mL): 10 mL    Colostomy (mL): 50 mL    Indwelling Catheter - Urethral (mL): 2700 mL    Nasogastric/Oral tube (mL): 250 mL    Other (mL): 20 mL  Total OUT: 3040 mL    Total NET: -1379.6 mL          LABS:                        8.6    22.98 )-----------( 341      ( 02 Jan 2022 02:11 )             25.6     01-02    136  |  103  |  16  ----------------------------<  114<H>  4.0   |  26  |  0.56    Ca    7.0<L>      02 Jan 2022 02:11  Phos  3.4     01-02  Mg     2.5     01-02    TPro  5.2<L>  /  Alb  1.0<L>  /  TBili  0.4  /  DBili  x   /  AST  40<H>  /  ALT  20  /  AlkPhos  118  01-02

## 2022-01-03 LAB
ALBUMIN SERPL ELPH-MCNC: 1.1 G/DL — LOW (ref 3.3–5)
ALP SERPL-CCNC: 132 U/L — HIGH (ref 40–120)
ALT FLD-CCNC: 30 U/L — SIGNIFICANT CHANGE UP (ref 12–78)
ANION GAP SERPL CALC-SCNC: 8 MMOL/L — SIGNIFICANT CHANGE UP (ref 5–17)
AST SERPL-CCNC: 46 U/L — HIGH (ref 15–37)
BILIRUB SERPL-MCNC: 0.4 MG/DL — SIGNIFICANT CHANGE UP (ref 0.2–1.2)
BUN SERPL-MCNC: 19 MG/DL — SIGNIFICANT CHANGE UP (ref 7–23)
CALCIUM SERPL-MCNC: 7 MG/DL — LOW (ref 8.5–10.1)
CHLORIDE SERPL-SCNC: 104 MMOL/L — SIGNIFICANT CHANGE UP (ref 96–108)
CO2 SERPL-SCNC: 24 MMOL/L — SIGNIFICANT CHANGE UP (ref 22–31)
CREAT SERPL-MCNC: 0.6 MG/DL — SIGNIFICANT CHANGE UP (ref 0.5–1.3)
GLUCOSE BLDC GLUCOMTR-MCNC: 136 MG/DL — HIGH (ref 70–99)
GLUCOSE BLDC GLUCOMTR-MCNC: 153 MG/DL — HIGH (ref 70–99)
GLUCOSE BLDC GLUCOMTR-MCNC: 158 MG/DL — HIGH (ref 70–99)
GLUCOSE SERPL-MCNC: 118 MG/DL — HIGH (ref 70–99)
GRAM STN FLD: SIGNIFICANT CHANGE UP
HCT VFR BLD CALC: 23.9 % — LOW (ref 34.5–45)
HGB BLD-MCNC: 7.8 G/DL — LOW (ref 11.5–15.5)
MAGNESIUM SERPL-MCNC: 2.5 MG/DL — SIGNIFICANT CHANGE UP (ref 1.6–2.6)
MCHC RBC-ENTMCNC: 29 PG — SIGNIFICANT CHANGE UP (ref 27–34)
MCHC RBC-ENTMCNC: 32.6 GM/DL — SIGNIFICANT CHANGE UP (ref 32–36)
MCV RBC AUTO: 88.8 FL — SIGNIFICANT CHANGE UP (ref 80–100)
NRBC # BLD: 0 /100 WBCS — SIGNIFICANT CHANGE UP (ref 0–0)
PHOSPHATE SERPL-MCNC: 3 MG/DL — SIGNIFICANT CHANGE UP (ref 2.5–4.5)
PLATELET # BLD AUTO: 263 K/UL — SIGNIFICANT CHANGE UP (ref 150–400)
POTASSIUM SERPL-MCNC: 3.1 MMOL/L — LOW (ref 3.5–5.3)
POTASSIUM SERPL-SCNC: 3.1 MMOL/L — LOW (ref 3.5–5.3)
PROT SERPL-MCNC: 5.1 GM/DL — LOW (ref 6–8.3)
RBC # BLD: 2.69 M/UL — LOW (ref 3.8–5.2)
RBC # FLD: 14.6 % — HIGH (ref 10.3–14.5)
SODIUM SERPL-SCNC: 136 MMOL/L — SIGNIFICANT CHANGE UP (ref 135–145)
SPECIMEN SOURCE: SIGNIFICANT CHANGE UP
WBC # BLD: 20.29 K/UL — HIGH (ref 3.8–10.5)
WBC # FLD AUTO: 20.29 K/UL — HIGH (ref 3.8–10.5)

## 2022-01-03 PROCEDURE — 99221 1ST HOSP IP/OBS SF/LOW 40: CPT | Mod: 25

## 2022-01-03 PROCEDURE — 76942 ECHO GUIDE FOR BIOPSY: CPT | Mod: 26

## 2022-01-03 PROCEDURE — 99232 SBSQ HOSP IP/OBS MODERATE 35: CPT

## 2022-01-03 PROCEDURE — 10160 PNXR ASPIR ABSC HMTMA BULLA: CPT

## 2022-01-03 PROCEDURE — 99291 CRITICAL CARE FIRST HOUR: CPT

## 2022-01-03 RX ORDER — ELECTROLYTE SOLUTION,INJ
1 VIAL (ML) INTRAVENOUS
Refills: 0 | Status: DISCONTINUED | OUTPATIENT
Start: 2022-01-03 | End: 2022-01-03

## 2022-01-03 RX ORDER — POTASSIUM CHLORIDE 20 MEQ
10 PACKET (EA) ORAL
Refills: 0 | Status: COMPLETED | OUTPATIENT
Start: 2022-01-03 | End: 2022-01-03

## 2022-01-03 RX ORDER — POTASSIUM CHLORIDE 20 MEQ
10 PACKET (EA) ORAL ONCE
Refills: 0 | Status: COMPLETED | OUTPATIENT
Start: 2022-01-03 | End: 2022-01-03

## 2022-01-03 RX ORDER — FUROSEMIDE 40 MG
40 TABLET ORAL ONCE
Refills: 0 | Status: DISCONTINUED | OUTPATIENT
Start: 2022-01-03 | End: 2022-01-03

## 2022-01-03 RX ORDER — I.V. FAT EMULSION 20 G/100ML
0.62 EMULSION INTRAVENOUS
Qty: 50 | Refills: 0 | Status: DISCONTINUED | OUTPATIENT
Start: 2022-01-03 | End: 2022-01-03

## 2022-01-03 RX ORDER — ALBUMIN HUMAN 25 %
100 VIAL (ML) INTRAVENOUS ONCE
Refills: 0 | Status: COMPLETED | OUTPATIENT
Start: 2022-01-03 | End: 2022-01-03

## 2022-01-03 RX ORDER — FUROSEMIDE 40 MG
40 TABLET ORAL ONCE
Refills: 0 | Status: COMPLETED | OUTPATIENT
Start: 2022-01-03 | End: 2022-01-03

## 2022-01-03 RX ORDER — ACETAMINOPHEN 500 MG
650 TABLET ORAL ONCE
Refills: 0 | Status: COMPLETED | OUTPATIENT
Start: 2022-01-03 | End: 2022-01-03

## 2022-01-03 RX ADMIN — DORZOLAMIDE HYDROCHLORIDE TIMOLOL MALEATE 1 DROP(S): 20; 5 SOLUTION/ DROPS OPHTHALMIC at 17:34

## 2022-01-03 RX ADMIN — PANTOPRAZOLE SODIUM 40 MILLIGRAM(S): 20 TABLET, DELAYED RELEASE ORAL at 11:09

## 2022-01-03 RX ADMIN — CHLORHEXIDINE GLUCONATE 15 MILLILITER(S): 213 SOLUTION TOPICAL at 17:34

## 2022-01-03 RX ADMIN — DORZOLAMIDE HYDROCHLORIDE TIMOLOL MALEATE 1 DROP(S): 20; 5 SOLUTION/ DROPS OPHTHALMIC at 05:10

## 2022-01-03 RX ADMIN — LEVETIRACETAM 420 MILLIGRAM(S): 250 TABLET, FILM COATED ORAL at 03:18

## 2022-01-03 RX ADMIN — Medication 100 MILLIEQUIVALENT(S): at 10:49

## 2022-01-03 RX ADMIN — Medication 650 MILLIGRAM(S): at 23:37

## 2022-01-03 RX ADMIN — Medication 50 MILLILITER(S): at 12:37

## 2022-01-03 RX ADMIN — PIPERACILLIN AND TAZOBACTAM 25 GRAM(S): 4; .5 INJECTION, POWDER, LYOPHILIZED, FOR SOLUTION INTRAVENOUS at 03:34

## 2022-01-03 RX ADMIN — Medication 100 MILLIEQUIVALENT(S): at 12:36

## 2022-01-03 RX ADMIN — FENTANYL CITRATE 50 MICROGRAM(S): 50 INJECTION INTRAVENOUS at 06:38

## 2022-01-03 RX ADMIN — Medication 100 MILLIEQUIVALENT(S): at 08:32

## 2022-01-03 RX ADMIN — Medication 650 MILLIGRAM(S): at 23:28

## 2022-01-03 RX ADMIN — CHLORHEXIDINE GLUCONATE 15 MILLILITER(S): 213 SOLUTION TOPICAL at 05:09

## 2022-01-03 RX ADMIN — Medication 1 EACH: at 22:01

## 2022-01-03 RX ADMIN — Medication 100 MILLIEQUIVALENT(S): at 09:40

## 2022-01-03 RX ADMIN — DEXMEDETOMIDINE HYDROCHLORIDE IN 0.9% SODIUM CHLORIDE 4.05 MICROGRAM(S)/KG/HR: 4 INJECTION INTRAVENOUS at 18:22

## 2022-01-03 RX ADMIN — FLUCONAZOLE 100 MILLIGRAM(S): 150 TABLET ORAL at 23:28

## 2022-01-03 RX ADMIN — PIPERACILLIN AND TAZOBACTAM 25 GRAM(S): 4; .5 INJECTION, POWDER, LYOPHILIZED, FOR SOLUTION INTRAVENOUS at 11:09

## 2022-01-03 RX ADMIN — PIPERACILLIN AND TAZOBACTAM 25 GRAM(S): 4; .5 INJECTION, POWDER, LYOPHILIZED, FOR SOLUTION INTRAVENOUS at 19:53

## 2022-01-03 RX ADMIN — LEVETIRACETAM 420 MILLIGRAM(S): 250 TABLET, FILM COATED ORAL at 15:15

## 2022-01-03 RX ADMIN — Medication 100 MILLIEQUIVALENT(S): at 04:30

## 2022-01-03 RX ADMIN — FENTANYL CITRATE 50 MICROGRAM(S): 50 INJECTION INTRAVENOUS at 23:51

## 2022-01-03 RX ADMIN — DEXMEDETOMIDINE HYDROCHLORIDE IN 0.9% SODIUM CHLORIDE 4.05 MICROGRAM(S)/KG/HR: 4 INJECTION INTRAVENOUS at 09:39

## 2022-01-03 RX ADMIN — Medication 25 MICROGRAM(S): at 21:34

## 2022-01-03 RX ADMIN — Medication 100 MILLIEQUIVALENT(S): at 05:09

## 2022-01-03 RX ADMIN — DEXMEDETOMIDINE HYDROCHLORIDE IN 0.9% SODIUM CHLORIDE 4.05 MICROGRAM(S)/KG/HR: 4 INJECTION INTRAVENOUS at 00:02

## 2022-01-03 RX ADMIN — Medication 100 MILLIEQUIVALENT(S): at 03:35

## 2022-01-03 RX ADMIN — Medication 40 MILLIGRAM(S): at 13:28

## 2022-01-03 RX ADMIN — DEXMEDETOMIDINE HYDROCHLORIDE IN 0.9% SODIUM CHLORIDE 4.05 MICROGRAM(S)/KG/HR: 4 INJECTION INTRAVENOUS at 13:58

## 2022-01-03 RX ADMIN — I.V. FAT EMULSION 10.4 GM/KG/DAY: 20 EMULSION INTRAVENOUS at 17:34

## 2022-01-03 RX ADMIN — CHLORHEXIDINE GLUCONATE 1 APPLICATION(S): 213 SOLUTION TOPICAL at 05:09

## 2022-01-03 RX ADMIN — FENTANYL CITRATE 50 MICROGRAM(S): 50 INJECTION INTRAVENOUS at 07:00

## 2022-01-03 RX ADMIN — DEXMEDETOMIDINE HYDROCHLORIDE IN 0.9% SODIUM CHLORIDE 4.05 MICROGRAM(S)/KG/HR: 4 INJECTION INTRAVENOUS at 23:12

## 2022-01-03 NOTE — CONSULT NOTE ADULT - SUBJECTIVE AND OBJECTIVE BOX
Interventional Radiology    Evaluate for Procedure:     HPI:  Patient is a 81F with a PMH of HTN, HLD, hypothyroidism, depression, seizures who presents to the ED for abd pain.  Patient states that over the last two days she had developed significant abdominal pain and multiple episodes of watery diarrhea.  Reports one episode of nausea and vomiting earlier today.  Patient has no other complaints.  Vitals stable,  labs show leukocytosis and hypokalemia.  CT abdomen significant for diverticulitis.     (23 Dec 2021 00:17)    ROS: unable to obtain      PMHx  Seizure    HTN (hypertension)    Glaucoma    Thyroid disease    Blood clot of neck vein    TIA (transient ischemic attack)      Allergies  No Known Allergies    Medications  furosemide   Injectable, Routine  furosemide   Injectable, Routine  piperacillin/tazobactam IVPB.., 09:00  piperacillin/tazobactam IVPB.., Now    PHYSICAL EXAM:  T(C): 37.6, Max: 37.8 (01-02-22 @ 12:00)  HR: 69  BP: 110/71  RR: 23  SpO2: 100%    PHYSICAL EXAM:  General: NAD, WDWN  Neuro:  Alert  and responsive  HEENT: NCAT, EOMI, conjunctiva clear, orally intubated  CV: +S1+S2 regular rate and rhythm  Lung: clear to ausculation bilaterally, respirations nonlabored, good inspiratory effort  Abdomen: soft, midline wound retention sutures intact,  Colostomy pink and viable, +stool in bag,  Extremities: no pedal edema or calf tenderness noted     LABS:  WBC 20.29 / HgB 7.8 / Hct 23.9 / Plt 263  Na 136 / K 3.1 / CO2 24 / Cl 104 / BUN 19 / Cr 0.60 / Glucose 118  ALT 30 / 46 / Alk Phos 132 / TBili 0.4  PTT -- / PT -- / INR --    Radiology:

## 2022-01-03 NOTE — PROGRESS NOTE ADULT - SUBJECTIVE AND OBJECTIVE BOX
24 hr events:  s/p IR drainage of abdominal fluid collection  failed weaning trial x2 this morning: became tachypneic and SOB  placed on higher pressure support 15 this afternoon with 3rd attempt at weaning and tolerating wean a little better with high support  colostomy with brown stool output  remains on PPN  awake, responsive on sedation vacation, following commands  leukocytosis improving      ## ROS:  [ x] unable to obtain due to intubation      ## Labs:  CBC:                        7.8    20.29 )-----------( 263      ( 03 Jan 2022 02:20 )             23.9     Chem:  01-03    136  |  104  |  19  ----------------------------<  118<H>  3.1<L>   |  24  |  0.60    Ca    7.0<L>      03 Jan 2022 02:20  Phos  3.0     01-03  Mg     2.5     01-03    TPro  5.1<L>  /  Alb  1.1<L>  /  TBili  0.4  / AST  46<H>  /  ALT  30  /  AlkPhos  132<H>  01-03    Culture - Sputum . (12.28.21 @ 18:54)    Specimen Source: .Sputum Sputum    Culture Results: Normal Respiratory Sendy present    Culture - Blood (12.28.21 @ 15:04)    Specimen Source: .Blood Blood    Culture Results: No Growth Final      Culture - Body Fluid with Gram Stain (12.25.21 @ 12:31)    Specimen Source: .Body Fluid INTRA -ABDOMINAL FLUID FOR CULTURE    Culture Results:   Few Escherichia coli  Rare Escherichia coli #2  Few Candida albicans "Susceptibilities not performed"  Moderate Bacteroides thetaiotamcron group "Susceptibilities not performed"  Moderate Bacteroides uniformis "Susceptibilities not performed"         ## Imaging:  CT abdomen < from: CT Abdomen and Pelvis w/ IV Cont (12.30.21 @ 15:35) >  Endotracheal tube enters right mainstem bronchus. Recommend repositioning   by pulling back the tube by approximately 2 to 3 cm with subsequent   confirmatory chest radiograph.    Enteric tube coiled within stomach, tip terminates within the fundus.   Consider repositioning    New multifocal small peripheral groundglass infiltrates suspicious for   pneumonia. Small pleural effusions.    Several small loculated fluid collections along the paracolic gutters and   lower pelvis with collection greater on the right with largest pocket measuring   5 x 5 x 2.4 cm with enhancement of the peritoneal reflections which may   indicate peritonitis.     Nonspecific mild diffuse gallbladder wall thickening with similar   distention        ## Medications:  fluconAZOLE IVPB 200 milliGRAM(s) IV Intermittent every 24 hours  piperacillin/tazobactam IVPB.. 3.375 Gram(s) IV Intermittent every 8 hours    dextrose 40% Gel 15 Gram(s) Oral once  dextrose 50% Injectable 25 Gram(s) IV Push once  dextrose 50% Injectable 25 Gram(s) IV Push once  dextrose 50% Injectable 12.5 Gram(s) IV Push once  glucagon  Injectable 1 milliGRAM(s) IntraMuscular once  levothyroxine Injectable 25 MICROGram(s) IV Push at bedtime    enoxaparin Injectable 40 milliGRAM(s) SubCutaneous daily    pantoprazole  Injectable 40 milliGRAM(s) IV Push daily    dexMEDEtomidine Infusion 0.2 MICROgram(s)/kG/Hr IV Continuous <Continuous>  fentaNYL    Injectable 25 MICROGram(s) IV Push every 4 hours PRN  fentaNYL    Injectable 50 MICROGram(s) IV Push every 4 hours PRN  levETIRAcetam  IVPB 500 milliGRAM(s) IV Intermittent every 12 hours      ## Vitals:  T(C): 37.2 (01-03-22 @ 19:58), Max: 37.7 (01-03-22 @ 12:00)  HR: 77 (01-03-22 @ 21:02) (57 - 83)  BP: 111/53 (01-03-22 @ 20:00) (96/77 - 180/88)  BP(mean): 71 (01-03-22 @ 20:00) (56 - 118)  RR: 23 (01-03-22 @ 20:00) (17 - 27)  SpO2: 99% (01-03-22 @ 21:02) (94% - 100%)    Vent: Mode: AC/ CMV (Assist Control/ Continuous Mandatory Ventilation), RR (machine): 18, RR (patient): 24, TV (machine): 450, FiO2: 35, PEEP: 5, PIP: 15        01-02 @ 07:01  -  01-03 @ 07:00  --------------------------------------------------------  IN: 2595.9 mL / OUT: 2561 mL / NET: 34.9 mL    01-03 @ 07:01 - 01-03 @ 21:29  --------------------------------------------------------  IN: 1823.5 mL / OUT: 3103 mL / NET: -1279.5 mL          ## P/E:  Gen: lying comfortably in bed in no apparent distress  HEENT: ETT in place, NGT (bilious output)  Resp: bilateral mechanical breath sounds, no wheeze, no rhonchi  CVS: RRR  Abd: soft hypoactive bowel sounds, retention sutures and abdominal incision packing remains in place, bilateral abdominal BABAK drain with serous output, colostomy with brown stool  Ext: anasarca  Neuro: awake, follows commands, able to wiggle toes, flex ankle, perform hand grasp; generalized weakness    CENTRAL LINE: [ ] YES [x ] NO  LOCATION:   DATE INSERTED:  REMOVE: [ ] YES [ ] NO      BARKLEY: [x ] YES [ ] NO    DATE INSERTED:  REMOVE:  [ ] YES [ ] NO      A-LINE:  [ ] YES [x ] NO  LOCATION:   DATE INSERTED:  REMOVE:  [ ] YES [ ] NO  EXPLAIN:    GLOBAL ISSUE/BEST PRACTICE:  Analgesia: fentanyl  Sedation: yes  HOB elevation: yes  Stress ulcer prophylaxis: protonix  VTE prophylaxis: lovenox  Oral Care: yes  Glycemic control: insulin sliding scale coverage  Nutrition: start trickle tube feeds    CODE STATUS: [x ] full code  [ ] DNR  [ ] DNI  [ ] [facility]  Goals of care discussion: [ ] yes

## 2022-01-03 NOTE — PROGRESS NOTE ADULT - ASSESSMENT
HPI:  Patient is a 81F with a PMH of HTN, HLD, hypothyroidism, depression, seizures who presents to the ED for abd pain.  Patient states that over the last two days she had developed significant abdominal pain and multiple episodes of watery diarrhea.  Reports one episode of nausea and vomiting earlier today.  Patient has no other complaints.  Vitals stable,  labs show leukocytosis and hypokalemia.  CT abdomen significant for diverticulitis.  Will admit to med surg.        1.  Complicated diverticulitis status post repair with colostomy    Recs:  - Monitor CBC, CMP, Mag, Phos  - Continue antibiotics per ID  - Continue PPN. Will need TPN ( central line / PICC line )  for prolonged NPO status.

## 2022-01-03 NOTE — CONSULT NOTE ADULT - TIME BILLING
GI
Review of history, imaging, physical exam, procedural indications, discussion with the primary team and patient's family

## 2022-01-03 NOTE — PROGRESS NOTE ADULT - SUBJECTIVE AND OBJECTIVE BOX
Patient seen and examined bedside in ICU  Tachypneic with SBT attempt this am.  Colostomy functioning  PPN running    T(F): 99.6 (01-03-22 @ 08:16), Max: 100 (01-02-22 @ 12:00)  HR: 69 (01-03-22 @ 09:00) (56 - 94)  BP: 110/71 (01-03-22 @ 09:00) (85/34 - 159/71)  RR: 23 (01-03-22 @ 09:00) (17 - 27)  SpO2: 100% (01-03-22 @ 09:00) (93% - 100%)  POCT Blood Glucose.: 132 mg/dL (02 Jan 2022 23:51)  POCT Blood Glucose.: 137 mg/dL (02 Jan 2022 16:33)      PHYSICAL EXAM:  General: NAD  HEENT: orally intubated. NGT to LWS with bilious output, 150cc/24h, plus 50cc since 7am today  CV: +S1+S2   Lung: mechanically vented  Abdomen: soft, midline wound retention sutures intact, skin openings irrigated with saline and repacked with 2" iodoform packing, dry dressing applied. Granulating. No gross drainage. Inferior aspect of wound with moderate adherent slough. Colostomy pink and viable, formed stool in bag, 100cc output. Serous output noted from RLQ BABAK: 40cc and LLQ BABAK 1cc  Extremities: pitting edema  : mc catheter with clear urine output, 2270cc      LABS:                        7.8    20.29 )-----------( 263      ( 03 Jan 2022 02:20 )             23.9     01-03    136  |  104  |  19  ----------------------------<  118<H>  3.1<L>   |  24  |  0.60    Ca    7.0<L>      03 Jan 2022 02:20  Phos  3.0     01-03  Mg     2.5     01-03    TPro  5.1<L>  /  Alb  1.1<L>  /  TBili  0.4  /  DBili  x   /  AST  46<H>  /  ALT  30  /  AlkPhos  132<H>  01-03      Culture Results:   Normal Respiratory Sendy present (12-28 @ 18:54)  Culture Results:   No Growth Final (12-28 @ 15:04)  Culture Results:   No Growth Final (12-28 @ 15:04)      A/P: 81F admitted with Acute Sigmoid Diverticulitis, Surgery consulted for interval perforated sigmoid diverticulitis. S/P ex lap, sigmoid resection, peritoneal lavage 12/25 and RTOR 12/26 for Irrigation of abdominal cavity with closure and creation of colostomy. Intubated. Leukocytosis downtrending  CT 12/30 shows several loculated IA fluid collections  - continue ICU management and vent support, SBT  - Continue PPN per GI, NGT to LCWS. If output lessens/becomes less bilious consider initiating tube feeds  - analgesia prn  - local wound care per surgery, local drain care. ostomy management per RN  - continue abx per ID  - IR to drain collection, Follow up cultures  - discussed with Dr Castro

## 2022-01-03 NOTE — CONSULT NOTE ADULT - ASSESSMENT
A/P: 81F   S/P ex lap, sigmoid resection, peritoneal lavage 12/25 and RTOR 12/26 for Irrigation of abdominal cavity with closure and creation of colostomy    PMH HTN, HLD, hypothyroidism, depression, seizures admitted with Acute Sigmoid Diverticulitis,.     Intubated. Off pressors. A-fib RVR s/p amio converted to sinus rhythm. Leukocytosis, slowly improving     CT 12/30 shows several loculated IA fluid collections    -Keep patient NPO, continue to hold Lovenox  -Will obtain consent for bedside drainage of Intraabdominal collection  Discussed with Dr Hilario A/P: 81F   S/P ex lap, sigmoid resection, peritoneal lavage 12/25 and RTOR 12/26 for Irrigation of abdominal cavity with closure and creation of colostomy    PMH HTN, HLD, hypothyroidism, depression, seizures admitted with Acute Sigmoid Diverticulitis,.     Intubated. Off pressors. A-fib RVR s/p amio converted to sinus rhythm. Leukocytosis, slowly improving     CT 12/30 shows several loculated IA fluid collections    -Keep patient NPO, continue to hold Lovenox  -Will obtain consent for bedside aspiration of Intraabdominal collection  Discussed with Dr Hilario

## 2022-01-03 NOTE — PROGRESS NOTE ADULT - SUBJECTIVE AND OBJECTIVE BOX
Patient is a 81y old  Female who presents with a chief complaint of diverticulitis (03 Jan 2022 09:48)      HPI:  Patient is a 81F with a PMH of HTN, HLD, hypothyroidism, depression, seizures who presents to the ED for abd pain.  Patient states that over the last two days she had developed significant abdominal pain and multiple episodes of watery diarrhea.  Reports one episode of nausea and vomiting earlier today.  Patient has no other complaints.  Vitals stable,  labs show leukocytosis and hypokalemia.  CT abdomen significant for diverticulitis.  Will admit to med surg.     (23 Dec 2021 00:17)      INTERVAL HPI/OVERNIGHT EVENTS: ICU #6 , Intubated (+) Patient seen earlier today  Brown, very soft stool in ostomy bag. No N/V NGT (+)    MEDICATIONS  (STANDING):  chlorhexidine 0.12% Liquid 15 milliLiter(s) Oral Mucosa every 12 hours  chlorhexidine 2% Cloths 1 Application(s) Topical <User Schedule>  dexMEDEtomidine Infusion 0.2 MICROgram(s)/kG/Hr (4.05 mL/Hr) IV Continuous <Continuous>  dextrose 40% Gel 15 Gram(s) Oral once  dextrose 5%. 1000 milliLiter(s) (50 mL/Hr) IV Continuous <Continuous>  dextrose 5%. 1000 milliLiter(s) (100 mL/Hr) IV Continuous <Continuous>  dextrose 50% Injectable 25 Gram(s) IV Push once  dextrose 50% Injectable 25 Gram(s) IV Push once  dextrose 50% Injectable 12.5 Gram(s) IV Push once  dorzolamide 2%/timolol 0.5% Ophthalmic Solution 1 Drop(s) Both EYES two times a day  enoxaparin Injectable 40 milliGRAM(s) SubCutaneous daily  fat emulsion (Plant Based) 20% Infusion 0.6173 Gm/kG/Day (10.4 mL/Hr) IV Continuous <Continuous>  fluconAZOLE IVPB 200 milliGRAM(s) IV Intermittent every 24 hours  glucagon  Injectable 1 milliGRAM(s) IntraMuscular once  levETIRAcetam  IVPB 500 milliGRAM(s) IV Intermittent every 12 hours  levothyroxine Injectable 25 MICROGram(s) IV Push at bedtime  pantoprazole  Injectable 40 milliGRAM(s) IV Push daily  Parenteral Nutrition - Adult 1 Each (63 mL/Hr) TPN Continuous <Continuous>  Parenteral Nutrition - Adult 1 Each (63 mL/Hr) TPN Continuous <Continuous>  piperacillin/tazobactam IVPB.. 3.375 Gram(s) IV Intermittent every 8 hours    MEDICATIONS  (PRN):  fentaNYL    Injectable 25 MICROGram(s) IV Push every 4 hours PRN Moderate Pain (4 - 6)  fentaNYL    Injectable 50 MICROGram(s) IV Push every 4 hours PRN Severe Pain (7 - 10)      FAMILY HISTORY:  FH: HTN (hypertension)        Allergies    No Known Allergies    Intolerances        PMH/PSH:  Seizure    HTN (hypertension)    Glaucoma    Thyroid disease    Blood clot of neck vein    TIA (transient ischemic attack)    S/P appendectomy          REVIEW OF SYSTEMS: Patient's eyes are open and follows but does not communicate  CONSTITUTIONAL: No fever, weight loss,  EYES: No eye pain, visual disturbances, or discharge  ENMT:  No difficulty hearing, tinnitus, vertigo; No sinus or throat pain  NECK: No pain or stiffness  BREASTS: No pain, masses, or nipple discharge  RESPIRATORY: No cough, wheezing, chills or hemoptysis;  CARDIOVASCULAR: No chest pain, palpitations, dizziness, or leg swelling  GASTROINTESTINAL: See above   GENITOURINARY: No dysuria, frequency, hematuria, or incontinence  NEUROLOGICAL: No headaches, memory loss, , numbness, or tremors  SKIN: No itching, burning, rashes, or lesions   LYMPH NODES: No enlarged glands  ENDOCRINE: No heat or cold intolerance; No hair loss  MUSCULOSKELETAL: No joint pain or swelling; No muscle, back, or extremity pain  PSYCHIATRIC: No depression, anxiety, mood swings, or difficulty sleeping  HEME/LYMPH: No easy bruising, or bleeding gums  ALLERGY AND IMMUNOLOGIC: No hives or eczema    Vital Signs Last 24 Hrs  T(C): 37.7 (03 Jan 2022 17:00), Max: 37.7 (03 Jan 2022 12:00)  T(F): 99.9 (03 Jan 2022 17:00), Max: 99.9 (03 Jan 2022 17:00)  HR: 71 (03 Jan 2022 18:00) (56 - 83)  BP: 118/50 (03 Jan 2022 18:00) (91/38 - 180/88)  BP(mean): 71 (03 Jan 2022 18:00) (55 - 118)  RR: 20 (03 Jan 2022 18:00) (17 - 27)  SpO2: 100% (03 Jan 2022 18:00) (94% - 100%)    PHYSICAL EXAM:  GENERAL: NAD, well-groomed, well-developed  HEAD:  Atraumatic, Normocephalic  EYES: EOMI, PERRLA, conjunctiva and sclera clear  NECK: Supple, No JVD, Normal thyroid  NERVOUS SYSTEM:  more alert but does not communicate ( see above )  CHEST/LUNG: Clear to percussion bilaterally; No rales, rhonchi, wheezing, or rubs  HEART: Regular rate and rhythm; No murmurs, rubs, or gallops  ABDOMEN: Soft, Nontender, Nondistended; Bowel sounds present  EXTREMITIES:  2+ Peripheral Pulses, No clubbing, cyanosis, or edema  LYMPH: No lymphadenopathy noted  SKIN: No rashes or lesions    LAB                          7.8    20.29 )-----------( 263      ( 03 Jan 2022 02:20 )             23.9       CBC:  01-03 @ 02:20  WBC 20.29   Hgb 7.8   Hct 23.9   Plts 263  MCV 88.8  01-02 @ 02:11  WBC 22.98   Hgb 8.6   Hct 25.6   Plts 341  MCV 86.8  01-01 @ 05:27  WBC 23.24   Hgb 10.4   Hct 31.9   Plts 365  MCV 87.6  12-31 @ 02:30  WBC 28.69   Hgb 10.4   Hct 30.0   Plts 369  MCV 85.7  12-30 @ 03:59  WBC 36.58   Hgb 9.9   Hct 30.1   Plts 274  MCV 88.0  12-29 @ 03:51  WBC 32.37   Hgb 8.8   Hct 26.8   Plts 313  MCV 88.7  12-28 @ 03:36  WBC 28.04   Hgb 8.9   Hct 27.4   Plts 311  MCV 88.7      Chemistry:  01-03 @ 02:20  Na+ 136  K+ 3.1  Cl- 104  CO2 24  BUN 19  Cr 0.60     01-02 @ 02:11  Na+ 136  K+ 4.0  Cl- 103  CO2 26  BUN 16  Cr 0.56     01-01 @ 05:27  Na+ 135  K+ 3.2  Cl- 100  CO2 25  BUN 12  Cr 0.45     12-31 @ 02:30  Na+ 135  K+ 3.1  Cl- 101  CO2 27  BUN 10  Cr 0.49     12-30 @ 13:23  Na+ 137  K+ 3.3  Cl- 104  CO2 24  BUN 10  Cr 0.49     12-30 @ 03:59  Na+ 136  K+ 3.2  Cl- 101  CO2 23  BUN 10  Cr 0.52     12-29 @ 12:28  Na+ 138  K+ 3.6  Cl- 106  CO2 23  BUN 11  Cr 0.54     12-29 @ 03:51  Na+ 137  K+ 2.7  Cl- 103  CO2 23  BUN 11  Cr 0.63     12-28 @ 03:36  Na+ 133  K+ 3.5  Cl- 104  CO2 19  BUN 17  Cr 0.74         Glucose, Serum: 118 mg/dL (01-03 @ 02:20)  Glucose, Serum: 114 mg/dL (01-02 @ 02:11)  Glucose, Serum: 123 mg/dL (01-01 @ 05:27)  Glucose, Serum: 127 mg/dL (12-31 @ 02:30)  Glucose, Serum: 135 mg/dL (12-30 @ 13:23)  Glucose, Serum: 105 mg/dL (12-30 @ 03:59)  Glucose, Serum: 80 mg/dL (12-29 @ 12:28)  Glucose, Serum: 88 mg/dL (12-29 @ 03:51)  Glucose, Serum: 109 mg/dL (12-28 @ 03:36)      03 Jan 2022 02:20    136    |  104    |  19     ----------------------------<  118    3.1     |  24     |  0.60   02 Jan 2022 02:11    136    |  103    |  16     ----------------------------<  114    4.0     |  26     |  0.56   01 Jan 2022 05:27    135    |  100    |  12     ----------------------------<  123    3.2     |  25     |  0.45   31 Dec 2021 02:30    135    |  101    |  10     ----------------------------<  127    3.1     |  27     |  0.49   30 Dec 2021 13:23    137    |  104    |  10     ----------------------------<  135    3.3     |  24     |  0.49   30 Dec 2021 03:59    136    |  101    |  10     ----------------------------<  105    3.2     |  23     |  0.52   29 Dec 2021 12:28    138    |  106    |  11     ----------------------------<  80     3.6     |  23     |  0.54     Ca    7.0        03 Jan 2022 02:20  Ca    7.0        02 Jan 2022 02:11  Ca    7.0        01 Jan 2022 05:27  Ca    7.1        31 Dec 2021 02:30  Ca    7.1        30 Dec 2021 13:23  Ca    7.2        30 Dec 2021 03:59  Ca    7.6        29 Dec 2021 12:28  Phos  3.0       03 Jan 2022 02:20  Phos  3.4       02 Jan 2022 02:11  Phos  2.5       01 Jan 2022 05:27  Phos  2.2       31 Dec 2021 02:30  Phos  2.4       30 Dec 2021 13:23  Phos  2.3       30 Dec 2021 03:59  Phos  2.4       29 Dec 2021 03:51  Mg     2.5       03 Jan 2022 02:20  Mg     2.5       02 Jan 2022 02:11  Mg     1.8       01 Jan 2022 05:27  Mg     1.8       31 Dec 2021 02:30  Mg     1.5       30 Dec 2021 13:23  Mg     1.9       30 Dec 2021 03:59  Mg     2.1       29 Dec 2021 03:51    TPro  5.1    /  Alb  1.1    /  TBili  0.4    /  DBili  x      /  AST  46     /  ALT  30     /  AlkPhos  132    03 Jan 2022 02:20  TPro  5.2    /  Alb  1.0    /  TBili  0.4    /  DBili  x      /  AST  40     /  ALT  20     /  AlkPhos  118    02 Jan 2022 02:11  TPro  4.9    /  Alb  1.0    /  TBili  0.3    /  DBili  x      /  AST  32     /  ALT  19     /  AlkPhos  120    01 Jan 2022 05:27  TPro  5.1    /  Alb  1.0    /  TBili  0.3    /  DBili  x      /  AST  26     /  ALT  17     /  AlkPhos  118    31 Dec 2021 02:30  TPro  5.2    /  Alb  1.0    /  TBili  0.3    /  DBili  x      /  AST  22     /  ALT  16     /  AlkPhos  131    30 Dec 2021 03:59  TPro  4.8    /  Alb  0.9    /  TBili  0.4    /  DBili  x      /  AST  20     /  ALT  19     /  AlkPhos  114    29 Dec 2021 03:51  TPro  4.7    /  Alb  0.9    /  TBili  0.3    /  DBili  x      /  AST  27     /  ALT  18     /  AlkPhos  78     28 Dec 2021 03:36              CAPILLARY BLOOD GLUCOSE      POCT Blood Glucose.: 158 mg/dL (03 Jan 2022 17:08)  POCT Blood Glucose.: 136 mg/dL (03 Jan 2022 11:04)  POCT Blood Glucose.: 132 mg/dL (02 Jan 2022 23:51)          RADIOLOGY & ADDITIONAL TESTS:    Imaging Personally Reviewed:  [ ] YES  [ ] NO    Consultant(s) Notes Reviewed:  [ ] YES  [ ] NO    Care Discussed with Consultants/Other Providers [ ] YES  [ ] NO

## 2022-01-03 NOTE — PROGRESS NOTE ADULT - ASSESSMENT
81F PMH HTN, HLD, hypothyroidism, depression, seizures presents for abdominal pain found to have acute sigmoid diverticulitis complicated by perforated sigmoid diverticulitis/peritonitis with abscess formation s/p ex-lap with large bowel resection / abdominal washout / wound vac placement 12/25. Brought back to OR yesterday 12/26 for abdominal closure and colostomy creation. Sepsis with septic shock requiring levophed. Remains intubated for post procedural acute hypoxic respiratory failure. Post op course with a-fib RVR, volume overload, persistent leukocytosis. Found to have multiple abdominal fluid collections s/p IR drainage of R abdominal collection today 1/3/22. Failure to wean.     NEURO  daily sedation vacation as tolerates  mental status improved  overall strength improved however still with generalized weakness      CV  out of shock state not on vasopressor support  a-fib RVR post op, s/p amio load converted to sinus rhythm  overall volume overloaded, anasarca with pitting edema on exam  has been getting diuresed with lasix   will diurese with lasix and albumin today  goal to get pt to net 500mL to 1L negative balance       PULM  weaning but on higher support  cont daily wean as tolerates  if unable to wean, will need trach  will discuss with family/son    GI  has been NPO on PPN awaiting bowel function recovery  POD #9 with RTOR for abdominal closure 12/26  retention sutures in place  monitor I/O from BABAK drain and output from colostomy  leukocytosis improving  s/p IR drainage of largest R abdominal fluid collection today (pt with multiple fluid collections)  follow up abdominal collection/abscess culture  will clamp NGT today and unclamp to see if there is significant NGT output, if not will attempt initiation of trickle feeds   surgical follow up appreciated    ID  sepsis present on admission with septic shock due to GI source from perforated diverticulitis   cont with fluconazole and zosyn for abdominal cultures growing candida and bacteroides and e-coli  CT chest also with bilateral opacities likely PNA, however antibx zosyn should be sufficient coverage for PNA  monitor WBC and fevers  repeat blood cx negative  sputum cx with normal respiratory yoly  follow up abdominal drainage culture  ID follow up appreciated      RENAL  Cr within normal limits  monitor UO  mc in place  hypokalemia: supplement low K      ENDO  cont synthroid IV for underlying hypothyroidism     GEN  full code  DVT prophylaxis: lovenox

## 2022-01-03 NOTE — PROGRESS NOTE ADULT - SUBJECTIVE AND OBJECTIVE BOX
[facility]  Division of Infectious Diseases  [phone]    Name: JUAREZ GTZ  Age: 81y  Gender: Female  MRN: [id number]    Interval History--  Notes reviewed. patient seen and examined in the AM. Remains vent dependent in ICU, awake, IR planned for drainage of fluid collection    Past Medical History--  Seizure    HTN (hypertension)    Glaucoma    Thyroid disease    Blood clot of neck vein    TIA (transient ischemic attack)    S/P appendectomy      Review of Systems--  Review of systems unable secondary to clinical condition.       For details regarding the patient's social history, family history, and other miscellaneous elements, please refer the initial infectious diseases consultation and/or the admitting history and physical examination for this admission.    Allergies    No Known Allergies    Intolerances        Medications--  Antibiotics:  fluconAZOLE IVPB 200 every 24 hours  piperacillin/tazobactam IVPB.. 3.375 every 8 hours      MEDICATIONS  (STANDING):  dexMEDEtomidine Infusion 0.2 <Continuous>  dextrose 40% Gel 15 once  dextrose 50% Injectable 25 once  dextrose 50% Injectable 25 once  dextrose 50% Injectable 12.5 once  enoxaparin Injectable 40 daily  glucagon  Injectable 1 once  levETIRAcetam  IVPB 500 every 12 hours  levothyroxine Injectable 25 at bedtime  pantoprazole  Injectable 40 daily      PRN  fentaNYL    Injectable 25 MICROGram(s) IV Push every 4 hours PRN  fentaNYL    Injectable 50 MICROGram(s) IV Push every 4 hours PRN      Physical Exam:  Vital Signs Last 24 Hrs  T(F): 99 (01-03-22 @ 19:58), Max: 99.9 (01-03-22 @ 17:00)  HR: 77 (01-03-22 @ 21:02)  BP: 111/53 (01-03-22 @ 20:00)  RR: 23 (01-03-22 @ 20:00)  SpO2: 99% (01-03-22 @ 21:02) (94% - 100%)  Wt(kg): --        General: Nontoxic-appearing Female in no acute distress.  HEENT: AT/NC. Oropharynx/dentition unable secondary to patient compliance. OETT.   Neck: Not rigid. No sense of mass.  Nodes: None palpable.  Lungs: ventilatory BS bilaterally without rales, wheezing or rhonchi  Heart: Regular rate and rhythm. No Murmur. No rub. No gallop. No palpable thrill.  Abdomen: Bowel sounds now present.  Soft. Mildly distended.  Incision dressed.  Ostomy pink with stool output. Drains scant output, serous x2. . No reaction to palpation.  still small wound dehiscence   Extremities: No cyanosis or clubbing. 2-3+ pitting edema. UE fairly symmetric today.  Skin: Warm. Dry No rash. No vasculitic stigmata.  Psychiatric: Unable      Laboratory Studies--  WBC Count: 20.29 K/uL (01-03 @ 02:20)  WBC Count: 22.98 K/uL (01-02 @ 02:11)  WBC Count: 23.24 K/uL (01-01 @ 05:27)  WBC Count: 28.69 K/uL (12-31 @ 02:30)  WBC Count: 36.58 K/uL (12-30 @ 03:59)  WBC Count: 32.37 K/uL (12-29 @ 03:51)  WBC Count: 28.04 K/uL (12-28 @ 03:36)                            7.8    20.29 )-----------( 263      ( 03 Jan 2022 02:20 )             23.9       01-03    136  |  104  |  19  ----------------------------<  118<H>  3.1<L>   |  24  |  0.60    Ca    7.0<L>      03 Jan 2022 02:20  Phos  3.0     01-03  Mg     2.5     01-03    TPro  5.1<L>  /  Alb  1.1<L>  /  TBili  0.4  /  DBili  x   /  AST  46<H>  /  ALT  30  /  AlkPhos  132<H>  01-03      Creatinine Trend: 0.60<--, 0.56<--, 0.45<--, 0.49<--, 0.49<--, 0.52<--        Culture Data      Culture - Sputum (collected 28 Dec 2021 18:54)  Source: .Sputum Sputum  Gram Stain (30 Dec 2021 18:49):    Moderate polymorphonuclear leukocytes per low power field    Numerous Squamous epithelial cells per low power field    Few Gram positive cocci in pairs per oil power field    Rare Gram Positive Rods per oil power field    Rare Yeast like cells per oil power field    Results consistent with oropharyngeal contamination  Final Report (30 Dec 2021 18:49):    Normal Respiratory Sendy present    Culture - Blood (collected 28 Dec 2021 15:04)  Source: .Blood Blood  Preliminary Report (29 Dec 2021 15:06):    No growth to date.    Culture - Blood (collected 28 Dec 2021 15:04)  Source: .Blood Blood  Preliminary Report (29 Dec 2021 15:06):    No growth to date.

## 2022-01-03 NOTE — PROGRESS NOTE ADULT - ASSESSMENT
Patient is a 81F with a PMH of HTN, HLD, hypothyroidism, depression, seizures who presents to the ED for abd pain found to have diverticulitis   has rising leukocytosis   had fever yesterday   tachycardic and now hypoxic   CXR (I personally reviewed) low lung volumes   origincal CT (I personally reviewed) with diverticulitis   she had a lot of pain in the abdomen on exam     12/25: s/p surgery for diverticulitis with perforation and abscess and went to the OR. intubated in ICU with vac and needs to go back to the OR, currently on zosyn, s/p one dose of diflucan last night   12/27: s/p repair and ostomy creation yesterday, wbc elevated, cultures sensitive to zosyn and candida albicans is notoriously sensitive to azoles such as fluconazole, wean of pressors and sedation, extubate when ready   12/28: Further increase in white blood cell count but overall the patient appears to be reasonably stable.  No concern of C. difficile at this juncture.  Current antibiotics are reasonable.  Leukocytosis like related to recent surgery, no concern of other superimposed process on the basis of exam.  I do not see a role to alter her antimicrobials.  12/29:  Remains intubated in ICU. still quite edematous and net positive, WBC climbing, small wound dehiscence at bottom of incision, plan for initiation of TPN today, remains on antibiotics    12/30: rising WBC, ostomy not functioning yet, very edematous CT today, may be source control issue so for now can continue same antibiotics and antifungal but may need to change if findings on the CT are worrisome or change in hemodynamics  12/31:Leukocytosis, with collections noted on CT.  However this is being done postoperative day 5, there is some possibility that this could represent postop changes but given the leukocytosis, concur with plans for attempts at percutaneous drainage.  White blood cell count down slightly compared to prior peak, will need to be trended.  Gallbladder is also distended.  Abdominal exam was benign this morning, but a calculus cholecystitis could be the differential diagnosis here as well (though n.p.o. status certainly could explain distended gallbladder, there is also report of gallbladder wall thickening).  Would modify antibiotics based on cultures from drainage, I do not believe that antibiotics and other the cells would be adequate here.  Fortunately she is off pressors, with preserved renal function, and tolerating pressure support.  1/1/22: Postop day 6.  I have some concerns with the appearance of the ostomy, though the abdominal exam overall is otherwise unchanged.  White blood cell count remains elevated, but is lower compared with prior values.  This is despite no change in antibiotic therapy.  Patient also has asymmetric edema of the upper extremities, right greater than left.  Low threshold for duplex ultrasound to exclude DVT.No new surveillance culture data.  1/2: POD7 Ostomy looks better today, UE symmetric. WBC elevated, some slight downward trend overall. Temps <= 100F.   1/3: drainage of fluid collection today, continue zosyn and fluconazole for now, monitor wbc and temps, watch ostomy output, diuresis and address third spacing

## 2022-01-03 NOTE — CONSULT NOTE ADULT - ATTENDING COMMENTS
pt seen and examined at bedside. pt is decompensating with evidence of sepsis from perforated diverticulitis. will take to or for emergent laparotomy with hartmans procedure. pt is critical and is high risk for morbidity and mortality. pt's family was made aware of the critical care of the operation.  icu to evaluate pt and pt will need to go to icu post operatively.
81F   S/P ex lap, sigmoid resection, peritoneal lavage 12/25 and RTOR 12/26 for Irrigation of abdominal cavity with closure and creation of colostomy    PMH HTN, HLD, hypothyroidism, depression, seizures admitted with Acute Sigmoid Diverticulitis,.     Intubated. Off pressors. A-fib RVR s/p amio converted to sinus rhythm. Leukocytosis, slowly improving     CT 12/30 shows several loculated IA fluid collections    -Keep patient NPO, continue to hold Lovenox  -Will obtain consent for bedside aspiration of Intraabdominal collection

## 2022-01-04 LAB
ALBUMIN SERPL ELPH-MCNC: 1.6 G/DL — LOW (ref 3.3–5)
ALP SERPL-CCNC: 142 U/L — HIGH (ref 40–120)
ALT FLD-CCNC: 33 U/L — SIGNIFICANT CHANGE UP (ref 12–78)
ANION GAP SERPL CALC-SCNC: 9 MMOL/L — SIGNIFICANT CHANGE UP (ref 5–17)
AST SERPL-CCNC: 43 U/L — HIGH (ref 15–37)
BILIRUB SERPL-MCNC: 0.4 MG/DL — SIGNIFICANT CHANGE UP (ref 0.2–1.2)
BUN SERPL-MCNC: 19 MG/DL — SIGNIFICANT CHANGE UP (ref 7–23)
CALCIUM SERPL-MCNC: 7.4 MG/DL — LOW (ref 8.5–10.1)
CHLORIDE SERPL-SCNC: 106 MMOL/L — SIGNIFICANT CHANGE UP (ref 96–108)
CO2 SERPL-SCNC: 23 MMOL/L — SIGNIFICANT CHANGE UP (ref 22–31)
CREAT SERPL-MCNC: 0.61 MG/DL — SIGNIFICANT CHANGE UP (ref 0.5–1.3)
FLUAV AG NPH QL: SIGNIFICANT CHANGE UP
FLUBV AG NPH QL: SIGNIFICANT CHANGE UP
GLUCOSE BLDC GLUCOMTR-MCNC: 149 MG/DL — HIGH (ref 70–99)
GLUCOSE BLDC GLUCOMTR-MCNC: 152 MG/DL — HIGH (ref 70–99)
GLUCOSE BLDC GLUCOMTR-MCNC: 161 MG/DL — HIGH (ref 70–99)
GLUCOSE SERPL-MCNC: 132 MG/DL — HIGH (ref 70–99)
HCT VFR BLD CALC: 23.9 % — LOW (ref 34.5–45)
HGB BLD-MCNC: 7.7 G/DL — LOW (ref 11.5–15.5)
MAGNESIUM SERPL-MCNC: 2.5 MG/DL — SIGNIFICANT CHANGE UP (ref 1.6–2.6)
MCHC RBC-ENTMCNC: 28.7 PG — SIGNIFICANT CHANGE UP (ref 27–34)
MCHC RBC-ENTMCNC: 32.2 GM/DL — SIGNIFICANT CHANGE UP (ref 32–36)
MCV RBC AUTO: 89.2 FL — SIGNIFICANT CHANGE UP (ref 80–100)
NRBC # BLD: 0 /100 WBCS — SIGNIFICANT CHANGE UP (ref 0–0)
PHOSPHATE SERPL-MCNC: 2.5 MG/DL — SIGNIFICANT CHANGE UP (ref 2.5–4.5)
PLATELET # BLD AUTO: 444 K/UL — HIGH (ref 150–400)
POTASSIUM SERPL-MCNC: 3.3 MMOL/L — LOW (ref 3.5–5.3)
POTASSIUM SERPL-SCNC: 3.3 MMOL/L — LOW (ref 3.5–5.3)
PROT SERPL-MCNC: 5.5 GM/DL — LOW (ref 6–8.3)
RBC # BLD: 2.68 M/UL — LOW (ref 3.8–5.2)
RBC # FLD: 14.6 % — HIGH (ref 10.3–14.5)
SARS-COV-2 RNA SPEC QL NAA+PROBE: SIGNIFICANT CHANGE UP
SODIUM SERPL-SCNC: 138 MMOL/L — SIGNIFICANT CHANGE UP (ref 135–145)
WBC # BLD: 16.61 K/UL — HIGH (ref 3.8–10.5)
WBC # FLD AUTO: 16.61 K/UL — HIGH (ref 3.8–10.5)

## 2022-01-04 PROCEDURE — 99291 CRITICAL CARE FIRST HOUR: CPT

## 2022-01-04 PROCEDURE — 76937 US GUIDE VASCULAR ACCESS: CPT | Mod: 26

## 2022-01-04 PROCEDURE — 71045 X-RAY EXAM CHEST 1 VIEW: CPT | Mod: 26

## 2022-01-04 PROCEDURE — 36556 INSERT NON-TUNNEL CV CATH: CPT

## 2022-01-04 PROCEDURE — 99232 SBSQ HOSP IP/OBS MODERATE 35: CPT

## 2022-01-04 RX ORDER — ELECTROLYTE SOLUTION,INJ
1 VIAL (ML) INTRAVENOUS
Refills: 0 | Status: DISCONTINUED | OUTPATIENT
Start: 2022-01-04 | End: 2022-01-04

## 2022-01-04 RX ORDER — FENTANYL CITRATE 50 UG/ML
0.5 INJECTION INTRAVENOUS
Qty: 2500 | Refills: 0 | Status: DISCONTINUED | OUTPATIENT
Start: 2022-01-04 | End: 2022-01-07

## 2022-01-04 RX ORDER — SODIUM CHLORIDE 9 MG/ML
10 INJECTION INTRAMUSCULAR; INTRAVENOUS; SUBCUTANEOUS
Refills: 0 | Status: DISCONTINUED | OUTPATIENT
Start: 2022-01-04 | End: 2022-01-07

## 2022-01-04 RX ORDER — POTASSIUM CHLORIDE 20 MEQ
40 PACKET (EA) ORAL EVERY 4 HOURS
Refills: 0 | Status: COMPLETED | OUTPATIENT
Start: 2022-01-04 | End: 2022-01-04

## 2022-01-04 RX ORDER — ALBUMIN HUMAN 25 %
100 VIAL (ML) INTRAVENOUS ONCE
Refills: 0 | Status: COMPLETED | OUTPATIENT
Start: 2022-01-04 | End: 2022-01-04

## 2022-01-04 RX ORDER — SODIUM CHLORIDE 9 MG/ML
1000 INJECTION, SOLUTION INTRAVENOUS
Refills: 0 | Status: DISCONTINUED | OUTPATIENT
Start: 2022-01-04 | End: 2022-01-05

## 2022-01-04 RX ORDER — FUROSEMIDE 40 MG
40 TABLET ORAL ONCE
Refills: 0 | Status: COMPLETED | OUTPATIENT
Start: 2022-01-04 | End: 2022-01-04

## 2022-01-04 RX ADMIN — CHLORHEXIDINE GLUCONATE 15 MILLILITER(S): 213 SOLUTION TOPICAL at 05:18

## 2022-01-04 RX ADMIN — PIPERACILLIN AND TAZOBACTAM 25 GRAM(S): 4; .5 INJECTION, POWDER, LYOPHILIZED, FOR SOLUTION INTRAVENOUS at 21:50

## 2022-01-04 RX ADMIN — Medication 40 MILLIEQUIVALENT(S): at 05:18

## 2022-01-04 RX ADMIN — DEXMEDETOMIDINE HYDROCHLORIDE IN 0.9% SODIUM CHLORIDE 4.05 MICROGRAM(S)/KG/HR: 4 INJECTION INTRAVENOUS at 17:53

## 2022-01-04 RX ADMIN — Medication 25 MICROGRAM(S): at 21:38

## 2022-01-04 RX ADMIN — DEXMEDETOMIDINE HYDROCHLORIDE IN 0.9% SODIUM CHLORIDE 4.05 MICROGRAM(S)/KG/HR: 4 INJECTION INTRAVENOUS at 06:30

## 2022-01-04 RX ADMIN — DEXMEDETOMIDINE HYDROCHLORIDE IN 0.9% SODIUM CHLORIDE 4.05 MICROGRAM(S)/KG/HR: 4 INJECTION INTRAVENOUS at 21:49

## 2022-01-04 RX ADMIN — LEVETIRACETAM 420 MILLIGRAM(S): 250 TABLET, FILM COATED ORAL at 03:03

## 2022-01-04 RX ADMIN — PIPERACILLIN AND TAZOBACTAM 25 GRAM(S): 4; .5 INJECTION, POWDER, LYOPHILIZED, FOR SOLUTION INTRAVENOUS at 03:21

## 2022-01-04 RX ADMIN — CHLORHEXIDINE GLUCONATE 1 APPLICATION(S): 213 SOLUTION TOPICAL at 05:17

## 2022-01-04 RX ADMIN — DEXMEDETOMIDINE HYDROCHLORIDE IN 0.9% SODIUM CHLORIDE 4.05 MICROGRAM(S)/KG/HR: 4 INJECTION INTRAVENOUS at 02:51

## 2022-01-04 RX ADMIN — FLUCONAZOLE 100 MILLIGRAM(S): 150 TABLET ORAL at 23:39

## 2022-01-04 RX ADMIN — Medication 40 MILLIEQUIVALENT(S): at 10:57

## 2022-01-04 RX ADMIN — DORZOLAMIDE HYDROCHLORIDE TIMOLOL MALEATE 1 DROP(S): 20; 5 SOLUTION/ DROPS OPHTHALMIC at 05:18

## 2022-01-04 RX ADMIN — FENTANYL CITRATE 4.05 MICROGRAM(S)/KG/HR: 50 INJECTION INTRAVENOUS at 02:03

## 2022-01-04 RX ADMIN — PANTOPRAZOLE SODIUM 40 MILLIGRAM(S): 20 TABLET, DELAYED RELEASE ORAL at 11:06

## 2022-01-04 RX ADMIN — ENOXAPARIN SODIUM 40 MILLIGRAM(S): 100 INJECTION SUBCUTANEOUS at 11:06

## 2022-01-04 RX ADMIN — FENTANYL CITRATE 50 MICROGRAM(S): 50 INJECTION INTRAVENOUS at 00:47

## 2022-01-04 RX ADMIN — FENTANYL CITRATE 4.05 MICROGRAM(S)/KG/HR: 50 INJECTION INTRAVENOUS at 21:50

## 2022-01-04 RX ADMIN — DORZOLAMIDE HYDROCHLORIDE TIMOLOL MALEATE 1 DROP(S): 20; 5 SOLUTION/ DROPS OPHTHALMIC at 17:25

## 2022-01-04 RX ADMIN — Medication 40 MILLIGRAM(S): at 15:09

## 2022-01-04 RX ADMIN — LEVETIRACETAM 420 MILLIGRAM(S): 250 TABLET, FILM COATED ORAL at 15:09

## 2022-01-04 RX ADMIN — Medication 1 EACH: at 22:01

## 2022-01-04 RX ADMIN — DEXMEDETOMIDINE HYDROCHLORIDE IN 0.9% SODIUM CHLORIDE 4.05 MICROGRAM(S)/KG/HR: 4 INJECTION INTRAVENOUS at 10:44

## 2022-01-04 RX ADMIN — PIPERACILLIN AND TAZOBACTAM 25 GRAM(S): 4; .5 INJECTION, POWDER, LYOPHILIZED, FOR SOLUTION INTRAVENOUS at 11:06

## 2022-01-04 RX ADMIN — DEXMEDETOMIDINE HYDROCHLORIDE IN 0.9% SODIUM CHLORIDE 4.05 MICROGRAM(S)/KG/HR: 4 INJECTION INTRAVENOUS at 15:58

## 2022-01-04 RX ADMIN — Medication 50 MILLILITER(S): at 16:04

## 2022-01-04 RX ADMIN — CHLORHEXIDINE GLUCONATE 15 MILLILITER(S): 213 SOLUTION TOPICAL at 17:25

## 2022-01-04 NOTE — PROGRESS NOTE ADULT - SUBJECTIVE AND OBJECTIVE BOX
Pt seen bedside in ICU.    T(F): 97.9 (01-04-22 @ 03:56), Max: 100.5 (01-03-22 @ 23:33)  HR: 56 (01-04-22 @ 07:00) (56 - 94)  BP: 98/46 (01-04-22 @ 07:00) (98/46 - 180/88)  RR: 18 (01-04-22 @ 07:00) (16 - 34)  SpO2: 100% (01-04-22 @ 07:00) (97% - 100%)  Wt(kg): --      POCT Blood Glucose.: 149 mg/dL (04 Jan 2022 05:31)  POCT Blood Glucose.: 153 mg/dL (03 Jan 2022 23:35)  POCT Blood Glucose.: 158 mg/dL (03 Jan 2022 17:08)  POCT Blood Glucose.: 136 mg/dL (03 Jan 2022 11:04)      PHYSICAL EXAM:  General: Awake  HEENT: orally intubated.   CV: +S1+S2   Lung: mechanically vented  Abdomen: soft, nondistended. midline wound retention sutures intact, skin openings irrigated with saline and repacked with 2" iodoform packing, dry dressing applied. Granulating. No gross drainage. Inferior aspect of wound with moderate adherent slough. Colostomy pink and viable, formed stool in bag, 450cc output. Serous output noted from RLQ BABAK: 5cc and LLQ BABAK 1cc  Extremities: pitting edema  : mc catheter with clear urine output, 3500cc      LABS:                        7.7    16.61 )-----------( 444      ( 04 Jan 2022 02:41 )             23.9     01-04    138  |  106  |  19  ----------------------------<  132<H>  3.3<L>   |  23  |  0.61    Ca    7.4<L>      04 Jan 2022 02:41  Phos  2.5     01-04  Mg     2.5     01-04    TPro  5.5<L>  /  Alb  1.6<L>  /  TBili  0.4  /  DBili  x   /  AST  43<H>  /  ALT  33  /  AlkPhos  142<H>  01-04      Culture Results:   Normal Respiratory Sendy present (12-28 @ 18:54)  Culture Results:   No Growth Final (12-28 @ 15:04)  Culture Results:   No Growth Final (12-28 @ 15:04)      A/P: 81F admitted with Acute Sigmoid Diverticulitis, Surgery consulted for interval perforated sigmoid diverticulitis. S/P ex lap, sigmoid resection, peritoneal lavage 12/25 and RTOR 12/26 for Irrigation of abdominal cavity with closure and creation of colostomy. Intubated. Leukocytosis downtrending. S/p bedside IR aspiration of fluid collection 1/3. Tube feeds initiated. Ostomy functioning.   - continue ICU management and vent support, SBT  - continue tube feeds as tolerated, taper PPN per GI  - analgesia prn  - local wound care per surgery, local drain care. ostomy management   - continue abx per ID     Pt seen bedside in ICU.  Tube feeds running.  No overnight events.  Remains on vent    T(F): 97.9 (01-04-22 @ 03:56), Max: 100.5 (01-03-22 @ 23:33)  HR: 56 (01-04-22 @ 07:00) (56 - 94)  BP: 98/46 (01-04-22 @ 07:00) (98/46 - 180/88)  RR: 18 (01-04-22 @ 07:00) (16 - 34)  SpO2: 100% (01-04-22 @ 07:00) (97% - 100%)  Wt(kg): --      POCT Blood Glucose.: 149 mg/dL (04 Jan 2022 05:31)  POCT Blood Glucose.: 153 mg/dL (03 Jan 2022 23:35)  POCT Blood Glucose.: 158 mg/dL (03 Jan 2022 17:08)  POCT Blood Glucose.: 136 mg/dL (03 Jan 2022 11:04)      PHYSICAL EXAM:  General: Awake and responsive  HEENT: orally intubated.   CV: +S1+S2   Lung: mechanically vented  Abdomen: soft, nondistended. midline wound retention sutures intact, skin openings irrigated with saline and repacked with 2" iodoform packing, dry dressing applied. Granulating. No gross drainage. Colostomy pink and viable, formed stool in bag, 450cc output. Serous output noted from RLQ BABAK: 5cc and LLQ BABAK 1cc  Extremities: pitting edema  : mc catheter with clear urine output, 3500cc      LABS:                        7.7    16.61 )-----------( 444      ( 04 Jan 2022 02:41 )             23.9     01-04    138  |  106  |  19  ----------------------------<  132<H>  3.3<L>   |  23  |  0.61    Ca    7.4<L>      04 Jan 2022 02:41  Phos  2.5     01-04  Mg     2.5     01-04    TPro  5.5<L>  /  Alb  1.6<L>  /  TBili  0.4  /  DBili  x   /  AST  43<H>  /  ALT  33  /  AlkPhos  142<H>  01-04      Culture Results:   Normal Respiratory Sendy present (12-28 @ 18:54)  Culture Results:   No Growth Final (12-28 @ 15:04)  Culture Results:   No Growth Final (12-28 @ 15:04)      A/P: 81F admitted with Acute Sigmoid Diverticulitis, Surgery consulted for interval perforated sigmoid diverticulitis. S/P ex lap, sigmoid resection, peritoneal lavage 12/25 and RTOR 12/26 for Irrigation of abdominal cavity with closure and creation of colostomy. Intubated. Leukocytosis downtrending. S/p bedside IR aspiration of fluid collection 1/3. Tube feeds initiated. Ostomy functioning.   - continue ICU management and vent support, SBT  - continue tube feeds as tolerated, taper PPN per GI  - analgesia prn  - local wound care per surgery, local drain care. ostomy management   - continue abx per ID, Follow up culture results

## 2022-01-04 NOTE — PROGRESS NOTE ADULT - SUBJECTIVE AND OBJECTIVE BOX
24 hr events:  s/p IR drainage of abdominal abscess  L BABAK drain with dark brown output (had been serous output on previous days)  tolerating tube feeds  required high pressure support 15 to wean a bit yesterday  tolerated some weaning today with PS 12    ## ROS:  [x ] unable to obtain due to intubation      ## Labs:  CBC:                        7.7    16.61 )-----------( 444      ( 04 Jan 2022 02:41 )             23.9     Chem:  01-04    138  |  106  |  19  ----------------------------<  132<H>  3.3<L>   |  23  |  0.61    Ca    7.4<L>      04 Jan 2022 02:41  Phos  2.5     01-04  Mg     2.5     01-04    TPro  5.5<L>  /  Alb  1.6<L>  /  TBili  0.4  /  DBili  x   /  AST  43<H>  /  ALT  33  /  AlkPhos  142<H>  01-04    Culture - Body Fluid with Gram Stain (01.03.22 @ 18:18)    Specimen Source: .Body Fluid Abdominal Fluid    Culture Results: Few Escherichia coli    Culture - Sputum . (12.28.21 @ 18:54)    Specimen Source: .Sputum Sputum    Culture Results: Normal Respiratory Sendy present    Culture - Blood (12.28.21 @ 15:04)    Specimen Source: .Blood Blood    Culture Results: No Growth Final      Culture - Body Fluid with Gram Stain (12.25.21 @ 12:31)    Specimen Source: .Body Fluid INTRA -ABDOMINAL FLUID FOR CULTURE    Culture Results:   Few Escherichia coli  Rare Escherichia coli #2  Few Candida albicans "Susceptibilities not performed"  Moderate Bacteroides thetaiotamcron group "Susceptibilities not performed"  Moderate Bacteroides uniformis "Susceptibilities not performed"      ## Medications:  fluconAZOLE IVPB 200 milliGRAM(s) IV Intermittent every 24 hours  piperacillin/tazobactam IVPB.. 3.375 Gram(s) IV Intermittent every 8 hours        dextrose 40% Gel 15 Gram(s) Oral once  dextrose 50% Injectable 25 Gram(s) IV Push once  dextrose 50% Injectable 12.5 Gram(s) IV Push once  dextrose 50% Injectable 25 Gram(s) IV Push once  glucagon  Injectable 1 milliGRAM(s) IntraMuscular once  levothyroxine Injectable 25 MICROGram(s) IV Push at bedtime    enoxaparin Injectable 40 milliGRAM(s) SubCutaneous daily    pantoprazole  Injectable 40 milliGRAM(s) IV Push daily    dexMEDEtomidine Infusion 0.2 MICROgram(s)/kG/Hr IV Continuous <Continuous>  fentaNYL   Infusion. 0.5 MICROgram(s)/kG/Hr IV Continuous <Continuous>  levETIRAcetam  IVPB 500 milliGRAM(s) IV Intermittent every 12 hours      ## Vitals:  T(C): 37.1 (01-04-22 @ 23:26), Max: 37.6 (01-04-22 @ 08:00)  HR: 75  (54 - 86)  BP: 134/50  (94/58 - 151/64)  BP(mean): 76  (61 - 105)  RR: 27 (14 - 33)  SpO2: 98% (90% - 100%)    Vent: Mode: AC/ CMV (Assist Control/ Continuous Mandatory Ventilation), RR (machine): 18, RR (patient): 23, TV (machine): 450, FiO2: 35, PEEP: 5, PS: , PIP: 16        01-03 @ 07:01  -  01-04 @ 07:00  --------------------------------------------------------  IN: 2941.8 mL / OUT: 4056 mL / NET: -1114.2 mL      ## P/E:  Gen: lying comfortably in bed in no apparent distress  HEENT: ETT in place, NGT in place  Resp: CTA B/L , no wheeze, no rales, mechanical breath sounds  CVS: RRR  Abd: soft ND +BS + retention sutures, + colostomy with brown stool, L BABAK dark brown output, R BABAK with serous output  Ext: anasarca of body  Neuro: arousable, follows commands, generalized weakness    CENTRAL LINE: [ ] YES [x ] NO  LOCATION:   DATE INSERTED:  REMOVE: [ ] YES [ ] NO      BARKLEY: [x ] YES [ ] NO    DATE INSERTED:  REMOVE:  [ ] YES [ ] NO      A-LINE:  [ ] YES [x ] NO  LOCATION:   DATE INSERTED:  REMOVE:  [ ] YES [ ] NO  EXPLAIN:    GLOBAL ISSUE/BEST PRACTICE:  Analgesia: yes prn  Sedation: yes  HOB elevation: yes  Stress ulcer prophylaxis: protonix  VTE prophylaxis: lovenox sc  Oral Care: yes  Glycemic control: yes  Nutrition: tube feeds    CODE STATUS: [x ] full code  [ ] DNR  [ ] DNI  [ ] MOLST  Goals of care discussion: [x ] yes

## 2022-01-04 NOTE — PROGRESS NOTE ADULT - ASSESSMENT
81F PMH HTN, HLD, hypothyroidism, depression, seizures presents for abdominal pain found to have acute sigmoid diverticulitis complicated by perforated sigmoid diverticulitis/peritonitis with abscess formation s/p ex-lap with large bowel resection / abdominal washout / wound vac placement 12/25. Brought back to OR yesterday 12/26 for abdominal closure and colostomy creation. Sepsis with septic shock requiring levophed. Remains intubated for post procedural acute hypoxic respiratory failure. Post op course with a-fib RVR, volume overload, persistent leukocytosis. Found to have multiple abdominal fluid collections s/p IR drainage of R abdominal abscess 1/3/22 growing e-coli. Failure to wean.     NEURO  daily sedation vacation as tolerates  mental status improved  overall strength improved however still with generalized weakness      CV  out of shock state not on vasopressor support  a-fib RVR post op, s/p amio load converted to sinus rhythm  overall volume overloaded, anasarca with pitting edema on exam  has been getting diuresed with lasix; did well with lasix/albumin combo yesterday  will diurese with lasix and albumin again today  goal to get pt to net 500mL to 1L negative balance       PULM  weaning but on high support  cont daily wean as tolerates  will need trach  d/w son Blake over the phone today, pending family decision    GI  POD #10 with RTOR for abdominal closure 12/26  retention sutures in place  monitor I/O from BABAK drain and output from colostomy: L BABAK drain with color change in drainage today- surgical team made aware  leukocytosis improving  s/p IR drainage of largest R abdominal fluid collection / abscess yesterday 1/3 (pt with multiple fluid collections)  follow up abdominal abscess culture sensitivity (abd abscess cx with e-coli)  cont tube feeds as tolerates, tube feeds initiated yesterday 1/3  surgical follow up appreciated    ID  sepsis present on admission with septic shock due to GI source from perforated diverticulitis   cont with fluconazole and zosyn for abdominal cultures growing candida and bacteroides and e-coli  abd abscess with e-coli growing in cx  CT chest also with bilateral opacities likely PNA, however antibx zosyn should be sufficient coverage for PNA  monitor WBC and fevers  repeat blood cx negative  sputum cx with normal respiratory yoly  follow up abdominal drainage culture  ID follow up appreciated      RENAL  Cr within normal limits  monitor UO  mc in place  hypokalemia: supplement low K      ENDO  cont synthroid IV for underlying hypothyroidism     GEN  full code  DVT prophylaxis: lovenox  spoke to son Blake over the phone: pt's condition, plan of care and need for mechanical vent support discussed. Son would like a little time to discuss with pt's significant other. he is aware that if trached and still unable to wean off the vent then pt may need to go a nursing facility.

## 2022-01-04 NOTE — PROGRESS NOTE ADULT - SUBJECTIVE AND OBJECTIVE BOX
[facility]  Division of Infectious Diseases  [phone]    Name: JUAREZ GTZ  Age: 81y  Gender: Female  MRN: [id number]    Interval History--  Notes reviewed. patient seen and examined. Remains vent dependent in ICU, IR planned for drainage of fluid collection with fluid growing ecoli     Past Medical History--  Seizure    HTN (hypertension)    Glaucoma    Thyroid disease    Blood clot of neck vein    TIA (transient ischemic attack)    S/P appendectomy      Review of Systems--  Review of systems unable secondary to clinical condition.       For details regarding the patient's social history, family history, and other miscellaneous elements, please refer the initial infectious diseases consultation and/or the admitting history and physical examination for this admission.    Allergies    No Known Allergies    Intolerances        Medications--  Antibiotics:  WBC Count: 16.61 K/uL (01-04 @ 02:41)  WBC Count: 20.29 K/uL (01-03 @ 02:20)  WBC Count: 22.98 K/uL (01-02 @ 02:11)  WBC Count: 23.24 K/uL (01-01 @ 05:27)  WBC Count: 28.69 K/uL (12-31 @ 02:30)  WBC Count: 36.58 K/uL (12-30 @ 03:59)  WBC Count: 32.37 K/uL (12-29 @ 03:51)                            7.7    16.61 )-----------( 444      ( 04 Jan 2022 02:41 )             23.9       01-04    138  |  106  |  19  ----------------------------<  132<H>  3.3<L>   |  23  |  0.61    Ca    7.4<L>      04 Jan 2022 02:41  Phos  2.5     01-04  Mg     2.5     01-04    TPro  5.5<L>  /  Alb  1.6<L>  /  TBili  0.4  /  DBili  x   /  AST  43<H>  /  ALT  33  /  AlkPhos  142<H>  01-04      Creatinine Trend: 0.61<--, 0.60<--, 0.56<--, 0.45<--, 0.49<--, 0.49<--          General: Nontoxic-appearing Female in no acute distress.  HEENT: AT/NC. Oropharynx/dentition unable secondary to patient compliance. +ETT with NGT and started on feeds    Neck: Not rigid. No sense of mass.  Nodes: None palpable.  Lungs: ventilatory BS bilaterally without rales, wheezing or rhonchi  Heart: Regular rate and rhythm. No Murmur. No rub. No gallop. No palpable thrill.  Abdomen: Bowel sounds now present.  Soft. Mildly distended.  Incision dressed.  Ostomy with stool output. Drains scant output, serous x2. . No reaction to palpation.  still small wound dehiscence   Extremities: No cyanosis or clubbing. 2-3+ pitting edema  Skin: Warm. Dry No rash. No vasculitic stigmata.  Psychiatric: Unable      Laboratory Studies--  WBC Count: 16.61 K/uL (01-04 @ 02:41)  WBC Count: 20.29 K/uL (01-03 @ 02:20)  WBC Count: 22.98 K/uL (01-02 @ 02:11)  WBC Count: 23.24 K/uL (01-01 @ 05:27)  WBC Count: 28.69 K/uL (12-31 @ 02:30)  WBC Count: 36.58 K/uL (12-30 @ 03:59)  WBC Count: 32.37 K/uL (12-29 @ 03:51)                            7.7    16.61 )-----------( 444      ( 04 Jan 2022 02:41 )             23.9       01-04    138  |  106  |  19  ----------------------------<  132<H>  3.3<L>   |  23  |  0.61    Ca    7.4<L>      04 Jan 2022 02:41  Phos  2.5     01-04  Mg     2.5     01-04    TPro  5.5<L>  /  Alb  1.6<L>  /  TBili  0.4  /  DBili  x   /  AST  43<H>  /  ALT  33  /  AlkPhos  142<H>  01-04      Creatinine Trend: 0.61<--, 0.60<--, 0.56<--, 0.45<--, 0.49<--, 0.49<--        Culture Data      Culture - Sputum (collected 28 Dec 2021 18:54)  Source: .Sputum Sputum  Gram Stain (30 Dec 2021 18:49):    Moderate polymorphonuclear leukocytes per low power field    Numerous Squamous epithelial cells per low power field    Few Gram positive cocci in pairs per oil power field    Rare Gram Positive Rods per oil power field    Rare Yeast like cells per oil power field    Results consistent with oropharyngeal contamination  Final Report (30 Dec 2021 18:49):    Normal Respiratory Sendy present    Culture - Blood (collected 28 Dec 2021 15:04)  Source: .Blood Blood  Preliminary Report (29 Dec 2021 15:06):    No growth to date.    Culture - Blood (collected 28 Dec 2021 15:04)  Source: .Blood Blood  Preliminary Report (29 Dec 2021 15:06):    No growth to date.

## 2022-01-04 NOTE — PROGRESS NOTE ADULT - ASSESSMENT
HPI:  Patient is a 81F with a PMH of HTN, HLD, hypothyroidism, depression, seizures who presents to the ED for abd pain.  Patient states that over the last two days she had developed significant abdominal pain and multiple episodes of watery diarrhea.  Reports one episode of nausea and vomiting earlier today.  Patient has no other complaints.  Vitals stable,  labs show leukocytosis and hypokalemia.  CT abdomen significant for diverticulitis.  Will admit to med surg.        1.  Complicated diverticulitis status post repair with colostomy  2. tolerating Vital @ 20 ml / hr ( started 2:39 PM yesterday )    Recs:  - Monitor CBC, CMP, Mag, Phos  - Continue antibiotics per ID  -  taper and decrease PPN as NGT feeds are increased.

## 2022-01-04 NOTE — PROGRESS NOTE ADULT - SUBJECTIVE AND OBJECTIVE BOX
Patient is a 81y old  Female who presents with a chief complaint of diverticulitis (04 Jan 2022 08:01)      HPI:  Patient is a 81F with a PMH of HTN, HLD, hypothyroidism, depression, seizures who presents to the ED for abd pain.  Patient states that over the last two days she had developed significant abdominal pain and multiple episodes of watery diarrhea.  Reports one episode of nausea and vomiting earlier today.  Patient has no other complaints.  Vitals stable,  labs show leukocytosis and hypokalemia.  CT abdomen significant for diverticulitis.  Will admit to med surg.     (23 Dec 2021 00:17)      INTERVAL HPI/OVERNIGHT EVENTS: ICU #6 Intubated (+)  Tolerating Vital @ 20 ml / hr. Liquid brown stool in ostomy bag. No N/V. Opens eyes     MEDICATIONS  (STANDING):  chlorhexidine 0.12% Liquid 15 milliLiter(s) Oral Mucosa every 12 hours  chlorhexidine 2% Cloths 1 Application(s) Topical <User Schedule>  dexMEDEtomidine Infusion 0.2 MICROgram(s)/kG/Hr (4.05 mL/Hr) IV Continuous <Continuous>  dextrose 40% Gel 15 Gram(s) Oral once  dextrose 5%. 1000 milliLiter(s) (50 mL/Hr) IV Continuous <Continuous>  dextrose 5%. 1000 milliLiter(s) (100 mL/Hr) IV Continuous <Continuous>  dextrose 50% Injectable 25 Gram(s) IV Push once  dextrose 50% Injectable 12.5 Gram(s) IV Push once  dextrose 50% Injectable 25 Gram(s) IV Push once  dorzolamide 2%/timolol 0.5% Ophthalmic Solution 1 Drop(s) Both EYES two times a day  enoxaparin Injectable 40 milliGRAM(s) SubCutaneous daily  fat emulsion (Plant Based) 20% Infusion 0.6173 Gm/kG/Day (10.4 mL/Hr) IV Continuous <Continuous>  fentaNYL   Infusion. 0.5 MICROgram(s)/kG/Hr (4.05 mL/Hr) IV Continuous <Continuous>  fluconAZOLE IVPB 200 milliGRAM(s) IV Intermittent every 24 hours  glucagon  Injectable 1 milliGRAM(s) IntraMuscular once  levETIRAcetam  IVPB 500 milliGRAM(s) IV Intermittent every 12 hours  levothyroxine Injectable 25 MICROGram(s) IV Push at bedtime  pantoprazole  Injectable 40 milliGRAM(s) IV Push daily  Parenteral Nutrition - Adult 1 Each (63 mL/Hr) TPN Continuous <Continuous>  piperacillin/tazobactam IVPB.. 3.375 Gram(s) IV Intermittent every 8 hours    MEDICATIONS  (PRN):      FAMILY HISTORY:  FH: HTN (hypertension)        Allergies    No Known Allergies    Intolerances        PMH/PSH:  Seizure    HTN (hypertension)    Glaucoma    Thyroid disease    Blood clot of neck vein    TIA (transient ischemic attack)    S/P appendectomy          REVIEW OF SYSTEMS:  CONSTITUTIONAL: Febrile (+)  EYES: No eye pain, visual disturbances, or discharge  ENMT:  No difficulty hearing, tinnitus, vertigo; No sinus or throat pain  NECK: No pain or stiffness  BREASTS: No pain, masses, or nipple discharge  RESPIRATORY: No cough, wheezing, chills or hemoptysis;   CARDIOVASCULAR: No chest pain, palpitations, dizziness, or leg swelling  GASTROINTESTINAL: See above   GENITOURINARY: No dysuria, frequency, hematuria, or incontinence  NEUROLOGICAL: No headaches, numbness, or tremors  SKIN: No itching, burning, rashes, or lesions   LYMPH NODES: No enlarged glands  ENDOCRINE: No heat or cold intolerance; No hair loss  MUSCULOSKELETAL: No joint pain or swelling; No muscle, back, or extremity pain  PSYCHIATRIC: No depression, anxiety, mood swings, or difficulty sleeping  HEME/LYMPH: No easy bruising, or bleeding gums  ALLERGY AND IMMUNOLOGIC: No hives or eczema    Vital Signs Last 24 Hrs  T(C): 36.6 (04 Jan 2022 03:56), Max: 38.1 (03 Jan 2022 23:33)  T(F): 97.9 (04 Jan 2022 03:56), Max: 100.5 (03 Jan 2022 23:33)  HR: 55 (04 Jan 2022 09:00) (55 - 94)  BP: 98/46 (04 Jan 2022 07:00) (98/46 - 148/64)  BP(mean): 63 (04 Jan 2022 07:00) (58 - 103)  RR: 18 (04 Jan 2022 07:00) (16 - 34)  SpO2: 100% (04 Jan 2022 09:00) (97% - 100%)    PHYSICAL EXAM:  GENERAL: NAD, well-groomed, well-developed  HEAD:  Atraumatic, Normocephalic  EYES: EOMI, PERRLA, conjunctiva and sclera clear  NECK: Supple, No JVD, Normal thyroid  NERVOUS SYSTEM:  Opens eyes   CHEST/LUNG: Clear to percussion bilaterally; No rales, rhonchi, wheezing, or rubs  HEART: Regular rate and rhythm; No murmurs, rubs, or gallops  ABDOMEN: Soft, Nontender, Nondistended; Bowel sounds present. Ostomy with liquid brown stool  EXTREMITIES:  2+ Peripheral Pulses, No clubbing, cyanosis, or edema  LYMPH: No lymphadenopathy noted  SKIN: No rashes or lesions    LAB                          7.7    16.61 )-----------( 444      ( 04 Jan 2022 02:41 )             23.9       CBC:  01-04 @ 02:41  WBC 16.61   Hgb 7.7   Hct 23.9   Plts 444  MCV 89.2  01-03 @ 02:20  WBC 20.29   Hgb 7.8   Hct 23.9   Plts 263  MCV 88.8  01-02 @ 02:11  WBC 22.98   Hgb 8.6   Hct 25.6   Plts 341  MCV 86.8  01-01 @ 05:27  WBC 23.24   Hgb 10.4   Hct 31.9   Plts 365  MCV 87.6  12-31 @ 02:30  WBC 28.69   Hgb 10.4   Hct 30.0   Plts 369  MCV 85.7  12-30 @ 03:59  WBC 36.58   Hgb 9.9   Hct 30.1   Plts 274  MCV 88.0  12-29 @ 03:51  WBC 32.37   Hgb 8.8   Hct 26.8   Plts 313  MCV 88.7      Chemistry:  01-04 @ 02:41  Na+ 138  K+ 3.3  Cl- 106  CO2 23  BUN 19  Cr 0.61     01-03 @ 02:20  Na+ 136  K+ 3.1  Cl- 104  CO2 24  BUN 19  Cr 0.60     01-02 @ 02:11  Na+ 136  K+ 4.0  Cl- 103  CO2 26  BUN 16  Cr 0.56     01-01 @ 05:27  Na+ 135  K+ 3.2  Cl- 100  CO2 25  BUN 12  Cr 0.45     12-31 @ 02:30  Na+ 135  K+ 3.1  Cl- 101  CO2 27  BUN 10  Cr 0.49     12-30 @ 13:23  Na+ 137  K+ 3.3  Cl- 104  CO2 24  BUN 10  Cr 0.49     12-30 @ 03:59  Na+ 136  K+ 3.2  Cl- 101  CO2 23  BUN 10  Cr 0.52     12-29 @ 12:28  Na+ 138  K+ 3.6  Cl- 106  CO2 23  BUN 11  Cr 0.54     12-29 @ 03:51  Na+ 137  K+ 2.7  Cl- 103  CO2 23  BUN 11  Cr 0.63         Glucose, Serum: 132 mg/dL (01-04 @ 02:41)  Glucose, Serum: 118 mg/dL (01-03 @ 02:20)  Glucose, Serum: 114 mg/dL (01-02 @ 02:11)  Glucose, Serum: 123 mg/dL (01-01 @ 05:27)  Glucose, Serum: 127 mg/dL (12-31 @ 02:30)  Glucose, Serum: 135 mg/dL (12-30 @ 13:23)  Glucose, Serum: 105 mg/dL (12-30 @ 03:59)  Glucose, Serum: 80 mg/dL (12-29 @ 12:28)  Glucose, Serum: 88 mg/dL (12-29 @ 03:51)      04 Jan 2022 02:41    138    |  106    |  19     ----------------------------<  132    3.3     |  23     |  0.61   03 Jan 2022 02:20    136    |  104    |  19     ----------------------------<  118    3.1     |  24     |  0.60   02 Jan 2022 02:11    136    |  103    |  16     ----------------------------<  114    4.0     |  26     |  0.56   01 Jan 2022 05:27    135    |  100    |  12     ----------------------------<  123    3.2     |  25     |  0.45   31 Dec 2021 02:30    135    |  101    |  10     ----------------------------<  127    3.1     |  27     |  0.49   30 Dec 2021 13:23    137    |  104    |  10     ----------------------------<  135    3.3     |  24     |  0.49   30 Dec 2021 03:59    136    |  101    |  10     ----------------------------<  105    3.2     |  23     |  0.52     Ca    7.4        04 Jan 2022 02:41  Ca    7.0        03 Jan 2022 02:20  Ca    7.0        02 Jan 2022 02:11  Ca    7.0        01 Jan 2022 05:27  Ca    7.1        31 Dec 2021 02:30  Ca    7.1        30 Dec 2021 13:23  Ca    7.2        30 Dec 2021 03:59  Phos  2.5       04 Jan 2022 02:41  Phos  3.0       03 Jan 2022 02:20  Phos  3.4       02 Jan 2022 02:11  Phos  2.5       01 Jan 2022 05:27  Phos  2.2       31 Dec 2021 02:30  Phos  2.4       30 Dec 2021 13:23  Phos  2.3       30 Dec 2021 03:59  Mg     2.5       04 Jan 2022 02:41  Mg     2.5       03 Jan 2022 02:20  Mg     2.5       02 Jan 2022 02:11  Mg     1.8       01 Jan 2022 05:27  Mg     1.8       31 Dec 2021 02:30  Mg     1.5       30 Dec 2021 13:23  Mg     1.9       30 Dec 2021 03:59    TPro  5.5    /  Alb  1.6    /  TBili  0.4    /  DBili  x      /  AST  43     /  ALT  33     /  AlkPhos  142    04 Jan 2022 02:41  TPro  5.1    /  Alb  1.1    /  TBili  0.4    /  DBili  x      /  AST  46     /  ALT  30     /  AlkPhos  132    03 Jan 2022 02:20  TPro  5.2    /  Alb  1.0    /  TBili  0.4    /  DBili  x      /  AST  40     /  ALT  20     /  AlkPhos  118    02 Jan 2022 02:11  TPro  4.9    /  Alb  1.0    /  TBili  0.3    /  DBili  x      /  AST  32     /  ALT  19     /  AlkPhos  120    01 Jan 2022 05:27  TPro  5.1    /  Alb  1.0    /  TBili  0.3    /  DBili  x      /  AST  26     /  ALT  17     /  AlkPhos  118    31 Dec 2021 02:30  TPro  5.2    /  Alb  1.0    /  TBili  0.3    /  DBili  x      /  AST  22     /  ALT  16     /  AlkPhos  131    30 Dec 2021 03:59  TPro  4.8    /  Alb  0.9    /  TBili  0.4    /  DBili  x      /  AST  20     /  ALT  19     /  AlkPhos  114    29 Dec 2021 03:51              CAPILLARY BLOOD GLUCOSE      POCT Blood Glucose.: 149 mg/dL (04 Jan 2022 05:31)  POCT Blood Glucose.: 153 mg/dL (03 Jan 2022 23:35)  POCT Blood Glucose.: 158 mg/dL (03 Jan 2022 17:08)          RADIOLOGY & ADDITIONAL TESTS:    Imaging Personally Reviewed:  [ ] YES  [ ] NO    Consultant(s) Notes Reviewed:  [ ] YES  [ ] NO    Care Discussed with Consultants/Other Providers [ ] YES  [ ] NO

## 2022-01-04 NOTE — PROGRESS NOTE ADULT - ASSESSMENT
Patient is a 81F with a PMH of HTN, HLD, hypothyroidism, depression, seizures who presents to the ED for abd pain found to have diverticulitis   has rising leukocytosis   had fever yesterday   tachycardic and now hypoxic   CXR (I personally reviewed) low lung volumes   origincal CT (I personally reviewed) with diverticulitis   she had a lot of pain in the abdomen on exam     12/25: s/p surgery for diverticulitis with perforation and abscess and went to the OR. intubated in ICU with vac and needs to go back to the OR, currently on zosyn, s/p one dose of diflucan last night   12/27: s/p repair and ostomy creation yesterday, wbc elevated, cultures sensitive to zosyn and candida albicans is notoriously sensitive to azoles such as fluconazole, wean of pressors and sedation, extubate when ready   12/28: Further increase in white blood cell count but overall the patient appears to be reasonably stable.  No concern of C. difficile at this juncture.  Current antibiotics are reasonable.  Leukocytosis like related to recent surgery, no concern of other superimposed process on the basis of exam.  I do not see a role to alter her antimicrobials.  12/29:  Remains intubated in ICU. still quite edematous and net positive, WBC climbing, small wound dehiscence at bottom of incision, plan for initiation of TPN today, remains on antibiotics    12/30: rising WBC, ostomy not functioning yet, very edematous CT today, may be source control issue so for now can continue same antibiotics and antifungal but may need to change if findings on the CT are worrisome or change in hemodynamics  12/31:Leukocytosis, with collections noted on CT.  However this is being done postoperative day 5, there is some possibility that this could represent postop changes but given the leukocytosis, concur with plans for attempts at percutaneous drainage.  White blood cell count down slightly compared to prior peak, will need to be trended.  Gallbladder is also distended.  Abdominal exam was benign this morning, but a calculus cholecystitis could be the differential diagnosis here as well (though n.p.o. status certainly could explain distended gallbladder, there is also report of gallbladder wall thickening).  Would modify antibiotics based on cultures from drainage, I do not believe that antibiotics and other the cells would be adequate here.  Fortunately she is off pressors, with preserved renal function, and tolerating pressure support.  1/1/22: Postop day 6.  I have some concerns with the appearance of the ostomy, though the abdominal exam overall is otherwise unchanged.  White blood cell count remains elevated, but is lower compared with prior values.  This is despite no change in antibiotic therapy.  Patient also has asymmetric edema of the upper extremities, right greater than left.  Low threshold for duplex ultrasound to exclude DVT.No new surveillance culture data.  1/2: POD7 Ostomy looks better today, UE symmetric. WBC elevated, some slight downward trend overall. Temps <= 100F.   1/3: drainage of fluid collection today, continue zosyn and fluconazole for now, monitor wbc and temps, watch ostomy output, diuresis and address third spacing   1/4: s/p drainage yesterday now growing ecoli and awaiting for sensitivities, wbc is improving, started on tube feeds

## 2022-01-04 NOTE — PROCEDURE NOTE - NSINFORMCONSENT_GEN_A_CORE
patient's son/Benefits, risks, and possible complications of procedure explained to patient/caregiver who verbalized understanding and gave verbal consent.
This was an emergent procedure.

## 2022-01-05 LAB
-  AMIKACIN: SIGNIFICANT CHANGE UP
-  AMOXICILLIN/CLAVULANIC ACID: SIGNIFICANT CHANGE UP
-  AMPICILLIN/SULBACTAM: SIGNIFICANT CHANGE UP
-  AMPICILLIN: SIGNIFICANT CHANGE UP
-  AZTREONAM: SIGNIFICANT CHANGE UP
-  CEFAZOLIN: SIGNIFICANT CHANGE UP
-  CEFEPIME: SIGNIFICANT CHANGE UP
-  CEFOXITIN: SIGNIFICANT CHANGE UP
-  CEFTRIAXONE: SIGNIFICANT CHANGE UP
-  CIPROFLOXACIN: SIGNIFICANT CHANGE UP
-  ERTAPENEM: SIGNIFICANT CHANGE UP
-  GENTAMICIN: SIGNIFICANT CHANGE UP
-  IMIPENEM: SIGNIFICANT CHANGE UP
-  LEVOFLOXACIN: SIGNIFICANT CHANGE UP
-  MEROPENEM: SIGNIFICANT CHANGE UP
-  PIPERACILLIN/TAZOBACTAM: SIGNIFICANT CHANGE UP
-  TOBRAMYCIN: SIGNIFICANT CHANGE UP
-  TRIMETHOPRIM/SULFAMETHOXAZOLE: SIGNIFICANT CHANGE UP
ALBUMIN SERPL ELPH-MCNC: 2.2 G/DL — LOW (ref 3.3–5)
ALP SERPL-CCNC: 129 U/L — HIGH (ref 40–120)
ALT FLD-CCNC: 36 U/L — SIGNIFICANT CHANGE UP (ref 12–78)
ANION GAP SERPL CALC-SCNC: 7 MMOL/L — SIGNIFICANT CHANGE UP (ref 5–17)
AST SERPL-CCNC: 35 U/L — SIGNIFICANT CHANGE UP (ref 15–37)
BILIRUB SERPL-MCNC: 0.4 MG/DL — SIGNIFICANT CHANGE UP (ref 0.2–1.2)
BUN SERPL-MCNC: 18 MG/DL — SIGNIFICANT CHANGE UP (ref 7–23)
CALCIUM SERPL-MCNC: 7.7 MG/DL — LOW (ref 8.5–10.1)
CHLORIDE SERPL-SCNC: 105 MMOL/L — SIGNIFICANT CHANGE UP (ref 96–108)
CO2 SERPL-SCNC: 26 MMOL/L — SIGNIFICANT CHANGE UP (ref 22–31)
CREAT SERPL-MCNC: 0.72 MG/DL — SIGNIFICANT CHANGE UP (ref 0.5–1.3)
CULTURE RESULTS: SIGNIFICANT CHANGE UP
GLUCOSE BLDC GLUCOMTR-MCNC: 127 MG/DL — HIGH (ref 70–99)
GLUCOSE BLDC GLUCOMTR-MCNC: 163 MG/DL — HIGH (ref 70–99)
GLUCOSE BLDC GLUCOMTR-MCNC: 163 MG/DL — HIGH (ref 70–99)
GLUCOSE BLDC GLUCOMTR-MCNC: 169 MG/DL — HIGH (ref 70–99)
GLUCOSE BLDC GLUCOMTR-MCNC: 178 MG/DL — HIGH (ref 70–99)
GLUCOSE SERPL-MCNC: 160 MG/DL — HIGH (ref 70–99)
GRAM STN FLD: SIGNIFICANT CHANGE UP
HCT VFR BLD CALC: 23.4 % — LOW (ref 34.5–45)
HGB BLD-MCNC: 7.5 G/DL — LOW (ref 11.5–15.5)
MAGNESIUM SERPL-MCNC: 2.5 MG/DL — SIGNIFICANT CHANGE UP (ref 1.6–2.6)
MCHC RBC-ENTMCNC: 29.3 PG — SIGNIFICANT CHANGE UP (ref 27–34)
MCHC RBC-ENTMCNC: 32.1 GM/DL — SIGNIFICANT CHANGE UP (ref 32–36)
MCV RBC AUTO: 91.4 FL — SIGNIFICANT CHANGE UP (ref 80–100)
METHOD TYPE: SIGNIFICANT CHANGE UP
NRBC # BLD: 0 /100 WBCS — SIGNIFICANT CHANGE UP (ref 0–0)
ORGANISM # SPEC MICROSCOPIC CNT: SIGNIFICANT CHANGE UP
ORGANISM # SPEC MICROSCOPIC CNT: SIGNIFICANT CHANGE UP
PHOSPHATE SERPL-MCNC: 2.5 MG/DL — SIGNIFICANT CHANGE UP (ref 2.5–4.5)
PLATELET # BLD AUTO: 490 K/UL — HIGH (ref 150–400)
POTASSIUM SERPL-MCNC: 3.4 MMOL/L — LOW (ref 3.5–5.3)
POTASSIUM SERPL-SCNC: 3.4 MMOL/L — LOW (ref 3.5–5.3)
PROT SERPL-MCNC: 6 GM/DL — SIGNIFICANT CHANGE UP (ref 6–8.3)
RBC # BLD: 2.56 M/UL — LOW (ref 3.8–5.2)
RBC # FLD: 14.9 % — HIGH (ref 10.3–14.5)
SODIUM SERPL-SCNC: 138 MMOL/L — SIGNIFICANT CHANGE UP (ref 135–145)
SPECIMEN SOURCE: SIGNIFICANT CHANGE UP
WBC # BLD: 15.4 K/UL — HIGH (ref 3.8–10.5)
WBC # FLD AUTO: 15.4 K/UL — HIGH (ref 3.8–10.5)

## 2022-01-05 PROCEDURE — 99291 CRITICAL CARE FIRST HOUR: CPT

## 2022-01-05 PROCEDURE — 99232 SBSQ HOSP IP/OBS MODERATE 35: CPT

## 2022-01-05 RX ORDER — FENTANYL CITRATE 50 UG/ML
50 INJECTION INTRAVENOUS ONCE
Refills: 0 | Status: DISCONTINUED | OUTPATIENT
Start: 2022-01-05 | End: 2022-01-05

## 2022-01-05 RX ORDER — MIDAZOLAM HYDROCHLORIDE 1 MG/ML
2 INJECTION, SOLUTION INTRAMUSCULAR; INTRAVENOUS ONCE
Refills: 0 | Status: DISCONTINUED | OUTPATIENT
Start: 2022-01-05 | End: 2022-01-05

## 2022-01-05 RX ORDER — FUROSEMIDE 40 MG
20 TABLET ORAL ONCE
Refills: 0 | Status: COMPLETED | OUTPATIENT
Start: 2022-01-05 | End: 2022-01-05

## 2022-01-05 RX ORDER — ALBUMIN HUMAN 25 %
50 VIAL (ML) INTRAVENOUS ONCE
Refills: 0 | Status: COMPLETED | OUTPATIENT
Start: 2022-01-05 | End: 2022-01-05

## 2022-01-05 RX ORDER — POTASSIUM CHLORIDE 20 MEQ
40 PACKET (EA) ORAL ONCE
Refills: 0 | Status: COMPLETED | OUTPATIENT
Start: 2022-01-05 | End: 2022-01-05

## 2022-01-05 RX ADMIN — DORZOLAMIDE HYDROCHLORIDE TIMOLOL MALEATE 1 DROP(S): 20; 5 SOLUTION/ DROPS OPHTHALMIC at 05:33

## 2022-01-05 RX ADMIN — ENOXAPARIN SODIUM 40 MILLIGRAM(S): 100 INJECTION SUBCUTANEOUS at 11:05

## 2022-01-05 RX ADMIN — Medication 20 MILLIGRAM(S): at 01:48

## 2022-01-05 RX ADMIN — Medication 50 MILLILITER(S): at 01:49

## 2022-01-05 RX ADMIN — PIPERACILLIN AND TAZOBACTAM 25 GRAM(S): 4; .5 INJECTION, POWDER, LYOPHILIZED, FOR SOLUTION INTRAVENOUS at 19:44

## 2022-01-05 RX ADMIN — FENTANYL CITRATE 4.05 MICROGRAM(S)/KG/HR: 50 INJECTION INTRAVENOUS at 19:44

## 2022-01-05 RX ADMIN — CHLORHEXIDINE GLUCONATE 1 APPLICATION(S): 213 SOLUTION TOPICAL at 05:32

## 2022-01-05 RX ADMIN — CHLORHEXIDINE GLUCONATE 15 MILLILITER(S): 213 SOLUTION TOPICAL at 05:32

## 2022-01-05 RX ADMIN — LEVETIRACETAM 420 MILLIGRAM(S): 250 TABLET, FILM COATED ORAL at 15:26

## 2022-01-05 RX ADMIN — CHLORHEXIDINE GLUCONATE 15 MILLILITER(S): 213 SOLUTION TOPICAL at 18:02

## 2022-01-05 RX ADMIN — DEXMEDETOMIDINE HYDROCHLORIDE IN 0.9% SODIUM CHLORIDE 4.05 MICROGRAM(S)/KG/HR: 4 INJECTION INTRAVENOUS at 11:05

## 2022-01-05 RX ADMIN — DEXMEDETOMIDINE HYDROCHLORIDE IN 0.9% SODIUM CHLORIDE 4.05 MICROGRAM(S)/KG/HR: 4 INJECTION INTRAVENOUS at 19:15

## 2022-01-05 RX ADMIN — Medication 40 MILLIEQUIVALENT(S): at 05:32

## 2022-01-05 RX ADMIN — PIPERACILLIN AND TAZOBACTAM 25 GRAM(S): 4; .5 INJECTION, POWDER, LYOPHILIZED, FOR SOLUTION INTRAVENOUS at 03:13

## 2022-01-05 RX ADMIN — MIDAZOLAM HYDROCHLORIDE 2 MILLIGRAM(S): 1 INJECTION, SOLUTION INTRAMUSCULAR; INTRAVENOUS at 19:14

## 2022-01-05 RX ADMIN — PIPERACILLIN AND TAZOBACTAM 25 GRAM(S): 4; .5 INJECTION, POWDER, LYOPHILIZED, FOR SOLUTION INTRAVENOUS at 11:05

## 2022-01-05 RX ADMIN — Medication 25 MICROGRAM(S): at 22:12

## 2022-01-05 RX ADMIN — DEXMEDETOMIDINE HYDROCHLORIDE IN 0.9% SODIUM CHLORIDE 4.05 MICROGRAM(S)/KG/HR: 4 INJECTION INTRAVENOUS at 03:13

## 2022-01-05 RX ADMIN — LEVETIRACETAM 420 MILLIGRAM(S): 250 TABLET, FILM COATED ORAL at 03:09

## 2022-01-05 RX ADMIN — FENTANYL CITRATE 50 MICROGRAM(S): 50 INJECTION INTRAVENOUS at 09:52

## 2022-01-05 RX ADMIN — PANTOPRAZOLE SODIUM 40 MILLIGRAM(S): 20 TABLET, DELAYED RELEASE ORAL at 11:06

## 2022-01-05 RX ADMIN — DEXMEDETOMIDINE HYDROCHLORIDE IN 0.9% SODIUM CHLORIDE 4.05 MICROGRAM(S)/KG/HR: 4 INJECTION INTRAVENOUS at 15:25

## 2022-01-05 RX ADMIN — DORZOLAMIDE HYDROCHLORIDE TIMOLOL MALEATE 1 DROP(S): 20; 5 SOLUTION/ DROPS OPHTHALMIC at 18:02

## 2022-01-05 RX ADMIN — FLUCONAZOLE 100 MILLIGRAM(S): 150 TABLET ORAL at 23:34

## 2022-01-05 RX ADMIN — DEXMEDETOMIDINE HYDROCHLORIDE IN 0.9% SODIUM CHLORIDE 4.05 MICROGRAM(S)/KG/HR: 4 INJECTION INTRAVENOUS at 00:38

## 2022-01-05 RX ADMIN — FENTANYL CITRATE 50 MICROGRAM(S): 50 INJECTION INTRAVENOUS at 10:10

## 2022-01-05 RX ADMIN — DEXMEDETOMIDINE HYDROCHLORIDE IN 0.9% SODIUM CHLORIDE 4.05 MICROGRAM(S)/KG/HR: 4 INJECTION INTRAVENOUS at 23:29

## 2022-01-05 NOTE — PROGRESS NOTE ADULT - ASSESSMENT
81F PMH HTN, HLD, hypothyroidism, depression, seizures presents for abdominal pain found to have acute sigmoid diverticulitis complicated by perforated sigmoid diverticulitis/peritonitis with abscess formation s/p ex-lap with large bowel resection / abdominal washout / wound vac placement 12/25. Brought back to OR yesterday 12/26 for abdominal closure and colostomy creation. Sepsis with septic shock requiring levophed. Remains intubated for post procedural acute hypoxic respiratory failure. Post op course with a-fib RVR, volume overload, persistent leukocytosis. Found to have multiple abdominal fluid collections s/p IR drainage of R abdominal abscess 1/3/22 growing e-coli and bacteroides. Failure to wean.     NEURO  daily sedation vacation as tolerates  mental status improved  overall strength improved however still with generalized weakness      CV  out of shock state not on vasopressor support  post op a-fib RVR, s/p amio load converted to sinus rhythm  remains volume overloaded, anasarca with pitting edema on exam  has been getting diuresed with lasix; did well with lasix/albumin combo yesterday  diurese with lasix and albumin early this morning  goal to get pt to net 500mL to 1L negative balance       PULM  weaning but on high support  cont daily wean as tolerates  will need trach  d/w son Blake over the phone yesterday, he called back today and is in agreement with trach placement    GI  POD #11 with RTOR for abdominal closure 12/26  retention sutures in place  monitor output from BABAK drain and output from colostomy: L BABAK drain with murky purulent drainage- surgical team aware  s/p IR drainage of largest R abdominal fluid collection / abscess on 1/3 (pt with multiple fluid collections)  abd abscess culture with e-coli and bacteroides  cont tube feeds as tolerates  surgical follow up appreciated    ID  sepsis present on admission with septic shock due to GI source from perforated diverticulitis   on fluconazole and zosyn for abdominal cultures growing candida and bacteroides and e-coli  abd abscess now with e-coli and bacteroids growing   antibx changed today. zosyn d/mikey and changed to ceftriaxone as organisms sensitive to ceftriaxone, flagyl added  CT chest also with bilateral opacities likely PNA, however the antibx pt has been on should be sufficient coverage for PNA  monitor WBC and fevers  repeat blood cx negative  sputum cx with normal respiratory yoly  ID follow up appreciated      RENAL  Cr within normal limits  monitor UO  mc in place  hypokalemia: supplement low K      ENDO  cont synthroid IV for underlying hypothyroidism     GEN  full code  DVT prophylaxis: lovenox  surgery made aware that son in agreement for trach

## 2022-01-05 NOTE — PROGRESS NOTE ADULT - ASSESSMENT
HPI:  Patient is a 81F with a PMH of HTN, HLD, hypothyroidism, depression, seizures who presents to the ED for abd pain.  Patient states that over the last two days she had developed significant abdominal pain and multiple episodes of watery diarrhea.  Reports one episode of nausea and vomiting earlier today.  Patient has no other complaints.  Vitals stable,  labs show leukocytosis and hypokalemia.  CT abdomen significant for diverticulitis.  Will admit to med surg.        1.  Complicated diverticulitis status post repair with colostomy  2. tolerating Vital @ 50 ml / hr (    Recs:  - Monitor CBC, CMP, Mag, Phos  - Continue antibiotics per ID  -  taper and discontinue  PPN as NGT feeds reached goal levels.  === Will follow on a PRN basis for now

## 2022-01-05 NOTE — PROVIDER CONTACT NOTE (EICU) - SITUATION
Patient noted to be in pain S/P ex lap, sigmoid resection, peritoneal lavage 12/25 and RTOR 12/26 for Irrigation of abdominal cavity with closure and creation of colostomy.  Remains intubated on fentanyl for sedation and pain.      Will give additional push of fentanyl 50mcg IVP for break through pain.

## 2022-01-05 NOTE — PROGRESS NOTE ADULT - SUBJECTIVE AND OBJECTIVE BOX
[facility]  Division of Infectious Diseases  [phone]    Name: JUAREZ GTZ  Age: 81y  Gender: Female  MRN: [id number]    Interval History--  Notes reviewed. patient seen and examined. Remains vent dependent in ICU, awake and spent time on pressure support,     Past Medical History--  Seizure    HTN (hypertension)    Glaucoma    Thyroid disease    Blood clot of neck vein    TIA (transient ischemic attack)    S/P appendectomy      Review of Systems--  Review of systems unable secondary to clinical condition.       For details regarding the patient's social history, family history, and other miscellaneous elements, please refer the initial infectious diseases consultation and/or the admitting history and physical examination for this admission.    Allergies    No Known Allergies    Intolerances        Medications--  Antibiotics:  fluconAZOLE IVPB 200 every 24 hours  piperacillin/tazobactam IVPB.. 3.375 every 8 hours      MEDICATIONS  (STANDING):  dexMEDEtomidine Infusion 0.2 <Continuous>  dextrose 40% Gel 15 once  dextrose 50% Injectable 25 once  dextrose 50% Injectable 12.5 once  dextrose 50% Injectable 25 once  enoxaparin Injectable 40 daily  fentaNYL   Infusion. 0.5 <Continuous>  glucagon  Injectable 1 once  levETIRAcetam  IVPB 500 every 12 hours  levothyroxine Injectable 25 at bedtime  pantoprazole  Injectable 40 daily      PRN      Physical Exam:  Vital Signs Last 24 Hrs  T(F): 99.8 (01-05-22 @ 19:30), Max: 99.8 (01-05-22 @ 19:30)  HR: 69 (01-05-22 @ 21:03)  BP: 149/62 (01-05-22 @ 19:00)  RR: 24 (01-05-22 @ 19:00)  SpO2: 100% (01-05-22 @ 21:03) (90% - 100%)  Wt(kg): --        General: Nontoxic-appearing Female in no acute distress.  HEENT: AT/NC. Oropharynx/dentition unable secondary to patient compliance. +ETT with NGT and started on feeds    Neck: Not rigid. No sense of mass.  Nodes: None palpable.  Lungs: ventilatory BS bilaterally without rales, wheezing or rhonchi  Heart: Regular rate and rhythm. No Murmur. No rub. No gallop. No palpable thrill.  Abdomen: Bowel sounds now present.  Soft. Mildly distended.  Incision dressed.  Ostomy with stool output. Drains scant output, serous x2. . No reaction to palpation.  still small wound dehiscence   Extremities: No cyanosis or clubbing. 2-3+ pitting edema  Skin: Warm. Dry No rash. No vasculitic stigmata.  Psychiatric: Unable      Laboratory Studies--  WBC Count: 15.40 K/uL (01-05 @ 04:33)  WBC Count: 16.61 K/uL (01-04 @ 02:41)  WBC Count: 20.29 K/uL (01-03 @ 02:20)  WBC Count: 22.98 K/uL (01-02 @ 02:11)  WBC Count: 23.24 K/uL (01-01 @ 05:27)  WBC Count: 28.69 K/uL (12-31 @ 02:30)  WBC Count: 36.58 K/uL (12-30 @ 03:59)                            7.5    15.40 )-----------( 490      ( 05 Jan 2022 04:33 )             23.4       01-05    138  |  105  |  18  ----------------------------<  160<H>  3.4<L>   |  26  |  0.72    Ca    7.7<L>      05 Jan 2022 04:33  Phos  2.5     01-05  Mg     2.5     01-05    TPro  6.0  /  Alb  2.2<L>  /  TBili  0.4  /  DBili  x   /  AST  35  /  ALT  36  /  AlkPhos  129<H>  01-05      Creatinine Trend: 0.72<--, 0.61<--, 0.60<--, 0.56<--, 0.45<--, 0.49<--          Culture Data      Culture - Sputum (collected 28 Dec 2021 18:54)  Source: .Sputum Sputum  Gram Stain (30 Dec 2021 18:49):    Moderate polymorphonuclear leukocytes per low power field    Numerous Squamous epithelial cells per low power field    Few Gram positive cocci in pairs per oil power field    Rare Gram Positive Rods per oil power field    Rare Yeast like cells per oil power field    Results consistent with oropharyngeal contamination  Final Report (30 Dec 2021 18:49):    Normal Respiratory Sendy present    Culture - Blood (collected 28 Dec 2021 15:04)  Source: .Blood Blood  Preliminary Report (29 Dec 2021 15:06):    No growth to date.    Culture - Blood (collected 28 Dec 2021 15:04)  Source: .Blood Blood  Preliminary Report (29 Dec 2021 15:06):    No growth to date.

## 2022-01-05 NOTE — PROGRESS NOTE ADULT - SUBJECTIVE AND OBJECTIVE BOX
24 hr events  no acute events overnight  tolerating some weaning  remains on light sedation  awake and responsive on sedation vacation  R IJ TLC placed yesterday for access due to underlying anasarca  tolerating tube feeds  brown soft stool in colostomy  son consented to trach today    [x] medications reviewed  [x] vitals reviewed  [x] ROS unable to obtain due to intubation/sedation    ##LABS  Complete Blood Count in AM (01.05.22 @ 04:33)    Nucleated RBC: 0 /100 WBCs    WBC Count: 15.40 K/uL    RBC Count: 2.56 M/uL    Hemoglobin: 7.5 g/dL    Hematocrit: 23.4 %    Mean Cell Volume: 91.4 fl    Mean Cell Hemoglobin: 29.3 pg    Mean Cell Hemoglobin Conc: 32.1 gm/dL    Red Cell Distrib Width: 14.9 %    Platelet Count - Automated: 490 K/uL    Comprehensive Metabolic Panel in AM (01.05.22 @ 04:33)    Sodium, Serum: 138 mmol/L    Potassium, Serum: 3.4 mmol/L    Chloride, Serum: 105 mmol/L    Carbon Dioxide, Serum: 26 mmol/L    Anion Gap, Serum: 7 mmol/L    Blood Urea Nitrogen, Serum: 18 mg/dL    Creatinine, Serum: 0.72 mg/dL    Glucose, Serum: 160 mg/dL    Calcium, Total Serum: 7.7 mg/dL    Protein Total, Serum: 6.0 gm/dL    Albumin, Serum: 2.2 g/dL    Bilirubin Total, Serum: 0.4 mg/dL    Alkaline Phosphatase, Serum: 129 U/L    Aspartate Aminotransferase (AST/SGOT): 35 U/L    Alanine Aminotransferase (ALT/SGPT): 36 U/L       Culture - Body Fluid with Gram Stain (01.03.22 @ 18:18)    Specimen Source: .Body Fluid Abdominal Fluid    Culture Results:   Few Escherichia coli  Few Bacteroides ovatus group "Susceptibilities not performed"     Culture - Sputum . (12.28.21 @ 18:54)    Specimen Source: .Sputum Sputum    Culture Results: Normal Respiratory Sendy present      ##IMAGING  CXR < from: Xray Chest 1 View- PORTABLE-Urgent (Xray Chest 1 View- PORTABLE-Urgent .) (01.04.22 @ 21:19) >  ET tube tip appropriately positioned above the alex.  Enteric tube tip in stomach.  Right IJ central venous catheter with tip in SVC.  Mild decrease in right pleural effusion.  Stable trace right pleural effusion.  Partial clearing of bilateral airspace opacities.  No pneumothorax.    ##PE  GEN: NAD, arousable, orally intubated  HEENT: ETT in place, NGT in place  CV: RRR  PULM: bilateral mechanical breath sounds, scattered rhonchi  ABD: L BABAK drain with murky drainage, R BABAK drain serous drainage, colostomy with brown stool, retention sutures and packing in place  EXT: generalized anasarca

## 2022-01-05 NOTE — PROGRESS NOTE ADULT - SUBJECTIVE AND OBJECTIVE BOX
SURGERY PROGRESS HPI:  Pt seen and examined at bedside resting comfortably in the ICU.       Vital Signs Last 24 Hrs  T(C): 37.1 (04 Jan 2022 23:26), Max: 37.6 (04 Jan 2022 08:00)  T(F): 98.8 (04 Jan 2022 23:26), Max: 99.7 (04 Jan 2022 08:00)  HR: 75 (05 Jan 2022 02:00) (54 - 86)  BP: 134/50 (05 Jan 2022 02:00) (94/58 - 151/64)  BP(mean): 76 (05 Jan 2022 02:00) (61 - 105)  RR: 27 (05 Jan 2022 02:00) (14 - 33)  SpO2: 98% (05 Jan 2022 02:00) (90% - 100%)      PHYSICAL EXAM:  General: Awake and responsive  HEENT: NGT tube feeds at 40  CV: +S1+S2   Lung: Intubated. mechanically vented  Abdomen: nondistended, +BS, soft, appropriate incisional tenderness. Dressing clean/dry/intact. Midline wound retention sutures intact, skin openings irrigated with saline and repacked with 2" iodoform packing, dry dressing applied. No gross drainage. Colostomy pink and viable, stool in bag. Left BABAK with seropurulent output, R BABAK with serous output.  Extremities: pitting edema  : mc catheter with clear urine output    I&O's Detail    03 Jan 2022 07:01  -  04 Jan 2022 07:00  --------------------------------------------------------  IN:    Dexmedetomidine: 310.3 mL    Fat Emulsion (Plant Based) 20%: 156 mL    FentaNYL: 40.5 mL    IV PiggyBack: 500 mL    PPN (Peripheral Parenteral Nutrition): 1575 mL    Vital1.5: 360 mL  Total IN: 2941.8 mL    OUT:    Bulb (mL): 5 mL    Bulb (mL): 1 mL    Colostomy (mL): 450 mL    Indwelling Catheter - Urethral (mL): 3500 mL    Nasogastric/Oral tube (mL): 100 mL  Total OUT: 4056 mL    Total NET: -1114.2 mL      04 Jan 2022 07:01  -  05 Jan 2022 04:54  --------------------------------------------------------  IN:    Albumin 25%  -  50 mL: 150 mL    Dexmedetomidine: 244 mL    dextrose 5%: 300 mL    Fat Emulsion (Plant Based) 20%: 41.6 mL    FentaNYL: 98 mL    IV PiggyBack: 200 mL    IV PiggyBack: 100 mL    PPN (Peripheral Parenteral Nutrition): 483 mL    Vital1.5: 640 mL  Total IN: 2256.6 mL    OUT:    Bulb (mL): 5 mL    Bulb (mL): 5 mL    Indwelling Catheter - Urethral (mL): 1705 mL  Total OUT: 1715 mL    Total NET: 541.6 mL      LABS:                        7.5    15.40 )-----------( 490      ( 05 Jan 2022 04:33 )             23.4     01-04    138  |  106  |  19  ----------------------------<  132<H>  3.3<L>   |  23  |  0.61    Ca    7.4<L>      04 Jan 2022 02:41  Phos  2.5     01-04  Mg     2.5     01-04    TPro  5.5<L>  /  Alb  1.6<L>  /  TBili  0.4  /  DBili  x   /  AST  43<H>  /  ALT  33  /  AlkPhos  142<H>  01-04    Culture Results:   Few Escherichia coli (01-03 @ 18:18)      A/P: 81F admitted with Acute Sigmoid Diverticulitis, Surgery consulted for interval perforated sigmoid diverticulitis. S/P ex lap, sigmoid resection, peritoneal lavage 12/25 and RTOR 12/26 for Irrigation of abdominal cavity with closure and creation of colostomy. Intubated. Leukocytosis downtrending. S/p bedside IR aspiration of fluid collection 1/3. Tube feeds initiated. Ostomy functioning.   - continue ICU management and vent support, SBT  - continue tube feeds as tolerated, taper PPN per GI  - analgesia prn  - local wound care per surgery, local drain care. ostomy management   - continue abx per ID SURGERY PROGRESS HPI:  Pt seen and examined at bedside resting comfortably in the ICU.       Vital Signs Last 24 Hrs  T(C): 37.1 (04 Jan 2022 23:26), Max: 37.6 (04 Jan 2022 08:00)  T(F): 98.8 (04 Jan 2022 23:26), Max: 99.7 (04 Jan 2022 08:00)  HR: 75 (05 Jan 2022 02:00) (54 - 86)  BP: 134/50 (05 Jan 2022 02:00) (94/58 - 151/64)  BP(mean): 76 (05 Jan 2022 02:00) (61 - 105)  RR: 27 (05 Jan 2022 02:00) (14 - 33)  SpO2: 98% (05 Jan 2022 02:00) (90% - 100%)      PHYSICAL EXAM:  General: Awake and responsive  HEENT: NGT tube feeds at 40  CV: +S1+S2   Lung: Intubated. mechanically vented  Abdomen: nondistended, +BS, soft, appropriate incisional tenderness. Dressing clean/dry/intact. Midline wound retention sutures intact, skin openings irrigated with saline and repacked with 2" iodoform packing, dry dressing applied. No gross drainage. Colostomy pink and viable, stool in bag. Left BABAK with seropurulent output, R BABAK with serous output.  Extremities: pitting edema  : mc catheter with clear urine output    I&O's Detail    03 Jan 2022 07:01  -  04 Jan 2022 07:00  --------------------------------------------------------  IN:    Dexmedetomidine: 310.3 mL    Fat Emulsion (Plant Based) 20%: 156 mL    FentaNYL: 40.5 mL    IV PiggyBack: 500 mL    PPN (Peripheral Parenteral Nutrition): 1575 mL    Vital1.5: 360 mL  Total IN: 2941.8 mL    OUT:    Bulb (mL): 5 mL    Bulb (mL): 1 mL    Colostomy (mL): 450 mL    Indwelling Catheter - Urethral (mL): 3500 mL    Nasogastric/Oral tube (mL): 100 mL  Total OUT: 4056 mL    Total NET: -1114.2 mL      04 Jan 2022 07:01  -  05 Jan 2022 04:54  --------------------------------------------------------  IN:    Albumin 25%  -  50 mL: 150 mL    Dexmedetomidine: 244 mL    dextrose 5%: 300 mL    Fat Emulsion (Plant Based) 20%: 41.6 mL    FentaNYL: 98 mL    IV PiggyBack: 200 mL    IV PiggyBack: 100 mL    PPN (Peripheral Parenteral Nutrition): 483 mL    Vital1.5: 640 mL  Total IN: 2256.6 mL    OUT:    Bulb (mL): 5 mL    Bulb (mL): 5 mL    Indwelling Catheter - Urethral (mL): 1705 mL  Total OUT: 1715 mL    Total NET: 541.6 mL      LABS:                        7.5    15.40 )-----------( 490      ( 05 Jan 2022 04:33 )             23.4     01-04    138  |  106  |  19  ----------------------------<  132<H>  3.3<L>   |  23  |  0.61    Ca    7.4<L>      04 Jan 2022 02:41  Phos  2.5     01-04  Mg     2.5     01-04    TPro  5.5<L>  /  Alb  1.6<L>  /  TBili  0.4  /  DBili  x   /  AST  43<H>  /  ALT  33  /  AlkPhos  142<H>  01-04    Culture Results:   Few Escherichia coli (01-03 @ 18:18)      A/P: 81F admitted with Acute Sigmoid Diverticulitis, Surgery consulted for interval perforated sigmoid diverticulitis. S/P ex lap, sigmoid resection, peritoneal lavage 12/25 and RTOR 12/26 for Irrigation of abdominal cavity with closure and creation of colostomy. Intubated. Leukocytosis downtrending. S/p bedside IR aspiration of fluid collection 1/3. Tube feeds initiated. Ostomy functioning.   - continue ICU management and vent support, SBT  - continue tube feeds as tolerated, taper PPN per GI  - analgesia prn  - local wound care per surgery, local drain care. ostomy management   - continue abx per ID  - possible tracheostomy  - will d/w attending

## 2022-01-05 NOTE — PROGRESS NOTE ADULT - SUBJECTIVE AND OBJECTIVE BOX
Patient is a 81y old  Female who presents with a chief complaint of diverticulitis (05 Jan 2022 04:54)      HPI:  Patient is a 81F with a PMH of HTN, HLD, hypothyroidism, depression, seizures who presents to the ED for abd pain.  Patient states that over the last two days she had developed significant abdominal pain and multiple episodes of watery diarrhea.  Reports one episode of nausea and vomiting earlier today.  Patient has no other complaints.  Vitals stable,  labs show leukocytosis and hypokalemia.  CT abdomen significant for diverticulitis.  Will admit to med surg.     (23 Dec 2021 00:17)      INTERVAL HPI/OVERNIGHT EVENTS: ICU #6  Intubated (+)  Tolerating Vital @ 50 ml / hr. No N/V. Stool in ostomy bag    MEDICATIONS  (STANDING):  chlorhexidine 0.12% Liquid 15 milliLiter(s) Oral Mucosa every 12 hours  chlorhexidine 2% Cloths 1 Application(s) Topical <User Schedule>  dexMEDEtomidine Infusion 0.2 MICROgram(s)/kG/Hr (4.05 mL/Hr) IV Continuous <Continuous>  dextrose 40% Gel 15 Gram(s) Oral once  dextrose 5%. 1000 milliLiter(s) (50 mL/Hr) IV Continuous <Continuous>  dextrose 5%. 1000 milliLiter(s) (100 mL/Hr) IV Continuous <Continuous>  dextrose 50% Injectable 25 Gram(s) IV Push once  dextrose 50% Injectable 12.5 Gram(s) IV Push once  dextrose 50% Injectable 25 Gram(s) IV Push once  dorzolamide 2%/timolol 0.5% Ophthalmic Solution 1 Drop(s) Both EYES two times a day  enoxaparin Injectable 40 milliGRAM(s) SubCutaneous daily  fentaNYL   Infusion. 0.5 MICROgram(s)/kG/Hr (4.05 mL/Hr) IV Continuous <Continuous>  fluconAZOLE IVPB 200 milliGRAM(s) IV Intermittent every 24 hours  glucagon  Injectable 1 milliGRAM(s) IntraMuscular once  levETIRAcetam  IVPB 500 milliGRAM(s) IV Intermittent every 12 hours  levothyroxine Injectable 25 MICROGram(s) IV Push at bedtime  pantoprazole  Injectable 40 milliGRAM(s) IV Push daily  Parenteral Nutrition - Adult 1 Each (42 mL/Hr) TPN Continuous <Continuous>  piperacillin/tazobactam IVPB.. 3.375 Gram(s) IV Intermittent every 8 hours    MEDICATIONS  (PRN):  sodium chloride 0.9% lock flush 10 milliLiter(s) IV Push every 1 hour PRN Pre/post blood products, medications, blood draw, and to maintain line patency      FAMILY HISTORY:  FH: HTN (hypertension)        Allergies    No Known Allergies    Intolerances        PMH/PSH:  Seizure    HTN (hypertension)    Glaucoma    Thyroid disease    Blood clot of neck vein    TIA (transient ischemic attack)    S/P appendectomy          REVIEW OF SYSTEMS: Uncommunicative   CONSTITUTIONAL: No fever, weight loss, or fatigue  EYES: No eye pain, visual disturbances, or discharge  ENMT:  No difficulty hearing, tinnitus, vertigo; No sinus or throat pain  NECK: No pain or stiffness  BREASTS: No pain, masses, or nipple discharge  RESPIRATORY: No cough, wheezing, chills or hemoptysis;   CARDIOVASCULAR: No chest pain, palpitations, dizziness, or leg swelling  GASTROINTESTINAL: See above   GENITOURINARY: No dysuria, frequency, hematuria, or incontinence  NEUROLOGICAL: Uncommunicative   SKIN: No itching, burning, rashes, or lesions   LYMPH NODES: No enlarged glands  ENDOCRINE: No heat or cold intolerance; No hair loss  MUSCULOSKELETAL: No joint pain or swelling; No muscle, back, or extremity pain  PSYCHIATRIC: No depression, anxiety, mood swings, or difficulty sleeping  HEME/LYMPH: No easy bruising, or bleeding gums  ALLERGY AND IMMUNOLOGIC: No hives or eczema    Vital Signs Last 24 Hrs  T(C): 37.6 (05 Jan 2022 12:00), Max: 37.6 (05 Jan 2022 04:00)  T(F): 99.7 (05 Jan 2022 12:00), Max: 99.7 (05 Jan 2022 12:00)  HR: 100 (05 Jan 2022 12:36) (59 - 100)  BP: 107/49 (05 Jan 2022 12:00) (94/58 - 170/62)  BP(mean): 67 (05 Jan 2022 12:00) (61 - 93)  RR: 35 (05 Jan 2022 12:30) (14 - 35)  SpO2: 92% (05 Jan 2022 12:36) (90% - 100%)    PHYSICAL EXAM:  GENERAL: NAD, well-groomed, well-developed  HEAD:  Atraumatic, Normocephalic  EYES: EOMI, PERRLA, conjunctiva and sclera clear  NECK: Supple, No JVD, Normal thyroid  NERVOUS SYSTEM:  Uncommunicative   CHEST/LUNG: Clear to percussion bilaterally; No rales, rhonchi, wheezing, or rubs  HEART: Regular rate and rhythm; No murmurs, rubs, or gallops  ABDOMEN: Soft, Nontender, Nondistended; Bowel sounds present  EXTREMITIES:  2+ Peripheral Pulses, No clubbing, cyanosis, or edema  LYMPH: No lymphadenopathy noted  SKIN: No rashes or lesions    LAB                          7.5    15.40 )-----------( 490      ( 05 Jan 2022 04:33 )             23.4       CBC:  01-05 @ 04:33  WBC 15.40   Hgb 7.5   Hct 23.4   Plts 490  MCV 91.4  01-04 @ 02:41  WBC 16.61   Hgb 7.7   Hct 23.9   Plts 444  MCV 89.2  01-03 @ 02:20  WBC 20.29   Hgb 7.8   Hct 23.9   Plts 263  MCV 88.8  01-02 @ 02:11  WBC 22.98   Hgb 8.6   Hct 25.6   Plts 341  MCV 86.8  01-01 @ 05:27  WBC 23.24   Hgb 10.4   Hct 31.9   Plts 365  MCV 87.6  12-31 @ 02:30  WBC 28.69   Hgb 10.4   Hct 30.0   Plts 369  MCV 85.7  12-30 @ 03:59  WBC 36.58   Hgb 9.9   Hct 30.1   Plts 274  MCV 88.0      Chemistry:  01-05 @ 04:33  Na+ 138  K+ 3.4  Cl- 105  CO2 26  BUN 18  Cr 0.72     01-04 @ 02:41  Na+ 138  K+ 3.3  Cl- 106  CO2 23  BUN 19  Cr 0.61     01-03 @ 02:20  Na+ 136  K+ 3.1  Cl- 104  CO2 24  BUN 19  Cr 0.60     01-02 @ 02:11  Na+ 136  K+ 4.0  Cl- 103  CO2 26  BUN 16  Cr 0.56     01-01 @ 05:27  Na+ 135  K+ 3.2  Cl- 100  CO2 25  BUN 12  Cr 0.45     12-31 @ 02:30  Na+ 135  K+ 3.1  Cl- 101  CO2 27  BUN 10  Cr 0.49     12-30 @ 13:23  Na+ 137  K+ 3.3  Cl- 104  CO2 24  BUN 10  Cr 0.49     12-30 @ 03:59  Na+ 136  K+ 3.2  Cl- 101  CO2 23  BUN 10  Cr 0.52         Glucose, Serum: 160 mg/dL (01-05 @ 04:33)  Glucose, Serum: 132 mg/dL (01-04 @ 02:41)  Glucose, Serum: 118 mg/dL (01-03 @ 02:20)  Glucose, Serum: 114 mg/dL (01-02 @ 02:11)  Glucose, Serum: 123 mg/dL (01-01 @ 05:27)  Glucose, Serum: 127 mg/dL (12-31 @ 02:30)  Glucose, Serum: 135 mg/dL (12-30 @ 13:23)  Glucose, Serum: 105 mg/dL (12-30 @ 03:59)      05 Jan 2022 04:33    138    |  105    |  18     ----------------------------<  160    3.4     |  26     |  0.72   04 Jan 2022 02:41    138    |  106    |  19     ----------------------------<  132    3.3     |  23     |  0.61   03 Jan 2022 02:20    136    |  104    |  19     ----------------------------<  118    3.1     |  24     |  0.60   02 Jan 2022 02:11    136    |  103    |  16     ----------------------------<  114    4.0     |  26     |  0.56   01 Jan 2022 05:27    135    |  100    |  12     ----------------------------<  123    3.2     |  25     |  0.45   31 Dec 2021 02:30    135    |  101    |  10     ----------------------------<  127    3.1     |  27     |  0.49   30 Dec 2021 13:23    137    |  104    |  10     ----------------------------<  135    3.3     |  24     |  0.49     Ca    7.7        05 Jan 2022 04:33  Ca    7.4        04 Jan 2022 02:41  Ca    7.0        03 Jan 2022 02:20  Ca    7.0        02 Jan 2022 02:11  Ca    7.0        01 Jan 2022 05:27  Ca    7.1        31 Dec 2021 02:30  Ca    7.1        30 Dec 2021 13:23  Phos  2.5       05 Jan 2022 04:33  Phos  2.5       04 Jan 2022 02:41  Phos  3.0       03 Jan 2022 02:20  Phos  3.4       02 Jan 2022 02:11  Phos  2.5       01 Jan 2022 05:27  Phos  2.2       31 Dec 2021 02:30  Phos  2.4       30 Dec 2021 13:23  Mg     2.5       05 Jan 2022 04:33  Mg     2.5       04 Jan 2022 02:41  Mg     2.5       03 Jan 2022 02:20  Mg     2.5       02 Jan 2022 02:11  Mg     1.8       01 Jan 2022 05:27  Mg     1.8       31 Dec 2021 02:30  Mg     1.5       30 Dec 2021 13:23    TPro  6.0    /  Alb  2.2    /  TBili  0.4    /  DBili  x      /  AST  35     /  ALT  36     /  AlkPhos  129    05 Jan 2022 04:33  TPro  5.5    /  Alb  1.6    /  TBili  0.4    /  DBili  x      /  AST  43     /  ALT  33     /  AlkPhos  142    04 Jan 2022 02:41  TPro  5.1    /  Alb  1.1    /  TBili  0.4    /  DBili  x      /  AST  46     /  ALT  30     /  AlkPhos  132    03 Jan 2022 02:20  TPro  5.2    /  Alb  1.0    /  TBili  0.4    /  DBili  x      /  AST  40     /  ALT  20     /  AlkPhos  118    02 Jan 2022 02:11  TPro  4.9    /  Alb  1.0    /  TBili  0.3    /  DBili  x      /  AST  32     /  ALT  19     /  AlkPhos  120    01 Jan 2022 05:27  TPro  5.1    /  Alb  1.0    /  TBili  0.3    /  DBili  x      /  AST  26     /  ALT  17     /  AlkPhos  118    31 Dec 2021 02:30  TPro  5.2    /  Alb  1.0    /  TBili  0.3    /  DBili  x      /  AST  22     /  ALT  16     /  AlkPhos  131    30 Dec 2021 03:59              CAPILLARY BLOOD GLUCOSE      POCT Blood Glucose.: 169 mg/dL (05 Jan 2022 11:17)  POCT Blood Glucose.: 163 mg/dL (05 Jan 2022 06:45)  POCT Blood Glucose.: 163 mg/dL (05 Jan 2022 02:01)  POCT Blood Glucose.: 161 mg/dL (04 Jan 2022 17:43)          RADIOLOGY & ADDITIONAL TESTS:    Imaging Personally Reviewed:  [ ] YES  [ ] NO    Consultant(s) Notes Reviewed:  [ ] YES  [ ] NO    Care Discussed with Consultants/Other Providers [ ] YES  [ ] NO

## 2022-01-05 NOTE — PROGRESS NOTE ADULT - ASSESSMENT
Patient is a 81F with a PMH of HTN, HLD, hypothyroidism, depression, seizures who presents to the ED for abd pain found to have diverticulitis   has rising leukocytosis   had fever yesterday   tachycardic and now hypoxic   CXR (I personally reviewed) low lung volumes   origincal CT (I personally reviewed) with diverticulitis   she had a lot of pain in the abdomen on exam     12/25: s/p surgery for diverticulitis with perforation and abscess and went to the OR. intubated in ICU with vac and needs to go back to the OR, currently on zosyn, s/p one dose of diflucan last night   12/27: s/p repair and ostomy creation yesterday, wbc elevated, cultures sensitive to zosyn and candida albicans is notoriously sensitive to azoles such as fluconazole, wean of pressors and sedation, extubate when ready   12/28: Further increase in white blood cell count but overall the patient appears to be reasonably stable.  No concern of C. difficile at this juncture.  Current antibiotics are reasonable.  Leukocytosis like related to recent surgery, no concern of other superimposed process on the basis of exam.  I do not see a role to alter her antimicrobials.  12/29:  Remains intubated in ICU. still quite edematous and net positive, WBC climbing, small wound dehiscence at bottom of incision, plan for initiation of TPN today, remains on antibiotics    12/30: rising WBC, ostomy not functioning yet, very edematous CT today, may be source control issue so for now can continue same antibiotics and antifungal but may need to change if findings on the CT are worrisome or change in hemodynamics  12/31:Leukocytosis, with collections noted on CT.  However this is being done postoperative day 5, there is some possibility that this could represent postop changes but given the leukocytosis, concur with plans for attempts at percutaneous drainage.  White blood cell count down slightly compared to prior peak, will need to be trended.  Gallbladder is also distended.  Abdominal exam was benign this morning, but a calculus cholecystitis could be the differential diagnosis here as well (though n.p.o. status certainly could explain distended gallbladder, there is also report of gallbladder wall thickening).  Would modify antibiotics based on cultures from drainage, I do not believe that antibiotics and other the cells would be adequate here.  Fortunately she is off pressors, with preserved renal function, and tolerating pressure support.  1/1/22: Postop day 6.  I have some concerns with the appearance of the ostomy, though the abdominal exam overall is otherwise unchanged.  White blood cell count remains elevated, but is lower compared with prior values.  This is despite no change in antibiotic therapy.  Patient also has asymmetric edema of the upper extremities, right greater than left.  Low threshold for duplex ultrasound to exclude DVT.No new surveillance culture data.  1/2: POD7 Ostomy looks better today, UE symmetric. WBC elevated, some slight downward trend overall. Temps <= 100F.   1/3: drainage of fluid collection today, continue zosyn and fluconazole for now, monitor wbc and temps, watch ostomy output, diuresis and address third spacing   1/4: s/p drainage yesterday now growing ecoli and awaiting for sensitivities, wbc is improving, started on tube feeds    1/5: abscess growing ecoli and bacteroides, S to ceftriaxone, wbc 15, weaning trial today, pain management

## 2022-01-06 ENCOUNTER — TRANSCRIPTION ENCOUNTER (OUTPATIENT)
Age: 82
End: 2022-01-06

## 2022-01-06 LAB
ALBUMIN SERPL ELPH-MCNC: 1.8 G/DL — LOW (ref 3.3–5)
ALP SERPL-CCNC: 116 U/L — SIGNIFICANT CHANGE UP (ref 40–120)
ALT FLD-CCNC: 32 U/L — SIGNIFICANT CHANGE UP (ref 12–78)
ANION GAP SERPL CALC-SCNC: 7 MMOL/L — SIGNIFICANT CHANGE UP (ref 5–17)
AST SERPL-CCNC: 30 U/L — SIGNIFICANT CHANGE UP (ref 15–37)
BILIRUB SERPL-MCNC: 0.2 MG/DL — SIGNIFICANT CHANGE UP (ref 0.2–1.2)
BUN SERPL-MCNC: 22 MG/DL — SIGNIFICANT CHANGE UP (ref 7–23)
CALCIUM SERPL-MCNC: 7.8 MG/DL — LOW (ref 8.5–10.1)
CHLORIDE SERPL-SCNC: 110 MMOL/L — HIGH (ref 96–108)
CO2 SERPL-SCNC: 24 MMOL/L — SIGNIFICANT CHANGE UP (ref 22–31)
CREAT SERPL-MCNC: 0.62 MG/DL — SIGNIFICANT CHANGE UP (ref 0.5–1.3)
GLUCOSE BLDC GLUCOMTR-MCNC: 151 MG/DL — HIGH (ref 70–99)
GLUCOSE BLDC GLUCOMTR-MCNC: 160 MG/DL — HIGH (ref 70–99)
GLUCOSE BLDC GLUCOMTR-MCNC: 166 MG/DL — HIGH (ref 70–99)
GLUCOSE BLDC GLUCOMTR-MCNC: 169 MG/DL — HIGH (ref 70–99)
GLUCOSE SERPL-MCNC: 167 MG/DL — HIGH (ref 70–99)
HCT VFR BLD CALC: 23.9 % — LOW (ref 34.5–45)
HGB BLD-MCNC: 7.3 G/DL — LOW (ref 11.5–15.5)
MAGNESIUM SERPL-MCNC: 2.1 MG/DL — SIGNIFICANT CHANGE UP (ref 1.6–2.6)
MCHC RBC-ENTMCNC: 28.7 PG — SIGNIFICANT CHANGE UP (ref 27–34)
MCHC RBC-ENTMCNC: 30.5 GM/DL — LOW (ref 32–36)
MCV RBC AUTO: 94.1 FL — SIGNIFICANT CHANGE UP (ref 80–100)
NRBC # BLD: 0 /100 WBCS — SIGNIFICANT CHANGE UP (ref 0–0)
PHOSPHATE SERPL-MCNC: 2.6 MG/DL — SIGNIFICANT CHANGE UP (ref 2.5–4.5)
PLATELET # BLD AUTO: 525 K/UL — HIGH (ref 150–400)
POTASSIUM SERPL-MCNC: 3.8 MMOL/L — SIGNIFICANT CHANGE UP (ref 3.5–5.3)
POTASSIUM SERPL-SCNC: 3.8 MMOL/L — SIGNIFICANT CHANGE UP (ref 3.5–5.3)
PROT SERPL-MCNC: 5.6 GM/DL — LOW (ref 6–8.3)
RBC # BLD: 2.54 M/UL — LOW (ref 3.8–5.2)
RBC # FLD: 15.2 % — HIGH (ref 10.3–14.5)
SODIUM SERPL-SCNC: 141 MMOL/L — SIGNIFICANT CHANGE UP (ref 135–145)
WBC # BLD: 14.85 K/UL — HIGH (ref 3.8–10.5)
WBC # FLD AUTO: 14.85 K/UL — HIGH (ref 3.8–10.5)

## 2022-01-06 PROCEDURE — 99291 CRITICAL CARE FIRST HOUR: CPT

## 2022-01-06 PROCEDURE — 99232 SBSQ HOSP IP/OBS MODERATE 35: CPT

## 2022-01-06 RX ORDER — ACETAMINOPHEN 500 MG
650 TABLET ORAL EVERY 6 HOURS
Refills: 0 | Status: DISCONTINUED | OUTPATIENT
Start: 2022-01-06 | End: 2022-01-07

## 2022-01-06 RX ORDER — METRONIDAZOLE 500 MG
500 TABLET ORAL EVERY 8 HOURS
Refills: 0 | Status: DISCONTINUED | OUTPATIENT
Start: 2022-01-06 | End: 2022-01-07

## 2022-01-06 RX ORDER — METRONIDAZOLE 500 MG
500 TABLET ORAL ONCE
Refills: 0 | Status: COMPLETED | OUTPATIENT
Start: 2022-01-06 | End: 2022-01-06

## 2022-01-06 RX ORDER — METRONIDAZOLE 500 MG
TABLET ORAL
Refills: 0 | Status: DISCONTINUED | OUTPATIENT
Start: 2022-01-06 | End: 2022-01-07

## 2022-01-06 RX ORDER — CEFTRIAXONE 500 MG/1
1000 INJECTION, POWDER, FOR SOLUTION INTRAMUSCULAR; INTRAVENOUS EVERY 24 HOURS
Refills: 0 | Status: DISCONTINUED | OUTPATIENT
Start: 2022-01-06 | End: 2022-01-07

## 2022-01-06 RX ADMIN — ENOXAPARIN SODIUM 40 MILLIGRAM(S): 100 INJECTION SUBCUTANEOUS at 11:24

## 2022-01-06 RX ADMIN — PIPERACILLIN AND TAZOBACTAM 25 GRAM(S): 4; .5 INJECTION, POWDER, LYOPHILIZED, FOR SOLUTION INTRAVENOUS at 04:02

## 2022-01-06 RX ADMIN — Medication 650 MILLIGRAM(S): at 12:43

## 2022-01-06 RX ADMIN — Medication 100 MILLIGRAM(S): at 21:41

## 2022-01-06 RX ADMIN — CEFTRIAXONE 100 MILLIGRAM(S): 500 INJECTION, POWDER, FOR SOLUTION INTRAMUSCULAR; INTRAVENOUS at 14:39

## 2022-01-06 RX ADMIN — DEXMEDETOMIDINE HYDROCHLORIDE IN 0.9% SODIUM CHLORIDE 4.05 MICROGRAM(S)/KG/HR: 4 INJECTION INTRAVENOUS at 16:52

## 2022-01-06 RX ADMIN — CHLORHEXIDINE GLUCONATE 15 MILLILITER(S): 213 SOLUTION TOPICAL at 17:16

## 2022-01-06 RX ADMIN — Medication 25 MICROGRAM(S): at 21:40

## 2022-01-06 RX ADMIN — DORZOLAMIDE HYDROCHLORIDE TIMOLOL MALEATE 1 DROP(S): 20; 5 SOLUTION/ DROPS OPHTHALMIC at 05:09

## 2022-01-06 RX ADMIN — CHLORHEXIDINE GLUCONATE 15 MILLILITER(S): 213 SOLUTION TOPICAL at 05:09

## 2022-01-06 RX ADMIN — DORZOLAMIDE HYDROCHLORIDE TIMOLOL MALEATE 1 DROP(S): 20; 5 SOLUTION/ DROPS OPHTHALMIC at 17:16

## 2022-01-06 RX ADMIN — DEXMEDETOMIDINE HYDROCHLORIDE IN 0.9% SODIUM CHLORIDE 4.05 MICROGRAM(S)/KG/HR: 4 INJECTION INTRAVENOUS at 11:24

## 2022-01-06 RX ADMIN — PANTOPRAZOLE SODIUM 40 MILLIGRAM(S): 20 TABLET, DELAYED RELEASE ORAL at 11:24

## 2022-01-06 RX ADMIN — PIPERACILLIN AND TAZOBACTAM 25 GRAM(S): 4; .5 INJECTION, POWDER, LYOPHILIZED, FOR SOLUTION INTRAVENOUS at 11:24

## 2022-01-06 RX ADMIN — DEXMEDETOMIDINE HYDROCHLORIDE IN 0.9% SODIUM CHLORIDE 4.05 MICROGRAM(S)/KG/HR: 4 INJECTION INTRAVENOUS at 21:39

## 2022-01-06 RX ADMIN — Medication 100 MILLIGRAM(S): at 14:39

## 2022-01-06 RX ADMIN — CHLORHEXIDINE GLUCONATE 1 APPLICATION(S): 213 SOLUTION TOPICAL at 05:09

## 2022-01-06 RX ADMIN — LEVETIRACETAM 420 MILLIGRAM(S): 250 TABLET, FILM COATED ORAL at 04:02

## 2022-01-06 RX ADMIN — Medication 650 MILLIGRAM(S): at 13:01

## 2022-01-06 RX ADMIN — LEVETIRACETAM 420 MILLIGRAM(S): 250 TABLET, FILM COATED ORAL at 15:24

## 2022-01-06 RX ADMIN — FENTANYL CITRATE 4.05 MICROGRAM(S)/KG/HR: 50 INJECTION INTRAVENOUS at 12:59

## 2022-01-06 RX ADMIN — DEXMEDETOMIDINE HYDROCHLORIDE IN 0.9% SODIUM CHLORIDE 4.05 MICROGRAM(S)/KG/HR: 4 INJECTION INTRAVENOUS at 05:41

## 2022-01-06 NOTE — PROGRESS NOTE ADULT - SUBJECTIVE AND OBJECTIVE BOX
Patient is a 81y old  Female who presents with a chief complaint of diverticulitis (06 Jan 2022 16:11)      HPI:  Patient is a 81F with a PMH of HTN, HLD, hypothyroidism, depression, seizures who presents to the ED for abd pain.  Patient states that over the last two days she had developed significant abdominal pain and multiple episodes of watery diarrhea.  Reports one episode of nausea and vomiting earlier today.  Patient has no other complaints.  Vitals stable,  labs show leukocytosis and hypokalemia.  CT abdomen significant for diverticulitis.  Will admit to med surg.     (23 Dec 2021 00:17)      INTERVAL HPI/OVERNIGHT EVENTS: ICU #6 Intubated (+). Patient seen earlier  Tolerating NGT feeds at 50 ml / hr. Stool out put (+)     MEDICATIONS  (STANDING):  cefTRIAXone   IVPB 1000 milliGRAM(s) IV Intermittent every 24 hours  chlorhexidine 0.12% Liquid 15 milliLiter(s) Oral Mucosa every 12 hours  chlorhexidine 2% Cloths 1 Application(s) Topical <User Schedule>  dexMEDEtomidine Infusion 0.2 MICROgram(s)/kG/Hr (4.05 mL/Hr) IV Continuous <Continuous>  dextrose 40% Gel 15 Gram(s) Oral once  dextrose 5%. 1000 milliLiter(s) (50 mL/Hr) IV Continuous <Continuous>  dextrose 5%. 1000 milliLiter(s) (100 mL/Hr) IV Continuous <Continuous>  dextrose 50% Injectable 25 Gram(s) IV Push once  dextrose 50% Injectable 12.5 Gram(s) IV Push once  dextrose 50% Injectable 25 Gram(s) IV Push once  dorzolamide 2%/timolol 0.5% Ophthalmic Solution 1 Drop(s) Both EYES two times a day  enoxaparin Injectable 40 milliGRAM(s) SubCutaneous daily  fentaNYL   Infusion. 0.5 MICROgram(s)/kG/Hr (4.05 mL/Hr) IV Continuous <Continuous>  glucagon  Injectable 1 milliGRAM(s) IntraMuscular once  levETIRAcetam  IVPB 500 milliGRAM(s) IV Intermittent every 12 hours  levothyroxine Injectable 25 MICROGram(s) IV Push at bedtime  metroNIDAZOLE  IVPB      metroNIDAZOLE  IVPB 500 milliGRAM(s) IV Intermittent every 8 hours  pantoprazole  Injectable 40 milliGRAM(s) IV Push daily    MEDICATIONS  (PRN):  acetaminophen     Tablet .. 650 milliGRAM(s) Oral every 6 hours PRN Temp greater or equal to 38C (100.4F)  sodium chloride 0.9% lock flush 10 milliLiter(s) IV Push every 1 hour PRN Pre/post blood products, medications, blood draw, and to maintain line patency      FAMILY HISTORY:  FH: HTN (hypertension)        Allergies    No Known Allergies    Intolerances        PMH/PSH:  Seizure    HTN (hypertension)    Glaucoma    Thyroid disease    Blood clot of neck vein    TIA (transient ischemic attack)    S/P appendectomy          REVIEW OF SYSTEMS:  CONSTITUTIONAL: No weight loss, Febrile (+)  RESPIRATORY: No cough, wheezing, chills or hemoptysis;   CARDIOVASCULAR: No chest pain, palpitations, dizziness, or leg swelling  GASTROINTESTINAL: See above  GENITOURINARY: No dysuria, frequency, hematuria, or incontinence  NEUROLOGICAL: No headaches, , numbness, or tremors  SKIN: No itching, burning, rashes, or lesions   LYMPH NODES: No enlarged glands  ENDOCRINE: No heat or cold intolerance; No hair loss  MUSCULOSKELETAL: No joint pain or swelling; No muscle, back, or extremity pain  PSYCHIATRIC: No depression, anxiety, mood swings, or difficulty sleeping  HEME/LYMPH: No easy bruising, or bleeding gums  ALLERGY AND IMMUNOLOGIC: No hives or eczema    Vital Signs Last 24 Hrs  T(C): 37.7 (06 Jan 2022 15:15), Max: 38.3 (06 Jan 2022 12:37)  T(F): 99.8 (06 Jan 2022 15:15), Max: 100.9 (06 Jan 2022 12:37)  HR: 76 (06 Jan 2022 18:00) (60 - 102)  BP: 158/66 (06 Jan 2022 18:00) (95/44 - 163/62)  BP(mean): 89 (06 Jan 2022 18:00) (50 - 92)  RR: 30 (06 Jan 2022 18:00) (17 - 30)  SpO2: 94% (06 Jan 2022 18:00) (90% - 100%)    PHYSICAL EXAM:  GENERAL: NAD, well-groomed, well-developed  NERVOUS SYSTEM:  Intubated (+)  CHEST/LUNG: Clear to percussion bilaterally; No rales, rhonchi, wheezing, or rubs  HEART: Regular rate and rhythm; No murmurs, rubs, or gallops  ABDOMEN: Soft, Nontender, Nondistended; Bowel sounds present  EXTREMITIES:  2+ Peripheral Pulses, No clubbing, cyanosis, or edema  LYMPH: No lymphadenopathy noted  SKIN: No rashes or lesions    LAB                          7.3    14.85 )-----------( 525      ( 06 Jan 2022 03:49 )             23.9       CBC:  01-06 @ 03:49  WBC 14.85   Hgb 7.3   Hct 23.9   Plts 525  MCV 94.1  01-05 @ 04:33  WBC 15.40   Hgb 7.5   Hct 23.4   Plts 490  MCV 91.4  01-04 @ 02:41  WBC 16.61   Hgb 7.7   Hct 23.9   Plts 444  MCV 89.2  01-03 @ 02:20  WBC 20.29   Hgb 7.8   Hct 23.9   Plts 263  MCV 88.8  01-02 @ 02:11  WBC 22.98   Hgb 8.6   Hct 25.6   Plts 341  MCV 86.8  01-01 @ 05:27  WBC 23.24   Hgb 10.4   Hct 31.9   Plts 365  MCV 87.6  12-31 @ 02:30  WBC 28.69   Hgb 10.4   Hct 30.0   Plts 369  MCV 85.7      Chemistry:  01-06 @ 03:49  Na+ 141  K+ 3.8  Cl- 110  CO2 24  BUN 22  Cr 0.62     01-05 @ 04:33  Na+ 138  K+ 3.4  Cl- 105  CO2 26  BUN 18  Cr 0.72     01-04 @ 02:41  Na+ 138  K+ 3.3  Cl- 106  CO2 23  BUN 19  Cr 0.61     01-03 @ 02:20  Na+ 136  K+ 3.1  Cl- 104  CO2 24  BUN 19  Cr 0.60     01-02 @ 02:11  Na+ 136  K+ 4.0  Cl- 103  CO2 26  BUN 16  Cr 0.56     01-01 @ 05:27  Na+ 135  K+ 3.2  Cl- 100  CO2 25  BUN 12  Cr 0.45     12-31 @ 02:30  Na+ 135  K+ 3.1  Cl- 101  CO2 27  BUN 10  Cr 0.49         Glucose, Serum: 167 mg/dL (01-06 @ 03:49)  Glucose, Serum: 160 mg/dL (01-05 @ 04:33)  Glucose, Serum: 132 mg/dL (01-04 @ 02:41)  Glucose, Serum: 118 mg/dL (01-03 @ 02:20)  Glucose, Serum: 114 mg/dL (01-02 @ 02:11)  Glucose, Serum: 123 mg/dL (01-01 @ 05:27)  Glucose, Serum: 127 mg/dL (12-31 @ 02:30)      06 Jan 2022 03:49    141    |  110    |  22     ----------------------------<  167    3.8     |  24     |  0.62   05 Jan 2022 04:33    138    |  105    |  18     ----------------------------<  160    3.4     |  26     |  0.72   04 Jan 2022 02:41    138    |  106    |  19     ----------------------------<  132    3.3     |  23     |  0.61   03 Jan 2022 02:20    136    |  104    |  19     ----------------------------<  118    3.1     |  24     |  0.60   02 Jan 2022 02:11    136    |  103    |  16     ----------------------------<  114    4.0     |  26     |  0.56   01 Jan 2022 05:27    135    |  100    |  12     ----------------------------<  123    3.2     |  25     |  0.45   31 Dec 2021 02:30    135    |  101    |  10     ----------------------------<  127    3.1     |  27     |  0.49     Ca    7.8        06 Jan 2022 03:49  Ca    7.7        05 Jan 2022 04:33  Ca    7.4        04 Jan 2022 02:41  Ca    7.0        03 Jan 2022 02:20  Ca    7.0        02 Jan 2022 02:11  Ca    7.0        01 Jan 2022 05:27  Ca    7.1        31 Dec 2021 02:30  Phos  2.6       06 Jan 2022 03:49  Phos  2.5       05 Jan 2022 04:33  Phos  2.5       04 Jan 2022 02:41  Phos  3.0       03 Jan 2022 02:20  Phos  3.4       02 Jan 2022 02:11  Phos  2.5       01 Jan 2022 05:27  Phos  2.2       31 Dec 2021 02:30  Mg     2.1       06 Jan 2022 03:49  Mg     2.5       05 Jan 2022 04:33  Mg     2.5       04 Jan 2022 02:41  Mg     2.5       03 Jan 2022 02:20  Mg     2.5       02 Jan 2022 02:11  Mg     1.8       01 Jan 2022 05:27  Mg     1.8       31 Dec 2021 02:30    TPro  5.6    /  Alb  1.8    /  TBili  0.2    /  DBili  x      /  AST  30     /  ALT  32     /  AlkPhos  116    06 Jan 2022 03:49  TPro  6.0    /  Alb  2.2    /  TBili  0.4    /  DBili  x      /  AST  35     /  ALT  36     /  AlkPhos  129    05 Jan 2022 04:33  TPro  5.5    /  Alb  1.6    /  TBili  0.4    /  DBili  x      /  AST  43     /  ALT  33     /  AlkPhos  142    04 Jan 2022 02:41  TPro  5.1    /  Alb  1.1    /  TBili  0.4    /  DBili  x      /  AST  46     /  ALT  30     /  AlkPhos  132    03 Jan 2022 02:20  TPro  5.2    /  Alb  1.0    /  TBili  0.4    /  DBili  x      /  AST  40     /  ALT  20     /  AlkPhos  118    02 Jan 2022 02:11  TPro  4.9    /  Alb  1.0    /  TBili  0.3    /  DBili  x      /  AST  32     /  ALT  19     /  AlkPhos  120    01 Jan 2022 05:27  TPro  5.1    /  Alb  1.0    /  TBili  0.3    /  DBili  x      /  AST  26     /  ALT  17     /  AlkPhos  118    31 Dec 2021 02:30              CAPILLARY BLOOD GLUCOSE      POCT Blood Glucose.: 169 mg/dL (06 Jan 2022 17:12)  POCT Blood Glucose.: 151 mg/dL (06 Jan 2022 11:28)  POCT Blood Glucose.: 166 mg/dL (06 Jan 2022 05:10)  POCT Blood Glucose.: 127 mg/dL (05 Jan 2022 23:32)          RADIOLOGY & ADDITIONAL TESTS:    Imaging Personally Reviewed:  [ ] YES  [ ] NO    Consultant(s) Notes Reviewed:  [ ] YES  [ ] NO    Care Discussed with Consultants/Other Providers [ ] YES  [ ] NO

## 2022-01-06 NOTE — PROGRESS NOTE ADULT - ASSESSMENT
HPI:  Patient is a 81F with a PMH of HTN, HLD, hypothyroidism, depression, seizures who presents to the ED for abd pain.  Patient states that over the last two days she had developed significant abdominal pain and multiple episodes of watery diarrhea.  Reports one episode of nausea and vomiting earlier today.  Patient has no other complaints.  Vitals stable,  labs show leukocytosis and hypokalemia.  CT abdomen significant for diverticulitis.  Will admit to med surg.        1.  Complicated diverticulitis status post repair with colostomy  2. tolerating Vital @ 50 ml / hr (    Recs:  - Monitor CBC, CMP, Mag, Phos  - Continue antibiotics per ID  -  Off PPN as NGT feeds reached goal levels.  === Will follow on a PRN basis for now

## 2022-01-06 NOTE — PROGRESS NOTE ADULT - SUBJECTIVE AND OBJECTIVE BOX
[facility]  Division of Infectious Diseases  [phone]    Name: JUAREZ GTZ  Age: 81y  Gender: Female  MRN: [id number]    Interval History--  Notes reviewed. patient seen and examined. Remains vent dependent in ICU, awake and spent time on pressure support, plan for trach tomorrow     Past Medical History--  Seizure    HTN (hypertension)    Glaucoma    Thyroid disease    Blood clot of neck vein    TIA (transient ischemic attack)    S/P appendectomy      Review of Systems--  Review of systems unable secondary to clinical condition.       For details regarding the patient's social history, family history, and other miscellaneous elements, please refer the initial infectious diseases consultation and/or the admitting history and physical examination for this admission.    Allergies    No Known Allergies    Intolerances        Medications--  Antibiotics:  cefTRIAXone   IVPB 1000 every 24 hours  metroNIDAZOLE  IVPB    metroNIDAZOLE  IVPB 500 every 8 hours      MEDICATIONS  (STANDING):  dexMEDEtomidine Infusion 0.2 <Continuous>  dextrose 40% Gel 15 once  dextrose 50% Injectable 25 once  dextrose 50% Injectable 12.5 once  dextrose 50% Injectable 25 once  enoxaparin Injectable 40 daily  fentaNYL   Infusion. 0.5 <Continuous>  glucagon  Injectable 1 once  levETIRAcetam  IVPB 500 every 12 hours  levothyroxine Injectable 25 at bedtime  pantoprazole  Injectable 40 daily      PRN  acetaminophen     Tablet .. 650 milliGRAM(s) Oral every 6 hours PRN      Physical Exam:  Vital Signs Last 24 Hrs  T(F): 99.8 (01-06-22 @ 15:15), Max: 100.9 (01-06-22 @ 12:37)  HR: 91 (01-06-22 @ 15:00)  BP: 142/62 (01-06-22 @ 15:00)  RR: 28 (01-06-22 @ 15:00)  SpO2: 99% (01-06-22 @ 15:00) (90% - 100%)  Wt(kg): --        General: Nontoxic-appearing Female in no acute distress.  HEENT: AT/NC. Oropharynx/dentition unable secondary to patient compliance. +ETT with NGT and started on feeds    Neck: Not rigid. No sense of mass.  Nodes: None palpable.  Lungs: ventilatory BS bilaterally without rales, wheezing or rhonchi  Heart: Regular rate and rhythm. No Murmur. No rub. No gallop. No palpable thrill.  Abdomen: Bowel sounds now present.  Soft. Mildly distended.  Incision dressed.  Ostomy with stool output. Drains scant output, serous x2. . slight grimace to palpation.  still small wound dehiscence   Extremities: No cyanosis or clubbing. 2-3+ pitting edema  Skin: Warm. Dry No rash. No vasculitic stigmata.  Psychiatric: Unable      Laboratory Studies--  WBC Count: 14.85 K/uL (01-06 @ 03:49)  WBC Count: 15.40 K/uL (01-05 @ 04:33)  WBC Count: 16.61 K/uL (01-04 @ 02:41)  WBC Count: 20.29 K/uL (01-03 @ 02:20)  WBC Count: 22.98 K/uL (01-02 @ 02:11)  WBC Count: 23.24 K/uL (01-01 @ 05:27)  WBC Count: 28.69 K/uL (12-31 @ 02:30)                            7.3    14.85 )-----------( 525      ( 06 Jan 2022 03:49 )             23.9       01-06    141  |  110<H>  |  22  ----------------------------<  167<H>  3.8   |  24  |  0.62    Ca    7.8<L>      06 Jan 2022 03:49  Phos  2.6     01-06  Mg     2.1     01-06    TPro  5.6<L>  /  Alb  1.8<L>  /  TBili  0.2  /  DBili  x   /  AST  30  /  ALT  32  /  AlkPhos  116  01-06      Creatinine Trend: 0.62<--, 0.72<--, 0.61<--, 0.60<--, 0.56<--, 0.45<--              Culture Data  Culture - Body Fluid with Gram Stain (01.03.22 @ 18:18)    Gram Stain:   Few polymorphonuclear leukocytes per low power field  Few Gram Negative Rods per oil power field    -  Amikacin: S <=16    -  Amoxicillin/Clavulanic Acid: S <=8/4    -  Ampicillin: S <=8 These ampicillin results predict results for amoxicillin    -  Ampicillin/Sulbactam: S <=4/2 Enterobacter, Klebsiella aerogenes, Citrobacter, and Serratia may develop resistance during prolonged therapy (3-4 days)    -  Aztreonam: S <=4    -  Cefazolin: S <=2 Enterobacter, Klebsiella aerogenes, Citrobacter, and Serratia may develop resistance during prolonged therapy (3-4 days)    -  Cefepime: S <=2    -  Cefoxitin: S <=8    -  Ceftriaxone: S <=1 Enterobacter, Klebsiella aerogenes, Citrobacter, and Serratia may develop resistance during prolonged therapy    -  Ciprofloxacin: S <=0.25    -  Ertapenem: S <=0.5    -  Gentamicin: S <=2    -  Imipenem: S <=1    -  Levofloxacin: S <=0.5    -  Meropenem: S <=1    -  Piperacillin/Tazobactam: S <=8    -  Tobramycin: S <=2    -  Trimethoprim/Sulfamethoxazole: R >2/38    Specimen Source: .Body Fluid Abdominal Fluid    Culture Results:   Few Escherichia coli  Few Bacteroides ovatus group "Susceptibilities not performed"    Organism Identification: Escherichia coli    Organism: Escherichia coli    Method Type: TONYA        Culture - Sputum (collected 28 Dec 2021 18:54)  Source: .Sputum Sputum  Gram Stain (30 Dec 2021 18:49):    Moderate polymorphonuclear leukocytes per low power field    Numerous Squamous epithelial cells per low power field    Few Gram positive cocci in pairs per oil power field    Rare Gram Positive Rods per oil power field    Rare Yeast like cells per oil power field    Results consistent with oropharyngeal contamination  Final Report (30 Dec 2021 18:49):    Normal Respiratory Sendy present    Culture - Blood (collected 28 Dec 2021 15:04)  Source: .Blood Blood  Preliminary Report (29 Dec 2021 15:06):    No growth to date.    Culture - Blood (collected 28 Dec 2021 15:04)  Source: .Blood Blood  Preliminary Report (29 Dec 2021 15:06):    No growth to date.

## 2022-01-06 NOTE — PROGRESS NOTE ADULT - ASSESSMENT
81F PMH HTN, HLD, hypothyroidism, depression, seizures presents for abdominal pain found to have acute sigmoid diverticulitis complicated by perforated sigmoid diverticulitis/peritonitis with abscess formation s/p ex-lap with large bowel resection / abdominal washout / wound vac placement 12/25. Brought back to OR yesterday 12/26 for abdominal closure and colostomy creation. Sepsis with septic shock requiring levophed. Remains intubated for post procedural acute hypoxic respiratory failure. Post op course with a-fib RVR, volume overload, persistent leukocytosis. Found to have multiple abdominal fluid collections s/p IR drainage of R abdominal abscess 1/3/22 growing e-coli and bacteroides. Failure to wean.     NEURO  daily sedation vacation as tolerates  mental status improved: responsive  overall strength improved however still with generalized weakness  physical therapy      CV  remains out of shock state not on vasopressor support  post op a-fib RVR, s/p amio load converted to sinus rhythm  remains volume overloaded, anasarca with pitting edema improved  s/p diuresis with lasix/albumin  goal to maintain net negative balance 500cc to 1L       PULM  weaning but on higher support  cont daily wean as tolerates  for trach tomorrow  will keep NPO after MN for trach    GI  POD #12 with RTOR for abdominal closure 12/26  retention sutures in place  monitor output from BABAK drain and output from colostomy: L BABAK drain with murky purulent drainage- surgical team aware  s/p IR drainage of largest R abdominal fluid collection / abscess on 1/3 (pt with multiple fluid collections)  abd abscess culture with e-coli and bacteroides  cont antibx which was changed yesterday by ID to ceftriaxone and flagyl  cont tube feeds as tolerates  surgical follow up appreciated    ID  sepsis present on admission with septic shock due to GI source from perforated diverticulitis   on fluconazole as abdominal cultures growing candida  abd abscess now with e-coli and bacteroids growing similar to initial abdominal peritoneal culture which grew candida and ecoli/bacteroides   antibx deescalated yesterday from zosyn to ceftriaxone and flagyl   CT chest also with bilateral opacities likely PNA, however the antibx pt has been on should be sufficient coverage for PNA  monitor WBC and fevers  repeat blood cx negative  sputum cx with normal respiratory yoly  ID follow up appreciated      RENAL  Cr within normal limits  monitor UO  mc in place  monitor electrolytes       ENDO  cont synthroid IV for underlying hypothyroidism     GEN  full code  DVT prophylaxis with lovenox  for trach tomorrow with surgery

## 2022-01-06 NOTE — PROGRESS NOTE ADULT - ASSESSMENT
Patient is a 81F with a PMH of HTN, HLD, hypothyroidism, depression, seizures who presents to the ED for abd pain found to have diverticulitis   has rising leukocytosis   had fever yesterday   tachycardic and now hypoxic   CXR (I personally reviewed) low lung volumes   origincal CT (I personally reviewed) with diverticulitis   she had a lot of pain in the abdomen on exam     12/25: s/p surgery for diverticulitis with perforation and abscess and went to the OR. intubated in ICU with vac and needs to go back to the OR, currently on zosyn, s/p one dose of diflucan last night   12/27: s/p repair and ostomy creation yesterday, wbc elevated, cultures sensitive to zosyn and candida albicans is notoriously sensitive to azoles such as fluconazole, wean of pressors and sedation, extubate when ready   12/28: Further increase in white blood cell count but overall the patient appears to be reasonably stable.  No concern of C. difficile at this juncture.  Current antibiotics are reasonable.  Leukocytosis like related to recent surgery, no concern of other superimposed process on the basis of exam.  I do not see a role to alter her antimicrobials.  12/29:  Remains intubated in ICU. still quite edematous and net positive, WBC climbing, small wound dehiscence at bottom of incision, plan for initiation of TPN today, remains on antibiotics    12/30: rising WBC, ostomy not functioning yet, very edematous CT today, may be source control issue so for now can continue same antibiotics and antifungal but may need to change if findings on the CT are worrisome or change in hemodynamics  12/31:Leukocytosis, with collections noted on CT.  However this is being done postoperative day 5, there is some possibility that this could represent postop changes but given the leukocytosis, concur with plans for attempts at percutaneous drainage.  White blood cell count down slightly compared to prior peak, will need to be trended.  Gallbladder is also distended.  Abdominal exam was benign this morning, but a calculus cholecystitis could be the differential diagnosis here as well (though n.p.o. status certainly could explain distended gallbladder, there is also report of gallbladder wall thickening).  Would modify antibiotics based on cultures from drainage, I do not believe that antibiotics and other the cells would be adequate here.  Fortunately she is off pressors, with preserved renal function, and tolerating pressure support.  1/1/22: Postop day 6.  I have some concerns with the appearance of the ostomy, though the abdominal exam overall is otherwise unchanged.  White blood cell count remains elevated, but is lower compared with prior values.  This is despite no change in antibiotic therapy.  Patient also has asymmetric edema of the upper extremities, right greater than left.  Low threshold for duplex ultrasound to exclude DVT.No new surveillance culture data.  1/2: POD7 Ostomy looks better today, UE symmetric. WBC elevated, some slight downward trend overall. Temps <= 100F.   1/3: drainage of fluid collection today, continue zosyn and fluconazole for now, monitor wbc and temps, watch ostomy output, diuresis and address third spacing   1/4: s/p drainage yesterday now growing ecoli and awaiting for sensitivities, wbc is improving, started on tube feeds    1/5: abscess growing ecoli and bacteroides, S to ceftriaxone, wbc 15, weaning trial today, pain management    1/6: awake, lung excerise today, trach planned for tomorrow, ostomy output is good. will stop fluconazole today and change antibiotics to ceftriaxone and flagly. Unclear stop date yet

## 2022-01-06 NOTE — PROGRESS NOTE ADULT - SUBJECTIVE AND OBJECTIVE BOX
24 hr events  no acute events overnight  tolerating some weaning at times requires higher pressure support  remains on light sedation  awake and responsive on sedation vacation  tolerating tube feeds      [x] medications reviewed  [x] vitals reviewed  [x] ROS unable to obtain due to intubation/sedation    ##LABS  Complete Blood Count in AM (01.06.22 @ 03:49)    Nucleated RBC: 0 /100 WBCs    WBC Count: 14.85 K/uL    RBC Count: 2.54 M/uL    Hemoglobin: 7.3 g/dL    Hematocrit: 23.9 %    Mean Cell Volume: 94.1 fl    Mean Cell Hemoglobin: 28.7 pg    Mean Cell Hemoglobin Conc: 30.5 gm/dL    Red Cell Distrib Width: 15.2 %    Platelet Count - Automated: 525 K/uL     Comprehensive Metabolic Panel in AM (01.06.22 @ 03:49)    Sodium, Serum: 141 mmol/L    Potassium, Serum: 3.8 mmol/L    Chloride, Serum: 110 mmol/L    Carbon Dioxide, Serum: 24 mmol/L    Anion Gap, Serum: 7 mmol/L    Blood Urea Nitrogen, Serum: 22 mg/dL    Creatinine, Serum: 0.62 mg/dL    Glucose, Serum: 167 mg/dL    Calcium, Total Serum: 7.8 mg/dL    Protein Total, Serum: 5.6 gm/dL    Albumin, Serum: 1.8 g/dL    Bilirubin Total, Serum: 0.2 mg/dL    Alkaline Phosphatase, Serum: 116 U/L    Aspartate Aminotransferase (AST/SGOT): 30 U/L    Alanine Aminotransferase (ALT/SGPT): 32 U/L      Culture - Body Fluid with Gram Stain (01.03.22 @ 18:18)    Specimen Source: .Body Fluid Abdominal Fluid    Culture Results:     Few Escherichia coli    Few Bacteroides ovatus group "Susceptibilities not performed"           ##PE  GEN: NAD, arousable, orally intubated  HEENT: ETT in place, NGT in place  CV: RRR  PULM: bilateral mechanical breath sounds, no wheeze  ABD: L BABAK drain with murky purulent drainage, R BABAK drain serous drainage, colostomy with brown stool, retention sutures and packing in place  EXT: generalized anasarca improving

## 2022-01-06 NOTE — PROGRESS NOTE ADULT - SUBJECTIVE AND OBJECTIVE BOX
SURGERY PROGRESS HPI:  Pt seen and examined at bedside resting comfortably in the ICU.       Vital Signs Last 24 Hrs  T(C): 37.1 (05 Jan 2022 23:53), Max: 37.7 (05 Jan 2022 19:30)  T(F): 98.7 (05 Jan 2022 23:53), Max: 99.8 (05 Jan 2022 19:30)  HR: 92 (06 Jan 2022 01:00) (60 - 102)  BP: 163/62 (06 Jan 2022 01:00) (95/44 - 170/62)  BP(mean): 92 (06 Jan 2022 01:00) (50 - 93)  RR: 30 (06 Jan 2022 01:00) (13 - 36)  SpO2: 90% (06 Jan 2022 01:00) (90% - 100%)      PHYSICAL EXAM:  General: Awake, NAD  HEENT: NGT tube feeds at 50  CV: +S1+S2   Lung: Intubated. mechanically vented  Abdomen: nondistended, +BS, soft, appropriate incisional tenderness. Dressing clean/dry/intact. Midline wound retention sutures intact, skin openings irrigated with saline and repacked with 2" iodoform packing, dry dressing applied. No gross drainage. Colostomy pink and viable, stool in bag. Left BABAK with seropurulent output, R BABAK with serous output.  Extremities: pitting edema  : mc catheter with clear urine output    I&O's Detail    04 Jan 2022 07:01  -  05 Jan 2022 07:00  --------------------------------------------------------  IN:    Albumin 25%  -  50 mL: 150 mL    Dexmedetomidine: 292.8 mL    dextrose 5%: 300 mL    Enteral Tube Flush: 100 mL    Fat Emulsion (Plant Based) 20%: 41.6 mL    FentaNYL: 114.4 mL    IV PiggyBack: 400 mL    IV PiggyBack: 100 mL    PPN (Peripheral Parenteral Nutrition): 651 mL    Vital1.5: 830 mL  Total IN: 2979.8 mL    OUT:    Bulb (mL): 5 mL    Bulb (mL): 10 mL    Colostomy (mL): 150 mL    Indwelling Catheter - Urethral (mL): 3725 mL  Total OUT: 3890 mL    Total NET: -910.2 mL      05 Jan 2022 07:01  -  06 Jan 2022 04:58  --------------------------------------------------------  IN:    Dexmedetomidine: 195.2 mL    FentaNYL: 286.9 mL    IV PiggyBack: 400 mL    PPN (Peripheral Parenteral Nutrition): 630 mL    Vital1.5: 600 mL  Total IN: 2112.1 mL    OUT:    Bulb (mL): 5 mL    Bulb (mL): 25 mL    Colostomy (mL): 225 mL    Indwelling Catheter - Urethral (mL): 510 mL  Total OUT: 765 mL    Total NET: 1347.1 mL      LABS:                        7.3    14.85 )-----------( 525      ( 06 Jan 2022 03:49 )             23.9     01-06    141  |  110<H>  |  22  ----------------------------<  167<H>  3.8   |  24  |  0.62    Ca    7.8<L>      06 Jan 2022 03:49  Phos  2.6     01-06  Mg     2.1     01-06    TPro  5.6<L>  /  Alb  1.8<L>  /  TBili  0.2  /  DBili  x   /  AST  30  /  ALT  32  /  AlkPhos  116  01-06    Culture Results:   Few Escherichia coli  Few Bacteroides ovatus group "Susceptibilities not performed" (01-03 @ 18:18)      A/P: 81F admitted with Acute Sigmoid Diverticulitis, Surgery consulted for interval perforated sigmoid diverticulitis. S/P ex lap, sigmoid resection, peritoneal lavage 12/25 and RTOR 12/26 for Irrigation of abdominal cavity with closure and creation of colostomy. Intubated. Leukocytosis downtrending. S/p bedside IR aspiration of fluid collection 1/3. Tube feeds initiated. Ostomy functioning.   - continue ICU management and vent support, SBT  - continue tube feeds as tolerated  - analgesia prn  - local wound care per surgery, local drain care. ostomy management   - continue abx per ID  - tracheostomy Friday  - will d/w attending

## 2022-01-07 LAB
ANION GAP SERPL CALC-SCNC: 8 MMOL/L — SIGNIFICANT CHANGE UP (ref 5–17)
BLD GP AB SCN SERPL QL: SIGNIFICANT CHANGE UP
BUN SERPL-MCNC: 22 MG/DL — SIGNIFICANT CHANGE UP (ref 7–23)
CALCIUM SERPL-MCNC: 8.1 MG/DL — LOW (ref 8.5–10.1)
CHLORIDE SERPL-SCNC: 112 MMOL/L — HIGH (ref 96–108)
CO2 SERPL-SCNC: 24 MMOL/L — SIGNIFICANT CHANGE UP (ref 22–31)
CREAT SERPL-MCNC: 0.51 MG/DL — SIGNIFICANT CHANGE UP (ref 0.5–1.3)
GLUCOSE BLDC GLUCOMTR-MCNC: 111 MG/DL — HIGH (ref 70–99)
GLUCOSE BLDC GLUCOMTR-MCNC: 111 MG/DL — HIGH (ref 70–99)
GLUCOSE BLDC GLUCOMTR-MCNC: 113 MG/DL — HIGH (ref 70–99)
GLUCOSE SERPL-MCNC: 117 MG/DL — HIGH (ref 70–99)
HCT VFR BLD CALC: 25 % — LOW (ref 34.5–45)
HCT VFR BLD CALC: 30.4 % — LOW (ref 34.5–45)
HGB BLD-MCNC: 7.6 G/DL — LOW (ref 11.5–15.5)
HGB BLD-MCNC: 9.5 G/DL — LOW (ref 11.5–15.5)
MAGNESIUM SERPL-MCNC: 2.6 MG/DL — SIGNIFICANT CHANGE UP (ref 1.6–2.6)
MCHC RBC-ENTMCNC: 28.6 PG — SIGNIFICANT CHANGE UP (ref 27–34)
MCHC RBC-ENTMCNC: 28.7 PG — SIGNIFICANT CHANGE UP (ref 27–34)
MCHC RBC-ENTMCNC: 30.4 GM/DL — LOW (ref 32–36)
MCHC RBC-ENTMCNC: 31.3 GM/DL — LOW (ref 32–36)
MCV RBC AUTO: 91.6 FL — SIGNIFICANT CHANGE UP (ref 80–100)
MCV RBC AUTO: 94.3 FL — SIGNIFICANT CHANGE UP (ref 80–100)
NRBC # BLD: 0 /100 WBCS — SIGNIFICANT CHANGE UP (ref 0–0)
NRBC # BLD: 0 /100 WBCS — SIGNIFICANT CHANGE UP (ref 0–0)
PHOSPHATE SERPL-MCNC: 2.7 MG/DL — SIGNIFICANT CHANGE UP (ref 2.5–4.5)
PLATELET # BLD AUTO: 587 K/UL — HIGH (ref 150–400)
PLATELET # BLD AUTO: 630 K/UL — HIGH (ref 150–400)
POTASSIUM SERPL-MCNC: 3.8 MMOL/L — SIGNIFICANT CHANGE UP (ref 3.5–5.3)
POTASSIUM SERPL-SCNC: 3.8 MMOL/L — SIGNIFICANT CHANGE UP (ref 3.5–5.3)
RBC # BLD: 2.65 M/UL — LOW (ref 3.8–5.2)
RBC # BLD: 3.32 M/UL — LOW (ref 3.8–5.2)
RBC # FLD: 15.3 % — HIGH (ref 10.3–14.5)
RBC # FLD: 15.7 % — HIGH (ref 10.3–14.5)
SODIUM SERPL-SCNC: 144 MMOL/L — SIGNIFICANT CHANGE UP (ref 135–145)
WBC # BLD: 15.03 K/UL — HIGH (ref 3.8–10.5)
WBC # BLD: 17.43 K/UL — HIGH (ref 3.8–10.5)
WBC # FLD AUTO: 15.03 K/UL — HIGH (ref 3.8–10.5)
WBC # FLD AUTO: 17.43 K/UL — HIGH (ref 3.8–10.5)

## 2022-01-07 PROCEDURE — 31600 PLANNED TRACHEOSTOMY: CPT | Mod: 78

## 2022-01-07 PROCEDURE — 99291 CRITICAL CARE FIRST HOUR: CPT

## 2022-01-07 PROCEDURE — 99232 SBSQ HOSP IP/OBS MODERATE 35: CPT

## 2022-01-07 DEVICE — TUBE TRACH 6.0 CUFF DCT: Type: IMPLANTABLE DEVICE | Status: FUNCTIONAL

## 2022-01-07 RX ORDER — FENTANYL CITRATE 50 UG/ML
0.5 INJECTION INTRAVENOUS
Qty: 2500 | Refills: 0 | Status: DISCONTINUED | OUTPATIENT
Start: 2022-01-07 | End: 2022-01-10

## 2022-01-07 RX ORDER — CHLORHEXIDINE GLUCONATE 213 G/1000ML
1 SOLUTION TOPICAL
Refills: 0 | Status: DISCONTINUED | OUTPATIENT
Start: 2022-01-07 | End: 2022-02-11

## 2022-01-07 RX ORDER — SODIUM CHLORIDE 9 MG/ML
1000 INJECTION, SOLUTION INTRAVENOUS
Refills: 0 | Status: DISCONTINUED | OUTPATIENT
Start: 2022-01-07 | End: 2022-02-11

## 2022-01-07 RX ORDER — ENOXAPARIN SODIUM 100 MG/ML
40 INJECTION SUBCUTANEOUS DAILY
Refills: 0 | Status: DISCONTINUED | OUTPATIENT
Start: 2022-01-07 | End: 2022-01-25

## 2022-01-07 RX ORDER — ACETAMINOPHEN 500 MG
650 TABLET ORAL EVERY 6 HOURS
Refills: 0 | Status: DISCONTINUED | OUTPATIENT
Start: 2022-01-07 | End: 2022-01-30

## 2022-01-07 RX ORDER — DEXTROSE 50 % IN WATER 50 %
15 SYRINGE (ML) INTRAVENOUS ONCE
Refills: 0 | Status: DISCONTINUED | OUTPATIENT
Start: 2022-01-07 | End: 2022-02-11

## 2022-01-07 RX ORDER — GLUCAGON INJECTION, SOLUTION 0.5 MG/.1ML
1 INJECTION, SOLUTION SUBCUTANEOUS ONCE
Refills: 0 | Status: DISCONTINUED | OUTPATIENT
Start: 2022-01-07 | End: 2022-02-11

## 2022-01-07 RX ORDER — LEVOTHYROXINE SODIUM 125 MCG
50 TABLET ORAL DAILY
Refills: 0 | Status: CANCELLED | OUTPATIENT
Start: 2022-01-08 | End: 2022-01-07

## 2022-01-07 RX ORDER — SODIUM CHLORIDE 9 MG/ML
10 INJECTION INTRAMUSCULAR; INTRAVENOUS; SUBCUTANEOUS
Refills: 0 | Status: DISCONTINUED | OUTPATIENT
Start: 2022-01-07 | End: 2022-02-11

## 2022-01-07 RX ORDER — CHLORHEXIDINE GLUCONATE 213 G/1000ML
15 SOLUTION TOPICAL EVERY 12 HOURS
Refills: 0 | Status: DISCONTINUED | OUTPATIENT
Start: 2022-01-07 | End: 2022-02-11

## 2022-01-07 RX ORDER — FUROSEMIDE 40 MG
40 TABLET ORAL ONCE
Refills: 0 | Status: COMPLETED | OUTPATIENT
Start: 2022-01-07 | End: 2022-01-07

## 2022-01-07 RX ORDER — DEXTROSE 50 % IN WATER 50 %
25 SYRINGE (ML) INTRAVENOUS ONCE
Refills: 0 | Status: DISCONTINUED | OUTPATIENT
Start: 2022-01-07 | End: 2022-02-11

## 2022-01-07 RX ORDER — PANTOPRAZOLE SODIUM 20 MG/1
40 TABLET, DELAYED RELEASE ORAL DAILY
Refills: 0 | Status: DISCONTINUED | OUTPATIENT
Start: 2022-01-07 | End: 2022-01-11

## 2022-01-07 RX ORDER — DEXTROSE 50 % IN WATER 50 %
12.5 SYRINGE (ML) INTRAVENOUS ONCE
Refills: 0 | Status: DISCONTINUED | OUTPATIENT
Start: 2022-01-07 | End: 2022-02-11

## 2022-01-07 RX ORDER — DEXMEDETOMIDINE HYDROCHLORIDE IN 0.9% SODIUM CHLORIDE 4 UG/ML
0.2 INJECTION INTRAVENOUS
Qty: 200 | Refills: 0 | Status: DISCONTINUED | OUTPATIENT
Start: 2022-01-07 | End: 2022-01-11

## 2022-01-07 RX ORDER — METRONIDAZOLE 500 MG
500 TABLET ORAL EVERY 8 HOURS
Refills: 0 | Status: DISCONTINUED | OUTPATIENT
Start: 2022-01-07 | End: 2022-01-11

## 2022-01-07 RX ORDER — ESCITALOPRAM OXALATE 10 MG/1
20 TABLET, FILM COATED ORAL DAILY
Refills: 0 | Status: DISCONTINUED | OUTPATIENT
Start: 2022-01-07 | End: 2022-01-07

## 2022-01-07 RX ORDER — ESCITALOPRAM OXALATE 10 MG/1
20 TABLET, FILM COATED ORAL DAILY
Refills: 0 | Status: DISCONTINUED | OUTPATIENT
Start: 2022-01-07 | End: 2022-02-11

## 2022-01-07 RX ORDER — ACETAMINOPHEN 500 MG
1000 TABLET ORAL ONCE
Refills: 0 | Status: COMPLETED | OUTPATIENT
Start: 2022-01-07 | End: 2022-01-07

## 2022-01-07 RX ORDER — POTASSIUM CHLORIDE 20 MEQ
10 PACKET (EA) ORAL ONCE
Refills: 0 | Status: COMPLETED | OUTPATIENT
Start: 2022-01-07 | End: 2022-01-07

## 2022-01-07 RX ORDER — DORZOLAMIDE HYDROCHLORIDE TIMOLOL MALEATE 20; 5 MG/ML; MG/ML
1 SOLUTION/ DROPS OPHTHALMIC
Refills: 0 | Status: DISCONTINUED | OUTPATIENT
Start: 2022-01-07 | End: 2022-02-11

## 2022-01-07 RX ORDER — LEVETIRACETAM 250 MG/1
500 TABLET, FILM COATED ORAL EVERY 12 HOURS
Refills: 0 | Status: DISCONTINUED | OUTPATIENT
Start: 2022-01-07 | End: 2022-01-10

## 2022-01-07 RX ORDER — CEFTRIAXONE 500 MG/1
1000 INJECTION, POWDER, FOR SOLUTION INTRAMUSCULAR; INTRAVENOUS EVERY 24 HOURS
Refills: 0 | Status: COMPLETED | OUTPATIENT
Start: 2022-01-07 | End: 2022-01-13

## 2022-01-07 RX ADMIN — DEXMEDETOMIDINE HYDROCHLORIDE IN 0.9% SODIUM CHLORIDE 4.05 MICROGRAM(S)/KG/HR: 4 INJECTION INTRAVENOUS at 18:26

## 2022-01-07 RX ADMIN — Medication 100 MILLIGRAM(S): at 05:13

## 2022-01-07 RX ADMIN — CHLORHEXIDINE GLUCONATE 1 APPLICATION(S): 213 SOLUTION TOPICAL at 05:11

## 2022-01-07 RX ADMIN — Medication 100 MILLIEQUIVALENT(S): at 07:57

## 2022-01-07 RX ADMIN — Medication 650 MILLIGRAM(S): at 23:40

## 2022-01-07 RX ADMIN — CHLORHEXIDINE GLUCONATE 1 APPLICATION(S): 213 SOLUTION TOPICAL at 15:11

## 2022-01-07 RX ADMIN — LEVETIRACETAM 420 MILLIGRAM(S): 250 TABLET, FILM COATED ORAL at 03:11

## 2022-01-07 RX ADMIN — FENTANYL CITRATE 4.05 MICROGRAM(S)/KG/HR: 50 INJECTION INTRAVENOUS at 02:05

## 2022-01-07 RX ADMIN — Medication 650 MILLIGRAM(S): at 11:19

## 2022-01-07 RX ADMIN — CHLORHEXIDINE GLUCONATE 15 MILLILITER(S): 213 SOLUTION TOPICAL at 16:58

## 2022-01-07 RX ADMIN — Medication 650 MILLIGRAM(S): at 03:50

## 2022-01-07 RX ADMIN — LEVETIRACETAM 420 MILLIGRAM(S): 250 TABLET, FILM COATED ORAL at 16:58

## 2022-01-07 RX ADMIN — PANTOPRAZOLE SODIUM 40 MILLIGRAM(S): 20 TABLET, DELAYED RELEASE ORAL at 11:19

## 2022-01-07 RX ADMIN — CHLORHEXIDINE GLUCONATE 15 MILLILITER(S): 213 SOLUTION TOPICAL at 05:11

## 2022-01-07 RX ADMIN — Medication 650 MILLIGRAM(S): at 06:27

## 2022-01-07 RX ADMIN — ESCITALOPRAM OXALATE 20 MILLIGRAM(S): 10 TABLET, FILM COATED ORAL at 15:18

## 2022-01-07 RX ADMIN — DEXMEDETOMIDINE HYDROCHLORIDE IN 0.9% SODIUM CHLORIDE 4.05 MICROGRAM(S)/KG/HR: 4 INJECTION INTRAVENOUS at 05:10

## 2022-01-07 RX ADMIN — DORZOLAMIDE HYDROCHLORIDE TIMOLOL MALEATE 1 DROP(S): 20; 5 SOLUTION/ DROPS OPHTHALMIC at 16:58

## 2022-01-07 RX ADMIN — DEXMEDETOMIDINE HYDROCHLORIDE IN 0.9% SODIUM CHLORIDE 4.05 MICROGRAM(S)/KG/HR: 4 INJECTION INTRAVENOUS at 11:19

## 2022-01-07 RX ADMIN — Medication 650 MILLIGRAM(S): at 11:44

## 2022-01-07 RX ADMIN — DORZOLAMIDE HYDROCHLORIDE TIMOLOL MALEATE 1 DROP(S): 20; 5 SOLUTION/ DROPS OPHTHALMIC at 05:11

## 2022-01-07 RX ADMIN — CEFTRIAXONE 100 MILLIGRAM(S): 500 INJECTION, POWDER, FOR SOLUTION INTRAMUSCULAR; INTRAVENOUS at 15:18

## 2022-01-07 RX ADMIN — FENTANYL CITRATE 4.05 MICROGRAM(S)/KG/HR: 50 INJECTION INTRAVENOUS at 15:36

## 2022-01-07 RX ADMIN — Medication 100 MILLIGRAM(S): at 21:57

## 2022-01-07 RX ADMIN — Medication 40 MILLIGRAM(S): at 10:08

## 2022-01-07 RX ADMIN — Medication 400 MILLIGRAM(S): at 16:57

## 2022-01-07 NOTE — PROGRESS NOTE ADULT - ASSESSMENT
81F w/ HTN, HLD, hypothyroidism, depression, seizures. Presented w/ abdominal pain found to have acute sigmoid diverticulitis. Complicated by perforated sigmoid diverticulitis w/ abscess s/p ex-lap w/ large bowel resection and ostomy creation. COurse complicated by rapid Afib, volume overload, multiple fluid collection s/p IR drainage of R abscess on 1/3 and failure to wean.     #Neuro - sedated w/ fentanyl and precedex; continue w/ keppra. start lexaparo  #CV - HD stable, no further episodes of afib s/p amio load  #Pulm - remains intubated post-op and has failed to wean; plan for tracheostomy w/ surgery today  #ID- currently in ceftriaxone and flagyl to cover E coli and bacteroides growing from abscess and abdominal cultures; febrile this morning w/ stable WBC count - monitor for now; appreciate ID input  #Renal/metabolic - normal renal function, acceptable electrolytes and UOP; lasix today  #GI - s/p ex-lap for sidmoid diverticulitis and abscess formation w/ large bowel resection an ostomy creation, as well as IR drain placement of intra-abd R abscess; tolerating NGT diet; protonix   #Heme - 1 pRBC pre-op today as requested by surgery; no active bleeding  #Endo - continue w/ levothyroxine, FS w/ ISS  #PPx - lovenox for ppx  #Dispo- remains intubated in ICU w/ plan for trach today; prognosis guarded; full code

## 2022-01-07 NOTE — PROGRESS NOTE ADULT - SUBJECTIVE AND OBJECTIVE BOX
S/P Open Tracheostomy secondary to Acute respiratory failure  81 year old Female seen and examined at bedside. Non verbal, responsive    Vital Signs Last 24 Hrs  T(F): 99.8 (01-07-22 @ 14:00), Max: 101 (01-07-22 @ 03:57)  HR: 99 (01-07-22 @ 16:00)  BP: 145/47 (01-07-22 @ 16:00)  RR: 24 (01-07-22 @ 16:00)  SpO2: 95% (01-07-22 @ 16:00)  Wt(kg): --   CAPILLARY BLOOD GLUCOSE      POCT Blood Glucose.: 113 mg/dL (07 Jan 2022 11:40)      GENERAL: Alert, NAD  HEENT: Trache site with small blood stain to gauze. Sutures intact  CHEST/LUNG: Clear to auscultation bilaterally, respirations nonlabored  HEART: +S1S2, regular rate and rhythm  ABDOMEN: soft, nondistended. Appropriate incisional tenderness. Dressings clean dry intact x 4 over trocar sites  EXTREMITIES:  no calf tenderness, no edema. Intermittent pneumatic compression devices b/l LE

## 2022-01-07 NOTE — CHART NOTE - NSCHARTNOTEFT_GEN_A_CORE
Assessment:  Pt s/p trach today, on vent, pt was NPO & NGT feeding resumed c Vital AF(1.2) @ 50 ml/hr.  Pt adm c diverticulitis/sigmoid diverticulitis, s/p exploratory lap, sigmoid resection, peritoneal lavage, irrigation of abdominal cavity c closure and creation of colostomy, s/p IR aspiration of fluid collection 1/3.  PMH include TIA, blood clot of neck vein, thyroid disease, HTN, seizure.    Factors impacting intake: [ ] none [ ] nausea  [ ] vomiting [ ] diarrhea [ ] constipation  [ ]chewing problems [ ] swallowing issues  [ x] other: acute illness as per above, alteration in GI tract function, last PPN order 01/04.    Diet Prescription: Diet, NPO with Tube Feed:   Tube Feeding Modality: Nasogastric  Vital AF  Total Volume for 24 Hours (mL): 1200  Continuous  Starting Tube Feed Rate {mL per Hour}: 50  Until Goal Tube Feed Rate (mL per Hour): 50  Tube Feed Duration (in Hours): 24  Tube Feed Start Time: 16:30 (01-07-22 @ 16:41)    Intake: Vital AF 1.2 @ 50 ml/hr =1200 ml, 1440 calories, 90 grams protein, 973 ml free water, 101% RDIs     Current Weight: 01/07, 87.7 kg, 12/23, 81 kg, c wt. gain of 6.7 kg   % Weight Change: 7.6%  01/07, 1+ generalized, 3+ edema of ankles noted  I/O: 2047/1540(+507)    Pertinent Medications: MEDICATIONS  (STANDING):  acetaminophen   IVPB .. 1000 milliGRAM(s) IV Intermittent once  cefTRIAXone   IVPB 1000 milliGRAM(s) IV Intermittent every 24 hours  chlorhexidine 0.12% Liquid 15 milliLiter(s) Oral Mucosa every 12 hours  chlorhexidine 2% Cloths 1 Application(s) Topical <User Schedule>  dexMEDEtomidine Infusion 0.2 MICROgram(s)/kG/Hr (4.05 mL/Hr) IV Continuous <Continuous>  dextrose 40% Gel 15 Gram(s) Oral once  dextrose 5%. 1000 milliLiter(s) (50 mL/Hr) IV Continuous <Continuous>  dextrose 5%. 1000 milliLiter(s) (100 mL/Hr) IV Continuous <Continuous>  dextrose 50% Injectable 25 Gram(s) IV Push once  dextrose 50% Injectable 12.5 Gram(s) IV Push once  dextrose 50% Injectable 25 Gram(s) IV Push once  dorzolamide 2%/timolol 0.5% Ophthalmic Solution 1 Drop(s) Both EYES two times a day  enoxaparin Injectable 40 milliGRAM(s) SubCutaneous daily  escitalopram 20 milliGRAM(s) Oral daily  fentaNYL   Infusion. 0.5 MICROgram(s)/kG/Hr (4.05 mL/Hr) IV Continuous <Continuous>  glucagon  Injectable 1 milliGRAM(s) IntraMuscular once  levETIRAcetam  IVPB 500 milliGRAM(s) IV Intermittent every 12 hours  metroNIDAZOLE  IVPB 500 milliGRAM(s) IV Intermittent every 8 hours  pantoprazole  Injectable 40 milliGRAM(s) IV Push daily    MEDICATIONS  (PRN):  acetaminophen     Tablet .. 650 milliGRAM(s) Oral every 6 hours PRN Temp greater or equal to 38C (100.4F)  sodium chloride 0.9% lock flush 10 milliLiter(s) IV Push every 1 hour PRN Pre/post blood products, medications, blood draw, and to maintain line patency    Pertinent Labs: 01-07 Na144 mmol/L Glu 117 mg/dL<H> K+ 3.8 mmol/L Cr  0.51 mg/dL BUN 22 mg/dL 01-07 Phos 2.7 mg/dL 01-06 Alb 1.8 g/dL<L> 12-31 Chol --    LDL --    HDL --    Trig 343 mg/dL<H>01-06 ALT 32 U/L AST 30 U/L Alkaline Phosphatase 116 U/L  12-24-21 @ 10:29 a1c 5.6   CAPILLARY BLOOD GLUCOSE      POCT Blood Glucose.: 111 mg/dL (07 Jan 2022 16:25)  POCT Blood Glucose.: 113 mg/dL (07 Jan 2022 11:40)  POCT Blood Glucose.: 111 mg/dL (07 Jan 2022 05:14)  POCT Blood Glucose.: 160 mg/dL (06 Jan 2022 23:41)  POCT Blood Glucose.: 169 mg/dL (06 Jan 2022 17:12)    Skin: WDL except ecchymotic, surgical incision     Estimated Needs:   [ x] no change since previous assessment(12/27)  [ ] recalculated:     Previous Nutrition Diagnosis:   Inadequate Oral Intake.     Etiology decreased ability to consume sufficient energy.     Signs/Symptoms Pt is currently NPO.     Goal/Expected Outcome Pt will consume & tolerate >/=75% nutritional needs via tolerated route; met       Nutrition Diagnosis is [ ] ongoing  [x ] resolved [ ] not applicable       New Nutrition Diagnosis: [x ] not applicable     Interventions:   Recommend  [ ] Change Diet To:  [ ] Nutrition Supplement  [x ] Nutrition Support; Vital AF 1.2 @ 50 ml/hr via GNR=9130 ml, 1440 calories, 90 grams protein, 973 ml free water, 101% RDIs   [ ] Other:     Monitoring and Evaluation:   [ ] PO intake [ x ] Tolerance to diet prescription [ x ] weights [ x ] labs[ x ] follow up per protocol  [ ] other:

## 2022-01-07 NOTE — PROGRESS NOTE ADULT - SUBJECTIVE AND OBJECTIVE BOX
INTERVAL HPI/OVERNIGHT EVENTS:  Pt. seen and examined at bedside resting comfortably, no acute overnight events. Patient booked for Tracheostomy today.  Tmax 101 F.     Vital Signs Last 24 Hrs  T(C): 38.3 (07 Jan 2022 03:57), Max: 38.3 (06 Jan 2022 12:37)  T(F): 101 (07 Jan 2022 03:57), Max: 101 (07 Jan 2022 03:57)  HR: 68 (07 Jan 2022 04:58) (62 - 102)  BP: 148/63 (07 Jan 2022 03:00) (99/51 - 174/70)  BP(mean): 88 (07 Jan 2022 03:00) (64 - 98)  RR: 19 (07 Jan 2022 03:00) (17 - 35)  SpO2: 95% (07 Jan 2022 04:58) (91% - 100%)    PHYSICAL EXAM:  General: Awake, Eyes open, NAD  CV: +S1+S2 , RRR  Lung: Intubated. Mechanically vented  Abdomen: nondistended, +BS, soft, appropriate incisional tenderness. Dressing clean/dry/intact. Midline wound retention sutures intact, skin openings irrigated with saline and repacked with 2" iodoform packing, dry dressing applied. No gross drainage. Colostomy pink and viable, stool in bag. Left BABAK with minimal milky output (10cc/24hr), R BABAK with serous output (30cc/24hr).  Extremities: pitting edema  : mc catheter with clear urine output    I&O's Detail    05 Jan 2022 07:01  -  06 Jan 2022 07:00  --------------------------------------------------------  IN:    Dexmedetomidine: 276.2 mL    Enteral Tube Flush: 60 mL    FentaNYL: 448.4 mL    IV PiggyBack: 400 mL    PPN (Peripheral Parenteral Nutrition): 630 mL    Vital1.5: 1200 mL  Total IN: 3014.6 mL    OUT:    Bulb (mL): 55 mL    Bulb (mL): 10 mL    Colostomy (mL): 425 mL    Indwelling Catheter - Urethral (mL): 1035 mL  Total OUT: 1525 mL    Total NET: 1489.6 mL      06 Jan 2022 07:01  -  07 Jan 2022 06:30  --------------------------------------------------------  IN:    Dexmedetomidine: 193 mL    Enteral Tube Flush: 10 mL    FentaNYL: 365.1 mL    IV PiggyBack: 350 mL    IV PiggyBack: 200 mL    Vital1.5: 800 mL  Total IN: 1918.1 mL    OUT:    Bulb (mL): 10 mL    Bulb (mL): 30 mL    Colostomy (mL): 300 mL    Indwelling Catheter - Urethral (mL): 500 mL    Nasogastric/Oral tube (mL): 0 mL    PPN (Peripheral Parenteral Nutrition): 0 mL  Total OUT: 840 mL    Total NET: 1078.1 mL      LABS:                        7.6    15.03 )-----------( 587      ( 07 Jan 2022 02:53 )             25.0     01-07    144  |  112<H>  |  22  ----------------------------<  117<H>  3.8   |  24  |  0.51    Ca    8.1<L>      07 Jan 2022 02:53  Phos  2.7     01-07  Mg     2.6     01-07    TPro  5.6<L>  /  Alb  1.8<L>  /  TBili  0.2  /  DBili  x   /  AST  30  /  ALT  32  /  AlkPhos  116  01-06    A/P: 81F admitted with Acute Sigmoid Diverticulitis, Surgery consulted for interval perforated sigmoid diverticulitis. S/P ex lap, sigmoid resection, peritoneal lavage 12/25 and RTOR 12/26 for Irrigation of abdominal cavity with closure and creation of colostomy. S/p bedside IR aspiration of fluid collection 1/3. Tube feeds initiated. Ostomy functioning.   Patient unable to be extubated, now planned for Tracheostomy today.   Tmax 101F and increased WBC.     - continue ICU management and vent support, SBT  - NPO p MN for Trach today  - analgesia prn, antipyretics PRN  - Monitor for temps  - local wound care per surgery, local drain care. ostomy management   - continue abx per ID  - will d/w attending

## 2022-01-07 NOTE — PROGRESS NOTE ADULT - SUBJECTIVE AND OBJECTIVE BOX
[facility]  Division of Infectious Diseases  [phone]    Name: JUAREZ GTZ  Age: 81y  Gender: Female  MRN: [id number]    Interval History--  Notes reviewed. patient seen and examined. Remains vent dependent in ICU, trach today    Past Medical History--  Seizure    HTN (hypertension)    Glaucoma    Thyroid disease    Blood clot of neck vein    TIA (transient ischemic attack)    S/P appendectomy      Review of Systems--  Review of systems unable secondary to clinical condition.       For details regarding the patient's social history, family history, and other miscellaneous elements, please refer the initial infectious diseases consultation and/or the admitting history and physical examination for this admission.    Allergies    No Known Allergies    Intolerances        Medications--  Antibiotics:  cefTRIAXone   IVPB 1000 every 24 hours  metroNIDAZOLE  IVPB 500 every 8 hours      MEDICATIONS  (STANDING):  acetaminophen   IVPB .. 1000 once  dexMEDEtomidine Infusion 0.2 <Continuous>  dextrose 40% Gel 15 once  dextrose 50% Injectable 25 once  dextrose 50% Injectable 12.5 once  dextrose 50% Injectable 25 once  enoxaparin Injectable 40 daily  escitalopram 20 daily  fentaNYL   Infusion. 0.5 <Continuous>  glucagon  Injectable 1 once  levETIRAcetam  IVPB 500 every 12 hours  pantoprazole  Injectable 40 daily      PRN  acetaminophen     Tablet .. 650 milliGRAM(s) Oral every 6 hours PRN      Physical Exam:  Vital Signs Last 24 Hrs  T(F): 99.8 (01-07-22 @ 14:00), Max: 101 (01-07-22 @ 03:57)  HR: 99 (01-07-22 @ 16:00)  BP: 145/47 (01-07-22 @ 16:00)  RR: 24 (01-07-22 @ 16:00)  SpO2: 95% (01-07-22 @ 16:00) (85% - 100%)  Wt(kg): --      General: Nontoxic-appearing Female in no acute distress.  HEENT: AT/NC. Oropharynx/dentition unable secondary to patient compliance. +ETT with NGT and started on feeds    Neck: Not rigid. No sense of mass.  Nodes: None palpable.  Lungs: ventilatory BS bilaterally without rales, wheezing or rhonchi  Heart: Regular rate and rhythm. No Murmur. No rub. No gallop. No palpable thrill.  Abdomen: Bowel sounds now present.  Soft. Mildly distended.  Incision dressed.  Ostomy with stool output. Drains scant output, serous x2. . slight grimace to palpation.  still small wound dehiscence   Extremities: No cyanosis or clubbing. 2-3+ pitting edema  Skin: Warm. Dry No rash. No vasculitic stigmata.  Psychiatric: Unable      Laboratory Studies--  WBC Count: 15.03 K/uL (01-07 @ 02:53)  WBC Count: 14.85 K/uL (01-06 @ 03:49)  WBC Count: 15.40 K/uL (01-05 @ 04:33)  WBC Count: 16.61 K/uL (01-04 @ 02:41)  WBC Count: 20.29 K/uL (01-03 @ 02:20)  WBC Count: 22.98 K/uL (01-02 @ 02:11)  WBC Count: 23.24 K/uL (01-01 @ 05:27)                            7.6    15.03 )-----------( 587      ( 07 Jan 2022 02:53 )             25.0       01-07    144  |  112<H>  |  22  ----------------------------<  117<H>  3.8   |  24  |  0.51    Ca    8.1<L>      07 Jan 2022 02:53  Phos  2.7     01-07  Mg     2.6     01-07    TPro  5.6<L>  /  Alb  1.8<L>  /  TBili  0.2  /  DBili  x   /  AST  30  /  ALT  32  /  AlkPhos  116  01-06      Creatinine Trend: 0.51<--, 0.62<--, 0.72<--, 0.61<--, 0.60<--, 0.56<--            Culture Data  Culture - Body Fluid with Gram Stain (01.03.22 @ 18:18)    Gram Stain:   Few polymorphonuclear leukocytes per low power field  Few Gram Negative Rods per oil power field    -  Amikacin: S <=16    -  Amoxicillin/Clavulanic Acid: S <=8/4    -  Ampicillin: S <=8 These ampicillin results predict results for amoxicillin    -  Ampicillin/Sulbactam: S <=4/2 Enterobacter, Klebsiella aerogenes, Citrobacter, and Serratia may develop resistance during prolonged therapy (3-4 days)    -  Aztreonam: S <=4    -  Cefazolin: S <=2 Enterobacter, Klebsiella aerogenes, Citrobacter, and Serratia may develop resistance during prolonged therapy (3-4 days)    -  Cefepime: S <=2    -  Cefoxitin: S <=8    -  Ceftriaxone: S <=1 Enterobacter, Klebsiella aerogenes, Citrobacter, and Serratia may develop resistance during prolonged therapy    -  Ciprofloxacin: S <=0.25    -  Ertapenem: S <=0.5    -  Gentamicin: S <=2    -  Imipenem: S <=1    -  Levofloxacin: S <=0.5    -  Meropenem: S <=1    -  Piperacillin/Tazobactam: S <=8    -  Tobramycin: S <=2    -  Trimethoprim/Sulfamethoxazole: R >2/38    Specimen Source: .Body Fluid Abdominal Fluid    Culture Results:   Few Escherichia coli  Few Bacteroides ovatus group "Susceptibilities not performed"    Organism Identification: Escherichia coli    Organism: Escherichia coli    Method Type: TONYA        Culture - Sputum (collected 28 Dec 2021 18:54)  Source: .Sputum Sputum  Gram Stain (30 Dec 2021 18:49):    Moderate polymorphonuclear leukocytes per low power field    Numerous Squamous epithelial cells per low power field    Few Gram positive cocci in pairs per oil power field    Rare Gram Positive Rods per oil power field    Rare Yeast like cells per oil power field    Results consistent with oropharyngeal contamination  Final Report (30 Dec 2021 18:49):    Normal Respiratory Sendy present    Culture - Blood (collected 28 Dec 2021 15:04)  Source: .Blood Blood  Preliminary Report (29 Dec 2021 15:06):    No growth to date.    Culture - Blood (collected 28 Dec 2021 15:04)  Source: .Blood Blood  Preliminary Report (29 Dec 2021 15:06):    No growth to date.

## 2022-01-07 NOTE — PROGRESS NOTE ADULT - ASSESSMENT
Patient is a 81F with a PMH of HTN, HLD, hypothyroidism, depression, seizures who presents to the ED for abd pain found to have diverticulitis   has rising leukocytosis   had fever yesterday   tachycardic and now hypoxic   CXR (I personally reviewed) low lung volumes   origincal CT (I personally reviewed) with diverticulitis   she had a lot of pain in the abdomen on exam     12/25: s/p surgery for diverticulitis with perforation and abscess and went to the OR. intubated in ICU with vac and needs to go back to the OR, currently on zosyn, s/p one dose of diflucan last night   12/27: s/p repair and ostomy creation yesterday, wbc elevated, cultures sensitive to zosyn and candida albicans is notoriously sensitive to azoles such as fluconazole, wean of pressors and sedation, extubate when ready   12/28: Further increase in white blood cell count but overall the patient appears to be reasonably stable.  No concern of C. difficile at this juncture.  Current antibiotics are reasonable.  Leukocytosis like related to recent surgery, no concern of other superimposed process on the basis of exam.  I do not see a role to alter her antimicrobials.  12/29:  Remains intubated in ICU. still quite edematous and net positive, WBC climbing, small wound dehiscence at bottom of incision, plan for initiation of TPN today, remains on antibiotics    12/30: rising WBC, ostomy not functioning yet, very edematous CT today, may be source control issue so for now can continue same antibiotics and antifungal but may need to change if findings on the CT are worrisome or change in hemodynamics  12/31:Leukocytosis, with collections noted on CT.  However this is being done postoperative day 5, there is some possibility that this could represent postop changes but given the leukocytosis, concur with plans for attempts at percutaneous drainage.  White blood cell count down slightly compared to prior peak, will need to be trended.  Gallbladder is also distended.  Abdominal exam was benign this morning, but a calculus cholecystitis could be the differential diagnosis here as well (though n.p.o. status certainly could explain distended gallbladder, there is also report of gallbladder wall thickening).  Would modify antibiotics based on cultures from drainage, I do not believe that antibiotics and other the cells would be adequate here.  Fortunately she is off pressors, with preserved renal function, and tolerating pressure support.  1/1/22: Postop day 6.  I have some concerns with the appearance of the ostomy, though the abdominal exam overall is otherwise unchanged.  White blood cell count remains elevated, but is lower compared with prior values.  This is despite no change in antibiotic therapy.  Patient also has asymmetric edema of the upper extremities, right greater than left.  Low threshold for duplex ultrasound to exclude DVT.No new surveillance culture data.  1/2: POD7 Ostomy looks better today, UE symmetric. WBC elevated, some slight downward trend overall. Temps <= 100F.   1/3: drainage of fluid collection today, continue zosyn and fluconazole for now, monitor wbc and temps, watch ostomy output, diuresis and address third spacing   1/4: s/p drainage yesterday now growing ecoli and awaiting for sensitivities, wbc is improving, started on tube feeds    1/5: abscess growing ecoli and bacteroides, S to ceftriaxone, wbc 15, weaning trial today, pain management    1/6: awake, lung excerise today, trach planned for tomorrow, ostomy output is good. will stop fluconazole today and change antibiotics to ceftriaxone and flagly. Unclear stop date yet    1/7 trach today, continue antibiotics

## 2022-01-07 NOTE — PROGRESS NOTE ADULT - SUBJECTIVE AND OBJECTIVE BOX
CHIEF COMPLAINT:    Interval Events:    REVIEW OF SYSTEMS:  Constitutional: [ ] fevers [ ] chills [ ] weight loss [ ] weight gain  HEENT: [ ] dry eyes [ ] eye irritation [ ] postnasal drip [ ] nasal congestion  CV: [ ] chest pain [ ] orthopnea [ ] palpitations [ ] murmur  Resp: [ ] cough [ ] shortness of breath [ ] dyspnea [ ] wheezing [ ] sputum [ ] hemoptysis  GI: [ ] nausea [ ] vomiting [ ] diarrhea [ ] constipation [ ] abd pain [ ] dysphagia   : [ ] dysuria [ ] nocturia [ ] hematuria [ ] increased urinary frequency  Musculoskeletal: [ ] back pain [ ] myalgias [ ] arthralgias [ ] fracture  Skin: [ ] rash [ ] itch  Neurological: [ ] headache [ ] dizziness [ ] syncope [ ] weakness [ ] numbness  Hematologic/Lymphatic: [ ] anemia [ ] bleeding problem  Allergic/Immunologic: [ ] itchy eyes [ ] nasal discharge [ ] hives [ ] angioedema  [ ] All other systems negative  [ ] Unable to assess ROS because ________    OBJECTIVE:  ICU Vital Signs Last 24 Hrs  T(C): 38.3 (07 Jan 2022 03:57), Max: 38.3 (06 Jan 2022 12:37)  T(F): 101 (07 Jan 2022 03:57), Max: 101 (07 Jan 2022 03:57)  HR: 86 (07 Jan 2022 07:21) (62 - 104)  BP: 141/55 (07 Jan 2022 07:21) (93/37 - 174/70)  BP(mean): 77 (07 Jan 2022 07:21) (53 - 98)  ABP: --  ABP(mean): --  RR: 18 (07 Jan 2022 07:21) (17 - 35)  SpO2: 97% (07 Jan 2022 07:21) (85% - 100%)    Mode: AC/ CMV (Assist Control/ Continuous Mandatory Ventilation), RR (machine): 18, TV (machine): 450, FiO2: 35, PEEP: 5, ITime: 1, MAP: 10, PIP: 25    01-06 @ 07:01  -  01-07 @ 07:00  --------------------------------------------------------  IN: 2047.4 mL / OUT: 1540 mL / NET: 507.4 mL    01-07 @ 07:01 - 01-07 @ 07:55  --------------------------------------------------------  IN: 29.3 mL / OUT: 0 mL / NET: 29.3 mL      CAPILLARY BLOOD GLUCOSE      POCT Blood Glucose.: 111 mg/dL (07 Jan 2022 05:14)      PHYSICAL EXAM:  General:   HEENT:   Neck:   Respiratory:   Cardiovascular:   Abdomen:   Extremities:   Skin:   Neurological:  Psychiatry:    LINES:    HOSPITAL MEDICATIONS:  MEDICATIONS  (STANDING):  cefTRIAXone   IVPB 1000 milliGRAM(s) IV Intermittent every 24 hours  chlorhexidine 0.12% Liquid 15 milliLiter(s) Oral Mucosa every 12 hours  chlorhexidine 2% Cloths 1 Application(s) Topical <User Schedule>  dexMEDEtomidine Infusion 0.2 MICROgram(s)/kG/Hr (4.05 mL/Hr) IV Continuous <Continuous>  dextrose 40% Gel 15 Gram(s) Oral once  dextrose 5%. 1000 milliLiter(s) (50 mL/Hr) IV Continuous <Continuous>  dextrose 5%. 1000 milliLiter(s) (100 mL/Hr) IV Continuous <Continuous>  dextrose 50% Injectable 25 Gram(s) IV Push once  dextrose 50% Injectable 12.5 Gram(s) IV Push once  dextrose 50% Injectable 25 Gram(s) IV Push once  dorzolamide 2%/timolol 0.5% Ophthalmic Solution 1 Drop(s) Both EYES two times a day  enoxaparin Injectable 40 milliGRAM(s) SubCutaneous daily  fentaNYL   Infusion. 0.5 MICROgram(s)/kG/Hr (4.05 mL/Hr) IV Continuous <Continuous>  glucagon  Injectable 1 milliGRAM(s) IntraMuscular once  levETIRAcetam  IVPB 500 milliGRAM(s) IV Intermittent every 12 hours  levothyroxine Injectable 25 MICROGram(s) IV Push at bedtime  metroNIDAZOLE  IVPB      metroNIDAZOLE  IVPB 500 milliGRAM(s) IV Intermittent every 8 hours  pantoprazole  Injectable 40 milliGRAM(s) IV Push daily  potassium chloride  10 mEq/100 mL IVPB 10 milliEquivalent(s) IV Intermittent once    MEDICATIONS  (PRN):  acetaminophen     Tablet .. 650 milliGRAM(s) Oral every 6 hours PRN Temp greater or equal to 38C (100.4F)  sodium chloride 0.9% lock flush 10 milliLiter(s) IV Push every 1 hour PRN Pre/post blood products, medications, blood draw, and to maintain line patency      LABS:                        7.6    15.03 )-----------( 587      ( 07 Jan 2022 02:53 )             25.0     Hgb Trend: 7.6<--, 7.3<--, 7.5<--, 7.7<--, 7.8<--  01-07    144  |  112<H>  |  22  ----------------------------<  117<H>  3.8   |  24  |  0.51    Ca    8.1<L>      07 Jan 2022 02:53  Phos  2.7     01-07  Mg     2.6     01-07    TPro  5.6<L>  /  Alb  1.8<L>  /  TBili  0.2  /  DBili  x   /  AST  30  /  ALT  32  /  AlkPhos  116  01-06              MICROBIOLOGY:     RADIOLOGY:  [ ] Reviewed and interpreted by me CHIEF COMPLAINT:    Interval Events:  Tmax 101  Increased FiO2 30 to 50%    REVIEW OF SYSTEMS:  [ x] Unable to assess ROS because intubated/sedated    OBJECTIVE:  ICU Vital Signs Last 24 Hrs  T(C): 38.3 (07 Jan 2022 03:57), Max: 38.3 (06 Jan 2022 12:37)  T(F): 101 (07 Jan 2022 03:57), Max: 101 (07 Jan 2022 03:57)  HR: 86 (07 Jan 2022 07:21) (62 - 104)  BP: 141/55 (07 Jan 2022 07:21) (93/37 - 174/70)  BP(mean): 77 (07 Jan 2022 07:21) (53 - 98)  ABP: --  ABP(mean): --  RR: 18 (07 Jan 2022 07:21) (17 - 35)  SpO2: 97% (07 Jan 2022 07:21) (85% - 100%)    Mode: AC/ CMV (Assist Control/ Continuous Mandatory Ventilation), RR (machine): 18, TV (machine): 450, FiO2: 35, PEEP: 5, ITime: 1, MAP: 10, PIP: 25    01-06 @ 07:01 - 01-07 @ 07:00  --------------------------------------------------------  IN: 2047.4 mL / OUT: 1540 mL / NET: 507.4 mL    01-07 @ 07:01 - 01-07 @ 07:55  --------------------------------------------------------  IN: 29.3 mL / OUT: 0 mL / NET: 29.3 mL      CAPILLARY BLOOD GLUCOSE      POCT Blood Glucose.: 111 mg/dL (07 Jan 2022 05:14)      PHYSICAL EXAM:  General: NAD, non toxic appearing  HEENT: ETT and NGT in place  Neck: supple  Respiratory: mechanical BS b/l  Cardiovascular: s1s2 RRR  Abdomen: soft, non tender, + ostomy w/ stool, 2 BABAK drains   Extremities: warm, +1 pitting edema b/l  Skin: abdominal dressing CDI  Neurological: moves extremities  Psychiatry: opens eyes, follows some commands slowly    LINES:  RIJ TLC 1/4    HOSPITAL MEDICATIONS:  MEDICATIONS  (STANDING):  cefTRIAXone   IVPB 1000 milliGRAM(s) IV Intermittent every 24 hours  chlorhexidine 0.12% Liquid 15 milliLiter(s) Oral Mucosa every 12 hours  chlorhexidine 2% Cloths 1 Application(s) Topical <User Schedule>  dexMEDEtomidine Infusion 0.2 MICROgram(s)/kG/Hr (4.05 mL/Hr) IV Continuous <Continuous>  dextrose 40% Gel 15 Gram(s) Oral once  dextrose 5%. 1000 milliLiter(s) (50 mL/Hr) IV Continuous <Continuous>  dextrose 5%. 1000 milliLiter(s) (100 mL/Hr) IV Continuous <Continuous>  dextrose 50% Injectable 25 Gram(s) IV Push once  dextrose 50% Injectable 12.5 Gram(s) IV Push once  dextrose 50% Injectable 25 Gram(s) IV Push once  dorzolamide 2%/timolol 0.5% Ophthalmic Solution 1 Drop(s) Both EYES two times a day  enoxaparin Injectable 40 milliGRAM(s) SubCutaneous daily  fentaNYL   Infusion. 0.5 MICROgram(s)/kG/Hr (4.05 mL/Hr) IV Continuous <Continuous>  glucagon  Injectable 1 milliGRAM(s) IntraMuscular once  levETIRAcetam  IVPB 500 milliGRAM(s) IV Intermittent every 12 hours  levothyroxine Injectable 25 MICROGram(s) IV Push at bedtime  metroNIDAZOLE  IVPB      metroNIDAZOLE  IVPB 500 milliGRAM(s) IV Intermittent every 8 hours  pantoprazole  Injectable 40 milliGRAM(s) IV Push daily  potassium chloride  10 mEq/100 mL IVPB 10 milliEquivalent(s) IV Intermittent once    MEDICATIONS  (PRN):  acetaminophen     Tablet .. 650 milliGRAM(s) Oral every 6 hours PRN Temp greater or equal to 38C (100.4F)  sodium chloride 0.9% lock flush 10 milliLiter(s) IV Push every 1 hour PRN Pre/post blood products, medications, blood draw, and to maintain line patency      LABS:                        7.6    15.03 )-----------( 587      ( 07 Jan 2022 02:53 )             25.0     Hgb Trend: 7.6<--, 7.3<--, 7.5<--, 7.7<--, 7.8<--  01-07    144  |  112<H>  |  22  ----------------------------<  117<H>  3.8   |  24  |  0.51    Ca    8.1<L>      07 Jan 2022 02:53  Phos  2.7     01-07  Mg     2.6     01-07    TPro  5.6<L>  /  Alb  1.8<L>  /  TBili  0.2  /  DBili  x   /  AST  30  /  ALT  32  /  AlkPhos  116  01-06              MICROBIOLOGY:     RADIOLOGY:  [ ] Reviewed and interpreted by me

## 2022-01-07 NOTE — PROGRESS NOTE ADULT - ASSESSMENT
81y female S/P Open Tracheostomy secondary to Acute respiratory failure POD 0    - continue ICU management and vent support  - analgesia prn, antipyretics PRN  - Monitor for temps  - Local wound care per surgery  - D/W Dr [place]

## 2022-01-08 LAB
ALBUMIN SERPL ELPH-MCNC: 1.9 G/DL — LOW (ref 3.3–5)
ALP SERPL-CCNC: 108 U/L — SIGNIFICANT CHANGE UP (ref 40–120)
ALT FLD-CCNC: 33 U/L — SIGNIFICANT CHANGE UP (ref 12–78)
ANION GAP SERPL CALC-SCNC: 8 MMOL/L — SIGNIFICANT CHANGE UP (ref 5–17)
AST SERPL-CCNC: 22 U/L — SIGNIFICANT CHANGE UP (ref 15–37)
BASOPHILS # BLD AUTO: 0.09 K/UL — SIGNIFICANT CHANGE UP (ref 0–0.2)
BASOPHILS NFR BLD AUTO: 0.6 % — SIGNIFICANT CHANGE UP (ref 0–2)
BILIRUB SERPL-MCNC: 0.3 MG/DL — SIGNIFICANT CHANGE UP (ref 0.2–1.2)
BUN SERPL-MCNC: 23 MG/DL — SIGNIFICANT CHANGE UP (ref 7–23)
CALCIUM SERPL-MCNC: 8.2 MG/DL — LOW (ref 8.5–10.1)
CHLORIDE SERPL-SCNC: 112 MMOL/L — HIGH (ref 96–108)
CO2 SERPL-SCNC: 24 MMOL/L — SIGNIFICANT CHANGE UP (ref 22–31)
CREAT SERPL-MCNC: 0.62 MG/DL — SIGNIFICANT CHANGE UP (ref 0.5–1.3)
EOSINOPHIL # BLD AUTO: 0.18 K/UL — SIGNIFICANT CHANGE UP (ref 0–0.5)
EOSINOPHIL NFR BLD AUTO: 1.2 % — SIGNIFICANT CHANGE UP (ref 0–6)
GLUCOSE BLDC GLUCOMTR-MCNC: 145 MG/DL — HIGH (ref 70–99)
GLUCOSE BLDC GLUCOMTR-MCNC: 151 MG/DL — HIGH (ref 70–99)
GLUCOSE BLDC GLUCOMTR-MCNC: 164 MG/DL — HIGH (ref 70–99)
GLUCOSE BLDC GLUCOMTR-MCNC: 178 MG/DL — HIGH (ref 70–99)
GLUCOSE SERPL-MCNC: 148 MG/DL — HIGH (ref 70–99)
HCT VFR BLD CALC: 28.9 % — LOW (ref 34.5–45)
HGB BLD-MCNC: 9 G/DL — LOW (ref 11.5–15.5)
IMM GRANULOCYTES NFR BLD AUTO: 0.5 % — SIGNIFICANT CHANGE UP (ref 0–1.5)
LYMPHOCYTES # BLD AUTO: 0.92 K/UL — LOW (ref 1–3.3)
LYMPHOCYTES # BLD AUTO: 6.4 % — LOW (ref 13–44)
MAGNESIUM SERPL-MCNC: 2.7 MG/DL — HIGH (ref 1.6–2.6)
MCHC RBC-ENTMCNC: 28.6 PG — SIGNIFICANT CHANGE UP (ref 27–34)
MCHC RBC-ENTMCNC: 31.1 GM/DL — LOW (ref 32–36)
MCV RBC AUTO: 91.7 FL — SIGNIFICANT CHANGE UP (ref 80–100)
MONOCYTES # BLD AUTO: 0.66 K/UL — SIGNIFICANT CHANGE UP (ref 0–0.9)
MONOCYTES NFR BLD AUTO: 4.6 % — SIGNIFICANT CHANGE UP (ref 2–14)
NEUTROPHILS # BLD AUTO: 12.54 K/UL — HIGH (ref 1.8–7.4)
NEUTROPHILS NFR BLD AUTO: 86.7 % — HIGH (ref 43–77)
NRBC # BLD: 0 /100 WBCS — SIGNIFICANT CHANGE UP (ref 0–0)
PHOSPHATE SERPL-MCNC: 2.7 MG/DL — SIGNIFICANT CHANGE UP (ref 2.5–4.5)
PLATELET # BLD AUTO: 600 K/UL — HIGH (ref 150–400)
POTASSIUM SERPL-MCNC: 3.5 MMOL/L — SIGNIFICANT CHANGE UP (ref 3.5–5.3)
POTASSIUM SERPL-SCNC: 3.5 MMOL/L — SIGNIFICANT CHANGE UP (ref 3.5–5.3)
PROT SERPL-MCNC: 6.2 GM/DL — SIGNIFICANT CHANGE UP (ref 6–8.3)
RBC # BLD: 3.15 M/UL — LOW (ref 3.8–5.2)
RBC # FLD: 16.4 % — HIGH (ref 10.3–14.5)
SODIUM SERPL-SCNC: 144 MMOL/L — SIGNIFICANT CHANGE UP (ref 135–145)
WBC # BLD: 14.46 K/UL — HIGH (ref 3.8–10.5)
WBC # FLD AUTO: 14.46 K/UL — HIGH (ref 3.8–10.5)

## 2022-01-08 PROCEDURE — 99291 CRITICAL CARE FIRST HOUR: CPT

## 2022-01-08 RX ORDER — POTASSIUM CHLORIDE 20 MEQ
40 PACKET (EA) ORAL ONCE
Refills: 0 | Status: DISCONTINUED | OUTPATIENT
Start: 2022-01-08 | End: 2022-01-08

## 2022-01-08 RX ORDER — FENTANYL CITRATE 50 UG/ML
1 INJECTION INTRAVENOUS
Refills: 0 | Status: DISCONTINUED | OUTPATIENT
Start: 2022-01-08 | End: 2022-01-10

## 2022-01-08 RX ORDER — POTASSIUM CHLORIDE 20 MEQ
20 PACKET (EA) ORAL ONCE
Refills: 0 | Status: DISCONTINUED | OUTPATIENT
Start: 2022-01-08 | End: 2022-01-08

## 2022-01-08 RX ORDER — FUROSEMIDE 40 MG
40 TABLET ORAL ONCE
Refills: 0 | Status: COMPLETED | OUTPATIENT
Start: 2022-01-08 | End: 2022-01-08

## 2022-01-08 RX ORDER — FENTANYL CITRATE 50 UG/ML
1 INJECTION INTRAVENOUS
Refills: 0 | Status: DISCONTINUED | OUTPATIENT
Start: 2022-01-08 | End: 2022-01-08

## 2022-01-08 RX ORDER — POTASSIUM CHLORIDE 20 MEQ
40 PACKET (EA) ORAL ONCE
Refills: 0 | Status: COMPLETED | OUTPATIENT
Start: 2022-01-08 | End: 2022-01-08

## 2022-01-08 RX ADMIN — Medication 650 MILLIGRAM(S): at 16:23

## 2022-01-08 RX ADMIN — DEXMEDETOMIDINE HYDROCHLORIDE IN 0.9% SODIUM CHLORIDE 4.05 MICROGRAM(S)/KG/HR: 4 INJECTION INTRAVENOUS at 00:12

## 2022-01-08 RX ADMIN — Medication 100 MILLIGRAM(S): at 22:01

## 2022-01-08 RX ADMIN — DORZOLAMIDE HYDROCHLORIDE TIMOLOL MALEATE 1 DROP(S): 20; 5 SOLUTION/ DROPS OPHTHALMIC at 18:06

## 2022-01-08 RX ADMIN — PANTOPRAZOLE SODIUM 40 MILLIGRAM(S): 20 TABLET, DELAYED RELEASE ORAL at 11:49

## 2022-01-08 RX ADMIN — DEXMEDETOMIDINE HYDROCHLORIDE IN 0.9% SODIUM CHLORIDE 4.05 MICROGRAM(S)/KG/HR: 4 INJECTION INTRAVENOUS at 12:59

## 2022-01-08 RX ADMIN — LEVETIRACETAM 420 MILLIGRAM(S): 250 TABLET, FILM COATED ORAL at 17:58

## 2022-01-08 RX ADMIN — FENTANYL CITRATE 1 PATCH: 50 INJECTION INTRAVENOUS at 19:05

## 2022-01-08 RX ADMIN — Medication 100 MILLIGRAM(S): at 13:01

## 2022-01-08 RX ADMIN — DORZOLAMIDE HYDROCHLORIDE TIMOLOL MALEATE 1 DROP(S): 20; 5 SOLUTION/ DROPS OPHTHALMIC at 05:11

## 2022-01-08 RX ADMIN — LEVETIRACETAM 420 MILLIGRAM(S): 250 TABLET, FILM COATED ORAL at 05:11

## 2022-01-08 RX ADMIN — Medication 40 MILLIGRAM(S): at 12:37

## 2022-01-08 RX ADMIN — ESCITALOPRAM OXALATE 20 MILLIGRAM(S): 10 TABLET, FILM COATED ORAL at 11:49

## 2022-01-08 RX ADMIN — FENTANYL CITRATE 4.05 MICROGRAM(S)/KG/HR: 50 INJECTION INTRAVENOUS at 05:38

## 2022-01-08 RX ADMIN — FENTANYL CITRATE 1 PATCH: 50 INJECTION INTRAVENOUS at 12:36

## 2022-01-08 RX ADMIN — DEXMEDETOMIDINE HYDROCHLORIDE IN 0.9% SODIUM CHLORIDE 4.05 MICROGRAM(S)/KG/HR: 4 INJECTION INTRAVENOUS at 22:00

## 2022-01-08 RX ADMIN — CHLORHEXIDINE GLUCONATE 15 MILLILITER(S): 213 SOLUTION TOPICAL at 05:10

## 2022-01-08 RX ADMIN — Medication 100 MILLIGRAM(S): at 05:10

## 2022-01-08 RX ADMIN — ENOXAPARIN SODIUM 40 MILLIGRAM(S): 100 INJECTION SUBCUTANEOUS at 11:49

## 2022-01-08 RX ADMIN — CHLORHEXIDINE GLUCONATE 1 APPLICATION(S): 213 SOLUTION TOPICAL at 05:11

## 2022-01-08 RX ADMIN — CHLORHEXIDINE GLUCONATE 15 MILLILITER(S): 213 SOLUTION TOPICAL at 18:03

## 2022-01-08 RX ADMIN — FENTANYL CITRATE 4.05 MICROGRAM(S)/KG/HR: 50 INJECTION INTRAVENOUS at 21:25

## 2022-01-08 RX ADMIN — Medication 40 MILLIEQUIVALENT(S): at 16:25

## 2022-01-08 RX ADMIN — DEXMEDETOMIDINE HYDROCHLORIDE IN 0.9% SODIUM CHLORIDE 4.05 MICROGRAM(S)/KG/HR: 4 INJECTION INTRAVENOUS at 18:50

## 2022-01-08 RX ADMIN — CEFTRIAXONE 100 MILLIGRAM(S): 500 INJECTION, POWDER, FOR SOLUTION INTRAMUSCULAR; INTRAVENOUS at 16:22

## 2022-01-08 RX ADMIN — DEXMEDETOMIDINE HYDROCHLORIDE IN 0.9% SODIUM CHLORIDE 4.05 MICROGRAM(S)/KG/HR: 4 INJECTION INTRAVENOUS at 05:34

## 2022-01-08 NOTE — PROGRESS NOTE ADULT - SUBJECTIVE AND OBJECTIVE BOX
Patient seen and examined at bedside. Remains intubated, but alert.  No acute events overnight per RN.   Tmax 100.4    Vital Signs Last 24 Hrs  T(F): 100.4 (01-08-22 @ 07:41), Max: 100.4 (01-08-22 @ 07:41)  HR: 89 (01-08-22 @ 07:00)  BP: 112/52 (01-08-22 @ 07:00)  RR: 22 (01-08-22 @ 07:00)  SpO2: 95% (01-08-22 @ 07:00)  POCT Blood Glucose.: 164 mg/dL (08 Jan 2022 06:43)    PHYSICAL EXAM:  GENERAL: intubated, alert  CHEST/LUNG: trach with minimal sanguineous oozing, breathing synchronous with ventilator  HEART: Regular rate and rhythm; S1 & S2 appreciated  ABDOMEN: soft, non tender, midline incision with no slough or drainage, superior area with some blood- repacked with iodoform and covered with gauze dressing. Ostomy functioning with air and stool in bag. Left BABAK with murky output, Right BABAK with serous output  EXTREMITIES:  no calf tenderness, no edema    I&O's Detail    07 Jan 2022 07:01  -  08 Jan 2022 07:00  --------------------------------------------------------  IN:    Dexmedetomidine: 33 mL    Dexmedetomidine: 120 mL    Enteral Tube Flush: 40 mL    FentaNYL: 338 mL    FentaNYL: 81.2 mL    IV PiggyBack: 400 mL    IV PiggyBack: 250 mL    PRBCs (Packed Red Blood Cells): 306 mL    Vital1.5: 800 mL  Total IN: 2368.2 mL    OUT:    Bulb (mL): 85 mL    Bulb (mL): 10 mL    Colostomy (mL): 200 mL    Indwelling Catheter - Urethral (mL): 1850 mL  Total OUT: 2145 mL    Total NET: 223.2 mL      LABS:                        9.0    14.46 )-----------( 600      ( 08 Jan 2022 03:15 )             28.9     01-08    144  |  112<H>  |  23  ----------------------------<  148<H>  3.5   |  24  |  0.62    Ca    8.2<L>      08 Jan 2022 03:15  Phos  2.7     01-08  Mg     2.7     01-08    TPro  6.2  /  Alb  1.9<L>  /  TBili  0.3  /  DBili  x   /  AST  22  /  ALT  33  /  AlkPhos  108  01-08    A/P  81y female S/P Open Tracheostomy secondary to Acute respiratory failure POD#1  1u PRBC on 1/7  Leukocytosis downtrending. Tmax 100.4    - local wound care per surgery  - IV abx  - analgesia prn  - continue ICU management and vent support  - will d/w surgical attending

## 2022-01-08 NOTE — PROGRESS NOTE ADULT - ASSESSMENT
81F w/ HTN, HLD, hypothyroidism, depression, seizures. Presented w/ abdominal pain found to have acute sigmoid diverticulitis. Complicated by perforated sigmoid diverticulitis w/ abscess s/p ex-lap w/ large bowel resection and ostomy creation. COurse complicated by rapid Afib, volume overload, multiple fluid collection s/p IR drainage of R abscess on 1/3 and failure to wean. S/p Trach yesterday.     #Neuro - sedated w/ fentanyl and precedex, will start fentanyl patch as pt has been on fentanyl for prolonged period and start weaning fent; continue w/ keppra and lexaparo  #CV - HD stable, no further episodes of afib s/p amio load  #Pulm - s/p trach for failure to wean post-op; start PSV daily and full vent support at night  #ID- currently in ceftriaxone and flagyl to cover E coli and bacteroides growing from abscess and abdominal cultures; low grade temp overnight w/ stable WBC count - monitor for now; appreciate ID input  #Renal/metabolic - normal renal function, hypoK repleted; lasix today  #GI - s/p ex-lap for sidmoid diverticulitis and abscess formation w/ large bowel resection an ostomy creation, as well as IR drain placement of intra-abd R abscess; tolerating NGT diet; protonix   #Heme - had bleeding around trach site which stopped s/p surgicell application, hgb stable  #Endo - continue w/ levothyroxine, FS w/ ISS  #PPx - lovenox for ppx  #Dispo- now s/p trach and will start to wean sedatives; prognosis guarded; full code

## 2022-01-08 NOTE — PROGRESS NOTE ADULT - SUBJECTIVE AND OBJECTIVE BOX
CHIEF COMPLAINT:    Interval Events:    REVIEW OF SYSTEMS:  Constitutional: [ ] fevers [ ] chills [ ] weight loss [ ] weight gain  HEENT: [ ] dry eyes [ ] eye irritation [ ] postnasal drip [ ] nasal congestion  CV: [ ] chest pain [ ] orthopnea [ ] palpitations [ ] murmur  Resp: [ ] cough [ ] shortness of breath [ ] dyspnea [ ] wheezing [ ] sputum [ ] hemoptysis  GI: [ ] nausea [ ] vomiting [ ] diarrhea [ ] constipation [ ] abd pain [ ] dysphagia   : [ ] dysuria [ ] nocturia [ ] hematuria [ ] increased urinary frequency  Musculoskeletal: [ ] back pain [ ] myalgias [ ] arthralgias [ ] fracture  Skin: [ ] rash [ ] itch  Neurological: [ ] headache [ ] dizziness [ ] syncope [ ] weakness [ ] numbness  Hematologic/Lymphatic: [ ] anemia [ ] bleeding problem  Allergic/Immunologic: [ ] itchy eyes [ ] nasal discharge [ ] hives [ ] angioedema  [ ] All other systems negative  [ ] Unable to assess ROS because ________    OBJECTIVE:  ICU Vital Signs Last 24 Hrs  T(C): 38 (08 Jan 2022 07:41), Max: 38 (08 Jan 2022 07:41)  T(F): 100.4 (08 Jan 2022 07:41), Max: 100.4 (08 Jan 2022 07:41)  HR: 82 (08 Jan 2022 09:00) (58 - 101)  BP: 112/52 (08 Jan 2022 07:01) (81/39 - 181/67)  BP(mean): 70 (08 Jan 2022 07:01) (53 - 103)  ABP: --  ABP(mean): --  RR: 15 (08 Jan 2022 09:00) (15 - 36)  SpO2: 97% (08 Jan 2022 09:00) (91% - 100%)    Mode: CPAP with PS, FiO2: 40, PEEP: 5, PS: 12, ITime: 0.9, MAP: 10, PIP: 21    01-07 @ 07:01  -  01-08 @ 07:00  --------------------------------------------------------  IN: 2368.2 mL / OUT: 2145 mL / NET: 223.2 mL    01-08 @ 07:01 - 01-08 @ 11:57  --------------------------------------------------------  IN: 156 mL / OUT: 0 mL / NET: 156 mL      CAPILLARY BLOOD GLUCOSE      POCT Blood Glucose.: 178 mg/dL (08 Jan 2022 11:32)      PHYSICAL EXAM:  General:   HEENT:   Neck:   Respiratory:   Cardiovascular:   Abdomen:   Extremities:   Skin:   Neurological:  Psychiatry:    LINES:    HOSPITAL MEDICATIONS:  MEDICATIONS  (STANDING):  cefTRIAXone   IVPB 1000 milliGRAM(s) IV Intermittent every 24 hours  chlorhexidine 0.12% Liquid 15 milliLiter(s) Oral Mucosa every 12 hours  chlorhexidine 2% Cloths 1 Application(s) Topical <User Schedule>  dexMEDEtomidine Infusion 0.2 MICROgram(s)/kG/Hr (4.05 mL/Hr) IV Continuous <Continuous>  dextrose 40% Gel 15 Gram(s) Oral once  dextrose 5%. 1000 milliLiter(s) (50 mL/Hr) IV Continuous <Continuous>  dextrose 5%. 1000 milliLiter(s) (100 mL/Hr) IV Continuous <Continuous>  dextrose 50% Injectable 25 Gram(s) IV Push once  dextrose 50% Injectable 12.5 Gram(s) IV Push once  dextrose 50% Injectable 25 Gram(s) IV Push once  dorzolamide 2%/timolol 0.5% Ophthalmic Solution 1 Drop(s) Both EYES two times a day  enoxaparin Injectable 40 milliGRAM(s) SubCutaneous daily  escitalopram 20 milliGRAM(s) Oral daily  fentaNYL   Infusion. 0.5 MICROgram(s)/kG/Hr (4.05 mL/Hr) IV Continuous <Continuous>  fentaNYL   Patch  25 MICROgram(s)/Hr 1 Patch Transdermal every 72 hours  furosemide   Injectable 40 milliGRAM(s) IV Push once  glucagon  Injectable 1 milliGRAM(s) IntraMuscular once  levETIRAcetam  IVPB 500 milliGRAM(s) IV Intermittent every 12 hours  metroNIDAZOLE  IVPB 500 milliGRAM(s) IV Intermittent every 8 hours  pantoprazole  Injectable 40 milliGRAM(s) IV Push daily  potassium chloride   Solution 40 milliEquivalent(s) Oral once    MEDICATIONS  (PRN):  acetaminophen     Tablet .. 650 milliGRAM(s) Oral every 6 hours PRN Temp greater or equal to 38C (100.4F)  sodium chloride 0.9% lock flush 10 milliLiter(s) IV Push every 1 hour PRN Pre/post blood products, medications, blood draw, and to maintain line patency      LABS:                        9.0    14.46 )-----------( 600      ( 08 Jan 2022 03:15 )             28.9     Hgb Trend: 9.0<--, 9.5<--, 7.6<--, 7.3<--, 7.5<--  01-08    144  |  112<H>  |  23  ----------------------------<  148<H>  3.5   |  24  |  0.62    Ca    8.2<L>      08 Jan 2022 03:15  Phos  2.7     01-08  Mg     2.7     01-08    TPro  6.2  /  Alb  1.9<L>  /  TBili  0.3  /  DBili  x   /  AST  22  /  ALT  33  /  AlkPhos  108  01-08              MICROBIOLOGY:     RADIOLOGY:  [ ] Reviewed and interpreted by me CHIEF COMPLAINT:    Interval Events:  s/p trach yesterday  had some bleeding from around site and surgicell placed - no further bleeding  PSV started this morning    REVIEW OF SYSTEMS:  [x ] Unable to assess ROS because sedated    OBJECTIVE:  ICU Vital Signs Last 24 Hrs  T(C): 38 (08 Jan 2022 07:41), Max: 38 (08 Jan 2022 07:41)  T(F): 100.4 (08 Jan 2022 07:41), Max: 100.4 (08 Jan 2022 07:41)  HR: 82 (08 Jan 2022 09:00) (58 - 101)  BP: 112/52 (08 Jan 2022 07:01) (81/39 - 181/67)  BP(mean): 70 (08 Jan 2022 07:01) (53 - 103)  ABP: --  ABP(mean): --  RR: 15 (08 Jan 2022 09:00) (15 - 36)  SpO2: 97% (08 Jan 2022 09:00) (91% - 100%)    Mode: CPAP with PS, FiO2: 40, PEEP: 5, PS: 12, ITime: 0.9, MAP: 10, PIP: 21    01-07 @ 07:01 - 01-08 @ 07:00  --------------------------------------------------------  IN: 2368.2 mL / OUT: 2145 mL / NET: 223.2 mL    01-08 @ 07:01 - 01-08 @ 11:57  --------------------------------------------------------  IN: 156 mL / OUT: 0 mL / NET: 156 mL      CAPILLARY BLOOD GLUCOSE      POCT Blood Glucose.: 178 mg/dL (08 Jan 2022 11:32)      PHYSICAL EXAM:  General: NAD, non toxic appearing  HEENT: ETT and NGT in place  Neck: supple  Respiratory: mechanical BS b/l  Cardiovascular: s1s2 RRR  Abdomen: soft, non tender, + ostomy w/ stool, 2 BABAK drains   Extremities: warm, +2 pitting edema b/l  Skin: abdominal dressing CDI  Neurological: moves extremities  Psychiatry: opens eyes, follows some commands slowly    LINES:  RIJ TLC 1/4    HOSPITAL MEDICATIONS:  MEDICATIONS  (STANDING):  cefTRIAXone   IVPB 1000 milliGRAM(s) IV Intermittent every 24 hours  chlorhexidine 0.12% Liquid 15 milliLiter(s) Oral Mucosa every 12 hours  chlorhexidine 2% Cloths 1 Application(s) Topical <User Schedule>  dexMEDEtomidine Infusion 0.2 MICROgram(s)/kG/Hr (4.05 mL/Hr) IV Continuous <Continuous>  dextrose 40% Gel 15 Gram(s) Oral once  dextrose 5%. 1000 milliLiter(s) (50 mL/Hr) IV Continuous <Continuous>  dextrose 5%. 1000 milliLiter(s) (100 mL/Hr) IV Continuous <Continuous>  dextrose 50% Injectable 25 Gram(s) IV Push once  dextrose 50% Injectable 12.5 Gram(s) IV Push once  dextrose 50% Injectable 25 Gram(s) IV Push once  dorzolamide 2%/timolol 0.5% Ophthalmic Solution 1 Drop(s) Both EYES two times a day  enoxaparin Injectable 40 milliGRAM(s) SubCutaneous daily  escitalopram 20 milliGRAM(s) Oral daily  fentaNYL   Infusion. 0.5 MICROgram(s)/kG/Hr (4.05 mL/Hr) IV Continuous <Continuous>  fentaNYL   Patch  25 MICROgram(s)/Hr 1 Patch Transdermal every 72 hours  furosemide   Injectable 40 milliGRAM(s) IV Push once  glucagon  Injectable 1 milliGRAM(s) IntraMuscular once  levETIRAcetam  IVPB 500 milliGRAM(s) IV Intermittent every 12 hours  metroNIDAZOLE  IVPB 500 milliGRAM(s) IV Intermittent every 8 hours  pantoprazole  Injectable 40 milliGRAM(s) IV Push daily  potassium chloride   Solution 40 milliEquivalent(s) Oral once    MEDICATIONS  (PRN):  acetaminophen     Tablet .. 650 milliGRAM(s) Oral every 6 hours PRN Temp greater or equal to 38C (100.4F)  sodium chloride 0.9% lock flush 10 milliLiter(s) IV Push every 1 hour PRN Pre/post blood products, medications, blood draw, and to maintain line patency      LABS:                        9.0    14.46 )-----------( 600      ( 08 Jan 2022 03:15 )             28.9     Hgb Trend: 9.0<--, 9.5<--, 7.6<--, 7.3<--, 7.5<--  01-08    144  |  112<H>  |  23  ----------------------------<  148<H>  3.5   |  24  |  0.62    Ca    8.2<L>      08 Jan 2022 03:15  Phos  2.7     01-08  Mg     2.7     01-08    TPro  6.2  /  Alb  1.9<L>  /  TBili  0.3  /  DBili  x   /  AST  22  /  ALT  33  /  AlkPhos  108  01-08              MICROBIOLOGY:     RADIOLOGY:  [ ] Reviewed and interpreted by me

## 2022-01-09 LAB
ANION GAP SERPL CALC-SCNC: 6 MMOL/L — SIGNIFICANT CHANGE UP (ref 5–17)
BUN SERPL-MCNC: 26 MG/DL — HIGH (ref 7–23)
CALCIUM SERPL-MCNC: 8.1 MG/DL — LOW (ref 8.5–10.1)
CHLORIDE SERPL-SCNC: 115 MMOL/L — HIGH (ref 96–108)
CO2 SERPL-SCNC: 27 MMOL/L — SIGNIFICANT CHANGE UP (ref 22–31)
CREAT SERPL-MCNC: 0.67 MG/DL — SIGNIFICANT CHANGE UP (ref 0.5–1.3)
GAS PNL BLDA: SIGNIFICANT CHANGE UP
GLUCOSE BLDC GLUCOMTR-MCNC: 129 MG/DL — HIGH (ref 70–99)
GLUCOSE BLDC GLUCOMTR-MCNC: 147 MG/DL — HIGH (ref 70–99)
GLUCOSE BLDC GLUCOMTR-MCNC: 195 MG/DL — HIGH (ref 70–99)
GLUCOSE SERPL-MCNC: 178 MG/DL — HIGH (ref 70–99)
HCT VFR BLD CALC: 26.2 % — LOW (ref 34.5–45)
HGB BLD-MCNC: 8.1 G/DL — LOW (ref 11.5–15.5)
MAGNESIUM SERPL-MCNC: 2.8 MG/DL — HIGH (ref 1.6–2.6)
MCHC RBC-ENTMCNC: 28.4 PG — SIGNIFICANT CHANGE UP (ref 27–34)
MCHC RBC-ENTMCNC: 30.9 GM/DL — LOW (ref 32–36)
MCV RBC AUTO: 91.9 FL — SIGNIFICANT CHANGE UP (ref 80–100)
NRBC # BLD: 0 /100 WBCS — SIGNIFICANT CHANGE UP (ref 0–0)
PHOSPHATE SERPL-MCNC: 2.8 MG/DL — SIGNIFICANT CHANGE UP (ref 2.5–4.5)
PLATELET # BLD AUTO: 450 K/UL — HIGH (ref 150–400)
POTASSIUM SERPL-MCNC: 3.9 MMOL/L — SIGNIFICANT CHANGE UP (ref 3.5–5.3)
POTASSIUM SERPL-SCNC: 3.9 MMOL/L — SIGNIFICANT CHANGE UP (ref 3.5–5.3)
RBC # BLD: 2.85 M/UL — LOW (ref 3.8–5.2)
RBC # FLD: 16.6 % — HIGH (ref 10.3–14.5)
SODIUM SERPL-SCNC: 148 MMOL/L — HIGH (ref 135–145)
WBC # BLD: 8.95 K/UL — SIGNIFICANT CHANGE UP (ref 3.8–10.5)
WBC # FLD AUTO: 8.95 K/UL — SIGNIFICANT CHANGE UP (ref 3.8–10.5)

## 2022-01-09 PROCEDURE — 99291 CRITICAL CARE FIRST HOUR: CPT

## 2022-01-09 PROCEDURE — 99232 SBSQ HOSP IP/OBS MODERATE 35: CPT

## 2022-01-09 RX ORDER — FUROSEMIDE 40 MG
40 TABLET ORAL ONCE
Refills: 0 | Status: COMPLETED | OUTPATIENT
Start: 2022-01-09 | End: 2022-01-09

## 2022-01-09 RX ORDER — ALPRAZOLAM 0.25 MG
0.25 TABLET ORAL EVERY 12 HOURS
Refills: 0 | Status: DISCONTINUED | OUTPATIENT
Start: 2022-01-09 | End: 2022-01-16

## 2022-01-09 RX ORDER — FENTANYL CITRATE 50 UG/ML
50 INJECTION INTRAVENOUS ONCE
Refills: 0 | Status: DISCONTINUED | OUTPATIENT
Start: 2022-01-09 | End: 2022-01-09

## 2022-01-09 RX ADMIN — FENTANYL CITRATE 4.05 MICROGRAM(S)/KG/HR: 50 INJECTION INTRAVENOUS at 17:05

## 2022-01-09 RX ADMIN — ESCITALOPRAM OXALATE 20 MILLIGRAM(S): 10 TABLET, FILM COATED ORAL at 11:46

## 2022-01-09 RX ADMIN — Medication 100 MILLIGRAM(S): at 22:01

## 2022-01-09 RX ADMIN — Medication 40 MILLIGRAM(S): at 17:36

## 2022-01-09 RX ADMIN — DEXMEDETOMIDINE HYDROCHLORIDE IN 0.9% SODIUM CHLORIDE 4.05 MICROGRAM(S)/KG/HR: 4 INJECTION INTRAVENOUS at 16:28

## 2022-01-09 RX ADMIN — DEXMEDETOMIDINE HYDROCHLORIDE IN 0.9% SODIUM CHLORIDE 4.05 MICROGRAM(S)/KG/HR: 4 INJECTION INTRAVENOUS at 06:25

## 2022-01-09 RX ADMIN — Medication 0.25 MILLIGRAM(S): at 18:43

## 2022-01-09 RX ADMIN — FENTANYL CITRATE 1 PATCH: 50 INJECTION INTRAVENOUS at 08:46

## 2022-01-09 RX ADMIN — DEXMEDETOMIDINE HYDROCHLORIDE IN 0.9% SODIUM CHLORIDE 4.05 MICROGRAM(S)/KG/HR: 4 INJECTION INTRAVENOUS at 18:43

## 2022-01-09 RX ADMIN — FENTANYL CITRATE 1 PATCH: 50 INJECTION INTRAVENOUS at 19:00

## 2022-01-09 RX ADMIN — FENTANYL CITRATE 50 MICROGRAM(S): 50 INJECTION INTRAVENOUS at 18:40

## 2022-01-09 RX ADMIN — Medication 100 MILLIGRAM(S): at 14:40

## 2022-01-09 RX ADMIN — Medication 650 MILLIGRAM(S): at 06:28

## 2022-01-09 RX ADMIN — CHLORHEXIDINE GLUCONATE 15 MILLILITER(S): 213 SOLUTION TOPICAL at 18:51

## 2022-01-09 RX ADMIN — DEXMEDETOMIDINE HYDROCHLORIDE IN 0.9% SODIUM CHLORIDE 4.05 MICROGRAM(S)/KG/HR: 4 INJECTION INTRAVENOUS at 22:01

## 2022-01-09 RX ADMIN — Medication 100 MILLIGRAM(S): at 06:26

## 2022-01-09 RX ADMIN — LEVETIRACETAM 420 MILLIGRAM(S): 250 TABLET, FILM COATED ORAL at 18:44

## 2022-01-09 RX ADMIN — ENOXAPARIN SODIUM 40 MILLIGRAM(S): 100 INJECTION SUBCUTANEOUS at 11:13

## 2022-01-09 RX ADMIN — DORZOLAMIDE HYDROCHLORIDE TIMOLOL MALEATE 1 DROP(S): 20; 5 SOLUTION/ DROPS OPHTHALMIC at 18:44

## 2022-01-09 RX ADMIN — CHLORHEXIDINE GLUCONATE 1 APPLICATION(S): 213 SOLUTION TOPICAL at 02:20

## 2022-01-09 RX ADMIN — CHLORHEXIDINE GLUCONATE 15 MILLILITER(S): 213 SOLUTION TOPICAL at 06:25

## 2022-01-09 RX ADMIN — PANTOPRAZOLE SODIUM 40 MILLIGRAM(S): 20 TABLET, DELAYED RELEASE ORAL at 11:13

## 2022-01-09 RX ADMIN — Medication 650 MILLIGRAM(S): at 12:14

## 2022-01-09 RX ADMIN — FENTANYL CITRATE 50 MICROGRAM(S): 50 INJECTION INTRAVENOUS at 19:00

## 2022-01-09 RX ADMIN — Medication 650 MILLIGRAM(S): at 06:27

## 2022-01-09 RX ADMIN — LEVETIRACETAM 420 MILLIGRAM(S): 250 TABLET, FILM COATED ORAL at 06:26

## 2022-01-09 RX ADMIN — CEFTRIAXONE 100 MILLIGRAM(S): 500 INJECTION, POWDER, FOR SOLUTION INTRAMUSCULAR; INTRAVENOUS at 16:36

## 2022-01-09 RX ADMIN — DORZOLAMIDE HYDROCHLORIDE TIMOLOL MALEATE 1 DROP(S): 20; 5 SOLUTION/ DROPS OPHTHALMIC at 06:26

## 2022-01-09 NOTE — PROGRESS NOTE ADULT - PROBLEM SELECTOR PLAN 4
Improved  Diuresis per [facility]  DVT study negative
Improved  Diuresis per [facility]  DVT study negative
Improving   Diuresis per [facility]  DVT study negative
Improving   Diuresis per [facility]  DVT study negative
RUE more than other extremities  Favor venous duplex US RUE r/o DVT  Diuresis per [facility]
Improved  Diuresis per [facility]
Improving   Diuresis per [facility]  DVT study negative
Improved  Diuresis per [facility]  DVT study negative

## 2022-01-09 NOTE — PROGRESS NOTE ADULT - SUBJECTIVE AND OBJECTIVE BOX
[facility]  Division of Infectious Diseases  [phone]    Name: JUAREZ GTZ  Age: 81y  Gender: Female  MRN: [id number]    Interval History--  Notes reviewed. patient seen and examined. s/p trach and connected to vent,   Past Medical History--  Seizure    HTN (hypertension)    Glaucoma    Thyroid disease    Blood clot of neck vein    TIA (transient ischemic attack)    S/P appendectomy      Review of Systems--  Review of systems unable secondary to clinical condition.       For details regarding the patient's social history, family history, and other miscellaneous elements, please refer the initial infectious diseases consultation and/or the admitting history and physical examination for this admission.    Allergies    No Known Allergies    Intolerances        Medications--  Antibiotics:  cefTRIAXone   IVPB 1000 every 24 hours  metroNIDAZOLE  IVPB 500 every 8 hours      MEDICATIONS  (STANDING):  ALPRAZolam 0.25 every 12 hours  dexMEDEtomidine Infusion 0.2 <Continuous>  dextrose 40% Gel 15 once  dextrose 50% Injectable 25 once  dextrose 50% Injectable 12.5 once  dextrose 50% Injectable 25 once  enoxaparin Injectable 40 daily  escitalopram 20 daily  fentaNYL   Infusion. 0.5 <Continuous>  fentaNYL   Patch  25 MICROgram(s)/Hr 1 every 72 hours  glucagon  Injectable 1 once  levETIRAcetam  IVPB 500 every 12 hours  pantoprazole  Injectable 40 daily      PRN  acetaminophen     Tablet .. 650 milliGRAM(s) Oral every 6 hours PRN      Physical Exam:  Vital Signs Last 24 Hrs  T(F): 99.7 (01-10-22 @ 00:09), Max: 100.9 (01-09-22 @ 02:20)  HR: 90 (01-10-22 @ 00:00)  BP: 171/64 (01-10-22 @ 00:00)  RR: 23 (01-10-22 @ 00:00)  SpO2: 99% (01-10-22 @ 00:00) (95% - 100%)  Wt(kg): --      General: Nontoxic-appearing Female in no acute distress.  HEENT: AT/NC. Oropharynx/dentition unable secondary to patient compliance. +trach to vent     Neck: Not rigid. No sense of mass.  Nodes: None palpable.  Lungs: ventilatory BS bilaterally without rales, wheezing or rhonchi  Heart: Regular rate and rhythm. No Murmur. No rub. No gallop. No palpable thrill.  Abdomen: Bowel sounds now present.  Soft. Mildly distended.  Incision dressed.  Ostomy with stool output. Drains scant output, serous x2 but L more purulent then R. . slight grimace to palpation.  still small wound dehiscence   Extremities: No cyanosis or clubbing. 2-3+ pitting edema  Skin: Warm. Dry No rash. No vasculitic stigmata.  Psychiatric: Unable      Laboratory Studies--  WBC Count: 8.95 K/uL (01-09 @ 04:27)  WBC Count: 14.46 K/uL (01-08 @ 03:15)  WBC Count: 17.43 K/uL (01-07 @ 17:00)  WBC Count: 15.03 K/uL (01-07 @ 02:53)  WBC Count: 14.85 K/uL (01-06 @ 03:49)  WBC Count: 15.40 K/uL (01-05 @ 04:33)  WBC Count: 16.61 K/uL (01-04 @ 02:41)                            8.1    8.95  )-----------( 450      ( 09 Jan 2022 04:27 )             26.2       01-09    148<H>  |  115<H>  |  26<H>  ----------------------------<  178<H>  3.9   |  27  |  0.67    Ca    8.1<L>      09 Jan 2022 04:27  Phos  2.8     01-09  Mg     2.8     01-09    TPro  6.2  /  Alb  1.9<L>  /  TBili  0.3  /  DBili  x   /  AST  22  /  ALT  33  /  AlkPhos  108  01-08      Creatinine Trend: 0.67<--, 0.62<--, 0.51<--, 0.62<--, 0.72<--, 0.61<--    Blood Gas Arterial, Lactate: 1.30 mmol/L (01-09-22 @ 09:06)          Culture Data  Culture - Body Fluid with Gram Stain (01.03.22 @ 18:18)    Gram Stain:   Few polymorphonuclear leukocytes per low power field  Few Gram Negative Rods per oil power field    -  Amikacin: S <=16    -  Amoxicillin/Clavulanic Acid: S <=8/4    -  Ampicillin: S <=8 These ampicillin results predict results for amoxicillin    -  Ampicillin/Sulbactam: S <=4/2 Enterobacter, Klebsiella aerogenes, Citrobacter, and Serratia may develop resistance during prolonged therapy (3-4 days)    -  Aztreonam: S <=4    -  Cefazolin: S <=2 Enterobacter, Klebsiella aerogenes, Citrobacter, and Serratia may develop resistance during prolonged therapy (3-4 days)    -  Cefepime: S <=2    -  Cefoxitin: S <=8    -  Ceftriaxone: S <=1 Enterobacter, Klebsiella aerogenes, Citrobacter, and Serratia may develop resistance during prolonged therapy    -  Ciprofloxacin: S <=0.25    -  Ertapenem: S <=0.5    -  Gentamicin: S <=2    -  Imipenem: S <=1    -  Levofloxacin: S <=0.5    -  Meropenem: S <=1    -  Piperacillin/Tazobactam: S <=8    -  Tobramycin: S <=2    -  Trimethoprim/Sulfamethoxazole: R >2/38    Specimen Source: .Body Fluid Abdominal Fluid    Culture Results:   Few Escherichia coli  Few Bacteroides ovatus group "Susceptibilities not performed"    Organism Identification: Escherichia coli    Organism: Escherichia coli    Method Type: TONYA        Culture - Sputum (collected 28 Dec 2021 18:54)  Source: .Sputum Sputum  Gram Stain (30 Dec 2021 18:49):    Moderate polymorphonuclear leukocytes per low power field    Numerous Squamous epithelial cells per low power field    Few Gram positive cocci in pairs per oil power field    Rare Gram Positive Rods per oil power field    Rare Yeast like cells per oil power field    Results consistent with oropharyngeal contamination  Final Report (30 Dec 2021 18:49):    Normal Respiratory Sendy present    Culture - Blood (collected 28 Dec 2021 15:04)  Source: .Blood Blood  Preliminary Report (29 Dec 2021 15:06):    No growth to date.    Culture - Blood (collected 28 Dec 2021 15:04)  Source: .Blood Blood  Preliminary Report (29 Dec 2021 15:06):    No growth to date.

## 2022-01-09 NOTE — PROGRESS NOTE ADULT - ASSESSMENT
Patient is a 81F with a PMH of HTN, HLD, hypothyroidism, depression, seizures who presents to the ED for abd pain found to have diverticulitis   has rising leukocytosis   had fever yesterday   tachycardic and now hypoxic   CXR (I personally reviewed) low lung volumes   origincal CT (I personally reviewed) with diverticulitis   she had a lot of pain in the abdomen on exam     12/25: s/p surgery for diverticulitis with perforation and abscess and went to the OR. intubated in ICU with vac and needs to go back to the OR, currently on zosyn, s/p one dose of diflucan last night   12/27: s/p repair and ostomy creation yesterday, wbc elevated, cultures sensitive to zosyn and candida albicans is notoriously sensitive to azoles such as fluconazole, wean of pressors and sedation, extubate when ready   12/28: Further increase in white blood cell count but overall the patient appears to be reasonably stable.  No concern of C. difficile at this juncture.  Current antibiotics are reasonable.  Leukocytosis like related to recent surgery, no concern of other superimposed process on the basis of exam.  I do not see a role to alter her antimicrobials.  12/29:  Remains intubated in ICU. still quite edematous and net positive, WBC climbing, small wound dehiscence at bottom of incision, plan for initiation of TPN today, remains on antibiotics    12/30: rising WBC, ostomy not functioning yet, very edematous CT today, may be source control issue so for now can continue same antibiotics and antifungal but may need to change if findings on the CT are worrisome or change in hemodynamics  12/31:Leukocytosis, with collections noted on CT.  However this is being done postoperative day 5, there is some possibility that this could represent postop changes but given the leukocytosis, concur with plans for attempts at percutaneous drainage.  White blood cell count down slightly compared to prior peak, will need to be trended.  Gallbladder is also distended.  Abdominal exam was benign this morning, but a calculus cholecystitis could be the differential diagnosis here as well (though n.p.o. status certainly could explain distended gallbladder, there is also report of gallbladder wall thickening).  Would modify antibiotics based on cultures from drainage, I do not believe that antibiotics and other the cells would be adequate here.  Fortunately she is off pressors, with preserved renal function, and tolerating pressure support.  1/1/22: Postop day 6.  I have some concerns with the appearance of the ostomy, though the abdominal exam overall is otherwise unchanged.  White blood cell count remains elevated, but is lower compared with prior values.  This is despite no change in antibiotic therapy.  Patient also has asymmetric edema of the upper extremities, right greater than left.  Low threshold for duplex ultrasound to exclude DVT.No new surveillance culture data.  1/2: POD7 Ostomy looks better today, UE symmetric. WBC elevated, some slight downward trend overall. Temps <= 100F.   1/3: drainage of fluid collection today, continue zosyn and fluconazole for now, monitor wbc and temps, watch ostomy output, diuresis and address third spacing   1/4: s/p drainage yesterday now growing ecoli and awaiting for sensitivities, wbc is improving, started on tube feeds    1/5: abscess growing ecoli and bacteroides, S to ceftriaxone, wbc 15, weaning trial today, pain management    1/6: awake, lung excerise today, trach planned for tomorrow, ostomy output is good. will stop fluconazole today and change antibiotics to ceftriaxone and flagly. Unclear stop date yet    1/7 trach today, continue antibiotics   1/9: s/p trach, had low grade fever but otherwise doing ok, will continues same antibiotics regimen for now but if fevers continue she will needs to be rescanned as those abscesses can progress.

## 2022-01-09 NOTE — PROGRESS NOTE ADULT - ASSESSMENT
81F w/ HTN, HLD, hypothyroidism, depression, seizures. Presented w/ abdominal pain found to have acute sigmoid diverticulitis. Complicated by perforated sigmoid diverticulitis w/ abscess s/p ex-lap w/ large bowel resection and ostomy creation. COurse complicated by rapid Afib, volume overload, multiple fluid collection s/p IR drainage of R abscess on 1/3 and failure to wean. S/p Trach 2 days ago.     #Neuro - fentanyl patch in place, will continue to slowly decrease fentanyl as pt has been on fentanyl for prolonged period; start xanax today as pt also has significant anxiety; titrate off precedex; continue w/ keppra and lexaparo  #CV - HD stable, no further episodes of afib s/p amio load  #Pulm - s/p trach for failure to wean post-op; start PSV daily and full vent support at night  #ID- currently on ceftriaxone and flagyl to cover E coli and bacteroides growing from abscess and abdominal cultures; low grade temp overnight w/ improved WBC count - monitor for now; appreciate ID input  #Renal/metabolic - normal renal function, start free water for hyperNa; lasix today  #GI - s/p ex-lap for sidmoid diverticulitis and abscess formation w/ large bowel resection an ostomy creation, as well as IR drain placement of intra-abd R abscess; tolerating NGT diet; protonix   #Heme - had bleeding around trach site which stopped s/p surgicell application, hgb stable  #Endo - continue w/ levothyroxine, FS w/ ISS  #PPx - lovenox for ppx  #Dispo- now s/p trach and continue to wean sedatives; prognosis guarded; full code  Pt seen and examined on 1/9    81F w/ HTN, HLD, hypothyroidism, depression, seizures. Presented w/ abdominal pain found to have acute sigmoid diverticulitis. Complicated by perforated sigmoid diverticulitis w/ abscess s/p ex-lap w/ large bowel resection and ostomy creation. COurse complicated by rapid Afib, volume overload, multiple fluid collection s/p IR drainage of R abscess on 1/3 and failure to wean. S/p Trach 2 days ago.     #Neuro - fentanyl patch in place, will continue to slowly decrease fentanyl as pt has been on fentanyl for prolonged period; start xanax today as pt also has significant anxiety; titrate off precedex; continue w/ keppra and lexaparo  #CV - HD stable, no further episodes of afib s/p amio load  #Pulm - s/p trach for failure to wean post-op; start PSV daily and full vent support at night  #ID- currently on ceftriaxone and flagyl to cover E coli and bacteroides growing from abscess and abdominal cultures; low grade temp overnight w/ improved WBC count - monitor for now; appreciate ID input  #Renal/metabolic - normal renal function, start free water for hyperNa; lasix today  #GI - s/p ex-lap for sidmoid diverticulitis and abscess formation w/ large bowel resection an ostomy creation, as well as IR drain placement of intra-abd R abscess; tolerating NGT diet; protonix   #Heme - had bleeding around trach site which stopped s/p surgicell application, hgb stable  #Endo - continue w/ levothyroxine, FS w/ ISS  #PPx - lovenox for ppx  #Dispo- now s/p trach and continue to wean sedatives; prognosis guarded; full code

## 2022-01-09 NOTE — PROGRESS NOTE ADULT - PROBLEM SELECTOR PROBLEM 4
Edema of right upper extremity

## 2022-01-09 NOTE — PROGRESS NOTE ADULT - CRITICAL CARE SERVICES PROVIDED
Critical care services provided

## 2022-01-09 NOTE — PROGRESS NOTE ADULT - SUBJECTIVE AND OBJECTIVE BOX
CHIEF COMPLAINT:    Interval Events:    REVIEW OF SYSTEMS:  Constitutional: [ ] fevers [ ] chills [ ] weight loss [ ] weight gain  HEENT: [ ] dry eyes [ ] eye irritation [ ] postnasal drip [ ] nasal congestion  CV: [ ] chest pain [ ] orthopnea [ ] palpitations [ ] murmur  Resp: [ ] cough [ ] shortness of breath [ ] dyspnea [ ] wheezing [ ] sputum [ ] hemoptysis  GI: [ ] nausea [ ] vomiting [ ] diarrhea [ ] constipation [ ] abd pain [ ] dysphagia   : [ ] dysuria [ ] nocturia [ ] hematuria [ ] increased urinary frequency  Musculoskeletal: [ ] back pain [ ] myalgias [ ] arthralgias [ ] fracture  Skin: [ ] rash [ ] itch  Neurological: [ ] headache [ ] dizziness [ ] syncope [ ] weakness [ ] numbness  Hematologic/Lymphatic: [ ] anemia [ ] bleeding problem  Allergic/Immunologic: [ ] itchy eyes [ ] nasal discharge [ ] hives [ ] angioedema  [ ] All other systems negative  [ ] Unable to assess ROS because ________    OBJECTIVE:  ICU Vital Signs Last 24 Hrs  T(C): 38.3 (09 Jan 2022 06:00), Max: 38.7 (08 Jan 2022 16:00)  T(F): 100.9 (09 Jan 2022 06:00), Max: 101.6 (08 Jan 2022 16:00)  HR: 65 (09 Jan 2022 07:00) (59 - 90)  BP: 105/53 (09 Jan 2022 07:00) (97/48 - 158/60)  BP(mean): 68 (09 Jan 2022 07:00) (62 - 101)  ABP: --  ABP(mean): --  RR: 17 (09 Jan 2022 07:00) (11 - 28)  SpO2: 100% (09 Jan 2022 07:00) (94% - 100%)    Mode: AC/ CMV (Assist Control/ Continuous Mandatory Ventilation), RR (machine): 18, TV (machine): 450, FiO2: 40, PEEP: 5, ITime: 0.9, MAP: 8, PIP: 20    01-08 @ 07:01  -  01-09 @ 07:00  --------------------------------------------------------  IN: 2313.9 mL / OUT: 2095 mL / NET: 218.9 mL      CAPILLARY BLOOD GLUCOSE      POCT Blood Glucose.: 129 mg/dL (09 Jan 2022 06:19)      PHYSICAL EXAM:  General:   HEENT:   Neck:   Respiratory:   Cardiovascular:   Abdomen:   Extremities:   Skin:   Neurological:  Psychiatry:    LINES:    HOSPITAL MEDICATIONS:  MEDICATIONS  (STANDING):  cefTRIAXone   IVPB 1000 milliGRAM(s) IV Intermittent every 24 hours  chlorhexidine 0.12% Liquid 15 milliLiter(s) Oral Mucosa every 12 hours  chlorhexidine 2% Cloths 1 Application(s) Topical <User Schedule>  dexMEDEtomidine Infusion 0.2 MICROgram(s)/kG/Hr (4.05 mL/Hr) IV Continuous <Continuous>  dextrose 40% Gel 15 Gram(s) Oral once  dextrose 5%. 1000 milliLiter(s) (50 mL/Hr) IV Continuous <Continuous>  dextrose 5%. 1000 milliLiter(s) (100 mL/Hr) IV Continuous <Continuous>  dextrose 50% Injectable 25 Gram(s) IV Push once  dextrose 50% Injectable 12.5 Gram(s) IV Push once  dextrose 50% Injectable 25 Gram(s) IV Push once  dorzolamide 2%/timolol 0.5% Ophthalmic Solution 1 Drop(s) Both EYES two times a day  enoxaparin Injectable 40 milliGRAM(s) SubCutaneous daily  escitalopram 20 milliGRAM(s) Oral daily  fentaNYL   Infusion. 0.5 MICROgram(s)/kG/Hr (4.05 mL/Hr) IV Continuous <Continuous>  fentaNYL   Patch  25 MICROgram(s)/Hr 1 Patch Transdermal every 72 hours  glucagon  Injectable 1 milliGRAM(s) IntraMuscular once  levETIRAcetam  IVPB 500 milliGRAM(s) IV Intermittent every 12 hours  metroNIDAZOLE  IVPB 500 milliGRAM(s) IV Intermittent every 8 hours  pantoprazole  Injectable 40 milliGRAM(s) IV Push daily    MEDICATIONS  (PRN):  acetaminophen     Tablet .. 650 milliGRAM(s) Oral every 6 hours PRN Temp greater or equal to 38C (100.4F)  sodium chloride 0.9% lock flush 10 milliLiter(s) IV Push every 1 hour PRN Pre/post blood products, medications, blood draw, and to maintain line patency      LABS:                        8.1    8.95  )-----------( 450      ( 09 Jan 2022 04:27 )             26.2     Hgb Trend: 8.1<--, 9.0<--, 9.5<--, 7.6<--, 7.3<--  01-09    148<H>  |  115<H>  |  26<H>  ----------------------------<  178<H>  3.9   |  27  |  0.67    Ca    8.1<L>      09 Jan 2022 04:27  Phos  2.8     01-09  Mg     2.8     01-09    TPro  6.2  /  Alb  1.9<L>  /  TBili  0.3  /  DBili  x   /  AST  22  /  ALT  33  /  AlkPhos  108  01-08              MICROBIOLOGY:     RADIOLOGY:  [ ] Reviewed and interpreted by me CHIEF COMPLAINT:    Interval Events:  Tmax 100.7  Slowly decreasing fent gtt  c/o pain and discomfort and difficulty breathing    REVIEW OF SYSTEMS:  Constitutional: [- ] fevers [- ] chills [ ] weight loss [ ] weight gain  HEENT: [ -] dry eyes [ ] eye irritation [ ] postnasal drip [ ] nasal congestion  CV: [ -] chest pain [ ] orthopnea [ ] palpitations [ ] murmur  Resp: [ ] cough [+ ] shortness of breath [+ ] dyspnea [ ] wheezing [ ] sputum [ ] hemoptysis  GI: [- ] nausea [- ] vomiting [ ] diarrhea [ ] constipation [ ] abd pain [ ] dysphagia   : [ ] dysuria [ ] nocturia [ ] hematuria [ ] increased urinary frequency  Musculoskeletal: [ +] back pain [ ] myalgias [ ] arthralgias [ ] fracture  Skin: [ ] rash [ ] itch  Neurological: [- ] headache [- ] dizziness [ ] syncope [ ] weakness [ ] numbness  Hematologic/Lymphatic: [ ] anemia [ ] bleeding problem  Allergic/Immunologic: [ ] itchy eyes [ ] nasal discharge [ ] hives [ ] angioedema  [ x] All other systems negative    OBJECTIVE:  ICU Vital Signs Last 24 Hrs  T(C): 38.3 (09 Jan 2022 06:00), Max: 38.7 (08 Jan 2022 16:00)  T(F): 100.9 (09 Jan 2022 06:00), Max: 101.6 (08 Jan 2022 16:00)  HR: 65 (09 Jan 2022 07:00) (59 - 90)  BP: 105/53 (09 Jan 2022 07:00) (97/48 - 158/60)  BP(mean): 68 (09 Jan 2022 07:00) (62 - 101)  ABP: --  ABP(mean): --  RR: 17 (09 Jan 2022 07:00) (11 - 28)  SpO2: 100% (09 Jan 2022 07:00) (94% - 100%)    Mode: AC/ CMV (Assist Control/ Continuous Mandatory Ventilation), RR (machine): 18, TV (machine): 450, FiO2: 40, PEEP: 5, ITime: 0.9, MAP: 8, PIP: 20    01-08 @ 07:01  -  01-09 @ 07:00  --------------------------------------------------------  IN: 2313.9 mL / OUT: 2095 mL / NET: 218.9 mL      CAPILLARY BLOOD GLUCOSE      POCT Blood Glucose.: 129 mg/dL (09 Jan 2022 06:19)      PHYSICAL EXAM:  General: NAD, non toxic appearing  HEENT: ETT and NGT in place  Neck: supple  Respiratory: mechanical BS b/l  Cardiovascular: s1s2 RRR  Abdomen: soft, non tender, + ostomy w/ stool, 2 BABAK drains   Extremities: warm, +2 pitting edema b/l  Skin: abdominal dressing CDI  Neurological: moves extremities  Psychiatry: more awake today, communicating     LINES:  RIJ TLC 1/4    HOSPITAL MEDICATIONS:  MEDICATIONS  (STANDING):  cefTRIAXone   IVPB 1000 milliGRAM(s) IV Intermittent every 24 hours  chlorhexidine 0.12% Liquid 15 milliLiter(s) Oral Mucosa every 12 hours  chlorhexidine 2% Cloths 1 Application(s) Topical <User Schedule>  dexMEDEtomidine Infusion 0.2 MICROgram(s)/kG/Hr (4.05 mL/Hr) IV Continuous <Continuous>  dextrose 40% Gel 15 Gram(s) Oral once  dextrose 5%. 1000 milliLiter(s) (50 mL/Hr) IV Continuous <Continuous>  dextrose 5%. 1000 milliLiter(s) (100 mL/Hr) IV Continuous <Continuous>  dextrose 50% Injectable 25 Gram(s) IV Push once  dextrose 50% Injectable 12.5 Gram(s) IV Push once  dextrose 50% Injectable 25 Gram(s) IV Push once  dorzolamide 2%/timolol 0.5% Ophthalmic Solution 1 Drop(s) Both EYES two times a day  enoxaparin Injectable 40 milliGRAM(s) SubCutaneous daily  escitalopram 20 milliGRAM(s) Oral daily  fentaNYL   Infusion. 0.5 MICROgram(s)/kG/Hr (4.05 mL/Hr) IV Continuous <Continuous>  fentaNYL   Patch  25 MICROgram(s)/Hr 1 Patch Transdermal every 72 hours  glucagon  Injectable 1 milliGRAM(s) IntraMuscular once  levETIRAcetam  IVPB 500 milliGRAM(s) IV Intermittent every 12 hours  metroNIDAZOLE  IVPB 500 milliGRAM(s) IV Intermittent every 8 hours  pantoprazole  Injectable 40 milliGRAM(s) IV Push daily    MEDICATIONS  (PRN):  acetaminophen     Tablet .. 650 milliGRAM(s) Oral every 6 hours PRN Temp greater or equal to 38C (100.4F)  sodium chloride 0.9% lock flush 10 milliLiter(s) IV Push every 1 hour PRN Pre/post blood products, medications, blood draw, and to maintain line patency      LABS:                        8.1    8.95  )-----------( 450      ( 09 Jan 2022 04:27 )             26.2     Hgb Trend: 8.1<--, 9.0<--, 9.5<--, 7.6<--, 7.3<--  01-09    148<H>  |  115<H>  |  26<H>  ----------------------------<  178<H>  3.9   |  27  |  0.67    Ca    8.1<L>      09 Jan 2022 04:27  Phos  2.8     01-09  Mg     2.8     01-09    TPro  6.2  /  Alb  1.9<L>  /  TBili  0.3  /  DBili  x   /  AST  22  /  ALT  33  /  AlkPhos  108  01-08              MICROBIOLOGY:     RADIOLOGY:  [ ] Reviewed and interpreted by me

## 2022-01-09 NOTE — PROGRESS NOTE ADULT - SUBJECTIVE AND OBJECTIVE BOX
SURGERY PROGRESS HPI:  Pt seen and examined at bedside resting comfortably in the ICU.      Vital Signs Last 24 Hrs  T(C): 38.3 (09 Jan 2022 02:20), Max: 38.7 (08 Jan 2022 16:00)  T(F): 100.9 (09 Jan 2022 02:20), Max: 101.6 (08 Jan 2022 16:00)  HR: 63 (09 Jan 2022 03:40) (59 - 95)  BP: 125/53 (09 Jan 2022 03:00) (97/48 - 158/60)  BP(mean): 75 (09 Jan 2022 03:00) (62 - 101)  RR: 17 (09 Jan 2022 03:40) (11 - 28)  SpO2: 99% (09 Jan 2022 03:40) (93% - 99%)      PHYSICAL EXAM:    GENERAL: NAD  HEENT: Tracheostomy intact, functioning well, no bleeding.  CHEST/LUNG: Clear to ausculation, bilaterally   HEART: RRR S1S2  Abdomen: nondistended, +BS, soft, appropriate incisional tenderness. Dressing clean/dry/intact. Midline wound retention sutures intact, skin openings irrigated with saline and repacked with 2" iodoform packing, dry dressing applied. No gross drainage. Colostomy pink and viable, stool in bag. Left BABAK with murky output, R BABAK with serous output  Extremities: pitting edema  : mc catheter with clear yellow urine output    I&O's Detail    07 Jan 2022 07:01  -  08 Jan 2022 07:00  --------------------------------------------------------  IN:    Dexmedetomidine: 33 mL    Dexmedetomidine: 120 mL    Enteral Tube Flush: 40 mL    FentaNYL: 338 mL    FentaNYL: 81.2 mL    IV PiggyBack: 400 mL    IV PiggyBack: 250 mL    PRBCs (Packed Red Blood Cells): 306 mL    Vital1.5: 800 mL  Total IN: 2368.2 mL    OUT:    Bulb (mL): 85 mL    Bulb (mL): 10 mL    Colostomy (mL): 200 mL    Indwelling Catheter - Urethral (mL): 1850 mL  Total OUT: 2145 mL    Total NET: 223.2 mL      08 Jan 2022 07:01  -  09 Jan 2022 04:01  --------------------------------------------------------  IN:    Dexmedetomidine: 168.9 mL    FentaNYL: 347.8 mL    IV PiggyBack: 250 mL    Vital1.5: 1050 mL  Total IN: 1816.7 mL    OUT:    Bulb (mL): 10 mL    Bulb (mL): 25 mL    Colostomy (mL): 200 mL    Indwelling Catheter - Urethral (mL): 1875 mL    PPN (Peripheral Parenteral Nutrition): 0 mL  Total OUT: 2110 mL    Total NET: -293.3 mL          LABS:                        9.0    14.46 )-----------( 600      ( 08 Jan 2022 03:15 )             28.9     01-08    144  |  112<H>  |  23  ----------------------------<  148<H>  3.5   |  24  |  0.62    Ca    8.2<L>      08 Jan 2022 03:15  Phos  2.7     01-08  Mg     2.7     01-08    TPro  6.2  /  Alb  1.9<L>  /  TBili  0.3  /  DBili  x   /  AST  22  /  ALT  33  /  AlkPhos  108  01-08        A/P: 81F admitted with Acute Sigmoid Diverticulitis, Surgery consulted for interval perforated sigmoid diverticulitis. S/P ex lap, sigmoid resection, peritoneal lavage 12/25 and RTOR 12/26 for Irrigation of abdominal cavity with closure and creation of colostomy. S/p bedside IR aspiration of fluid collection 1/3. Tube feeds initiated. Ostomy functioning. Tracheostomy 1/7.    - continue ICU management and vent support  - local wound care per surgery, local drain care. ostomy management   - continue abx per ID  - will d/w attending c/o gluteal abcess to right side drained 1 month ago, reinfected and started antibiotics 4 days ago.

## 2022-01-10 DIAGNOSIS — R50.9 FEVER, UNSPECIFIED: ICD-10-CM

## 2022-01-10 LAB
ALBUMIN SERPL ELPH-MCNC: 1.9 G/DL — LOW (ref 3.3–5)
ALP SERPL-CCNC: 88 U/L — SIGNIFICANT CHANGE UP (ref 40–120)
ALT FLD-CCNC: 22 U/L — SIGNIFICANT CHANGE UP (ref 12–78)
ANION GAP SERPL CALC-SCNC: 6 MMOL/L — SIGNIFICANT CHANGE UP (ref 5–17)
AST SERPL-CCNC: 16 U/L — SIGNIFICANT CHANGE UP (ref 15–37)
BILIRUB SERPL-MCNC: 0.3 MG/DL — SIGNIFICANT CHANGE UP (ref 0.2–1.2)
BLD GP AB SCN SERPL QL: SIGNIFICANT CHANGE UP
BUN SERPL-MCNC: 22 MG/DL — SIGNIFICANT CHANGE UP (ref 7–23)
CALCIUM SERPL-MCNC: 8.2 MG/DL — LOW (ref 8.5–10.1)
CHLORIDE SERPL-SCNC: 112 MMOL/L — HIGH (ref 96–108)
CO2 SERPL-SCNC: 28 MMOL/L — SIGNIFICANT CHANGE UP (ref 22–31)
CREAT SERPL-MCNC: 0.56 MG/DL — SIGNIFICANT CHANGE UP (ref 0.5–1.3)
FLUAV AG NPH QL: SIGNIFICANT CHANGE UP
FLUBV AG NPH QL: SIGNIFICANT CHANGE UP
GLUCOSE BLDC GLUCOMTR-MCNC: 141 MG/DL — HIGH (ref 70–99)
GLUCOSE BLDC GLUCOMTR-MCNC: 150 MG/DL — HIGH (ref 70–99)
GLUCOSE BLDC GLUCOMTR-MCNC: 154 MG/DL — HIGH (ref 70–99)
GLUCOSE BLDC GLUCOMTR-MCNC: 158 MG/DL — HIGH (ref 70–99)
GLUCOSE SERPL-MCNC: 158 MG/DL — HIGH (ref 70–99)
HCT VFR BLD CALC: 29.1 % — LOW (ref 34.5–45)
HGB BLD-MCNC: 9.1 G/DL — LOW (ref 11.5–15.5)
MAGNESIUM SERPL-MCNC: 2.6 MG/DL — SIGNIFICANT CHANGE UP (ref 1.6–2.6)
MCHC RBC-ENTMCNC: 28.3 PG — SIGNIFICANT CHANGE UP (ref 27–34)
MCHC RBC-ENTMCNC: 31.3 GM/DL — LOW (ref 32–36)
MCV RBC AUTO: 90.7 FL — SIGNIFICANT CHANGE UP (ref 80–100)
NRBC # BLD: 0 /100 WBCS — SIGNIFICANT CHANGE UP (ref 0–0)
PHOSPHATE SERPL-MCNC: 2.3 MG/DL — LOW (ref 2.5–4.5)
PLATELET # BLD AUTO: 531 K/UL — HIGH (ref 150–400)
POTASSIUM SERPL-MCNC: 3.3 MMOL/L — LOW (ref 3.5–5.3)
POTASSIUM SERPL-SCNC: 3.3 MMOL/L — LOW (ref 3.5–5.3)
PROT SERPL-MCNC: 6.2 GM/DL — SIGNIFICANT CHANGE UP (ref 6–8.3)
RBC # BLD: 3.21 M/UL — LOW (ref 3.8–5.2)
RBC # FLD: 16.1 % — HIGH (ref 10.3–14.5)
SARS-COV-2 RNA SPEC QL NAA+PROBE: SIGNIFICANT CHANGE UP
SODIUM SERPL-SCNC: 146 MMOL/L — HIGH (ref 135–145)
WBC # BLD: 9.34 K/UL — SIGNIFICANT CHANGE UP (ref 3.8–10.5)
WBC # FLD AUTO: 9.34 K/UL — SIGNIFICANT CHANGE UP (ref 3.8–10.5)

## 2022-01-10 PROCEDURE — 99232 SBSQ HOSP IP/OBS MODERATE 35: CPT

## 2022-01-10 PROCEDURE — 99291 CRITICAL CARE FIRST HOUR: CPT

## 2022-01-10 RX ORDER — FENTANYL CITRATE 50 UG/ML
50 INJECTION INTRAVENOUS ONCE
Refills: 0 | Status: DISCONTINUED | OUTPATIENT
Start: 2022-01-10 | End: 2022-01-10

## 2022-01-10 RX ORDER — POTASSIUM CHLORIDE 20 MEQ
40 PACKET (EA) ORAL ONCE
Refills: 0 | Status: COMPLETED | OUTPATIENT
Start: 2022-01-10 | End: 2022-01-10

## 2022-01-10 RX ORDER — LEVOTHYROXINE SODIUM 125 MCG
50 TABLET ORAL DAILY
Refills: 0 | Status: DISCONTINUED | OUTPATIENT
Start: 2022-01-10 | End: 2022-01-28

## 2022-01-10 RX ORDER — FENTANYL CITRATE 50 UG/ML
1 INJECTION INTRAVENOUS
Refills: 0 | Status: DISCONTINUED | OUTPATIENT
Start: 2022-01-10 | End: 2022-01-17

## 2022-01-10 RX ORDER — FENTANYL CITRATE 50 UG/ML
25 INJECTION INTRAVENOUS
Refills: 0 | Status: DISCONTINUED | OUTPATIENT
Start: 2022-01-10 | End: 2022-01-10

## 2022-01-10 RX ORDER — POTASSIUM PHOSPHATE, MONOBASIC POTASSIUM PHOSPHATE, DIBASIC 236; 224 MG/ML; MG/ML
15 INJECTION, SOLUTION INTRAVENOUS ONCE
Refills: 0 | Status: COMPLETED | OUTPATIENT
Start: 2022-01-10 | End: 2022-01-10

## 2022-01-10 RX ORDER — LEVETIRACETAM 250 MG/1
500 TABLET, FILM COATED ORAL EVERY 12 HOURS
Refills: 0 | Status: DISCONTINUED | OUTPATIENT
Start: 2022-01-10 | End: 2022-01-19

## 2022-01-10 RX ORDER — FUROSEMIDE 40 MG
40 TABLET ORAL ONCE
Refills: 0 | Status: COMPLETED | OUTPATIENT
Start: 2022-01-10 | End: 2022-01-10

## 2022-01-10 RX ADMIN — Medication 650 MILLIGRAM(S): at 05:15

## 2022-01-10 RX ADMIN — Medication 100 MILLIGRAM(S): at 05:13

## 2022-01-10 RX ADMIN — FENTANYL CITRATE 50 MICROGRAM(S): 50 INJECTION INTRAVENOUS at 16:30

## 2022-01-10 RX ADMIN — CHLORHEXIDINE GLUCONATE 15 MILLILITER(S): 213 SOLUTION TOPICAL at 18:05

## 2022-01-10 RX ADMIN — Medication 40 MILLIEQUIVALENT(S): at 06:07

## 2022-01-10 RX ADMIN — DEXMEDETOMIDINE HYDROCHLORIDE IN 0.9% SODIUM CHLORIDE 4.05 MICROGRAM(S)/KG/HR: 4 INJECTION INTRAVENOUS at 04:30

## 2022-01-10 RX ADMIN — DORZOLAMIDE HYDROCHLORIDE TIMOLOL MALEATE 1 DROP(S): 20; 5 SOLUTION/ DROPS OPHTHALMIC at 05:14

## 2022-01-10 RX ADMIN — DORZOLAMIDE HYDROCHLORIDE TIMOLOL MALEATE 1 DROP(S): 20; 5 SOLUTION/ DROPS OPHTHALMIC at 18:11

## 2022-01-10 RX ADMIN — ENOXAPARIN SODIUM 40 MILLIGRAM(S): 100 INJECTION SUBCUTANEOUS at 13:30

## 2022-01-10 RX ADMIN — LEVETIRACETAM 500 MILLIGRAM(S): 250 TABLET, FILM COATED ORAL at 18:04

## 2022-01-10 RX ADMIN — Medication 100 MILLIGRAM(S): at 21:05

## 2022-01-10 RX ADMIN — FENTANYL CITRATE 1 PATCH: 50 INJECTION INTRAVENOUS at 20:35

## 2022-01-10 RX ADMIN — DEXMEDETOMIDINE HYDROCHLORIDE IN 0.9% SODIUM CHLORIDE 4.05 MICROGRAM(S)/KG/HR: 4 INJECTION INTRAVENOUS at 20:59

## 2022-01-10 RX ADMIN — CHLORHEXIDINE GLUCONATE 1 APPLICATION(S): 213 SOLUTION TOPICAL at 04:30

## 2022-01-10 RX ADMIN — FENTANYL CITRATE 1 PATCH: 50 INJECTION INTRAVENOUS at 07:00

## 2022-01-10 RX ADMIN — Medication 650 MILLIGRAM(S): at 23:28

## 2022-01-10 RX ADMIN — ESCITALOPRAM OXALATE 20 MILLIGRAM(S): 10 TABLET, FILM COATED ORAL at 13:30

## 2022-01-10 RX ADMIN — Medication 40 MILLIGRAM(S): at 13:29

## 2022-01-10 RX ADMIN — POTASSIUM PHOSPHATE, MONOBASIC POTASSIUM PHOSPHATE, DIBASIC 62.5 MILLIMOLE(S): 236; 224 INJECTION, SOLUTION INTRAVENOUS at 06:07

## 2022-01-10 RX ADMIN — PANTOPRAZOLE SODIUM 40 MILLIGRAM(S): 20 TABLET, DELAYED RELEASE ORAL at 13:31

## 2022-01-10 RX ADMIN — DEXMEDETOMIDINE HYDROCHLORIDE IN 0.9% SODIUM CHLORIDE 4.05 MICROGRAM(S)/KG/HR: 4 INJECTION INTRAVENOUS at 13:31

## 2022-01-10 RX ADMIN — LEVETIRACETAM 420 MILLIGRAM(S): 250 TABLET, FILM COATED ORAL at 05:13

## 2022-01-10 RX ADMIN — Medication 650 MILLIGRAM(S): at 18:06

## 2022-01-10 RX ADMIN — FENTANYL CITRATE 25 MICROGRAM(S): 50 INJECTION INTRAVENOUS at 14:18

## 2022-01-10 RX ADMIN — FENTANYL CITRATE 50 MICROGRAM(S): 50 INJECTION INTRAVENOUS at 16:15

## 2022-01-10 RX ADMIN — Medication 100 MILLIGRAM(S): at 13:35

## 2022-01-10 RX ADMIN — Medication 650 MILLIGRAM(S): at 05:14

## 2022-01-10 RX ADMIN — Medication 0.25 MILLIGRAM(S): at 18:11

## 2022-01-10 RX ADMIN — CHLORHEXIDINE GLUCONATE 15 MILLILITER(S): 213 SOLUTION TOPICAL at 05:13

## 2022-01-10 RX ADMIN — DEXMEDETOMIDINE HYDROCHLORIDE IN 0.9% SODIUM CHLORIDE 4.05 MICROGRAM(S)/KG/HR: 4 INJECTION INTRAVENOUS at 01:01

## 2022-01-10 RX ADMIN — CEFTRIAXONE 100 MILLIGRAM(S): 500 INJECTION, POWDER, FOR SOLUTION INTRAMUSCULAR; INTRAVENOUS at 16:00

## 2022-01-10 RX ADMIN — Medication 50 MICROGRAM(S): at 18:04

## 2022-01-10 RX ADMIN — Medication 0.25 MILLIGRAM(S): at 05:13

## 2022-01-10 NOTE — PROGRESS NOTE ADULT - SUBJECTIVE AND OBJECTIVE BOX
Patient seen and examined at bedside resting.  Remains intubated, sedated. Does open eyes to voice.  Tmax 100.9    Vital Signs Last 24 Hrs  T(F): 100.9 (01-10-22 @ 05:14), Max: 100.9 (01-10-22 @ 05:14)  HR: 87 (01-10-22 @ 05:14)  BP: 184/77 (01-10-22 @ 05:00)  RR: 23 (01-10-22 @ 05:14)  SpO2: 97% (01-10-22 @ 05:14)  POCT Blood Glucose.: 150 mg/dL (10 Nato 2022 05:26)    PHYSICAL EXAM:  GENERAL: Alert, NAD  CHEST/LUNG: Clear to auscultation bilaterally, respirations nonlabored  HEART: Regular rate and rhythm; S1 & S2 appreciated  ABDOMEN: soft, non tender, non distended. Midline incision with no active drainage, retention sutures in place. R BABAK with serous output, L BABAK with purulent output. Midline packed with iodoform and covered with dry gauze  MITIES:  no calf tenderness, no edema    I&O's Detail    09 Jan 2022 07:01  -  10 Nato 2022 07:00  --------------------------------------------------------  IN:    Dexmedetomidine: 203.1 mL    Enteral Tube Flush: 250 mL    FentaNYL: 139.5 mL    IV PiggyBack: 600 mL    Vital1.5: 800 mL  Total IN: 1992.6 mL    OUT:    Bulb (mL): 5 mL    Bulb (mL): 15 mL    Colostomy (mL): 250 mL    Indwelling Catheter - Urethral (mL): 1705 mL    Stool (mL): 0 mL  Total OUT: 1975 mL    Total NET: 17.6 mL    LABS:                        9.1    9.34  )-----------( 531      ( 10 Nato 2022 04:18 )             29.1     01-10    146<H>  |  112<H>  |  22  ----------------------------<  158<H>  3.3<L>   |  28  |  0.56    Ca    8.2<L>      10 Nato 2022 04:18  Phos  2.3     01-10  Mg     2.6     01-10    TPro  6.2  /  Alb  1.9<L>  /  TBili  0.3  /  DBili  x   /  AST  16  /  ALT  22  /  AlkPhos  88  01-10    A/P  81F admitted with Acute Sigmoid Diverticulitis, Surgery consulted for interval perforated sigmoid diverticulitis. S/P ex lap, sigmoid resection, peritoneal lavage 12/25 and RTOR 12/26 for Irrigation of abdominal cavity with closure and creation of colostomy. S/p bedside IR aspiration of fluid collection 1/3. Tube feeds initiated. Ostomy functioning. Tracheostomy 1/7.    - local wound care per surgery  - monitor ostomy output, BABAK output  - abx per ID  - wean vent as tolerated  - continue ICU management and supportive care  - will d/w attending

## 2022-01-10 NOTE — PROGRESS NOTE ADULT - SUBJECTIVE AND OBJECTIVE BOX
# CC: Patient is a 81y old  Female who presents with a chief complaint of diverticulitis (10 Nato 2022 07:00)      ## HPI:  Patient is a 81F with a PMH of HTN, HLD, hypothyroidism, depression, seizures who presents to the ED for abd pain.  Patient states that over the last two days she had developed significant abdominal pain and multiple episodes of watery diarrhea.  Reports one episode of nausea and vomiting earlier today.  Patient has no other complaints.  Vitals stable,  labs show leukocytosis and hypokalemia.  CT abdomen significant for diverticulitis.  Will admit to med surg.     (23 Dec 2021 00:17)      **O/N:**    ## ROS:    ## Labs:  ** CBC: **                        9.1    9.34  )-----------( 531      ( 10 Nato 2022 04:18 )             29.1     ** Chem:  **  01-10    146<H>  |  112<H>  |  22  ----------------------------<  158<H>  3.3<L>   |  28  |  0.56    Ca    8.2<L>      10 Nato 2022 04:18  Phos  2.3     01-10  Mg     2.6     01-10    TPro  6.2  /  Alb  1.9<L>  /  TBili  0.3  /  DBili  x   /  AST  16  /  ALT  22  /  AlkPhos  88  01-10    ** Coags: **      CAPILLARY BLOOD GLUCOSE      POCT Blood Glucose.: 141 mg/dL (10 Nato 2022 13:34)  POCT Blood Glucose.: 150 mg/dL (10 Nato 2022 05:26)  POCT Blood Glucose.: 158 mg/dL (10 Nato 2022 00:51)        Culture - Body Fluid with Gram Stain (collected 03 Jan 2022 18:18)  Source: .Body Fluid Abdominal Fluid  Gram Stain (05 Jan 2022 21:32):    Few polymorphonuclear leukocytes per low power field    Few Gram Negative Rods per oil power field  Final Report (05 Jan 2022 21:32):    Few Escherichia coli    Few Bacteroides ovatus group "Susceptibilities not performed"  Organism: Escherichia coli (05 Jan 2022 21:32)  Organism: Escherichia coli (05 Jan 2022 21:32)      -  Amikacin: S <=16      -  Amoxicillin/Clavulanic Acid: S <=8/4      -  Ampicillin: S <=8 These ampicillin results predict results for amoxicillin      -  Ampicillin/Sulbactam: S <=4/2 Enterobacter, Klebsiella aerogenes, Citrobacter, and Serratia may develop resistance during prolonged therapy (3-4 days)      -  Aztreonam: S <=4      -  Cefazolin: S <=2 Enterobacter, Klebsiella aerogenes, Citrobacter, and Serratia may develop resistance during prolonged therapy (3-4 days)      -  Cefepime: S <=2      -  Cefoxitin: S <=8      -  Ceftriaxone: S <=1 Enterobacter, Klebsiella aerogenes, Citrobacter, and Serratia may develop resistance during prolonged therapy      -  Ciprofloxacin: S <=0.25      -  Ertapenem: S <=0.5      -  Gentamicin: S <=2      -  Imipenem: S <=1      -  Levofloxacin: S <=0.5      -  Meropenem: S <=1      -  Piperacillin/Tazobactam: S <=8      -  Tobramycin: S <=2      -  Trimethoprim/Sulfamethoxazole: R >2/38      Method Type: TONYA    Culture - Sputum (collected 28 Dec 2021 18:54)  Source: .Sputum Sputum  Gram Stain (30 Dec 2021 18:49):    Moderate polymorphonuclear leukocytes per low power field    Numerous Squamous epithelial cells per low power field    Few Gram positive cocci in pairs per oil power field    Rare Gram Positive Rods per oil power field    Rare Yeast like cells per oil power field    Results consistent with oropharyngeal contamination  Final Report (30 Dec 2021 18:49):    Normal Respiratory Sendy present    Culture - Blood (collected 28 Dec 2021 15:04)  Source: .Blood Blood  Final Report (02 Jan 2022 16:00):    No Growth Final    Culture - Blood (collected 28 Dec 2021 15:04)  Source: .Blood Blood  Final Report (02 Jan 2022 16:00):    No Growth Final        ## Imaging:    ## Medications:    cefTRIAXone   IVPB 1000 milliGRAM(s) IV Intermittent every 24 hours  metroNIDAZOLE  IVPB 500 milliGRAM(s) IV Intermittent every 8 hours      acetaminophen     Tablet .. 650 milliGRAM(s) Oral every 6 hours PRN  ALPRAZolam 0.25 milliGRAM(s) Oral every 12 hours  dexMEDEtomidine Infusion 0.2 MICROgram(s)/kG/Hr IV Continuous <Continuous>  escitalopram 20 milliGRAM(s) Oral daily  fentaNYL    Injectable 25 MICROGram(s) IV Push every 2 hours PRN  fentaNYL   Patch  25 MICROgram(s)/Hr 1 Patch Transdermal every 72 hours  levETIRAcetam 500 milliGRAM(s) Oral every 12 hours      enoxaparin Injectable 40 milliGRAM(s) SubCutaneous daily    pantoprazole  Injectable 40 milliGRAM(s) IV Push daily      dextrose 40% Gel 15 Gram(s) Oral once  dextrose 50% Injectable 25 Gram(s) IV Push once  dextrose 50% Injectable 12.5 Gram(s) IV Push once  dextrose 50% Injectable 25 Gram(s) IV Push once  glucagon  Injectable 1 milliGRAM(s) IntraMuscular once  levothyroxine 50 MICROGram(s) Oral daily          ## O/E:  ICU Vital Signs Last 24 Hrs  T(C): 38.2 (10 Nato 2022 12:00), Max: 38.3 (10 Nato 2022 05:14)  T(F): 100.7 (10 Nato 2022 12:00), Max: 100.9 (10 Nato 2022 05:14)  HR: 60 (10 Nato 2022 09:44) (60 - 94)  BP: 112/50 (10 Nato 2022 08:00) (112/50 - 184/77)  BP(mean): 68 (10 Nato 2022 08:00) (68 - 124)  ABP: --  ABP(mean): --  RR: 18 (10 Nato 2022 08:00) (12 - 27)  SpO2: 99% (10 Nato 2022 09:44) (94% - 99%)    I&O's Summary    09 Jan 2022 07:01  -  10 Nato 2022 07:00  --------------------------------------------------------  IN: 2582.1 mL / OUT: 2895 mL / NET: -312.9 mL      Mode: AC/ CMV (Assist Control/ Continuous Mandatory Ventilation), RR (machine): 18, TV (machine): 450, FiO2: 40, PEEP: 5, ITime: 1, MAP: 10, PIP: 23  Gen: lying comfortably in bed in no apparent distress  HEENT: PERRL, EOMI  Resp: CTA B/L no c/r/w  CVS: S1S2 no m/r/g  Abd: soft NT/ND +BS  Ext: no c/c/e  Neuro: A&Ox3    ## Code status:  Goals of care discussion: [x] yes [ ] no  [x] full code  [ ] DNR  [ ] DNI  [ ] MOLST # CC: Patient is a 81y old  Female who presents with a chief complaint of diverticulitis (10 Nato 2022 07:00)      ## HPI:  Patient is a 81F with a PMH of HTN, HLD, hypothyroidism, depression, seizures who presents to the ED for abd pain.  Patient states that over the last two days she had developed significant abdominal pain and multiple episodes of watery diarrhea.  Reports one episode of nausea and vomiting earlier today.  Patient has no other complaints.  Vitals stable,  labs show leukocytosis and hypokalemia.  CT abdomen significant for diverticulitis.  Will admit to med surg.     (23 Dec 2021 00:17)      **O/N:**  Remains on full vent via trache    ## ROS: Unobtainable    ## Labs:  ** CBC: **             9.1    9.34  )-----------( 531      ( 10 Nato 2022 04:18 )             29.1     ** Chem:  **  01-10    146<H>  |  112<H>  |  22  ----------------------------<  158<H>  3.3<L>   |  28  |  0.56    Ca    8.2<L>      10 Nato 2022 04:18  Phos  2.3     01-10  Mg     2.6     01-10    TPro  6.2  /  Alb  1.9<L>  /  TBili  0.3  /  DBili  x   /  AST  16  /  ALT  22  /  AlkPhos  88  01-10    POCT Blood Glucose.: 141 mg/dL (10 Nato 2022 13:34)  POCT Blood Glucose.: 150 mg/dL (10 Nato 2022 05:26)  POCT Blood Glucose.: 158 mg/dL (10 Nato 2022 00:51)    Culture - Body Fluid with Gram Stain (collected 03 Jan 2022 18:18)  Organism: Escherichia coli (05 Jan 2022 21:32)      -  Amikacin: S <=16      -  Amoxicillin/Clavulanic Acid: S <=8/4      -  Ampicillin: S <=8 These ampicillin results predict results for amoxicillin      -  Ampicillin/Sulbactam: S <=4/2 Enterobacter, Klebsiella aerogenes, Citrobacter, and Serratia may develop resistance during prolonged therapy (3-4 days)      -  Aztreonam: S <=4      -  Cefazolin: S <=2 Enterobacter, Klebsiella aerogenes, Citrobacter, and Serratia may develop resistance during prolonged therapy (3-4 days)      -  Cefepime: S <=2      -  Cefoxitin: S <=8      -  Ceftriaxone: S <=1 Enterobacter, Klebsiella aerogenes, Citrobacter, and Serratia may develop resistance during prolonged therapy      -  Ciprofloxacin: S <=0.25      -  Ertapenem: S <=0.5      -  Gentamicin: S <=2      -  Imipenem: S <=1      -  Levofloxacin: S <=0.5      -  Meropenem: S <=1      -  Piperacillin/Tazobactam: S <=8      -  Tobramycin: S <=2      -  Trimethoprim/Sulfamethoxazole: R >2/38      Method Type: TONYA    Culture - Sputum (collected 28 Dec 2021 18:54)    Results consistent with oropharyngeal contamination    Normal Respiratory Sendy present    Culture - Blood (collected 28 Dec 2021 15:04)    No Growth Final    Culture - Blood (collected 28 Dec 2021 15:04)    No Growth Final    ## Medications:  cefTRIAXone   IVPB 1000 milliGRAM(s) IV Intermittent every 24 hours  metroNIDAZOLE  IVPB 500 milliGRAM(s) IV Intermittent every 8 hours    acetaminophen     Tablet .. 650 milliGRAM(s) Oral every 6 hours PRN  ALPRAZolam 0.25 milliGRAM(s) Oral every 12 hours  dexMEDEtomidine Infusion 0.2 MICROgram(s)/kG/Hr IV Continuous <Continuous>  escitalopram 20 milliGRAM(s) Oral daily  fentaNYL    Injectable 25 MICROGram(s) IV Push every 2 hours PRN  fentaNYL   Patch  25 MICROgram(s)/Hr 1 Patch Transdermal every 72 hours  levETIRAcetam 500 milliGRAM(s) Oral every 12 hours  enoxaparin Injectable 40 milliGRAM(s) SubCutaneous daily  pantoprazole  Injectable 40 milliGRAM(s) IV Push daily  levothyroxine 50 MICROGram(s) Oral daily    ## O/E:  T(F): 100.7 (10 Nato 2022 12:00), Max: 100.9 (10 Nato 2022 05:14)  HR: 60 (10 Nato 2022 09:44) (60 - 94)  BP: 112/50 (10 Nato 2022 08:00) (112/50 - 184/77)  BP(mean): 68 (10 Nato 2022 08:00) (68 - 124)  RR: 18 (10 Nato 2022 08:00) (12 - 27)  SpO2: 99% (10 Nato 2022 09:44) (94% - 99%)  IN: 2582.1 mL / OUT: 2895 mL / NET: -312.9 mL  Mode: AC/ CMV (Assist Control/ Continuous Mandatory Ventilation), RR (machine): 18, TV (machine): 450, FiO2: 40, PEEP: 5, ITime: 1, MAP: 10, PIP: 23    Gen: lying comfortably in bed in no apparent distress on vent via trache  HEENT: trache in place  Resp: mechanical breath sounds B/L  CVS: S1S2 no m/r/g  Abd: incision site c/d/i, BABAK drains in place x 2  Ext: no c/c; trace-1+ edema in extremities  Neuro: opens eyes to voice, withdraws to pain    ## Code status:  Goals of care discussion: [x] yes [ ] no  [x] full code  [ ] DNR  [ ] DNI  [ ] MURALI

## 2022-01-10 NOTE — PROGRESS NOTE ADULT - ASSESSMENT
Patient is a 81F with a PMH of HTN, HLD, hypothyroidism, depression, seizures who presents to the ED for abd pain found to have diverticulitis   has rising leukocytosis   had fever yesterday   tachycardic and now hypoxic   CXR (I personally reviewed) low lung volumes   origincal CT (I personally reviewed) with diverticulitis   she had a lot of pain in the abdomen on exam     12/25: s/p surgery for diverticulitis with perforation and abscess and went to the OR. intubated in ICU with vac and needs to go back to the OR, currently on zosyn, s/p one dose of diflucan last night   12/27: s/p repair and ostomy creation yesterday, wbc elevated, cultures sensitive to zosyn and candida albicans is notoriously sensitive to azoles such as fluconazole, wean of pressors and sedation, extubate when ready   12/28: Further increase in white blood cell count but overall the patient appears to be reasonably stable.  No concern of C. difficile at this juncture.  Current antibiotics are reasonable.  Leukocytosis like related to recent surgery, no concern of other superimposed process on the basis of exam.  I do not see a role to alter her antimicrobials.  12/29:  Remains intubated in ICU. still quite edematous and net positive, WBC climbing, small wound dehiscence at bottom of incision, plan for initiation of TPN today, remains on antibiotics    12/30: rising WBC, ostomy not functioning yet, very edematous CT today, may be source control issue so for now can continue same antibiotics and antifungal but may need to change if findings on the CT are worrisome or change in hemodynamics  12/31:Leukocytosis, with collections noted on CT.  However this is being done postoperative day 5, there is some possibility that this could represent postop changes but given the leukocytosis, concur with plans for attempts at percutaneous drainage.  White blood cell count down slightly compared to prior peak, will need to be trended.  Gallbladder is also distended.  Abdominal exam was benign this morning, but a calculus cholecystitis could be the differential diagnosis here as well (though n.p.o. status certainly could explain distended gallbladder, there is also report of gallbladder wall thickening).  Would modify antibiotics based on cultures from drainage, I do not believe that antibiotics and other the cells would be adequate here.  Fortunately she is off pressors, with preserved renal function, and tolerating pressure support.  1/1/22: Postop day 6.  I have some concerns with the appearance of the ostomy, though the abdominal exam overall is otherwise unchanged.  White blood cell count remains elevated, but is lower compared with prior values.  This is despite no change in antibiotic therapy.  Patient also has asymmetric edema of the upper extremities, right greater than left.  Low threshold for duplex ultrasound to exclude DVT.No new surveillance culture data.  1/2: POD7 Ostomy looks better today, UE symmetric. WBC elevated, some slight downward trend overall. Temps <= 100F.   1/3: drainage of fluid collection today, continue zosyn and fluconazole for now, monitor wbc and temps, watch ostomy output, diuresis and address third spacing   1/4: s/p drainage yesterday now growing ecoli and awaiting for sensitivities, wbc is improving, started on tube feeds    1/5: abscess growing ecoli and bacteroides, S to ceftriaxone, wbc 15, weaning trial today, pain management    1/6: awake, lung exercise today, trach planned for tomorrow, ostomy output is good. will stop fluconazole today and change antibiotics to ceftriaxone and flagly. Unclear stop date yet    1/7 trach today, continue antibiotics   1/9: s/p trach, had low grade fever but otherwise doing ok, will continues same antibiotics regimen for now but if fevers continue she will needs to be rescanned as those abscesses can progress.  1/10: low grade fever but doing well on trach-PS, ostomy with stool and gas, shakes head when asked if in pain, discussed with surgery about repeating CT scan given the temp

## 2022-01-10 NOTE — PROGRESS NOTE ADULT - SUBJECTIVE AND OBJECTIVE BOX
[facility]  Division of Infectious Diseases  [phone]    Name: JUAREZ GTZ  Age: 81y  Gender: Female  MRN: [id number]    Interval History--  Notes reviewed. patient seen and examined. s/p trach and connected to vent, wbc is normal, febrile 100.8, tolerating tube feeds     Past Medical History--  Seizure    HTN (hypertension)    Glaucoma    Thyroid disease    Blood clot of neck vein    TIA (transient ischemic attack)    S/P appendectomy      Review of Systems--  Review of systems unable secondary to clinical condition.       For details regarding the patient's social history, family history, and other miscellaneous elements, please refer the initial infectious diseases consultation and/or the admitting history and physical examination for this admission.    Allergies    No Known Allergies    Intolerances        Medications--  Antibiotics:  cefTRIAXone   IVPB 1000 every 24 hours  metroNIDAZOLE  IVPB 500 every 8 hours      MEDICATIONS  (STANDING):  ALPRAZolam 0.25 every 12 hours  dexMEDEtomidine Infusion 0.2 <Continuous>  dextrose 40% Gel 15 once  dextrose 50% Injectable 25 once  dextrose 50% Injectable 12.5 once  dextrose 50% Injectable 25 once  enoxaparin Injectable 40 daily  escitalopram 20 daily  fentaNYL   Patch  25 MICROgram(s)/Hr 1 every 72 hours  glucagon  Injectable 1 once  levETIRAcetam 500 every 12 hours  levothyroxine 50 daily  pantoprazole  Injectable 40 daily      PRN  acetaminophen     Tablet .. 650 milliGRAM(s) Oral every 6 hours PRN  fentaNYL    Injectable 25 MICROGram(s) IV Push every 2 hours PRN      Physical Exam:  Vital Signs Last 24 Hrs  T(F): 100.7 (01-10-22 @ 12:00), Max: 100.9 (01-10-22 @ 05:14)  HR: 60 (01-10-22 @ 09:44)  BP: 112/50 (01-10-22 @ 08:00)  RR: 18 (01-10-22 @ 08:00)  SpO2: 99% (01-10-22 @ 09:44) (94% - 99%)  Wt(kg): --    General: Nontoxic-appearing Female in no acute distress.  HEENT: AT/NC. Oropharynx/dentition unable secondary to patient compliance. +trach to vent     Neck: Not rigid. No sense of mass.  Nodes: None palpable.  Lungs: ventilatory BS bilaterally without rales, wheezing or rhonchi  Heart: Regular rate and rhythm. No Murmur. No rub. No gallop. No palpable thrill.  Abdomen: Bowel sounds now present.  Soft. Mildly distended.  Incision dressed.  Ostomy with stool and gas. Drains scant output, serous x2 but L more purulent then R. still small wound dehiscence   Extremities: No cyanosis or clubbing. 2-3+ pitting edema  Skin: Warm. Dry No rash. No vasculitic stigmata.  Psychiatric: Unable      Laboratory Studies--  WBC Count: 9.34 K/uL (01-10 @ 04:18)  WBC Count: 8.95 K/uL (01-09 @ 04:27)  WBC Count: 14.46 K/uL (01-08 @ 03:15)  WBC Count: 17.43 K/uL (01-07 @ 17:00)  WBC Count: 15.03 K/uL (01-07 @ 02:53)  WBC Count: 14.85 K/uL (01-06 @ 03:49)  WBC Count: 15.40 K/uL (01-05 @ 04:33)                            9.1    9.34  )-----------( 531      ( 10 Nato 2022 04:18 )             29.1       01-10    146<H>  |  112<H>  |  22  ----------------------------<  158<H>  3.3<L>   |  28  |  0.56    Ca    8.2<L>      10 Nato 2022 04:18  Phos  2.3     01-10  Mg     2.6     01-10    TPro  6.2  /  Alb  1.9<L>  /  TBili  0.3  /  DBili  x   /  AST  16  /  ALT  22  /  AlkPhos  88  01-10      Creatinine Trend: 0.56<--, 0.67<--, 0.62<--, 0.51<--, 0.62<--, 0.72<--    Blood Gas Arterial, Lactate: 1.30 mmol/L (01-09-22 @ 09:06)            Culture Data  Culture - Body Fluid with Gram Stain (01.03.22 @ 18:18)    Gram Stain:   Few polymorphonuclear leukocytes per low power field  Few Gram Negative Rods per oil power field    -  Amikacin: S <=16    -  Amoxicillin/Clavulanic Acid: S <=8/4    -  Ampicillin: S <=8 These ampicillin results predict results for amoxicillin    -  Ampicillin/Sulbactam: S <=4/2 Enterobacter, Klebsiella aerogenes, Citrobacter, and Serratia may develop resistance during prolonged therapy (3-4 days)    -  Aztreonam: S <=4    -  Cefazolin: S <=2 Enterobacter, Klebsiella aerogenes, Citrobacter, and Serratia may develop resistance during prolonged therapy (3-4 days)    -  Cefepime: S <=2    -  Cefoxitin: S <=8    -  Ceftriaxone: S <=1 Enterobacter, Klebsiella aerogenes, Citrobacter, and Serratia may develop resistance during prolonged therapy    -  Ciprofloxacin: S <=0.25    -  Ertapenem: S <=0.5    -  Gentamicin: S <=2    -  Imipenem: S <=1    -  Levofloxacin: S <=0.5    -  Meropenem: S <=1    -  Piperacillin/Tazobactam: S <=8    -  Tobramycin: S <=2    -  Trimethoprim/Sulfamethoxazole: R >2/38    Specimen Source: .Body Fluid Abdominal Fluid    Culture Results:   Few Escherichia coli  Few Bacteroides ovatus group "Susceptibilities not performed"    Organism Identification: Escherichia coli    Organism: Escherichia coli    Method Type: TONYA        Culture - Sputum (collected 28 Dec 2021 18:54)  Source: .Sputum Sputum  Gram Stain (30 Dec 2021 18:49):    Moderate polymorphonuclear leukocytes per low power field    Numerous Squamous epithelial cells per low power field    Few Gram positive cocci in pairs per oil power field    Rare Gram Positive Rods per oil power field    Rare Yeast like cells per oil power field    Results consistent with oropharyngeal contamination  Final Report (30 Dec 2021 18:49):    Normal Respiratory Sendy present    Culture - Blood (collected 28 Dec 2021 15:04)  Source: .Blood Blood  Preliminary Report (29 Dec 2021 15:06):    No growth to date.    Culture - Blood (collected 28 Dec 2021 15:04)  Source: .Blood Blood  Preliminary Report (29 Dec 2021 15:06):    No growth to date.

## 2022-01-10 NOTE — PROGRESS NOTE ADULT - ASSESSMENT
81F with diverticulitis c/b perforation s/p ex-lap with additional washout, failure to wean  - Remains trache to vent. 18/450/40%/+5. Decreased RR to 15. Breathing @23  - c/w Precedex. Added Xanax for anxiety to help taper off  - D/C fentanyl gtt, c/w patch for long-term analgesia  - c/w antibiotics: empiric Flagyl and ceftriaxone for E. coli from wound culture  - patient with intermittent fevers, but is on appropriate antibiotics and does not have leukocytosis. Unclear if source control has been achieved. Wound care per surgery  - SAT / SBT for weaning. If cannot tolerate SBT, may need to switch to slow wean plan.  - Patient will remain in ICU while on multiple titratable gtt for pain and anxiety.    I have personally provided 45 minutes of critical care time.

## 2022-01-11 LAB
ALBUMIN SERPL ELPH-MCNC: 1.8 G/DL — LOW (ref 3.3–5)
ALP SERPL-CCNC: 77 U/L — SIGNIFICANT CHANGE UP (ref 40–120)
ALT FLD-CCNC: 18 U/L — SIGNIFICANT CHANGE UP (ref 12–78)
ANION GAP SERPL CALC-SCNC: 6 MMOL/L — SIGNIFICANT CHANGE UP (ref 5–17)
AST SERPL-CCNC: 16 U/L — SIGNIFICANT CHANGE UP (ref 15–37)
BASOPHILS # BLD AUTO: 0.04 K/UL — SIGNIFICANT CHANGE UP (ref 0–0.2)
BASOPHILS NFR BLD AUTO: 0.4 % — SIGNIFICANT CHANGE UP (ref 0–2)
BILIRUB SERPL-MCNC: 0.2 MG/DL — SIGNIFICANT CHANGE UP (ref 0.2–1.2)
BUN SERPL-MCNC: 26 MG/DL — HIGH (ref 7–23)
CALCIUM SERPL-MCNC: 8.2 MG/DL — LOW (ref 8.5–10.1)
CHLORIDE SERPL-SCNC: 114 MMOL/L — HIGH (ref 96–108)
CO2 SERPL-SCNC: 30 MMOL/L — SIGNIFICANT CHANGE UP (ref 22–31)
CREAT SERPL-MCNC: 0.61 MG/DL — SIGNIFICANT CHANGE UP (ref 0.5–1.3)
EOSINOPHIL # BLD AUTO: 0.13 K/UL — SIGNIFICANT CHANGE UP (ref 0–0.5)
EOSINOPHIL NFR BLD AUTO: 1.5 % — SIGNIFICANT CHANGE UP (ref 0–6)
GLUCOSE BLDC GLUCOMTR-MCNC: 142 MG/DL — HIGH (ref 70–99)
GLUCOSE BLDC GLUCOMTR-MCNC: 159 MG/DL — HIGH (ref 70–99)
GLUCOSE SERPL-MCNC: 163 MG/DL — HIGH (ref 70–99)
HCT VFR BLD CALC: 27.9 % — LOW (ref 34.5–45)
HGB BLD-MCNC: 8.7 G/DL — LOW (ref 11.5–15.5)
IMM GRANULOCYTES NFR BLD AUTO: 0.3 % — SIGNIFICANT CHANGE UP (ref 0–1.5)
LYMPHOCYTES # BLD AUTO: 1.33 K/UL — SIGNIFICANT CHANGE UP (ref 1–3.3)
LYMPHOCYTES # BLD AUTO: 14.9 % — SIGNIFICANT CHANGE UP (ref 13–44)
MAGNESIUM SERPL-MCNC: 2.1 MG/DL — SIGNIFICANT CHANGE UP (ref 1.6–2.6)
MCHC RBC-ENTMCNC: 28.4 PG — SIGNIFICANT CHANGE UP (ref 27–34)
MCHC RBC-ENTMCNC: 31.2 GM/DL — LOW (ref 32–36)
MCV RBC AUTO: 91.2 FL — SIGNIFICANT CHANGE UP (ref 80–100)
MONOCYTES # BLD AUTO: 0.71 K/UL — SIGNIFICANT CHANGE UP (ref 0–0.9)
MONOCYTES NFR BLD AUTO: 8 % — SIGNIFICANT CHANGE UP (ref 2–14)
NEUTROPHILS # BLD AUTO: 6.69 K/UL — SIGNIFICANT CHANGE UP (ref 1.8–7.4)
NEUTROPHILS NFR BLD AUTO: 74.9 % — SIGNIFICANT CHANGE UP (ref 43–77)
NRBC # BLD: 0 /100 WBCS — SIGNIFICANT CHANGE UP (ref 0–0)
PHOSPHATE SERPL-MCNC: 2.6 MG/DL — SIGNIFICANT CHANGE UP (ref 2.5–4.5)
PLATELET # BLD AUTO: 466 K/UL — HIGH (ref 150–400)
POTASSIUM SERPL-MCNC: 3.2 MMOL/L — LOW (ref 3.5–5.3)
POTASSIUM SERPL-SCNC: 3.2 MMOL/L — LOW (ref 3.5–5.3)
PROT SERPL-MCNC: 5.7 GM/DL — LOW (ref 6–8.3)
RBC # BLD: 3.06 M/UL — LOW (ref 3.8–5.2)
RBC # FLD: 15.9 % — HIGH (ref 10.3–14.5)
SODIUM SERPL-SCNC: 150 MMOL/L — HIGH (ref 135–145)
WBC # BLD: 8.93 K/UL — SIGNIFICANT CHANGE UP (ref 3.8–10.5)
WBC # FLD AUTO: 8.93 K/UL — SIGNIFICANT CHANGE UP (ref 3.8–10.5)

## 2022-01-11 PROCEDURE — 74177 CT ABD & PELVIS W/CONTRAST: CPT | Mod: 26

## 2022-01-11 PROCEDURE — 99291 CRITICAL CARE FIRST HOUR: CPT

## 2022-01-11 PROCEDURE — 99232 SBSQ HOSP IP/OBS MODERATE 35: CPT

## 2022-01-11 RX ORDER — METRONIDAZOLE 500 MG
500 TABLET ORAL EVERY 8 HOURS
Refills: 0 | Status: DISCONTINUED | OUTPATIENT
Start: 2022-01-11 | End: 2022-01-14

## 2022-01-11 RX ORDER — POTASSIUM CHLORIDE 20 MEQ
40 PACKET (EA) ORAL ONCE
Refills: 0 | Status: COMPLETED | OUTPATIENT
Start: 2022-01-11 | End: 2022-01-11

## 2022-01-11 RX ORDER — DILTIAZEM HCL 120 MG
10 CAPSULE, EXT RELEASE 24 HR ORAL ONCE
Refills: 0 | Status: COMPLETED | OUTPATIENT
Start: 2022-01-11 | End: 2022-01-11

## 2022-01-11 RX ORDER — ACETAMINOPHEN 500 MG
1000 TABLET ORAL ONCE
Refills: 0 | Status: COMPLETED | OUTPATIENT
Start: 2022-01-11 | End: 2022-01-11

## 2022-01-11 RX ORDER — PANTOPRAZOLE SODIUM 20 MG/1
40 TABLET, DELAYED RELEASE ORAL DAILY
Refills: 0 | Status: DISCONTINUED | OUTPATIENT
Start: 2022-01-11 | End: 2022-01-28

## 2022-01-11 RX ADMIN — ESCITALOPRAM OXALATE 20 MILLIGRAM(S): 10 TABLET, FILM COATED ORAL at 14:59

## 2022-01-11 RX ADMIN — Medication 0.25 MILLIGRAM(S): at 18:48

## 2022-01-11 RX ADMIN — LEVETIRACETAM 500 MILLIGRAM(S): 250 TABLET, FILM COATED ORAL at 05:29

## 2022-01-11 RX ADMIN — Medication 0.25 MILLIGRAM(S): at 05:29

## 2022-01-11 RX ADMIN — DEXMEDETOMIDINE HYDROCHLORIDE IN 0.9% SODIUM CHLORIDE 4.05 MICROGRAM(S)/KG/HR: 4 INJECTION INTRAVENOUS at 15:45

## 2022-01-11 RX ADMIN — CHLORHEXIDINE GLUCONATE 15 MILLILITER(S): 213 SOLUTION TOPICAL at 18:48

## 2022-01-11 RX ADMIN — DORZOLAMIDE HYDROCHLORIDE TIMOLOL MALEATE 1 DROP(S): 20; 5 SOLUTION/ DROPS OPHTHALMIC at 18:49

## 2022-01-11 RX ADMIN — FENTANYL CITRATE 1 PATCH: 50 INJECTION INTRAVENOUS at 12:32

## 2022-01-11 RX ADMIN — FENTANYL CITRATE 1 PATCH: 50 INJECTION INTRAVENOUS at 07:51

## 2022-01-11 RX ADMIN — ENOXAPARIN SODIUM 40 MILLIGRAM(S): 100 INJECTION SUBCUTANEOUS at 12:33

## 2022-01-11 RX ADMIN — CHLORHEXIDINE GLUCONATE 15 MILLILITER(S): 213 SOLUTION TOPICAL at 05:29

## 2022-01-11 RX ADMIN — CHLORHEXIDINE GLUCONATE 1 APPLICATION(S): 213 SOLUTION TOPICAL at 05:28

## 2022-01-11 RX ADMIN — Medication 650 MILLIGRAM(S): at 05:47

## 2022-01-11 RX ADMIN — FENTANYL CITRATE 1 PATCH: 50 INJECTION INTRAVENOUS at 12:00

## 2022-01-11 RX ADMIN — Medication 1 MILLIGRAM(S): at 15:56

## 2022-01-11 RX ADMIN — Medication 650 MILLIGRAM(S): at 12:32

## 2022-01-11 RX ADMIN — Medication 500 MILLIGRAM(S): at 21:59

## 2022-01-11 RX ADMIN — CEFTRIAXONE 100 MILLIGRAM(S): 500 INJECTION, POWDER, FOR SOLUTION INTRAMUSCULAR; INTRAVENOUS at 14:59

## 2022-01-11 RX ADMIN — Medication 100 MILLIGRAM(S): at 05:29

## 2022-01-11 RX ADMIN — Medication 650 MILLIGRAM(S): at 06:26

## 2022-01-11 RX ADMIN — LEVETIRACETAM 500 MILLIGRAM(S): 250 TABLET, FILM COATED ORAL at 18:49

## 2022-01-11 RX ADMIN — PANTOPRAZOLE SODIUM 40 MILLIGRAM(S): 20 TABLET, DELAYED RELEASE ORAL at 12:32

## 2022-01-11 RX ADMIN — DEXMEDETOMIDINE HYDROCHLORIDE IN 0.9% SODIUM CHLORIDE 4.05 MICROGRAM(S)/KG/HR: 4 INJECTION INTRAVENOUS at 01:23

## 2022-01-11 RX ADMIN — Medication 650 MILLIGRAM(S): at 00:28

## 2022-01-11 RX ADMIN — Medication 10 MILLIGRAM(S): at 23:49

## 2022-01-11 RX ADMIN — Medication 400 MILLIGRAM(S): at 23:50

## 2022-01-11 RX ADMIN — Medication 50 MICROGRAM(S): at 18:48

## 2022-01-11 RX ADMIN — DORZOLAMIDE HYDROCHLORIDE TIMOLOL MALEATE 1 DROP(S): 20; 5 SOLUTION/ DROPS OPHTHALMIC at 05:29

## 2022-01-11 RX ADMIN — DEXMEDETOMIDINE HYDROCHLORIDE IN 0.9% SODIUM CHLORIDE 4.05 MICROGRAM(S)/KG/HR: 4 INJECTION INTRAVENOUS at 05:29

## 2022-01-11 RX ADMIN — FENTANYL CITRATE 1 PATCH: 50 INJECTION INTRAVENOUS at 19:23

## 2022-01-11 RX ADMIN — Medication 500 MILLIGRAM(S): at 14:59

## 2022-01-11 RX ADMIN — Medication 40 MILLIEQUIVALENT(S): at 05:28

## 2022-01-11 NOTE — PROGRESS NOTE ADULT - SUBJECTIVE AND OBJECTIVE BOX
# CC: Patient is a 81y old  Female who presents with a chief complaint of diverticulitis (11 Jan 2022 10:44)      ## HPI:  Patient is a 81F with a PMH of HTN, HLD, hypothyroidism, depression, seizures who presents to the ED for abd pain.  Patient states that over the last two days she had developed significant abdominal pain and multiple episodes of watery diarrhea.  Reports one episode of nausea and vomiting earlier today.  Patient has no other complaints.  Vitals stable,  labs show leukocytosis and hypokalemia.  CT abdomen significant for diverticulitis.  Will admit to med surg.     (23 Dec 2021 00:17)      **O/N:**    ## ROS:    ## Labs:  ** CBC: **                        8.7    8.93  )-----------( 466      ( 11 Jan 2022 02:55 )             27.9     ** Chem:  **  01-11    150<H>  |  114<H>  |  26<H>  ----------------------------<  163<H>  3.2<L>   |  30  |  0.61    Ca    8.2<L>      11 Jan 2022 02:55  Phos  2.6     01-11  Mg     2.1     01-11    TPro  5.7<L>  /  Alb  1.8<L>  /  TBili  0.2  /  DBili  x   /  AST  16  /  ALT  18  /  AlkPhos  77  01-11    ** Coags: **      CAPILLARY BLOOD GLUCOSE      POCT Blood Glucose.: 159 mg/dL (11 Jan 2022 00:51)  POCT Blood Glucose.: 154 mg/dL (10 Nato 2022 18:50)  POCT Blood Glucose.: 141 mg/dL (10 Nato 2022 13:34)        Culture - Body Fluid with Gram Stain (collected 03 Jan 2022 18:18)  Source: .Body Fluid Abdominal Fluid  Gram Stain (05 Jan 2022 21:32):    Few polymorphonuclear leukocytes per low power field    Few Gram Negative Rods per oil power field  Final Report (05 Jan 2022 21:32):    Few Escherichia coli    Few Bacteroides ovatus group "Susceptibilities not performed"  Organism: Escherichia coli (05 Jan 2022 21:32)  Organism: Escherichia coli (05 Jan 2022 21:32)      -  Amikacin: S <=16      -  Amoxicillin/Clavulanic Acid: S <=8/4      -  Ampicillin: S <=8 These ampicillin results predict results for amoxicillin      -  Ampicillin/Sulbactam: S <=4/2 Enterobacter, Klebsiella aerogenes, Citrobacter, and Serratia may develop resistance during prolonged therapy (3-4 days)      -  Aztreonam: S <=4      -  Cefazolin: S <=2 Enterobacter, Klebsiella aerogenes, Citrobacter, and Serratia may develop resistance during prolonged therapy (3-4 days)      -  Cefepime: S <=2      -  Cefoxitin: S <=8      -  Ceftriaxone: S <=1 Enterobacter, Klebsiella aerogenes, Citrobacter, and Serratia may develop resistance during prolonged therapy      -  Ciprofloxacin: S <=0.25      -  Ertapenem: S <=0.5      -  Gentamicin: S <=2      -  Imipenem: S <=1      -  Levofloxacin: S <=0.5      -  Meropenem: S <=1      -  Piperacillin/Tazobactam: S <=8      -  Tobramycin: S <=2      -  Trimethoprim/Sulfamethoxazole: R >2/38      Method Type: TONYA    Culture - Sputum (collected 28 Dec 2021 18:54)  Source: .Sputum Sputum  Gram Stain (30 Dec 2021 18:49):    Moderate polymorphonuclear leukocytes per low power field    Numerous Squamous epithelial cells per low power field    Few Gram positive cocci in pairs per oil power field    Rare Gram Positive Rods per oil power field    Rare Yeast like cells per oil power field    Results consistent with oropharyngeal contamination  Final Report (30 Dec 2021 18:49):    Normal Respiratory Sendy present    Culture - Blood (collected 28 Dec 2021 15:04)  Source: .Blood Blood  Final Report (02 Jan 2022 16:00):    No Growth Final    Culture - Blood (collected 28 Dec 2021 15:04)  Source: .Blood Blood  Final Report (02 Jan 2022 16:00):    No Growth Final        ## Imaging:    ## Medications:    cefTRIAXone   IVPB 1000 milliGRAM(s) IV Intermittent every 24 hours  metroNIDAZOLE    Tablet 500 milliGRAM(s) Oral every 8 hours      acetaminophen     Tablet .. 650 milliGRAM(s) Oral every 6 hours PRN  ALPRAZolam 0.25 milliGRAM(s) Oral every 12 hours  dexMEDEtomidine Infusion 0.2 MICROgram(s)/kG/Hr IV Continuous <Continuous>  escitalopram 20 milliGRAM(s) Oral daily  fentaNYL   Patch  50 MICROgram(s)/Hr. 1 Patch Transdermal every 48 hours  levETIRAcetam 500 milliGRAM(s) Oral every 12 hours      enoxaparin Injectable 40 milliGRAM(s) SubCutaneous daily    pantoprazole   Suspension 40 milliGRAM(s) Enteral Tube daily      dextrose 40% Gel 15 Gram(s) Oral once  dextrose 50% Injectable 25 Gram(s) IV Push once  dextrose 50% Injectable 12.5 Gram(s) IV Push once  dextrose 50% Injectable 25 Gram(s) IV Push once  glucagon  Injectable 1 milliGRAM(s) IntraMuscular once  levothyroxine 50 MICROGram(s) Oral daily          ## O/E:  ICU Vital Signs Last 24 Hrs  T(C): 38.3 (11 Jan 2022 08:00), Max: 38.8 (10 Nato 2022 15:17)  T(F): 101 (11 Jan 2022 08:00), Max: 101.9 (10 Nato 2022 15:17)  HR: 85 (11 Jan 2022 12:00) (60 - 112)  BP: 150/51 (11 Jan 2022 12:00) (88/40 - 205/80)  BP(mean): 80 (11 Jan 2022 12:00) (55 - 116)  ABP: --  ABP(mean): --  RR: 30 (11 Jan 2022 12:00) (16 - 31)  SpO2: 97% (11 Jan 2022 12:00) (96% - 100%)    I&O's Summary    10 Nato 2022 07:01  -  11 Jan 2022 07:00  --------------------------------------------------------  IN: 2904.2 mL / OUT: 3075 mL / NET: -170.8 mL    11 Jan 2022 07:01  -  11 Jan 2022 12:48  --------------------------------------------------------  IN: 62.2 mL / OUT: 100 mL / NET: -37.8 mL      Mode: PS (Pressure Support)/ Spontaneous, FiO2: 40, PEEP: 5, PS: 5, ITime: 0.6, MAP: 6, PIP: 10  Gen: lying comfortably in bed in no apparent distress  HEENT: PERRL, EOMI  Resp: CTA B/L no c/r/w  CVS: S1S2 no m/r/g  Abd: soft NT/ND +BS  Ext: no c/c/e  Neuro: A&Ox3    ## Code status:  Goals of care discussion: [x] yes [ ] no  [x] full code  [ ] DNR  [ ] DNI  [ ] MOLST # CC: Patient is a 81y old  Female who presents with a chief complaint of diverticulitis (11 Jan 2022 10:44)    ## HPI:  Patient is a 81F with a PMH of HTN, HLD, hypothyroidism, depression, seizures who presents to the ED for abd pain.  Patient states that over the last two days she had developed significant abdominal pain and multiple episodes of watery diarrhea.  Reports one episode of nausea and vomiting earlier today.  Patient has no other complaints.  Vitals stable,  labs show leukocytosis and hypokalemia.  CT abdomen significant for diverticulitis.  Will admit to med surg.     (23 Dec 2021 00:17)    **O/N:**  On full vent overnight    ## ROS: Unobtainable    ## Labs:  ** CBC: **             8.7    8.93  )-----------( 466      ( 11 Jan 2022 02:55 )             27.9     ** Chem:  **  01-11    150<H>  |  114<H>  |  26<H>  ----------------------------<  163<H>  3.2<L>   |  30  |  0.61    Ca    8.2<L>      11 Jan 2022 02:55  Phos  2.6     01-11  Mg     2.1     01-11    TPro  5.7<L>  /  Alb  1.8<L>  /  TBili  0.2  /  DBili  x   /  AST  16  /  ALT  18  /  AlkPhos  77  01-11    POCT Blood Glucose.: 159 mg/dL (11 Jan 2022 00:51)  POCT Blood Glucose.: 154 mg/dL (10 Nato 2022 18:50)  POCT Blood Glucose.: 141 mg/dL (10 Nato 2022 13:34)    Culture - Body Fluid with Gram Stain (collected 03 Jan 2022 18:18)    Few Bacteroides ovatus group "Susceptibilities not performed"    Few Escherichia coli      -  Amikacin: S <=16      -  Amoxicillin/Clavulanic Acid: S <=8/4      -  Ampicillin: S <=8 These ampicillin results predict results for amoxicillin      -  Ampicillin/Sulbactam: S <=4/2 Enterobacter, Klebsiella aerogenes, Citrobacter, and Serratia may develop resistance during prolonged therapy (3-4 days)      -  Aztreonam: S <=4      -  Cefazolin: S <=2 Enterobacter, Klebsiella aerogenes, Citrobacter, and Serratia may develop resistance during prolonged therapy (3-4 days)      -  Cefepime: S <=2      -  Cefoxitin: S <=8      -  Ceftriaxone: S <=1 Enterobacter, Klebsiella aerogenes, Citrobacter, and Serratia may develop resistance during prolonged therapy      -  Ciprofloxacin: S <=0.25      -  Ertapenem: S <=0.5      -  Gentamicin: S <=2      -  Imipenem: S <=1      -  Levofloxacin: S <=0.5      -  Meropenem: S <=1      -  Piperacillin/Tazobactam: S <=8      -  Tobramycin: S <=2      -  Trimethoprim/Sulfamethoxazole: R >2/38      Method Type: TOYNA    Culture - Sputum (collected 28 Dec 2021 18:54): Normal Respiratory Sendy present  Culture - Blood (collected 28 Dec 2021 15:04): No Growth Final  Culture - Blood (collected 28 Dec 2021 15:04): No Growth Final    ## Medications:  cefTRIAXone   IVPB 1000 milliGRAM(s) IV Intermittent every 24 hours  metroNIDAZOLE    Tablet 500 milliGRAM(s) Oral every 8 hours    acetaminophen     Tablet .. 650 milliGRAM(s) Oral every 6 hours PRN  ALPRAZolam 0.25 milliGRAM(s) Oral every 12 hours  dexMEDEtomidine Infusion 0.2 MICROgram(s)/kG/Hr IV Continuous <Continuous>  escitalopram 20 milliGRAM(s) Oral daily  fentaNYL   Patch  50 MICROgram(s)/Hr. 1 Patch Transdermal every 48 hours  levETIRAcetam 500 milliGRAM(s) Oral every 12 hours    enoxaparin Injectable 40 milliGRAM(s) SubCutaneous daily  pantoprazole   Suspension 40 milliGRAM(s) Enteral Tube daily  levothyroxine 50 MICROGram(s) Oral daily    ## O/E:  T(F): 101 (11 Jan 2022 08:00), Max: 101.9 (10 Nato 2022 15:17)  HR: 85 (11 Jan 2022 12:00) (60 - 112)  BP: 150/51 (11 Jan 2022 12:00) (88/40 - 205/80)  BP(mean): 80 (11 Jan 2022 12:00) (55 - 116)  RR: 30 (11 Jan 2022 12:00) (16 - 31)  SpO2: 97% (11 Jan 2022 12:00) (96% - 100%)  IN: 2904.2 mL / OUT: 3075 mL / NET: -170.8 mL  Mode: PS (Pressure Support)/ Spontaneous, FiO2: 40, PEEP: 5, PS: 5, ITime: 0.6, MAP: 6, PIP: 10    Gen: lying comfortably in bed in no apparent distress on vent via trache  HEENT: trache in place  Resp: mechanical breath sounds B/L  CVS: S1S2 no m/r/g  Abd: incision site c/d/i, BABAK drains in place x 2 (serous x 1 and purulent x 1)  Ext: no c/c; trace-1+ edema in extremities  Neuro: opens eyes to voice, withdraws to pain    ## Code status:  Goals of care discussion: [x] yes [ ] no  [x] full code  [ ] DNR  [ ] DNI  [ ] MURALI

## 2022-01-11 NOTE — PROGRESS NOTE ADULT - ASSESSMENT
81F with diverticulitis c/b perforation s/p ex-lap with additional washout, failure to wean  - trache to vent. Placed on pressure support 5/5 @40% today. Will continue for as long as tolerated, then back to vent if she tires out  - wound care per surgery  - antibiotics with ceftriaxone / Flagyl.   - fentanyl patch in place; increase dose and c/w PRN pushes if any additional pain  - titrate down Precedex. c/w Xanax for anxiety, can give additional PRN pushes of Ativan if additional anxiety  - Patient will stay in ICU until off Precedex    I have personally provided 45 minutes of critical care time.

## 2022-01-11 NOTE — PROGRESS NOTE ADULT - SUBJECTIVE AND OBJECTIVE BOX
[facility]  Division of Infectious Diseases  [phone]    Name: JUAREZ GTZ  Age: 81y  Gender: Female  MRN: [id number]    Interval History--  Notes reviewed.     Past Medical History--  Seizure    HTN (hypertension)    Glaucoma    Thyroid disease    Blood clot of neck vein    TIA (transient ischemic attack)    S/P appendectomy        For details regarding the patient's social history, family history, and other miscellaneous elements, please refer the initial infectious diseases consultation and/or the admitting history and physical examination for this admission.    Allergies    No Known Allergies    Intolerances        Medications--  Antibiotics:  cefTRIAXone   IVPB 1000 milliGRAM(s) IV Intermittent every 24 hours  metroNIDAZOLE    Tablet 500 milliGRAM(s) Oral every 8 hours    Immunologic:    Other:  acetaminophen     Tablet .. PRN  ALPRAZolam  chlorhexidine 0.12% Liquid  chlorhexidine 2% Cloths  dexMEDEtomidine Infusion  dextrose 40% Gel  dextrose 5%.  dextrose 5%.  dextrose 50% Injectable  dextrose 50% Injectable  dextrose 50% Injectable  dorzolamide 2%/timolol 0.5% Ophthalmic Solution  enoxaparin Injectable  escitalopram  fentaNYL   Patch  50 MICROgram(s)/Hr.  glucagon  Injectable  levETIRAcetam  levothyroxine  pantoprazole   Suspension  sodium chloride 0.9% lock flush PRN      Review of Systems--  A 10-point review of systems was obtained.     Pertinent positives and negatives--  Constitutional: No fevers. No Chills. No Rigors.   Cardiovascular: No chest pain. No palpitations.  Respiratory: No shortness of breath. No cough.  Gastrointestinal: No nausea or vomiting. No diarrhea or constipation.   Psychiatric: Pleasant. Appropriate affect.    Review of systems otherwise negative except as previously noted.    Physical Examination--  Vital Signs: T(F): 101 (01-11-22 @ 08:00), Max: 101.9 (01-10-22 @ 15:17)  HR: 61 (01-11-22 @ 09:30)  BP: 125/43 (01-11-22 @ 08:00)  RR: 18 (01-11-22 @ 08:00)  SpO2: 99% (01-11-22 @ 09:30)  Wt(kg): --  General: Nontoxic-appearing Female in no acute distress.  HEENT: AT/NC. PERRL. EOMI. Anicteric. Conjunctiva pink and moist. Oropharynx clear. Dentition fair.  Neck: Not rigid. No sense of mass.  Nodes: None palpable.  Lungs: Clear bilaterally without rales, wheezing or rhonchi  Heart: Regular rate and rhythm. No Murmur. No rub. No gallop. No palpable thrill.  Abdomen: Bowel sounds present and normoactive. Soft. Nondistended. Nontender. No sense of mass. No organomegaly.  Back: No spinal tenderness. No costovertebral angle tenderness.   Extremities: No cyanosis or clubbing. No edema.   Skin: Warm. Dry. Good turgor. No rash. No vasculitic stigmata.  Psychiatric: Appropriate affect and mood for situation.         Laboratory Studies--  CBC                        8.7    8.93  )-----------( 466      ( 11 Jan 2022 02:55 )             27.9       Chemistries  01-11    150<H>  |  114<H>  |  26<H>  ----------------------------<  163<H>  3.2<L>   |  30  |  0.61    Ca    8.2<L>      11 Jan 2022 02:55  Phos  2.6     01-11  Mg     2.1     01-11    TPro  5.7<L>  /  Alb  1.8<L>  /  TBili  0.2  /  DBili  x   /  AST  16  /  ALT  18  /  AlkPhos  77  01-11      Culture Data           [facility]  Division of Infectious Diseases  [phone]    Name: JUAREZ GTZ  Age: 81y  Gender: Female  MRN: [id number]    Interval History--  Notes reviewed. Seen earlier today.  Sedated on the ventilator.    Past Medical History--  Seizure    HTN (hypertension)    Glaucoma    Thyroid disease    Blood clot of neck vein    TIA (transient ischemic attack)    S/P appendectomy        For details regarding the patient's social history, family history, and other miscellaneous elements, please refer the initial infectious diseases consultation and/or the admitting history and physical examination for this admission.    Allergies    No Known Allergies    Intolerances        Medications--  Antibiotics:  cefTRIAXone   IVPB 1000 milliGRAM(s) IV Intermittent every 24 hours  metroNIDAZOLE    Tablet 500 milliGRAM(s) Oral every 8 hours    Immunologic:    Other:  acetaminophen     Tablet .. PRN  ALPRAZolam  chlorhexidine 0.12% Liquid  chlorhexidine 2% Cloths  dexMEDEtomidine Infusion  dextrose 40% Gel  dextrose 5%.  dextrose 5%.  dextrose 50% Injectable  dextrose 50% Injectable  dextrose 50% Injectable  dorzolamide 2%/timolol 0.5% Ophthalmic Solution  enoxaparin Injectable  escitalopram  fentaNYL   Patch  50 MICROgram(s)/Hr.  glucagon  Injectable  levETIRAcetam  levothyroxine  pantoprazole   Suspension  sodium chloride 0.9% lock flush PRN      Review of Systems--  Review of systems unable secondary to clinical condition.     Physical Examination--  Vital Signs: T(F): 101 (01-11-22 @ 08:00), Max: 101.9 (01-10-22 @ 15:17)  HR: 61 (01-11-22 @ 09:30)  BP: 125/43 (01-11-22 @ 08:00)  RR: 18 (01-11-22 @ 08:00)  SpO2: 99% (01-11-22 @ 09:30)  Wt(kg): --  General: Nontoxic-appearing Female in no acute distress. Trached on vent  HEENT: AT/NC. Oropharynx/dentition unable secondary to patient compliance.   Neck: Not rigid. No sense of mass. Trach C/D/I  Nodes: None palpable.  Lungs: coarse B BS  Heart: Regular rate and rhythm. No Murmur. No rub. No gallop. No palpable thrill.  Abdomen: Bowel sounds now present.  Soft. Mildly distended.  Incision dressed.  Ostomy with stool and gas. Drains scant output, serous   Extremities: No cyanosis or clubbing. 3+ pitting edema  Skin: Warm. Dry No rash. No vasculitic stigmata.  Psychiatric: Unable        Laboratory Studies--  CBC                        8.7    8.93  )-----------( 466      ( 11 Jan 2022 02:55 )             27.9       Chemistries  01-11    150<H>  |  114<H>  |  26<H>  ----------------------------<  163<H>  3.2<L>   |  30  |  0.61    Ca    8.2<L>      11 Jan 2022 02:55  Phos  2.6     01-11  Mg     2.1     01-11    TPro  5.7<L>  /  Alb  1.8<L>  /  TBili  0.2  /  DBili  x   /  AST  16  /  ALT  18  /  AlkPhos  77  01-11      Culture Data

## 2022-01-11 NOTE — PROGRESS NOTE ADULT - ASSESSMENT
Patient is a 81F with a PMH of HTN, HLD, hypothyroidism, depression, seizures who presents to the ED for abd pain found to have diverticulitis   has rising leukocytosis   had fever yesterday   tachycardic and now hypoxic   CXR (I personally reviewed) low lung volumes   origincal CT (I personally reviewed) with diverticulitis   she had a lot of pain in the abdomen on exam     12/25: s/p surgery for diverticulitis with perforation and abscess and went to the OR. intubated in ICU with vac and needs to go back to the OR, currently on zosyn, s/p one dose of diflucan last night   12/27: s/p repair and ostomy creation yesterday, wbc elevated, cultures sensitive to zosyn and candida albicans is notoriously sensitive to azoles such as fluconazole, wean of pressors and sedation, extubate when ready   12/28: Further increase in white blood cell count but overall the patient appears to be reasonably stable.  No concern of C. difficile at this juncture.  Current antibiotics are reasonable.  Leukocytosis like related to recent surgery, no concern of other superimposed process on the basis of exam.  I do not see a role to alter her antimicrobials.  12/29:  Remains intubated in ICU. still quite edematous and net positive, WBC climbing, small wound dehiscence at bottom of incision, plan for initiation of TPN today, remains on antibiotics    12/30: rising WBC, ostomy not functioning yet, very edematous CT today, may be source control issue so for now can continue same antibiotics and antifungal but may need to change if findings on the CT are worrisome or change in hemodynamics  12/31:Leukocytosis, with collections noted on CT.  However this is being done postoperative day 5, there is some possibility that this could represent postop changes but given the leukocytosis, concur with plans for attempts at percutaneous drainage.  White blood cell count down slightly compared to prior peak, will need to be trended.  Gallbladder is also distended.  Abdominal exam was benign this morning, but a calculus cholecystitis could be the differential diagnosis here as well (though n.p.o. status certainly could explain distended gallbladder, there is also report of gallbladder wall thickening).  Would modify antibiotics based on cultures from drainage, I do not believe that antibiotics and other the cells would be adequate here.  Fortunately she is off pressors, with preserved renal function, and tolerating pressure support.  1/1/22: Postop day 6.  I have some concerns with the appearance of the ostomy, though the abdominal exam overall is otherwise unchanged.  White blood cell count remains elevated, but is lower compared with prior values.  This is despite no change in antibiotic therapy.  Patient also has asymmetric edema of the upper extremities, right greater than left.  Low threshold for duplex ultrasound to exclude DVT.No new surveillance culture data.  1/2: POD7 Ostomy looks better today, UE symmetric. WBC elevated, some slight downward trend overall. Temps <= 100F.   1/3: drainage of fluid collection today, continue zosyn and fluconazole for now, monitor wbc and temps, watch ostomy output, diuresis and address third spacing   1/4: s/p drainage yesterday now growing ecoli and awaiting for sensitivities, wbc is improving, started on tube feeds    1/5: abscess growing ecoli and bacteroides, S to ceftriaxone, wbc 15, weaning trial today, pain management    1/6: awake, lung exercise today, trach planned for tomorrow, ostomy output is good. will stop fluconazole today and change antibiotics to ceftriaxone and flagly. Unclear stop date yet    1/7 trach today, continue antibiotics   1/9: s/p trach, had low grade fever but otherwise doing ok, will continues same antibiotics regimen for now but if fevers continue she will needs to be rescanned as those abscesses can progress.  1/10: low grade fever but doing well on trach-PS, ostomy with stool and gas, shakes head when asked if in pain, discussed with surgery about repeating CT scan given the temp  1/11: Still febrile, favor interval CT as above.

## 2022-01-11 NOTE — PROGRESS NOTE ADULT - SUBJECTIVE AND OBJECTIVE BOX
Patient seen and examined at bedside.  No acute events overnight.  Tmax 101.9    Vital Signs Last 24 Hrs  T(F): 100.2 (01-11-22 @ 06:00), Max: 101.9 (01-10-22 @ 15:17)  HR: 84 (01-11-22 @ 06:00)  BP: 148/67 (01-11-22 @ 06:00)  RR: 27 (01-11-22 @ 06:00)  SpO2: 99% (01-11-22 @ 06:00)  POCT Blood Glucose.: 159 mg/dL (11 Jan 2022 00:51)    PHYSICAL EXAM:  GENERAL: arousable, NAD  CHEST/LUNG: breathing synchronous with vent  HEART: Regular rate and rhythm; S1 & S2 appreciated  ABDOMEN: soft, non tender, non distended. Midline incision with retention sutures, area clean and dry and repacked with iodoform. Left BABAK  purulent, Right BABAK serous  EXTREMITIES:  no calf tenderness, edematous    I&O's Detail    09 Jan 2022 07:01  -  10 Nato 2022 07:00  --------------------------------------------------------  IN:    Dexmedetomidine: 264.1 mL    Enteral Tube Flush: 500 mL    FentaNYL: 168 mL    IV PiggyBack: 600 mL    Vital1.5: 1050 mL  Total IN: 2582.1 mL    OUT:    Bulb (mL): 10 mL    Bulb (mL): 30 mL    Colostomy (mL): 300 mL    Indwelling Catheter - Urethral (mL): 2555 mL    Stool (mL): 0 mL  Total OUT: 2895 mL    Total NET: -312.9 mL      10 Nato 2022 07:01  -  11 Jan 2022 06:44  --------------------------------------------------------  IN:    Dexmedetomidine: 292.8 mL    Enteral Tube Flush: 1050 mL    FentaNYL: 11.4 mL    IV PiggyBack: 200 mL    IV PiggyBack: 150 mL    Vital1.5: 1200 mL  Total IN: 2904.2 mL    OUT:    Bulb (mL): 10 mL    Bulb (mL): 40 mL    Colostomy (mL): 260 mL    Indwelling Catheter - Urethral (mL): 2765 mL    Stool (mL): 0 mL  Total OUT: 3075 mL    Total NET: -170.8 mL          LABS:                        8.7    8.93  )-----------( 466      ( 11 Jan 2022 02:55 )             27.9     01-11    150<H>  |  114<H>  |  26<H>  ----------------------------<  163<H>  3.2<L>   |  30  |  0.61    Ca    8.2<L>      11 Jan 2022 02:55  Phos  2.6     01-11  Mg     2.1     01-11    TPro  5.7<L>  /  Alb  1.8<L>  /  TBili  0.2  /  DBili  x   /  AST  16  /  ALT  18  /  AlkPhos  77  01-11      A/P  81F admitted with Acute Sigmoid Diverticulitis, Surgery consulted for interval perforated sigmoid diverticulitis. S/P ex lap, sigmoid resection, peritoneal lavage 12/25 and RTOR 12/26 for Irrigation of abdominal cavity with closure and creation of colostomy. S/p bedside IR aspiration of fluid collection 1/3. Tube feeds initiated. Ostomy functioning. Tracheostomy 1/7.  Remains febrile     - local wound care per surgery  - monitor ostomy output, BABAK output  - abx per ID  - continue tube feeds  - wean vent as tolerated  - continue ICU management and supportive care  - persistently febrile, will discuss possible CT with surgical attending  - will d/w surgical attending

## 2022-01-12 LAB
ALBUMIN SERPL ELPH-MCNC: 1.7 G/DL — LOW (ref 3.3–5)
ALP SERPL-CCNC: 70 U/L — SIGNIFICANT CHANGE UP (ref 40–120)
ALT FLD-CCNC: 15 U/L — SIGNIFICANT CHANGE UP (ref 12–78)
ANION GAP SERPL CALC-SCNC: 6 MMOL/L — SIGNIFICANT CHANGE UP (ref 5–17)
AST SERPL-CCNC: 15 U/L — SIGNIFICANT CHANGE UP (ref 15–37)
BILIRUB SERPL-MCNC: 0.2 MG/DL — SIGNIFICANT CHANGE UP (ref 0.2–1.2)
BUN SERPL-MCNC: 22 MG/DL — SIGNIFICANT CHANGE UP (ref 7–23)
CALCIUM SERPL-MCNC: 8 MG/DL — LOW (ref 8.5–10.1)
CHLORIDE SERPL-SCNC: 113 MMOL/L — HIGH (ref 96–108)
CO2 SERPL-SCNC: 30 MMOL/L — SIGNIFICANT CHANGE UP (ref 22–31)
CREAT SERPL-MCNC: 0.51 MG/DL — SIGNIFICANT CHANGE UP (ref 0.5–1.3)
GLUCOSE BLDC GLUCOMTR-MCNC: 128 MG/DL — HIGH (ref 70–99)
GLUCOSE BLDC GLUCOMTR-MCNC: 130 MG/DL — HIGH (ref 70–99)
GLUCOSE SERPL-MCNC: 124 MG/DL — HIGH (ref 70–99)
HCT VFR BLD CALC: 27.7 % — LOW (ref 34.5–45)
HGB BLD-MCNC: 8.4 G/DL — LOW (ref 11.5–15.5)
MAGNESIUM SERPL-MCNC: 2 MG/DL — SIGNIFICANT CHANGE UP (ref 1.6–2.6)
MCHC RBC-ENTMCNC: 28.1 PG — SIGNIFICANT CHANGE UP (ref 27–34)
MCHC RBC-ENTMCNC: 30.3 GM/DL — LOW (ref 32–36)
MCV RBC AUTO: 92.6 FL — SIGNIFICANT CHANGE UP (ref 80–100)
NRBC # BLD: 0 /100 WBCS — SIGNIFICANT CHANGE UP (ref 0–0)
PHOSPHATE SERPL-MCNC: 2.6 MG/DL — SIGNIFICANT CHANGE UP (ref 2.5–4.5)
PLATELET # BLD AUTO: 476 K/UL — HIGH (ref 150–400)
POTASSIUM SERPL-MCNC: 3 MMOL/L — LOW (ref 3.5–5.3)
POTASSIUM SERPL-SCNC: 3 MMOL/L — LOW (ref 3.5–5.3)
PROT SERPL-MCNC: 5.4 GM/DL — LOW (ref 6–8.3)
RBC # BLD: 2.99 M/UL — LOW (ref 3.8–5.2)
RBC # FLD: 15.7 % — HIGH (ref 10.3–14.5)
SODIUM SERPL-SCNC: 149 MMOL/L — HIGH (ref 135–145)
WBC # BLD: 10.86 K/UL — HIGH (ref 3.8–10.5)
WBC # FLD AUTO: 10.86 K/UL — HIGH (ref 3.8–10.5)

## 2022-01-12 PROCEDURE — 99291 CRITICAL CARE FIRST HOUR: CPT

## 2022-01-12 PROCEDURE — 99232 SBSQ HOSP IP/OBS MODERATE 35: CPT

## 2022-01-12 RX ORDER — SODIUM,POTASSIUM PHOSPHATES 278-250MG
2 POWDER IN PACKET (EA) ORAL ONCE
Refills: 0 | Status: DISCONTINUED | OUTPATIENT
Start: 2022-01-12 | End: 2022-01-12

## 2022-01-12 RX ORDER — DILTIAZEM HCL 120 MG
10 CAPSULE, EXT RELEASE 24 HR ORAL ONCE
Refills: 0 | Status: COMPLETED | OUTPATIENT
Start: 2022-01-12 | End: 2022-01-12

## 2022-01-12 RX ORDER — POTASSIUM CHLORIDE 20 MEQ
40 PACKET (EA) ORAL ONCE
Refills: 0 | Status: COMPLETED | OUTPATIENT
Start: 2022-01-12 | End: 2022-01-12

## 2022-01-12 RX ADMIN — Medication 500 MILLIGRAM(S): at 21:50

## 2022-01-12 RX ADMIN — Medication 0.25 MILLIGRAM(S): at 05:15

## 2022-01-12 RX ADMIN — CHLORHEXIDINE GLUCONATE 15 MILLILITER(S): 213 SOLUTION TOPICAL at 05:15

## 2022-01-12 RX ADMIN — LEVETIRACETAM 500 MILLIGRAM(S): 250 TABLET, FILM COATED ORAL at 05:26

## 2022-01-12 RX ADMIN — Medication 500 MILLIGRAM(S): at 05:15

## 2022-01-12 RX ADMIN — CHLORHEXIDINE GLUCONATE 1 APPLICATION(S): 213 SOLUTION TOPICAL at 05:15

## 2022-01-12 RX ADMIN — LEVETIRACETAM 500 MILLIGRAM(S): 250 TABLET, FILM COATED ORAL at 17:20

## 2022-01-12 RX ADMIN — Medication 650 MILLIGRAM(S): at 11:32

## 2022-01-12 RX ADMIN — Medication 650 MILLIGRAM(S): at 12:00

## 2022-01-12 RX ADMIN — CHLORHEXIDINE GLUCONATE 15 MILLILITER(S): 213 SOLUTION TOPICAL at 17:20

## 2022-01-12 RX ADMIN — Medication 0.25 MILLIGRAM(S): at 17:20

## 2022-01-12 RX ADMIN — DORZOLAMIDE HYDROCHLORIDE TIMOLOL MALEATE 1 DROP(S): 20; 5 SOLUTION/ DROPS OPHTHALMIC at 17:21

## 2022-01-12 RX ADMIN — Medication 10 MILLIGRAM(S): at 01:16

## 2022-01-12 RX ADMIN — CEFTRIAXONE 100 MILLIGRAM(S): 500 INJECTION, POWDER, FOR SOLUTION INTRAMUSCULAR; INTRAVENOUS at 14:59

## 2022-01-12 RX ADMIN — Medication 40 MILLIEQUIVALENT(S): at 07:10

## 2022-01-12 RX ADMIN — Medication 1000 MILLIGRAM(S): at 00:54

## 2022-01-12 RX ADMIN — PANTOPRAZOLE SODIUM 40 MILLIGRAM(S): 20 TABLET, DELAYED RELEASE ORAL at 11:03

## 2022-01-12 RX ADMIN — Medication 500 MILLIGRAM(S): at 13:23

## 2022-01-12 RX ADMIN — DORZOLAMIDE HYDROCHLORIDE TIMOLOL MALEATE 1 DROP(S): 20; 5 SOLUTION/ DROPS OPHTHALMIC at 05:15

## 2022-01-12 RX ADMIN — ESCITALOPRAM OXALATE 20 MILLIGRAM(S): 10 TABLET, FILM COATED ORAL at 11:45

## 2022-01-12 RX ADMIN — ENOXAPARIN SODIUM 40 MILLIGRAM(S): 100 INJECTION SUBCUTANEOUS at 11:03

## 2022-01-12 RX ADMIN — Medication 50 MICROGRAM(S): at 17:20

## 2022-01-12 RX ADMIN — FENTANYL CITRATE 1 PATCH: 50 INJECTION INTRAVENOUS at 19:11

## 2022-01-12 RX ADMIN — FENTANYL CITRATE 1 PATCH: 50 INJECTION INTRAVENOUS at 06:10

## 2022-01-12 NOTE — PROGRESS NOTE ADULT - SUBJECTIVE AND OBJECTIVE BOX
# CC: Patient is a 81y old  Female who presents with a chief complaint of diverticulitis (12 Jan 2022 11:44)      ## HPI:  Patient is a 81F with a PMH of HTN, HLD, hypothyroidism, depression, seizures who presents to the ED for abd pain.  Patient states that over the last two days she had developed significant abdominal pain and multiple episodes of watery diarrhea.  Reports one episode of nausea and vomiting earlier today.  Patient has no other complaints.  Vitals stable,  labs show leukocytosis and hypokalemia.  CT abdomen significant for diverticulitis.  Will admit to med surg.     (23 Dec 2021 00:17)      **O/N:**    ## ROS:    ## Labs:  ** CBC: **                        8.4    10.86 )-----------( 476      ( 12 Jan 2022 03:00 )             27.7     ** Chem:  **  01-12    149<H>  |  113<H>  |  22  ----------------------------<  124<H>  3.0<L>   |  30  |  0.51    Ca    8.0<L>      12 Jan 2022 03:00  Phos  2.6     01-12  Mg     2.0     01-12    TPro  5.4<L>  /  Alb  1.7<L>  /  TBili  0.2  /  DBili  x   /  AST  15  /  ALT  15  /  AlkPhos  70  01-12    ** Coags: **      CAPILLARY BLOOD GLUCOSE      POCT Blood Glucose.: 130 mg/dL (12 Jan 2022 11:12)        Culture - Body Fluid with Gram Stain (collected 03 Jan 2022 18:18)  Source: .Body Fluid Abdominal Fluid  Gram Stain (05 Jan 2022 21:32):    Few polymorphonuclear leukocytes per low power field    Few Gram Negative Rods per oil power field  Final Report (05 Jan 2022 21:32):    Few Escherichia coli    Few Bacteroides ovatus group "Susceptibilities not performed"  Organism: Escherichia coli (05 Jan 2022 21:32)  Organism: Escherichia coli (05 Jan 2022 21:32)      -  Amikacin: S <=16      -  Amoxicillin/Clavulanic Acid: S <=8/4      -  Ampicillin: S <=8 These ampicillin results predict results for amoxicillin      -  Ampicillin/Sulbactam: S <=4/2 Enterobacter, Klebsiella aerogenes, Citrobacter, and Serratia may develop resistance during prolonged therapy (3-4 days)      -  Aztreonam: S <=4      -  Cefazolin: S <=2 Enterobacter, Klebsiella aerogenes, Citrobacter, and Serratia may develop resistance during prolonged therapy (3-4 days)      -  Cefepime: S <=2      -  Cefoxitin: S <=8      -  Ceftriaxone: S <=1 Enterobacter, Klebsiella aerogenes, Citrobacter, and Serratia may develop resistance during prolonged therapy      -  Ciprofloxacin: S <=0.25      -  Ertapenem: S <=0.5      -  Gentamicin: S <=2      -  Imipenem: S <=1      -  Levofloxacin: S <=0.5      -  Meropenem: S <=1      -  Piperacillin/Tazobactam: S <=8      -  Tobramycin: S <=2      -  Trimethoprim/Sulfamethoxazole: R >2/38      Method Type: TONYA        ## Imaging:    ## Medications:    cefTRIAXone   IVPB 1000 milliGRAM(s) IV Intermittent every 24 hours  metroNIDAZOLE    Tablet 500 milliGRAM(s) Oral every 8 hours      acetaminophen     Tablet .. 650 milliGRAM(s) Oral every 6 hours PRN  ALPRAZolam 0.25 milliGRAM(s) Oral every 12 hours  escitalopram 20 milliGRAM(s) Oral daily  fentaNYL   Patch  50 MICROgram(s)/Hr. 1 Patch Transdermal every 48 hours  levETIRAcetam 500 milliGRAM(s) Oral every 12 hours      enoxaparin Injectable 40 milliGRAM(s) SubCutaneous daily    pantoprazole   Suspension 40 milliGRAM(s) Enteral Tube daily      dextrose 40% Gel 15 Gram(s) Oral once  dextrose 50% Injectable 25 Gram(s) IV Push once  dextrose 50% Injectable 12.5 Gram(s) IV Push once  dextrose 50% Injectable 25 Gram(s) IV Push once  glucagon  Injectable 1 milliGRAM(s) IntraMuscular once  levothyroxine 50 MICROGram(s) Oral daily          ## O/E:  ICU Vital Signs Last 24 Hrs  T(C): 38.3 (12 Jan 2022 11:25), Max: 38.3 (12 Jan 2022 11:25)  T(F): 101 (12 Jan 2022 11:25), Max: 101 (12 Jan 2022 11:25)  HR: 74 (12 Jan 2022 13:00) (69 - 158)  BP: 160/75 (12 Jan 2022 13:00) (121/65 - 180/68)  BP(mean): 100 (12 Jan 2022 13:00) (75 - 110)  ABP: --  ABP(mean): --  RR: 20 (12 Jan 2022 13:00) (14 - 24)  SpO2: 100% (12 Jan 2022 13:00) (97% - 100%)    I&O's Summary    11 Jan 2022 07:01  -  12 Jan 2022 07:00  --------------------------------------------------------  IN: 2485.2 mL / OUT: 1615 mL / NET: 870.2 mL    12 Jan 2022 07:01  -  12 Jan 2022 14:37  --------------------------------------------------------  IN: 200 mL / OUT: 140 mL / NET: 60 mL      Mode: CPAP with PS, FiO2: 40, PEEP: 5, PS: 5, ITime: 0.6  Gen: lying comfortably in bed in no apparent distress  HEENT: PERRL, EOMI  Resp: CTA B/L no c/r/w  CVS: S1S2 no m/r/g  Abd: soft NT/ND +BS  Ext: no c/c/e  Neuro: A&Ox3    ## Code status:  Goals of care discussion: [x] yes [ ] no  [x] full code  [ ] DNR  [ ] DNI  [ ] MURALI

## 2022-01-12 NOTE — PROGRESS NOTE ADULT - SUBJECTIVE AND OBJECTIVE BOX
Patient seen and examined at bedside resting with Dr. Fairbanks.  Trach in place, tolerating pressure support.   Tmax 101    Vital Signs Last 24 Hrs  T(F): 101 (01-12-22 @ 11:25), Max: 101 (01-12-22 @ 11:25)  HR: 77 (01-12-22 @ 11:00)  BP: 152/61 (01-12-22 @ 11:00)  RR: 24 (01-12-22 @ 11:00)  SpO2: 100% (01-12-22 @ 11:00)  POCT Blood Glucose.: 130 mg/dL (12 Jan 2022 11:12)    PHYSICAL EXAM:  GENERAL: Alert, NAD  CHEST/LUNG: Clear to auscultation bilaterally, respirations nonlabored  HEART: Regular rate and rhythm; S1 & S2 appreciated  ABDOMEN: soft, non tender, non distended. Midline incision with retention sutures, area clean and dry and repacked with iodoform. Left BABAK  purulent, Right BABAK serous  EXTREMITIES:  no calf tenderness, edematous extremities    I&O's Detail    11 Jan 2022 07:01  -  12 Jan 2022 07:00  --------------------------------------------------------  IN:    Dexmedetomidine: 85.2 mL    Enteral Tube Flush: 1200 mL    IV PiggyBack: 50 mL    Vital1.5: 1150 mL  Total IN: 2485.2 mL    OUT:    Bulb (mL): 10 mL    Bulb (mL): 20 mL    Indwelling Catheter - Urethral (mL): 1585 mL  Total OUT: 1615 mL    Total NET: 870.2 mL      12 Jan 2022 07:01  -  12 Jan 2022 11:44  --------------------------------------------------------  IN:    Vital1.5: 200 mL  Total IN: 200 mL    OUT:    Indwelling Catheter - Urethral (mL): 140 mL  Total OUT: 140 mL    Total NET: 60 mL    LABS:                        8.4    10.86 )-----------( 476      ( 12 Jan 2022 03:00 )             27.7     01-12    149<H>  |  113<H>  |  22  ----------------------------<  124<H>  3.0<L>   |  30  |  0.51    Ca    8.0<L>      12 Jan 2022 03:00  Phos  2.6     01-12  Mg     2.0     01-12    TPro  5.4<L>  /  Alb  1.7<L>  /  TBili  0.2  /  DBili  x   /  AST  15  /  ALT  15  /  AlkPhos  70  01-12    RADIOLOGY & ADDITIONAL STUDIES:  < from: CT Abdomen and Pelvis w/ IV Cont (01.11.22 @ 16:42) >  ACC: [id number] EXAM:  CT ABDOMEN AND PELVIS IC                          PROCEDURE DATE:  01/11/2022          INTERPRETATION:  CLINICAL INFORMATION: Perforated sigmoid status post   repair with ostomy.    COMPARISON: 12/30/2021    CONTRAST/COMPLICATIONS:  IV Contrast: Omnipaque 350  95 cc administered   5 cc discarded  Oral Contrast: NONE  Complications: None reported at time of study completion    PROCEDURE:  CT of the Abdomen and Pelvis was performed.  Sagittal and coronal reformats were performed.    FINDINGS:  LOWER CHEST: Small left pleural effusion with passive atelectasis at the   left lung base. Coronary artery calcification. Additional areas of patchy   density at the lung bases suggesting small infiltrates versus atelectasis    LIVER: Within normal limits.  BILE DUCTS: Normal caliber.  GALLBLADDER: Cholelithiasis.  SPLEEN: Within normal limits.  PANCREAS: Within normal limits.  ADRENALS: Within normal limits.  KIDNEYS/URETERS: Within normal limits.    BLADDER: Boucher catheter.  REPRODUCTIVE ORGANS: Uterus demonstrates probable fibroids. Adnexa within   normal limits.    BOWEL: No bowel obstruction. Appendix is normal.NG tube extends into the   stomach. Left lower quadrant colostomy is again noted with suture line in   the sigmoid colon.  PERITONEUM: Decrease in previously seen focal fluid collections. Left   paracolic gutter fluid is no longer seen. Fluid is again seen in the   right paracolic gutter decreased in volume from the prior study. There is   a small fluid collection posteriorly in the pelvis which is larger than   on the prior study seen on image 134 of series 2 measuring 3.9 x 2.4 cm  VESSELS: Atherosclerotic changes. Retroaortic left renal vein  RETROPERITONEUM/LYMPH NODES: No lymphadenopathy.  ABDOMINAL WALL: Midline anterior abdominal wall open wounds with packing   within the inferior wound. A drain is seen entering from the anterior   left mid abdomen and extending into the pelvis. A second drain enters   through the right anterior mid abdomen andalso extends to the pelvis  BONES: Degenerative changes most prominent in the lower lumbar spine.   Grade 1 anterolisthesis of L3 on L4.    IMPRESSION:  Small pleural effusion with passive atelectasis at the left lung base is   again seen. Resolution of previously seen right pleural effusion  Patchy densities in the lung bases partially visualized representing   small areas of linear atelectasis versus infiltrate.  NG tube again seen  Cholelithiasis  Decreased volume of fluid collections in the paracolic gutters.  Fluid collection the posterior pelvis is increased in size from the prior   study  Open wound midline anterior abdominal wall again seen  Left lower quadrant colostomy again appreciated  2. Drains are again seen ending in the pelvis.    < end of copied text >    A/P  81F admitted with Acute Sigmoid Diverticulitis, Surgery consulted for interval perforated sigmoid diverticulitis. S/P ex lap, sigmoid resection, peritoneal lavage 12/25 and RTOR 12/26 for Irrigation of abdominal cavity with closure and creation of colostomy. S/p bedside IR aspiration of fluid collection 1/3. Tube feeds initiated. Ostomy functioning. Tracheostomy 1/7.  Remains febrile. Small elevation in WBC  CT A/P: decreased volume of collections in paracolic gutters, increase in size of fluid collection in posterior pelvis    - IR eval for drainage of posterior pelvis collection  - local wound care per surgery  - monitor ostomy output, BABAK output  - abx per ID  - continue tube feeds  - wean vent as tolerated  - continue ICU management and supportive care  - d/w Surgical attending

## 2022-01-12 NOTE — PROGRESS NOTE ADULT - ASSESSMENT
81F with history of perforated sigmoid colon, s/p ex lap, sigmoid resection. Patient underwent successful bedside IR aspiration on 1/3 of fluid collection in the right flank, now improved.     Course complicated by fevers and leukocytosis of 10.86. CT scan 1/11 shows 3.9x2.4cm fluid collection posteriorly in the pelvis, with surgical drain in collection,    Recommend conservative management, and repeat imaging over the weekend to further evaluate fluid collection. -81F with history of perforated sigmoid colon, s/p ex lap, sigmoid resection. Patient underwent successful bedside IR aspiration on 1/3 of fluid collection in the right flank, now improved.   -Course complicated by fevers and leukocytosis of 10.86. CT scan 1/11 shows 3.9x2.4cm fluid collection posteriorly in the pelvis, with surgical drain in collection,  -Recommend conservative management, and repeat imaging over the weekend to further evaluate fluid collection.

## 2022-01-12 NOTE — PROGRESS NOTE ADULT - SUBJECTIVE AND OBJECTIVE BOX
[facility]  Division of Infectious Diseases  [phone]    Name: JUAREZ GTZ  Age: 81y  Gender: Female  MRN: [id number]    Interval History--  Notes reviewed. Seen earlier today.  not very responsive on trach to vent     Past Medical History--  Seizure    HTN (hypertension)    Glaucoma    Thyroid disease    Blood clot of neck vein    TIA (transient ischemic attack)    S/P appendectomy        For details regarding the patient's social history, family history, and other miscellaneous elements, please refer the initial infectious diseases consultation and/or the admitting history and physical examination for this admission.    Allergies    No Known Allergies    Intolerances        Medications--  Antibiotics:  cefTRIAXone   IVPB 1000 every 24 hours  metroNIDAZOLE    Tablet 500 every 8 hours      MEDICATIONS  (STANDING):  ALPRAZolam 0.25 every 12 hours  dextrose 40% Gel 15 once  dextrose 50% Injectable 25 once  dextrose 50% Injectable 12.5 once  dextrose 50% Injectable 25 once  enoxaparin Injectable 40 daily  escitalopram 20 daily  fentaNYL   Patch  50 MICROgram(s)/Hr. 1 every 48 hours  glucagon  Injectable 1 once  levETIRAcetam 500 every 12 hours  levothyroxine 50 daily  pantoprazole   Suspension 40 daily      PRN  acetaminophen     Tablet .. 650 milliGRAM(s) Oral every 6 hours PRN      Physical Exam:  Vital Signs Last 24 Hrs  T(F): 99.5 (01-12-22 @ 23:00), Max: 101 (01-12-22 @ 11:25)  HR: 66 (01-12-22 @ 23:00)  BP: 122/69 (01-12-22 @ 23:00)  RR: 20 (01-12-22 @ 23:00)  SpO2: 100% (01-12-22 @ 23:00) (98% - 100%)  Wt(kg): --      General: Nontoxic-appearing Female in no acute distress. Trached on vent  HEENT: AT/NC. Oropharynx/dentition unable secondary to patient compliance.   Neck: Not rigid. No sense of mass. Trach C/D/I  Nodes: None palpable.  Lungs: coarse B BS  Heart: Regular rate and rhythm. No Murmur. No rub. No gallop. No palpable thrill.  Abdomen: Bowel sounds now present.  Soft. Mildly distended.  Incision dressed.  Ostomy with stool and gas. Drains scant output, serous though left more murky appearing   Extremities: No cyanosis or clubbing. 3+ pitting edema  Skin: Warm. Dry No rash. No vasculitic stigmata.  Psychiatric: Unable        Laboratory Studies--  WBC Count: 10.86 K/uL (01-12 @ 03:00)  WBC Count: 8.93 K/uL (01-11 @ 02:55)  WBC Count: 9.34 K/uL (01-10 @ 04:18)  WBC Count: 8.95 K/uL (01-09 @ 04:27)  WBC Count: 14.46 K/uL (01-08 @ 03:15)  WBC Count: 17.43 K/uL (01-07 @ 17:00)  WBC Count: 15.03 K/uL (01-07 @ 02:53)                            8.4    10.86 )-----------( 476      ( 12 Jan 2022 03:00 )             27.7       01-12    149<H>  |  113<H>  |  22  ----------------------------<  124<H>  3.0<L>   |  30  |  0.51    Ca    8.0<L>      12 Jan 2022 03:00  Phos  2.6     01-12  Mg     2.0     01-12    TPro  5.4<L>  /  Alb  1.7<L>  /  TBili  0.2  /  DBili  x   /  AST  15  /  ALT  15  /  AlkPhos  70  01-12      Creatinine Trend: 0.51<--, 0.61<--, 0.56<--, 0.67<--, 0.62<--, 0.51<--         Culture Data  .Body Fluid Abdominal Fluid  01-03-22   Few Escherichia coli  Few Bacteroides ovatus group "Susceptibilities not performed"  --  Escherichia coli      .Sputum Sputum  12-28-21   Normal Respiratory Sendy present  --    Moderate polymorphonuclear leukocytes per low power field  Numerous Squamous epithelial cells per low power field  Few Gram positive cocci in pairs per oil power field  Rare Gram Positive Rods per oil power field  Rare Yeast like cells per oil power field  Results consistent with oropharyngeal contamination      .Blood Blood  12-28-21   No Growth Final  --  --      .Body Fluid INTRA -ABDOMINAL FLUID FOR CULTURE  12-25-21   Few Escherichia coli  Rare Escherichia coli #2  Few Candida albicans "Susceptibilities not performed"  Moderate Bacteroides thetaiotamcron group "Susceptibilities not performed"  Moderate Bacteroides uniformis "Susceptibilities not performed"  --  Escherichia coli  Escherichia coli      .Blood Blood-Peripheral  12-25-21   No Growth Final  --  --      .Blood Blood-Peripheral  12-24-21   No Growth Final  --  --      Clean Catch Clean Catch (Midstream)  12-23-21   Normal Urogenital sendy present  --  --    RADIOLOGY:  CT Abdomen and Pelvis w/ IV Cont (01.11.22 @ 16:42)  IMPRESSION:  Small pleural effusion with passive atelectasis at the left lung base is   again seen. Resolution of previously seen right pleural effusion  Patchy densities in the lung bases partially visualized representing   small areas of linear atelectasis versus infiltrate.  NG tube again seen  Cholelithiasis  Decreased volume of fluid collections in the paracolic gutters.  Fluid collection the posterior pelvis is increased in size from the prior   study  Open wound midline anterior abdominal wall again seen  Left lower quadrant colostomy again appreciated  2. Drains are again seen ending in the pelvis.

## 2022-01-12 NOTE — PROGRESS NOTE ADULT - SUBJECTIVE AND OBJECTIVE BOX
Interventional Radiology    Evaluate for Procedure: Drainage of pelvic fluid collection    HPI:  Patient is a 81F with a PMH of HTN, HLD, hypothyroidism, depression, seizures who presents to the ED for abd pain.  Patient states that over the last two days she had developed significant abdominal pain and multiple episodes of watery diarrhea.  Reports one episode of nausea and vomiting earlier today.  Patient has no other complaints.  Vitals stable,  labs show leukocytosis and hypokalemia.  CT abdomen significant for diverticulitis.  Will admit to med surg.     (23 Dec 2021 00:17)      Allergies:   Medications (Abx/Cardiac/Anticoagulation/Blood Products)    cefTRIAXone   IVPB: 100 mL/Hr IV Intermittent (01-11 @ 14:59)  diltiazem Injectable: 10 milliGRAM(s) IV Push (01-11 @ 23:49)  diltiazem Injectable: 10 milliGRAM(s) IV Push (01-12 @ 01:16)  enoxaparin Injectable: 40 milliGRAM(s) SubCutaneous (01-12 @ 11:03)  furosemide   Injectable: 40 milliGRAM(s) IV Push (01-10 @ 13:29)  metroNIDAZOLE    Tablet: 500 milliGRAM(s) Oral (01-12 @ 05:15)  metroNIDAZOLE  IVPB: 100 mL/Hr IV Intermittent (01-11 @ 05:29)    Data:    T(C): 38.3  HR: 77  BP: 152/61  RR: 24  SpO2: 100%    -WBC 10.86 / HgB 8.4 / Hct 27.7 / Plt 476  -Na 149 / Cl 113 / BUN 22 / Glucose 124  -K 3.0 / CO2 30 / Cr 0.51  -ALT 15 / Alk Phos 70 / T.Bili 0.2  -INR 1.28 / PTT --    Radiology:   < from: CT Abdomen and Pelvis w/ IV Cont (01.11.22 @ 16:42) >    ACC: [id number] EXAM:  CT ABDOMEN AND PELVIS IC                          PROCEDURE DATE:  01/11/2022          INTERPRETATION:  CLINICAL INFORMATION: Perforated sigmoid status post   repair with ostomy.    COMPARISON: 12/30/2021    CONTRAST/COMPLICATIONS:  IV Contrast: Omnipaque 350  95 cc administered   5 cc discarded  Oral Contrast: NONE  Complications: None reported at time of study completion    PROCEDURE:  CT of the Abdomen and Pelvis was performed.  Sagittal and coronal reformats were performed.    FINDINGS:  LOWER CHEST: Small left pleural effusion with passive atelectasis at the   left lung base. Coronary artery calcification. Additional areas of patchy   density at the lung bases suggesting small infiltrates versus atelectasis    LIVER: Within normal limits.  BILE DUCTS: Normal caliber.  GALLBLADDER: Cholelithiasis.  SPLEEN: Within normal limits.  PANCREAS: Within normal limits.  ADRENALS: Within normal limits.  KIDNEYS/URETERS: Within normal limits.    BLADDER: Boucher catheter.  REPRODUCTIVE ORGANS: Uterus demonstrates probable fibroids. Adnexa within   normal limits.    BOWEL: No bowel obstruction. Appendix is normal.NG tube extends into the   stomach. Left lower quadrant colostomy is again noted with suture line in   the sigmoid colon.  PERITONEUM: Decrease in previously seen focal fluid collections. Left   paracolic gutter fluid is no longer seen. Fluid is again seen in the   right paracolic gutter decreased in volume from the prior study. There is   a small fluid collection posteriorly in the pelvis which is larger than   on the prior study seen on image 134 of series 2 measuring 3.9 x 2.4 cm  VESSELS: Atherosclerotic changes. Retroaortic left renal vein  RETROPERITONEUM/LYMPH NODES: No lymphadenopathy.  ABDOMINAL WALL: Midline anterior abdominal wall open wounds with packing   within the inferior wound. A drain is seen entering from the anterior   left mid abdomen and extending into the pelvis. A second drain enters   through the right anterior mid abdomen andalso extends to the pelvis  BONES: Degenerative changes most prominent in the lower lumbar spine.   Grade 1 anterolisthesis of L3 on L4.    IMPRESSION:  Small pleural effusion with passive atelectasis at the left lung base is   again seen. Resolutionof previously seen right pleural effusion  Patchy densities in the lung bases partially visualized representing   small areas of linear atelectasis versus infiltrate.  NG tube again seen  Cholelithiasis  Decreased volume of fluid collections in the paracolic gutters.  Fluid collection the posterior pelvis is increased in size from the prior   study  Open wound midline anterior abdominal wall again seen  Left lower quadrant colostomy again appreciated  2. Drains are again seen ending in the pelvis.     Interventional Radiology  Evaluate for Procedure: Drainage of pelvic fluid collection    HPI:  Patient is a 81F with a PMH of HTN, HLD, hypothyroidism, depression, seizures who presents to the ED for abd pain.  Patient states that over the last two days she had developed significant abdominal pain and multiple episodes of watery diarrhea.  Reports one episode of nausea and vomiting earlier today.  Patient has no other complaints.  Vitals stable,  labs show leukocytosis and hypokalemia.  CT abdomen significant for diverticulitis.  Will admit to med surg.     (23 Dec 2021 00:17)    Allergies: nkda  Medications (Abx/Cardiac/Anticoagulation/Blood Products)  cefTRIAXone   IVPB: 100 mL/Hr IV Intermittent (01-11 @ 14:59)  diltiazem Injectable: 10 milliGRAM(s) IV Push (01-11 @ 23:49)  diltiazem Injectable: 10 milliGRAM(s) IV Push (01-12 @ 01:16)  enoxaparin Injectable: 40 milliGRAM(s) SubCutaneous (01-12 @ 11:03)  furosemide   Injectable: 40 milliGRAM(s) IV Push (01-10 @ 13:29)  metroNIDAZOLE    Tablet: 500 milliGRAM(s) Oral (01-12 @ 05:15)  metroNIDAZOLE  IVPB: 100 mL/Hr IV Intermittent (01-11 @ 05:29)    Data:    T(C): 38.3  HR: 77  BP: 152/61  RR: 24  SpO2: 100%    -WBC 10.86 / HgB 8.4 / Hct 27.7 / Plt 476  -Na 149 / Cl 113 / BUN 22 / Glucose 124  -K 3.0 / CO2 30 / Cr 0.51  -ALT 15 / Alk Phos 70 / T.Bili 0.2  -INR 1.28 / PTT --    Radiology:   -Decreased volume of fluid collections in the paracolic gutters.  -Fluid collection the posterior pelvis is increased in size from the prior   study  -Open wound midline anterior abdominal wall again seen  -Left lower quadrant colostomy again appreciated  -2 Drains are again seen ending in the pelvis.

## 2022-01-12 NOTE — CHART NOTE - NSCHARTNOTEFT_GEN_A_CORE
Patient seen and examined.    Awake, following commands, tolerating CPAP, off pressors, tolerating tube feeds with good ostomy function, LUQ drain with scant purulent output.  CT scan shows collections. Given ongoing intermittent fevers and modest rise in WBC today will consult to evaluate for window.  Eventual endoscopically assisted G tube placement; Complicated by presence of drain, ostomy and external retentions in close proximity to each other in LUQ.  Discussed with ICU.

## 2022-01-12 NOTE — PROGRESS NOTE ADULT - ASSESSMENT
Patient is a 81F with a PMH of HTN, HLD, hypothyroidism, depression, seizures who presents to the ED for abd pain found to have diverticulitis   has rising leukocytosis   had fever yesterday   tachycardic and now hypoxic   CXR (I personally reviewed) low lung volumes   origincal CT (I personally reviewed) with diverticulitis   she had a lot of pain in the abdomen on exam     12/25: s/p surgery for diverticulitis with perforation and abscess and went to the OR. intubated in ICU with vac and needs to go back to the OR, currently on zosyn, s/p one dose of diflucan last night   12/27: s/p repair and ostomy creation yesterday, wbc elevated, cultures sensitive to zosyn and candida albicans is notoriously sensitive to azoles such as fluconazole, wean of pressors and sedation, extubate when ready   12/28: Further increase in white blood cell count but overall the patient appears to be reasonably stable.  No concern of C. difficile at this juncture.  Current antibiotics are reasonable.  Leukocytosis like related to recent surgery, no concern of other superimposed process on the basis of exam.  I do not see a role to alter her antimicrobials.  12/29:  Remains intubated in ICU. still quite edematous and net positive, WBC climbing, small wound dehiscence at bottom of incision, plan for initiation of TPN today, remains on antibiotics    12/30: rising WBC, ostomy not functioning yet, very edematous CT today, may be source control issue so for now can continue same antibiotics and antifungal but may need to change if findings on the CT are worrisome or change in hemodynamics  12/31:Leukocytosis, with collections noted on CT.  However this is being done postoperative day 5, there is some possibility that this could represent postop changes but given the leukocytosis, concur with plans for attempts at percutaneous drainage.  White blood cell count down slightly compared to prior peak, will need to be trended.  Gallbladder is also distended.  Abdominal exam was benign this morning, but a calculus cholecystitis could be the differential diagnosis here as well (though n.p.o. status certainly could explain distended gallbladder, there is also report of gallbladder wall thickening).  Would modify antibiotics based on cultures from drainage, I do not believe that antibiotics and other the cells would be adequate here.  Fortunately she is off pressors, with preserved renal function, and tolerating pressure support.  1/1/22: Postop day 6.  I have some concerns with the appearance of the ostomy, though the abdominal exam overall is otherwise unchanged.  White blood cell count remains elevated, but is lower compared with prior values.  This is despite no change in antibiotic therapy.  Patient also has asymmetric edema of the upper extremities, right greater than left.  Low threshold for duplex ultrasound to exclude DVT.No new surveillance culture data.  1/2: POD7 Ostomy looks better today, UE symmetric. WBC elevated, some slight downward trend overall. Temps <= 100F.   1/3: drainage of fluid collection today, continue zosyn and fluconazole for now, monitor wbc and temps, watch ostomy output, diuresis and address third spacing   1/4: s/p drainage yesterday now growing ecoli and awaiting for sensitivities, wbc is improving, started on tube feeds    1/5: abscess growing ecoli and bacteroides, S to ceftriaxone, wbc 15, weaning trial today, pain management    1/6: awake, lung exercise today, trach planned for tomorrow, ostomy output is good. will stop fluconazole today and change antibiotics to ceftriaxone and flagly. Unclear stop date yet    1/7 trach today, continue antibiotics   1/9: s/p trach, had low grade fever but otherwise doing ok, will continues same antibiotics regimen for now but if fevers continue she will needs to be rescanned as those abscesses can progress.  1/10: low grade fever but doing well on trach-PS, ostomy with stool and gas, shakes head when asked if in pain, discussed with surgery about repeating CT scan given the temp  1/11: Still febrile, favor interval CT as above.  1/12: CT of abdomen showing some signs of improvement and elsewhere  it shows signs of worsening. wbc normal, tube feeds currently via NGT

## 2022-01-12 NOTE — CHART NOTE - NSCHARTNOTEFT_GEN_A_CORE
Patient is a 81F with a PMH of HTN, HLD, hypothyroidism, depression, seizures who presents to the ED for abd pain.  Patient states that over the last two days she had developed significant abdominal pain and multiple episodes of watery diarrhea.  Reports one episode of nausea and vomiting earlier today.  Patient has no other complaints.  Vitals stable, (hypoxix?) labs show leukocytosis and hypokalemia.  CT abdomen significant for diverticulitis.  initially admitted to med surg.  CT noted with sigmoid diverticulosis with adjacent stranding, compatible with diverticulitis. No associated abscess is identified. Trace adjacent pelvic free fluid.81F with a PMH of HTN, HLD, hypothyroidism, depression, seizures p/w abd pain, found to have acute diverticulitis . acute metabolic encephalopathy sec to above Perforated sigmoid diverticulitis. 12/24/21 - Exploratory laparotomy and a sigmoid colectomy and abdominal washout abd at that time left open. Transferred to ICU for post op care on mechanical ventilation. 10mg IV lopressor in OR for Afib with RVR. 5L IVF given intra-op. EBL 100cc. Received intubated, sedated, on phenylephrine.  Patient went for abdominal wound closure and creation of colostomy on 12/26. Patient developed adb abscess 1/3 s/p IR drainage of abdominal abscesses Patient is a 81F with a PMH of HTN, HLD, hypothyroidism, depression, seizures who presents to the ED for abd pain.  Patient states that over the last two days she had developed significant abdominal pain and multiple episodes of watery diarrhea.  Reports one episode of nausea and vomiting earlier today.  Patient has no other complaints.  Vitals stable, (hypoxix?) labs show leukocytosis and hypokalemia.  CT abdomen significant for diverticulitis.  initially admitted to med surg.  CT noted with sigmoid diverticulosis with adjacent stranding, compatible with diverticulitis. No associated abscess is identified. Trace adjacent pelvic free fluid.81F with a PMH of HTN, HLD, hypothyroidism, depression, seizures p/w abd pain, found to have acute diverticulitis . acute metabolic encephalopathy sec to above Perforated sigmoid diverticulitis. 21 - Exploratory laparotomy and a sigmoid colectomy and abdominal washout abd at that time left open. Transferred to ICU for post op care on mechanical ventilation. 10mg IV lopressor in OR for Afib with RVR. 5L IVF given intra-op. EBL 100cc. Received intubated, sedated, on phenylephrine.  Patient went for abdominal wound closure and creation of colostomy on . Patient developed adb abscess 1/3 s/p IR drainage of abdominal abscesses. Initially patient on PPN for nutrition Now tolerating tube feeds with brown soft stool in colostomy.. Found to have multiple abdominal fluid collections s/p IR drainage of R abdominal abscess 1/3/22 growing e-coli and bacteroides Sensitive to ceftriaxone, continue antibiotics to ceftriaxone and flagyl per ID.   Patient was unable to wean and son consented to trach.  -Now out of shock state not on vasopressor support.  Post op a-fib RVR, s/p amio load converted to sinus rhythm.  Left BABAK drain with murky purulent drainage- surgical team aware.  Patient with intermittent fevers, but is on appropriate antibiotics and does not have leukocytosis. Unclear if source control has been achieved.  Possible drainage by IR.   Peg placement also requested by IR next week as per surgery.  trach placed.  Pain management will changed fentanyl to Dilaudid PRN. Patient seen and examined by ICU attending and stable for transfer to medicine service for continued care.    Report given to Dr. Andrea and placed on VIVI Turpin  critical  Patient is a 81F with a PMH of HTN, HLD, hypothyroidism, depression, seizures who presents to the ED for abd pain.  Patient states that over the last two days she had developed significant abdominal pain and multiple episodes of watery diarrhea.  Reports one episode of nausea and vomiting earlier today.  Patient has no other complaints.  Vitals stable, (hypoxix?) labs show leukocytosis and hypokalemia.  CT abdomen significant for diverticulitis.  initially admitted to med surg.  CT noted with sigmoid diverticulosis with adjacent stranding, compatible with diverticulitis. No associated abscess is identified. Trace adjacent pelvic free fluid.81F with a PMH of HTN, HLD, hypothyroidism, depression, seizures p/w abd pain, found to have acute diverticulitis . acute metabolic encephalopathy sec to above Perforated sigmoid diverticulitis. 21 - Exploratory laparotomy and a sigmoid colectomy and abdominal washout abd at that time left open. Transferred to ICU for post op care on mechanical ventilation. 10mg IV lopressor in OR for Afib with RVR. 5L IVF given intra-op. EBL 100cc. Received intubated, sedated, on phenylephrine.  Patient went for abdominal wound closure and creation of colostomy on . Patient developed adb abscess 1/3 s/p IR drainage of abdominal abscesses. Initially patient on PPN for nutrition Now tolerating tube feeds with brown soft stool in colostomy.. Found to have multiple abdominal fluid collections s/p IR drainage of R abdominal abscess 1/3/22 growing e-coli and bacteroides Sensitive to ceftriaxone, continue antibiotics to ceftriaxone and flagyl per ID.   Patient was unable to wean and son consented to trach.  -Now out of shock state not on vasopressor support.  Post op a-fib RVR, s/p amio load converted to sinus rhythm.  Left BABAK drain with murky purulent drainage- surgical team aware.  Patient with intermittent fevers, but is on appropriate antibiotics and does not have leukocytosis. Unclear if source control has been achieved.  Possible drainage by IR.   Peg placement also requested by IR next week as per surgery.  trach placed.  Pain management on fentanyl patch. Patient seen and examined by ICU attending and stable for transfer to medicine service for continued care.    Report given to Dr. Andrea and placed on Dr. Krystal Slaughter, DAVID-LAURA  critical

## 2022-01-13 LAB
ALBUMIN SERPL ELPH-MCNC: 1.8 G/DL — LOW (ref 3.3–5)
ALP SERPL-CCNC: 72 U/L — SIGNIFICANT CHANGE UP (ref 40–120)
ALT FLD-CCNC: 15 U/L — SIGNIFICANT CHANGE UP (ref 12–78)
ANION GAP SERPL CALC-SCNC: 9 MMOL/L — SIGNIFICANT CHANGE UP (ref 5–17)
AST SERPL-CCNC: 16 U/L — SIGNIFICANT CHANGE UP (ref 15–37)
BASOPHILS # BLD AUTO: 0.06 K/UL — SIGNIFICANT CHANGE UP (ref 0–0.2)
BASOPHILS NFR BLD AUTO: 0.6 % — SIGNIFICANT CHANGE UP (ref 0–2)
BILIRUB SERPL-MCNC: 0.2 MG/DL — SIGNIFICANT CHANGE UP (ref 0.2–1.2)
BUN SERPL-MCNC: 23 MG/DL — SIGNIFICANT CHANGE UP (ref 7–23)
CALCIUM SERPL-MCNC: 8.5 MG/DL — SIGNIFICANT CHANGE UP (ref 8.5–10.1)
CHLORIDE SERPL-SCNC: 109 MMOL/L — HIGH (ref 96–108)
CO2 SERPL-SCNC: 27 MMOL/L — SIGNIFICANT CHANGE UP (ref 22–31)
CREAT SERPL-MCNC: 0.48 MG/DL — LOW (ref 0.5–1.3)
EOSINOPHIL # BLD AUTO: 0.93 K/UL — HIGH (ref 0–0.5)
EOSINOPHIL NFR BLD AUTO: 9.5 % — HIGH (ref 0–6)
GLUCOSE BLDC GLUCOMTR-MCNC: 131 MG/DL — HIGH (ref 70–99)
GLUCOSE BLDC GLUCOMTR-MCNC: 131 MG/DL — HIGH (ref 70–99)
GLUCOSE BLDC GLUCOMTR-MCNC: 137 MG/DL — HIGH (ref 70–99)
GLUCOSE SERPL-MCNC: 122 MG/DL — HIGH (ref 70–99)
HCT VFR BLD CALC: 31.3 % — LOW (ref 34.5–45)
HGB BLD-MCNC: 9.4 G/DL — LOW (ref 11.5–15.5)
IMM GRANULOCYTES NFR BLD AUTO: 0.5 % — SIGNIFICANT CHANGE UP (ref 0–1.5)
LYMPHOCYTES # BLD AUTO: 1.45 K/UL — SIGNIFICANT CHANGE UP (ref 1–3.3)
LYMPHOCYTES # BLD AUTO: 14.8 % — SIGNIFICANT CHANGE UP (ref 13–44)
MAGNESIUM SERPL-MCNC: 2.3 MG/DL — SIGNIFICANT CHANGE UP (ref 1.6–2.6)
MCHC RBC-ENTMCNC: 28.1 PG — SIGNIFICANT CHANGE UP (ref 27–34)
MCHC RBC-ENTMCNC: 30 GM/DL — LOW (ref 32–36)
MCV RBC AUTO: 93.4 FL — SIGNIFICANT CHANGE UP (ref 80–100)
MONOCYTES # BLD AUTO: 0.55 K/UL — SIGNIFICANT CHANGE UP (ref 0–0.9)
MONOCYTES NFR BLD AUTO: 5.6 % — SIGNIFICANT CHANGE UP (ref 2–14)
NEUTROPHILS # BLD AUTO: 6.73 K/UL — SIGNIFICANT CHANGE UP (ref 1.8–7.4)
NEUTROPHILS NFR BLD AUTO: 69 % — SIGNIFICANT CHANGE UP (ref 43–77)
NRBC # BLD: 0 /100 WBCS — SIGNIFICANT CHANGE UP (ref 0–0)
PHOSPHATE SERPL-MCNC: 3 MG/DL — SIGNIFICANT CHANGE UP (ref 2.5–4.5)
PLATELET # BLD AUTO: 519 K/UL — HIGH (ref 150–400)
POTASSIUM SERPL-MCNC: 3.3 MMOL/L — LOW (ref 3.5–5.3)
POTASSIUM SERPL-SCNC: 3.3 MMOL/L — LOW (ref 3.5–5.3)
PROT SERPL-MCNC: 6 GM/DL — SIGNIFICANT CHANGE UP (ref 6–8.3)
RBC # BLD: 3.35 M/UL — LOW (ref 3.8–5.2)
RBC # FLD: 15.5 % — HIGH (ref 10.3–14.5)
SODIUM SERPL-SCNC: 145 MMOL/L — SIGNIFICANT CHANGE UP (ref 135–145)
WBC # BLD: 9.77 K/UL — SIGNIFICANT CHANGE UP (ref 3.8–10.5)
WBC # FLD AUTO: 9.77 K/UL — SIGNIFICANT CHANGE UP (ref 3.8–10.5)

## 2022-01-13 PROCEDURE — 99223 1ST HOSP IP/OBS HIGH 75: CPT

## 2022-01-13 PROCEDURE — ZZZZZ: CPT

## 2022-01-13 PROCEDURE — 99232 SBSQ HOSP IP/OBS MODERATE 35: CPT

## 2022-01-13 PROCEDURE — 99222 1ST HOSP IP/OBS MODERATE 55: CPT

## 2022-01-13 PROCEDURE — 99233 SBSQ HOSP IP/OBS HIGH 50: CPT

## 2022-01-13 RX ORDER — CEFTRIAXONE 500 MG/1
1000 INJECTION, POWDER, FOR SOLUTION INTRAMUSCULAR; INTRAVENOUS ONCE
Refills: 0 | Status: COMPLETED | OUTPATIENT
Start: 2022-01-13 | End: 2022-01-13

## 2022-01-13 RX ORDER — VANCOMYCIN HCL 1 G
125 VIAL (EA) INTRAVENOUS EVERY 6 HOURS
Refills: 0 | Status: DISCONTINUED | OUTPATIENT
Start: 2022-01-13 | End: 2022-01-14

## 2022-01-13 RX ORDER — POTASSIUM CHLORIDE 20 MEQ
40 PACKET (EA) ORAL EVERY 4 HOURS
Refills: 0 | Status: COMPLETED | OUTPATIENT
Start: 2022-01-13 | End: 2022-01-13

## 2022-01-13 RX ADMIN — Medication 0.25 MILLIGRAM(S): at 05:21

## 2022-01-13 RX ADMIN — ENOXAPARIN SODIUM 40 MILLIGRAM(S): 100 INJECTION SUBCUTANEOUS at 11:02

## 2022-01-13 RX ADMIN — DORZOLAMIDE HYDROCHLORIDE TIMOLOL MALEATE 1 DROP(S): 20; 5 SOLUTION/ DROPS OPHTHALMIC at 05:22

## 2022-01-13 RX ADMIN — CEFTRIAXONE 100 MILLIGRAM(S): 500 INJECTION, POWDER, FOR SOLUTION INTRAMUSCULAR; INTRAVENOUS at 21:45

## 2022-01-13 RX ADMIN — FENTANYL CITRATE 1 PATCH: 50 INJECTION INTRAVENOUS at 20:48

## 2022-01-13 RX ADMIN — Medication 125 MILLIGRAM(S): at 18:11

## 2022-01-13 RX ADMIN — FENTANYL CITRATE 1 PATCH: 50 INJECTION INTRAVENOUS at 12:50

## 2022-01-13 RX ADMIN — ESCITALOPRAM OXALATE 20 MILLIGRAM(S): 10 TABLET, FILM COATED ORAL at 11:03

## 2022-01-13 RX ADMIN — Medication 40 MILLIEQUIVALENT(S): at 14:35

## 2022-01-13 RX ADMIN — CHLORHEXIDINE GLUCONATE 1 APPLICATION(S): 213 SOLUTION TOPICAL at 05:20

## 2022-01-13 RX ADMIN — Medication 500 MILLIGRAM(S): at 05:21

## 2022-01-13 RX ADMIN — LEVETIRACETAM 500 MILLIGRAM(S): 250 TABLET, FILM COATED ORAL at 18:58

## 2022-01-13 RX ADMIN — PANTOPRAZOLE SODIUM 40 MILLIGRAM(S): 20 TABLET, DELAYED RELEASE ORAL at 11:05

## 2022-01-13 RX ADMIN — LEVETIRACETAM 500 MILLIGRAM(S): 250 TABLET, FILM COATED ORAL at 05:21

## 2022-01-13 RX ADMIN — CHLORHEXIDINE GLUCONATE 15 MILLILITER(S): 213 SOLUTION TOPICAL at 05:20

## 2022-01-13 RX ADMIN — Medication 500 MILLIGRAM(S): at 21:45

## 2022-01-13 RX ADMIN — Medication 0.25 MILLIGRAM(S): at 18:54

## 2022-01-13 RX ADMIN — Medication 500 MILLIGRAM(S): at 14:35

## 2022-01-13 RX ADMIN — Medication 40 MILLIEQUIVALENT(S): at 18:11

## 2022-01-13 NOTE — CHART NOTE - NSCHARTNOTEFT_GEN_A_CORE
LA paperwork sent by danielle Lozano received and filled out. Paperwork left with assistant nurse manager to be returned to son.

## 2022-01-13 NOTE — PROCEDURE NOTE - NSPROCDETAILS_GEN_ALL_CORE
location identified, draped/prepped, sterile technique used/sterile dressing applied/sterile technique, catheter placed/supine position/ultrasound guidance
guidewire recovered/lumen(s) aspirated and flushed/sterile dressing applied/sterile technique, catheter placed/ultrasound guidance with use of sterile gel and probe cove

## 2022-01-13 NOTE — CONSULT NOTE ADULT - ASSESSMENT
TRESA PIZARRO 81 f 12/22[birth year]2021 1940 DR ANTONIO PATTON     Initial evaluation/Pulmonary Critical Care consultation requested on  1/13/2022 by SANTIAGO CELESTIN    from Dr Justice   Patient examined chart reviewed    HOSPITAL ADMISSION   PATIENT CAME  FROM (if information available)      TRESA PIZARRO 81 f 12/[birth year]/2021 1940 DR ANTONIO PATTON     REVIEW OF SYMPTOMS      Able to give (reliable) ROS  NO     PHYSICAL EXAM    HEENT Unremarkable  atraumatic   RESP Fair air entry EXP prolonged    Harsh breath sound Resp distres mild   CARDIAC S1 S2 No S3     NO JVD    ABDOMEN SOFT BS PRESENT NOT DISTENDED No hepatosplenomegaly   PEDAL EDEMA present No calf tenderness  NO rash       PATIENT PRESENTATION.      81F w/ HTN, HLD, hypothyroidism, depression, seizures. Presented w/ abdominal pain found to have acute sigmoid diverticulitis. Complicated by perforated sigmoid diverticulitis w/ abscess s/p ex-lap w/ large bowel resection and ostomy creation. COurse complicated by rapid Afib, volume overload, multiple fluid collection s/p IR drainage of R abscess on 1/3 and failure to wean. S/p Trach  Pulm consulted 1/13/2022                                                       ______  DOA/CC.  12/22/2019 81F w/ HTN, HLD, hypothyroidism, depression, seizures. Presented w/ abdominal pain found to have acute sigmoid diverticulitis  ____  PMH.  pmh Hytn  pmh Sz.      _________  PROBLEMS.     VDRF.  TRACH Done 1/7  ABDOMINAL INFECTION.   1/11 ctap Decreased volume of fluid in paracolic gutters   COLOSTOMY Created 12/26  NG tube placed 12/26   DRAINS 2 drains ending in pelvis 1/11 ctap   DIARRHEA 1/13/2022   PAIN 1/10 fent 50   SZ 1/10 keppra 500.2     _____                            COVID STATUS.   ICU STAY. 12/22-1/12/2022   GOC.  1/13/2022 full code     _____                            HOSPITAL COURSE.  SURGERY.  1/7/2022 Trach Dr [place]  12/26/2021 Abdominal washout closure creatn colostomy  Dr Castro  12/24/2021 Emergent ex lap sigmoid resectn feculent eritonitis   NEURO-   PAIN fentanyl patch  ANXIETY xanax precede  SEIZURES  keppra   DEPRN lexaparo  CARDIAC  Episodes of afib s/p amio load  PULM  s/p trach for failure to wean post-op; VDRF  ID   Rxed  ceftriaxone and flagyl to cover E coli and bacteroides growing from abscess and abdominal cultures;  RENAL-   HyperNa  GI   PERF DIV  s/p ex-lap for sidmoid diverticulitis and abscess formation w/ large bowel resection an ostomy creation, as well as IR drain placement of intra-abd R abscess;   DIET tolerating NGT diet; protonix   #Heme   ENDO levothyroxine, FS w/ ISS        BEST PRACTICE ISSUES.                                                  HEAD OF BED ELEVATION. Yes  DVT PROPHYLAXIS.    1/7 lvnx 40   LIGHT PROPHYLAXIS.    1/11 protonix 40                                                                                     DIET.    1/7 vital af 1200 ngt          INFECTION PROPHYLAXIS.   1/7 Chlorhexidine .12%   1/7 chlorhexidine 2%      GENERAL ISSUES                                       ALLERGY.    nka                        WEIGHT.         12/24/2021 81                           BMI.                   12/24/2021 28           PATIENT DATA   VITALS/PO/IO/VENT/DRIPS.       1/13/2022 99 70 120/70   1/13/2022 11a cpap 5/5/.4 svt 370 r 25      ASSESSMENT/RECOMMENDATIONS.    HEMODYNAMICS.   Monitor bp Target MAP 65 (+)    RESP.   Monitor po Target po 90-95%    OXYGEN REQUIREMENTS.   1/13/2022 40%    VENT MANAGEMENT.   HOB elevation  Target Pplat 35 (-)  Target PO 90-95%  Target pH 730 (+)    INFECTION.  Abdominal infection sec perforated sigmoid div feculent peritonitis poa 12/22.  w 1/13/2022 w 9.7   12/26/2021 Abdominal washout closure creatn colostomy  Dr Castro  12/24/2021 Emergent ex lap sigmoid resectn feculent eritonitis   ctap ic 1/11 sm effsn and atelect l base   patchy densities bases   ng tube   Decreased volume of fluid in paracolic gutters   2 drains ending in pelvis   1/11 Flagyl.3 ccpa (iai)   1/7 rocephin x 7d ccpa (iai)     DIARRHEA.   1/13/2022 po vanco ngt 125.4 Dr Patton     ANEMIA.  Hb 1/13/2022 Hb 9.4   Monitor    NG FEEDS   1/13/2022 speech eval to transition to po diet      TIME SPENT   Over 55 minutes aggregate care time spent on encounter; activities included   direct patient care, counseling and/or coordinating care reviewing notes, lab data/ imaging , discussion with multidisciplinary team/ patient  /family and explaining in detail risks, benefits, alternatives  of the recommendations     TRESA PIZARRO 81 f 12/22/2021 1940 DR ANTONIO PATTON

## 2022-01-13 NOTE — PROGRESS NOTE ADULT - ASSESSMENT
Patient is a 81F with a PMH of HTN, HLD, hypothyroidism, depression, seizures who presents to the ED for abd pain found to have diverticulitis   has rising leukocytosis   had fever yesterday   tachycardic and now hypoxic   CXR (I personally reviewed) low lung volumes   origincal CT (I personally reviewed) with diverticulitis   she had a lot of pain in the abdomen on exam     12/25: s/p surgery for diverticulitis with perforation and abscess and went to the OR. intubated in ICU with vac and needs to go back to the OR, currently on zosyn, s/p one dose of diflucan last night   12/27: s/p repair and ostomy creation yesterday, wbc elevated, cultures sensitive to zosyn and candida albicans is notoriously sensitive to azoles such as fluconazole, wean of pressors and sedation, extubate when ready   12/28: Further increase in white blood cell count but overall the patient appears to be reasonably stable.  No concern of C. difficile at this juncture.  Current antibiotics are reasonable.  Leukocytosis like related to recent surgery, no concern of other superimposed process on the basis of exam.  I do not see a role to alter her antimicrobials.  12/29:  Remains intubated in ICU. still quite edematous and net positive, WBC climbing, small wound dehiscence at bottom of incision, plan for initiation of TPN today, remains on antibiotics    12/30: rising WBC, ostomy not functioning yet, very edematous CT today, may be source control issue so for now can continue same antibiotics and antifungal but may need to change if findings on the CT are worrisome or change in hemodynamics  12/31:Leukocytosis, with collections noted on CT.  However this is being done postoperative day 5, there is some possibility that this could represent postop changes but given the leukocytosis, concur with plans for attempts at percutaneous drainage.  White blood cell count down slightly compared to prior peak, will need to be trended.  Gallbladder is also distended.  Abdominal exam was benign this morning, but a calculus cholecystitis could be the differential diagnosis here as well (though n.p.o. status certainly could explain distended gallbladder, there is also report of gallbladder wall thickening).  Would modify antibiotics based on cultures from drainage, I do not believe that antibiotics and other the cells would be adequate here.  Fortunately she is off pressors, with preserved renal function, and tolerating pressure support.  1/1/22: Postop day 6.  I have some concerns with the appearance of the ostomy, though the abdominal exam overall is otherwise unchanged.  White blood cell count remains elevated, but is lower compared with prior values.  This is despite no change in antibiotic therapy.  Patient also has asymmetric edema of the upper extremities, right greater than left.  Low threshold for duplex ultrasound to exclude DVT.No new surveillance culture data.  1/2: POD7 Ostomy looks better today, UE symmetric. WBC elevated, some slight downward trend overall. Temps <= 100F.   1/3: drainage of fluid collection today, continue zosyn and fluconazole for now, monitor wbc and temps, watch ostomy output, diuresis and address third spacing   1/4: s/p drainage yesterday now growing ecoli and awaiting for sensitivities, wbc is improving, started on tube feeds    1/5: abscess growing ecoli and bacteroides, S to ceftriaxone, wbc 15, weaning trial today, pain management    1/6: awake, lung exercise today, trach planned for tomorrow, ostomy output is good. will stop fluconazole today and change antibiotics to ceftriaxone and flagly. Unclear stop date yet    1/7 trach today, continue antibiotics   1/9: s/p trach, had low grade fever but otherwise doing ok, will continues same antibiotics regimen for now but if fevers continue she will needs to be rescanned as those abscesses can progress.  1/10: low grade fever but doing well on trach-PS, ostomy with stool and gas, shakes head when asked if in pain, discussed with surgery about repeating CT scan given the temp  1/11: Still febrile, favor interval CT as above.  1/12: CT of abdomen showing some signs of improvement and elsewhere  it shows signs of worsening. wbc normal, tube feeds currently via NGT   1/13: no fever, no wbc, Cr and LFTs ok, Ceftriaxone and flagyl continued. No planned IR intervention at this time - fluid collection without access to drain, surgical drain within the collection. Pt possibly will have repeat imaging over the weekend to evaluate progress.

## 2022-01-13 NOTE — CHART NOTE - NSCHARTNOTEFT_GEN_A_CORE
Patient seen and examined with Dr. Lu    Retention sutures removed. No bleeding noted, patient tolerated procedure well. Wound packed with iodoform packing, and covered with gauze.     Pending PT wound vac    Discussed with Dr. Lu

## 2022-01-13 NOTE — CONSULT NOTE ADULT - SUBJECTIVE AND OBJECTIVE BOX
JUAREZ GTZ    LVS 1E 193 D    Allergies    No Known Allergies    Intolerances        PAST MEDICAL & SURGICAL HISTORY:  Seizure    HTN (hypertension)    Glaucoma    Thyroid disease    Blood clot of neck vein  left side    TIA (transient ischemic attack)  20 years ago    S/P appendectomy        FAMILY HISTORY:  FH: HTN (hypertension)        Home Medications:  amLODIPine 10 mg oral tablet: 1 tab(s) orally once a day (22 Dec 2021 16:46)  aspirin 81 mg oral tablet, chewable: 1 tab(s) orally once a day (22 Dec 2021 16:46)  escitalopram 20 mg oral tablet: 1 tab(s) orally once a day (22 Dec 2021 16:46)  keppera:  (22 Dec 2021 16:46)  levETIRAcetam 500 mg oral tablet: 1 tab(s) orally 2 times a day (22 Dec 2021 16:46)  timolol-dorzolamide 0.5%-2% preservative-free ophthalmic solution: 1 drop(s) to each affected eye 2 times a day (22 Dec 2021 16:46)  Zetia 10 mg oral tablet: 1 tab(s) orally once a day (22 Dec 2021 16:46)      MEDICATIONS  (STANDING):  ALPRAZolam 0.25 milliGRAM(s) Oral every 12 hours  cefTRIAXone   IVPB 1000 milliGRAM(s) IV Intermittent every 24 hours  chlorhexidine 0.12% Liquid 15 milliLiter(s) Oral Mucosa every 12 hours  chlorhexidine 2% Cloths 1 Application(s) Topical <User Schedule>  dextrose 40% Gel 15 Gram(s) Oral once  dextrose 5%. 1000 milliLiter(s) (50 mL/Hr) IV Continuous <Continuous>  dextrose 5%. 1000 milliLiter(s) (100 mL/Hr) IV Continuous <Continuous>  dextrose 50% Injectable 25 Gram(s) IV Push once  dextrose 50% Injectable 12.5 Gram(s) IV Push once  dextrose 50% Injectable 25 Gram(s) IV Push once  dorzolamide 2%/timolol 0.5% Ophthalmic Solution 1 Drop(s) Both EYES two times a day  enoxaparin Injectable 40 milliGRAM(s) SubCutaneous daily  escitalopram 20 milliGRAM(s) Oral daily  fentaNYL   Patch  50 MICROgram(s)/Hr. 1 Patch Transdermal every 48 hours  glucagon  Injectable 1 milliGRAM(s) IntraMuscular once  levETIRAcetam 500 milliGRAM(s) Oral every 12 hours  levothyroxine 50 MICROGram(s) Oral daily  metroNIDAZOLE    Tablet 500 milliGRAM(s) Oral every 8 hours  pantoprazole   Suspension 40 milliGRAM(s) Enteral Tube daily    MEDICATIONS  (PRN):  acetaminophen     Tablet .. 650 milliGRAM(s) Oral every 6 hours PRN Temp greater or equal to 38C (100.4F)  sodium chloride 0.9% lock flush 10 milliLiter(s) IV Push every 1 hour PRN Pre/post blood products, medications, blood draw, and to maintain line patency      Diet, NPO with Tube Feed:   Tube Feeding Modality: Nasogastric  Vital AF  Total Volume for 24 Hours (mL): 1200  Continuous  Starting Tube Feed Rate mL per Hour: 50  Until Goal Tube Feed Rate (mL per Hour): 50  Tube Feed Duration (in Hours): 24  Tube Feed Start Time: 16:30 (01-07-22 @ 16:41) [Active]          Vital Signs Last 24 Hrs  T(C): 37.1 (13 Jan 2022 05:05), Max: 38.3 (12 Jan 2022 11:25)  T(F): 98.8 (13 Jan 2022 05:05), Max: 101 (12 Jan 2022 11:25)  HR: 66 (13 Jan 2022 05:46) (57 - 78)  BP: 138/71 (13 Jan 2022 05:05) (118/52 - 165/67)  BP(mean): 84 (12 Jan 2022 22:00) (72 - 100)  RR: 18 (13 Jan 2022 05:05) (14 - 24)  SpO2: 99% (13 Jan 2022 05:46) (99% - 100%)      01-12-22 @ 07:01  -  01-13-22 @ 07:00  --------------------------------------------------------  IN: 1850 mL / OUT: 1275 mL / NET: 575 mL        Mode: AC/ CMV (Assist Control/ Continuous Mandatory Ventilation), RR (machine): 15, TV (machine): 450, FiO2: 40, PEEP: 5, ITime: 1, MAP: 9, PIP: 21      LABS:                        8.4    10.86 )-----------( 476      ( 12 Jan 2022 03:00 )             27.7     01-12    149<H>  |  113<H>  |  22  ----------------------------<  124<H>  3.0<L>   |  30  |  0.51    Ca    8.0<L>      12 Jan 2022 03:00  Phos  2.6     01-12  Mg     2.0     01-12    TPro  5.4<L>  /  Alb  1.7<L>  /  TBili  0.2  /  DBili  x   /  AST  15  /  ALT  15  /  AlkPhos  70  01-12              WBC:  WBC Count: 10.86 K/uL (01-12 @ 03:00)  WBC Count: 8.93 K/uL (01-11 @ 02:55)  WBC Count: 9.34 K/uL (01-10 @ 04:18)      MICROBIOLOGY:  RECENT CULTURES:                  Sodium:  Sodium, Serum: 149 mmol/L (01-12 @ 03:00)  Sodium, Serum: 150 mmol/L (01-11 @ 02:55)  Sodium, Serum: 146 mmol/L (01-10 @ 04:18)      0.51 mg/dL 01-12 @ 03:00  0.61 mg/dL 01-11 @ 02:55  0.56 mg/dL 01-10 @ 04:18      Hemoglobin:  Hemoglobin: 8.4 g/dL (01-12 @ 03:00)  Hemoglobin: 8.7 g/dL (01-11 @ 02:55)  Hemoglobin: 9.1 g/dL (01-10 @ 04:18)      Platelets: Platelet Count - Automated: 476 K/uL (01-12 @ 03:00)  Platelet Count - Automated: 466 K/uL (01-11 @ 02:55)  Platelet Count - Automated: 531 K/uL (01-10 @ 04:18)      LIVER FUNCTIONS - ( 12 Jan 2022 03:00 )  Alb: 1.7 g/dL / Pro: 5.4 gm/dL / ALK PHOS: 70 U/L / ALT: 15 U/L / AST: 15 U/L / GGT: x                 RADIOLOGY & ADDITIONAL STUDIES:      MICROBIOLOGY:  RECENT CULTURES:

## 2022-01-13 NOTE — PROGRESS NOTE ADULT - SUBJECTIVE AND OBJECTIVE BOX
JUAREZ GTZ  MRN-[id number]    Follow Up:  perforated diverticulitis, fever    Interval History: The pt was seen and examined earlier, downgraded from ICU/CCU to the medical floor and then to telemetry unit. The pt is afebrile, vented via tracheostomy, leukocytosis normalized, Cr ok.     PAST MEDICAL & SURGICAL HISTORY:  Seizure    HTN (hypertension)    Glaucoma    Thyroid disease    Blood clot of neck vein  left side    TIA (transient ischemic attack)  20 years ago    S/P appendectomy        ROS:    [x ] Unobtainable because: pt is vented via tracheostomy  [ ] All other systems negative    Constitutional: no fever, no chills  Head: no trauma  Eyes: no vision changes, no eye pain  ENT:  no sore throat, no rhinorrhea  Cardiovascular:  no chest pain, no palpitation  Respiratory:  no SOB, no cough  GI:  no abd pain, no vomiting, no diarrhea  urinary: no dysuria, no hematuria, no flank pain  musculoskeletal:  no joint pain, no joint swelling  skin:  no rash  neurology:  no headache, no seizure, no change in mental status  psych: no anxiety, no depression         Allergies  No Known Allergies        ANTIMICROBIALS:  cefTRIAXone   IVPB 1000 every 24 hours  metroNIDAZOLE    Tablet 500 every 8 hours  vancomycin    Solution 125 every 6 hours      OTHER MEDS:  acetaminophen     Tablet .. 650 milliGRAM(s) Oral every 6 hours PRN  ALPRAZolam 0.25 milliGRAM(s) Oral every 12 hours  chlorhexidine 0.12% Liquid 15 milliLiter(s) Oral Mucosa every 12 hours  chlorhexidine 2% Cloths 1 Application(s) Topical <User Schedule>  dextrose 40% Gel 15 Gram(s) Oral once  dextrose 5%. 1000 milliLiter(s) IV Continuous <Continuous>  dextrose 5%. 1000 milliLiter(s) IV Continuous <Continuous>  dextrose 50% Injectable 25 Gram(s) IV Push once  dextrose 50% Injectable 12.5 Gram(s) IV Push once  dextrose 50% Injectable 25 Gram(s) IV Push once  dorzolamide 2%/timolol 0.5% Ophthalmic Solution 1 Drop(s) Both EYES two times a day  enoxaparin Injectable 40 milliGRAM(s) SubCutaneous daily  escitalopram 20 milliGRAM(s) Oral daily  fentaNYL   Patch  50 MICROgram(s)/Hr. 1 Patch Transdermal every 48 hours  glucagon  Injectable 1 milliGRAM(s) IntraMuscular once  levETIRAcetam 500 milliGRAM(s) Oral every 12 hours  levothyroxine 50 MICROGram(s) Oral daily  pantoprazole   Suspension 40 milliGRAM(s) Enteral Tube daily  potassium chloride   Powder 40 milliEquivalent(s) Oral every 4 hours  sodium chloride 0.9% lock flush 10 milliLiter(s) IV Push every 1 hour PRN      Vital Signs Last 24 Hrs  T(C): 37.3 (13 Jan 2022 11:59), Max: 37.8 (12 Jan 2022 16:00)  T(F): 99.1 (13 Jan 2022 11:59), Max: 100 (12 Jan 2022 16:00)  HR: 73 (13 Jan 2022 11:59) (57 - 82)  BP: 129/70 (13 Jan 2022 11:59) (118/52 - 165/67)  BP(mean): 84 (12 Jan 2022 22:00) (72 - 96)  RR: 18 (13 Jan 2022 11:59) (15 - 21)  SpO2: 97% (13 Jan 2022 11:59) (97% - 100%)    Physical Exam:  General: Nontoxic-appearing Female in no acute distress. Trached on vent  HEENT: AT/NC. Oropharynx/dentition unable secondary to patient compliance.   Neck: Not rigid. No sense of mass. Trach C/D/I  Nodes: None palpable.  Lungs: coarse B BS  Heart: Regular rate and rhythm. No Murmur. No rub. No gallop. No palpable thrill.  Abdomen: Bowel sounds now present.  Soft. Mildly distended.  Incision dressed.  Ostomy with stool and gas. Drains   Extremities: No cyanosis or clubbing. 3+ pitting edema  Skin: Warm. Dry No rash. No vasculitic stigmata.  Neuro: awake, not alert, does not follow commands   Psychiatric: Unable    WBC Count: 9.77 K/uL (01-13 @ 09:31)  WBC Count: 10.86 K/uL (01-12 @ 03:00)  WBC Count: 8.93 K/uL (01-11 @ 02:55)  WBC Count: 9.34 K/uL (01-10 @ 04:18)  WBC Count: 8.95 K/uL (01-09 @ 04:27)  WBC Count: 14.46 K/uL (01-08 @ 03:15)  WBC Count: 17.43 K/uL (01-07 @ 17:00)                            9.4    9.77  )-----------( 519      ( 13 Jan 2022 09:31 )             31.3       01-13    145  |  109<H>  |  23  ----------------------------<  122<H>  3.3<L>   |  27  |  0.48<L>    Ca    8.5      13 Jan 2022 09:31  Phos  3.0     01-13  Mg     2.3     01-13    TPro  6.0  /  Alb  1.8<L>  /  TBili  0.2  /  DBili  x   /  AST  16  /  ALT  15  /  AlkPhos  72  01-13          Creatinine Trend: 0.48<--, 0.51<--, 0.61<--, 0.56<--, 0.67<--, 0.62<--      MICROBIOLOGY:  v  .Body Fluid Abdominal Fluid  01-03-22   Few Escherichia coli  Few Bacteroides ovatus group "Susceptibilities not performed"  --  Escherichia coli      .Sputum Sputum  12-28-21   Normal Respiratory Sendy present  --    Moderate polymorphonuclear leukocytes per low power field  Numerous Squamous epithelial cells per low power field  Few Gram positive cocci in pairs per oil power field  Rare Gram Positive Rods per oil power field  Rare Yeast like cells per oil power field  Results consistent with oropharyngeal contamination      .Blood Blood  12-28-21   No Growth Final  --  --      .Body Fluid INTRA -ABDOMINAL FLUID FOR CULTURE  12-25-21   Few Escherichia coli  Rare Escherichia coli #2  Few Candida albicans "Susceptibilities not performed"  Moderate Bacteroides thetaiotamcron group "Susceptibilities not performed"  Moderate Bacteroides uniformis "Susceptibilities not performed"  --  Escherichia coli  Escherichia coli      .Blood Blood-Peripheral  12-25-21   No Growth Final  --  --      .Blood Blood-Peripheral  12-24-21   No Growth Final  --  --      Clean Catch Clean Catch (Midstream)  12-23-21   Normal Urogenital sendy present  --  --    SARS-CoV-2 Result: NotDetec (01-10-22 @ 04:19)    SARS-CoV-2: NotDetec (24 Dec 2021 14:38)  SARS-CoV-2: NotDetec (24 Dec 2021 00:03)    RADIOLOGY:

## 2022-01-13 NOTE — PROGRESS NOTE ADULT - ASSESSMENT
81F with a PMH of HTN, HLD, hypothyroidism, depression, seizures who presents to the ED for abd pain.  Patient states that over the last two days she had developed significant abdominal pain and multiple episodes of watery diarrhea.  Reports one episode of nausea and vomiting earlier today.  Patient has no other complaints.  Vitals stable, (hypoxix?) labs show leukocytosis and hypokalemia.  CT abdomen significant for diverticulitis.  initially admitted to med surg.  CT noted with sigmoid diverticulosis with adjacent stranding, compatible with diverticulitis. No associated abscess is identified. Trace adjacent pelvic free fluid.81F with a PMH of HTN, HLD, hypothyroidism, depression, seizures p/w abd pain,       Perforated Acute diverticulitis   s/p 12/24/21 - Exploratory laparotomy and a sigmoid colectomy and abdominal washout  Patient went for abdominal wound closure and creation of colostomy on 12/26.  Patient developed adb abscess 1/3 s/p IR drainage of abdominal abscesses.  Initially patient on PPN for nutrition Now tolerating tube feeds with brown soft stool in colostomy..  Found to have multiple abdominal fluid collections s/p IR drainage of R abdominal abscess 1/3/22 growing e-coli and bacteroides       Septic Shock s/p mechanical vent and pressors  Transferred to ICU for post op care on mechanical ventilation.  now off pressors failed extubation  now trach to cpap  Pulm and ID consutled  Cultures Sensitive to ceftriaxone, continue antibiotics to ceftriaxone and flagyl per ID.    acute metabolic encephalopathy sec to above  stable    Rapid AFIB due to Shock state  10mg IV lopressor in OR for Afib with RVR. 5L IVF given intra-op. EBL 100cc. Received intubated, sedated, on phenylephrine.    s/p amio load converted to sinus rhythm.  not on rate control or AC on downgrade  Cards consulted  plan:  add BB  hold full dose A/C for now    Patient went for abdominal wound closure and creation of colostomy on 12/26. Patient developed adb abscess 1/3 s/p IR drainage of abdominal abscesses   Post op a-fib   Left BABAK drain with murky purulent drainage- surgical team aware.  Patient with intermittent fevers, but is on appropriate antibiotics and does not have leukocytosis. Unclear if source control has been achieved.  Possible drainage by IR.   Peg placement also requested by IR next week as per surgery. 1/7 trach placed.  Pain management on fentanyl patch. Patient seen and examined by ICU attending and stable for transfer to medicine service for continued care.  elizabeth  81F with a PMH of HTN, HLD, hypothyroidism, depression, seizures who presents to the ED for abd pain.  Patient states that over the last two days she had developed significant abdominal pain and multiple episodes of watery diarrhea.  Reports one episode of nausea and vomiting earlier today.  Patient has no other complaints.  Vitals stable, (hypoxix?) labs show leukocytosis and hypokalemia.  CT abdomen significant for diverticulitis.  initially admitted to med surg.  CT noted with sigmoid diverticulosis with adjacent stranding, compatible with diverticulitis. No associated abscess is identified. Trace adjacent pelvic free fluid.81F with a PMH of HTN, HLD, hypothyroidism, depression, seizures p/w abd pain,       Perforated Acute diverticulitis   s/p 12/24/21 - Exploratory laparotomy and a sigmoid colectomy and abdominal washout  Patient went for abdominal wound closure and creation of colostomy on 12/26.  Patient developed adb abscess 1/3 s/p IR drainage of abdominal abscesses.  Initially patient on PPN for nutrition Now tolerating tube feeds with brown soft stool in colostomy..  Found to have multiple abdominal fluid collections s/p IR drainage of R abdominal abscess 1/3/22 growing e-coli and bacteroides  Peg placement also requested by IR next week as per surgery.  Noted collection around BABAK  drain  plan:  repeat CT over weekend  c/w antibiotics + vanc (oral), ceftriaxone and flagyl  BABAK drain care  IR for PEG tube next week  followup CT : patient may need IR to drain abscess on Monday      Septic Shock s/p  pressors  Transferred to ICU for post op care on mechanical ventilation.  now off pressors failed extubation  now trach to cpap  Pulm and ID consutled  Cultures Sensitive to ceftriaxone, continue antibiotics to ceftriaxone and flagyl per ID.    Hypoxemic respiratory failure now trach to CPAP  Failed extubation in ICU  now trach to CPAP  Pulm following  on fentayl patch for pain control    acute metabolic encephalopathy sec to above  stable    Rapid AFIB due to Shock state  10mg IV lopressor in OR for Afib with RVR. 5L IVF given intra-op. EBL 100cc. Received intubated, sedated, on phenylephrine.    s/p amio load converted to sinus rhythm.  not on rate control or AC on downgrade  Cards consulted  plan:  add BB  hold full dose A/C for now      dvt ppx: lovenox  subq    for AFIB: transfer to tele

## 2022-01-13 NOTE — CONSULT NOTE ADULT - SUBJECTIVE AND OBJECTIVE BOX
CHIEF COMPLAINT:  Patient is a 81y old  Female who presents with a chief complaint of diverticulitis (13 Jan 2022 08:15)      HPI:  Patient is a 81F with a PMH of HTN, HLD, hypothyroidism, depression, seizures who presents to the ED for abd pain.  Patient states that over the last two days she had developed significant abdominal pain and multiple episodes of watery diarrhea.  Reports one episode of nausea and vomiting earlier today.  Patient has no other complaints.  Vitals stable,  labs show leukocytosis and hypokalemia.  CT abdomen significant for diverticulitis.       ALLERGIES:  No Known Allergies        Home Medications:  amLODIPine 10 mg oral tablet: 1 tab(s) orally once a day (22 Dec 2021 16:46)  aspirin 81 mg oral tablet, chewable: 1 tab(s) orally once a day (22 Dec 2021 16:46)  escitalopram 20 mg oral tablet: 1 tab(s) orally once a day (22 Dec 2021 16:46)  keppera:  (22 Dec 2021 16:46)  levETIRAcetam 500 mg oral tablet: 1 tab(s) orally 2 times a day (22 Dec 2021 16:46)  timolol-dorzolamide 0.5%-2% preservative-free ophthalmic solution: 1 drop(s) to each affected eye 2 times a day (22 Dec 2021 16:46)  Zetia 10 mg oral tablet: 1 tab(s) orally once a day (22 Dec 2021 16:46)    PAST MEDICAL & SURGICAL HISTORY:  Seizure    HTN (hypertension)    Glaucoma    Thyroid disease    Blood clot of neck vein  left side    TIA (transient ischemic attack)  20 years ago    S/P appendectomy          FAMILY HISTORY:  FH: HTN (hypertension)        SOCIAL HISTORY:    REVIEW OF SYSTEMS:  General:  No wt loss, fevers, chills, night sweats  Eyes:  Good vision, no reported pain  ENT:  No sore throat, pain, runny nose, dysphagia  CV:  No pain, palpitations, hypo/hypertension  Resp:  No dyspnea, cough, tachypnea, wheezing  GI:  No pain, nausea, vomiting, diarrhea, constipation  :  No pain, bleeding, incontinence, nocturia  Muscle:  No pain, weakness  Breast:  No pain, abscess, mass, discharge  Neuro:  No weakness, tingling, memory problems  Psych:  No fatigue, insomnia, mood problems, depression  Endocrine:  No polyuria, polydipsia, cold/heat intolerance  Heme:  No petechiae, ecchymosis, easy bruisability  Skin:  No rash, edema    PHYSICAL EXAM:  Vital Signs:  Vital Signs Last 24 Hrs  T(C): 37.3 (13 Jan 2022 11:59), Max: 37.8 (12 Jan 2022 16:00)  T(F): 99.1 (13 Jan 2022 11:59), Max: 100 (12 Jan 2022 16:00)  HR: 73 (13 Jan 2022 11:59) (57 - 82)  BP: 129/70 (13 Jan 2022 11:59) (118/52 - 165/67)  BP(mean): 84 (12 Jan 2022 22:00) (72 - 96)  RR: 18 (13 Jan 2022 11:59) (14 - 21)  SpO2: 97% (13 Jan 2022 11:59) (97% - 100%)  I&O's Summary    12 Jan 2022 07:01  -  13 Jan 2022 07:00  --------------------------------------------------------  IN: 1850 mL / OUT: 1275 mL / NET: 575 mL      I&O's Detail    12 Jan 2022 07:01  -  13 Jan 2022 07:00  --------------------------------------------------------  IN:    Enteral Tube Flush: 650 mL    IV PiggyBack: 50 mL    Vital1.5: 1150 mL  Total IN: 1850 mL    OUT:    Bulb (mL): 5 mL    Bulb (mL): 20 mL    Colostomy (mL): 200 mL    Incontinent per Collection Bag (mL): 750 mL    Indwelling Catheter - Urethral (mL): 300 mL  Total OUT: 1275 mL    Total NET: 575 mL          Tele:     Constitutional: well developed, normal appearance, well groomed, well nourished, no deformities and no acute distress.   Eyes: the conjunctiva exhibited no abnormalities and the eyelids demonstrated no xanthelasmas.   HEENT: normal oral mucosa, no oral pallor and no oral cyanosis.   Neck: normal jugular venous A waves present, normal jugular venous V waves present and no jugular venous mayfield A waves.   Pulmonary: no respiratory distress, normal respiratory rhythm and effort, no accessory muscle use and lungs were clear to auscultation bilaterally.   Cardiovascular: heart rate and rhythm were normal, normal S1 and S2 and no murmur, gallop, rub, heave or thrill are present.   Abdomen: soft, non-tender, no hepato-splenomegaly and no abdominal mass palpated.   Musculoskeletal: the gait could not be assessed..   Extremities: no clubbing of the fingernails, no localized cyanosis, no petechial hemorrhages and no ischemic changes.   Skin: normal skin color and pigmentation, no rash, no venous stasis, no skin lesions, no skin ulcer and no xanthoma was observed.   Psychiatric: oriented to person, place, and time, the affect was normal, the mood was normal and not feeling anxious.      LABORATORY:                          9.4    9.77  )-----------( 519      ( 13 Jan 2022 09:31 )             31.3     01-13    145  |  109<H>  |  23  ----------------------------<  122<H>  3.3<L>   |  27  |  0.48<L>    Ca    8.5      13 Jan 2022 09:31  Phos  3.0     01-13  Mg     2.3     01-13    TPro  6.0  /  Alb  1.8<L>  /  TBili  0.2  /  DBili  x   /  AST  16  /  ALT  15  /  AlkPhos  72  01-13          CAPILLARY BLOOD GLUCOSE      POCT Blood Glucose.: 131 mg/dL (13 Jan 2022 07:02)  POCT Blood Glucose.: 137 mg/dL (13 Jan 2022 01:15)  POCT Blood Glucose.: 128 mg/dL (12 Jan 2022 16:56)    LIVER FUNCTIONS - ( 13 Jan 2022 09:31 )  Alb: 1.8 g/dL / Pro: 6.0 gm/dL / ALK PHOS: 72 U/L / ALT: 15 U/L / AST: 16 U/L / GGT: x             IMAGING:  < from: 12 Lead ECG (12.24.21 @ 20:02) >  Atrial fibrillation with rapid ventricular response  Nonspecific ST and T wave abnormality  Abnormal ECG  When compared with ECG of 24-DEC-2021 12:08,  Atrial fibrillation has replaced Sinus rhythm  Vent. rate has increased BY  59 BPM  Nonspecific T wave abnormality now evident in Lateral leads    < end of copied text >    < from: CT Abdomen and Pelvis w/ IV Cont (01.11.22 @ 16:42) >  Small pleural effusion with passive atelectasis at the left lung base is   again seen. Resolutionof previously seen right pleural effusion  Patchy densities in the lung bases partially visualized representing   small areas of linear atelectasis versus infiltrate.  NG tube again seen  Cholelithiasis  Decreased volume of fluid collections in the paracolic gutters.  Fluid collection the posterior pelvis is increased in size from the prior   study  Open wound midline anterior abdominal wall again seen  Left lower quadrant colostomy again appreciated  2. Drains are again seen ending in the pelvis.    < end of copied text >    < from: TTE Echo Complete w/o Contrast w/ Doppler (12.25.21 @ 10:02) >  Summary:   1. Left ventricular ejection fraction, by visual estimation, is 55 to   60%.   2. Normal global left ventricular systolic function.   3. Trace mitral valve regurgitation.   4. Consider contrast echocardiogram, if clinically warranted,to better   evauate left ventricular function and wall motion.    < end of copied text >    ASSESSMENT:  Patient is a 81F with a PMH of HTN, HLD, hypothyroidism, depression, seizures who presents to the ED for abd pain.  Patient states that over the last two days she had developed significant abdominal pain and multiple episodes of watery diarrhea.  Reports one episode of nausea and vomiting earlier today.  Patient has no other complaints.  Vitals stable,  labs show leukocytosis and hypokalemia.  CT abdomen significant for diverticulitis.     PLAN:       ALPRAZolam 0.25 milliGRAM(s) Oral every 12 hours  cefTRIAXone   IVPB 1000 milliGRAM(s) IV Intermittent every 24 hours  chlorhexidine 0.12% Liquid 15 milliLiter(s) Oral Mucosa every 12 hours  chlorhexidine 2% Cloths 1 Application(s) Topical <User Schedule>  dorzolamide 2%/timolol 0.5% Ophthalmic Solution 1 Drop(s) Both EYES two times a day  enoxaparin Injectable 40 milliGRAM(s) SubCutaneous daily  escitalopram 20 milliGRAM(s) Oral daily  fentaNYL   Patch  50 MICROgram(s)/Hr. 1 Patch Transdermal every 48 hours  glucagon  Injectable 1 milliGRAM(s) IntraMuscular once  levETIRAcetam 500 milliGRAM(s) Oral every 12 hours  levothyroxine 50 MICROGram(s) Oral daily  metroNIDAZOLE    Tablet 500 milliGRAM(s) Oral every 8 hours  pantoprazole   Suspension 40 milliGRAM(s) Enteral Tube daily  potassium chloride   Powder 40 milliEquivalent(s) Oral every 4 hours  vancomycin    Solution 125 milliGRAM(s) Oral every 6 hours      Fredy Duran MD, FACC, MEENAKSHI, FASNC, FACP  Director, Heart Failure Services  [facility]  , Department of Cardiology  [facility]     CHIEF COMPLAINT:  Patient is a 81y old  Female who presents with a chief complaint of diverticulitis (13 Jan 2022 08:15)      HPI:  Patient is a 81F with a PMH of HTN, HLD, hypothyroidism, depression, seizures who presents to the ED for abd pain.  Patient states that over the last two days she had developed significant abdominal pain and multiple episodes of watery diarrhea.  Reports one episode of nausea and vomiting earlier today.  Patient has no other complaints.  Vitals stable,  labs show leukocytosis and hypokalemia.  CT abdomen significant for diverticulitis. Had rapid afib now SR.      ALLERGIES:  No Known Allergies        Home Medications:  amLODIPine 10 mg oral tablet: 1 tab(s) orally once a day (22 Dec 2021 16:46)  aspirin 81 mg oral tablet, chewable: 1 tab(s) orally once a day (22 Dec 2021 16:46)  escitalopram 20 mg oral tablet: 1 tab(s) orally once a day (22 Dec 2021 16:46)  keppera:  (22 Dec 2021 16:46)  levETIRAcetam 500 mg oral tablet: 1 tab(s) orally 2 times a day (22 Dec 2021 16:46)  timolol-dorzolamide 0.5%-2% preservative-free ophthalmic solution: 1 drop(s) to each affected eye 2 times a day (22 Dec 2021 16:46)  Zetia 10 mg oral tablet: 1 tab(s) orally once a day (22 Dec 2021 16:46)    PAST MEDICAL & SURGICAL HISTORY:  Seizure    HTN (hypertension)    Glaucoma    Thyroid disease    Blood clot of neck vein  left side    TIA (transient ischemic attack)  20 years ago    S/P appendectomy          FAMILY HISTORY:  FH: HTN (hypertension)        SOCIAL HISTORY:    REVIEW OF SYSTEMS:  General:  No wt loss, fevers, chills, night sweats  Eyes:  Good vision, no reported pain  ENT:  No sore throat, pain, runny nose, dysphagia  CV:  No pain, palpitations, hypo/hypertension  Resp:  No dyspnea, cough, tachypnea, wheezing  GI:  No pain, nausea, vomiting, diarrhea, constipation  :  No pain, bleeding, incontinence, nocturia  Muscle:  No pain, weakness  Breast:  No pain, abscess, mass, discharge  Neuro:  No weakness, tingling, memory problems  Psych:  No fatigue, insomnia, mood problems, depression  Endocrine:  No polyuria, polydipsia, cold/heat intolerance  Heme:  No petechiae, ecchymosis, easy bruisability  Skin:  No rash, edema    PHYSICAL EXAM:  Vital Signs:  Vital Signs Last 24 Hrs  T(C): 37.3 (13 Jan 2022 11:59), Max: 37.8 (12 Jan 2022 16:00)  T(F): 99.1 (13 Jan 2022 11:59), Max: 100 (12 Jan 2022 16:00)  HR: 73 (13 Jan 2022 11:59) (57 - 82)  BP: 129/70 (13 Jan 2022 11:59) (118/52 - 165/67)  BP(mean): 84 (12 Jan 2022 22:00) (72 - 96)  RR: 18 (13 Jan 2022 11:59) (14 - 21)  SpO2: 97% (13 Jan 2022 11:59) (97% - 100%)  I&O's Summary    12 Jan 2022 07:01  -  13 Jan 2022 07:00  --------------------------------------------------------  IN: 1850 mL / OUT: 1275 mL / NET: 575 mL      I&O's Detail    12 Jan 2022 07:01  -  13 Jan 2022 07:00  --------------------------------------------------------  IN:    Enteral Tube Flush: 650 mL    IV PiggyBack: 50 mL    Vital1.5: 1150 mL  Total IN: 1850 mL    OUT:    Bulb (mL): 5 mL    Bulb (mL): 20 mL    Colostomy (mL): 200 mL    Incontinent per Collection Bag (mL): 750 mL    Indwelling Catheter - Urethral (mL): 300 mL  Total OUT: 1275 mL    Total NET: 575 mL          Tele:     Constitutional: well developed, normal appearance, well groomed, well nourished, no deformities and no acute distress.   Eyes: the conjunctiva exhibited no abnormalities and the eyelids demonstrated no xanthelasmas.   HEENT: normal oral mucosa, no oral pallor and no oral cyanosis.   Neck: normal jugular venous A waves present, normal jugular venous V waves present and no jugular venous mayfield A waves.   Pulmonary: no respiratory distress, normal respiratory rhythm and effort, no accessory muscle use and lungs were clear to auscultation bilaterally.   Cardiovascular: heart rate and rhythm were normal, normal S1 and S2 and no murmur, gallop, rub, heave or thrill are present.   Abdomen: soft, non-tender, no hepato-splenomegaly and no abdominal mass palpated.   Musculoskeletal: the gait could not be assessed..   Extremities: no clubbing of the fingernails, no localized cyanosis, no petechial hemorrhages and no ischemic changes.   Skin: normal skin color and pigmentation, no rash, no venous stasis, no skin lesions, no skin ulcer and no xanthoma was observed.   Psychiatric: oriented to person, place, and time, the affect was normal, the mood was normal and not feeling anxious.      LABORATORY:                          9.4    9.77  )-----------( 519      ( 13 Jan 2022 09:31 )             31.3     01-13    145  |  109<H>  |  23  ----------------------------<  122<H>  3.3<L>   |  27  |  0.48<L>    Ca    8.5      13 Jan 2022 09:31  Phos  3.0     01-13  Mg     2.3     01-13    TPro  6.0  /  Alb  1.8<L>  /  TBili  0.2  /  DBili  x   /  AST  16  /  ALT  15  /  AlkPhos  72  01-13          CAPILLARY BLOOD GLUCOSE      POCT Blood Glucose.: 131 mg/dL (13 Jan 2022 07:02)  POCT Blood Glucose.: 137 mg/dL (13 Jan 2022 01:15)  POCT Blood Glucose.: 128 mg/dL (12 Jan 2022 16:56)    LIVER FUNCTIONS - ( 13 Jan 2022 09:31 )  Alb: 1.8 g/dL / Pro: 6.0 gm/dL / ALK PHOS: 72 U/L / ALT: 15 U/L / AST: 16 U/L / GGT: x             IMAGING:  < from: 12 Lead ECG (12.24.21 @ 20:02) >  Atrial fibrillation with rapid ventricular response  Nonspecific ST and T wave abnormality  Abnormal ECG  When compared with ECG of 24-DEC-2021 12:08,  Atrial fibrillation has replaced Sinus rhythm  Vent. rate has increased BY  59 BPM  Nonspecific T wave abnormality now evident in Lateral leads    < end of copied text >    < from: CT Abdomen and Pelvis w/ IV Cont (01.11.22 @ 16:42) >  Small pleural effusion with passive atelectasis at the left lung base is   again seen. Resolutionof previously seen right pleural effusion  Patchy densities in the lung bases partially visualized representing   small areas of linear atelectasis versus infiltrate.  NG tube again seen  Cholelithiasis  Decreased volume of fluid collections in the paracolic gutters.  Fluid collection the posterior pelvis is increased in size from the prior   study  Open wound midline anterior abdominal wall again seen  Left lower quadrant colostomy again appreciated  2. Drains are again seen ending in the pelvis.    < end of copied text >    < from: TTE Echo Complete w/o Contrast w/ Doppler (12.25.21 @ 10:02) >  Summary:   1. Left ventricular ejection fraction, by visual estimation, is 55 to   60%.   2. Normal global left ventricular systolic function.   3. Trace mitral valve regurgitation.   4. Consider contrast echocardiogram, if clinically warranted,to better   evauate left ventricular function and wall motion.    < end of copied text >    ASSESSMENT:  Patient is a 81F with a PMH of HTN, HLD, hypothyroidism, depression, seizures who presents to the ED for abd pain.  Patient states that over the last two days she had developed significant abdominal pain and multiple episodes of watery diarrhea.  Reports one episode of nausea and vomiting earlier today.  Patient has no other complaints.  Vitals stable,  labs show leukocytosis and hypokalemia.  CT abdomen significant for diverticulitis.  Had rapid afib now SR.    PLAN:       ALPRAZolam 0.25 milliGRAM(s) Oral every 12 hours  cefTRIAXone   IVPB 1000 milliGRAM(s) IV Intermittent every 24 hours  chlorhexidine 0.12% Liquid 15 milliLiter(s) Oral Mucosa every 12 hours  chlorhexidine 2% Cloths 1 Application(s) Topical <User Schedule>  dorzolamide 2%/timolol 0.5% Ophthalmic Solution 1 Drop(s) Both EYES two times a day  enoxaparin Injectable 40 milliGRAM(s) SubCutaneous daily  escitalopram 20 milliGRAM(s) Oral daily  fentaNYL   Patch  50 MICROgram(s)/Hr. 1 Patch Transdermal every 48 hours  glucagon  Injectable 1 milliGRAM(s) IntraMuscular once  levETIRAcetam 500 milliGRAM(s) Oral every 12 hours  levothyroxine 50 MICROGram(s) Oral daily  metroNIDAZOLE    Tablet 500 milliGRAM(s) Oral every 8 hours  pantoprazole   Suspension 40 milliGRAM(s) Enteral Tube daily  potassium chloride   Powder 40 milliEquivalent(s) Oral every 4 hours  vancomycin    Solution 125 milliGRAM(s) Oral every 6 hours    add bb.  hold on full AC now given post op and anemia.    Fredy Duran MD, FACC, FASBRITTON, BEN, FACP  Director, Heart Failure Services  [facility]  , Department of Cardiology  [facility]     CHIEF COMPLAINT:  Patient is a 81y old  Female who presents with a chief complaint of diverticulitis (13 Jan 2022 08:15)      HPI:  Patient is a 81F with a PMH of HTN, HLD, hypothyroidism, depression, seizures who presents to the ED for abd pain.  Patient states that over the last two days she had developed significant abdominal pain and multiple episodes of watery diarrhea.  Reports one episode of nausea and vomiting earlier today.  Patient has no other complaints.  Vitals stable,  labs show leukocytosis and hypokalemia.  CT abdomen significant for diverticulitis. Had rapid afib now SR.      ALLERGIES:  No Known Allergies        Home Medications:  amLODIPine 10 mg oral tablet: 1 tab(s) orally once a day (22 Dec 2021 16:46)  aspirin 81 mg oral tablet, chewable: 1 tab(s) orally once a day (22 Dec 2021 16:46)  escitalopram 20 mg oral tablet: 1 tab(s) orally once a day (22 Dec 2021 16:46)  keppera:  (22 Dec 2021 16:46)  levETIRAcetam 500 mg oral tablet: 1 tab(s) orally 2 times a day (22 Dec 2021 16:46)  timolol-dorzolamide 0.5%-2% preservative-free ophthalmic solution: 1 drop(s) to each affected eye 2 times a day (22 Dec 2021 16:46)  Zetia 10 mg oral tablet: 1 tab(s) orally once a day (22 Dec 2021 16:46)    PAST MEDICAL & SURGICAL HISTORY:  Seizure    HTN (hypertension)    Glaucoma    Thyroid disease    Blood clot of neck vein  left side    TIA (transient ischemic attack)  20 years ago    S/P appendectomy          FAMILY HISTORY:  FH: HTN (hypertension)        SOCIAL HISTORY:  no tobacco    REVIEW OF SYSTEMS:  General:  No wt loss, fevers, chills, night sweats  Eyes:  Good vision, no reported pain  ENT:  No sore throat, pain, runny nose, dysphagia  CV:  No pain, palpitations, hypo/hypertension  Resp:  No dyspnea, cough, tachypnea, wheezing  GI:  No pain, nausea, vomiting, diarrhea, constipation  :  No pain, bleeding, incontinence, nocturia  Muscle:  No pain, weakness  Breast:  No pain, abscess, mass, discharge  Neuro:  No weakness, tingling, memory problems  Psych:  No fatigue, insomnia, mood problems, depression  Endocrine:  No polyuria, polydipsia, cold/heat intolerance  Heme:  No petechiae, ecchymosis, easy bruisability  Skin:  No rash, edema    PHYSICAL EXAM:  Vital Signs:  Vital Signs Last 24 Hrs  T(C): 37.3 (13 Jan 2022 11:59), Max: 37.8 (12 Jan 2022 16:00)  T(F): 99.1 (13 Jan 2022 11:59), Max: 100 (12 Jan 2022 16:00)  HR: 73 (13 Jan 2022 11:59) (57 - 82)  BP: 129/70 (13 Jan 2022 11:59) (118/52 - 165/67)  BP(mean): 84 (12 Jan 2022 22:00) (72 - 96)  RR: 18 (13 Jan 2022 11:59) (14 - 21)  SpO2: 97% (13 Jan 2022 11:59) (97% - 100%)  I&O's Summary    12 Jan 2022 07:01  -  13 Jan 2022 07:00  --------------------------------------------------------  IN: 1850 mL / OUT: 1275 mL / NET: 575 mL      I&O's Detail    12 Jan 2022 07:01  -  13 Jan 2022 07:00  --------------------------------------------------------  IN:    Enteral Tube Flush: 650 mL    IV PiggyBack: 50 mL    Vital1.5: 1150 mL  Total IN: 1850 mL    OUT:    Bulb (mL): 5 mL    Bulb (mL): 20 mL    Colostomy (mL): 200 mL    Incontinent per Collection Bag (mL): 750 mL    Indwelling Catheter - Urethral (mL): 300 mL  Total OUT: 1275 mL    Total NET: 575 mL          Tele: SR    Constitutional: well developed, normal appearance, well groomed, well nourished, no deformities and no acute distress.   Eyes: the conjunctiva exhibited no abnormalities and the eyelids demonstrated no xanthelasmas.   HEENT: normal oral mucosa, no oral pallor and no oral cyanosis.   Neck: normal jugular venous A waves present, normal jugular venous V waves present and no jugular venous mayfield A waves.   Pulmonary: no respiratory distress, normal respiratory rhythm and effort, no accessory muscle use and lungs were clear to auscultation bilaterally.   Cardiovascular: heart rate and rhythm were normal, normal S1 and S2 and no murmur, gallop, rub, heave or thrill are present.   Abdomen: soft, non-tender  Musculoskeletal: the gait could not be assessed.  Extremities: no clubbing of the fingernails, no localized cyanosis, no petechial hemorrhages and no ischemic changes.   Skin: normal skin color and pigmentation, no rash, no venous stasis, no skin lesions, no skin ulcer and no xanthoma was observed.       LABORATORY:                          9.4    9.77  )-----------( 519      ( 13 Jan 2022 09:31 )             31.3     01-13    145  |  109<H>  |  23  ----------------------------<  122<H>  3.3<L>   |  27  |  0.48<L>    Ca    8.5      13 Jan 2022 09:31  Phos  3.0     01-13  Mg     2.3     01-13    TPro  6.0  /  Alb  1.8<L>  /  TBili  0.2  /  DBili  x   /  AST  16  /  ALT  15  /  AlkPhos  72  01-13          CAPILLARY BLOOD GLUCOSE      POCT Blood Glucose.: 131 mg/dL (13 Jan 2022 07:02)  POCT Blood Glucose.: 137 mg/dL (13 Jan 2022 01:15)  POCT Blood Glucose.: 128 mg/dL (12 Jan 2022 16:56)    LIVER FUNCTIONS - ( 13 Jan 2022 09:31 )  Alb: 1.8 g/dL / Pro: 6.0 gm/dL / ALK PHOS: 72 U/L / ALT: 15 U/L / AST: 16 U/L / GGT: x             IMAGING:  < from: 12 Lead ECG (12.24.21 @ 20:02) >  Atrial fibrillation with rapid ventricular response  Nonspecific ST and T wave abnormality  Abnormal ECG  When compared with ECG of 24-DEC-2021 12:08,  Atrial fibrillation has replaced Sinus rhythm  Vent. rate has increased BY  59 BPM  Nonspecific T wave abnormality now evident in Lateral leads    < end of copied text >    < from: CT Abdomen and Pelvis w/ IV Cont (01.11.22 @ 16:42) >  Small pleural effusion with passive atelectasis at the left lung base is   again seen. Resolutionof previously seen right pleural effusion  Patchy densities in the lung bases partially visualized representing   small areas of linear atelectasis versus infiltrate.  NG tube again seen  Cholelithiasis  Decreased volume of fluid collections in the paracolic gutters.  Fluid collection the posterior pelvis is increased in size from the prior   study  Open wound midline anterior abdominal wall again seen  Left lower quadrant colostomy again appreciated  2. Drains are again seen ending in the pelvis.    < end of copied text >    < from: TTE Echo Complete w/o Contrast w/ Doppler (12.25.21 @ 10:02) >  Summary:   1. Left ventricular ejection fraction, by visual estimation, is 55 to   60%.   2. Normal global left ventricular systolic function.   3. Trace mitral valve regurgitation.   4. Consider contrast echocardiogram, if clinically warranted,to better   evauate left ventricular function and wall motion.    < end of copied text >    ASSESSMENT:  Patient is a 81F with a PMH of HTN, HLD, hypothyroidism, depression, seizures who presents to the ED for abd pain.  Patient states that over the last two days she had developed significant abdominal pain and multiple episodes of watery diarrhea.  Reports one episode of nausea and vomiting earlier today.  Patient has no other complaints.  Vitals stable,  labs show leukocytosis and hypokalemia.  CT abdomen significant for diverticulitis.  Had rapid afib now SR.    PLAN:       ALPRAZolam 0.25 milliGRAM(s) Oral every 12 hours  cefTRIAXone   IVPB 1000 milliGRAM(s) IV Intermittent every 24 hours  chlorhexidine 0.12% Liquid 15 milliLiter(s) Oral Mucosa every 12 hours  chlorhexidine 2% Cloths 1 Application(s) Topical <User Schedule>  dorzolamide 2%/timolol 0.5% Ophthalmic Solution 1 Drop(s) Both EYES two times a day  enoxaparin Injectable 40 milliGRAM(s) SubCutaneous daily  escitalopram 20 milliGRAM(s) Oral daily  fentaNYL   Patch  50 MICROgram(s)/Hr. 1 Patch Transdermal every 48 hours  glucagon  Injectable 1 milliGRAM(s) IntraMuscular once  levETIRAcetam 500 milliGRAM(s) Oral every 12 hours  levothyroxine 50 MICROGram(s) Oral daily  metroNIDAZOLE    Tablet 500 milliGRAM(s) Oral every 8 hours  pantoprazole   Suspension 40 milliGRAM(s) Enteral Tube daily  potassium chloride   Powder 40 milliEquivalent(s) Oral every 4 hours  vancomycin    Solution 125 milliGRAM(s) Oral every 6 hours    can add bb if bp allows.  hold on full AC now given post op and anemia.    Fredy Duran MD, FACC, BEN ARRIETA, FACP  Director, Heart Failure Services  [facility]  , Department of Cardiology  [facility]

## 2022-01-13 NOTE — PROGRESS NOTE ADULT - SUBJECTIVE AND OBJECTIVE BOX
SURGERY PROGRESS HPI:  Pt seen and examined at bedside resting comfortably, alert.    Vital Signs Last 24 Hrs  T(C): 37.5 (12 Jan 2022 23:00), Max: 38.3 (12 Jan 2022 11:25)  T(F): 99.5 (12 Jan 2022 23:00), Max: 101 (12 Jan 2022 11:25)  HR: 66 (12 Jan 2022 23:00) (57 - 107)  BP: 122/69 (12 Jan 2022 23:00) (118/52 - 165/67)  BP(mean): 84 (12 Jan 2022 22:00) (72 - 100)  RR: 20 (12 Jan 2022 23:00) (14 - 24)  SpO2: 100% (12 Jan 2022 23:00) (98% - 100%)      PHYSICAL EXAM:    GENERAL: NAD  HEENT: Tracheostomy intact functioning well  CHEST/LUNG: Clear to ausculation, bilaterally   HEART: RRR S1S2  ABDOMEN: Dressing clean/dry/intact. Midline incision with retention sutures intact, wound irrigated with NS and repacked with iodoform. Ostomy pink and viable with air and stool in the bag. Left BABAK purulent, Right BABAK serous. non distended, +BS, soft, appropriate incisional tenderness  EXTREMITIES:  calf soft, non tender b/l    I&O's Detail    11 Jan 2022 07:01  -  12 Jan 2022 07:00  --------------------------------------------------------  IN:    Dexmedetomidine: 85.2 mL    Enteral Tube Flush: 1200 mL    IV PiggyBack: 50 mL    Vital1.5: 1150 mL  Total IN: 2485.2 mL    OUT:    Bulb (mL): 10 mL    Bulb (mL): 20 mL    Indwelling Catheter - Urethral (mL): 1585 mL  Total OUT: 1615 mL    Total NET: 870.2 mL      12 Jan 2022 07:01  -  13 Jan 2022 05:09  --------------------------------------------------------  IN:    Enteral Tube Flush: 650 mL    IV PiggyBack: 50 mL    Vital1.5: 750 mL  Total IN: 1450 mL    OUT:    Bulb (mL): 10 mL    Bulb (mL): 5 mL    Colostomy (mL): 200 mL    Incontinent per Collection Bag (mL): 400 mL    Indwelling Catheter - Urethral (mL): 300 mL  Total OUT: 915 mL    Total NET: 535 mL      LABS:                        8.4    10.86 )-----------( 476      ( 12 Jan 2022 03:00 )             27.7     01-12    149<H>  |  113<H>  |  22  ----------------------------<  124<H>  3.0<L>   |  30  |  0.51    Ca    8.0<L>      12 Jan 2022 03:00  Phos  2.6     01-12  Mg     2.0     01-12    TPro  5.4<L>  /  Alb  1.7<L>  /  TBili  0.2  /  DBili  x   /  AST  15  /  ALT  15  /  AlkPhos  70  01-12        A/P: 81F admitted with Acute Sigmoid Diverticulitis, Surgery consulted for interval perforated sigmoid diverticulitis. S/P ex lap, sigmoid resection, peritoneal lavage 12/25 and RTOR 12/26 for Irrigation of abdominal cavity with closure and creation of colostomy. S/p bedside IR aspiration of fluid collection 1/3. Tube feeds initiated. Ostomy functioning. Tracheostomy 1/7.  CT A/P 1/11: decreased volume of collections in paracolic gutters, increase in size of fluid collection in posterior pelvis.  Downgraded 1/12.    - local wound care per surgery  - monitor ostomy output, BABAK output  - abx per ID  - continue tube feeds  - wean vent as tolerated  - Per IR: 3.7cm deep pelvic abscess without percutaneous access. There is a surgical drain within this collection. Repeat imaging over the weekend.  - Eventual endoscopically assisted G tube placement  - continue medical management and supportive care  - will d/w attending

## 2022-01-13 NOTE — PROGRESS NOTE ADULT - SUBJECTIVE AND OBJECTIVE BOX
patient seen and examined at bedside  downgraded to medicine  trach to cpap  ngt tube feeds running  +colostomy + lefty drain  alert today, able to communicate softly and briefly through trach. denies any active complaints  noted watery diarrhea through ostomy    vitals stable  afebrile  labs reviewed: hb 9.4, cr .48 k 3.3    on keppra and xanax  on rocephin and flagyl      Review of Systems:  General:denies fever chills, headache, weakness  HEENT: denies blurry vision,diffculty swallowing, difficulty hearing, tinnitus  Cardiovascular: denies chest pain  ,palpitations  Pulmonary:denies shortness of breath, cough, wheezing, hemoptysis  Gastrointestinal: denies abdominal pain, constipation, diarrhea,nausea , vomiting, hematochezia  : denies hematuria, dysuria, or incontinence  Neurological: denies weakness, numbness , tingling, dizziness, tremors  MSK: denies muscle pain, difficulty ambulating, swelling, back pain  skin: denies skin rash, itching, burning, or  skin lesions  Psychiatrical: denies mood disturbances, anxierty, feeling depressed, depression , or difficulty sleeping    Objective:  Vitals  T(C): 37.3 (01-13-22 @ 11:59), Max: 37.5 (01-12-22 @ 23:00)  HR: 82 (01-13-22 @ 16:35) (57 - 82)  BP: 129/70 (01-13-22 @ 11:59) (122/69 - 165/67)  RR: 18 (01-13-22 @ 11:59) (15 - 21)  SpO2: 97% (01-13-22 @ 16:35) (97% - 100%)    Physical Exam:  General: comfortable, no acute distress, w  HEENT: Atraumatic, no LAD, trachea midline, PERRLA  Cardiovascular: irregular s1s2, no murmurs, gallops or fricition rubs  Pulmonary: decreased, no wheezing , rhonchi, trach to cpap  Gastrointestinal: soft non tender non distended, no masses felt, no organomegally ++ LEFTY drain, ostomy, NGT  Muscloskeletal: no lower extremity edema, intact bilateral lower extremity pulses  Neurological: CN II-12 intact. No focal weakness  Psychiatrical: normal mood, cooperative  SKIN: no rash, lesions or ulcers    Labs:                          9.4    9.77  )-----------( 519      ( 13 Jan 2022 09:31 )             31.3     01-13    145  |  109<H>  |  23  ----------------------------<  122<H>  3.3<L>   |  27  |  0.48<L>    Ca    8.5      13 Jan 2022 09:31  Phos  3.0     01-13  Mg     2.3     01-13    TPro  6.0  /  Alb  1.8<L>  /  TBili  0.2  /  DBili  x   /  AST  16  /  ALT  15  /  AlkPhos  72  01-13    LIVER FUNCTIONS - ( 13 Jan 2022 09:31 )  Alb: 1.8 g/dL / Pro: 6.0 gm/dL / ALK PHOS: 72 U/L / ALT: 15 U/L / AST: 16 U/L / GGT: x                 Active Medications  MEDICATIONS  (STANDING):  ALPRAZolam 0.25 milliGRAM(s) Oral every 12 hours  cefTRIAXone   IVPB 1000 milliGRAM(s) IV Intermittent every 24 hours  chlorhexidine 0.12% Liquid 15 milliLiter(s) Oral Mucosa every 12 hours  chlorhexidine 2% Cloths 1 Application(s) Topical <User Schedule>  dextrose 40% Gel 15 Gram(s) Oral once  dextrose 5%. 1000 milliLiter(s) (50 mL/Hr) IV Continuous <Continuous>  dextrose 5%. 1000 milliLiter(s) (100 mL/Hr) IV Continuous <Continuous>  dextrose 50% Injectable 25 Gram(s) IV Push once  dextrose 50% Injectable 12.5 Gram(s) IV Push once  dextrose 50% Injectable 25 Gram(s) IV Push once  dorzolamide 2%/timolol 0.5% Ophthalmic Solution 1 Drop(s) Both EYES two times a day  enoxaparin Injectable 40 milliGRAM(s) SubCutaneous daily  escitalopram 20 milliGRAM(s) Oral daily  fentaNYL   Patch  50 MICROgram(s)/Hr. 1 Patch Transdermal every 48 hours  glucagon  Injectable 1 milliGRAM(s) IntraMuscular once  levETIRAcetam 500 milliGRAM(s) Oral every 12 hours  levothyroxine 50 MICROGram(s) Oral daily  metroNIDAZOLE    Tablet 500 milliGRAM(s) Oral every 8 hours  pantoprazole   Suspension 40 milliGRAM(s) Enteral Tube daily  potassium chloride   Powder 40 milliEquivalent(s) Oral every 4 hours  vancomycin    Solution 125 milliGRAM(s) Oral every 6 hours    MEDICATIONS  (PRN):  acetaminophen     Tablet .. 650 milliGRAM(s) Oral every 6 hours PRN Temp greater or equal to 38C (100.4F)  sodium chloride 0.9% lock flush 10 milliLiter(s) IV Push every 1 hour PRN Pre/post blood products, medications, blood draw, and to maintain line patency

## 2022-01-14 LAB
ALBUMIN SERPL ELPH-MCNC: 1.7 G/DL — LOW (ref 3.3–5)
ALP SERPL-CCNC: 62 U/L — SIGNIFICANT CHANGE UP (ref 40–120)
ALT FLD-CCNC: 14 U/L — SIGNIFICANT CHANGE UP (ref 12–78)
ANION GAP SERPL CALC-SCNC: 6 MMOL/L — SIGNIFICANT CHANGE UP (ref 5–17)
AST SERPL-CCNC: 14 U/L — LOW (ref 15–37)
BASE EXCESS BLDA CALC-SCNC: 3.9 MMOL/L — HIGH (ref -2–3)
BASOPHILS # BLD AUTO: 0.06 K/UL — SIGNIFICANT CHANGE UP (ref 0–0.2)
BASOPHILS NFR BLD AUTO: 0.7 % — SIGNIFICANT CHANGE UP (ref 0–2)
BILIRUB SERPL-MCNC: 0.2 MG/DL — SIGNIFICANT CHANGE UP (ref 0.2–1.2)
BLOOD GAS COMMENTS: SIGNIFICANT CHANGE UP
BLOOD GAS SOURCE: SIGNIFICANT CHANGE UP
BUN SERPL-MCNC: 23 MG/DL — SIGNIFICANT CHANGE UP (ref 7–23)
CALCIUM SERPL-MCNC: 8.6 MG/DL — SIGNIFICANT CHANGE UP (ref 8.5–10.1)
CHLORIDE SERPL-SCNC: 116 MMOL/L — HIGH (ref 96–108)
CO2 BLDA-SCNC: 29 MMOL/L — HIGH (ref 19–24)
CO2 SERPL-SCNC: 26 MMOL/L — SIGNIFICANT CHANGE UP (ref 22–31)
CREAT SERPL-MCNC: 0.44 MG/DL — LOW (ref 0.5–1.3)
EOSINOPHIL # BLD AUTO: 0.6 K/UL — HIGH (ref 0–0.5)
EOSINOPHIL NFR BLD AUTO: 6.6 % — HIGH (ref 0–6)
GLUCOSE BLDC GLUCOMTR-MCNC: 121 MG/DL — HIGH (ref 70–99)
GLUCOSE BLDC GLUCOMTR-MCNC: 123 MG/DL — HIGH (ref 70–99)
GLUCOSE BLDC GLUCOMTR-MCNC: 133 MG/DL — HIGH (ref 70–99)
GLUCOSE BLDC GLUCOMTR-MCNC: 135 MG/DL — HIGH (ref 70–99)
GLUCOSE SERPL-MCNC: 117 MG/DL — HIGH (ref 70–99)
HCO3 BLDA-SCNC: 28 MMOL/L — SIGNIFICANT CHANGE UP (ref 21–28)
HCT VFR BLD CALC: 30.1 % — LOW (ref 34.5–45)
HGB BLD-MCNC: 8.9 G/DL — LOW (ref 11.5–15.5)
HOROWITZ INDEX BLDA+IHG-RTO: 40 — SIGNIFICANT CHANGE UP
IMM GRANULOCYTES NFR BLD AUTO: 0.4 % — SIGNIFICANT CHANGE UP (ref 0–1.5)
INR BLD: 1.14 RATIO — SIGNIFICANT CHANGE UP (ref 0.88–1.16)
LYMPHOCYTES # BLD AUTO: 1.42 K/UL — SIGNIFICANT CHANGE UP (ref 1–3.3)
LYMPHOCYTES # BLD AUTO: 15.5 % — SIGNIFICANT CHANGE UP (ref 13–44)
MAGNESIUM SERPL-MCNC: 2.3 MG/DL — SIGNIFICANT CHANGE UP (ref 1.6–2.6)
MCHC RBC-ENTMCNC: 27.8 PG — SIGNIFICANT CHANGE UP (ref 27–34)
MCHC RBC-ENTMCNC: 29.6 GM/DL — LOW (ref 32–36)
MCV RBC AUTO: 94.1 FL — SIGNIFICANT CHANGE UP (ref 80–100)
MONOCYTES # BLD AUTO: 0.57 K/UL — SIGNIFICANT CHANGE UP (ref 0–0.9)
MONOCYTES NFR BLD AUTO: 6.2 % — SIGNIFICANT CHANGE UP (ref 2–14)
NEUTROPHILS # BLD AUTO: 6.45 K/UL — SIGNIFICANT CHANGE UP (ref 1.8–7.4)
NEUTROPHILS NFR BLD AUTO: 70.6 % — SIGNIFICANT CHANGE UP (ref 43–77)
NRBC # BLD: 0 /100 WBCS — SIGNIFICANT CHANGE UP (ref 0–0)
PCO2 BLDA: 38 MMHG — SIGNIFICANT CHANGE UP (ref 32–46)
PH BLD: 7.47 — HIGH (ref 7.35–7.45)
PHOSPHATE SERPL-MCNC: 3.2 MG/DL — SIGNIFICANT CHANGE UP (ref 2.5–4.5)
PLATELET # BLD AUTO: 422 K/UL — HIGH (ref 150–400)
PO2 BLDA: 110 MMHG — HIGH (ref 83–108)
POTASSIUM SERPL-MCNC: 3.7 MMOL/L — SIGNIFICANT CHANGE UP (ref 3.5–5.3)
POTASSIUM SERPL-SCNC: 3.7 MMOL/L — SIGNIFICANT CHANGE UP (ref 3.5–5.3)
PROCALCITONIN SERPL-MCNC: 0.08 NG/ML — SIGNIFICANT CHANGE UP (ref 0.02–0.1)
PROT SERPL-MCNC: 5.5 GM/DL — LOW (ref 6–8.3)
PROTHROM AB SERPL-ACNC: 13.1 SEC — SIGNIFICANT CHANGE UP (ref 10.6–13.6)
RBC # BLD: 3.2 M/UL — LOW (ref 3.8–5.2)
RBC # FLD: 15.6 % — HIGH (ref 10.3–14.5)
SAO2 % BLDA: 98.4 % — HIGH (ref 94–98)
SODIUM SERPL-SCNC: 148 MMOL/L — HIGH (ref 135–145)
T3 SERPL-MCNC: 73 NG/DL — LOW (ref 80–200)
T4 AB SER-ACNC: 8.1 UG/DL — SIGNIFICANT CHANGE UP (ref 4.6–12)
TSH SERPL-MCNC: 12.2 UIU/ML — HIGH (ref 0.36–3.74)
WBC # BLD: 9.14 K/UL — SIGNIFICANT CHANGE UP (ref 3.8–10.5)
WBC # FLD AUTO: 9.14 K/UL — SIGNIFICANT CHANGE UP (ref 3.8–10.5)

## 2022-01-14 PROCEDURE — 99232 SBSQ HOSP IP/OBS MODERATE 35: CPT

## 2022-01-14 PROCEDURE — 99233 SBSQ HOSP IP/OBS HIGH 50: CPT

## 2022-01-14 PROCEDURE — 71045 X-RAY EXAM CHEST 1 VIEW: CPT | Mod: 26

## 2022-01-14 RX ORDER — CEFTRIAXONE 500 MG/1
1000 INJECTION, POWDER, FOR SOLUTION INTRAMUSCULAR; INTRAVENOUS EVERY 24 HOURS
Refills: 0 | Status: DISCONTINUED | OUTPATIENT
Start: 2022-01-14 | End: 2022-01-14

## 2022-01-14 RX ORDER — CARVEDILOL PHOSPHATE 80 MG/1
3.12 CAPSULE, EXTENDED RELEASE ORAL EVERY 12 HOURS
Refills: 0 | Status: DISCONTINUED | OUTPATIENT
Start: 2022-01-14 | End: 2022-02-11

## 2022-01-14 RX ADMIN — DORZOLAMIDE HYDROCHLORIDE TIMOLOL MALEATE 1 DROP(S): 20; 5 SOLUTION/ DROPS OPHTHALMIC at 17:45

## 2022-01-14 RX ADMIN — Medication 0.25 MILLIGRAM(S): at 06:05

## 2022-01-14 RX ADMIN — CHLORHEXIDINE GLUCONATE 1 APPLICATION(S): 213 SOLUTION TOPICAL at 06:06

## 2022-01-14 RX ADMIN — DORZOLAMIDE HYDROCHLORIDE TIMOLOL MALEATE 1 DROP(S): 20; 5 SOLUTION/ DROPS OPHTHALMIC at 06:05

## 2022-01-14 RX ADMIN — Medication 650 MILLIGRAM(S): at 00:57

## 2022-01-14 RX ADMIN — LEVETIRACETAM 500 MILLIGRAM(S): 250 TABLET, FILM COATED ORAL at 06:05

## 2022-01-14 RX ADMIN — Medication 500 MILLIGRAM(S): at 11:29

## 2022-01-14 RX ADMIN — CHLORHEXIDINE GLUCONATE 15 MILLILITER(S): 213 SOLUTION TOPICAL at 17:46

## 2022-01-14 RX ADMIN — Medication 125 MILLIGRAM(S): at 17:45

## 2022-01-14 RX ADMIN — PANTOPRAZOLE SODIUM 40 MILLIGRAM(S): 20 TABLET, DELAYED RELEASE ORAL at 11:29

## 2022-01-14 RX ADMIN — CHLORHEXIDINE GLUCONATE 15 MILLILITER(S): 213 SOLUTION TOPICAL at 06:05

## 2022-01-14 RX ADMIN — Medication 0.25 MILLIGRAM(S): at 17:45

## 2022-01-14 RX ADMIN — Medication 50 MICROGRAM(S): at 06:06

## 2022-01-14 RX ADMIN — CEFTRIAXONE 100 MILLIGRAM(S): 500 INJECTION, POWDER, FOR SOLUTION INTRAMUSCULAR; INTRAVENOUS at 17:45

## 2022-01-14 RX ADMIN — Medication 125 MILLIGRAM(S): at 06:05

## 2022-01-14 RX ADMIN — LEVETIRACETAM 500 MILLIGRAM(S): 250 TABLET, FILM COATED ORAL at 17:45

## 2022-01-14 RX ADMIN — ESCITALOPRAM OXALATE 20 MILLIGRAM(S): 10 TABLET, FILM COATED ORAL at 11:29

## 2022-01-14 RX ADMIN — Medication 500 MILLIGRAM(S): at 06:05

## 2022-01-14 RX ADMIN — ENOXAPARIN SODIUM 40 MILLIGRAM(S): 100 INJECTION SUBCUTANEOUS at 11:29

## 2022-01-14 RX ADMIN — Medication 125 MILLIGRAM(S): at 11:29

## 2022-01-14 RX ADMIN — Medication 125 MILLIGRAM(S): at 00:57

## 2022-01-14 RX ADMIN — FENTANYL CITRATE 1 PATCH: 50 INJECTION INTRAVENOUS at 08:21

## 2022-01-14 NOTE — PHYSICAL THERAPY INITIAL EVALUATION ADULT - PLANNED THERAPY INTERVENTIONS, PT EVAL
bed mobility training
balance training/bed mobility training/gait training/strengthening/transfer training

## 2022-01-14 NOTE — CHART NOTE - NSCHARTNOTEFT_GEN_A_CORE
Pt adm c  abdominal pain; found c sigmoid diverticulitis, s/p exploratory lap, sigmoid resection, peritoneal lavage, irrigation of abdominal cavity c closure and creation of colostomy, s/p IR aspiration of fluid collection 1/3; c lefty drain.   Trach placed 1/7; now trach to vent c CPAP trials as tolerated. Pt currently being feed via NGT; plan is for PEG placement when medically stable. PMH include TIA, blood clot of neck vein, thyroid disease, HTN, seizure. Pt remains lethargic , confused, minimally verbal.   Will review for TF formula change once PEG placed & pt off vent support.       Factors impacting intake: [ ] none [ ] nausea  [ ] vomiting [ ] diarrhea [ ] constipation  [ X]chewing problems [X ] swallowing issues  [x ] other: acute illness; inability to self-feed    Diet Prescription: Diet, NPO with Tube Feed:   Tube Feeding Modality: Nasogastric  Vital AF  Total Volume for 24 Hours (mL): 1200  Continuous  Starting Tube Feed Rate {mL per Hour}: 50  Until Goal Tube Feed Rate (mL per Hour): 50  Tube Feed Duration (in Hours): 24  Tube Feed Start Time: 16:30  Free Water Flush  Bolus   Total Volume per Flush (mL): 250   Frequency: Every 4 Hours (01-14-22 @ 12:14)    Intake:   TF provides 1200 ml, 1440 kcal, 90 g protein, 972 ml H2O; meets pt calculated energy needs    Current Weight:   83.5 kg (1/12); admission wt 81 kg (12/23)  % Weight Change:  3% wt gain x 20 days    2+ edema noted b/l arms    Pertinent Medications: MEDICATIONS  (STANDING):  ALPRAZolam 0.25 milliGRAM(s) Oral every 12 hours  cefTRIAXone   IVPB 1000 milliGRAM(s) IV Intermittent every 24 hours  chlorhexidine 0.12% Liquid 15 milliLiter(s) Oral Mucosa every 12 hours  chlorhexidine 2% Cloths 1 Application(s) Topical <User Schedule>  dextrose 40% Gel 15 Gram(s) Oral once  dextrose 5%. 1000 milliLiter(s) (50 mL/Hr) IV Continuous <Continuous>  dextrose 5%. 1000 milliLiter(s) (100 mL/Hr) IV Continuous <Continuous>  dextrose 50% Injectable 25 Gram(s) IV Push once  dextrose 50% Injectable 12.5 Gram(s) IV Push once  dextrose 50% Injectable 25 Gram(s) IV Push once  dorzolamide 2%/timolol 0.5% Ophthalmic Solution 1 Drop(s) Both EYES two times a day  enoxaparin Injectable 40 milliGRAM(s) SubCutaneous daily  escitalopram 20 milliGRAM(s) Oral daily  fentaNYL   Patch  50 MICROgram(s)/Hr. 1 Patch Transdermal every 48 hours  glucagon  Injectable 1 milliGRAM(s) IntraMuscular once  levETIRAcetam 500 milliGRAM(s) Oral every 12 hours  levothyroxine 50 MICROGram(s) Oral daily  metroNIDAZOLE    Tablet 500 milliGRAM(s) Oral every 8 hours  pantoprazole   Suspension 40 milliGRAM(s) Enteral Tube daily  vancomycin    Solution 125 milliGRAM(s) Oral every 6 hours    MEDICATIONS  (PRN):  acetaminophen     Tablet .. 650 milliGRAM(s) Oral every 6 hours PRN Temp greater or equal to 38C (100.4F)  sodium chloride 0.9% lock flush 10 milliLiter(s) IV Push every 1 hour PRN Pre/post blood products, medications, blood draw, and to maintain line patency    Pertinent Labs: 01-14 Na148 mmol/L<H> Glu 117 mg/dL<H> K+ 3.7 mmol/L Cr  0.44 mg/dL<L> BUN 23 mg/dL 01-14 Phos 3.2 mg/dL 01-14 Alb 1.7 g/dL<L> 12-31 Chol --    LDL --    HDL --    Trig 343 mg/dL<H>  01-13 POCT: 137. 133, 131; 12-24 A1c 5.6%    Skin: no pressure ulcers; surgical incision noted    Estimated Needs:   [X ] no change since previous assessment  (12/27)  [ ] recalculated:     Previous Nutrition Diagnosis:  NONE      Nutrition Diagnosis is [ ] ongoing  [ ] resolved [ X] not applicable     New Nutrition Diagnosis: [ X] not applicable      Interventions:   continue c current tube feeding  Recommend  [ ] Change Diet To:  [ ] Nutrition Supplement  [ ] Nutrition Support  [X ] Other:   H2O flush recommendation for 200 ml q 6 hrs considering pt hypernatremic at this time (= 800 ml daily flushes + 972 ml in formula = 1772 ml daily total) for IBW of 59 kg @ 30 ml/kg; also pt already c 2+ edema noted b/l arms; present diet rx written by MD; advised ROSALINE King of recommendation; she will check c Dr Andrea  Also: once pt off vent & PEG placed, change to Jevity 1.2 to goal rate of 50 ml/hr (provides 1200 ml, 1440 kcal, 67 g protein, 972 ml H2O)    Monitoring and Evaluation:   [ ] PO intake [ x ] Tolerance to diet prescription [ x ] weights [ x ] labs[ x ] follow up per protocol  [ ] other:

## 2022-01-14 NOTE — PHYSICAL THERAPY INITIAL EVALUATION ADULT - DISCHARGE DISPOSITION, PT EVAL
Subacute rehab. Wound care recommendations made./rehabilitation facility
sub acute rehab/rehabilitation facility

## 2022-01-14 NOTE — PHYSICAL THERAPY INITIAL EVALUATION ADULT - IMPAIRMENTS FOUND, PT EVAL
aerobic capacity/endurance/gait, locomotion, and balance/joint integrity and mobility/muscle strength/neuromotor development and sensory integration/poor safety awareness/posture
aerobic capacity/endurance/arousal, attention, and cognition/decreased midline orientation/gait, locomotion, and balance/muscle strength/ventilation and respiration/gas exchange

## 2022-01-14 NOTE — PROGRESS NOTE ADULT - SUBJECTIVE AND OBJECTIVE BOX
JUAREZ GTZ    LVS 2D 271 D    Allergies    No Known Allergies    Intolerances        PAST MEDICAL & SURGICAL HISTORY:  Seizure    HTN (hypertension)    Glaucoma    Thyroid disease    Blood clot of neck vein  left side    TIA (transient ischemic attack)  20 years ago    S/P appendectomy        FAMILY HISTORY:  FH: HTN (hypertension)        Home Medications:  amLODIPine 10 mg oral tablet: 1 tab(s) orally once a day (22 Dec 2021 16:46)  aspirin 81 mg oral tablet, chewable: 1 tab(s) orally once a day (22 Dec 2021 16:46)  escitalopram 20 mg oral tablet: 1 tab(s) orally once a day (22 Dec 2021 16:46)  keppera:  (22 Dec 2021 16:46)  levETIRAcetam 500 mg oral tablet: 1 tab(s) orally 2 times a day (22 Dec 2021 16:46)  timolol-dorzolamide 0.5%-2% preservative-free ophthalmic solution: 1 drop(s) to each affected eye 2 times a day (22 Dec 2021 16:46)  Zetia 10 mg oral tablet: 1 tab(s) orally once a day (22 Dec 2021 16:46)      MEDICATIONS  (STANDING):  ALPRAZolam 0.25 milliGRAM(s) Oral every 12 hours  chlorhexidine 0.12% Liquid 15 milliLiter(s) Oral Mucosa every 12 hours  chlorhexidine 2% Cloths 1 Application(s) Topical <User Schedule>  dextrose 40% Gel 15 Gram(s) Oral once  dextrose 5%. 1000 milliLiter(s) (50 mL/Hr) IV Continuous <Continuous>  dextrose 5%. 1000 milliLiter(s) (100 mL/Hr) IV Continuous <Continuous>  dextrose 50% Injectable 25 Gram(s) IV Push once  dextrose 50% Injectable 12.5 Gram(s) IV Push once  dextrose 50% Injectable 25 Gram(s) IV Push once  dorzolamide 2%/timolol 0.5% Ophthalmic Solution 1 Drop(s) Both EYES two times a day  enoxaparin Injectable 40 milliGRAM(s) SubCutaneous daily  escitalopram 20 milliGRAM(s) Oral daily  fentaNYL   Patch  50 MICROgram(s)/Hr. 1 Patch Transdermal every 48 hours  glucagon  Injectable 1 milliGRAM(s) IntraMuscular once  levETIRAcetam 500 milliGRAM(s) Oral every 12 hours  levothyroxine 50 MICROGram(s) Oral daily  metroNIDAZOLE    Tablet 500 milliGRAM(s) Oral every 8 hours  pantoprazole   Suspension 40 milliGRAM(s) Enteral Tube daily  vancomycin    Solution 125 milliGRAM(s) Oral every 6 hours    MEDICATIONS  (PRN):  acetaminophen     Tablet .. 650 milliGRAM(s) Oral every 6 hours PRN Temp greater or equal to 38C (100.4F)  sodium chloride 0.9% lock flush 10 milliLiter(s) IV Push every 1 hour PRN Pre/post blood products, medications, blood draw, and to maintain line patency      Diet, NPO with Tube Feed:   Tube Feeding Modality: Nasogastric  Vital AF  Total Volume for 24 Hours (mL): 1200  Continuous  Starting Tube Feed Rate mL per Hour: 50  Until Goal Tube Feed Rate (mL per Hour): 50  Tube Feed Duration (in Hours): 24  Tube Feed Start Time: 16:30 (01-07-22 @ 16:41) [Active]          Vital Signs Last 24 Hrs  T(C): 38 (14 Jan 2022 02:00), Max: 38.2 (14 Jan 2022 00:52)  T(F): 100.4 (14 Jan 2022 02:00), Max: 100.7 (14 Jan 2022 00:52)  HR: 60 (14 Jan 2022 06:00) (60 - 87)  BP: 165/75 (14 Jan 2022 06:00) (115/62 - 165/75)  BP(mean): --  RR: 18 (14 Jan 2022 06:00) (18 - 20)  SpO2: 98% (14 Jan 2022 06:00) (97% - 99%)      01-14-22 @ 07:01  -  01-14-22 @ 08:19  --------------------------------------------------------  IN: 840 mL / OUT: 25 mL / NET: 815 mL        Mode: AC/ CMV (Assist Control/ Continuous Mandatory Ventilation), RR (machine): 15, TV (machine): 450, FiO2: 40, PEEP: 5, ITime: 0.6, MAP: 10, PIP: 23      LABS:                        9.4    9.77  )-----------( 519      ( 13 Jan 2022 09:31 )             31.3     01-13    145  |  109<H>  |  23  ----------------------------<  122<H>  3.3<L>   |  27  |  0.48<L>    Ca    8.5      13 Jan 2022 09:31  Phos  3.0     01-13  Mg     2.3     01-13    TPro  6.0  /  Alb  1.8<L>  /  TBili  0.2  /  DBili  x   /  AST  16  /  ALT  15  /  AlkPhos  72  01-13          ABG - ( 14 Jan 2022 05:24 )  pH, Arterial: x     pH, Blood: 7.47  /  pCO2: 38    /  pO2: 110   / HCO3: 28    / Base Excess: 3.9   /  SaO2: 98.4                WBC:  WBC Count: 9.77 K/uL (01-13 @ 09:31)  WBC Count: 10.86 K/uL (01-12 @ 03:00)  WBC Count: 8.93 K/uL (01-11 @ 02:55)      MICROBIOLOGY:  RECENT CULTURES:                  Sodium:  Sodium, Serum: 145 mmol/L (01-13 @ 09:31)  Sodium, Serum: 149 mmol/L (01-12 @ 03:00)  Sodium, Serum: 150 mmol/L (01-11 @ 02:55)      0.48 mg/dL 01-13 @ 09:31  0.51 mg/dL 01-12 @ 03:00  0.61 mg/dL 01-11 @ 02:55      Hemoglobin:  Hemoglobin: 9.4 g/dL (01-13 @ 09:31)  Hemoglobin: 8.4 g/dL (01-12 @ 03:00)  Hemoglobin: 8.7 g/dL (01-11 @ 02:55)      Platelets: Platelet Count - Automated: 519 K/uL (01-13 @ 09:31)  Platelet Count - Automated: 476 K/uL (01-12 @ 03:00)  Platelet Count - Automated: 466 K/uL (01-11 @ 02:55)      LIVER FUNCTIONS - ( 13 Jan 2022 09:31 )  Alb: 1.8 g/dL / Pro: 6.0 gm/dL / ALK PHOS: 72 U/L / ALT: 15 U/L / AST: 16 U/L / GGT: x                 RADIOLOGY & ADDITIONAL STUDIES:      MICROBIOLOGY:  RECENT CULTURES:

## 2022-01-14 NOTE — PHYSICAL THERAPY INITIAL EVALUATION ADULT - PERSONAL SAFETY AND JUDGMENT, REHAB EVAL
Subjective:       Patient ID: Holly Patel is a 28 y.o. Black or  female who presents for follow-up evaluation of ESRD    HPI    Ms. 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  Patient initiated on hemodialysis October 2015, trained on home hemodialysis February 2016, now performing at home without difficulty (with main assistance from her sister).  Patient reports blood pressures have been better controlled.  She otherwise denies any fever, chest pain, shortness of breath, abdominal pain, diarrhea, dysuria/hematuria.    Review of Systems   Constitutional: Negative for appetite change, fatigue and fever.   Respiratory: Negative for chest tightness and shortness of breath.    Cardiovascular: Negative for chest pain and leg swelling.   Gastrointestinal: Negative for abdominal pain, constipation, diarrhea, nausea and vomiting.   Genitourinary: Negative for difficulty urinating, dysuria, flank pain, frequency, hematuria and urgency.   Musculoskeletal: Negative for arthralgias, joint swelling and myalgias.   Skin: Negative for rash and wound.   Neurological: Negative for dizziness, weakness and light-headedness.   All other systems reviewed and are negative.      Objective:      Physical Exam   Constitutional: She appears well-developed and well-nourished.   Cardiovascular: Normal rate, regular rhythm and normal heart sounds.  Exam reveals no gallop and no friction rub.    No murmur heard.  Pulmonary/Chest: Effort normal and breath sounds normal. No respiratory distress. She has no wheezes. She has no rales.   Abdominal: Soft. Bowel sounds are normal. There is no tenderness.   Musculoskeletal: She exhibits no edema.   Neurological: She is alert.   Skin: Skin is warm and dry. No rash noted. No erythema.   Psychiatric: She has a normal mood and affect. 
  Vitals reviewed.    Access: L AVF w/ good thrill/bruit    Non-OCF labs June  eKt/V 2.2  H/H 9.8/30.8  Ca/Phos 9.0/5.4    Alb 3.4    Assessment:       1. ESRD (end stage renal disease)    2. Hypertensive kidney disease with end-stage renal disease    3. Liver replaced by transplant    4. Hyperparathyroidism, secondary renal    5. Anemia of chronic renal failure, stage 5        Plan:     Ms. 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  Patient initiated on hemodialysis October 2015, trained on home hemodialysis February 2016, now performing at home without difficulty (with main assistance from her sister).  Kt/V previously above goal, reduced treatments to 4x/wk, now at goal, will continue to monitor clearance.  Patient followed by Transplant for possible kidney transplantation.  - Hypertension: BP improved though still elevated, patient previously unable to afford carvedilol, previously increased labetolol  - Anemia: Hgb near goal, continue erythropoetin therapy  - Bone/mineral metabolism: patient with secondary hyperparathyroidism, discussed low phosphorous diet; previously increased calcitriol, initiated cinacalcet (had not received previously due to insurance/pharmacy), PTH trending down    Return to clinic monthly        
N/A
intact

## 2022-01-14 NOTE — PROGRESS NOTE ADULT - SUBJECTIVE AND OBJECTIVE BOX
JUAREZ GTZ  [id number]    Follow Up:  perforated diverticulitis, pelvic abscess    Interval History: The pt was seen and examined earlier, no distress, opens her eyes, does not follow commands, vented via tracheostomy, not interactive. The pt had a low grade temp last night, no leukocytosis, Cr and LFTs ok.     PAST MEDICAL & SURGICAL HISTORY:  Seizure    HTN (hypertension)    Glaucoma    Thyroid disease    Blood clot of neck vein  left side    TIA (transient ischemic attack)  20 years ago    S/P appendectomy        ROS:    [x ] Unobtainable because: vented, not interactive   [ ] All other systems negative    Constitutional: no fever, no chills  Head: no trauma  Eyes: no vision changes, no eye pain  ENT:  no sore throat, no rhinorrhea  Cardiovascular:  no chest pain, no palpitation  Respiratory:  no SOB, no cough  GI:  no abd pain, no vomiting, no diarrhea  urinary: no dysuria, no hematuria, no flank pain  musculoskeletal:  no joint pain, no joint swelling  skin:  no rash  neurology:  no headache, no seizure, no change in mental status  psych: no anxiety, no depression         Allergies  No Known Allergies        ANTIMICROBIALS:  cefTRIAXone   IVPB 1000 every 24 hours  metroNIDAZOLE    Tablet 500 every 8 hours  vancomycin    Solution 125 every 6 hours      OTHER MEDS:  acetaminophen     Tablet .. 650 milliGRAM(s) Oral every 6 hours PRN  ALPRAZolam 0.25 milliGRAM(s) Oral every 12 hours  chlorhexidine 0.12% Liquid 15 milliLiter(s) Oral Mucosa every 12 hours  chlorhexidine 2% Cloths 1 Application(s) Topical <User Schedule>  dextrose 40% Gel 15 Gram(s) Oral once  dextrose 5%. 1000 milliLiter(s) IV Continuous <Continuous>  dextrose 5%. 1000 milliLiter(s) IV Continuous <Continuous>  dextrose 50% Injectable 25 Gram(s) IV Push once  dextrose 50% Injectable 12.5 Gram(s) IV Push once  dextrose 50% Injectable 25 Gram(s) IV Push once  dorzolamide 2%/timolol 0.5% Ophthalmic Solution 1 Drop(s) Both EYES two times a day  enoxaparin Injectable 40 milliGRAM(s) SubCutaneous daily  escitalopram 20 milliGRAM(s) Oral daily  fentaNYL   Patch  50 MICROgram(s)/Hr. 1 Patch Transdermal every 48 hours  glucagon  Injectable 1 milliGRAM(s) IntraMuscular once  levETIRAcetam 500 milliGRAM(s) Oral every 12 hours  levothyroxine 50 MICROGram(s) Oral daily  pantoprazole   Suspension 40 milliGRAM(s) Enteral Tube daily  sodium chloride 0.9% lock flush 10 milliLiter(s) IV Push every 1 hour PRN      Vital Signs Last 24 Hrs  T(C): 36.6 (14 Jan 2022 17:02), Max: 38.2 (14 Jan 2022 00:52)  T(F): 97.8 (14 Jan 2022 17:02), Max: 100.7 (14 Jan 2022 00:52)  HR: 82 (14 Jan 2022 17:02) (60 - 87)  BP: 164/75 (14 Jan 2022 17:02) (115/62 - 165/75)  BP(mean): --  RR: 20 (14 Jan 2022 17:02) (15 - 20)  SpO2: 97% (14 Jan 2022 17:02) (96% - 99%)    Physical Exam:  General: Nontoxic-appearing Female in no acute distress. Trached on vent  HEENT: AT/NC. Oropharynx/dentition unable secondary to patient compliance.   Neck: Not rigid. No sense of mass. Trach C/D/I  Nodes: None palpable.  Lungs: coarse B BS  Heart: Regular rate and rhythm. No Murmur. No rub. No gallop. No palpable thrill.  Abdomen: Bowel sounds now present.  Soft. Mildly distended.  Incision dressed.  Ostomy with stool and gas. Drains x2  Extremities: No cyanosis or clubbing. edema is improving   Skin: Warm. Dry No rash. No vasculitic stigmata.  Neuro: awake, not alert, does not follow commands   Psychiatric: Unable    WBC Count: 9.14 K/uL (01-14 @ 08:33)  WBC Count: 9.77 K/uL (01-13 @ 09:31)  WBC Count: 10.86 K/uL (01-12 @ 03:00)  WBC Count: 8.93 K/uL (01-11 @ 02:55)  WBC Count: 9.34 K/uL (01-10 @ 04:18)  WBC Count: 8.95 K/uL (01-09 @ 04:27)  WBC Count: 14.46 K/uL (01-08 @ 03:15)                            8.9    9.14  )-----------( 422      ( 14 Jan 2022 08:33 )             30.1       01-14    148<H>  |  116<H>  |  23  ----------------------------<  117<H>  3.7   |  26  |  0.44<L>    Ca    8.6      14 Jan 2022 08:33  Phos  3.2     01-14  Mg     2.3     01-14    TPro  5.5<L>  /  Alb  1.7<L>  /  TBili  0.2  /  DBili  x   /  AST  14<L>  /  ALT  14  /  AlkPhos  62  01-14          Creatinine Trend: 0.44<--, 0.48<--, 0.51<--, 0.61<--, 0.56<--, 0.67<--      MICROBIOLOGY:  v  .Body Fluid Abdominal Fluid  01-03-22   Few Escherichia coli  Few Bacteroides ovatus group "Susceptibilities not performed"  --  Escherichia coli      .Sputum Sputum  12-28-21   Normal Respiratory Sendy present  --    Moderate polymorphonuclear leukocytes per low power field  Numerous Squamous epithelial cells per low power field  Few Gram positive cocci in pairs per oil power field  Rare Gram Positive Rods per oil power field  Rare Yeast like cells per oil power field  Results consistent with oropharyngeal contamination      .Blood Blood  12-28-21   No Growth Final  --  --      .Body Fluid INTRA -ABDOMINAL FLUID FOR CULTURE  12-25-21   Few Escherichia coli  Rare Escherichia coli #2  Few Candida albicans "Susceptibilities not performed"  Moderate Bacteroides thetaiotamcron group "Susceptibilities not performed"  Moderate Bacteroides uniformis "Susceptibilities not performed"  --  Escherichia coli  Escherichia coli      .Blood Blood-Peripheral  12-25-21   No Growth Final  --  --      .Blood Blood-Peripheral  12-24-21   No Growth Final  --  --      Clean Catch Clean Catch (Midstream)  12-23-21   Normal Urogenital sendy present  --  --    Procalcitonin, Serum: 0.08 (01-14-22 @ 13:25)    SARS-CoV-2 Result: NotDetec (01-10-22 @ 04:19)    SARS-CoV-2: NotDetec (24 Dec 2021 14:38)  SARS-CoV-2: NotDetec (24 Dec 2021 00:03)    RADIOLOGY:

## 2022-01-14 NOTE — PROGRESS NOTE ADULT - SUBJECTIVE AND OBJECTIVE BOX
Patient seen and examined at bedside. Febrile 100.7 overnight. Vitals stable Hb8.9 Sodium 148 today . Cr stable. Patient respond to name. eyes open. ngt tube feeds running +colostomy + lefty drain. noted watery diarrhea through ostomy  Review of Systems:  limited ROS today due to mental status and trach to cpap  Objective:  Vitals  T(C): 37.6 (01-14-22 @ 11:53), Max: 38.2 (01-14-22 @ 00:52)  HR: 73 (01-14-22 @ 11:53) (60 - 87)  BP: 135/75 (01-14-22 @ 11:53) (115/62 - 165/75)  RR: 15 (01-14-22 @ 11:53) (15 - 20)  SpO2: 99% (01-14-22 @ 11:53) (97% - 99%)    Physical Exam:  General: comfortable, no acute distress,   HEENT: Atraumatic, no LAD, trachea midline, PERRLA  Cardiovascular: irregular s1s2, no murmurs, gallops or fricition rubs  Pulmonary: decreased, no wheezing , rhonchi, trach to cpap  Gastrointestinal: soft non tender non distended, no masses felt, no organomegally ++ LEFTY drain, ostomy, NGT  Muscloskeletal: no lower extremity edema, intact bilateral lower extremity pulses  Neurological: CN II-12 intact. No focal weakness  Psychiatrical: normal mood, cooperative  SKIN: no rash, lesions or ulcers  Labs:                          8.9    9.14  )-----------( 422      ( 14 Jan 2022 08:33 )             30.1     01-14    148<H>  |  116<H>  |  23  ----------------------------<  117<H>  3.7   |  26  |  0.44<L>    Ca    8.6      14 Jan 2022 08:33  Phos  3.2     01-14  Mg     2.3     01-14    TPro  5.5<L>  /  Alb  1.7<L>  /  TBili  0.2  /  DBili  x   /  AST  14<L>  /  ALT  14  /  AlkPhos  62  01-14    LIVER FUNCTIONS - ( 14 Jan 2022 08:33 )  Alb: 1.7 g/dL / Pro: 5.5 gm/dL / ALK PHOS: 62 U/L / ALT: 14 U/L / AST: 14 U/L / GGT: x           PT/INR - ( 14 Jan 2022 08:33 )   PT: 13.1 sec;   INR: 1.14 ratio               Active Medications  MEDICATIONS  (STANDING):  ALPRAZolam 0.25 milliGRAM(s) Oral every 12 hours  cefTRIAXone   IVPB 1000 milliGRAM(s) IV Intermittent every 24 hours  chlorhexidine 0.12% Liquid 15 milliLiter(s) Oral Mucosa every 12 hours  chlorhexidine 2% Cloths 1 Application(s) Topical <User Schedule>  dextrose 40% Gel 15 Gram(s) Oral once  dextrose 5%. 1000 milliLiter(s) (50 mL/Hr) IV Continuous <Continuous>  dextrose 5%. 1000 milliLiter(s) (100 mL/Hr) IV Continuous <Continuous>  dextrose 50% Injectable 25 Gram(s) IV Push once  dextrose 50% Injectable 12.5 Gram(s) IV Push once  dextrose 50% Injectable 25 Gram(s) IV Push once  dorzolamide 2%/timolol 0.5% Ophthalmic Solution 1 Drop(s) Both EYES two times a day  enoxaparin Injectable 40 milliGRAM(s) SubCutaneous daily  escitalopram 20 milliGRAM(s) Oral daily  fentaNYL   Patch  50 MICROgram(s)/Hr. 1 Patch Transdermal every 48 hours  glucagon  Injectable 1 milliGRAM(s) IntraMuscular once  levETIRAcetam 500 milliGRAM(s) Oral every 12 hours  levothyroxine 50 MICROGram(s) Oral daily  metroNIDAZOLE    Tablet 500 milliGRAM(s) Oral every 8 hours  pantoprazole   Suspension 40 milliGRAM(s) Enteral Tube daily  vancomycin    Solution 125 milliGRAM(s) Oral every 6 hours    MEDICATIONS  (PRN):  acetaminophen     Tablet .. 650 milliGRAM(s) Oral every 6 hours PRN Temp greater or equal to 38C (100.4F)  sodium chloride 0.9% lock flush 10 milliLiter(s) IV Push every 1 hour PRN Pre/post blood products, medications, blood draw, and to maintain line patency

## 2022-01-14 NOTE — PROGRESS NOTE ADULT - ASSESSMENT
81F with a PMH of HTN, HLD, hypothyroidism, depression, seizures who presents to the ED for abd pain.  Patient states that over the last two days she had developed significant abdominal pain and multiple episodes of watery diarrhea.  Reports one episode of nausea and vomiting earlier today.  Patient has no other complaints.  Vitals stable, (hypoxix?) labs show leukocytosis and hypokalemia.  CT abdomen significant for diverticulitis.  initially admitted to med surg.  CT noted with sigmoid diverticulosis with adjacent stranding, compatible with diverticulitis. No associated abscess is identified. Trace adjacent pelvic free fluid.81F with a PMH of HTN, HLD, hypothyroidism, depression, seizures p/w abd pain,       Perforated Acute diverticulitis   s/p 12/24/21 - Exploratory laparotomy and a sigmoid colectomy and abdominal washout  Patient went for abdominal wound closure and creation of colostomy on 12/26.  Patient developed adb abscess 1/3 s/p IR drainage of abdominal abscesses.  Initially patient on PPN for nutrition Now tolerating tube feeds with brown soft stool in colostomy..  Found to have multiple abdominal fluid collections s/p IR drainage of R abdominal abscess 1/3/22 growing e-coli and bacteroides  Peg placement also requested by IR next week as per surgery.  Noted collection around BABAK  drain  plan:  repeat CT over weekend  c/w antibiotics + vanc (oral), ceftriaxone and flagyl  BABAK drain care  IR for PEG tube next week  followup CT : patient will need IR to drain abscess on Monday      Septic Shock s/p  pressors  Transferred to ICU for post op care on mechanical ventilation.  now off pressors failed extubation  now trach to cpap  Pulm and ID consutled  Cultures Sensitive to ceftriaxone, continue antibiotics to ceftriaxone and flagyl per ID.  Noted: added trial PO vanc for diarrhea    Hypoxemic respiratory failure now trach to CPAP  Failed extubation in ICU  now trach to CPAP  Pulm following  on fentayl patch for pain control    acute metabolic encephalopathy sec to above  stable    Rapid AFIB due to Shock state  10mg IV lopressor in OR for Afib with RVR. 5L IVF given intra-op. EBL 100cc. Received intubated, sedated, on phenylephrine.    s/p amio load converted to sinus rhythm.  not on rate control or AC on downgrade  Cards consulted  plan:  add BB  hold full dose A/C for now      dvt ppx: lovenox  subq

## 2022-01-14 NOTE — PROGRESS NOTE ADULT - SUBJECTIVE AND OBJECTIVE BOX
Pt seen and examined bedside  No overnight issues  Trache to vent, on pressure support  Tube feeds running  Colostomy functioning    T(F): 100.4 (01-14-22 @ 02:00), Max: 100.7 (01-14-22 @ 00:52)  HR: 60 (01-14-22 @ 06:00) (60 - 87)  BP: 165/75 (01-14-22 @ 06:00) (115/62 - 165/75)  RR: 18 (01-14-22 @ 06:00) (18 - 20)  SpO2: 98% (01-14-22 @ 06:00) (97% - 99%)  POCT Blood Glucose.: 121 mg/dL (14 Jan 2022 08:19)  POCT Blood Glucose.: 131 mg/dL (13 Jan 2022 23:08)    PHYSICAL EXAM:  GENERAL: lethargic  HEENT: NCAT, NGT with TF running  Chest: Tracheostomy intact functioning well  HEART: RRR S1S2  ABDOMEN: Midline wound with ~1cm opening superiorly and ~4cm opening inferiorly, mild adherent slough seen, no active drainage. Irrigated with saline. Repacked with iodoform packing and dry dressing applied.  Ostomy pink and viable with air and stool in the bag. Left BABAK purulent, output 5cc, Right BABAK serous output 20cc. non distended, +BS, soft  EXTREMITIES:  calf soft, non tender b/l    LABS:                        8.9    9.14  )-----------( 422      ( 14 Jan 2022 08:33 )             30.1     01-14    148<H>  |  116<H>  |  23  ----------------------------<  117<H>  3.7   |  26  |  0.44<L>    Ca    8.6      14 Jan 2022 08:33  Phos  3.2     01-14  Mg     2.3     01-14    TPro  5.5<L>  /  Alb  1.7<L>  /  TBili  0.2  /  DBili  x   /  AST  14<L>  /  ALT  14  /  AlkPhos  62  01-14    PT/INR - ( 14 Jan 2022 08:33 )   PT: 13.1 sec;   INR: 1.14 ratio        A/P: 81F admitted with Acute Sigmoid Diverticulitis s/p ex lap, sigmoid resection, peritoneal lavage 12/25 and RTOR 12/26 for irrigation of abdominal cavity with closure and creation of colostomy. S/p bedside IR aspiration of fluid collection 1/3. Tracheostomy 1/7.  now with 3.7cm deep pelvic abscess without percutaneous access per IR. Surveillance imaging planned  continue local drain and ostomy care per nursing  local wound care per surgery  continue abx per ID  continue tube feeds, vent support with weaning protocol, medical management  Eventual endoscopically assisted G tube placement

## 2022-01-14 NOTE — PHYSICAL THERAPY INITIAL EVALUATION ADULT - PRECAUTIONS/LIMITATIONS, REHAB EVAL
fall precautions/oxygen therapy device and L/min
+trach to vent/cardiac precautions/fall precautions/oxygen therapy device and L/min/surgical precautions

## 2022-01-14 NOTE — PHYSICAL THERAPY INITIAL EVALUATION ADULT - PERTINENT HX OF CURRENT PROBLEM, REHAB EVAL
Pt is a 80 y/o female initially admitted with Acute Sigmoid Diverticulitis. Pt underwent s/p ex lap, sigmoid resection, peritoneal lavage 12/25 and RTOR 12/26 for irrigation of abdominal cavity with closure and creation of colostomy. S/p bedside IR aspiration of fluid collection 1/3. Tracheostomy 1/7.
81F with a PMH of HTN, HLD, hypothyroidism, depression, seizures p/w abd pain, found to have acute diverticulitis .

## 2022-01-14 NOTE — PROGRESS NOTE ADULT - SUBJECTIVE AND OBJECTIVE BOX
Patient is a 81y old  Female who presents with a chief complaint of abd pain (14 Jan 2022 12:18)    PAST MEDICAL & SURGICAL HISTORY:  Seizure    HTN (hypertension)    Glaucoma    Thyroid disease    Blood clot of neck vein  left side    TIA (transient ischemic attack)  20 years ago    S/P appendectomy    INTERVAL HISTORY:  	  MEDICATIONS:  MEDICATIONS  (STANDING):  ALPRAZolam 0.25 milliGRAM(s) Oral every 12 hours  cefTRIAXone   IVPB 1000 milliGRAM(s) IV Intermittent every 24 hours  chlorhexidine 0.12% Liquid 15 milliLiter(s) Oral Mucosa every 12 hours  chlorhexidine 2% Cloths 1 Application(s) Topical <User Schedule>  dorzolamide 2%/timolol 0.5% Ophthalmic Solution 1 Drop(s) Both EYES two times a day  enoxaparin Injectable 40 milliGRAM(s) SubCutaneous daily  escitalopram 20 milliGRAM(s) Oral daily  fentaNYL   Patch  50 MICROgram(s)/Hr. 1 Patch Transdermal every 48 hours  levETIRAcetam 500 milliGRAM(s) Oral every 12 hours  levothyroxine 50 MICROGram(s) Oral daily  metroNIDAZOLE    Tablet 500 milliGRAM(s) Oral every 8 hours  pantoprazole   Suspension 40 milliGRAM(s) Enteral Tube daily  vancomycin    Solution 125 milliGRAM(s) Oral every 6 hours    MEDICATIONS  (PRN):  acetaminophen     Tablet .. 650 milliGRAM(s) Oral every 6 hours PRN Temp greater or equal to 38C (100.4F)  sodium chloride 0.9% lock flush 10 milliLiter(s) IV Push every 1 hour PRN Pre/post blood products, medications, blood draw, and to maintain line patency    Vitals:  T(F): 97.8 (01-14-22 @ 17:02), Max: 100.7 (01-14-22 @ 00:52)  HR: 82 (01-14-22 @ 17:02) (60 - 87)  BP: 164/75 (01-14-22 @ 17:02) (115/62 - 165/75)  RR: 20 (01-14-22 @ 17:02) (15 - 20)  SpO2: 97% (01-14-22 @ 17:02) (97% - 99%)    01-14 @ 07:01  -  01-14 @ 17:39  --------------------------------------------------------  IN:    Enteral Tube Flush: 600 mL    Vital1.5: 240 mL  Total IN: 840 mL    OUT:    Bulb (mL): 20 mL    Bulb (mL): 5 mL  Total OUT: 25 mL    Total NET: 815 mL    PHYSICAL EXAM:  Neuro: Awake, responsive  CV: S1 S2 RRR  Lungs: CTABL  GI: Soft, BS +, ND, NT  Extremities: No edema    TELEMETRY: RSR    RADIOLOGY: < from: Xray Chest 1 View- PORTABLE-Routine (Xray Chest 1 View- PORTABLE-Routine in AM.) (01.14.22 @ 08:06) >  Residual LEFT pleural effusion and/or basilar airspace   consolidation. Small RIGHT effusion..    < end of copied text >    DIAGNOSTIC TESTING:    [x ] Echocardiogram: < from: TTE Echo Complete w/o Contrast w/ Doppler (12.25.21 @ 10:02) >  Left Ventricle: Normal left ventricular size and wall thicknesses, with   normal systolic and diastolic function. The left ventricle was not well   visualized.  Global LV systolic function was normal. Left ventricular ejection   fraction, by visual estimation, is 55 to 60%. Extremely limited apical   and subcostal views.  Right Ventricle: Normal right ventricular size and function.  Left Atrium: The left atrium is normal in size.  Right Atrium: The right atrium is normal in size.  Pericardium: There is no evidence of pericardial effusion.  Mitral Valve: Structurally normal mitral valve, with normal leaflet   excursion. Trace mitral valve regurgitation is seen.  Tricuspid Valve: Structurally normal tricuspid valve, with normal leaflet   excursion. No tricuspid regurgitation is visualized. Adequate TR velocity   was not obtained to accurately assess RVSP.  Aortic Valve: Normal trileaflet aortic valve with normal opening. Peak   transaortic gradient equals 6.2 mmHg, mean transaortic gradient equals   3.5 mmHg, the calculated aortic valve area equals 1.68 cm² by the   continuity equation consistent with normally opening aortic valve. No   evidence of aortic valve regurgitation is seen.  Pulmonic Valve: The pulmonic valve was not well visualized. No indication   of pulmonic valve regurgitation.  Aorta: The aortic root and ascending aorta are structurally normal, with   no evidence of dilitation.  Pulmonary Artery: The main pulmonary artery is normal in size.      Summary:   1. Left ventricular ejection fraction, by visual estimation, is 55 to   60%.   2. Normal global left ventricular systolic function.   3. Trace mitral valve regurgitation.   4. Consider contrast echocardiogram, if clinically warranted,to better   evauate left ventricular function and wall motion.    < end of copied text >    LABS:	 	    14 Jan 2022 08:33    148    |  116    |  23     ----------------------------<  117    3.7     |  26     |  0.44   13 Jan 2022 09:31    145    |  109    |  23     ----------------------------<  122    3.3     |  27     |  0.48   12 Jan 2022 03:00    149    |  113    |  22     ----------------------------<  124    3.0     |  30     |  0.51     Ca    8.6        14 Jan 2022 08:33  Phos  3.2       14 Jan 2022 08:33  Mg     2.3       14 Jan 2022 08:33    TPro  5.5    /  Alb  1.7    /  TBili  0.2    /  DBili  x      /  AST  14     /  ALT  14     /  AlkPhos  62     14 Jan 2022 08:33                        8.9    9.14  )-----------( 422      ( 14 Jan 2022 08:33 )             30.1 ,                       9.4    9.77  )-----------( 519      ( 13 Jan 2022 09:31 )             31.3 ,                       8.4    10.86 )-----------( 476      ( 12 Jan 2022 03:00 )             27.7     TSH: Thyroid Stimulating Hormone, Serum: 12.200 uIU/mL (01-14 @ 08:33)    PT/PTT- ( 14 Jan 2022 08:33 )   PT: 13.1 sec;  PTT: x        INR: 1.14 ratio (01-14 @ 08:33)           Patient is a 81y old  Female who presents with a chief complaint of abd pain (14 Jan 2022 12:18)    PAST MEDICAL & SURGICAL HISTORY:  Seizure    HTN (hypertension)    Glaucoma    Thyroid disease    Blood clot of neck vein  left side    TIA (transient ischemic attack)  20 years ago    S/P appendectomy    INTERVAL HISTORY: lethargic, Trach to vent  	  MEDICATIONS:  MEDICATIONS  (STANDING):  ALPRAZolam 0.25 milliGRAM(s) Oral every 12 hours  cefTRIAXone   IVPB 1000 milliGRAM(s) IV Intermittent every 24 hours  chlorhexidine 0.12% Liquid 15 milliLiter(s) Oral Mucosa every 12 hours  chlorhexidine 2% Cloths 1 Application(s) Topical <User Schedule>  dorzolamide 2%/timolol 0.5% Ophthalmic Solution 1 Drop(s) Both EYES two times a day  enoxaparin Injectable 40 milliGRAM(s) SubCutaneous daily  escitalopram 20 milliGRAM(s) Oral daily  fentaNYL   Patch  50 MICROgram(s)/Hr. 1 Patch Transdermal every 48 hours  levETIRAcetam 500 milliGRAM(s) Oral every 12 hours  levothyroxine 50 MICROGram(s) Oral daily  metroNIDAZOLE    Tablet 500 milliGRAM(s) Oral every 8 hours  pantoprazole   Suspension 40 milliGRAM(s) Enteral Tube daily  vancomycin    Solution 125 milliGRAM(s) Oral every 6 hours    MEDICATIONS  (PRN):  acetaminophen     Tablet .. 650 milliGRAM(s) Oral every 6 hours PRN Temp greater or equal to 38C (100.4F)  sodium chloride 0.9% lock flush 10 milliLiter(s) IV Push every 1 hour PRN Pre/post blood products, medications, blood draw, and to maintain line patency    Vitals:  T(F): 97.8 (01-14-22 @ 17:02), Max: 100.7 (01-14-22 @ 00:52)  HR: 82 (01-14-22 @ 17:02) (60 - 87)  BP: 164/75 (01-14-22 @ 17:02) (115/62 - 165/75)  RR: 20 (01-14-22 @ 17:02) (15 - 20)  SpO2: 97% (01-14-22 @ 17:02) (97% - 99%)    01-14 @ 07:01  -  01-14 @ 17:39  --------------------------------------------------------  IN:    Enteral Tube Flush: 600 mL    Vital1.5: 240 mL  Total IN: 840 mL    OUT:    Bulb (mL): 20 mL    Bulb (mL): 5 mL  Total OUT: 25 mL    Total NET: 815 mL    PHYSICAL EXAM:  Neuro: lethargic  CV: S1 S2 RRR  Lungs: diminished to bases  GI: Soft, BS +, ND, NT, + functioning colostomy , Multiple drains   Extremities: + edema UE > LE    TELEMETRY: RSR    RADIOLOGY: < from: Xray Chest 1 View- PORTABLE-Routine (Xray Chest 1 View- PORTABLE-Routine in AM.) (01.14.22 @ 08:06) >  Residual LEFT pleural effusion and/or basilar airspace   consolidation. Small RIGHT effusion..    < end of copied text >    DIAGNOSTIC TESTING:    [x ] Echocardiogram: < from: TTE Echo Complete w/o Contrast w/ Doppler (12.25.21 @ 10:02) >  Left Ventricle: Normal left ventricular size and wall thicknesses, with   normal systolic and diastolic function. The left ventricle was not well   visualized.  Global LV systolic function was normal. Left ventricular ejection   fraction, by visual estimation, is 55 to 60%. Extremely limited apical   and subcostal views.  Right Ventricle: Normal right ventricular size and function.  Left Atrium: The left atrium is normal in size.  Right Atrium: The right atrium is normal in size.  Pericardium: There is no evidence of pericardial effusion.  Mitral Valve: Structurally normal mitral valve, with normal leaflet   excursion. Trace mitral valve regurgitation is seen.  Tricuspid Valve: Structurally normal tricuspid valve, with normal leaflet   excursion. No tricuspid regurgitation is visualized. Adequate TR velocity   was not obtained to accurately assess RVSP.  Aortic Valve: Normal trileaflet aortic valve with normal opening. Peak   transaortic gradient equals 6.2 mmHg, mean transaortic gradient equals   3.5 mmHg, the calculated aortic valve area equals 1.68 cm² by the   continuity equation consistent with normally opening aortic valve. No   evidence of aortic valve regurgitation is seen.  Pulmonic Valve: The pulmonic valve was not well visualized. No indication   of pulmonic valve regurgitation.  Aorta: The aortic root and ascending aorta are structurally normal, with   no evidence of dilitation.  Pulmonary Artery: The main pulmonary artery is normal in size.      Summary:   1. Left ventricular ejection fraction, by visual estimation, is 55 to   60%.   2. Normal global left ventricular systolic function.   3. Trace mitral valve regurgitation.   4. Consider contrast echocardiogram, if clinically warranted,to better   evauate left ventricular function and wall motion.    < end of copied text >    LABS:	 	    14 Jan 2022 08:33    148    |  116    |  23     ----------------------------<  117    3.7     |  26     |  0.44   13 Jan 2022 09:31    145    |  109    |  23     ----------------------------<  122    3.3     |  27     |  0.48   12 Jan 2022 03:00    149    |  113    |  22     ----------------------------<  124    3.0     |  30     |  0.51     Ca    8.6        14 Jan 2022 08:33  Phos  3.2       14 Jan 2022 08:33  Mg     2.3       14 Jan 2022 08:33    TPro  5.5    /  Alb  1.7    /  TBili  0.2    /  DBili  x      /  AST  14     /  ALT  14     /  AlkPhos  62     14 Jan 2022 08:33                        8.9    9.14  )-----------( 422      ( 14 Jan 2022 08:33 )             30.1 ,                       9.4    9.77  )-----------( 519      ( 13 Jan 2022 09:31 )             31.3 ,                       8.4    10.86 )-----------( 476      ( 12 Jan 2022 03:00 )             27.7     TSH: Thyroid Stimulating Hormone, Serum: 12.200 uIU/mL (01-14 @ 08:33)    PT/PTT- ( 14 Jan 2022 08:33 )   PT: 13.1 sec;  PTT: x        INR: 1.14 ratio (01-14 @ 08:33)

## 2022-01-14 NOTE — PHYSICAL THERAPY INITIAL EVALUATION ADULT - ADDITIONAL COMMENTS
Pt states she lives with significant other "Jeffrey" in an apartment with no steps to enter, and elevator access. Pt states she was able to perform functional mobility with rolling walker, occasionally requiring assist from partner.
See Initial PT Evaluation notes.

## 2022-01-14 NOTE — PHYSICAL THERAPY INITIAL EVALUATION ADULT - GENERAL OBSERVATIONS, REHAB EVAL
Chart (EMR) reviewed. Pt seen for PT Re-evaluation and wound vac application. Received supine c HOB elevated, NAD. +NGT(off), +trach to vent, +colostomy bag intact, +2 lefty to abdomen intact, +dressing to abdomen intact, +cardiac monitor donned, +IV intact, +B/L SCD's donned. Pt lethargic but arousable, non-verbal, but able to follow simple commands inconsistently.
Pt found semi supine in bed in NAD, +IV, +NRB mask + NC, +primafit, agreeable to PT Omer and RN Gigi aware.

## 2022-01-14 NOTE — PROGRESS NOTE ADULT - ASSESSMENT
Patient is a 81F with a PMH of HTN, HLD, hypothyroidism, depression, seizures who presents to the ED for abd pain found to have diverticulitis   has rising leukocytosis   had fever yesterday   tachycardic and now hypoxic   CXR (I personally reviewed) low lung volumes   origincal CT (I personally reviewed) with diverticulitis   she had a lot of pain in the abdomen on exam     12/25: s/p surgery for diverticulitis with perforation and abscess and went to the OR. intubated in ICU with vac and needs to go back to the OR, currently on zosyn, s/p one dose of diflucan last night   12/27: s/p repair and ostomy creation yesterday, wbc elevated, cultures sensitive to zosyn and candida albicans is notoriously sensitive to azoles such as fluconazole, wean of pressors and sedation, extubate when ready   12/28: Further increase in white blood cell count but overall the patient appears to be reasonably stable.  No concern of C. difficile at this juncture.  Current antibiotics are reasonable.  Leukocytosis like related to recent surgery, no concern of other superimposed process on the basis of exam.  I do not see a role to alter her antimicrobials.  12/29:  Remains intubated in ICU. still quite edematous and net positive, WBC climbing, small wound dehiscence at bottom of incision, plan for initiation of TPN today, remains on antibiotics    12/30: rising WBC, ostomy not functioning yet, very edematous CT today, may be source control issue so for now can continue same antibiotics and antifungal but may need to change if findings on the CT are worrisome or change in hemodynamics  12/31:Leukocytosis, with collections noted on CT.  However this is being done postoperative day 5, there is some possibility that this could represent postop changes but given the leukocytosis, concur with plans for attempts at percutaneous drainage.  White blood cell count down slightly compared to prior peak, will need to be trended.  Gallbladder is also distended.  Abdominal exam was benign this morning, but a calculus cholecystitis could be the differential diagnosis here as well (though n.p.o. status certainly could explain distended gallbladder, there is also report of gallbladder wall thickening).  Would modify antibiotics based on cultures from drainage, I do not believe that antibiotics and other the cells would be adequate here.  Fortunately she is off pressors, with preserved renal function, and tolerating pressure support.  1/1/22: Postop day 6.  I have some concerns with the appearance of the ostomy, though the abdominal exam overall is otherwise unchanged.  White blood cell count remains elevated, but is lower compared with prior values.  This is despite no change in antibiotic therapy.  Patient also has asymmetric edema of the upper extremities, right greater than left.  Low threshold for duplex ultrasound to exclude DVT.No new surveillance culture data.  1/2: POD7 Ostomy looks better today, UE symmetric. WBC elevated, some slight downward trend overall. Temps <= 100F.   1/3: drainage of fluid collection today, continue zosyn and fluconazole for now, monitor wbc and temps, watch ostomy output, diuresis and address third spacing   1/4: s/p drainage yesterday now growing ecoli and awaiting for sensitivities, wbc is improving, started on tube feeds    1/5: abscess growing ecoli and bacteroides, S to ceftriaxone, wbc 15, weaning trial today, pain management    1/6: awake, lung exercise today, trach planned for tomorrow, ostomy output is good. will stop fluconazole today and change antibiotics to ceftriaxone and flagly. Unclear stop date yet    1/7 trach today, continue antibiotics   1/9: s/p trach, had low grade fever but otherwise doing ok, will continues same antibiotics regimen for now but if fevers continue she will needs to be rescanned as those abscesses can progress.  1/10: low grade fever but doing well on trach-PS, ostomy with stool and gas, shakes head when asked if in pain, discussed with surgery about repeating CT scan given the temp  1/11: Still febrile, favor interval CT as above.  1/12: CT of abdomen showing some signs of improvement and elsewhere  it shows signs of worsening. wbc normal, tube feeds currently via NGT   1/13: no fever, no wbc, Cr and LFTs ok, Ceftriaxone and flagyl continued. No planned IR intervention at this time - fluid collection without access to drain, surgical drain within the collection. Pt possibly will have repeat imaging over the weekend to evaluate progress.   Attending addendum:  agree with above  continue antibiotics   vent weaning  surgical follow up for the abdominal wound  1/14: Low grade temp last night, midline placed, no leukocytosis, Cr and LFTs ok, culture data reviewed. Pt's colostomy with small amount of liquid stool and gas, no stools recorded for two days, no role for po Vancomycin at this time. The pt has been on abx since her admission, will stop all abx and will continue to monitor.

## 2022-01-14 NOTE — PROGRESS NOTE ADULT - ASSESSMENT
TRESA PIZARRO 81 f 12/22/2021 1942 DR ANTONIO PATTON     REVIEW OF SYMPTOMS      Able to give (reliable) ROS  NO     PHYSICAL EXAM    HEENT Unremarkable  atraumatic   RESP Fair air entry EXP prolonged    Harsh breath sound Resp distres mild   CARDIAC S1 S2 No S3     NO JVD    ABDOMEN SOFT BS PRESENT NOT DISTENDED No hepatosplenomegaly   PEDAL EDEMA present No calf tenderness  NO rash     ______  DOA/CC.  12/22[birth year]2021 81F w/ HTN, HLD, hypothyroidism, depression, seizures. Presented w/ abdominal pain found to have acute sigmoid diverticulitis  ____  PMH.  pmh Hytn  pmh Sz.      _________  PROBLEMS.     VDRF.  TRACH Done 1/7  ABDOMINAL INFECTION.   1/11 ctap Decreased volume of fluid in paracolic gutters   COLOSTOMY Created 12/26  NG tube placed 12/26   DRAINS 2 drains ending in pelvis 1/11 ctap     DIARRHEA 1/13/2022   PAIN 1/10 fent 50   SZ 1/10 keppra 500.2   Woundvac Rx started 1/14  Hypernatremia 1/14/2022 Na 148  Hypothyroid 1/14/2022 tsh 12   _____                            COVID STATUS.   ICU STAY. 12/22-1/12/2022   GOC.  1/13/2022 full code       PATIENT DATA   VITALS/PO/IO/VENT/DRIPS.     1/14/2022 afeb 80 160/70   1/14/2022 cpap 5/5/.4 svt 319 r 28      ASSESSMENT/RECOMMENDATIONS.    HEMODYNAMICS.   Monitor bp Target MAP 65 (+)    RESP.   Monitor po Target po 90-95%  1/14/2022 15/450/.4/5 747/38/110     OXYGEN REQUIREMENTS.   1/13/2022 40%    VENT MANAGEMENT.   HOB elevation  Target Pplat 35 (-)  Target PO 90-95%  Target pH 730 (+)    INFECTION.  Abdominal infection sec perforated sigmoid div feculent peritonitis poa 12/22.  w 1/13-1/14/2022 w 9.7 - 9.1   pr 1/14/2022 (-)   12/26/2021 Abdominal washout closure creatn colostomy  Dr Castro  12/24/2021 Emergent ex lap sigmoid resectn feculent eritonitis   ctap ic 1/11 sm effsn and atelect l base   patchy densities bases   ng tube   Decreased volume of fluid in paracolic gutters   2 drains ending in pelvis   1/11 Flagyl.3 ccpa (iai)   1/7 rocephin x 7d ccpa (iai)       PL EFFSN.  1/14/2022 CXR residuall pl effs or basal consolidatn  Effsn likely sec to abdominal infkammation   If fever leukocytosis increases will need diagnostic thorac      DIARRHEA.   1/13/2022 po vanco ngt 125.4 Dr Patton     ANEMIA.  Hb 1/13-1/14/2022 Hb 9.4 - 8.9   Monitor    HYPERNATREMIA.  Na 1/14/2022 Na 148    NG FEEDS   1/13/2022 speech eval to transition to po diet      TIME SPENT   Over 25 minutes aggregate care time spent on encounter; activities included   direct patient care, counseling and/or coordinating care reviewing notes, lab data/ imaging , discussion with multidisciplinary team/ patient  /family and explaining in detail risks, benefits, alternatives  of the recommendations     TRESA PIZARRO 81 f 12/22/2021 1942 DR ANTONIO PATTON

## 2022-01-14 NOTE — PROGRESS NOTE ADULT - ASSESSMENT
acute metabolic encephalopathy sec to above 12/24/21 - Exploratory laparotomy and a sigmoid colectomy and abdominal washout abd at that time left open. Transferred to ICU for post op care on mechanical ventilation. 10mg IV lopressor in OR for Afib with RVR. 5L IVF given intra-op. EBL 100cc. Received intubated, sedated, on phenylephrine.  Patient went for abdominal wound closure and creation of colostomy on 12/26. Patient developed adb abscess 1/3 s/p IR drainage of abdominal abscesses. Initially patient on PPN for nutrition Now tolerating tube feeds with brown soft stool in colostomy.. Found to have multiple abdominal fluid collections s/p IR drainage of R abdominal abscess 1/3/22 growing e-coli and bacteroides Sensitive to ceftriaxone, continue antibiotics to ceftriaxone and flagyl per ID.   Patient was unable to wean and son consented to trach.  -Now out of shock state not on vasopressor support.   Left BABAK drain with murky purulent drainage- surgical team aware.   but is on appropriate antibiotics and does not have leukocytosis. Unclear if source control has been achieved.  Possible drainage by IR.   Peg placement also requested by IR next week as per surgery.   Pain management on fentanyl patch. Patient seen and examined by ICU attending and stable for transfer to medicine service for continued care.    Patient is a 81F with a PMH of HTN, HLD, hypothyroidism, depression, seizures who presents to the ED for abd pain.  Patient states that she had developed significant abdominal pain and multiple episodes of watery diarrhea. CT abdomen significant for acute diverticulitis.  S/P ex lap, sigmoid resection, peritoneal lavage 12/25 for interval perforated sigmoid diverticulitis and RTOR 12/26 for Irrigation of abdominal cavity with closure and creation of colostomy.  s/p IR drainage of abdominal abscesses 1/3 22. Off pressors, Difficult to wean from vent,  s/p trach 1/7/22. Patient with intermittent fevers, now with 3.7cm deep pelvic abscess without percutaneous access per IR. Eventual endoscopically assisted G tube placement.  Post op a-fib RVR, s/p amio load converted to sinus rhythm while in ICU.     -cont on tele, remains in RSR  -add low dose coreg as pt with elevated BP, rate controlled, titrate up as HR tolerated, can restart Norvasc if still needed for BP control   -holding off on full AC now given post op and anemia.  -TSH 12.2, currently on synthroid, will check free T4/total T3  -post op care as per surgery team, ABx as per ID  -vent support with weaning protocol per pulm  -Nutrition support  Patient is a 81F with a PMH of HTN, HLD, hypothyroidism, depression, seizures who presents to the ED for abd pain.  CT abdomen significant for acute diverticulitis.   S/P ex lap, sigmoid resection, peritoneal lavage 12/25 for interval perforated sigmoid diverticulitis and RTOR 12/26 for Irrigation of abdominal cavity with closure and creation of colostomy.    s/p IR drainage of abdominal abscesses 1/3/22. Off pressors, Difficult to wean from vent,  s/p trach 1/7/22.  Patient with intermittent fevers, now with 3.7cm deep pelvic abscess without percutaneous access per IR. Eventual endoscopically assisted G tube placement as per surgical team.  Post op a-fib RVR, s/p amio load converted to sinus rhythm while in ICU.     -cont on tele, remains in RSR  -add low dose coreg as pt with elevated BP, rate controlled, titrate up as HR tolerated, can restart Norvasc if still needed for BP control   -holding off on full AC now given post op and anemia.  -TSH 12.2, currently on synthroid, will check free T4/total T3  -post op care as per surgery team, ABx as per ID  -vent support with weaning protocol per pulm  -Nutrition support, NGT feeds

## 2022-01-14 NOTE — PHYSICAL THERAPY INITIAL EVALUATION ADULT - BED MOBILITY TRAINING, PT EVAL
Pt will improve bed mobility to Min A x 1 within 3-4 weeks.
Independent in bed mobility- supine<>sit, rolling side<>side observing proper body mechanics, proper positioning, body alignment and precautions.

## 2022-01-15 LAB
ANION GAP SERPL CALC-SCNC: 10 MMOL/L — SIGNIFICANT CHANGE UP (ref 5–17)
BUN SERPL-MCNC: 19 MG/DL — SIGNIFICANT CHANGE UP (ref 7–23)
CALCIUM SERPL-MCNC: 8.3 MG/DL — LOW (ref 8.5–10.1)
CHLORIDE SERPL-SCNC: 105 MMOL/L — SIGNIFICANT CHANGE UP (ref 96–108)
CO2 SERPL-SCNC: 26 MMOL/L — SIGNIFICANT CHANGE UP (ref 22–31)
CREAT SERPL-MCNC: 0.43 MG/DL — LOW (ref 0.5–1.3)
GLUCOSE BLDC GLUCOMTR-MCNC: 128 MG/DL — HIGH (ref 70–99)
GLUCOSE BLDC GLUCOMTR-MCNC: 134 MG/DL — HIGH (ref 70–99)
GLUCOSE BLDC GLUCOMTR-MCNC: 137 MG/DL — HIGH (ref 70–99)
GLUCOSE SERPL-MCNC: 127 MG/DL — HIGH (ref 70–99)
HCT VFR BLD CALC: 31.8 % — LOW (ref 34.5–45)
HGB BLD-MCNC: 9.8 G/DL — LOW (ref 11.5–15.5)
MAGNESIUM SERPL-MCNC: 2.2 MG/DL — SIGNIFICANT CHANGE UP (ref 1.6–2.6)
MCHC RBC-ENTMCNC: 28.3 PG — SIGNIFICANT CHANGE UP (ref 27–34)
MCHC RBC-ENTMCNC: 30.8 GM/DL — LOW (ref 32–36)
MCV RBC AUTO: 91.9 FL — SIGNIFICANT CHANGE UP (ref 80–100)
NRBC # BLD: 0 /100 WBCS — SIGNIFICANT CHANGE UP (ref 0–0)
PHOSPHATE SERPL-MCNC: 3 MG/DL — SIGNIFICANT CHANGE UP (ref 2.5–4.5)
PLATELET # BLD AUTO: 452 K/UL — HIGH (ref 150–400)
POTASSIUM SERPL-MCNC: 3.4 MMOL/L — LOW (ref 3.5–5.3)
POTASSIUM SERPL-SCNC: 3.4 MMOL/L — LOW (ref 3.5–5.3)
RBC # BLD: 3.46 M/UL — LOW (ref 3.8–5.2)
RBC # FLD: 15.5 % — HIGH (ref 10.3–14.5)
SODIUM SERPL-SCNC: 141 MMOL/L — SIGNIFICANT CHANGE UP (ref 135–145)
T3 SERPL-MCNC: 68 NG/DL — LOW (ref 80–200)
T4 FREE SERPL-MCNC: 1.6 NG/DL — SIGNIFICANT CHANGE UP (ref 0.9–1.8)
WBC # BLD: 10.89 K/UL — HIGH (ref 3.8–10.5)
WBC # FLD AUTO: 10.89 K/UL — HIGH (ref 3.8–10.5)

## 2022-01-15 PROCEDURE — 99233 SBSQ HOSP IP/OBS HIGH 50: CPT

## 2022-01-15 PROCEDURE — 99232 SBSQ HOSP IP/OBS MODERATE 35: CPT

## 2022-01-15 RX ORDER — POTASSIUM CHLORIDE 20 MEQ
20 PACKET (EA) ORAL
Refills: 0 | Status: DISCONTINUED | OUTPATIENT
Start: 2022-01-15 | End: 2022-01-27

## 2022-01-15 RX ADMIN — LEVETIRACETAM 500 MILLIGRAM(S): 250 TABLET, FILM COATED ORAL at 05:25

## 2022-01-15 RX ADMIN — DORZOLAMIDE HYDROCHLORIDE TIMOLOL MALEATE 1 DROP(S): 20; 5 SOLUTION/ DROPS OPHTHALMIC at 05:26

## 2022-01-15 RX ADMIN — CHLORHEXIDINE GLUCONATE 15 MILLILITER(S): 213 SOLUTION TOPICAL at 05:25

## 2022-01-15 RX ADMIN — LEVETIRACETAM 500 MILLIGRAM(S): 250 TABLET, FILM COATED ORAL at 17:33

## 2022-01-15 RX ADMIN — ENOXAPARIN SODIUM 40 MILLIGRAM(S): 100 INJECTION SUBCUTANEOUS at 11:30

## 2022-01-15 RX ADMIN — CHLORHEXIDINE GLUCONATE 1 APPLICATION(S): 213 SOLUTION TOPICAL at 05:27

## 2022-01-15 RX ADMIN — Medication 20 MILLIEQUIVALENT(S): at 17:39

## 2022-01-15 RX ADMIN — DORZOLAMIDE HYDROCHLORIDE TIMOLOL MALEATE 1 DROP(S): 20; 5 SOLUTION/ DROPS OPHTHALMIC at 17:32

## 2022-01-15 RX ADMIN — ESCITALOPRAM OXALATE 20 MILLIGRAM(S): 10 TABLET, FILM COATED ORAL at 11:31

## 2022-01-15 RX ADMIN — CHLORHEXIDINE GLUCONATE 15 MILLILITER(S): 213 SOLUTION TOPICAL at 17:32

## 2022-01-15 RX ADMIN — CARVEDILOL PHOSPHATE 3.12 MILLIGRAM(S): 80 CAPSULE, EXTENDED RELEASE ORAL at 17:32

## 2022-01-15 RX ADMIN — FENTANYL CITRATE 1 PATCH: 50 INJECTION INTRAVENOUS at 11:29

## 2022-01-15 RX ADMIN — FENTANYL CITRATE 1 PATCH: 50 INJECTION INTRAVENOUS at 11:31

## 2022-01-15 RX ADMIN — Medication 50 MICROGRAM(S): at 05:26

## 2022-01-15 RX ADMIN — PANTOPRAZOLE SODIUM 40 MILLIGRAM(S): 20 TABLET, DELAYED RELEASE ORAL at 11:30

## 2022-01-15 RX ADMIN — FENTANYL CITRATE 1 PATCH: 50 INJECTION INTRAVENOUS at 09:29

## 2022-01-15 RX ADMIN — Medication 0.25 MILLIGRAM(S): at 17:32

## 2022-01-15 RX ADMIN — CARVEDILOL PHOSPHATE 3.12 MILLIGRAM(S): 80 CAPSULE, EXTENDED RELEASE ORAL at 05:29

## 2022-01-15 NOTE — PROGRESS NOTE ADULT - SUBJECTIVE AND OBJECTIVE BOX
JUAREZ GTZ    LVS 2D 271 D    Allergies    No Known Allergies    Intolerances        PAST MEDICAL & SURGICAL HISTORY:  Seizure    HTN (hypertension)    Glaucoma    Thyroid disease    Blood clot of neck vein  left side    TIA (transient ischemic attack)  20 years ago    S/P appendectomy        FAMILY HISTORY:  FH: HTN (hypertension)        Home Medications:  amLODIPine 10 mg oral tablet: 1 tab(s) orally once a day (22 Dec 2021 16:46)  aspirin 81 mg oral tablet, chewable: 1 tab(s) orally once a day (22 Dec 2021 16:46)  escitalopram 20 mg oral tablet: 1 tab(s) orally once a day (22 Dec 2021 16:46)  keppera:  (22 Dec 2021 16:46)  levETIRAcetam 500 mg oral tablet: 1 tab(s) orally 2 times a day (22 Dec 2021 16:46)  timolol-dorzolamide 0.5%-2% preservative-free ophthalmic solution: 1 drop(s) to each affected eye 2 times a day (22 Dec 2021 16:46)  Zetia 10 mg oral tablet: 1 tab(s) orally once a day (22 Dec 2021 16:46)      MEDICATIONS  (STANDING):  ALPRAZolam 0.25 milliGRAM(s) Oral every 12 hours  carvedilol 3.125 milliGRAM(s) Oral every 12 hours  chlorhexidine 0.12% Liquid 15 milliLiter(s) Oral Mucosa every 12 hours  chlorhexidine 2% Cloths 1 Application(s) Topical <User Schedule>  dextrose 40% Gel 15 Gram(s) Oral once  dextrose 5%. 1000 milliLiter(s) (50 mL/Hr) IV Continuous <Continuous>  dextrose 5%. 1000 milliLiter(s) (100 mL/Hr) IV Continuous <Continuous>  dextrose 50% Injectable 25 Gram(s) IV Push once  dextrose 50% Injectable 12.5 Gram(s) IV Push once  dextrose 50% Injectable 25 Gram(s) IV Push once  dorzolamide 2%/timolol 0.5% Ophthalmic Solution 1 Drop(s) Both EYES two times a day  enoxaparin Injectable 40 milliGRAM(s) SubCutaneous daily  escitalopram 20 milliGRAM(s) Oral daily  fentaNYL   Patch  50 MICROgram(s)/Hr. 1 Patch Transdermal every 48 hours  glucagon  Injectable 1 milliGRAM(s) IntraMuscular once  levETIRAcetam 500 milliGRAM(s) Oral every 12 hours  levothyroxine 50 MICROGram(s) Oral daily  pantoprazole   Suspension 40 milliGRAM(s) Enteral Tube daily    MEDICATIONS  (PRN):  acetaminophen     Tablet .. 650 milliGRAM(s) Oral every 6 hours PRN Temp greater or equal to 38C (100.4F)  sodium chloride 0.9% lock flush 10 milliLiter(s) IV Push every 1 hour PRN Pre/post blood products, medications, blood draw, and to maintain line patency      Diet, NPO with Tube Feed:   Tube Feeding Modality: Nasogastric  Vital AF  Total Volume for 24 Hours (mL): 1200  Continuous  Starting Tube Feed Rate mL per Hour: 50  Until Goal Tube Feed Rate (mL per Hour): 50  Tube Feed Duration (in Hours): 24  Tube Feed Start Time: 16:30  Free Water Flush  Bolus   Total Volume per Flush (mL): 250   Frequency: Every 4 Hours (01-14-22 @ 12:14) [Active]          Vital Signs Last 24 Hrs  T(C): 37.1 (15 Nato 2022 05:16), Max: 37.6 (14 Jan 2022 11:53)  T(F): 98.8 (15 Nato 2022 05:16), Max: 99.7 (14 Jan 2022 11:53)  HR: 74 (15 Nato 2022 08:45) (73 - 85)  BP: 136/55 (15 Nato 2022 05:16) (135/75 - 164/75)  BP(mean): --  RR: 17 (15 Nato 2022 05:16) (15 - 20)  SpO2: 99% (15 Nato 2022 08:45) (96% - 99%)      01-14-22 @ 07:01  -  01-15-22 @ 07:00  --------------------------------------------------------  IN: 900 mL / OUT: 975 mL / NET: -75 mL        Mode: AC/ CMV (Assist Control/ Continuous Mandatory Ventilation), RR (machine): 15, TV (machine): 450, FiO2: 40, PEEP: 5, PS: 5, ITime: 1, MAP: 6, PIP: 10      LABS:                        8.9    9.14  )-----------( 422      ( 14 Jan 2022 08:33 )             30.1     01-14    148<H>  |  116<H>  |  23  ----------------------------<  117<H>  3.7   |  26  |  0.44<L>    Ca    8.6      14 Jan 2022 08:33  Phos  3.2     01-14  Mg     2.3     01-14    TPro  5.5<L>  /  Alb  1.7<L>  /  TBili  0.2  /  DBili  x   /  AST  14<L>  /  ALT  14  /  AlkPhos  62  01-14    PT/INR - ( 14 Jan 2022 08:33 )   PT: 13.1 sec;   INR: 1.14 ratio               ABG - ( 14 Jan 2022 05:24 )  pH, Arterial: x     pH, Blood: 7.47  /  pCO2: 38    /  pO2: 110   / HCO3: 28    / Base Excess: 3.9   /  SaO2: 98.4                WBC:  WBC Count: 9.14 K/uL (01-14 @ 08:33)  WBC Count: 9.77 K/uL (01-13 @ 09:31)  WBC Count: 10.86 K/uL (01-12 @ 03:00)      MICROBIOLOGY:  RECENT CULTURES:              PT/INR - ( 14 Jan 2022 08:33 )   PT: 13.1 sec;   INR: 1.14 ratio             Sodium:  Sodium, Serum: 148 mmol/L (01-14 @ 08:33)  Sodium, Serum: 145 mmol/L (01-13 @ 09:31)  Sodium, Serum: 149 mmol/L (01-12 @ 03:00)      0.44 mg/dL 01-14 @ 08:33  0.48 mg/dL 01-13 @ 09:31  0.51 mg/dL 01-12 @ 03:00      Hemoglobin:  Hemoglobin: 8.9 g/dL (01-14 @ 08:33)  Hemoglobin: 9.4 g/dL (01-13 @ 09:31)  Hemoglobin: 8.4 g/dL (01-12 @ 03:00)      Platelets: Platelet Count - Automated: 422 K/uL (01-14 @ 08:33)  Platelet Count - Automated: 519 K/uL (01-13 @ 09:31)  Platelet Count - Automated: 476 K/uL (01-12 @ 03:00)      LIVER FUNCTIONS - ( 14 Jan 2022 08:33 )  Alb: 1.7 g/dL / Pro: 5.5 gm/dL / ALK PHOS: 62 U/L / ALT: 14 U/L / AST: 14 U/L / GGT: x                 RADIOLOGY & ADDITIONAL STUDIES:      MICROBIOLOGY:  RECENT CULTURES:

## 2022-01-15 NOTE — PROGRESS NOTE ADULT - ASSESSMENT
81F with a PMH of HTN, HLD, hypothyroidism, depression, seizures who presents to the ED for abd pain.  Patient states that over the last two days she had developed significant abdominal pain and multiple episodes of watery diarrhea.  Reports one episode of nausea and vomiting earlier today.  Patient has no other complaints.  Vitals stable, (hypoxix?) labs show leukocytosis and hypokalemia.  CT abdomen significant for diverticulitis.  initially admitted to med surg.  CT noted with sigmoid diverticulosis with adjacent stranding, compatible with diverticulitis. No associated abscess is identified. Trace adjacent pelvic free fluid.81F with a PMH of HTN, HLD, hypothyroidism, depression, seizures p/w abd pain,       Perforated Acute diverticulitis   s/p 12/24/21 - Exploratory laparotomy and a sigmoid colectomy and abdominal washout  Patient went for abdominal wound closure and creation of colostomy on 12/26.  Patient developed adb abscess 1/3 s/p IR drainage of abdominal abscesses.  Initially patient on PPN for nutrition Now tolerating tube feeds with brown soft stool in colostomy..  Found to have multiple abdominal fluid collections s/p IR drainage of R abdominal abscess 1/3/22 growing e-coli and bacteroides  Peg placement also requested by IR next week as per surgery.  Noted collection around BABAK  drain  plan:  repeat CT over weekend  c/w antibiotics + vanc (oral), ceftriaxone and flagyl  BABAK drain care  IR for PEG tube next week  followup CT : patient will need IR to drain abscess on Monday      Septic Shock s/p  pressors  Transferred to ICU for post op care on mechanical ventilation.  now off pressors failed extubation  now trach to cpap  Pulm and ID consutled  Cultures Sensitive to ceftriaxone, continue antibiotics to ceftriaxone and flagyl per ID. added trial PO vanc for diarrhea.  plan:  Appreciate ID followup. all abx dced.      Hypoxemic respiratory failure now trach to CPAP  Failed extubation in ICU  now trach to CPAP  Pulm following  on fentayl patch for pain control    acute metabolic encephalopathy sec to above  stable    Rapid AFIB due to Shock state  10mg IV lopressor in OR for Afib with RVR. 5L IVF given intra-op. EBL 100cc. Received intubated, sedated, on phenylephrine.    s/p amio load converted to sinus rhythm.  not on rate control or AC on downgrade  Cards consulted  plan:  add BB  hold full dose A/C for now      dvt ppx: lovenox  subq

## 2022-01-15 NOTE — PROGRESS NOTE ADULT - ASSESSMENT
TRESA PIZARRO 81 f 12/22/2021 1942 DR ANTONIO PATTON     REVIEW OF SYMPTOMS      Able to give (reliable) ROS  NO     PHYSICAL EXAM    HEENT Unremarkable  atraumatic   RESP Fair air entry EXP prolonged    Harsh breath sound Resp distres mild   CARDIAC S1 S2 No S3     NO JVD    ABDOMEN SOFT BS PRESENT NOT DISTENDED No hepatosplenomegaly   PEDAL EDEMA present No calf tenderness  NO rash     ______  DOA/CC.  12/22/2021 81F w/ HTN, HLD, hypothyroidism, depression, seizures. Presented w/ abdominal pain found to have acute sigmoid diverticulitis  ____  PMH.  pmh Hytn  pmh Sz.      _________  PROBLEMS.     VDRF. Since surgery 12/26  TRACH Done 1/7  ABDOMINAL INFECTION.   12/24 Lap sigmoid resectn  Bedside aspiration 1/3 r flank abscess by ir  1/11 ctap Decreased volume of fluid in paracolic gutters   DRAINS 2 drains ending in pelvis 1/11 ctap   COLOSTOMY Created 12/26    1/3 ABD ASPIRATE E coli bacteroides     NG tube placed 12/26     DIARRHEA 1/13/2022   PAIN 1/10 fent 50   SZ 1/10 keppra 500.2   Woundvac Rx started 1/14  Hypernatremia 1/14/2022 Na 148  Hypothyroid 1/14/2022 tsh 12     _____                            COVID STATUS. 12/24 pcr (-)  ICU STAY. 12/22-1/12/2022   GOC.  1/13/2022 full code       BEST PRACTICE ISSUES.                                                  HEAD OF BED ELEVATION. Yes  DVT PROPHYLAXIS.    1/7 lvnx 40   LIGHT PROPHYLAXIS.    1/11 protonix 40                                                                                     DIET.    1/7 vital af 1200 ngt          INFECTION PROPHYLAXIS.   1/7 Chlorhexidine .12%   1/7 chlorhexidine 2%    SPEECH SWALLOW RECOMMENDATIONS.     PATIENT DATA   VITALS/PO/IO/VENT/DRIPS.   1/15/2022 99f 70 140/70   1/15/2022 3p cpap 5/5/.4 svt 400 r 22        ASSESSMENT/RECOMMENDATIONS.    HEMODYNAMICS.   Monitor bp Target MAP 65 (+)    RESP.   Monitor po Target po 90-95%  1/14/2022 15/450/.4/5 747/38/110     OXYGEN REQUIREMENTS.   1/13/2022 40%    VENT MANAGEMENT.   HOB elevation  Target Pplat 35 (-)  Target PO 90-95%  Target pH 730 (+)    INFECTION.  Abdominal infection sec perforated sigmoid div feculent peritonitis poa 12/22.  w 1/13-1/14/2022 w 9.7 - 9.1   pr 1/14/2022 (-)   12/26/2021 Abdominal washout closure creatn colostomy  Dr Castro  12/24/2021 Emergent ex lap sigmoid resectn feculent eritonitis   ctap ic 1/11 sm effsn and atelect l base   patchy densities bases   ng tube   Decreased volume of fluid in paracolic gutters   2 drains ending in pelvis   1/11 Flagyl.3 ccpa (iai)   1/7 rocephin x 7d ccpa (iai)       PL EFFSN.  1/14/2022 CXR residuall pl effs or basal consolidatn  Effsn likely sec to abdominal infkammation   If fever leukocytosis increases will need diagnostic thorac      DIARRHEA.   1/13/2022 po vanco ngt 125.4 Dr Patton     ANEMIA.  Hb 1/13-1/14/2022 Hb 9.4 - 8.9   Monitor    HYPERNATREMIA.  Na 1/14-1/15/2022 Na 148-141    NG FEEDS   1/13/2022 speech eval to transition to po diet      TIME SPENT   Over 25 minutes aggregate care time spent on encounter; activities included   direct patient care, counseling and/or coordinating care reviewing notes, lab data/ imaging , discussion with multidisciplinary team/ patient  /family and explaining in detail risks, benefits, alternatives  of the recommendations     TRESA PIZARRO 81 f 12/22/2021 1942 DR ANTONIO PATTON

## 2022-01-15 NOTE — PROGRESS NOTE ADULT - ASSESSMENT
81F with a PMH of HTN, HLD, hypothyroidism, depression, seizures who initially p/w abd pain, acute diverticulitis. now s/p ex lap, sigmoid resection, peritoneal lavage 12/25 for interval perforated sigmoid diverticulitis and RTOR 12/26 for Irrigation of abdominal cavity with closure and creation of colostomy, s/p IR drainage of abdominal abscesses 1/3/22. post op course c/b shock requiring pressor support and prolonged intubation now s/p trach 1/7/22. also c/b intermittent fevers, now with 3.7cm deep pelvic abscess. also with a-fib with RVR, s/p amio load converted to sinus rhythm while in ICU.     -cont on tele, remains in RSR  -cont coreg, will, titrate as needed  -holding off on full AC now given post op and anemia.  -post op care as per surgery team, ABx as per ID  -vent support with weaning protocol per pulm

## 2022-01-15 NOTE — PROGRESS NOTE ADULT - SUBJECTIVE AND OBJECTIVE BOX
24H hour events:   pt resting  no acute events overnight  MEDICATIONS:  carvedilol 3.125 milliGRAM(s) Oral every 12 hours  enoxaparin Injectable 40 milliGRAM(s) SubCutaneous daily        acetaminophen     Tablet .. 650 milliGRAM(s) Oral every 6 hours PRN  ALPRAZolam 0.25 milliGRAM(s) Oral every 12 hours  escitalopram 20 milliGRAM(s) Oral daily  fentaNYL   Patch  50 MICROgram(s)/Hr. 1 Patch Transdermal every 48 hours  levETIRAcetam 500 milliGRAM(s) Oral every 12 hours    pantoprazole   Suspension 40 milliGRAM(s) Enteral Tube daily    dextrose 40% Gel 15 Gram(s) Oral once  dextrose 50% Injectable 25 Gram(s) IV Push once  dextrose 50% Injectable 12.5 Gram(s) IV Push once  dextrose 50% Injectable 25 Gram(s) IV Push once  glucagon  Injectable 1 milliGRAM(s) IntraMuscular once  levothyroxine 50 MICROGram(s) Oral daily    chlorhexidine 0.12% Liquid 15 milliLiter(s) Oral Mucosa every 12 hours  chlorhexidine 2% Cloths 1 Application(s) Topical <User Schedule>  dextrose 5%. 1000 milliLiter(s) IV Continuous <Continuous>  dextrose 5%. 1000 milliLiter(s) IV Continuous <Continuous>  dorzolamide 2%/timolol 0.5% Ophthalmic Solution 1 Drop(s) Both EYES two times a day  sodium chloride 0.9% lock flush 10 milliLiter(s) IV Push every 1 hour PRN          PHYSICAL EXAM:  T(C): 37.1 (01-15-22 @ 05:16), Max: 37.6 (01-14-22 @ 11:53)  HR: 74 (01-15-22 @ 08:45) (73 - 85)  BP: 136/55 (01-15-22 @ 05:16) (135/75 - 164/75)  RR: 17 (01-15-22 @ 05:16) (15 - 20)  SpO2: 99% (01-15-22 @ 08:45) (96% - 99%)  Wt(kg): --  I&O's Summary    14 Jan 2022 07:01  -  15 Nato 2022 07:00  --------------------------------------------------------  IN: 900 mL / OUT: 975 mL / NET: -75 mL        Appearance: Normal	  HEENT:   Normal oral mucosa, PERRL, EOMI	  Lymphatic: No lymphadenopathy  Cardiovascular: Normal S1 S2, No JVD, No murmurs, No edema  Respiratory: coarse bs b/l. pt on trache	  Psychiatry: unable to assess  Gastrointestinal:  soft. non tender. abd wound and colostomy noted.   Skin: No rashes, No ecchymoses, No cyanosis	  Neurologic: unable to assess  Extremities: Normal range of motion, No clubbing, cyanosis      LABS:	 	    CBC Full  -  ( 14 Jan 2022 08:33 )  WBC Count : 9.14 K/uL  Hemoglobin : 8.9 g/dL  Hematocrit : 30.1 %  Platelet Count - Automated : 422 K/uL  Mean Cell Volume : 94.1 fl  Mean Cell Hemoglobin : 27.8 pg  Mean Cell Hemoglobin Concentration : 29.6 gm/dL  Auto Neutrophil # : 6.45 K/uL  Auto Lymphocyte # : 1.42 K/uL  Auto Monocyte # : 0.57 K/uL  Auto Eosinophil # : 0.60 K/uL  Auto Basophil # : 0.06 K/uL  Auto Neutrophil % : 70.6 %  Auto Lymphocyte % : 15.5 %  Auto Monocyte % : 6.2 %  Auto Eosinophil % : 6.6 %  Auto Basophil % : 0.7 %    01-14    148<H>  |  116<H>  |  23  ----------------------------<  117<H>  3.7   |  26  |  0.44<L>    Ca    8.6      14 Jan 2022 08:33  Phos  3.2     01-14  Mg     2.3     01-14    TPro  5.5<L>  /  Alb  1.7<L>  /  TBili  0.2  /  DBili  x   /  AST  14<L>  /  ALT  14  /  AlkPhos  62  01-14      proBNP:   Lipid Profile:   HgA1c:   TSH:       CARDIAC MARKERS:            TELEMETRY: 	    ECG:  	  RADIOLOGY:  OTHER: 	    PREVIOUS DIAGNOSTIC TESTING:    [ ] Echocardiogram:  [ ]  Catheterization:  [ ] Stress Test:  	  	  ASSESSMENT/PLAN:

## 2022-01-15 NOTE — PROGRESS NOTE ADULT - SUBJECTIVE AND OBJECTIVE BOX
SURGERY PROGRESS HPI:  Pt seen and examined at bedside resting comfortably.      Vital Signs Last 24 Hrs  T(C): 36.9 (15 Nato 2022 00:03), Max: 37.6 (14 Jan 2022 11:53)  T(F): 98.4 (15 Nato 2022 00:03), Max: 99.7 (14 Jan 2022 11:53)  HR: 73 (15 Nato 2022 00:03) (60 - 84)  BP: 150/73 (15 Nato 2022 00:03) (135/75 - 165/75)  BP(mean): --  RR: 18 (15 Nato 2022 00:03) (15 - 20)  SpO2: 97% (15 Nato 2022 00:03) (96% - 99%)      PHYSICAL EXAM:  GENERAL: NAD, lethargic  HEENT: NGT with TF running at 50  Chest: Tracheostomy intact functioning well  HEART: RRR S1S2  ABDOMEN: Midline wound with wound vac with good seal functioning well with minimal s/s output in cannister. Ostomy pink and viable with air and stool in the bag. Left BABAK purulent. Right BABAK serous output. non distended, +BS, soft, nontender  EXTREMITIES:  calf soft, non tender b/l    I&O's Detail    14 Jan 2022 07:01  -  15 Nato 2022 04:42  --------------------------------------------------------  IN:    Enteral Tube Flush: 600 mL    Vital1.5: 240 mL  Total IN: 840 mL    OUT:    Bulb (mL): 20 mL    Bulb (mL): 5 mL    Incontinent per Collection Bag (mL): 600 mL  Total OUT: 625 mL    Total NET: 215 mL      LABS:                        8.9    9.14  )-----------( 422      ( 14 Jan 2022 08:33 )             30.1     01-14    148<H>  |  116<H>  |  23  ----------------------------<  117<H>  3.7   |  26  |  0.44<L>    Ca    8.6      14 Jan 2022 08:33  Phos  3.2     01-14  Mg     2.3     01-14    TPro  5.5<L>  /  Alb  1.7<L>  /  TBili  0.2  /  DBili  x   /  AST  14<L>  /  ALT  14  /  AlkPhos  62  01-14    PT/INR - ( 14 Jan 2022 08:33 )   PT: 13.1 sec;   INR: 1.14 ratio           A/P: 81F admitted with Acute Sigmoid Diverticulitis s/p ex lap, sigmoid resection, peritoneal lavage 12/25 and RTOR 12/26 for irrigation of abdominal cavity with closure and creation of colostomy. S/p bedside IR aspiration of fluid collection 1/3. Tracheostomy 1/7. With 3.7cm deep pelvic abscess without percutaneous access per IR.     continue local drain and ostomy care per nursing  wound vac changes per PT (MWF)  abx d/c'd per ID  continue tube feeds, vent support with weaning protocol, medical management  endoscopically assisted G tube placement next week  will d/w attending

## 2022-01-15 NOTE — PROGRESS NOTE ADULT - SUBJECTIVE AND OBJECTIVE BOX
Patient seen and examined at bedside. Afebrile. low grade at 99.1 overnight. Vitals stable Hb8.9 Sodium 141 after adding free water . Cr stable. Patient respond to name. eyes open. ngt tube feeds running +colostomy + lefty drain. noted watery diarrhea through ostomy.  ID and Surgery followup appreciated. Patient off all antibiotics  Review of Systems:  limited ROS today due to mental status and trach to cpap  Objective:  Vitals  T(C): 37.6 (01-14-22 @ 11:53), Max: 38.2 (01-14-22 @ 00:52)  HR: 73 (01-14-22 @ 11:53) (60 - 87)  BP: 135/75 (01-14-22 @ 11:53) (115/62 - 165/75)  RR: 15 (01-14-22 @ 11:53) (15 - 20)  SpO2: 99% (01-14-22 @ 11:53) (97% - 99%)    Physical Exam:  General: comfortable, no acute distress,   HEENT: Atraumatic, no LAD, trachea midline, PERRLA  Cardiovascular: irregular s1s2, no murmurs, gallops or fricition rubs  Pulmonary: decreased, no wheezing , rhonchi, trach to cpap  Gastrointestinal: soft non tender non distended, no masses felt, no organomegally ++ LEFTY drain, ostomy, NGT, : Midline wound with wound vac  Muscloskeletal: no lower extremity edema, intact bilateral lower extremity pulses  Neurological: CN II-12 intact. No focal weakness  Psychiatrical: normal mood, cooperative  SKIN: no rash, lesions or ulcers  Labs:                          8.9    9.14  )-----------( 422      ( 14 Jan 2022 08:33 )             30.1     01-14    148<H>  |  116<H>  |  23  ----------------------------<  117<H>  3.7   |  26  |  0.44<L>    Ca    8.6      14 Jan 2022 08:33  Phos  3.2     01-14  Mg     2.3     01-14    TPro  5.5<L>  /  Alb  1.7<L>  /  TBili  0.2  /  DBili  x   /  AST  14<L>  /  ALT  14  /  AlkPhos  62  01-14    LIVER FUNCTIONS - ( 14 Jan 2022 08:33 )  Alb: 1.7 g/dL / Pro: 5.5 gm/dL / ALK PHOS: 62 U/L / ALT: 14 U/L / AST: 14 U/L / GGT: x           PT/INR - ( 14 Jan 2022 08:33 )   PT: 13.1 sec;   INR: 1.14 ratio               Active Medications  MEDICATIONS  (STANDING):  ALPRAZolam 0.25 milliGRAM(s) Oral every 12 hours  cefTRIAXone   IVPB 1000 milliGRAM(s) IV Intermittent every 24 hours  chlorhexidine 0.12% Liquid 15 milliLiter(s) Oral Mucosa every 12 hours  chlorhexidine 2% Cloths 1 Application(s) Topical <User Schedule>  dextrose 40% Gel 15 Gram(s) Oral once  dextrose 5%. 1000 milliLiter(s) (50 mL/Hr) IV Continuous <Continuous>  dextrose 5%. 1000 milliLiter(s) (100 mL/Hr) IV Continuous <Continuous>  dextrose 50% Injectable 25 Gram(s) IV Push once  dextrose 50% Injectable 12.5 Gram(s) IV Push once  dextrose 50% Injectable 25 Gram(s) IV Push once  dorzolamide 2%/timolol 0.5% Ophthalmic Solution 1 Drop(s) Both EYES two times a day  enoxaparin Injectable 40 milliGRAM(s) SubCutaneous daily  escitalopram 20 milliGRAM(s) Oral daily  fentaNYL   Patch  50 MICROgram(s)/Hr. 1 Patch Transdermal every 48 hours  glucagon  Injectable 1 milliGRAM(s) IntraMuscular once  levETIRAcetam 500 milliGRAM(s) Oral every 12 hours  levothyroxine 50 MICROGram(s) Oral daily  metroNIDAZOLE    Tablet 500 milliGRAM(s) Oral every 8 hours  pantoprazole   Suspension 40 milliGRAM(s) Enteral Tube daily  vancomycin    Solution 125 milliGRAM(s) Oral every 6 hours    MEDICATIONS  (PRN):  acetaminophen     Tablet .. 650 milliGRAM(s) Oral every 6 hours PRN Temp greater or equal to 38C (100.4F)  sodium chloride 0.9% lock flush 10 milliLiter(s) IV Push every 1 hour PRN Pre/post blood products, medications, blood draw, and to maintain line patency

## 2022-01-16 LAB
ANION GAP SERPL CALC-SCNC: 4 MMOL/L — LOW (ref 5–17)
BUN SERPL-MCNC: 20 MG/DL — SIGNIFICANT CHANGE UP (ref 7–23)
CALCIUM SERPL-MCNC: 8.7 MG/DL — SIGNIFICANT CHANGE UP (ref 8.5–10.1)
CHLORIDE SERPL-SCNC: 111 MMOL/L — HIGH (ref 96–108)
CO2 SERPL-SCNC: 29 MMOL/L — SIGNIFICANT CHANGE UP (ref 22–31)
CREAT SERPL-MCNC: 0.45 MG/DL — LOW (ref 0.5–1.3)
FLUAV AG NPH QL: SIGNIFICANT CHANGE UP
FLUBV AG NPH QL: SIGNIFICANT CHANGE UP
GLUCOSE BLDC GLUCOMTR-MCNC: 106 MG/DL — HIGH (ref 70–99)
GLUCOSE BLDC GLUCOMTR-MCNC: 115 MG/DL — HIGH (ref 70–99)
GLUCOSE BLDC GLUCOMTR-MCNC: 119 MG/DL — HIGH (ref 70–99)
GLUCOSE BLDC GLUCOMTR-MCNC: 130 MG/DL — HIGH (ref 70–99)
GLUCOSE BLDC GLUCOMTR-MCNC: 99 MG/DL — SIGNIFICANT CHANGE UP (ref 70–99)
GLUCOSE SERPL-MCNC: 106 MG/DL — HIGH (ref 70–99)
HCT VFR BLD CALC: 32.9 % — LOW (ref 34.5–45)
HGB BLD-MCNC: 10.1 G/DL — LOW (ref 11.5–15.5)
MAGNESIUM SERPL-MCNC: 2.4 MG/DL — SIGNIFICANT CHANGE UP (ref 1.6–2.6)
MCHC RBC-ENTMCNC: 28.2 PG — SIGNIFICANT CHANGE UP (ref 27–34)
MCHC RBC-ENTMCNC: 30.7 GM/DL — LOW (ref 32–36)
MCV RBC AUTO: 91.9 FL — SIGNIFICANT CHANGE UP (ref 80–100)
NRBC # BLD: 0 /100 WBCS — SIGNIFICANT CHANGE UP (ref 0–0)
PHOSPHATE SERPL-MCNC: 3.5 MG/DL — SIGNIFICANT CHANGE UP (ref 2.5–4.5)
PLATELET # BLD AUTO: 478 K/UL — HIGH (ref 150–400)
POTASSIUM SERPL-MCNC: 3.4 MMOL/L — LOW (ref 3.5–5.3)
POTASSIUM SERPL-SCNC: 3.4 MMOL/L — LOW (ref 3.5–5.3)
RBC # BLD: 3.58 M/UL — LOW (ref 3.8–5.2)
RBC # FLD: 15.3 % — HIGH (ref 10.3–14.5)
SARS-COV-2 RNA SPEC QL NAA+PROBE: SIGNIFICANT CHANGE UP
SODIUM SERPL-SCNC: 144 MMOL/L — SIGNIFICANT CHANGE UP (ref 135–145)
WBC # BLD: 9.84 K/UL — SIGNIFICANT CHANGE UP (ref 3.8–10.5)
WBC # FLD AUTO: 9.84 K/UL — SIGNIFICANT CHANGE UP (ref 3.8–10.5)

## 2022-01-16 PROCEDURE — 71045 X-RAY EXAM CHEST 1 VIEW: CPT | Mod: 26

## 2022-01-16 PROCEDURE — 99233 SBSQ HOSP IP/OBS HIGH 50: CPT

## 2022-01-16 PROCEDURE — 99232 SBSQ HOSP IP/OBS MODERATE 35: CPT

## 2022-01-16 RX ORDER — POTASSIUM CHLORIDE 20 MEQ
40 PACKET (EA) ORAL EVERY 4 HOURS
Refills: 0 | Status: COMPLETED | OUTPATIENT
Start: 2022-01-16 | End: 2022-01-16

## 2022-01-16 RX ADMIN — FENTANYL CITRATE 1 PATCH: 50 INJECTION INTRAVENOUS at 05:12

## 2022-01-16 RX ADMIN — DORZOLAMIDE HYDROCHLORIDE TIMOLOL MALEATE 1 DROP(S): 20; 5 SOLUTION/ DROPS OPHTHALMIC at 05:09

## 2022-01-16 RX ADMIN — DORZOLAMIDE HYDROCHLORIDE TIMOLOL MALEATE 1 DROP(S): 20; 5 SOLUTION/ DROPS OPHTHALMIC at 17:04

## 2022-01-16 RX ADMIN — CHLORHEXIDINE GLUCONATE 15 MILLILITER(S): 213 SOLUTION TOPICAL at 05:09

## 2022-01-16 RX ADMIN — Medication 0.25 MILLIGRAM(S): at 17:03

## 2022-01-16 RX ADMIN — Medication 40 MILLIEQUIVALENT(S): at 17:03

## 2022-01-16 RX ADMIN — LEVETIRACETAM 500 MILLIGRAM(S): 250 TABLET, FILM COATED ORAL at 11:10

## 2022-01-16 RX ADMIN — FENTANYL CITRATE 1 PATCH: 50 INJECTION INTRAVENOUS at 07:18

## 2022-01-16 RX ADMIN — PANTOPRAZOLE SODIUM 40 MILLIGRAM(S): 20 TABLET, DELAYED RELEASE ORAL at 11:11

## 2022-01-16 RX ADMIN — ENOXAPARIN SODIUM 40 MILLIGRAM(S): 100 INJECTION SUBCUTANEOUS at 11:11

## 2022-01-16 RX ADMIN — Medication 0.25 MILLIGRAM(S): at 11:20

## 2022-01-16 RX ADMIN — CARVEDILOL PHOSPHATE 3.12 MILLIGRAM(S): 80 CAPSULE, EXTENDED RELEASE ORAL at 17:03

## 2022-01-16 RX ADMIN — Medication 40 MILLIEQUIVALENT(S): at 13:09

## 2022-01-16 RX ADMIN — ESCITALOPRAM OXALATE 20 MILLIGRAM(S): 10 TABLET, FILM COATED ORAL at 11:11

## 2022-01-16 RX ADMIN — Medication 50 MICROGRAM(S): at 11:10

## 2022-01-16 RX ADMIN — FENTANYL CITRATE 1 PATCH: 50 INJECTION INTRAVENOUS at 20:00

## 2022-01-16 RX ADMIN — CARVEDILOL PHOSPHATE 3.12 MILLIGRAM(S): 80 CAPSULE, EXTENDED RELEASE ORAL at 11:10

## 2022-01-16 RX ADMIN — LEVETIRACETAM 500 MILLIGRAM(S): 250 TABLET, FILM COATED ORAL at 17:03

## 2022-01-16 RX ADMIN — CHLORHEXIDINE GLUCONATE 15 MILLILITER(S): 213 SOLUTION TOPICAL at 17:03

## 2022-01-16 RX ADMIN — CHLORHEXIDINE GLUCONATE 1 APPLICATION(S): 213 SOLUTION TOPICAL at 05:09

## 2022-01-16 NOTE — PROGRESS NOTE ADULT - SUBJECTIVE AND OBJECTIVE BOX
24H hour events:   resting  no acute events overnight  tele: SR 70s  MEDICATIONS:  carvedilol 3.125 milliGRAM(s) Oral every 12 hours  enoxaparin Injectable 40 milliGRAM(s) SubCutaneous daily        acetaminophen     Tablet .. 650 milliGRAM(s) Oral every 6 hours PRN  ALPRAZolam 0.25 milliGRAM(s) Oral every 12 hours  escitalopram 20 milliGRAM(s) Oral daily  fentaNYL   Patch  50 MICROgram(s)/Hr. 1 Patch Transdermal every 48 hours  levETIRAcetam 500 milliGRAM(s) Oral every 12 hours    pantoprazole   Suspension 40 milliGRAM(s) Enteral Tube daily    dextrose 40% Gel 15 Gram(s) Oral once  dextrose 50% Injectable 25 Gram(s) IV Push once  dextrose 50% Injectable 12.5 Gram(s) IV Push once  dextrose 50% Injectable 25 Gram(s) IV Push once  glucagon  Injectable 1 milliGRAM(s) IntraMuscular once  levothyroxine 50 MICROGram(s) Oral daily    chlorhexidine 0.12% Liquid 15 milliLiter(s) Oral Mucosa every 12 hours  chlorhexidine 2% Cloths 1 Application(s) Topical <User Schedule>  dextrose 5%. 1000 milliLiter(s) IV Continuous <Continuous>  dextrose 5%. 1000 milliLiter(s) IV Continuous <Continuous>  dorzolamide 2%/timolol 0.5% Ophthalmic Solution 1 Drop(s) Both EYES two times a day  potassium chloride    Tablet ER 20 milliEquivalent(s) Oral every 2 hours  sodium chloride 0.9% lock flush 10 milliLiter(s) IV Push every 1 hour PRN          PHYSICAL EXAM:  T(C): 37.4 (01-16-22 @ 07:00), Max: 37.5 (01-16-22 @ 00:56)  HR: 72 (01-16-22 @ 08:55) (63 - 78)  BP: 156/83 (01-16-22 @ 07:00) (156/83 - 164/71)  RR: 17 (01-16-22 @ 08:00) (17 - 18)  SpO2: 98% (01-16-22 @ 08:55) (96% - 100%)  Wt(kg): --  I&O's Summary      Appearance: Normal	  HEENT:   Normal oral mucosa, PERRL, EOMI	  Lymphatic: No lymphadenopathy  Cardiovascular: Normal S1 S2, No JVD, No murmurs, No edema  Respiratory: coarse bs/ on trache	  Psychiatry: unable to assess  Gastrointestinal:  Soft, Non-tender, + BS	  Skin: No rashes, No ecchymoses, No cyanosis	  Neurologic: unable to assess  Extremities: Normal range of motion, No clubbing, cyanosis       LABS:	 	    CBC Full  -  ( 16 Jan 2022 10:47 )  WBC Count : 9.84 K/uL  Hemoglobin : 10.1 g/dL  Hematocrit : 32.9 %  Platelet Count - Automated : 478 K/uL  Mean Cell Volume : 91.9 fl  Mean Cell Hemoglobin : 28.2 pg  Mean Cell Hemoglobin Concentration : 30.7 gm/dL  Auto Neutrophil # : x  Auto Lymphocyte # : x  Auto Monocyte # : x  Auto Eosinophil # : x  Auto Basophil # : x  Auto Neutrophil % : x  Auto Lymphocyte % : x  Auto Monocyte % : x  Auto Eosinophil % : x  Auto Basophil % : x    01-15    141  |  105  |  19  ----------------------------<  127<H>  3.4<L>   |  26  |  0.43<L>    Ca    8.3<L>      15 Nato 2022 10:55  Phos  3.0     01-15  Mg     2.2     01-15        proBNP:   Lipid Profile:   HgA1c:   TSH:       CARDIAC MARKERS:            TELEMETRY: 	    ECG:  	  RADIOLOGY:  OTHER: 	    PREVIOUS DIAGNOSTIC TESTING:    [ ] Echocardiogram:  [ ]  Catheterization:  [ ] Stress Test:  	  	  ASSESSMENT/PLAN:

## 2022-01-16 NOTE — PROGRESS NOTE ADULT - SUBJECTIVE AND OBJECTIVE BOX
Patient seen and examined at bedside. Afebrile. low grade at 99.3 this am. vitals stable Hb improving Sodium 141 after adding free water . Cr stable. Patient respond to name. eyes open. ngt tube feeds running +colostomy + lefty drain. noted watery diarrhea through ostomy.  ++ NGT clogged.. replaced by surgery  ROS:  limited ROS today due to mental status and trach to cpap. patient alert today awake.  Objective:  Vitals  T(C): 37.6 (01-14-22 @ 11:53), Max: 38.2 (01-14-22 @ 00:52)  HR: 73 (01-14-22 @ 11:53) (60 - 87)  BP: 135/75 (01-14-22 @ 11:53) (115/62 - 165/75)  RR: 15 (01-14-22 @ 11:53) (15 - 20)  SpO2: 99% (01-14-22 @ 11:53) (97% - 99%)    Physical Exam:  General: comfortable, no acute distress,   HEENT: Atraumatic, no LAD, trachea midline, PERRLA  Cardiovascular: irregular s1s2, no murmurs, gallops or fricition rubs  Pulmonary: decreased, no wheezing , rhonchi, trach to cpap  Gastrointestinal: soft non tender non distended, no masses felt, no organomegally ++ LEFTY drain, ostomy, NGT, : Midline wound with wound vac  Muscloskeletal: no lower extremity edema, intact bilateral lower extremity pulses  Neurological: CN II-12 intact. No focal weakness  Psychiatrical: normal mood, cooperative  SKIN: no rash, lesions or ulcers  Labs:    Labs:                          10.1   9.84  )-----------( 478      ( 16 Jan 2022 10:47 )             32.9     01-16    144  |  111<H>  |  20  ----------------------------<  106<H>  3.4<L>   |  29  |  0.45<L>    Ca    8.7      16 Jan 2022 10:47  Phos  3.5     01-16  Mg     2.4     01-16              Active Medications  MEDICATIONS  (STANDING):  ALPRAZolam 0.25 milliGRAM(s) Oral every 12 hours  carvedilol 3.125 milliGRAM(s) Oral every 12 hours  chlorhexidine 0.12% Liquid 15 milliLiter(s) Oral Mucosa every 12 hours  chlorhexidine 2% Cloths 1 Application(s) Topical <User Schedule>  dextrose 40% Gel 15 Gram(s) Oral once  dextrose 5%. 1000 milliLiter(s) (50 mL/Hr) IV Continuous <Continuous>  dextrose 5%. 1000 milliLiter(s) (100 mL/Hr) IV Continuous <Continuous>  dextrose 50% Injectable 25 Gram(s) IV Push once  dextrose 50% Injectable 12.5 Gram(s) IV Push once  dextrose 50% Injectable 25 Gram(s) IV Push once  dorzolamide 2%/timolol 0.5% Ophthalmic Solution 1 Drop(s) Both EYES two times a day  enoxaparin Injectable 40 milliGRAM(s) SubCutaneous daily  escitalopram 20 milliGRAM(s) Oral daily  fentaNYL   Patch  50 MICROgram(s)/Hr. 1 Patch Transdermal every 48 hours  glucagon  Injectable 1 milliGRAM(s) IntraMuscular once  levETIRAcetam 500 milliGRAM(s) Oral every 12 hours  levothyroxine 50 MICROGram(s) Oral daily  pantoprazole   Suspension 40 milliGRAM(s) Enteral Tube daily  potassium chloride    Tablet ER 20 milliEquivalent(s) Oral every 2 hours  potassium chloride    Tablet ER 40 milliEquivalent(s) Oral every 4 hours    MEDICATIONS  (PRN):  acetaminophen     Tablet .. 650 milliGRAM(s) Oral every 6 hours PRN Temp greater or equal to 38C (100.4F)  sodium chloride 0.9% lock flush 10 milliLiter(s) IV Push every 1 hour PRN Pre/post blood products, medications, blood draw, and to maintain line patency

## 2022-01-16 NOTE — PROGRESS NOTE ADULT - ASSESSMENT
81F with a PMH of HTN, HLD, hypothyroidism, depression, seizures who presents to the ED for abd pain.  Patient states that over the last two days she had developed significant abdominal pain and multiple episodes of watery diarrhea.  Reports one episode of nausea and vomiting earlier today.  Patient has no other complaints.  Vitals stable, (hypoxix?) labs show leukocytosis and hypokalemia.  CT abdomen significant for diverticulitis.  initially admitted to med surg.  CT noted with sigmoid diverticulosis with adjacent stranding, compatible with diverticulitis. No associated abscess is identified. Trace adjacent pelvic free fluid.81F with a PMH of HTN, HLD, hypothyroidism, depression, seizures p/w abd pain,       Perforated Acute diverticulitis   s/p 12/24/21 - Exploratory laparotomy and a sigmoid colectomy and abdominal washout  Patient went for abdominal wound closure and creation of colostomy on 12/26.  Patient developed adb abscess 1/3 s/p IR drainage of abdominal abscesses.  Initially patient on PPN for nutrition Now tolerating tube feeds with brown soft stool in colostomy..  Found to have multiple abdominal fluid collections s/p IR drainage of R abdominal abscess 1/3/22 growing e-coli and bacteroides  Peg placement also requested by IR next week as per surgery.  Noted collection around BABAK  drain  ct was planned for followup although cancelled  all abx dced as per iD recs  plan:  BABAK drain care  IR for PEG tube early this wek      Septic Shock s/p  pressors  Transferred to ICU for post op care on mechanical ventilation.  now off pressors failed extubation  now trach to cpap  Pulm and ID consutled  Cultures Sensitive to ceftriaxone, continue antibiotics to ceftriaxone and flagyl per ID. added trial PO vanc for diarrhea.  plan:  Appreciate ID followup. all abx dced.      Hypoxemic respiratory failure now trach to CPAP  Failed extubation in ICU  now trach to CPAP  Pulm following  on fentayl patch for pain control    acute metabolic encephalopathy sec to above  stable    Rapid AFIB due to Shock state  10mg IV lopressor in OR for Afib with RVR. 5L IVF given intra-op. EBL 100cc. Received intubated, sedated, on phenylephrine.    s/p amio load converted to sinus rhythm.  not on rate control or AC on downgrade  Cards consulted  plan:  add BB  hold full dose A/C for now      dvt ppx: lovenox  subq

## 2022-01-16 NOTE — PROGRESS NOTE ADULT - SUBJECTIVE AND OBJECTIVE BOX
JUAREZ GTZ    LVS 2D 271 D    Allergies    No Known Allergies    Intolerances        PAST MEDICAL & SURGICAL HISTORY:  Seizure    HTN (hypertension)    Glaucoma    Thyroid disease    Blood clot of neck vein  left side    TIA (transient ischemic attack)  20 years ago    S/P appendectomy        FAMILY HISTORY:  FH: HTN (hypertension)        Home Medications:  amLODIPine 10 mg oral tablet: 1 tab(s) orally once a day (22 Dec 2021 16:46)  aspirin 81 mg oral tablet, chewable: 1 tab(s) orally once a day (22 Dec 2021 16:46)  escitalopram 20 mg oral tablet: 1 tab(s) orally once a day (22 Dec 2021 16:46)  keppera:  (22 Dec 2021 16:46)  levETIRAcetam 500 mg oral tablet: 1 tab(s) orally 2 times a day (22 Dec 2021 16:46)  timolol-dorzolamide 0.5%-2% preservative-free ophthalmic solution: 1 drop(s) to each affected eye 2 times a day (22 Dec 2021 16:46)  Zetia 10 mg oral tablet: 1 tab(s) orally once a day (22 Dec 2021 16:46)      MEDICATIONS  (STANDING):  ALPRAZolam 0.25 milliGRAM(s) Oral every 12 hours  carvedilol 3.125 milliGRAM(s) Oral every 12 hours  chlorhexidine 0.12% Liquid 15 milliLiter(s) Oral Mucosa every 12 hours  chlorhexidine 2% Cloths 1 Application(s) Topical <User Schedule>  dextrose 40% Gel 15 Gram(s) Oral once  dextrose 5%. 1000 milliLiter(s) (50 mL/Hr) IV Continuous <Continuous>  dextrose 5%. 1000 milliLiter(s) (100 mL/Hr) IV Continuous <Continuous>  dextrose 50% Injectable 25 Gram(s) IV Push once  dextrose 50% Injectable 12.5 Gram(s) IV Push once  dextrose 50% Injectable 25 Gram(s) IV Push once  dorzolamide 2%/timolol 0.5% Ophthalmic Solution 1 Drop(s) Both EYES two times a day  enoxaparin Injectable 40 milliGRAM(s) SubCutaneous daily  escitalopram 20 milliGRAM(s) Oral daily  fentaNYL   Patch  50 MICROgram(s)/Hr. 1 Patch Transdermal every 48 hours  glucagon  Injectable 1 milliGRAM(s) IntraMuscular once  levETIRAcetam 500 milliGRAM(s) Oral every 12 hours  levothyroxine 50 MICROGram(s) Oral daily  pantoprazole   Suspension 40 milliGRAM(s) Enteral Tube daily  potassium chloride    Tablet ER 20 milliEquivalent(s) Oral every 2 hours    MEDICATIONS  (PRN):  acetaminophen     Tablet .. 650 milliGRAM(s) Oral every 6 hours PRN Temp greater or equal to 38C (100.4F)  sodium chloride 0.9% lock flush 10 milliLiter(s) IV Push every 1 hour PRN Pre/post blood products, medications, blood draw, and to maintain line patency      Diet, NPO with Tube Feed:   Tube Feeding Modality: Nasogastric  Vital AF  Total Volume for 24 Hours (mL): 1200  Continuous  Starting Tube Feed Rate mL per Hour: 50  Until Goal Tube Feed Rate (mL per Hour): 50  Tube Feed Duration (in Hours): 24  Tube Feed Start Time: 16:30  Free Water Flush  Bolus   Total Volume per Flush (mL): 250   Frequency: Every 4 Hours (01-14-22 @ 12:14) [Active]          Vital Signs Last 24 Hrs  T(C): 37.4 (16 Jan 2022 07:00), Max: 37.5 (16 Jan 2022 00:56)  T(F): 99.3 (16 Jan 2022 07:00), Max: 99.5 (16 Jan 2022 00:56)  HR: 72 (16 Jan 2022 08:55) (63 - 78)  BP: 156/83 (16 Jan 2022 07:00) (147/73 - 164/71)  BP(mean): --  RR: 17 (16 Jan 2022 08:00) (17 - 18)  SpO2: 98% (16 Jan 2022 08:55) (96% - 100%)        Mode: CPAP with PS, FiO2: 40, PEEP: 5, PS: 5, ITime: 0.6, MAP: 6, PIP: 10      LABS:                        9.8    10.89 )-----------( 452      ( 15 Nato 2022 10:55 )             31.8     01-15    141  |  105  |  19  ----------------------------<  127<H>  3.4<L>   |  26  |  0.43<L>    Ca    8.3<L>      15 Jan 2022 10:55  Phos  3.0     01-15  Mg     2.2     01-15                WBC:  WBC Count: 10.89 K/uL (01-15 @ 10:55)  WBC Count: 9.14 K/uL (01-14 @ 08:33)  WBC Count: 9.77 K/uL (01-13 @ 09:31)      MICROBIOLOGY:  RECENT CULTURES:                  Sodium:  Sodium, Serum: 141 mmol/L (01-15 @ 10:55)  Sodium, Serum: 148 mmol/L (01-14 @ 08:33)  Sodium, Serum: 145 mmol/L (01-13 @ 09:31)      0.43 mg/dL 01-15 @ 10:55  0.44 mg/dL 01-14 @ 08:33  0.48 mg/dL 01-13 @ 09:31      Hemoglobin:  Hemoglobin: 9.8 g/dL (01-15 @ 10:55)  Hemoglobin: 8.9 g/dL (01-14 @ 08:33)  Hemoglobin: 9.4 g/dL (01-13 @ 09:31)      Platelets: Platelet Count - Automated: 452 K/uL (01-15 @ 10:55)  Platelet Count - Automated: 422 K/uL (01-14 @ 08:33)  Platelet Count - Automated: 519 K/uL (01-13 @ 09:31)              RADIOLOGY & ADDITIONAL STUDIES:      MICROBIOLOGY:  RECENT CULTURES:

## 2022-01-16 NOTE — PROGRESS NOTE ADULT - ASSESSMENT
TRESA PIZARRO 81 f 12/22/2021 1942 DR ANTONIO PATTON     REVIEW OF SYMPTOMS      Able to give (reliable) ROS  NO     PHYSICAL EXAM    HEENT Unremarkable  atraumatic   RESP Fair air entry EXP prolonged    Harsh breath sound Resp distres mild   CARDIAC S1 S2 No S3     NO JVD    ABDOMEN SOFT BS PRESENT NOT DISTENDED No hepatosplenomegaly   PEDAL EDEMA present No calf tenderness  NO rash     ______  DOA/CC.  12/22/2021 81F w/ HTN, HLD, hypothyroidism, depression, seizures. Presented w/ abdominal pain found to have acute sigmoid diverticulitis  ____  PMH.  pmh Hytn  pmh Sz.      _________  PROBLEMS.     VDRF. Since surgery 12/26  TRACH Done 1/7  ABDOMINAL INFECTION.   12/24 Lap sigmoid resectn  Bedside aspiration 1/3 r flank abscess by ir  1/11 ctap Decreased volume of fluid in paracolic gutters   DRAINS 2 drains ending in pelvis 1/11 ctap   COLOSTOMY Created 12/26    1/3 ABD ASPIRATE E coli bacteroides     NG tube placed 12/26     DIARRHEA 1/13/2022   PAIN 1/10 fent 50   SZ 1/10 keppra 500.2   Woundvac Rx started 1/14  Hypernatremia 1/14/2022 Na 148  Hypothyroid 1/14/2022 tsh 12     _____                            COVID STATUS. 12/24 pcr (-)  ICU STAY. 12/22-1/12/2022   GOC.  1/13/2022 full code       BEST PRACTICE ISSUES.                                                  HEAD OF BED ELEVATION. Yes  DVT PROPHYLAXIS.    1/7 lvnx 40   LIGHT PROPHYLAXIS.    1/11 protonix 40                                                                                     DIET.    1/7 vital af 1200 ngt          INFECTION PROPHYLAXIS.   1/7 Chlorhexidine .12%   1/7 chlorhexidine 2%    SPEECH SWALLOW RECOMMENDATIONS.     PATIENT DATA   VITALS/PO/IO/VENT/DRIPS.   1/15/2022 99f 70 140/70   1/15/2022 3p cpap 5/5/.4 svt 400 r 22        ASSESSMENT/RECOMMENDATIONS.    HEMODYNAMICS.   Monitor bp Target MAP 65 (+)    RESP.   Monitor po Target po 90-95%  1/14/2022 15/450/.4/5 747/38/110     OXYGEN REQUIREMENTS.   1/13/2022 40%    VENT MANAGEMENT.   HOB elevation  Target Pplat 35 (-)  Target PO 90-95%  Target pH 730 (+)    INFECTION.  Abdominal infection sec perforated sigmoid div feculent peritonitis poa 12/22.  w 1/13-1/14/2022 w 9.7 - 9.1   pr 1/14/2022 (-)   1/11-1/15 Flagyl.3 ccpa (iai)   1/7-1/15 rocephin x 7d ccpa (iai)   1/16/2022 Off abio      SP ABD SURGERY.  81F admitted with Acute Sigmoid Diverticulitis s/p ex lap, sigmoid resection, peritoneal lavage 12/25 and RTOR 12/26 for irrigation of abdominal cavity with closure and creation of colostomy. S/p bedside IR aspiration of fluid collection 1/3. Tracheostomy 1/7.   Deep pelvic abscess noted on CT,  Local drain/ostomy care per nursing  a/r  Per surgery endoscopically assisted G tube placement next week      PL EFFSN.  1/16/2022 cxr tr bl effs  1/14/2022 CXR residuall pl effs or basal consolidatn  Effsn likely sec to abdominal infkammation       ANEMIA.  Hb 1/13-1/14-1/16/2022 Hb 9.4 - 8.9 -10   Monitor    HYPERNATREMIA.  Na 1/14-1/15/2022 Na 148-141    NG FEEDS   1/13/2022 speech eval to transition to po diet      TIME SPENT   Over 25 minutes aggregate care time spent on encounter; activities included   direct patient care, counseling and/or coordinating care reviewing notes, lab data/ imaging , discussion with multidisciplinary team/ patient  /family and explaining in detail risks, benefits, alternatives  of the recommendations     TRESA PIZARRO 81 f 12/22/2021 1942 DR ANTONIO PATTON

## 2022-01-16 NOTE — PROGRESS NOTE ADULT - SUBJECTIVE AND OBJECTIVE BOX
Pt seen bedside, appears comfortable.  Per medical team, NGT was noted to be clogged overnight - it was therefore removed.   New NGT was inserted at present time, left nare.  Colostomy functioning  Maintained on full vent support overnight.    T(F): 99.5 (01-16-22 @ 00:56), Max: 99.5 (01-16-22 @ 00:56)  HR: 63 (01-16-22 @ 04:45) (63 - 78)  BP: 164/71 (01-16-22 @ 00:56) (147/73 - 164/71)  RR: 18 (01-16-22 @ 00:56) (17 - 18)  SpO2: 100% (01-16-22 @ 04:45) (96% - 100%)    POCT Blood Glucose.: 115 mg/dL (16 Jan 2022 00:37)  POCT Blood Glucose.: 128 mg/dL (15 Nato 2022 16:19)  POCT Blood Glucose.: 137 mg/dL (15 Nato 2022 11:26)      PHYSICAL EXAM:  General: alert  HEENT: tracheto vent  CV: +S1+S2 regular rate and rhythm  Lung: good b/l aeration  Abdomen: soft, NTND. Normoactive BS. Colostomy with air and stool in bag. Wound VAC intact midline with good seal. R BABAK drain with scant serous output, L BABAK drain with scant purulent output  Extremities: no pedal edema or calf tenderness noted   : primafit intact draining light urine    LABS:           pending     A/P: 81F admitted with Acute Sigmoid Diverticulitis s/p ex lap, sigmoid resection, peritoneal lavage 12/25 and RTOR 12/26 for irrigation of abdominal cavity with closure and creation of colostomy. S/p bedside IR aspiration of fluid collection 1/3. Tracheostomy 1/7.   Deep pelvic abscess noted on CT, continue to monitor drains. Local drain/ostomy care per nursing  Continue wound vac changes per PT (MWF)  off abx per ID  XR to confirm NGT placement, then may continue tube feeds  continue vent support with weaning protocol, medical management  endoscopically assisted G tube placement next week

## 2022-01-17 ENCOUNTER — TRANSCRIPTION ENCOUNTER (OUTPATIENT)
Age: 82
End: 2022-01-17

## 2022-01-17 LAB
ANION GAP SERPL CALC-SCNC: 6 MMOL/L — SIGNIFICANT CHANGE UP (ref 5–17)
BUN SERPL-MCNC: 22 MG/DL — SIGNIFICANT CHANGE UP (ref 7–23)
CALCIUM SERPL-MCNC: 8.4 MG/DL — LOW (ref 8.5–10.1)
CHLORIDE SERPL-SCNC: 110 MMOL/L — HIGH (ref 96–108)
CO2 SERPL-SCNC: 27 MMOL/L — SIGNIFICANT CHANGE UP (ref 22–31)
CREAT SERPL-MCNC: 0.55 MG/DL — SIGNIFICANT CHANGE UP (ref 0.5–1.3)
GLUCOSE BLDC GLUCOMTR-MCNC: 134 MG/DL — HIGH (ref 70–99)
GLUCOSE BLDC GLUCOMTR-MCNC: 140 MG/DL — HIGH (ref 70–99)
GLUCOSE BLDC GLUCOMTR-MCNC: 150 MG/DL — HIGH (ref 70–99)
GLUCOSE BLDC GLUCOMTR-MCNC: 156 MG/DL — HIGH (ref 70–99)
GLUCOSE SERPL-MCNC: 141 MG/DL — HIGH (ref 70–99)
HCT VFR BLD CALC: 32.3 % — LOW (ref 34.5–45)
HGB BLD-MCNC: 9.6 G/DL — LOW (ref 11.5–15.5)
MAGNESIUM SERPL-MCNC: 2.9 MG/DL — HIGH (ref 1.6–2.6)
MCHC RBC-ENTMCNC: 28 PG — SIGNIFICANT CHANGE UP (ref 27–34)
MCHC RBC-ENTMCNC: 29.7 GM/DL — LOW (ref 32–36)
MCV RBC AUTO: 94.2 FL — SIGNIFICANT CHANGE UP (ref 80–100)
NRBC # BLD: 0 /100 WBCS — SIGNIFICANT CHANGE UP (ref 0–0)
PHOSPHATE SERPL-MCNC: 3.3 MG/DL — SIGNIFICANT CHANGE UP (ref 2.5–4.5)
PLATELET # BLD AUTO: 451 K/UL — HIGH (ref 150–400)
POTASSIUM SERPL-MCNC: 3.7 MMOL/L — SIGNIFICANT CHANGE UP (ref 3.5–5.3)
POTASSIUM SERPL-SCNC: 3.7 MMOL/L — SIGNIFICANT CHANGE UP (ref 3.5–5.3)
RBC # BLD: 3.43 M/UL — LOW (ref 3.8–5.2)
RBC # FLD: 15.7 % — HIGH (ref 10.3–14.5)
SODIUM SERPL-SCNC: 143 MMOL/L — SIGNIFICANT CHANGE UP (ref 135–145)
WBC # BLD: 11.05 K/UL — HIGH (ref 3.8–10.5)
WBC # FLD AUTO: 11.05 K/UL — HIGH (ref 3.8–10.5)

## 2022-01-17 PROCEDURE — 99233 SBSQ HOSP IP/OBS HIGH 50: CPT

## 2022-01-17 PROCEDURE — 99232 SBSQ HOSP IP/OBS MODERATE 35: CPT

## 2022-01-17 RX ADMIN — DORZOLAMIDE HYDROCHLORIDE TIMOLOL MALEATE 1 DROP(S): 20; 5 SOLUTION/ DROPS OPHTHALMIC at 17:58

## 2022-01-17 RX ADMIN — FENTANYL CITRATE 1 PATCH: 50 INJECTION INTRAVENOUS at 12:11

## 2022-01-17 RX ADMIN — LEVETIRACETAM 500 MILLIGRAM(S): 250 TABLET, FILM COATED ORAL at 05:45

## 2022-01-17 RX ADMIN — PANTOPRAZOLE SODIUM 40 MILLIGRAM(S): 20 TABLET, DELAYED RELEASE ORAL at 12:13

## 2022-01-17 RX ADMIN — ESCITALOPRAM OXALATE 20 MILLIGRAM(S): 10 TABLET, FILM COATED ORAL at 12:13

## 2022-01-17 RX ADMIN — CHLORHEXIDINE GLUCONATE 15 MILLILITER(S): 213 SOLUTION TOPICAL at 05:46

## 2022-01-17 RX ADMIN — CHLORHEXIDINE GLUCONATE 15 MILLILITER(S): 213 SOLUTION TOPICAL at 17:57

## 2022-01-17 RX ADMIN — CHLORHEXIDINE GLUCONATE 1 APPLICATION(S): 213 SOLUTION TOPICAL at 05:45

## 2022-01-17 RX ADMIN — FENTANYL CITRATE 1 PATCH: 50 INJECTION INTRAVENOUS at 20:59

## 2022-01-17 RX ADMIN — Medication 50 MICROGRAM(S): at 05:45

## 2022-01-17 RX ADMIN — CARVEDILOL PHOSPHATE 3.12 MILLIGRAM(S): 80 CAPSULE, EXTENDED RELEASE ORAL at 05:45

## 2022-01-17 RX ADMIN — DORZOLAMIDE HYDROCHLORIDE TIMOLOL MALEATE 1 DROP(S): 20; 5 SOLUTION/ DROPS OPHTHALMIC at 05:46

## 2022-01-17 RX ADMIN — LEVETIRACETAM 500 MILLIGRAM(S): 250 TABLET, FILM COATED ORAL at 17:57

## 2022-01-17 RX ADMIN — ENOXAPARIN SODIUM 40 MILLIGRAM(S): 100 INJECTION SUBCUTANEOUS at 12:12

## 2022-01-17 RX ADMIN — CARVEDILOL PHOSPHATE 3.12 MILLIGRAM(S): 80 CAPSULE, EXTENDED RELEASE ORAL at 17:57

## 2022-01-17 RX ADMIN — FENTANYL CITRATE 1 PATCH: 50 INJECTION INTRAVENOUS at 08:49

## 2022-01-17 RX ADMIN — FENTANYL CITRATE 1 PATCH: 50 INJECTION INTRAVENOUS at 12:14

## 2022-01-17 NOTE — PROGRESS NOTE ADULT - SUBJECTIVE AND OBJECTIVE BOX
Patient seen and examined at bedside. Afebrile. low grade at 99.3 this am. vitals stable Hb improving Sodium 143 / Cr stable. Patient respond to name. eyes open. ngt tube feeds running +colostomy + lefty drain. patient for Open G tube  ROS:  limited ROS today due to mental status and trach to cpap. patient alert today awake.  Objective:  Vitals  T(C): 37.6 (01-14-22 @ 11:53), Max: 38.2 (01-14-22 @ 00:52)  HR: 73 (01-14-22 @ 11:53) (60 - 87)  BP: 135/75 (01-14-22 @ 11:53) (115/62 - 165/75)  RR: 15 (01-14-22 @ 11:53) (15 - 20)  SpO2: 99% (01-14-22 @ 11:53) (97% - 99%)    Physical Exam:  General: comfortable, no acute distress,   HEENT: Atraumatic, no LAD, trachea midline, PERRLA  Cardiovascular: irregular s1s2, no murmurs, gallops or fricition rubs  Pulmonary: decreased, no wheezing , rhonchi, trach to cpap  Gastrointestinal: soft non tender non distended, no masses felt, no organomegally ++ LEFTY drain, ostomy, NGT, : Midline wound with wound vac  Muscloskeletal: no lower extremity edema, intact bilateral lower extremity pulses  Neurological: CN II-12 intact. No focal weakness  Psychiatrical: normal mood, cooperative  SKIN: no rash, lesions or ulcers  Labs:      Labs:                          9.6    11.05 )-----------( 451      ( 17 Jan 2022 09:16 )             32.3     01-17    143  |  110<H>  |  22  ----------------------------<  141<H>  3.7   |  27  |  0.55    Ca    8.4<L>      17 Jan 2022 09:16  Phos  3.3     01-17  Mg     2.9     01-17              Active Medications  MEDICATIONS  (STANDING):  carvedilol 3.125 milliGRAM(s) Oral every 12 hours  chlorhexidine 0.12% Liquid 15 milliLiter(s) Oral Mucosa every 12 hours  chlorhexidine 2% Cloths 1 Application(s) Topical <User Schedule>  dextrose 40% Gel 15 Gram(s) Oral once  dextrose 5%. 1000 milliLiter(s) (50 mL/Hr) IV Continuous <Continuous>  dextrose 5%. 1000 milliLiter(s) (100 mL/Hr) IV Continuous <Continuous>  dextrose 50% Injectable 25 Gram(s) IV Push once  dextrose 50% Injectable 12.5 Gram(s) IV Push once  dextrose 50% Injectable 25 Gram(s) IV Push once  dorzolamide 2%/timolol 0.5% Ophthalmic Solution 1 Drop(s) Both EYES two times a day  enoxaparin Injectable 40 milliGRAM(s) SubCutaneous daily  escitalopram 20 milliGRAM(s) Oral daily  glucagon  Injectable 1 milliGRAM(s) IntraMuscular once  levETIRAcetam 500 milliGRAM(s) Oral every 12 hours  levothyroxine 50 MICROGram(s) Oral daily  pantoprazole   Suspension 40 milliGRAM(s) Enteral Tube daily  potassium chloride    Tablet ER 20 milliEquivalent(s) Oral every 2 hours    MEDICATIONS  (PRN):  acetaminophen     Tablet .. 650 milliGRAM(s) Oral every 6 hours PRN Temp greater or equal to 38C (100.4F)  sodium chloride 0.9% lock flush 10 milliLiter(s) IV Push every 1 hour PRN Pre/post blood products, medications, blood draw, and to maintain line patency                                     Active Medications  MEDICATIONS  (STANDING):  ALPRAZolam 0.25 milliGRAM(s) Oral every 12 hours  carvedilol 3.125 milliGRAM(s) Oral every 12 hours  chlorhexidine 0.12% Liquid 15 milliLiter(s) Oral Mucosa every 12 hours  chlorhexidine 2% Cloths 1 Application(s) Topical <User Schedule>  dextrose 40% Gel 15 Gram(s) Oral once  dextrose 5%. 1000 milliLiter(s) (50 mL/Hr) IV Continuous <Continuous>  dextrose 5%. 1000 milliLiter(s) (100 mL/Hr) IV Continuous <Continuous>  dextrose 50% Injectable 25 Gram(s) IV Push once  dextrose 50% Injectable 12.5 Gram(s) IV Push once  dextrose 50% Injectable 25 Gram(s) IV Push once  dorzolamide 2%/timolol 0.5% Ophthalmic Solution 1 Drop(s) Both EYES two times a day  enoxaparin Injectable 40 milliGRAM(s) SubCutaneous daily  escitalopram 20 milliGRAM(s) Oral daily  fentaNYL   Patch  50 MICROgram(s)/Hr. 1 Patch Transdermal every 48 hours  glucagon  Injectable 1 milliGRAM(s) IntraMuscular once  levETIRAcetam 500 milliGRAM(s) Oral every 12 hours  levothyroxine 50 MICROGram(s) Oral daily  pantoprazole   Suspension 40 milliGRAM(s) Enteral Tube daily  potassium chloride    Tablet ER 20 milliEquivalent(s) Oral every 2 hours  potassium chloride    Tablet ER 40 milliEquivalent(s) Oral every 4 hours    MEDICATIONS  (PRN):  acetaminophen     Tablet .. 650 milliGRAM(s) Oral every 6 hours PRN Temp greater or equal to 38C (100.4F)  sodium chloride 0.9% lock flush 10 milliLiter(s) IV Push every 1 hour PRN Pre/post blood products, medications, blood draw, and to maintain line patency

## 2022-01-17 NOTE — PROGRESS NOTE ADULT - ASSESSMENT
81F with a PMH of HTN, HLD, hypothyroidism, depression, seizures who initially p/w abd pain, acute diverticulitis. now s/p ex lap, sigmoid resection, peritoneal lavage 12/25 for interval perforated sigmoid diverticulitis and RTOR 12/26 for Irrigation of abdominal cavity with closure and creation of colostomy, s/p IR drainage of abdominal abscesses 1/3/22. post op course c/b shock requiring pressor support and prolonged intubation now s/p trach 1/7/22.   also with a-fib with RVR, s/p amio load converted to sinus rhythm while in ICU.     -cont on tele, remains in RSR  -cont coreg, will, titrate as needed  -holding off on full AC now given post op and anemia.  -vent support with weaning protocol per pulm  -Nutrition support, NGT feeds, Albumin 1.7  -endoscopically assisted open Gastrostomy tube placement planned for Tuesday 1/18/22 as per surgery

## 2022-01-17 NOTE — PROGRESS NOTE ADULT - ASSESSMENT
81F with a PMH of HTN, HLD, hypothyroidism, depression, seizures who presents to the ED for abd pain.  Patient states that over the last two days she had developed significant abdominal pain and multiple episodes of watery diarrhea.  Reports one episode of nausea and vomiting earlier today.  Patient has no other complaints.  Vitals stable, (hypoxix?) labs show leukocytosis and hypokalemia.  CT abdomen significant for diverticulitis.  initially admitted to med surg.  CT noted with sigmoid diverticulosis with adjacent stranding, compatible with diverticulitis. No associated abscess is identified. Trace adjacent pelvic free fluid.81F with a PMH of HTN, HLD, hypothyroidism, depression, seizures p/w abd pain,       Perforated Acute diverticulitis   s/p 12/24/21 - Exploratory laparotomy and a sigmoid colectomy and abdominal washout  Patient went for abdominal wound closure and creation of colostomy on 12/26.  Patient developed adb abscess 1/3 s/p IR drainage of abdominal abscesses.  Initially patient on PPN for nutrition Now tolerating tube feeds with brown soft stool in colostomy..  Found to have multiple abdominal fluid collections s/p IR drainage of R abdominal abscess 1/3/22 growing e-coli and bacteroides  Peg placement also requested by IR next week as per surgery.  Noted collection around BABAK  drain  ct was planned for followup although cancelled  all abx dced as per iD recs  plan:  BABAK drain care  open  g tube in am      Septic Shock s/p  pressors  Transferred to ICU for post op care on mechanical ventilation.  now off pressors failed extubation  now trach to cpap  Pulm and ID consutled  Cultures Sensitive to ceftriaxone, continue antibiotics to ceftriaxone and flagyl per ID. added trial PO vanc for diarrhea.  plan:  Appreciate ID followup. all abx dced.      Hypoxemic respiratory failure now trach to CPAP  Failed extubation in ICU  now trach to CPAP  Pulm following  on fentayl patch for pain control    acute metabolic encephalopathy sec to above  stable    Rapid AFIB due to Shock state  10mg IV lopressor in OR for Afib with RVR. 5L IVF given intra-op. EBL 100cc. Received intubated, sedated, on phenylephrine.    s/p amio load converted to sinus rhythm.  not on rate control or AC on downgrade  Cards consulted  plan:  add BB  hold full dose A/C for now      dvt ppx: lovenox  subq

## 2022-01-17 NOTE — PROGRESS NOTE ADULT - SUBJECTIVE AND OBJECTIVE BOX
INTERVAL HPI/OVERNIGHT EVENTS:  Pt. seen and examined at bedside with Dr. Fairbanks. Trach to vent. Open eyes and awake but not following commands. Tolerating tube feeds at 50cc/hr. Wound vac functioning well.   Afebrile, no acute o/n events.     Vital Signs Last 24 Hrs  T(C): 36.9 (17 Jan 2022 06:19), Max: 37.1 (16 Jan 2022 16:23)  T(F): 98.4 (17 Jan 2022 06:19), Max: 98.7 (16 Jan 2022 16:23)  HR: 82 (17 Jan 2022 06:19) (68 - 86)  BP: 124/68 (17 Jan 2022 06:19) (124/68 - 155/78)  RR: 18 (17 Jan 2022 06:19) (17 - 18)  SpO2: 99% (17 Jan 2022 06:19) (97% - 100%)    PHYSICAL EXAM:  GENERAL: NAD  HEENT: Tracheostomy to vent functioning well  CHEST/LUNG: Clear to ausculation, bilaterally   HEART: RRR S1S2  ABDOMEN: Colostomy pink and viable with air and stool in bag. Wound VAC intact midline with good seal. R BABAK drain with serous output (25cc/24hr), L BABAK drain with scant purulent output (10cc/24hr), drains stripped. non distended, +BS, soft, non tender, no guarding  EXTREMITIES:  calf soft, non tender b/l    I&O's Detail    16 Jan 2022 07:01  -  17 Jan 2022 07:00  --------------------------------------------------------  IN:    Enteral Tube Flush: 750 mL  Total IN: 750 mL    OUT:    Bulb (mL): 10 mL    Bulb (mL): 25 mL  Total OUT: 35 mL    Total NET: 715 mL      LABS:                        9.6    11.05 )-----------( 451      ( 17 Jan 2022 09:16 )             32.3     01-17    143  |  110<H>  |  22  ----------------------------<  141<H>  3.7   |  27  |  0.55    Ca    8.4<L>      17 Jan 2022 09:16  Phos  3.3     01-17  Mg     2.9     01-17      A/P: 81F admitted with Acute Sigmoid Diverticulitis s/p ex lap, sigmoid resection, peritoneal lavage 12/25 and RTOR 12/26 for irrigation of abdominal cavity with closure and creation of colostomy. S/p bedside IR aspiration of fluid collection 1/3. Tracheostomy 1/7.   Deep pelvic abscess noted on CT, continue to monitor drains. Local drain/ostomy care per nursing    Continue wound vac changes per PT (MWF)  off abx per ID  continue tube feeds. NPO p MN for OR Tuesday   continue vent support with weaning protocol, medical management  History of depression and context of prolonged ICU stay/sedation concern for hypoactive delirium. >> Rec behavioral health consult   endoscopically assisted open Gastrostomy tube placement planned for Tuesday 1/18/22 with Dr. Fairbanks.   F/u AM labs, preop labs.  Mobilize patient , continue to increase activity with PT.   Discussed with surgical attending, Dr. Fairbanks

## 2022-01-17 NOTE — PROGRESS NOTE ADULT - ASSESSMENT
TRESA PIZARRO 81 f 12/22/2021 1942 DR ANTONIO PATTON     REVIEW OF SYMPTOMS      Able to give (reliable) ROS  NO     PHYSICAL EXAM    HEENT Unremarkable  atraumatic   RESP Fair air entry EXP prolonged    Harsh breath sound Resp distres mild   CARDIAC S1 S2 No S3     NO JVD    ABDOMEN SOFT BS PRESENT NOT DISTENDED No hepatosplenomegaly   PEDAL EDEMA present No calf tenderness  NO rash     ______  DOA/CC.  12/22/2021 81F w/ HTN, HLD, hypothyroidism, depression, seizures. Presented w/ abdominal pain found to have acute sigmoid diverticulitis  ____  PMH.  pmh Hytn  pmh Sz.      _________  PROBLEMS.     VDRF. Since surgery 12/26  TRACH Done 1/7  ABDOMINAL INFECTION.   12/24 Lap sigmoid resectn  Bedside aspiration 1/3 r flank abscess by ir  1/11 ctap Decreased volume of fluid in paracolic gutters   DRAINS 2 drains ending in pelvis 1/11 ctap   COLOSTOMY Created 12/26    1/3 ABD ASPIRATE E coli bacteroides   Abio dced 1/15    NG tube placed 12/26     DIARRHEA 1/13/2022   PAIN 1/10 fent 50   SZ 1/10 keppra 500.2   Woundvac Rx started 1/14  Hypernatremia 1/14/2022 Na 148  Hypothyroid 1/14/2022 tsh 12     _____                            COVID STATUS. 12/24 pcr (-)  ICU STAY. 12/22-1/12/2022   GOC.  1/13/2022 full code       BEST PRACTICE ISSUES.                                                  HEAD OF BED ELEVATION. Yes  DVT PROPHYLAXIS.    1/7 lvnx 40   LIGHT PROPHYLAXIS.    1/11 protonix 40                                                                                     DIET.    1/7 vital af 1200 ngt          INFECTION PROPHYLAXIS.   1/7 Chlorhexidine .12%   1/7 chlorhexidine 2%      PATIENT DATA   VITALS/PO/IO/VENT/DRIPS.   1/17/2022 99f 80 140/70   1/17/2022 3p cpcp 5/5/.4 svt 520 r 23     ASSESSMENT/RECOMMENDATIONS.    HEMODYNAMICS.   Monitor bp Target MAP 65 (+)    RESP.   Monitor po Target po 90-95%  1/14/2022 15/450/.4/5 747/38/110     OXYGEN REQUIREMENTS.   1/17/2022 40%  1/13/2022 40%    VENT MANAGEMENT.   HOB elevation  Target Pplat 35 (-)  Target PO 90-95%  Target pH 730 (+)    INFECTION.  Abdominal infection sec perforated sigmoid div feculent peritonitis poa 12/22.  w 1/13-1/14/2022 w 9.7 - 9.1   pr 1/14/2022 (-)   1/11-1/15 Flagyl.3 ccpa (iai)   1/7-1/15 rocephin x 7d ccpa (iai)   1/16/2022 Off abio      SP ABD SURGERY.  81F admitted with Acute Sigmoid Diverticulitis s/p ex lap, sigmoid resection, peritoneal lavage 12/25 and RTOR 12/26 for irrigation of abdominal cavity with closure and creation of colostomy. S/p bedside IR aspiration of fluid collection 1/3. Tracheostomy 1/7.   Deep pelvic abscess noted on CT,  Local drain/ostomy care per nursing  a/r  Per surgery endoscopically assisted G tube placement this week      PL EFFSN.  1/16/2022 cxr tr bl effs  1/14/2022 CXR residuall pl effs or basal consolidatn  Effsn likely sec to abdominal infkammation       ANEMIA.  Hb 1/13-1/14-1/16-1/17/2022 Hb 9.4 - 8.9 -10 - 9.6   Monitor    NG FEEDS   1/17/2022 peg plannd    TIME SPENT   Over 25 minutes aggregate care time spent on encounter; activities included   direct patient care, counseling and/or coordinating care reviewing notes, lab data/ imaging , discussion with multidisciplinary team/ patient  /family and explaining in detail risks, benefits, alternatives  of the recommendations     TRESA PIZARRO 81 f 12/22/2021 1942 DR ANTONIO PATTON

## 2022-01-17 NOTE — PROGRESS NOTE ADULT - SUBJECTIVE AND OBJECTIVE BOX
JUAREZ GTZ    LVS 2D 271 D    Allergies    No Known Allergies    Intolerances        PAST MEDICAL & SURGICAL HISTORY:  Seizure    HTN (hypertension)    Glaucoma    Thyroid disease    Blood clot of neck vein  left side    TIA (transient ischemic attack)  20 years ago    S/P appendectomy        FAMILY HISTORY:  FH: HTN (hypertension)        Home Medications:  amLODIPine 10 mg oral tablet: 1 tab(s) orally once a day (22 Dec 2021 16:46)  aspirin 81 mg oral tablet, chewable: 1 tab(s) orally once a day (22 Dec 2021 16:46)  escitalopram 20 mg oral tablet: 1 tab(s) orally once a day (22 Dec 2021 16:46)  keppera:  (22 Dec 2021 16:46)  levETIRAcetam 500 mg oral tablet: 1 tab(s) orally 2 times a day (22 Dec 2021 16:46)  timolol-dorzolamide 0.5%-2% preservative-free ophthalmic solution: 1 drop(s) to each affected eye 2 times a day (22 Dec 2021 16:46)  Zetia 10 mg oral tablet: 1 tab(s) orally once a day (22 Dec 2021 16:46)      MEDICATIONS  (STANDING):  carvedilol 3.125 milliGRAM(s) Oral every 12 hours  chlorhexidine 0.12% Liquid 15 milliLiter(s) Oral Mucosa every 12 hours  chlorhexidine 2% Cloths 1 Application(s) Topical <User Schedule>  dextrose 40% Gel 15 Gram(s) Oral once  dextrose 5%. 1000 milliLiter(s) (50 mL/Hr) IV Continuous <Continuous>  dextrose 5%. 1000 milliLiter(s) (100 mL/Hr) IV Continuous <Continuous>  dextrose 50% Injectable 25 Gram(s) IV Push once  dextrose 50% Injectable 12.5 Gram(s) IV Push once  dextrose 50% Injectable 25 Gram(s) IV Push once  dorzolamide 2%/timolol 0.5% Ophthalmic Solution 1 Drop(s) Both EYES two times a day  enoxaparin Injectable 40 milliGRAM(s) SubCutaneous daily  escitalopram 20 milliGRAM(s) Oral daily  fentaNYL   Patch  50 MICROgram(s)/Hr. 1 Patch Transdermal every 48 hours  glucagon  Injectable 1 milliGRAM(s) IntraMuscular once  levETIRAcetam 500 milliGRAM(s) Oral every 12 hours  levothyroxine 50 MICROGram(s) Oral daily  pantoprazole   Suspension 40 milliGRAM(s) Enteral Tube daily  potassium chloride    Tablet ER 20 milliEquivalent(s) Oral every 2 hours    MEDICATIONS  (PRN):  acetaminophen     Tablet .. 650 milliGRAM(s) Oral every 6 hours PRN Temp greater or equal to 38C (100.4F)  sodium chloride 0.9% lock flush 10 milliLiter(s) IV Push every 1 hour PRN Pre/post blood products, medications, blood draw, and to maintain line patency      Diet, NPO with Tube Feed:   Tube Feeding Modality: Nasogastric  Vital AF  Total Volume for 24 Hours (mL): 1200  Continuous  Starting Tube Feed Rate mL per Hour: 50  Until Goal Tube Feed Rate (mL per Hour): 50  Tube Feed Duration (in Hours): 24  Tube Feed Start Time: 16:30  Free Water Flush  Bolus   Total Volume per Flush (mL): 250   Frequency: Every 4 Hours (01-14-22 @ 12:14) [Active]          Vital Signs Last 24 Hrs  T(C): 36.9 (17 Jan 2022 06:19), Max: 37.1 (16 Jan 2022 16:23)  T(F): 98.4 (17 Jan 2022 06:19), Max: 98.7 (16 Jan 2022 16:23)  HR: 82 (17 Jan 2022 06:19) (68 - 86)  BP: 124/68 (17 Jan 2022 06:19) (124/68 - 171/74)  BP(mean): --  RR: 18 (17 Jan 2022 06:19) (17 - 19)  SpO2: 99% (17 Jan 2022 06:19) (95% - 100%)      01-16-22 @ 07:01  -  01-17-22 @ 07:00  --------------------------------------------------------  IN: 750 mL / OUT: 35 mL / NET: 715 mL        Mode: AC/ CMV (Assist Control/ Continuous Mandatory Ventilation), RR (machine): 15, TV (machine): 450, FiO2: 40, PEEP: 5, ITime: 0.9, MAP: 9, PIP: 21      LABS:                        9.6    11.05 )-----------( 451      ( 17 Jan 2022 09:16 )             32.3     01-17    143  |  110<H>  |  22  ----------------------------<  141<H>  3.7   |  27  |  0.55    Ca    8.4<L>      17 Jan 2022 09:16  Phos  3.3     01-17  Mg     2.9     01-17                WBC:  WBC Count: 11.05 K/uL (01-17 @ 09:16)  WBC Count: 9.84 K/uL (01-16 @ 10:47)  WBC Count: 10.89 K/uL (01-15 @ 10:55)  WBC Count: 9.14 K/uL (01-14 @ 08:33)      MICROBIOLOGY:  RECENT CULTURES:                  Sodium:  Sodium, Serum: 143 mmol/L (01-17 @ 09:16)  Sodium, Serum: 144 mmol/L (01-16 @ 10:47)  Sodium, Serum: 141 mmol/L (01-15 @ 10:55)  Sodium, Serum: 148 mmol/L (01-14 @ 08:33)      0.55 mg/dL 01-17 @ 09:16  0.45 mg/dL 01-16 @ 10:47  0.43 mg/dL 01-15 @ 10:55  0.44 mg/dL 01-14 @ 08:33      Hemoglobin:  Hemoglobin: 9.6 g/dL (01-17 @ 09:16)  Hemoglobin: 10.1 g/dL (01-16 @ 10:47)  Hemoglobin: 9.8 g/dL (01-15 @ 10:55)  Hemoglobin: 8.9 g/dL (01-14 @ 08:33)      Platelets: Platelet Count - Automated: 451 K/uL (01-17 @ 09:16)  Platelet Count - Automated: 478 K/uL (01-16 @ 10:47)  Platelet Count - Automated: 452 K/uL (01-15 @ 10:55)  Platelet Count - Automated: 422 K/uL (01-14 @ 08:33)              RADIOLOGY & ADDITIONAL STUDIES:      MICROBIOLOGY:  RECENT CULTURES:

## 2022-01-17 NOTE — PROGRESS NOTE ADULT - SUBJECTIVE AND OBJECTIVE BOX
SURGERY PROGRESS HPI:  Pt seen and examined at bedside resting comfortably.      Vital Signs Last 24 Hrs  T(C): 36.7 (17 Jan 2022 00:52), Max: 37.4 (16 Jan 2022 07:00)  T(F): 98 (17 Jan 2022 00:52), Max: 99.3 (16 Jan 2022 07:00)  HR: 75 (17 Jan 2022 05:10) (63 - 86)  BP: 130/61 (17 Jan 2022 00:52) (130/61 - 171/74)  BP(mean): --  RR: 18 (17 Jan 2022 00:52) (17 - 19)  SpO2: 98% (17 Jan 2022 05:10) (95% - 100%)      PHYSICAL EXAM:    GENERAL: NAD  HEENT: Tracheostomy to vent functioning well  CHEST/LUNG: Clear to ausculation, bilaterally   HEART: RRR S1S2  ABDOMEN: Colostomy pink and viable with air and stool in bag. Wound VAC intact midline with good seal. R BABAK drain with scant serous output, L BABAK drain with scant purulent output. non distended, +BS, soft, non tender, no guarding  EXTREMITIES:  calf soft, non tender b/l    I&O's Detail    16 Jan 2022 07:01  -  17 Jan 2022 06:00  --------------------------------------------------------  IN:  Total IN: 0 mL    OUT:    Bulb (mL): 5 mL    Bulb (mL): 5 mL  Total OUT: 10 mL    Total NET: -10 mL      LABS:                        10.1   9.84  )-----------( 478      ( 16 Jan 2022 10:47 )             32.9     01-16    144  |  111<H>  |  20  ----------------------------<  106<H>  3.4<L>   |  29  |  0.45<L>    Ca    8.7      16 Jan 2022 10:47  Phos  3.5     01-16  Mg     2.4     01-16        A/P: 81F admitted with Acute Sigmoid Diverticulitis s/p ex lap, sigmoid resection, peritoneal lavage 12/25 and RTOR 12/26 for irrigation of abdominal cavity with closure and creation of colostomy. S/p bedside IR aspiration of fluid collection 1/3. Tracheostomy 1/7.   Deep pelvic abscess noted on CT, continue to monitor drains. Local drain/ostomy care per nursing  Continue wound vac changes per PT (MWF)  off abx per ID  continue tube feeds  continue vent support with weaning protocol, medical management  endoscopically assisted G tube placement this week

## 2022-01-18 LAB
ANION GAP SERPL CALC-SCNC: 4 MMOL/L — LOW (ref 5–17)
APTT BLD: 28.6 SEC — SIGNIFICANT CHANGE UP (ref 27.5–35.5)
BLD GP AB SCN SERPL QL: SIGNIFICANT CHANGE UP
BUN SERPL-MCNC: 21 MG/DL — SIGNIFICANT CHANGE UP (ref 7–23)
CALCIUM SERPL-MCNC: 8.7 MG/DL — SIGNIFICANT CHANGE UP (ref 8.5–10.1)
CHLORIDE SERPL-SCNC: 111 MMOL/L — HIGH (ref 96–108)
CO2 SERPL-SCNC: 29 MMOL/L — SIGNIFICANT CHANGE UP (ref 22–31)
CREAT SERPL-MCNC: 0.41 MG/DL — LOW (ref 0.5–1.3)
GLUCOSE BLDC GLUCOMTR-MCNC: 104 MG/DL — HIGH (ref 70–99)
GLUCOSE BLDC GLUCOMTR-MCNC: 109 MG/DL — HIGH (ref 70–99)
GLUCOSE BLDC GLUCOMTR-MCNC: 131 MG/DL — HIGH (ref 70–99)
GLUCOSE SERPL-MCNC: 119 MG/DL — HIGH (ref 70–99)
HCT VFR BLD CALC: 32.2 % — LOW (ref 34.5–45)
HGB BLD-MCNC: 9.6 G/DL — LOW (ref 11.5–15.5)
INR BLD: 1.06 RATIO — SIGNIFICANT CHANGE UP (ref 0.88–1.16)
MAGNESIUM SERPL-MCNC: 2.2 MG/DL — SIGNIFICANT CHANGE UP (ref 1.6–2.6)
MCHC RBC-ENTMCNC: 27.7 PG — SIGNIFICANT CHANGE UP (ref 27–34)
MCHC RBC-ENTMCNC: 29.8 GM/DL — LOW (ref 32–36)
MCV RBC AUTO: 92.8 FL — SIGNIFICANT CHANGE UP (ref 80–100)
NRBC # BLD: 0 /100 WBCS — SIGNIFICANT CHANGE UP (ref 0–0)
PHOSPHATE SERPL-MCNC: 3.6 MG/DL — SIGNIFICANT CHANGE UP (ref 2.5–4.5)
PLATELET # BLD AUTO: 469 K/UL — HIGH (ref 150–400)
POTASSIUM SERPL-MCNC: 3.5 MMOL/L — SIGNIFICANT CHANGE UP (ref 3.5–5.3)
POTASSIUM SERPL-SCNC: 3.5 MMOL/L — SIGNIFICANT CHANGE UP (ref 3.5–5.3)
PROTHROM AB SERPL-ACNC: 12.3 SEC — SIGNIFICANT CHANGE UP (ref 10.6–13.6)
RBC # BLD: 3.47 M/UL — LOW (ref 3.8–5.2)
RBC # FLD: 15.4 % — HIGH (ref 10.3–14.5)
SODIUM SERPL-SCNC: 144 MMOL/L — SIGNIFICANT CHANGE UP (ref 135–145)
WBC # BLD: 11 K/UL — HIGH (ref 3.8–10.5)
WBC # FLD AUTO: 11 K/UL — HIGH (ref 3.8–10.5)

## 2022-01-18 PROCEDURE — 43246 EGD PLACE GASTROSTOMY TUBE: CPT | Mod: 78

## 2022-01-18 PROCEDURE — 99232 SBSQ HOSP IP/OBS MODERATE 35: CPT

## 2022-01-18 PROCEDURE — 99233 SBSQ HOSP IP/OBS HIGH 50: CPT

## 2022-01-18 DEVICE — TUBE GASTMY GASTRONOME 20FR 2/BX: Type: IMPLANTABLE DEVICE | Status: FUNCTIONAL

## 2022-01-18 RX ORDER — SODIUM CHLORIDE 9 MG/ML
1000 INJECTION, SOLUTION INTRAVENOUS
Refills: 0 | Status: DISCONTINUED | OUTPATIENT
Start: 2022-01-18 | End: 2022-01-18

## 2022-01-18 RX ADMIN — DORZOLAMIDE HYDROCHLORIDE TIMOLOL MALEATE 1 DROP(S): 20; 5 SOLUTION/ DROPS OPHTHALMIC at 05:52

## 2022-01-18 RX ADMIN — ESCITALOPRAM OXALATE 20 MILLIGRAM(S): 10 TABLET, FILM COATED ORAL at 11:06

## 2022-01-18 RX ADMIN — FENTANYL CITRATE 1 PATCH: 50 INJECTION INTRAVENOUS at 19:27

## 2022-01-18 RX ADMIN — CHLORHEXIDINE GLUCONATE 15 MILLILITER(S): 213 SOLUTION TOPICAL at 05:52

## 2022-01-18 RX ADMIN — LEVETIRACETAM 500 MILLIGRAM(S): 250 TABLET, FILM COATED ORAL at 05:53

## 2022-01-18 RX ADMIN — Medication 50 MICROGRAM(S): at 05:53

## 2022-01-18 RX ADMIN — CHLORHEXIDINE GLUCONATE 1 APPLICATION(S): 213 SOLUTION TOPICAL at 05:52

## 2022-01-18 RX ADMIN — FENTANYL CITRATE 1 PATCH: 50 INJECTION INTRAVENOUS at 08:05

## 2022-01-18 RX ADMIN — CHLORHEXIDINE GLUCONATE 15 MILLILITER(S): 213 SOLUTION TOPICAL at 18:09

## 2022-01-18 RX ADMIN — PANTOPRAZOLE SODIUM 40 MILLIGRAM(S): 20 TABLET, DELAYED RELEASE ORAL at 11:06

## 2022-01-18 RX ADMIN — SODIUM CHLORIDE 75 MILLILITER(S): 9 INJECTION, SOLUTION INTRAVENOUS at 16:17

## 2022-01-18 RX ADMIN — DORZOLAMIDE HYDROCHLORIDE TIMOLOL MALEATE 1 DROP(S): 20; 5 SOLUTION/ DROPS OPHTHALMIC at 18:09

## 2022-01-18 RX ADMIN — CARVEDILOL PHOSPHATE 3.12 MILLIGRAM(S): 80 CAPSULE, EXTENDED RELEASE ORAL at 05:53

## 2022-01-18 NOTE — PROGRESS NOTE ADULT - SUBJECTIVE AND OBJECTIVE BOX
JUAREZ GTZ    LVS 2D 271 D    Allergies    No Known Allergies    Intolerances        PAST MEDICAL & SURGICAL HISTORY:  Seizure    HTN (hypertension)    Glaucoma    Thyroid disease    Blood clot of neck vein  left side    TIA (transient ischemic attack)  20 years ago    S/P appendectomy        FAMILY HISTORY:  FH: HTN (hypertension)        Home Medications:  amLODIPine 10 mg oral tablet: 1 tab(s) orally once a day (22 Dec 2021 16:46)  aspirin 81 mg oral tablet, chewable: 1 tab(s) orally once a day (22 Dec 2021 16:46)  escitalopram 20 mg oral tablet: 1 tab(s) orally once a day (22 Dec 2021 16:46)  keppera:  (22 Dec 2021 16:46)  levETIRAcetam 500 mg oral tablet: 1 tab(s) orally 2 times a day (22 Dec 2021 16:46)  timolol-dorzolamide 0.5%-2% preservative-free ophthalmic solution: 1 drop(s) to each affected eye 2 times a day (22 Dec 2021 16:46)  Zetia 10 mg oral tablet: 1 tab(s) orally once a day (22 Dec 2021 16:46)      MEDICATIONS  (STANDING):  carvedilol 3.125 milliGRAM(s) Oral every 12 hours  chlorhexidine 0.12% Liquid 15 milliLiter(s) Oral Mucosa every 12 hours  chlorhexidine 2% Cloths 1 Application(s) Topical <User Schedule>  dextrose 40% Gel 15 Gram(s) Oral once  dextrose 5%. 1000 milliLiter(s) (50 mL/Hr) IV Continuous <Continuous>  dextrose 5%. 1000 milliLiter(s) (100 mL/Hr) IV Continuous <Continuous>  dextrose 50% Injectable 25 Gram(s) IV Push once  dextrose 50% Injectable 12.5 Gram(s) IV Push once  dextrose 50% Injectable 25 Gram(s) IV Push once  dorzolamide 2%/timolol 0.5% Ophthalmic Solution 1 Drop(s) Both EYES two times a day  enoxaparin Injectable 40 milliGRAM(s) SubCutaneous daily  escitalopram 20 milliGRAM(s) Oral daily  glucagon  Injectable 1 milliGRAM(s) IntraMuscular once  levETIRAcetam 500 milliGRAM(s) Oral every 12 hours  levothyroxine 50 MICROGram(s) Oral daily  pantoprazole   Suspension 40 milliGRAM(s) Enteral Tube daily  potassium chloride    Tablet ER 20 milliEquivalent(s) Oral every 2 hours    MEDICATIONS  (PRN):  acetaminophen     Tablet .. 650 milliGRAM(s) Oral every 6 hours PRN Temp greater or equal to 38C (100.4F)  sodium chloride 0.9% lock flush 10 milliLiter(s) IV Push every 1 hour PRN Pre/post blood products, medications, blood draw, and to maintain line patency      Diet, NPO after Midnight:      NPO Start Date: 17-Jan-2022,   NPO Start Time: 23:59  Except Medications (01-17-22 @ 10:16) [Active]  Diet, NPO with Tube Feed:   Tube Feeding Modality: Nasogastric  Vital AF  Total Volume for 24 Hours (mL): 1200  Continuous  Starting Tube Feed Rate mL per Hour: 50  Until Goal Tube Feed Rate (mL per Hour): 50  Tube Feed Duration (in Hours): 24  Tube Feed Start Time: 16:30  Free Water Flush  Bolus   Total Volume per Flush (mL): 250   Frequency: Every 4 Hours (01-14-22 @ 12:14) [Active]          Vital Signs Last 24 Hrs  T(C): 37.4 (18 Jan 2022 04:56), Max: 37.4 (17 Jan 2022 13:05)  T(F): 99.3 (18 Jan 2022 04:56), Max: 99.3 (17 Jan 2022 13:05)  HR: 77 (18 Jan 2022 05:51) (68 - 80)  BP: 149/69 (18 Jan 2022 05:51) (116/69 - 172/73)  BP(mean): 108 (17 Jan 2022 13:05) (108 - 108)  RR: 17 (18 Jan 2022 05:51) (17 - 20)  SpO2: 100% (18 Jan 2022 05:51) (97% - 100%)      01-17-22 @ 07:01  -  01-18-22 @ 07:00  --------------------------------------------------------  IN: 1350 mL / OUT: 725 mL / NET: 625 mL        Mode: AC/ CMV (Assist Control/ Continuous Mandatory Ventilation), RR (machine): 15, TV (machine): 450, FiO2: 40, PEEP: 5, ITime: 1, MAP: 10, PIP: 23      LABS:                        9.6    11.00 )-----------( 469      ( 18 Jan 2022 07:57 )             32.2     01-18    144  |  111<H>  |  21  ----------------------------<  119<H>  3.5   |  29  |  0.41<L>    Ca    8.7      18 Jan 2022 07:57  Phos  3.6     01-18  Mg     2.2     01-18      PT/INR - ( 18 Jan 2022 07:57 )   PT: 12.3 sec;   INR: 1.06 ratio         PTT - ( 18 Jan 2022 07:57 )  PTT:28.6 sec          WBC:  WBC Count: 11.00 K/uL (01-18 @ 07:57)  WBC Count: 11.05 K/uL (01-17 @ 09:16)  WBC Count: 9.84 K/uL (01-16 @ 10:47)  WBC Count: 10.89 K/uL (01-15 @ 10:55)      MICROBIOLOGY:  RECENT CULTURES:              PT/INR - ( 18 Jan 2022 07:57 )   PT: 12.3 sec;   INR: 1.06 ratio         PTT - ( 18 Jan 2022 07:57 )  PTT:28.6 sec    Sodium:  Sodium, Serum: 144 mmol/L (01-18 @ 07:57)  Sodium, Serum: 143 mmol/L (01-17 @ 09:16)  Sodium, Serum: 144 mmol/L (01-16 @ 10:47)  Sodium, Serum: 141 mmol/L (01-15 @ 10:55)      0.41 mg/dL 01-18 @ 07:57  0.55 mg/dL 01-17 @ 09:16  0.45 mg/dL 01-16 @ 10:47  0.43 mg/dL 01-15 @ 10:55      Hemoglobin:  Hemoglobin: 9.6 g/dL (01-18 @ 07:57)  Hemoglobin: 9.6 g/dL (01-17 @ 09:16)  Hemoglobin: 10.1 g/dL (01-16 @ 10:47)  Hemoglobin: 9.8 g/dL (01-15 @ 10:55)      Platelets: Platelet Count - Automated: 469 K/uL (01-18 @ 07:57)  Platelet Count - Automated: 451 K/uL (01-17 @ 09:16)  Platelet Count - Automated: 478 K/uL (01-16 @ 10:47)  Platelet Count - Automated: 452 K/uL (01-15 @ 10:55)              RADIOLOGY & ADDITIONAL STUDIES:      MICROBIOLOGY:  RECENT CULTURES:

## 2022-01-18 NOTE — PROGRESS NOTE ADULT - PROBLEM SELECTOR PLAN 2
Once cultures obtained, may consider altering Zosyn and fluconazole depending on culture results and sensitivities  f/u on cultures
stop all abx   pain control  surgery follow appreciated   possible re-imaging over the weekend
continue ceftriaxone and flagyl   pain control  surgery follow up given CT findings though may be IR who will drain largest fluid collection
can ceftriaxone and flagyl   pain control
Concur with plans for IR drainage  Please send for routine culture Gram stain  Once cultures obtained, may consider altering Zosyn and fluconazole
change antibiotics to ceftriaxone and flagyl   pain control
continue ceftriaxone and flagyl   pain control  surgery follow appreciated   no IR intervention at this time  possible re-imaging over the weekend
continue ceftriaxone and flagyl   pain control
change antibiotics to ceftriaxone and flagyl   pain control
continue ceftriaxone and flagyl   pain control
change antibiotics to ceftriaxone and flagyl   pain control
monitor off abx   pain control  surgery following
Concur with plans for IR drainage  Please send for routine culture Gram stain  Once cultures obtained, may consider altering Zosyn and fluconazole depending on culture results and sensitivities
Concur with plans for IR drainage  Please send for routine culture Gram stain  Once cultures obtained, may consider altering Zosyn and fluconazole

## 2022-01-18 NOTE — PROGRESS NOTE ADULT - NSPROGADDITIONALINFOA_GEN_ALL_CORE
I'm available for issues over the remainder of the weekend, please call ([phone]) if ID input needed.    Won Hector MD  Attending Physician  [facility]  Division of Infectious Diseases  [phone]
Manolo Dunn DO  Infectious Disease Attending  Pager [phone]  After 5pm/weekends please call [phone] for all inquiries and new consults
D/W CCM    Manolo Dunn DO  Infectious Disease Attending  Pager [phone]  After 5pm/weekends please call [phone] for all inquiries and new consults
.  Attending: 3.7cm deep pelvic abscess without percutaneous access. There is a surgical drain within this collection. Recommend conservative management and observation. If collection increases in size and becomes accessible, will drain. Surveillance imaging.
Manolo Dunn DO  Infectious Disease Attending  Pager [phone]  After 5pm/weekends please call [phone] for all inquiries and new consults
D/W Kiesha Dunn, DO  Infectious Disease Attending  Pager [phone]  After 5pm/weekends please call [phone] for all inquiries and new consults
D/W team and IR NP    Manolo Dunn, DO  Infectious Disease Attending  Pager [phone]  After 5pm/weekends please call [phone] for all inquiries and new consults
Will sign off, re-consult as needed    Discussed with Dr. Dunn  Msg sent to Dr. Andrea
D/W  CCM    Manolo Dunn DO  Infectious Disease Attending  Pager [phone]  After 5pm/weekends please call [phone] for all inquiries and new consults
Discussed with Dr. Dunn  Discussed with Dr. Andrea
D/W Dr. Tyson Dunn, DO  Infectious Disease Attending  Pager [phone]  After 5pm/weekends please call [phone] for all inquiries and new consults
I'm available for issues over the remainder of the weekend, please call ([phone]) if ID input needed.    Won Hector MD  Attending Physician  [facility]  Division of Infectious Diseases  [phone]

## 2022-01-18 NOTE — PROGRESS NOTE ADULT - ASSESSMENT
Patient is a 81F with a PMH of HTN, HLD, hypothyroidism, depression, seizures who presents to the ED for abd pain found to have diverticulitis   has rising leukocytosis   had fever yesterday   tachycardic and now hypoxic   CXR (I personally reviewed) low lung volumes   origincal CT (I personally reviewed) with diverticulitis   she had a lot of pain in the abdomen on exam     12/25: s/p surgery for diverticulitis with perforation and abscess and went to the OR. intubated in ICU with vac and needs to go back to the OR, currently on zosyn, s/p one dose of diflucan last night   12/27: s/p repair and ostomy creation yesterday, wbc elevated, cultures sensitive to zosyn and candida albicans is notoriously sensitive to azoles such as fluconazole, wean of pressors and sedation, extubate when ready   12/28: Further increase in white blood cell count but overall the patient appears to be reasonably stable.  No concern of C. difficile at this juncture.  Current antibiotics are reasonable.  Leukocytosis like related to recent surgery, no concern of other superimposed process on the basis of exam.  I do not see a role to alter her antimicrobials.  12/29:  Remains intubated in ICU. still quite edematous and net positive, WBC climbing, small wound dehiscence at bottom of incision, plan for initiation of TPN today, remains on antibiotics    12/30: rising WBC, ostomy not functioning yet, very edematous CT today, may be source control issue so for now can continue same antibiotics and antifungal but may need to change if findings on the CT are worrisome or change in hemodynamics  12/31:Leukocytosis, with collections noted on CT.  However this is being done postoperative day 5, there is some possibility that this could represent postop changes but given the leukocytosis, concur with plans for attempts at percutaneous drainage.  White blood cell count down slightly compared to prior peak, will need to be trended.  Gallbladder is also distended.  Abdominal exam was benign this morning, but a calculus cholecystitis could be the differential diagnosis here as well (though n.p.o. status certainly could explain distended gallbladder, there is also report of gallbladder wall thickening).  Would modify antibiotics based on cultures from drainage, I do not believe that antibiotics and other the cells would be adequate here.  Fortunately she is off pressors, with preserved renal function, and tolerating pressure support.  1/1/22: Postop day 6.  I have some concerns with the appearance of the ostomy, though the abdominal exam overall is otherwise unchanged.  White blood cell count remains elevated, but is lower compared with prior values.  This is despite no change in antibiotic therapy.  Patient also has asymmetric edema of the upper extremities, right greater than left.  Low threshold for duplex ultrasound to exclude DVT.No new surveillance culture data.  1/2: POD7 Ostomy looks better today, UE symmetric. WBC elevated, some slight downward trend overall. Temps <= 100F.   1/3: drainage of fluid collection today, continue zosyn and fluconazole for now, monitor wbc and temps, watch ostomy output, diuresis and address third spacing   1/4: s/p drainage yesterday now growing ecoli and awaiting for sensitivities, wbc is improving, started on tube feeds    1/5: abscess growing ecoli and bacteroides, S to ceftriaxone, wbc 15, weaning trial today, pain management    1/6: awake, lung exercise today, trach planned for tomorrow, ostomy output is good. will stop fluconazole today and change antibiotics to ceftriaxone and flagly. Unclear stop date yet    1/7 trach today, continue antibiotics   1/9: s/p trach, had low grade fever but otherwise doing ok, will continues same antibiotics regimen for now but if fevers continue she will needs to be rescanned as those abscesses can progress.  1/10: low grade fever but doing well on trach-PS, ostomy with stool and gas, shakes head when asked if in pain, discussed with surgery about repeating CT scan given the temp  1/11: Still febrile, favor interval CT as above.  1/12: CT of abdomen showing some signs of improvement and elsewhere  it shows signs of worsening. wbc normal, tube feeds currently via NGT   1/13: no fever, no wbc, Cr and LFTs ok, Ceftriaxone and flagyl continued. No planned IR intervention at this time - fluid collection without access to drain, surgical drain within the collection. Pt possibly will have repeat imaging over the weekend to evaluate progress.   Attending addendum:  agree with above  continue antibiotics   vent weaning  surgical follow up for the abdominal wound  1/14: Low grade temp last night, midline placed, no leukocytosis, Cr and LFTs ok, culture data reviewed. Pt's colostomy with small amount of liquid stool and gas, no stools recorded for two days, no role for po Vancomycin at this time. The pt has been on abx since her admission, will stop all abx and will continue to monitor.    1/18: no fevers since 1/14, WBC better 11.00, cr ok, off abx, now s/p EGD, with PEG replacement.

## 2022-01-18 NOTE — PROGRESS NOTE ADULT - SUBJECTIVE AND OBJECTIVE BOX
Patient seen and examined at bedside. Afebrile.. Patient respond to name. eyes open. ngt tube feeds running +colostomy + lefty drain. patient for Open G tube  ROS:  limited ROS today due to mental status and trach to cpap. patient alert today awake.  Objective:  Vitals      Physical Exam:  General: comfortable, no acute distress,   HEENT: Atraumatic, no LAD, trachea midline, PERRLA  Cardiovascular: irregular s1s2, no murmurs, gallops or fricition rubs  Pulmonary: decreased, no wheezing , rhonchi, trach to cpap  Gastrointestinal: soft non tender non distended, no masses felt, no organomegally ++ LEFTY drain, ostomy, NGT, : Midline wound with wound vac  Muscloskeletal: no lower extremity edema, intact bilateral lower extremity pulses  Neurological: CN II-12 intact. No focal weakness  Psychiatrical: normal mood, cooperative  SKIN: no rash, lesions or ulcers  Labs:      Labs:                          9.6    11.00 )-----------( 469      ( 18 Jan 2022 07:57 )             32.2     01-18    144  |  111<H>  |  21  ----------------------------<  119<H>  3.5   |  29  |  0.41<L>    Ca    8.7      18 Jan 2022 07:57  Phos  3.6     01-18  Mg     2.2     01-18        PT/INR - ( 18 Jan 2022 07:57 )   PT: 12.3 sec;   INR: 1.06 ratio         PTT - ( 18 Jan 2022 07:57 )  PTT:28.6 sec      Active Medications  MEDICATIONS  (STANDING):  carvedilol 3.125 milliGRAM(s) Oral every 12 hours  chlorhexidine 0.12% Liquid 15 milliLiter(s) Oral Mucosa every 12 hours  chlorhexidine 2% Cloths 1 Application(s) Topical <User Schedule>  dextrose 40% Gel 15 Gram(s) Oral once  dextrose 5%. 1000 milliLiter(s) (100 mL/Hr) IV Continuous <Continuous>  dextrose 5%. 1000 milliLiter(s) (50 mL/Hr) IV Continuous <Continuous>  dextrose 50% Injectable 25 Gram(s) IV Push once  dextrose 50% Injectable 12.5 Gram(s) IV Push once  dextrose 50% Injectable 25 Gram(s) IV Push once  dorzolamide 2%/timolol 0.5% Ophthalmic Solution 1 Drop(s) Both EYES two times a day  enoxaparin Injectable 40 milliGRAM(s) SubCutaneous daily  escitalopram 20 milliGRAM(s) Oral daily  glucagon  Injectable 1 milliGRAM(s) IntraMuscular once  levETIRAcetam 500 milliGRAM(s) Oral every 12 hours  levothyroxine 50 MICROGram(s) Oral daily  pantoprazole   Suspension 40 milliGRAM(s) Enteral Tube daily  potassium chloride    Tablet ER 20 milliEquivalent(s) Oral every 2 hours    MEDICATIONS  (PRN):  acetaminophen     Tablet .. 650 milliGRAM(s) Oral every 6 hours PRN Temp greater or equal to 38C (100.4F)  sodium chloride 0.9% lock flush 10 milliLiter(s) IV Push every 1 hour PRN Pre/post blood products, medications, blood draw, and to maintain line patency                  Active Medications  MEDICATIONS  (STANDING):  carvedilol 3.125 milliGRAM(s) Oral every 12 hours  chlorhexidine 0.12% Liquid 15 milliLiter(s) Oral Mucosa every 12 hours  chlorhexidine 2% Cloths 1 Application(s) Topical <User Schedule>  dextrose 40% Gel 15 Gram(s) Oral once  dextrose 5%. 1000 milliLiter(s) (50 mL/Hr) IV Continuous <Continuous>  dextrose 5%. 1000 milliLiter(s) (100 mL/Hr) IV Continuous <Continuous>  dextrose 50% Injectable 25 Gram(s) IV Push once  dextrose 50% Injectable 12.5 Gram(s) IV Push once  dextrose 50% Injectable 25 Gram(s) IV Push once  dorzolamide 2%/timolol 0.5% Ophthalmic Solution 1 Drop(s) Both EYES two times a day  enoxaparin Injectable 40 milliGRAM(s) SubCutaneous daily  escitalopram 20 milliGRAM(s) Oral daily  glucagon  Injectable 1 milliGRAM(s) IntraMuscular once  levETIRAcetam 500 milliGRAM(s) Oral every 12 hours  levothyroxine 50 MICROGram(s) Oral daily  pantoprazole   Suspension 40 milliGRAM(s) Enteral Tube daily  potassium chloride    Tablet ER 20 milliEquivalent(s) Oral every 2 hours    MEDICATIONS  (PRN):  acetaminophen     Tablet .. 650 milliGRAM(s) Oral every 6 hours PRN Temp greater or equal to 38C (100.4F)  sodium chloride 0.9% lock flush 10 milliLiter(s) IV Push every 1 hour PRN Pre/post blood products, medications, blood draw, and to maintain line patency                                     Active Medications  MEDICATIONS  (STANDING):  ALPRAZolam 0.25 milliGRAM(s) Oral every 12 hours  carvedilol 3.125 milliGRAM(s) Oral every 12 hours  chlorhexidine 0.12% Liquid 15 milliLiter(s) Oral Mucosa every 12 hours  chlorhexidine 2% Cloths 1 Application(s) Topical <User Schedule>  dextrose 40% Gel 15 Gram(s) Oral once  dextrose 5%. 1000 milliLiter(s) (50 mL/Hr) IV Continuous <Continuous>  dextrose 5%. 1000 milliLiter(s) (100 mL/Hr) IV Continuous <Continuous>  dextrose 50% Injectable 25 Gram(s) IV Push once  dextrose 50% Injectable 12.5 Gram(s) IV Push once  dextrose 50% Injectable 25 Gram(s) IV Push once  dorzolamide 2%/timolol 0.5% Ophthalmic Solution 1 Drop(s) Both EYES two times a day  enoxaparin Injectable 40 milliGRAM(s) SubCutaneous daily  escitalopram 20 milliGRAM(s) Oral daily  fentaNYL   Patch  50 MICROgram(s)/Hr. 1 Patch Transdermal every 48 hours  glucagon  Injectable 1 milliGRAM(s) IntraMuscular once  levETIRAcetam 500 milliGRAM(s) Oral every 12 hours  levothyroxine 50 MICROGram(s) Oral daily  pantoprazole   Suspension 40 milliGRAM(s) Enteral Tube daily  potassium chloride    Tablet ER 20 milliEquivalent(s) Oral every 2 hours  potassium chloride    Tablet ER 40 milliEquivalent(s) Oral every 4 hours    MEDICATIONS  (PRN):  acetaminophen     Tablet .. 650 milliGRAM(s) Oral every 6 hours PRN Temp greater or equal to 38C (100.4F)  sodium chloride 0.9% lock flush 10 milliLiter(s) IV Push every 1 hour PRN Pre/post blood products, medications, blood draw, and to maintain line patency

## 2022-01-18 NOTE — PROGRESS NOTE ADULT - SUBJECTIVE AND OBJECTIVE BOX
JUAREZ GTZ  MRN-[id number]    Follow Up:  peritonitis     Interval History: The pt was seen and examined earlier, no distress, awake, does not follow commands, no distress, vented via tracheostomy. The pt is afebrile, WBC better, Cr ok.     PAST MEDICAL & SURGICAL HISTORY:  Seizure    HTN (hypertension)    Glaucoma    Thyroid disease    Blood clot of neck vein  left side    TIA (transient ischemic attack)  20 years ago    S/P appendectomy        ROS:    [x ] Unobtainable because: vented, not interactive  [ ] All other systems negative    Constitutional: no fever, no chills  Head: no trauma  Eyes: no vision changes, no eye pain  ENT:  no sore throat, no rhinorrhea  Cardiovascular:  no chest pain, no palpitation  Respiratory:  no SOB, no cough  GI:  no abd pain, no vomiting, no diarrhea  urinary: no dysuria, no hematuria, no flank pain  musculoskeletal:  no joint pain, no joint swelling  skin:  no rash  neurology:  no headache, no seizure, no change in mental status  psych: no anxiety, no depression         Allergies  No Known Allergies        ANTIMICROBIALS:      OTHER MEDS:  acetaminophen     Tablet .. 650 milliGRAM(s) Oral every 6 hours PRN  carvedilol 3.125 milliGRAM(s) Oral every 12 hours  chlorhexidine 0.12% Liquid 15 milliLiter(s) Oral Mucosa every 12 hours  chlorhexidine 2% Cloths 1 Application(s) Topical <User Schedule>  dextrose 40% Gel 15 Gram(s) Oral once  dextrose 5%. 1000 milliLiter(s) IV Continuous <Continuous>  dextrose 5%. 1000 milliLiter(s) IV Continuous <Continuous>  dextrose 50% Injectable 25 Gram(s) IV Push once  dextrose 50% Injectable 12.5 Gram(s) IV Push once  dextrose 50% Injectable 25 Gram(s) IV Push once  dorzolamide 2%/timolol 0.5% Ophthalmic Solution 1 Drop(s) Both EYES two times a day  enoxaparin Injectable 40 milliGRAM(s) SubCutaneous daily  escitalopram 20 milliGRAM(s) Oral daily  glucagon  Injectable 1 milliGRAM(s) IntraMuscular once  levETIRAcetam 500 milliGRAM(s) Oral every 12 hours  levothyroxine 50 MICROGram(s) Oral daily  pantoprazole   Suspension 40 milliGRAM(s) Enteral Tube daily  potassium chloride    Tablet ER 20 milliEquivalent(s) Oral every 2 hours  sodium chloride 0.9% lock flush 10 milliLiter(s) IV Push every 1 hour PRN      Vital Signs Last 24 Hrs  T(C): 36.8 (18 Jan 2022 17:08), Max: 37.6 (18 Jan 2022 07:28)  T(F): 98.3 (18 Jan 2022 17:08), Max: 99.6 (18 Jan 2022 07:28)  HR: 60 (18 Jan 2022 17:08) (60 - 77)  BP: 106/51 (18 Jan 2022 17:08) (105/48 - 172/73)  BP(mean): --  RR: 15 (18 Jan 2022 17:08) (15 - 18)  SpO2: 100% (18 Jan 2022 17:08) (97% - 100%)    Physical Exam:  General: Nontoxic-appearing Female in no acute distress. Trached on vent  HEENT: AT/NC. Oropharynx/dentition unable secondary to patient compliance.   Neck: Not rigid. No sense of mass. Trach C/D/I  Nodes: None palpable.  Lungs: coarse B BS  Heart: Regular rate and rhythm. No Murmur. No rub. No gallop. No palpable thrill.  Abdomen: Bowel sounds now present.  Soft. Mildly distended.  Incision dressed.  Ostomy with stool and gas. Drains x2  Extremities: No cyanosis or clubbing. 1+ edema of bilateral lower extremities   Skin: Warm. Dry No rash. No vasculitic stigmata.  Neuro: awake, not alert, does not follow commands   Psychiatric: Unable    WBC Count: 11.00 K/uL (01-18 @ 07:57)  WBC Count: 11.05 K/uL (01-17 @ 09:16)  WBC Count: 9.84 K/uL (01-16 @ 10:47)  WBC Count: 10.89 K/uL (01-15 @ 10:55)  WBC Count: 9.14 K/uL (01-14 @ 08:33)  WBC Count: 9.77 K/uL (01-13 @ 09:31)  WBC Count: 10.86 K/uL (01-12 @ 03:00)                            9.6    11.00 )-----------( 469      ( 18 Jan 2022 07:57 )             32.2       01-18    144  |  111<H>  |  21  ----------------------------<  119<H>  3.5   |  29  |  0.41<L>    Ca    8.7      18 Jan 2022 07:57  Phos  3.6     01-18  Mg     2.2     01-18            Creatinine Trend: 0.41<--, 0.55<--, 0.45<--, 0.43<--, 0.44<--, 0.48<--      MICROBIOLOGY:  v  .Body Fluid Abdominal Fluid  01-03-22   Few Escherichia coli  Few Bacteroides ovatus group "Susceptibilities not performed"  --  Escherichia coli      .Sputum Sputum  12-28-21   Normal Respiratory Sendy present  --    Moderate polymorphonuclear leukocytes per low power field  Numerous Squamous epithelial cells per low power field  Few Gram positive cocci in pairs per oil power field  Rare Gram Positive Rods per oil power field  Rare Yeast like cells per oil power field  Results consistent with oropharyngeal contamination      .Blood Blood  12-28-21   No Growth Final  --  --      .Body Fluid INTRA -ABDOMINAL FLUID FOR CULTURE  12-25-21   Few Escherichia coli  Rare Escherichia coli #2  Few Candida albicans "Susceptibilities not performed"  Moderate Bacteroides thetaiotamcron group "Susceptibilities not performed"  Moderate Bacteroides uniformis "Susceptibilities not performed"  --  Escherichia coli  Escherichia coli      .Blood Blood-Peripheral  12-25-21   No Growth Final  --  --      .Blood Blood-Peripheral  12-24-21   No Growth Final  --  --      Clean Catch Clean Catch (Midstream)  12-23-21   Normal Urogenital sendy present  --  --      Procalcitonin, Serum: 0.08 (01-14-22 @ 13:25)    SARS-CoV-2 Result: NotDetec (01-16-22 @ 04:36)    SARS-CoV-2: NotDetec (24 Dec 2021 14:38)  SARS-CoV-2: NotDetec (24 Dec 2021 00:03)    RADIOLOGY:

## 2022-01-18 NOTE — PROGRESS NOTE ADULT - PROBLEM SELECTOR PLAN 3
Trend CBC  if high fevers repeat blood cultures  attempt to remove TPN and hence central line
trend fever curve   CT A/P showing fluid collections still and thus these may the cause of persistent fevers and may not be an antibiotics issue but a source control issue - possible re-imaging over the weekend    continue antibiotics
improved  trend fever curve   monitor off abx
Trend CBC  if high fevers repeat blood cultures  avoid central lines
removal of central line  trend fever curve   CT A/P showing fluid collections still and thus these may the cause of persistent fevers and may not be an antibiotics issue but a source control nawaf   continue antibiotics
Trend CBC  Again, would send surveillance blood cultures, patient is at high risk for fungal infection given central catheter, TPN, abdominal process
Trend CBC  Would send surveillance blood cultures, patient is at high risk for fungal infection given central catheter, TPN, abdominal process
consider removal central line  trend fever curve   CT A/P   continue antibiotics
Trend CBC  follow cultures   if high fevers repeat blood cultures  attempt to remove TPN
consider removal central line  trend fever curve   CT A/P   continue antibiotics
trend fever curve   stop all abx and monitor
Trend CBC  if high fevers repeat blood cultures  attempt to remove TPN and hence central line
Trend CBC  would send surveillance blood cultures, patient is at high risk for fungal infection given central catheter, TPN, abdominal process
Trend CBC  would send surveillance blood cultures, patient is at high risk for fungal infection given central catheter, TPN, abdominal process

## 2022-01-18 NOTE — PROGRESS NOTE ADULT - SUBJECTIVE AND OBJECTIVE BOX
Patient is a 81y old  Female who presents with a chief complaint of abd pain(18 Jan 2022 09:47)    PAST MEDICAL & SURGICAL HISTORY:  Seizure    HTN (hypertension)    Glaucoma    Thyroid disease    Blood clot of neck vein  left side    TIA (transient ischemic attack)  20 years ago    S/P appendectomy    INTERVAL HISTORY: awake, does not follow commands, trach to vent   	  MEDICATIONS:  MEDICATIONS  (STANDING):  carvedilol 3.125 milliGRAM(s) Oral every 12 hours  chlorhexidine 0.12% Liquid 15 milliLiter(s) Oral Mucosa every 12 hours  chlorhexidine 2% Cloths 1 Application(s) Topical <User Schedule>  dorzolamide 2%/timolol 0.5% Ophthalmic Solution 1 Drop(s) Both EYES two times a day  enoxaparin Injectable 40 milliGRAM(s) SubCutaneous daily  escitalopram 20 milliGRAM(s) Oral daily  lactated ringers. 1000 milliLiter(s) (75 mL/Hr) IV Continuous <Continuous>  levETIRAcetam 500 milliGRAM(s) Oral every 12 hours  levothyroxine 50 MICROGram(s) Oral daily  pantoprazole   Suspension 40 milliGRAM(s) Enteral Tube daily  potassium chloride    Tablet ER 20 milliEquivalent(s) Oral every 2 hours    MEDICATIONS  (PRN):  acetaminophen     Tablet .. 650 milliGRAM(s) Oral every 6 hours PRN Temp greater or equal to 38C (100.4F)  sodium chloride 0.9% lock flush 10 milliLiter(s) IV Push every 1 hour PRN Pre/post blood products, medications, blood draw, and to maintain line patency    Vitals:  T(F): 99.6 (01-18-22 @ 11:02), Max: 99.6 (01-18-22 @ 07:28)  HR: 65 (01-18-22 @ 16:12) (65 - 80)  BP: 163/63 (01-18-22 @ 11:02) (116/69 - 172/73)  RR: 18 (01-18-22 @ 11:02) (17 - 18)  SpO2: 97% (01-18-22 @ 16:12) (97% - 100%)    01-17 @ 07:01  -  01-18 @ 07:00  --------------------------------------------------------  IN:    Enteral Tube Flush: 750 mL    Vital1.5: 600 mL  Total IN: 1350 mL    OUT:    Bulb (mL): 15 mL    Bulb (mL): 10 mL    Colostomy (mL): 350 mL    Incontinent per Collection Bag (mL): 350 mL  Total OUT: 725 mL    Total NET: 625 mL    Weight (kg): 84 (01-18 @ 12:49)  BMI (kg/m2): 29.9 (01-18 @ 12:49)    PHYSICAL EXAM:  Neuro: Awake, does not follow commands   CV: S1 S2 RRR  Lungs: diminished to bases, trach to vent  GI: Soft, BS +, ND, NT, Wound VAC, + colostomy, multiple drains    Extremities:  some + edema to upper /lower extremities     TELEMETRY: RSR    RADIOLOGY:< from: Xray Chest 1 View- PORTABLE-Urgent (Xray Chest 1 View- PORTABLE-Urgent .) (01.16.22 @ 06:24) >  Impression: Status post tracheostomy. NG tip in stomach. Trace bilateral   pleural effusion.    < end of copied text >    DIAGNOSTIC TESTING:    [x ] Echocardiogram: < from: TTE Echo Complete w/o Contrast w/ Doppler (12.25.21 @ 10:02) >  Left Ventricle: Normal left ventricular size and wall thicknesses, with   normal systolic and diastolic function. The left ventricle was not well   visualized.  Global LV systolic function was normal. Left ventricular ejection   fraction, by visual estimation, is 55 to 60%. Extremely limited apical   and subcostal views.  Right Ventricle: Normal right ventricular size and function.  Left Atrium: The left atrium is normal in size.  Right Atrium: The right atrium is normal in size.  Pericardium: There is no evidence of pericardial effusion.  Mitral Valve: Structurally normal mitral valve, with normal leaflet   excursion. Trace mitral valve regurgitation is seen.  Tricuspid Valve: Structurally normal tricuspid valve, with normal leaflet   excursion. No tricuspid regurgitation is visualized. Adequate TR velocity   was not obtained to accurately assess RVSP.  Aortic Valve: Normal trileaflet aortic valve with normal opening. Peak   transaortic gradient equals 6.2 mmHg, mean transaortic gradient equals   3.5 mmHg, the calculated aortic valve area equals 1.68 cm² by the   continuity equation consistent with normally opening aortic valve. No   evidence of aortic valve regurgitation is seen.  Pulmonic Valve: The pulmonic valve was not well visualized. No indication   of pulmonic valve regurgitation.  Aorta: The aortic root and ascending aorta are structurally normal, with   no evidence of dilitation.  Pulmonary Artery: The main pulmonary artery is normal in size.      Summary:   1. Left ventricular ejection fraction, by visual estimation, is 55 to   60%.   2. Normal global left ventricular systolic function.   3. Trace mitral valve regurgitation.   4. Consider contrast echocardiogram, if clinically warranted,to better   evauate left ventricular function and wall motion.    < end of copied text >    LABS:	 	    18 Jan 2022 07:57    144    |  111    |  21     ----------------------------<  119    3.5     |  29     |  0.41   17 Jan 2022 09:16    143    |  110    |  22     ----------------------------<  141    3.7     |  27     |  0.55   16 Jan 2022 10:47    144    |  111    |  20     ----------------------------<  106    3.4     |  29     |  0.45     Ca    8.7        18 Jan 2022 07:57  Phos  3.6       18 Jan 2022 07:57  Mg     2.2       18 Jan 2022 07:57                        9.6    11.00 )-----------( 469      ( 18 Jan 2022 07:57 )             32.2 ,                       9.6    11.05 )-----------( 451      ( 17 Jan 2022 09:16 )             32.3 ,                       10.1   9.84  )-----------( 478      ( 16 Jan 2022 10:47 )             32.9     PT/PTT- ( 18 Jan 2022 07:57 )   PT: 12.3 sec;  PTT: 28.6 sec    INR: 1.06 ratio (01-18 @ 07:57)

## 2022-01-18 NOTE — PROGRESS NOTE ADULT - SUBJECTIVE AND OBJECTIVE BOX
Pre-operative Note    - Pre-operative diagnosis: Dysphagia    - Procedure: Endoscopic-assisted gastrostomy tube placement     - Labs:                        9.6    11.00 )-----------( 469      ( 18 Jan 2022 07:57 )             32.2     01-18    144  |  111<H>  |  21  ----------------------------<  119<H>  3.5   |  29  |  0.41<L>    Ca    8.7      18 Jan 2022 07:57  Phos  3.6     01-18  Mg     2.2     01-18      PT/INR - ( 18 Jan 2022 07:57 )   PT: 12.3 sec;   INR: 1.06 ratio      PTT - ( 18 Jan 2022 07:57 )  PTT:28.6 sec    Type & Screen: A POS    - CXR: Trace b/l pleural effusions 1/16/22    - Blood: Not needed.     - Orders:  > NPO   > Medical clearance for OR    - Permits:  > Consent in chart  > Case scheduled with OR

## 2022-01-18 NOTE — PROGRESS NOTE ADULT - PROBLEM SELECTOR PROBLEM 1
Perforated diverticulum of large intestine

## 2022-01-18 NOTE — PROGRESS NOTE ADULT - SUBJECTIVE AND OBJECTIVE BOX
Post-op check    S/P PEG POD#0  81 year old Female seen and examined at bedside resting comfortably.    Vital Signs Last 24 Hrs  T(F): 99.1 (01-18-22 @ 18:52), Max: 99.6 (01-18-22 @ 07:28)  HR: 64 (01-18-22 @ 18:52)  BP: 111/67 (01-18-22 @ 18:52)  RR: 17 (01-18-22 @ 18:52)  SpO2: 100% (01-18-22 @ 18:52)  POCT Blood Glucose.: 104 mg/dL (18 Jan 2022 17:47)      GENERAL: NAD  HEENT: Tracheostomy to vent functioning well  CHEST/LUNG: Clear to ausculation, bilaterally   HEART: RRR S1S2  ABDOMEN: PEG and dressing intact. Colostomy pink and viable with air and stool in bag. Wound VAC intact midline with good seal. R BABAK drain with scant serous output, L BABAK drain with scant purulent output. non distended, +BS, soft, non tender, no guarding  EXTREMITIES:  calf soft, non tender b/l        A/P: 81F admitted with Acute Sigmoid Diverticulitis s/p ex lap, sigmoid resection, peritoneal lavage 12/25 and RTOR 12/26 for irrigation of abdominal cavity with closure and creation of colostomy. S/p bedside IR aspiration of fluid collection 1/3. Tracheostomy 1/7, PEG 1/18.    Tube feeds to start tomorrow AM  Continue wound vac changes per PT (MWF)  off abx per ID  continue vent support with weaning protocol, medical management  History of depression and context of prolonged ICU stay/sedation concern for hypoactive delirium. Rec behavioral health consult   F/u AM labs  Mobilize patient, continue to increase activity with PT

## 2022-01-18 NOTE — PROGRESS NOTE ADULT - PROBLEM SELECTOR PLAN 1
Enteral feeding when able  Would continue Zosyn  Continue fluconazole
Surgical follow up  Attention to ostomy  Enteral feeding when able  Diuresis as able
continue to monitor off antibiotics  loose bowel movements - most likely related to tube feeds
continue Enteral feeding after trach  Ceftriaxone 2g daily and flagyl 500mg 3 times a day
continue Enteral feeding   tomorrow, change to ceftriaxone 2g and flagyl   Continue fluconazole as there were small abscess as well
continue Enteral feeding after trach  Ceftriaxone 2g daily and flagyl 500mg 3 times a day  obtain CT A/P
continue Enteral feeding   stop all antibiotics and monitor  if pt is having multiple loose bowel movements, please send GI PCR
continue Enteral feeding after trach  change to ceftriaxone 2g and flagyl 500mg 3 times a day
continue Enteral feeding after trach  Ceftriaxone 2g daily and flagyl 500mg 3 times a day  obtain CT A/P  may be able to d/c antibiotics if collections are resolved
continue Enteral feeding after trach  Ceftriaxone 2g daily and flagyl 500mg 3 times a day
continue Enteral feeding   Would continue Zosyn  Continue fluconazole  follow cultures and sensitivities
Enteral feeding when able  Would continue Zosyn  Continue FLuconazole
continue Enteral feeding   Ceftriaxone 2g daily and flagyl 500mg 3 times a day
continue Enteral feeding   change to ceftriaxone 2g and flagyl 500mg 3 times a day   stop fluconazole

## 2022-01-18 NOTE — PROGRESS NOTE ADULT - PROBLEM SELECTOR PROBLEM 3
Leukocytosis
Fever
Leukocytosis
Leukocytosis
Fever
Leukocytosis

## 2022-01-18 NOTE — PROGRESS NOTE ADULT - ASSESSMENT
81F with a PMH of HTN, HLD, hypothyroidism, depression, seizures who initially p/w abd pain, acute diverticulitis. now s/p ex lap, sigmoid resection, peritoneal lavage 12/25 for interval perforated sigmoid diverticulitis and RTOR 12/26 for Irrigation of abdominal cavity with closure and creation of colostomy, s/p IR drainage of abdominal abscesses 1/3/22. post op course c/b shock requiring pressor support and prolonged intubation now s/p trach 1/7/22.   also with a-fib with RVR, s/p amio load converted to sinus rhythm while in ICU.     -cont on tele, remains in RSR  -cont coreg,  titrate as needed  -vent support with weaning protocol per pulm  -Nutrition support, tube feeds, Albumin 1.7  -Holding off on full AC now given post op and anemia. Scheduled for endoscopically assisted open Gastrostomy tube placement today  -Continued vent support with weaning protocol per pulm  -CV stable otherwise.    -signing off now, please reconsult as needed

## 2022-01-18 NOTE — PRE-OP CHECKLIST - VIA
[facility]  eMERGENCY dEPARTMENT eNCOUnter      Pt Name: Adelso Zepeda  MRN: [id number]  [place] 1966  Date of evaluation: 11/5/2018  Provider: Erickson Montes MD    CHIEF COMPLAINT       Chief Complaint   Patient presents with    Head Injury         HISTORY OF PRESENT ILLNESS   (Location/Symptom, Timing/Onset,Context/Setting, Quality, Duration, Modifying Factors, Severity)  Note limiting factors. Adelso Zepeda is a 50 y.o. male who presents to the emergency department with facial injuries sustained during police arrest. Per police pt tried to jump out of the car and hit his face on the pavement. He has blood and lacs to nose and the above eye on the L. The pt is awake alert and talking has no other complaints. Is in handcuffs under arrest at this time. He is in no distress states his tdap is utd. The history is provided by the patient and the police. NursingNotes were reviewed. REVIEW OF SYSTEMS    (2-9 systems for level 4, 10 or more for level 5)     Review of Systems   Constitutional: Negative for fever. HENT: Positive for facial swelling. Eyes: Negative for visual disturbance. Respiratory: Negative for cough. Cardiovascular: Negative for chest pain. Gastrointestinal: Negative for abdominal pain and vomiting. Musculoskeletal: Negative for gait problem. Skin: Positive for wound. Psychiatric/Behavioral: Negative for confusion. A complete review of systems was performed and is negative except as noted above in the HPI.        PAST MEDICAL HISTORY     Past Medical History:   Diagnosis Date    CAD (coronary artery disease)     Chest pain 12/12/2011    Cigarette smoker 12/12/2011    Depression     Hypertension 12/12/2011    Seizures ([facility] [address]          SURGICAL HISTORY       Past Surgical History:   Procedure Laterality Date    APPENDECTOMY      [facility]LECY[facility]OMY  5/18/2006    [facility]    COLONOSCOPY      ENDOSCOPY, COLON, DIAGNOSTIC      KNEE ARTHROSCOPY
transport carrier
transport carrier
Complexity of Data Reviewed  Tests in the radiology section of CPT®: ordered and reviewed          CONSULTS:  None    PROCEDURES:  Unless otherwise notedbelow, none     Procedures    FINAL IMPRESSION     1. Facial laceration, initial encounter          DISPOSITION/PLAN   DISPOSITION  11/05/2018 10:22:01 PM      PATIENT REFERRED TO:  No follow-up provider specified.     DISCHARGE MEDICATIONS:  Current Discharge Medication List             (Please note that portions of this note were completed with a voice recognition program.  Efforts were made to edit the dictations butoccasionally words are mis-transcribed.)    Raghavendra Cerrato MD (electronically signed)  AttendingEmergency Physician         Raghavendra Cerrato MD  11/10/18 [id number]
BED
bed

## 2022-01-18 NOTE — PROGRESS NOTE ADULT - PROBLEM SELECTOR PROBLEM 2
Peritonitis

## 2022-01-18 NOTE — PROGRESS NOTE ADULT - ASSESSMENT
81F with a PMH of HTN, HLD, hypothyroidism, depression, seizures who presents to the ED for abd pain.  Patient states that over the last two days she had developed significant abdominal pain and multiple episodes of watery diarrhea.  Reports one episode of nausea and vomiting earlier today.  Patient has no other complaints.  Vitals stable, (hypoxix?) labs show leukocytosis and hypokalemia.  CT abdomen significant for diverticulitis.  initially admitted to med surg.  CT noted with sigmoid diverticulosis with adjacent stranding, compatible with diverticulitis. No associated abscess is identified. Trace adjacent pelvic free fluid.81F with a PMH of HTN, HLD, hypothyroidism, depression, seizures p/w abd pain,       Perforated Acute diverticulitis   s/p 12/24/21 - Exploratory laparotomy and a sigmoid colectomy and abdominal washout  Patient went for abdominal wound closure and creation of colostomy on 12/26.  Patient developed adb abscess 1/3 s/p IR drainage of abdominal abscesses.  Initially patient on PPN for nutrition Now tolerating tube feeds with brown soft stool in colostomy..  Found to have multiple abdominal fluid collections s/p IR drainage of R abdominal abscess 1/3/22 growing e-coli and bacteroides  Peg placement also requested by IR next week as per surgery.  Noted collection around BABAK  drain  ct was planned for followup although cancelled  all abx dced as per iD recs  plan:  BABAK drain care  for PEG today      Septic Shock s/p  pressors  Transferred to ICU for post op care on mechanical ventilation.  now off pressors failed extubation  now trach to cpap  Pulm and ID consutled  Cultures Sensitive to ceftriaxone, continue antibiotics to ceftriaxone and flagyl per ID. added trial PO vanc for diarrhea.  plan:  Appreciate ID followup. all abx dced.      Hypoxemic respiratory failure now trach to CPAP  Failed extubation in ICU  now trach to CPAP  Pulm following  on fentayl patch for pain control    acute metabolic encephalopathy sec to above  stable    Rapid AFIB due to Shock state  10mg IV lopressor in OR for Afib with RVR. 5L IVF given intra-op. EBL 100cc. Received intubated, sedated, on phenylephrine.    s/p amio load converted to sinus rhythm.  not on rate control or AC on downgrade  Cards consulted  plan:  add BB  hold full dose A/C for now      dvt ppx: lovenox  subq

## 2022-01-18 NOTE — PROGRESS NOTE ADULT - ASSESSMENT
TRESA PIZARRO 81 f 12/22/2021 1942 DR ANTONIO PATTON     REVIEW OF SYMPTOMS      Able to give (reliable) ROS  NO     PHYSICAL EXAM    HEENT Unremarkable  atraumatic   RESP Fair air entry EXP prolonged    Harsh breath sound Resp distres mild   CARDIAC S1 S2 No S3     NO JVD    ABDOMEN SOFT BS PRESENT NOT DISTENDED No hepatosplenomegaly   PEDAL EDEMA present No calf tenderness  NO rash     ______  DOA/CC.  12/22/2021 81F w/ HTN, HLD, hypothyroidism, depression, seizures. Presented w/ abdominal pain found to have acute sigmoid diverticulitis  ____  PMH.  pmh Hytn  pmh Sz.      _________  PROBLEMS.     VDRF. Since surgery 12/26  TRACH Done 1/7  ABDOMINAL INFECTION.   Feculent peritonitis perf div poa   COLOSTOMY Created 12/26  1/3 ABD ASPIRATE E coli bacteroides Abio dced 1/15  NG tube placed 12/26       PAIN 1/10 fent 50   SZ 1/10 keppra 500.2   Woundvac Rx started 1/14  Hypernatremia 1/14/2022 Na 148  Hypothyroid 1/14/2022 tsh 12     _____                            COVID STATUS. 12/24 pcr (-)  ICU STAY. 12/22-1/12/2022   GOC.  1/13/2022 full code     PATIENT DATA   VITALS/PO/IO/VENT/DRIPS.   1/18/2022 99f 60 100/60   1/18/2022 ac 15/450/5/.4      ASSESSMENT/RECOMMENDATIONS.    HEMODYNAMICS.   Monitor bp Target MAP 65 (+)    RESP.   Monitor po Target po 90-95%  1/14/2022 15/450/.4/5 747/38/110     OXYGEN REQUIREMENTS.   1/17/2022 40%  1/13/2022 40%    VENT MANAGEMENT.   HOB elevation  Target Pplat 35 (-)  Target PO 90-95%  Target pH 730 (+)    INFECTION.  Abdominal infection sec perforated sigmoid div feculent peritonitis poa 12/22.  w 1/13-1/14/2022 w 9.7 - 9.1   1/16/2022 Off abio    SP ABD SURGERY.  81F admitted with Acute Sigmoid Diverticulitis s/p ex lap, sigmoid resection, peritoneal lavage 12/25 and RTOR 12/26 for irrigation of abdominal cavity with closure and creation of colostomy. S/p bedside IR aspiration of fluid collection 1/3. Tracheostomy 1/7.   Deep pelvic abscess noted on CT,  Local drain/ostomy care per nursing  a/r  Per surgery endoscopically assisted G tube placement this week      TIME SPENT   Over 25 minutes aggregate care time spent on encounter; activities included   direct patient care, counseling and/or coordinating care reviewing notes, lab data/ imaging , discussion with multidisciplinary team/ patient  /family and explaining in detail risks, benefits, alternatives  of the recommendations     TRESA PIZARRO 81 f 12/22/2021 1942 DR ANTONIO PATTON

## 2022-01-19 LAB
ANION GAP SERPL CALC-SCNC: 10 MMOL/L — SIGNIFICANT CHANGE UP (ref 5–17)
BASOPHILS # BLD AUTO: 0.09 K/UL — SIGNIFICANT CHANGE UP (ref 0–0.2)
BASOPHILS NFR BLD AUTO: 0.8 % — SIGNIFICANT CHANGE UP (ref 0–2)
BUN SERPL-MCNC: 16 MG/DL — SIGNIFICANT CHANGE UP (ref 7–23)
CALCIUM SERPL-MCNC: 8.5 MG/DL — SIGNIFICANT CHANGE UP (ref 8.5–10.1)
CHLORIDE SERPL-SCNC: 110 MMOL/L — HIGH (ref 96–108)
CO2 SERPL-SCNC: 26 MMOL/L — SIGNIFICANT CHANGE UP (ref 22–31)
CREAT SERPL-MCNC: 0.52 MG/DL — SIGNIFICANT CHANGE UP (ref 0.5–1.3)
EOSINOPHIL # BLD AUTO: 0.22 K/UL — SIGNIFICANT CHANGE UP (ref 0–0.5)
EOSINOPHIL NFR BLD AUTO: 1.9 % — SIGNIFICANT CHANGE UP (ref 0–6)
GLUCOSE BLDC GLUCOMTR-MCNC: 91 MG/DL — SIGNIFICANT CHANGE UP (ref 70–99)
GLUCOSE BLDC GLUCOMTR-MCNC: 93 MG/DL — SIGNIFICANT CHANGE UP (ref 70–99)
GLUCOSE BLDC GLUCOMTR-MCNC: 97 MG/DL — SIGNIFICANT CHANGE UP (ref 70–99)
GLUCOSE BLDC GLUCOMTR-MCNC: 99 MG/DL — SIGNIFICANT CHANGE UP (ref 70–99)
GLUCOSE SERPL-MCNC: 85 MG/DL — SIGNIFICANT CHANGE UP (ref 70–99)
HCT VFR BLD CALC: 35.1 % — SIGNIFICANT CHANGE UP (ref 34.5–45)
HGB BLD-MCNC: 10.5 G/DL — LOW (ref 11.5–15.5)
IMM GRANULOCYTES NFR BLD AUTO: 0.5 % — SIGNIFICANT CHANGE UP (ref 0–1.5)
LYMPHOCYTES # BLD AUTO: 1.46 K/UL — SIGNIFICANT CHANGE UP (ref 1–3.3)
LYMPHOCYTES # BLD AUTO: 12.7 % — LOW (ref 13–44)
MAGNESIUM SERPL-MCNC: 2.1 MG/DL — SIGNIFICANT CHANGE UP (ref 1.6–2.6)
MCHC RBC-ENTMCNC: 27.9 PG — SIGNIFICANT CHANGE UP (ref 27–34)
MCHC RBC-ENTMCNC: 29.9 GM/DL — LOW (ref 32–36)
MCV RBC AUTO: 93.1 FL — SIGNIFICANT CHANGE UP (ref 80–100)
MONOCYTES # BLD AUTO: 0.75 K/UL — SIGNIFICANT CHANGE UP (ref 0–0.9)
MONOCYTES NFR BLD AUTO: 6.5 % — SIGNIFICANT CHANGE UP (ref 2–14)
NEUTROPHILS # BLD AUTO: 8.89 K/UL — HIGH (ref 1.8–7.4)
NEUTROPHILS NFR BLD AUTO: 77.6 % — HIGH (ref 43–77)
NRBC # BLD: 0 /100 WBCS — SIGNIFICANT CHANGE UP (ref 0–0)
PHOSPHATE SERPL-MCNC: 3.1 MG/DL — SIGNIFICANT CHANGE UP (ref 2.5–4.5)
PLATELET # BLD AUTO: 559 K/UL — HIGH (ref 150–400)
POTASSIUM SERPL-MCNC: 3.3 MMOL/L — LOW (ref 3.5–5.3)
POTASSIUM SERPL-SCNC: 3.3 MMOL/L — LOW (ref 3.5–5.3)
RBC # BLD: 3.77 M/UL — LOW (ref 3.8–5.2)
RBC # FLD: 15.1 % — HIGH (ref 10.3–14.5)
SODIUM SERPL-SCNC: 146 MMOL/L — HIGH (ref 135–145)
WBC # BLD: 11.47 K/UL — HIGH (ref 3.8–10.5)
WBC # FLD AUTO: 11.47 K/UL — HIGH (ref 3.8–10.5)

## 2022-01-19 PROCEDURE — 99233 SBSQ HOSP IP/OBS HIGH 50: CPT

## 2022-01-19 PROCEDURE — 99232 SBSQ HOSP IP/OBS MODERATE 35: CPT

## 2022-01-19 RX ORDER — LEVETIRACETAM 250 MG/1
500 TABLET, FILM COATED ORAL EVERY 12 HOURS
Refills: 0 | Status: DISCONTINUED | OUTPATIENT
Start: 2022-01-19 | End: 2022-01-27

## 2022-01-19 RX ORDER — SODIUM CHLORIDE 9 MG/ML
1000 INJECTION, SOLUTION INTRAVENOUS
Refills: 0 | Status: DISCONTINUED | OUTPATIENT
Start: 2022-01-19 | End: 2022-01-20

## 2022-01-19 RX ORDER — ACETAMINOPHEN 500 MG
650 TABLET ORAL ONCE
Refills: 0 | Status: COMPLETED | OUTPATIENT
Start: 2022-01-19 | End: 2022-01-19

## 2022-01-19 RX ORDER — POTASSIUM CHLORIDE 20 MEQ
10 PACKET (EA) ORAL
Refills: 0 | Status: DISCONTINUED | OUTPATIENT
Start: 2022-01-19 | End: 2022-01-19

## 2022-01-19 RX ORDER — POTASSIUM CHLORIDE 20 MEQ
10 PACKET (EA) ORAL
Refills: 0 | Status: COMPLETED | OUTPATIENT
Start: 2022-01-19 | End: 2022-01-19

## 2022-01-19 RX ADMIN — CHLORHEXIDINE GLUCONATE 15 MILLILITER(S): 213 SOLUTION TOPICAL at 19:04

## 2022-01-19 RX ADMIN — DORZOLAMIDE HYDROCHLORIDE TIMOLOL MALEATE 1 DROP(S): 20; 5 SOLUTION/ DROPS OPHTHALMIC at 06:19

## 2022-01-19 RX ADMIN — FENTANYL CITRATE 1 PATCH: 50 INJECTION INTRAVENOUS at 19:46

## 2022-01-19 RX ADMIN — DORZOLAMIDE HYDROCHLORIDE TIMOLOL MALEATE 1 DROP(S): 20; 5 SOLUTION/ DROPS OPHTHALMIC at 19:05

## 2022-01-19 RX ADMIN — CHLORHEXIDINE GLUCONATE 15 MILLILITER(S): 213 SOLUTION TOPICAL at 06:18

## 2022-01-19 RX ADMIN — ENOXAPARIN SODIUM 40 MILLIGRAM(S): 100 INJECTION SUBCUTANEOUS at 14:30

## 2022-01-19 RX ADMIN — Medication 100 MILLIEQUIVALENT(S): at 20:00

## 2022-01-19 RX ADMIN — SODIUM CHLORIDE 80 MILLILITER(S): 9 INJECTION, SOLUTION INTRAVENOUS at 19:04

## 2022-01-19 RX ADMIN — Medication 650 MILLIGRAM(S): at 01:17

## 2022-01-19 RX ADMIN — LEVETIRACETAM 420 MILLIGRAM(S): 250 TABLET, FILM COATED ORAL at 07:41

## 2022-01-19 RX ADMIN — LEVETIRACETAM 420 MILLIGRAM(S): 250 TABLET, FILM COATED ORAL at 19:05

## 2022-01-19 RX ADMIN — Medication 100 MILLIEQUIVALENT(S): at 22:00

## 2022-01-19 RX ADMIN — Medication 100 MILLIEQUIVALENT(S): at 20:34

## 2022-01-19 RX ADMIN — CHLORHEXIDINE GLUCONATE 1 APPLICATION(S): 213 SOLUTION TOPICAL at 06:20

## 2022-01-19 NOTE — PROGRESS NOTE ADULT - SUBJECTIVE AND OBJECTIVE BOX
[facility]    LVS 2D 271 D    Allergies    No Known Allergies    Intolerances        PAST MEDICAL & SURGICAL HISTORY:  Seizure    HTN (hypertension)    Glaucoma    Thyroid disease    Blood clot of neck vein  left side    TIA (transient ischemic attack)  20 years ago    S/P appendectomy        FAMILY HISTORY:  FH: HTN (hypertension)        Home Medications:  amLODIPine 10 mg oral tablet: 1 tab(s) orally once a day (22 Dec 2021 16:46)  aspirin 81 mg oral tablet, chewable: 1 tab(s) orally once a day (22 Dec 2021 16:46)  escitalopram 20 mg oral tablet: 1 tab(s) orally once a day (22 Dec 2021 16:46)  keppera:  (22 Dec 2021 16:46)  levETIRAcetam 500 mg oral tablet: 1 tab(s) orally 2 times a day (22 Dec 2021 16:46)  timolol-dorzolamide 0.5%-2% preservative-free ophthalmic solution: 1 drop(s) to each affected eye 2 times a day (22 Dec 2021 16:46)  Zetia 10 mg oral tablet: 1 tab(s) orally once a day (22 Dec 2021 16:46)      MEDICATIONS  (STANDING):  carvedilol 3.125 milliGRAM(s) Oral every 12 hours  chlorhexidine 0.12% Liquid 15 milliLiter(s) Oral Mucosa every 12 hours  chlorhexidine 2% Cloths 1 Application(s) Topical <User Schedule>  dextrose 40% Gel 15 Gram(s) Oral once  dextrose 5%. 1000 milliLiter(s) (50 mL/Hr) IV Continuous <Continuous>  dextrose 5%. 1000 milliLiter(s) (100 mL/Hr) IV Continuous <Continuous>  dextrose 50% Injectable 25 Gram(s) IV Push once  dextrose 50% Injectable 12.5 Gram(s) IV Push once  dextrose 50% Injectable 25 Gram(s) IV Push once  dorzolamide 2%/timolol 0.5% Ophthalmic Solution 1 Drop(s) Both EYES two times a day  enoxaparin Injectable 40 milliGRAM(s) SubCutaneous daily  escitalopram 20 milliGRAM(s) Oral daily  glucagon  Injectable 1 milliGRAM(s) IntraMuscular once  levETIRAcetam 500 milliGRAM(s) Oral every 12 hours  levothyroxine 50 MICROGram(s) Oral daily  pantoprazole   Suspension 40 milliGRAM(s) Enteral Tube daily  potassium chloride    Tablet ER 20 milliEquivalent(s) Oral every 2 hours    MEDICATIONS  (PRN):  acetaminophen     Tablet .. 650 milliGRAM(s) Oral every 6 hours PRN Temp greater or equal to 38C (100.4F)  sodium chloride 0.9% lock flush 10 milliLiter(s) IV Push every 1 hour PRN Pre/post blood products, medications, blood draw, and to maintain line patency      Diet, NPO (01-18-22 @ 16:10) [Active]          Vital Signs Last 24 Hrs  T(C): 37.8 (19 Jan 2022 05:02), Max: 38.1 (19 Jan 2022 00:06)  T(F): 100.1 (19 Jan 2022 05:02), Max: 100.6 (19 Jan 2022 00:06)  HR: 93 (19 Jan 2022 05:02) (57 - 94)  BP: 160/74 (19 Jan 2022 05:02) (105/48 - 163/63)  BP(mean): --  RR: 22 (19 Jan 2022 05:02) (15 - 22)  SpO2: 98% (19 Jan 2022 05:02) (97% - 100%)      01-17-22 @ 07:01  -  01-18-22 @ 07:00  --------------------------------------------------------  IN: 1350 mL / OUT: 725 mL / NET: 625 mL    01-18-22 @ 07:01  -  01-19-22 @ 06:35  --------------------------------------------------------  IN: 0 mL / OUT: 8 mL / NET: -8 mL        Mode: AC/ CMV (Assist Control/ Continuous Mandatory Ventilation), RR (machine): 15, TV (machine): 450, FiO2: 40, PEEP: 5, ITime: 1, MAP: 8, PIP: 17      LABS:                        9.6    11.00 )-----------( 469      ( 18 Jan 2022 07:57 )             32.2     01-18    144  |  111<H>  |  21  ----------------------------<  119<H>  3.5   |  29  |  0.41<L>    Ca    8.7      18 Jan 2022 07:57  Phos  3.6     01-18  Mg     2.2     01-18      PT/INR - ( 18 Jan 2022 07:57 )   PT: 12.3 sec;   INR: 1.06 ratio         PTT - ( 18 Jan 2022 07:57 )  PTT:28.6 sec          WBC:  WBC Count: 11.00 K/uL (01-18 @ 07:57)  WBC Count: 11.05 K/uL (01-17 @ 09:16)  WBC Count: 9.84 K/uL (01-16 @ 10:47)  WBC Count: 10.89 K/uL (01-15 @ 10:55)      MICROBIOLOGY:  RECENT CULTURES:              PT/INR - ( 18 Jan 2022 07:57 )   PT: 12.3 sec;   INR: 1.06 ratio         PTT - ( 18 Jan 2022 07:57 )  PTT:28.6 sec    Sodium:  Sodium, Serum: 144 mmol/L (01-18 @ 07:57)  Sodium, Serum: 143 mmol/L (01-17 @ 09:16)  Sodium, Serum: 144 mmol/L (01-16 @ 10:47)  Sodium, Serum: 141 mmol/L (01-15 @ 10:55)      0.41 mg/dL 01-18 @ 07:57  0.55 mg/dL 01-17 @ 09:16  0.45 mg/dL 01-16 @ 10:47  0.43 mg/dL 01-15 @ 10:55      Hemoglobin:  Hemoglobin: 9.6 g/dL (01-18 @ 07:57)  Hemoglobin: 9.6 g/dL (01-17 @ 09:16)  Hemoglobin: 10.1 g/dL (01-16 @ 10:47)  Hemoglobin: 9.8 g/dL (01-15 @ 10:55)      Platelets: Platelet Count - Automated: 469 K/uL (01-18 @ 07:57)  Platelet Count - Automated: 451 K/uL (01-17 @ 09:16)  Platelet Count - Automated: 478 K/uL (01-16 @ 10:47)  Platelet Count - Automated: 452 K/uL (01-15 @ 10:55)              RADIOLOGY & ADDITIONAL STUDIES:      MICROBIOLOGY:  RECENT CULTURES:

## 2022-01-19 NOTE — PROGRESS NOTE ADULT - SUBJECTIVE AND OBJECTIVE BOX
SURGERY PROGRESS HPI:  S/P PEG POD#1  Patient seen and examined at bedside resting comfortably.      Vital Signs Last 24 Hrs  T(C): 38.1 (19 Jan 2022 00:54), Max: 38.1 (19 Jan 2022 00:06)  T(F): 100.6 (19 Jan 2022 00:54), Max: 100.6 (19 Jan 2022 00:06)  HR: 82 (19 Jan 2022 01:15) (57 - 82)  BP: 153/66 (19 Jan 2022 00:06) (105/48 - 172/73)  BP(mean): --  RR: 17 (18 Jan 2022 18:52) (15 - 18)  SpO2: 97% (19 Jan 2022 01:15) (97% - 100%)      PHYSICAL EXAM:  GENERAL: NAD  HEENT: Tracheostomy to vent functioning well  CHEST/LUNG: Clear to ausculation, bilaterally   HEART: RRR S1S2  ABDOMEN: PEG and dressing intact. Colostomy pink and viable with air and stool in bag. Wound VAC intact midline with good seal. R BABAK drain with scant serous output, L BABAK drain with scant purulent output. non distended, +BS, soft, non tender, no guarding  EXTREMITIES:  calf soft, non tender b/l    I&O's Detail    17 Jan 2022 07:01  -  18 Jan 2022 07:00  --------------------------------------------------------  IN:    Enteral Tube Flush: 750 mL    Vital1.5: 600 mL  Total IN: 1350 mL    OUT:    Bulb (mL): 15 mL    Bulb (mL): 10 mL    Colostomy (mL): 350 mL    Incontinent per Collection Bag (mL): 350 mL  Total OUT: 725 mL    Total NET: 625 mL      18 Jan 2022 07:01  -  19 Jan 2022 04:27  --------------------------------------------------------  IN:  Total IN: 0 mL    OUT:    Bulb (mL): 5 mL    Bulb (mL): 3 mL  Total OUT: 8 mL    Total NET: -8 mL      LABS:                        9.6    11.00 )-----------( 469      ( 18 Jan 2022 07:57 )             32.2     01-18    144  |  111<H>  |  21  ----------------------------<  119<H>  3.5   |  29  |  0.41<L>    Ca    8.7      18 Jan 2022 07:57  Phos  3.6     01-18  Mg     2.2     01-18      PT/INR - ( 18 Jan 2022 07:57 )   PT: 12.3 sec;   INR: 1.06 ratio      PTT - ( 18 Jan 2022 07:57 )  PTT:28.6 sec      A/P: 81F admitted with Acute Sigmoid Diverticulitis s/p ex lap, sigmoid resection, peritoneal lavage 12/25 and RTOR 12/26 for irrigation of abdominal cavity with closure and creation of colostomy. S/p bedside IR aspiration of fluid collection 1/3. Tracheostomy 1/7, PEG 1/18. Tmax: 100.6    Tube feeds to start today  Continue wound vac changes per PT (MWF)  off abx per ID  continue vent support with weaning protocol, medical management  History of depression and context of prolonged ICU stay/sedation concern for hypoactive delirium. Rec behavioral health consult   F/u AM labs  Mobilize patient, continue to increase activity with PT  Will d/w attending

## 2022-01-19 NOTE — PROGRESS NOTE ADULT - ASSESSMENT
81F with a PMH of HTN, HLD, hypothyroidism, depression, seizures who presents to the ED for abd pain.  Patient states that over the last two days she had developed significant abdominal pain and multiple episodes of watery diarrhea.  Reports one episode of nausea and vomiting earlier today.  Patient has no other complaints.  Vitals stable, (hypoxix?) labs show leukocytosis and hypokalemia.  CT abdomen significant for diverticulitis.  initially admitted to med surg.  CT noted with sigmoid diverticulosis with adjacent stranding, compatible with diverticulitis. No associated abscess is identified. Trace adjacent pelvic free fluid.81F with a PMH of HTN, HLD, hypothyroidism, depression, seizures p/w abd pain,       Perforated Acute diverticulitis   s/p 12/24/21 - Exploratory laparotomy and a sigmoid colectomy and abdominal washout  Patient went for abdominal wound closure and creation of colostomy on 12/26.  Patient developed adb abscess 1/3 s/p IR drainage of abdominal abscesses.  Initially patient on PPN for nutrition Now tolerating tube feeds with brown soft stool in colostomy..  Found to have multiple abdominal fluid collections s/p IR drainage of R abdominal abscess 1/3/22 growing e-coli and bacteroides  Peg placement also requested by IR next week as per surgery.  Noted collection around BABAK  drain  ct was planned for followup although cancelled  all abx dced as per iD recs  plan:  BABAK drain care  PEG stable      Septic Shock s/p  pressors  Transferred to ICU for post op care on mechanical ventilation.  now off pressors failed extubation  now trach to cpap  Pulm and ID consutled  Cultures Sensitive to ceftriaxone, continue antibiotics to ceftriaxone and flagyl per ID. added trial PO vanc for diarrhea.  plan:  Appreciate ID followup. all abx dced.      Hypoxemic respiratory failure now trach to CPAP  now trach to CPAP  Pulm following  on fentayl patch for pain control    acute metabolic encephalopathy sec to above  stable    Rapid AFIB due to Shock state  10mg IV lopressor in OR for Afib with RVR. 5L IVF given intra-op. EBL 100cc. Received intubated, sedated, on phenylephrine.    s/p amio load converted to sinus rhythm.  not on rate control or AC on downgrade  Cards consulted  plan:  add BB  hold full dose A/C for now      dvt ppx: lovenox  subq

## 2022-01-19 NOTE — PROGRESS NOTE ADULT - SUBJECTIVE AND OBJECTIVE BOX
Patient is a 81y old  Female who presents with a chief complaint of diverticulitis (2022 06:35)    INTERVAL HPI/OVERNIGHT EVENTS: Patients seen and examined at bedside this morning. No acute events overnight.     MEDICATIONS  (STANDING):  carvedilol 3.125 milliGRAM(s) Oral every 12 hours  chlorhexidine 0.12% Liquid 15 milliLiter(s) Oral Mucosa every 12 hours  chlorhexidine 2% Cloths 1 Application(s) Topical <User Schedule>  dextrose 40% Gel 15 Gram(s) Oral once  dextrose 5%. 1000 milliLiter(s) (50 mL/Hr) IV Continuous <Continuous>  dextrose 5%. 1000 milliLiter(s) (100 mL/Hr) IV Continuous <Continuous>  dextrose 50% Injectable 25 Gram(s) IV Push once  dextrose 50% Injectable 12.5 Gram(s) IV Push once  dextrose 50% Injectable 25 Gram(s) IV Push once  dorzolamide 2%/timolol 0.5% Ophthalmic Solution 1 Drop(s) Both EYES two times a day  enoxaparin Injectable 40 milliGRAM(s) SubCutaneous daily  escitalopram 20 milliGRAM(s) Oral daily  glucagon  Injectable 1 milliGRAM(s) IntraMuscular once  levETIRAcetam  IVPB 500 milliGRAM(s) IV Intermittent every 12 hours  levothyroxine 50 MICROGram(s) Oral daily  pantoprazole   Suspension 40 milliGRAM(s) Enteral Tube daily  potassium chloride    Tablet ER 20 milliEquivalent(s) Oral every 2 hours    MEDICATIONS  (PRN):  acetaminophen     Tablet .. 650 milliGRAM(s) Oral every 6 hours PRN Temp greater or equal to 38C (100.4F)  sodium chloride 0.9% lock flush 10 milliLiter(s) IV Push every 1 hour PRN Pre/post blood products, medications, blood draw, and to maintain line patency    Allergies    No Known Allergies    Intolerances      REVIEW OF SYSTEMS:  All other systems reviewed and are negative    Vital Signs Last 24 Hrs  T(C): 36.7 (2022 11:01), Max: 38.1 (2022 00:06)  T(F): 98.1 (2022 11:01), Max: 100.6 (2022 00:06)  HR: 81 (2022 11:01) (57 - 94)  BP: 166/66 (2022 11:01) (105/48 - 166/66)  BP(mean): --  RR: 18 (2022 11:01) (15 - 22)  SpO2: 100% (2022 11:01) (97% - 100%)  Daily     Daily Weight in k.3 (2022 05:02)  I&O's Summary    2022 07:01  -  2022 07:00  --------------------------------------------------------  IN: 0 mL / OUT: 408 mL / NET: -408 mL    2022 07:01  -  2022 12:50  --------------------------------------------------------  IN: 0 mL / OUT: 10 mL / NET: -10 mL      CAPILLARY BLOOD GLUCOSE      POCT Blood Glucose.: 93 mg/dL (2022 06:23)  POCT Blood Glucose.: 99 mg/dL (2022 00:07)  POCT Blood Glucose.: 104 mg/dL (2022 17:47)      Physical Exam:  General: comfortable, no acute distress,   HEENT: Atraumatic, no LAD, trachea midline, PERRLA  Cardiovascular: irregular s1s2, no murmurs, gallops or fricition rubs  Pulmonary: decreased, no wheezing , rhonchi, trach to cpap  Gastrointestinal: soft non tender non distended, no masses felt, no organomegally ++ BABAK drain, ostomy, PEG, : Midline wound with wound vac  Muscloskeletal: no lower extremity edema, intact bilateral lower extremity pulses  Neurological: CN II-12 intact. No focal weakness  Psychiatrical: normal mood, cooperative  SKIN: no rash, lesions or ulcers    Labs                          9.6    11.00 )-----------( 469      ( 2022 07:57 )             32.2     01-18    144  |  111<H>  |  21  ----------------------------<  119<H>  3.5   |  29  |  0.41<L>    Ca    8.7      2022 07:57  Phos  3.6     -  Mg     2.2     -18      PT/INR - ( 2022 07:57 )   PT: 12.3 sec;   INR: 1.06 ratio         PTT - ( 2022 07:57 )  PTT:28.6 sec

## 2022-01-20 LAB
ALBUMIN SERPL ELPH-MCNC: 2.1 G/DL — LOW (ref 3.3–5)
ALP SERPL-CCNC: 73 U/L — SIGNIFICANT CHANGE UP (ref 40–120)
ALT FLD-CCNC: 17 U/L — SIGNIFICANT CHANGE UP (ref 12–78)
ANION GAP SERPL CALC-SCNC: 9 MMOL/L — SIGNIFICANT CHANGE UP (ref 5–17)
APPEARANCE UR: CLEAR — SIGNIFICANT CHANGE UP
AST SERPL-CCNC: 21 U/L — SIGNIFICANT CHANGE UP (ref 15–37)
BACTERIA # UR AUTO: ABNORMAL
BILIRUB SERPL-MCNC: 0.4 MG/DL — SIGNIFICANT CHANGE UP (ref 0.2–1.2)
BILIRUB UR-MCNC: NEGATIVE — SIGNIFICANT CHANGE UP
BUN SERPL-MCNC: 15 MG/DL — SIGNIFICANT CHANGE UP (ref 7–23)
CALCIUM SERPL-MCNC: 8.5 MG/DL — SIGNIFICANT CHANGE UP (ref 8.5–10.1)
CHLORIDE SERPL-SCNC: 111 MMOL/L — HIGH (ref 96–108)
CO2 SERPL-SCNC: 25 MMOL/L — SIGNIFICANT CHANGE UP (ref 22–31)
COLOR SPEC: YELLOW — SIGNIFICANT CHANGE UP
CREAT SERPL-MCNC: 0.5 MG/DL — SIGNIFICANT CHANGE UP (ref 0.5–1.3)
DIFF PNL FLD: ABNORMAL
EPI CELLS # UR: ABNORMAL
GLUCOSE BLDC GLUCOMTR-MCNC: 112 MG/DL — HIGH (ref 70–99)
GLUCOSE BLDC GLUCOMTR-MCNC: 115 MG/DL — HIGH (ref 70–99)
GLUCOSE BLDC GLUCOMTR-MCNC: 125 MG/DL — HIGH (ref 70–99)
GLUCOSE SERPL-MCNC: 120 MG/DL — HIGH (ref 70–99)
GLUCOSE UR QL: NEGATIVE MG/DL — SIGNIFICANT CHANGE UP
HCT VFR BLD CALC: 35.2 % — SIGNIFICANT CHANGE UP (ref 34.5–45)
HGB BLD-MCNC: 10.5 G/DL — LOW (ref 11.5–15.5)
KETONES UR-MCNC: ABNORMAL
LEUKOCYTE ESTERASE UR-ACNC: NEGATIVE — SIGNIFICANT CHANGE UP
MCHC RBC-ENTMCNC: 27.9 PG — SIGNIFICANT CHANGE UP (ref 27–34)
MCHC RBC-ENTMCNC: 29.8 GM/DL — LOW (ref 32–36)
MCV RBC AUTO: 93.4 FL — SIGNIFICANT CHANGE UP (ref 80–100)
NITRITE UR-MCNC: NEGATIVE — SIGNIFICANT CHANGE UP
NRBC # BLD: 0 /100 WBCS — SIGNIFICANT CHANGE UP (ref 0–0)
PH UR: 7 — SIGNIFICANT CHANGE UP (ref 5–8)
PLATELET # BLD AUTO: 554 K/UL — HIGH (ref 150–400)
POTASSIUM SERPL-MCNC: 3.5 MMOL/L — SIGNIFICANT CHANGE UP (ref 3.5–5.3)
POTASSIUM SERPL-SCNC: 3.5 MMOL/L — SIGNIFICANT CHANGE UP (ref 3.5–5.3)
PROT SERPL-MCNC: 6.4 GM/DL — SIGNIFICANT CHANGE UP (ref 6–8.3)
PROT UR-MCNC: 15 MG/DL
RAPID RVP RESULT: SIGNIFICANT CHANGE UP
RBC # BLD: 3.77 M/UL — LOW (ref 3.8–5.2)
RBC # FLD: 14.9 % — HIGH (ref 10.3–14.5)
SARS-COV-2 RNA SPEC QL NAA+PROBE: SIGNIFICANT CHANGE UP
SODIUM SERPL-SCNC: 145 MMOL/L — SIGNIFICANT CHANGE UP (ref 135–145)
SP GR SPEC: 1.01 — SIGNIFICANT CHANGE UP (ref 1.01–1.02)
UROBILINOGEN FLD QL: NEGATIVE MG/DL — SIGNIFICANT CHANGE UP
WBC # BLD: 11.64 K/UL — HIGH (ref 3.8–10.5)
WBC # FLD AUTO: 11.64 K/UL — HIGH (ref 3.8–10.5)
WBC UR QL: SIGNIFICANT CHANGE UP

## 2022-01-20 PROCEDURE — 99233 SBSQ HOSP IP/OBS HIGH 50: CPT

## 2022-01-20 PROCEDURE — 90792 PSYCH DIAG EVAL W/MED SRVCS: CPT

## 2022-01-20 PROCEDURE — 99232 SBSQ HOSP IP/OBS MODERATE 35: CPT

## 2022-01-20 RX ORDER — ACETAMINOPHEN 500 MG
325 TABLET ORAL ONCE
Refills: 0 | Status: COMPLETED | OUTPATIENT
Start: 2022-01-20 | End: 2022-01-20

## 2022-01-20 RX ORDER — ACETAMINOPHEN 500 MG
650 TABLET ORAL ONCE
Refills: 0 | Status: COMPLETED | OUTPATIENT
Start: 2022-01-20 | End: 2022-01-20

## 2022-01-20 RX ADMIN — CHLORHEXIDINE GLUCONATE 1 APPLICATION(S): 213 SOLUTION TOPICAL at 05:07

## 2022-01-20 RX ADMIN — LEVETIRACETAM 420 MILLIGRAM(S): 250 TABLET, FILM COATED ORAL at 05:13

## 2022-01-20 RX ADMIN — DORZOLAMIDE HYDROCHLORIDE TIMOLOL MALEATE 1 DROP(S): 20; 5 SOLUTION/ DROPS OPHTHALMIC at 05:09

## 2022-01-20 RX ADMIN — Medication 650 MILLIGRAM(S): at 14:00

## 2022-01-20 RX ADMIN — Medication 650 MILLIGRAM(S): at 17:51

## 2022-01-20 RX ADMIN — DORZOLAMIDE HYDROCHLORIDE TIMOLOL MALEATE 1 DROP(S): 20; 5 SOLUTION/ DROPS OPHTHALMIC at 17:52

## 2022-01-20 RX ADMIN — CHLORHEXIDINE GLUCONATE 15 MILLILITER(S): 213 SOLUTION TOPICAL at 05:08

## 2022-01-20 RX ADMIN — Medication 650 MILLIGRAM(S): at 02:54

## 2022-01-20 RX ADMIN — CARVEDILOL PHOSPHATE 3.12 MILLIGRAM(S): 80 CAPSULE, EXTENDED RELEASE ORAL at 17:51

## 2022-01-20 RX ADMIN — CHLORHEXIDINE GLUCONATE 15 MILLILITER(S): 213 SOLUTION TOPICAL at 17:51

## 2022-01-20 RX ADMIN — SODIUM CHLORIDE 80 MILLILITER(S): 9 INJECTION, SOLUTION INTRAVENOUS at 13:31

## 2022-01-20 RX ADMIN — Medication 650 MILLIGRAM(S): at 11:46

## 2022-01-20 RX ADMIN — ENOXAPARIN SODIUM 40 MILLIGRAM(S): 100 INJECTION SUBCUTANEOUS at 11:46

## 2022-01-20 RX ADMIN — LEVETIRACETAM 420 MILLIGRAM(S): 250 TABLET, FILM COATED ORAL at 17:51

## 2022-01-20 NOTE — BH CONSULTATION LIAISON ASSESSMENT NOTE - SUMMARY
80yo F with a PMH of HTN, HLD, hypothyroidism, depression (no exact details known at this time), seizures who presented to the ED on 12/22/2021 for abdominal pain with CT abdomen showing perforated sigmoid diverticulitis. On 12/24/21, Patient underwent Exploratory laparotomy, a sigmoid colectomy and abdominal washout abd at that time left open. Patient went for abdominal wound closure and creation of colostomy on 12/21, developed abdominal abscess 1/3/22 s/p IR drainage of abdominal abscesses growing e-coli and bacteroides Sensitive to ceftriaxone. + also with a-fib with RVR, s/p amio load converted to sinus rhythm while in ICU.+ tracheostomy on 1/7/22; EGD, with PEG replacement 1/18/22. Patient's currently makes eye contact and has minimal response to her name being called. Patient's moth opens/closes, no sounds comes out. Patient not able to manifest any signs of comprehending what is being spoken to her / unable to manifest any nonverbal modes of communication as well.

## 2022-01-20 NOTE — PROGRESS NOTE ADULT - ASSESSMENT
81F with a PMH of HTN, HLD, hypothyroidism, depression, seizures who presents to the ED for abd pain.  Patient states that over the last two days she had developed significant abdominal pain and multiple episodes of watery diarrhea.  Reports one episode of nausea and vomiting earlier today.  Patient has no other complaints.  Vitals stable, (hypoxix?) labs show leukocytosis and hypokalemia.  CT abdomen significant for diverticulitis.  initially admitted to med surg.  CT noted with sigmoid diverticulosis with adjacent stranding, compatible with diverticulitis. No associated abscess is identified. Trace adjacent pelvic free fluid.81F with a PMH of HTN, HLD, hypothyroidism, depression, seizures p/w abd pain,       Perforated Acute diverticulitis   s/p 12/24/21 - Exploratory laparotomy and a sigmoid colectomy and abdominal washout  Patient went for abdominal wound closure and creation of colostomy on 12/26.  Patient developed adb abscess 1/3 s/p IR drainage of abdominal abscesses.  Initially patient on PPN for nutrition Now tolerating tube feeds with brown soft stool in colostomy..  Found to have multiple abdominal fluid collections s/p IR drainage of R abdominal abscess 1/3/22 growing e-coli and bacteroides  Peg placement also requested by IR next week as per surgery.  Noted collection around BABAK  drain  ct was planned for followup although cancelled  all abx dced as per iD recs  plan:  BABAK drain care  PEG stable      Septic Shock s/p  pressors  Transferred to ICU for post op care on mechanical ventilation.  now off pressors failed extubation  now trach to cpap  Pulm and ID consutled  Cultures Sensitive to ceftriaxone, continue antibiotics to ceftriaxone and flagyl per ID. added trial PO vanc for diarrhea.  plan:  Appreciate ID followup. all abx dced.  Febriel again today, follow CLX, covid PCR      Hypoxemic respiratory failure now trach to CPAP  now trach to CPAP  Pulm following  on fentayl patch for pain control    acute metabolic encephalopathy sec to above  stable    Rapid AFIB due to Shock state  10mg IV lopressor in OR for Afib with RVR. 5L IVF given intra-op. EBL 100cc. Received intubated, sedated, on phenylephrine.    s/p amio load converted to sinus rhythm.  not on rate control or AC on downgrade  Cards consulted  plan:  add BB  hold full dose A/C for now      dvt ppx: lovenox  subq

## 2022-01-20 NOTE — BH CONSULTATION LIAISON ASSESSMENT NOTE - MSE UNSTRUCTURED FT
awake, wide eyed stare with reduced blinking. Makes eye contact and has minimal response to her name being called. Patient's moth opens/closes, no sounds comes out. Patient not able to manifest any signs of comprehending what is being spoken to her / unable to manifest any nonverbal modes of communication as well.

## 2022-01-20 NOTE — PROGRESS NOTE ADULT - SUBJECTIVE AND OBJECTIVE BOX
Patient is a 81y old  Female who presents with a chief complaint of diverticulitis (2022 09:37)    INTERVAL HPI/OVERNIGHT EVENTS:  Pt was seen and examined, no acute events.    MEDICATIONS  (STANDING):  carvedilol 3.125 milliGRAM(s) Oral every 12 hours  chlorhexidine 0.12% Liquid 15 milliLiter(s) Oral Mucosa every 12 hours  chlorhexidine 2% Cloths 1 Application(s) Topical <User Schedule>  dextrose 40% Gel 15 Gram(s) Oral once  dextrose 5%. 1000 milliLiter(s) (50 mL/Hr) IV Continuous <Continuous>  dextrose 5%. 1000 milliLiter(s) (100 mL/Hr) IV Continuous <Continuous>  dextrose 50% Injectable 25 Gram(s) IV Push once  dextrose 50% Injectable 12.5 Gram(s) IV Push once  dextrose 50% Injectable 25 Gram(s) IV Push once  dorzolamide 2%/timolol 0.5% Ophthalmic Solution 1 Drop(s) Both EYES two times a day  enoxaparin Injectable 40 milliGRAM(s) SubCutaneous daily  escitalopram 20 milliGRAM(s) Oral daily  glucagon  Injectable 1 milliGRAM(s) IntraMuscular once  levETIRAcetam  IVPB 500 milliGRAM(s) IV Intermittent every 12 hours  levothyroxine 50 MICROGram(s) Oral daily  pantoprazole   Suspension 40 milliGRAM(s) Enteral Tube daily  potassium chloride    Tablet ER 20 milliEquivalent(s) Oral every 2 hours    MEDICATIONS  (PRN):  acetaminophen     Tablet .. 650 milliGRAM(s) Oral every 6 hours PRN Temp greater or equal to 38C (100.4F)  sodium chloride 0.9% lock flush 10 milliLiter(s) IV Push every 1 hour PRN Pre/post blood products, medications, blood draw, and to maintain line patency      Allergies  No Known Allergies      Vital Signs Last 24 Hrs  T(C): 38.3 (2022 17:41), Max: 38.3 (2022 17:41)  T(F): 101 (2022 17:41), Max: 101 (2022 17:41)  HR: 72 (2022 17:16) (68 - 88)  BP: 183/68 (2022 17:41) (135/68 - 184/72)  BP(mean): --  RR: 18 (2022 17:00) (16 - 18)  SpO2: 100% (2022 17:16) (96% - 100%)      PHYSICAL EXAM:  GENERAL: NAD  HEAD:  Atraumatic  EYES: PERRLA  NERVOUS SYSTEM:  Awake, alert  CHEST/LUNG: Clear  HEART: RRR  ABDOMEN: Soft, non tender  EXTREMITIES: no edema       LABS:                        10.5   11.64 )-----------( 554      ( 2022 08:04 )             35.2         145  |  111<H>  |  15  ----------------------------<  120<H>  3.5   |  25  |  0.50    Ca    8.5      2022 08:04  Phos  3.1       Mg     2.1         TPro  6.4  /  Alb  2.1<L>  /  TBili  0.4  /  DBili  x   /  AST  21  /  ALT  17  /  AlkPhos  73        Urinalysis Basic - ( 2022 18:58 )    Color: Yellow / Appearance: Clear / S.010 / pH: x  Gluc: x / Ketone: Small  / Bili: Negative / Urobili: Negative mg/dL   Blood: x / Protein: 15 mg/dL / Nitrite: Negative   Leuk Esterase: Negative / RBC: x / WBC 3-5   Sq Epi: x / Non Sq Epi: Moderate / Bacteria: Moderate      CAPILLARY BLOOD GLUCOSE      POCT Blood Glucose.: 125 mg/dL (2022 16:54)  POCT Blood Glucose.: 115 mg/dL (2022 11:18)  POCT Blood Glucose.: 112 mg/dL (2022 07:53)  POCT Blood Glucose.: 97 mg/dL (2022 22:05)      RADIOLOGY & ADDITIONAL TESTS:    Imaging Personally Reviewed:  [ ] YES  [ ] NO    Consultant(s) Notes Reviewed:  [ ] YES  [ ] NO    Care Discussed with Consultants/Other Providers [ ] YES  [ ] NO

## 2022-01-20 NOTE — BH CONSULTATION LIAISON ASSESSMENT NOTE - NSBHCHARTREVIEWLAB_PSY_A_CORE FT
01-20    145  |  111<H>  |  15  ----------------------------<  120<H>  3.5   |  25  |  0.50    Ca    8.5      20 Jan 2022 08:04  Phos  3.1     01-19  Mg     2.1     01-19    TPro  6.4  /  Alb  2.1<L>  /  TBili  0.4  /  DBili  x   /  AST  21  /  ALT  17  /  AlkPhos  73  01-20

## 2022-01-20 NOTE — BH CONSULTATION LIAISON ASSESSMENT NOTE - HPI (INCLUDE ILLNESS QUALITY, SEVERITY, DURATION, TIMING, CONTEXT, MODIFYING FACTORS, ASSOCIATED SIGNS AND SYMPTOMS)
81F, has a common-law /long term partner, mother of an adult son (involved), with a PMH of HTN, HLD, hypothyroidism, depression, seizures who presents to the ED on 12/22/2021 for abdominal pain with CT abdomen showing perforated sigmoid diverticulitis. On 12/24/21, Patient underwent Exploratory laparotomy, a sigmoid colectomy and abdominal washout abd at that time left open. Patient went for abdominal wound closure and creation of colostomy on 12/21, developed abdominal abscess 1/3/22 s/p IR drainage of abdominal abscesses growing e-coli and bacteroides Sensitive to ceftriaxone. + also with a-fib with RVR, s/p amio load converted to sinus rhythm while in ICU.+ tracheostomy on 1/7/22; EGD, with PEG replacement 1/18/22.     [facility] no record of patient  IST[facility] Reference #:[id number] no record of Patient   81  F, has a common-law /long term partner, mother of an adult son (involved), with a PMH of HTN, HLD, hypothyroidism, depression, seizures who presents to the ED on 12/22/2021 for abdominal pain with CT abdomen showing perforated sigmoid diverticulitis. On 12/24/21, Patient underwent Exploratory laparotomy, a sigmoid colectomy and abdominal washout abd at that time left open. Patient went for abdominal wound closure and creation of colostomy on 12/21, developed abdominal abscess 1/3/22 s/p IR drainage of abdominal abscesses growing e-coli and bacteroides Sensitive to ceftriaxone. + also with a-fib with RVR, s/p amio load converted to sinus rhythm while in ICU.+ tracheostomy on 1/7/22; EGD, with PEG replacement 1/18/22.     EXAM: Patient awake, wide eyed stare with reduced blinking. Makes eye contact and has minimal response to her name being called. Patient's moth opens/closes, no sounds comes out. Patient not able to manifest any signs of comprehending what is being spoken to her / unable to manifest any nonverbal modes of communication as well.     [facility] no record of patient  [facility] Reference #:[id number] no record of Patient

## 2022-01-20 NOTE — BH CONSULTATION LIAISON ASSESSMENT NOTE - NSBHCHARTREVIEWVS_PSY_A_CORE FT
Vital Signs Last 24 Hrs  T(C): 37.9 (20 Jan 2022 11:28), Max: 37.9 (20 Jan 2022 11:28)  T(F): 100.3 (20 Jan 2022 11:28), Max: 100.3 (20 Jan 2022 11:28)  HR: 74 (20 Jan 2022 12:20) (68 - 88)  BP: 166/87 (20 Jan 2022 11:28) (135/68 - 173/78)  BP(mean): --  RR: 16 (20 Jan 2022 11:28) (16 - 18)  SpO2: 100% (20 Jan 2022 12:20) (96% - 100%)

## 2022-01-20 NOTE — BH CONSULTATION LIAISON ASSESSMENT NOTE - NSBHCONSULTRECOMMENDOTHER_PSY_A_CORE FT
- it is not possible to diagnose a primary mood episode on a Patient who is not able to communicate in any manner and who has multiple medical issues/conditions/circumstances which contribute to her mental status which seems to be impaired at this time  - supportive care, optimize Pt's comfort including weaning off trach/fenestrated tube as clinically indicated; communications board, reorient, screen for pain, re-positioning to prevent decubitus ulcers

## 2022-01-20 NOTE — BH CONSULTATION LIAISON ASSESSMENT NOTE - NSBHCHARTREVIEWINVESTIGATE_PSY_A_CORE FT
CT Abdomen and Pelvis w/ IV Cont (01.11.22) Decreased volume of fluid collections in the paracolic gutters. Fluid collection the posterior pelvis is increased in size from the prior study Open wound midline anterior abdominal wall again seen Left lower quadrant colostomy again appreciated

## 2022-01-20 NOTE — BH CONSULTATION LIAISON ASSESSMENT NOTE - CURRENT MEDICATION
MEDICATIONS  (STANDING):  carvedilol 3.125 milliGRAM(s) Oral every 12 hours  chlorhexidine 0.12% Liquid 15 milliLiter(s) Oral Mucosa every 12 hours  chlorhexidine 2% Cloths 1 Application(s) Topical <User Schedule>  dextrose 40% Gel 15 Gram(s) Oral once  dextrose 5% + sodium chloride 0.45% 1000 milliLiter(s) (80 mL/Hr) IV Continuous <Continuous>  dextrose 5%. 1000 milliLiter(s) (50 mL/Hr) IV Continuous <Continuous>  dextrose 5%. 1000 milliLiter(s) (100 mL/Hr) IV Continuous <Continuous>  dextrose 50% Injectable 25 Gram(s) IV Push once  dextrose 50% Injectable 12.5 Gram(s) IV Push once  dextrose 50% Injectable 25 Gram(s) IV Push once  dorzolamide 2%/timolol 0.5% Ophthalmic Solution 1 Drop(s) Both EYES two times a day  enoxaparin Injectable 40 milliGRAM(s) SubCutaneous daily  escitalopram 20 milliGRAM(s) Oral daily  glucagon  Injectable 1 milliGRAM(s) IntraMuscular once  levETIRAcetam  IVPB 500 milliGRAM(s) IV Intermittent every 12 hours  levothyroxine 50 MICROGram(s) Oral daily  pantoprazole   Suspension 40 milliGRAM(s) Enteral Tube daily  potassium chloride    Tablet ER 20 milliEquivalent(s) Oral every 2 hours    MEDICATIONS  (PRN):  acetaminophen     Tablet .. 650 milliGRAM(s) Oral every 6 hours PRN Temp greater or equal to 38C (100.4F)  sodium chloride 0.9% lock flush 10 milliLiter(s) IV Push every 1 hour PRN Pre/post blood products, medications, blood draw, and to maintain line patency

## 2022-01-20 NOTE — PROGRESS NOTE ADULT - SUBJECTIVE AND OBJECTIVE BOX
Patient seen and examined at bedside in no distress.    Vital Signs Last 24 Hrs  T(F): 98 (01-20-22 @ 05:04), Max: 98.9 (01-20-22 @ 00:58)  HR: 88 (01-20-22 @ 05:55)  BP: 173/78 (01-20-22 @ 05:04)  RR: 18 (01-20-22 @ 05:04)  SpO2: 100% (01-20-22 @ 05:55)    GENERAL: Awake, alert, NAD  HEENT: Trach to vent  CHEST/LUNG: Respirations nonlabored  HEART: S1S2, RRR  ABDOMEN: Midline wound vac intact, functioning. PEG clamped. Soft, NTND. Colostomy pink and viable with air and stool in bag. R BABAK drain with scant serous output, L BABAK drain with scant purulent output. non distended, +BS, soft, non tender, no guarding  EXTREMITIES: No pedal edema b/l     LABS:                        10.5   11.64 )-----------( 554      ( 20 Jan 2022 08:04 )             35.2     01-20    145  |  111<H>  |  15  ----------------------------<  120<H>  3.5   |  25  |  0.50    Ca    8.5      20 Jan 2022 08:04  Phos  3.1     01-19  Mg     2.1     01-19    TPro  6.4  /  Alb  2.1<L>  /  TBili  0.4  /  DBili  x   /  AST  21  /  ALT  17  /  AlkPhos  73  01-20    A/P: 81F admitted with Acute Sigmoid Diverticulitis s/p ex lap, sigmoid resection, peritoneal lavage 12/25 and RTOR 12/26 for irrigation of abdominal cavity with closure and creation of colostomy. S/p bedside IR aspiration of fluid collection 1/3. Tracheostomy 1/7, PEG 1/18. Afebrile <24hrs.   -- OK to start tube feeds  -- wound vac changes per PT  -- ostomy care  -- drain care, monitor OP  -- vent support  -- increase activity with PT  -- continue medical management and supportive care  -- discussed with Dr. Lu

## 2022-01-20 NOTE — PROGRESS NOTE ADULT - ASSESSMENT
TRESA PIZARRO 81 f 12/22/2021 1942 DR ANTONIO PATTON     REVIEW OF SYMPTOMS      Able to give (reliable) ROS  NO     PHYSICAL EXAM    HEENT Unremarkable  atraumatic   RESP Fair air entry EXP prolonged    Harsh breath sound Resp distres mild   CARDIAC S1 S2 No S3     NO JVD    ABDOMEN SOFT BS PRESENT NOT DISTENDED No hepatosplenomegaly   PEDAL EDEMA present No calf tenderness  NO rash     ______  DOA/CC.  12/22/2021 81F w/ HTN, HLD, hypothyroidism, depression, seizures. Presented w/ abdominal pain found to have acute sigmoid diverticulitis  ____  PMH.  pmh Hytn  pmh Sz.      _________  PROBLEMS.     VDRF. Since surgery 12/26  TRACH Done 1/7  ABDOMINAL INFECTION.   Feculent peritonitis perf div poa   COLOSTOMY Created 12/26  1/3 ABD ASPIRATE E coli bacteroides Abio dced 1/15  GT 1/18      PAIN 1/10 fent 50   SZ 1/10 keppra 500.2   Woundvac Rx started 1/14  Hypernatremia 1/14/2022 Na 148  Hypothyroid 1/14/2022 tsh 12   Fever spike 101 1/20/2022     _____                            COVID STATUS. 12/24 pcr (-)  ICU STAY. 12/22-1/12/2022   GOC.  1/13/2022 full code       BEST PRACTICE ISSUES.                                                  HEAD OF BED ELEVATION. Yes  DVT PROPHYLAXIS.    1/7 lvnx 40   LIGHT PROPHYLAXIS.    1/11 protonix 40                                                                                     DIET.    1/20/2022 jevity 1.5 1200  1/18/2022 npo  1/7 vital af 1200 ngt          INFECTION PROPHYLAXIS.   1/7 Chlorhexidine .12%   1/7 chlorhexidine 2%    SPEECH SWALLOW RECOMMENDATIONS.         PATIENT DATA   VITALS/PO/IO/VENT/DRIPS.   1/20/2022 afev 76 180/70   1/20/2022 cpap 5/5/.4 369 22      ASSESSMENT/RECOMMENDATIONS.    HEMODYNAMICS.   Monitor bp Target MAP 65 (+)    RESP.   Monitor po Target po 90-95%  1/14/2022 15/450/.4/5 747/38/110     OXYGEN REQUIREMENTS.   1/17/2022 40%  1/13/2022 40%    VENT MANAGEMENT.   HOB elevation  Target Pplat 35 (-)  Target PO 90-95%  Target pH 730 (+)    INFECTION.  Abdominal infection sec perforated sigmoid div feculent peritonitis poa 12/22.  w 1/13-1/14/2022 w 9.7 - 9.1   1/16/2022 Off abio    SP ABD SURGERY.  81F admitted with Acute Sigmoid Diverticulitis s/p ex lap, sigmoid resection, peritoneal lavage 12/25 and RTOR 12/26 for irrigation of abdominal cavity with closure and creation of colostomy. S/p bedside IR aspiration of fluid collection 1/3. Tracheostomy 1/7.   Deep pelvic abscess noted on CT,  Local drain/ostomy care per nursing    PL EFFSN.  1/16/2022 cxr tr bl effs  1/14/2022 CXR residuall pl effs or basal consolidatn  Effsn likely sec to abdominal infkammation       ANEMIA.  Hb 1/13-1/14-1/16-1/17/2022 Hb 9.4 - 8.9 -10 - 9.6   Monitor    GT placed 1/18      TIME SPENT   Over 25 minutes aggregate care time spent on encounter; activities included   direct patient care, counseling and/or coordinating care reviewing notes, lab data/ imaging , discussion with multidisciplinary team/ patient  /family and explaining in detail risks, benefits, alternatives  of the recommendations     TRESA PIZARRO 81 f 12/22/2021 1942 DR ANTONIO PATTON

## 2022-01-20 NOTE — PROGRESS NOTE ADULT - SUBJECTIVE AND OBJECTIVE BOX
JUAREZ GTZ    LVS 2D 271 D    Allergies    No Known Allergies    Intolerances        PAST MEDICAL & SURGICAL HISTORY:  Seizure    HTN (hypertension)    Glaucoma    Thyroid disease    Blood clot of neck vein  left side    TIA (transient ischemic attack)  20 years ago    S/P appendectomy        FAMILY HISTORY:  FH: HTN (hypertension)        Home Medications:  amLODIPine 10 mg oral tablet: 1 tab(s) orally once a day (22 Dec 2021 16:46)  aspirin 81 mg oral tablet, chewable: 1 tab(s) orally once a day (22 Dec 2021 16:46)  escitalopram 20 mg oral tablet: 1 tab(s) orally once a day (22 Dec 2021 16:46)  keppera:  (22 Dec 2021 16:46)  levETIRAcetam 500 mg oral tablet: 1 tab(s) orally 2 times a day (22 Dec 2021 16:46)  timolol-dorzolamide 0.5%-2% preservative-free ophthalmic solution: 1 drop(s) to each affected eye 2 times a day (22 Dec 2021 16:46)  Zetia 10 mg oral tablet: 1 tab(s) orally once a day (22 Dec 2021 16:46)      MEDICATIONS  (STANDING):  carvedilol 3.125 milliGRAM(s) Oral every 12 hours  chlorhexidine 0.12% Liquid 15 milliLiter(s) Oral Mucosa every 12 hours  chlorhexidine 2% Cloths 1 Application(s) Topical <User Schedule>  dextrose 40% Gel 15 Gram(s) Oral once  dextrose 5% + sodium chloride 0.45% 1000 milliLiter(s) (80 mL/Hr) IV Continuous <Continuous>  dextrose 5%. 1000 milliLiter(s) (50 mL/Hr) IV Continuous <Continuous>  dextrose 5%. 1000 milliLiter(s) (100 mL/Hr) IV Continuous <Continuous>  dextrose 50% Injectable 25 Gram(s) IV Push once  dextrose 50% Injectable 12.5 Gram(s) IV Push once  dextrose 50% Injectable 25 Gram(s) IV Push once  dorzolamide 2%/timolol 0.5% Ophthalmic Solution 1 Drop(s) Both EYES two times a day  enoxaparin Injectable 40 milliGRAM(s) SubCutaneous daily  escitalopram 20 milliGRAM(s) Oral daily  glucagon  Injectable 1 milliGRAM(s) IntraMuscular once  levETIRAcetam  IVPB 500 milliGRAM(s) IV Intermittent every 12 hours  levothyroxine 50 MICROGram(s) Oral daily  pantoprazole   Suspension 40 milliGRAM(s) Enteral Tube daily  potassium chloride    Tablet ER 20 milliEquivalent(s) Oral every 2 hours    MEDICATIONS  (PRN):  acetaminophen     Tablet .. 650 milliGRAM(s) Oral every 6 hours PRN Temp greater or equal to 38C (100.4F)  sodium chloride 0.9% lock flush 10 milliLiter(s) IV Push every 1 hour PRN Pre/post blood products, medications, blood draw, and to maintain line patency      Diet, NPO (01-18-22 @ 16:10) [Active]          Vital Signs Last 24 Hrs  T(C): 36.7 (20 Jan 2022 05:04), Max: 37.2 (20 Jan 2022 00:58)  T(F): 98 (20 Jan 2022 05:04), Max: 98.9 (20 Jan 2022 00:58)  HR: 88 (20 Jan 2022 05:55) (68 - 89)  BP: 173/78 (20 Jan 2022 05:04) (135/68 - 173/78)  BP(mean): --  RR: 18 (20 Jan 2022 05:04) (18 - 18)  SpO2: 100% (20 Jan 2022 05:55) (96% - 100%)      01-19-22 @ 07:01  -  01-20-22 @ 07:00  --------------------------------------------------------  IN: 0 mL / OUT: 14 mL / NET: -14 mL        Mode: AC/ CMV (Assist Control/ Continuous Mandatory Ventilation), RR (machine): 15, TV (machine): 450, FiO2: 40, PEEP: 5, ITime: 1, MAP: 9, PIP: 17      LABS:                        10.5   11.64 )-----------( 554      ( 20 Jan 2022 08:04 )             35.2     01-19    146<H>  |  110<H>  |  16  ----------------------------<  85  3.3<L>   |  26  |  0.52    Ca    8.5      19 Jan 2022 16:26  Phos  3.1     01-19  Mg     2.1     01-19                WBC:  WBC Count: 11.64 K/uL (01-20 @ 08:04)  WBC Count: 11.47 K/uL (01-19 @ 16:26)  WBC Count: 11.00 K/uL (01-18 @ 07:57)  WBC Count: 11.05 K/uL (01-17 @ 09:16)  WBC Count: 9.84 K/uL (01-16 @ 10:47)      MICROBIOLOGY:  RECENT CULTURES:                  Sodium:  Sodium, Serum: 146 mmol/L (01-19 @ 16:26)  Sodium, Serum: 144 mmol/L (01-18 @ 07:57)  Sodium, Serum: 143 mmol/L (01-17 @ 09:16)  Sodium, Serum: 144 mmol/L (01-16 @ 10:47)      0.52 mg/dL 01-19 @ 16:26  0.41 mg/dL 01-18 @ 07:57  0.55 mg/dL 01-17 @ 09:16  0.45 mg/dL 01-16 @ 10:47      Hemoglobin:  Hemoglobin: 10.5 g/dL (01-20 @ 08:04)  Hemoglobin: 10.5 g/dL (01-19 @ 16:26)  Hemoglobin: 9.6 g/dL (01-18 @ 07:57)  Hemoglobin: 9.6 g/dL (01-17 @ 09:16)  Hemoglobin: 10.1 g/dL (01-16 @ 10:47)      Platelets: Platelet Count - Automated: 554 K/uL (01-20 @ 08:04)  Platelet Count - Automated: 559 K/uL (01-19 @ 16:26)  Platelet Count - Automated: 469 K/uL (01-18 @ 07:57)  Platelet Count - Automated: 451 K/uL (01-17 @ 09:16)  Platelet Count - Automated: 478 K/uL (01-16 @ 10:47)              RADIOLOGY & ADDITIONAL STUDIES:      MICROBIOLOGY:  RECENT CULTURES:

## 2022-01-21 LAB
ALBUMIN SERPL ELPH-MCNC: 2.1 G/DL — LOW (ref 3.3–5)
ALP SERPL-CCNC: 69 U/L — SIGNIFICANT CHANGE UP (ref 40–120)
ALT FLD-CCNC: 15 U/L — SIGNIFICANT CHANGE UP (ref 12–78)
ANION GAP SERPL CALC-SCNC: 5 MMOL/L — SIGNIFICANT CHANGE UP (ref 5–17)
AST SERPL-CCNC: 16 U/L — SIGNIFICANT CHANGE UP (ref 15–37)
BILIRUB SERPL-MCNC: 0.4 MG/DL — SIGNIFICANT CHANGE UP (ref 0.2–1.2)
BUN SERPL-MCNC: 15 MG/DL — SIGNIFICANT CHANGE UP (ref 7–23)
CALCIUM SERPL-MCNC: 8.9 MG/DL — SIGNIFICANT CHANGE UP (ref 8.5–10.1)
CHLORIDE SERPL-SCNC: 113 MMOL/L — HIGH (ref 96–108)
CO2 SERPL-SCNC: 28 MMOL/L — SIGNIFICANT CHANGE UP (ref 22–31)
CREAT SERPL-MCNC: 0.46 MG/DL — LOW (ref 0.5–1.3)
GLUCOSE BLDC GLUCOMTR-MCNC: 102 MG/DL — HIGH (ref 70–99)
GLUCOSE BLDC GLUCOMTR-MCNC: 107 MG/DL — HIGH (ref 70–99)
GLUCOSE BLDC GLUCOMTR-MCNC: 112 MG/DL — HIGH (ref 70–99)
GLUCOSE BLDC GLUCOMTR-MCNC: 117 MG/DL — HIGH (ref 70–99)
GLUCOSE SERPL-MCNC: 113 MG/DL — HIGH (ref 70–99)
HCT VFR BLD CALC: 34.4 % — LOW (ref 34.5–45)
HGB BLD-MCNC: 10.3 G/DL — LOW (ref 11.5–15.5)
MCHC RBC-ENTMCNC: 28.1 PG — SIGNIFICANT CHANGE UP (ref 27–34)
MCHC RBC-ENTMCNC: 29.9 GM/DL — LOW (ref 32–36)
MCV RBC AUTO: 93.7 FL — SIGNIFICANT CHANGE UP (ref 80–100)
NRBC # BLD: 0 /100 WBCS — SIGNIFICANT CHANGE UP (ref 0–0)
PLATELET # BLD AUTO: 476 K/UL — HIGH (ref 150–400)
POTASSIUM SERPL-MCNC: 3.1 MMOL/L — LOW (ref 3.5–5.3)
POTASSIUM SERPL-SCNC: 3.1 MMOL/L — LOW (ref 3.5–5.3)
PROT SERPL-MCNC: 6.2 GM/DL — SIGNIFICANT CHANGE UP (ref 6–8.3)
RBC # BLD: 3.67 M/UL — LOW (ref 3.8–5.2)
RBC # FLD: 14.7 % — HIGH (ref 10.3–14.5)
SODIUM SERPL-SCNC: 146 MMOL/L — HIGH (ref 135–145)
WBC # BLD: 10.7 K/UL — HIGH (ref 3.8–10.5)
WBC # FLD AUTO: 10.7 K/UL — HIGH (ref 3.8–10.5)

## 2022-01-21 PROCEDURE — 99232 SBSQ HOSP IP/OBS MODERATE 35: CPT

## 2022-01-21 PROCEDURE — 93010 ELECTROCARDIOGRAM REPORT: CPT

## 2022-01-21 PROCEDURE — 99231 SBSQ HOSP IP/OBS SF/LOW 25: CPT

## 2022-01-21 PROCEDURE — 74177 CT ABD & PELVIS W/CONTRAST: CPT | Mod: 26

## 2022-01-21 RX ORDER — DIATRIZOATE MEGLUMINE 180 MG/ML
30 INJECTION, SOLUTION INTRAVESICAL ONCE
Refills: 0 | Status: COMPLETED | OUTPATIENT
Start: 2022-01-21 | End: 2022-01-21

## 2022-01-21 RX ADMIN — LEVETIRACETAM 420 MILLIGRAM(S): 250 TABLET, FILM COATED ORAL at 05:05

## 2022-01-21 RX ADMIN — CHLORHEXIDINE GLUCONATE 15 MILLILITER(S): 213 SOLUTION TOPICAL at 17:17

## 2022-01-21 RX ADMIN — DORZOLAMIDE HYDROCHLORIDE TIMOLOL MALEATE 1 DROP(S): 20; 5 SOLUTION/ DROPS OPHTHALMIC at 05:05

## 2022-01-21 RX ADMIN — DIATRIZOATE MEGLUMINE 30 MILLILITER(S): 180 INJECTION, SOLUTION INTRAVESICAL at 09:15

## 2022-01-21 RX ADMIN — ESCITALOPRAM OXALATE 20 MILLIGRAM(S): 10 TABLET, FILM COATED ORAL at 13:06

## 2022-01-21 RX ADMIN — PANTOPRAZOLE SODIUM 40 MILLIGRAM(S): 20 TABLET, DELAYED RELEASE ORAL at 13:06

## 2022-01-21 RX ADMIN — LEVETIRACETAM 420 MILLIGRAM(S): 250 TABLET, FILM COATED ORAL at 17:17

## 2022-01-21 RX ADMIN — Medication 50 MICROGRAM(S): at 05:13

## 2022-01-21 RX ADMIN — CARVEDILOL PHOSPHATE 3.12 MILLIGRAM(S): 80 CAPSULE, EXTENDED RELEASE ORAL at 17:17

## 2022-01-21 RX ADMIN — DORZOLAMIDE HYDROCHLORIDE TIMOLOL MALEATE 1 DROP(S): 20; 5 SOLUTION/ DROPS OPHTHALMIC at 17:18

## 2022-01-21 RX ADMIN — CHLORHEXIDINE GLUCONATE 15 MILLILITER(S): 213 SOLUTION TOPICAL at 05:04

## 2022-01-21 RX ADMIN — CARVEDILOL PHOSPHATE 3.12 MILLIGRAM(S): 80 CAPSULE, EXTENDED RELEASE ORAL at 05:13

## 2022-01-21 RX ADMIN — CHLORHEXIDINE GLUCONATE 1 APPLICATION(S): 213 SOLUTION TOPICAL at 05:04

## 2022-01-21 RX ADMIN — ENOXAPARIN SODIUM 40 MILLIGRAM(S): 100 INJECTION SUBCUTANEOUS at 13:06

## 2022-01-21 NOTE — PROGRESS NOTE ADULT - ASSESSMENT
TRESA PIZARRO 81 f 12/22/2021 1942 DR ANTONIO PATTON     REVIEW OF SYMPTOMS      Able to give (reliable) ROS  NO     PHYSICAL EXAM    HEENT Unremarkable  atraumatic   RESP Fair air entry EXP prolonged    Harsh breath sound Resp distres mild   CARDIAC S1 S2 No S3     NO JVD    ABDOMEN SOFT BS PRESENT NOT DISTENDED No hepatosplenomegaly   PEDAL EDEMA present No calf tenderness  NO rash     ______  DOA/CC.  12/22/2021 81F w/ HTN, HLD, hypothyroidism, depression, seizures. Presented w/ abdominal pain found to have acute sigmoid diverticulitis  ____  PMH.  pmh Hytn  pmh Sz.      _________  PROBLEMS.     VDRF. Since surgery 12/26  TRACH Done 1/7  ABDOMINAL INFECTION.   Feculent peritonitis perf div poa   COLOSTOMY Created 12/26  1/3 ABD ASPIRATE E coli bacteroides Abio dced 1/15  GT 1/18      PAIN 1/10 fent 50   SZ 1/10 keppra 500.2   Woundvac Rx started 1/14  Hypernatremia 1/14/2022 Na 148  Hypothyroid 1/14/2022 tsh 12   Fever spike 101 1/20/2022     _____                            COVID STATUS. 12/24 pcr (-)  ICU STAY. 12/22-1/12/2022   GOC.  1/13/2022 full code       BEST PRACTICE ISSUES.                                                  HEAD OF BED ELEVATION. Yes  DVT PROPHYLAXIS.    1/7 lvnx 40   LIGHT PROPHYLAXIS.    1/11 protonix 40                                                                                     DIET.    1/20/2022 jevity 1.5 1200  1/18/2022 npo  1/7 vital af 1200 ngt          INFECTION PROPHYLAXIS.   1/7 Chlorhexidine .12%   1/7 chlorhexidine 2%    SPEECH SWALLOW RECOMMENDATIONS.         PATIENT DATA   VITALS/PO/IO/VENT/DRIPS.   1/21/2022 afeb 80 170/70   1/21/2022 cpap 5/5/.3510 22      ASSESSMENT/RECOMMENDATIONS.    HEMODYNAMICS.   Monitor bp Target MAP 65 (+)    RESP.   Monitor po Target po 90-95%  1/14/2022 15/450/.4/5 747/38/110     OXYGEN REQUIREMENTS.   1/17/2022 40%  1/13/2022 40%    VENT MANAGEMENT.   HOB elevation  Target Pplat 35 (-)  Target PO 90-95%  Target pH 730 (+)    INFECTION.  Abdominal infection sec perforated sigmoid div feculent peritonitis poa 12/22.  w 1/13-1/14-1/21/2022 w 9.7 - 9.1 -10  1/16/2022 Off abio    SP ABD SURGERY.  81F admitted with Acute Sigmoid Diverticulitis s/p ex lap, sigmoid resection, peritoneal lavage 12/25 and RTOR 12/26 for irrigation of abdominal cavity with closure and creation of colostomy. S/p bedside IR aspiration of fluid collection 1/3. Tracheostomy 1/7.   Deep pelvic abscess noted on CT,  Local drain/ostomy care per nursing    TIME SPENT   Over 25 minutes aggregate care time spent on encounter; activities included   direct patient care, counseling and/or coordinating care reviewing notes, lab data/ imaging , discussion with multidisciplinary team/ patient  /family and explaining in detail risks, benefits, alternatives  of the recommendations     TRESA PIZARRO 81 f 12/22/2021 1942 DR ANTONIO PATTON

## 2022-01-21 NOTE — PROGRESS NOTE ADULT - ASSESSMENT
A/P: 81F admitted with Acute Sigmoid Diverticulitis s/p ex lap, sigmoid resection, peritoneal lavage 12/25 and RTOR 12/26 for irrigation of abdominal cavity with closure and creation of colostomy. S/p bedside IR aspiration of fluid collection 1/3. Tracheostomy 1/7, PEG 1/18.   febrile  101 at 5pm yesterday     wound vac changes per PT   ostomy care   drain care, monitor OP   vent support   increase activity with PT   continue medical management and supportive care  -- will discuss with  Dr. Lu     A/P: 81F admitted with Acute Sigmoid Diverticulitis s/p ex lap, sigmoid resection, peritoneal lavage 12/25 and RTOR 12/26 for irrigation of abdominal cavity with closure and creation of colostomy. S/p bedside IR aspiration of fluid collection 1/3. Tracheostomy 1/7, PEG 1/18.   febrile  101 at 5pm yesterday    HOLD PEG feedings, CT Abd and pelvis   wound vac changes per PT   ostomy care   drain care, monitor OP   vent support   increase activity with PT   continue medical management and supportive care

## 2022-01-21 NOTE — CHART NOTE - NSCHARTNOTEFT_GEN_A_CORE
Assessment: Pt admitted with sigmoid diverticulitis, s/p exploratory lap, sigmoid resection, peritoneal lavage, irrigation of abdominal cavity with closure and creation of colostomy, s/p IR aspiration of fluid collection 1/3; c lefty drain. Trach placed 1/7; now trach to CPAP. Pt s/p PEG placement 1/18; tolerating feeds, however discontinued this AM (1/21) due to CT scan of abdomen; plan is to resume tube feedings.  PMHx includes hypothyroidism, depression, HTN, HLD, seizures, TIA, blood clot of neck vein.    Factors impacting intake: [ ] none [ ] nausea  [ ] vomiting [ ] diarrhea [ ] constipation [x]chewing problems [x] swallowing issues  [x] other: acute illness; inability to self-feed    Diet Prescription: NPO w/ TF; Jevity 1.5 via PEG to goal rate 50 ml/hr (discontinued this AM 1/21 due to CT scan; plan is to resume)    Intake: Feeding currently discontinued; to resume shortly; Jevity 1.5 via PEG @ goal rate provides 1200 ml, 1800 kcal, 77 gm protein, 912 ml water    Current Weight: 84.3 kg (1/19), 81 kg (12/23)  % Weight Change: 4.1% wt gain x 27 days    Fluid accumulation: 1+ edema R arm, wrist & hand    Pertinent Medications: MEDICATIONS  (STANDING):  carvedilol 3.125 milliGRAM(s) Oral every 12 hours  chlorhexidine 0.12% Liquid 15 milliLiter(s) Oral Mucosa every 12 hours  chlorhexidine 2% Cloths 1 Application(s) Topical <User Schedule>  dextrose 40% Gel 15 Gram(s) Oral once  dextrose 5%. 1000 milliLiter(s) (50 mL/Hr) IV Continuous <Continuous>  dextrose 5%. 1000 milliLiter(s) (100 mL/Hr) IV Continuous <Continuous>  dextrose 50% Injectable 25 Gram(s) IV Push once  dextrose 50% Injectable 12.5 Gram(s) IV Push once  dextrose 50% Injectable 25 Gram(s) IV Push once  dorzolamide 2%/timolol 0.5% Ophthalmic Solution 1 Drop(s) Both EYES two times a day  enoxaparin Injectable 40 milliGRAM(s) SubCutaneous daily  escitalopram 20 milliGRAM(s) Oral daily  glucagon  Injectable 1 milliGRAM(s) IntraMuscular once  levETIRAcetam  IVPB 500 milliGRAM(s) IV Intermittent every 12 hours  levothyroxine 50 MICROGram(s) Oral daily  pantoprazole   Suspension 40 milliGRAM(s) Enteral Tube daily  potassium chloride    Tablet ER 20 milliEquivalent(s) Oral every 2 hours    MEDICATIONS  (PRN):  acetaminophen     Tablet .. 650 milliGRAM(s) Oral every 6 hours PRN Temp greater or equal to 38C (100.4F)  sodium chloride 0.9% lock flush 10 milliLiter(s) IV Push every 1 hour PRN Pre/post blood products, medications, blood draw, and to maintain line patency    Pertinent Labs: 01-21 Na146 mmol/L<H> Glu 113 mg/dL<H> K+ 3.1 mmol/L<L> Cr  0.46 mg/dL<L> BUN 15 mg/dL 01-19 Phos 3.1 mg/dL 01-21 Alb 2.1 g/dL<L> 12-31 Trig 343 mg/dL<H>  12-24 HgbA1c 5.6%    POCT Blood Glucose.: 112 mg/dL (21 Jan 2022 07:03)  POCT Blood Glucose.: 117 mg/dL (21 Jan 2022 00:49)  POCT Blood Glucose.: 125 mg/dL (20 Jan 2022 16:54)  POCT Blood Glucose.: 115 mg/dL (20 Jan 2022 11:18)    Skin: No pressure ulcers; wound to abdomen; surgical incision - complications    Estimated Needs:   [x] no change since previous assessment on 12/27/21  [ ] recalculated:    Previous Nutrition Diagnosis:  NONE    Nutrition Diagnosis is [x] ongoing  [ ] resolved [ ] not applicable     New Nutrition Diagnosis: [x] not applicable       Interventions:   Recommend  [ ] Change Diet To:  [ ] Nutrition Supplement  [x] Nutrition Support: Recommend change to Jevity 1.2 to goal rate 60 ml/hr which provides 1440 ml, 1728 kcal, 80 gm protein, 1166 ml water  [x] Other: 150 ml water flush q 6 hrs    Monitoring and Evaluation:   [ ] PO intake [ x ] Tolerance to diet prescription [ x ] weights [ x ] labs[ x ] follow up per protocol  [ ] other: Assessment: Pt admitted with sigmoid diverticulitis, s/p exploratory lap, sigmoid resection, peritoneal lavage, irrigation of abdominal cavity with closure and creation of colostomy, s/p IR aspiration of fluid collection 1/3; with lefty drain. Trach placed ; now trach to CPAP. Pt s/p PEG placement ; tolerating feeds, however discontinued this AM () due to CT scan of abdomen; plan is to resume tube feedings when medically feasible.  PMHx includes hypothyroidism, depression, HTN, HLD, seizures, TIA, blood clot of neck vein.    Factors impacting intake: [ ] none [ ] nausea  [ ] vomiting [ ] diarrhea [ ] constipation [x]chewing problems [x] swallowing issues  [x] other: acute illness; inability to self-feed    Diet Prescription: Currently no diet order as of this AM ()    Intake: Feeding currently discontinued; pt was receiving and tolerating Jevity 1.5 via PEG @ goal rate 50 ml/hr (provides 1200 ml, 1800 kcal, 77 gm protein, 912 ml water); provides >75% RDIs for energy/protein    Current Weight: 84.3 kg (), 81 kg ()  % Weight Change: 4.1% wt gain x 27 days    Fluid accumulation: 1+ edema R arm, wrist & hand    Pertinent Medications: MEDICATIONS  (STANDING):  carvedilol 3.125 milliGRAM(s) Oral every 12 hours  chlorhexidine 0.12% Liquid 15 milliLiter(s) Oral Mucosa every 12 hours  chlorhexidine 2% Cloths 1 Application(s) Topical <User Schedule>  dextrose 40% Gel 15 Gram(s) Oral once  dextrose 5%. 1000 milliLiter(s) (50 mL/Hr) IV Continuous <Continuous>  dextrose 5%. 1000 milliLiter(s) (100 mL/Hr) IV Continuous <Continuous>  dextrose 50% Injectable 25 Gram(s) IV Push once  dextrose 50% Injectable 12.5 Gram(s) IV Push once  dextrose 50% Injectable 25 Gram(s) IV Push once  dorzolamide 2%/timolol 0.5% Ophthalmic Solution 1 Drop(s) Both EYES two times a day  enoxaparin Injectable 40 milliGRAM(s) SubCutaneous daily  escitalopram 20 milliGRAM(s) Oral daily  glucagon  Injectable 1 milliGRAM(s) IntraMuscular once  levETIRAcetam  IVPB 500 milliGRAM(s) IV Intermittent every 12 hours  levothyroxine 50 MICROGram(s) Oral daily  pantoprazole   Suspension 40 milliGRAM(s) Enteral Tube daily  potassium chloride    Tablet ER 20 milliEquivalent(s) Oral every 2 hours    MEDICATIONS  (PRN):  acetaminophen     Tablet .. 650 milliGRAM(s) Oral every 6 hours PRN Temp greater or equal to 38C (100.4F)  sodium chloride 0.9% lock flush 10 milliLiter(s) IV Push every 1 hour PRN Pre/post blood products, medications, blood draw, and to maintain line patency    Pertinent Labs:  Na146 mmol/L<H> Glu 113 mg/dL<H> K+ 3.1 mmol/L<L> Cr  0.46 mg/dL<L> BUN 15 mg/dL  Phos 3.1 mg/dL  Alb 2.1 g/dL<L> 12-31 Trig 343 mg/dL<H>  12-24 HgbA1c 5.6%    POCT Blood Glucose.: 112 mg/dL (2022 07:03)  POCT Blood Glucose.: 117 mg/dL (2022 00:49)  POCT Blood Glucose.: 125 mg/dL (2022 16:54)  POCT Blood Glucose.: 115 mg/dL (2022 11:18)    Skin: No pressure ulcers; wound to abdomen (surgical incision - complications)    Estimated Needs:   [x] no change since previous assessment on 21 for estimated energy & protein needs (25-30 kcal/kg; 4820-8148 kcal daily & 1.2-1.4 g/kg; 70.86-82.46 gm protein daily)  [x] recalculated: Estimated fluid needs; Using IBW 58.9 kg  25-30 ml/k2376-6054 ml fluid daily; high end, as Na elevated    Previous Nutrition Diagnosis:  NONE    Nutrition Diagnosis is [x] ongoing  [ ] resolved [ ] not applicable     New Nutrition Diagnosis: [x]       Interventions:   Recommend  [ ] Change Diet To:  [ ] Nutrition Supplement  [x] Nutrition Support: Recommend change to Jevity 1.2 to goal rate 60 ml/hr which provides 1440 ml, 1728 kcal, 80 gm protein, 1166 ml water; RD recommendations have been entered per ROSALINE Barnes, who will activate order once tube feedings medically feasible ot resume  [x] Other: 150 ml water flush q 6 hrs    Monitoring and Evaluation:   [ ] PO intake [ x ] Tolerance to diet prescription [ x ] weights [ x ] labs[ x ] follow up per protocol  [ ] other: Assessment: Pt admitted with sigmoid diverticulitis, s/p exploratory lap, sigmoid resection, peritoneal lavage, irrigation of abdominal cavity with closure and creation of colostomy, s/p IR aspiration of fluid collection 1/3; with lefty drain. Trach placed ; now trach to CPAP. Pt s/p PEG placement ; tolerating feeds, however discontinued this AM () due to feedings on hold for CT scan of abdomen and pelvis; plan is to resume tube feedings when medically feasible.  PMHx includes hypothyroidism, depression, HTN, HLD, seizures, TIA, blood clot of neck vein.    Factors impacting intake: [ ] none [ ] nausea  [ ] vomiting [ ] diarrhea [ ] constipation [x]chewing problems [x] swallowing issues  [x] other: acute illness; inability to self-feed    Diet Prescription: Currently no diet order as of this AM ()    Intake: Feeding currently discontinued; pt was receiving and tolerating Jevity 1.5 via PEG @ goal rate 50 ml/hr (provides 1200 ml, 1800 kcal, 77 gm protein, 912 ml water); provides >75% RDIs for energy/protein    Current Weight: 84.3 kg (), 81 kg ()  % Weight Change: 4.1% wt gain x 27 days    Fluid accumulation: 1+ edema R arm, wrist & hand    Physical appearance: Pt sleeping during visit; observed pt with mild temporal and orbital depletion    Pertinent Medications: MEDICATIONS  (STANDING):  carvedilol 3.125 milliGRAM(s) Oral every 12 hours  chlorhexidine 0.12% Liquid 15 milliLiter(s) Oral Mucosa every 12 hours  chlorhexidine 2% Cloths 1 Application(s) Topical <User Schedule>  dextrose 40% Gel 15 Gram(s) Oral once  dextrose 5%. 1000 milliLiter(s) (50 mL/Hr) IV Continuous <Continuous>  dextrose 5%. 1000 milliLiter(s) (100 mL/Hr) IV Continuous <Continuous>  dextrose 50% Injectable 25 Gram(s) IV Push once  dextrose 50% Injectable 12.5 Gram(s) IV Push once  dextrose 50% Injectable 25 Gram(s) IV Push once  dorzolamide 2%/timolol 0.5% Ophthalmic Solution 1 Drop(s) Both EYES two times a day  enoxaparin Injectable 40 milliGRAM(s) SubCutaneous daily  escitalopram 20 milliGRAM(s) Oral daily  glucagon  Injectable 1 milliGRAM(s) IntraMuscular once  levETIRAcetam  IVPB 500 milliGRAM(s) IV Intermittent every 12 hours  levothyroxine 50 MICROGram(s) Oral daily  pantoprazole   Suspension 40 milliGRAM(s) Enteral Tube daily  potassium chloride    Tablet ER 20 milliEquivalent(s) Oral every 2 hours    MEDICATIONS  (PRN):  acetaminophen     Tablet .. 650 milliGRAM(s) Oral every 6 hours PRN Temp greater or equal to 38C (100.4F)  sodium chloride 0.9% lock flush 10 milliLiter(s) IV Push every 1 hour PRN Pre/post blood products, medications, blood draw, and to maintain line patency    Pertinent Labs:  Na146 mmol/L<H> Glu 113 mg/dL<H> K+ 3.1 mmol/L<L> Cr  0.46 mg/dL<L> BUN 15 mg/dL  Phos 3.1 mg/dL  Alb 2.1 g/dL<L>  Trig 343 mg/dL<H>  1224 HgbA1c 5.6%    POCT Blood Glucose.: 112 mg/dL (2022 07:03)  POCT Blood Glucose.: 117 mg/dL (2022 00:49)  POCT Blood Glucose.: 125 mg/dL (2022 16:54)  POCT Blood Glucose.: 115 mg/dL (2022 11:18)    Skin: No pressure ulcers; wound to abdomen (surgical incision - complications)    Estimated Needs:   [x] no change since previous assessment on 21 for estimated energy & protein needs (25-30 kcal/kg; 9365-7558 kcal daily & 1.2-1.4 g/kg; 70.86-82.46 gm protein daily)  [x] recalculated: Estimated fluid needs; Using IBW 58.9 kg  25-30 ml/k6604-3585 ml fluid daily; high end, as Na elevated    Previous Nutrition Diagnosis:  NONE    Nutrition Diagnosis is [ ] ongoing  [ ] resolved [x] not applicable     New Nutrition Diagnosis: [x] Inadequate enteral nutrition infusion    Related to: Feedings currently being held    As evidenced by: Feeding not infusing, pt not meeting estimated energy/protein needs    Goal: Pt to meet > 75% energy/protein needs via PEG feedings once medically feasible      Interventions:   Recommend  [ ] Change Diet To:  [ ] Nutrition Supplement  [x] Nutrition Support: Recommend change to Jevity 1.2 to goal rate 60 ml/hr which provides 1440 ml, 1728 kcal, 80 gm protein, 1166 ml water; RD recommendations have been entered per discussion with ROSALINE Barnes, who will activate order once medically feasible for tube feedings to resume  [x] Other: 150 ml water flush q 6 hrs    Monitoring and Evaluation:   [ ] PO intake [ x ] Tolerance to diet prescription [ x ] weights [ x ] labs[ x ] follow up per protocol  [ ] other: Assessment: Pt admitted with sigmoid diverticulitis, s/p exploratory lap, sigmoid resection, peritoneal lavage, irrigation of abdominal cavity with closure and creation of colostomy, s/p IR aspiration of fluid collection 1/3; with lefty drain. Trach placed ; trach to vent. Pt s/p PEG placement ; tolerating feeds, however discontinued this AM () due to feedings on hold for CT scan of abdomen and pelvis; plan is to resume tube feedings when medically feasible.  PMHx includes hypothyroidism, depression, HTN, HLD, seizures, TIA, blood clot of neck vein.    Factors impacting intake: [ ] none [ ] nausea  [ ] vomiting [ ] diarrhea [ ] constipation [x]chewing problems [x] swallowing issues  [x] other: acute illness; inability to self-feed    Diet Prescription: Currently no diet order as of this AM ()    Intake: Feeding currently discontinued; pt was receiving and tolerating Jevity 1.5 via PEG @ goal rate 50 ml/hr (provides 1200 ml, 1800 kcal, 77 gm protein, 912 ml water); provides >75% RDIs for energy/protein    Current Weight: 84.3 kg (), 81 kg ()  % Weight Change: 4.1% wt gain x 27 days    Fluid accumulation: 1+ edema R arm, wrist & hand    Physical appearance: Pt sleeping during visit; observed pt with mild temporal and orbital depletion    Pertinent Medications: MEDICATIONS  (STANDING):  carvedilol 3.125 milliGRAM(s) Oral every 12 hours  chlorhexidine 0.12% Liquid 15 milliLiter(s) Oral Mucosa every 12 hours  chlorhexidine 2% Cloths 1 Application(s) Topical <User Schedule>  dextrose 40% Gel 15 Gram(s) Oral once  dextrose 5%. 1000 milliLiter(s) (50 mL/Hr) IV Continuous <Continuous>  dextrose 5%. 1000 milliLiter(s) (100 mL/Hr) IV Continuous <Continuous>  dextrose 50% Injectable 25 Gram(s) IV Push once  dextrose 50% Injectable 12.5 Gram(s) IV Push once  dextrose 50% Injectable 25 Gram(s) IV Push once  dorzolamide 2%/timolol 0.5% Ophthalmic Solution 1 Drop(s) Both EYES two times a day  enoxaparin Injectable 40 milliGRAM(s) SubCutaneous daily  escitalopram 20 milliGRAM(s) Oral daily  glucagon  Injectable 1 milliGRAM(s) IntraMuscular once  levETIRAcetam  IVPB 500 milliGRAM(s) IV Intermittent every 12 hours  levothyroxine 50 MICROGram(s) Oral daily  pantoprazole   Suspension 40 milliGRAM(s) Enteral Tube daily  potassium chloride    Tablet ER 20 milliEquivalent(s) Oral every 2 hours    MEDICATIONS  (PRN):  acetaminophen     Tablet .. 650 milliGRAM(s) Oral every 6 hours PRN Temp greater or equal to 38C (100.4F)  sodium chloride 0.9% lock flush 10 milliLiter(s) IV Push every 1 hour PRN Pre/post blood products, medications, blood draw, and to maintain line patency    Pertinent Labs:  Na146 mmol/L<H> Glu 113 mg/dL<H> K+ 3.1 mmol/L<L> Cr  0.46 mg/dL<L> BUN 15 mg/dL  Phos 3.1 mg/dL  Alb 2.1 g/dL<L>  Trig 343 mg/dL<H>  12-24 HgbA1c 5.6%    POCT Blood Glucose.: 112 mg/dL (2022 07:03)  POCT Blood Glucose.: 117 mg/dL (2022 00:49)  POCT Blood Glucose.: 125 mg/dL (2022 16:54)  POCT Blood Glucose.: 115 mg/dL (2022 11:18)    Skin: No pressure ulcers; wound to abdomen (surgical incision - complications)    Estimated Needs:   [x] no change since previous assessment on 21 for estimated energy & protein needs (25-30 kcal/kg; 9097-1287 kcal daily & 1.2-1.4 g/kg; 70.86-82.46 gm protein daily)  [x] recalculated: Estimated fluid needs; Using IBW 58.9 kg  25-30 ml/k9053-5632 ml fluid daily; high end, as Na elevated    Previous Nutrition Diagnosis:  NONE    Nutrition Diagnosis is [ ] ongoing  [ ] resolved [x] not applicable     New Nutrition Diagnosis: [x] Inadequate enteral nutrition infusion    Related to: Feedings currently being held    As evidenced by: Feeding not infusing, pt not meeting estimated energy/protein needs    Goal: Pt to meet > 75% energy/protein needs via PEG feedings once medically feasible      Interventions:   Recommend  [ ] Change Diet To:  [ ] Nutrition Supplement  [x] Nutrition Support: Recommend change to Jevity 1.2 to goal rate 60 ml/hr which provides 1440 ml, 1728 kcal, 80 gm protein, 1166 ml water; RD recommendations have been entered per discussion with ROSALINE Barnes, who will activate order once medically feasible for tube feedings to resume  [x] Other: 150 ml water flush q 6 hrs    Monitoring and Evaluation:   [ ] PO intake [ x ] Tolerance to diet prescription [ x ] weights [ x ] labs[ x ] follow up per protocol  [ ] other:

## 2022-01-21 NOTE — PROGRESS NOTE ADULT - ASSESSMENT
81F with a PMH of HTN, HLD, hypothyroidism, depression, seizures who presents to the ED for abd pain.  Patient states that over the last two days she had developed significant abdominal pain and multiple episodes of watery diarrhea.  Reports one episode of nausea and vomiting earlier today.  Patient has no other complaints.  Vitals stable, (hypoxix?) labs show leukocytosis and hypokalemia.  CT abdomen significant for diverticulitis.  initially admitted to med surg.  CT noted with sigmoid diverticulosis with adjacent stranding, compatible with diverticulitis. No associated abscess is identified. Trace adjacent pelvic free fluid.81F with a PMH of HTN, HLD, hypothyroidism, depression, seizures p/w abd pain,       Perforated Acute diverticulitis   s/p 12/24/21 - Exploratory laparotomy and a sigmoid colectomy and abdominal washout  Patient went for abdominal wound closure and creation of colostomy on 12/26.  Patient developed adb abscess 1/3 s/p IR drainage of abdominal abscesses.  Initially patient on PPN for nutrition Now tolerating tube feeds with brown soft stool in colostomy..  Found to have multiple abdominal fluid collections s/p IR drainage of R abdominal abscess 1/3/22 growing e-coli and bacteroides  Peg placement also requested by IR next week as per surgery.  Noted collection around BABAK  drain  ct was planned for followup although cancelled  all abx dced as per iD recs  plan:  BABAK drain care  PEG stable      Septic Shock s/p  pressors  Transferred to ICU for post op care on mechanical ventilation.  now off pressors failed extubation  now trach to cpap  Pulm and ID consutled  Cultures Sensitive to ceftriaxone, continue antibiotics to ceftriaxone and flagyl per ID. added trial PO vanc for diarrhea.  plan:  Appreciate ID followup. all abx dced.  Febriel again today, follow CLX, covid PCR      Hypoxemic respiratory failure now trach to CPAP  now trach to CPAP  Pulm following  on fentayl patch for pain control    acute metabolic encephalopathy sec to above  stable    Rapid AFIB due to Shock state  10mg IV lopressor in OR for Afib with RVR. 5L IVF given intra-op. EBL 100cc. Received intubated, sedated, on phenylephrine.    s/p amio load converted to sinus rhythm.  not on rate control or AC on downgrade  Cards consulted  plan:  add BB  hold full dose A/C for now      dvt ppx: lovenox  subq     81F with a PMH of HTN, HLD, hypothyroidism, depression, seizures who presents to the ED for abd pain.  Patient states that over the last two days she had developed significant abdominal pain and multiple episodes of watery diarrhea.  Reports one episode of nausea and vomiting earlier today.  Patient has no other complaints.  Vitals stable, (hypoxix?) labs show leukocytosis and hypokalemia.  CT abdomen significant for diverticulitis.  initially admitted to med surg.  CT noted with sigmoid diverticulosis with adjacent stranding, compatible with diverticulitis. No associated abscess is identified. Trace adjacent pelvic free fluid.81F with a PMH of HTN, HLD, hypothyroidism, depression, seizures p/w abd pain,       Perforated Acute diverticulitis   s/p 12/24/21 - Exploratory laparotomy and a sigmoid colectomy and abdominal washout  Patient went for abdominal wound closure and creation of colostomy on 12/26.  Patient developed adb abscess 1/3 s/p IR drainage of abdominal abscesses.  Initially patient on PPN for nutrition Now tolerating tube feeds with brown soft stool in colostomy..  Found to have multiple abdominal fluid collections s/p IR drainage of R abdominal abscess 1/3/22 growing e-coli and bacteroides  Peg placement also requested by IR next week as per surgery.  Noted collection around BABAK  drain  ct was planned for followup although cancelled  all abx dced as per iD recs, now febrile again  BABAK drain care  PEG stable      Septic Shock s/p  pressors  Transferred to ICU for post op care on mechanical ventilation.  now off pressors failed extubation  now trach to cpap  completed ceftriaxone and flagyl per ID. trial PO vanc for diarrhea. now off all abx  Febrile again, follow CLX, covid PCR neg  Pt made NPO per sx  CT A/p noted, ab collections somewhat improved, Multiple focal opacification in RL, will obtain CXR, might need dedicated CT chest  Today Tmax 100.1 this am, afebrile since then, if spikes again will start Abx  Will reconsult ID      Hypoxemic respiratory failure now trach to CPAP  now trach to CPAP  Pulm following  on fentayl patch for pain control    acute metabolic encephalopathy sec to above  stable    Rapid AFIB due to Shock state  10mg IV lopressor in OR for Afib with RVR. 5L IVF given intra-op. EBL 100cc. Received intubated, sedated, on phenylephrine.    s/p amio load converted to sinus rhythm.  not on rate control or AC on downgrade  Cards consulted  plan:  added BB  hold full dose A/C for now      dvt ppx: lovenox  subq

## 2022-01-21 NOTE — PROGRESS NOTE ADULT - SUBJECTIVE AND OBJECTIVE BOX
Patient is a 81y old  Female who presents with a chief complaint of diverticulitis (2022 06:48)    INTERVAL HPI/OVERNIGHT EVENTS:  Pt was seen and examined, no acute events.    MEDICATIONS  (STANDING):  carvedilol 3.125 milliGRAM(s) Oral every 12 hours  chlorhexidine 0.12% Liquid 15 milliLiter(s) Oral Mucosa every 12 hours  chlorhexidine 2% Cloths 1 Application(s) Topical <User Schedule>  dextrose 40% Gel 15 Gram(s) Oral once  dextrose 5%. 1000 milliLiter(s) (50 mL/Hr) IV Continuous <Continuous>  dextrose 5%. 1000 milliLiter(s) (100 mL/Hr) IV Continuous <Continuous>  dextrose 50% Injectable 25 Gram(s) IV Push once  dextrose 50% Injectable 12.5 Gram(s) IV Push once  dextrose 50% Injectable 25 Gram(s) IV Push once  dorzolamide 2%/timolol 0.5% Ophthalmic Solution 1 Drop(s) Both EYES two times a day  enoxaparin Injectable 40 milliGRAM(s) SubCutaneous daily  escitalopram 20 milliGRAM(s) Oral daily  glucagon  Injectable 1 milliGRAM(s) IntraMuscular once  levETIRAcetam  IVPB 500 milliGRAM(s) IV Intermittent every 12 hours  levothyroxine 50 MICROGram(s) Oral daily  pantoprazole   Suspension 40 milliGRAM(s) Enteral Tube daily  potassium chloride    Tablet ER 20 milliEquivalent(s) Oral every 2 hours    MEDICATIONS  (PRN):  acetaminophen     Tablet .. 650 milliGRAM(s) Oral every 6 hours PRN Temp greater or equal to 38C (100.4F)  sodium chloride 0.9% lock flush 10 milliLiter(s) IV Push every 1 hour PRN Pre/post blood products, medications, blood draw, and to maintain line patency      Allergies  No Known Allergies      Vital Signs Last 24 Hrs  T(C): 36.8 (2022 15:29), Max: 37.9 (2022 00:38)  T(F): 98.2 (2022 15:29), Max: 100.2 (2022 00:38)  HR: 80 (2022 15:29) (65 - 80)  BP: 178/70 (2022 15:29) (147/70 - 178/70)  BP(mean): --  RR: 17 (2022 15:29) (16 - 18)  SpO2: 100% (2022 15:29) (98% - 100%)      PHYSICAL EXAM:  GENERAL: NAD  HEAD:  Atraumatic  EYES: PERRLA  NERVOUS SYSTEM:  Awake, alert  CHEST/LUNG: Clear  HEART: RRR  ABDOMEN: Soft, Midline wound vac, PEG in place, Colostomy in place,. Rt BABAK drain with scant serous output, L BABAK drain with scant purulent output. non distended, +BS, soft, non tender, no guarding  EXTREMITIES: no edema      LABS:                        10.3   10.70 )-----------( 476      ( 2022 08:26 )             34.4         146<H>  |  113<H>  |  15  ----------------------------<  113<H>  3.1<L>   |  28  |  0.46<L>    Ca    8.9      2022 08:26    TPro  6.2  /  Alb  2.1<L>  /  TBili  0.4  /  DBili  x   /  AST  16  /  ALT  15  /  AlkPhos  69        Urinalysis Basic - ( 2022 18:58 )    Color: Yellow / Appearance: Clear / S.010 / pH: x  Gluc: x / Ketone: Small  / Bili: Negative / Urobili: Negative mg/dL   Blood: x / Protein: 15 mg/dL / Nitrite: Negative   Leuk Esterase: Negative / RBC: x / WBC 3-5   Sq Epi: x / Non Sq Epi: Moderate / Bacteria: Moderate      CAPILLARY BLOOD GLUCOSE      POCT Blood Glucose.: 102 mg/dL (2022 15:41)  POCT Blood Glucose.: 107 mg/dL (2022 11:15)  POCT Blood Glucose.: 112 mg/dL (2022 07:03)  POCT Blood Glucose.: 117 mg/dL (2022 00:49)      RADIOLOGY & ADDITIONAL TESTS:    Imaging Personally Reviewed:  [ ] YES  [ ] NO    Consultant(s) Notes Reviewed:  [ ] YES  [ ] NO    Care Discussed with Consultants/Other Providers [ ] YES  [ ] NO

## 2022-01-21 NOTE — BH CONSULTATION LIAISON PROGRESS NOTE - NSBHFUPINTERVALHXFT_PSY_A_CORE
Patient was febrile  101 at 5pm yesterday; PEG feeds held. same clinical presentation - Patient awake, wide eyed stare with reduced blinking. Makes eye contact and has minimal response to her name being called. Patient's moth opens/closes, no sounds comes out. Patient not able to manifest any signs of comprehending what is being spoken to her / unable to manifest any nonverbal modes of communication as well.

## 2022-01-21 NOTE — BH CONSULTATION LIAISON PROGRESS NOTE - CURRENT MEDICATION
MEDICATIONS  (STANDING):  carvedilol 3.125 milliGRAM(s) Oral every 12 hours  chlorhexidine 0.12% Liquid 15 milliLiter(s) Oral Mucosa every 12 hours  chlorhexidine 2% Cloths 1 Application(s) Topical <User Schedule>  dextrose 40% Gel 15 Gram(s) Oral once  dextrose 5%. 1000 milliLiter(s) (50 mL/Hr) IV Continuous <Continuous>  dextrose 5%. 1000 milliLiter(s) (100 mL/Hr) IV Continuous <Continuous>  dextrose 50% Injectable 25 Gram(s) IV Push once  dextrose 50% Injectable 12.5 Gram(s) IV Push once  dextrose 50% Injectable 25 Gram(s) IV Push once  dorzolamide 2%/timolol 0.5% Ophthalmic Solution 1 Drop(s) Both EYES two times a day  enoxaparin Injectable 40 milliGRAM(s) SubCutaneous daily  escitalopram 20 milliGRAM(s) Oral daily  glucagon  Injectable 1 milliGRAM(s) IntraMuscular once  levETIRAcetam  IVPB 500 milliGRAM(s) IV Intermittent every 12 hours  levothyroxine 50 MICROGram(s) Oral daily  pantoprazole   Suspension 40 milliGRAM(s) Enteral Tube daily  potassium chloride    Tablet ER 20 milliEquivalent(s) Oral every 2 hours    MEDICATIONS  (PRN):  acetaminophen     Tablet .. 650 milliGRAM(s) Oral every 6 hours PRN Temp greater or equal to 38C (100.4F)  sodium chloride 0.9% lock flush 10 milliLiter(s) IV Push every 1 hour PRN Pre/post blood products, medications, blood draw, and to maintain line patency

## 2022-01-21 NOTE — BH CONSULTATION LIAISON PROGRESS NOTE - NSBHCHARTREVIEWVS_PSY_A_CORE FT
Vital Signs Last 24 Hrs  T(C): 37.8 (21 Jan 2022 11:33), Max: 38.3 (20 Jan 2022 17:41)  T(F): 100.1 (21 Jan 2022 11:33), Max: 101 (20 Jan 2022 17:41)  HR: 65 (21 Jan 2022 11:33) (65 - 78)  BP: 147/70 (21 Jan 2022 11:33) (147/70 - 184/72)  BP(mean): --  RR: 16 (21 Jan 2022 11:33) (16 - 18)  SpO2: 99% (21 Jan 2022 11:33) (98% - 100%)

## 2022-01-21 NOTE — PROGRESS NOTE ADULT - SUBJECTIVE AND OBJECTIVE BOX
Surgery NP note  Patient seen and examined bedside resting comfortably.  Nonverbal. NO nausea and vomiting. Tolerating PEG feedings.  Febrile 101 at 5pm yesterday    T(F): 99.8 (01-21-22 @ 06:00), Max: 101 (01-20-22 @ 17:41)  HR: 70 (01-21-22 @ 06:00) (69 - 78)  BP: 154/72 (01-21-22 @ 06:00) (154/72 - 184/72)  RR: 18 (01-21-22 @ 06:00) (16 - 18)  SpO2: 99% (01-21-22 @ 06:00) (98% - 100%)  Wt(kg): --  CAPILLARY BLOOD GLUCOSE      POCT Blood Glucose.: 117 mg/dL (21 Jan 2022 00:49)  POCT Blood Glucose.: 125 mg/dL (20 Jan 2022 16:54)  POCT Blood Glucose.: 115 mg/dL (20 Jan 2022 11:18)  POCT Blood Glucose.: 112 mg/dL (20 Jan 2022 07:53)      PHYSICAL EXAM:  General: NAD, WDWN.   Neuro:  Alert & responsive  HEENT: NCAT, EOMI, conjunctiva clear  CV: +S1+S2 regular rate and rhythm  Lung: clear to ausculation bilaterally, respirations nonlabored, good inspiratory effort  Abdomen:  Midline wound vac intact, functioning. PEG clamped. Soft, NTND. Colostomy pink and viable with air and stool in bag. R BABAK drain with scant serous output, L BABAK drain with scant purulent output. non distended, +BS, soft, non tender, no guarding  Extremities: no pedal edema or calf tenderness noted     LABS:                        10.5   11.64 )-----------( 554      ( 20 Jan 2022 08:04 )             35.2     01-20    145  |  111<H>  |  15  ----------------------------<  120<H>  3.5   |  25  |  0.50    Ca    8.5      20 Jan 2022 08:04  Phos  3.1     01-19  Mg     2.1     01-19    TPro  6.4  /  Alb  2.1<L>  /  TBili  0.4  /  DBili  x   /  AST  21  /  ALT  17  /  AlkPhos  73  01-20    LIVER FUNCTIONS - ( 20 Jan 2022 08:04 )  Alb: 2.1 g/dL / Pro: 6.4 gm/dL / ALK PHOS: 73 U/L / ALT: 17 U/L / AST: 21 U/L / GGT: x             I&O's Detail    19 Jan 2022 07:01  -  20 Jan 2022 07:00  --------------------------------------------------------  IN:  Total IN: 0 mL    OUT:    Bulb (mL): 7 mL    Bulb (mL): 7 mL  Total OUT: 14 mL    Total NET: -14 mL

## 2022-01-21 NOTE — PROGRESS NOTE ADULT - SUBJECTIVE AND OBJECTIVE BOX
JUAREZ GTZ    LVS 2D 271 D    Allergies    No Known Allergies    Intolerances        PAST MEDICAL & SURGICAL HISTORY:  Seizure    HTN (hypertension)    Glaucoma    Thyroid disease    Blood clot of neck vein  left side    TIA (transient ischemic attack)  20 years ago    S/P appendectomy        FAMILY HISTORY:  FH: HTN (hypertension)        Home Medications:  amLODIPine 10 mg oral tablet: 1 tab(s) orally once a day (22 Dec 2021 16:46)  aspirin 81 mg oral tablet, chewable: 1 tab(s) orally once a day (22 Dec 2021 16:46)  escitalopram 20 mg oral tablet: 1 tab(s) orally once a day (22 Dec 2021 16:46)  keppera:  (22 Dec 2021 16:46)  levETIRAcetam 500 mg oral tablet: 1 tab(s) orally 2 times a day (22 Dec 2021 16:46)  timolol-dorzolamide 0.5%-2% preservative-free ophthalmic solution: 1 drop(s) to each affected eye 2 times a day (22 Dec 2021 16:46)  Zetia 10 mg oral tablet: 1 tab(s) orally once a day (22 Dec 2021 16:46)      MEDICATIONS  (STANDING):  carvedilol 3.125 milliGRAM(s) Oral every 12 hours  chlorhexidine 0.12% Liquid 15 milliLiter(s) Oral Mucosa every 12 hours  chlorhexidine 2% Cloths 1 Application(s) Topical <User Schedule>  dextrose 40% Gel 15 Gram(s) Oral once  dextrose 5%. 1000 milliLiter(s) (50 mL/Hr) IV Continuous <Continuous>  dextrose 5%. 1000 milliLiter(s) (100 mL/Hr) IV Continuous <Continuous>  dextrose 50% Injectable 25 Gram(s) IV Push once  dextrose 50% Injectable 12.5 Gram(s) IV Push once  dextrose 50% Injectable 25 Gram(s) IV Push once  dorzolamide 2%/timolol 0.5% Ophthalmic Solution 1 Drop(s) Both EYES two times a day  enoxaparin Injectable 40 milliGRAM(s) SubCutaneous daily  escitalopram 20 milliGRAM(s) Oral daily  glucagon  Injectable 1 milliGRAM(s) IntraMuscular once  levETIRAcetam  IVPB 500 milliGRAM(s) IV Intermittent every 12 hours  levothyroxine 50 MICROGram(s) Oral daily  pantoprazole   Suspension 40 milliGRAM(s) Enteral Tube daily  potassium chloride    Tablet ER 20 milliEquivalent(s) Oral every 2 hours    MEDICATIONS  (PRN):  acetaminophen     Tablet .. 650 milliGRAM(s) Oral every 6 hours PRN Temp greater or equal to 38C (100.4F)  sodium chloride 0.9% lock flush 10 milliLiter(s) IV Push every 1 hour PRN Pre/post blood products, medications, blood draw, and to maintain line patency      Diet, NPO with Tube Feed:   Tube Feeding Modality: Gastrostomy  Jevity 1.5 Santos  Total Volume for 24 Hours (mL): 1200  Continuous  Starting Tube Feed Rate mL per Hour: 10  Increase Tube Feed Rate by (mL): 10     Every 10 hours  Until Goal Tube Feed Rate (mL per Hour): 50  Tube Feed Duration (in Hours): 24  Tube Feed Start Time: 18:37 (22 @ 18:38) [Active]          Vital Signs Last 24 Hrs  T(C): 37.7 (2022 06:00), Max: 38.3 (2022 17:41)  T(F): 99.8 (2022 06:00), Max: 101 (2022 17:41)  HR: 70 (2022 06:00) (69 - 78)  BP: 154/72 (2022 06:00) (154/72 - 184/72)  BP(mean): --  RR: 18 (2022 06:00) (16 - 18)  SpO2: 99% (2022 06:00) (98% - 100%)      22 @ 07:01  -  22 @ 07:00  --------------------------------------------------------  IN: 0 mL / OUT: 14 mL / NET: -14 mL        Mode: AC/ CMV (Assist Control/ Continuous Mandatory Ventilation), RR (machine): 15, TV (machine): 450, FiO2: 30, PEEP: 5, PS: 5, ITime: 1, MAP: 3, PIP: 16      LABS:                        10.5   11.64 )-----------( 554      ( 2022 08:04 )             35.2         145  |  111<H>  |  15  ----------------------------<  120<H>  3.5   |  25  |  0.50    Ca    8.5      2022 08:04  Phos  3.1       Mg     2.1         TPro  6.4  /  Alb  2.1<L>  /  TBili  0.4  /  DBili  x   /  AST  21  /  ALT  17  /  AlkPhos  73        Urinalysis Basic - ( 2022 18:58 )    Color: Yellow / Appearance: Clear / S.010 / pH: x  Gluc: x / Ketone: Small  / Bili: Negative / Urobili: Negative mg/dL   Blood: x / Protein: 15 mg/dL / Nitrite: Negative   Leuk Esterase: Negative / RBC: x / WBC 3-5   Sq Epi: x / Non Sq Epi: Moderate / Bacteria: Moderate            WBC:  WBC Count: 11.64 K/uL ( @ 08:04)  WBC Count: 11.47 K/uL ( @ 16:26)  WBC Count: 11.00 K/uL ( @ 07:57)  WBC Count: 11.05 K/uL ( @ 09:16)      MICROBIOLOGY:  RECENT CULTURES:                  Sodium:  Sodium, Serum: 145 mmol/L ( @ 08:04)  Sodium, Serum: 146 mmol/L ( @ 16:26)  Sodium, Serum: 144 mmol/L ( @ 07:57)  Sodium, Serum: 143 mmol/L ( @ 09:16)      0.50 mg/dL  @ 08:04  0.52 mg/dL  16:26  0.41 mg/dL  07:57  0.55 mg/dL  @ 09:16      Hemoglobin:  Hemoglobin: 10.5 g/dL ( @ 08:04)  Hemoglobin: 10.5 g/dL ( @ 16:26)  Hemoglobin: 9.6 g/dL ( @ 07:57)  Hemoglobin: 9.6 g/dL ( @ 09:16)      Platelets: Platelet Count - Automated: 554 K/uL ( @ 08:04)  Platelet Count - Automated: 559 K/uL ( @ 16:26)  Platelet Count - Automated: 469 K/uL ( @ 07:57)  Platelet Count - Automated: 451 K/uL ( @ 09:16)      LIVER FUNCTIONS - ( 2022 08:04 )  Alb: 2.1 g/dL / Pro: 6.4 gm/dL / ALK PHOS: 73 U/L / ALT: 17 U/L / AST: 21 U/L / GGT: x             Urinalysis Basic - ( 2022 18:58 )    Color: Yellow / Appearance: Clear / S.010 / pH: x  Gluc: x / Ketone: Small  / Bili: Negative / Urobili: Negative mg/dL   Blood: x / Protein: 15 mg/dL / Nitrite: Negative   Leuk Esterase: Negative / RBC: x / WBC 3-5   Sq Epi: x / Non Sq Epi: Moderate / Bacteria: Moderate        RADIOLOGY & ADDITIONAL STUDIES:      MICROBIOLOGY:  RECENT CULTURES:

## 2022-01-21 NOTE — BH CONSULTATION LIAISON PROGRESS NOTE - NSBHCHARTREVIEWLAB_PSY_A_CORE FT
01-21    146<H>  |  113<H>  |  15  ----------------------------<  113<H>  3.1<L>   |  28  |  0.46<L>    Ca    8.9      21 Jan 2022 08:26  Phos  3.1     01-19  Mg     2.1     01-19    TPro  6.2  /  Alb  2.1<L>  /  TBili  0.4  /  DBili  x   /  AST  16  /  ALT  15  /  AlkPhos  69  01-21

## 2022-01-22 LAB
ALBUMIN SERPL ELPH-MCNC: 1.9 G/DL — LOW (ref 3.3–5)
ALP SERPL-CCNC: 63 U/L — SIGNIFICANT CHANGE UP (ref 40–120)
ALT FLD-CCNC: 15 U/L — SIGNIFICANT CHANGE UP (ref 12–78)
ANION GAP SERPL CALC-SCNC: 10 MMOL/L — SIGNIFICANT CHANGE UP (ref 5–17)
ANION GAP SERPL CALC-SCNC: 6 MMOL/L — SIGNIFICANT CHANGE UP (ref 5–17)
AST SERPL-CCNC: 18 U/L — SIGNIFICANT CHANGE UP (ref 15–37)
BASOPHILS # BLD AUTO: 0.06 K/UL — SIGNIFICANT CHANGE UP (ref 0–0.2)
BASOPHILS NFR BLD AUTO: 0.6 % — SIGNIFICANT CHANGE UP (ref 0–2)
BILIRUB SERPL-MCNC: 0.4 MG/DL — SIGNIFICANT CHANGE UP (ref 0.2–1.2)
BUN SERPL-MCNC: 16 MG/DL — SIGNIFICANT CHANGE UP (ref 7–23)
BUN SERPL-MCNC: 16 MG/DL — SIGNIFICANT CHANGE UP (ref 7–23)
CALCIUM SERPL-MCNC: 8.2 MG/DL — LOW (ref 8.5–10.1)
CALCIUM SERPL-MCNC: 8.8 MG/DL — SIGNIFICANT CHANGE UP (ref 8.5–10.1)
CHLORIDE SERPL-SCNC: 107 MMOL/L — SIGNIFICANT CHANGE UP (ref 96–108)
CHLORIDE SERPL-SCNC: 113 MMOL/L — HIGH (ref 96–108)
CO2 SERPL-SCNC: 26 MMOL/L — SIGNIFICANT CHANGE UP (ref 22–31)
CO2 SERPL-SCNC: 26 MMOL/L — SIGNIFICANT CHANGE UP (ref 22–31)
CREAT SERPL-MCNC: 0.46 MG/DL — LOW (ref 0.5–1.3)
CREAT SERPL-MCNC: 0.48 MG/DL — LOW (ref 0.5–1.3)
CULTURE RESULTS: SIGNIFICANT CHANGE UP
EOSINOPHIL # BLD AUTO: 0.31 K/UL — SIGNIFICANT CHANGE UP (ref 0–0.5)
EOSINOPHIL NFR BLD AUTO: 3.1 % — SIGNIFICANT CHANGE UP (ref 0–6)
GLUCOSE BLDC GLUCOMTR-MCNC: 100 MG/DL — HIGH (ref 70–99)
GLUCOSE BLDC GLUCOMTR-MCNC: 133 MG/DL — HIGH (ref 70–99)
GLUCOSE BLDC GLUCOMTR-MCNC: 93 MG/DL — SIGNIFICANT CHANGE UP (ref 70–99)
GLUCOSE BLDC GLUCOMTR-MCNC: 94 MG/DL — SIGNIFICANT CHANGE UP (ref 70–99)
GLUCOSE SERPL-MCNC: 136 MG/DL — HIGH (ref 70–99)
GLUCOSE SERPL-MCNC: 90 MG/DL — SIGNIFICANT CHANGE UP (ref 70–99)
HCT VFR BLD CALC: 34.1 % — LOW (ref 34.5–45)
HGB BLD-MCNC: 10.3 G/DL — LOW (ref 11.5–15.5)
IMM GRANULOCYTES NFR BLD AUTO: 0.5 % — SIGNIFICANT CHANGE UP (ref 0–1.5)
LYMPHOCYTES # BLD AUTO: 1.31 K/UL — SIGNIFICANT CHANGE UP (ref 1–3.3)
LYMPHOCYTES # BLD AUTO: 12.9 % — LOW (ref 13–44)
MAGNESIUM SERPL-MCNC: 2.1 MG/DL — SIGNIFICANT CHANGE UP (ref 1.6–2.6)
MCHC RBC-ENTMCNC: 28 PG — SIGNIFICANT CHANGE UP (ref 27–34)
MCHC RBC-ENTMCNC: 30.2 GM/DL — LOW (ref 32–36)
MCV RBC AUTO: 92.7 FL — SIGNIFICANT CHANGE UP (ref 80–100)
MONOCYTES # BLD AUTO: 0.62 K/UL — SIGNIFICANT CHANGE UP (ref 0–0.9)
MONOCYTES NFR BLD AUTO: 6.1 % — SIGNIFICANT CHANGE UP (ref 2–14)
NEUTROPHILS # BLD AUTO: 7.78 K/UL — HIGH (ref 1.8–7.4)
NEUTROPHILS NFR BLD AUTO: 76.8 % — SIGNIFICANT CHANGE UP (ref 43–77)
NRBC # BLD: 0 /100 WBCS — SIGNIFICANT CHANGE UP (ref 0–0)
PHOSPHATE SERPL-MCNC: 3.6 MG/DL — SIGNIFICANT CHANGE UP (ref 2.5–4.5)
PLATELET # BLD AUTO: 477 K/UL — HIGH (ref 150–400)
POTASSIUM SERPL-MCNC: 2.9 MMOL/L — CRITICAL LOW (ref 3.5–5.3)
POTASSIUM SERPL-MCNC: 4.3 MMOL/L — SIGNIFICANT CHANGE UP (ref 3.5–5.3)
POTASSIUM SERPL-SCNC: 2.9 MMOL/L — CRITICAL LOW (ref 3.5–5.3)
POTASSIUM SERPL-SCNC: 4.3 MMOL/L — SIGNIFICANT CHANGE UP (ref 3.5–5.3)
PROCALCITONIN SERPL-MCNC: 0.04 NG/ML — SIGNIFICANT CHANGE UP (ref 0.02–0.1)
PROT SERPL-MCNC: 5.8 GM/DL — LOW (ref 6–8.3)
RBC # BLD: 3.68 M/UL — LOW (ref 3.8–5.2)
RBC # FLD: 14.6 % — HIGH (ref 10.3–14.5)
SODIUM SERPL-SCNC: 143 MMOL/L — SIGNIFICANT CHANGE UP (ref 135–145)
SODIUM SERPL-SCNC: 145 MMOL/L — SIGNIFICANT CHANGE UP (ref 135–145)
SPECIMEN SOURCE: SIGNIFICANT CHANGE UP
WBC # BLD: 10.13 K/UL — SIGNIFICANT CHANGE UP (ref 3.8–10.5)
WBC # FLD AUTO: 10.13 K/UL — SIGNIFICANT CHANGE UP (ref 3.8–10.5)

## 2022-01-22 PROCEDURE — 99232 SBSQ HOSP IP/OBS MODERATE 35: CPT

## 2022-01-22 PROCEDURE — 71045 X-RAY EXAM CHEST 1 VIEW: CPT | Mod: 26

## 2022-01-22 RX ORDER — POTASSIUM CHLORIDE 20 MEQ
40 PACKET (EA) ORAL EVERY 4 HOURS
Refills: 0 | Status: COMPLETED | OUTPATIENT
Start: 2022-01-22 | End: 2022-01-22

## 2022-01-22 RX ORDER — POTASSIUM CHLORIDE 20 MEQ
10 PACKET (EA) ORAL
Refills: 0 | Status: COMPLETED | OUTPATIENT
Start: 2022-01-22 | End: 2022-01-22

## 2022-01-22 RX ADMIN — PANTOPRAZOLE SODIUM 40 MILLIGRAM(S): 20 TABLET, DELAYED RELEASE ORAL at 12:06

## 2022-01-22 RX ADMIN — LEVETIRACETAM 420 MILLIGRAM(S): 250 TABLET, FILM COATED ORAL at 17:35

## 2022-01-22 RX ADMIN — DORZOLAMIDE HYDROCHLORIDE TIMOLOL MALEATE 1 DROP(S): 20; 5 SOLUTION/ DROPS OPHTHALMIC at 06:24

## 2022-01-22 RX ADMIN — DORZOLAMIDE HYDROCHLORIDE TIMOLOL MALEATE 1 DROP(S): 20; 5 SOLUTION/ DROPS OPHTHALMIC at 17:35

## 2022-01-22 RX ADMIN — CHLORHEXIDINE GLUCONATE 15 MILLILITER(S): 213 SOLUTION TOPICAL at 21:41

## 2022-01-22 RX ADMIN — CARVEDILOL PHOSPHATE 3.12 MILLIGRAM(S): 80 CAPSULE, EXTENDED RELEASE ORAL at 06:23

## 2022-01-22 RX ADMIN — Medication 40 MILLIEQUIVALENT(S): at 10:06

## 2022-01-22 RX ADMIN — Medication 100 MILLIEQUIVALENT(S): at 12:06

## 2022-01-22 RX ADMIN — LEVETIRACETAM 420 MILLIGRAM(S): 250 TABLET, FILM COATED ORAL at 06:23

## 2022-01-22 RX ADMIN — CHLORHEXIDINE GLUCONATE 1 APPLICATION(S): 213 SOLUTION TOPICAL at 06:23

## 2022-01-22 RX ADMIN — ESCITALOPRAM OXALATE 20 MILLIGRAM(S): 10 TABLET, FILM COATED ORAL at 12:07

## 2022-01-22 RX ADMIN — ENOXAPARIN SODIUM 40 MILLIGRAM(S): 100 INJECTION SUBCUTANEOUS at 12:06

## 2022-01-22 RX ADMIN — CHLORHEXIDINE GLUCONATE 15 MILLILITER(S): 213 SOLUTION TOPICAL at 10:08

## 2022-01-22 RX ADMIN — Medication 40 MILLIEQUIVALENT(S): at 14:47

## 2022-01-22 RX ADMIN — Medication 50 MICROGRAM(S): at 06:23

## 2022-01-22 RX ADMIN — CARVEDILOL PHOSPHATE 3.12 MILLIGRAM(S): 80 CAPSULE, EXTENDED RELEASE ORAL at 17:36

## 2022-01-22 RX ADMIN — Medication 100 MILLIEQUIVALENT(S): at 12:58

## 2022-01-22 RX ADMIN — Medication 100 MILLIEQUIVALENT(S): at 10:06

## 2022-01-22 NOTE — PROGRESS NOTE ADULT - SUBJECTIVE AND OBJECTIVE BOX
Comfortable in bed  No reported nausea, vomiting, fever or chills    ICU Vital Signs Last 24 Hrs  T(C): 37.6 (22 Jan 2022 05:36), Max: 37.8 (21 Jan 2022 11:33)  T(F): 99.7 (22 Jan 2022 05:36), Max: 100.1 (21 Jan 2022 11:33)  HR: 73 (22 Jan 2022 08:36) (61 - 80)  BP: 154/71 (22 Jan 2022 05:36) (119/71 - 178/70)  BP(mean): --  ABP: --  ABP(mean): --  RR: 18 (22 Jan 2022 05:36) (16 - 18)  SpO2: 99% (22 Jan 2022 08:36) (96% - 100%)    On exam: arousable  Trach, on vent  Abd is soft, not distended, some discomfort at drain sites  Right sided drain is serous, left sided drain is purulent  G-tube in place  Wound vac in place                          10.3   10.13 )-----------( 477      ( 22 Jan 2022 07:30 )             34.1

## 2022-01-22 NOTE — PROGRESS NOTE ADULT - SUBJECTIVE AND OBJECTIVE BOX
JUAREZ GTZ    LVS 2D 271 D    Allergies    No Known Allergies    Intolerances        PAST MEDICAL & SURGICAL HISTORY:  Seizure    HTN (hypertension)    Glaucoma    Thyroid disease    Blood clot of neck vein  left side    TIA (transient ischemic attack)  20 years ago    S/P appendectomy        FAMILY HISTORY:  FH: HTN (hypertension)        Home Medications:  amLODIPine 10 mg oral tablet: 1 tab(s) orally once a day (22 Dec 2021 16:46)  aspirin 81 mg oral tablet, chewable: 1 tab(s) orally once a day (22 Dec 2021 16:46)  escitalopram 20 mg oral tablet: 1 tab(s) orally once a day (22 Dec 2021 16:46)  keppera:  (22 Dec 2021 16:46)  levETIRAcetam 500 mg oral tablet: 1 tab(s) orally 2 times a day (22 Dec 2021 16:46)  timolol-dorzolamide 0.5%-2% preservative-free ophthalmic solution: 1 drop(s) to each affected eye 2 times a day (22 Dec 2021 16:46)  Zetia 10 mg oral tablet: 1 tab(s) orally once a day (22 Dec 2021 16:46)      MEDICATIONS  (STANDING):  carvedilol 3.125 milliGRAM(s) Oral every 12 hours  chlorhexidine 0.12% Liquid 15 milliLiter(s) Oral Mucosa every 12 hours  chlorhexidine 2% Cloths 1 Application(s) Topical <User Schedule>  dextrose 40% Gel 15 Gram(s) Oral once  dextrose 5%. 1000 milliLiter(s) (50 mL/Hr) IV Continuous <Continuous>  dextrose 5%. 1000 milliLiter(s) (100 mL/Hr) IV Continuous <Continuous>  dextrose 50% Injectable 25 Gram(s) IV Push once  dextrose 50% Injectable 12.5 Gram(s) IV Push once  dextrose 50% Injectable 25 Gram(s) IV Push once  dorzolamide 2%/timolol 0.5% Ophthalmic Solution 1 Drop(s) Both EYES two times a day  enoxaparin Injectable 40 milliGRAM(s) SubCutaneous daily  escitalopram 20 milliGRAM(s) Oral daily  glucagon  Injectable 1 milliGRAM(s) IntraMuscular once  levETIRAcetam  IVPB 500 milliGRAM(s) IV Intermittent every 12 hours  levothyroxine 50 MICROGram(s) Oral daily  pantoprazole   Suspension 40 milliGRAM(s) Enteral Tube daily  potassium chloride    Tablet ER 20 milliEquivalent(s) Oral every 2 hours  potassium chloride   Powder 40 milliEquivalent(s) Oral every 4 hours    MEDICATIONS  (PRN):  acetaminophen     Tablet .. 650 milliGRAM(s) Oral every 6 hours PRN Temp greater or equal to 38C (100.4F)  sodium chloride 0.9% lock flush 10 milliLiter(s) IV Push every 1 hour PRN Pre/post blood products, medications, blood draw, and to maintain line patency      Diet, NPO with Tube Feed:   Tube Feeding Modality: Gastrostomy  Jevity 1.2 Santos  Total Volume for 24 Hours (mL): 1440  Continuous  Starting Tube Feed Rate mL per Hour: 20  Increase Tube Feed Rate by (mL): 10     Every 6 hours  Until Goal Tube Feed Rate (mL per Hour): 60  Tube Feed Duration (in Hours): 24  Tube Feed Start Time: 12:00  Free Water Flush  Bolus   Total Volume per Flush (mL): 150   Frequency: Every 6 Hours   Total Daily Volume of Flush (mL): 600    Start Time: 12:00 (22 @ 11:06) [Active]          Vital Signs Last 24 Hrs  T(C): 37.6 (2022 05:36), Max: 37.8 (2022 11:33)  T(F): 99.7 (2022 05:36), Max: 100.1 (2022 11:33)  HR: 73 (2022 08:36) (61 - 80)  BP: 154/71 (2022 05:36) (119/71 - 178/70)  BP(mean): --  RR: 18 (2022 05:36) (16 - 18)  SpO2: 99% (2022 08:36) (96% - 100%)      22 @ 07:01  -  22 @ 07:00  --------------------------------------------------------  IN: 0 mL / OUT: 300 mL / NET: -300 mL        Mode: PS (Pressure Support)/ Spontaneous, FiO2: 30, PEEP: 5, PS: 5, ITime: 0.9, MAP: 8, PIP: 11      LABS:                        10.3   10.13 )-----------( 477      ( 2022 07:30 )             34.1         143  |  107  |  16  ----------------------------<  90  2.9<LL>   |  26  |  0.48<L>    Ca    8.2<L>      2022 07:30    TPro  5.8<L>  /  Alb  1.9<L>  /  TBili  0.4  /  DBili  x   /  AST  18  /  ALT  15  /  AlkPhos  63        Urinalysis Basic - ( 2022 18:58 )    Color: Yellow / Appearance: Clear / S.010 / pH: x  Gluc: x / Ketone: Small  / Bili: Negative / Urobili: Negative mg/dL   Blood: x / Protein: 15 mg/dL / Nitrite: Negative   Leuk Esterase: Negative / RBC: x / WBC 3-5   Sq Epi: x / Non Sq Epi: Moderate / Bacteria: Moderate            WBC:  WBC Count: 10.13 K/uL ( @ 07:30)  WBC Count: 10.70 K/uL ( @ 08:26)  WBC Count: 11.64 K/uL ( @ 08:04)  WBC Count: 11.47 K/uL ( @ 16:26)      MICROBIOLOGY:  RECENT CULTURES:   .Blood Blood-Peripheral XXXX XXXX   No growth to date.                    Sodium:  Sodium, Serum: 143 mmol/L ( @ 07:30)  Sodium, Serum: 146 mmol/L ( @ 08:26)  Sodium, Serum: 145 mmol/L ( @ 08:04)  Sodium, Serum: 146 mmol/L ( @ 16:26)      0.48 mg/dL  @ :30  0.46 mg/dL  @ 08:26  0.50 mg/dL  @ 08:04  0.52 mg/dL  16:26      Hemoglobin:  Hemoglobin: 10.3 g/dL ( @ 07:30)  Hemoglobin: 10.3 g/dL ( @ 08:26)  Hemoglobin: 10.5 g/dL ( @ 08:04)  Hemoglobin: 10.5 g/dL ( @ 16:26)      Platelets: Platelet Count - Automated: 477 K/uL ( @ 07:30)  Platelet Count - Automated: 476 K/uL ( @ 08:26)  Platelet Count - Automated: 554 K/uL ( @ 08:04)  Platelet Count - Automated: 559 K/uL ( @ 16:26)      LIVER FUNCTIONS - ( 2022 07:30 )  Alb: 1.9 g/dL / Pro: 5.8 gm/dL / ALK PHOS: 63 U/L / ALT: 15 U/L / AST: 18 U/L / GGT: x             Urinalysis Basic - ( 2022 18:58 )    Color: Yellow / Appearance: Clear / S.010 / pH: x  Gluc: x / Ketone: Small  / Bili: Negative / Urobili: Negative mg/dL   Blood: x / Protein: 15 mg/dL / Nitrite: Negative   Leuk Esterase: Negative / RBC: x / WBC 3-5   Sq Epi: x / Non Sq Epi: Moderate / Bacteria: Moderate        RADIOLOGY & ADDITIONAL STUDIES:      MICROBIOLOGY:  RECENT CULTURES:   .Blood Blood-Peripheral XXXX XXXX   No growth to date.

## 2022-01-22 NOTE — PROGRESS NOTE ADULT - ASSESSMENT
81F with a PMH of HTN, HLD, hypothyroidism, depression, seizures who presents to the ED for abd pain.  Patient states that over the last two days she had developed significant abdominal pain and multiple episodes of watery diarrhea.  Reports one episode of nausea and vomiting earlier today.  Patient has no other complaints.  Vitals stable, (hypoxix?) labs show leukocytosis and hypokalemia.  CT abdomen significant for diverticulitis.  initially admitted to med surg.  CT noted with sigmoid diverticulosis with adjacent stranding, compatible with diverticulitis. No associated abscess is identified. Trace adjacent pelvic free fluid.81F with a PMH of HTN, HLD, hypothyroidism, depression, seizures p/w abd pain,       Perforated Acute diverticulitis   s/p 12/24/21 - Exploratory laparotomy and a sigmoid colectomy and abdominal washout  Patient went for abdominal wound closure and creation of colostomy on 12/26.  Patient developed adb abscess 1/3 s/p IR drainage of abdominal abscesses.  Initially patient on PPN for nutrition Now tolerating tube feeds with brown soft stool in colostomy..  Found to have multiple abdominal fluid collections s/p IR drainage of R abdominal abscess 1/3/22 growing e-coli and bacteroides  Peg placement also requested by IR next week as per surgery.  Noted collection around BABAK  drain  ct was planned for followup although cancelled  all abx dced as per iD recs, now febrile again  BABAK drain care  PEG stable, feeding started       Septic Shock s/p  pressors  Transferred to ICU for post op care on mechanical ventilation.  now off pressors failed extubation  now trach to cpap  completed ceftriaxone and flagyl per ID. trial PO vanc for diarrhea. now off all abx  Febrile again, follow CLX, covid PCR neg  CT A/p noted, ab collections somewhat improved, Multiple focal opacification in RL, Repeat CXR looks unchanged, follow up official read   Afebrile since yesterday if spikes again will start Abx and will reconsult ID      Hypoxemic respiratory failure now trach to CPAP  now trach to CPAP  Pulm following  on fentayl patch for pain control    acute metabolic encephalopathy sec to above  stable    Rapid AFIB due to Shock state  10mg IV lopressor in OR for Afib with RVR. 5L IVF given intra-op. EBL 100cc. Received intubated, sedated, on phenylephrine.    s/p amio load converted to sinus rhythm.  not on rate control or AC on downgrade  Cards consulted  plan:  added BB  hold full dose A/C for now      dvt ppx: lovenox  subq

## 2022-01-22 NOTE — PROGRESS NOTE ADULT - SUBJECTIVE AND OBJECTIVE BOX
Patient is a 81y old  Female who presents with a chief complaint of diverticulitis (22 Jan 2022 11:21)    INTERVAL HPI/OVERNIGHT EVENTS:  Pt was seen and examined, no acute events.    MEDICATIONS  (STANDING):  carvedilol 3.125 milliGRAM(s) Oral every 12 hours  chlorhexidine 0.12% Liquid 15 milliLiter(s) Oral Mucosa every 12 hours  chlorhexidine 2% Cloths 1 Application(s) Topical <User Schedule>  dextrose 40% Gel 15 Gram(s) Oral once  dextrose 5%. 1000 milliLiter(s) (50 mL/Hr) IV Continuous <Continuous>  dextrose 5%. 1000 milliLiter(s) (100 mL/Hr) IV Continuous <Continuous>  dextrose 50% Injectable 25 Gram(s) IV Push once  dextrose 50% Injectable 12.5 Gram(s) IV Push once  dextrose 50% Injectable 25 Gram(s) IV Push once  dorzolamide 2%/timolol 0.5% Ophthalmic Solution 1 Drop(s) Both EYES two times a day  enoxaparin Injectable 40 milliGRAM(s) SubCutaneous daily  escitalopram 20 milliGRAM(s) Oral daily  glucagon  Injectable 1 milliGRAM(s) IntraMuscular once  levETIRAcetam  IVPB 500 milliGRAM(s) IV Intermittent every 12 hours  levothyroxine 50 MICROGram(s) Oral daily  pantoprazole   Suspension 40 milliGRAM(s) Enteral Tube daily  potassium chloride    Tablet ER 20 milliEquivalent(s) Oral every 2 hours    MEDICATIONS  (PRN):  acetaminophen     Tablet .. 650 milliGRAM(s) Oral every 6 hours PRN Temp greater or equal to 38C (100.4F)  sodium chloride 0.9% lock flush 10 milliLiter(s) IV Push every 1 hour PRN Pre/post blood products, medications, blood draw, and to maintain line patency      Allergies  No Known Allergies      Vital Signs Last 24 Hrs  T(C): 37.7 (22 Jan 2022 16:20), Max: 37.7 (22 Jan 2022 16:20)  T(F): 99.8 (22 Jan 2022 16:20), Max: 99.8 (22 Jan 2022 16:20)  HR: 85 (22 Jan 2022 17:17) (61 - 85)  BP: 170/70 (22 Jan 2022 16:20) (119/71 - 170/70)  BP(mean): --  RR: 18 (22 Jan 2022 16:20) (16 - 18)  SpO2: 100% (22 Jan 2022 17:17) (96% - 100%)    PHYSICAL EXAM:  GENERAL: NAD  HEAD:  Atraumatic  EYES: PERRLA  NERVOUS SYSTEM:  Awake, alert  CHEST/LUNG: Clear  HEART: RRR  ABDOMEN: Soft, Midline wound vac, PEG in place, Colostomy in place,. Rt BABAK drain with scant serous output, L BABAK drain with scant purulent output. non distended, +BS, soft, non tender, no guarding  EXTREMITIES: no edema      LABS:                        10.3   10.13 )-----------( 477      ( 22 Jan 2022 07:30 )             34.1     01-22    145  |  113<H>  |  16  ----------------------------<  136<H>  4.3   |  26  |  0.46<L>    Ca    8.8      22 Jan 2022 18:11  Phos  3.6     01-22  Mg     2.1     01-22    TPro  5.8<L>  /  Alb  1.9<L>  /  TBili  0.4  /  DBili  x   /  AST  18  /  ALT  15  /  AlkPhos  63  01-22      CAPILLARY BLOOD GLUCOSE      POCT Blood Glucose.: 133 mg/dL (22 Jan 2022 19:00)  POCT Blood Glucose.: 100 mg/dL (22 Jan 2022 11:27)  POCT Blood Glucose.: 94 mg/dL (22 Jan 2022 05:44)  POCT Blood Glucose.: 93 mg/dL (22 Jan 2022 00:55)      Culture - Blood (collected 21 Jan 2022 02:16)  Source: .Blood Blood-Peripheral  Preliminary Report (22 Jan 2022 03:02):    No growth to date.    Culture - Blood (collected 21 Jan 2022 02:16)  Source: .Blood Blood-Peripheral  Preliminary Report (22 Jan 2022 03:02):    No growth to date.      RADIOLOGY & ADDITIONAL TESTS:    Imaging Personally Reviewed:  [ ] YES  [ ] NO    Consultant(s) Notes Reviewed:  [ ] YES  [ ] NO    Care Discussed with Consultants/Other Providers [ ] YES  [ ] NO

## 2022-01-22 NOTE — PROGRESS NOTE ADULT - ASSESSMENT
81 year old woman s/p Rosales's procedure, trach, PEG  - tube feeds as tolerated  - continue left sided BABAK to bulb suction as it is decompressing pelvic abscess  - remove right sided BABAK  - continue wound vac

## 2022-01-22 NOTE — PROGRESS NOTE ADULT - ASSESSMENT
TRESA PIZARRO 81 f 12/22/2021 1942 DR ANTONIO PATTON     REVIEW OF SYMPTOMS      Able to give (reliable) ROS  NO     PHYSICAL EXAM    HEENT Unremarkable  atraumatic   RESP Fair air entry EXP prolonged    Harsh breath sound Resp distres mild   CARDIAC S1 S2 No S3     NO JVD    ABDOMEN SOFT BS PRESENT NOT DISTENDED No hepatosplenomegaly   PEDAL EDEMA present No calf tenderness  NO rash     ______  DOA/CC.  12/22/2021 81F w/ HTN, HLD, hypothyroidism, depression, seizures. Presented w/ abdominal pain found to have acute sigmoid diverticulitis  ____  PMH.  pmh Hytn  pmh Sz.      _________  PROBLEMS.     VDRF. Since surgery 12/26  TRACH Done 1/7  ABDOMINAL INFECTION.   Feculent peritonitis perf div poa   COLOSTOMY Created 12/26  1/3 ABD ASPIRATE E coli bacteroides Abio dced 1/15  GT 1/18      PAIN 1/10 fent 50   SZ 1/10 keppra 500.2   Woundvac Rx started 1/14  Hypernatremia 1/14/2022 Na 148  Hypothyroid 1/14/2022 tsh 12   Fever spike 101 1/20/2022   R LEFTY removd 1/22/2022     _____                            COVID STATUS. 12/24 pcr (-)  ICU STAY. 12/22-1/12/2022   GOC.  1/13/2022 full code       BEST PRACTICE ISSUES.                                                  HEAD OF BED ELEVATION. Yes  DVT PROPHYLAXIS.    1/7 lvnx 40   LIGHT PROPHYLAXIS.    1/11 protonix 40                                                                                     DIET.    1/20/2022 jevity 1.5 1200  1/18/2022 npo  1/7 vital af 1200 ngt          INFECTION PROPHYLAXIS.   1/7 Chlorhexidine .12%   1/7 chlorhexidine 2%      PATIENT DATA   VITALS/PO/IO/VENT/DRIPS.   1/22/2022 afeb 70 170/70   1/22/2022 5p cpap 5/5/.3      ASSESSMENT/RECOMMENDATIONS.    HEMODYNAMICS.   Monitor bp Target MAP 65 (+)    RESP.   Monitor po Target po 90-95%  1/14/2022 15/450/.4/5 747/38/110     OXYGEN REQUIREMENTS.   1/17/2022 40%  1/13/2022 40%    VENT MANAGEMENT.   HOB elevation  Target Pplat 35 (-)  Target PO 90-95%  Target pH 730 (+)    INFECTION.  Abdominal infection sec perforated sigmoid div feculent peritonitis poa 12/22.  w 1/13-1/14-1/21/2022 w 9.7 - 9.1 -10  1/16/2022 Off abio    SP ABD SURGERY.  81F admitted with Acute Sigmoid Diverticulitis s/p ex lap, sigmoid resection, peritoneal lavage 12/25 and RTOR 12/26 for irrigation of abdominal cavity with closure and creation of colostomy. S/p bedside IR aspiration of fluid collection 1/3. Tracheostomy 1/7.   Deep pelvic abscess noted on CT,  Local drain/ostomy care per nursing  1/22/2022 r lefty removd     PL EFFSN.  1/16/2022 cxr tr bl effs  1/14/2022 CXR residuall pl effs or basal consolidatn  Effsn likely sec to abdominal infkammation       ANEMIA.  Hb 1/13-1/14-1/16-1/17/2022 Hb 9.4 - 8.9 -10 - 9.6   Monitor    GT placed 1/18    TIME SPENT   Over 25 minutes aggregate care time spent on encounter; activities included   direct patient care, counseling and/or coordinating care reviewing notes, lab data/ imaging , discussion with multidisciplinary team/ patient  /family and explaining in detail risks, benefits, alternatives  of the recommendations     TRESA PIZARRO 81 f 12/22/2021 1942 DR ANTONIO PATTON

## 2022-01-22 NOTE — CHART NOTE - NSCHARTNOTEFT_GEN_A_CORE
Hospitalist Medicine JU[facility] WORTHY    Notified by RN patient with critical lab result K+2.9  Interventions taken:     KCL 40 MEQ POWDER POX 2   BMP AT16:00  MAG & PHOS LEVEL STAT.      Estefani Bishop NP-C

## 2022-01-23 LAB
ALBUMIN SERPL ELPH-MCNC: 2.2 G/DL — LOW (ref 3.3–5)
ALP SERPL-CCNC: 74 U/L — SIGNIFICANT CHANGE UP (ref 40–120)
ALT FLD-CCNC: 19 U/L — SIGNIFICANT CHANGE UP (ref 12–78)
ANION GAP SERPL CALC-SCNC: 5 MMOL/L — SIGNIFICANT CHANGE UP (ref 5–17)
AST SERPL-CCNC: 17 U/L — SIGNIFICANT CHANGE UP (ref 15–37)
BILIRUB SERPL-MCNC: 0.2 MG/DL — SIGNIFICANT CHANGE UP (ref 0.2–1.2)
BUN SERPL-MCNC: 19 MG/DL — SIGNIFICANT CHANGE UP (ref 7–23)
CALCIUM SERPL-MCNC: 8.5 MG/DL — SIGNIFICANT CHANGE UP (ref 8.5–10.1)
CHLORIDE SERPL-SCNC: 112 MMOL/L — HIGH (ref 96–108)
CO2 SERPL-SCNC: 28 MMOL/L — SIGNIFICANT CHANGE UP (ref 22–31)
CREAT SERPL-MCNC: 0.62 MG/DL — SIGNIFICANT CHANGE UP (ref 0.5–1.3)
GLUCOSE BLDC GLUCOMTR-MCNC: 146 MG/DL — HIGH (ref 70–99)
GLUCOSE BLDC GLUCOMTR-MCNC: 151 MG/DL — HIGH (ref 70–99)
GLUCOSE BLDC GLUCOMTR-MCNC: 160 MG/DL — HIGH (ref 70–99)
GLUCOSE BLDC GLUCOMTR-MCNC: 196 MG/DL — HIGH (ref 70–99)
GLUCOSE BLDC GLUCOMTR-MCNC: 196 MG/DL — HIGH (ref 70–99)
GLUCOSE SERPL-MCNC: 178 MG/DL — HIGH (ref 70–99)
HCT VFR BLD CALC: 35.3 % — SIGNIFICANT CHANGE UP (ref 34.5–45)
HGB BLD-MCNC: 10.6 G/DL — LOW (ref 11.5–15.5)
MCHC RBC-ENTMCNC: 28 PG — SIGNIFICANT CHANGE UP (ref 27–34)
MCHC RBC-ENTMCNC: 30 GM/DL — LOW (ref 32–36)
MCV RBC AUTO: 93.1 FL — SIGNIFICANT CHANGE UP (ref 80–100)
NRBC # BLD: 0 /100 WBCS — SIGNIFICANT CHANGE UP (ref 0–0)
PLATELET # BLD AUTO: 468 K/UL — HIGH (ref 150–400)
POTASSIUM SERPL-MCNC: 3.6 MMOL/L — SIGNIFICANT CHANGE UP (ref 3.5–5.3)
POTASSIUM SERPL-SCNC: 3.6 MMOL/L — SIGNIFICANT CHANGE UP (ref 3.5–5.3)
PROT SERPL-MCNC: 6.2 GM/DL — SIGNIFICANT CHANGE UP (ref 6–8.3)
RBC # BLD: 3.79 M/UL — LOW (ref 3.8–5.2)
RBC # FLD: 15 % — HIGH (ref 10.3–14.5)
SODIUM SERPL-SCNC: 145 MMOL/L — SIGNIFICANT CHANGE UP (ref 135–145)
WBC # BLD: 15.24 K/UL — HIGH (ref 3.8–10.5)
WBC # FLD AUTO: 15.24 K/UL — HIGH (ref 3.8–10.5)

## 2022-01-23 PROCEDURE — 99232 SBSQ HOSP IP/OBS MODERATE 35: CPT

## 2022-01-23 RX ADMIN — LEVETIRACETAM 420 MILLIGRAM(S): 250 TABLET, FILM COATED ORAL at 17:48

## 2022-01-23 RX ADMIN — ESCITALOPRAM OXALATE 20 MILLIGRAM(S): 10 TABLET, FILM COATED ORAL at 11:56

## 2022-01-23 RX ADMIN — CARVEDILOL PHOSPHATE 3.12 MILLIGRAM(S): 80 CAPSULE, EXTENDED RELEASE ORAL at 17:47

## 2022-01-23 RX ADMIN — CHLORHEXIDINE GLUCONATE 15 MILLILITER(S): 213 SOLUTION TOPICAL at 05:04

## 2022-01-23 RX ADMIN — CHLORHEXIDINE GLUCONATE 1 APPLICATION(S): 213 SOLUTION TOPICAL at 05:04

## 2022-01-23 RX ADMIN — Medication 50 MICROGRAM(S): at 05:04

## 2022-01-23 RX ADMIN — DORZOLAMIDE HYDROCHLORIDE TIMOLOL MALEATE 1 DROP(S): 20; 5 SOLUTION/ DROPS OPHTHALMIC at 05:04

## 2022-01-23 RX ADMIN — ENOXAPARIN SODIUM 40 MILLIGRAM(S): 100 INJECTION SUBCUTANEOUS at 11:56

## 2022-01-23 RX ADMIN — PANTOPRAZOLE SODIUM 40 MILLIGRAM(S): 20 TABLET, DELAYED RELEASE ORAL at 11:56

## 2022-01-23 RX ADMIN — LEVETIRACETAM 420 MILLIGRAM(S): 250 TABLET, FILM COATED ORAL at 05:04

## 2022-01-23 RX ADMIN — CHLORHEXIDINE GLUCONATE 15 MILLILITER(S): 213 SOLUTION TOPICAL at 17:47

## 2022-01-23 RX ADMIN — CARVEDILOL PHOSPHATE 3.12 MILLIGRAM(S): 80 CAPSULE, EXTENDED RELEASE ORAL at 05:04

## 2022-01-23 RX ADMIN — DORZOLAMIDE HYDROCHLORIDE TIMOLOL MALEATE 1 DROP(S): 20; 5 SOLUTION/ DROPS OPHTHALMIC at 17:47

## 2022-01-23 NOTE — PROGRESS NOTE ADULT - SUBJECTIVE AND OBJECTIVE BOX
Patient is a 81y old  Female who presents with a chief complaint of diverticulitis (23 Jan 2022 10:53)    INTERVAL HPI/OVERNIGHT EVENTS:  Pt was seen and examined, no acute events.    MEDICATIONS  (STANDING):  carvedilol 3.125 milliGRAM(s) Oral every 12 hours  chlorhexidine 0.12% Liquid 15 milliLiter(s) Oral Mucosa every 12 hours  chlorhexidine 2% Cloths 1 Application(s) Topical <User Schedule>  dextrose 40% Gel 15 Gram(s) Oral once  dextrose 5%. 1000 milliLiter(s) (50 mL/Hr) IV Continuous <Continuous>  dextrose 5%. 1000 milliLiter(s) (100 mL/Hr) IV Continuous <Continuous>  dextrose 50% Injectable 25 Gram(s) IV Push once  dextrose 50% Injectable 12.5 Gram(s) IV Push once  dextrose 50% Injectable 25 Gram(s) IV Push once  dorzolamide 2%/timolol 0.5% Ophthalmic Solution 1 Drop(s) Both EYES two times a day  enoxaparin Injectable 40 milliGRAM(s) SubCutaneous daily  escitalopram 20 milliGRAM(s) Oral daily  glucagon  Injectable 1 milliGRAM(s) IntraMuscular once  levETIRAcetam  IVPB 500 milliGRAM(s) IV Intermittent every 12 hours  levothyroxine 50 MICROGram(s) Oral daily  pantoprazole   Suspension 40 milliGRAM(s) Enteral Tube daily  potassium chloride    Tablet ER 20 milliEquivalent(s) Oral every 2 hours    MEDICATIONS  (PRN):  acetaminophen     Tablet .. 650 milliGRAM(s) Oral every 6 hours PRN Temp greater or equal to 38C (100.4F)  sodium chloride 0.9% lock flush 10 milliLiter(s) IV Push every 1 hour PRN Pre/post blood products, medications, blood draw, and to maintain line patency      Allergies    No Known Allergies    Intolerances          Vital Signs Last 24 Hrs  T(C): 37.5 (23 Jan 2022 16:45), Max: 37.9 (23 Jan 2022 00:32)  T(F): 99.5 (23 Jan 2022 16:45), Max: 100.2 (23 Jan 2022 00:32)  HR: 75 (23 Jan 2022 16:57) (60 - 82)  BP: 125/59 (23 Jan 2022 16:45) (125/59 - 169/61)  BP(mean): --  RR: 24 (23 Jan 2022 16:45) (16 - 24)  SpO2: 98% (23 Jan 2022 16:57) (96% - 100%)    PHYSICAL EXAM:  GENERAL: NAD  HEAD:  Atraumatic  EYES: PERRLA  NERVOUS SYSTEM:  Awake, alert  CHEST/LUNG: Clear  HEART: RRR  ABDOMEN: Soft, Midline wound vac, PEG in place, Colostomy in place,. Rt BABAK drain with scant serous output, L BABAK drain with scant purulent output. non distended, +BS, soft, non tender, no guarding  EXTREMITIES: no edema        LABS:                        10.6   15.24 )-----------( 468      ( 23 Jan 2022 09:57 )             35.3     01-23    145  |  112<H>  |  19  ----------------------------<  178<H>  3.6   |  28  |  0.62    Ca    8.5      23 Jan 2022 09:57  Phos  3.6     01-22  Mg     2.1     01-22    TPro  6.2  /  Alb  2.2<L>  /  TBili  0.2  /  DBili  x   /  AST  17  /  ALT  19  /  AlkPhos  74  01-23        CAPILLARY BLOOD GLUCOSE      POCT Blood Glucose.: 146 mg/dL (23 Jan 2022 18:02)  POCT Blood Glucose.: 160 mg/dL (23 Jan 2022 11:12)  POCT Blood Glucose.: 196 mg/dL (23 Jan 2022 06:33)  POCT Blood Glucose.: 196 mg/dL (23 Jan 2022 00:20)      Culture - Blood (collected 21 Jan 2022 02:16)  Source: .Blood Blood-Peripheral  Preliminary Report (22 Jan 2022 03:02):    No growth to date.    Culture - Blood (collected 21 Jan 2022 02:16)  Source: .Blood Blood-Peripheral  Preliminary Report (22 Jan 2022 03:02):    No growth to date.    Culture - Urine (collected 21 Jan 2022 01:09)  Source: Clean Catch Clean Catch (Midstream)  Final Report (22 Jan 2022 20:58):    <10,000 CFU/mL Normal Urogenital Sendy      RADIOLOGY & ADDITIONAL TESTS:    Imaging Personally Reviewed:  [ ] YES  [ ] NO    Consultant(s) Notes Reviewed:  [ ] YES  [ ] NO    Care Discussed with Consultants/Other Providers [ ] YES  [ ] NO

## 2022-01-23 NOTE — PROGRESS NOTE ADULT - SUBJECTIVE AND OBJECTIVE BOX
JUAREZ GTZ    LVS 2D 271 D    Allergies    No Known Allergies    Intolerances        PAST MEDICAL & SURGICAL HISTORY:  Seizure    HTN (hypertension)    Glaucoma    Thyroid disease    Blood clot of neck vein  left side    TIA (transient ischemic attack)  20 years ago    S/P appendectomy        FAMILY HISTORY:  FH: HTN (hypertension)        Home Medications:  amLODIPine 10 mg oral tablet: 1 tab(s) orally once a day (22 Dec 2021 16:46)  aspirin 81 mg oral tablet, chewable: 1 tab(s) orally once a day (22 Dec 2021 16:46)  escitalopram 20 mg oral tablet: 1 tab(s) orally once a day (22 Dec 2021 16:46)  keppera:  (22 Dec 2021 16:46)  levETIRAcetam 500 mg oral tablet: 1 tab(s) orally 2 times a day (22 Dec 2021 16:46)  timolol-dorzolamide 0.5%-2% preservative-free ophthalmic solution: 1 drop(s) to each affected eye 2 times a day (22 Dec 2021 16:46)  Zetia 10 mg oral tablet: 1 tab(s) orally once a day (22 Dec 2021 16:46)      MEDICATIONS  (STANDING):  carvedilol 3.125 milliGRAM(s) Oral every 12 hours  chlorhexidine 0.12% Liquid 15 milliLiter(s) Oral Mucosa every 12 hours  chlorhexidine 2% Cloths 1 Application(s) Topical <User Schedule>  dextrose 40% Gel 15 Gram(s) Oral once  dextrose 5%. 1000 milliLiter(s) (50 mL/Hr) IV Continuous <Continuous>  dextrose 5%. 1000 milliLiter(s) (100 mL/Hr) IV Continuous <Continuous>  dextrose 50% Injectable 25 Gram(s) IV Push once  dextrose 50% Injectable 12.5 Gram(s) IV Push once  dextrose 50% Injectable 25 Gram(s) IV Push once  dorzolamide 2%/timolol 0.5% Ophthalmic Solution 1 Drop(s) Both EYES two times a day  enoxaparin Injectable 40 milliGRAM(s) SubCutaneous daily  escitalopram 20 milliGRAM(s) Oral daily  glucagon  Injectable 1 milliGRAM(s) IntraMuscular once  levETIRAcetam  IVPB 500 milliGRAM(s) IV Intermittent every 12 hours  levothyroxine 50 MICROGram(s) Oral daily  pantoprazole   Suspension 40 milliGRAM(s) Enteral Tube daily  potassium chloride    Tablet ER 20 milliEquivalent(s) Oral every 2 hours    MEDICATIONS  (PRN):  acetaminophen     Tablet .. 650 milliGRAM(s) Oral every 6 hours PRN Temp greater or equal to 38C (100.4F)  sodium chloride 0.9% lock flush 10 milliLiter(s) IV Push every 1 hour PRN Pre/post blood products, medications, blood draw, and to maintain line patency      Diet, NPO with Tube Feed:   Tube Feeding Modality: Gastrostomy  Jevity 1.2 Santos  Total Volume for 24 Hours (mL): 1440  Continuous  Starting Tube Feed Rate mL per Hour: 20  Increase Tube Feed Rate by (mL): 10     Every 6 hours  Until Goal Tube Feed Rate (mL per Hour): 60  Tube Feed Duration (in Hours): 24  Tube Feed Start Time: 12:00  Free Water Flush  Bolus   Total Volume per Flush (mL): 150   Frequency: Every 6 Hours   Total Daily Volume of Flush (mL): 600    Start Time: 12:00 (01-21-22 @ 11:06) [Active]          Vital Signs Last 24 Hrs  T(C): 36.9 (23 Jan 2022 05:00), Max: 37.9 (23 Jan 2022 00:32)  T(F): 98.4 (23 Jan 2022 05:00), Max: 100.2 (23 Jan 2022 00:32)  HR: 66 (23 Jan 2022 08:06) (66 - 85)  BP: 144/78 (23 Jan 2022 05:00) (144/78 - 170/70)  BP(mean): --  RR: 18 (23 Jan 2022 05:00) (16 - 20)  SpO2: 100% (23 Jan 2022 08:06) (98% - 100%)      01-22-22 @ 07:01  -  01-23-22 @ 07:00  --------------------------------------------------------  IN: 2140 mL / OUT: 1010 mL / NET: 1130 mL        Mode: CPAP with PS, FiO2: 30, PEEP: 5, PS: 5, ITime: 0.8, MAP: 6, PIP: 10      LABS:                        10.6   15.24 )-----------( 468      ( 23 Jan 2022 09:57 )             35.3     01-22    145  |  113<H>  |  16  ----------------------------<  136<H>  4.3   |  26  |  0.46<L>    Ca    8.8      22 Jan 2022 18:11  Phos  3.6     01-22  Mg     2.1     01-22    TPro  5.8<L>  /  Alb  1.9<L>  /  TBili  0.4  /  DBili  x   /  AST  18  /  ALT  15  /  AlkPhos  63  01-22              WBC:  WBC Count: 15.24 K/uL (01-23 @ 09:57)  WBC Count: 10.13 K/uL (01-22 @ 07:30)  WBC Count: 10.70 K/uL (01-21 @ 08:26)  WBC Count: 11.64 K/uL (01-20 @ 08:04)  WBC Count: 11.47 K/uL (01-19 @ 16:26)      MICROBIOLOGY:  RECENT CULTURES:  01-21 .Blood Blood-Peripheral XXXX XXXX   No growth to date.    01-21 Clean Catch Clean Catch (Midstream) XXXX XXXX   <10,000 CFU/mL Normal Urogenital Sendy                    Sodium:  Sodium, Serum: 145 mmol/L (01-22 @ 18:11)  Sodium, Serum: 143 mmol/L (01-22 @ 07:30)  Sodium, Serum: 146 mmol/L (01-21 @ 08:26)  Sodium, Serum: 145 mmol/L (01-20 @ 08:04)  Sodium, Serum: 146 mmol/L (01-19 @ 16:26)      0.46 mg/dL 01-22 @ 18:11  0.48 mg/dL 01-22 @ 07:30  0.46 mg/dL 01-21 @ 08:26  0.50 mg/dL 01-20 @ 08:04  0.52 mg/dL 01-19 @ 16:26      Hemoglobin:  Hemoglobin: 10.6 g/dL (01-23 @ 09:57)  Hemoglobin: 10.3 g/dL (01-22 @ 07:30)  Hemoglobin: 10.3 g/dL (01-21 @ 08:26)  Hemoglobin: 10.5 g/dL (01-20 @ 08:04)  Hemoglobin: 10.5 g/dL (01-19 @ 16:26)      Platelets: Platelet Count - Automated: 468 K/uL (01-23 @ 09:57)  Platelet Count - Automated: 477 K/uL (01-22 @ 07:30)  Platelet Count - Automated: 476 K/uL (01-21 @ 08:26)  Platelet Count - Automated: 554 K/uL (01-20 @ 08:04)  Platelet Count - Automated: 559 K/uL (01-19 @ 16:26)      LIVER FUNCTIONS - ( 22 Jan 2022 07:30 )  Alb: 1.9 g/dL / Pro: 5.8 gm/dL / ALK PHOS: 63 U/L / ALT: 15 U/L / AST: 18 U/L / GGT: x                 RADIOLOGY & ADDITIONAL STUDIES:      MICROBIOLOGY:  RECENT CULTURES:  01-21 .Blood Blood-Peripheral XXXX XXXX   No growth to date.    01-21 Clean Catch Clean Catch (Midstream) XXXX XXXX   <10,000 CFU/mL Normal Urogenital Sendy

## 2022-01-23 NOTE — PROGRESS NOTE ADULT - ASSESSMENT
81F with a PMH of HTN, HLD, hypothyroidism, depression, seizures who presents to the ED for abd pain.  Patient states that over the last two days she had developed significant abdominal pain and multiple episodes of watery diarrhea.  Reports one episode of nausea and vomiting earlier today.  Patient has no other complaints.  Vitals stable, (hypoxix?) labs show leukocytosis and hypokalemia.  CT abdomen significant for diverticulitis.  initially admitted to med surg.  CT noted with sigmoid diverticulosis with adjacent stranding, compatible with diverticulitis. No associated abscess is identified. Trace adjacent pelvic free fluid.81F with a PMH of HTN, HLD, hypothyroidism, depression, seizures p/w abd pain,       Perforated Acute diverticulitis   s/p 12/24/21 - Exploratory laparotomy and a sigmoid colectomy and abdominal washout  Patient went for abdominal wound closure and creation of colostomy on 12/26.  Patient developed adb abscess 1/3 s/p IR drainage of abdominal abscesses.  Initially patient on PPN for nutrition Now tolerating tube feeds with brown soft stool in colostomy.  Found to have multiple abdominal fluid collections s/p IR drainage of R abdominal abscess 1/3/22 growing e-coli and bacteroides  Peg placement also requested by IR next week as per surgery.  Noted collection around BABAK  drain  ct was planned for followup although cancelled  all abx dced as per iD recs, now febrile again  BABAK drain care  PEG stable, feeding started       Septic Shock s/p  pressors  Transferred to ICU for post op care on mechanical ventilation.  now off pressors failed extubation  now trach to cpap  completed ceftriaxone and flagyl per ID. trial PO vanc for diarrhea. now off all abx  Febrile again, follow CLX, neg so far, covid PCR neg  CT A/p noted, ab collections somewhat improved, Multiple focal opacification in RL, Repeat CXR looks unchanged, follow up official read   Low grade temp , today WBC increased , will check cbc with diff, procal, ID Re eval      Hypoxemic respiratory failure now trach to CPAP  now trach to CPAP  Pulm following  on fentayl patch for pain control    acute metabolic encephalopathy sec to above  stable    Rapid AFIB due to Shock state  10mg IV lopressor in OR for Afib with RVR. 5L IVF given intra-op. EBL 100cc. Received intubated, sedated, on phenylephrine.    s/p amio load converted to sinus rhythm.  not on rate control or AC on downgrade  Cards consulted  plan:  added BB  hold full dose A/C for now      dvt ppx: lovenox  subq

## 2022-01-23 NOTE — PROGRESS NOTE ADULT - ASSESSMENT
TRSEA PIZARRO 81 f [birth year]22[birth year]2021 1942 DR ANTONIO PATTON     REVIEW OF SYMPTOMS      Able to give (reliable) ROS  NO     PHYSICAL EXAM    HEENT Unremarkable  atraumatic   RESP Fair air entry EXP prolonged    Harsh breath sound Resp distres mild   CARDIAC S1 S2 No S3     NO JVD    ABDOMEN SOFT BS PRESENT NOT DISTENDED No hepatosplenomegaly   PEDAL EDEMA present No calf tenderness  NO rash     ______  DOA/CC.  12/22[birth year]2021 81F w/ HTN, HLD, hypothyroidism, depression, seizures. Presented w/ abdominal pain found to have acute sigmoid diverticulitis  ____  PMH.  pmh Hytn  pmh Sz.      _________  PROBLEMS.     VDRF. Since surgery 12/26  TRACH Done 1/7  ABDOMINAL INFECTION.   Feculent peritonitis perf div poa   COLOSTOMY Created 12/26  1/3 ABD ASPIRATE E coli bacteroides Abio dced 1/15  GT 1/18      PAIN 1/10 fent 50   SZ 1/10 keppra 500.2   Woundvac Rx started 1/14  Hypernatremia 1/14/2022 Na 148  Hypothyroid 1/14/2022 tsh 12   Fever spike 101 1/20/2022   R BABAK removd 1/22/2022     _____                            COVID STATUS. 12/24 pcr (-)  ICU STAY. 12/22-1/12/2022   GOC.  1/13/2022 full code       BEST PRACTICE ISSUES.                                                  HEAD OF BED ELEVATION. Yes  DVT PROPHYLAXIS.    1/7 lvnx 40   LIGHT PROPHYLAXIS.    1/11 protonix 40                                                                                     DIET.    1/20/2022 jevity 1.5 1200  1/18/2022 npo  1/7 vital af 1200 ngt          INFECTION PROPHYLAXIS.   1/7 Chlorhexidine .12%   1/7 chlorhexidine 2%    SPEECH SWALLOW RECOMMENDATIONS.       PATIENT DATA   VITALS/PO/IO/VENT/DRIPS.   1/23/2022 99f 78 120/50   1/23/2022 4p cpap 5/5/.3 400/26       ASSESSMENT/RECOMMENDATIONS.    HEMODYNAMICS.   Monitor bp Target MAP 65 (+)    RESP.   Monitor po Target po 90-95%  1/14/2022 15/450/.4/5 747/38/110     OXYGEN REQUIREMENTS.   1/17/2022 40%  1/13/2022 40%    VENT MANAGEMENT.   HOB elevation  Target Pplat 35 (-)  Target PO 90-95%  Target pH 730 (+)    INFECTION.  Abdominal infection sec perforated sigmoid div feculent peritonitis poa 12/22.  w 1/13-1/14-1/21-1/23/2022 w 9.7 - 9.1 -10 - 15   1/16/2022 Off abio    SP ABD SURGERY.  81F admitted with Acute Sigmoid Diverticulitis s/p ex lap, sigmoid resection, peritoneal lavage 12/25 and RTOR 12/26 for irrigation of abdominal cavity with closure and creation of colostomy. S/p bedside IR aspiration of fluid collection 1/3. Tracheostomy 1/7.   Deep pelvic abscess noted on CT,  Local drain/ostomy care per nursing  1/22/2022 nancy beth     TIME SPENT   Over 25 minutes aggregate care time spent on encounter; activities included   direct patient care, counseling and/or coordinating care reviewing notes, lab data/ imaging , discussion with multidisciplinary team/ patient  /family and explaining in detail risks, benefits, alternatives  of the recommendations     TRESA PIZARRO 81 f 12/22/2021 1942 DR ANTONIO PATTON

## 2022-01-23 NOTE — PROGRESS NOTE ADULT - SUBJECTIVE AND OBJECTIVE BOX
Patient seen and examined at bedside with Dr. Lu.  No events o/n    Vital Signs Last 24 Hrs  T(F): 98.4 (01-23-22 @ 05:00), Max: 100.2 (01-23-22 @ 00:32)  HR: 66 (01-23-22 @ 08:06)  BP: 144/78 (01-23-22 @ 05:00)  RR: 18 (01-23-22 @ 05:00)  SpO2: 100% (01-23-22 @ 08:06)    GENERAL: Awake, alert, NAD  HEENT: Trach to vent  CHEST/LUNG: Respirations nonlabored  HEART: S1S2, RRR  ABDOMEN: Midline wound vac functioning. LLQ BABAK with minimal purulent output. Soft, NTND. PEG in place   EXTREMITIES: SCDs b/l       LABS:                        10.6   15.24 )-----------( 468      ( 23 Jan 2022 09:57 )             35.3     01-22    145  |  113<H>  |  16  ----------------------------<  136<H>  4.3   |  26  |  0.46<L>    Ca    8.8      22 Jan 2022 18:11  Phos  3.6     01-22  Mg     2.1     01-22    TPro  5.8<L>  /  Alb  1.9<L>  /  TBili  0.4  /  DBili  x   /  AST  18  /  ALT  15  /  AlkPhos  63  01-22    A/P: 81F s/p ex lap, sigmoid rxn, abthera placement 12/25, RTOR for completion of hartmanns procedure and closure of abdominal wall 12/26, trach 1/7, PEG 1/18. Leukocytosis today.   -- TF as tolerated  -- cont left BABAK to suction, monitor output   -- wound vac changes per PT  -- management per primary team; leukocytosis w/u

## 2022-01-24 LAB
ALBUMIN SERPL ELPH-MCNC: 2.1 G/DL — LOW (ref 3.3–5)
ALP SERPL-CCNC: 71 U/L — SIGNIFICANT CHANGE UP (ref 40–120)
ALT FLD-CCNC: 19 U/L — SIGNIFICANT CHANGE UP (ref 12–78)
ANION GAP SERPL CALC-SCNC: 6 MMOL/L — SIGNIFICANT CHANGE UP (ref 5–17)
AST SERPL-CCNC: 20 U/L — SIGNIFICANT CHANGE UP (ref 15–37)
BASOPHILS # BLD AUTO: 0.05 K/UL — SIGNIFICANT CHANGE UP (ref 0–0.2)
BASOPHILS NFR BLD AUTO: 0.2 % — SIGNIFICANT CHANGE UP (ref 0–2)
BILIRUB SERPL-MCNC: 0.3 MG/DL — SIGNIFICANT CHANGE UP (ref 0.2–1.2)
BUN SERPL-MCNC: 19 MG/DL — SIGNIFICANT CHANGE UP (ref 7–23)
CALCIUM SERPL-MCNC: 8.6 MG/DL — SIGNIFICANT CHANGE UP (ref 8.5–10.1)
CHLORIDE SERPL-SCNC: 113 MMOL/L — HIGH (ref 96–108)
CO2 SERPL-SCNC: 27 MMOL/L — SIGNIFICANT CHANGE UP (ref 22–31)
CREAT SERPL-MCNC: 0.56 MG/DL — SIGNIFICANT CHANGE UP (ref 0.5–1.3)
EOSINOPHIL # BLD AUTO: 0.4 K/UL — SIGNIFICANT CHANGE UP (ref 0–0.5)
EOSINOPHIL NFR BLD AUTO: 1.9 % — SIGNIFICANT CHANGE UP (ref 0–6)
GLUCOSE BLDC GLUCOMTR-MCNC: 128 MG/DL — HIGH (ref 70–99)
GLUCOSE BLDC GLUCOMTR-MCNC: 137 MG/DL — HIGH (ref 70–99)
GLUCOSE BLDC GLUCOMTR-MCNC: 153 MG/DL — HIGH (ref 70–99)
GLUCOSE SERPL-MCNC: 138 MG/DL — HIGH (ref 70–99)
HCT VFR BLD CALC: 34 % — LOW (ref 34.5–45)
HGB BLD-MCNC: 10.2 G/DL — LOW (ref 11.5–15.5)
IMM GRANULOCYTES NFR BLD AUTO: 0.6 % — SIGNIFICANT CHANGE UP (ref 0–1.5)
LYMPHOCYTES # BLD AUTO: 1.5 K/UL — SIGNIFICANT CHANGE UP (ref 1–3.3)
LYMPHOCYTES # BLD AUTO: 7 % — LOW (ref 13–44)
MCHC RBC-ENTMCNC: 28.1 PG — SIGNIFICANT CHANGE UP (ref 27–34)
MCHC RBC-ENTMCNC: 30 GM/DL — LOW (ref 32–36)
MCV RBC AUTO: 93.7 FL — SIGNIFICANT CHANGE UP (ref 80–100)
MONOCYTES # BLD AUTO: 0.86 K/UL — SIGNIFICANT CHANGE UP (ref 0–0.9)
MONOCYTES NFR BLD AUTO: 4 % — SIGNIFICANT CHANGE UP (ref 2–14)
NEUTROPHILS # BLD AUTO: 18.43 K/UL — HIGH (ref 1.8–7.4)
NEUTROPHILS NFR BLD AUTO: 86.3 % — HIGH (ref 43–77)
NRBC # BLD: 0 /100 WBCS — SIGNIFICANT CHANGE UP (ref 0–0)
PLATELET # BLD AUTO: 430 K/UL — HIGH (ref 150–400)
POTASSIUM SERPL-MCNC: 3.2 MMOL/L — LOW (ref 3.5–5.3)
POTASSIUM SERPL-SCNC: 3.2 MMOL/L — LOW (ref 3.5–5.3)
PROCALCITONIN SERPL-MCNC: 0.06 NG/ML — SIGNIFICANT CHANGE UP (ref 0.02–0.1)
PROT SERPL-MCNC: 6.1 GM/DL — SIGNIFICANT CHANGE UP (ref 6–8.3)
RBC # BLD: 3.63 M/UL — LOW (ref 3.8–5.2)
RBC # FLD: 15.2 % — HIGH (ref 10.3–14.5)
SODIUM SERPL-SCNC: 146 MMOL/L — HIGH (ref 135–145)
WBC # BLD: 21.37 K/UL — HIGH (ref 3.8–10.5)
WBC # FLD AUTO: 21.37 K/UL — HIGH (ref 3.8–10.5)

## 2022-01-24 PROCEDURE — 99232 SBSQ HOSP IP/OBS MODERATE 35: CPT

## 2022-01-24 RX ORDER — POTASSIUM CHLORIDE 20 MEQ
40 PACKET (EA) ORAL ONCE
Refills: 0 | Status: COMPLETED | OUTPATIENT
Start: 2022-01-24 | End: 2022-01-24

## 2022-01-24 RX ADMIN — Medication 650 MILLIGRAM(S): at 05:50

## 2022-01-24 RX ADMIN — Medication 40 MILLIEQUIVALENT(S): at 11:55

## 2022-01-24 RX ADMIN — CHLORHEXIDINE GLUCONATE 15 MILLILITER(S): 213 SOLUTION TOPICAL at 05:48

## 2022-01-24 RX ADMIN — CARVEDILOL PHOSPHATE 3.12 MILLIGRAM(S): 80 CAPSULE, EXTENDED RELEASE ORAL at 05:48

## 2022-01-24 RX ADMIN — ESCITALOPRAM OXALATE 20 MILLIGRAM(S): 10 TABLET, FILM COATED ORAL at 11:54

## 2022-01-24 RX ADMIN — CARVEDILOL PHOSPHATE 3.12 MILLIGRAM(S): 80 CAPSULE, EXTENDED RELEASE ORAL at 17:49

## 2022-01-24 RX ADMIN — Medication 50 MICROGRAM(S): at 05:47

## 2022-01-24 RX ADMIN — Medication 650 MILLIGRAM(S): at 11:54

## 2022-01-24 RX ADMIN — LEVETIRACETAM 420 MILLIGRAM(S): 250 TABLET, FILM COATED ORAL at 17:49

## 2022-01-24 RX ADMIN — PANTOPRAZOLE SODIUM 40 MILLIGRAM(S): 20 TABLET, DELAYED RELEASE ORAL at 11:54

## 2022-01-24 RX ADMIN — ENOXAPARIN SODIUM 40 MILLIGRAM(S): 100 INJECTION SUBCUTANEOUS at 11:56

## 2022-01-24 RX ADMIN — Medication 650 MILLIGRAM(S): at 23:44

## 2022-01-24 RX ADMIN — CHLORHEXIDINE GLUCONATE 1 APPLICATION(S): 213 SOLUTION TOPICAL at 05:49

## 2022-01-24 RX ADMIN — DORZOLAMIDE HYDROCHLORIDE TIMOLOL MALEATE 1 DROP(S): 20; 5 SOLUTION/ DROPS OPHTHALMIC at 17:48

## 2022-01-24 RX ADMIN — LEVETIRACETAM 420 MILLIGRAM(S): 250 TABLET, FILM COATED ORAL at 05:45

## 2022-01-24 RX ADMIN — DORZOLAMIDE HYDROCHLORIDE TIMOLOL MALEATE 1 DROP(S): 20; 5 SOLUTION/ DROPS OPHTHALMIC at 05:48

## 2022-01-24 RX ADMIN — CHLORHEXIDINE GLUCONATE 15 MILLILITER(S): 213 SOLUTION TOPICAL at 17:49

## 2022-01-24 NOTE — PROGRESS NOTE ADULT - SUBJECTIVE AND OBJECTIVE BOX
JUAREZ GTZ  MRN-[id number]      Follow Up:  fever     Interval History: patient seen and examined. has been having low grade fevers. repeat CT the other dayshowed possible pneumonia vs atelectasis and smaller but still present fluid collections in the abdomen. She was on PS 5/5 wit 35% O2.     ROS:    [ X] Unobtainable because: trach and minimal ROS   [ ] All other systems negative except as noted    Constitutional: no fever, no chills  Head: no trauma  Eyes: no vision changes, no eye pain  ENT:  no sore throat, no rhinorrhea  Cardiovascular:  no chest pain, no palpitation  Respiratory:  no SOB, no cough  GI:  no abd pain, no vomiting, no diarrhea  urinary: no dysuria, no hematuria, no flank pain  musculoskeletal:  no joint pain, no joint swelling  skin:  no rash  neurology:  no headache, no seizure, no change in mental status  psych: no anxiety, no depression         Allergies  No Known Allergies        ANTIMICROBIALS:      OTHER MEDS:  acetaminophen     Tablet .. 650 milliGRAM(s) Oral every 6 hours PRN  carvedilol 3.125 milliGRAM(s) Oral every 12 hours  chlorhexidine 0.12% Liquid 15 milliLiter(s) Oral Mucosa every 12 hours  chlorhexidine 2% Cloths 1 Application(s) Topical <User Schedule>  dextrose 40% Gel 15 Gram(s) Oral once  dextrose 5%. 1000 milliLiter(s) IV Continuous <Continuous>  dextrose 5%. 1000 milliLiter(s) IV Continuous <Continuous>  dextrose 50% Injectable 25 Gram(s) IV Push once  dextrose 50% Injectable 12.5 Gram(s) IV Push once  dextrose 50% Injectable 25 Gram(s) IV Push once  dorzolamide 2%/timolol 0.5% Ophthalmic Solution 1 Drop(s) Both EYES two times a day  enoxaparin Injectable 40 milliGRAM(s) SubCutaneous daily  escitalopram 20 milliGRAM(s) Oral daily  glucagon  Injectable 1 milliGRAM(s) IntraMuscular once  levETIRAcetam  IVPB 500 milliGRAM(s) IV Intermittent every 12 hours  levothyroxine 50 MICROGram(s) Oral daily  pantoprazole   Suspension 40 milliGRAM(s) Enteral Tube daily  potassium chloride    Tablet ER 20 milliEquivalent(s) Oral every 2 hours  sodium chloride 0.9% lock flush 10 milliLiter(s) IV Push every 1 hour PRN      Physical Exam:  Vital Signs Last 24 Hrs  T(C): 38.3 (24 Jan 2022 11:35), Max: 38.3 (24 Jan 2022 05:24)  T(F): 100.9 (24 Jan 2022 11:35), Max: 100.9 (24 Jan 2022 05:24)  HR: 77 (24 Jan 2022 14:50) (71 - 94)  BP: 124/65 (24 Jan 2022 11:35) (122/66 - 148/72)  BP(mean): 85 (24 Jan 2022 11:35) (85 - 85)  RR: 18 (24 Jan 2022 11:35) (18 - 24)  SpO2: 99% (24 Jan 2022 14:50) (94% - 100%)    General: Nontoxic-appearing Female in no acute distress. Trached on vent  HEENT: AT/NC. Oropharynx/dentition unable secondary to patient compliance.   Neck: Not rigid. No sense of mass. Trach C/D/I  Nodes: None palpable.  Lungs: coarse B BS  Heart: Regular rate and rhythm. +Murmur. No rub. No gallop. No palpable thrill.  Abdomen: Bowel sounds now present.  Soft. Mildly distended.  Incision now has a wound vac.  Ostomy with stool and gas. Drains x1, there is a new PEG tube which appears C/D/I   Extremities: No cyanosis or clubbing. 1+ edema of bilateral lower extremities   Skin: Warm. Dry No rash. No vasculitic stigmata.  Neuro: awake, somewhat alert and did follow simple commands but not all    Psychiatric: calm  Vacular: rue midline     WBC Count: 21.37 K/uL (01-24 @ 08:10)  WBC Count: 15.24 K/uL (01-23 @ 09:57)  WBC Count: 10.13 K/uL (01-22 @ 07:30)  WBC Count: 10.70 K/uL (01-21 @ 08:26)  WBC Count: 11.64 K/uL (01-20 @ 08:04)  WBC Count: 11.47 K/uL (01-19 @ 16:26)  WBC Count: 11.00 K/uL (01-18 @ 07:57)                            10.2   21.37 )-----------( 430      ( 24 Jan 2022 08:10 )             34.0       01-24    146<H>  |  113<H>  |  19  ----------------------------<  138<H>  3.2<L>   |  27  |  0.56    Ca    8.6      24 Jan 2022 08:10    TPro  6.1  /  Alb  2.1<L>  /  TBili  0.3  /  DBili  x   /  AST  20  /  ALT  19  /  AlkPhos  71  01-24          Creatinine Trend: 0.56<--, 0.62<--, 0.46<--, 0.48<--, 0.46<--, 0.50<--      MICROBIOLOGY:  v  .Blood Blood-Peripheral  01-21-22   No growth to date.  --  --      Clean Catch Clean Catch (Midstream)  01-21-22   <10,000 CFU/mL Normal Urogenital Sendy  --  --      .Body Fluid Abdominal Fluid  01-03-22   Few Escherichia coli  Few Bacteroides ovatus group "Susceptibilities not performed"  --  Escherichia coli      .Sputum Sputum  12-28-21   Normal Respiratory Sendy present  --    Moderate polymorphonuclear leukocytes per low power field  Numerous Squamous epithelial cells per low power field  Few Gram positive cocci in pairs per oil power field  Rare Gram Positive Rods per oil power field  Rare Yeast like cells per oil power field  Results consistent with oropharyngeal contamination      .Blood Blood  12-28-21   No Growth Final  --  --        Rapid RVP Result: NotDetec (01-20 @ 18:59)      Procalcitonin, Serum: 0.04 (01-22-22 @ 10:46)    Rapid RVP Result: NotDetec (01-20-22 @ 18:59)  SARS-CoV-2: NotDetec (01-20-22 @ 18:59)        RADIOLOGY:  < from: CT Abdomen and Pelvis w/ Oral Cont and w/ IV Cont (01.21.22 @ 10:46) >  IMPRESSION:    Overall, improved appearance of fluid collections since 1/11/2022, which   are either decreased in size or are small and stable.  -Posterior pelvic fluid pocket is decrease in size, measuring 2.7 x 1.7   cm, image 102 series 2, previously 3.9 x 2.4 cm.  -Right paracolic gutter sliver of loculated fluid is also decrease in   size, measuring 4.3 x 1.0 cm, image 62 series 2, previously 4.5 x 1.6 cm.  -Trace left paracolic gutter fluid, measuring 1.6 cm, image 71 series 2,   is unchanged.  -Trace inferior perihepatic loculated fluid measuring 1.7 cm, image 46   series 2, is unchanged.    Gastrostomy tube with bumper localized to the stomach in intraluminal   location.    Few areas of patchy airspace opacification along the dependent right   upper lobe image 1 series 2, and lingula images 13-15 of series 2, could   reflect areas of multifocal infection. Follow to resolution. Trace   pleural effusions.    Distended gallbladder with cholelithiasis, similar to prior studies.   Correlate with clinical symptoms, LFTs, and right upperquadrant   ultrasound if there is concern for acute cholecystitis.    Coronary artery and aortic valve calcification.    < end of copied text >

## 2022-01-24 NOTE — PROGRESS NOTE ADULT - ASSESSMENT
81F with a PMH of HTN, HLD, hypothyroidism, depression, seizures who presents to the ED for abd pain found to have diverticulitis   has rising leukocytosis   had fever yesterday   tachycardic and now hypoxic   CXR (I personally reviewed) low lung volumes   origincal CT (I personally reviewed) with diverticulitis   she had a lot of pain in the abdomen on exam     12/25: s/p surgery for diverticulitis with perforation and abscess and went to the OR. intubated in ICU with vac and needs to go back to the OR, currently on zosyn, s/p one dose of diflucan last night   12/27: s/p repair and ostomy creation yesterday, wbc elevated, cultures sensitive to zosyn and candida albicans is notoriously sensitive to azoles such as fluconazole, wean of pressors and sedation, extubate when ready   12/28: Further increase in white blood cell count but overall the patient appears to be reasonably stable.  No concern of C. difficile at this juncture.  Current antibiotics are reasonable.  Leukocytosis like related to recent surgery, no concern of other superimposed process on the basis of exam.  I do not see a role to alter her antimicrobials.  12/29:  Remains intubated in ICU. still quite edematous and net positive, WBC climbing, small wound dehiscence at bottom of incision, plan for initiation of TPN today, remains on antibiotics    12/30: rising WBC, ostomy not functioning yet, very edematous CT today, may be source control issue so for now can continue same antibiotics and antifungal but may need to change if findings on the CT are worrisome or change in hemodynamics  12/31:Leukocytosis, with collections noted on CT.  However this is being done postoperative day 5, there is some possibility that this could represent postop changes but given the leukocytosis, concur with plans for attempts at percutaneous drainage.  White blood cell count down slightly compared to prior peak, will need to be trended.  Gallbladder is also distended.  Abdominal exam was benign this morning, but a calculus cholecystitis could be the differential diagnosis here as well (though n.p.o. status certainly could explain distended gallbladder, there is also report of gallbladder wall thickening).  Would modify antibiotics based on cultures from drainage, I do not believe that antibiotics and other the cells would be adequate here.  Fortunately she is off pressors, with preserved renal function, and tolerating pressure support.  1/1/22: Postop day 6.  I have some concerns with the appearance of the ostomy, though the abdominal exam overall is otherwise unchanged.  White blood cell count remains elevated, but is lower compared with prior values.  This is despite no change in antibiotic therapy.  Patient also has asymmetric edema of the upper extremities, right greater than left.  Low threshold for duplex ultrasound to exclude DVT.No new surveillance culture data.  1/2: POD7 Ostomy looks better today, UE symmetric. WBC elevated, some slight downward trend overall. Temps <= 100F.   1/3: drainage of fluid collection today, continue zosyn and fluconazole for now, monitor wbc and temps, watch ostomy output, diuresis and address third spacing   1/4: s/p drainage yesterday now growing ecoli and awaiting for sensitivities, wbc is improving, started on tube feeds    1/5: abscess growing ecoli and bacteroides, S to ceftriaxone, wbc 15, weaning trial today, pain management    1/6: awake, lung exercise today, trach planned for tomorrow, ostomy output is good. will stop fluconazole today and change antibiotics to ceftriaxone and flagly. Unclear stop date yet    1/7 trach today, continue antibiotics   1/9: s/p trach, had low grade fever but otherwise doing ok, will continues same antibiotics regimen for now but if fevers continue she will needs to be rescanned as those abscesses can progress.  1/10: low grade fever but doing well on trach-PS, ostomy with stool and gas, shakes head when asked if in pain, discussed with surgery about repeating CT scan given the temp  1/11: Still febrile, favor interval CT as above.  1/12: CT of abdomen showing some signs of improvement and elsewhere  it shows signs of worsening. wbc normal, tube feeds currently via NGT   1/13: no fever, no wbc, Cr and LFTs ok, Ceftriaxone and flagyl continued. No planned IR intervention at this time - fluid collection without access to drain, surgical drain within the collection. Pt possibly will have repeat imaging over the weekend to evaluate progress.   Attending addendum:  agree with above  continue antibiotics   vent weaning  surgical follow up for the abdominal wound  1/14: Low grade temp last night, midline placed, no leukocytosis, Cr and LFTs ok, culture data reviewed. Pt's colostomy with small amount of liquid stool and gas, no stools recorded for two days, no role for po Vancomycin at this time. The pt has been on abx since her admission, will stop all abx and will continue to monitor.    1/18: no fevers since 1/14, WBC better 11.00, cr ok, off abx, now s/p EGD, with PEG replacement.   1/24: spiking low grade fevers, low suspicion for pneumonia given little secretions and doing well on the PS wth low settings, peg site looks clean, trach site OK as well, fevers may be from small fluid collections intra-abdominally vs atelectasis Would suggest aggressive pulmonary toilet including chest vest. follow cultures and trend wbc and fever curve . If persistent fevers would start  Zosyn and PO fluconazole but for now please hold antibiotics     #Perforated diverticulum of large intestine.   ·  Plan: continue to monitor off antibiotics  monitor for signs worsening signs and at which point start Zosyn and PO fluconazole   continue wound vac       #Fever.   ·  Plan: trend  monitor off antibiotics.   monitor off abx.  UA   frequent turns, offloading, and nutrition optimization to avoid bedsores  consider checking for DVTs   exchange midline in right arm     #Acute Respiratory Failure  wean oxygen as tolerated  chest PT  frequent suctioning   chest vest   HOB >30 degrees     Discussed with Dr. Billy Dunn DO  Infectious Disease Attending  Pager [phone]  After 5pm/weekends please call [phone] for all inquiries and new consults

## 2022-01-24 NOTE — PROGRESS NOTE ADULT - SUBJECTIVE AND OBJECTIVE BOX
JUAREZ GTZ    LVS 2D 271 D    Allergies    No Known Allergies    Intolerances        PAST MEDICAL & SURGICAL HISTORY:  Seizure    HTN (hypertension)    Glaucoma    Thyroid disease    Blood clot of neck vein  left side    TIA (transient ischemic attack)  20 years ago    S/P appendectomy        FAMILY HISTORY:  FH: HTN (hypertension)        Home Medications:  amLODIPine 10 mg oral tablet: 1 tab(s) orally once a day (22 Dec 2021 16:46)  aspirin 81 mg oral tablet, chewable: 1 tab(s) orally once a day (22 Dec 2021 16:46)  escitalopram 20 mg oral tablet: 1 tab(s) orally once a day (22 Dec 2021 16:46)  keppera:  (22 Dec 2021 16:46)  levETIRAcetam 500 mg oral tablet: 1 tab(s) orally 2 times a day (22 Dec 2021 16:46)  timolol-dorzolamide 0.5%-2% preservative-free ophthalmic solution: 1 drop(s) to each affected eye 2 times a day (22 Dec 2021 16:46)  Zetia 10 mg oral tablet: 1 tab(s) orally once a day (22 Dec 2021 16:46)      MEDICATIONS  (STANDING):  carvedilol 3.125 milliGRAM(s) Oral every 12 hours  chlorhexidine 0.12% Liquid 15 milliLiter(s) Oral Mucosa every 12 hours  chlorhexidine 2% Cloths 1 Application(s) Topical <User Schedule>  dextrose 40% Gel 15 Gram(s) Oral once  dextrose 5%. 1000 milliLiter(s) (50 mL/Hr) IV Continuous <Continuous>  dextrose 5%. 1000 milliLiter(s) (100 mL/Hr) IV Continuous <Continuous>  dextrose 50% Injectable 12.5 Gram(s) IV Push once  dextrose 50% Injectable 25 Gram(s) IV Push once  dextrose 50% Injectable 25 Gram(s) IV Push once  dorzolamide 2%/timolol 0.5% Ophthalmic Solution 1 Drop(s) Both EYES two times a day  enoxaparin Injectable 40 milliGRAM(s) SubCutaneous daily  escitalopram 20 milliGRAM(s) Oral daily  glucagon  Injectable 1 milliGRAM(s) IntraMuscular once  levETIRAcetam  IVPB 500 milliGRAM(s) IV Intermittent every 12 hours  levothyroxine 50 MICROGram(s) Oral daily  pantoprazole   Suspension 40 milliGRAM(s) Enteral Tube daily  potassium chloride    Tablet ER 20 milliEquivalent(s) Oral every 2 hours  potassium chloride   Powder 40 milliEquivalent(s) Oral once    MEDICATIONS  (PRN):  acetaminophen     Tablet .. 650 milliGRAM(s) Oral every 6 hours PRN Temp greater or equal to 38C (100.4F)  sodium chloride 0.9% lock flush 10 milliLiter(s) IV Push every 1 hour PRN Pre/post blood products, medications, blood draw, and to maintain line patency      Diet, NPO with Tube Feed:   Tube Feeding Modality: Gastrostomy  Jevity 1.2 Santos  Total Volume for 24 Hours (mL): 1440  Continuous  Starting Tube Feed Rate mL per Hour: 20  Increase Tube Feed Rate by (mL): 10     Every 6 hours  Until Goal Tube Feed Rate (mL per Hour): 60  Tube Feed Duration (in Hours): 24  Tube Feed Start Time: 12:00  Free Water Flush  Bolus   Total Volume per Flush (mL): 150   Frequency: Every 6 Hours   Total Daily Volume of Flush (mL): 600    Start Time: 12:00 (01-21-22 @ 11:06) [Active]          Vital Signs Last 24 Hrs  T(C): 38.3 (24 Jan 2022 05:24), Max: 38.3 (24 Jan 2022 05:24)  T(F): 100.9 (24 Jan 2022 05:24), Max: 100.9 (24 Jan 2022 05:24)  HR: 76 (24 Jan 2022 05:24) (60 - 89)  BP: 148/72 (24 Jan 2022 05:24) (122/66 - 148/72)  BP(mean): --  RR: 20 (24 Jan 2022 05:24) (16 - 24)  SpO2: 99% (24 Jan 2022 05:24) (94% - 100%)      01-23-22 @ 07:01  -  01-24-22 @ 07:00  --------------------------------------------------------  IN: 720 mL / OUT: 520 mL / NET: 200 mL        Mode: AC/ CMV (Assist Control/ Continuous Mandatory Ventilation), RR (machine): 15, TV (machine): 450, FiO2: 30, PEEP: 5, PS: , ITime: 0.8, MAP: 9, PIP: 18      LABS:                        10.2   21.37 )-----------( 430      ( 24 Jan 2022 08:10 )             34.0     01-24    146<H>  |  113<H>  |  19  ----------------------------<  138<H>  3.2<L>   |  27  |  0.56    Ca    8.6      24 Jan 2022 08:10    TPro  6.1  /  Alb  2.1<L>  /  TBili  0.3  /  DBili  x   /  AST  20  /  ALT  19  /  AlkPhos  71  01-24              WBC:  WBC Count: 21.37 K/uL (01-24 @ 08:10)  WBC Count: 15.24 K/uL (01-23 @ 09:57)  WBC Count: 10.13 K/uL (01-22 @ 07:30)  WBC Count: 10.70 K/uL (01-21 @ 08:26)      MICROBIOLOGY:  RECENT CULTURES:  01-21 .Blood Blood-Peripheral XXXX XXXX   No growth to date.    01-21 Clean Catch Clean Catch (Midstream) XXXX XXXX   <10,000 CFU/mL Normal Urogenital Sendy                    Sodium:  Sodium, Serum: 146 mmol/L (01-24 @ 08:10)  Sodium, Serum: 145 mmol/L (01-23 @ 09:57)  Sodium, Serum: 145 mmol/L (01-22 @ 18:11)  Sodium, Serum: 143 mmol/L (01-22 @ 07:30)  Sodium, Serum: 146 mmol/L (01-21 @ 08:26)      0.56 mg/dL 01-24 @ 08:10  0.62 mg/dL 01-23 @ 09:57  0.46 mg/dL 01-22 @ 18:11  0.48 mg/dL 01-22 @ 07:30  0.46 mg/dL 01-21 @ 08:26      Hemoglobin:  Hemoglobin: 10.2 g/dL (01-24 @ 08:10)  Hemoglobin: 10.6 g/dL (01-23 @ 09:57)  Hemoglobin: 10.3 g/dL (01-22 @ 07:30)  Hemoglobin: 10.3 g/dL (01-21 @ 08:26)      Platelets: Platelet Count - Automated: 430 K/uL (01-24 @ 08:10)  Platelet Count - Automated: 468 K/uL (01-23 @ 09:57)  Platelet Count - Automated: 477 K/uL (01-22 @ 07:30)  Platelet Count - Automated: 476 K/uL (01-21 @ 08:26)      LIVER FUNCTIONS - ( 24 Jan 2022 08:10 )  Alb: 2.1 g/dL / Pro: 6.1 gm/dL / ALK PHOS: 71 U/L / ALT: 19 U/L / AST: 20 U/L / GGT: x                 RADIOLOGY & ADDITIONAL STUDIES:      MICROBIOLOGY:  RECENT CULTURES:  01-21 .Blood Blood-Peripheral XXXX XXXX   No growth to date.    01-21 Clean Catch Clean Catch (Midstream) XXXX XXXX   <10,000 CFU/mL Normal Urogenital Sendy

## 2022-01-24 NOTE — PROGRESS NOTE ADULT - SUBJECTIVE AND OBJECTIVE BOX
Patient is a 81y old  Female who presents with a chief complaint of diverticulitis (24 Jan 2022 16:00)    INTERVAL HPI/OVERNIGHT EVENTS:  Pt was seen and examined, no acute events.    MEDICATIONS  (STANDING):  carvedilol 3.125 milliGRAM(s) Oral every 12 hours  chlorhexidine 0.12% Liquid 15 milliLiter(s) Oral Mucosa every 12 hours  chlorhexidine 2% Cloths 1 Application(s) Topical <User Schedule>  dextrose 40% Gel 15 Gram(s) Oral once  dextrose 5%. 1000 milliLiter(s) (50 mL/Hr) IV Continuous <Continuous>  dextrose 5%. 1000 milliLiter(s) (100 mL/Hr) IV Continuous <Continuous>  dextrose 50% Injectable 25 Gram(s) IV Push once  dextrose 50% Injectable 12.5 Gram(s) IV Push once  dextrose 50% Injectable 25 Gram(s) IV Push once  dorzolamide 2%/timolol 0.5% Ophthalmic Solution 1 Drop(s) Both EYES two times a day  enoxaparin Injectable 40 milliGRAM(s) SubCutaneous daily  escitalopram 20 milliGRAM(s) Oral daily  glucagon  Injectable 1 milliGRAM(s) IntraMuscular once  levETIRAcetam  IVPB 500 milliGRAM(s) IV Intermittent every 12 hours  levothyroxine 50 MICROGram(s) Oral daily  pantoprazole   Suspension 40 milliGRAM(s) Enteral Tube daily  potassium chloride    Tablet ER 20 milliEquivalent(s) Oral every 2 hours    MEDICATIONS  (PRN):  acetaminophen     Tablet .. 650 milliGRAM(s) Oral every 6 hours PRN Temp greater or equal to 38C (100.4F)  sodium chloride 0.9% lock flush 10 milliLiter(s) IV Push every 1 hour PRN Pre/post blood products, medications, blood draw, and to maintain line patency      Allergies  No Known Allergies      Vital Signs Last 24 Hrs  T(C): 37.4 (24 Jan 2022 16:16), Max: 38.3 (24 Jan 2022 05:24)  T(F): 99.3 (24 Jan 2022 16:16), Max: 100.9 (24 Jan 2022 05:24)  HR: 78 (24 Jan 2022 18:20) (71 - 94)  BP: 115/62 (24 Jan 2022 16:16) (115/62 - 148/72)  BP(mean): 85 (24 Jan 2022 11:35) (85 - 85)  RR: 18 (24 Jan 2022 16:16) (18 - 22)  SpO2: 99% (24 Jan 2022 18:20) (94% - 100%)    PHYSICAL EXAM:  GENERAL: NAD  HEAD:  Atraumatic  EYES: PERRLA  NERVOUS SYSTEM:  Awake, alert  CHEST/LUNG: Clear  HEART: RRR  ABDOMEN: Soft, Midline wound vac, PEG in place, Colostomy in place,.  L BABAK drain with scant purulent output. non distended, +BS, soft, non tender, no guarding  EXTREMITIES: no edema      LABS:                        10.2   21.37 )-----------( 430      ( 24 Jan 2022 08:10 )             34.0     01-24    146<H>  |  113<H>  |  19  ----------------------------<  138<H>  3.2<L>   |  27  |  0.56    Ca    8.6      24 Jan 2022 08:10    TPro  6.1  /  Alb  2.1<L>  /  TBili  0.3  /  DBili  x   /  AST  20  /  ALT  19  /  AlkPhos  71  01-24        CAPILLARY BLOOD GLUCOSE      POCT Blood Glucose.: 128 mg/dL (24 Jan 2022 11:35)  POCT Blood Glucose.: 153 mg/dL (24 Jan 2022 07:48)  POCT Blood Glucose.: 151 mg/dL (23 Jan 2022 22:40)      Culture - Blood (collected 21 Jan 2022 02:16)  Source: .Blood Blood-Peripheral  Preliminary Report (22 Jan 2022 03:02):    No growth to date.    Culture - Blood (collected 21 Jan 2022 02:16)  Source: .Blood Blood-Peripheral  Preliminary Report (22 Jan 2022 03:02):    No growth to date.    Culture - Urine (collected 21 Jan 2022 01:09)  Source: Clean Catch Clean Catch (Midstream)  Final Report (22 Jan 2022 20:58):    <10,000 CFU/mL Normal Urogenital Sendy      RADIOLOGY & ADDITIONAL TESTS:    Imaging Personally Reviewed:  [ ] YES  [ ] NO    Consultant(s) Notes Reviewed:  [ ] YES  [ ] NO    Care Discussed with Consultants/Other Providers [ ] YES  [ ] NO

## 2022-01-24 NOTE — PROGRESS NOTE ADULT - ASSESSMENT
81F with a PMH of HTN, HLD, hypothyroidism, depression, seizures who presents to the ED for abd pain.  Patient states that over the last two days she had developed significant abdominal pain and multiple episodes of watery diarrhea.  Reports one episode of nausea and vomiting earlier today.  Patient has no other complaints.  Vitals stable, (hypoxix?) labs show leukocytosis and hypokalemia.  CT abdomen significant for diverticulitis.  initially admitted to med surg.  CT noted with sigmoid diverticulosis with adjacent stranding, compatible with diverticulitis. No associated abscess is identified. Trace adjacent pelvic free fluid.81F with a PMH of HTN, HLD, hypothyroidism, depression, seizures p/w abd pain,       Perforated Acute diverticulitis   s/p 12/24/21 - Exploratory laparotomy and a sigmoid colectomy and abdominal washout  Patient went for abdominal wound closure and creation of colostomy on 12/26.  Patient developed adb abscess 1/3 s/p IR drainage of abdominal abscesses.  Initially patient on PPN for nutrition Now tolerating tube feeds with brown soft stool in colostomy.  Found to have multiple abdominal fluid collections s/p IR drainage of R abdominal abscess 1/3/22 growing e-coli and bacteroides  S/P Peg placement  all abx dced as per iD recs, now febrile again  BABAK drain care, wound vac in place   PEG stable, feeding started       Septic Shock s/p  pressors  Transferred to ICU for post op care on mechanical ventilation.  now off pressors failed extubation  now trach to cpap  completed ceftriaxone and flagyl per ID. trial PO vanc for diarrhea. now off all abx  Febrile again, follow CLX, neg so far, covid PCR neg  CT A/p noted, ab collections somewhat improved, Multiple focal opacification in RL, Repeat CXR looks unchanged, follow up official read   Low grade temp ,WBC increased , Re consulted ID      Hypoxemic respiratory failure now trach to CPAP  now trach to CPAP  Pulm following  on fentayl patch for pain control    acute metabolic encephalopathy sec to above  stable    Rapid AFIB due to Shock state  10mg IV lopressor in OR for Afib with RVR. 5L IVF given intra-op. EBL 100cc. Received intubated, sedated, on phenylephrine.    s/p amio load converted to sinus rhythm.  not on rate control or AC on downgrade  Cards consulted  plan:  added BB  holding full dose A/C for now      dvt ppx: lovenox  subq    Doppler ordered

## 2022-01-24 NOTE — PROGRESS NOTE ADULT - ASSESSMENT
TRESA PIZARRO 81 f 12/22/2021 1942 DR ANTONIO PATTON     REVIEW OF SYMPTOMS      Able to give (reliable) ROS  NO     PHYSICAL EXAM    HEENT Unremarkable  atraumatic   RESP Fair air entry EXP prolonged    Harsh breath sound Resp distres mild   CARDIAC S1 S2 No S3     NO JVD    ABDOMEN SOFT BS PRESENT NOT DISTENDED No hepatosplenomegaly   PEDAL EDEMA present No calf tenderness  NO rash     ______  DOA/CC.  12/22/2021 81F w/ HTN, HLD, hypothyroidism, depression, seizures. Presented w/ abdominal pain found to have acute sigmoid diverticulitis  ____  PMH.  pmh Hytn  pmh Sz.      _________  PROBLEMS.     VDRF. Since surgery 12/26  TRACH Done 1/7  ABDOMINAL INFECTION.   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 12/24 pcr (-)  ICU STAY. 12/22-1/12/2022   GOC.  1/13/2022 full code       BEST PRACTICE ISSUES.                                                  HEAD OF BED ELEVATION. Yes  DVT PROPHYLAXIS.    1/7 lvnx 40   LIGHT PROPHYLAXIS.    1/11 protonix 40                                                                                     DIET.    1/20/2022 jevity 1.5 1200  1/18/2022 npo  1/7 vital af 1200 ngt          INFECTION PROPHYLAXIS.   1/7 Chlorhexidine .12%   1/7 chlorhexidine 2%    SPEECH SWALLOW RECOMMENDATIONS.     PATIENT DATA   VITALS/PO/IO/VENT/DRIPS.   1/24/2022 99f 82 115/80   1/24/2022 cpap 5/5/.3        ASSESSMENT/RECOMMENDATIONS.    HEMODYNAMICS.   Monitor bp Target MAP 65 (+)    RESP.   Monitor po Target po 90-95%  1/14/2022 15/450/.4/5 747/38/110     OXYGEN REQUIREMENTS.   1/24/2022 30%  1/17/2022 40%  1/13/2022 40%    VENT MANAGEMENT.   HOB elevation  Target Pplat 35 (-)  Target PO 90-95%  Target pH 730 (+)    INFECTION.  Abdominal infection sec perforated sigmoid div feculent peritonitis poa 12/22.  w 1/13-1/14-1/21-1/23-1/24/2022 w 9.7 - 9.1 -10 - 15 - 21   1/16/2022 Off abio  1/22 cxr sm r eff  1/24/2022 Pt has progressive leukocytosis   1/24/2022  id on case  Recent ctap (-)    1/24/2022 check cxr and v duplx     SP ABD SURGERY.  81F admitted with Acute Sigmoid Diverticulitis s/p ex lap, sigmoid resection, peritoneal lavage 12/25 and RTOR 12/26 for irrigation of abdominal cavity with closure and creation of colostomy. S/p bedside IR aspiration of fluid collection 1/3. Tracheostomy 1/7.   Deep pelvic abscess noted on CT,  Local drain/ostomy care per nursing  1/22/2022 r lefty removd     TIME SPENT   Over 25 minutes aggregate care time spent on encounter; activities included   direct patient care, counseling and/or coordinating care reviewing notes, lab data/ imaging , discussion with multidisciplinary team/ patient  /family and explaining in detail risks, benefits, alternatives  of the recommendations     TRESA PIZARRO 81 f 12/22/2021 1942 DR ANTONIO PATTON

## 2022-01-24 NOTE — PROGRESS NOTE ADULT - SUBJECTIVE AND OBJECTIVE BOX
INTERVAL HPI/OVERNIGHT EVENTS:  Pt. seen and examined at bedside resting comfortably. No acute o/n events. Patient awake and opens eyes.   Tolerating tube feeds. No N/V.   Tmax 100.9    Vital Signs Last 24 Hrs  T(C): 38.3 (24 Jan 2022 05:24), Max: 38.3 (24 Jan 2022 05:24)  T(F): 100.9 (24 Jan 2022 05:24), Max: 100.9 (24 Jan 2022 05:24)  HR: 76 (24 Jan 2022 05:24) (60 - 89)  BP: 148/72 (24 Jan 2022 05:24) (122/66 - 148/72)  RR: 20 (24 Jan 2022 05:24) (16 - 24)  SpO2: 99% (24 Jan 2022 05:24) (94% - 100%)    PHYSICAL EXAM:  GENERAL: Awake, alert, NAD  HEENT: Trach to vent  CHEST/LUNG: Respirations nonlabored  HEART: S1S2, RRR  ABDOMEN: Midline wound vac functioning. LLQ BABAK with minimal purulent output. Soft, NTND. PEG in place, TF running.   EXTREMITIES: SCDs b/l       I&O's Detail    22 Jan 2022 07:01  -  23 Jan 2022 07:00  --------------------------------------------------------  IN:    Enteral Tube Flush: 600 mL    IV PiggyBack: 100 mL    Jevity 1.2: 1440 mL  Total IN: 2140 mL    OUT:    Bulb (mL): 25 mL    Bulb (mL): 35 mL    Colostomy (mL): 300 mL    Incontinent per Collection Bag (mL): 650 mL  Total OUT: 1010 mL    Total NET: 1130 mL      23 Jan 2022 07:01  -  24 Jan 2022 05:57  --------------------------------------------------------  IN:    Jevity 1.2: 720 mL  Total IN: 720 mL    OUT:    Bulb (mL): 20 mL  Total OUT: 20 mL    Total NET: 700 mL      LABS:                        10.6   15.24 )-----------( 468      ( 23 Jan 2022 09:57 )             35.3     01-23    145  |  112<H>  |  19  ----------------------------<  178<H>  3.6   |  28  |  0.62    Ca    8.5      23 Jan 2022 09:57  Phos  3.6     01-22  Mg     2.1     01-22    TPro  6.2  /  Alb  2.2<L>  /  TBili  0.2  /  DBili  x   /  AST  17  /  ALT  19  /  AlkPhos  74  01-23      A/P: 81F s/p ex lap, sigmoid rxn, abthera placement 12/25, RTOR for completion of hartmanns procedure and closure of abdominal wall 12/26, trach 1/7, PEG 1/18. Leukocytosis (15) yesterday, Temp 100.9 this morning.     -- TF as tolerated  -- cont left BABAK to suction, monitor output   -- wound vac changes per PT  -- management per primary team; leukocytosis w/u  -- ID follow up   --Will d/w surgical attending

## 2022-01-25 LAB
ALBUMIN SERPL ELPH-MCNC: 1.9 G/DL — LOW (ref 3.3–5)
ALP SERPL-CCNC: 82 U/L — SIGNIFICANT CHANGE UP (ref 40–120)
ALT FLD-CCNC: 21 U/L — SIGNIFICANT CHANGE UP (ref 12–78)
ANION GAP SERPL CALC-SCNC: 6 MMOL/L — SIGNIFICANT CHANGE UP (ref 5–17)
AST SERPL-CCNC: 18 U/L — SIGNIFICANT CHANGE UP (ref 15–37)
BILIRUB SERPL-MCNC: 0.3 MG/DL — SIGNIFICANT CHANGE UP (ref 0.2–1.2)
BUN SERPL-MCNC: 19 MG/DL — SIGNIFICANT CHANGE UP (ref 7–23)
CALCIUM SERPL-MCNC: 8.5 MG/DL — SIGNIFICANT CHANGE UP (ref 8.5–10.1)
CHLORIDE SERPL-SCNC: 113 MMOL/L — HIGH (ref 96–108)
CO2 SERPL-SCNC: 27 MMOL/L — SIGNIFICANT CHANGE UP (ref 22–31)
CREAT SERPL-MCNC: 0.57 MG/DL — SIGNIFICANT CHANGE UP (ref 0.5–1.3)
GLUCOSE BLDC GLUCOMTR-MCNC: 126 MG/DL — HIGH (ref 70–99)
GLUCOSE BLDC GLUCOMTR-MCNC: 144 MG/DL — HIGH (ref 70–99)
GLUCOSE SERPL-MCNC: 139 MG/DL — HIGH (ref 70–99)
HCT VFR BLD CALC: 34.9 % — SIGNIFICANT CHANGE UP (ref 34.5–45)
HGB BLD-MCNC: 10.3 G/DL — LOW (ref 11.5–15.5)
MCHC RBC-ENTMCNC: 27.9 PG — SIGNIFICANT CHANGE UP (ref 27–34)
MCHC RBC-ENTMCNC: 29.5 G/DL — LOW (ref 32–36)
MCV RBC AUTO: 94.6 FL — SIGNIFICANT CHANGE UP (ref 80–100)
NRBC # BLD: 0 /100 WBCS — SIGNIFICANT CHANGE UP (ref 0–0)
PLATELET # BLD AUTO: 369 K/UL — SIGNIFICANT CHANGE UP (ref 150–400)
POTASSIUM SERPL-MCNC: 4 MMOL/L — SIGNIFICANT CHANGE UP (ref 3.5–5.3)
POTASSIUM SERPL-SCNC: 4 MMOL/L — SIGNIFICANT CHANGE UP (ref 3.5–5.3)
PROT SERPL-MCNC: 6.2 GM/DL — SIGNIFICANT CHANGE UP (ref 6–8.3)
RBC # BLD: 3.69 M/UL — LOW (ref 3.8–5.2)
RBC # FLD: 15.4 % — HIGH (ref 10.3–14.5)
SODIUM SERPL-SCNC: 146 MMOL/L — HIGH (ref 135–145)
WBC # BLD: 18.35 K/UL — HIGH (ref 3.8–10.5)
WBC # FLD AUTO: 18.35 K/UL — HIGH (ref 3.8–10.5)

## 2022-01-25 PROCEDURE — 93970 EXTREMITY STUDY: CPT | Mod: 26

## 2022-01-25 PROCEDURE — 99232 SBSQ HOSP IP/OBS MODERATE 35: CPT

## 2022-01-25 PROCEDURE — 71045 X-RAY EXAM CHEST 1 VIEW: CPT | Mod: 26

## 2022-01-25 RX ORDER — ENOXAPARIN SODIUM 100 MG/ML
80 INJECTION SUBCUTANEOUS EVERY 12 HOURS
Refills: 0 | Status: DISCONTINUED | OUTPATIENT
Start: 2022-01-25 | End: 2022-01-28

## 2022-01-25 RX ORDER — ENOXAPARIN SODIUM 100 MG/ML
40 INJECTION SUBCUTANEOUS ONCE
Refills: 0 | Status: COMPLETED | OUTPATIENT
Start: 2022-01-25 | End: 2022-01-25

## 2022-01-25 RX ORDER — ENOXAPARIN SODIUM 100 MG/ML
80 INJECTION SUBCUTANEOUS EVERY 12 HOURS
Refills: 0 | Status: DISCONTINUED | OUTPATIENT
Start: 2022-01-25 | End: 2022-01-25

## 2022-01-25 RX ADMIN — CHLORHEXIDINE GLUCONATE 1 APPLICATION(S): 213 SOLUTION TOPICAL at 06:25

## 2022-01-25 RX ADMIN — CHLORHEXIDINE GLUCONATE 15 MILLILITER(S): 213 SOLUTION TOPICAL at 17:55

## 2022-01-25 RX ADMIN — CHLORHEXIDINE GLUCONATE 15 MILLILITER(S): 213 SOLUTION TOPICAL at 06:25

## 2022-01-25 RX ADMIN — Medication 650 MILLIGRAM(S): at 06:25

## 2022-01-25 RX ADMIN — DORZOLAMIDE HYDROCHLORIDE TIMOLOL MALEATE 1 DROP(S): 20; 5 SOLUTION/ DROPS OPHTHALMIC at 06:24

## 2022-01-25 RX ADMIN — ENOXAPARIN SODIUM 40 MILLIGRAM(S): 100 INJECTION SUBCUTANEOUS at 13:02

## 2022-01-25 RX ADMIN — CARVEDILOL PHOSPHATE 3.12 MILLIGRAM(S): 80 CAPSULE, EXTENDED RELEASE ORAL at 17:56

## 2022-01-25 RX ADMIN — DORZOLAMIDE HYDROCHLORIDE TIMOLOL MALEATE 1 DROP(S): 20; 5 SOLUTION/ DROPS OPHTHALMIC at 17:55

## 2022-01-25 RX ADMIN — Medication 650 MILLIGRAM(S): at 14:41

## 2022-01-25 RX ADMIN — Medication 50 MICROGRAM(S): at 06:25

## 2022-01-25 RX ADMIN — ESCITALOPRAM OXALATE 20 MILLIGRAM(S): 10 TABLET, FILM COATED ORAL at 11:35

## 2022-01-25 RX ADMIN — ENOXAPARIN SODIUM 80 MILLIGRAM(S): 100 INJECTION SUBCUTANEOUS at 21:55

## 2022-01-25 RX ADMIN — ENOXAPARIN SODIUM 40 MILLIGRAM(S): 100 INJECTION SUBCUTANEOUS at 11:35

## 2022-01-25 RX ADMIN — Medication 650 MILLIGRAM(S): at 11:37

## 2022-01-25 RX ADMIN — LEVETIRACETAM 420 MILLIGRAM(S): 250 TABLET, FILM COATED ORAL at 17:55

## 2022-01-25 RX ADMIN — LEVETIRACETAM 420 MILLIGRAM(S): 250 TABLET, FILM COATED ORAL at 06:25

## 2022-01-25 RX ADMIN — PANTOPRAZOLE SODIUM 40 MILLIGRAM(S): 20 TABLET, DELAYED RELEASE ORAL at 11:35

## 2022-01-25 RX ADMIN — CARVEDILOL PHOSPHATE 3.12 MILLIGRAM(S): 80 CAPSULE, EXTENDED RELEASE ORAL at 06:25

## 2022-01-25 NOTE — PROGRESS NOTE ADULT - ASSESSMENT
TRESA PIZARRO 81 f 12/22/2021 1942 DR ANTONIO PATTON     REVIEW OF SYMPTOMS      Able to give (reliable) ROS  NO     PHYSICAL EXAM    HEENT Unremarkable  atraumatic   RESP Fair air entry EXP prolonged    Harsh breath sound Resp distres mild   CARDIAC S1 S2 No S3     NO JVD    ABDOMEN SOFT BS PRESENT NOT DISTENDED No hepatosplenomegaly   PEDAL EDEMA present No calf tenderness  NO rash     ______  DOA/CC.  12/22/2021 81F w/ HTN, HLD, hypothyroidism, depression, seizures. Presented w/ abdominal pain found to have acute sigmoid diverticulitis  ____  PMH.  pmh Hytn  pmh Sz.      _________  PROBLEMS.     VDRF. Since surgery 12/26  TRACH Done 1/7  ABDOMINAL INFECTION.   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 12/24 pcr (-)  ICU STAY. 12/22-1/12/2022   GOC.  1/13/2022 full code       BEST PRACTICE ISSUES.                                                  HEAD OF BED ELEVATION. Yes  DVT PROPHYLAXIS.    1/25/2022 lvnx 80.2   1/7 lvnx 40   LIGHT PROPHYLAXIS.    1/11 protonix 40                                                                                     DIET.    1/20/2022 jevity 1.5 1200  1/18/2022 npo  1/7 vital af 1200 ngt          INFECTION PROPHYLAXIS.   1/7 Chlorhexidine .12%   1/7 chlorhexidine 2%      PATIENT DATA   VITALS/PO/IO/VENT/DRIPS.  1/25/2022 100f 77 160/70   1/25/2022 cpap 5/5/.3       ASSESSMENT/RECOMMENDATIONS.    HEMODYNAMICS.   Monitor bp Target MAP 65 (+)    RESP.   Monitor po Target po 90-95%  1/14/2022 15/450/.4/5 747/38/110     DVT  1/25/2022 V duplx partially occlusive thrombus r cfv ac to subacute   1/25/2022 dw surg antonia Winter and then with med antonia Weinberg and startd fd vnx     OXYGEN REQUIREMENTS.   1/24/2022 30%  1/17/2022 40%  1/13/2022 40%    VENT MANAGEMENT.   HOB elevation  Target Pplat 35 (-)  Target PO 90-95%  Target pH 730 (+)    INFECTION.  Abdominal infection sec perforated sigmoid div feculent peritonitis poa 12/22.  w 1/13-1/14-1/21-1/23-1/24-1/25/2022 w 9.7 - 9.1 -10 - 15 - 21 - 18   1/16/2022 Off abio  1/25/2022 cxr no infiltr  1/21 ctap oc ic Fluid collections since 1/11/2022 decreased in size or small and stable   Post pelvic fluid pocket decreased now 2.7 x 1.7 cm prev 3.9 x 2.4 cm R paracolic gutter also decreased 4.3 x 1 prev 4.5 x 1.6   1/22 cxr sm r eff  1/24/2022 Pt has progressive leukocytosis   1/24/2022  id on case  Recent ctap (-)    1/25/2022 pt was found to have r dvt and startd fd lvnx    SP ABD SURGERY.  81F admitted with Acute Sigmoid Diverticulitis s/p ex lap, sigmoid resection, peritoneal lavage 12/25 and RTOR 12/26 for irrigation of abdominal cavity with closure and creation of colostomy. S/p bedside IR aspiration of fluid collection 1/3. Tracheostomy 1/7.   Deep pelvic abscess noted on CT,  Local drain/ostomy care per nursing  1/22/2022 r lefty removd     PL EFFSN.  1/16/2022 cxr tr bl effs  1/14/2022 CXR residuall pl effs or basal consolidatn  Effsn likely sec to abdominal infkammation       ANEMIA.  Hb 1/13-1/14-1/16-1/17-1/24/2022 Hb 9.4 - 8.9 -10 - 9.6 - 10.2   Monitor    GT placed 1/18      TIME SPENT   Over 25 minutes aggregate care time spent on encounter; activities included   direct patient care, counseling and/or coordinating care reviewing notes, lab data/ imaging , discussion with multidisciplinary team/ patient  /family and explaining in detail risks, benefits, alternatives  of the recommendations     TRESA PIZARRO 81 f 12/22/2021 1942 DR ANTONIO PATTON

## 2022-01-25 NOTE — PROGRESS NOTE ADULT - SUBJECTIVE AND OBJECTIVE BOX
Ms. Allred is comfortable in bed  Responds to some yes or no questions    ICU Vital Signs Last 24 Hrs  T(C): 37.8 (25 Jan 2022 08:35), Max: 38.4 (24 Jan 2022 23:22)  T(F): 100.1 (25 Jan 2022 08:35), Max: 101.1 (24 Jan 2022 23:22)  HR: 69 (25 Jan 2022 08:05) (69 - 94)  BP: 162/72 (25 Jan 2022 05:05) (115/62 - 169/69)  BP(mean): 85 (24 Jan 2022 11:35) (85 - 85)  ABP: --  ABP(mean): --  RR: 20 (25 Jan 2022 05:05) (18 - 20)  SpO2: 98% (25 Jan 2022 08:05) (98% - 100%)      I&O's Detail    24 Jan 2022 07:01  -  25 Jan 2022 07:00  --------------------------------------------------------  IN:  Total IN: 0 mL    OUT:    Bulb (mL): 10 mL  Total OUT: 10 mL    Total NET: -10 mL      On exam: awake  Trach in place, on mechanical ventilation  Abd is soft, not distended, not tender  Wound VAC in place  Left BABAK with scant purulent drainage  Gastrostomy tube in place with tube feeds running  Ostomy is pink, viable and functioning                            10.3   18.35 )-----------( 369      ( 25 Jan 2022 06:35 )             34.9

## 2022-01-25 NOTE — PROGRESS NOTE ADULT - SUBJECTIVE AND OBJECTIVE BOX
JUAREZ GTZ  MRN-[id number]    Follow Up:  Fevers, perforated diverticulitis     Interval History: The pt was seen and examined earlier, no distress, awake and alert, follows some commands - opens her mouth. The pt had a fever of 101.1 yesterday evening, low grade temps today, WBC better, Cr and LFTs ok.     PAST MEDICAL & SURGICAL HISTORY:  Seizure    HTN (hypertension)    Glaucoma    Thyroid disease    Blood clot of neck vein  left side    TIA (transient ischemic attack)  20 years ago    S/P appendectomy        ROS:    [x ] Unobtainable because: pt is vented via tracheostomy   [ ] All other systems negative    Constitutional: no fever, no chills  Head: no trauma  Eyes: no vision changes, no eye pain  ENT:  no sore throat, no rhinorrhea  Cardiovascular:  no chest pain, no palpitation  Respiratory:  no SOB, no cough  GI:  no abd pain, no vomiting, no diarrhea  urinary: no dysuria, no hematuria, no flank pain  musculoskeletal:  no joint pain, no joint swelling  skin:  no rash  neurology:  no headache, no seizure, no change in mental status  psych: no anxiety, no depression         Allergies  No Known Allergies        ANTIMICROBIALS:      OTHER MEDS:  acetaminophen     Tablet .. 650 milliGRAM(s) Oral every 6 hours PRN  carvedilol 3.125 milliGRAM(s) Oral every 12 hours  chlorhexidine 0.12% Liquid 15 milliLiter(s) Oral Mucosa every 12 hours  chlorhexidine 2% Cloths 1 Application(s) Topical <User Schedule>  dextrose 40% Gel 15 Gram(s) Oral once  dextrose 5%. 1000 milliLiter(s) IV Continuous <Continuous>  dextrose 5%. 1000 milliLiter(s) IV Continuous <Continuous>  dextrose 50% Injectable 25 Gram(s) IV Push once  dextrose 50% Injectable 12.5 Gram(s) IV Push once  dextrose 50% Injectable 25 Gram(s) IV Push once  dorzolamide 2%/timolol 0.5% Ophthalmic Solution 1 Drop(s) Both EYES two times a day  enoxaparin Injectable 80 milliGRAM(s) SubCutaneous every 12 hours  escitalopram 20 milliGRAM(s) Oral daily  glucagon  Injectable 1 milliGRAM(s) IntraMuscular once  levETIRAcetam  IVPB 500 milliGRAM(s) IV Intermittent every 12 hours  levothyroxine 50 MICROGram(s) Oral daily  pantoprazole   Suspension 40 milliGRAM(s) Enteral Tube daily  potassium chloride    Tablet ER 20 milliEquivalent(s) Oral every 2 hours  sodium chloride 0.9% lock flush 10 milliLiter(s) IV Push every 1 hour PRN      Vital Signs Last 24 Hrs  T(C): 38.2 (25 Jan 2022 11:38), Max: 38.4 (24 Jan 2022 23:22)  T(F): 100.8 (25 Jan 2022 11:38), Max: 101.1 (24 Jan 2022 23:22)  HR: 72 (25 Jan 2022 13:59) (69 - 87)  BP: 166/74 (25 Jan 2022 11:38) (115/62 - 169/69)  BP(mean): --  RR: 20 (25 Jan 2022 11:38) (18 - 20)  SpO2: 97% (25 Jan 2022 13:59) (97% - 100%)    Physical Exam:  General: Nontoxic-appearing Female in no acute distress. Trached on vent  HEENT: AT/NC. No oral lesions on superficial exam  Neck: Not rigid. No sense of mass. Trach C/D/I  Nodes: None palpable.  Lungs: coarse breath sounds bilaterally   Heart: Regular rate and rhythm. +Murmur. No rub. No gallop. No palpable thrill.  Abdomen: Bowel sounds now present.  Soft. Mildly distended.  Incision with wound vac.  Ostomy with stool and gas. Drains x1, there is a new PEG tube which appears C/D/I   Extremities: No cyanosis or clubbing. 1+ edema of bilateral lower extremities   Skin: Warm. Dry No rash. No vasculitic stigmata.  Neuro: awake, somewhat alert and did follow simple commands but not all    Psychiatric: calm  Vacular: RUE midline     WBC Count: 18.35 K/uL (01-25 @ 06:35)  WBC Count: 21.37 K/uL (01-24 @ 08:10)  WBC Count: 15.24 K/uL (01-23 @ 09:57)  WBC Count: 10.13 K/uL (01-22 @ 07:30)  WBC Count: 10.70 K/uL (01-21 @ 08:26)  WBC Count: 11.64 K/uL (01-20 @ 08:04)  WBC Count: 11.47 K/uL (01-19 @ 16:26)                            10.3   18.35 )-----------( 369      ( 25 Jan 2022 06:35 )             34.9       01-25    146<H>  |  113<H>  |  19  ----------------------------<  139<H>  4.0   |  27  |  0.57    Ca    8.5      25 Jan 2022 06:35    TPro  6.2  /  Alb  1.9<L>  /  TBili  0.3  /  DBili  x   /  AST  18  /  ALT  21  /  AlkPhos  82  01-25          Creatinine Trend: 0.57<--, 0.56<--, 0.62<--, 0.46<--, 0.48<--, 0.46<--      MICROBIOLOGY:  v  .Blood Blood-Peripheral  01-21-22   No growth to date.  --  --      Clean Catch Clean Catch (Midstream)  01-21-22   <10,000 CFU/mL Normal Urogenital Sendy  --  --      .Body Fluid Abdominal Fluid  01-03-22   Few Escherichia coli  Few Bacteroides ovatus group "Susceptibilities not performed"  --  Escherichia coli      .Sputum Sputum  12-28-21   Normal Respiratory Sendy present  --    Moderate polymorphonuclear leukocytes per low power field  Numerous Squamous epithelial cells per low power field  Few Gram positive cocci in pairs per oil power field  Rare Gram Positive Rods per oil power field  Rare Yeast like cells per oil power field  Results consistent with oropharyngeal contamination      .Blood Blood  12-28-21   No Growth Final  --  --          Rapid RVP Result: NotDetec (01-20 @ 18:59)      Procalcitonin, Serum: 0.06 (01-24-22 @ 10:30)  Procalcitonin, Serum: 0.04 (01-22-22 @ 10:46)    Rapid RVP Result: NotDetec (01-20-22 @ 18:59)  SARS-CoV-2: NotDetec (01-20-22 @ 18:59)    SARS-CoV-2: NotDetec (20 Jan 2022 18:59)  SARS-CoV-2: NotDetec (24 Dec 2021 14:38)  SARS-CoV-2: NotDetec (24 Dec 2021 00:03)    RADIOLOGY:     JUAREZ GTZ  MRN-[id number]    Follow Up:  Fevers, perforated diverticulitis     Interval History: The pt was seen and examined earlier, no distress, awake and alert, follows some commands - opens her mouth. The pt had a fever of 101.1 yesterday evening, low grade temps today, WBC better, Cr and LFTs ok.     PAST MEDICAL & SURGICAL HISTORY:  Seizure    HTN (hypertension)    Glaucoma    Thyroid disease    Blood clot of neck vein  left side    TIA (transient ischemic attack)  20 years ago    S/P appendectomy        ROS:    [x ] Unobtainable because: pt is vented via tracheostomy   [ ] All other systems negative    Constitutional: no fever, no chills  Head: no trauma  Eyes: no vision changes, no eye pain  ENT:  no sore throat, no rhinorrhea  Cardiovascular:  no chest pain, no palpitation  Respiratory:  no SOB, no cough  GI:  no abd pain, no vomiting, no diarrhea  urinary: no dysuria, no hematuria, no flank pain  musculoskeletal:  no joint pain, no joint swelling  skin:  no rash  neurology:  no headache, no seizure, no change in mental status  psych: no anxiety, no depression         Allergies  No Known Allergies        ANTIMICROBIALS:      OTHER MEDS:  acetaminophen     Tablet .. 650 milliGRAM(s) Oral every 6 hours PRN  carvedilol 3.125 milliGRAM(s) Oral every 12 hours  chlorhexidine 0.12% Liquid 15 milliLiter(s) Oral Mucosa every 12 hours  chlorhexidine 2% Cloths 1 Application(s) Topical <User Schedule>  dextrose 40% Gel 15 Gram(s) Oral once  dextrose 5%. 1000 milliLiter(s) IV Continuous <Continuous>  dextrose 5%. 1000 milliLiter(s) IV Continuous <Continuous>  dextrose 50% Injectable 25 Gram(s) IV Push once  dextrose 50% Injectable 12.5 Gram(s) IV Push once  dextrose 50% Injectable 25 Gram(s) IV Push once  dorzolamide 2%/timolol 0.5% Ophthalmic Solution 1 Drop(s) Both EYES two times a day  enoxaparin Injectable 80 milliGRAM(s) SubCutaneous every 12 hours  escitalopram 20 milliGRAM(s) Oral daily  glucagon  Injectable 1 milliGRAM(s) IntraMuscular once  levETIRAcetam  IVPB 500 milliGRAM(s) IV Intermittent every 12 hours  levothyroxine 50 MICROGram(s) Oral daily  pantoprazole   Suspension 40 milliGRAM(s) Enteral Tube daily  potassium chloride    Tablet ER 20 milliEquivalent(s) Oral every 2 hours  sodium chloride 0.9% lock flush 10 milliLiter(s) IV Push every 1 hour PRN      Vital Signs Last 24 Hrs  T(C): 38.2 (25 Jan 2022 11:38), Max: 38.4 (24 Jan 2022 23:22)  T(F): 100.8 (25 Jan 2022 11:38), Max: 101.1 (24 Jan 2022 23:22)  HR: 72 (25 Jan 2022 13:59) (69 - 87)  BP: 166/74 (25 Jan 2022 11:38) (115/62 - 169/69)  BP(mean): --  RR: 20 (25 Jan 2022 11:38) (18 - 20)  SpO2: 97% (25 Jan 2022 13:59) (97% - 100%)    Physical Exam:  General: Nontoxic-appearing Female in no acute distress. Trached on vent  HEENT: AT/NC. No oral lesions on superficial exam  Neck: Not rigid. No sense of mass. Trach C/D/I  Nodes: None palpable.  Lungs: coarse breath sounds bilaterally   Heart: Regular rate and rhythm. +Murmur. No rub. No gallop. No palpable thrill.  Abdomen: Bowel sounds now present.  Soft. Mildly distended.  Incision with wound vac.  Ostomy with stool and gas. Drains x1, there is a new PEG tube which appears C/D/I   Extremities: No cyanosis or clubbing. 1+ edema of bilateral lower extremities   Skin: Warm. Dry No rash. No vasculitic stigmata.  Neuro: awake, somewhat alert and did follow simple commands but not all    Psychiatric: calm  Vacular: RUE midline     WBC Count: 18.35 K/uL (01-25 @ 06:35)  WBC Count: 21.37 K/uL (01-24 @ 08:10)  WBC Count: 15.24 K/uL (01-23 @ 09:57)  WBC Count: 10.13 K/uL (01-22 @ 07:30)  WBC Count: 10.70 K/uL (01-21 @ 08:26)  WBC Count: 11.64 K/uL (01-20 @ 08:04)  WBC Count: 11.47 K/uL (01-19 @ 16:26)                            10.3   18.35 )-----------( 369      ( 25 Jan 2022 06:35 )             34.9       01-25    146<H>  |  113<H>  |  19  ----------------------------<  139<H>  4.0   |  27  |  0.57    Ca    8.5      25 Jan 2022 06:35    TPro  6.2  /  Alb  1.9<L>  /  TBili  0.3  /  DBili  x   /  AST  18  /  ALT  21  /  AlkPhos  82  01-25          Creatinine Trend: 0.57<--, 0.56<--, 0.62<--, 0.46<--, 0.48<--, 0.46<--      MICROBIOLOGY:  v  .Blood Blood-Peripheral  01-21-22   No growth to date.  --  --      Clean Catch Clean Catch (Midstream)  01-21-22   <10,000 CFU/mL Normal Urogenital Sendy  --  --      .Body Fluid Abdominal Fluid  01-03-22   Few Escherichia coli  Few Bacteroides ovatus group "Susceptibilities not performed"  --  Escherichia coli      .Sputum Sputum  12-28-21   Normal Respiratory Sendy present  --    Moderate polymorphonuclear leukocytes per low power field  Numerous Squamous epithelial cells per low power field  Few Gram positive cocci in pairs per oil power field  Rare Gram Positive Rods per oil power field  Rare Yeast like cells per oil power field  Results consistent with oropharyngeal contamination      .Blood Blood  12-28-21   No Growth Final  --  --          Rapid RVP Result: NotDetec (01-20 @ 18:59)      Procalcitonin, Serum: 0.06 (01-24-22 @ 10:30)  Procalcitonin, Serum: 0.04 (01-22-22 @ 10:46)    Rapid RVP Result: NotDetec (01-20-22 @ 18:59)  SARS-CoV-2: NotDetec (01-20-22 @ 18:59)    SARS-CoV-2: NotDetec (20 Jan 2022 18:59)  SARS-CoV-2: NotDetec (24 Dec 2021 14:38)  SARS-CoV-2: NotDetec (24 Dec 2021 00:03)    RADIOLOGY:  DVT: atrially occluded DVT

## 2022-01-25 NOTE — PROGRESS NOTE ADULT - ASSESSMENT
81 year old woman s/p Rosales's, trach, PEG, wound VAC  - continue tube feeds as tolerated  - continue local wound care with wound vac changes  - continue to monitor left BABAK quality and quantity  - appreciate ID recommendations, monitoring off antibiotics

## 2022-01-25 NOTE — CHART NOTE - NSCHARTNOTEFT_GEN_A_CORE
Called by Dr. Justice to start lovenox full dose for + DVT R femoral vein. Dr. Justice spoke with surgical ROSAILNE Balderas who told him No surgical contraindication to full dose anticoagulation. Pr received lovenox 40 mg today already, additional lovenox 40 mg ordered for now, start lovenox 80 mg tonight.

## 2022-01-25 NOTE — PROGRESS NOTE ADULT - SUBJECTIVE AND OBJECTIVE BOX
JUAREZ GTZ    LVS 2C 242 I    Allergies    No Known Allergies    Intolerances        PAST MEDICAL & SURGICAL HISTORY:  Seizure    HTN (hypertension)    Glaucoma    Thyroid disease    Blood clot of neck vein  left side    TIA (transient ischemic attack)  20 years ago    S/P appendectomy        FAMILY HISTORY:  FH: HTN (hypertension)        Home Medications:  amLODIPine 10 mg oral tablet: 1 tab(s) orally once a day (22 Dec 2021 16:46)  aspirin 81 mg oral tablet, chewable: 1 tab(s) orally once a day (22 Dec 2021 16:46)  escitalopram 20 mg oral tablet: 1 tab(s) orally once a day (22 Dec 2021 16:46)  keppera:  (22 Dec 2021 16:46)  levETIRAcetam 500 mg oral tablet: 1 tab(s) orally 2 times a day (22 Dec 2021 16:46)  timolol-dorzolamide 0.5%-2% preservative-free ophthalmic solution: 1 drop(s) to each affected eye 2 times a day (22 Dec 2021 16:46)  Zetia 10 mg oral tablet: 1 tab(s) orally once a day (22 Dec 2021 16:46)      MEDICATIONS  (STANDING):  carvedilol 3.125 milliGRAM(s) Oral every 12 hours  chlorhexidine 0.12% Liquid 15 milliLiter(s) Oral Mucosa every 12 hours  chlorhexidine 2% Cloths 1 Application(s) Topical <User Schedule>  dextrose 40% Gel 15 Gram(s) Oral once  dextrose 5%. 1000 milliLiter(s) (50 mL/Hr) IV Continuous <Continuous>  dextrose 5%. 1000 milliLiter(s) (100 mL/Hr) IV Continuous <Continuous>  dextrose 50% Injectable 25 Gram(s) IV Push once  dextrose 50% Injectable 12.5 Gram(s) IV Push once  dextrose 50% Injectable 25 Gram(s) IV Push once  dorzolamide 2%/timolol 0.5% Ophthalmic Solution 1 Drop(s) Both EYES two times a day  enoxaparin Injectable 40 milliGRAM(s) SubCutaneous daily  escitalopram 20 milliGRAM(s) Oral daily  glucagon  Injectable 1 milliGRAM(s) IntraMuscular once  levETIRAcetam  IVPB 500 milliGRAM(s) IV Intermittent every 12 hours  levothyroxine 50 MICROGram(s) Oral daily  pantoprazole   Suspension 40 milliGRAM(s) Enteral Tube daily  potassium chloride    Tablet ER 20 milliEquivalent(s) Oral every 2 hours    MEDICATIONS  (PRN):  acetaminophen     Tablet .. 650 milliGRAM(s) Oral every 6 hours PRN Temp greater or equal to 38C (100.4F)  sodium chloride 0.9% lock flush 10 milliLiter(s) IV Push every 1 hour PRN Pre/post blood products, medications, blood draw, and to maintain line patency      Diet, NPO with Tube Feed:   Tube Feeding Modality: Gastrostomy  Jevity 1.2 Santos  Total Volume for 24 Hours (mL): 1440  Continuous  Starting Tube Feed Rate mL per Hour: 20  Increase Tube Feed Rate by (mL): 10     Every 6 hours  Until Goal Tube Feed Rate (mL per Hour): 60  Tube Feed Duration (in Hours): 24  Tube Feed Start Time: 12:00  Free Water Flush  Bolus   Total Volume per Flush (mL): 150   Frequency: Every 6 Hours   Total Daily Volume of Flush (mL): 600    Start Time: 12:00 (01-21-22 @ 11:06) [Active]          Vital Signs Last 24 Hrs  T(C): 38 (25 Jan 2022 05:05), Max: 38.4 (24 Jan 2022 23:22)  T(F): 100.4 (25 Jan 2022 05:05), Max: 101.1 (24 Jan 2022 23:22)  HR: 69 (25 Jan 2022 08:05) (69 - 94)  BP: 162/72 (25 Jan 2022 05:05) (115/62 - 169/69)  BP(mean): 85 (24 Jan 2022 11:35) (85 - 85)  RR: 20 (25 Jan 2022 05:05) (18 - 20)  SpO2: 98% (25 Jan 2022 08:05) (98% - 100%)      01-24-22 @ 07:01  -  01-25-22 @ 07:00  --------------------------------------------------------  IN: 0 mL / OUT: 10 mL / NET: -10 mL        Mode: CPAP with PS, FiO2: 30, PEEP: 5, PS: 5, ITime: 1, MAP: 7, PIP: 10      LABS:                        10.3   18.35 )-----------( 369      ( 25 Jan 2022 06:35 )             34.9     01-25    146<H>  |  113<H>  |  19  ----------------------------<  139<H>  4.0   |  27  |  0.57    Ca    8.5      25 Jan 2022 06:35    TPro  6.2  /  Alb  1.9<L>  /  TBili  0.3  /  DBili  x   /  AST  18  /  ALT  21  /  AlkPhos  82  01-25              WBC:  WBC Count: 18.35 K/uL (01-25 @ 06:35)  WBC Count: 21.37 K/uL (01-24 @ 08:10)  WBC Count: 15.24 K/uL (01-23 @ 09:57)  WBC Count: 10.13 K/uL (01-22 @ 07:30)      MICROBIOLOGY:  RECENT CULTURES:  01-21 .Blood Blood-Peripheral XXXX XXXX   No growth to date.    01-21 Clean Catch Clean Catch (Midstream) XXXX XXXX   <10,000 CFU/mL Normal Urogenital Sendy                    Sodium:  Sodium, Serum: 146 mmol/L (01-25 @ 06:35)  Sodium, Serum: 146 mmol/L (01-24 @ 08:10)  Sodium, Serum: 145 mmol/L (01-23 @ 09:57)  Sodium, Serum: 145 mmol/L (01-22 @ 18:11)  Sodium, Serum: 143 mmol/L (01-22 @ 07:30)      0.57 mg/dL 01-25 @ 06:35  0.56 mg/dL 01-24 @ 08:10  0.62 mg/dL 01-23 @ 09:57  0.46 mg/dL 01-22 @ 18:11  0.48 mg/dL 01-22 @ 07:30      Hemoglobin:  Hemoglobin: 10.3 g/dL (01-25 @ 06:35)  Hemoglobin: 10.2 g/dL (01-24 @ 08:10)  Hemoglobin: 10.6 g/dL (01-23 @ 09:57)  Hemoglobin: 10.3 g/dL (01-22 @ 07:30)      Platelets: Platelet Count - Automated: 369 K/uL (01-25 @ 06:35)  Platelet Count - Automated: 430 K/uL (01-24 @ 08:10)  Platelet Count - Automated: 468 K/uL (01-23 @ 09:57)  Platelet Count - Automated: 477 K/uL (01-22 @ 07:30)      LIVER FUNCTIONS - ( 25 Jan 2022 06:35 )  Alb: 1.9 g/dL / Pro: 6.2 gm/dL / ALK PHOS: 82 U/L / ALT: 21 U/L / AST: 18 U/L / GGT: x                 RADIOLOGY & ADDITIONAL STUDIES:      MICROBIOLOGY:  RECENT CULTURES:  01-21 .Blood Blood-Peripheral XXXX XXXX   No growth to date.    01-21 Clean Catch Clean Catch (Midstream) XXXX XXXX   <10,000 CFU/mL Normal Urogenital Sendy

## 2022-01-25 NOTE — PROGRESS NOTE ADULT - ASSESSMENT
81F with a PMH of HTN, HLD, hypothyroidism, depression, seizures who presents to the ED for abd pain found to have diverticulitis   has rising leukocytosis   had fever yesterday   tachycardic and now hypoxic   CXR (I personally reviewed) low lung volumes   origincal CT (I personally reviewed) with diverticulitis   she had a lot of pain in the abdomen on exam     12/25: s/p surgery for diverticulitis with perforation and abscess and went to the OR. intubated in ICU with vac and needs to go back to the OR, currently on zosyn, s/p one dose of diflucan last night   12/27: s/p repair and ostomy creation yesterday, wbc elevated, cultures sensitive to zosyn and candida albicans is notoriously sensitive to azoles such as fluconazole, wean of pressors and sedation, extubate when ready   12/28: Further increase in white blood cell count but overall the patient appears to be reasonably stable.  No concern of C. difficile at this juncture.  Current antibiotics are reasonable.  Leukocytosis like related to recent surgery, no concern of other superimposed process on the basis of exam.  I do not see a role to alter her antimicrobials.  12/29:  Remains intubated in ICU. still quite edematous and net positive, WBC climbing, small wound dehiscence at bottom of incision, plan for initiation of TPN today, remains on antibiotics    12/30: rising WBC, ostomy not functioning yet, very edematous CT today, may be source control issue so for now can continue same antibiotics and antifungal but may need to change if findings on the CT are worrisome or change in hemodynamics  12/31:Leukocytosis, with collections noted on CT.  However this is being done postoperative day 5, there is some possibility that this could represent postop changes but given the leukocytosis, concur with plans for attempts at percutaneous drainage.  White blood cell count down slightly compared to prior peak, will need to be trended.  Gallbladder is also distended.  Abdominal exam was benign this morning, but a calculus cholecystitis could be the differential diagnosis here as well (though n.p.o. status certainly could explain distended gallbladder, there is also report of gallbladder wall thickening).  Would modify antibiotics based on cultures from drainage, I do not believe that antibiotics and other the cells would be adequate here.  Fortunately she is off pressors, with preserved renal function, and tolerating pressure support.  1/1/22: Postop day 6.  I have some concerns with the appearance of the ostomy, though the abdominal exam overall is otherwise unchanged.  White blood cell count remains elevated, but is lower compared with prior values.  This is despite no change in antibiotic therapy.  Patient also has asymmetric edema of the upper extremities, right greater than left.  Low threshold for duplex ultrasound to exclude DVT.No new surveillance culture data.  1/2: POD7 Ostomy looks better today, UE symmetric. WBC elevated, some slight downward trend overall. Temps <= 100F.   1/3: drainage of fluid collection today, continue zosyn and fluconazole for now, monitor wbc and temps, watch ostomy output, diuresis and address third spacing   1/4: s/p drainage yesterday now growing ecoli and awaiting for sensitivities, wbc is improving, started on tube feeds    1/5: abscess growing ecoli and bacteroides, S to ceftriaxone, wbc 15, weaning trial today, pain management    1/6: awake, lung exercise today, trach planned for tomorrow, ostomy output is good. will stop fluconazole today and change antibiotics to ceftriaxone and flagly. Unclear stop date yet    1/7 trach today, continue antibiotics   1/9: s/p trach, had low grade fever but otherwise doing ok, will continues same antibiotics regimen for now but if fevers continue she will needs to be rescanned as those abscesses can progress.  1/10: low grade fever but doing well on trach-PS, ostomy with stool and gas, shakes head when asked if in pain, discussed with surgery about repeating CT scan given the temp  1/11: Still febrile, favor interval CT as above.  1/12: CT of abdomen showing some signs of improvement and elsewhere  it shows signs of worsening. wbc normal, tube feeds currently via NGT   1/13: no fever, no wbc, Cr and LFTs ok, Ceftriaxone and flagyl continued. No planned IR intervention at this time - fluid collection without access to drain, surgical drain within the collection. Pt possibly will have repeat imaging over the weekend to evaluate progress.   Attending addendum:  agree with above  continue antibiotics   vent weaning  surgical follow up for the abdominal wound  1/14: Low grade temp last night, midline placed, no leukocytosis, Cr and LFTs ok, culture data reviewed. Pt's colostomy with small amount of liquid stool and gas, no stools recorded for two days, no role for po Vancomycin at this time. The pt has been on abx since her admission, will stop all abx and will continue to monitor.    1/18: no fevers since 1/14, WBC better 11.00, cr ok, off abx, now s/p EGD, with PEG replacement.   1/24: spiking low grade fevers, low suspicion for pneumonia given little secretions and doing well on the PS wth low settings, peg site looks clean, trach site OK as well, fevers may be from small fluid collections intra-abdominally vs atelectasis Would suggest aggressive pulmonary toilet including chest vest. follow cultures and trend wbc and fever curve . If persistent fevers would start  Zosyn and PO fluconazole but for now please hold antibiotics   1/25: continues having fevers, low grade temps today, WBC better 18.35, Cr and LFTs ok, + new DVT of right common femoral vein - could be the cause of pt's fevers - on full dose Lovenox. Will continue to monitor off abx, WBC got better without abx.     #Perforated diverticulum of large intestine.   ·  Plan: continue to monitor off antibiotics  monitor for worsening signs and at which point start Zosyn and PO fluconazole   continue wound vac       #Fever.   ·  Plan:   trend pt's temperatures   monitor off antibiotics.   UA   + new DVT of right common femoral vein   consider exchanging midline in right arm   frequent turns, offloading, and nutrition optimization to avoid bedsores    #Acute Respiratory Failure  wean oxygen as tolerated  chest PT  frequent suctioning   chest vest   HOB >30 degrees    #DVT  AC as per protocol

## 2022-01-25 NOTE — PROGRESS NOTE ADULT - SUBJECTIVE AND OBJECTIVE BOX
Patient is a 81y old  Female who presents with a chief complaint of diverticulitis (25 Jan 2022 11:14)    INTERVAL HPI/OVERNIGHT EVENTS:  Pt was seen and examined, no acute events.    MEDICATIONS  (STANDING):  carvedilol 3.125 milliGRAM(s) Oral every 12 hours  chlorhexidine 0.12% Liquid 15 milliLiter(s) Oral Mucosa every 12 hours  chlorhexidine 2% Cloths 1 Application(s) Topical <User Schedule>  dextrose 40% Gel 15 Gram(s) Oral once  dextrose 5%. 1000 milliLiter(s) (50 mL/Hr) IV Continuous <Continuous>  dextrose 5%. 1000 milliLiter(s) (100 mL/Hr) IV Continuous <Continuous>  dextrose 50% Injectable 25 Gram(s) IV Push once  dextrose 50% Injectable 12.5 Gram(s) IV Push once  dextrose 50% Injectable 25 Gram(s) IV Push once  dorzolamide 2%/timolol 0.5% Ophthalmic Solution 1 Drop(s) Both EYES two times a day  enoxaparin Injectable 40 milliGRAM(s) SubCutaneous once  enoxaparin Injectable 80 milliGRAM(s) SubCutaneous every 12 hours  escitalopram 20 milliGRAM(s) Oral daily  glucagon  Injectable 1 milliGRAM(s) IntraMuscular once  levETIRAcetam  IVPB 500 milliGRAM(s) IV Intermittent every 12 hours  levothyroxine 50 MICROGram(s) Oral daily  pantoprazole   Suspension 40 milliGRAM(s) Enteral Tube daily  potassium chloride    Tablet ER 20 milliEquivalent(s) Oral every 2 hours    MEDICATIONS  (PRN):  acetaminophen     Tablet .. 650 milliGRAM(s) Oral every 6 hours PRN Temp greater or equal to 38C (100.4F)  sodium chloride 0.9% lock flush 10 milliLiter(s) IV Push every 1 hour PRN Pre/post blood products, medications, blood draw, and to maintain line patency      Allergies  No Known Allergies      Vital Signs Last 24 Hrs  T(C): 38.2 (25 Jan 2022 11:38), Max: 38.4 (24 Jan 2022 23:22)  T(F): 100.8 (25 Jan 2022 11:38), Max: 101.1 (24 Jan 2022 23:22)  HR: 77 (25 Jan 2022 11:38) (69 - 94)  BP: 166/74 (25 Jan 2022 11:38) (115/62 - 169/69)  BP(mean): --  RR: 20 (25 Jan 2022 11:38) (18 - 20)  SpO2: 99% (25 Jan 2022 11:38) (98% - 100%)      PHYSICAL EXAM:  GENERAL: NAD  HEAD:  Atraumatic   EYES: PERRLA  NERVOUS SYSTEM:  Awake, alert  CHEST/LUNG: Diminished, on CPAP via trach   HEART: RRR  ABDOMEN: Soft, non tender  EXTREMITIES:  no edema      LABS:                        10.3   18.35 )-----------( 369      ( 25 Jan 2022 06:35 )             34.9     01-25    146<H>  |  113<H>  |  19  ----------------------------<  139<H>  4.0   |  27  |  0.57    Ca    8.5      25 Jan 2022 06:35    TPro  6.2  /  Alb  1.9<L>  /  TBili  0.3  /  DBili  x   /  AST  18  /  ALT  21  /  AlkPhos  82  01-25        CAPILLARY BLOOD GLUCOSE      POCT Blood Glucose.: 126 mg/dL (25 Jan 2022 05:08)  POCT Blood Glucose.: 137 mg/dL (24 Jan 2022 22:29)      Culture - Blood (collected 21 Jan 2022 02:16)  Source: .Blood Blood-Peripheral  Preliminary Report (22 Jan 2022 03:02):    No growth to date.    Culture - Blood (collected 21 Jan 2022 02:16)  Source: .Blood Blood-Peripheral  Preliminary Report (22 Jan 2022 03:02):    No growth to date.    Culture - Urine (collected 21 Jan 2022 01:09)  Source: Clean Catch Clean Catch (Midstream)  Final Report (22 Jan 2022 20:58):    <10,000 CFU/mL Normal Urogenital Sendy      RADIOLOGY & ADDITIONAL TESTS:    Imaging Personally Reviewed:  [ ] YES  [ ] NO    Consultant(s) Notes Reviewed:  [ ] YES  [ ] NO    Care Discussed with Consultants/Other Providers [ ] YES  [ ] NO

## 2022-01-25 NOTE — PROGRESS NOTE ADULT - ASSESSMENT
81F with a PMH of HTN, HLD, hypothyroidism, depression, seizures p/w abd pain, found to have acute diverticulitis . acute metabolic encephalopathy sec to above Perforated sigmoid diverticulitis. 12/24/21 - Exploratory laparotomy and a sigmoid colectomy and abdominal washout abd at that time left open. Transferred to ICU for post op care on mechanical ventilation. 10mg IV lopressor in OR for Afib with RVR. 5L IVF given intra-op. EBL 100cc. Received intubated, sedated, on phenylephrine.  Patient went for abdominal wound closure and creation of colostomy on 12/26. Patient developed adb abscess 1/3 s/p IR drainage of abdominal abscesses. Initially patient on PPN for nutrition Now tolerating tube feeds with brown soft stool in colostomy.. Found to have multiple abdominal fluid collections s/p IR drainage of R abdominal abscess 1/3/22 growing e-coli and bacteroides Sensitive to ceftriaxone, continue antibiotics to ceftriaxone and flagyl per ID.   Patient was unable to wean and son consented to trach.  -Now out of shock state not on vasopressor support.  Post op a-fib RVR, s/p amio load converted to sinus rhythm.  Patient with intermittent fevers. S/P PEG and trach. Pain management on fentanyl patch.      Perforated Acute diverticulitis   s/p 12/24/21 - Exploratory laparotomy and a sigmoid colectomy and abdominal washout  Patient went for abdominal wound closure and creation of colostomy on 12/26.  Patient developed adb abscess 1/3 s/p IR drainage of abdominal abscesses.  Initially patient on PPN for nutrition Now tolerating tube feeds with brown soft stool in colostomy.  Found to have multiple abdominal fluid collections s/p IR drainage of R abdominal abscess 1/3/22 growing e-coli and bacteroides  S/P Peg placement  all abx dced as per iD recs, now febrile again  Repeat CT noted   BABAK drain care, wound vac in place   PEG stable, feeding started       Septic Shock s/p  pressors  Transferred to ICU for post op care on mechanical ventilation.  now off pressors,  failed extubation, trach to cpap  completed ceftriaxone and flagyl per ID. trial PO vanc for diarrhea. now off all abx  Febrile again, follow CLX, neg so far, covid PCR neg  CT A/p noted, ab collections somewhat improved, Multiple focal opacification in RL, Repeat CXR looks unchanged,.  Low grade temp ,WBC increased , Re consulted ID, hold off Abx now  Pt has acute DVT, possible cause of fever?      Hypoxemic respiratory failure now trach to CPAP  now trach to CPAP  Pulm following  on fentayl patch for pain control    acute metabolic encephalopathy sec to above  stable    Rapid AFIB due to Shock state  s/p amio load converted to sinus rhythm.  not on rate control or AC on downgrade, now on AC also for DVT  Cards consulted  added BB  On AC      Acute DVT:  - Started on full dose Lovenox    Discussed with Friend Reece.

## 2022-01-26 LAB
ALBUMIN SERPL ELPH-MCNC: 1.9 G/DL — LOW (ref 3.3–5)
ALP SERPL-CCNC: 72 U/L — SIGNIFICANT CHANGE UP (ref 40–120)
ALT FLD-CCNC: 28 U/L — SIGNIFICANT CHANGE UP (ref 12–78)
ANION GAP SERPL CALC-SCNC: 4 MMOL/L — LOW (ref 5–17)
AST SERPL-CCNC: 25 U/L — SIGNIFICANT CHANGE UP (ref 15–37)
BILIRUB SERPL-MCNC: 0.1 MG/DL — LOW (ref 0.2–1.2)
BUN SERPL-MCNC: 19 MG/DL — SIGNIFICANT CHANGE UP (ref 7–23)
CALCIUM SERPL-MCNC: 8.4 MG/DL — LOW (ref 8.5–10.1)
CHLORIDE SERPL-SCNC: 115 MMOL/L — HIGH (ref 96–108)
CO2 SERPL-SCNC: 28 MMOL/L — SIGNIFICANT CHANGE UP (ref 22–31)
CREAT SERPL-MCNC: 0.5 MG/DL — SIGNIFICANT CHANGE UP (ref 0.5–1.3)
CULTURE RESULTS: SIGNIFICANT CHANGE UP
CULTURE RESULTS: SIGNIFICANT CHANGE UP
FLUAV AG NPH QL: SIGNIFICANT CHANGE UP
FLUBV AG NPH QL: SIGNIFICANT CHANGE UP
GLUCOSE BLDC GLUCOMTR-MCNC: 127 MG/DL — HIGH (ref 70–99)
GLUCOSE BLDC GLUCOMTR-MCNC: 135 MG/DL — HIGH (ref 70–99)
GLUCOSE BLDC GLUCOMTR-MCNC: 141 MG/DL — HIGH (ref 70–99)
GLUCOSE SERPL-MCNC: 147 MG/DL — HIGH (ref 70–99)
HCT VFR BLD CALC: 30.6 % — LOW (ref 34.5–45)
HGB BLD-MCNC: 9 G/DL — LOW (ref 11.5–15.5)
MCHC RBC-ENTMCNC: 28.1 PG — SIGNIFICANT CHANGE UP (ref 27–34)
MCHC RBC-ENTMCNC: 29.4 G/DL — LOW (ref 32–36)
MCV RBC AUTO: 95.6 FL — SIGNIFICANT CHANGE UP (ref 80–100)
NRBC # BLD: 0 /100 WBCS — SIGNIFICANT CHANGE UP (ref 0–0)
PLATELET # BLD AUTO: 320 K/UL — SIGNIFICANT CHANGE UP (ref 150–400)
POTASSIUM SERPL-MCNC: 3.5 MMOL/L — SIGNIFICANT CHANGE UP (ref 3.5–5.3)
POTASSIUM SERPL-SCNC: 3.5 MMOL/L — SIGNIFICANT CHANGE UP (ref 3.5–5.3)
PROT SERPL-MCNC: 5.8 GM/DL — LOW (ref 6–8.3)
RBC # BLD: 3.2 M/UL — LOW (ref 3.8–5.2)
RBC # FLD: 15.2 % — HIGH (ref 10.3–14.5)
SARS-COV-2 RNA SPEC QL NAA+PROBE: SIGNIFICANT CHANGE UP
SODIUM SERPL-SCNC: 147 MMOL/L — HIGH (ref 135–145)
SPECIMEN SOURCE: SIGNIFICANT CHANGE UP
SPECIMEN SOURCE: SIGNIFICANT CHANGE UP
WBC # BLD: 14.12 K/UL — HIGH (ref 3.8–10.5)
WBC # FLD AUTO: 14.12 K/UL — HIGH (ref 3.8–10.5)

## 2022-01-26 PROCEDURE — 99232 SBSQ HOSP IP/OBS MODERATE 35: CPT

## 2022-01-26 PROCEDURE — 99233 SBSQ HOSP IP/OBS HIGH 50: CPT

## 2022-01-26 RX ORDER — AMLODIPINE BESYLATE 2.5 MG/1
10 TABLET ORAL DAILY
Refills: 0 | Status: DISCONTINUED | OUTPATIENT
Start: 2022-01-26 | End: 2022-02-11

## 2022-01-26 RX ADMIN — LEVETIRACETAM 420 MILLIGRAM(S): 250 TABLET, FILM COATED ORAL at 17:39

## 2022-01-26 RX ADMIN — ENOXAPARIN SODIUM 80 MILLIGRAM(S): 100 INJECTION SUBCUTANEOUS at 22:18

## 2022-01-26 RX ADMIN — Medication 650 MILLIGRAM(S): at 00:56

## 2022-01-26 RX ADMIN — ENOXAPARIN SODIUM 80 MILLIGRAM(S): 100 INJECTION SUBCUTANEOUS at 11:50

## 2022-01-26 RX ADMIN — ESCITALOPRAM OXALATE 20 MILLIGRAM(S): 10 TABLET, FILM COATED ORAL at 11:46

## 2022-01-26 RX ADMIN — Medication 650 MILLIGRAM(S): at 12:50

## 2022-01-26 RX ADMIN — Medication 650 MILLIGRAM(S): at 11:49

## 2022-01-26 RX ADMIN — CHLORHEXIDINE GLUCONATE 15 MILLILITER(S): 213 SOLUTION TOPICAL at 17:38

## 2022-01-26 RX ADMIN — PANTOPRAZOLE SODIUM 40 MILLIGRAM(S): 20 TABLET, DELAYED RELEASE ORAL at 11:46

## 2022-01-26 RX ADMIN — CARVEDILOL PHOSPHATE 3.12 MILLIGRAM(S): 80 CAPSULE, EXTENDED RELEASE ORAL at 06:17

## 2022-01-26 RX ADMIN — CHLORHEXIDINE GLUCONATE 15 MILLILITER(S): 213 SOLUTION TOPICAL at 06:17

## 2022-01-26 RX ADMIN — DORZOLAMIDE HYDROCHLORIDE TIMOLOL MALEATE 1 DROP(S): 20; 5 SOLUTION/ DROPS OPHTHALMIC at 06:17

## 2022-01-26 RX ADMIN — DORZOLAMIDE HYDROCHLORIDE TIMOLOL MALEATE 1 DROP(S): 20; 5 SOLUTION/ DROPS OPHTHALMIC at 17:38

## 2022-01-26 RX ADMIN — CHLORHEXIDINE GLUCONATE 1 APPLICATION(S): 213 SOLUTION TOPICAL at 06:17

## 2022-01-26 RX ADMIN — LEVETIRACETAM 420 MILLIGRAM(S): 250 TABLET, FILM COATED ORAL at 06:17

## 2022-01-26 RX ADMIN — AMLODIPINE BESYLATE 10 MILLIGRAM(S): 2.5 TABLET ORAL at 11:47

## 2022-01-26 RX ADMIN — Medication 50 MICROGRAM(S): at 06:17

## 2022-01-26 RX ADMIN — CARVEDILOL PHOSPHATE 3.12 MILLIGRAM(S): 80 CAPSULE, EXTENDED RELEASE ORAL at 17:38

## 2022-01-26 NOTE — PROGRESS NOTE ADULT - SUBJECTIVE AND OBJECTIVE BOX
[id number]       JUAREZ GTZ    LVS 2C 242 I    Allergies    No Known Allergies    Intolerances        PAST MEDICAL & SURGICAL HISTORY:  Seizure    HTN (hypertension)    Glaucoma    Thyroid disease    Blood clot of neck vein  left side    TIA (transient ischemic attack)  20 years ago    S/P appendectomy        FAMILY HISTORY:  FH: HTN (hypertension)        Home Medications:  amLODIPine 10 mg oral tablet: 1 tab(s) orally once a day (22 Dec 2021 16:46)  aspirin 81 mg oral tablet, chewable: 1 tab(s) orally once a day (22 Dec 2021 16:46)  escitalopram 20 mg oral tablet: 1 tab(s) orally once a day (22 Dec 2021 16:46)  keppera:  (22 Dec 2021 16:46)  levETIRAcetam 500 mg oral tablet: 1 tab(s) orally 2 times a day (22 Dec 2021 16:46)  timolol-dorzolamide 0.5%-2% preservative-free ophthalmic solution: 1 drop(s) to each affected eye 2 times a day (22 Dec 2021 16:46)  Zetia 10 mg oral tablet: 1 tab(s) orally once a day (22 Dec 2021 16:46)      MEDICATIONS  (STANDING):  amLODIPine   Tablet 10 milliGRAM(s) Oral daily  carvedilol 3.125 milliGRAM(s) Oral every 12 hours  chlorhexidine 0.12% Liquid 15 milliLiter(s) Oral Mucosa every 12 hours  chlorhexidine 2% Cloths 1 Application(s) Topical <User Schedule>  dextrose 40% Gel 15 Gram(s) Oral once  dextrose 5%. 1000 milliLiter(s) (50 mL/Hr) IV Continuous <Continuous>  dextrose 5%. 1000 milliLiter(s) (100 mL/Hr) IV Continuous <Continuous>  dextrose 50% Injectable 25 Gram(s) IV Push once  dextrose 50% Injectable 12.5 Gram(s) IV Push once  dextrose 50% Injectable 25 Gram(s) IV Push once  dorzolamide 2%/timolol 0.5% Ophthalmic Solution 1 Drop(s) Both EYES two times a day  enoxaparin Injectable 80 milliGRAM(s) SubCutaneous every 12 hours  escitalopram 20 milliGRAM(s) Oral daily  glucagon  Injectable 1 milliGRAM(s) IntraMuscular once  levETIRAcetam  IVPB 500 milliGRAM(s) IV Intermittent every 12 hours  levothyroxine 50 MICROGram(s) Oral daily  pantoprazole   Suspension 40 milliGRAM(s) Enteral Tube daily  potassium chloride    Tablet ER 20 milliEquivalent(s) Oral every 2 hours    MEDICATIONS  (PRN):  acetaminophen     Tablet .. 650 milliGRAM(s) Oral every 6 hours PRN Temp greater or equal to 38C (100.4F)  sodium chloride 0.9% lock flush 10 milliLiter(s) IV Push every 1 hour PRN Pre/post blood products, medications, blood draw, and to maintain line patency      Diet, NPO with Tube Feed:   Tube Feeding Modality: Gastrostomy  Jevity 1.2 Santos  Total Volume for 24 Hours (mL): 1440  Continuous  Starting Tube Feed Rate mL per Hour: 20  Increase Tube Feed Rate by (mL): 10     Every 6 hours  Until Goal Tube Feed Rate (mL per Hour): 60  Tube Feed Duration (in Hours): 24  Tube Feed Start Time: 12:00  Free Water Flush  Bolus   Total Volume per Flush (mL): 150   Frequency: Every 6 Hours   Total Daily Volume of Flush (mL): 600    Start Time: 12:00 (01-21-22 @ 11:06) [Active]          Vital Signs Last 24 Hrs  T(C): 37.5 (26 Jan 2022 16:16), Max: 38.3 (26 Jan 2022 11:21)  T(F): 99.5 (26 Jan 2022 16:16), Max: 100.9 (26 Jan 2022 11:21)  HR: 69 (26 Jan 2022 21:11) (65 - 87)  BP: 125/72 (26 Jan 2022 16:16) (125/72 - 184/70)  BP(mean): --  RR: 18 (26 Jan 2022 16:16) (18 - 18)  SpO2: 98% (26 Jan 2022 21:11) (97% - 100%)      01-25-22 @ 07:01  -  01-26-22 @ 07:00  --------------------------------------------------------  IN: 0 mL / OUT: 5 mL / NET: -5 mL    01-26-22 @ 07:01  -  01-26-22 @ 21:45  --------------------------------------------------------  IN: 1320 mL / OUT: 155 mL / NET: 1165 mL        Mode: AC/ CMV (Assist Control/ Continuous Mandatory Ventilation), RR (machine): 15, TV (machine): 450, FiO2: 30, PEEP: 5, ITime: 0.8, MAP: 10, PIP: 28      LABS:                        9.0    14.12 )-----------( 320      ( 26 Jan 2022 08:55 )             30.6     01-26    147<H>  |  115<H>  |  19  ----------------------------<  147<H>  3.5   |  28  |  0.50    Ca    8.4<L>      26 Jan 2022 08:55    TPro  5.8<L>  /  Alb  1.9<L>  /  TBili  0.1<L>  /  DBili  x   /  AST  25  /  ALT  28  /  AlkPhos  72  01-26              WBC:  WBC Count: 14.12 K/uL (01-26 @ 08:55)  WBC Count: 18.35 K/uL (01-25 @ 06:35)  WBC Count: 21.37 K/uL (01-24 @ 08:10)  WBC Count: 15.24 K/uL (01-23 @ 09:57)      MICROBIOLOGY:  RECENT CULTURES:  01-21 .Blood Blood-Peripheral XXXX XXXX   No Growth Final    01-21 Clean Catch Clean Catch (Midstream) XXXX XXXX   <10,000 CFU/mL Normal Urogenital Sendy                    Sodium:  Sodium, Serum: 147 mmol/L (01-26 @ 08:55)  Sodium, Serum: 146 mmol/L (01-25 @ 06:35)  Sodium, Serum: 146 mmol/L (01-24 @ 08:10)  Sodium, Serum: 145 mmol/L (01-23 @ 09:57)      0.50 mg/dL 01-26 @ 08:55  0.57 mg/dL 01-25 @ 06:35  0.56 mg/dL 01-24 @ 08:10  0.62 mg/dL 01-23 @ 09:57      Hemoglobin:  Hemoglobin: 9.0 g/dL (01-26 @ 08:55)  Hemoglobin: 10.3 g/dL (01-25 @ 06:35)  Hemoglobin: 10.2 g/dL (01-24 @ 08:10)  Hemoglobin: 10.6 g/dL (01-23 @ 09:57)      Platelets: Platelet Count - Automated: 320 K/uL (01-26 @ 08:55)  Platelet Count - Automated: 369 K/uL (01-25 @ 06:35)  Platelet Count - Automated: 430 K/uL (01-24 @ 08:10)  Platelet Count - Automated: 468 K/uL (01-23 @ 09:57)      LIVER FUNCTIONS - ( 26 Jan 2022 08:55 )  Alb: 1.9 g/dL / Pro: 5.8 gm/dL / ALK PHOS: 72 U/L / ALT: 28 U/L / AST: 25 U/L / GGT: x                 RADIOLOGY & ADDITIONAL STUDIES:      MICROBIOLOGY:  RECENT CULTURES:  01-21 .Blood Blood-Peripheral XXXX XXXX   No Growth Final    01-21 Clean Catch Clean Catch (Midstream) XXXX XXXX   <10,000 CFU/mL Normal Urogenital Sendy

## 2022-01-26 NOTE — PROGRESS NOTE ADULT - SUBJECTIVE AND OBJECTIVE BOX
Pt was seen and examined at bedside  acute overnight events noted:   febrile this am to 100.9  patient able to muffle words through trach. reports she wants to wash her face  denies fever chills chest pain shortness of breath  +trach  to vent  + peg. + ostomy + left sided lefty drain in place    Review of Systems:  General:denies fever chills, headache, weakness  HEENT: denies blurry vision,diffculty swallowing, difficulty hearing, tinnitus  Cardiovascular: denies chest pain  ,palpitations  Pulmonary:denies shortness of breath, cough, wheezing, hemoptysis  Gastrointestinal: denies abdominal pain, constipation, diarrhea,nausea , vomiting, hematochezia  : denies hematuria, dysuria, or incontinence  Neurological: denies weakness, numbness , tingling, dizziness, tremors  MSK: denies muscle pain, difficulty ambulating, swelling, back pain  skin: denies skin rash, itching, burning, or  skin lesions  Psychiatrical: denies mood disturbances, anxierty, feeling depressed, depression , or difficulty sleeping    Objective:  Vitals  T(C): 38.3 (01-26-22 @ 11:21), Max: 38.3 (01-26-22 @ 11:21)  HR: 73 (01-26-22 @ 11:36) (65 - 87)  BP: 178/75 (01-26-22 @ 11:21) (156/71 - 184/70)  RR: 18 (01-26-22 @ 05:31) (18 - 19)  SpO2: 98% (01-26-22 @ 11:36) (97% - 100%)    Physical Exam:  General: comfortable, no acute distress, well nourished  HEENT: Atraumatic, no LAD, trachea midline, PERRLA  Cardiovascular: normal s1s2, no murmurs, gallops or fricition rubs  Pulmonary: diminished, no wheezing , rhonchi, + trach  Gastrointestinal: soft non tender non distended, no masses felt, no organomegally + 0stomy , + left lefty drain  Muscloskeletal: no lower extremity edema, intact bilateral lower extremity pulses  Neurological: CN II-12 intact. No focal weakness  Psychiatrical: normal mood, cooperative  SKIN: no rash, lesions or ulcers    Labs:                          9.0    14.12 )-----------( 320      ( 26 Jan 2022 08:55 )             30.6     01-26    147<H>  |  115<H>  |  19  ----------------------------<  147<H>  3.5   |  28  |  0.50    Ca    8.4<L>      26 Jan 2022 08:55    TPro  5.8<L>  /  Alb  1.9<L>  /  TBili  0.1<L>  /  DBili  x   /  AST  25  /  ALT  28  /  AlkPhos  72  01-26    LIVER FUNCTIONS - ( 26 Jan 2022 08:55 )  Alb: 1.9 g/dL / Pro: 5.8 gm/dL / ALK PHOS: 72 U/L / ALT: 28 U/L / AST: 25 U/L / GGT: x                 Active Medications  MEDICATIONS  (STANDING):  amLODIPine   Tablet 10 milliGRAM(s) Oral daily  carvedilol 3.125 milliGRAM(s) Oral every 12 hours  chlorhexidine 0.12% Liquid 15 milliLiter(s) Oral Mucosa every 12 hours  chlorhexidine 2% Cloths 1 Application(s) Topical <User Schedule>  dextrose 40% Gel 15 Gram(s) Oral once  dextrose 5%. 1000 milliLiter(s) (50 mL/Hr) IV Continuous <Continuous>  dextrose 5%. 1000 milliLiter(s) (100 mL/Hr) IV Continuous <Continuous>  dextrose 50% Injectable 25 Gram(s) IV Push once  dextrose 50% Injectable 12.5 Gram(s) IV Push once  dextrose 50% Injectable 25 Gram(s) IV Push once  dorzolamide 2%/timolol 0.5% Ophthalmic Solution 1 Drop(s) Both EYES two times a day  enoxaparin Injectable 80 milliGRAM(s) SubCutaneous every 12 hours  escitalopram 20 milliGRAM(s) Oral daily  glucagon  Injectable 1 milliGRAM(s) IntraMuscular once  levETIRAcetam  IVPB 500 milliGRAM(s) IV Intermittent every 12 hours  levothyroxine 50 MICROGram(s) Oral daily  pantoprazole   Suspension 40 milliGRAM(s) Enteral Tube daily  potassium chloride    Tablet ER 20 milliEquivalent(s) Oral every 2 hours    MEDICATIONS  (PRN):  acetaminophen     Tablet .. 650 milliGRAM(s) Oral every 6 hours PRN Temp greater or equal to 38C (100.4F)  sodium chloride 0.9% lock flush 10 milliLiter(s) IV Push every 1 hour PRN Pre/post blood products, medications, blood draw, and to maintain line patency

## 2022-01-26 NOTE — PROGRESS NOTE ADULT - ASSESSMENT
81F with a PMH of HTN, HLD, hypothyroidism, depression, seizures who presents to the ED for abd pain found to have diverticulitis   has rising leukocytosis   had fever yesterday   tachycardic and now hypoxic   CXR (I personally reviewed) low lung volumes   origincal CT (I personally reviewed) with diverticulitis   she had a lot of pain in the abdomen on exam     12/25: s/p surgery for diverticulitis with perforation and abscess and went to the OR. intubated in ICU with vac and needs to go back to the OR, currently on zosyn, s/p one dose of diflucan last night   12/27: s/p repair and ostomy creation yesterday, wbc elevated, cultures sensitive to zosyn and candida albicans is notoriously sensitive to azoles such as fluconazole, wean of pressors and sedation, extubate when ready   12/28: Further increase in white blood cell count but overall the patient appears to be reasonably stable.  No concern of C. difficile at this juncture.  Current antibiotics are reasonable.  Leukocytosis like related to recent surgery, no concern of other superimposed process on the basis of exam.  I do not see a role to alter her antimicrobials.  12/29:  Remains intubated in ICU. still quite edematous and net positive, WBC climbing, small wound dehiscence at bottom of incision, plan for initiation of TPN today, remains on antibiotics    12/30: rising WBC, ostomy not functioning yet, very edematous CT today, may be source control issue so for now can continue same antibiotics and antifungal but may need to change if findings on the CT are worrisome or change in hemodynamics  12/31:Leukocytosis, with collections noted on CT.  However this is being done postoperative day 5, there is some possibility that this could represent postop changes but given the leukocytosis, concur with plans for attempts at percutaneous drainage.  White blood cell count down slightly compared to prior peak, will need to be trended.  Gallbladder is also distended.  Abdominal exam was benign this morning, but a calculus cholecystitis could be the differential diagnosis here as well (though n.p.o. status certainly could explain distended gallbladder, there is also report of gallbladder wall thickening).  Would modify antibiotics based on cultures from drainage, I do not believe that antibiotics and other the cells would be adequate here.  Fortunately she is off pressors, with preserved renal function, and tolerating pressure support.  1/1/22: Postop day 6.  I have some concerns with the appearance of the ostomy, though the abdominal exam overall is otherwise unchanged.  White blood cell count remains elevated, but is lower compared with prior values.  This is despite no change in antibiotic therapy.  Patient also has asymmetric edema of the upper extremities, right greater than left.  Low threshold for duplex ultrasound to exclude DVT.No new surveillance culture data.  1/2: POD7 Ostomy looks better today, UE symmetric. WBC elevated, some slight downward trend overall. Temps <= 100F.   1/3: drainage of fluid collection today, continue zosyn and fluconazole for now, monitor wbc and temps, watch ostomy output, diuresis and address third spacing   1/4: s/p drainage yesterday now growing ecoli and awaiting for sensitivities, wbc is improving, started on tube feeds    1/5: abscess growing ecoli and bacteroides, S to ceftriaxone, wbc 15, weaning trial today, pain management    1/6: awake, lung exercise today, trach planned for tomorrow, ostomy output is good. will stop fluconazole today and change antibiotics to ceftriaxone and flagly. Unclear stop date yet    1/7 trach today, continue antibiotics   1/9: s/p trach, had low grade fever but otherwise doing ok, will continues same antibiotics regimen for now but if fevers continue she will needs to be rescanned as those abscesses can progress.  1/10: low grade fever but doing well on trach-PS, ostomy with stool and gas, shakes head when asked if in pain, discussed with surgery about repeating CT scan given the temp  1/11: Still febrile, favor interval CT as above.  1/12: CT of abdomen showing some signs of improvement and elsewhere  it shows signs of worsening. wbc normal, tube feeds currently via NGT   1/13: no fever, no wbc, Cr and LFTs ok, Ceftriaxone and flagyl continued. No planned IR intervention at this time - fluid collection without access to drain, surgical drain within the collection. Pt possibly will have repeat imaging over the weekend to evaluate progress.   Attending addendum:  agree with above  continue antibiotics   vent weaning  surgical follow up for the abdominal wound  1/14: Low grade temp last night, midline placed, no leukocytosis, Cr and LFTs ok, culture data reviewed. Pt's colostomy with small amount of liquid stool and gas, no stools recorded for two days, no role for po Vancomycin at this time. The pt has been on abx since her admission, will stop all abx and will continue to monitor.    1/18: no fevers since 1/14, WBC better 11.00, cr ok, off abx, now s/p EGD, with PEG replacement.   1/24: spiking low grade fevers, low suspicion for pneumonia given little secretions and doing well on the PS wth low settings, peg site looks clean, trach site OK as well, fevers may be from small fluid collections intra-abdominally vs atelectasis Would suggest aggressive pulmonary toilet including chest vest. follow cultures and trend wbc and fever curve . If persistent fevers would start  Zosyn and PO fluconazole but for now please hold antibiotics   1/25: continues having fevers, low grade temps today, WBC better 18.35, Cr and LFTs ok, + new DVT of right common femoral vein - could be the cause of pt's fevers - on full dose Lovenox. Will continue to monitor off abx, WBC got better without abx.   Attending addendum:  patient with long hospital course and now on the floors with fever and found to have a DVT  wbc came down without antibiotics   fever curve is getting better   continue to monitor off antibiotics   treat DVT with full dose AC   suspect fevers could be from dvt but may also be from fluid collection    1/26: low grade temp today, pt is more awake and alert today, WBC improving 14.2 off abx, Cr and LFTs ok, abd wound with clean base and healing well - vac dressing was changed today during my exam. In favor to continue to monitor off abx for now.     #Perforated diverticulum of large intestine.   ·  Plan: continue to monitor off antibiotics  monitor for worsening signs and at which point start Zosyn and PO fluconazole   continue wound vac       #Fever.   ·  Plan:   trend pt's temperatures   monitor off antibiotics.   UA   + new DVT of right common femoral vein   consider exchanging midline in right arm   frequent turns, offloading, and nutrition optimization to avoid bedsores    #Acute Respiratory Failure  wean oxygen as tolerated  chest PT  frequent suctioning   chest vest   HOB >30 degrees    #DVT  AC as per protocol

## 2022-01-26 NOTE — PROGRESS NOTE ADULT - SUBJECTIVE AND OBJECTIVE BOX
SURGERY PROGRESS HPI:  Pt seen and examined at bedside. Able to respond to yes or no questions. Says "I want water." Pain is well controlled on pain medication. Pt tolerating tube feeds through PEG. Pt denies nausea and vomiting. Pt denies chest pain, SOB, dizziness, fever, chills.      Vital Signs Last 24 Hrs  T(C): 37.8 (26 Jan 2022 00:02), Max: 38.2 (25 Jan 2022 11:38)  T(F): 100.1 (26 Jan 2022 00:02), Max: 100.8 (25 Jan 2022 11:38)  HR: 65 (26 Jan 2022 01:09) (65 - 85)  BP: 184/70 (26 Jan 2022 00:02) (162/72 - 184/70)  BP(mean): --  RR: 18 (26 Jan 2022 00:02) (18 - 20)  SpO2: 98% (26 Jan 2022 01:09) (97% - 100%)      PHYSICAL EXAM:    GENERAL: NAD, Alert  HEENT: Tracheostomy intact functioning well  CHEST/LUNG: Clear to ausculation, bilaterally   HEART: RRR S1S2  ABDOMEN: Wound vac intact with good seal and functioning well. PEG intact with feeds running. Ostomy pink and viable with stool in the bag. Left BABAK with scant purulent output. non distended, +BS, soft, non tender, no guarding  EXTREMITIES:  calf soft, non tender b/l      I&O's Detail    24 Jan 2022 07:01  -  25 Jan 2022 07:00  --------------------------------------------------------  IN:  Total IN: 0 mL    OUT:    Bulb (mL): 10 mL  Total OUT: 10 mL    Total NET: -10 mL          LABS:                        10.3   18.35 )-----------( 369      ( 25 Jan 2022 06:35 )             34.9     01-25    146<H>  |  113<H>  |  19  ----------------------------<  139<H>  4.0   |  27  |  0.57    Ca    8.5      25 Jan 2022 06:35    TPro  6.2  /  Alb  1.9<L>  /  TBili  0.3  /  DBili  x   /  AST  18  /  ALT  21  /  AlkPhos  82  01-25    < from: US Duplex Venous Lower Ext Complete, Bilateral (01.25.22 @ 10:58) >  IMPRESSION:    Partially occlusive thrombus of the right common femoral vein which may   be acute to subacute given slightly increased echogenicity.    No DVT of the left lower extremity.    < end of copied text >        A/P: 81F s/p ex lap, sigmoid rxn, abthera placement 12/25, RTOR for completion of hartmanns procedure and closure of abdominal wall 12/26, IR  abscess aspiration 1/3, trach 1/7, PEG 1/18.   CT 1/21 shows improving fluid collections. R BABAK removed 1/22.  US 1/25 shows Partially occlusive thrombus of the right common femoral vein.  Tmax yesterday: 100.8    - TF as tolerated  - cont left BABAK to suction, monitor output   - wound vac changes per PT  - management per primary team; leukocytosis w/u  - Monitor off abx for now per ID  - Full dose Lovenox per medicine for DVT  - Will d/w surgical attending

## 2022-01-26 NOTE — PROGRESS NOTE ADULT - ASSESSMENT
TRESA PIZARRO 81 f [birth year]2021 1942 DR ANTONIO PATTON     REVIEW OF SYMPTOMS      Able to give (reliable) ROS  NO     PHYSICAL EXAM    HEENT Unremarkable  atraumatic   RESP Fair air entry EXP prolonged    Harsh breath sound Resp distres mild   CARDIAC S1 S2 No S3     NO JVD    ABDOMEN SOFT BS PRESENT NOT DISTENDED No hepatosplenomegaly   PEDAL EDEMA present No calf tenderness  NO rash     ______  DOA/CC.  [birth year]/22[birth year]20[birth year] 81F w/ HTN, HLD, hypothyroidism, depression, seizures. Presented w/ abdominal pain found to have acute sigmoid diverticulitis  ____  PMH.  pmh Hytn  pmh Sz.      _________  PROBLEMS.     VDRF. Since surgery 12/26  TRACH Done 1/7  ABDOMINAL INFECTION.   Feculent peritonitis perf div poa   COLOSTOMY Created 12/26  1/3 ABD ASPIRATE E coli bacteroides Abio dced 1/15  GT 1/18      PAIN 1/10 fent 50   SZ 1/10 keppra 500.2   Woundvac Rx started 1/14  Hypernatremia 1/14/2022 Na 148  Hypothyroid 1/14/2022 tsh 12   Fever spike 101 1/20/2022   R LEFTY removd 1/22/2022  Leukocytsosis 1/24/2022   dvt 1/25/2022 r cfv fd lvnx startd   Lovenox 80.2 started 1/25     _____                            COVID STATUS. 12/24 pcr (-)  ICU STAY. 12/22-1/12/2022   GOC.  1/13/2022 full code       BEST PRACTICE ISSUES.                                                  HEAD OF BED ELEVATION. Yes  DVT PROPHYLAXIS.    1/25/2022 lvnx 80.2   1/7 lvnx 40   LIGHT PROPHYLAXIS.    1/11 protonix 40                                                                                     DIET.    1/20/2022 jevity 1.5 1200  1/18/2022 npo  1/7 vital af 1200 ngt          INFECTION PROPHYLAXIS.   1/7 Chlorhexidine .12%   1/7 chlorhexidine 2%      PATIENT DATA   VITALS/PO/IO/VENT/DRIPS.  1/26/2022 99 75 120/70   1/26/2022 ac 15/450/5/.3        ASSESSMENT/RECOMMENDATIONS.    HEMODYNAMICS.   Monitor bp Target MAP 65 (+)    RESP.   Monitor po Target po 90-95%  1/14/2022 15/450/.4/5 747/38/110     DVT  1/25/2022 V duplx partially occlusive thrombus r cfv ac to subacute   1/25/2022 dw surg antonia Winter and then with med antonia Weinberg and startd fd vnx     OXYGEN REQUIREMENTS.   1/24/2022 30%  1/17/2022 40%  1/13/2022 40%    VENT MANAGEMENT.   HOB elevation  Target Pplat 35 (-)  Target PO 90-95%  Target pH 730 (+)    INFECTION.  Abdominal infection sec perforated sigmoid div feculent peritonitis poa 12/22.  w 1/25-1/26/2022 w 18 - 14  1/16/2022 Off abio  1/25/2022 cxr no infiltr  1/21 ctap oc ic Fluid collections since 1/11/2022 decreased in size or small and stable   Post pelvic fluid pocket decreased now 2.7 x 1.7 cm prev 3.9 x 2.4 cm R paracolic gutter also decreased 4.3 x 1 prev 4.5 x 1.6   1/22 cxr sm r eff  1/24/2022 Pt has progressive leukocytosis   1/24/2022  id on case  Recent ctap (-)    1/25/2022 pt was found to have r dvt and startd fd lvnx    SP ABD SURGERY.  81F admitted with Acute Sigmoid Diverticulitis s/p ex lap, sigmoid resection, peritoneal lavage 12/25 and RTOR 12/26 for irrigation of abdominal cavity with closure and creation of colostomy. S/p bedside IR aspiration of fluid collection 1/3. Tracheostomy 1/7.   Deep pelvic abscess noted on CT,  Local drain/ostomy care per nursing  1/22/2022 r lefty removd     PL EFFSN.  1/16/2022 cxr tr bl effs  1/14/2022 CXR residuall pl effs or basal consolidatn  Effsn likely sec to abdominal infkammation       ANEMIA.  Hb 1/13-1/14-1/16-1/17-1/24/2022 Hb 9.4 - 8.9 -10 - 9.6 - 10.2   Monitor    GT placed 1/18    Hypernatremia  Na 1/26/2022 Na 147    TIME SPENT   Over 25 minutes aggregate care time spent on encounter; activities included   direct patient care, counseling and/or coordinating care reviewing notes, lab data/ imaging , discussion with multidisciplinary team/ patient  /family and explaining in detail risks, benefits, alternatives  of the recommendations     TRESA PIZARRO 81 f 12/22[birth year]2021 1942 DR ANTONIO PATTON

## 2022-01-26 NOTE — PROGRESS NOTE ADULT - SUBJECTIVE AND OBJECTIVE BOX
JUAREZ GTZ  MRN-[id number]    Follow Up:  fever    Interval History: The pt was seen and examined earlier, no distress, awake and alert, attempting to speak, asking for water. Abd vac dressing change in progress. The pt had 100.9 temp today, vented via tracheostomy, WBC improving.      PAST MEDICAL & SURGICAL HISTORY:  Seizure    HTN (hypertension)    Glaucoma    Thyroid disease    Blood clot of neck vein  left side    TIA (transient ischemic attack)  20 years ago    S/P appendectomy        ROS:    [x ] Unobtainable because: vented, tracheostomy   [ ] All other systems negative    Constitutional: no fever, no chills  Head: no trauma  Eyes: no vision changes, no eye pain  ENT:  no sore throat, no rhinorrhea  Cardiovascular:  no chest pain, no palpitation  Respiratory:  no SOB, no cough  GI:  no abd pain, no vomiting, no diarrhea  urinary: no dysuria, no hematuria, no flank pain  musculoskeletal:  no joint pain, no joint swelling  skin:  no rash  neurology:  no headache, no seizure, no change in mental status  psych: no anxiety, no depression         Allergies  No Known Allergies        ANTIMICROBIALS:      OTHER MEDS:  acetaminophen     Tablet .. 650 milliGRAM(s) Oral every 6 hours PRN  amLODIPine   Tablet 10 milliGRAM(s) Oral daily  carvedilol 3.125 milliGRAM(s) Oral every 12 hours  chlorhexidine 0.12% Liquid 15 milliLiter(s) Oral Mucosa every 12 hours  chlorhexidine 2% Cloths 1 Application(s) Topical <User Schedule>  dextrose 40% Gel 15 Gram(s) Oral once  dextrose 5%. 1000 milliLiter(s) IV Continuous <Continuous>  dextrose 5%. 1000 milliLiter(s) IV Continuous <Continuous>  dextrose 50% Injectable 25 Gram(s) IV Push once  dextrose 50% Injectable 12.5 Gram(s) IV Push once  dextrose 50% Injectable 25 Gram(s) IV Push once  dorzolamide 2%/timolol 0.5% Ophthalmic Solution 1 Drop(s) Both EYES two times a day  enoxaparin Injectable 80 milliGRAM(s) SubCutaneous every 12 hours  escitalopram 20 milliGRAM(s) Oral daily  glucagon  Injectable 1 milliGRAM(s) IntraMuscular once  levETIRAcetam  IVPB 500 milliGRAM(s) IV Intermittent every 12 hours  levothyroxine 50 MICROGram(s) Oral daily  pantoprazole   Suspension 40 milliGRAM(s) Enteral Tube daily  potassium chloride    Tablet ER 20 milliEquivalent(s) Oral every 2 hours  sodium chloride 0.9% lock flush 10 milliLiter(s) IV Push every 1 hour PRN      Vital Signs Last 24 Hrs  T(C): 37.5 (26 Jan 2022 16:16), Max: 38.3 (26 Jan 2022 11:21)  T(F): 99.5 (26 Jan 2022 16:16), Max: 100.9 (26 Jan 2022 11:21)  HR: 75 (26 Jan 2022 16:16) (65 - 87)  BP: 125/72 (26 Jan 2022 16:16) (125/72 - 184/70)  BP(mean): --  RR: 18 (26 Jan 2022 16:16) (18 - 18)  SpO2: 99% (26 Jan 2022 16:16) (97% - 100%)    Physical Exam:  General: Nontoxic-appearing Female in no acute distress. Trached on vent  HEENT: AT/NC. No oral lesions on superficial exam  Neck: Not rigid. No sense of mass. Trach C/D/I  Nodes: None palpable.  Lungs: diminished breath sounds bilaterally   Heart: Regular rate and rhythm. +Murmur. No rub. No gallop. No palpable thrill.  Abdomen: Bowel sounds now present.  Soft. Mildly distended.  Abd Incision site wound with clean base, healing well.  Ostomy with stool and gas. Drains x1, there is a PEG tube which appears C/D/I   Extremities: No cyanosis or clubbing. 1+ edema of bilateral lower extremities   Skin: Warm. Dry No rash. No vasculitic stigmata.  Neuro: awake, alert and did follow simple commands but not all    Psychiatric: calm  Vacular: RUE midline     WBC Count: 14.12 K/uL (01-26 @ 08:55)  WBC Count: 18.35 K/uL (01-25 @ 06:35)  WBC Count: 21.37 K/uL (01-24 @ 08:10)  WBC Count: 15.24 K/uL (01-23 @ 09:57)  WBC Count: 10.13 K/uL (01-22 @ 07:30)  WBC Count: 10.70 K/uL (01-21 @ 08:26)  WBC Count: 11.64 K/uL (01-20 @ 08:04)                            9.0    14.12 )-----------( 320      ( 26 Jan 2022 08:55 )             30.6       01-26    147<H>  |  115<H>  |  19  ----------------------------<  147<H>  3.5   |  28  |  0.50    Ca    8.4<L>      26 Jan 2022 08:55    TPro  5.8<L>  /  Alb  1.9<L>  /  TBili  0.1<L>  /  DBili  x   /  AST  25  /  ALT  28  /  AlkPhos  72  01-26          Creatinine Trend: 0.50<--, 0.57<--, 0.56<--, 0.62<--, 0.46<--, 0.48<--      MICROBIOLOGY:  v  .Blood Blood-Peripheral  01-21-22   No Growth Final  --  --      Clean Catch Clean Catch (Midstream)  01-21-22   <10,000 CFU/mL Normal Urogenital Sendy  --  --      .Body Fluid Abdominal Fluid  01-03-22   Few Escherichia coli  Few Bacteroides ovatus group "Susceptibilities not performed"  --  Escherichia coli      .Sputum Sputum  12-28-21   Normal Respiratory Sendy present  --    Moderate polymorphonuclear leukocytes per low power field  Numerous Squamous epithelial cells per low power field  Few Gram positive cocci in pairs per oil power field  Rare Gram Positive Rods per oil power field  Rare Yeast like cells per oil power field  Results consistent with oropharyngeal contamination      .Blood Blood  12-28-21   No Growth Final  --  --          Rapid RVP Result: NotDetec (01-20 @ 18:59)      Procalcitonin, Serum: 0.06 (01-24-22 @ 10:30)  Procalcitonin, Serum: 0.04 (01-22-22 @ 10:46)    SARS-CoV-2 Result: NotDetec (01-26-22 @ 11:43)    SARS-CoV-2: NotDetec (20 Jan 2022 18:59)  SARS-CoV-2: NotDetec (24 Dec 2021 14:38)  SARS-CoV-2: NotDetec (24 Dec 2021 00:03)    RADIOLOGY:

## 2022-01-26 NOTE — PROGRESS NOTE ADULT - ASSESSMENT
81F with a PMH of HTN, HLD, hypothyroidism, depression, seizures p/w abd pain, found to have acute diverticulitis . acute metabolic encephalopathy sec to above Perforated sigmoid diverticulitis. 12/24/21 - Exploratory laparotomy and a sigmoid colectomy and abdominal washout abd at that time left open. Transferred to ICU for post op care on mechanical ventilation. 10mg IV lopressor in OR for Afib with RVR. 5L IVF given intra-op. EBL 100cc. Received intubated, sedated, on phenylephrine.  Patient went for abdominal wound closure and creation of colostomy on 12/26. Patient developed adb abscess 1/3 s/p IR drainage of abdominal abscesses. Initially patient on PPN for nutrition Now tolerating tube feeds with brown soft stool in colostomy.. Found to have multiple abdominal fluid collections s/p IR drainage of R abdominal abscess 1/3/22 growing e-coli and bacteroides Sensitive to ceftriaxone, continue antibiotics to ceftriaxone and flagyl per ID.   Patient was unable to wean and son consented to trach.  -Now out of shock state not on vasopressor support.  Post op a-fib RVR, s/p amio load converted to sinus rhythm.  Patient with intermittent fevers. S/P PEG and trach. Pain management on fentanyl patch.      Perforated Acute diverticulitis   s/p 12/24/21 - Exploratory laparotomy and a sigmoid colectomy and abdominal washout  Patient went for abdominal wound closure and creation of colostomy on 12/26.  Patient developed adb abscess 1/3 s/p IR drainage of abdominal abscesses.  Initially patient on PPN for nutrition Now tolerating tube feeds with brown soft stool in colostomy.  Found to have multiple abdominal fluid collections s/p IR drainage of R abdominal abscess 1/3/22 growing e-coli and bacteroides  S/P Peg placement  all abx dced as per iD recs, now febrile again  Repeat CT noted   LEFTY drain care, wound vac in place   PEG stable, feeding started       Septic Shock s/p  pressors  Transferred to ICU for post op care on mechanical ventilation.  now off pressors,  failed extubation, trach to cpap  completed ceftriaxone and flagyl per ID. trial PO vanc for diarrhea. now off all abx  Febrile again, follow CLX, neg so far, covid PCR neg  CT A/p noted, ab collections somewhat improved, Multiple focal opacification in RL, Repeat CXR looks unchanged,.  Low grade temp ,WBC increased , Re consulted ID, hold off Abx now  Pt has acute DVT, possible cause of fever?      Hypoxemic respiratory failure now trach to CPAP  now trach to CPAP  Pulm following  on fentayl patch for pain control    acute metabolic encephalopathy sec to above  stable    Rapid AFIB due to Shock state  s/p amio load converted to sinus rhythm.  not on rate control or AC on downgrade, now on AC also for DVT  Cards consulted  added BB  On AC      Acute DVT:  - Started on full dose Lovenox    patient started on full dose lovenox; febrile 100.9. noted low output in left lefty,  continue to monitor off abx.   low threshold to further investigate fluid collection.

## 2022-01-27 LAB
ANION GAP SERPL CALC-SCNC: 4 MMOL/L — LOW (ref 5–17)
BASOPHILS # BLD AUTO: 0.07 K/UL — SIGNIFICANT CHANGE UP (ref 0–0.2)
BASOPHILS NFR BLD AUTO: 0.5 % — SIGNIFICANT CHANGE UP (ref 0–2)
BLD GP AB SCN SERPL QL: SIGNIFICANT CHANGE UP
BUN SERPL-MCNC: 19 MG/DL — SIGNIFICANT CHANGE UP (ref 7–23)
CALCIUM SERPL-MCNC: 8.6 MG/DL — SIGNIFICANT CHANGE UP (ref 8.5–10.1)
CHLORIDE SERPL-SCNC: 115 MMOL/L — HIGH (ref 96–108)
CO2 SERPL-SCNC: 29 MMOL/L — SIGNIFICANT CHANGE UP (ref 22–31)
CREAT SERPL-MCNC: 0.45 MG/DL — LOW (ref 0.5–1.3)
EOSINOPHIL # BLD AUTO: 0.35 K/UL — SIGNIFICANT CHANGE UP (ref 0–0.5)
EOSINOPHIL NFR BLD AUTO: 2.6 % — SIGNIFICANT CHANGE UP (ref 0–6)
GLUCOSE BLDC GLUCOMTR-MCNC: 112 MG/DL — HIGH (ref 70–99)
GLUCOSE BLDC GLUCOMTR-MCNC: 139 MG/DL — HIGH (ref 70–99)
GLUCOSE BLDC GLUCOMTR-MCNC: 150 MG/DL — HIGH (ref 70–99)
GLUCOSE SERPL-MCNC: 115 MG/DL — HIGH (ref 70–99)
HCT VFR BLD CALC: 26.1 % — LOW (ref 34.5–45)
HCT VFR BLD CALC: 32.1 % — LOW (ref 34.5–45)
HGB BLD-MCNC: 7.7 G/DL — LOW (ref 11.5–15.5)
HGB BLD-MCNC: 9.7 G/DL — LOW (ref 11.5–15.5)
IMM GRANULOCYTES NFR BLD AUTO: 0.6 % — SIGNIFICANT CHANGE UP (ref 0–1.5)
INR BLD: 1.23 RATIO — HIGH (ref 0.88–1.16)
LYMPHOCYTES # BLD AUTO: 1.62 K/UL — SIGNIFICANT CHANGE UP (ref 1–3.3)
LYMPHOCYTES # BLD AUTO: 11.9 % — LOW (ref 13–44)
MAGNESIUM SERPL-MCNC: 2.3 MG/DL — SIGNIFICANT CHANGE UP (ref 1.6–2.6)
MCHC RBC-ENTMCNC: 28.2 PG — SIGNIFICANT CHANGE UP (ref 27–34)
MCHC RBC-ENTMCNC: 28.4 PG — SIGNIFICANT CHANGE UP (ref 27–34)
MCHC RBC-ENTMCNC: 29.5 G/DL — LOW (ref 32–36)
MCHC RBC-ENTMCNC: 30.2 G/DL — LOW (ref 32–36)
MCV RBC AUTO: 94.1 FL — SIGNIFICANT CHANGE UP (ref 80–100)
MCV RBC AUTO: 95.6 FL — SIGNIFICANT CHANGE UP (ref 80–100)
MONOCYTES # BLD AUTO: 0.75 K/UL — SIGNIFICANT CHANGE UP (ref 0–0.9)
MONOCYTES NFR BLD AUTO: 5.5 % — SIGNIFICANT CHANGE UP (ref 2–14)
NEUTROPHILS # BLD AUTO: 10.74 K/UL — HIGH (ref 1.8–7.4)
NEUTROPHILS NFR BLD AUTO: 78.9 % — HIGH (ref 43–77)
NRBC # BLD: 0 /100 WBCS — SIGNIFICANT CHANGE UP (ref 0–0)
NRBC # BLD: 0 /100 WBCS — SIGNIFICANT CHANGE UP (ref 0–0)
PHOSPHATE SERPL-MCNC: 2.7 MG/DL — SIGNIFICANT CHANGE UP (ref 2.5–4.5)
PLATELET # BLD AUTO: 362 K/UL — SIGNIFICANT CHANGE UP (ref 150–400)
PLATELET # BLD AUTO: 412 K/UL — HIGH (ref 150–400)
POTASSIUM SERPL-MCNC: 3.4 MMOL/L — LOW (ref 3.5–5.3)
POTASSIUM SERPL-SCNC: 3.4 MMOL/L — LOW (ref 3.5–5.3)
PROTHROM AB SERPL-ACNC: 14.1 SEC — HIGH (ref 10.6–13.6)
RBC # BLD: 2.73 M/UL — LOW (ref 3.8–5.2)
RBC # BLD: 3.41 M/UL — LOW (ref 3.8–5.2)
RBC # FLD: 14.9 % — HIGH (ref 10.3–14.5)
RBC # FLD: 15 % — HIGH (ref 10.3–14.5)
SODIUM SERPL-SCNC: 148 MMOL/L — HIGH (ref 135–145)
WBC # BLD: 13.61 K/UL — HIGH (ref 3.8–10.5)
WBC # BLD: 16.32 K/UL — HIGH (ref 3.8–10.5)
WBC # FLD AUTO: 13.61 K/UL — HIGH (ref 3.8–10.5)
WBC # FLD AUTO: 16.32 K/UL — HIGH (ref 3.8–10.5)

## 2022-01-27 PROCEDURE — 99232 SBSQ HOSP IP/OBS MODERATE 35: CPT

## 2022-01-27 PROCEDURE — 99233 SBSQ HOSP IP/OBS HIGH 50: CPT

## 2022-01-27 PROCEDURE — 74174 CTA ABD&PLVS W/CONTRAST: CPT | Mod: 26

## 2022-01-27 RX ORDER — LEVETIRACETAM 250 MG/1
500 TABLET, FILM COATED ORAL
Refills: 0 | Status: DISCONTINUED | OUTPATIENT
Start: 2022-01-27 | End: 2022-01-28

## 2022-01-27 RX ORDER — POTASSIUM CHLORIDE 20 MEQ
40 PACKET (EA) ORAL ONCE
Refills: 0 | Status: COMPLETED | OUTPATIENT
Start: 2022-01-27 | End: 2022-01-27

## 2022-01-27 RX ADMIN — DORZOLAMIDE HYDROCHLORIDE TIMOLOL MALEATE 1 DROP(S): 20; 5 SOLUTION/ DROPS OPHTHALMIC at 05:06

## 2022-01-27 RX ADMIN — Medication 40 MILLIEQUIVALENT(S): at 17:58

## 2022-01-27 RX ADMIN — Medication 650 MILLIGRAM(S): at 12:00

## 2022-01-27 RX ADMIN — CARVEDILOL PHOSPHATE 3.12 MILLIGRAM(S): 80 CAPSULE, EXTENDED RELEASE ORAL at 17:58

## 2022-01-27 RX ADMIN — AMLODIPINE BESYLATE 10 MILLIGRAM(S): 2.5 TABLET ORAL at 05:08

## 2022-01-27 RX ADMIN — CARVEDILOL PHOSPHATE 3.12 MILLIGRAM(S): 80 CAPSULE, EXTENDED RELEASE ORAL at 05:08

## 2022-01-27 RX ADMIN — CHLORHEXIDINE GLUCONATE 15 MILLILITER(S): 213 SOLUTION TOPICAL at 05:06

## 2022-01-27 RX ADMIN — CHLORHEXIDINE GLUCONATE 15 MILLILITER(S): 213 SOLUTION TOPICAL at 17:58

## 2022-01-27 RX ADMIN — CHLORHEXIDINE GLUCONATE 1 APPLICATION(S): 213 SOLUTION TOPICAL at 05:07

## 2022-01-27 RX ADMIN — ESCITALOPRAM OXALATE 20 MILLIGRAM(S): 10 TABLET, FILM COATED ORAL at 11:14

## 2022-01-27 RX ADMIN — Medication 40 MILLIEQUIVALENT(S): at 10:05

## 2022-01-27 RX ADMIN — Medication 650 MILLIGRAM(S): at 11:14

## 2022-01-27 RX ADMIN — LEVETIRACETAM 500 MILLIGRAM(S): 250 TABLET, FILM COATED ORAL at 18:42

## 2022-01-27 RX ADMIN — DORZOLAMIDE HYDROCHLORIDE TIMOLOL MALEATE 1 DROP(S): 20; 5 SOLUTION/ DROPS OPHTHALMIC at 17:58

## 2022-01-27 RX ADMIN — ENOXAPARIN SODIUM 80 MILLIGRAM(S): 100 INJECTION SUBCUTANEOUS at 11:14

## 2022-01-27 RX ADMIN — PANTOPRAZOLE SODIUM 40 MILLIGRAM(S): 20 TABLET, DELAYED RELEASE ORAL at 11:14

## 2022-01-27 RX ADMIN — LEVETIRACETAM 420 MILLIGRAM(S): 250 TABLET, FILM COATED ORAL at 05:10

## 2022-01-27 RX ADMIN — Medication 50 MICROGRAM(S): at 05:07

## 2022-01-27 NOTE — PROGRESS NOTE ADULT - SUBJECTIVE AND OBJECTIVE BOX
SURGERY PROGRESS HPI:  Pt seen and examined at bedside with Dr. Fairbanks. No complaints. Tolerating feeds. No nausea and vomiting. +Ostomy function. No chest pain, SOB, dizziness, fever, chills.       Vital Signs Last 24 Hrs  T(C): 36.3 (27 Jan 2022 04:46), Max: 38.3 (26 Jan 2022 11:21)  T(F): 97.3 (27 Jan 2022 04:46), Max: 100.9 (26 Jan 2022 11:21)  HR: 68 (27 Jan 2022 05:18) (68 - 87)  BP: 146/75 (27 Jan 2022 04:46) (125/72 - 178/75)  BP(mean): --  RR: 20 (27 Jan 2022 04:46) (18 - 20)  SpO2: 98% (27 Jan 2022 05:18) (98% - 100%)      PHYSICAL EXAM:  GENERAL: NAD, Alert  HEENT: Tracheostomy intact functioning well  CHEST/LUNG: Clear to ausculation, bilaterally   HEART: RRR S1S2  ABDOMEN: Wound vac intact with good seal and functioning well. PEG intact with feeds running. Ostomy pink and viable with stool in the bag. Left BABAK with scant purulent output. non distended, +BS, soft, non tender, no guarding  EXTREMITIES:  calf soft, non tender b/l    I&O's Detail    26 Jan 2022 07:01  -  27 Jan 2022 07:00  --------------------------------------------------------  IN:    Enteral Tube Flush: 600 mL    Jevity 1.2: 720 mL  Total IN: 1320 mL    OUT:    Bulb (mL): 5 mL    Colostomy (mL): 150 mL  Total OUT: 155 mL    Total NET: 1165 mL          LABS:                        9.7    13.61 )-----------( 362      ( 27 Jan 2022 07:45 )             32.1     01-26    147<H>  |  115<H>  |  19  ----------------------------<  147<H>  3.5   |  28  |  0.50    Ca    8.4<L>      26 Jan 2022 08:55    TPro  5.8<L>  /  Alb  1.9<L>  /  TBili  0.1<L>  /  DBili  x   /  AST  25  /  ALT  28  /  AlkPhos  72  01-26          A/P: 81F s/p ex lap, sigmoid rxn, abthera placement 12/25, RTOR for completion of hartmanns procedure and closure of abdominal wall 12/26, IR  abscess aspiration 1/3, trach 1/7, PEG 1/18.   CT 1/21 shows improving fluid collections. R BABAK removed 1/22.  US 1/25 shows Partially occlusive thrombus of the right common femoral vein.  100.6 overnight    - TF as tolerated  - cont left BABAK to suction, monitor output   - wound vac changes per PT  - management per primary team; leukocytosis w/u  - Monitor off abx for now per ID  - Full dose Lovenox per medicine for DVT  - discussed with Dr. Fairbanks

## 2022-01-27 NOTE — PROCEDURE NOTE - NSSECUREBY_VASC_A_CORE
stabilization device/sterile occulsive dressing
stabilization device/sterile occulsive dressing/sterile pressure dressing

## 2022-01-27 NOTE — CONSULT NOTE ADULT - ASSESSMENT
Interventional Radiology  Evaluate for Procedure: Fluid collection drain    HPI: 81y Female with post-op abdominal collections, fever    Allergies:   Medications (Abx/Cardiac/Anticoagulation/Blood Products)  amLODIPine   Tablet: 10 milliGRAM(s) Oral (01-27 @ 05:08)  carvedilol: 3.125 milliGRAM(s) Oral (01-27 @ 05:08)  enoxaparin Injectable: 80 milliGRAM(s) SubCutaneous (01-27 @ 11:14)    Data:  T(C): 38.3  HR: 81  BP: 136/71  RR: 20  SpO2: 97%    -WBC 13.61 / HgB 9.7 / Hct 32.1 / Plt 362  -Na 148 / Cl 115 / BUN 19 / Glucose 115  -K 3.4 / CO2 29 / Cr 0.45  -ALT -- / Alk Phos -- / T.Bili --  -INR 1.06 / PTT 28.6    Radiology: CT reviewed, multiple tiny collections, all improving compared to prior    Assessment/Plan:   -81y Female with post-op abdominal collections, fever  -On recent CT, abdominal collections are improved and tiny. If there are concerns regarding cellulitis or subq collection in the area of surgical drains, can refer to gen surg.   -Will exchange midline per ID request

## 2022-01-27 NOTE — PROGRESS NOTE ADULT - ASSESSMENT
TRESA PIZARRO 81 f 12/22/2021 1942 DR ANTONIO PATTON     REVIEW OF SYMPTOMS      Able to give (reliable) ROS  NO     PHYSICAL EXAM    HEENT Unremarkable  atraumatic   RESP Fair air entry EXP prolonged    Harsh breath sound Resp distres mild   CARDIAC S1 S2 No S3     NO JVD    ABDOMEN SOFT BS PRESENT NOT DISTENDED No hepatosplenomegaly   PEDAL EDEMA present No calf tenderness  NO rash     ______  DOA/CC.  12/22/2021 81F w/ HTN, HLD, hypothyroidism, depression, seizures. Presented w/ abdominal pain found to have acute sigmoid diverticulitis  ____  PMH.  pmh Hytn  pmh Sz.      _________  PROBLEMS.     VDRF. Since surgery 12/26  TRACH Done 1/7  ABDOMINAL INFECTION.   Feculent peritonitis perf div poa   COLOSTOMY Created 12/26  1/3 ABD ASPIRATE E coli bacteroides Abio dced 1/15  GT 1/18      PAIN 1/10 fent 50   SZ 1/10 keppra 500.2   Woundvac Rx started 1/14  Hypernatremia 1/14/2022 Na 148  Hypothyroid 1/14/2022 tsh 12   Fever spike 101 1/20/2022   R LEFTY removd 1/22/2022  Leukocytsosis 1/24/2022   dvt 1/25/2022 r cfv fd lvnx startd   Lovenox 80.2 started 1/25   Drop in Hb 1/27/2022     _____                            COVID STATUS. 12/24 pcr (-)  ICU STAY. 12/22-1/12/2022   GOC.  1/13/2022 full code       BEST PRACTICE ISSUES.                                                  HEAD OF BED ELEVATION. Yes  DVT PROPHYLAXIS.    1/25/2022 lvnx 80.2   1/25/2022 V duplx partially occlusive thrombus r cfv ac to subacute   1/7 lvnx 40   LIGHT PROPHYLAXIS.    1/11 protonix 40                                                                                     DIET.    1/20/2022 jevity 1.5 1200  1/18/2022 npo  1/7 vital af 1200 ngt          INFECTION PROPHYLAXIS.   1/7 Chlorhexidine .12%   1/7 chlorhexidine 2%      PATIENT DATA   VITALS/PO/IO/VENT/DRIPS.  1/27/2022 lefty 5 cc  1/27/2022 ac 15/450/5/.3      ASSESSMENT/RECOMMENDATIONS.    HEMODYNAMICS.   Monitor bp Target MAP 65 (+)    RESP.   Monitor po Target po 90-95%  1/14/2022 15/450/.4/5 747/38/110     DVT  1/25/2022 V duplx partially occlusive thrombus r cfv ac to subacute   1/25/2022 dw surg antonia Winter and then with med antonia Weinberg and startd fd vnx     OXYGEN REQUIREMENTS.   1/24/2022 30%  1/17/2022 40%  1/13/2022 40%    VENT MANAGEMENT.   HOB elevation  Target Pplat 35 (-)  Target PO 90-95%  Target pH 730 (+)    INFECTION.  Abdominal infection sec perforated sigmoid div feculent peritonitis poa 12/22.  w 1/25-1/26-1/27/2022 w 18 - 14-16  1/16/2022 Off abio  1/25/2022 cxr no infiltr  1/21 ctap oc ic Fluid collections since 1/11/2022 decreased in size or small and stable   Post pelvic fluid pocket decreased now 2.7 x 1.7 cm prev 3.9 x 2.4 cm R paracolic gutter also decreased 4.3 x 1 prev 4.5 x 1.6   1/22 cxr sm r eff  1/24/2022 Pt has progressive leukocytosis   1/24/2022  id on case  Recent ctap (-)    1/25/2022 pt was found to have r dvt and startd fd lvnx    SP ABD SURGERY.  81F admitted with Acute Sigmoid Diverticulitis s/p ex lap, sigmoid resection, peritoneal lavage 12/25 and RTOR 12/26 for irrigation of abdominal cavity with closure and creation of colostomy. S/p bedside IR aspiration of fluid collection 1/3. Tracheostomy 1/7.   Deep pelvic abscess noted on CT,  Local drain/ostomy care per nursing  1/22/2022 r lefty removd     PL EFFSN.  1/16/2022 cxr tr bl effs  1/14/2022 CXR residuall pl effs or basal consolidatn  Effsn likely sec to abdominal infkammation       ANEMIA.  Hb 1/27-1/27 Hb 9.7-7.7  Hb 1/13-1/14-1/16-1/17-1/24/2022 Hb 9.4 - 8.9 -10 - 9.6 - 10.2   1/27/2022 cta abd was ordered  Monitor  1/27/2022 If hb continues to drop will dc lvnx and will need ivcf     GT placed 1/18    Hypernatremia  Na 1/26-1/27/2022 Na 147-148     TIME SPENT   Over 25 minutes aggregate care time spent on encounter; activities included   direct patient care, counseling and/or coordinating care reviewing notes, lab data/ imaging , discussion with multidisciplinary team/ patient  /family and explaining in detail risks, benefits, alternatives  of the recommendations     TRESA PIZARRO 81 f 12/22/2021 1942 DR ANTONIO PATTON

## 2022-01-27 NOTE — PROGRESS NOTE ADULT - SUBJECTIVE AND OBJECTIVE BOX
JUAREZ GTZ  MRN-[id number]    Follow Up:  Fevers    Interval History: The pt was seen and examined earlier, no distress, awake and alert, seems to be understanding my questions, asking again for water, explained again to the pt why she can't have water at this time, pt is vented via tracheostomy. The pt continues having fevers on and off, WBC better, denies any pain.      PAST MEDICAL & SURGICAL HISTORY:  Seizure    HTN (hypertension)    Glaucoma    Thyroid disease    Blood clot of neck vein  left side    TIA (transient ischemic attack)  20 years ago    S/P appendectomy        ROS:    [x ] Unobtainable because: pt is vented via tracheostomy  [ ] All other systems negative    Constitutional: no fever, no chills  Head: no trauma  Eyes: no vision changes, no eye pain  ENT:  no sore throat, no rhinorrhea  Cardiovascular:  no chest pain, no palpitation  Respiratory:  no SOB, no cough  GI:  no abd pain, no vomiting, no diarrhea  urinary: no dysuria, no hematuria, no flank pain  musculoskeletal:  no joint pain, no joint swelling  skin:  no rash  neurology:  no headache, no seizure, no change in mental status  psych: no anxiety, no depression         Allergies  No Known Allergies        ANTIMICROBIALS:      OTHER MEDS:  acetaminophen     Tablet .. 650 milliGRAM(s) Oral every 6 hours PRN  amLODIPine   Tablet 10 milliGRAM(s) Oral daily  carvedilol 3.125 milliGRAM(s) Oral every 12 hours  chlorhexidine 0.12% Liquid 15 milliLiter(s) Oral Mucosa every 12 hours  chlorhexidine 2% Cloths 1 Application(s) Topical <User Schedule>  dextrose 40% Gel 15 Gram(s) Oral once  dextrose 5%. 1000 milliLiter(s) IV Continuous <Continuous>  dextrose 5%. 1000 milliLiter(s) IV Continuous <Continuous>  dextrose 50% Injectable 25 Gram(s) IV Push once  dextrose 50% Injectable 12.5 Gram(s) IV Push once  dextrose 50% Injectable 25 Gram(s) IV Push once  dorzolamide 2%/timolol 0.5% Ophthalmic Solution 1 Drop(s) Both EYES two times a day  enoxaparin Injectable 80 milliGRAM(s) SubCutaneous every 12 hours  escitalopram 20 milliGRAM(s) Oral daily  glucagon  Injectable 1 milliGRAM(s) IntraMuscular once  levETIRAcetam  Solution 500 milliGRAM(s) Enteral Tube two times a day  levothyroxine 50 MICROGram(s) Oral daily  pantoprazole   Suspension 40 milliGRAM(s) Enteral Tube daily  potassium chloride    Tablet ER 40 milliEquivalent(s) Oral once  sodium chloride 0.9% lock flush 10 milliLiter(s) IV Push every 1 hour PRN      Vital Signs Last 24 Hrs  T(C): 38.3 (27 Jan 2022 10:40), Max: 38.3 (27 Jan 2022 10:40)  T(F): 101 (27 Jan 2022 10:40), Max: 101 (27 Jan 2022 10:40)  HR: 81 (27 Jan 2022 11:55) (68 - 83)  BP: 136/71 (27 Jan 2022 10:40) (125/72 - 160/-)  BP(mean): --  RR: 20 (27 Jan 2022 04:46) (18 - 20)  SpO2: 97% (27 Jan 2022 11:55) (97% - 100%)    Physical Exam:  General: Nontoxic-appearing Female in no acute distress. Trached on vent  HEENT: AT/NC. No oral lesions on superficial exam  Neck: Not rigid. No sense of mass. Trach C/D/I  Nodes: None palpable.  Lungs: diminished breath sounds bilaterally   Heart: Regular rate and rhythm. +Murmur. No rub. No gallop. No palpable thrill.  Abdomen: Bowel sounds now present.  Soft. Mildly distended.  Abd incision with vac dressing.  Ostomy with stool and gas. Drains x1, there is a PEG tube which appears C/D/I   Extremities: No cyanosis or clubbing. 1+ edema of bilateral lower extremities   Skin: Warm. Dry No rash. No vasculitic stigmata.  Neuro: awake, alert and did follow simple commands but not all    Psychiatric: calm      WBC Count: 13.61 K/uL (01-27 @ 07:45)  WBC Count: 14.12 K/uL (01-26 @ 08:55)  WBC Count: 18.35 K/uL (01-25 @ 06:35)  WBC Count: 21.37 K/uL (01-24 @ 08:10)  WBC Count: 15.24 K/uL (01-23 @ 09:57)  WBC Count: 10.13 K/uL (01-22 @ 07:30)  WBC Count: 10.70 K/uL (01-21 @ 08:26)                            9.7    13.61 )-----------( 362      ( 27 Jan 2022 07:45 )             32.1       01-27    148<H>  |  115<H>  |  19  ----------------------------<  115<H>  3.4<L>   |  29  |  0.45<L>    Ca    8.6      27 Jan 2022 07:45  Phos  2.7     01-27  Mg     2.3     01-27    TPro  5.8<L>  /  Alb  1.9<L>  /  TBili  0.1<L>  /  DBili  x   /  AST  25  /  ALT  28  /  AlkPhos  72  01-26          Creatinine Trend: 0.45<--, 0.50<--, 0.57<--, 0.56<--, 0.62<--, 0.46<--      MICROBIOLOGY:  v  .Blood Blood-Peripheral  01-21-22   No Growth Final  --  --      Clean Catch Clean Catch (Midstream)  01-21-22   <10,000 CFU/mL Normal Urogenital Sendy  --  --      .Body Fluid Abdominal Fluid  01-03-22   Few Escherichia coli  Few Bacteroides ovatus group "Susceptibilities not performed"  --  Escherichia coli      .Sputum Sputum  12-28-21   Normal Respiratory Sendy present  --    Moderate polymorphonuclear leukocytes per low power field  Numerous Squamous epithelial cells per low power field  Few Gram positive cocci in pairs per oil power field  Rare Gram Positive Rods per oil power field  Rare Yeast like cells per oil power field  Results consistent with oropharyngeal contamination      .Blood Blood  12-28-21   No Growth Final  --  --    Rapid RVP Result: NotDetec (01-20 @ 18:59)    Procalcitonin, Serum: 0.06 (01-24-22 @ 10:30)  Procalcitonin, Serum: 0.04 (01-22-22 @ 10:46)    SARS-CoV-2 Result: NotDetec (01-26-22 @ 11:43)    SARS-CoV-2: NotDetec (20 Jan 2022 18:59)  SARS-CoV-2: NotDetec (24 Dec 2021 14:38)  SARS-CoV-2: NotDetec (24 Dec 2021 00:03)    RADIOLOGY:

## 2022-01-27 NOTE — PROCEDURE NOTE - ADDITIONAL PROCEDURE DETAILS
Midline catheter exchanged. Initial midline placed in R basilic. Catheter placed in same site confirmed with blood return.

## 2022-01-27 NOTE — CHART NOTE - NSCHARTNOTEFT_GEN_A_CORE
Hospitalist Medicine NP Note.    EVENT: RN Called to see patient c/o bleeding from the PEG site     SUBJECTIVE: Patient is seen and evaluated.  noted moderate amount of bleeding from the PEG site with blood clots.  cleaned and DSD applied, bleeding stopped.  surgery PA notified.  PEG was placed by surgeon on 1/18/22.  patient is on Lovenox 80 mg sq q12h for DVT     OBJECTIVE:  Vital Signs Last 24 Hrs  T(C): 36.9 (27 Jan 2022 17:15), Max: 38.3 (27 Jan 2022 10:40)  T(F): 98.5 (27 Jan 2022 17:15), Max: 101 (27 Jan 2022 10:40)  HR: 78 (27 Jan 2022 17:15) (68 - 84)  BP: 113/70 (27 Jan 2022 17:15) (113/70 - 160/-)  RR: 18 (27 Jan 2022 17:15) (18 - 20)  SpO2: 100% (27 Jan 2022 17:15) (96% - 100%)    LABS:                        9.7    13.61 )-----------( 362      ( 27 Jan 2022 07:45 )             32.1     01-27    148<H>  |  115<H>  |  19  ----------------------------<  115<H>  3.4<L>   |  29  |  0.45<L>    Ca    8.6      27 Jan 2022 07:45  Phos  2.7     01-27  Mg     2.3     01-27    TPro  5.8<L>  /  Alb  1.9<L>  /  TBili  0.1<L>  /  DBili  x   /  AST  25  /  ALT  28  /  AlkPhos  72  01-26      EKG:   IMGAGING:    ASSESSMENT:  HPI:  Patient is a 81F with a PMH of HTN, HLD, hypothyroidism, depression, seizures who presents to the ED for abd pain.  Patient states that over the last two days she had developed significant abdominal pain and multiple episodes of watery diarrhea.  Reports one episode of nausea and vomiting earlier today.  Patient has no other complaints.  Vitals stable,  labs show leukocytosis and hypokalemia.  CT abdomen significant for diverticulitis.  Will admit to med surg.     (23 Dec 2021 00:17)    PLAN: STAT CBC, PT INR  SURGERY Called.  GI CONSULT Dr. Ness notified  and recommended CT ANGIO IF FURTHER BLEEDING FROM THE SITE OR BLOODY BM.  LOVENOX HELD FOR TONIGHT DOSE.  If h/h drop stop lovenox    Notified MD Dr. Zully Bishop, NP- C

## 2022-01-27 NOTE — PROGRESS NOTE ADULT - SUBJECTIVE AND OBJECTIVE BOX
JUAREZ GTZ    LVS 1B 122 D    Allergies    No Known Allergies    Intolerances        PAST MEDICAL & SURGICAL HISTORY:  Seizure    HTN (hypertension)    Glaucoma    Thyroid disease    Blood clot of neck vein  left side    TIA (transient ischemic attack)  20 years ago    S/P appendectomy        FAMILY HISTORY:  FH: HTN (hypertension)        Home Medications:  amLODIPine 10 mg oral tablet: 1 tab(s) orally once a day (22 Dec 2021 16:46)  aspirin 81 mg oral tablet, chewable: 1 tab(s) orally once a day (22 Dec 2021 16:46)  escitalopram 20 mg oral tablet: 1 tab(s) orally once a day (22 Dec 2021 16:46)  keppera:  (22 Dec 2021 16:46)  levETIRAcetam 500 mg oral tablet: 1 tab(s) orally 2 times a day (22 Dec 2021 16:46)  timolol-dorzolamide 0.5%-2% preservative-free ophthalmic solution: 1 drop(s) to each affected eye 2 times a day (22 Dec 2021 16:46)  Zetia 10 mg oral tablet: 1 tab(s) orally once a day (22 Dec 2021 16:46)      MEDICATIONS  (STANDING):  amLODIPine   Tablet 10 milliGRAM(s) Oral daily  carvedilol 3.125 milliGRAM(s) Oral every 12 hours  chlorhexidine 0.12% Liquid 15 milliLiter(s) Oral Mucosa every 12 hours  chlorhexidine 2% Cloths 1 Application(s) Topical <User Schedule>  dextrose 40% Gel 15 Gram(s) Oral once  dextrose 5%. 1000 milliLiter(s) (50 mL/Hr) IV Continuous <Continuous>  dextrose 5%. 1000 milliLiter(s) (100 mL/Hr) IV Continuous <Continuous>  dextrose 50% Injectable 25 Gram(s) IV Push once  dextrose 50% Injectable 12.5 Gram(s) IV Push once  dextrose 50% Injectable 25 Gram(s) IV Push once  dorzolamide 2%/timolol 0.5% Ophthalmic Solution 1 Drop(s) Both EYES two times a day  enoxaparin Injectable 80 milliGRAM(s) SubCutaneous every 12 hours  escitalopram 20 milliGRAM(s) Oral daily  glucagon  Injectable 1 milliGRAM(s) IntraMuscular once  levETIRAcetam  Solution 500 milliGRAM(s) Enteral Tube two times a day  levothyroxine 50 MICROGram(s) Oral daily  pantoprazole   Suspension 40 milliGRAM(s) Enteral Tube daily    MEDICATIONS  (PRN):  acetaminophen     Tablet .. 650 milliGRAM(s) Oral every 6 hours PRN Temp greater or equal to 38C (100.4F)  sodium chloride 0.9% lock flush 10 milliLiter(s) IV Push every 1 hour PRN Pre/post blood products, medications, blood draw, and to maintain line patency      Diet, NPO with Tube Feed:   Tube Feeding Modality: Gastrostomy  Jevity 1.2 Santos  Total Volume for 24 Hours (mL): 1440  Continuous  Starting Tube Feed Rate mL per Hour: 20  Increase Tube Feed Rate by (mL): 10     Every 6 hours  Until Goal Tube Feed Rate (mL per Hour): 60  Tube Feed Duration (in Hours): 24  Tube Feed Start Time: 12:00  Free Water Flush  Bolus   Total Volume per Flush (mL): 150   Frequency: Every 6 Hours   Total Daily Volume of Flush (mL): 600    Start Time: 12:00 (01-21-22 @ 11:06) [Active]          Vital Signs Last 24 Hrs  T(C): 38.3 (27 Jan 2022 10:40), Max: 38.3 (27 Jan 2022 10:40)  T(F): 101 (27 Jan 2022 10:40), Max: 101 (27 Jan 2022 10:40)  HR: 80 (27 Jan 2022 10:40) (68 - 83)  BP: 136/71 (27 Jan 2022 10:40) (125/72 - 160/-)  BP(mean): --  RR: 20 (27 Jan 2022 04:46) (18 - 20)  SpO2: 100% (27 Jan 2022 10:40) (98% - 100%)      01-26-22 @ 07:01  -  01-27-22 @ 07:00  --------------------------------------------------------  IN: 1320 mL / OUT: 155 mL / NET: 1165 mL        Mode: AC/ CMV (Assist Control/ Continuous Mandatory Ventilation), RR (machine): 15, TV (machine): 450, FiO2: 30, PEEP: 5, PS: 10, ITime: 0.8, MAP: 10, PIP: 18      LABS:                        9.7    13.61 )-----------( 362      ( 27 Jan 2022 07:45 )             32.1     01-27    148<H>  |  115<H>  |  19  ----------------------------<  115<H>  3.4<L>   |  29  |  0.45<L>    Ca    8.6      27 Jan 2022 07:45  Phos  2.7     01-27  Mg     2.3     01-27    TPro  5.8<L>  /  Alb  1.9<L>  /  TBili  0.1<L>  /  DBili  x   /  AST  25  /  ALT  28  /  AlkPhos  72  01-26              WBC:  WBC Count: 13.61 K/uL (01-27 @ 07:45)  WBC Count: 14.12 K/uL (01-26 @ 08:55)  WBC Count: 18.35 K/uL (01-25 @ 06:35)  WBC Count: 21.37 K/uL (01-24 @ 08:10)      MICROBIOLOGY:  RECENT CULTURES:  01-21 .Blood Blood-Peripheral XXXX XXXX   No Growth Final    01-21 Clean Catch Clean Catch (Midstream) XXXX XXXX   <10,000 CFU/mL Normal Urogenital Sendy                    Sodium:  Sodium, Serum: 148 mmol/L (01-27 @ 07:45)  Sodium, Serum: 147 mmol/L (01-26 @ 08:55)  Sodium, Serum: 146 mmol/L (01-25 @ 06:35)  Sodium, Serum: 146 mmol/L (01-24 @ 08:10)      0.45 mg/dL 01-27 @ 07:45  0.50 mg/dL 01-26 @ 08:55  0.57 mg/dL 01-25 @ 06:35  0.56 mg/dL 01-24 @ 08:10      Hemoglobin:  Hemoglobin: 9.7 g/dL (01-27 @ 07:45)  Hemoglobin: 9.0 g/dL (01-26 @ 08:55)  Hemoglobin: 10.3 g/dL (01-25 @ 06:35)  Hemoglobin: 10.2 g/dL (01-24 @ 08:10)      Platelets: Platelet Count - Automated: 362 K/uL (01-27 @ 07:45)  Platelet Count - Automated: 320 K/uL (01-26 @ 08:55)  Platelet Count - Automated: 369 K/uL (01-25 @ 06:35)  Platelet Count - Automated: 430 K/uL (01-24 @ 08:10)      LIVER FUNCTIONS - ( 26 Jan 2022 08:55 )  Alb: 1.9 g/dL / Pro: 5.8 gm/dL / ALK PHOS: 72 U/L / ALT: 28 U/L / AST: 25 U/L / GGT: x                 RADIOLOGY & ADDITIONAL STUDIES:      MICROBIOLOGY:  RECENT CULTURES:  01-21 .Blood Blood-Peripheral XXXX XXXX   No Growth Final    01-21 Clean Catch Clean Catch (Midstream) XXXX XXXX   <10,000 CFU/mL Normal Urogenital Sendy

## 2022-01-27 NOTE — PROGRESS NOTE ADULT - SUBJECTIVE AND OBJECTIVE BOX
Pt was seen and examined at bedside  acute overnight events noted:   febrile this am to 101  patient able to muffle words through trach. reports she wants to wash her face  denies fever chills chest pain shortness of breath  +trach  to vent  + peg. + ostomy + left sided lefty drain in place    Review of Systems:  General:denies fever chills, headache, weakness  HEENT: denies blurry vision,diffculty swallowing, difficulty hearing, tinnitus  Cardiovascular: denies chest pain  ,palpitations  Pulmonary:denies shortness of breath, cough, wheezing, hemoptysis  Gastrointestinal: denies abdominal pain, constipation, diarrhea,nausea , vomiting, hematochezia  : denies hematuria, dysuria, or incontinence  Neurological: denies weakness, numbness , tingling, dizziness, tremors  MSK: denies muscle pain, difficulty ambulating, swelling, back pain  skin: denies skin rash, itching, burning, or  skin lesions  Psychiatrical: denies mood disturbances, anxierty, feeling depressed, depression , or difficulty sleeping    Objective:  Vitals  T(C): 38.3 (01-26-22 @ 11:21), Max: 38.3 (01-26-22 @ 11:21)  HR: 73 (01-26-22 @ 11:36) (65 - 87)  BP: 178/75 (01-26-22 @ 11:21) (156/71 - 184/70)  RR: 18 (01-26-22 @ 05:31) (18 - 19)  SpO2: 98% (01-26-22 @ 11:36) (97% - 100%)    Physical Exam:  General: comfortable, no acute distress, well nourished  HEENT: Atraumatic, no LAD, trachea midline, PERRLA  Cardiovascular: normal s1s2, no murmurs, gallops or fricition rubs  Pulmonary: diminished, no wheezing , rhonchi, + trach  Gastrointestinal: soft non tender non distended, no masses felt, no organomegally + 0stomy , + left lefty drain  Muscloskeletal: no lower extremity edema, intact bilateral lower extremity pulses  Neurological: CN II-12 intact. No focal weakness  Psychiatrical: normal mood, cooperative  SKIN: no rash, lesions or ulcers      Labs:                          9.7    13.61 )-----------( 362      ( 27 Jan 2022 07:45 )             32.1     01-27    148<H>  |  115<H>  |  19  ----------------------------<  115<H>  3.4<L>   |  29  |  0.45<L>    Ca    8.6      27 Jan 2022 07:45  Phos  2.7     01-27  Mg     2.3     01-27    TPro  5.8<L>  /  Alb  1.9<L>  /  TBili  0.1<L>  /  DBili  x   /  AST  25  /  ALT  28  /  AlkPhos  72  01-26    LIVER FUNCTIONS - ( 26 Jan 2022 08:55 )  Alb: 1.9 g/dL / Pro: 5.8 gm/dL / ALK PHOS: 72 U/L / ALT: 28 U/L / AST: 25 U/L / GGT: x                 Active Medications  MEDICATIONS  (STANDING):  amLODIPine   Tablet 10 milliGRAM(s) Oral daily  carvedilol 3.125 milliGRAM(s) Oral every 12 hours  chlorhexidine 0.12% Liquid 15 milliLiter(s) Oral Mucosa every 12 hours  chlorhexidine 2% Cloths 1 Application(s) Topical <User Schedule>  dextrose 40% Gel 15 Gram(s) Oral once  dextrose 5%. 1000 milliLiter(s) (50 mL/Hr) IV Continuous <Continuous>  dextrose 5%. 1000 milliLiter(s) (100 mL/Hr) IV Continuous <Continuous>  dextrose 50% Injectable 25 Gram(s) IV Push once  dextrose 50% Injectable 12.5 Gram(s) IV Push once  dextrose 50% Injectable 25 Gram(s) IV Push once  dorzolamide 2%/timolol 0.5% Ophthalmic Solution 1 Drop(s) Both EYES two times a day  enoxaparin Injectable 80 milliGRAM(s) SubCutaneous every 12 hours  escitalopram 20 milliGRAM(s) Oral daily  glucagon  Injectable 1 milliGRAM(s) IntraMuscular once  levETIRAcetam  Solution 500 milliGRAM(s) Enteral Tube two times a day  levothyroxine 50 MICROGram(s) Oral daily  pantoprazole   Suspension 40 milliGRAM(s) Enteral Tube daily  potassium chloride    Tablet ER 40 milliEquivalent(s) Oral once    MEDICATIONS  (PRN):  acetaminophen     Tablet .. 650 milliGRAM(s) Oral every 6 hours PRN Temp greater or equal to 38C (100.4F)  sodium chloride 0.9% lock flush 10 milliLiter(s) IV Push every 1 hour PRN Pre/post blood products, medications, blood draw, and to maintain line patency

## 2022-01-27 NOTE — PROGRESS NOTE ADULT - ASSESSMENT
81F with a PMH of HTN, HLD, hypothyroidism, depression, seizures who presents to the ED for abd pain found to have diverticulitis   has rising leukocytosis   had fever yesterday   tachycardic and now hypoxic   CXR (I personally reviewed) low lung volumes   origincal CT (I personally reviewed) with diverticulitis   she had a lot of pain in the abdomen on exam     12/25: s/p surgery for diverticulitis with perforation and abscess and went to the OR. intubated in ICU with vac and needs to go back to the OR, currently on zosyn, s/p one dose of diflucan last night   12/27: s/p repair and ostomy creation yesterday, wbc elevated, cultures sensitive to zosyn and candida albicans is notoriously sensitive to azoles such as fluconazole, wean of pressors and sedation, extubate when ready   12/28: Further increase in white blood cell count but overall the patient appears to be reasonably stable.  No concern of C. difficile at this juncture.  Current antibiotics are reasonable.  Leukocytosis like related to recent surgery, no concern of other superimposed process on the basis of exam.  I do not see a role to alter her antimicrobials.  12/29:  Remains intubated in ICU. still quite edematous and net positive, WBC climbing, small wound dehiscence at bottom of incision, plan for initiation of TPN today, remains on antibiotics    12/30: rising WBC, ostomy not functioning yet, very edematous CT today, may be source control issue so for now can continue same antibiotics and antifungal but may need to change if findings on the CT are worrisome or change in hemodynamics  12/31:Leukocytosis, with collections noted on CT.  However this is being done postoperative day 5, there is some possibility that this could represent postop changes but given the leukocytosis, concur with plans for attempts at percutaneous drainage.  White blood cell count down slightly compared to prior peak, will need to be trended.  Gallbladder is also distended.  Abdominal exam was benign this morning, but a calculus cholecystitis could be the differential diagnosis here as well (though n.p.o. status certainly could explain distended gallbladder, there is also report of gallbladder wall thickening).  Would modify antibiotics based on cultures from drainage, I do not believe that antibiotics and other the cells would be adequate here.  Fortunately she is off pressors, with preserved renal function, and tolerating pressure support.  1/1/22: Postop day 6.  I have some concerns with the appearance of the ostomy, though the abdominal exam overall is otherwise unchanged.  White blood cell count remains elevated, but is lower compared with prior values.  This is despite no change in antibiotic therapy.  Patient also has asymmetric edema of the upper extremities, right greater than left.  Low threshold for duplex ultrasound to exclude DVT.No new surveillance culture data.  1/2: POD7 Ostomy looks better today, UE symmetric. WBC elevated, some slight downward trend overall. Temps <= 100F.   1/3: drainage of fluid collection today, continue zosyn and fluconazole for now, monitor wbc and temps, watch ostomy output, diuresis and address third spacing   1/4: s/p drainage yesterday now growing ecoli and awaiting for sensitivities, wbc is improving, started on tube feeds    1/5: abscess growing ecoli and bacteroides, S to ceftriaxone, wbc 15, weaning trial today, pain management    1/6: awake, lung exercise today, trach planned for tomorrow, ostomy output is good. will stop fluconazole today and change antibiotics to ceftriaxone and flagly. Unclear stop date yet    1/7 trach today, continue antibiotics   1/9: s/p trach, had low grade fever but otherwise doing ok, will continues same antibiotics regimen for now but if fevers continue she will needs to be rescanned as those abscesses can progress.  1/10: low grade fever but doing well on trach-PS, ostomy with stool and gas, shakes head when asked if in pain, discussed with surgery about repeating CT scan given the temp  1/11: Still febrile, favor interval CT as above.  1/12: CT of abdomen showing some signs of improvement and elsewhere  it shows signs of worsening. wbc normal, tube feeds currently via NGT   1/13: no fever, no wbc, Cr and LFTs ok, Ceftriaxone and flagyl continued. No planned IR intervention at this time - fluid collection without access to drain, surgical drain within the collection. Pt possibly will have repeat imaging over the weekend to evaluate progress.   Attending addendum:  agree with above  continue antibiotics   vent weaning  surgical follow up for the abdominal wound  1/14: Low grade temp last night, midline placed, no leukocytosis, Cr and LFTs ok, culture data reviewed. Pt's colostomy with small amount of liquid stool and gas, no stools recorded for two days, no role for po Vancomycin at this time. The pt has been on abx since her admission, will stop all abx and will continue to monitor.    1/18: no fevers since 1/14, WBC better 11.00, cr ok, off abx, now s/p EGD, with PEG replacement.   1/24: spiking low grade fevers, low suspicion for pneumonia given little secretions and doing well on the PS wth low settings, peg site looks clean, trach site OK as well, fevers may be from small fluid collections intra-abdominally vs atelectasis Would suggest aggressive pulmonary toilet including chest vest. follow cultures and trend wbc and fever curve . If persistent fevers would start  Zosyn and PO fluconazole but for now please hold antibiotics   1/25: continues having fevers, low grade temps today, WBC better 18.35, Cr and LFTs ok, + new DVT of right common femoral vein - could be the cause of pt's fevers - on full dose Lovenox. Will continue to monitor off abx, WBC got better without abx.   Attending addendum:  patient with long hospital course and now on the floors with fever and found to have a DVT  wbc came down without antibiotics   fever curve is getting better   continue to monitor off antibiotics   treat DVT with full dose AC   suspect fevers could be from dvt but may also be from fluid collection    1/26: low grade temp today, pt is more awake and alert today, WBC improving 14.2 off abx, Cr and LFTs ok, abd wound with clean base and healing well - vac dressing was changed today during my exam. In favor to continue to monitor off abx for now.   1/27: Pt continues spiking fevers, low grade last night and 101 today, awake and alert during my exam, WBC improving off abx 13.61, recent chest xray with no acute lung disease, COVID 19 is negative. Pt's fevers most likely related to her DVT, will collect two sets of surveillance BC and will continue to monitor off abx for now.     #Perforated diverticulum of large intestine.   ·  Plan: continue to monitor off antibiotics  monitor for worsening signs and at which point start Zosyn and PO fluconazole   continue wound vac       #Fever.   ·  Plan:   trend pt's temperatures   monitor off antibiotics.   UA   + new DVT of right common femoral vein   frequent turns, offloading, and nutrition optimization to avoid bedsores    #Acute Respiratory Failure  wean oxygen as tolerated  chest PT  frequent suctioning   chest vest   HOB >30 degrees    #DVT  AC as per protocol    Discussed with Dr. Hector  Msg sent to Dr. Andrea

## 2022-01-28 LAB
ANION GAP SERPL CALC-SCNC: 8 MMOL/L — SIGNIFICANT CHANGE UP (ref 5–17)
APPEARANCE UR: ABNORMAL
BACTERIA # UR AUTO: ABNORMAL
BASOPHILS # BLD AUTO: 0.05 K/UL — SIGNIFICANT CHANGE UP (ref 0–0.2)
BASOPHILS NFR BLD AUTO: 0.3 % — SIGNIFICANT CHANGE UP (ref 0–2)
BILIRUB UR-MCNC: NEGATIVE — SIGNIFICANT CHANGE UP
BUN SERPL-MCNC: 46 MG/DL — HIGH (ref 7–23)
CALCIUM SERPL-MCNC: 8.4 MG/DL — LOW (ref 8.5–10.1)
CHLORIDE SERPL-SCNC: 115 MMOL/L — HIGH (ref 96–108)
CO2 SERPL-SCNC: 24 MMOL/L — SIGNIFICANT CHANGE UP (ref 22–31)
COLOR SPEC: YELLOW — SIGNIFICANT CHANGE UP
CREAT SERPL-MCNC: 0.86 MG/DL — SIGNIFICANT CHANGE UP (ref 0.5–1.3)
DIFF PNL FLD: ABNORMAL
EOSINOPHIL # BLD AUTO: 0.02 K/UL — SIGNIFICANT CHANGE UP (ref 0–0.5)
EOSINOPHIL NFR BLD AUTO: 0.1 % — SIGNIFICANT CHANGE UP (ref 0–6)
EPI CELLS # UR: SIGNIFICANT CHANGE UP
GLUCOSE BLDC GLUCOMTR-MCNC: 131 MG/DL — HIGH (ref 70–99)
GLUCOSE BLDC GLUCOMTR-MCNC: 133 MG/DL — HIGH (ref 70–99)
GLUCOSE SERPL-MCNC: 123 MG/DL — HIGH (ref 70–99)
GLUCOSE UR QL: NEGATIVE MG/DL — SIGNIFICANT CHANGE UP
HCT VFR BLD CALC: 28.4 % — LOW (ref 34.5–45)
HCT VFR BLD CALC: 30.2 % — LOW (ref 34.5–45)
HGB BLD-MCNC: 8.7 G/DL — LOW (ref 11.5–15.5)
HGB BLD-MCNC: 9.6 G/DL — LOW (ref 11.5–15.5)
IMM GRANULOCYTES NFR BLD AUTO: 1.2 % — SIGNIFICANT CHANGE UP (ref 0–1.5)
KETONES UR-MCNC: NEGATIVE — SIGNIFICANT CHANGE UP
LEUKOCYTE ESTERASE UR-ACNC: ABNORMAL
LYMPHOCYTES # BLD AUTO: 1.59 K/UL — SIGNIFICANT CHANGE UP (ref 1–3.3)
LYMPHOCYTES # BLD AUTO: 8.4 % — LOW (ref 13–44)
MCHC RBC-ENTMCNC: 27.7 PG — SIGNIFICANT CHANGE UP (ref 27–34)
MCHC RBC-ENTMCNC: 28.5 PG — SIGNIFICANT CHANGE UP (ref 27–34)
MCHC RBC-ENTMCNC: 30.6 G/DL — LOW (ref 32–36)
MCHC RBC-ENTMCNC: 31.8 G/DL — LOW (ref 32–36)
MCV RBC AUTO: 87 FL — SIGNIFICANT CHANGE UP (ref 80–100)
MCV RBC AUTO: 93.1 FL — SIGNIFICANT CHANGE UP (ref 80–100)
MONOCYTES # BLD AUTO: 1.05 K/UL — HIGH (ref 0–0.9)
MONOCYTES NFR BLD AUTO: 5.5 % — SIGNIFICANT CHANGE UP (ref 2–14)
NEUTROPHILS # BLD AUTO: 16.07 K/UL — HIGH (ref 1.8–7.4)
NEUTROPHILS NFR BLD AUTO: 84.5 % — HIGH (ref 43–77)
NITRITE UR-MCNC: NEGATIVE — SIGNIFICANT CHANGE UP
NRBC # BLD: 0 /100 WBCS — SIGNIFICANT CHANGE UP (ref 0–0)
NRBC # BLD: 0 /100 WBCS — SIGNIFICANT CHANGE UP (ref 0–0)
PH UR: 6 — SIGNIFICANT CHANGE UP (ref 5–8)
PLATELET # BLD AUTO: 325 K/UL — SIGNIFICANT CHANGE UP (ref 150–400)
PLATELET # BLD AUTO: SIGNIFICANT CHANGE UP K/UL (ref 150–400)
POTASSIUM SERPL-MCNC: 5 MMOL/L — SIGNIFICANT CHANGE UP (ref 3.5–5.3)
POTASSIUM SERPL-SCNC: 5 MMOL/L — SIGNIFICANT CHANGE UP (ref 3.5–5.3)
PROT UR-MCNC: 100 MG/DL
RBC # BLD: 3.05 M/UL — LOW (ref 3.8–5.2)
RBC # BLD: 3.47 M/UL — LOW (ref 3.8–5.2)
RBC # FLD: 15.8 % — HIGH (ref 10.3–14.5)
RBC # FLD: 18.6 % — HIGH (ref 10.3–14.5)
RBC CASTS # UR COMP ASSIST: ABNORMAL /HPF (ref 0–4)
SODIUM SERPL-SCNC: 147 MMOL/L — HIGH (ref 135–145)
SP GR SPEC: 1.01 — SIGNIFICANT CHANGE UP (ref 1.01–1.02)
UROBILINOGEN FLD QL: NEGATIVE MG/DL — SIGNIFICANT CHANGE UP
WBC # BLD: 19 K/UL — HIGH (ref 3.8–10.5)
WBC # BLD: 24.37 K/UL — HIGH (ref 3.8–10.5)
WBC # FLD AUTO: 19 K/UL — HIGH (ref 3.8–10.5)
WBC # FLD AUTO: 24.37 K/UL — HIGH (ref 3.8–10.5)
WBC UR QL: >50

## 2022-01-28 PROCEDURE — 99232 SBSQ HOSP IP/OBS MODERATE 35: CPT

## 2022-01-28 PROCEDURE — 99233 SBSQ HOSP IP/OBS HIGH 50: CPT

## 2022-01-28 RX ORDER — VANCOMYCIN HCL 1 G
VIAL (EA) INTRAVENOUS
Refills: 0 | Status: DISCONTINUED | OUTPATIENT
Start: 2022-01-28 | End: 2022-01-28

## 2022-01-28 RX ORDER — PANTOPRAZOLE SODIUM 20 MG/1
40 TABLET, DELAYED RELEASE ORAL EVERY 12 HOURS
Refills: 0 | Status: DISCONTINUED | OUTPATIENT
Start: 2022-01-28 | End: 2022-02-08

## 2022-01-28 RX ORDER — LEVOTHYROXINE SODIUM 125 MCG
37.5 TABLET ORAL AT BEDTIME
Refills: 0 | Status: DISCONTINUED | OUTPATIENT
Start: 2022-01-28 | End: 2022-02-08

## 2022-01-28 RX ORDER — AMIKACIN SULFATE 250 MG/ML
600 INJECTION, SOLUTION INTRAMUSCULAR; INTRAVENOUS ONCE
Refills: 0 | Status: COMPLETED | OUTPATIENT
Start: 2022-01-28 | End: 2022-01-28

## 2022-01-28 RX ORDER — VANCOMYCIN HCL 1 G
1000 VIAL (EA) INTRAVENOUS ONCE
Refills: 0 | Status: COMPLETED | OUTPATIENT
Start: 2022-01-28 | End: 2022-01-28

## 2022-01-28 RX ORDER — PROTAMINE SULFATE 10 MG/ML
40 AMPUL (ML) INTRAVENOUS ONCE
Refills: 0 | Status: COMPLETED | OUTPATIENT
Start: 2022-01-28 | End: 2022-01-28

## 2022-01-28 RX ORDER — SUCRALFATE 1 G
1 TABLET ORAL
Refills: 0 | Status: DISCONTINUED | OUTPATIENT
Start: 2022-01-28 | End: 2022-01-31

## 2022-01-28 RX ORDER — LEVETIRACETAM 250 MG/1
500 TABLET, FILM COATED ORAL EVERY 12 HOURS
Refills: 0 | Status: DISCONTINUED | OUTPATIENT
Start: 2022-01-28 | End: 2022-02-08

## 2022-01-28 RX ADMIN — Medication 1 GRAM(S): at 18:16

## 2022-01-28 RX ADMIN — ESCITALOPRAM OXALATE 20 MILLIGRAM(S): 10 TABLET, FILM COATED ORAL at 11:23

## 2022-01-28 RX ADMIN — DORZOLAMIDE HYDROCHLORIDE TIMOLOL MALEATE 1 DROP(S): 20; 5 SOLUTION/ DROPS OPHTHALMIC at 17:14

## 2022-01-28 RX ADMIN — CHLORHEXIDINE GLUCONATE 1 APPLICATION(S): 213 SOLUTION TOPICAL at 06:50

## 2022-01-28 RX ADMIN — Medication 650 MILLIGRAM(S): at 00:28

## 2022-01-28 RX ADMIN — Medication 1 GRAM(S): at 11:30

## 2022-01-28 RX ADMIN — CHLORHEXIDINE GLUCONATE 15 MILLILITER(S): 213 SOLUTION TOPICAL at 17:13

## 2022-01-28 RX ADMIN — Medication 1 GRAM(S): at 06:50

## 2022-01-28 RX ADMIN — Medication 650 MILLIGRAM(S): at 17:50

## 2022-01-28 RX ADMIN — PANTOPRAZOLE SODIUM 40 MILLIGRAM(S): 20 TABLET, DELAYED RELEASE ORAL at 00:41

## 2022-01-28 RX ADMIN — Medication 37.5 MICROGRAM(S): at 21:39

## 2022-01-28 RX ADMIN — LEVETIRACETAM 420 MILLIGRAM(S): 250 TABLET, FILM COATED ORAL at 06:49

## 2022-01-28 RX ADMIN — AMIKACIN SULFATE 102.4 MILLIGRAM(S): 250 INJECTION, SOLUTION INTRAMUSCULAR; INTRAVENOUS at 21:39

## 2022-01-28 RX ADMIN — CHLORHEXIDINE GLUCONATE 15 MILLILITER(S): 213 SOLUTION TOPICAL at 06:49

## 2022-01-28 RX ADMIN — Medication 1 GRAM(S): at 23:57

## 2022-01-28 RX ADMIN — Medication 250 MILLIGRAM(S): at 19:04

## 2022-01-28 RX ADMIN — Medication 650 MILLIGRAM(S): at 16:36

## 2022-01-28 RX ADMIN — Medication 324 MILLIGRAM(S): at 00:49

## 2022-01-28 RX ADMIN — CARVEDILOL PHOSPHATE 3.12 MILLIGRAM(S): 80 CAPSULE, EXTENDED RELEASE ORAL at 17:15

## 2022-01-28 RX ADMIN — DORZOLAMIDE HYDROCHLORIDE TIMOLOL MALEATE 1 DROP(S): 20; 5 SOLUTION/ DROPS OPHTHALMIC at 06:50

## 2022-01-28 RX ADMIN — LEVETIRACETAM 420 MILLIGRAM(S): 250 TABLET, FILM COATED ORAL at 18:16

## 2022-01-28 RX ADMIN — PANTOPRAZOLE SODIUM 40 MILLIGRAM(S): 20 TABLET, DELAYED RELEASE ORAL at 18:16

## 2022-01-28 NOTE — CONSULT NOTE ADULT - ASSESSMENT
82yo female with PMH as above, with acute GI bleed in distal gastric antrum. Labs significant for H/H 7.7/26.1, INR1.23.    Plan:  - s/p protamine  - Started 1st unit of PRBC and 2nd PRBC ordered to be given  -    80yo female with PMH as above, with acute GI bleed in distal gastric antrum. Labs significant for H/H 7.7/26.1, INR1.23.    Plan:  - s/p protamine infusion  - D/C anticoagulants  - Started 1st unit of PRBC and 2nd PRBC ordered to be given   - f/u post transfusion CBC  - Currently hemodynamically stable repeat BP 100s systolic and 70s diastolic, HR 72  - f/u GI consult recommendations  - Patient has no ICU needs at this time and can remain on unit      Thank you for the consultation   80yo female with PMH as above, with acute GI bleed in distal gastric antrum. Labs significant for H/H 7.7/26.1, INR1.23.    Plan:  - CTA A/P showing active bleed in distal gastric antrum  - s/p protamine infusion  - D/C anticoagulants  - Started 1st unit of PRBC and 2nd PRBC ordered to be given   - f/u post transfusion CBC  - Currently hemodynamically stable repeat BP 100s systolic and 70s diastolic, HR 72  - f/u GI consult recommendations  - Patient has no ICU needs at this time and can remain on unit      Thank you for the consultation

## 2022-01-28 NOTE — CONSULT NOTE ADULT - SUBJECTIVE AND OBJECTIVE BOX
CHIEF COMPLAINT: GI bleed    HPI:  82yo female with PMH including HTN, HLD, hypothyroidism, depression, seizures p/w abdominal pain, with acute diverticulitis, acute metabolic encephalopathy secondary to perforated sigmoid diverticulitis. 12/24/21 - Exploratory laparotomy and a sigmoid colectomy and washout abd at that time left open. Transferred to ICU for post op care on ventilation. Course complicated by R abdominal abscess formation 1/3 s/p IR drainage, cultured growing e.coli and bacteroides treated with Rocephin/flagyl; on PPN for nutrition now on tolerating tube feeds. s/p trach due to inability to wean off vent and peg. Patient was seen lying comfortably in bed, endorses feeling tired currently denies any chest pain, shortness of breath, abdominal pain. ICU consulted for acute GI bleed      PAST MEDICAL & SURGICAL HISTORY:  Seizure  HTN (hypertension)  Glaucoma  Thyroid disease  Blood clot of neck vein left side  TIA (transient ischemic attack) 20 years ago  S/P appendectomy      FAMILY HISTORY:  FH: HTN (hypertension)    Allergies  No Known Allergies    Intolerances    HOME MEDICATIONS:    REVIEW OF SYSTEMS:  Limited as patient is on trach collar, denies chest pain, shortness of breath, abdominal pain and endorses no other complaints.    OBJECTIVE:  ICU Vital Signs Last 24 Hrs  T(C): 37.8 (28 Jan 2022 00:13), Max: 38.3 (27 Jan 2022 10:40)  T(F): 100.1 (28 Jan 2022 00:13), Max: 101 (27 Jan 2022 10:40)  HR: 75 (28 Jan 2022 00:30) (65 - 84)  BP: 125/65 (28 Jan 2022 00:13) (113/70 - 146/75)  BP(mean): --  ABP: --  ABP(mean): --  RR: 18 (28 Jan 2022 00:13) (18 - 20)  SpO2: 99% (28 Jan 2022 00:30) (96% - 100%)    Mode: AC/ CMV (Assist Control/ Continuous Mandatory Ventilation), RR (machine): 15, TV (machine): 450, FiO2: 30, PEEP: 5, PS: 10, ITime: 0.9, MAP: 9, PIP: 16    01-26 @ 07:01  -  01-27 @ 07:00  --------------------------------------------------------  IN: 1320 mL / OUT: 155 mL / NET: 1165 mL    01-27 @ 07:01  - 01-28 @ 01:36  --------------------------------------------------------  IN: 1000 mL / OUT: 305 mL / NET: 695 mL      CAPILLARY BLOOD GLUCOSE      POCT Blood Glucose.: 150 mg/dL (27 Jan 2022 17:33)      PHYSICAL EXAM:  General: NAD  HEENT: Anicteric sclera, PERRLA  Respiratory: Clear to auscultation bilaterally  Cardiovascular: RRR, +S1S2  Abdomen: Dried blood on abdominal dressing around peg site, +PEG, 100cc of blood mixed with stool in colostomy bag  Extremities: + radial and pedal pulses, +brisk cap refill  Neurological: Moving all extremities  Psychiatry: Appropriate responds to questions    HOSPITAL MEDICATIONS:  MEDICATIONS  (STANDING):  amLODIPine   Tablet 10 milliGRAM(s) Oral daily  carvedilol 3.125 milliGRAM(s) Oral every 12 hours  chlorhexidine 0.12% Liquid 15 milliLiter(s) Oral Mucosa every 12 hours  chlorhexidine 2% Cloths 1 Application(s) Topical <User Schedule>  dextrose 40% Gel 15 Gram(s) Oral once  dextrose 5%. 1000 milliLiter(s) (50 mL/Hr) IV Continuous <Continuous>  dextrose 5%. 1000 milliLiter(s) (100 mL/Hr) IV Continuous <Continuous>  dextrose 50% Injectable 25 Gram(s) IV Push once  dextrose 50% Injectable 12.5 Gram(s) IV Push once  dextrose 50% Injectable 25 Gram(s) IV Push once  dorzolamide 2%/timolol 0.5% Ophthalmic Solution 1 Drop(s) Both EYES two times a day  escitalopram 20 milliGRAM(s) Oral daily  glucagon  Injectable 1 milliGRAM(s) IntraMuscular once  levETIRAcetam  Solution 500 milliGRAM(s) Enteral Tube two times a day  levothyroxine 50 MICROGram(s) Oral daily  pantoprazole  Injectable 40 milliGRAM(s) IV Push every 12 hours  sucralfate 1 Gram(s) Oral four times a day    MEDICATIONS  (PRN):  acetaminophen     Tablet .. 650 milliGRAM(s) Oral every 6 hours PRN Temp greater or equal to 38C (100.4F)  sodium chloride 0.9% lock flush 10 milliLiter(s) IV Push every 1 hour PRN Pre/post blood products, medications, blood draw, and to maintain line patency      LABS:                        7.7    16.32 )-----------( 412      ( 27 Jan 2022 19:58 )             26.1     01-27    148<H>  |  115<H>  |  19  ----------------------------<  115<H>  3.4<L>   |  29  |  0.45<L>    Ca    8.6      27 Jan 2022 07:45  Phos  2.7     01-27  Mg     2.3     01-27    TPro  5.8<L>  /  Alb  1.9<L>  /  TBili  0.1<L>  /  DBili  x   /  AST  25  /  ALT  28  /  AlkPhos  72  01-26    PT/INR - ( 27 Jan 2022 19:58 )   PT: 14.1 sec;   INR: 1.23 ratio        Radiology:  < from: CT Angio Abdomen and Pelvis w/ IV Cont (01.27.22 @ 22:28) >    INTERPRETATION:  Dr. Coppola called.  Gastrostomy in satisfactory   positioning.  Evidence of active GI bleed in distal gastric antrum likely   related to gastritis or PUD.  Official report in AM.  gallstones.    < end of copied text >     CHIEF COMPLAINT: GI bleed    HPI:  80yo female with PMH including HTN, HLD, hypothyroidism, depression, seizures p/w abdominal pain, with acute diverticulitis, acute metabolic encephalopathy secondary to perforated sigmoid diverticulitis. 12/24/21 - Exploratory laparotomy and a sigmoid colectomy and washout abd at that time left open. Transferred to ICU for post op care on ventilation. Course complicated by R abdominal abscess formation 1/3 s/p IR drainage, cultured growing e.coli and bacteroides treated with Rocephin/flagyl; on PPN for nutrition now on tolerating tube feeds. s/p trach due to inability to wean off vent and peg. Patient was seen lying comfortably in bed, endorses feeling tired currently denies any chest pain, shortness of breath, abdominal pain. ICU consulted for acute GI bleed      PAST MEDICAL & SURGICAL HISTORY:  Seizure  HTN (hypertension)  Glaucoma  Thyroid disease  Blood clot of neck vein left side  TIA (transient ischemic attack) 20 years ago  S/P appendectomy      FAMILY HISTORY:  FH: HTN (hypertension)    Allergies  No Known Allergies    Intolerances    HOME MEDICATIONS:    REVIEW OF SYSTEMS:  Limited as patient is on trach collar, denies chest pain, shortness of breath, abdominal pain and endorses no other complaints.    OBJECTIVE:  ICU Vital Signs Last 24 Hrs  T(C): 37.8 (28 Jan 2022 00:13), Max: 38.3 (27 Jan 2022 10:40)  T(F): 100.1 (28 Jan 2022 00:13), Max: 101 (27 Jan 2022 10:40)  HR: 75 (28 Jan 2022 00:30) (65 - 84)  BP: 125/65 (28 Jan 2022 00:13) (113/70 - 146/75)  BP(mean): --  ABP: --  ABP(mean): --  RR: 18 (28 Jan 2022 00:13) (18 - 20)  SpO2: 99% (28 Jan 2022 00:30) (96% - 100%)    Mode: AC/ CMV (Assist Control/ Continuous Mandatory Ventilation), RR (machine): 15, TV (machine): 450, FiO2: 30, PEEP: 5, PS: 10, ITime: 0.9, MAP: 9, PIP: 16    01-26 @ 07:01  -  01-27 @ 07:00  --------------------------------------------------------  IN: 1320 mL / OUT: 155 mL / NET: 1165 mL    01-27 @ 07:01  - 01-28 @ 01:36  --------------------------------------------------------  IN: 1000 mL / OUT: 305 mL / NET: 695 mL      CAPILLARY BLOOD GLUCOSE      POCT Blood Glucose.: 150 mg/dL (27 Jan 2022 17:33)      PHYSICAL EXAM:  General: NAD  HEENT: Anicteric sclera, PERRLA  Respiratory: Clear to auscultation bilaterally  Cardiovascular: RRR, +S1S2  Abdomen: Dried blood on abdominal dressing around peg site, +PEG, 100cc of blood mixed with stool in colostomy bag  Extremities: + radial and pedal pulses, +brisk cap refill  Neurological: Moving all extremities  Psychiatry: Appropriate responds to questions    HOSPITAL MEDICATIONS:  MEDICATIONS  (STANDING):  amLODIPine   Tablet 10 milliGRAM(s) Oral daily  carvedilol 3.125 milliGRAM(s) Oral every 12 hours  chlorhexidine 0.12% Liquid 15 milliLiter(s) Oral Mucosa every 12 hours  chlorhexidine 2% Cloths 1 Application(s) Topical <User Schedule>  dextrose 40% Gel 15 Gram(s) Oral once  dextrose 5%. 1000 milliLiter(s) (50 mL/Hr) IV Continuous <Continuous>  dextrose 5%. 1000 milliLiter(s) (100 mL/Hr) IV Continuous <Continuous>  dextrose 50% Injectable 25 Gram(s) IV Push once  dextrose 50% Injectable 12.5 Gram(s) IV Push once  dextrose 50% Injectable 25 Gram(s) IV Push once  dorzolamide 2%/timolol 0.5% Ophthalmic Solution 1 Drop(s) Both EYES two times a day  escitalopram 20 milliGRAM(s) Oral daily  glucagon  Injectable 1 milliGRAM(s) IntraMuscular once  levETIRAcetam  Solution 500 milliGRAM(s) Enteral Tube two times a day  levothyroxine 50 MICROGram(s) Oral daily  pantoprazole  Injectable 40 milliGRAM(s) IV Push every 12 hours  sucralfate 1 Gram(s) Oral four times a day    MEDICATIONS  (PRN):  acetaminophen     Tablet .. 650 milliGRAM(s) Oral every 6 hours PRN Temp greater or equal to 38C (100.4F)  sodium chloride 0.9% lock flush 10 milliLiter(s) IV Push every 1 hour PRN Pre/post blood products, medications, blood draw, and to maintain line patency      LABS:                        7.7    16.32 )-----------( 412      ( 27 Jan 2022 19:58 )             26.1     01-27    148<H>  |  115<H>  |  19  ----------------------------<  115<H>  3.4<L>   |  29  |  0.45<L>    Ca    8.6      27 Jan 2022 07:45  Phos  2.7     01-27  Mg     2.3     01-27    TPro  5.8<L>  /  Alb  1.9<L>  /  TBili  0.1<L>  /  DBili  x   /  AST  25  /  ALT  28  /  AlkPhos  72  01-26    PT/INR - ( 27 Jan 2022 19:58 )   PT: 14.1 sec;   INR: 1.23 ratio        Radiology:  < from: CT Angio Abdomen and Pelvis w/ IV Cont (01.27.22 @ 22:28) >    INTERPRETATION:  Dr. Coppola called.  Gastrostomy in satisfactory   positioning.  Evidence of active GI bleed in distal gastric antrum likely   related to gastritis or PUD.  Official report in AM.  gallstones.    < end of copied text >     CHIEF COMPLAINT: GI bleed    HPI:  80yo female with PMH including HTN, HLD, hypothyroidism, depression, seizures p/w abdominal pain, with acute diverticulitis, acute metabolic encephalopathy secondary to perforated sigmoid diverticulitis. 12/24/21 - Exploratory laparotomy and a sigmoid colectomy and washout abd at that time left open. Transferred to ICU for post op care on ventilation. Course complicated by R abdominal abscess formation 1/3 s/p IR drainage, wound culture growing e.coli and bacteroides treated with Rocephin/flagyl; was on PPN for nutrition now tolerating tube feeds. s/p trach due to inability to wean off vent and pegged. Patient was seen lying comfortably in bed, endorses feeling tired currently denies any chest pain, shortness of breath, abdominal pain. ICU consulted for acute GI bleed.      PAST MEDICAL & SURGICAL HISTORY:  Seizure  HTN (hypertension)  Glaucoma  Thyroid disease  Blood clot of neck vein left side  TIA (transient ischemic attack) 20 years ago  S/P appendectomy      FAMILY HISTORY:  FH: HTN (hypertension)    Allergies  No Known Allergies    Intolerances    HOME MEDICATIONS:    REVIEW OF SYSTEMS:  Limited as patient is on trach collar, denies chest pain, shortness of breath, abdominal pain and endorses no other complaints.    OBJECTIVE:  ICU Vital Signs Last 24 Hrs  T(C): 37.8 (28 Jan 2022 00:13), Max: 38.3 (27 Jan 2022 10:40)  T(F): 100.1 (28 Jan 2022 00:13), Max: 101 (27 Jan 2022 10:40)  HR: 75 (28 Jan 2022 00:30) (65 - 84)  BP: 125/65 (28 Jan 2022 00:13) (113/70 - 146/75)  BP(mean): --  ABP: --  ABP(mean): --  RR: 18 (28 Jan 2022 00:13) (18 - 20)  SpO2: 99% (28 Jan 2022 00:30) (96% - 100%)    Mode: AC/ CMV (Assist Control/ Continuous Mandatory Ventilation), RR (machine): 15, TV (machine): 450, FiO2: 30, PEEP: 5, PS: 10, ITime: 0.9, MAP: 9, PIP: 16    01-26 @ 07:01  -  01-27 @ 07:00  --------------------------------------------------------  IN: 1320 mL / OUT: 155 mL / NET: 1165 mL    01-27 @ 07:01  - 01-28 @ 01:36  --------------------------------------------------------  IN: 1000 mL / OUT: 305 mL / NET: 695 mL      CAPILLARY BLOOD GLUCOSE      POCT Blood Glucose.: 150 mg/dL (27 Jan 2022 17:33)      PHYSICAL EXAM:  General: NAD  HEENT: Anicteric sclera, PERRLA  Respiratory: Clear to auscultation bilaterally  Cardiovascular: RRR, +S1S2  Abdomen: Dried blood on abdominal dressing around peg site, +PEG, 100cc of blood mixed with stool in colostomy bag  Extremities: + radial and pedal pulses, +brisk cap refill  Neurological: Moving all extremities  Psychiatry: Appropriate responds to questions    HOSPITAL MEDICATIONS:  MEDICATIONS  (STANDING):  amLODIPine   Tablet 10 milliGRAM(s) Oral daily  carvedilol 3.125 milliGRAM(s) Oral every 12 hours  chlorhexidine 0.12% Liquid 15 milliLiter(s) Oral Mucosa every 12 hours  chlorhexidine 2% Cloths 1 Application(s) Topical <User Schedule>  dextrose 40% Gel 15 Gram(s) Oral once  dextrose 5%. 1000 milliLiter(s) (50 mL/Hr) IV Continuous <Continuous>  dextrose 5%. 1000 milliLiter(s) (100 mL/Hr) IV Continuous <Continuous>  dextrose 50% Injectable 25 Gram(s) IV Push once  dextrose 50% Injectable 12.5 Gram(s) IV Push once  dextrose 50% Injectable 25 Gram(s) IV Push once  dorzolamide 2%/timolol 0.5% Ophthalmic Solution 1 Drop(s) Both EYES two times a day  escitalopram 20 milliGRAM(s) Oral daily  glucagon  Injectable 1 milliGRAM(s) IntraMuscular once  levETIRAcetam  Solution 500 milliGRAM(s) Enteral Tube two times a day  levothyroxine 50 MICROGram(s) Oral daily  pantoprazole  Injectable 40 milliGRAM(s) IV Push every 12 hours  sucralfate 1 Gram(s) Oral four times a day    MEDICATIONS  (PRN):  acetaminophen     Tablet .. 650 milliGRAM(s) Oral every 6 hours PRN Temp greater or equal to 38C (100.4F)  sodium chloride 0.9% lock flush 10 milliLiter(s) IV Push every 1 hour PRN Pre/post blood products, medications, blood draw, and to maintain line patency      LABS:                        7.7    16.32 )-----------( 412      ( 27 Jan 2022 19:58 )             26.1     01-27    148<H>  |  115<H>  |  19  ----------------------------<  115<H>  3.4<L>   |  29  |  0.45<L>    Ca    8.6      27 Jan 2022 07:45  Phos  2.7     01-27  Mg     2.3     01-27    TPro  5.8<L>  /  Alb  1.9<L>  /  TBili  0.1<L>  /  DBili  x   /  AST  25  /  ALT  28  /  AlkPhos  72  01-26    PT/INR - ( 27 Jan 2022 19:58 )   PT: 14.1 sec;   INR: 1.23 ratio        Radiology:  < from: CT Angio Abdomen and Pelvis w/ IV Cont (01.27.22 @ 22:28) >    INTERPRETATION:  Dr. Coppola called.  Gastrostomy in satisfactory   positioning.  Evidence of active GI bleed in distal gastric antrum likely   related to gastritis or PUD.  Official report in AM.  gallstones.    < end of copied text >

## 2022-01-28 NOTE — PROGRESS NOTE ADULT - ASSESSMENT
TRESA PIZARRO 81 f 12/22/2021 1942 DR ANTONIO PATTON     REVIEW OF SYMPTOMS      Able to give (reliable) ROS  NO     PHYSICAL EXAM    HEENT Unremarkable  atraumatic   RESP Fair air entry EXP prolonged    Harsh breath sound Resp distres mild   CARDIAC S1 S2 No S3     NO JVD    ABDOMEN SOFT BS PRESENT NOT DISTENDED No hepatosplenomegaly   PEDAL EDEMA present No calf tenderness  NO rash     ______  DOA/CC.  12/22/2021 81F w/ HTN, HLD, hypothyroidism, depression, seizures. Presented w/ abdominal pain found to have acute sigmoid diverticulitis  ____  PMH.  pmh Hytn  pmh Sz.    _________  PROBLEMS.     VDRF. Since surgery 12/26  TRACH Done 1/7  ABDOMINAL INFECTION.   Feculent peritonitis perf div poa   COLOSTOMY Created 12/26  1/3 ABD ASPIRATE E coli bacteroides Abio dced 1/15  GT 1/18      PAIN 1/10 fent 50   SZ 1/10 keppra 500.2   Woundvac Rx started 1/14  Hypernatremia 1/14/2022 Na 148  Hypothyroid 1/14/2022 tsh 12   Fever spike 101 1/20/2022   R LEFTY removd 1/22/2022  Leukocytsosis 1/24/2022   dvt 1/25/2022 r cfv fd lvnx startd   1/25 Lovenox 80.2 started   Drop in Hb 1/27/2022 1/28/2022 Transfused 2 u   1/28/2022 Lvnx DCed by PA   1/28/2022 amikacin 600 one dose by PA  1/28/2022 vanco 1 g one dose by PA   1/28/2022 NPO   _____                            COVID STATUS. 12/24 pcr (-)  ICU STAY. 12/22-1/12/2022   GOC.  1/13/2022 full code       BEST PRACTICE ISSUES.                                                  HEAD OF BED ELEVATION. Yes  DVT PROPHYLAXIS.   1/28/2022 lvnx dced because of active gi bleed    1/25/2022 lvnx 80.2   1/25/2022 V duplx partially occlusive thrombus r cfv ac to subacute   1/7 lvnx 40   LIGHT PROPHYLAXIS.    1/11 protonix 40                                                                                     DIET.    1/28/2022 npo   1/20/2022 jevity 1.5 1200  1/18/2022 npo  1/7 vital af 1200 ngt          INFECTION PROPHYLAXIS.   1/7 Chlorhexidine .12%   1/7 chlorhexidine 2%      PATIENT DATA   VITALS/PO/IO/VENT/DRIPS.  1/28/2022 100 88 120/60   1/28/2022 4p ac 15/450/5/.3      ASSESSMENT/RECOMMENDATIONS.    HEMODYNAMICS.   Monitor bp Target MAP 65 (+)    RESP.   Monitor po Target po 90-95%  1/14/2022 15/450/.4/5 747/38/110     DVT  1/25/2022 V duplx partially occlusive thrombus r cfv ac to subacute   1/25 lvnx 80.2   1/28/2022 lvnx 80.2 dced by PA as Hb had dropped requiring transfusion  1/28/2022 RECOMMEND Vasc surgery eval for ivcf asap     OXYGEN REQUIREMENTS.   1/24/2022 30%  1/17/2022 40%  1/13/2022 40%    VENT MANAGEMENT.   HOB elevation  Target Pplat 35 (-)  Target PO 90-95%  Target pH 730 (+)    FEVER 1/28/2022.  Abdominal infection sec perforated sigmoid div feculent peritonitis poa 12/22.  Pt was taken off abio 1/15  She has been developing progressive leukocytosis and low gd fever   w 1/27-1/28 16-24  1/21 ctap showed decreased collections cw 1/11 1/28/2022 Abio restarted by pa   1/28/2022 amikacin 600 one dose  1/28/2022 vanco 1 g one dose   To follow with ID     SP ABD SURGERY.  81F admitted with Acute Sigmoid Diverticulitis s/p ex lap, sigmoid resection, peritoneal lavage 12/25 and RTOR 12/26 for irrigation of abdominal cavity with closure and creation of colostomy. S/p bedside IR aspiration of fluid collection 1/3. Tracheostomy 1/7.   Deep pelvic abscess noted on CT,  Local drain/ostomy care per nursing  1/22/2022 r lefty removd         ANEMIA.  Hb 1/27-1/27-1/28-1/28 Hb 9.7-7.7-8.7-9.6   1/28/2022 lvnx 80.2 dced     TRANSFUSION.  1/28/2022 2u prbc       TIME SPENT   Over 25 minutes aggregate care time spent on encounter; activities included   direct patient care, counseling and/or coordinating care reviewing notes, lab data/ imaging , discussion with multidisciplinary team/ patient  /family and explaining in detail risks, benefits, alternatives  of the recommendations     TRESA PIZARRO 81 f 12/22/2021 1942 DR ANTONIO PATTON

## 2022-01-28 NOTE — PROGRESS NOTE ADULT - ASSESSMENT
Patient is a 81y old  Female who presents with a chief complaint of diverticulitis (28 Jan 2022 08:07)      HPI:  Patient is a 81F with a PMH of HTN, HLD, hypothyroidism, depression, seizures who presents to the ED for abd pain.  Patient states that over the last two days she had developed significant abdominal pain and multiple episodes of watery diarrhea.  Reports one episode of nausea and vomiting earlier today.  Patient has no other complaints.  Vitals stable,  labs show leukocytosis and hypokalemia.  CT abdomen significant for diverticulitis.  Will admit to med surg.     (23 Dec 2021 00:17)      INTERVAL HPI/OVERNIGHT EVENTS: Patient seen earlier today  Patient had multiple red, bloody BMs last night. Painless. No N/V. Patient was on Lovenox BID for a DVT. Patient had a CT scan that revealed an antral bleed. Patient given Protamine last night.    See labs.     Upper GI Bleed s/p Protamine - 1) NPO  2) PPI 3) Carafate 4) EGD Monday 5) f/u labs 6) Hold anticoagulants

## 2022-01-28 NOTE — PROGRESS NOTE ADULT - SUBJECTIVE AND OBJECTIVE BOX
Patient seen and examined at bedside with Dr. Fairbanks. Lethargic, not responding to questions at this time.   Transfused 2u PRBC for active bleed in antrum of stomach.  Tmax 102.2    Vital Signs Last 24 Hrs  T(F): 102.2 (01-28-22 @ 17:28), Max: 102.2 (01-28-22 @ 17:28)  HR: 88 (01-28-22 @ 17:28)  BP: 128/61 (01-28-22 @ 17:28)  RR: 18 (01-28-22 @ 17:28)  SpO2: 98% (01-28-22 @ 16:36)  POCT Blood Glucose.: 131 mg/dL (28 Jan 2022 17:51)    PHYSICAL EXAM:  GENERAL: lethargic  CHEST/LUNG: tolerating trach to vent  HEART: Regular rate and rhythm; S1 & S2 appreciated  ABDOMEN: soft, non tender, non distended. Old blood noted at wound vac site and under PEG bumper. No active or bright red blood noted at vac, PEG, or ostomy site. No bloody output from ostomy  EXTREMITIES:  no calf tenderness, no edema    I&O's Detail    27 Jan 2022 07:01  -  28 Jan 2022 07:00  --------------------------------------------------------  IN:    Enteral Tube Flush: 300 mL    Jevity 1.2: 700 mL  Total IN: 1000 mL    OUT:    Bulb (mL): 5 mL    Colostomy (mL): 300 mL  Total OUT: 305 mL    Total NET: 695 mL      28 Jan 2022 07:01  -  28 Jan 2022 18:15  --------------------------------------------------------  IN:  Total IN: 0 mL    OUT:    Stool (mL): 1 mL  Total OUT: 1 mL    Total NET: -1 mL    LABS:                        9.6    24.37 )-----------( 325      ( 28 Jan 2022 17:18 )             30.2     01-28    147<H>  |  115<H>  |  46<H>  ----------------------------<  123<H>  5.0   |  24  |  0.86    Ca    8.4<L>      28 Jan 2022 07:07  Phos  2.7     01-27  Mg     2.3     01-27    PT/INR - ( 27 Jan 2022 19:58 )   PT: 14.1 sec;   INR: 1.23 ratio      RADIOLOGY & ADDITIONAL STUDIES:  < from: CT Angio Abdomen and Pelvis w/ IV Cont (01.27.22 @ 22:28) >  ACC: [id number] EXAM:  CT ANGIO ABD PELV (W)AW IC                          PROCEDURE DATE:  01/27/2022          INTERPRETATION:  CLINICAL INFORMATION: Bleeding from the gastrostomy site    COMPARISON: CT of January 21, 2022.    CONTRAST/COMPLICATIONS:  IV Contrast: Omnipaque 350  90 cc administered   10 cc discarded  Oral Contrast: NONE  Complications: None reported at time of study completion    PROCEDURE:  CT of the Abdomen and Pelvis was performed.  Precontrast, Arterial and Delayed phases were performed.  Sagittal and coronal reformats were performed.    FINDINGS:  LOWER CHEST: Trace left pleural effusion.    LIVER: Within normal limits.  BILE DUCTS: Normal caliber.  GALLBLADDER: Gallstones.  SPLEEN: Within normal limits.  PANCREAS: Within normal limits.  ADRENALS: Within normal limits.  KIDNEYS/URETERS: Within normal limits.    BLADDER: Incompletely distended with wall thickening and mucosal   hyperemia suggesting cystitis.  REPRODUCTIVE ORGANS: Fibroid uterus.    BOWEL: Gastrostomy tube with bumper in proper positioning in the gastric   body. There is intraluminal accumulation of contrast in the distal   gastric antrum which is now present on noncontrast studies, portable with   active GI bleeding, from gastritis or underlying peptic ulcer disease. No   bowel obstruction. Appendix is not visualized.  There is a left lower   quadrant colostomy.  PERITONEUM: There is a catheter that enters the peritoneal cavity from   the left mid abdomen. The catheter coils around the epigastrium for   terminating in the rectouterine space. Trace amount of free pelvic fluid.  VESSELS: Aorta is not dilated. Moderate atherosclerotic vascular   calcification. Mild eccentric plaque/thrombus in the infrarenal abdominal   aorta.  RETROPERITONEUM/LYMPH NODES: No lymphadenopathy.  ABDOMINAL WALL: Within normal limits.  BONES: The bones are diffusely osteopenic.  Mild grade 1 anterolisthesis   between L3 on L4 and L4 on L5.  Severe disc space narrowing between L3-L5.    IMPRESSION:  There is evidence of active GI bleeding in the gastric antrum, related to   peptic ulcer disease or gastritis.  Findings discussed with Dr. Coppola with   [facility] at 12:08 am on 1/28/22 with [facility].    Gastrostomy tube in place.  Left colostomy.  Possible cystitis.  Correlate with urinalysis.    < end of copied text >    A/P  81F s/p ex lap, sigmoid rxn, abthera placement 12/25, RTOR for completion of hartmanns procedure and closure of abdominal wall 12/26, IR  abscess aspiration 1/3, trach 1/7, PEG 1/18.   CT 1/21 shows improving fluid collections. R BABAK removed 1/22.  US 1/25 shows Partially occlusive thrombus of the right common femoral vein.  With active GI bleeding in gastric antrum related to ulcer vs gastritis overnight. tranfused 2u PRBC, protamine    - PPI, carafate, hold lovenox in setting of active bleed  - EGD per GI on monday  - wound vac per PT  - BABAK to suction, monitor output  - monitor labs  - continue medical management and supportive care  - discussed with Dr. Fairbanks

## 2022-01-28 NOTE — CHART NOTE - NSCHARTNOTEFT_GEN_A_CORE
CTA A/P done.  Janeth called for results and patient noted to have active bleeding in distal gastric antrum; gastrostomy in satisfactory positioning.  -will keep pt NPO  -IV PPI & carafate  -Repeat H/H 7.7/26.1 (from 9.7/32.1) --> will transfuse 2 units pRBCs  -Lovenox discontinued  -D/w pharmacy and will give protamine sulfate 40 mg x 1  -Vitals stable (/65, HR 73); will continue to monitor vitals closely  -ICU consult  -Will continue to closely monitor patient  -D/w Dr Ness & Dr Rivera

## 2022-01-28 NOTE — PROGRESS NOTE ADULT - SUBJECTIVE AND OBJECTIVE BOX
JUAREZ GTZ  MRN-[id number]    Follow Up:  Fevers, leukocytosis     Interval History:  The pt was seen and examined earlier, fatigued, abd vac dressing change is in progress, vented via tracheostomy. The pt had a low grade last night, WBC elevated.     PAST MEDICAL & SURGICAL HISTORY:  Seizure    HTN (hypertension)    Glaucoma    Thyroid disease    Blood clot of neck vein  left side    TIA (transient ischemic attack)  20 years ago    S/P appendectomy        ROS:    [x ] Unobtainable because: lethargic, vented via tracheostomy   [ ] All other systems negative    Constitutional: no fever, no chills  Head: no trauma  Eyes: no vision changes, no eye pain  ENT:  no sore throat, no rhinorrhea  Cardiovascular:  no chest pain, no palpitation  Respiratory:  no SOB, no cough  GI:  no abd pain, no vomiting, no diarrhea  urinary: no dysuria, no hematuria, no flank pain  musculoskeletal:  no joint pain, no joint swelling  skin:  no rash  neurology:  no headache, no seizure, no change in mental status  psych: no anxiety, no depression         Allergies  No Known Allergies        ANTIMICROBIALS:      OTHER MEDS:  acetaminophen     Tablet .. 650 milliGRAM(s) Oral every 6 hours PRN  amLODIPine   Tablet 10 milliGRAM(s) Oral daily  carvedilol 3.125 milliGRAM(s) Oral every 12 hours  chlorhexidine 0.12% Liquid 15 milliLiter(s) Oral Mucosa every 12 hours  chlorhexidine 2% Cloths 1 Application(s) Topical <User Schedule>  dextrose 40% Gel 15 Gram(s) Oral once  dextrose 5%. 1000 milliLiter(s) IV Continuous <Continuous>  dextrose 5%. 1000 milliLiter(s) IV Continuous <Continuous>  dextrose 50% Injectable 25 Gram(s) IV Push once  dextrose 50% Injectable 12.5 Gram(s) IV Push once  dextrose 50% Injectable 25 Gram(s) IV Push once  dorzolamide 2%/timolol 0.5% Ophthalmic Solution 1 Drop(s) Both EYES two times a day  escitalopram 20 milliGRAM(s) Oral daily  glucagon  Injectable 1 milliGRAM(s) IntraMuscular once  levETIRAcetam  IVPB 500 milliGRAM(s) IV Intermittent every 12 hours  levothyroxine Injectable 37.5 MICROGram(s) IV Push at bedtime  pantoprazole  Injectable 40 milliGRAM(s) IV Push every 12 hours  sodium chloride 0.9% lock flush 10 milliLiter(s) IV Push every 1 hour PRN  sucralfate 1 Gram(s) Oral four times a day      Vital Signs Last 24 Hrs  T(C): 36.6 (2022 12:01), Max: 37.8 (2022 00:13)  T(F): 97.8 (2022 12:01), Max: 100.1 (2022 00:13)  HR: 81 (2022 12:) (65 - 84)  BP: 128/60 (2022 12:01) (113/70 - 128/60)  BP(mean): --  RR: 19 (:01) (18 - 19)  SpO2: 100% (:) (96% - 100%)    Physical Exam:  General: Nontoxic-appearing Female in no acute distress. Trached on vent  HEENT: AT/NC. Unable to assess pt's mouth or eyes due to non-compliance   Neck: Not rigid. No sense of mass. Trach C/D/I  Nodes: None palpable.  Lungs: diminished breath sounds bilaterally   Heart: Regular rate and rhythm. +Murmur. No rub. No gallop. No palpable thrill.  Abdomen: Bowel sounds now present.  Soft. Mildly distended.  Abd incision with vac dressing, base is clean and healing well - seen during vac exchange.  Ostomy with stool. Drains x1, there is a PEG tube C/D/I   Extremities: No cyanosis or clubbing. 1+ edema of bilateral lower extremities   Skin: Warm. Dry No rash. No vasculitic stigmata.  Neuro: unable to assess   Psychiatric: calm    WBC Count: 19.00 K/uL ( @ 07:07)  WBC Count: 16.32 K/uL ( @ 19:58)  WBC Count: 13.61 K/uL ( @ 07:45)  WBC Count: 14.12 K/uL ( @ 08:55)  WBC Count: 18.35 K/uL ( @ 06:35)  WBC Count: 21.37 K/uL ( @ 08:10)  WBC Count: 15.24 K/uL ( @ 09:57)                            8.7    19.00 )-----------( CLUMPED    ( 2022 07:07 )             28.4           147<H>  |  115<H>  |  46<H>  ----------------------------<  123<H>  5.0   |  24  |  0.86    Ca    8.4<L>      2022 07:07  Phos  2.7       Mg     2.3             Urinalysis Basic - ( 2022 14:23 )    Color: Yellow / Appearance: very cloudy / S.015 / pH: x  Gluc: x / Ketone: Negative  / Bili: Negative / Urobili: Negative mg/dL   Blood: x / Protein: 100 mg/dL / Nitrite: Negative   Leuk Esterase: Moderate / RBC: x / WBC x   Sq Epi: x / Non Sq Epi: x / Bacteria: x        Creatinine Trend: 0.86<--, 0.45<--, 0.50<--, 0.57<--, 0.56<--, 0.62<--      MICROBIOLOGY:  v  .Blood Blood-Peripheral  22   No growth to date.  --  --      .Blood Blood-Peripheral  22   No Growth Final  --  --      Clean Catch Clean Catch (Midstream)  22   <10,000 CFU/mL Normal Urogenital Sendy  --  --      .Body Fluid Abdominal Fluid  22   Few Escherichia coli  Few Bacteroides ovatus group "Susceptibilities not performed"  --  Escherichia coli      Procalcitonin, Serum: 0.06 (22 @ 10:30)  Procalcitonin, Serum: 0.04 (22 @ 10:46)    SARS-CoV-2 Result: NotDetec (22 @ 11:43)    SARS-CoV-2: NotDetec (2022 18:59)  SARS-CoV-2: NotDetec (24 Dec 2021 14:38)  SARS-CoV-2: NotDetec (24 Dec 2021 00:03)    RADIOLOGY:    < from: CT Angio Abdomen and Pelvis w/ IV Cont (22 @ 22:28) >    ACC: [id number] EXAM:  CT ANGIO ABD PELV (W)AW IC                          PROCEDURE DATE:  2022          INTERPRETATION:  CLINICAL INFORMATION: Bleeding from the gastrostomy site    COMPARISON: CT of 2022.    CONTRAST/COMPLICATIONS:  IV Contrast: Omnipaque 350  90 cc administered   10 cc discarded  Oral Contrast: NONE  Complications: None reported at time of study completion    PROCEDURE:  CT of the Abdomen and Pelvis was performed.  Precontrast, Arterial and Delayed phases were performed.  Sagittal and coronal reformats were performed.    FINDINGS:  LOWER CHEST: Trace left pleural effusion.    LIVER: Within normal limits.  BILE DUCTS: Normal caliber.  GALLBLADDER: Gallstones.  SPLEEN: Within normal limits.  PANCREAS: Within normal limits.  ADRENALS: Within normal limits.  KIDNEYS/URETERS: Within normal limits.    BLADDER: Incompletely distended with wall thickening and mucosal   hyperemia suggesting cystitis.  REPRODUCTIVE ORGANS: Fibroid uterus.    BOWEL: Gastrostomy tube with bumper in proper positioning in the gastric   body. There is intraluminal accumulation of contrast in the distal   gastric antrum which is now present on noncontrast studies, portable with   active GI bleeding, from gastritis or underlying peptic ulcer disease. No   bowel obstruction. Appendix is not visualized.  There is a left lower   quadrant colostomy.  PERITONEUM: There is a catheter that enters the peritoneal cavity from   the left mid abdomen. The catheter coils around the epigastrium for   terminating in the rectouterine space. Trace amount of free pelvic fluid.  VESSELS: Aorta is not dilated. Moderate atherosclerotic vascular   calcification. Mild eccentric plaque/thrombus in the infrarenal abdominal   aorta.  RETROPERITONEUM/LYMPH NODES: No lymphadenopathy.  ABDOMINAL WALL: Within normal limits.  BONES: The bones are diffusely osteopenic.  Mild grade 1 anterolisthesis   between L3 on L4 and L4 on L5.  Severe disc space narrowing between L3-L5.    IMPRESSION:  There is evidence of active GI bleeding in the gastric antrum, related to   peptic ulcer disease or gastritis.  Findings discussed with Dr. Coppola with   [facility] at 12:08 am on 22 with RBV.    Gastrostomy tube in place.  Left colostomy.  Possible cystitis.  Correlate with urinalysis.    --- End of Report ---    < end of copied text >

## 2022-01-28 NOTE — PROGRESS NOTE ADULT - SUBJECTIVE AND OBJECTIVE BOX
Pt was seen and examined at bedside  acute overnight events noted:   febrile this am to 100.1  sleepy today  patient noted with active bleeding and clots in peg  this am: patient noted with black stool in ostomy and wound vac  ordered for two units of prbc  for fever consulted IR for drain of pelvic fluid and replacement of midline: midline replaced  collection too small to drain  +trach  to vent  + peg. + ostomy + left sided lefty drain in place    Review of Systems:  unable to obtain today patietn sleeping  Objective:  Vitals  T(C): 38.3 (01-26-22 @ 11:21), Max: 38.3 (01-26-22 @ 11:21)  HR: 73 (01-26-22 @ 11:36) (65 - 87)  BP: 178/75 (01-26-22 @ 11:21) (156/71 - 184/70)  RR: 18 (01-26-22 @ 05:31) (18 - 19)  SpO2: 98% (01-26-22 @ 11:36) (97% - 100%)    Physical Exam:  General: comfortable, no acute distress, well nourished  HEENT: Atraumatic, no LAD, trachea midline, PERRLA  Cardiovascular: normal s1s2, no murmurs, gallops or fricition rubs  Pulmonary: diminished, no wheezing , rhonchi, + trach  Gastrointestinal: soft non tender non distended, no masses felt, no organomegally + 0stomy , + left lefty drain++ wound vac  Muscloskeletal: no lower extremity edema, intact bilateral lower extremity pulses  Neurological: CN II-12 intact. No focal weakness  Psychiatrical: normal mood, cooperative  SKIN: no rash, lesions or ulcers      Labs:                          9.7    13.61 )-----------( 362      ( 27 Jan 2022 07:45 )             32.1     01-27    148<H>  |  115<H>  |  19  ----------------------------<  115<H>  3.4<L>   |  29  |  0.45<L>    Ca    8.6      27 Jan 2022 07:45  Phos  2.7     01-27  Mg     2.3     01-27    TPro  5.8<L>  /  Alb  1.9<L>  /  TBili  0.1<L>  /  DBili  x   /  AST  25  /  ALT  28  /  AlkPhos  72  01-26    LIVER FUNCTIONS - ( 26 Jan 2022 08:55 )  Alb: 1.9 g/dL / Pro: 5.8 gm/dL / ALK PHOS: 72 U/L / ALT: 28 U/L / AST: 25 U/L / GGT: x                 Active Medications  MEDICATIONS  (STANDING):  amLODIPine   Tablet 10 milliGRAM(s) Oral daily  carvedilol 3.125 milliGRAM(s) Oral every 12 hours  chlorhexidine 0.12% Liquid 15 milliLiter(s) Oral Mucosa every 12 hours  chlorhexidine 2% Cloths 1 Application(s) Topical <User Schedule>  dextrose 40% Gel 15 Gram(s) Oral once  dextrose 5%. 1000 milliLiter(s) (50 mL/Hr) IV Continuous <Continuous>  dextrose 5%. 1000 milliLiter(s) (100 mL/Hr) IV Continuous <Continuous>  dextrose 50% Injectable 25 Gram(s) IV Push once  dextrose 50% Injectable 12.5 Gram(s) IV Push once  dextrose 50% Injectable 25 Gram(s) IV Push once  dorzolamide 2%/timolol 0.5% Ophthalmic Solution 1 Drop(s) Both EYES two times a day  enoxaparin Injectable 80 milliGRAM(s) SubCutaneous every 12 hours  escitalopram 20 milliGRAM(s) Oral daily  glucagon  Injectable 1 milliGRAM(s) IntraMuscular once  levETIRAcetam  Solution 500 milliGRAM(s) Enteral Tube two times a day  levothyroxine 50 MICROGram(s) Oral daily  pantoprazole   Suspension 40 milliGRAM(s) Enteral Tube daily  potassium chloride    Tablet ER 40 milliEquivalent(s) Oral once    MEDICATIONS  (PRN):  acetaminophen     Tablet .. 650 milliGRAM(s) Oral every 6 hours PRN Temp greater or equal to 38C (100.4F)  sodium chloride 0.9% lock flush 10 milliLiter(s) IV Push every 1 hour PRN Pre/post blood products, medications, blood draw, and to maintain line patency

## 2022-01-28 NOTE — PROGRESS NOTE ADULT - ASSESSMENT
81F with a PMH of HTN, HLD, hypothyroidism, depression, seizures who presents to the ED for abd pain found to have diverticulitis   has rising leukocytosis   had fever yesterday   tachycardic and now hypoxic   CXR (I personally reviewed) low lung volumes   origincal CT (I personally reviewed) with diverticulitis   she had a lot of pain in the abdomen on exam     12/25: s/p surgery for diverticulitis with perforation and abscess and went to the OR. intubated in ICU with vac and needs to go back to the OR, currently on zosyn, s/p one dose of diflucan last night   12/27: s/p repair and ostomy creation yesterday, wbc elevated, cultures sensitive to zosyn and candida albicans is notoriously sensitive to azoles such as fluconazole, wean of pressors and sedation, extubate when ready   12/28: Further increase in white blood cell count but overall the patient appears to be reasonably stable.  No concern of C. difficile at this juncture.  Current antibiotics are reasonable.  Leukocytosis like related to recent surgery, no concern of other superimposed process on the basis of exam.  I do not see a role to alter her antimicrobials.  12/29:  Remains intubated in ICU. still quite edematous and net positive, WBC climbing, small wound dehiscence at bottom of incision, plan for initiation of TPN today, remains on antibiotics    12/30: rising WBC, ostomy not functioning yet, very edematous CT today, may be source control issue so for now can continue same antibiotics and antifungal but may need to change if findings on the CT are worrisome or change in hemodynamics  12/31:Leukocytosis, with collections noted on CT.  However this is being done postoperative day 5, there is some possibility that this could represent postop changes but given the leukocytosis, concur with plans for attempts at percutaneous drainage.  White blood cell count down slightly compared to prior peak, will need to be trended.  Gallbladder is also distended.  Abdominal exam was benign this morning, but a calculus cholecystitis could be the differential diagnosis here as well (though n.p.o. status certainly could explain distended gallbladder, there is also report of gallbladder wall thickening).  Would modify antibiotics based on cultures from drainage, I do not believe that antibiotics and other the cells would be adequate here.  Fortunately she is off pressors, with preserved renal function, and tolerating pressure support.  1/1/22: Postop day 6.  I have some concerns with the appearance of the ostomy, though the abdominal exam overall is otherwise unchanged.  White blood cell count remains elevated, but is lower compared with prior values.  This is despite no change in antibiotic therapy.  Patient also has asymmetric edema of the upper extremities, right greater than left.  Low threshold for duplex ultrasound to exclude DVT.No new surveillance culture data.  1/2: POD7 Ostomy looks better today, UE symmetric. WBC elevated, some slight downward trend overall. Temps <= 100F.   1/3: drainage of fluid collection today, continue zosyn and fluconazole for now, monitor wbc and temps, watch ostomy output, diuresis and address third spacing   1/4: s/p drainage yesterday now growing ecoli and awaiting for sensitivities, wbc is improving, started on tube feeds    1/5: abscess growing ecoli and bacteroides, S to ceftriaxone, wbc 15, weaning trial today, pain management    1/6: awake, lung exercise today, trach planned for tomorrow, ostomy output is good. will stop fluconazole today and change antibiotics to ceftriaxone and flagly. Unclear stop date yet    1/7 trach today, continue antibiotics   1/9: s/p trach, had low grade fever but otherwise doing ok, will continues same antibiotics regimen for now but if fevers continue she will needs to be rescanned as those abscesses can progress.  1/10: low grade fever but doing well on trach-PS, ostomy with stool and gas, shakes head when asked if in pain, discussed with surgery about repeating CT scan given the temp  1/11: Still febrile, favor interval CT as above.  1/12: CT of abdomen showing some signs of improvement and elsewhere  it shows signs of worsening. wbc normal, tube feeds currently via NGT   1/13: no fever, no wbc, Cr and LFTs ok, Ceftriaxone and flagyl continued. No planned IR intervention at this time - fluid collection without access to drain, surgical drain within the collection. Pt possibly will have repeat imaging over the weekend to evaluate progress.   Attending addendum:  agree with above  continue antibiotics   vent weaning  surgical follow up for the abdominal wound  1/14: Low grade temp last night, midline placed, no leukocytosis, Cr and LFTs ok, culture data reviewed. Pt's colostomy with small amount of liquid stool and gas, no stools recorded for two days, no role for po Vancomycin at this time. The pt has been on abx since her admission, will stop all abx and will continue to monitor.    1/18: no fevers since 1/14, WBC better 11.00, cr ok, off abx, now s/p EGD, with PEG replacement.   1/24: spiking low grade fevers, low suspicion for pneumonia given little secretions and doing well on the PS wth low settings, peg site looks clean, trach site OK as well, fevers may be from small fluid collections intra-abdominally vs atelectasis Would suggest aggressive pulmonary toilet including chest vest. follow cultures and trend wbc and fever curve . If persistent fevers would start  Zosyn and PO fluconazole but for now please hold antibiotics   1/25: continues having fevers, low grade temps today, WBC better 18.35, Cr and LFTs ok, + new DVT of right common femoral vein - could be the cause of pt's fevers - on full dose Lovenox. Will continue to monitor off abx, WBC got better without abx.   Attending addendum:  patient with long hospital course and now on the floors with fever and found to have a DVT  wbc came down without antibiotics   fever curve is getting better   continue to monitor off antibiotics   treat DVT with full dose AC   suspect fevers could be from dvt but may also be from fluid collection    1/26: low grade temp today, pt is more awake and alert today, WBC improving 14.2 off abx, Cr and LFTs ok, abd wound with clean base and healing well - vac dressing was changed today during my exam. In favor to continue to monitor off abx for now.   1/27: Pt continues spiking fevers, low grade last night and 101 today, awake and alert during my exam, WBC improving off abx 13.61, recent chest xray with no acute lung disease, COVID 19 is negative. Pt's fevers most likely related to her DVT, will collect two sets of surveillance BC and will continue to monitor off abx for now.   1/28: CTA A/P done last night - There is evidence of active GI bleeding in the gastric antrum, related to peptic ulcer disease or gastritis, possible cystitis. Blood transfusion is in progress during my exam, wound vac is being changed - base of the wound is clean with no evidence of acute infection. Pt had a low grade temp last night, WBC elevated - could be reactive to DVT. Will obtain UA and UC, will hold off on abx for now.     #Perforated diverticulum of large intestine.   ·  Plan: continue to monitor off antibiotics  monitor for worsening signs and at which point give a dose of Vancomycin and a dose of Amikacin   continue wound vac       #Fever.   ·  Plan:   trend pt's temperatures   monitor off antibiotics for now  If continues spiking uncontrolled fevers, give one time dose of Vancomycin and one time dose of Amikacin    UA pending   + new DVT of right common femoral vein   frequent turns, offloading, and nutrition optimization to avoid bedsores    #Acute Respiratory Failure  wean oxygen as tolerated  chest PT  frequent suctioning   chest vest   HOB >30 degrees    #DVT  AC as per protocol    Discussed with Dr. Hector  Msg sent to Dr. Andrea

## 2022-01-28 NOTE — CHART NOTE - NSCHARTNOTEFT_GEN_A_CORE
Pt seen for nutrition follow-up. Per chart pt with PMH HTN, depression, seizures, presents with abdominal pain, diarrhea, found with sepsis due to perforated diverticulitis. s/p ex-lap with colon resection  and creation of colostomy ; was unable to be extubated postsurgery; s/p trach - remains trach to vent. s/p EGD with PEG placement . Course further complicated by DVT in right femoral vein and now pt with acute GIB.     Factors impacting intake: [ ] none [ ] nausea  [ ] vomiting [ ] diarrhea [ ] constipation  [ ]chewing problems [ ] swallowing issues  [x] other: altered GI function- GI bleed    Diet Prescription: Diet, NPO (22 @ 00:07)    Intake: Tube feed held for GIB. Was previously receiving     Current Weight: () 82.5kg () 81kg indicating weight gain of 1.5kg  % Weight Change: 2% weight gain x 1 month    Edema: 1+ right and left feet; 2+ right arm as per flow sheets    Physical Appearance: debilitated; unable to conduct nutrition focused physical exam as pt asleep at time of visit    Pertinent Medications: MEDICATIONS  (STANDING):  amLODIPine   Tablet 10 milliGRAM(s) Oral daily  carvedilol 3.125 milliGRAM(s) Oral every 12 hours  chlorhexidine 0.12% Liquid 15 milliLiter(s) Oral Mucosa every 12 hours  chlorhexidine 2% Cloths 1 Application(s) Topical <User Schedule>  dextrose 40% Gel 15 Gram(s) Oral once  dextrose 5%. 1000 milliLiter(s) (50 mL/Hr) IV Continuous <Continuous>  dextrose 5%. 1000 milliLiter(s) (100 mL/Hr) IV Continuous <Continuous>  dextrose 50% Injectable 25 Gram(s) IV Push once  dextrose 50% Injectable 12.5 Gram(s) IV Push once  dextrose 50% Injectable 25 Gram(s) IV Push once  dorzolamide 2%/timolol 0.5% Ophthalmic Solution 1 Drop(s) Both EYES two times a day  escitalopram 20 milliGRAM(s) Oral daily  glucagon  Injectable 1 milliGRAM(s) IntraMuscular once  levETIRAcetam  IVPB 500 milliGRAM(s) IV Intermittent every 12 hours  levothyroxine Injectable 37.5 MICROGram(s) IV Push at bedtime  pantoprazole  Injectable 40 milliGRAM(s) IV Push every 12 hours  sucralfate 1 Gram(s) Oral four times a day    MEDICATIONS  (PRN):  acetaminophen     Tablet .. 650 milliGRAM(s) Oral every 6 hours PRN Temp greater or equal to 38C (100.4F)  sodium chloride 0.9% lock flush 10 milliLiter(s) IV Push every 1 hour PRN Pre/post blood products, medications, blood draw, and to maintain line patency    Pertinent Labs:  Na147 mmol/L<H> Glu 123 mg/dL<H> K+ 5.0 mmol/L Cr  0.86 mg/dL BUN 46 mg/dL<H>  Phos 2.7 mg/dL  Alb 1.9 g/dL<L>  Chol --    LDL --    HDL --    Trig 343 mg/dL<H>      Skin: no pressure injuries per flow sheets    Estimated Needs:   [x] no change since previous assessment on 21 for estimated energy & protein needs (25-30 kcal/kg; 3065-0575 kcal daily & 1.2-1.4 g/kg; 70.86-82.46 gm protein daily)  [x] recalculated: Estimated fluid needs; Using IBW 58.9 kg  25-30 ml/[id number] ml fluid daily; high end, as Na remains elevated      Nutrition Diagnosis:     [x] Inadequate enteral nutrition infusion    Related to: Feedings currently being held    As evidenced by: Feeding not infusing, pt not meeting estimated energy/protein needs    Goal: Pt to meet > 75% energy/protein needs via PEG feedings once medically feasible      Nutrition Diagnosis is [x] ongoing  [ ] resolved [ ] not applicable     New Nutrition Diagnosis: [x] not applicable      Interventions:   Recommend  [x] When/if medically feasible restart EN: Jevity 1.2 to goal rate 60 ml/hr which provides 1440 ml total volume, 1728 kcal, 80 g protein, 1166 ml water; 150ml water flushes q 6 hours  [ ] Change Diet To:  [ ] Nutrition Supplement  [x] Nutrition Support: If pt unable to tolerate EN at this time consider PN  [ ] Other:     Monitoring and Evaluation:   [ ] PO intake [ x ] Tolerance to diet prescription [ x ] weights [ x ] labs[ x ] follow up per protocol  [ ] other:

## 2022-01-28 NOTE — CHART NOTE - NSCHARTNOTEFT_GEN_A_CORE
Patient seen earlier this AM and examined. Chart reviewed. VSS, afebrile. Labs seen. PE - small amount of black liquid in colostomy bag. Will follow

## 2022-01-28 NOTE — PROGRESS NOTE ADULT - ASSESSMENT
81F with a PMH of HTN, HLD, hypothyroidism, depression, seizures p/w abd pain, found to have acute diverticulitis . acute metabolic encephalopathy sec to above Perforated sigmoid diverticulitis. 12/24/21 - Exploratory laparotomy and a sigmoid colectomy and abdominal washout abd at that time left open. Transferred to ICU for post op care on mechanical ventilation. 10mg IV lopressor in OR for Afib with RVR. 5L IVF given intra-op. EBL 100cc. Received intubated, sedated, on phenylephrine.  Patient went for abdominal wound closure and creation of colostomy on 12/26. Patient developed adb abscess 1/3 s/p IR drainage of abdominal abscesses. Initially patient on PPN for nutrition Now tolerating tube feeds with brown soft stool in colostomy.. Found to have multiple abdominal fluid collections s/p IR drainage of R abdominal abscess 1/3/22 growing e-coli and bacteroides Sensitive to ceftriaxone, continue antibiotics to ceftriaxone and flagyl per ID.   Patient was unable to wean and son consented to trach.  -Now out of shock state not on vasopressor support.  Post op a-fib RVR, s/p amio load converted to sinus rhythm.  Patient with intermittent fevers. S/P PEG and trach. Pain management on fentanyl patch.      Perforated Acute diverticulitis   s/p 12/24/21 - Exploratory laparotomy and a sigmoid colectomy and abdominal washout  Patient went for abdominal wound closure and creation of colostomy on 12/26.  Patient developed adb abscess 1/3 s/p IR drainage of abdominal abscesses.  Initially patient on PPN for nutrition Now tolerating tube feeds with brown soft stool in colostomy.  Found to have multiple abdominal fluid collections s/p IR drainage of R abdominal abscess 1/3/22 growing e-coli and bacteroides  S/P Peg placement  all abx dced as per iD recs, now febrile again  Repeat CT noted   BABAK drain care, wound vac in place   PEG and ostomy with active bleeding 11/28: lovenox stopped: given reversal ordered two units of prbc    plan:  npo  active type and cross  2 units of blood to be given  GI and Surgery consulted  maintain npo for endoscopy monday        Septic Shock s/p  pressors  Transferred to ICU for post op care on mechanical ventilation.  now off pressors,  failed extubation, trach to cpap  completed ceftriaxone and flagyl per ID. trial PO vanc for diarrhea. now off all abx  Febrile again, follow CLX, neg so far, covid PCR neg  CT A/p noted, ab collections somewhat improved, Multiple focal opacification in RL, Repeat CXR looks unchanged,.  persistent fevers,WBC increased , Re consulted ID  Pt has acute dvt  consulted with IR: Midline exchanged 11/28  plan:  start IV zosyn    Hypoxemic respiratory failure now trach to CPAP  now trach to CPAP  Pulm following  on fentayl patch for pain control    acute metabolic encephalopathy sec to above  stable    Rapid AFIB due to Shock state  s/p amio load converted to sinus rhythm.  not on rate control or AC on downgrade, now on AC also for DVT  Cards consulted  added BB  HOLD AC    Acute DVT:  -off a/c      plan: low grade temp, active bleeding noted in peg. black stool in ostomy  for endoscopy on monday  updated spouse on plan of care.. discussed risk of stroke vs risk of death from bleeding due to anticoagulation,. spouse agreement to hold a/c  will reach out to vascular on possible IVC

## 2022-01-28 NOTE — PROGRESS NOTE ADULT - SUBJECTIVE AND OBJECTIVE BOX
JUAREZ ECHOLSNIS    LVS 1B [address] D    Allergies    No Known Allergies    Intolerances        PAST MEDICAL & SURGICAL HISTORY:  Seizure    HTN (hypertension)    Glaucoma    Thyroid disease    Blood clot of neck vein  left side    TIA (transient ischemic attack)  20 years ago    S/P appendectomy        FAMILY HISTORY:  FH: HTN (hypertension)        Home Medications:  amLODIPine 10 mg oral tablet: 1 tab(s) orally once a day (22 Dec 2021 16:46)  aspirin 81 mg oral tablet, chewable: 1 tab(s) orally once a day (22 Dec 2021 16:46)  escitalopram 20 mg oral tablet: 1 tab(s) orally once a day (22 Dec 2021 16:46)  keppera:  (22 Dec 2021 16:46)  levETIRAcetam 500 mg oral tablet: 1 tab(s) orally 2 times a day (22 Dec 2021 16:46)  timolol-dorzolamide 0.5%-2% preservative-free ophthalmic solution: 1 drop(s) to each affected eye 2 times a day (22 Dec 2021 16:46)  Zetia 10 mg oral tablet: 1 tab(s) orally once a day (22 Dec 2021 16:46)      MEDICATIONS  (STANDING):  amLODIPine   Tablet 10 milliGRAM(s) Oral daily  carvedilol 3.125 milliGRAM(s) Oral every 12 hours  chlorhexidine 0.12% Liquid 15 milliLiter(s) Oral Mucosa every 12 hours  chlorhexidine 2% Cloths 1 Application(s) Topical <User Schedule>  dextrose 40% Gel 15 Gram(s) Oral once  dextrose 5%. 1000 milliLiter(s) (50 mL/Hr) IV Continuous <Continuous>  dextrose 5%. 1000 milliLiter(s) (100 mL/Hr) IV Continuous <Continuous>  dextrose 50% Injectable 25 Gram(s) IV Push once  dextrose 50% Injectable 12.5 Gram(s) IV Push once  dextrose 50% Injectable 25 Gram(s) IV Push once  dorzolamide 2%/timolol 0.5% Ophthalmic Solution 1 Drop(s) Both EYES two times a day  escitalopram 20 milliGRAM(s) Oral daily  glucagon  Injectable 1 milliGRAM(s) IntraMuscular once  levETIRAcetam  IVPB 500 milliGRAM(s) IV Intermittent every 12 hours  levothyroxine Injectable 37.5 MICROGram(s) IV Push at bedtime  pantoprazole  Injectable 40 milliGRAM(s) IV Push every 12 hours  sucralfate 1 Gram(s) Oral four times a day    MEDICATIONS  (PRN):  acetaminophen     Tablet .. 650 milliGRAM(s) Oral every 6 hours PRN Temp greater or equal to 38C (100.4F)  sodium chloride 0.9% lock flush 10 milliLiter(s) IV Push every 1 hour PRN Pre/post blood products, medications, blood draw, and to maintain line patency      Diet, NPO (01-28-22 @ 00:07) [Active]          Vital Signs Last 24 Hrs  T(C): 37.7 (28 Jan 2022 05:55), Max: 38.3 (27 Jan 2022 10:40)  T(F): 99.9 (28 Jan 2022 05:55), Max: 101 (27 Jan 2022 10:40)  HR: 77 (28 Jan 2022 05:55) (65 - 84)  BP: 123/72 (28 Jan 2022 05:55) (113/70 - 136/71)  BP(mean): --  RR: 18 (28 Jan 2022 05:55) (18 - 18)  SpO2: 100% (28 Jan 2022 05:55) (96% - 100%)      01-27-22 @ 07:01  -  01-28-22 @ 07:00  --------------------------------------------------------  IN: 1000 mL / OUT: 305 mL / NET: 695 mL        Mode: AC/ CMV (Assist Control/ Continuous Mandatory Ventilation), RR (machine): 15, TV (machine): 450, FiO2: 30, PEEP: 5, PS: 10, ITime: 1, MAP: 10, PIP: 18      LABS:                        8.7    19.00 )-----------( x        ( 28 Jan 2022 07:07 )             28.4     01-28    147<H>  |  115<H>  |  46<H>  ----------------------------<  123<H>  5.0   |  24  |  0.86    Ca    8.4<L>      28 Jan 2022 07:07  Phos  2.7     01-27  Mg     2.3     01-27    TPro  5.8<L>  /  Alb  1.9<L>  /  TBili  0.1<L>  /  DBili  x   /  AST  25  /  ALT  28  /  AlkPhos  72  01-26    PT/INR - ( 27 Jan 2022 19:58 )   PT: 14.1 sec;   INR: 1.23 ratio                   WBC:  WBC Count: 19.00 K/uL (01-28 @ 07:07)  WBC Count: 16.32 K/uL (01-27 @ 19:58)  WBC Count: 13.61 K/uL (01-27 @ 07:45)  WBC Count: 14.12 K/uL (01-26 @ 08:55)  WBC Count: 18.35 K/uL (01-25 @ 06:35)  WBC Count: 21.37 K/uL (01-24 @ 08:10)      MICROBIOLOGY:  RECENT CULTURES:              PT/INR - ( 27 Jan 2022 19:58 )   PT: 14.1 sec;   INR: 1.23 ratio             Sodium:  Sodium, Serum: 147 mmol/L (01-28 @ 07:07)  Sodium, Serum: 148 mmol/L (01-27 @ 07:45)  Sodium, Serum: 147 mmol/L (01-26 @ 08:55)  Sodium, Serum: 146 mmol/L (01-25 @ 06:35)  Sodium, Serum: 146 mmol/L (01-24 @ 08:10)      0.86 mg/dL 01-28 @ 07:07  0.45 mg/dL 01-27 @ 07:45  0.50 mg/dL 01-26 @ 08:55  0.57 mg/dL 01-25 @ 06:35  0.56 mg/dL 01-24 @ 08:10      Hemoglobin:  Hemoglobin: 8.7 g/dL (01-28 @ 07:07)  Hemoglobin: 7.7 g/dL (01-27 @ 19:58)  Hemoglobin: 9.7 g/dL (01-27 @ 07:45)  Hemoglobin: 9.0 g/dL (01-26 @ 08:55)  Hemoglobin: 10.3 g/dL (01-25 @ 06:35)  Hemoglobin: 10.2 g/dL (01-24 @ 08:10)      Platelets: Platelet Count - Automated: 412 K/uL (01-27 @ 19:58)  Platelet Count - Automated: 362 K/uL (01-27 @ 07:45)  Platelet Count - Automated: 320 K/uL (01-26 @ 08:55)  Platelet Count - Automated: 369 K/uL (01-25 @ 06:35)  Platelet Count - Automated: 430 K/uL (01-24 @ 08:10)      LIVER FUNCTIONS - ( 26 Jan 2022 08:55 )  Alb: 1.9 g/dL / Pro: 5.8 gm/dL / ALK PHOS: 72 U/L / ALT: 28 U/L / AST: 25 U/L / GGT: x                 RADIOLOGY & ADDITIONAL STUDIES:      MICROBIOLOGY:  RECENT CULTURES:

## 2022-01-28 NOTE — PROGRESS NOTE ADULT - SUBJECTIVE AND OBJECTIVE BOX
Patient is a 81y old  Female who presents with a chief complaint of diverticulitis (28 Jan 2022 08:07)      HPI:  Patient is a 81F with a PMH of HTN, HLD, hypothyroidism, depression, seizures who presents to the ED for abd pain.  Patient states that over the last two days she had developed significant abdominal pain and multiple episodes of watery diarrhea.  Reports one episode of nausea and vomiting earlier today.  Patient has no other complaints.  Vitals stable,  labs show leukocytosis and hypokalemia.  CT abdomen significant for diverticulitis.  Will admit to med surg.     (23 Dec 2021 00:17)      INTERVAL HPI/OVERNIGHT EVENTS: Patient seen earlier today. Chart reviewed.   Patient had multiple red, bloody BMs last night. Painless. No N/V. Patient was on Lovenox BID for a DVT. Patient had a CT scan that revealed an antral bleed. Patient given Protamine last night.    See labs.     MEDICATIONS  (STANDING):  amLODIPine   Tablet 10 milliGRAM(s) Oral daily  carvedilol 3.125 milliGRAM(s) Oral every 12 hours  chlorhexidine 0.12% Liquid 15 milliLiter(s) Oral Mucosa every 12 hours  chlorhexidine 2% Cloths 1 Application(s) Topical <User Schedule>  dextrose 40% Gel 15 Gram(s) Oral once  dextrose 5%. 1000 milliLiter(s) (50 mL/Hr) IV Continuous <Continuous>  dextrose 5%. 1000 milliLiter(s) (100 mL/Hr) IV Continuous <Continuous>  dextrose 50% Injectable 25 Gram(s) IV Push once  dextrose 50% Injectable 12.5 Gram(s) IV Push once  dextrose 50% Injectable 25 Gram(s) IV Push once  dorzolamide 2%/timolol 0.5% Ophthalmic Solution 1 Drop(s) Both EYES two times a day  escitalopram 20 milliGRAM(s) Oral daily  glucagon  Injectable 1 milliGRAM(s) IntraMuscular once  levETIRAcetam  IVPB 500 milliGRAM(s) IV Intermittent every 12 hours  levothyroxine Injectable 37.5 MICROGram(s) IV Push at bedtime  pantoprazole  Injectable 40 milliGRAM(s) IV Push every 12 hours  sucralfate 1 Gram(s) Oral four times a day    MEDICATIONS  (PRN):  acetaminophen     Tablet .. 650 milliGRAM(s) Oral every 6 hours PRN Temp greater or equal to 38C (100.4F)  sodium chloride 0.9% lock flush 10 milliLiter(s) IV Push every 1 hour PRN Pre/post blood products, medications, blood draw, and to maintain line patency      FAMILY HISTORY:  FH: HTN (hypertension)        Allergies    No Known Allergies    Intolerances        PMH/PSH:  Seizure    HTN (hypertension)    Glaucoma    Thyroid disease    Blood clot of neck vein    TIA (transient ischemic attack)    S/P appendectomy          REVIEW OF SYSTEMS:  CONSTITUTIONAL: No fever, weight loss,   EYES: No eye pain, visual disturbances, or discharge  ENMT:  No difficulty hearing, tinnitus, vertigo; No sinus or throat pain  NECK: No pain or stiffness  BREASTS: No pain, masses, or nipple discharge  RESPIRATORY: No cough, wheezing, chills or hemoptysis; No shortness of breath  CARDIOVASCULAR: No chest pain, palpitations, dizziness, or leg swelling  GASTROINTESTINAL: See above   GENITOURINARY: No dysuria, frequency, hematuria, or incontinence  NEUROLOGICAL: No headaches, numbness, or tremors  SKIN: No itching, burning, rashes, or lesions   LYMPH NODES: No enlarged glands  ENDOCRINE: No heat or cold intolerance; No hair loss  MUSCULOSKELETAL: No joint pain or swelling; No muscle, back, or extremity pain  PSYCHIATRIC: No depression, anxiety, mood swings, or difficulty sleeping  HEME/LYMPH: No easy bruising, or bleeding gums  ALLERGY AND IMMUNOLOGIC: No hives or eczema    Vital Signs Last 24 Hrs  T(C): 36.6 (28 Jan 2022 12:01), Max: 37.8 (28 Jan 2022 00:13)  T(F): 97.8 (28 Jan 2022 12:01), Max: 100.1 (28 Jan 2022 00:13)  HR: 81 (28 Jan 2022 12:01) (65 - 84)  BP: 128/60 (28 Jan 2022 12:01) (113/70 - 128/60)  BP(mean): --  RR: 19 (28 Jan 2022 12:01) (18 - 19)  SpO2: 100% (28 Jan 2022 12:01) (96% - 100%)    PHYSICAL EXAM:  GENERAL: NAD, well-groomed, well-developed  HEAD:  Atraumatic, Normocephalic  EYES: EOMI, PERRLA, conjunctiva and sclera clear  NECK: Supple, No JVD, Normal thyroid  NERVOUS SYSTEM:  Arouse able   CHEST/LUNG: Clear to percussion bilaterally; No rales, rhonchi, wheezing, or rubs  HEART: Regular rate and rhythm; No murmurs, rubs, or gallops  ABDOMEN: Soft, Nontender, Nondistended; Bowel sounds present. Small amount of liquid black material in ostomy.   EXTREMITIES:  2+ Peripheral Pulses, No clubbing, cyanosis, or edema  LYMPH: No lymphadenopathy noted  SKIN: No rashes or lesions    LAB                          8.7    19.00 )-----------( CLUMPED    ( 28 Jan 2022 07:07 )             28.4       CBC:  01-28 @ 07:07  WBC 19.00   Hgb 8.7   Hct 28.4   Plts CLUMPED  MCV 93.1  01-27 @ 19:58  WBC 16.32   Hgb 7.7   Hct 26.1   Plts 412  MCV 95.6  01-27 @ 07:45  WBC 13.61   Hgb 9.7   Hct 32.1   Plts 362  MCV 94.1  01-26 @ 08:55  WBC 14.12   Hgb 9.0   Hct 30.6   Plts 320  MCV 95.6  01-25 @ 06:35  WBC 18.35   Hgb 10.3   Hct 34.9   Plts 369  MCV 94.6  01-24 @ 08:10  WBC 21.37   Hgb 10.2   Hct 34.0   Plts 430  MCV 93.7  01-23 @ 09:57  WBC 15.24   Hgb 10.6   Hct 35.3   Plts 468  MCV 93.1  01-22 @ 07:30  WBC 10.13   Hgb 10.3   Hct 34.1   Plts 477  MCV 92.7      Chemistry:  01-28 @ 07:07  Na+ 147  K+ 5.0  Cl- 115  CO2 24  BUN 46  Cr 0.86     01-27 @ 07:45  Na+ 148  K+ 3.4  Cl- 115  CO2 29  BUN 19  Cr 0.45     01-26 @ 08:55  Na+ 147  K+ 3.5  Cl- 115  CO2 28  BUN 19  Cr 0.50     01-25 @ 06:35  Na+ 146  K+ 4.0  Cl- 113  CO2 27  BUN 19  Cr 0.57     01-24 @ 08:10  Na+ 146  K+ 3.2  Cl- 113  CO2 27  BUN 19  Cr 0.56     01-23 @ 09:57  Na+ 145  K+ 3.6  Cl- 112  CO2 28  BUN 19  Cr 0.62     01-22 @ 18:11  Na+ 145  K+ 4.3  Cl- 113  CO2 26  BUN 16  Cr 0.46     01-22 @ 07:30  Na+ 143  K+ 2.9  Cl- 107  CO2 26  BUN 16  Cr 0.48         Glucose, Serum: 123 mg/dL (01-28 @ 07:07)  Glucose, Serum: 115 mg/dL (01-27 @ 07:45)  Glucose, Serum: 147 mg/dL (01-26 @ 08:55)  Glucose, Serum: 139 mg/dL (01-25 @ 06:35)  Glucose, Serum: 138 mg/dL (01-24 @ 08:10)  Glucose, Serum: 178 mg/dL (01-23 @ 09:57)  Glucose, Serum: 136 mg/dL (01-22 @ 18:11)  Glucose, Serum: 90 mg/dL (01-22 @ 07:30)      28 Jan 2022 07:07    147    |  115    |  46     ----------------------------<  123    5.0     |  24     |  0.86   27 Jan 2022 07:45    148    |  115    |  19     ----------------------------<  115    3.4     |  29     |  0.45   26 Jan 2022 08:55    147    |  115    |  19     ----------------------------<  147    3.5     |  28     |  0.50   25 Jan 2022 06:35    146    |  113    |  19     ----------------------------<  139    4.0     |  27     |  0.57   24 Jan 2022 08:10    146    |  113    |  19     ----------------------------<  138    3.2     |  27     |  0.56   23 Jan 2022 09:57    145    |  112    |  19     ----------------------------<  178    3.6     |  28     |  0.62   22 Jan 2022 18:11    145    |  113    |  16     ----------------------------<  136    4.3     |  26     |  0.46     Ca    8.4        28 Jan 2022 07:07  Ca    8.6        27 Jan 2022 07:45  Ca    8.4        26 Jan 2022 08:55  Ca    8.5        25 Jan 2022 06:35  Ca    8.6        24 Jan 2022 08:10  Ca    8.5        23 Jan 2022 09:57  Ca    8.8        22 Jan 2022 18:11  Phos  2.7       27 Jan 2022 07:45  Phos  3.6       22 Jan 2022 07:30  Mg     2.3       27 Jan 2022 07:45  Mg     2.1       22 Jan 2022 07:30    TPro  5.8    /  Alb  1.9    /  TBili  0.1    /  DBili  x      /  AST  25     /  ALT  28     /  AlkPhos  72     26 Jan 2022 08:55  TPro  6.2    /  Alb  1.9    /  TBili  0.3    /  DBili  x      /  AST  18     /  ALT  21     /  AlkPhos  82     25 Jan 2022 06:35  TPro  6.1    /  Alb  2.1    /  TBili  0.3    /  DBili  x      /  AST  20     /  ALT  19     /  AlkPhos  71     24 Jan 2022 08:10  TPro  6.2    /  Alb  2.2    /  TBili  0.2    /  DBili  x      /  AST  17     /  ALT  19     /  AlkPhos  74     23 Jan 2022 09:57  TPro  5.8    /  Alb  1.9    /  TBili  0.4    /  DBili  x      /  AST  18     /  ALT  15     /  AlkPhos  63     22 Jan 2022 07:30      PT/INR - ( 27 Jan 2022 19:58 )   PT: 14.1 sec;   INR: 1.23 ratio                 CAPILLARY BLOOD GLUCOSE      POCT Blood Glucose.: 133 mg/dL (28 Jan 2022 11:23)  POCT Blood Glucose.: 150 mg/dL (27 Jan 2022 17:33)          RADIOLOGY & ADDITIONAL TESTS:    Imaging Personally Reviewed:  [ ] YES  [ ] NO    Consultant(s) Notes Reviewed:  [ ] YES  [ ] NO    Care Discussed with Consultants/Other Providers [ ] YES  [ ] NO

## 2022-01-29 LAB
ANION GAP SERPL CALC-SCNC: 7 MMOL/L — SIGNIFICANT CHANGE UP (ref 5–17)
APTT BLD: 29.7 SEC — SIGNIFICANT CHANGE UP (ref 27.5–35.5)
BLD GP AB SCN SERPL QL: SIGNIFICANT CHANGE UP
BUN SERPL-MCNC: 42 MG/DL — HIGH (ref 7–23)
CALCIUM SERPL-MCNC: 8.5 MG/DL — SIGNIFICANT CHANGE UP (ref 8.5–10.1)
CHLORIDE SERPL-SCNC: 115 MMOL/L — HIGH (ref 96–108)
CO2 SERPL-SCNC: 24 MMOL/L — SIGNIFICANT CHANGE UP (ref 22–31)
CREAT SERPL-MCNC: 0.76 MG/DL — SIGNIFICANT CHANGE UP (ref 0.5–1.3)
GLUCOSE BLDC GLUCOMTR-MCNC: 136 MG/DL — HIGH (ref 70–99)
GLUCOSE BLDC GLUCOMTR-MCNC: 140 MG/DL — HIGH (ref 70–99)
GLUCOSE BLDC GLUCOMTR-MCNC: 140 MG/DL — HIGH (ref 70–99)
GLUCOSE BLDC GLUCOMTR-MCNC: 150 MG/DL — HIGH (ref 70–99)
GLUCOSE BLDC GLUCOMTR-MCNC: 160 MG/DL — HIGH (ref 70–99)
GLUCOSE BLDC GLUCOMTR-MCNC: 163 MG/DL — HIGH (ref 70–99)
GLUCOSE SERPL-MCNC: 126 MG/DL — HIGH (ref 70–99)
HCT VFR BLD CALC: 27.4 % — LOW (ref 34.5–45)
HCT VFR BLD CALC: 28.6 % — LOW (ref 34.5–45)
HGB BLD-MCNC: 8.6 G/DL — LOW (ref 11.5–15.5)
HGB BLD-MCNC: 8.9 G/DL — LOW (ref 11.5–15.5)
INR BLD: 1.21 RATIO — HIGH (ref 0.88–1.16)
MAGNESIUM SERPL-MCNC: 3 MG/DL — HIGH (ref 1.6–2.6)
MCHC RBC-ENTMCNC: 27.7 PG — SIGNIFICANT CHANGE UP (ref 27–34)
MCHC RBC-ENTMCNC: 27.7 PG — SIGNIFICANT CHANGE UP (ref 27–34)
MCHC RBC-ENTMCNC: 31.1 G/DL — LOW (ref 32–36)
MCHC RBC-ENTMCNC: 31.4 G/DL — LOW (ref 32–36)
MCV RBC AUTO: 88.4 FL — SIGNIFICANT CHANGE UP (ref 80–100)
MCV RBC AUTO: 89.1 FL — SIGNIFICANT CHANGE UP (ref 80–100)
NRBC # BLD: 0 /100 WBCS — SIGNIFICANT CHANGE UP (ref 0–0)
NRBC # BLD: 0 /100 WBCS — SIGNIFICANT CHANGE UP (ref 0–0)
PHOSPHATE SERPL-MCNC: 3.5 MG/DL — SIGNIFICANT CHANGE UP (ref 2.5–4.5)
PLATELET # BLD AUTO: 277 K/UL — SIGNIFICANT CHANGE UP (ref 150–400)
PLATELET # BLD AUTO: 294 K/UL — SIGNIFICANT CHANGE UP (ref 150–400)
POTASSIUM SERPL-MCNC: 4 MMOL/L — SIGNIFICANT CHANGE UP (ref 3.5–5.3)
POTASSIUM SERPL-SCNC: 4 MMOL/L — SIGNIFICANT CHANGE UP (ref 3.5–5.3)
PROTHROM AB SERPL-ACNC: 13.9 SEC — HIGH (ref 10.6–13.6)
RBC # BLD: 3.1 M/UL — LOW (ref 3.8–5.2)
RBC # BLD: 3.21 M/UL — LOW (ref 3.8–5.2)
RBC # FLD: 18.1 % — HIGH (ref 10.3–14.5)
RBC # FLD: 18.8 % — HIGH (ref 10.3–14.5)
SODIUM SERPL-SCNC: 146 MMOL/L — HIGH (ref 135–145)
WBC # BLD: 23.36 K/UL — HIGH (ref 3.8–10.5)
WBC # BLD: 27.98 K/UL — HIGH (ref 3.8–10.5)
WBC # FLD AUTO: 23.36 K/UL — HIGH (ref 3.8–10.5)
WBC # FLD AUTO: 27.98 K/UL — HIGH (ref 3.8–10.5)

## 2022-01-29 PROCEDURE — 99233 SBSQ HOSP IP/OBS HIGH 50: CPT

## 2022-01-29 PROCEDURE — 99232 SBSQ HOSP IP/OBS MODERATE 35: CPT

## 2022-01-29 RX ORDER — PIPERACILLIN AND TAZOBACTAM 4; .5 G/20ML; G/20ML
3.38 INJECTION, POWDER, LYOPHILIZED, FOR SOLUTION INTRAVENOUS ONCE
Refills: 0 | Status: COMPLETED | OUTPATIENT
Start: 2022-01-29 | End: 2022-01-29

## 2022-01-29 RX ORDER — PIPERACILLIN AND TAZOBACTAM 4; .5 G/20ML; G/20ML
3.38 INJECTION, POWDER, LYOPHILIZED, FOR SOLUTION INTRAVENOUS EVERY 8 HOURS
Refills: 0 | Status: DISCONTINUED | OUTPATIENT
Start: 2022-01-29 | End: 2022-01-30

## 2022-01-29 RX ADMIN — Medication 37.5 MICROGRAM(S): at 21:23

## 2022-01-29 RX ADMIN — PANTOPRAZOLE SODIUM 40 MILLIGRAM(S): 20 TABLET, DELAYED RELEASE ORAL at 17:52

## 2022-01-29 RX ADMIN — Medication 1 GRAM(S): at 17:52

## 2022-01-29 RX ADMIN — LEVETIRACETAM 420 MILLIGRAM(S): 250 TABLET, FILM COATED ORAL at 05:11

## 2022-01-29 RX ADMIN — Medication 650 MILLIGRAM(S): at 15:00

## 2022-01-29 RX ADMIN — AMLODIPINE BESYLATE 10 MILLIGRAM(S): 2.5 TABLET ORAL at 05:10

## 2022-01-29 RX ADMIN — CHLORHEXIDINE GLUCONATE 15 MILLILITER(S): 213 SOLUTION TOPICAL at 05:10

## 2022-01-29 RX ADMIN — LEVETIRACETAM 420 MILLIGRAM(S): 250 TABLET, FILM COATED ORAL at 17:50

## 2022-01-29 RX ADMIN — DORZOLAMIDE HYDROCHLORIDE TIMOLOL MALEATE 1 DROP(S): 20; 5 SOLUTION/ DROPS OPHTHALMIC at 05:10

## 2022-01-29 RX ADMIN — Medication 1 GRAM(S): at 11:37

## 2022-01-29 RX ADMIN — DORZOLAMIDE HYDROCHLORIDE TIMOLOL MALEATE 1 DROP(S): 20; 5 SOLUTION/ DROPS OPHTHALMIC at 17:50

## 2022-01-29 RX ADMIN — CARVEDILOL PHOSPHATE 3.12 MILLIGRAM(S): 80 CAPSULE, EXTENDED RELEASE ORAL at 05:10

## 2022-01-29 RX ADMIN — ESCITALOPRAM OXALATE 20 MILLIGRAM(S): 10 TABLET, FILM COATED ORAL at 11:36

## 2022-01-29 RX ADMIN — Medication 650 MILLIGRAM(S): at 00:22

## 2022-01-29 RX ADMIN — CHLORHEXIDINE GLUCONATE 15 MILLILITER(S): 213 SOLUTION TOPICAL at 17:52

## 2022-01-29 RX ADMIN — Medication 650 MILLIGRAM(S): at 01:00

## 2022-01-29 RX ADMIN — Medication 650 MILLIGRAM(S): at 06:00

## 2022-01-29 RX ADMIN — PIPERACILLIN AND TAZOBACTAM 25 GRAM(S): 4; .5 INJECTION, POWDER, LYOPHILIZED, FOR SOLUTION INTRAVENOUS at 17:50

## 2022-01-29 RX ADMIN — PIPERACILLIN AND TAZOBACTAM 25 GRAM(S): 4; .5 INJECTION, POWDER, LYOPHILIZED, FOR SOLUTION INTRAVENOUS at 23:04

## 2022-01-29 RX ADMIN — PANTOPRAZOLE SODIUM 40 MILLIGRAM(S): 20 TABLET, DELAYED RELEASE ORAL at 05:11

## 2022-01-29 RX ADMIN — Medication 650 MILLIGRAM(S): at 14:04

## 2022-01-29 RX ADMIN — Medication 650 MILLIGRAM(S): at 05:14

## 2022-01-29 RX ADMIN — CHLORHEXIDINE GLUCONATE 1 APPLICATION(S): 213 SOLUTION TOPICAL at 05:11

## 2022-01-29 RX ADMIN — Medication 1 GRAM(S): at 23:04

## 2022-01-29 RX ADMIN — PIPERACILLIN AND TAZOBACTAM 200 GRAM(S): 4; .5 INJECTION, POWDER, LYOPHILIZED, FOR SOLUTION INTRAVENOUS at 10:05

## 2022-01-29 RX ADMIN — Medication 1 GRAM(S): at 05:10

## 2022-01-29 RX ADMIN — CARVEDILOL PHOSPHATE 3.12 MILLIGRAM(S): 80 CAPSULE, EXTENDED RELEASE ORAL at 17:48

## 2022-01-29 NOTE — PROGRESS NOTE ADULT - SUBJECTIVE AND OBJECTIVE BOX
Patient seen and examined at bedside with Dr. Castro, resting comfortably,overnight no further evidence of GIB, Pt hemodynamically stable. +fevers      Vital Signs Last 24 Hrs  T(C): 39.1 (2022 13:59), Max: 39.2 (2022 12:00)  T(F): 102.3 (2022 13:59), Max: 102.5 (2022 12:00)  HR: 76 (2022 12:00) (52 - 91)  BP: 125/70 (2022 12:00) (125/70 - 131/74)  BP(mean): --  RR: 18 (2022 12:00) (18 - 18)  SpO2: 98% (2022 12:00) (98% - 99%)  I&O's Summary    2022 07:01  -  2022 07:00  --------------------------------------------------------  IN: 671 mL / OUT: 36 mL / NET: 635 mL        PHYSICAL EXAM:  GENERAL: lethargic  CHEST/LUNG: tolerating trach to vent  HEART: Regular rate and rhythm; S1 & S2 appreciated  ABDOMEN: soft, non tender, non distended. Old blood noted at wound vac site and under PEG bumper. No active or bright red blood noted at vac, PEG, or ostomy site. No bloody output from ostomy  EXTREMITIES:  no calf tenderness, no edema      LABS:                        8.6    27.98 )-----------( 294      ( 2022 06:33 )             27.4         146<H>  |  115<H>  |  42<H>  ----------------------------<  126<H>  4.0   |  24  |  0.76    Ca    8.5      2022 06:33  Phos  3.5       Mg     3.0           PT/INR - ( 2022 06:33 )   PT: 13.9 sec;   INR: 1.21 ratio         PTT - ( 2022 06:33 )  PTT:29.7 sec  Urinalysis Basic - ( 2022 14:23 )    Color: Yellow / Appearance: very cloudy / S.015 / pH: x  Gluc: x / Ketone: Negative  / Bili: Negative / Urobili: Negative mg/dL   Blood: x / Protein: 100 mg/dL / Nitrite: Negative   Leuk Esterase: Moderate / RBC: 11-25 /HPF / WBC >50   Sq Epi: x / Non Sq Epi: Few / Bacteria: TNTC      Culture Results:   No growth to date. ( @ 14:43)  Culture Results:   No growth to date. ( @ 14:43)        A/P  81F s/p ex lap, sigmoid rxn, abthera placement , RTOR for completion of hartmanns procedure and closure of abdominal wall , IR  abscess aspiration 1/3, trach , PEG .   CT  shows improving fluid collections. R BABAK removed .  US  shows Partially occlusive thrombus of the right common femoral vein.  With active GI bleeding in gastric antrum related to ulcer vs gastritis overnight. tranfused 2u PRBC, protamine  +UTI  - fevers possibly from UTI, f/u ID input  - cont zosyn  - PPI, carafate, hold lovenox in setting of active bleed  - EGD per GI on Monday  - wound vac per PT  - BABAK to suction, monitor output  - monitor labs  - continue medical management and supportive care  - discussed with Dr. Castro

## 2022-01-29 NOTE — PROGRESS NOTE ADULT - SUBJECTIVE AND OBJECTIVE BOX
JUAREZ ECHOLSNIS    LVS 1B 122 D    Allergies    No Known Allergies    Intolerances        PAST MEDICAL & SURGICAL HISTORY:  Seizure    HTN (hypertension)    Glaucoma    Thyroid disease    Blood clot of neck vein  left side    TIA (transient ischemic attack)  20 years ago    S/P appendectomy        FAMILY HISTORY:  FH: HTN (hypertension)        Home Medications:  amLODIPine 10 mg oral tablet: 1 tab(s) orally once a day (22 Dec 2021 16:46)  aspirin 81 mg oral tablet, chewable: 1 tab(s) orally once a day (22 Dec 2021 16:46)  escitalopram 20 mg oral tablet: 1 tab(s) orally once a day (22 Dec 2021 16:46)  keppera:  (22 Dec 2021 16:46)  levETIRAcetam 500 mg oral tablet: 1 tab(s) orally 2 times a day (22 Dec 2021 16:46)  timolol-dorzolamide 0.5%-2% preservative-free ophthalmic solution: 1 drop(s) to each affected eye 2 times a day (22 Dec 2021 16:46)  Zetia 10 mg oral tablet: 1 tab(s) orally once a day (22 Dec 2021 16:46)      MEDICATIONS  (STANDING):  amLODIPine   Tablet 10 milliGRAM(s) Oral daily  carvedilol 3.125 milliGRAM(s) Oral every 12 hours  chlorhexidine 0.12% Liquid 15 milliLiter(s) Oral Mucosa every 12 hours  chlorhexidine 2% Cloths 1 Application(s) Topical <User Schedule>  dextrose 40% Gel 15 Gram(s) Oral once  dextrose 5%. 1000 milliLiter(s) (50 mL/Hr) IV Continuous <Continuous>  dextrose 5%. 1000 milliLiter(s) (100 mL/Hr) IV Continuous <Continuous>  dextrose 50% Injectable 25 Gram(s) IV Push once  dextrose 50% Injectable 12.5 Gram(s) IV Push once  dextrose 50% Injectable 25 Gram(s) IV Push once  dorzolamide 2%/timolol 0.5% Ophthalmic Solution 1 Drop(s) Both EYES two times a day  escitalopram 20 milliGRAM(s) Oral daily  glucagon  Injectable 1 milliGRAM(s) IntraMuscular once  levETIRAcetam  IVPB 500 milliGRAM(s) IV Intermittent every 12 hours  levothyroxine Injectable 37.5 MICROGram(s) IV Push at bedtime  pantoprazole  Injectable 40 milliGRAM(s) IV Push every 12 hours  piperacillin/tazobactam IVPB.. 3.375 Gram(s) IV Intermittent every 8 hours  sucralfate 1 Gram(s) Oral four times a day    MEDICATIONS  (PRN):  acetaminophen     Tablet .. 650 milliGRAM(s) Oral every 6 hours PRN Temp greater or equal to 38C (100.4F)  sodium chloride 0.9% lock flush 10 milliLiter(s) IV Push every 1 hour PRN Pre/post blood products, medications, blood draw, and to maintain line patency      Diet, NPO (22 @ 00:07) [Active]          Vital Signs Last 24 Hrs  T(C): 38.4 (2022 05:49), Max: 39 (2022 17:28)  T(F): 101.2 (2022 05:49), Max: 102.2 (2022 17:28)  HR: 88 (2022 07:28) (52 - 91)  BP: 131/74 (2022 05:49) (126/67 - 131/74)  BP(mean): --  RR: 18 (2022 05:49) (18 - 19)  SpO2: 98% (2022 07:28) (98% - 100%)      22 @ 07:01  -  22 @ 07:00  --------------------------------------------------------  IN: 671 mL / OUT: 36 mL / NET: 635 mL        Mode: AC/ CMV (Assist Control/ Continuous Mandatory Ventilation), RR (machine): 15, TV (machine): 450, FiO2: 30, PEEP: 5, ITime: 0.9, MAP: 8, PIP: 17      LABS:                        8.6    27.98 )-----------( 294      ( 2022 06:33 )             27.4         146<H>  |  115<H>  |  42<H>  ----------------------------<  126<H>  4.0   |  24  |  0.76    Ca    8.5      2022 06:33  Phos  3.5       Mg     3.0           PT/INR - ( 2022 06:33 )   PT: 13.9 sec;   INR: 1.21 ratio         PTT - ( 2022 06:33 )  PTT:29.7 sec  Urinalysis Basic - ( 2022 14:23 )    Color: Yellow / Appearance: very cloudy / S.015 / pH: x  Gluc: x / Ketone: Negative  / Bili: Negative / Urobili: Negative mg/dL   Blood: x / Protein: 100 mg/dL / Nitrite: Negative   Leuk Esterase: Moderate / RBC: 11-25 /HPF / WBC >50   Sq Epi: x / Non Sq Epi: Few / Bacteria: TNTC            WBC:  WBC Count: 27.98 K/uL ( @ 06:33)  WBC Count: 24.37 K/uL ( @ 17:18)  WBC Count: 19.00 K/uL ( @ 07:07)  WBC Count: 16.32 K/uL ( @ 19:58)  WBC Count: 13.61 K/uL ( @ 07:45)  WBC Count: 14.12 K/uL ( @ 08:55)      MICROBIOLOGY:  RECENT CULTURES:   .Blood Blood-Peripheral XXXX XXXX   No growth to date.                PT/INR - ( 2022 06:33 )   PT: 13.9 sec;   INR: 1.21 ratio         PTT - ( 2022 06:33 )  PTT:29.7 sec    Sodium:  Sodium, Serum: 146 mmol/L ( @ 06:33)  Sodium, Serum: 147 mmol/L ( @ 07:07)  Sodium, Serum: 148 mmol/L ( @ 07:45)  Sodium, Serum: 147 mmol/L ( @ 08:55)      0.76 mg/dL  @ 06:33  0.86 mg/dL  @ 07:07  0.45 mg/dL  @ 07:45  0.50 mg/dL  @ 08:55      Hemoglobin:  Hemoglobin: 8.6 g/dL ( @ 06:33)  Hemoglobin: 9.6 g/dL ( @ 17:18)  Hemoglobin: 8.7 g/dL ( @ 07:07)  Hemoglobin: 7.7 g/dL ( @ 19:58)  Hemoglobin: 9.7 g/dL ( @ 07:45)  Hemoglobin: 9.0 g/dL ( @ 08:55)      Platelets: Platelet Count - Automated: 294 K/uL ( @ 06:33)  Platelet Count - Automated: 325 K/uL ( @ 17:18)  Platelet Count - Automated: CLUMPED K/uL ( @ 07:07)  Platelet Count - Automated: 412 K/uL ( @ 19:58)  Platelet Count - Automated: 362 K/uL ( @ 07:45)  Platelet Count - Automated: 320 K/uL ( @ 08:55)          Urinalysis Basic - ( 2022 14:23 )    Color: Yellow / Appearance: very cloudy / S.015 / pH: x  Gluc: x / Ketone: Negative  / Bili: Negative / Urobili: Negative mg/dL   Blood: x / Protein: 100 mg/dL / Nitrite: Negative   Leuk Esterase: Moderate / RBC: 11-25 /HPF / WBC >50   Sq Epi: x / Non Sq Epi: Few / Bacteria: TNTC        RADIOLOGY & ADDITIONAL STUDIES:      MICROBIOLOGY:  RECENT CULTURES:   .Blood Blood-Peripheral XXXX XXXX   No growth to date.

## 2022-01-29 NOTE — PROGRESS NOTE ADULT - ASSESSMENT
81F with a PMH of HTN, HLD, hypothyroidism, depression, seizures p/w abd pain, found to have acute diverticulitis . acute metabolic encephalopathy sec to above Perforated sigmoid diverticulitis. 12/24/21 - Exploratory laparotomy and a sigmoid colectomy and abdominal washout abd at that time left open. Transferred to ICU for post op care on mechanical ventilation. 10mg IV lopressor in OR for Afib with RVR. 5L IVF given intra-op. EBL 100cc. Received intubated, sedated, on phenylephrine.  Patient went for abdominal wound closure and creation of colostomy on 12/26. Patient developed adb abscess 1/3 s/p IR drainage of abdominal abscesses. Initially patient on PPN for nutrition Now tolerating tube feeds with brown soft stool in colostomy.. Found to have multiple abdominal fluid collections s/p IR drainage of R abdominal abscess 1/3/22 growing e-coli and bacteroides Sensitive to ceftriaxone, continue antibiotics to ceftriaxone and flagyl per ID.   Patient was unable to wean and son consented to trach.  -Now out of shock state not on vasopressor support.  Post op a-fib RVR, s/p amio load converted to sinus rhythm.  Patient with intermittent fevers. S/P PEG and trach. Pain management on fentanyl patch.      Perforated Acute diverticulitis   s/p 12/24/21 - Exploratory laparotomy and a sigmoid colectomy and abdominal washout  Patient went for abdominal wound closure and creation of colostomy on 12/26.  Patient developed adb abscess 1/3 s/p IR drainage of abdominal abscesses.  Initially patient on PPN for nutrition Now tolerating tube feeds with brown soft stool in colostomy.  Found to have multiple abdominal fluid collections s/p IR drainage of R abdominal abscess 1/3/22 growing e-coli and bacteroides  S/P Peg placement  all abx dced as per iD recs, now febrile again  Repeat CT noted   BABAK drain care, wound vac in place   PEG and ostomy with active bleeding 11/28: lovenox stopped: given reversal ordered two units of prbc    plan:  npo  active type and cross  2 units of blood to be given  GI and Surgery consulted  maintain npo for endoscopy monday    Septic Shock s/p  pressors  Transferred to ICU for post op care on mechanical ventilation.  now off pressors,  failed extubation, trach to cpap  completed ceftriaxone and flagyl per ID. trial PO vanc for diarrhea. now off all abx  Febrile again, follow CLX, neg so far, covid PCR neg  CT A/p noted, ab collections somewhat improved, Multiple focal opacification in RL, Repeat CXR looks unchanged,.  persistent fevers,WBC increased , Re consulted ID  + right sided acute dvt  consulted with IR: Midline exchanged 11/28, collection in pelvis too small to drain  blood cultures on 1/27 negative  u/a positive on the 28th  plan:  start IV zosyn    Acute blood anemia due to hemorrhagic gastritis worsened by anticoagulation  s/p 2 units of prbc  for endoscopy on monday      Rapid AFIB due to Shock state  s/p amio load converted to sinus rhythm.  not on rate control or AC on downgrade, now on AC also for DVT  Cards consulted  added BB  HOLD AC      Hypoxemic respiratory failure now trach to CPAP  now stable on trach to CPAP  Pulm following  on fentayl patch for pain control    acute metabolic encephalopathy sec to above  tabl      Right lower extremity Acute DVT:  -off a/c due to Gi bleed  -consulted IR for IVC      plan: temperature today; 102.. N active bleeding noted in peg. black stool in ostomy  for endoscopy on monday  reached to out to vascular : referred to IR. IR consulted for Peg

## 2022-01-29 NOTE — PROGRESS NOTE ADULT - ASSESSMENT
81F with a PMH of HTN, HLD, hypothyroidism, depression, seizures who presents to the ED for abd pain found to have diverticulitis   has rising leukocytosis   had fever yesterday   tachycardic and now hypoxic   CXR (I personally reviewed) low lung volumes   origincal CT (I personally reviewed) with diverticulitis   she had a lot of pain in the abdomen on exam     12/25: s/p surgery for diverticulitis with perforation and abscess and went to the OR. intubated in ICU with vac and needs to go back to the OR, currently on zosyn, s/p one dose of diflucan last night   12/27: s/p repair and ostomy creation yesterday, wbc elevated, cultures sensitive to zosyn and candida albicans is notoriously sensitive to azoles such as fluconazole, wean of pressors and sedation, extubate when ready   12/28: Further increase in white blood cell count but overall the patient appears to be reasonably stable.  No concern of C. difficile at this juncture.  Current antibiotics are reasonable.  Leukocytosis like related to recent surgery, no concern of other superimposed process on the basis of exam.  I do not see a role to alter her antimicrobials.  12/29:  Remains intubated in ICU. still quite edematous and net positive, WBC climbing, small wound dehiscence at bottom of incision, plan for initiation of TPN today, remains on antibiotics    12/30: rising WBC, ostomy not functioning yet, very edematous CT today, may be source control issue so for now can continue same antibiotics and antifungal but may need to change if findings on the CT are worrisome or change in hemodynamics  12/31:Leukocytosis, with collections noted on CT.  However this is being done postoperative day 5, there is some possibility that this could represent postop changes but given the leukocytosis, concur with plans for attempts at percutaneous drainage.  White blood cell count down slightly compared to prior peak, will need to be trended.  Gallbladder is also distended.  Abdominal exam was benign this morning, but a calculus cholecystitis could be the differential diagnosis here as well (though n.p.o. status certainly could explain distended gallbladder, there is also report of gallbladder wall thickening).  Would modify antibiotics based on cultures from drainage, I do not believe that antibiotics and other the cells would be adequate here.  Fortunately she is off pressors, with preserved renal function, and tolerating pressure support.  1/1/22: Postop day 6.  I have some concerns with the appearance of the ostomy, though the abdominal exam overall is otherwise unchanged.  White blood cell count remains elevated, but is lower compared with prior values.  This is despite no change in antibiotic therapy.  Patient also has asymmetric edema of the upper extremities, right greater than left.  Low threshold for duplex ultrasound to exclude DVT.No new surveillance culture data.  1/2: POD7 Ostomy looks better today, UE symmetric. WBC elevated, some slight downward trend overall. Temps <= 100F.   1/3: drainage of fluid collection today, continue zosyn and fluconazole for now, monitor wbc and temps, watch ostomy output, diuresis and address third spacing   1/4: s/p drainage yesterday now growing ecoli and awaiting for sensitivities, wbc is improving, started on tube feeds    1/5: abscess growing ecoli and bacteroides, S to ceftriaxone, wbc 15, weaning trial today, pain management    1/6: awake, lung exercise today, trach planned for tomorrow, ostomy output is good. will stop fluconazole today and change antibiotics to ceftriaxone and flagly. Unclear stop date yet    1/7 trach today, continue antibiotics   1/9: s/p trach, had low grade fever but otherwise doing ok, will continues same antibiotics regimen for now but if fevers continue she will needs to be rescanned as those abscesses can progress.  1/10: low grade fever but doing well on trach-PS, ostomy with stool and gas, shakes head when asked if in pain, discussed with surgery about repeating CT scan given the temp  1/11: Still febrile, favor interval CT as above.  1/12: CT of abdomen showing some signs of improvement and elsewhere  it shows signs of worsening. wbc normal, tube feeds currently via NGT   1/13: no fever, no wbc, Cr and LFTs ok, Ceftriaxone and flagyl continued. No planned IR intervention at this time - fluid collection without access to drain, surgical drain within the collection. Pt possibly will have repeat imaging over the weekend to evaluate progress.   Attending addendum:  agree with above  continue antibiotics   vent weaning  surgical follow up for the abdominal wound  1/14: Low grade temp last night, midline placed, no leukocytosis, Cr and LFTs ok, culture data reviewed. Pt's colostomy with small amount of liquid stool and gas, no stools recorded for two days, no role for po Vancomycin at this time. The pt has been on abx since her admission, will stop all abx and will continue to monitor.    1/18: no fevers since 1/14, WBC better 11.00, cr ok, off abx, now s/p EGD, with PEG replacement.   1/24: spiking low grade fevers, low suspicion for pneumonia given little secretions and doing well on the PS wth low settings, peg site looks clean, trach site OK as well, fevers may be from small fluid collections intra-abdominally vs atelectasis Would suggest aggressive pulmonary toilet including chest vest. follow cultures and trend wbc and fever curve . If persistent fevers would start  Zosyn and PO fluconazole but for now please hold antibiotics   1/25: continues having fevers, low grade temps today, WBC better 18.35, Cr and LFTs ok, + new DVT of right common femoral vein - could be the cause of pt's fevers - on full dose Lovenox. Will continue to monitor off abx, WBC got better without abx.   Attending addendum:  patient with long hospital course and now on the floors with fever and found to have a DVT  wbc came down without antibiotics   fever curve is getting better   continue to monitor off antibiotics   treat DVT with full dose AC   suspect fevers could be from dvt but may also be from fluid collection    1/26: low grade temp today, pt is more awake and alert today, WBC improving 14.2 off abx, Cr and LFTs ok, abd wound with clean base and healing well - vac dressing was changed today during my exam. In favor to continue to monitor off abx for now.   1/27: Pt continues spiking fevers, low grade last night and 101 today, awake and alert during my exam, WBC improving off abx 13.61, recent chest xray with no acute lung disease, COVID 19 is negative. Pt's fevers most likely related to her DVT, will collect two sets of surveillance BC and will continue to monitor off abx for now.   1/28: CTA A/P done last night - There is evidence of active GI bleeding in the gastric antrum, related to peptic ulcer disease or gastritis, possible cystitis. Blood transfusion is in progress during my exam, wound vac is being changed - base of the wound is clean with no evidence of acute infection. Pt had a low grade temp last night, WBC elevated - could be reactive to DVT. Will obtain UA and UC, will hold off on abx for now.   1/29: persistent fevers, received a dose of vanco and a dose of Azactam last evening, WBC high 27.98, BC with no growth to date, UA positive, UC pending. Will start on broad spectrum antibiotic now as unclear it is unclear at this time if fevers caused by UTI only, or in combination with DVT, or other source. Pt's abd was seen with last vac dressing change - incision site wound  is clean and healing well. JPis still draining cloudy drainage.     #Perforated diverticulum of large intestine.   ·  Plan:   start Zosyn IV  s/p one time dose of Vancomycin and a dose of Amikacin yesterday   continue wound vac       #Fever.   ·  Plan:   start Zosyn  trend pt's temperatures   awaiting for UC  +DVT of right common femoral vein   if continues spiking fevers despite abx, consider CT a/p to evaluate for collection/hematoma  BC NGTD    #Acute Respiratory Failure  wean oxygen as tolerated  chest PT  frequent suctioning   chest vest   HOB >30 degrees    #DVT  AC as per protocol    Discussed with Dr. Andrea

## 2022-01-29 NOTE — PROGRESS NOTE ADULT - ASSESSMENT
TRESA PIZARRO 81 f [birth year]2021 1942 DR ANTONIO PATTON     REVIEW OF SYMPTOMS      Able to give (reliable) ROS  NO     PHYSICAL EXAM    HEENT Unremarkable  atraumatic   RESP Fair air entry EXP prolonged    Harsh breath sound Resp distres mild   CARDIAC S1 S2 No S3     NO JVD    ABDOMEN SOFT BS PRESENT NOT DISTENDED No hepatosplenomegaly   PEDAL EDEMA present No calf tenderness  NO rash     ______  DOA/CC.  12/22/2019 81F w/ HTN, HLD, hypothyroidism, depression, seizures. Presented w/ abdominal pain found to have acute sigmoid diverticulitis    PMH.  pmh Hytn  pmh Sz.    PROBLEMS.  Feculent peritonitis poa 12/22  Colostomy 12/26  Initial abio course competed 1/15  R LEFTY removd 1/22/2022  DVT 1/25/2022 r cfv fd lvnx startd   Drop Hb 1/27 Lvnx dced  GI bl peg and ostomy 1/28  NPO 1/28  Zosyn 1/29     MISC ISSUES.  COVID STATUS. 12/24 pcr (-)  ICU STAY. 12/22-1/12/2022   GOC.  1/13/2022 full code       BEST PRACTICE ISSUES.                                                  HEAD OF BED ELEVATION. Yes  DVT PROPHYLAXIS.     1/28/2022 lvnx dced because of active gi bleed    1/25/2022 lvnx 80.2   1/25/2022 V duplx partially occlusive thrombus r cfv ac to subacute   1/7 lvnx 40   LIGHT PROPHYLAXIS.    1/11 protonix 40                                                                                     DIET.    1/28/2022 npo   1/20/2022 jevity 1.5 1200  1/18/2022 npo  1/7 vital af 1200 ngt          INFECTION PROPHYLAXIS.   1/7 Chlorhexidine .12%   1/7 chlorhexidine 2%    SPEECH SWALLOW RECOMMENDATIONS.       PATIENT DATA   VITALS/PO/IO/VENT/DRIPS.  1/29/2022 100f 73 100/40   1/29/2022 ac 15/450/5/.3        ASSESSMENT/RECOMMENDATIONS.    HEMODYNAMICS.   Monitor bp Target MAP 65 (+)    RESP.   Monitor po Target po 90-95%  1/14/2022 15/450/.4/5 747/38/110     DVT  1/25/2022 V duplx partially occlusive thrombus r cfv ac to subacute   1/25 lvnx 80.2   1/28/2022 lvnx 80.2 dced by PA as Hb had dropped requiring transfusion  1/28/2022 RECOMMEND Vasc surgery eval for ivcf asap     FEVER 1/28/2022.  Abdominal infection sec perforated sigmoid div feculent peritonitis poa 12/22.  Pt was taken off abio 1/15  1/28/2021 She has been developing progressive leukocytosis and low gd fever   w 1/27-1/28 -  1/29 [id number]  1/21 ctap showed decreased collections cw 1/11 1/29/2022 zosyn started by id    SP ABD SURGERY.  81F admitted with Acute Sigmoid Diverticulitis s/p ex lap, sigmoid resection, peritoneal lavage 12/25 and RTOR 12/26 for irrigation of abdominal cavity with closure and creation of colostomy. S/p bedside IR aspiration of fluid collection 1/3. Tracheostomy 1/7.   Deep pelvic abscess noted on CT,  Local drain/ostomy care per nursing  1/22/2022 r lefty removd         ANEMIA.  Hb 1/27-1/27-1/28-1/28-1/29 Hb 9.7-7.7-8.7-9.6 - 8.9  1/28/2022 lvnx 80.2 dced     TRANSFUSION.  1/28/2022 2u prbc       GT placed 1/18    GI BLEED   11/28 peg and ostomy with active bleed  1/29/2022 egd planned for 1/31    Hypernatremia  Na 1/26-1/27-1/28/2022 Na 147-148 -147     IV Fluids.  1/29/2022 Pt is npo   1/29/2022 Recommend iv maintenance fluid      TIME SPENT   Over 25 minutes aggregate care time spent on encounter; activities included   direct patient care, counseling and/or coordinating care reviewing notes, lab data/ imaging , discussion with multidisciplinary team/ patient  /family and explaining in detail risks, benefits, alternatives  of the recommendations     TRESA PIZARRO 81 f 12/[birth year]2021 1940 DR ANTONIO PATTON

## 2022-01-29 NOTE — PROGRESS NOTE ADULT - SUBJECTIVE AND OBJECTIVE BOX
JUAREZ TGZ  MRN-[id number]    Follow Up:  fevers, ?uti    Interval History: The pt was seen and examined earlier, vented via tracheostomy, lethargic. The pt continues spiking fevers, WBC is high 27.98.    PAST MEDICAL & SURGICAL HISTORY:  Seizure    HTN (hypertension)    Glaucoma    Thyroid disease    Blood clot of neck vein  left side    TIA (transient ischemic attack)  20 years ago    S/P appendectomy        ROS:    [x ] Unobtainable because: pt is lethargic, vented   [ ] All other systems negative    Constitutional: no fever, no chills  Head: no trauma  Eyes: no vision changes, no eye pain  ENT:  no sore throat, no rhinorrhea  Cardiovascular:  no chest pain, no palpitation  Respiratory:  no SOB, no cough  GI:  no abd pain, no vomiting, no diarrhea  urinary: no dysuria, no hematuria, no flank pain  musculoskeletal:  no joint pain, no joint swelling  skin:  no rash  neurology:  no headache, no seizure, no change in mental status  psych: no anxiety, no depression         Allergies  No Known Allergies        ANTIMICROBIALS:  piperacillin/tazobactam IVPB.. 3.375 every 8 hours      OTHER MEDS:  acetaminophen     Tablet .. 650 milliGRAM(s) Oral every 6 hours PRN  amLODIPine   Tablet 10 milliGRAM(s) Oral daily  carvedilol 3.125 milliGRAM(s) Oral every 12 hours  chlorhexidine 0.12% Liquid 15 milliLiter(s) Oral Mucosa every 12 hours  chlorhexidine 2% Cloths 1 Application(s) Topical <User Schedule>  dextrose 40% Gel 15 Gram(s) Oral once  dextrose 5%. 1000 milliLiter(s) IV Continuous <Continuous>  dextrose 5%. 1000 milliLiter(s) IV Continuous <Continuous>  dextrose 50% Injectable 25 Gram(s) IV Push once  dextrose 50% Injectable 12.5 Gram(s) IV Push once  dextrose 50% Injectable 25 Gram(s) IV Push once  dorzolamide 2%/timolol 0.5% Ophthalmic Solution 1 Drop(s) Both EYES two times a day  escitalopram 20 milliGRAM(s) Oral daily  glucagon  Injectable 1 milliGRAM(s) IntraMuscular once  levETIRAcetam  IVPB 500 milliGRAM(s) IV Intermittent every 12 hours  levothyroxine Injectable 37.5 MICROGram(s) IV Push at bedtime  pantoprazole  Injectable 40 milliGRAM(s) IV Push every 12 hours  sodium chloride 0.9% lock flush 10 milliLiter(s) IV Push every 1 hour PRN  sucralfate 1 Gram(s) Oral four times a day      Vital Signs Last 24 Hrs  T(C): 38.3 (2022 16:32), Max: 39.2 (2022 12:00)  T(F): 100.9 (2022 16:32), Max: 102.5 (2022 12:00)  HR: 73 (2022 16:32) (73 - 91)  BP: 109/49 (2022 16:32) (109/49 - 131/74)  BP(mean): --  RR: 18 (2022 12:00) (18 - 18)  SpO2: 100% (2022 16:32) (98% - 100%)    Physical Exam:  General: Nontoxic-appearing Female in no acute distress. Trached on vent, lethargic   HEENT: AT/NC. Unable to assess pt's mouth or eyes due to non-compliance   Neck: Not rigid. No sense of mass. Trach C/D/I  Nodes: None palpable.  Lungs: diminished breath sounds bilaterally   Heart: Regular rate and rhythm. +Murmur. No rub. No gallop. No palpable thrill.  Abdomen: Bowel sounds now present.  Soft. Mildly distended.  Abd incision with vac dressing.  Ostomy with stool and gas. Drains x1 with cloudy drainage, there is a PEG tube C/D/I   Extremities: No cyanosis or clubbing. 1+ edema of bilateral lower extremities   Skin: Warm. Dry No rash. No vasculitic stigmata.  Neuro: unable to assess   Psychiatric: unable to assess    WBC Count: 27.98 K/uL ( @ 06:33)  WBC Count: 24.37 K/uL ( @ 17:18)  WBC Count: 19.00 K/uL ( @ 07:07)  WBC Count: 16.32 K/uL ( @ 19:58)  WBC Count: 13.61 K/uL ( @ 07:45)  WBC Count: 14.12 K/uL ( @ 08:55)  WBC Count: 18.35 K/uL ( @ 06:35)                            8.6    27.98 )-----------( 294      ( 2022 06:33 )             27.4           146<H>  |  115<H>  |  42<H>  ----------------------------<  126<H>  4.0   |  24  |  0.76    Ca    8.5      2022 06:33  Phos  3.5       Mg     3.0             Urinalysis Basic - ( 2022 14:23 )    Color: Yellow / Appearance: very cloudy / S.015 / pH: x  Gluc: x / Ketone: Negative  / Bili: Negative / Urobili: Negative mg/dL   Blood: x / Protein: 100 mg/dL / Nitrite: Negative   Leuk Esterase: Moderate / RBC: 11-25 /HPF / WBC >50   Sq Epi: x / Non Sq Epi: Few / Bacteria: TNTC        Creatinine Trend: 0.76<--, 0.86<--, 0.45<--, 0.50<--, 0.57<--, 0.56<--      MICROBIOLOGY:  v  .Blood Blood-Peripheral  22   No growth to date.  --  --      .Blood Blood-Peripheral  22   No Growth Final  --  --      Clean Catch Clean Catch (Midstream)  22   <10,000 CFU/mL Normal Urogenital Sendy  --  --      .Body Fluid Abdominal Fluid  22   Few Escherichia coli  Few Bacteroides ovatus group "Susceptibilities not performed"  --  Escherichia coli      Procalcitonin, Serum: 0.06 (22 @ 10:30)    SARS-CoV-2 Result: NotDetec (22 @ 11:43)    SARS-CoV-2: NotDetec (2022 18:59)  SARS-CoV-2: NotDetec (24 Dec 2021 14:38)  SARS-CoV-2: NotDetec (24 Dec 2021 00:03)    RADIOLOGY:

## 2022-01-29 NOTE — PROGRESS NOTE ADULT - SUBJECTIVE AND OBJECTIVE BOX
Pt was seen and examined at bedside  acute overnight events noted:   febrile this am to 102.1  sleepy today  npo , off a/c. no active bleeding noted   s/p 2 units of prbc  this am: patient noted with black stool in ostomy and wound vac  labs reviewed : white count 27  patient started on IV zosyn  u/a positive  blood cultures on the 27th negative  +trach  to vent  + peg. + ostomy + left sided lefty drain in place    Review of Systems:  unable to obtain today patietn sleeping  Objective:  Vitals  T(C): 38.3 (01-26-22 @ 11:21), Max: 38.3 (01-26-22 @ 11:21)  HR: 73 (01-26-22 @ 11:36) (65 - 87)  BP: 178/75 (01-26-22 @ 11:21) (156/71 - 184/70)  RR: 18 (01-26-22 @ 05:31) (18 - 19)  SpO2: 98% (01-26-22 @ 11:36) (97% - 100%)    Physical Exam:  General: comfortable, no acute distress, well nourished  HEENT: Atraumatic, no LAD, trachea midline, PERRLA  Cardiovascular: normal s1s2, no murmurs, gallops or fricition rubs  Pulmonary: diminished, no wheezing , rhonchi, + trach  Gastrointestinal: soft non tender non distended, no masses felt, no organomegally + 0stomy , + left lefty drain++ wound vac  Muscloskeletal: no lower extremity edema, intact bilateral lower extremity pulses  Neurological: CN II-12 intact. No focal weakness  Psychiatrical: normal mood, cooperative  SKIN: no rash, lesions or ulcers      Labs:                          9.7    13.61 )-----------( 362      ( 27 Jan 2022 07:45 )             32.1     01-27    148<H>  |  115<H>  |  19  ----------------------------<  115<H>  3.4<L>   |  29  |  0.45<L>    Ca    8.6      27 Jan 2022 07:45  Phos  2.7     01-27  Mg     2.3     01-27    TPro  5.8<L>  /  Alb  1.9<L>  /  TBili  0.1<L>  /  DBili  x   /  AST  25  /  ALT  28  /  AlkPhos  72  01-26    LIVER FUNCTIONS - ( 26 Jan 2022 08:55 )  Alb: 1.9 g/dL / Pro: 5.8 gm/dL / ALK PHOS: 72 U/L / ALT: 28 U/L / AST: 25 U/L / GGT: x                 Active Medications  MEDICATIONS  (STANDING):  amLODIPine   Tablet 10 milliGRAM(s) Oral daily  carvedilol 3.125 milliGRAM(s) Oral every 12 hours  chlorhexidine 0.12% Liquid 15 milliLiter(s) Oral Mucosa every 12 hours  chlorhexidine 2% Cloths 1 Application(s) Topical <User Schedule>  dextrose 40% Gel 15 Gram(s) Oral once  dextrose 5%. 1000 milliLiter(s) (50 mL/Hr) IV Continuous <Continuous>  dextrose 5%. 1000 milliLiter(s) (100 mL/Hr) IV Continuous <Continuous>  dextrose 50% Injectable 25 Gram(s) IV Push once  dextrose 50% Injectable 12.5 Gram(s) IV Push once  dextrose 50% Injectable 25 Gram(s) IV Push once  dorzolamide 2%/timolol 0.5% Ophthalmic Solution 1 Drop(s) Both EYES two times a day  enoxaparin Injectable 80 milliGRAM(s) SubCutaneous every 12 hours  escitalopram 20 milliGRAM(s) Oral daily  glucagon  Injectable 1 milliGRAM(s) IntraMuscular once  levETIRAcetam  Solution 500 milliGRAM(s) Enteral Tube two times a day  levothyroxine 50 MICROGram(s) Oral daily  pantoprazole   Suspension 40 milliGRAM(s) Enteral Tube daily  potassium chloride    Tablet ER 40 milliEquivalent(s) Oral once    MEDICATIONS  (PRN):  acetaminophen     Tablet .. 650 milliGRAM(s) Oral every 6 hours PRN Temp greater or equal to 38C (100.4F)  sodium chloride 0.9% lock flush 10 milliLiter(s) IV Push every 1 hour PRN Pre/post blood products, medications, blood draw, and to maintain line patency

## 2022-01-30 LAB
ANION GAP SERPL CALC-SCNC: 9 MMOL/L — SIGNIFICANT CHANGE UP (ref 5–17)
BASOPHILS # BLD AUTO: 0.03 K/UL — SIGNIFICANT CHANGE UP (ref 0–0.2)
BASOPHILS NFR BLD AUTO: 0.1 % — SIGNIFICANT CHANGE UP (ref 0–2)
BUN SERPL-MCNC: 30 MG/DL — HIGH (ref 7–23)
CALCIUM SERPL-MCNC: 8.5 MG/DL — SIGNIFICANT CHANGE UP (ref 8.5–10.1)
CHLORIDE SERPL-SCNC: 115 MMOL/L — HIGH (ref 96–108)
CO2 SERPL-SCNC: 24 MMOL/L — SIGNIFICANT CHANGE UP (ref 22–31)
CREAT SERPL-MCNC: 0.81 MG/DL — SIGNIFICANT CHANGE UP (ref 0.5–1.3)
CULTURE RESULTS: SIGNIFICANT CHANGE UP
EOSINOPHIL # BLD AUTO: 0.02 K/UL — SIGNIFICANT CHANGE UP (ref 0–0.5)
EOSINOPHIL NFR BLD AUTO: 0.1 % — SIGNIFICANT CHANGE UP (ref 0–6)
FLUAV AG NPH QL: SIGNIFICANT CHANGE UP
FLUBV AG NPH QL: SIGNIFICANT CHANGE UP
GLUCOSE BLDC GLUCOMTR-MCNC: 136 MG/DL — HIGH (ref 70–99)
GLUCOSE BLDC GLUCOMTR-MCNC: 152 MG/DL — HIGH (ref 70–99)
GLUCOSE BLDC GLUCOMTR-MCNC: 157 MG/DL — HIGH (ref 70–99)
GLUCOSE BLDC GLUCOMTR-MCNC: 167 MG/DL — HIGH (ref 70–99)
GLUCOSE BLDC GLUCOMTR-MCNC: 168 MG/DL — HIGH (ref 70–99)
GLUCOSE SERPL-MCNC: 149 MG/DL — HIGH (ref 70–99)
HCT VFR BLD CALC: 26.5 % — LOW (ref 34.5–45)
HGB BLD-MCNC: 8.2 G/DL — LOW (ref 11.5–15.5)
IMM GRANULOCYTES NFR BLD AUTO: 1.4 % — SIGNIFICANT CHANGE UP (ref 0–1.5)
LYMPHOCYTES # BLD AUTO: 0.93 K/UL — LOW (ref 1–3.3)
LYMPHOCYTES # BLD AUTO: 4.5 % — LOW (ref 13–44)
MAGNESIUM SERPL-MCNC: 2.4 MG/DL — SIGNIFICANT CHANGE UP (ref 1.6–2.6)
MCHC RBC-ENTMCNC: 27.7 PG — SIGNIFICANT CHANGE UP (ref 27–34)
MCHC RBC-ENTMCNC: 30.9 G/DL — LOW (ref 32–36)
MCV RBC AUTO: 89.5 FL — SIGNIFICANT CHANGE UP (ref 80–100)
MONOCYTES # BLD AUTO: 1.42 K/UL — HIGH (ref 0–0.9)
MONOCYTES NFR BLD AUTO: 6.8 % — SIGNIFICANT CHANGE UP (ref 2–14)
MRSA PCR RESULT.: SIGNIFICANT CHANGE UP
NEUTROPHILS # BLD AUTO: 18.1 K/UL — HIGH (ref 1.8–7.4)
NEUTROPHILS NFR BLD AUTO: 87.1 % — HIGH (ref 43–77)
NRBC # BLD: 0 /100 WBCS — SIGNIFICANT CHANGE UP (ref 0–0)
PHOSPHATE SERPL-MCNC: 3.2 MG/DL — SIGNIFICANT CHANGE UP (ref 2.5–4.5)
PLATELET # BLD AUTO: 288 K/UL — SIGNIFICANT CHANGE UP (ref 150–400)
POTASSIUM SERPL-MCNC: 2.9 MMOL/L — CRITICAL LOW (ref 3.5–5.3)
POTASSIUM SERPL-SCNC: 2.9 MMOL/L — CRITICAL LOW (ref 3.5–5.3)
RBC # BLD: 2.96 M/UL — LOW (ref 3.8–5.2)
RBC # FLD: 18 % — HIGH (ref 10.3–14.5)
S AUREUS DNA NOSE QL NAA+PROBE: DETECTED
SARS-COV-2 RNA SPEC QL NAA+PROBE: SIGNIFICANT CHANGE UP
SODIUM SERPL-SCNC: 148 MMOL/L — HIGH (ref 135–145)
SPECIMEN SOURCE: SIGNIFICANT CHANGE UP
WBC # BLD: 20.79 K/UL — HIGH (ref 3.8–10.5)
WBC # FLD AUTO: 20.79 K/UL — HIGH (ref 3.8–10.5)

## 2022-01-30 PROCEDURE — 71250 CT THORAX DX C-: CPT | Mod: 26

## 2022-01-30 PROCEDURE — 70450 CT HEAD/BRAIN W/O DYE: CPT | Mod: 26

## 2022-01-30 PROCEDURE — 99233 SBSQ HOSP IP/OBS HIGH 50: CPT

## 2022-01-30 PROCEDURE — 74176 CT ABD & PELVIS W/O CONTRAST: CPT | Mod: 26

## 2022-01-30 PROCEDURE — 99233 SBSQ HOSP IP/OBS HIGH 50: CPT | Mod: GC

## 2022-01-30 PROCEDURE — 99232 SBSQ HOSP IP/OBS MODERATE 35: CPT

## 2022-01-30 RX ORDER — PIPERACILLIN AND TAZOBACTAM 4; .5 G/20ML; G/20ML
3.38 INJECTION, POWDER, LYOPHILIZED, FOR SOLUTION INTRAVENOUS EVERY 8 HOURS
Refills: 0 | Status: DISCONTINUED | OUTPATIENT
Start: 2022-01-30 | End: 2022-02-06

## 2022-01-30 RX ORDER — POTASSIUM CHLORIDE 20 MEQ
10 PACKET (EA) ORAL
Refills: 0 | Status: COMPLETED | OUTPATIENT
Start: 2022-01-30 | End: 2022-01-30

## 2022-01-30 RX ORDER — ACETAMINOPHEN 500 MG
1000 TABLET ORAL ONCE
Refills: 0 | Status: COMPLETED | OUTPATIENT
Start: 2022-01-30 | End: 2022-01-30

## 2022-01-30 RX ORDER — POTASSIUM CHLORIDE 20 MEQ
40 PACKET (EA) ORAL EVERY 4 HOURS
Refills: 0 | Status: DISCONTINUED | OUTPATIENT
Start: 2022-01-30 | End: 2022-01-30

## 2022-01-30 RX ORDER — SODIUM CHLORIDE 9 MG/ML
1000 INJECTION, SOLUTION INTRAVENOUS
Refills: 0 | Status: DISCONTINUED | OUTPATIENT
Start: 2022-01-30 | End: 2022-01-31

## 2022-01-30 RX ORDER — ACETAMINOPHEN 500 MG
650 TABLET ORAL EVERY 6 HOURS
Refills: 0 | Status: DISCONTINUED | OUTPATIENT
Start: 2022-01-30 | End: 2022-02-11

## 2022-01-30 RX ADMIN — Medication 650 MILLIGRAM(S): at 13:40

## 2022-01-30 RX ADMIN — Medication 100 MILLIEQUIVALENT(S): at 08:59

## 2022-01-30 RX ADMIN — Medication 100 MILLIEQUIVALENT(S): at 09:53

## 2022-01-30 RX ADMIN — Medication 100 MILLIEQUIVALENT(S): at 11:43

## 2022-01-30 RX ADMIN — LEVETIRACETAM 420 MILLIGRAM(S): 250 TABLET, FILM COATED ORAL at 05:10

## 2022-01-30 RX ADMIN — PIPERACILLIN AND TAZOBACTAM 25 GRAM(S): 4; .5 INJECTION, POWDER, LYOPHILIZED, FOR SOLUTION INTRAVENOUS at 21:14

## 2022-01-30 RX ADMIN — Medication 650 MILLIGRAM(S): at 00:17

## 2022-01-30 RX ADMIN — DORZOLAMIDE HYDROCHLORIDE TIMOLOL MALEATE 1 DROP(S): 20; 5 SOLUTION/ DROPS OPHTHALMIC at 05:11

## 2022-01-30 RX ADMIN — PANTOPRAZOLE SODIUM 40 MILLIGRAM(S): 20 TABLET, DELAYED RELEASE ORAL at 18:06

## 2022-01-30 RX ADMIN — Medication 1 GRAM(S): at 18:06

## 2022-01-30 RX ADMIN — PIPERACILLIN AND TAZOBACTAM 25 GRAM(S): 4; .5 INJECTION, POWDER, LYOPHILIZED, FOR SOLUTION INTRAVENOUS at 05:10

## 2022-01-30 RX ADMIN — DORZOLAMIDE HYDROCHLORIDE TIMOLOL MALEATE 1 DROP(S): 20; 5 SOLUTION/ DROPS OPHTHALMIC at 18:06

## 2022-01-30 RX ADMIN — AMLODIPINE BESYLATE 10 MILLIGRAM(S): 2.5 TABLET ORAL at 05:09

## 2022-01-30 RX ADMIN — Medication 37.5 MICROGRAM(S): at 21:25

## 2022-01-30 RX ADMIN — CARVEDILOL PHOSPHATE 3.12 MILLIGRAM(S): 80 CAPSULE, EXTENDED RELEASE ORAL at 05:09

## 2022-01-30 RX ADMIN — Medication 400 MILLIGRAM(S): at 17:59

## 2022-01-30 RX ADMIN — PANTOPRAZOLE SODIUM 40 MILLIGRAM(S): 20 TABLET, DELAYED RELEASE ORAL at 05:10

## 2022-01-30 RX ADMIN — LEVETIRACETAM 420 MILLIGRAM(S): 250 TABLET, FILM COATED ORAL at 18:06

## 2022-01-30 RX ADMIN — CHLORHEXIDINE GLUCONATE 15 MILLILITER(S): 213 SOLUTION TOPICAL at 18:02

## 2022-01-30 RX ADMIN — Medication 650 MILLIGRAM(S): at 01:00

## 2022-01-30 RX ADMIN — CHLORHEXIDINE GLUCONATE 1 APPLICATION(S): 213 SOLUTION TOPICAL at 05:09

## 2022-01-30 RX ADMIN — SODIUM CHLORIDE 30 MILLILITER(S): 9 INJECTION, SOLUTION INTRAVENOUS at 09:09

## 2022-01-30 RX ADMIN — Medication 1 GRAM(S): at 05:09

## 2022-01-30 RX ADMIN — ESCITALOPRAM OXALATE 20 MILLIGRAM(S): 10 TABLET, FILM COATED ORAL at 11:46

## 2022-01-30 RX ADMIN — Medication 1000 MILLIGRAM(S): at 19:30

## 2022-01-30 RX ADMIN — CHLORHEXIDINE GLUCONATE 15 MILLILITER(S): 213 SOLUTION TOPICAL at 05:11

## 2022-01-30 RX ADMIN — PIPERACILLIN AND TAZOBACTAM 25 GRAM(S): 4; .5 INJECTION, POWDER, LYOPHILIZED, FOR SOLUTION INTRAVENOUS at 13:39

## 2022-01-30 RX ADMIN — Medication 650 MILLIGRAM(S): at 14:31

## 2022-01-30 RX ADMIN — Medication 1 GRAM(S): at 11:46

## 2022-01-30 RX ADMIN — CARVEDILOL PHOSPHATE 3.12 MILLIGRAM(S): 80 CAPSULE, EXTENDED RELEASE ORAL at 18:02

## 2022-01-30 NOTE — PROGRESS NOTE ADULT - ASSESSMENT
81F s/p ex lap, sigmoid rxn, abthera placement 12/25, RTOR for completion of hartmanns procedure and closure of abdominal wall 12/26, IR  abscess aspiration 1/3, trach 1/7, PEG 1/18.   CT 1/21 shows improving fluid collections. R BABAK removed 1/22.  US 1/25 shows Partially occlusive thrombus of the right common femoral vein.  With active GI bleeding in gastric antrum related to ulcer vs gastritis overnight. tranfused 2u PRBC, protamine  +UTI    - cont zosyn, f/u ID  - PPI, carafate, hold lovenox in setting of active bleed  - EGD per GI on Monday  - wound vac per PT  - BABAK to suction, monitor output  - monitor labs  - continue medical management and supportive care  - discussed with Dr. Castro

## 2022-01-30 NOTE — PROGRESS NOTE ADULT - ASSESSMENT
81F with a PMH of HTN, HLD, hypothyroidism, depression, seizures who presents to the ED for abd pain.  Patient states that over the last two days she had developed significant abdominal pain and multiple episodes of watery diarrhea.  Reports one episode of nausea and vomiting earlier today.  Patient has no other complaints.  Vitals stable,  labs show leukocytosis and hypokalemia.  CT abdomen significant for diverticulitis.  Will admit to med surg.     (23 Dec 2021 00:17)      Upper GI Bleed with CT Angio showing bleeding in gastric antrum s/p Protamine  1) Monitor Hb, transfuse to Hb >7    2) Continue NPO    3) PPI BID   4) Carafate   5) EGD Monday  6) Hold anticoagulants

## 2022-01-30 NOTE — PROVIDER CONTACT NOTE (CRITICAL VALUE NOTIFICATION) - TEST AND RESULT REPORTED:
Potassium 2.8
Potassium 2.9
CA 6.3 (lab did not caluclate true Ca based of pt albumin)
Potassium 2.7
calcium 6.4
serum potassium 2.9
serum potassium 2.9

## 2022-01-30 NOTE — PROVIDER CONTACT NOTE (CRITICAL VALUE NOTIFICATION) - NAME OF MD/NP/PA/DO NOTIFIED:
Sabrina WAGGONER
ROSALINE Barnes
Surgical PA/Marifer
DAVID denton
Jackie Bennett.
Olinda WAGGONER
britt Bishop

## 2022-01-30 NOTE — PROVIDER CONTACT NOTE (CRITICAL VALUE NOTIFICATION) - PERSON GIVING RESULT:
MS Drake from core lab
thanh
Katarina Parks/ maria antonia
Jere
Aristeo
Kate, maria antonia
Katarina BIRD)

## 2022-01-30 NOTE — PROGRESS NOTE ADULT - SUBJECTIVE AND OBJECTIVE BOX
Surgery NP note  Patient seen and examined bedside resting comfortably.  nonverbal  Febrile    T(F): 100.9 (01-30-22 @ 12:18), Max: 102.8 (01-30-22 @ 05:13)  HR: 74 (01-30-22 @ 12:18) (71 - 92)  BP: 118/69 (01-30-22 @ 12:18) (109/49 - 130/67)  RR: 18 (01-30-22 @ 12:18) (18 - 18)  SpO2: 99% (01-30-22 @ 12:18) (94% - 100%)  Wt(kg): --  CAPILLARY BLOOD GLUCOSE      POCT Blood Glucose.: 157 mg/dL (30 Jan 2022 11:30)  POCT Blood Glucose.: 168 mg/dL (30 Jan 2022 07:43)  POCT Blood Glucose.: 136 mg/dL (30 Jan 2022 05:10)  POCT Blood Glucose.: 140 mg/dL (29 Jan 2022 23:56)  POCT Blood Glucose.: 136 mg/dL (29 Jan 2022 15:57)      PHYSICAL EXAM:  General: NAD, WDWN.   Neuro:  Alert & responsive  HEENT: NCAT, EOMI, conjunctiva clear  CV: +S1+S2 regular rate and rhythm  Lung: clear to ausculation bilaterally, respirations nonlabored, good inspiratory effort  Abdomen: soft, Non tender, No Distension. Normal active BS.  No active or bright red blood noted at vac, PEG, or ostomy site. No bloody output from ostomy  Extremities: no pedal edema or calf tenderness noted     LABS:                        8.2    20.79 )-----------( 288      ( 30 Jan 2022 07:34 )             26.5     01-30    148<H>  |  115<H>  |  30<H>  ----------------------------<  149<H>  2.9<LL>   |  24  |  0.81    Ca    8.5      30 Jan 2022 07:34  Phos  3.2     01-30  Mg     2.4     01-30        PT/INR - ( 29 Jan 2022 06:33 )   PT: 13.9 sec;   INR: 1.21 ratio         PTT - ( 29 Jan 2022 06:33 )  PTT:29.7 sec  I&O's Detail    29 Jan 2022 07:01  -  30 Jan 2022 07:00  --------------------------------------------------------  IN:    IV PiggyBack: 50 mL    IV PiggyBack: 200 mL  Total IN: 250 mL    OUT:    Bulb (mL): 10 mL    Colostomy (mL): 20 mL    Oral Fluid: 0 mL  Total OUT: 30 mL    Total NET: 220 mL

## 2022-01-30 NOTE — PROGRESS NOTE ADULT - SUBJECTIVE AND OBJECTIVE BOX
JUAREZ GTZ  MRN-[id number]    Follow Up:  Fevers    Interval History: The pt was seen and examined earlier, no distress, remains lethargic, vented via tracheostomy. The pt continues having fevers, WBC better.     PAST MEDICAL & SURGICAL HISTORY:  Seizure    HTN (hypertension)    Glaucoma    Thyroid disease    Blood clot of neck vein  left side    TIA (transient ischemic attack)  20 years ago    S/P appendectomy        ROS:    [x ] Unobtainable because: lethargic, vented via tracheostomy  [ ] All other systems negative    Constitutional: no fever, no chills  Head: no trauma  Eyes: no vision changes, no eye pain  ENT:  no sore throat, no rhinorrhea  Cardiovascular:  no chest pain, no palpitation  Respiratory:  no SOB, no cough  GI:  no abd pain, no vomiting, no diarrhea  urinary: no dysuria, no hematuria, no flank pain  musculoskeletal:  no joint pain, no joint swelling  skin:  no rash  neurology:  no headache, no seizure, no change in mental status  psych: no anxiety, no depression         Allergies  No Known Allergies        ANTIMICROBIALS:  piperacillin/tazobactam IVPB.. 3.375 every 8 hours      OTHER MEDS:  acetaminophen    Suspension .. 650 milliGRAM(s) Oral every 6 hours PRN  amLODIPine   Tablet 10 milliGRAM(s) Oral daily  carvedilol 3.125 milliGRAM(s) Oral every 12 hours  chlorhexidine 0.12% Liquid 15 milliLiter(s) Oral Mucosa every 12 hours  chlorhexidine 2% Cloths 1 Application(s) Topical <User Schedule>  dextrose 40% Gel 15 Gram(s) Oral once  dextrose 5%. 1000 milliLiter(s) IV Continuous <Continuous>  dextrose 5%. 1000 milliLiter(s) IV Continuous <Continuous>  dextrose 5%. 1000 milliLiter(s) IV Continuous <Continuous>  dextrose 50% Injectable 25 Gram(s) IV Push once  dextrose 50% Injectable 12.5 Gram(s) IV Push once  dextrose 50% Injectable 25 Gram(s) IV Push once  dorzolamide 2%/timolol 0.5% Ophthalmic Solution 1 Drop(s) Both EYES two times a day  escitalopram 20 milliGRAM(s) Oral daily  glucagon  Injectable 1 milliGRAM(s) IntraMuscular once  levETIRAcetam  IVPB 500 milliGRAM(s) IV Intermittent every 12 hours  levothyroxine Injectable 37.5 MICROGram(s) IV Push at bedtime  pantoprazole  Injectable 40 milliGRAM(s) IV Push every 12 hours  sodium chloride 0.9% lock flush 10 milliLiter(s) IV Push every 1 hour PRN  sucralfate 1 Gram(s) Oral four times a day      Vital Signs Last 24 Hrs  T(C): 39.3 (2022 05:13), Max: 39.3 (2022 05:13)  T(F): 102.8 (2022 05:13), Max: 102.8 (2022 05:13)  HR: 92 (2022 10:42) (71 - 92)  BP: 130/67 (2022 05:13) (109/49 - 130/67)  BP(mean): --  RR: --  SpO2: 98% (2022 10:42) (94% - 100%)    Physical Exam:  General: Nontoxic-appearing Female in no acute distress. Trached on vent, lethargic   HEENT: AT/NC. Unable to assess pt's mouth or eyes due to non-compliance   Neck: Not rigid. No sense of mass. Trach C/D/I  Nodes: None palpable.  Lungs: diminished breath sounds bilaterally   Heart: Regular rate and rhythm. +Murmur. No rub. No gallop. No palpable thrill. Right arm midline c/d/i   Abdomen: Bowel sounds now present.  Soft. not distended.  Abd incision with vac dressing.  Ostomy with dark stool and gas. Drains x1 with cloudy drainage, there is a PEG tube C/D/I   Extremities: No cyanosis or clubbing. 1+ edema of bilateral lower extremities   Skin: Warm. Dry No rash. No vasculitic stigmata.  Neuro: unable to assess   Psychiatric: unable to assess    WBC Count: 20.79 K/uL ( @ 07:34)  WBC Count: 23.36 K/uL ( @ 18:09)  WBC Count: 27.98 K/uL ( @ 06:33)  WBC Count: 24.37 K/uL ( @ 17:18)  WBC Count: 19.00 K/uL ( @ 07:07)  WBC Count: 16.32 K/uL ( @ 19:58)  WBC Count: 13.61 K/uL ( @ 07:45)                            8.2    20.79 )-----------( 288      ( 2022 07:34 )             26.5           148<H>  |  115<H>  |  30<H>  ----------------------------<  149<H>  2.9<LL>   |  24  |  0.81    Ca    8.5      2022 07:34  Phos  3.2       Mg     2.4             Urinalysis Basic - ( 2022 14:23 )    Color: Yellow / Appearance: very cloudy / S.015 / pH: x  Gluc: x / Ketone: Negative  / Bili: Negative / Urobili: Negative mg/dL   Blood: x / Protein: 100 mg/dL / Nitrite: Negative   Leuk Esterase: Moderate / RBC: 11-25 /HPF / WBC >50   Sq Epi: x / Non Sq Epi: Few / Bacteria: TNTC        Creatinine Trend: 0.81<--, 0.76<--, 0.86<--, 0.45<--, 0.50<--, 0.57<--      MICROBIOLOGY:  v  .Blood Blood-Peripheral  22   No growth to date.  --  --      .Blood Blood-Peripheral  22   No Growth Final  --  --      Clean Catch Clean Catch (Midstream)  22   <10,000 CFU/mL Normal Urogenital Sendy  --  --      .Body Fluid Abdominal Fluid  22   Few Escherichia coli  Few Bacteroides ovatus group "Susceptibilities not performed"  --  Escherichia coli      Procalcitonin, Serum: 0.06 (22 @ 10:30)    SARS-CoV-2 Result: NotDetec (22 @ 10:40)  SARS-CoV-2 Result: NotDetec (22 @ 11:43)    SARS-CoV-2: NotDetec (2022 18:59)  SARS-CoV-2: NotDetec (24 Dec 2021 14:38)  SARS-CoV-2: NotDetec (24 Dec 2021 00:03)    RADIOLOGY:    < from: CT Head No Cont (22 @ 10:33) >    ACC: [id number] EXAM:  CT BRAIN                          PROCEDURE DATE:  2022          INTERPRETATION:  Clinical indication: Fever.    Multiple axial sections were performed from base skull to vertex without   contrast enhancement. Coronal andsagittal destructions were performed as   well    This exam is compared with prior brain MRI performed on 2014.    Abnormal areas of low-attenuation involving the left temporal frontal   parietal region is again seen as well as the left perisylvian, left basal   ganglion and left corona radiata region. These findings are likely   compatible with a large acute left MCA infarct though MRI of the brain is   recommended to better characterize this finding. There is localized mass   effect seen consisting sulcal effacement. Mass effect on left lateral   ventricle is identified. Left-to-right shift (1.7 mm) seen.    Abnormal low-attenuation involving the right basal ganglia/corona radiata   region is seen which is likely compatible with old infarct.   Age-indeterminate infarct is seen involving the right frontal subcortical   region (series 2 image 34).    Extra-axial low-attenuation seen involving left posterior fossa region is   seen which measures approximately 3.1 x 1.9 cm. This compatible with an   arachnoid cyst.    Evaluation of the osseous structures with appropriate window demonstrates   hyperostosis of frontalis interna.    Left maxillary right ethmoid sinus mucosal thickening is seen.    Opacification of both mastoid andmiddle ear regions seen.    IMPRESSION: Acute left MCA infarct is suspected. This finding can be   further evaluated with MRI.    --- End of Report ---            MAXWELL WORLEY MD; Attending Radiologist  This document has been electronically signed.2022 10:57AM    < end of copied text >

## 2022-01-30 NOTE — PROGRESS NOTE ADULT - ASSESSMENT
81F with a PMH of HTN, HLD, hypothyroidism, depression, seizures p/w abd pain, found to have acute diverticulitis . acute metabolic encephalopathy sec to above Perforated sigmoid diverticulitis. 12/24/21 - Exploratory laparotomy and a sigmoid colectomy and abdominal washout abd at that time left open. Transferred to ICU for post op care on mechanical ventilation. 10mg IV lopressor in OR for Afib with RVR. 5L IVF given intra-op. EBL 100cc. Received intubated, sedated, on phenylephrine.  Patient went for abdominal wound closure and creation of colostomy on 12/26. Patient developed adb abscess 1/3 s/p IR drainage of abdominal abscesses. Initially patient on PPN for nutrition Now tolerating tube feeds with brown soft stool in colostomy.. Found to have multiple abdominal fluid collections s/p IR drainage of R abdominal abscess 1/3/22 growing e-coli and bacteroides Sensitive to ceftriaxone, continue antibiotics to ceftriaxone and flagyl per ID.   Patient was unable to wean and son consented to trach.  -Now out of shock state not on vasopressor support.  Post op a-fib RVR, s/p amio load converted to sinus rhythm.  Patient with intermittent fevers. S/P PEG and trach. Pain management on fentanyl patch.      Perforated Acute diverticulitis   s/p 12/24/21 - Exploratory laparotomy and a sigmoid colectomy and abdominal washout  Patient went for abdominal wound closure and creation of colostomy on 12/26.  Patient developed adb abscess 1/3 s/p IR drainage of abdominal abscesses.  Initially patient on PPN for nutrition Now tolerating tube feeds with brown soft stool in colostomy.  Found to have multiple abdominal fluid collections s/p IR drainage of R abdominal abscess 1/3/22 growing e-coli and bacteroides  S/P Peg placement  all abx dced as per iD recs, now febrile again  Repeat CT noted   BABAK drain care, wound vac in place   PEG and ostomy with active bleeding 1/28: lovenox stopped: given reversal ordered two units of prbc  Repet cT abdomen with trace pelvic fluid: active gastric fundus bleeding  s/p 2 units of blood  Remains  febrile 102.8  plan:  npo  active type and cross  GI and Surgery consulted  maintain npo for endoscopy monday    Septic Shock s/p  pressors  Transferred to ICU for post op care on mechanical ventilation.  now off pressors,  failed extubation, trach to cpap  completed ceftriaxone and flagyl per ID. trial PO vanc for diarrhea. now off all abx  Cx neg so far, covid PCR neg  Recent Followup CTA A/P noted, collections now trace, ++ Cystitis, Multiple focal opacification in RL, Recent CXR looks unchanged,.  persistent fevers, WBC increased , Re consulted ID  + right sided acute dvt  blood cultures on 1/27 negative  consulted with IR: Midline exchanged 1/28, collection in pelvis too small to drain  u/a positive on the 28th  s/p vanco and amikacin 1/28  persistently febrile  Staph A PCR positive  plan:  c/w zosyn  check blood cx: covid pcr repeat ct chest  high suspicion fever is due to GI bleed    Acute blood anemia due to hemorrhagic gastritis worsened by anticoagulation  s/p 2 units of prbc  for endoscopy on monday      Rapid AFIB due to Shock state  s/p amio load converted to sinus rhythm.  not on rate control or AC on downgrade, now on AC also for DVT  Cards consulted  added BB  HOLD AC      Hypoxemic respiratory failure now trach to CPAP  now stable on trach to CPAP  Pulm following  on fentayl patch for pain control    acute metabolic encephalopathy sec to above  resolved      Right lower extremity Acute DVT:  -off a/c due to Gi bleed  -consulted IR for IVC      plan: temperature today; 102.8.. No active bleeding noted in peg. black stool in ostomy  for endoscopy on monday  reached to out to vascular : referred to IR. IR consulted for IVC filter

## 2022-01-30 NOTE — PROGRESS NOTE ADULT - ASSESSMENT
TRESA PIZARRO 81 f 12/22/2021 1942 DR ANTONIO PATTON     REVIEW OF SYMPTOMS      Able to give (reliable) ROS  NO     PHYSICAL EXAM    HEENT Unremarkable  atraumatic   RESP Fair air entry EXP prolonged    Harsh breath sound Resp distres mild   CARDIAC S1 S2 No S3     NO JVD    ABDOMEN SOFT BS PRESENT NOT DISTENDED No hepatosplenomegaly   PEDAL EDEMA present No calf tenderness  NO rash     ______  DOA/CC.  12/22/2021 81F w/ HTN, HLD, hypothyroidism, depression, seizures. Presented w/ abdominal pain found to have acute sigmoid diverticulitis    PMH.  pmh Hytn  pmh Sz.    PROBLEMS.  Feculent peritonitis poa 12/22  Colostomy 12/26  Initial abio course competed 1/15  R LEFTY removd 1/22/2022  DVT 1/25/2022 r cfv fd lvnx startd   Drop Hb 1/27 Lvnx dced  GI bl peg and ostomy 1/28  NPO 1/28  Zosyn 1/29   Need for ivcf 1/30/2022 dw Dr Patton    MISC ISSUES.  COVID STATUS. 12/24 pcr (-)  ICU STAY. 12/22-1/12/2022   GOC.  1/13/2022 full code       BEST PRACTICE ISSUES.                                                  HEAD OF BED ELEVATION. Yes  DVT PROPHYLAXIS.     1/28/2022 lvnx dced because of active gi bleed    1/25/2022 lvnx 80.2   1/25/2022 V duplx partially occlusive thrombus r cfv ac to subacute   1/7 lvnx 40   LIGHT PROPHYLAXIS.    1/11 protonix 40                                                                                     DIET.    1/28/2022 npo   1/20/2022 jevity 1.5 1200  1/18/2022 npo  1/7 vital af 1200 ngt          INFECTION PROPHYLAXIS.   1/7 Chlorhexidine .12%   1/7 chlorhexidine 2%    SPEECH SWALLOW RECOMMENDATIONS.         PATIENT DATA   VITALS/PO/IO/VENT/DRIPS.  1/30/2022 100f 81 130/70   1/30/2022 ac 15/450/5/30%     ASSESSMENT/RECOMMENDATIONS.    HEMODYNAMICS.   Monitor bp Target MAP 65 (+)    RESP.   Monitor po Target po 90-95%  1/14/2022 15/450/.4/5 747/38/110     DVT  1/25/2022 V duplx partially occlusive thrombus r cfv ac to subacute   1/25 lvnx 80.2   1/28/2022 lvnx 80.2 dced by PA as Hb had dropped requiring transfusion  1/28/2022 RECOMMEND Vasc surgery eval for ivcf asap   1/30/2022 dw Dr Patton He told me Vasc and IR have been contacted    OXYGEN REQUIREMENTS.   1/24/2022 30%  1/17/2022 40%  1/13/2022 40%    VENT MANAGEMENT.   HOB elevation  Target Pplat 35 (-)  Target PO 90-95%  Target pH 730 (+)    FEVER 1/28/2022.  New rll pneum 1/30 ct  Abdominal infection sec perforated sigmoid div feculent peritonitis poa 12/22.  Pt was taken off abio 1/15  1/28/2021 She has been developing progressive leukocytosis and low gd fever   w 1/27-1/28 -  1/29-1/30 c35-90-88 - 20  1/30/2022 ct ch new rll pneu  1/21 ctap showed decreased collections cw 1/11 1/29/2022 zosyn started by id    SP ABD SURGERY.  81F admitted with Acute Sigmoid Diverticulitis s/p ex lap, sigmoid resection, peritoneal lavage 12/25 and RTOR 12/26 for irrigation of abdominal cavity with closure and creation of colostomy. S/p bedside IR aspiration of fluid collection 1/3. Tracheostomy 1/7.   Deep pelvic abscess noted on CT,  Local drain/ostomy care per nursing  1/22/2022 r lefty removd         ANEMIA.  Hb 1/27-1/27-1/28-1/28-1/29 Hb 9.7-7.7-8.7-9.6 - 8.9  1/28/2022 lvnx 80.2 dced     TRANSFUSION.  1/28/2022 2u prbc     AMS  1/30/2022 ct h ac l mca cva   suggest neuro eval      GT placed 1/18    GI BLEED   11/28 peg and ostomy with active bleed  1/29/2022 egd planned for 1/31    Hypernatremia  Na 1/26-1/27-1/28/2022 Na 147-148 -147     IV Fluids.  1/30/2022 IV D5 30 (Dr RUBY)  1/29/2022 Pt is npo   1/29/2022 Recommend iv maintenance fluid    TIME SPENT   Over 25 minutes aggregate care time spent on encounter; activities included   direct patient care, counseling and/or coordinating care reviewing notes, lab data/ imaging , discussion with multidisciplinary team/ patient  /family and explaining in detail risks, benefits, alternatives  of the recommendations     TRESA PIZARRO 81 f 12/22/2021 1942 DR ANTONIO PATTON

## 2022-01-30 NOTE — PROGRESS NOTE ADULT - ASSESSMENT
81F with a PMH of HTN, HLD, hypothyroidism, depression, seizures who presents to the ED for abd pain found to have diverticulitis   has rising leukocytosis   had fever yesterday   tachycardic and now hypoxic   CXR (I personally reviewed) low lung volumes   origincal CT (I personally reviewed) with diverticulitis   she had a lot of pain in the abdomen on exam     12/25: s/p surgery for diverticulitis with perforation and abscess and went to the OR. intubated in ICU with vac and needs to go back to the OR, currently on zosyn, s/p one dose of diflucan last night   12/27: s/p repair and ostomy creation yesterday, wbc elevated, cultures sensitive to zosyn and candida albicans is notoriously sensitive to azoles such as fluconazole, wean of pressors and sedation, extubate when ready   12/28: Further increase in white blood cell count but overall the patient appears to be reasonably stable.  No concern of C. difficile at this juncture.  Current antibiotics are reasonable.  Leukocytosis like related to recent surgery, no concern of other superimposed process on the basis of exam.  I do not see a role to alter her antimicrobials.  12/29:  Remains intubated in ICU. still quite edematous and net positive, WBC climbing, small wound dehiscence at bottom of incision, plan for initiation of TPN today, remains on antibiotics    12/30: rising WBC, ostomy not functioning yet, very edematous CT today, may be source control issue so for now can continue same antibiotics and antifungal but may need to change if findings on the CT are worrisome or change in hemodynamics  12/31:Leukocytosis, with collections noted on CT.  However this is being done postoperative day 5, there is some possibility that this could represent postop changes but given the leukocytosis, concur with plans for attempts at percutaneous drainage.  White blood cell count down slightly compared to prior peak, will need to be trended.  Gallbladder is also distended.  Abdominal exam was benign this morning, but a calculus cholecystitis could be the differential diagnosis here as well (though n.p.o. status certainly could explain distended gallbladder, there is also report of gallbladder wall thickening).  Would modify antibiotics based on cultures from drainage, I do not believe that antibiotics and other the cells would be adequate here.  Fortunately she is off pressors, with preserved renal function, and tolerating pressure support.  1/1/22: Postop day 6.  I have some concerns with the appearance of the ostomy, though the abdominal exam overall is otherwise unchanged.  White blood cell count remains elevated, but is lower compared with prior values.  This is despite no change in antibiotic therapy.  Patient also has asymmetric edema of the upper extremities, right greater than left.  Low threshold for duplex ultrasound to exclude DVT.No new surveillance culture data.  1/2: POD7 Ostomy looks better today, UE symmetric. WBC elevated, some slight downward trend overall. Temps <= 100F.   1/3: drainage of fluid collection today, continue zosyn and fluconazole for now, monitor wbc and temps, watch ostomy output, diuresis and address third spacing   1/4: s/p drainage yesterday now growing ecoli and awaiting for sensitivities, wbc is improving, started on tube feeds    1/5: abscess growing ecoli and bacteroides, S to ceftriaxone, wbc 15, weaning trial today, pain management    1/6: awake, lung exercise today, trach planned for tomorrow, ostomy output is good. will stop fluconazole today and change antibiotics to ceftriaxone and flagly. Unclear stop date yet    1/7 trach today, continue antibiotics   1/9: s/p trach, had low grade fever but otherwise doing ok, will continues same antibiotics regimen for now but if fevers continue she will needs to be rescanned as those abscesses can progress.  1/10: low grade fever but doing well on trach-PS, ostomy with stool and gas, shakes head when asked if in pain, discussed with surgery about repeating CT scan given the temp  1/11: Still febrile, favor interval CT as above.  1/12: CT of abdomen showing some signs of improvement and elsewhere  it shows signs of worsening. wbc normal, tube feeds currently via NGT   1/13: no fever, no wbc, Cr and LFTs ok, Ceftriaxone and flagyl continued. No planned IR intervention at this time - fluid collection without access to drain, surgical drain within the collection. Pt possibly will have repeat imaging over the weekend to evaluate progress.   Attending addendum:  agree with above  continue antibiotics   vent weaning  surgical follow up for the abdominal wound  1/14: Low grade temp last night, midline placed, no leukocytosis, Cr and LFTs ok, culture data reviewed. Pt's colostomy with small amount of liquid stool and gas, no stools recorded for two days, no role for po Vancomycin at this time. The pt has been on abx since her admission, will stop all abx and will continue to monitor.    1/18: no fevers since 1/14, WBC better 11.00, cr ok, off abx, now s/p EGD, with PEG replacement.   1/24: spiking low grade fevers, low suspicion for pneumonia given little secretions and doing well on the PS wth low settings, peg site looks clean, trach site OK as well, fevers may be from small fluid collections intra-abdominally vs atelectasis Would suggest aggressive pulmonary toilet including chest vest. follow cultures and trend wbc and fever curve . If persistent fevers would start  Zosyn and PO fluconazole but for now please hold antibiotics   1/25: continues having fevers, low grade temps today, WBC better 18.35, Cr and LFTs ok, + new DVT of right common femoral vein - could be the cause of pt's fevers - on full dose Lovenox. Will continue to monitor off abx, WBC got better without abx.   Attending addendum:  patient with long hospital course and now on the floors with fever and found to have a DVT  wbc came down without antibiotics   fever curve is getting better   continue to monitor off antibiotics   treat DVT with full dose AC   suspect fevers could be from dvt but may also be from fluid collection    1/26: low grade temp today, pt is more awake and alert today, WBC improving 14.2 off abx, Cr and LFTs ok, abd wound with clean base and healing well - vac dressing was changed today during my exam. In favor to continue to monitor off abx for now.   1/27: Pt continues spiking fevers, low grade last night and 101 today, awake and alert during my exam, WBC improving off abx 13.61, recent chest xray with no acute lung disease, COVID 19 is negative. Pt's fevers most likely related to her DVT, will collect two sets of surveillance BC and will continue to monitor off abx for now.   1/28: CTA A/P done last night - There is evidence of active GI bleeding in the gastric antrum, related to peptic ulcer disease or gastritis, possible cystitis. Blood transfusion is in progress during my exam, wound vac is being changed - base of the wound is clean with no evidence of acute infection. Pt had a low grade temp last night, WBC elevated - could be reactive to DVT. Will obtain UA and UC, will hold off on abx for now.   1/29: persistent fevers, received a dose of vanco and a dose of Azactam last evening, WBC high 27.98, BC with no growth to date, UA positive, UC pending. Will start on broad spectrum antibiotic now as unclear it is unclear at this time if fevers caused by UTI only, or in combination with DVT, or other source. Pt's abd was seen with last vac dressing change - incision site wound  is clean and healing well. JPis still draining cloudy drainage.   1/30: lethargic, continues having fevers, WBC better 20.79, all BCs remain with no growth, UC pending, CT head - Acute left MCA infarct is suspected, CT chest and CT a/p performed earlier, pending radiology reading, will continue with Zosyn, Na is slightly elevated, would prefer to have UC available prior switching abx.     #Perforated diverticulum of large intestine.   ·  Plan:   continue Zosyn IV  monitor sodium levels   s/p one time dose of Vancomycin and a dose of Amikacin on 1/28  continue wound vac   CT chest/a/p - pending reading       #Fever.   ·  Plan:   continue Zosyn  trend pt's temperatures   awaiting for UC  +DVT of right common femoral vein   New left MCA infarct on CT head  CT c/a/p pending reading, performed earlier   BC NGTD  MRSA PCR not detected     #Acute Respiratory Failure  wean oxygen as tolerated  chest PT  frequent suctioning   chest vest   HOB >30 degrees    #DVT  AC as per protocol

## 2022-01-30 NOTE — PROGRESS NOTE ADULT - SUBJECTIVE AND OBJECTIVE BOX
Chief Complaint:  Patient is a 81y old  Female who presents with a chief complaint of diverticulitis (30 Jan 2022 12:27)      Interval Events:   Persistently febrile  Hb stable over past 48 hours  Last transfusion on 1/28    Allergies:  No Known Allergies      Hospital Medications:  acetaminophen    Suspension .. 650 milliGRAM(s) Oral every 6 hours PRN  amLODIPine   Tablet 10 milliGRAM(s) Oral daily  carvedilol 3.125 milliGRAM(s) Oral every 12 hours  chlorhexidine 0.12% Liquid 15 milliLiter(s) Oral Mucosa every 12 hours  chlorhexidine 2% Cloths 1 Application(s) Topical <User Schedule>  dextrose 40% Gel 15 Gram(s) Oral once  dextrose 5%. 1000 milliLiter(s) IV Continuous <Continuous>  dextrose 5%. 1000 milliLiter(s) IV Continuous <Continuous>  dextrose 5%. 1000 milliLiter(s) IV Continuous <Continuous>  dextrose 50% Injectable 25 Gram(s) IV Push once  dextrose 50% Injectable 12.5 Gram(s) IV Push once  dextrose 50% Injectable 25 Gram(s) IV Push once  dorzolamide 2%/timolol 0.5% Ophthalmic Solution 1 Drop(s) Both EYES two times a day  escitalopram 20 milliGRAM(s) Oral daily  glucagon  Injectable 1 milliGRAM(s) IntraMuscular once  levETIRAcetam  IVPB 500 milliGRAM(s) IV Intermittent every 12 hours  levothyroxine Injectable 37.5 MICROGram(s) IV Push at bedtime  pantoprazole  Injectable 40 milliGRAM(s) IV Push every 12 hours  piperacillin/tazobactam IVPB.. 3.375 Gram(s) IV Intermittent every 8 hours  sodium chloride 0.9% lock flush 10 milliLiter(s) IV Push every 1 hour PRN  sucralfate 1 Gram(s) Oral four times a day      PMHX/PSHX:  Seizure    HTN (hypertension)    Glaucoma    Thyroid disease    Blood clot of neck vein    TIA (transient ischemic attack)    S/P appendectomy          ROS:   + fever, otherwise unable to obtain    Vital Signs:  Vital Signs Last 24 Hrs  T(C): 38.3 (30 Jan 2022 12:18), Max: 39.3 (30 Jan 2022 05:13)  T(F): 100.9 (30 Jan 2022 12:18), Max: 102.8 (30 Jan 2022 05:13)  HR: 74 (30 Jan 2022 12:18) (71 - 92)  BP: 118/69 (30 Jan 2022 12:18) (109/49 - 130/67)  BP(mean): --  RR: 18 (30 Jan 2022 12:18) (18 - 18)  SpO2: 99% (30 Jan 2022 12:18) (94% - 100%)  Daily     Daily     PHYSICAL EXAM:   GENERAL:  No distress  HEENT: conjunctivae clear  CHEST:  Full & symmetric excursion, +trach  HEART:  Regular rhythm  ABDOMEN:  Soft, non-tender, non-distended  EXTREMITIES:  no edema  SKIN:  Dry/warm      LABS:                        8.2    20.79 )-----------( 288      ( 30 Jan 2022 07:34 )             26.5     01-30    148<H>  |  115<H>  |  30<H>  ----------------------------<  149<H>  2.9<LL>   |  24  |  0.81    Ca    8.5      30 Jan 2022 07:34  Phos  3.2     01-30  Mg     2.4     01-30        PT/INR - ( 29 Jan 2022 06:33 )   PT: 13.9 sec;   INR: 1.21 ratio         PTT - ( 29 Jan 2022 06:33 )  PTT:29.7 sec        Imaging:  c< from: CT Abdomen and Pelvis No Cont (01.30.22 @ 10:33) >    IMPRESSION:  New right lower lobe pneumonia.    Nodular consolidation in the left lower lobe is unchanged from prior   study 1/27/2022.    No acute intra-abdominal pathology. Status post Varela's procedure.   Surgical drain with tip in the pelvis where there is trace free fluid. No   intra-abdominal collection.    Findings were discussed with Dr. ANTONIO PATTON  1/30/2022 12:31 PM by Dr. Desouza with read back confirmation.    < end of copied text >

## 2022-01-30 NOTE — PROGRESS NOTE ADULT - SUBJECTIVE AND OBJECTIVE BOX
JUAREZ ECHOLSNIS    LVS 1B 122 D    Allergies    No Known Allergies    Intolerances        PAST MEDICAL & SURGICAL HISTORY:  Seizure    HTN (hypertension)    Glaucoma    Thyroid disease    Blood clot of neck vein  left side    TIA (transient ischemic attack)  20 years ago    S/P appendectomy        FAMILY HISTORY:  FH: HTN (hypertension)        Home Medications:  amLODIPine 10 mg oral tablet: 1 tab(s) orally once a day (22 Dec 2021 16:46)  aspirin 81 mg oral tablet, chewable: 1 tab(s) orally once a day (22 Dec 2021 16:46)  escitalopram 20 mg oral tablet: 1 tab(s) orally once a day (22 Dec 2021 16:46)  keppera:  (22 Dec 2021 16:46)  levETIRAcetam 500 mg oral tablet: 1 tab(s) orally 2 times a day (22 Dec 2021 16:46)  timolol-dorzolamide 0.5%-2% preservative-free ophthalmic solution: 1 drop(s) to each affected eye 2 times a day (22 Dec 2021 16:46)  Zetia 10 mg oral tablet: 1 tab(s) orally once a day (22 Dec 2021 16:46)      MEDICATIONS  (STANDING):  amLODIPine   Tablet 10 milliGRAM(s) Oral daily  carvedilol 3.125 milliGRAM(s) Oral every 12 hours  chlorhexidine 0.12% Liquid 15 milliLiter(s) Oral Mucosa every 12 hours  chlorhexidine 2% Cloths 1 Application(s) Topical <User Schedule>  dextrose 40% Gel 15 Gram(s) Oral once  dextrose 5%. 1000 milliLiter(s) (30 mL/Hr) IV Continuous <Continuous>  dextrose 5%. 1000 milliLiter(s) (50 mL/Hr) IV Continuous <Continuous>  dextrose 5%. 1000 milliLiter(s) (100 mL/Hr) IV Continuous <Continuous>  dextrose 50% Injectable 25 Gram(s) IV Push once  dextrose 50% Injectable 12.5 Gram(s) IV Push once  dextrose 50% Injectable 25 Gram(s) IV Push once  dorzolamide 2%/timolol 0.5% Ophthalmic Solution 1 Drop(s) Both EYES two times a day  escitalopram 20 milliGRAM(s) Oral daily  glucagon  Injectable 1 milliGRAM(s) IntraMuscular once  levETIRAcetam  IVPB 500 milliGRAM(s) IV Intermittent every 12 hours  levothyroxine Injectable 37.5 MICROGram(s) IV Push at bedtime  pantoprazole  Injectable 40 milliGRAM(s) IV Push every 12 hours  piperacillin/tazobactam IVPB.. 3.375 Gram(s) IV Intermittent every 8 hours  potassium chloride  10 mEq/100 mL IVPB 10 milliEquivalent(s) IV Intermittent every 1 hour  sucralfate 1 Gram(s) Oral four times a day    MEDICATIONS  (PRN):  acetaminophen    Suspension .. 650 milliGRAM(s) Oral every 6 hours PRN Temp greater or equal to 38C (100.4F)  sodium chloride 0.9% lock flush 10 milliLiter(s) IV Push every 1 hour PRN Pre/post blood products, medications, blood draw, and to maintain line patency      Diet, NPO (22 @ 00:07) [Active]          Vital Signs Last 24 Hrs  T(C): 39.3 (2022 05:13), Max: 39.3 (2022 05:13)  T(F): 102.8 (2022 05:13), Max: 102.8 (2022 05:13)  HR: 89 (2022 07:38) (71 - 90)  BP: 130/67 (2022 05:13) (109/49 - 130/67)  BP(mean): --  RR: 18 (2022 12:00) (18 - 18)  SpO2: 97% (2022 07:38) (94% - 100%)      22 @ 07:01  -  22 @ 07:00  --------------------------------------------------------  IN: 250 mL / OUT: 30 mL / NET: 220 mL        Mode: AC/ CMV (Assist Control/ Continuous Mandatory Ventilation), RR (machine): 15, TV (machine): 450, FiO2: 30, PEEP: 5, ITime: 1, MAP: 9, PIP: 17      LABS:                        8.2    20.79 )-----------( 288      ( 2022 07:34 )             26.5         148<H>  |  115<H>  |  30<H>  ----------------------------<  149<H>  2.9<LL>   |  24  |  0.81    Ca    8.5      2022 07:34  Phos  3.2       Mg     2.4           PT/INR - ( 2022 06:33 )   PT: 13.9 sec;   INR: 1.21 ratio         PTT - ( 2022 06:33 )  PTT:29.7 sec  Urinalysis Basic - ( 2022 14:23 )    Color: Yellow / Appearance: very cloudy / S.015 / pH: x  Gluc: x / Ketone: Negative  / Bili: Negative / Urobili: Negative mg/dL   Blood: x / Protein: 100 mg/dL / Nitrite: Negative   Leuk Esterase: Moderate / RBC: 11-25 /HPF / WBC >50   Sq Epi: x / Non Sq Epi: Few / Bacteria: TNTC            WBC:  WBC Count: 20.79 K/uL ( @ 07:34)  WBC Count: 23.36 K/uL ( @ 18:09)  WBC Count: 27.98 K/uL ( @ 06:33)  WBC Count: 24.37 K/uL ( @ 17:18)  WBC Count: 19.00 K/uL ( @ 07:07)  WBC Count: 16.32 K/uL ( @ 19:58)  WBC Count: 13.61 K/uL ( @ 07:45)      MICROBIOLOGY:  RECENT CULTURES:   .Blood Blood-Peripheral XXXX XXXX   No growth to date.                PT/INR - ( 2022 06:33 )   PT: 13.9 sec;   INR: 1.21 ratio         PTT - ( 2022 06:33 )  PTT:29.7 sec    Sodium:  Sodium, Serum: 148 mmol/L ( @ 07:34)  Sodium, Serum: 146 mmol/L ( @ 06:33)  Sodium, Serum: 147 mmol/L ( @ 07:07)  Sodium, Serum: 148 mmol/L ( @ 07:45)      0.81 mg/dL :34  0.76 mg/dL  @ 06:33  0.86 mg/dL  07:07  0.45 mg/dL :45      Hemoglobin:  Hemoglobin: 8.2 g/dL ( @ 07:34)  Hemoglobin: 8.9 g/dL ( @ 18:09)  Hemoglobin: 8.6 g/dL ( 06:33)  Hemoglobin: 9.6 g/dL ( @ 17:18)  Hemoglobin: 8.7 g/dL ( @ 07:07)  Hemoglobin: 7.7 g/dL ( @ 19:58)  Hemoglobin: 9.7 g/dL ( @ 07:45)      Platelets: Platelet Count - Automated: 288 K/uL ( @ 07:34)  Platelet Count - Automated: 277 K/uL ( @ 18:09)  Platelet Count - Automated: 294 K/uL ( 06:33)  Platelet Count - Automated: 325 K/uL ( @ 17:18)  Platelet Count - Automated: CLUMPED K/uL ( @ 07:07)  Platelet Count - Automated: 412 K/uL ( @ 19:58)  Platelet Count - Automated: 362 K/uL ( @ 07:45)          Urinalysis Basic - ( 2022 14:23 )    Color: Yellow / Appearance: very cloudy / S.015 / pH: x  Gluc: x / Ketone: Negative  / Bili: Negative / Urobili: Negative mg/dL   Blood: x / Protein: 100 mg/dL / Nitrite: Negative   Leuk Esterase: Moderate / RBC: 11-25 /HPF / WBC >50   Sq Epi: x / Non Sq Epi: Few / Bacteria: TNTC        RADIOLOGY & ADDITIONAL STUDIES:      MICROBIOLOGY:  RECENT CULTURES:   .Blood Blood-Peripheral XXXX XXXX   No growth to date.

## 2022-01-30 NOTE — PROGRESS NOTE ADULT - SUBJECTIVE AND OBJECTIVE BOX
Patient seen and examined at bedside. Febrile this am to 102.8.   Given vanco amikacin on iv zosyn  black stool noted in ostomy  patient trach to cpap resting. + left sided drain. + midline    Review of Systems:  limited ROS due to trach    Objective:  Vitals  T(C): 39.3 (01-30-22 @ 05:13), Max: 39.3 (01-30-22 @ 05:13)  HR: 89 (01-30-22 @ 07:38) (71 - 90)  BP: 130/67 (01-30-22 @ 05:13) (109/49 - 130/67)  RR: 18 (01-29-22 @ 12:00) (18 - 18)  SpO2: 97% (01-30-22 @ 07:38) (94% - 100%)    Physical Exam:  General: comfortable, no acute distress, well nourished  HEENT: Atraumatic, no LAD, trachea midline, PERRLA  Cardiovascular: normal s1s2, no murmurs, gallops or fricition rubs  Pulmonary: clear to ausculation Bilaterally, no wheezing , rhonchi  Gastrointestinal: soft non tender non distended, no masses felt, no organomegally, + ostomy + lefty drain  Muscloskeletal: no lower extremity edema, intact bilateral lower extremity pulses  Neurological: CN II-12 intact. No focal weakness  Psychiatrical: normal mood, cooperative  SKIN: no rash, lesions or ulcers    Labs:                          8.2    20.79 )-----------( 288      ( 30 Jan 2022 07:34 )             26.5     01-30    148<H>  |  115<H>  |  30<H>  ----------------------------<  149<H>  2.9<LL>   |  24  |  0.81    Ca    8.5      30 Jan 2022 07:34  Phos  3.2     01-30  Mg     2.4     01-30        PT/INR - ( 29 Jan 2022 06:33 )   PT: 13.9 sec;   INR: 1.21 ratio         PTT - ( 29 Jan 2022 06:33 )  PTT:29.7 sec      Active Medications  MEDICATIONS  (STANDING):  amLODIPine   Tablet 10 milliGRAM(s) Oral daily  carvedilol 3.125 milliGRAM(s) Oral every 12 hours  chlorhexidine 0.12% Liquid 15 milliLiter(s) Oral Mucosa every 12 hours  chlorhexidine 2% Cloths 1 Application(s) Topical <User Schedule>  dextrose 40% Gel 15 Gram(s) Oral once  dextrose 5%. 1000 milliLiter(s) (50 mL/Hr) IV Continuous <Continuous>  dextrose 5%. 1000 milliLiter(s) (100 mL/Hr) IV Continuous <Continuous>  dextrose 50% Injectable 25 Gram(s) IV Push once  dextrose 50% Injectable 12.5 Gram(s) IV Push once  dextrose 50% Injectable 25 Gram(s) IV Push once  dorzolamide 2%/timolol 0.5% Ophthalmic Solution 1 Drop(s) Both EYES two times a day  escitalopram 20 milliGRAM(s) Oral daily  glucagon  Injectable 1 milliGRAM(s) IntraMuscular once  levETIRAcetam  IVPB 500 milliGRAM(s) IV Intermittent every 12 hours  levothyroxine Injectable 37.5 MICROGram(s) IV Push at bedtime  pantoprazole  Injectable 40 milliGRAM(s) IV Push every 12 hours  piperacillin/tazobactam IVPB.. 3.375 Gram(s) IV Intermittent every 8 hours  sucralfate 1 Gram(s) Oral four times a day    MEDICATIONS  (PRN):  acetaminophen    Suspension .. 650 milliGRAM(s) Oral every 6 hours PRN Temp greater or equal to 38C (100.4F)  sodium chloride 0.9% lock flush 10 milliLiter(s) IV Push every 1 hour PRN Pre/post blood products, medications, blood draw, and to maintain line patency

## 2022-01-31 ENCOUNTER — TRANSCRIPTION ENCOUNTER (OUTPATIENT)
Age: 82
End: 2022-01-31

## 2022-01-31 LAB
ANION GAP SERPL CALC-SCNC: 5 MMOL/L — SIGNIFICANT CHANGE UP (ref 5–17)
ANION GAP SERPL CALC-SCNC: 7 MMOL/L — SIGNIFICANT CHANGE UP (ref 5–17)
APTT BLD: 30.2 SEC — SIGNIFICANT CHANGE UP (ref 27.5–35.5)
BLD GP AB SCN SERPL QL: SIGNIFICANT CHANGE UP
BUN SERPL-MCNC: 18 MG/DL — SIGNIFICANT CHANGE UP (ref 7–23)
BUN SERPL-MCNC: 21 MG/DL — SIGNIFICANT CHANGE UP (ref 7–23)
CALCIUM SERPL-MCNC: 8.4 MG/DL — LOW (ref 8.5–10.1)
CALCIUM SERPL-MCNC: 8.4 MG/DL — LOW (ref 8.5–10.1)
CHLORIDE SERPL-SCNC: 113 MMOL/L — HIGH (ref 96–108)
CHLORIDE SERPL-SCNC: 114 MMOL/L — HIGH (ref 96–108)
CO2 SERPL-SCNC: 24 MMOL/L — SIGNIFICANT CHANGE UP (ref 22–31)
CO2 SERPL-SCNC: 25 MMOL/L — SIGNIFICANT CHANGE UP (ref 22–31)
CREAT SERPL-MCNC: 0.52 MG/DL — SIGNIFICANT CHANGE UP (ref 0.5–1.3)
CREAT SERPL-MCNC: 0.56 MG/DL — SIGNIFICANT CHANGE UP (ref 0.5–1.3)
GLUCOSE BLDC GLUCOMTR-MCNC: 122 MG/DL — HIGH (ref 70–99)
GLUCOSE BLDC GLUCOMTR-MCNC: 130 MG/DL — HIGH (ref 70–99)
GLUCOSE BLDC GLUCOMTR-MCNC: 140 MG/DL — HIGH (ref 70–99)
GLUCOSE BLDC GLUCOMTR-MCNC: 149 MG/DL — HIGH (ref 70–99)
GLUCOSE SERPL-MCNC: 122 MG/DL — HIGH (ref 70–99)
GLUCOSE SERPL-MCNC: 134 MG/DL — HIGH (ref 70–99)
GRAM STN FLD: SIGNIFICANT CHANGE UP
HCT VFR BLD CALC: 23.8 % — LOW (ref 34.5–45)
HGB BLD-MCNC: 7.3 G/DL — LOW (ref 11.5–15.5)
INR BLD: 1.52 RATIO — HIGH (ref 0.88–1.16)
MAGNESIUM SERPL-MCNC: 2.1 MG/DL — SIGNIFICANT CHANGE UP (ref 1.6–2.6)
MCHC RBC-ENTMCNC: 27.5 PG — SIGNIFICANT CHANGE UP (ref 27–34)
MCHC RBC-ENTMCNC: 30.7 G/DL — LOW (ref 32–36)
MCV RBC AUTO: 89.8 FL — SIGNIFICANT CHANGE UP (ref 80–100)
NRBC # BLD: 0 /100 WBCS — SIGNIFICANT CHANGE UP (ref 0–0)
PHOSPHATE SERPL-MCNC: 1.9 MG/DL — LOW (ref 2.5–4.5)
PHOSPHATE SERPL-MCNC: 2.2 MG/DL — LOW (ref 2.5–4.5)
PLATELET # BLD AUTO: 282 K/UL — SIGNIFICANT CHANGE UP (ref 150–400)
POTASSIUM SERPL-MCNC: 2.6 MMOL/L — CRITICAL LOW (ref 3.5–5.3)
POTASSIUM SERPL-MCNC: 3.3 MMOL/L — LOW (ref 3.5–5.3)
POTASSIUM SERPL-SCNC: 2.6 MMOL/L — CRITICAL LOW (ref 3.5–5.3)
POTASSIUM SERPL-SCNC: 3.3 MMOL/L — LOW (ref 3.5–5.3)
PROTHROM AB SERPL-ACNC: 17.3 SEC — HIGH (ref 10.6–13.6)
RBC # BLD: 2.65 M/UL — LOW (ref 3.8–5.2)
RBC # FLD: 17.3 % — HIGH (ref 10.3–14.5)
SODIUM SERPL-SCNC: 143 MMOL/L — SIGNIFICANT CHANGE UP (ref 135–145)
SODIUM SERPL-SCNC: 145 MMOL/L — SIGNIFICANT CHANGE UP (ref 135–145)
SPECIMEN SOURCE: SIGNIFICANT CHANGE UP
WBC # BLD: 17.82 K/UL — HIGH (ref 3.8–10.5)
WBC # FLD AUTO: 17.82 K/UL — HIGH (ref 3.8–10.5)

## 2022-01-31 PROCEDURE — 99233 SBSQ HOSP IP/OBS HIGH 50: CPT

## 2022-01-31 PROCEDURE — 99232 SBSQ HOSP IP/OBS MODERATE 35: CPT

## 2022-01-31 RX ORDER — POTASSIUM CHLORIDE 20 MEQ
10 PACKET (EA) ORAL
Refills: 0 | Status: COMPLETED | OUTPATIENT
Start: 2022-01-31 | End: 2022-01-31

## 2022-01-31 RX ORDER — POTASSIUM CHLORIDE 20 MEQ
40 PACKET (EA) ORAL EVERY 4 HOURS
Refills: 0 | Status: COMPLETED | OUTPATIENT
Start: 2022-01-31 | End: 2022-01-31

## 2022-01-31 RX ORDER — DEXTROSE MONOHYDRATE, SODIUM CHLORIDE, AND POTASSIUM CHLORIDE 50; .745; 4.5 G/1000ML; G/1000ML; G/1000ML
1000 INJECTION, SOLUTION INTRAVENOUS
Refills: 0 | Status: DISCONTINUED | OUTPATIENT
Start: 2022-01-31 | End: 2022-02-08

## 2022-01-31 RX ORDER — POTASSIUM PHOSPHATE, MONOBASIC POTASSIUM PHOSPHATE, DIBASIC 236; 224 MG/ML; MG/ML
15 INJECTION, SOLUTION INTRAVENOUS ONCE
Refills: 0 | Status: COMPLETED | OUTPATIENT
Start: 2022-01-31 | End: 2022-01-31

## 2022-01-31 RX ORDER — SODIUM,POTASSIUM PHOSPHATES 278-250MG
1 POWDER IN PACKET (EA) ORAL ONCE
Refills: 0 | Status: COMPLETED | OUTPATIENT
Start: 2022-01-31 | End: 2022-01-31

## 2022-01-31 RX ADMIN — LEVETIRACETAM 420 MILLIGRAM(S): 250 TABLET, FILM COATED ORAL at 18:01

## 2022-01-31 RX ADMIN — Medication 100 MILLIEQUIVALENT(S): at 10:50

## 2022-01-31 RX ADMIN — Medication 100 MILLIEQUIVALENT(S): at 22:36

## 2022-01-31 RX ADMIN — DEXTROSE MONOHYDRATE, SODIUM CHLORIDE, AND POTASSIUM CHLORIDE 42 MILLILITER(S): 50; .745; 4.5 INJECTION, SOLUTION INTRAVENOUS at 16:13

## 2022-01-31 RX ADMIN — CARVEDILOL PHOSPHATE 3.12 MILLIGRAM(S): 80 CAPSULE, EXTENDED RELEASE ORAL at 18:01

## 2022-01-31 RX ADMIN — CARVEDILOL PHOSPHATE 3.12 MILLIGRAM(S): 80 CAPSULE, EXTENDED RELEASE ORAL at 05:17

## 2022-01-31 RX ADMIN — POTASSIUM PHOSPHATE, MONOBASIC POTASSIUM PHOSPHATE, DIBASIC 62.5 MILLIMOLE(S): 236; 224 INJECTION, SOLUTION INTRAVENOUS at 22:35

## 2022-01-31 RX ADMIN — Medication 100 MILLIEQUIVALENT(S): at 20:28

## 2022-01-31 RX ADMIN — POTASSIUM PHOSPHATE, MONOBASIC POTASSIUM PHOSPHATE, DIBASIC 62.5 MILLIMOLE(S): 236; 224 INJECTION, SOLUTION INTRAVENOUS at 16:37

## 2022-01-31 RX ADMIN — CHLORHEXIDINE GLUCONATE 15 MILLILITER(S): 213 SOLUTION TOPICAL at 05:18

## 2022-01-31 RX ADMIN — Medication 100 MILLIEQUIVALENT(S): at 11:48

## 2022-01-31 RX ADMIN — Medication 1 PACKET(S): at 14:19

## 2022-01-31 RX ADMIN — DORZOLAMIDE HYDROCHLORIDE TIMOLOL MALEATE 1 DROP(S): 20; 5 SOLUTION/ DROPS OPHTHALMIC at 18:01

## 2022-01-31 RX ADMIN — AMLODIPINE BESYLATE 10 MILLIGRAM(S): 2.5 TABLET ORAL at 05:17

## 2022-01-31 RX ADMIN — LEVETIRACETAM 420 MILLIGRAM(S): 250 TABLET, FILM COATED ORAL at 05:18

## 2022-01-31 RX ADMIN — Medication 100 MILLIEQUIVALENT(S): at 16:13

## 2022-01-31 RX ADMIN — PANTOPRAZOLE SODIUM 40 MILLIGRAM(S): 20 TABLET, DELAYED RELEASE ORAL at 05:19

## 2022-01-31 RX ADMIN — PIPERACILLIN AND TAZOBACTAM 25 GRAM(S): 4; .5 INJECTION, POWDER, LYOPHILIZED, FOR SOLUTION INTRAVENOUS at 16:43

## 2022-01-31 RX ADMIN — Medication 40 MILLIEQUIVALENT(S): at 14:19

## 2022-01-31 RX ADMIN — Medication 1 GRAM(S): at 11:49

## 2022-01-31 RX ADMIN — PANTOPRAZOLE SODIUM 40 MILLIGRAM(S): 20 TABLET, DELAYED RELEASE ORAL at 18:01

## 2022-01-31 RX ADMIN — Medication 40 MILLIEQUIVALENT(S): at 14:23

## 2022-01-31 RX ADMIN — CHLORHEXIDINE GLUCONATE 1 APPLICATION(S): 213 SOLUTION TOPICAL at 05:19

## 2022-01-31 RX ADMIN — Medication 100 MILLIEQUIVALENT(S): at 23:48

## 2022-01-31 RX ADMIN — Medication 37.5 MICROGRAM(S): at 22:48

## 2022-01-31 RX ADMIN — Medication 100 MILLIEQUIVALENT(S): at 13:25

## 2022-01-31 RX ADMIN — PIPERACILLIN AND TAZOBACTAM 25 GRAM(S): 4; .5 INJECTION, POWDER, LYOPHILIZED, FOR SOLUTION INTRAVENOUS at 05:18

## 2022-01-31 RX ADMIN — Medication 100 MILLIEQUIVALENT(S): at 14:18

## 2022-01-31 RX ADMIN — CHLORHEXIDINE GLUCONATE 15 MILLILITER(S): 213 SOLUTION TOPICAL at 18:00

## 2022-01-31 RX ADMIN — DEXTROSE MONOHYDRATE, SODIUM CHLORIDE, AND POTASSIUM CHLORIDE 42 MILLILITER(S): 50; .745; 4.5 INJECTION, SOLUTION INTRAVENOUS at 22:50

## 2022-01-31 RX ADMIN — DORZOLAMIDE HYDROCHLORIDE TIMOLOL MALEATE 1 DROP(S): 20; 5 SOLUTION/ DROPS OPHTHALMIC at 05:18

## 2022-01-31 RX ADMIN — Medication 1 GRAM(S): at 05:17

## 2022-01-31 RX ADMIN — ESCITALOPRAM OXALATE 20 MILLIGRAM(S): 10 TABLET, FILM COATED ORAL at 11:49

## 2022-01-31 RX ADMIN — Medication 1 GRAM(S): at 00:37

## 2022-01-31 RX ADMIN — Medication 100 MILLIEQUIVALENT(S): at 16:09

## 2022-01-31 NOTE — PROGRESS NOTE ADULT - SUBJECTIVE AND OBJECTIVE BOX
Patient is a 81y old  Female who presents with a chief complaint of diverticulitis (31 Jan 2022 12:37)      HPI:  Patient is a 81F with a PMH of HTN, HLD, hypothyroidism, depression, seizures who presents to the ED for abd pain.  Patient states that over the last two days she had developed significant abdominal pain and multiple episodes of watery diarrhea.  Reports one episode of nausea and vomiting earlier today.  Patient has no other complaints.  Vitals stable,  labs show leukocytosis and hypokalemia.  CT abdomen significant for diverticulitis.  Will admit to med surg.     (23 Dec 2021 00:17)      INTERVAL HPI/OVERNIGHT EVENTS: Patient seen earlier today  Small amount of black material in ostomy. No N/V or abdominal pain    MEDICATIONS  (STANDING):  amLODIPine   Tablet 10 milliGRAM(s) Oral daily  carvedilol 3.125 milliGRAM(s) Oral every 12 hours  chlorhexidine 0.12% Liquid 15 milliLiter(s) Oral Mucosa every 12 hours  chlorhexidine 2% Cloths 1 Application(s) Topical <User Schedule>  dextrose 40% Gel 15 Gram(s) Oral once  dextrose 5% + sodium chloride 0.45% with potassium chloride 20 mEq/L 1000 milliLiter(s) (42 mL/Hr) IV Continuous <Continuous>  dextrose 5%. 1000 milliLiter(s) (50 mL/Hr) IV Continuous <Continuous>  dextrose 5%. 1000 milliLiter(s) (100 mL/Hr) IV Continuous <Continuous>  dextrose 50% Injectable 25 Gram(s) IV Push once  dextrose 50% Injectable 12.5 Gram(s) IV Push once  dextrose 50% Injectable 25 Gram(s) IV Push once  dorzolamide 2%/timolol 0.5% Ophthalmic Solution 1 Drop(s) Both EYES two times a day  escitalopram 20 milliGRAM(s) Oral daily  glucagon  Injectable 1 milliGRAM(s) IntraMuscular once  levETIRAcetam  IVPB 500 milliGRAM(s) IV Intermittent every 12 hours  levothyroxine Injectable 37.5 MICROGram(s) IV Push at bedtime  pantoprazole  Injectable 40 milliGRAM(s) IV Push every 12 hours  piperacillin/tazobactam IVPB.. 3.375 Gram(s) IV Intermittent every 8 hours  potassium chloride  10 mEq/100 mL IVPB 10 milliEquivalent(s) IV Intermittent every 1 hour  potassium phosphate IVPB 15 milliMole(s) IV Intermittent once  sucralfate 1 Gram(s) Oral four times a day    MEDICATIONS  (PRN):  acetaminophen    Suspension .. 650 milliGRAM(s) Oral every 6 hours PRN Temp greater or equal to 38C (100.4F)  sodium chloride 0.9% lock flush 10 milliLiter(s) IV Push every 1 hour PRN Pre/post blood products, medications, blood draw, and to maintain line patency      FAMILY HISTORY:  FH: HTN (hypertension)        Allergies    No Known Allergies    Intolerances        PMH/PSH:  Seizure    HTN (hypertension)    Glaucoma    Thyroid disease    Blood clot of neck vein    TIA (transient ischemic attack)    S/P appendectomy          REVIEW OF SYSTEMS:  CONSTITUTIONAL: No fever, weight loss,  EYES: No eye pain, visual disturbances, or discharge  ENMT:  No difficulty hearing, tinnitus, vertigo; No sinus or throat pain  NECK: No pain or stiffness  BREASTS: No pain, masses, or nipple discharge  RESPIRATORY: No cough, wheezing, chills or hemoptysis; No shortness of breath  CARDIOVASCULAR: No chest pain, palpitations, dizziness, or leg swelling  GASTROINTESTINAL: See above   GENITOURINARY: No dysuria, frequency, hematuria, or incontinence  NEUROLOGICAL: No headaches, memory loss, loss of strength, numbness, or tremors  SKIN: No itching, burning, rashes, or lesions   LYMPH NODES: No enlarged glands  ENDOCRINE: No heat or cold intolerance; No hair loss  MUSCULOSKELETAL: No joint pain or swelling; No muscle, back, or extremity pain  PSYCHIATRIC: No depression, anxiety, mood swings, or difficulty sleeping  HEME/LYMPH: No easy bruising, or bleeding gums  ALLERGY AND IMMUNOLOGIC: No hives or eczema    Vital Signs Last 24 Hrs  T(C): 37 (31 Jan 2022 12:20), Max: 38.3 (30 Jan 2022 17:34)  T(F): 98.6 (31 Jan 2022 12:20), Max: 101 (30 Jan 2022 17:34)  HR: 68 (31 Jan 2022 12:20) (68 - 83)  BP: 127/68 (31 Jan 2022 12:20) (122/72 - 137/74)  BP(mean): --  RR: 18 (31 Jan 2022 12:20) (18 - 18)  SpO2: 98% (31 Jan 2022 12:20) (97% - 100%)    PHYSICAL EXAM:  GENERAL: NAD, well-groomed, well-developed  HEAD:  Atraumatic, Normocephalic  EYES: EOMI, PERRLA, conjunctiva and sclera clear  NECK: Supple, No JVD, Normal thyroid  NERVOUS SYSTEM:  MS unchanged  CHEST/LUNG: Clear to percussion bilaterally; No rales, rhonchi, wheezing, or rubs  HEART: Regular rate and rhythm; No murmurs, rubs, or gallops  ABDOMEN: Soft, Nontender, Nondistended; Bowel sounds present  EXTREMITIES:  2+ Peripheral Pulses, No clubbing, cyanosis, or edema  LYMPH: No lymphadenopathy noted  SKIN: No rashes or lesions    LAB                          7.3    17.82 )-----------( 282      ( 31 Jan 2022 07:11 )             23.8       CBC:  01-31 @ 07:11  WBC 17.82   Hgb 7.3   Hct 23.8   Plts 282  MCV 89.8  01-30 @ 07:34  WBC 20.79   Hgb 8.2   Hct 26.5   Plts 288  MCV 89.5  01-29 @ 18:09  WBC 23.36   Hgb 8.9   Hct 28.6   Plts 277  MCV 89.1  01-29 @ 06:33  WBC 27.98   Hgb 8.6   Hct 27.4   Plts 294  MCV 88.4  01-28 @ 17:18  WBC 24.37   Hgb 9.6   Hct 30.2   Plts 325  MCV 87.0  01-28 @ 07:07  WBC 19.00   Hgb 8.7   Hct 28.4   Plts CLUMPED  MCV 93.1  01-27 @ 19:58  WBC 16.32   Hgb 7.7   Hct 26.1   Plts 412  MCV 95.6  01-27 @ 07:45  WBC 13.61   Hgb 9.7   Hct 32.1   Plts 362  MCV 94.1  01-26 @ 08:55  WBC 14.12   Hgb 9.0   Hct 30.6   Plts 320  MCV 95.6  01-25 @ 06:35  WBC 18.35   Hgb 10.3   Hct 34.9   Plts 369  MCV 94.6      Chemistry:  01-31 @ 07:11  Na+ 145  K+ 2.6  Cl- 114  CO2 24  BUN 21  Cr 0.56     01-30 @ 07:34  Na+ 148  K+ 2.9  Cl- 115  CO2 24  BUN 30  Cr 0.81     01-29 @ 06:33  Na+ 146  K+ 4.0  Cl- 115  CO2 24  BUN 42  Cr 0.76     01-28 @ 07:07  Na+ 147  K+ 5.0  Cl- 115  CO2 24  BUN 46  Cr 0.86     01-27 @ 07:45  Na+ 148  K+ 3.4  Cl- 115  CO2 29  BUN 19  Cr 0.45     01-26 @ 08:55  Na+ 147  K+ 3.5  Cl- 115  CO2 28  BUN 19  Cr 0.50     01-25 @ 06:35  Na+ 146  K+ 4.0  Cl- 113  CO2 27  BUN 19  Cr 0.57         Glucose, Serum: 134 mg/dL (01-31 @ 07:11)  Glucose, Serum: 149 mg/dL (01-30 @ 07:34)  Glucose, Serum: 126 mg/dL (01-29 @ 06:33)  Glucose, Serum: 123 mg/dL (01-28 @ 07:07)  Glucose, Serum: 115 mg/dL (01-27 @ 07:45)  Glucose, Serum: 147 mg/dL (01-26 @ 08:55)  Glucose, Serum: 139 mg/dL (01-25 @ 06:35)      31 Jan 2022 07:11    145    |  114    |  21     ----------------------------<  134    2.6     |  24     |  0.56   30 Jan 2022 07:34    148    |  115    |  30     ----------------------------<  149    2.9     |  24     |  0.81   29 Jan 2022 06:33    146    |  115    |  42     ----------------------------<  126    4.0     |  24     |  0.76   28 Jan 2022 07:07    147    |  115    |  46     ----------------------------<  123    5.0     |  24     |  0.86   27 Jan 2022 07:45    148    |  115    |  19     ----------------------------<  115    3.4     |  29     |  0.45   26 Jan 2022 08:55    147    |  115    |  19     ----------------------------<  147    3.5     |  28     |  0.50   25 Jan 2022 06:35    146    |  113    |  19     ----------------------------<  139    4.0     |  27     |  0.57     Ca    8.4        31 Jan 2022 07:11  Ca    8.5        30 Jan 2022 07:34  Ca    8.5        29 Jan 2022 06:33  Ca    8.4        28 Jan 2022 07:07  Ca    8.6        27 Jan 2022 07:45  Ca    8.4        26 Jan 2022 08:55  Ca    8.5        25 Jan 2022 06:35  Phos  2.2       31 Jan 2022 07:11  Phos  3.2       30 Jan 2022 07:34  Phos  3.5       29 Jan 2022 06:33  Phos  2.7       27 Jan 2022 07:45  Mg     2.1       31 Jan 2022 07:11  Mg     2.4       30 Jan 2022 07:34  Mg     3.0       29 Jan 2022 06:33  Mg     2.3       27 Jan 2022 07:45    TPro  5.8    /  Alb  1.9    /  TBili  0.1    /  DBili  x      /  AST  25     /  ALT  28     /  AlkPhos  72     26 Jan 2022 08:55  TPro  6.2    /  Alb  1.9    /  TBili  0.3    /  DBili  x      /  AST  18     /  ALT  21     /  AlkPhos  82     25 Jan 2022 06:35      PT/INR - ( 31 Jan 2022 07:11 )   PT: 17.3 sec;   INR: 1.52 ratio         PTT - ( 31 Jan 2022 07:11 )  PTT:30.2 sec        CAPILLARY BLOOD GLUCOSE      POCT Blood Glucose.: 140 mg/dL (31 Jan 2022 11:32)  POCT Blood Glucose.: 149 mg/dL (31 Jan 2022 05:39)  POCT Blood Glucose.: 152 mg/dL (30 Jan 2022 21:35)  POCT Blood Glucose.: 167 mg/dL (30 Jan 2022 16:05)          RADIOLOGY & ADDITIONAL TESTS:    Imaging Personally Reviewed:  [ ] YES  [ ] NO    Consultant(s) Notes Reviewed:  [ ] YES  [ ] NO    Care Discussed with Consultants/Other Providers [ ] YES  [ ] NO

## 2022-01-31 NOTE — PROGRESS NOTE ADULT - ASSESSMENT
81F with a PMH of HTN, HLD, hypothyroidism, depression, seizures who presents to the ED for abd pain found to have diverticulitis   has rising leukocytosis   had fever yesterday   tachycardic and now hypoxic   CXR (I personally reviewed) low lung volumes   origincal CT (I personally reviewed) with diverticulitis   she had a lot of pain in the abdomen on exam     12/25: s/p surgery for diverticulitis with perforation and abscess and went to the OR. intubated in ICU with vac and needs to go back to the OR, currently on zosyn, s/p one dose of diflucan last night   12/27: s/p repair and ostomy creation yesterday, wbc elevated, cultures sensitive to zosyn and candida albicans is notoriously sensitive to azoles such as fluconazole, wean of pressors and sedation, extubate when ready   12/28: Further increase in white blood cell count but overall the patient appears to be reasonably stable.  No concern of C. difficile at this juncture.  Current antibiotics are reasonable.  Leukocytosis like related to recent surgery, no concern of other superimposed process on the basis of exam.  I do not see a role to alter her antimicrobials.  12/29:  Remains intubated in ICU. still quite edematous and net positive, WBC climbing, small wound dehiscence at bottom of incision, plan for initiation of TPN today, remains on antibiotics    12/30: rising WBC, ostomy not functioning yet, very edematous CT today, may be source control issue so for now can continue same antibiotics and antifungal but may need to change if findings on the CT are worrisome or change in hemodynamics  12/31:Leukocytosis, with collections noted on CT.  However this is being done postoperative day 5, there is some possibility that this could represent postop changes but given the leukocytosis, concur with plans for attempts at percutaneous drainage.  White blood cell count down slightly compared to prior peak, will need to be trended.  Gallbladder is also distended.  Abdominal exam was benign this morning, but a calculus cholecystitis could be the differential diagnosis here as well (though n.p.o. status certainly could explain distended gallbladder, there is also report of gallbladder wall thickening).  Would modify antibiotics based on cultures from drainage, I do not believe that antibiotics and other the cells would be adequate here.  Fortunately she is off pressors, with preserved renal function, and tolerating pressure support.  1/1/22: Postop day 6.  I have some concerns with the appearance of the ostomy, though the abdominal exam overall is otherwise unchanged.  White blood cell count remains elevated, but is lower compared with prior values.  This is despite no change in antibiotic therapy.  Patient also has asymmetric edema of the upper extremities, right greater than left.  Low threshold for duplex ultrasound to exclude DVT.No new surveillance culture data.  1/2: POD7 Ostomy looks better today, UE symmetric. WBC elevated, some slight downward trend overall. Temps <= 100F.   1/3: drainage of fluid collection today, continue zosyn and fluconazole for now, monitor wbc and temps, watch ostomy output, diuresis and address third spacing   1/4: s/p drainage yesterday now growing ecoli and awaiting for sensitivities, wbc is improving, started on tube feeds    1/5: abscess growing ecoli and bacteroides, S to ceftriaxone, wbc 15, weaning trial today, pain management    1/6: awake, lung exercise today, trach planned for tomorrow, ostomy output is good. will stop fluconazole today and change antibiotics to ceftriaxone and flagly. Unclear stop date yet    1/7 trach today, continue antibiotics   1/9: s/p trach, had low grade fever but otherwise doing ok, will continues same antibiotics regimen for now but if fevers continue she will needs to be rescanned as those abscesses can progress.  1/10: low grade fever but doing well on trach-PS, ostomy with stool and gas, shakes head when asked if in pain, discussed with surgery about repeating CT scan given the temp  1/11: Still febrile, favor interval CT as above.  1/12: CT of abdomen showing some signs of improvement and elsewhere  it shows signs of worsening. wbc normal, tube feeds currently via NGT   1/13: no fever, no wbc, Cr and LFTs ok, Ceftriaxone and flagyl continued. No planned IR intervention at this time - fluid collection without access to drain, surgical drain within the collection. Pt possibly will have repeat imaging over the weekend to evaluate progress.   Attending addendum:  agree with above  continue antibiotics   vent weaning  surgical follow up for the abdominal wound  1/14: Low grade temp last night, midline placed, no leukocytosis, Cr and LFTs ok, culture data reviewed. Pt's colostomy with small amount of liquid stool and gas, no stools recorded for two days, no role for po Vancomycin at this time. The pt has been on abx since her admission, will stop all abx and will continue to monitor.    1/18: no fevers since 1/14, WBC better 11.00, cr ok, off abx, now s/p EGD, with PEG replacement.   1/24: spiking low grade fevers, low suspicion for pneumonia given little secretions and doing well on the PS wth low settings, peg site looks clean, trach site OK as well, fevers may be from small fluid collections intra-abdominally vs atelectasis Would suggest aggressive pulmonary toilet including chest vest. follow cultures and trend wbc and fever curve . If persistent fevers would start  Zosyn and PO fluconazole but for now please hold antibiotics   1/25: continues having fevers, low grade temps today, WBC better 18.35, Cr and LFTs ok, + new DVT of right common femoral vein - could be the cause of pt's fevers - on full dose Lovenox. Will continue to monitor off abx, WBC got better without abx.   Attending addendum:  patient with long hospital course and now on the floors with fever and found to have a DVT  wbc came down without antibiotics   fever curve is getting better   continue to monitor off antibiotics   treat DVT with full dose AC   suspect fevers could be from dvt but may also be from fluid collection    1/26: low grade temp today, pt is more awake and alert today, WBC improving 14.2 off abx, Cr and LFTs ok, abd wound with clean base and healing well - vac dressing was changed today during my exam. In favor to continue to monitor off abx for now.   1/27: Pt continues spiking fevers, low grade last night and 101 today, awake and alert during my exam, WBC improving off abx 13.61, recent chest xray with no acute lung disease, COVID 19 is negative. Pt's fevers most likely related to her DVT, will collect two sets of surveillance BC and will continue to monitor off abx for now.   1/28: CTA A/P done last night - There is evidence of active GI bleeding in the gastric antrum, related to peptic ulcer disease or gastritis, possible cystitis. Blood transfusion is in progress during my exam, wound vac is being changed - base of the wound is clean with no evidence of acute infection. Pt had a low grade temp last night, WBC elevated - could be reactive to DVT. Will obtain UA and UC, will hold off on abx for now.   1/29: persistent fevers, received a dose of vanco and a dose of Azactam last evening, WBC high 27.98, BC with no growth to date, UA positive, UC pending. Will start on broad spectrum antibiotic now as unclear it is unclear at this time if fevers caused by UTI only, or in combination with DVT, or other source. Pt's abd was seen with last vac dressing change - incision site wound  is clean and healing well. JPis still draining cloudy drainage.   1/30: lethargic, continues having fevers, WBC better 20.79, all BCs remain with no growth, UC pending, CT head - Acute left MCA infarct is suspected, CT chest and CT a/p performed earlier, pending radiology reading, will continue with Zosyn, Na is slightly elevated, would prefer to have UC available prior switching abx.   1/31: no fevers today, remains lethargic, vented, WBC with improvement 17.82, two sets of BCs were collected yesterday and pending result, Zosyn continued. All BCs with no growth to date, last UC is contaminated.     #RLL Pneumonia   - continue Zosyn IV (day #3)  - trend pt's temperatures  - trend WBC  - MRSA PCR not detected      #Perforated diverticulum of large intestine.   ·  Plan:   continue Zosyn IV  monitor sodium levels   s/p one time dose of Vancomycin and a dose of Amikacin on 1/28  continue wound vac   CT chest/a/p - + pneumonia, no abdominal pathology       #Fever.   ·  Plan:   continue Zosyn  trend pt's temperatures   UC - contaminated   +DVT of right common femoral vein   New left MCA infarct on CT head  CT c/a/p - new pneumonia RLL, no acute abd pathology   BC NGTD  repeat BC pending   MRSA PCR not detected     #Acute Respiratory Failure  wean oxygen as tolerated  chest PT  frequent suctioning   chest vest   HOB >30 degrees    #DVT  AC as per protocol    Discussed with Dr. Hector

## 2022-01-31 NOTE — PROGRESS NOTE ADULT - ASSESSMENT
81F with a PMH of HTN, HLD, hypothyroidism, depression, seizures p/w abd pain, found to have acute diverticulitis . acute metabolic encephalopathy sec to above Perforated sigmoid diverticulitis. 12/24/21 - Exploratory laparotomy and a sigmoid colectomy and abdominal washout abd at that time left open. Transferred to ICU for post op care on mechanical ventilation. 10mg IV lopressor in OR for Afib with RVR. 5L IVF given intra-op. EBL 100cc. Received intubated, sedated, on phenylephrine.  Patient went for abdominal wound closure and creation of colostomy on 12/26. Patient developed adb abscess 1/3 s/p IR drainage of abdominal abscesses. Initially patient on PPN for nutrition Now tolerating tube feeds with brown soft stool in colostomy.. Found to have multiple abdominal fluid collections s/p IR drainage of R abdominal abscess 1/3/22 growing e-coli and bacteroides Sensitive to ceftriaxone, continue antibiotics to ceftriaxone and flagyl per ID.   Patient was unable to wean and son consented to trach.  -Now out of shock state not on vasopressor support.  Post op a-fib RVR, s/p amio load converted to sinus rhythm.  Patient with intermittent fevers. S/P PEG and trach. Pain management on fentanyl patch.      Perforated Acute diverticulitis   s/p 12/24/21 - Exploratory laparotomy and a sigmoid colectomy and abdominal washout  Patient went for abdominal wound closure and creation of colostomy on 12/26.  Patient developed adb abscess 1/3 s/p IR drainage of abdominal abscesses.  Initially patient on PPN for nutrition Now tolerating tube feeds with brown soft stool in colostomy.  Found to have multiple abdominal fluid collections s/p IR drainage of R abdominal abscess 1/3/22 growing e-coli and bacteroides  S/P Peg placement  all abx dced as per iD recs, now febrile again  Repeat CT noted   BABAK drain care, wound vac in place   PEG and ostomy with active bleeding 1/28: lovenox stopped: given reversal ordered two units of prbc  Repeat cT abdomen with trace pelvic fluid: active gastric fundus bleeding  s/p 2 units of blood  febrile although curve improving. 100.4  npo  plan:  npo  active type and cross  GI and Surgery consulted  maintain npo for endoscopy Tuesday ( postponed to low electrolytes)    Septic Shock s/p  pressors  Transferred to ICU for post op care on mechanical ventilation.  now off pressors,  failed extubation, trach to cpap  completed ceftriaxone and flagyl per ID. trial PO vanc for diarrhea. now off all abx  Cx neg so far, covid PCR neg  Recent Followup CTA A/P noted, collections now trace, ++ Cystitis, Multiple focal opacification in RL, Recent CXR looks unchanged,.  persistent fevers, WBC increased , Re consulted ID  + right sided acute dvt  blood cultures on 1/27 negative  consulted with IR: Midline exchanged 1/28, collection in pelvis too small to drain  u/a positive on the 28th  s/p vanco and amikacin 1/28  persistently febrile  Staph A PCR positive  repeat ct chest + RLL  noted discussed cta on the 27th, radiology concerned on PE  plan:  c/w zosyn  check blood cx: covid pcr repeated    Acute blood anemia due to hemorrhagic gastritis worsened by anticoagulation  s/p 2 units of prbc  s/p 1 unit 1/31  for endoscopy on tuesday      Rapid AFIB due to Shock state  s/p amio load converted to sinus rhythm.  not on rate control or AC on downgrade, now on AC also for DVT  Cards consulted  added BB  HOLD AC      Hypoxemic respiratory failure now trach to CPAP  now stable on trach to CPAP  CTA Noted: Discussion with radio, concern  for new PE?  Pulm following  on fentayl patch for pain control  off a/c  cpap settings without change  plan:  monitor closely    Right lower extremity Acute DVT:  -off a/c due to Gi bleed  -consulted IR for IVC :asked for followup sonogram prior to ivc filter placement  plan  no a/c for now  followup duplex ordered      plan: temperature today; 100.4 Curve improving. No active bleeding noted in peg. black stool in ostomy  for endoscopy on tuesday. will give 1 unit of prbc today and aggressively replace lytes  ordered surveillance duplex of RLE as per radio request

## 2022-01-31 NOTE — PROGRESS NOTE ADULT - SUBJECTIVE AND OBJECTIVE BOX
JUAREZ GTZ  MRN-[id number]    Follow Up:  persistent fevers, PNA, ?UTI    Interval History: The pt was seen and examined earlier, no distress, still lethargic but more awake today than yesterday. The pt had no fevers today, vented via tracheostomy, WBC better.     PAST MEDICAL & SURGICAL HISTORY:  Seizure    HTN (hypertension)    Glaucoma    Thyroid disease    Blood clot of neck vein  left side    TIA (transient ischemic attack)  20 years ago    S/P appendectomy        ROS:    [x ] Unobtainable because: vented, lethargic   [ ] All other systems negative    Constitutional: no fever, no chills  Head: no trauma  Eyes: no vision changes, no eye pain  ENT:  no sore throat, no rhinorrhea  Cardiovascular:  no chest pain, no palpitation  Respiratory:  no SOB, no cough  GI:  no abd pain, no vomiting, no diarrhea  urinary: no dysuria, no hematuria, no flank pain  musculoskeletal:  no joint pain, no joint swelling  skin:  no rash  neurology:  no headache, no seizure, no change in mental status  psych: no anxiety, no depression         Allergies  No Known Allergies        ANTIMICROBIALS:  piperacillin/tazobactam IVPB.. 3.375 every 8 hours      OTHER MEDS:  acetaminophen    Suspension .. 650 milliGRAM(s) Oral every 6 hours PRN  amLODIPine   Tablet 10 milliGRAM(s) Oral daily  carvedilol 3.125 milliGRAM(s) Oral every 12 hours  chlorhexidine 0.12% Liquid 15 milliLiter(s) Oral Mucosa every 12 hours  chlorhexidine 2% Cloths 1 Application(s) Topical <User Schedule>  dextrose 40% Gel 15 Gram(s) Oral once  dextrose 5% + sodium chloride 0.45% with potassium chloride 20 mEq/L 1000 milliLiter(s) IV Continuous <Continuous>  dextrose 5%. 1000 milliLiter(s) IV Continuous <Continuous>  dextrose 5%. 1000 milliLiter(s) IV Continuous <Continuous>  dextrose 50% Injectable 25 Gram(s) IV Push once  dextrose 50% Injectable 12.5 Gram(s) IV Push once  dextrose 50% Injectable 25 Gram(s) IV Push once  dorzolamide 2%/timolol 0.5% Ophthalmic Solution 1 Drop(s) Both EYES two times a day  escitalopram 20 milliGRAM(s) Oral daily  glucagon  Injectable 1 milliGRAM(s) IntraMuscular once  levETIRAcetam  IVPB 500 milliGRAM(s) IV Intermittent every 12 hours  levothyroxine Injectable 37.5 MICROGram(s) IV Push at bedtime  pantoprazole  Injectable 40 milliGRAM(s) IV Push every 12 hours  sodium chloride 0.9% lock flush 10 milliLiter(s) IV Push every 1 hour PRN      Vital Signs Last 24 Hrs  T(C): 36.9 (31 Jan 2022 15:35), Max: 38.3 (30 Jan 2022 17:34)  T(F): 98.5 (31 Jan 2022 15:35), Max: 101 (30 Jan 2022 17:34)  HR: 71 (31 Jan 2022 15:35) (68 - 83)  BP: 126/70 (31 Jan 2022 15:35) (122/72 - 137/74)  BP(mean): --  RR: 18 (31 Jan 2022 15:35) (18 - 18)  SpO2: 98% (31 Jan 2022 15:35) (97% - 100%)    Physical Exam:  General: Nontoxic-appearing Female in no acute distress. Trached on vent, lethargic   HEENT: AT/NC. Unable to assess pt's mouth or eyes due to non-compliance   Neck: Not rigid. No sense of mass. Trach C/D/I  Nodes: None palpable.  Lungs: diminished breath sounds bilaterally   Heart: Regular rate and rhythm. +Murmur. No rub. No gallop. No palpable thrill. Right arm midline c/d/i   Abdomen: Bowel sounds now present.  Soft. not distended.  Abd incision with vac dressing.  Ostomy with dark stool and gas. Drains x1, there is a PEG tube C/D/I   Extremities: No cyanosis or clubbing. 1+ edema of bilateral lower extremities   Skin: Warm. Dry No rash. No vasculitic stigmata.  Neuro: opens her eyes   Psychiatric: unable to assess    WBC Count: 17.82 K/uL (01-31 @ 07:11)  WBC Count: 20.79 K/uL (01-30 @ 07:34)  WBC Count: 23.36 K/uL (01-29 @ 18:09)  WBC Count: 27.98 K/uL (01-29 @ 06:33)  WBC Count: 24.37 K/uL (01-28 @ 17:18)  WBC Count: 19.00 K/uL (01-28 @ 07:07)  WBC Count: 16.32 K/uL (01-27 @ 19:58)                            7.3    17.82 )-----------( 282      ( 31 Jan 2022 07:11 )             23.8       01-31    145  |  114<H>  |  21  ----------------------------<  134<H>  2.6<LL>   |  24  |  0.56    Ca    8.4<L>      31 Jan 2022 07:11  Phos  2.2     01-31  Mg     2.1     01-31            Creatinine Trend: 0.56<--, 0.81<--, 0.76<--, 0.86<--, 0.45<--, 0.50<--      MICROBIOLOGY:  v  .Sputum Sputum  01-31-22 --  --    Numerous polymorphonuclear leukocytes per low power field  Rare Squamous epithelial cells per low power field  Moderate Gram Negative Rods seen per oil power field      Catheterized Catheterized  01-28-22   >=3 organisms. Probable collection contamination.  --  --      .Blood Blood-Peripheral  01-27-22   No growth to date.  --  --      .Blood Blood-Peripheral  01-21-22   No Growth Final  --  --      Clean Catch Clean Catch (Midstream)  01-21-22   <10,000 CFU/mL Normal Urogenital Sendy  --  --      .Body Fluid Abdominal Fluid  01-03-22   Few Escherichia coli  Few Bacteroides ovatus group "Susceptibilities not performed"  --  Escherichia coli      SARS-CoV-2 Result: NotDetec (01-30-22 @ 10:40)    SARS-CoV-2: NotDetec (20 Jan 2022 18:59)  SARS-CoV-2: NotDetec (24 Dec 2021 14:38)  SARS-CoV-2: NotDetec (24 Dec 2021 00:03)    RADIOLOGY:     JUAREZ GTZ  MRN-[id number]    Follow Up:  persistent fevers, PNA, ?UTI    Interval History: The pt was seen and examined earlier, no distress, still lethargic but more awake today than yesterday. The pt had no fevers today, vented via tracheostomy, WBC better.     PAST MEDICAL & SURGICAL HISTORY:  Seizure    HTN (hypertension)    Glaucoma    Thyroid disease    Blood clot of neck vein  left side    TIA (transient ischemic attack)  20 years ago    S/P appendectomy        ROS:    [x ] Unobtainable because: vented, lethargic   [ ] All other systems negative    Constitutional: no fever, no chills  Head: no trauma  Eyes: no vision changes, no eye pain  ENT:  no sore throat, no rhinorrhea  Cardiovascular:  no chest pain, no palpitation  Respiratory:  no SOB, no cough  GI:  no abd pain, no vomiting, no diarrhea  urinary: no dysuria, no hematuria, no flank pain  musculoskeletal:  no joint pain, no joint swelling  skin:  no rash  neurology:  no headache, no seizure, no change in mental status  psych: no anxiety, no depression         Allergies  No Known Allergies        ANTIMICROBIALS:  piperacillin/tazobactam IVPB.. 3.375 every 8 hours      OTHER MEDS:  acetaminophen    Suspension .. 650 milliGRAM(s) Oral every 6 hours PRN  amLODIPine   Tablet 10 milliGRAM(s) Oral daily  carvedilol 3.125 milliGRAM(s) Oral every 12 hours  chlorhexidine 0.12% Liquid 15 milliLiter(s) Oral Mucosa every 12 hours  chlorhexidine 2% Cloths 1 Application(s) Topical <User Schedule>  dextrose 40% Gel 15 Gram(s) Oral once  dextrose 5% + sodium chloride 0.45% with potassium chloride 20 mEq/L 1000 milliLiter(s) IV Continuous <Continuous>  dextrose 5%. 1000 milliLiter(s) IV Continuous <Continuous>  dextrose 5%. 1000 milliLiter(s) IV Continuous <Continuous>  dextrose 50% Injectable 25 Gram(s) IV Push once  dextrose 50% Injectable 12.5 Gram(s) IV Push once  dextrose 50% Injectable 25 Gram(s) IV Push once  dorzolamide 2%/timolol 0.5% Ophthalmic Solution 1 Drop(s) Both EYES two times a day  escitalopram 20 milliGRAM(s) Oral daily  glucagon  Injectable 1 milliGRAM(s) IntraMuscular once  levETIRAcetam  IVPB 500 milliGRAM(s) IV Intermittent every 12 hours  levothyroxine Injectable 37.5 MICROGram(s) IV Push at bedtime  pantoprazole  Injectable 40 milliGRAM(s) IV Push every 12 hours  sodium chloride 0.9% lock flush 10 milliLiter(s) IV Push every 1 hour PRN      Vital Signs Last 24 Hrs  T(C): 36.9 (31 Jan 2022 15:35), Max: 38.3 (30 Jan 2022 17:34)  T(F): 98.5 (31 Jan 2022 15:35), Max: 101 (30 Jan 2022 17:34)  HR: 71 (31 Jan 2022 15:35) (68 - 83)  BP: 126/70 (31 Jan 2022 15:35) (122/72 - 137/74)  BP(mean): --  RR: 18 (31 Jan 2022 15:35) (18 - 18)  SpO2: 98% (31 Jan 2022 15:35) (97% - 100%)    Physical Exam:  General: Nontoxic-appearing Female in no acute distress. Trached on vent, lethargic   HEENT: AT/NC. Unable to assess pt's mouth or eyes due to non-compliance   Neck: Not rigid. No sense of mass. Trach C/D/I  Nodes: None palpable.  Lungs: bilateral rhonchi, no wheezes  Heart: Regular rate and rhythm. +Murmur. No rub. No gallop. No palpable thrill. Right arm midline c/d/i   Abdomen: Bowel sounds now present.  Soft. not distended.  Abd incision with vac dressing.  Ostomy with dark stool and gas. Drains x1, there is a PEG tube C/D/I   Extremities: No cyanosis or clubbing. 1+ edema of bilateral lower extremities   Skin: Warm. Dry No rash. No vasculitic stigmata.  Neuro: opens her eyes   Psychiatric: unable to assess    WBC Count: 17.82 K/uL (01-31 @ 07:11)  WBC Count: 20.79 K/uL (01-30 @ 07:34)  WBC Count: 23.36 K/uL (01-29 @ 18:09)  WBC Count: 27.98 K/uL (01-29 @ 06:33)  WBC Count: 24.37 K/uL (01-28 @ 17:18)  WBC Count: 19.00 K/uL (01-28 @ 07:07)  WBC Count: 16.32 K/uL (01-27 @ 19:58)                            7.3    17.82 )-----------( 282      ( 31 Jan 2022 07:11 )             23.8       01-31    145  |  114<H>  |  21  ----------------------------<  134<H>  2.6<LL>   |  24  |  0.56    Ca    8.4<L>      31 Jan 2022 07:11  Phos  2.2     01-31  Mg     2.1     01-31            Creatinine Trend: 0.56<--, 0.81<--, 0.76<--, 0.86<--, 0.45<--, 0.50<--      MICROBIOLOGY:  v  .Sputum Sputum  01-31-22 --  --    Numerous polymorphonuclear leukocytes per low power field  Rare Squamous epithelial cells per low power field  Moderate Gram Negative Rods seen per oil power field      Catheterized Catheterized  01-28-22   >=3 organisms. Probable collection contamination.  --  --      .Blood Blood-Peripheral  01-27-22   No growth to date.  --  --      .Blood Blood-Peripheral  01-21-22   No Growth Final  --  --      Clean Catch Clean Catch (Midstream)  01-21-22   <10,000 CFU/mL Normal Urogenital Sendy  --  --      .Body Fluid Abdominal Fluid  01-03-22   Few Escherichia coli  Few Bacteroides ovatus group "Susceptibilities not performed"  --  Escherichia coli      SARS-CoV-2 Result: NotDetec (01-30-22 @ 10:40)    SARS-CoV-2: NotDetec (20 Jan 2022 18:59)  SARS-CoV-2: NotDetec (24 Dec 2021 14:38)  SARS-CoV-2: NotDetec (24 Dec 2021 00:03)    RADIOLOGY:

## 2022-01-31 NOTE — PROGRESS NOTE ADULT - ASSESSMENT
81F with a PMH of HTN, HLD, hypothyroidism, depression, seizures who presents to the ED for abd pain.  Patient states that over the last two days she had developed significant abdominal pain and multiple episodes of watery diarrhea.  Reports one episode of nausea and vomiting earlier today.  Patient has no other complaints.  Vitals stable,  labs show leukocytosis and hypokalemia.  CT abdomen significant for diverticulitis.  Will admit to med surg.     (23 Dec 2021 00:17)      Upper GI Bleed with CT Angio showing bleeding in gastric antrum s/p Protamine  1) Monitor Hb ( HGB 7.3 ) , transfuse to Hb >8    2) Continue NPO    3) PPI BID   4) Carafate ( hold prior to procedure )  5) EGD Tuesday ( postponed secondary to hypokalemia, anemia )   6) Hold anticoagulants    81F with a PMH of HTN, HLD, hypothyroidism, depression, seizures who presents to the ED for abd pain.  Patient states that over the last two days she had developed significant abdominal pain and multiple episodes of watery diarrhea.  Reports one episode of nausea and vomiting earlier today.  Patient has no other complaints.  Vitals stable,  labs show leukocytosis and hypokalemia.  CT abdomen significant for diverticulitis.  Will admit to med surg.     (23 Dec 2021 00:17)      Upper GI Bleed with CT Angio showing bleeding in gastric antrum s/p Protamine  1) Monitor Hb ( HGB 7.3 ) , transfuse to Hb >8    2) Continue NPO    3) PPI BID   4) Carafate ( hold prior to procedure )  5) EGD Tuesday ( postponed secondary to hypokalemia, anemia )   6) Hold anticoagulants   ===== Spoke    81F with a PMH of HTN, HLD, hypothyroidism, depression, seizures who presents to the ED for abd pain.  Patient states that over the last two days she had developed significant abdominal pain and multiple episodes of watery diarrhea.  Reports one episode of nausea and vomiting earlier today.  Patient has no other complaints.  Vitals stable,  labs show leukocytosis and hypokalemia.  CT abdomen significant for diverticulitis.  Will admit to med surg.     (23 Dec 2021 00:17)      Upper GI Bleed with CT Angio showing bleeding in gastric antrum s/p Protamine  1) Monitor Hb ( HGB 7.3 ) , transfuse to Hb >8    2) Continue NPO    3) PPI BID   4) Carafate ( hold prior to procedure )  5) EGD Tuesday ( postponed secondary to hypokalemia, anemia )   6) Hold anticoagulants   ===== Spoke with son twice

## 2022-01-31 NOTE — PROGRESS NOTE ADULT - SUBJECTIVE AND OBJECTIVE BOX
JUAREZ ECHOLSNIS    LVS 1B 122 D    Allergies    No Known Allergies    Intolerances        PAST MEDICAL & SURGICAL HISTORY:  Seizure    HTN (hypertension)    Glaucoma    Thyroid disease    Blood clot of neck vein  left side    TIA (transient ischemic attack)  20 years ago    S/P appendectomy        FAMILY HISTORY:  FH: HTN (hypertension)        Home Medications:  amLODIPine 10 mg oral tablet: 1 tab(s) orally once a day (22 Dec 2021 16:46)  aspirin 81 mg oral tablet, chewable: 1 tab(s) orally once a day (22 Dec 2021 16:46)  escitalopram 20 mg oral tablet: 1 tab(s) orally once a day (22 Dec 2021 16:46)  keppera:  (22 Dec 2021 16:46)  levETIRAcetam 500 mg oral tablet: 1 tab(s) orally 2 times a day (22 Dec 2021 16:46)  timolol-dorzolamide 0.5%-2% preservative-free ophthalmic solution: 1 drop(s) to each affected eye 2 times a day (22 Dec 2021 16:46)  Zetia 10 mg oral tablet: 1 tab(s) orally once a day (22 Dec 2021 16:46)      MEDICATIONS  (STANDING):  amLODIPine   Tablet 10 milliGRAM(s) Oral daily  carvedilol 3.125 milliGRAM(s) Oral every 12 hours  chlorhexidine 0.12% Liquid 15 milliLiter(s) Oral Mucosa every 12 hours  chlorhexidine 2% Cloths 1 Application(s) Topical <User Schedule>  dextrose 40% Gel 15 Gram(s) Oral once  dextrose 5%. 1000 milliLiter(s) (30 mL/Hr) IV Continuous <Continuous>  dextrose 5%. 1000 milliLiter(s) (50 mL/Hr) IV Continuous <Continuous>  dextrose 5%. 1000 milliLiter(s) (100 mL/Hr) IV Continuous <Continuous>  dextrose 50% Injectable 25 Gram(s) IV Push once  dextrose 50% Injectable 12.5 Gram(s) IV Push once  dextrose 50% Injectable 25 Gram(s) IV Push once  dorzolamide 2%/timolol 0.5% Ophthalmic Solution 1 Drop(s) Both EYES two times a day  escitalopram 20 milliGRAM(s) Oral daily  glucagon  Injectable 1 milliGRAM(s) IntraMuscular once  levETIRAcetam  IVPB 500 milliGRAM(s) IV Intermittent every 12 hours  levothyroxine Injectable 37.5 MICROGram(s) IV Push at bedtime  pantoprazole  Injectable 40 milliGRAM(s) IV Push every 12 hours  piperacillin/tazobactam IVPB.. 3.375 Gram(s) IV Intermittent every 8 hours  potassium chloride  10 mEq/100 mL IVPB 10 milliEquivalent(s) IV Intermittent every 1 hour  sucralfate 1 Gram(s) Oral four times a day    MEDICATIONS  (PRN):  acetaminophen    Suspension .. 650 milliGRAM(s) Oral every 6 hours PRN Temp greater or equal to 38C (100.4F)  sodium chloride 0.9% lock flush 10 milliLiter(s) IV Push every 1 hour PRN Pre/post blood products, medications, blood draw, and to maintain line patency      Diet, NPO (01-28-22 @ 00:07) [Active]          Vital Signs Last 24 Hrs  T(C): 37.2 (31 Jan 2022 05:01), Max: 38.3 (30 Jan 2022 12:18)  T(F): 98.9 (31 Jan 2022 05:01), Max: 101 (30 Jan 2022 17:34)  HR: 71 (31 Jan 2022 08:19) (68 - 92)  BP: 137/74 (31 Jan 2022 05:01) (118/69 - 137/74)  BP(mean): --  RR: 18 (31 Jan 2022 05:01) (18 - 18)  SpO2: 98% (31 Jan 2022 08:19) (97% - 100%)      01-30-22 @ 07:01  -  01-31-22 @ 07:00  --------------------------------------------------------  IN: 960 mL / OUT: 225 mL / NET: 735 mL        Mode: CPAP with PS, FiO2: 30, PEEP: 5, PS: 10, ITime: 1, MAP: 9, PIP: 15      LABS:                        7.3    17.82 )-----------( 282      ( 31 Jan 2022 07:11 )             23.8     01-31    145  |  114<H>  |  21  ----------------------------<  134<H>  2.6<LL>   |  24  |  0.56    Ca    8.4<L>      31 Jan 2022 07:11  Phos  2.2     01-31  Mg     2.1     01-31      PT/INR - ( 31 Jan 2022 07:11 )   PT: 17.3 sec;   INR: 1.52 ratio         PTT - ( 31 Jan 2022 07:11 )  PTT:30.2 sec          WBC:  WBC Count: 17.82 K/uL (01-31 @ 07:11)  WBC Count: 20.79 K/uL (01-30 @ 07:34)  WBC Count: 23.36 K/uL (01-29 @ 18:09)  WBC Count: 27.98 K/uL (01-29 @ 06:33)  WBC Count: 24.37 K/uL (01-28 @ 17:18)  WBC Count: 19.00 K/uL (01-28 @ 07:07)  WBC Count: 16.32 K/uL (01-27 @ 19:58)      MICROBIOLOGY:  RECENT CULTURES:  01-31 .Sputum Sputum XXXX   Numerous polymorphonuclear leukocytes per low power field  Rare Squamous epithelial cells per low power field  Moderate Gram Negative Rods seen per oil power field XXXX    01-28 Catheterized Catheterized XXXX XXXX   >=3 organisms. Probable collection contamination.    01-27 .Blood Blood-Peripheral XXXX XXXX   No growth to date.                PT/INR - ( 31 Jan 2022 07:11 )   PT: 17.3 sec;   INR: 1.52 ratio         PTT - ( 31 Jan 2022 07:11 )  PTT:30.2 sec    Sodium:  Sodium, Serum: 145 mmol/L (01-31 @ 07:11)  Sodium, Serum: 148 mmol/L (01-30 @ 07:34)  Sodium, Serum: 146 mmol/L (01-29 @ 06:33)  Sodium, Serum: 147 mmol/L (01-28 @ 07:07)      0.56 mg/dL 01-31 @ 07:11  0.81 mg/dL 01-30 @ 07:34  0.76 mg/dL 01-29 @ 06:33  0.86 mg/dL 01-28 @ 07:07      Hemoglobin:  Hemoglobin: 7.3 g/dL (01-31 @ 07:11)  Hemoglobin: 8.2 g/dL (01-30 @ 07:34)  Hemoglobin: 8.9 g/dL (01-29 @ 18:09)  Hemoglobin: 8.6 g/dL (01-29 @ 06:33)  Hemoglobin: 9.6 g/dL (01-28 @ 17:18)  Hemoglobin: 8.7 g/dL (01-28 @ 07:07)  Hemoglobin: 7.7 g/dL (01-27 @ 19:58)      Platelets: Platelet Count - Automated: 282 K/uL (01-31 @ 07:11)  Platelet Count - Automated: 288 K/uL (01-30 @ 07:34)  Platelet Count - Automated: 277 K/uL (01-29 @ 18:09)  Platelet Count - Automated: 294 K/uL (01-29 @ 06:33)  Platelet Count - Automated: 325 K/uL (01-28 @ 17:18)  Platelet Count - Automated: CLUMPED K/uL (01-28 @ 07:07)  Platelet Count - Automated: 412 K/uL (01-27 @ 19:58)              RADIOLOGY & ADDITIONAL STUDIES:      MICROBIOLOGY:  RECENT CULTURES:  01-31 .Sputum Sputum XXXX   Numerous polymorphonuclear leukocytes per low power field  Rare Squamous epithelial cells per low power field  Moderate Gram Negative Rods seen per oil power field XXXX    01-28 Catheterized Catheterized XXXX XXXX   >=3 organisms. Probable collection contamination.    01-27 .Blood Blood-Peripheral XXXX XXXX   No growth to date.

## 2022-01-31 NOTE — PROGRESS NOTE ADULT - ASSESSMENT
TRESA PIZARRO 81 f 12/22/2021 1942 DR ANTONIO PATTON     REVIEW OF SYMPTOMS      Able to give (reliable) ROS  NO     PHYSICAL EXAM    HEENT Unremarkable  atraumatic   RESP Fair air entry EXP prolonged    Harsh breath sound Resp distres mild   CARDIAC S1 S2 No S3     NO JVD    ABDOMEN SOFT BS PRESENT NOT DISTENDED No hepatosplenomegaly   PEDAL EDEMA present No calf tenderness  NO rash     ______  DOA/CC.  12/22/2021 81F w/ HTN, HLD, hypothyroidism, depression, seizures. Presented w/ abdominal pain found to have acute sigmoid diverticulitis    PMH.  pmh Hytn  pmh Sz.    PROBLEMS.  Feculent peritonitis poa 12/22  Colostomy 12/26  Initial abio course competed 1/15    R LEFTY removd 1/22/2022  DVT 1/25/2022 r cfv fd lvnx startd   Drop Hb 1/27 Lvnx dced  GI bl peg and ostomy 1/28  NPO 1/28  Zosyn 1/29   Need for ivcf 1/30/2022 dw Dr Patton  New rll pneum 1/30 ct   Ac L MCA cva 1/30/2022 CT     MISC ISSUES.  COVID STATUS. 12/24 pcr (-)  ICU STAY. 12/22-1/12/2022   GOC.  1/13/2022 full code       BEST PRACTICE ISSUES.                                                  HEAD OF BED ELEVATION. Yes  DVT PROPHYLAXIS.     1/28/2022 lvnx dced because of active gi bleed    1/25/2022 lvnx 80.2   1/25/2022 V duplx partially occlusive thrombus r cfv ac to subacute   1/7 lvnx 40   LIGHT PROPHYLAXIS.    1/11 protonix 40                                                                                     DIET.    1/28/2022 npo   1/20/2022 jevity 1.5 1200  1/18/2022 npo  1/7 vital af 1200 ngt          INFECTION PROPHYLAXIS.   1/7 Chlorhexidine .12%   1/7 chlorhexidine 2%    SPEECH SWALLOW RECOMMENDATIONS.       PATIENT DATA   VITALS/PO/IO/VENT/DRIPS.  1/31/2022 72 120/70   1/31/2022 ac 15/450/5/.3      ASSESSMENT/RECOMMENDATIONS.    HEMODYNAMICS.   Monitor bp Target MAP 65 (+)    RESP.   Monitor po Target po 90-95%  1/14/2022 15/450/.4/5 747/38/110     DVT  1/25/2022 V duplx partially occlusive thrombus r cfv ac to subacute   1/25 lvnx 80.2   1/28/2022 lvnx 80.2 dced by PA as Hb had dropped requiring transfusion  1/28/2022 RECOMMEND Vasc surgery eval for ivcf asap   1/30/2022 dw Dr Patton He told me Vasc and IR have been contacted    OXYGEN REQUIREMENTS.   1/31/2022 fio2 30%  1/24/2022 30%  1/17/2022 40%  1/13/2022 40%    VENT MANAGEMENT.   HOB elevation  Target Pplat 35 (-)  Target PO 90-95%  Target pH 730 (+)    FEVER 1/28/2022.  New rll pneum 1/30 ct  Abdominal infection sec perforated sigmoid div feculent peritonitis poa 12/22.  Pt was taken off abio 1/15  1/28/2021 She has been developing progressive leukocytosis and low gd fever   w 1/27-1/28 -  1/29-1/30-1/31 [id number] - 20-17   1/30/2022 ct ch new rll pneu  1/21 ctap showed decreased collections cw 1/11 1/29/2022 zosyn started by id    SP ABD SURGERY.  81F admitted with Acute Sigmoid Diverticulitis s/p ex lap, sigmoid resection, peritoneal lavage 12/25 and RTOR 12/26 for irrigation of abdominal cavity with closure and creation of colostomy. S/p bedside IR aspiration of fluid collection 1/3. Tracheostomy 1/7.   Deep pelvic abscess noted on CT,  Local drain/ostomy care per nursing  1/22/2022 r lefty removd         ANEMIA.  Hb 1/27-1/27-1/28-1/28-1/29-1/31 Hb 9.7-7.7-8.7-9.6 - 8.9-7.3   1/28/2022 lvnx 80.2 dced     TRANSFUSION.  1/28/2022 2u prbc     AMS  1/30/2022 ct h ac l mca cva   suggest neuro eval    GT placed 1/18    GI BLEED   11/28 peg and ostomy with active bleed  1/29/2022 egd planned for 1/31    Hypernatremia  Na 1/26-1/27-1/28-1/31/2022 Na 147-148 -147 - 143     IV Fluids.  1/30/2022 IV D5 30 (Dr RUBY)  1/29/2022 Pt is npo   1/29/2022 Recommend iv maintenance fluid      TIME SPENT   Over 25 minutes aggregate care time spent on encounter; activities included   direct patient care, counseling and/or coordinating care reviewing notes, lab data/ imaging , discussion with multidisciplinary team/ patient  /family and explaining in detail risks, benefits, alternatives  of the recommendations     TRESA PIZARRO 81 f 12/22/2021 1942 DR ANTONIO PATTON

## 2022-01-31 NOTE — PROGRESS NOTE ADULT - SUBJECTIVE AND OBJECTIVE BOX
Patient seen and examined at bedside.    events noted  1. Hb dropped today.   2. lytes low  3. A/C on hold  4. patient lethargic  5. cpap settings without change  6. black stool in ostom7  7. NPO for EGD, postponed until tuesday  8 on iV zosyn. blood culture and sputum ordered by ID    Review of Systems:  unable to obtain due to lethargy    Objective:  Vitals  T(C): 36.9 (01-31-22 @ 15:35), Max: 38.3 (01-30-22 @ 17:34)  HR: 71 (01-31-22 @ 15:35) (68 - 83)  BP: 126/70 (01-31-22 @ 15:35) (122/72 - 137/74)  RR: 18 (01-31-22 @ 15:35) (18 - 18)  SpO2: 98% (01-31-22 @ 15:35) (97% - 100%)    Physical Exam:  General: comfortable, no acute distress, well nourished  HEENT: Atraumatic, no LAD, trachea midline, PERRLA  Cardiovascular: normal s1s2, no murmurs, gallops or fricition rubs  Pulmonary: clear to ausculation Bilaterally, no wheezing , rhonchi + trach to cpap  Gastrointestinal: soft non tender non distended, no masses felt, no organomegally,  BABAK + ostomy  Muscloskeletal: no lower extremity edema, intact bilateral lower extremity pulses  Neurological: CN II-12 intact. No focal weakness  Psychiatrical: lethargic today  SKIN: no rash, lesions or ulcers    Labs:                          7.3    17.82 )-----------( 282      ( 31 Jan 2022 07:11 )             23.8     01-31    145  |  114<H>  |  21  ----------------------------<  134<H>  2.6<LL>   |  24  |  0.56    Ca    8.4<L>      31 Jan 2022 07:11  Phos  2.2     01-31  Mg     2.1     01-31        PT/INR - ( 31 Jan 2022 07:11 )   PT: 17.3 sec;   INR: 1.52 ratio         PTT - ( 31 Jan 2022 07:11 )  PTT:30.2 sec      Active Medications  MEDICATIONS  (STANDING):  amLODIPine   Tablet 10 milliGRAM(s) Oral daily  carvedilol 3.125 milliGRAM(s) Oral every 12 hours  chlorhexidine 0.12% Liquid 15 milliLiter(s) Oral Mucosa every 12 hours  chlorhexidine 2% Cloths 1 Application(s) Topical <User Schedule>  dextrose 40% Gel 15 Gram(s) Oral once  dextrose 5% + sodium chloride 0.45% with potassium chloride 20 mEq/L 1000 milliLiter(s) (42 mL/Hr) IV Continuous <Continuous>  dextrose 5%. 1000 milliLiter(s) (50 mL/Hr) IV Continuous <Continuous>  dextrose 5%. 1000 milliLiter(s) (100 mL/Hr) IV Continuous <Continuous>  dextrose 50% Injectable 25 Gram(s) IV Push once  dextrose 50% Injectable 12.5 Gram(s) IV Push once  dextrose 50% Injectable 25 Gram(s) IV Push once  dorzolamide 2%/timolol 0.5% Ophthalmic Solution 1 Drop(s) Both EYES two times a day  escitalopram 20 milliGRAM(s) Oral daily  glucagon  Injectable 1 milliGRAM(s) IntraMuscular once  levETIRAcetam  IVPB 500 milliGRAM(s) IV Intermittent every 12 hours  levothyroxine Injectable 37.5 MICROGram(s) IV Push at bedtime  pantoprazole  Injectable 40 milliGRAM(s) IV Push every 12 hours  piperacillin/tazobactam IVPB.. 3.375 Gram(s) IV Intermittent every 8 hours    MEDICATIONS  (PRN):  acetaminophen    Suspension .. 650 milliGRAM(s) Oral every 6 hours PRN Temp greater or equal to 38C (100.4F)  sodium chloride 0.9% lock flush 10 milliLiter(s) IV Push every 1 hour PRN Pre/post blood products, medications, blood draw, and to maintain line patency

## 2022-01-31 NOTE — PROGRESS NOTE ADULT - SUBJECTIVE AND OBJECTIVE BOX
Pt seen and examined at bedside with Dr. Fairbanks.  Febrile o/n.    Vital Signs Last 24 Hrs  T(F): 98.6 (01-31-22 @ 12:20), Max: 101 (01-30-22 @ 17:34)  HR: 68 (01-31-22 @ 12:20)  BP: 127/68 (01-31-22 @ 12:20)  RR: 18 (01-31-22 @ 12:20)  SpO2: 98% (01-31-22 @ 12:20)    GENERAL: Awake, NAD  CHEST/LUNG: Respirations nonlabored  HEART: S1S2, RRR  ABDOMEN: Soft, NTND. Midline vac functioning, no blood noted. Ostomy with black stool in bag. RLQ BABAK with purulent output, 25cc/24hrs. Clamped PEG.   EXTREMITIES: B/l LE edema    LABS:                        7.3    17.82 )-----------( 282      ( 31 Jan 2022 07:11 )             23.8     01-31    145  |  114<H>  |  21  ----------------------------<  134<H>  2.6<LL>   |  24  |  0.56    Ca    8.4<L>      31 Jan 2022 07:11  Phos  2.2     01-31  Mg     2.1     01-31    A/P: 81F s/p ex lap, sigmoid rxn, abthera placement 12/25, RTOR for completion of hartmanns procedure and closure of abdominal wall 12/26, IR  abscess aspiration 1/3, trach 1/7, PEG 1/18.   CT 1/21 shows improving fluid collections. R BABAK removed 1/22.  US 1/25 shows partially occlusive thrombus of the right common femoral vein.  With active GI bleeding in gastric antrum related to ulcer vs gastritis overnight. tranfused 2u PRBC, protamine  +UTI  -- EGD today, f/u findings  -- PPI, carafate, A/C on hold  -- wound vac  -- BABAK, monitor OP  -- PT  -- continue medical management and supportive care

## 2022-01-31 NOTE — CHART NOTE - NSCHARTNOTEFT_GEN_A_CORE
Patient with small nonocclusive subacute thrombus in right common femoral vein, likely not clinically significant. Would repeat ultrasound tomorrow to evaluate for progression and/or stability as well as repeat blood cultures. IR will reassess once these studies are done. IR brief consult - Patient with small nonocclusive subacute thrombus in right common femoral vein, likely not clinically significant. Would repeat ultrasound tomorrow to evaluate for progression and/or stability as well as repeat blood cultures. IR will reassess once these studies are done.

## 2022-02-01 LAB
ALBUMIN SERPL ELPH-MCNC: 1.4 G/DL — LOW (ref 3.3–5)
ALP SERPL-CCNC: 144 U/L — HIGH (ref 40–120)
ALT FLD-CCNC: 203 U/L — HIGH (ref 12–78)
ANION GAP SERPL CALC-SCNC: 6 MMOL/L — SIGNIFICANT CHANGE UP (ref 5–17)
AST SERPL-CCNC: 207 U/L — HIGH (ref 15–37)
BASOPHILS # BLD AUTO: 0.05 K/UL — SIGNIFICANT CHANGE UP (ref 0–0.2)
BASOPHILS NFR BLD AUTO: 0.3 % — SIGNIFICANT CHANGE UP (ref 0–2)
BILIRUB SERPL-MCNC: 0.5 MG/DL — SIGNIFICANT CHANGE UP (ref 0.2–1.2)
BLD GP AB SCN SERPL QL: SIGNIFICANT CHANGE UP
BUN SERPL-MCNC: 16 MG/DL — SIGNIFICANT CHANGE UP (ref 7–23)
CALCIUM SERPL-MCNC: 8.5 MG/DL — SIGNIFICANT CHANGE UP (ref 8.5–10.1)
CHLORIDE SERPL-SCNC: 115 MMOL/L — HIGH (ref 96–108)
CO2 SERPL-SCNC: 24 MMOL/L — SIGNIFICANT CHANGE UP (ref 22–31)
CREAT SERPL-MCNC: 0.42 MG/DL — LOW (ref 0.5–1.3)
CULTURE RESULTS: SIGNIFICANT CHANGE UP
CULTURE RESULTS: SIGNIFICANT CHANGE UP
EOSINOPHIL # BLD AUTO: 0.33 K/UL — SIGNIFICANT CHANGE UP (ref 0–0.5)
EOSINOPHIL NFR BLD AUTO: 2 % — SIGNIFICANT CHANGE UP (ref 0–6)
GLUCOSE BLDC GLUCOMTR-MCNC: 119 MG/DL — HIGH (ref 70–99)
GLUCOSE BLDC GLUCOMTR-MCNC: 126 MG/DL — HIGH (ref 70–99)
GLUCOSE SERPL-MCNC: 122 MG/DL — HIGH (ref 70–99)
HCT VFR BLD CALC: 30.5 % — LOW (ref 34.5–45)
HGB BLD-MCNC: 9.6 G/DL — LOW (ref 11.5–15.5)
IMM GRANULOCYTES NFR BLD AUTO: 1.6 % — HIGH (ref 0–1.5)
LYMPHOCYTES # BLD AUTO: 0.99 K/UL — LOW (ref 1–3.3)
LYMPHOCYTES # BLD AUTO: 5.9 % — LOW (ref 13–44)
MCHC RBC-ENTMCNC: 27.9 PG — SIGNIFICANT CHANGE UP (ref 27–34)
MCHC RBC-ENTMCNC: 31.5 G/DL — LOW (ref 32–36)
MCV RBC AUTO: 88.7 FL — SIGNIFICANT CHANGE UP (ref 80–100)
MONOCYTES # BLD AUTO: 0.72 K/UL — SIGNIFICANT CHANGE UP (ref 0–0.9)
MONOCYTES NFR BLD AUTO: 4.3 % — SIGNIFICANT CHANGE UP (ref 2–14)
NEUTROPHILS # BLD AUTO: 14.41 K/UL — HIGH (ref 1.8–7.4)
NEUTROPHILS NFR BLD AUTO: 85.9 % — HIGH (ref 43–77)
NRBC # BLD: 0 /100 WBCS — SIGNIFICANT CHANGE UP (ref 0–0)
PHOSPHATE SERPL-MCNC: 3 MG/DL — SIGNIFICANT CHANGE UP (ref 2.5–4.5)
PLATELET # BLD AUTO: 330 K/UL — SIGNIFICANT CHANGE UP (ref 150–400)
POTASSIUM SERPL-MCNC: 3.7 MMOL/L — SIGNIFICANT CHANGE UP (ref 3.5–5.3)
POTASSIUM SERPL-SCNC: 3.7 MMOL/L — SIGNIFICANT CHANGE UP (ref 3.5–5.3)
PROT SERPL-MCNC: 6.1 GM/DL — SIGNIFICANT CHANGE UP (ref 6–8.3)
RBC # BLD: 3.44 M/UL — LOW (ref 3.8–5.2)
RBC # FLD: 17.1 % — HIGH (ref 10.3–14.5)
SODIUM SERPL-SCNC: 145 MMOL/L — SIGNIFICANT CHANGE UP (ref 135–145)
SPECIMEN SOURCE: SIGNIFICANT CHANGE UP
SPECIMEN SOURCE: SIGNIFICANT CHANGE UP
WBC # BLD: 16.76 K/UL — HIGH (ref 3.8–10.5)
WBC # FLD AUTO: 16.76 K/UL — HIGH (ref 3.8–10.5)

## 2022-02-01 PROCEDURE — 99233 SBSQ HOSP IP/OBS HIGH 50: CPT

## 2022-02-01 PROCEDURE — 99232 SBSQ HOSP IP/OBS MODERATE 35: CPT

## 2022-02-01 RX ORDER — SODIUM CHLORIDE 9 MG/ML
1000 INJECTION, SOLUTION INTRAVENOUS
Refills: 0 | Status: DISCONTINUED | OUTPATIENT
Start: 2022-02-01 | End: 2022-02-01

## 2022-02-01 RX ORDER — ONDANSETRON 8 MG/1
4 TABLET, FILM COATED ORAL ONCE
Refills: 0 | Status: DISCONTINUED | OUTPATIENT
Start: 2022-02-01 | End: 2022-02-01

## 2022-02-01 RX ADMIN — PANTOPRAZOLE SODIUM 40 MILLIGRAM(S): 20 TABLET, DELAYED RELEASE ORAL at 05:53

## 2022-02-01 RX ADMIN — PIPERACILLIN AND TAZOBACTAM 25 GRAM(S): 4; .5 INJECTION, POWDER, LYOPHILIZED, FOR SOLUTION INTRAVENOUS at 09:53

## 2022-02-01 RX ADMIN — PIPERACILLIN AND TAZOBACTAM 25 GRAM(S): 4; .5 INJECTION, POWDER, LYOPHILIZED, FOR SOLUTION INTRAVENOUS at 13:03

## 2022-02-01 RX ADMIN — Medication 37.5 MICROGRAM(S): at 21:12

## 2022-02-01 RX ADMIN — CHLORHEXIDINE GLUCONATE 15 MILLILITER(S): 213 SOLUTION TOPICAL at 17:30

## 2022-02-01 RX ADMIN — CHLORHEXIDINE GLUCONATE 1 APPLICATION(S): 213 SOLUTION TOPICAL at 05:53

## 2022-02-01 RX ADMIN — CARVEDILOL PHOSPHATE 3.12 MILLIGRAM(S): 80 CAPSULE, EXTENDED RELEASE ORAL at 05:54

## 2022-02-01 RX ADMIN — SODIUM CHLORIDE 100 MILLILITER(S): 9 INJECTION, SOLUTION INTRAVENOUS at 14:44

## 2022-02-01 RX ADMIN — DORZOLAMIDE HYDROCHLORIDE TIMOLOL MALEATE 1 DROP(S): 20; 5 SOLUTION/ DROPS OPHTHALMIC at 05:53

## 2022-02-01 RX ADMIN — LEVETIRACETAM 420 MILLIGRAM(S): 250 TABLET, FILM COATED ORAL at 05:56

## 2022-02-01 RX ADMIN — ESCITALOPRAM OXALATE 20 MILLIGRAM(S): 10 TABLET, FILM COATED ORAL at 11:57

## 2022-02-01 RX ADMIN — CHLORHEXIDINE GLUCONATE 15 MILLILITER(S): 213 SOLUTION TOPICAL at 05:53

## 2022-02-01 RX ADMIN — PIPERACILLIN AND TAZOBACTAM 25 GRAM(S): 4; .5 INJECTION, POWDER, LYOPHILIZED, FOR SOLUTION INTRAVENOUS at 01:13

## 2022-02-01 RX ADMIN — PIPERACILLIN AND TAZOBACTAM 25 GRAM(S): 4; .5 INJECTION, POWDER, LYOPHILIZED, FOR SOLUTION INTRAVENOUS at 21:12

## 2022-02-01 RX ADMIN — PANTOPRAZOLE SODIUM 40 MILLIGRAM(S): 20 TABLET, DELAYED RELEASE ORAL at 17:30

## 2022-02-01 RX ADMIN — CARVEDILOL PHOSPHATE 3.12 MILLIGRAM(S): 80 CAPSULE, EXTENDED RELEASE ORAL at 17:36

## 2022-02-01 RX ADMIN — LEVETIRACETAM 420 MILLIGRAM(S): 250 TABLET, FILM COATED ORAL at 17:30

## 2022-02-01 RX ADMIN — DORZOLAMIDE HYDROCHLORIDE TIMOLOL MALEATE 1 DROP(S): 20; 5 SOLUTION/ DROPS OPHTHALMIC at 17:30

## 2022-02-01 RX ADMIN — AMLODIPINE BESYLATE 10 MILLIGRAM(S): 2.5 TABLET ORAL at 05:54

## 2022-02-01 NOTE — PROGRESS NOTE ADULT - SUBJECTIVE AND OBJECTIVE BOX
Patient seen and examined at bedside  no acute overnight events  patient npo for egd. alert today. nodding head  AFEBRILE TODAY  vitals stable  cbc and bmp reviewed.  K and po4 acceptable  on IV zosyn  cpap settings are the same    Review of Systems:  limited ROS today due to trach. also only nodding head    Objective:  Vitals  T(C): 37.7 (02-01-22 @ 14:11), Max: 37.9 (01-31-22 @ 18:34)  HR: 71 (02-01-22 @ 14:56) (66 - 78)  BP: 135/56 (02-01-22 @ 14:56) (126/70 - 143/69)  RR: 20 (02-01-22 @ 14:56) (18 - 20)  SpO2: 98% (02-01-22 @ 14:56) (95% - 100%)    Physical Exam:  General: comfortable, no acute distress, well nourished  HEENT: Atraumatic, no LAD, trachea midline, PERRLA  Cardiovascular: normal s1s2, no murmurs, gallops or fricition rubs  Pulmonary: clear to ausculation Bilaterally, no wheezing , rhonchi  Gastrointestinal: soft non tender non distended, no masses felt, no organomegally  Muscloskeletal: no lower extremity edema, intact bilateral lower extremity pulses  Neurological: CN II-12 intact. No focal weakness  Psychiatrical: normal mood, cooperative  SKIN: no rash, lesions or ulcers  LINES/DRAINS: Midline wound vac functioning. Ostomy pink and viable with melanotic liquid stool in bag. PEG clamped. BABAK with scant purulent output    Labs:                          9.6    16.76 )-----------( 330      ( 01 Feb 2022 07:43 )             30.5     02-01    145  |  115<H>  |  16  ----------------------------<  122<H>  3.7   |  24  |  0.42<L>    Ca    8.5      01 Feb 2022 07:43  Phos  3.0     02-01  Mg     2.1     01-31    TPro  6.1  /  Alb  1.4<L>  /  TBili  0.5  /  DBili  x   /  AST  207<H>  /  ALT  203<H>  /  AlkPhos  144<H>  02-01    LIVER FUNCTIONS - ( 01 Feb 2022 07:43 )  Alb: 1.4 g/dL / Pro: 6.1 gm/dL / ALK PHOS: 144 U/L / ALT: 203 U/L / AST: 207 U/L / GGT: x           PT/INR - ( 31 Jan 2022 07:11 )   PT: 17.3 sec;   INR: 1.52 ratio         PTT - ( 31 Jan 2022 07:11 )  PTT:30.2 sec      Active Medications  MEDICATIONS  (STANDING):  amLODIPine   Tablet 10 milliGRAM(s) Oral daily  carvedilol 3.125 milliGRAM(s) Oral every 12 hours  chlorhexidine 0.12% Liquid 15 milliLiter(s) Oral Mucosa every 12 hours  chlorhexidine 2% Cloths 1 Application(s) Topical <User Schedule>  dextrose 40% Gel 15 Gram(s) Oral once  dextrose 5% + sodium chloride 0.45% with potassium chloride 20 mEq/L 1000 milliLiter(s) (42 mL/Hr) IV Continuous <Continuous>  dextrose 5%. 1000 milliLiter(s) (50 mL/Hr) IV Continuous <Continuous>  dextrose 5%. 1000 milliLiter(s) (100 mL/Hr) IV Continuous <Continuous>  dextrose 50% Injectable 25 Gram(s) IV Push once  dextrose 50% Injectable 12.5 Gram(s) IV Push once  dextrose 50% Injectable 25 Gram(s) IV Push once  dorzolamide 2%/timolol 0.5% Ophthalmic Solution 1 Drop(s) Both EYES two times a day  escitalopram 20 milliGRAM(s) Oral daily  glucagon  Injectable 1 milliGRAM(s) IntraMuscular once  lactated ringers. 1000 milliLiter(s) (100 mL/Hr) IV Continuous <Continuous>  levETIRAcetam  IVPB 500 milliGRAM(s) IV Intermittent every 12 hours  levothyroxine Injectable 37.5 MICROGram(s) IV Push at bedtime  pantoprazole  Injectable 40 milliGRAM(s) IV Push every 12 hours  piperacillin/tazobactam IVPB.. 3.375 Gram(s) IV Intermittent every 8 hours    MEDICATIONS  (PRN):  acetaminophen    Suspension .. 650 milliGRAM(s) Oral every 6 hours PRN Temp greater or equal to 38C (100.4F)  ondansetron Injectable 4 milliGRAM(s) IV Push once PRN Nausea and/or Vomiting  sodium chloride 0.9% lock flush 10 milliLiter(s) IV Push every 1 hour PRN Pre/post blood products, medications, blood draw, and to maintain line patency

## 2022-02-01 NOTE — PROGRESS NOTE ADULT - SUBJECTIVE AND OBJECTIVE BOX
JUAREZ ECHOLSNIS    LVS 1B 122 D    Allergies    No Known Allergies    Intolerances        PAST MEDICAL & SURGICAL HISTORY:  Seizure    HTN (hypertension)    Glaucoma    Thyroid disease    Blood clot of neck vein  left side    TIA (transient ischemic attack)  20 years ago    S/P appendectomy        FAMILY HISTORY:  FH: HTN (hypertension)        Home Medications:  amLODIPine 10 mg oral tablet: 1 tab(s) orally once a day (22 Dec 2021 16:46)  aspirin 81 mg oral tablet, chewable: 1 tab(s) orally once a day (22 Dec 2021 16:46)  escitalopram 20 mg oral tablet: 1 tab(s) orally once a day (22 Dec 2021 16:46)  keppera:  (22 Dec 2021 16:46)  levETIRAcetam 500 mg oral tablet: 1 tab(s) orally 2 times a day (22 Dec 2021 16:46)  timolol-dorzolamide 0.5%-2% preservative-free ophthalmic solution: 1 drop(s) to each affected eye 2 times a day (22 Dec 2021 16:46)  Zetia 10 mg oral tablet: 1 tab(s) orally once a day (22 Dec 2021 16:46)      MEDICATIONS  (STANDING):  amLODIPine   Tablet 10 milliGRAM(s) Oral daily  carvedilol 3.125 milliGRAM(s) Oral every 12 hours  chlorhexidine 0.12% Liquid 15 milliLiter(s) Oral Mucosa every 12 hours  chlorhexidine 2% Cloths 1 Application(s) Topical <User Schedule>  dextrose 40% Gel 15 Gram(s) Oral once  dextrose 5% + sodium chloride 0.45% with potassium chloride 20 mEq/L 1000 milliLiter(s) (42 mL/Hr) IV Continuous <Continuous>  dextrose 5%. 1000 milliLiter(s) (50 mL/Hr) IV Continuous <Continuous>  dextrose 5%. 1000 milliLiter(s) (100 mL/Hr) IV Continuous <Continuous>  dextrose 50% Injectable 25 Gram(s) IV Push once  dextrose 50% Injectable 12.5 Gram(s) IV Push once  dextrose 50% Injectable 25 Gram(s) IV Push once  dorzolamide 2%/timolol 0.5% Ophthalmic Solution 1 Drop(s) Both EYES two times a day  escitalopram 20 milliGRAM(s) Oral daily  glucagon  Injectable 1 milliGRAM(s) IntraMuscular once  levETIRAcetam  IVPB 500 milliGRAM(s) IV Intermittent every 12 hours  levothyroxine Injectable 37.5 MICROGram(s) IV Push at bedtime  pantoprazole  Injectable 40 milliGRAM(s) IV Push every 12 hours  piperacillin/tazobactam IVPB.. 3.375 Gram(s) IV Intermittent every 8 hours    MEDICATIONS  (PRN):  acetaminophen    Suspension .. 650 milliGRAM(s) Oral every 6 hours PRN Temp greater or equal to 38C (100.4F)  sodium chloride 0.9% lock flush 10 milliLiter(s) IV Push every 1 hour PRN Pre/post blood products, medications, blood draw, and to maintain line patency      Diet, NPO (01-28-22 @ 00:07) [Active]          Vital Signs Last 24 Hrs  T(C): 37.4 (01 Feb 2022 05:27), Max: 37.9 (31 Jan 2022 18:34)  T(F): 99.3 (01 Feb 2022 05:27), Max: 100.3 (31 Jan 2022 18:34)  HR: 66 (01 Feb 2022 05:34) (66 - 78)  BP: 136/74 (01 Feb 2022 05:27) (126/70 - 143/69)  BP(mean): --  RR: 20 (01 Feb 2022 05:27) (18 - 20)  SpO2: 97% (01 Feb 2022 05:34) (96% - 98%)      01-31-22 @ 07:01  -  02-01-22 @ 07:00  --------------------------------------------------------  IN: 704 mL / OUT: 500 mL / NET: 204 mL        Mode: CPAP with PS, FiO2: 30, PEEP: 5, PS: 10, ITime: 1, MAP: 9, PIP: 15      LABS:                        9.6    16.76 )-----------( 330      ( 01 Feb 2022 07:43 )             30.5     02-01    145  |  115<H>  |  16  ----------------------------<  122<H>  3.7   |  24  |  0.42<L>    Ca    8.5      01 Feb 2022 07:43  Phos  3.0     02-01  Mg     2.1     01-31    TPro  6.1  /  Alb  1.4<L>  /  TBili  0.5  /  DBili  x   /  AST  207<H>  /  ALT  203<H>  /  AlkPhos  144<H>  02-01    PT/INR - ( 31 Jan 2022 07:11 )   PT: 17.3 sec;   INR: 1.52 ratio         PTT - ( 31 Jan 2022 07:11 )  PTT:30.2 sec          WBC:  WBC Count: 16.76 K/uL (02-01 @ 07:43)  WBC Count: 17.82 K/uL (01-31 @ 07:11)  WBC Count: 20.79 K/uL (01-30 @ 07:34)  WBC Count: 23.36 K/uL (01-29 @ 18:09)  WBC Count: 27.98 K/uL (01-29 @ 06:33)  WBC Count: 24.37 K/uL (01-28 @ 17:18)      MICROBIOLOGY:  RECENT CULTURES:  01-31 .Sputum Sputum XXXX   Numerous polymorphonuclear leukocytes per low power field  Rare Squamous epithelial cells per low power field  Moderate Gram Negative Rods seen per oil power field   Few Pseudomonas aeruginosa    01-31 .Blood Blood-Peripheral XXXX XXXX   No growth to date.    01-28 Catheterized Catheterized XXXX XXXX   >=3 organisms. Probable collection contamination.    01-27 .Blood Blood-Peripheral XXXX XXXX   No growth to date.                PT/INR - ( 31 Jan 2022 07:11 )   PT: 17.3 sec;   INR: 1.52 ratio         PTT - ( 31 Jan 2022 07:11 )  PTT:30.2 sec    Sodium:  Sodium, Serum: 145 mmol/L (02-01 @ 07:43)  Sodium, Serum: 143 mmol/L (01-31 @ 17:52)  Sodium, Serum: 145 mmol/L (01-31 @ 07:11)  Sodium, Serum: 148 mmol/L (01-30 @ 07:34)  Sodium, Serum: 146 mmol/L (01-29 @ 06:33)      0.42 mg/dL 02-01 @ 07:43  0.52 mg/dL 01-31 @ 17:52  0.56 mg/dL 01-31 @ 07:11  0.81 mg/dL 01-30 @ 07:34  0.76 mg/dL 01-29 @ 06:33      Hemoglobin:  Hemoglobin: 9.6 g/dL (02-01 @ 07:43)  Hemoglobin: 7.3 g/dL (01-31 @ 07:11)  Hemoglobin: 8.2 g/dL (01-30 @ 07:34)  Hemoglobin: 8.9 g/dL (01-29 @ 18:09)  Hemoglobin: 8.6 g/dL (01-29 @ 06:33)  Hemoglobin: 9.6 g/dL (01-28 @ 17:18)      Platelets: Platelet Count - Automated: 330 K/uL (02-01 @ 07:43)  Platelet Count - Automated: 282 K/uL (01-31 @ 07:11)  Platelet Count - Automated: 288 K/uL (01-30 @ 07:34)  Platelet Count - Automated: 277 K/uL (01-29 @ 18:09)  Platelet Count - Automated: 294 K/uL (01-29 @ 06:33)  Platelet Count - Automated: 325 K/uL (01-28 @ 17:18)      LIVER FUNCTIONS - ( 01 Feb 2022 07:43 )  Alb: 1.4 g/dL / Pro: 6.1 gm/dL / ALK PHOS: 144 U/L / ALT: 203 U/L / AST: 207 U/L / GGT: x                 RADIOLOGY & ADDITIONAL STUDIES:      MICROBIOLOGY:  RECENT CULTURES:  01-31 .Sputum Sputum XXXX   Numerous polymorphonuclear leukocytes per low power field  Rare Squamous epithelial cells per low power field  Moderate Gram Negative Rods seen per oil power field   Few Pseudomonas aeruginosa    01-31 .Blood Blood-Peripheral XXXX XXXX   No growth to date.    01-28 Catheterized Catheterized XXXX XXXX   >=3 organisms. Probable collection contamination.    01-27 .Blood Blood-Peripheral XXXX XXXX   No growth to date.

## 2022-02-01 NOTE — PROGRESS NOTE ADULT - SUBJECTIVE AND OBJECTIVE BOX
JUAREZ GTZ  [id number]    Follow Up:  persistent fevers, PNA, ?UTI    Interval History: The pt was seen and examined earlier, no distress, egd with GI , IR not planning on placing IVC filter    PAST MEDICAL & SURGICAL HISTORY:  Seizure    HTN (hypertension)    Glaucoma    Thyroid disease    Blood clot of neck vein  left side    TIA (transient ischemic attack)  20 years ago    S/P appendectomy        ROS:    [x ] Unobtainable because: vented, lethargic   [ ] All other systems negative    Constitutional: no fever, no chills  Head: no trauma  Eyes: no vision changes, no eye pain  ENT:  no sore throat, no rhinorrhea  Cardiovascular:  no chest pain, no palpitation  Respiratory:  no SOB, no cough  GI:  no abd pain, no vomiting, no diarrhea  urinary: no dysuria, no hematuria, no flank pain  musculoskeletal:  no joint pain, no joint swelling  skin:  no rash  neurology:  no headache, no seizure, no change in mental status  psych: no anxiety, no depression         Allergies  No Known Allergies        ANTIMICROBIALS:   piperacillin/tazobactam IVPB.. 3.375 every 8 hours      MEDICATIONS  (STANDING):  amLODIPine   Tablet 10 daily  carvedilol 3.125 every 12 hours  dextrose 40% Gel 15 once  dextrose 50% Injectable 25 once  dextrose 50% Injectable 12.5 once  dextrose 50% Injectable 25 once  escitalopram 20 daily  glucagon  Injectable 1 once  levETIRAcetam  IVPB 500 every 12 hours  levothyroxine Injectable 37.5 at bedtime  pantoprazole  Injectable 40 every 12 hours      PRN  acetaminophen    Suspension .. 650 milliGRAM(s) Oral every 6 hours PRN      Physical Exam:  Vital Signs Last 24 Hrs  T(F): 98 (02-01-22 @ 17:13), Max: 99.8 (02-01-22 @ 14:11)  HR: 77 (02-01-22 @ 21:39)  BP: 120/69 (02-01-22 @ 17:13)  RR: 20 (02-01-22 @ 17:13)  SpO2: 98% (02-01-22 @ 21:39) (95% - 100%)  Wt(kg): --    Physical Exam:  General: Nontoxic-appearing Female in no acute distress. Trached on vent, lethargic   HEENT: AT/NC. Unable to assess pt's mouth or eyes due to non-compliance   Neck: Not rigid. No sense of mass. Trach C/D/I  Nodes: None palpable.  Lungs: bilateral rhonchi, no wheezes  Heart: Regular rate and rhythm. +Murmur. No rub. No gallop. No palpable thrill. Right arm midline c/d/i   Abdomen: Bowel sounds now present.  Soft. not distended.  Abd incision with vac dressing.  Ostomy with dark stool and gas. Drains x1, +PEG tube   Extremities: No cyanosis or clubbing. 1+ edema of bilateral lower extremities   Skin: Warm. Dry No rash. No vasculitic stigmata.  Neuro: opens her eyes   Psychiatric: unable to assess    WBC Count: 16.76 K/uL (02-01 @ 07:43)  WBC Count: 17.82 K/uL (01-31 @ 07:11)  WBC Count: 20.79 K/uL (01-30 @ 07:34)  WBC Count: 23.36 K/uL (01-29 @ 18:09)  WBC Count: 27.98 K/uL (01-29 @ 06:33)  WBC Count: 24.37 K/uL (01-28 @ 17:18)  WBC Count: 19.00 K/uL (01-28 @ 07:07)                            9.6    16.76 )-----------( 330      ( 01 Feb 2022 07:43 )             30.5       02-01    145  |  115<H>  |  16  ----------------------------<  122<H>  3.7   |  24  |  0.42<L>    Ca    8.5      01 Feb 2022 07:43  Phos  3.0     02-01  Mg     2.1     01-31    TPro  6.1  /  Alb  1.4<L>  /  TBili  0.5  /  DBili  x   /  AST  207<H>  /  ALT  203<H>  /  AlkPhos  144<H>  02-01      Creatinine Trend: 0.42<--, 0.52<--, 0.56<--, 0.81<--, 0.76<--, 0.86<--            MICROBIOLOGY:  Culture - Sputum . (01.31.22 @ 00:37)    Gram Stain:   Numerous polymorphonuclear leukocytes per low power field  Rare Squamous epithelial cells per low power field  Moderate Gram Negative Rods seen per oil power field    Specimen Source: .Sputum Sputum    Culture Results:   Few Pseudomonas aeruginosa      Catheterized Catheterized  01-28-22   >=3 organisms. Probable collection contamination.  --  --      .Blood Blood-Peripheral  01-27-22   No growth to date.  --  --      .Blood Blood-Peripheral  01-21-22   No Growth Final  --  --      Clean Catch Clean Catch (Midstream)  01-21-22   <10,000 CFU/mL Normal Urogenital Sendy  --  --      .Body Fluid Abdominal Fluid  01-03-22   Few Escherichia coli  Few Bacteroides ovatus group "Susceptibilities not performed"  --  Escherichia coli      SARS-CoV-2 Result: NotDetec (01-30-22 @ 10:40)    SARS-CoV-2: NotDetec (20 Jan 2022 18:59)  SARS-CoV-2: NotDetec (24 Dec 2021 14:38)  SARS-CoV-2: NotDetec (24 Dec 2021 00:03)    RADIOLOGY:

## 2022-02-01 NOTE — PROGRESS NOTE ADULT - ASSESSMENT
-Follow up regarding possible IVC filter.  -Imaging reviewed. Clot is minimal and subacute/chronic in appearance and clinically insignificant. No IVC filter needed and likely don't need anticoagulation either. Will defer IVC filter. Re-consult as needed.

## 2022-02-01 NOTE — BRIEF OPERATIVE NOTE - NSICDXBRIEFPROCEDURE_GEN_ALL_CORE_FT
PROCEDURES:  EGD, with PEG replacement 18-Jan-2022 16:17:51  Kevin Bauer  
PROCEDURES:  EGD with biopsy 01-Feb-2022 14:37:04  Stan Ness  
PROCEDURES:  Creation, tracheostomy, open 07-Jan-2022 14:23:45  Funmi Cam  
PROCEDURES:  Irrigation of abdominal cavity with closure 26-Dec-2021 10:32:27  Shannan Stewart  Creation of colostomy 26-Dec-2021 10:33:13  Shannan Stewart  
PROCEDURES:  Exploratory laparotomy 25-Dec-2021 00:32:05  Marifer Tomas  Open resection, sigmoid colon 25-Dec-2021 00:32:51  Marifer Tomas  Peritoneal lavage 25-Dec-2021 00:33:33  Marifer Tomas

## 2022-02-01 NOTE — CHART NOTE - NSCHARTNOTEFT_GEN_A_CORE
At GIs request, PEG suture removed and tube slightly loosened. Skin clean with no signs of infection/rash/injury.

## 2022-02-01 NOTE — BRIEF OPERATIVE NOTE - NSICDXBRIEFPREOP_GEN_ALL_CORE_FT
PRE-OP DIAGNOSIS:  Upper GI bleed 01-Feb-2022 14:37:23  Stan Ness  
PRE-OP DIAGNOSIS:  Acute respiratory failure, unspecified whether with hypoxia or hypercapnia 07-Jan-2022 14:24:12  Funmi Cam  
PRE-OP DIAGNOSIS:  Perforated sigmoid colon 25-Dec-2021 00:34:34  Marifer Tomas  

## 2022-02-01 NOTE — BRIEF OPERATIVE NOTE - NSICDXBRIEFPOSTOP_GEN_ALL_CORE_FT
POST-OP DIAGNOSIS:  Gastritis 01-Feb-2022 14:37:54  Stan Ness  
POST-OP DIAGNOSIS:  Perforated sigmoid colon 25-Dec-2021 00:34:45  Marifer Tomas  
POST-OP DIAGNOSIS:  Perforated sigmoid colon 25-Dec-2021 00:34:45  Marifer Tomas  
POST-OP DIAGNOSIS:  Acute respiratory failure, unspecified whether with hypoxia or hypercapnia 07-Jan-2022 14:24:17  Funmi Cam  
POST-OP DIAGNOSIS:  Perforated sigmoid colon 25-Dec-2021 00:34:45  Marifer Tomas

## 2022-02-01 NOTE — PROGRESS NOTE ADULT - SUBJECTIVE AND OBJECTIVE BOX
Procedure:           Upper GI endoscopy with biopsy 02-01-22 @ 14:25    Indications:                 Upper GI bleed    Monitored Anesthesia Care Provided by :     ____________________________________________________________________________________________________  Procedure:           Pre-Anesthesia Assessment:                       - Prior to the procedure, a History and Physical was performed, and patient                        medications and allergies were reviewed. The patient is not competent. The risks                        and benefits of the procedure and the sedation options and risks were                        discussed with the patient's son. All questions were answered and informed consent                        was obtained. Patient identification and proposed procedure were verified by                        the physician, the nurse and the anesthesiologist in the procedure room.                        Mental Status Examination:  alert and oriented. Airway Examination: normal                        oropharyngeal airway and neck mobility. Respiratory Examination: clear to                        auscultation. CV Examination: normal. Prophylactic Antibiotics:  The patient                        does not require prophylactic antibiotics.                        Grade Assessment: . After                        reviewing the risks and benefits, the patient was deemed in satisfactory                        condition to undergo the procedure. The anesthesia plan was to use monitored                        anesthesia care (MAC). Immediately prior to administration of medications,                        the patient was re-assessed for adequacy to receive sedatives. The heart        		     rate, respiratory rate, oxygen saturations, blood pressure, adequacy of                        pulmonary ventilation, and response to care were monitored throughout the                        procedure. The physical status of the patient was re-assessed after the                        procedure.                       After obtaining informed consent, the endoscope was passed under direct                        vision. Throughout the procedure, the patient's blood pressure, pulse, and                        oxygen saturations were monitored continuously. The Endoscope was introduced                        through the mouth, and advanced to the second part of duodenum. Retroflexion was done in the stomach. The upper GI                        endoscopy was accomplished with ease. The patient tolerated the procedure                        well.          NO BLOOD SEEN IN THE UPPER GI TRACT    ESOPHAGUS:   WNL    STOMACH:   PEG tube imbedded into mucosa. Ecchymosis under the PEG tube      DUODENUM:  WNL      Assessment :  As above     PLAN :  Only etiology for the UGI bleed would be the erosions from the PEG tube imbedded into the mucosa 1) Restart feeds 2) Restart anticoagulation 24 hours after the PEG tube is loosened ( discussed with surgery )

## 2022-02-01 NOTE — PROGRESS NOTE ADULT - ASSESSMENT
81F with a PMH of HTN, HLD, hypothyroidism, depression, seizures p/w abd pain, found to have acute diverticulitis . acute metabolic encephalopathy sec to above Perforated sigmoid diverticulitis. 12/24/21 - Exploratory laparotomy and a sigmoid colectomy and abdominal washout abd at that time left open. Transferred to ICU for post op care on mechanical ventilation. 10mg IV lopressor in OR for Afib with RVR. 5L IVF given intra-op. EBL 100cc. Received intubated, sedated, on phenylephrine.  Patient went for abdominal wound closure and creation of colostomy on 12/26. Patient developed adb abscess 1/3 s/p IR drainage of abdominal abscesses. Initially patient on PPN for nutrition Now tolerating tube feeds with brown soft stool in colostomy.. Found to have multiple abdominal fluid collections s/p IR drainage of R abdominal abscess 1/3/22 growing e-coli and bacteroides Sensitive to ceftriaxone, continue antibiotics to ceftriaxone and flagyl per ID.   Patient was unable to wean and son consented to trach.  -Now out of shock state not on vasopressor support.  Post op a-fib RVR, s/p amio load converted to sinus rhythm.  Patient with intermittent fevers. S/P PEG and trach. Pain management on fentanyl patch.      Perforated Acute diverticulitis   s/p 12/24/21 - Exploratory laparotomy and a sigmoid colectomy and abdominal washout  Patient went for abdominal wound closure and creation of colostomy on 12/26.  Patient developed adb abscess 1/3 s/p IR drainage of abdominal abscesses.  Initially patient on PPN for nutrition Now tolerating tube feeds with brown soft stool in colostomy.  Found to have multiple abdominal fluid collections s/p IR drainage of R abdominal abscess 1/3/22 growing e-coli and bacteroides  S/P Peg placement  all abx dced as per iD recs, now febrile again  Repeat CT noted   BABAK drain care, wound vac in place   PEG and ostomy with active bleeding 1/28: lovenox stopped: given reversal ordered two units of prbc  Repeat cT abdomen with trace pelvic fluid: active gastric fundus bleeding  s/p 2 units of blood  AFEBRILE NOW  npo  plan:  Patient UNDERWENT EGD.  hold anticoagluation, followup full report    Septic Shock s/p  pressors  Transferred to ICU for post op care on mechanical ventilation.  now off pressors,  failed extubation, trach to cpap  completed ceftriaxone and flagyl per ID. trial PO vanc for diarrhea. now off all abx  Cx neg so far, covid PCR neg  Recent Followup CTA A/P noted, collections now trace, ++ Cystitis, Multiple focal opacification in RL, Recent CXR looks unchanged,.  persistent fevers, WBC increased , Re consulted ID  + right sided acute dvt  blood cultures on 1/27 negative  consulted with IR: Midline exchanged 1/28, collection in pelvis too small to drain  u/a positive on the 28th  s/p vanco and amikacin 1/28  persistently febrile  Staph A PCR positive  repeat ct chest + RLL  noted discussed cta on the 27th, radiology concerned on PE  plan:  reviewed radiology assessment today. WILL NOT IVC FILTER on this admit. also recommended against AC  c/w zosyn  check blood cx: covid pcr repeated    Acute blood anemia due to hemorrhagic gastritis worsened by anticoagulation  s/p 2 units of prbc  s/p 1 unit 1/31  stable  EGD 2/1      Rapid AFIB due to Shock state  s/p amio load converted to sinus rhythm.  not on rate control or AC on downgrade, now on AC also for DVT  Cards consulted  added BB  HOLD AC      Hypoxemic respiratory failure now trach to CPAP  now stable on trach to CPAP  CTA Noted: Discussion with radio, concern  for new PE?  Pulm following  on fentayl patch for pain control  off a/c  cpap settings without change  plan:  monitor closely    Right lower extremity Acute DVT:  -off a/c due to Gi bleed  -consulted IR for IVC :asked for followup sonogram prior to ivc filter placement  plan  no a/c for now  followup duplex ordered      plan  1. AFEBRILE   2. Underwent EGD: followup report  3. NO IVC filter as per IR  4. will followup GI report if team can restart feeds

## 2022-02-01 NOTE — BRIEF OPERATIVE NOTE - OPERATION/FINDINGS
see full operative report
As above  - see dictation
see operative report
EGD with PEG tube placement
See operative note.

## 2022-02-01 NOTE — PROGRESS NOTE ADULT - ASSESSMENT
From Initial ID consult note: 81F with a PMH of HTN, HLD, hypothyroidism, depression, seizures who presents to the ED for abd pain found to have diverticulitis   has rising leukocytosis   had fever yesterday   tachycardic and now hypoxic   CXR (I personally reviewed) low lung volumes   original CT (I personally reviewed) with diverticulitis   she had a lot of pain in the abdomen on exam     Progress Notes  12/25: s/p surgery for diverticulitis with perforation and abscess and went to the OR. intubated in ICU with vac and needs to go back to the OR, currently on zosyn, s/p one dose of diflucan last night   12/27: s/p repair and ostomy creation yesterday, wbc elevated, cultures sensitive to zosyn and candida albicans is notoriously sensitive to azoles such as fluconazole, wean of pressors and sedation, extubate when ready   12/28: Further increase in white blood cell count but overall the patient appears to be reasonably stable.  No concern of C. difficile at this juncture.  Current antibiotics are reasonable.  Leukocytosis like related to recent surgery, no concern of other superimposed process on the basis of exam.  I do not see a role to alter her antimicrobials.  12/29:  Remains intubated in ICU. still quite edematous and net positive, WBC climbing, small wound dehiscence at bottom of incision, plan for initiation of TPN today, remains on antibiotics    12/30: rising WBC, ostomy not functioning yet, very edematous CT today, may be source control issue so for now can continue same antibiotics and antifungal but may need to change if findings on the CT are worrisome or change in hemodynamics  12/31:Leukocytosis, with collections noted on CT.  However this is being done postoperative day 5, there is some possibility that this could represent postop changes but given the leukocytosis, concur with plans for attempts at percutaneous drainage.  White blood cell count down slightly compared to prior peak, will need to be trended.  Gallbladder is also distended.  Abdominal exam was benign this morning, but a calculus cholecystitis could be the differential diagnosis here as well (though n.p.o. status certainly could explain distended gallbladder, there is also report of gallbladder wall thickening).  Would modify antibiotics based on cultures from drainage, I do not believe that antibiotics and other the cells would be adequate here.  Fortunately she is off pressors, with preserved renal function, and tolerating pressure support.  1/1/22: Postop day 6.  I have some concerns with the appearance of the ostomy, though the abdominal exam overall is otherwise unchanged.  White blood cell count remains elevated, but is lower compared with prior values.  This is despite no change in antibiotic therapy.  Patient also has asymmetric edema of the upper extremities, right greater than left.  Low threshold for duplex ultrasound to exclude DVT.No new surveillance culture data.  1/2: POD7 Ostomy looks better today, UE symmetric. WBC elevated, some slight downward trend overall. Temps <= 100F.   1/3: drainage of fluid collection today, continue zosyn and fluconazole for now, monitor wbc and temps, watch ostomy output, diuresis and address third spacing   1/4: s/p drainage yesterday now growing ecoli and awaiting for sensitivities, wbc is improving, started on tube feeds    1/5: abscess growing ecoli and bacteroides, S to ceftriaxone, wbc 15, weaning trial today, pain management    1/6: awake, lung exercise today, trach planned for tomorrow, ostomy output is good. will stop fluconazole today and change antibiotics to ceftriaxone and flagly. Unclear stop date yet    1/7 trach today, continue antibiotics   1/9: s/p trach, had low grade fever but otherwise doing ok, will continues same antibiotics regimen for now but if fevers continue she will needs to be rescanned as those abscesses can progress.  1/10: low grade fever but doing well on trach-PS, ostomy with stool and gas, shakes head when asked if in pain, discussed with surgery about repeating CT scan given the temp  1/11: Still febrile, favor interval CT as above.  1/12: CT of abdomen showing some signs of improvement and elsewhere  it shows signs of worsening. wbc normal, tube feeds currently via NGT   1/13: no fever, no wbc, Cr and LFTs ok, Ceftriaxone and flagyl continued. No planned IR intervention at this time - fluid collection without access to drain, surgical drain within the collection. Pt possibly will have repeat imaging over the weekend to evaluate progress.   Attending addendum:  agree with above  continue antibiotics   vent weaning  surgical follow up for the abdominal wound  1/14: Low grade temp last night, midline placed, no leukocytosis, Cr and LFTs ok, culture data reviewed. Pt's colostomy with small amount of liquid stool and gas, no stools recorded for two days, no role for po Vancomycin at this time. The pt has been on abx since her admission, will stop all abx and will continue to monitor.    1/18: no fevers since 1/14, WBC better 11.00, cr ok, off abx, now s/p EGD, with PEG replacement.   1/24: spiking low grade fevers, low suspicion for pneumonia given little secretions and doing well on the PS wth low settings, peg site looks clean, trach site OK as well, fevers may be from small fluid collections intra-abdominally vs atelectasis Would suggest aggressive pulmonary toilet including chest vest. follow cultures and trend wbc and fever curve . If persistent fevers would start  Zosyn and PO fluconazole but for now please hold antibiotics   1/25: continues having fevers, low grade temps today, WBC better 18.35, Cr and LFTs ok, + new DVT of right common femoral vein - could be the cause of pt's fevers - on full dose Lovenox. Will continue to monitor off abx, WBC got better without abx.   Attending addendum:  patient with long hospital course and now on the floors with fever and found to have a DVT  wbc came down without antibiotics   fever curve is getting better   continue to monitor off antibiotics   treat DVT with full dose AC   suspect fevers could be from dvt but may also be from fluid collection    1/26: low grade temp today, pt is more awake and alert today, WBC improving 14.2 off abx, Cr and LFTs ok, abd wound with clean base and healing well - vac dressing was changed today during my exam. In favor to continue to monitor off abx for now.   1/27: Pt continues spiking fevers, low grade last night and 101 today, awake and alert during my exam, WBC improving off abx 13.61, recent chest xray with no acute lung disease, COVID 19 is negative. Pt's fevers most likely related to her DVT, will collect two sets of surveillance BC and will continue to monitor off abx for now.   1/28: CTA A/P done last night - There is evidence of active GI bleeding in the gastric antrum, related to peptic ulcer disease or gastritis, possible cystitis. Blood transfusion is in progress during my exam, wound vac is being changed - base of the wound is clean with no evidence of acute infection. Pt had a low grade temp last night, WBC elevated - could be reactive to DVT. Will obtain UA and UC, will hold off on abx for now.   1/29: persistent fevers, received a dose of vanco and a dose of Azactam last evening, WBC high 27.98, BC with no growth to date, UA positive, UC pending. Will start on broad spectrum antibiotic now as unclear it is unclear at this time if fevers caused by UTI only, or in combination with DVT, or other source. Pt's abd was seen with last vac dressing change - incision site wound  is clean and healing well. JPis still draining cloudy drainage.   1/30: lethargic, continues having fevers, WBC better 20.79, all BCs remain with no growth, UC pending, CT head - Acute left MCA infarct is suspected, CT chest and CT a/p performed earlier, pending radiology reading, will continue with Zosyn, Na is slightly elevated, would prefer to have UC available prior switching abx.   1/31: no fevers today, remains lethargic, vented, WBC with improvement 17.82, two sets of BCs were collected yesterday and pending result, Zosyn continued. All BCs with no growth to date, last UC is contaminated.  2/1: afebrile last 24hrs, no IVC filter placement per IR, wbc slightly better, PEG tube loosened and EGD by EGD didnt find bleed, pseudomonas in respiratory culture and CT did show pneumonia but she is doing well on , nonetheless hopefully the zosyn is sensitive,      #RLL Pneumonia   - continue Zosyn IV (day #4)  - trend pt's temperatures  - trend WBC  - MRSA PCR not detected      #Perforated diverticulum of large intestine.   continue Zosyn IV  monitor sodium levels   s/p one time dose of Vancomycin and a dose of Amikacin on 1/28  continue wound vac   CT chest/a/p - + pneumonia, no abdominal pathology       #Fever.   continue Zosyn  trend pt's temperatures   UC - contaminated   +DVT of right common femoral vein   New left MCA infarct on CT head  CT c/a/p - new pneumonia RLL, no acute abd pathology   BC NGTD  repeat BC pending   MRSA PCR not detected     #Acute Respiratory Failure  wean off ventilator as tolerated   chest PT  frequent suctioning   chest vest   HOB >30 degrees    #DVT  AC as per protocol    Manolo Dunn DO  Infectious Disease Attending  Pager [phone]  After 5pm/weekends please call [phone] for all inquiries and new consults

## 2022-02-01 NOTE — PROGRESS NOTE ADULT - SUBJECTIVE AND OBJECTIVE BOX
Patient seen and examined at bedside in no distress.  Awake, alert.   Shakes head when asked if in pain.    Vital Signs Last 24 Hrs  T(F): 99.3 (02-01-22 @ 05:27), Max: 100.3 (01-31-22 @ 18:34)  HR: 70 (02-01-22 @ 08:30)  BP: 136/74 (02-01-22 @ 05:27)  RR: 20 (02-01-22 @ 05:27)  SpO2: 99% (02-01-22 @ 08:30)    GENERAL: Alert, NAD  HEENT: Trach to vent  CHEST/LUNG: Respirations nonlabored  HEART: S1S2, RRR  ABDOMEN: Soft, NTND. Midline wound vac functioning. Ostomy pink and viable with melanotic liquid stool in bag. PEG clamped. BABAK with scant purulent output  EXTREMITIES: B/l LE offloaders in place     LABS:                        9.6    16.76 )-----------( 330      ( 01 Feb 2022 07:43 )             30.5     02-01    145  |  115<H>  |  16  ----------------------------<  122<H>  3.7   |  24  |  0.42<L>    Ca    8.5      01 Feb 2022 07:43  Phos  3.0     02-01  Mg     2.1     01-31    TPro  6.1  /  Alb  1.4<L>  /  TBili  0.5  /  DBili  x   /  AST  207<H>  /  ALT  203<H>  /  AlkPhos  144<H>  02-01    PT/INR - ( 31 Jan 2022 07:11 )   PT: 17.3 sec;   INR: 1.52 ratio      A/P: 81F s/p ex lap, sigmoid rxn, abthera placement 12/25, RTOR for completion of hartmanns procedure and closure of abdominal wall 12/26, IR  abscess aspiration 1/3, trach 1/7, PEG 1/18.   CT 1/21 shows improving fluid collections. R BABAK removed 1/22.  US 1/25 shows partially occlusive thrombus of the right common femoral vein.  ABLA 2/2 GI s/p 3u PRBC, protamine  +UTI  -- EGD today, f/u findings  -- PPI, carafate, A/C on hold  -- wound vac  -- BABAK, monitor OP  -- PT  -- IVC filter deferred by IR, f/u repeat US  -- continue medical management and supportive care

## 2022-02-02 LAB
-  AMIKACIN: SIGNIFICANT CHANGE UP
-  AZTREONAM: SIGNIFICANT CHANGE UP
-  CEFEPIME: SIGNIFICANT CHANGE UP
-  CEFTAZIDIME: SIGNIFICANT CHANGE UP
-  CIPROFLOXACIN: SIGNIFICANT CHANGE UP
-  GENTAMICIN: SIGNIFICANT CHANGE UP
-  IMIPENEM: SIGNIFICANT CHANGE UP
-  LEVOFLOXACIN: SIGNIFICANT CHANGE UP
-  MEROPENEM: SIGNIFICANT CHANGE UP
-  PIPERACILLIN/TAZOBACTAM: SIGNIFICANT CHANGE UP
-  TOBRAMYCIN: SIGNIFICANT CHANGE UP
ANION GAP SERPL CALC-SCNC: 7 MMOL/L — SIGNIFICANT CHANGE UP (ref 5–17)
BUN SERPL-MCNC: 15 MG/DL — SIGNIFICANT CHANGE UP (ref 7–23)
CALCIUM SERPL-MCNC: 8 MG/DL — LOW (ref 8.5–10.1)
CHLORIDE SERPL-SCNC: 114 MMOL/L — HIGH (ref 96–108)
CO2 SERPL-SCNC: 23 MMOL/L — SIGNIFICANT CHANGE UP (ref 22–31)
CREAT SERPL-MCNC: 0.52 MG/DL — SIGNIFICANT CHANGE UP (ref 0.5–1.3)
CULTURE RESULTS: SIGNIFICANT CHANGE UP
GLUCOSE BLDC GLUCOMTR-MCNC: 154 MG/DL — HIGH (ref 70–99)
GLUCOSE BLDC GLUCOMTR-MCNC: 186 MG/DL — HIGH (ref 70–99)
GLUCOSE BLDC GLUCOMTR-MCNC: 196 MG/DL — HIGH (ref 70–99)
GLUCOSE BLDC GLUCOMTR-MCNC: 264 MG/DL — HIGH (ref 70–99)
GLUCOSE SERPL-MCNC: 187 MG/DL — HIGH (ref 70–99)
GRAM STN FLD: SIGNIFICANT CHANGE UP
HCT VFR BLD CALC: 27.8 % — LOW (ref 34.5–45)
HGB BLD-MCNC: 8.5 G/DL — LOW (ref 11.5–15.5)
MAGNESIUM SERPL-MCNC: 2.6 MG/DL — SIGNIFICANT CHANGE UP (ref 1.6–2.6)
MCHC RBC-ENTMCNC: 27.6 PG — SIGNIFICANT CHANGE UP (ref 27–34)
MCHC RBC-ENTMCNC: 30.6 G/DL — LOW (ref 32–36)
MCV RBC AUTO: 90.3 FL — SIGNIFICANT CHANGE UP (ref 80–100)
METHOD TYPE: SIGNIFICANT CHANGE UP
NRBC # BLD: 0 /100 WBCS — SIGNIFICANT CHANGE UP (ref 0–0)
ORGANISM # SPEC MICROSCOPIC CNT: SIGNIFICANT CHANGE UP
ORGANISM # SPEC MICROSCOPIC CNT: SIGNIFICANT CHANGE UP
PHOSPHATE SERPL-MCNC: 2.2 MG/DL — LOW (ref 2.5–4.5)
PLATELET # BLD AUTO: 315 K/UL — SIGNIFICANT CHANGE UP (ref 150–400)
POTASSIUM SERPL-MCNC: 3.3 MMOL/L — LOW (ref 3.5–5.3)
POTASSIUM SERPL-SCNC: 3.3 MMOL/L — LOW (ref 3.5–5.3)
RBC # BLD: 3.08 M/UL — LOW (ref 3.8–5.2)
RBC # FLD: 17 % — HIGH (ref 10.3–14.5)
SODIUM SERPL-SCNC: 144 MMOL/L — SIGNIFICANT CHANGE UP (ref 135–145)
SPECIMEN SOURCE: SIGNIFICANT CHANGE UP
WBC # BLD: 12.54 K/UL — HIGH (ref 3.8–10.5)
WBC # FLD AUTO: 12.54 K/UL — HIGH (ref 3.8–10.5)

## 2022-02-02 PROCEDURE — 93970 EXTREMITY STUDY: CPT | Mod: 26

## 2022-02-02 PROCEDURE — 99232 SBSQ HOSP IP/OBS MODERATE 35: CPT

## 2022-02-02 PROCEDURE — 99233 SBSQ HOSP IP/OBS HIGH 50: CPT

## 2022-02-02 RX ORDER — SIMVASTATIN 20 MG/1
40 TABLET, FILM COATED ORAL AT BEDTIME
Refills: 0 | Status: DISCONTINUED | OUTPATIENT
Start: 2022-02-02 | End: 2022-02-09

## 2022-02-02 RX ORDER — HEPARIN SODIUM 5000 [USP'U]/ML
INJECTION INTRAVENOUS; SUBCUTANEOUS
Qty: 25000 | Refills: 0 | Status: DISCONTINUED | OUTPATIENT
Start: 2022-02-02 | End: 2022-02-02

## 2022-02-02 RX ORDER — HEPARIN SODIUM 5000 [USP'U]/ML
3000 INJECTION INTRAVENOUS; SUBCUTANEOUS EVERY 6 HOURS
Refills: 0 | Status: DISCONTINUED | OUTPATIENT
Start: 2022-02-02 | End: 2022-02-02

## 2022-02-02 RX ORDER — ASPIRIN/CALCIUM CARB/MAGNESIUM 324 MG
81 TABLET ORAL DAILY
Refills: 0 | Status: DISCONTINUED | OUTPATIENT
Start: 2022-02-02 | End: 2022-02-11

## 2022-02-02 RX ORDER — POTASSIUM PHOSPHATE, MONOBASIC POTASSIUM PHOSPHATE, DIBASIC 236; 224 MG/ML; MG/ML
15 INJECTION, SOLUTION INTRAVENOUS ONCE
Refills: 0 | Status: COMPLETED | OUTPATIENT
Start: 2022-02-02 | End: 2022-02-02

## 2022-02-02 RX ORDER — HEPARIN SODIUM 5000 [USP'U]/ML
6500 INJECTION INTRAVENOUS; SUBCUTANEOUS EVERY 6 HOURS
Refills: 0 | Status: DISCONTINUED | OUTPATIENT
Start: 2022-02-02 | End: 2022-02-02

## 2022-02-02 RX ADMIN — DEXTROSE MONOHYDRATE, SODIUM CHLORIDE, AND POTASSIUM CHLORIDE 42 MILLILITER(S): 50; .745; 4.5 INJECTION, SOLUTION INTRAVENOUS at 05:05

## 2022-02-02 RX ADMIN — CHLORHEXIDINE GLUCONATE 1 APPLICATION(S): 213 SOLUTION TOPICAL at 05:04

## 2022-02-02 RX ADMIN — POTASSIUM PHOSPHATE, MONOBASIC POTASSIUM PHOSPHATE, DIBASIC 62.5 MILLIMOLE(S): 236; 224 INJECTION, SOLUTION INTRAVENOUS at 10:34

## 2022-02-02 RX ADMIN — DORZOLAMIDE HYDROCHLORIDE TIMOLOL MALEATE 1 DROP(S): 20; 5 SOLUTION/ DROPS OPHTHALMIC at 05:05

## 2022-02-02 RX ADMIN — Medication 650 MILLIGRAM(S): at 05:06

## 2022-02-02 RX ADMIN — PIPERACILLIN AND TAZOBACTAM 25 GRAM(S): 4; .5 INJECTION, POWDER, LYOPHILIZED, FOR SOLUTION INTRAVENOUS at 21:39

## 2022-02-02 RX ADMIN — Medication 37.5 MICROGRAM(S): at 21:40

## 2022-02-02 RX ADMIN — AMLODIPINE BESYLATE 10 MILLIGRAM(S): 2.5 TABLET ORAL at 05:05

## 2022-02-02 RX ADMIN — SIMVASTATIN 40 MILLIGRAM(S): 20 TABLET, FILM COATED ORAL at 21:42

## 2022-02-02 RX ADMIN — PIPERACILLIN AND TAZOBACTAM 25 GRAM(S): 4; .5 INJECTION, POWDER, LYOPHILIZED, FOR SOLUTION INTRAVENOUS at 05:05

## 2022-02-02 RX ADMIN — LEVETIRACETAM 420 MILLIGRAM(S): 250 TABLET, FILM COATED ORAL at 17:30

## 2022-02-02 RX ADMIN — CARVEDILOL PHOSPHATE 3.12 MILLIGRAM(S): 80 CAPSULE, EXTENDED RELEASE ORAL at 17:30

## 2022-02-02 RX ADMIN — PIPERACILLIN AND TAZOBACTAM 25 GRAM(S): 4; .5 INJECTION, POWDER, LYOPHILIZED, FOR SOLUTION INTRAVENOUS at 15:00

## 2022-02-02 RX ADMIN — PANTOPRAZOLE SODIUM 40 MILLIGRAM(S): 20 TABLET, DELAYED RELEASE ORAL at 05:05

## 2022-02-02 RX ADMIN — DEXTROSE MONOHYDRATE, SODIUM CHLORIDE, AND POTASSIUM CHLORIDE 42 MILLILITER(S): 50; .745; 4.5 INJECTION, SOLUTION INTRAVENOUS at 21:40

## 2022-02-02 RX ADMIN — CHLORHEXIDINE GLUCONATE 15 MILLILITER(S): 213 SOLUTION TOPICAL at 17:30

## 2022-02-02 RX ADMIN — CARVEDILOL PHOSPHATE 3.12 MILLIGRAM(S): 80 CAPSULE, EXTENDED RELEASE ORAL at 05:05

## 2022-02-02 RX ADMIN — PANTOPRAZOLE SODIUM 40 MILLIGRAM(S): 20 TABLET, DELAYED RELEASE ORAL at 17:30

## 2022-02-02 RX ADMIN — CHLORHEXIDINE GLUCONATE 15 MILLILITER(S): 213 SOLUTION TOPICAL at 05:04

## 2022-02-02 RX ADMIN — ESCITALOPRAM OXALATE 20 MILLIGRAM(S): 10 TABLET, FILM COATED ORAL at 12:34

## 2022-02-02 RX ADMIN — LEVETIRACETAM 420 MILLIGRAM(S): 250 TABLET, FILM COATED ORAL at 05:04

## 2022-02-02 RX ADMIN — DORZOLAMIDE HYDROCHLORIDE TIMOLOL MALEATE 1 DROP(S): 20; 5 SOLUTION/ DROPS OPHTHALMIC at 17:30

## 2022-02-02 NOTE — CONSULT NOTE ADULT - REASON FOR ADMISSION
diverticulitis

## 2022-02-02 NOTE — PROGRESS NOTE ADULT - SUBJECTIVE AND OBJECTIVE BOX
Patient is a 81y old  Female who presents with a chief complaint of diverticulitis (02 Feb 2022 15:01)      HPI:  Patient is a 81F with a PMH of HTN, HLD, hypothyroidism, depression, seizures who presents to the ED for abd pain.  Patient states that over the last two days she had developed significant abdominal pain and multiple episodes of watery diarrhea.  Reports one episode of nausea and vomiting earlier today.  Patient has no other complaints.  Vitals stable,  labs show leukocytosis and hypokalemia.  CT abdomen significant for diverticulitis.  Will admit to med surg.     (23 Dec 2021 00:17)      INTERVAL HPI/OVERNIGHT EVENTS: patient seen earlier today    MEDICATIONS  (STANDING):  amLODIPine   Tablet 10 milliGRAM(s) Oral daily  aspirin  chewable 81 milliGRAM(s) Oral daily  carvedilol 3.125 milliGRAM(s) Oral every 12 hours  chlorhexidine 0.12% Liquid 15 milliLiter(s) Oral Mucosa every 12 hours  chlorhexidine 2% Cloths 1 Application(s) Topical <User Schedule>  dextrose 40% Gel 15 Gram(s) Oral once  dextrose 5% + sodium chloride 0.45% with potassium chloride 20 mEq/L 1000 milliLiter(s) (42 mL/Hr) IV Continuous <Continuous>  dextrose 5%. 1000 milliLiter(s) (50 mL/Hr) IV Continuous <Continuous>  dextrose 5%. 1000 milliLiter(s) (100 mL/Hr) IV Continuous <Continuous>  dextrose 50% Injectable 25 Gram(s) IV Push once  dextrose 50% Injectable 12.5 Gram(s) IV Push once  dextrose 50% Injectable 25 Gram(s) IV Push once  dorzolamide 2%/timolol 0.5% Ophthalmic Solution 1 Drop(s) Both EYES two times a day  escitalopram 20 milliGRAM(s) Oral daily  glucagon  Injectable 1 milliGRAM(s) IntraMuscular once  levETIRAcetam  IVPB 500 milliGRAM(s) IV Intermittent every 12 hours  levothyroxine Injectable 37.5 MICROGram(s) IV Push at bedtime  pantoprazole  Injectable 40 milliGRAM(s) IV Push every 12 hours  piperacillin/tazobactam IVPB.. 3.375 Gram(s) IV Intermittent every 8 hours  simvastatin 40 milliGRAM(s) Oral at bedtime    MEDICATIONS  (PRN):  acetaminophen    Suspension .. 650 milliGRAM(s) Oral every 6 hours PRN Temp greater or equal to 38C (100.4F)  sodium chloride 0.9% lock flush 10 milliLiter(s) IV Push every 1 hour PRN Pre/post blood products, medications, blood draw, and to maintain line patency      FAMILY HISTORY:  FH: HTN (hypertension)        Allergies    No Known Allergies    Intolerances        PMH/PSH:  Seizure    HTN (hypertension)    Glaucoma    Thyroid disease    Blood clot of neck vein    TIA (transient ischemic attack)    S/P appendectomy          REVIEW OF SYSTEMS:  CONSTITUTIONAL: No fever, weight loss, or fatigue  EYES: No eye pain, visual disturbances, or discharge  ENMT:  No difficulty hearing, tinnitus, vertigo; No sinus or throat pain  NECK: No pain or stiffness  BREASTS: No pain, masses, or nipple discharge  RESPIRATORY: No cough, wheezing, chills or hemoptysis; No shortness of breath  CARDIOVASCULAR: No chest pain, palpitations, dizziness, or leg swelling  GASTROINTESTINAL: No abdominal or epigastric pain. No nausea, vomiting, or hematemesis; No diarrhea or constipation. No melena or hematochezia.  GENITOURINARY: No dysuria, frequency, hematuria, or incontinence  NEUROLOGICAL: No headaches, memory loss, loss of strength, numbness, or tremors  SKIN: No itching, burning, rashes, or lesions   LYMPH NODES: No enlarged glands  ENDOCRINE: No heat or cold intolerance; No hair loss  MUSCULOSKELETAL: No joint pain or swelling; No muscle, back, or extremity pain  PSYCHIATRIC: No depression, anxiety, mood swings, or difficulty sleeping  HEME/LYMPH: No easy bruising, or bleeding gums  ALLERGY AND IMMUNOLOGIC: No hives or eczema    Vital Signs Last 24 Hrs  T(C): 37.5 (02 Feb 2022 18:10), Max: 38.1 (02 Feb 2022 05:08)  T(F): 99.5 (02 Feb 2022 18:10), Max: 100.6 (02 Feb 2022 05:08)  HR: 74 (02 Feb 2022 18:10) (64 - 82)  BP: 137/74 (02 Feb 2022 18:10) (114/56 - 148/71)  BP(mean): --  RR: 18 (02 Feb 2022 18:10) (17 - 18)  SpO2: 98% (02 Feb 2022 18:10) (97% - 100%)    PHYSICAL EXAM:  GENERAL: NAD, well-groomed, well-developed  HEAD:  Atraumatic, Normocephalic  EYES: EOMI, PERRLA, conjunctiva and sclera clear  ENMT: No tonsillar erythema, exudates, or enlargement; Moist mucous membranes, Good dentition, No lesions  NECK: Supple, No JVD, Normal thyroid  NERVOUS SYSTEM:  Alert & Oriented X3, Good concentration; Motor Strength 5/5 B/L upper and lower extremities; DTRs 2+ intact and symmetric  CHEST/LUNG: Clear to percussion bilaterally; No rales, rhonchi, wheezing, or rubs  HEART: Regular rate and rhythm; No murmurs, rubs, or gallops  ABDOMEN: Soft, Nontender, Nondistended; Bowel sounds present  EXTREMITIES:  2+ Peripheral Pulses, No clubbing, cyanosis, or edema  LYMPH: No lymphadenopathy noted  SKIN: No rashes or lesions    LAB                          8.5    12.54 )-----------( 315      ( 02 Feb 2022 08:07 )             27.8       CBC:  02-02 @ 08:07  WBC 12.54   Hgb 8.5   Hct 27.8   Plts 315  MCV 90.3  02-01 @ 07:43  WBC 16.76   Hgb 9.6   Hct 30.5   Plts 330  MCV 88.7  01-31 @ 07:11  WBC 17.82   Hgb 7.3   Hct 23.8   Plts 282  MCV 89.8  01-30 @ 07:34  WBC 20.79   Hgb 8.2   Hct 26.5   Plts 288  MCV 89.5  01-29 @ 18:09  WBC 23.36   Hgb 8.9   Hct 28.6   Plts 277  MCV 89.1  01-29 @ 06:33  WBC 27.98   Hgb 8.6   Hct 27.4   Plts 294  MCV 88.4  01-28 @ 17:18  WBC 24.37   Hgb 9.6   Hct 30.2   Plts 325  MCV 87.0  01-28 @ 07:07  WBC 19.00   Hgb 8.7   Hct 28.4   Plts CLUMPED  MCV 93.1  01-27 @ 19:58  WBC 16.32   Hgb 7.7   Hct 26.1   Plts 412  MCV 95.6  01-27 @ 07:45  WBC 13.61   Hgb 9.7   Hct 32.1   Plts 362  MCV 94.1      Chemistry:  02-02 @ 08:07  Na+ 144  K+ 3.3  Cl- 114  CO2 23  BUN 15  Cr 0.52     02-01 @ 07:43  Na+ 145  K+ 3.7  Cl- 115  CO2 24  BUN 16  Cr 0.42     01-31 @ 17:52  Na+ 143  K+ 3.3  Cl- 113  CO2 25  BUN 18  Cr 0.52     01-31 @ 07:11  Na+ 145  K+ 2.6  Cl- 114  CO2 24  BUN 21  Cr 0.56     01-30 @ 07:34  Na+ 148  K+ 2.9  Cl- 115  CO2 24  BUN 30  Cr 0.81     01-29 @ 06:33  Na+ 146  K+ 4.0  Cl- 115  CO2 24  BUN 42  Cr 0.76     01-28 @ 07:07  Na+ 147  K+ 5.0  Cl- 115  CO2 24  BUN 46  Cr 0.86     01-27 @ 07:45  Na+ 148  K+ 3.4  Cl- 115  CO2 29  BUN 19  Cr 0.45         Glucose, Serum: 187 mg/dL (02-02 @ 08:07)  Glucose, Serum: 122 mg/dL (02-01 @ 07:43)  Glucose, Serum: 122 mg/dL (01-31 @ 17:52)  Glucose, Serum: 134 mg/dL (01-31 @ 07:11)  Glucose, Serum: 149 mg/dL (01-30 @ 07:34)  Glucose, Serum: 126 mg/dL (01-29 @ 06:33)  Glucose, Serum: 123 mg/dL (01-28 @ 07:07)  Glucose, Serum: 115 mg/dL (01-27 @ 07:45)      02 Feb 2022 08:07    144    |  114    |  15     ----------------------------<  187    3.3     |  23     |  0.52   01 Feb 2022 07:43    145    |  115    |  16     ----------------------------<  122    3.7     |  24     |  0.42   31 Jan 2022 17:52    143    |  113    |  18     ----------------------------<  122    3.3     |  25     |  0.52   31 Jan 2022 07:11    145    |  114    |  21     ----------------------------<  134    2.6     |  24     |  0.56   30 Jan 2022 07:34    148    |  115    |  30     ----------------------------<  149    2.9     |  24     |  0.81   29 Jan 2022 06:33    146    |  115    |  42     ----------------------------<  126    4.0     |  24     |  0.76   28 Jan 2022 07:07    147    |  115    |  46     ----------------------------<  123    5.0     |  24     |  0.86     Ca    8.0        02 Feb 2022 08:07  Ca    8.5        01 Feb 2022 07:43  Ca    8.4        31 Jan 2022 17:52  Ca    8.4        31 Jan 2022 07:11  Ca    8.5        30 Jan 2022 07:34  Ca    8.5        29 Jan 2022 06:33  Ca    8.4        28 Jan 2022 07:07  Phos  2.2       02 Feb 2022 08:07  Phos  3.0       01 Feb 2022 07:43  Phos  1.9       31 Jan 2022 17:52  Phos  2.2       31 Jan 2022 07:11  Phos  3.2       30 Jan 2022 07:34  Phos  3.5       29 Jan 2022 06:33  Phos  2.7       27 Jan 2022 07:45  Mg     2.6       02 Feb 2022 08:07  Mg     2.1       31 Jan 2022 07:11  Mg     2.4       30 Jan 2022 07:34  Mg     3.0       29 Jan 2022 06:33  Mg     2.3       27 Jan 2022 07:45    TPro  6.1    /  Alb  1.4    /  TBili  0.5    /  DBili  x      /  AST  207    /  ALT  203    /  AlkPhos  144    01 Feb 2022 07:43              CAPILLARY BLOOD GLUCOSE      POCT Blood Glucose.: 154 mg/dL (02 Feb 2022 16:22)  POCT Blood Glucose.: 196 mg/dL (02 Feb 2022 11:34)  POCT Blood Glucose.: 264 mg/dL (02 Feb 2022 07:54)  POCT Blood Glucose.: 186 mg/dL (02 Feb 2022 00:06)          RADIOLOGY & ADDITIONAL TESTS:    Imaging Personally Reviewed:  [ ] YES  [ ] NO    Consultant(s) Notes Reviewed:  [ ] YES  [ ] NO    Care Discussed with Consultants/Other Providers [ ] YES  [ ] NO Patient is a 81y old  Female who presents with a chief complaint of diverticulitis (02 Feb 2022 15:01)      HPI:  Patient is a 81F with a PMH of HTN, HLD, hypothyroidism, depression, seizures who presents to the ED for abd pain.  Patient states that over the last two days she had developed significant abdominal pain and multiple episodes of watery diarrhea.  Reports one episode of nausea and vomiting earlier today.  Patient has no other complaints.  Vitals stable,  labs show leukocytosis and hypokalemia.  CT abdomen significant for diverticulitis.  Will admit to med surg.     (23 Dec 2021 00:17)      INTERVAL HPI/OVERNIGHT EVENTS: patient seen earlier today  The patient denies melena, hematochezia, hematemesis, nausea, vomiting, abdominal pain, constipation, diarrhea, or change in bowel movements PEG tube sutures cut and tube was slightly pushed in. Tolerating feeds at 50 ml / hr.    MEDICATIONS  (STANDING):  amLODIPine   Tablet 10 milliGRAM(s) Oral daily  aspirin  chewable 81 milliGRAM(s) Oral daily  carvedilol 3.125 milliGRAM(s) Oral every 12 hours  chlorhexidine 0.12% Liquid 15 milliLiter(s) Oral Mucosa every 12 hours  chlorhexidine 2% Cloths 1 Application(s) Topical <User Schedule>  dextrose 40% Gel 15 Gram(s) Oral once  dextrose 5% + sodium chloride 0.45% with potassium chloride 20 mEq/L 1000 milliLiter(s) (42 mL/Hr) IV Continuous <Continuous>  dextrose 5%. 1000 milliLiter(s) (50 mL/Hr) IV Continuous <Continuous>  dextrose 5%. 1000 milliLiter(s) (100 mL/Hr) IV Continuous <Continuous>  dextrose 50% Injectable 25 Gram(s) IV Push once  dextrose 50% Injectable 12.5 Gram(s) IV Push once  dextrose 50% Injectable 25 Gram(s) IV Push once  dorzolamide 2%/timolol 0.5% Ophthalmic Solution 1 Drop(s) Both EYES two times a day  escitalopram 20 milliGRAM(s) Oral daily  glucagon  Injectable 1 milliGRAM(s) IntraMuscular once  levETIRAcetam  IVPB 500 milliGRAM(s) IV Intermittent every 12 hours  levothyroxine Injectable 37.5 MICROGram(s) IV Push at bedtime  pantoprazole  Injectable 40 milliGRAM(s) IV Push every 12 hours  piperacillin/tazobactam IVPB.. 3.375 Gram(s) IV Intermittent every 8 hours  simvastatin 40 milliGRAM(s) Oral at bedtime    MEDICATIONS  (PRN):  acetaminophen    Suspension .. 650 milliGRAM(s) Oral every 6 hours PRN Temp greater or equal to 38C (100.4F)  sodium chloride 0.9% lock flush 10 milliLiter(s) IV Push every 1 hour PRN Pre/post blood products, medications, blood draw, and to maintain line patency      FAMILY HISTORY:  FH: HTN (hypertension)        Allergies    No Known Allergies    Intolerances        PMH/PSH:  Seizure    HTN (hypertension)    Glaucoma    Thyroid disease    Blood clot of neck vein    TIA (transient ischemic attack)    S/P appendectomy          REVIEW OF SYSTEMS:  CONSTITUTIONAL: No fever, weight loss,   EYES: No eye pain, visual disturbances, or discharge  ENMT:  No difficulty hearing, tinnitus, vertigo; No sinus or throat pain  NECK: No pain or stiffness  BREASTS: No pain, masses, or nipple discharge  RESPIRATORY: No cough, wheezing, chills or hemoptysis;  CARDIOVASCULAR: No chest pain, palpitations, dizziness, or leg swelling  GASTROINTESTINAL: See above   GENITOURINARY: No dysuria, frequency, hematuria, or incontinence  NEUROLOGICAL: No headaches,  numbness, or tremors  SKIN: No itching, burning, rashes, or lesions   LYMPH NODES: No enlarged glands  ENDOCRINE: No heat or cold intolerance; No hair loss  MUSCULOSKELETAL: No joint pain or swelling; No muscle, back, or extremity pain  PSYCHIATRIC: No depression, anxiety, mood swings, or difficulty sleeping  HEME/LYMPH: No easy bruising, or bleeding gums  ALLERGY AND IMMUNOLOGIC: No hives or eczema    Vital Signs Last 24 Hrs  T(C): 37.5 (02 Feb 2022 18:10), Max: 38.1 (02 Feb 2022 05:08)  T(F): 99.5 (02 Feb 2022 18:10), Max: 100.6 (02 Feb 2022 05:08)  HR: 74 (02 Feb 2022 18:10) (64 - 82)  BP: 137/74 (02 Feb 2022 18:10) (114/56 - 148/71)  BP(mean): --  RR: 18 (02 Feb 2022 18:10) (17 - 18)  SpO2: 98% (02 Feb 2022 18:10) (97% - 100%)    PHYSICAL EXAM:  GENERAL: NAD, well-groomed, well-developed  HEAD:  Atraumatic, Normocephalic  EYES: EOMI, PERRLA, conjunctiva and sclera clear  NECK: Supple, No JVD, Normal thyroid  NERVOUS SYSTEM:  Alert & Oriented , Fair concentration;   CHEST/LUNG: Clear to percussion bilaterally; No rales, rhonchi, wheezing, or rubs  HEART: Regular rate and rhythm; No murmurs, rubs, or gallops  ABDOMEN: Soft, Nontender, Nondistended; Bowel sounds present. PEG tube sutured in. Could not turn PEG tube.  EXTREMITIES:  2+ Peripheral Pulses, No clubbing, cyanosis, or edema  LYMPH: No lymphadenopathy noted  SKIN: No rashes or lesions    LAB                          8.5    12.54 )-----------( 315      ( 02 Feb 2022 08:07 )             27.8       CBC:  02-02 @ 08:07  WBC 12.54   Hgb 8.5   Hct 27.8   Plts 315  MCV 90.3  02-01 @ 07:43  WBC 16.76   Hgb 9.6   Hct 30.5   Plts 330  MCV 88.7  01-31 @ 07:11  WBC 17.82   Hgb 7.3   Hct 23.8   Plts 282  MCV 89.8  01-30 @ 07:34  WBC 20.79   Hgb 8.2   Hct 26.5   Plts 288  MCV 89.5  01-29 @ 18:09  WBC 23.36   Hgb 8.9   Hct 28.6   Plts 277  MCV 89.1  01-29 @ 06:33  WBC 27.98   Hgb 8.6   Hct 27.4   Plts 294  MCV 88.4  01-28 @ 17:18  WBC 24.37   Hgb 9.6   Hct 30.2   Plts 325  MCV 87.0  01-28 @ 07:07  WBC 19.00   Hgb 8.7   Hct 28.4   Plts CLUMPED  MCV 93.1  01-27 @ 19:58  WBC 16.32   Hgb 7.7   Hct 26.1   Plts 412  MCV 95.6  01-27 @ 07:45  WBC 13.61   Hgb 9.7   Hct 32.1   Plts 362  MCV 94.1      Chemistry:  02-02 @ 08:07  Na+ 144  K+ 3.3  Cl- 114  CO2 23  BUN 15  Cr 0.52     02-01 @ 07:43  Na+ 145  K+ 3.7  Cl- 115  CO2 24  BUN 16  Cr 0.42     01-31 @ 17:52  Na+ 143  K+ 3.3  Cl- 113  CO2 25  BUN 18  Cr 0.52     01-31 @ 07:11  Na+ 145  K+ 2.6  Cl- 114  CO2 24  BUN 21  Cr 0.56     01-30 @ 07:34  Na+ 148  K+ 2.9  Cl- 115  CO2 24  BUN 30  Cr 0.81     01-29 @ 06:33  Na+ 146  K+ 4.0  Cl- 115  CO2 24  BUN 42  Cr 0.76     01-28 @ 07:07  Na+ 147  K+ 5.0  Cl- 115  CO2 24  BUN 46  Cr 0.86     01-27 @ 07:45  Na+ 148  K+ 3.4  Cl- 115  CO2 29  BUN 19  Cr 0.45         Glucose, Serum: 187 mg/dL (02-02 @ 08:07)  Glucose, Serum: 122 mg/dL (02-01 @ 07:43)  Glucose, Serum: 122 mg/dL (01-31 @ 17:52)  Glucose, Serum: 134 mg/dL (01-31 @ 07:11)  Glucose, Serum: 149 mg/dL (01-30 @ 07:34)  Glucose, Serum: 126 mg/dL (01-29 @ 06:33)  Glucose, Serum: 123 mg/dL (01-28 @ 07:07)  Glucose, Serum: 115 mg/dL (01-27 @ 07:45)      02 Feb 2022 08:07    144    |  114    |  15     ----------------------------<  187    3.3     |  23     |  0.52   01 Feb 2022 07:43    145    |  115    |  16     ----------------------------<  122    3.7     |  24     |  0.42   31 Jan 2022 17:52    143    |  113    |  18     ----------------------------<  122    3.3     |  25     |  0.52   31 Jan 2022 07:11    145    |  114    |  21     ----------------------------<  134    2.6     |  24     |  0.56   30 Jan 2022 07:34    148    |  115    |  30     ----------------------------<  149    2.9     |  24     |  0.81   29 Jan 2022 06:33    146    |  115    |  42     ----------------------------<  126    4.0     |  24     |  0.76   28 Jan 2022 07:07    147    |  115    |  46     ----------------------------<  123    5.0     |  24     |  0.86     Ca    8.0        02 Feb 2022 08:07  Ca    8.5        01 Feb 2022 07:43  Ca    8.4        31 Jan 2022 17:52  Ca    8.4        31 Jan 2022 07:11  Ca    8.5        30 Jan 2022 07:34  Ca    8.5        29 Jan 2022 06:33  Ca    8.4        28 Jan 2022 07:07  Phos  2.2       02 Feb 2022 08:07  Phos  3.0       01 Feb 2022 07:43  Phos  1.9       31 Jan 2022 17:52  Phos  2.2       31 Jan 2022 07:11  Phos  3.2       30 Jan 2022 07:34  Phos  3.5       29 Jan 2022 06:33  Phos  2.7       27 Jan 2022 07:45  Mg     2.6       02 Feb 2022 08:07  Mg     2.1       31 Jan 2022 07:11  Mg     2.4       30 Jan 2022 07:34  Mg     3.0       29 Jan 2022 06:33  Mg     2.3       27 Jan 2022 07:45    TPro  6.1    /  Alb  1.4    /  TBili  0.5    /  DBili  x      /  AST  207    /  ALT  203    /  AlkPhos  144    01 Feb 2022 07:43              CAPILLARY BLOOD GLUCOSE      POCT Blood Glucose.: 154 mg/dL (02 Feb 2022 16:22)  POCT Blood Glucose.: 196 mg/dL (02 Feb 2022 11:34)  POCT Blood Glucose.: 264 mg/dL (02 Feb 2022 07:54)  POCT Blood Glucose.: 186 mg/dL (02 Feb 2022 00:06)          RADIOLOGY & ADDITIONAL TESTS:    Imaging Personally Reviewed:  [ ] YES  [ ] NO    Consultant(s) Notes Reviewed:  [ ] YES  [ ] NO    Care Discussed with Consultants/Other Providers [ ] YES  [ ] NO

## 2022-02-02 NOTE — CONSULT NOTE ADULT - CONSULT REQUESTED DATE/TIME
13-Jan-2022
13-Jan-2022 13:28
23-Dec-2021 13:00
24-Dec-2021 20:00
24-Dec-2021
28-Jan-2022 01:13
24-Dec-2021 15:39
03-Jan-2022 09:49
02-Feb-2022 15:01

## 2022-02-02 NOTE — PROGRESS NOTE ADULT - SUBJECTIVE AND OBJECTIVE BOX
JUAREZ GTZ  MRN-[id number]    Follow Up:  Pneumonia, fever, s/p Varela's procedure     Interval History: The pt was seen and examined earlier, no distress, seems to be doing better, more awake, opens her eyes. The pt had a low grade temp of 100.6 this morning, WBC 12.54, Cr ok.     PAST MEDICAL & SURGICAL HISTORY:  Seizure    HTN (hypertension)    Glaucoma    Thyroid disease    Blood clot of neck vein  left side    TIA (transient ischemic attack)  20 years ago    S/P appendectomy        ROS:    [x ] Unobtainable because: pt is vented via tracheostomy  [ ] All other systems negative    Constitutional: no fever, no chills  Head: no trauma  Eyes: no vision changes, no eye pain  ENT:  no sore throat, no rhinorrhea  Cardiovascular:  no chest pain, no palpitation  Respiratory:  no SOB, no cough  GI:  no abd pain, no vomiting, no diarrhea  urinary: no dysuria, no hematuria, no flank pain  musculoskeletal:  no joint pain, no joint swelling  skin:  no rash  neurology:  no headache, no seizure, no change in mental status  psych: no anxiety, no depression         Allergies  No Known Allergies        ANTIMICROBIALS:  piperacillin/tazobactam IVPB.. 3.375 every 8 hours      OTHER MEDS:  acetaminophen    Suspension .. 650 milliGRAM(s) Oral every 6 hours PRN  amLODIPine   Tablet 10 milliGRAM(s) Oral daily  carvedilol 3.125 milliGRAM(s) Oral every 12 hours  chlorhexidine 0.12% Liquid 15 milliLiter(s) Oral Mucosa every 12 hours  chlorhexidine 2% Cloths 1 Application(s) Topical <User Schedule>  dextrose 40% Gel 15 Gram(s) Oral once  dextrose 5% + sodium chloride 0.45% with potassium chloride 20 mEq/L 1000 milliLiter(s) IV Continuous <Continuous>  dextrose 5%. 1000 milliLiter(s) IV Continuous <Continuous>  dextrose 5%. 1000 milliLiter(s) IV Continuous <Continuous>  dextrose 50% Injectable 25 Gram(s) IV Push once  dextrose 50% Injectable 12.5 Gram(s) IV Push once  dextrose 50% Injectable 25 Gram(s) IV Push once  dorzolamide 2%/timolol 0.5% Ophthalmic Solution 1 Drop(s) Both EYES two times a day  escitalopram 20 milliGRAM(s) Oral daily  glucagon  Injectable 1 milliGRAM(s) IntraMuscular once  levETIRAcetam  IVPB 500 milliGRAM(s) IV Intermittent every 12 hours  levothyroxine Injectable 37.5 MICROGram(s) IV Push at bedtime  pantoprazole  Injectable 40 milliGRAM(s) IV Push every 12 hours  sodium chloride 0.9% lock flush 10 milliLiter(s) IV Push every 1 hour PRN      Vital Signs Last 24 Hrs  T(C): 36.6 (02 Feb 2022 11:27), Max: 38.1 (02 Feb 2022 05:08)  T(F): 97.8 (02 Feb 2022 11:27), Max: 100.6 (02 Feb 2022 05:08)  HR: 66 (02 Feb 2022 11:32) (64 - 82)  BP: 114/56 (02 Feb 2022 11:27) (114/56 - 148/71)  BP(mean): --  RR: 17 (02 Feb 2022 11:27) (17 - 20)  SpO2: 98% (02 Feb 2022 11:32) (97% - 100%)    Physical Exam:  General: Nontoxic-appearing Female in no acute distress. Trached on vent, lethargic - a little better today  HEENT: AT/NC. Unable to assess pt's mouth or eyes due to non-compliance   Neck: Not rigid. No sense of mass. Trach C/D/I  Nodes: None palpable.  Lungs: diminished breath sounds bilaterally, no rhonchi, no wheezes  Heart: Regular rate and rhythm. +Murmur. No rub. No gallop. No palpable thrill. Right arm midline c/d/i   Abdomen: Bowel sounds now present.  Soft. not distended.  Abd incision with vac dressing.  Ostomy with dark stool and gas. Drains x1, +PEG tube   Extremities: No cyanosis or clubbing. 1+ edema of bilateral lower extremities   Skin: Warm. Dry No rash. No vasculitic stigmata.  Neuro: opens her eyes   Psychiatric: unable to assess    WBC Count: 12.54 K/uL (02-02 @ 08:07)  WBC Count: 16.76 K/uL (02-01 @ 07:43)  WBC Count: 17.82 K/uL (01-31 @ 07:11)  WBC Count: 20.79 K/uL (01-30 @ 07:34)  WBC Count: 23.36 K/uL (01-29 @ 18:09)  WBC Count: 27.98 K/uL (01-29 @ 06:33)  WBC Count: 24.37 K/uL (01-28 @ 17:18)                            8.5    12.54 )-----------( 315      ( 02 Feb 2022 08:07 )             27.8       02-02    144  |  114<H>  |  15  ----------------------------<  187<H>  3.3<L>   |  23  |  0.52    Ca    8.0<L>      02 Feb 2022 08:07  Phos  2.2     02-02  Mg     2.6     02-02    TPro  6.1  /  Alb  1.4<L>  /  TBili  0.5  /  DBili  x   /  AST  207<H>  /  ALT  203<H>  /  AlkPhos  144<H>  02-01          Creatinine Trend: 0.52<--, 0.42<--, 0.52<--, 0.56<--, 0.81<--, 0.76<--      MICROBIOLOGY:  v  .Blood Blood  02-01-22   No growth to date.  --  --      .Sputum Sputum  01-31-22   Few Pseudomonas aeruginosa  --    Numerous polymorphonuclear leukocytes per low power field  Rare Squamous epithelial cells per low power field  Moderate Gram Negative Rods seen per oil power field      .Blood Blood-Peripheral  01-31-22   No growth to date.  --  --      Catheterized Catheterized  01-28-22   >=3 organisms. Probable collection contamination.  --  --      .Blood Blood-Peripheral  01-27-22   No Growth Final  --  --      .Blood Blood-Peripheral  01-21-22   No Growth Final  --  --      Clean Catch Clean Catch (Midstream)  01-21-22   <10,000 CFU/mL Normal Urogenital Snedy  --  --      .Body Fluid Abdominal Fluid  01-03-22   Few Escherichia coli  Few Bacteroides ovatus group "Susceptibilities not performed"  --  Escherichia coli    SARS-CoV-2 Result: NotDetec (01-30-22 @ 10:40)    SARS-CoV-2: NotDetec (20 Jan 2022 18:59)  SARS-CoV-2: NotDetec (24 Dec 2021 14:38)  SARS-CoV-2: [facility] (24 Dec 2021 00:03)    RADIOLOGY:     JUAREZ GTZ  MRN-[id number]    Follow Up:  Pneumonia, fever, s/p Varela's procedure     Interval History: The pt was seen and examined earlier, no distress, seems to be doing better, more awake, opens her eyes. The pt had a low grade temp of 100.6 this morning, WBC 12.54, Cr ok.     PAST MEDICAL & SURGICAL HISTORY:  Seizure    HTN (hypertension)    Glaucoma    Thyroid disease    Blood clot of neck vein  left side    TIA (transient ischemic attack)  20 years ago    S/P appendectomy        ROS:    [x ] Unobtainable because: pt is vented via tracheostomy  [ ] All other systems negative    Constitutional: no fever, no chills  Head: no trauma  Eyes: no vision changes, no eye pain  ENT:  no sore throat, no rhinorrhea  Cardiovascular:  no chest pain, no palpitation  Respiratory:  no SOB, no cough  GI:  no abd pain, no vomiting, no diarrhea  urinary: no dysuria, no hematuria, no flank pain  musculoskeletal:  no joint pain, no joint swelling  skin:  no rash  neurology:  no headache, no seizure, no change in mental status  psych: no anxiety, no depression         Allergies  No Known Allergies        ANTIMICROBIALS:  piperacillin/tazobactam IVPB.. 3.375 every 8 hours      OTHER MEDS:  acetaminophen    Suspension .. 650 milliGRAM(s) Oral every 6 hours PRN  amLODIPine   Tablet 10 milliGRAM(s) Oral daily  carvedilol 3.125 milliGRAM(s) Oral every 12 hours  chlorhexidine 0.12% Liquid 15 milliLiter(s) Oral Mucosa every 12 hours  chlorhexidine 2% Cloths 1 Application(s) Topical <User Schedule>  dextrose 40% Gel 15 Gram(s) Oral once  dextrose 5% + sodium chloride 0.45% with potassium chloride 20 mEq/L 1000 milliLiter(s) IV Continuous <Continuous>  dextrose 5%. 1000 milliLiter(s) IV Continuous <Continuous>  dextrose 5%. 1000 milliLiter(s) IV Continuous <Continuous>  dextrose 50% Injectable 25 Gram(s) IV Push once  dextrose 50% Injectable 12.5 Gram(s) IV Push once  dextrose 50% Injectable 25 Gram(s) IV Push once  dorzolamide 2%/timolol 0.5% Ophthalmic Solution 1 Drop(s) Both EYES two times a day  escitalopram 20 milliGRAM(s) Oral daily  glucagon  Injectable 1 milliGRAM(s) IntraMuscular once  levETIRAcetam  IVPB 500 milliGRAM(s) IV Intermittent every 12 hours  levothyroxine Injectable 37.5 MICROGram(s) IV Push at bedtime  pantoprazole  Injectable 40 milliGRAM(s) IV Push every 12 hours  sodium chloride 0.9% lock flush 10 milliLiter(s) IV Push every 1 hour PRN      Vital Signs Last 24 Hrs  T(C): 36.6 (02 Feb 2022 11:27), Max: 38.1 (02 Feb 2022 05:08)  T(F): 97.8 (02 Feb 2022 11:27), Max: 100.6 (02 Feb 2022 05:08)  HR: 66 (02 Feb 2022 11:32) (64 - 82)  BP: 114/56 (02 Feb 2022 11:27) (114/56 - 148/71)  BP(mean): --  RR: 17 (02 Feb 2022 11:27) (17 - 20)  SpO2: 98% (02 Feb 2022 11:32) (97% - 100%)    Physical Exam:  General: Nontoxic-appearing Female in no acute distress. Trached on vent, lethargic - a little better today  HEENT: AT/NC. Unable to assess pt's mouth or eyes due to non-compliance   Neck: Not rigid. No sense of mass. Trach C/D/I  Nodes: None palpable.  Lungs: diminished breath sounds bilaterally, no rhonchi, no wheezes  Heart: Regular rate and rhythm. +Murmur. No rub. No gallop. No palpable thrill. Right arm midline c/d/i   Abdomen: Bowel sounds now present.  Soft. not distended.  Abd incision with vac dressing.  Ostomy with dark stool and gas. Drains x1, +PEG tube   Extremities: No cyanosis or clubbing. 1+ edema of bilateral lower extremities   Skin: Warm. Dry No rash. No vasculitic stigmata.  Neuro: opens her eyes   Psychiatric: unable to assess    WBC Count: 12.54 K/uL (02-02 @ 08:07)  WBC Count: 16.76 K/uL (02-01 @ 07:43)  WBC Count: 17.82 K/uL (01-31 @ 07:11)  WBC Count: 20.79 K/uL (01-30 @ 07:34)  WBC Count: 23.36 K/uL (01-29 @ 18:09)  WBC Count: 27.98 K/uL (01-29 @ 06:33)  WBC Count: 24.37 K/uL (01-28 @ 17:18)                            8.5    12.54 )-----------( 315      ( 02 Feb 2022 08:07 )             27.8       02-02    144  |  114<H>  |  15  ----------------------------<  187<H>  3.3<L>   |  23  |  0.52    Ca    8.0<L>      02 Feb 2022 08:07  Phos  2.2     02-02  Mg     2.6     02-02    TPro  6.1  /  Alb  1.4<L>  /  TBili  0.5  /  DBili  x   /  AST  207<H>  /  ALT  203<H>  /  AlkPhos  144<H>  02-01          Creatinine Trend: 0.52<--, 0.42<--, 0.52<--, 0.56<--, 0.81<--, 0.76<--      MICROBIOLOGY:  v  .Blood Blood  02-01-22   No growth to date.  --  --      .Sputum Sputum  01-31-22   Few Pseudomonas aeruginosa  --    Numerous polymorphonuclear leukocytes per low power field  Rare Squamous epithelial cells per low power field  Moderate Gram Negative Rods seen per oil power field      .Blood Blood-Peripheral  01-31-22   No growth to date.  --  --      Catheterized Catheterized  01-28-22   >=3 organisms. Probable collection contamination.  --  --      .Blood Blood-Peripheral  01-27-22   No Growth Final  --  --      .Blood Blood-Peripheral  01-21-22   No Growth Final  --  --      Clean Catch Clean Catch (Midstream)  01-21-22   <10,000 CFU/mL Normal Urogenital Sendy  --  --      .Body Fluid Abdominal Fluid  01-03-22   Few Escherichia coli  Few Bacteroides ovatus group "Susceptibilities not performed"  --  Escherichia coli    SARS-CoV-2 Result: NotDetec (01-30-22 @ 10:40)    SARS-CoV-2: NotDetec (20 Jan 2022 18:59)  SARS-CoV-2: NotDetec (24 Dec 2021 14:38)  SARS-CoV-2: NotDetec (24 Dec 2021 00:03)    RADIOLOGY:  < from: CT Head No Cont (01.30.22 @ 10:33) >  IMPRESSION: Acute left MCA infarct is suspected. This finding can be   further evaluated with MRI.    < end of copied text >

## 2022-02-02 NOTE — PROGRESS NOTE ADULT - SUBJECTIVE AND OBJECTIVE BOX
Patient: JUAREZ GTZ [id number] 81y Female                            Hospitalist Attending Note    T 100.6 this am.  Unable to offer complaints.     ____________________PHYSICAL EXAM:  GENERAL:  NAD Awake, does not follow commands.   HEENT: NCAT  CARDIOVASCULAR:  S1, S2  LUNGS: b/l rhonchi.   ABDOMEN:  soft, (-) tenderness, (-) distension, (+) bowel sounds, (-) guarding, (-) rebound (-) rigidity.  Colostomy in place with dark stool.  Midline abdominal wound vac in place.    EXTREMITIES:  no cyanosis / clubbing / edema.   ____________________     VITALS:  Vital Signs Last 24 Hrs  T(C): 36.6 (02 Feb 2022 11:27), Max: 38.1 (02 Feb 2022 05:08)  T(F): 97.8 (02 Feb 2022 11:27), Max: 100.6 (02 Feb 2022 05:08)  HR: 66 (02 Feb 2022 11:32) (64 - 82)  BP: 114/56 (02 Feb 2022 11:27) (114/56 - 148/71)  BP(mean): --  RR: 17 (02 Feb 2022 11:27) (17 - 20)  SpO2: 98% (02 Feb 2022 11:32) (97% - 100%) Daily     Daily   CAPILLARY BLOOD GLUCOSE      POCT Blood Glucose.: 196 mg/dL (02 Feb 2022 11:34)  POCT Blood Glucose.: 264 mg/dL (02 Feb 2022 07:54)  POCT Blood Glucose.: 186 mg/dL (02 Feb 2022 00:06)  POCT Blood Glucose.: 119 mg/dL (01 Feb 2022 16:35)    I&O's Summary      HISTORY:  PAST MEDICAL & SURGICAL HISTORY:  Seizure    HTN (hypertension)    Glaucoma    Thyroid disease    Blood clot of neck vein  left side    TIA (transient ischemic attack)  20 years ago    S/P appendectomy    Allergies    No Known Allergies    Intolerances       LABS:                        8.5    12.54 )-----------( 315      ( 02 Feb 2022 08:07 )             27.8       Culture - Blood (collected 01 Feb 2022 00:50)  Source: .Blood Blood  Preliminary Report (02 Feb 2022 01:01):    No growth to date.    Culture - Sputum (collected 31 Jan 2022 00:37)  Source: .Sputum Sputum  Gram Stain (prelim) (31 Jan 2022 23:06):    Numerous polymorphonuclear leukocytes per low power field    Rare Squamous epithelial cells per low power field    Moderate Gram Negative Rods seen per oil power field  Preliminary Report (31 Jan 2022 23:06):    Few Pseudomonas aeruginosa    Culture - Blood (collected 31 Jan 2022 00:13)  Source: .Blood Blood-Peripheral  Preliminary Report (01 Feb 2022 01:01):    No growth to date.    Culture - Blood (collected 31 Jan 2022 00:13)  Source: .Blood Blood-Peripheral  Preliminary Report (01 Feb 2022 01:01):    No growth to date.    02-02    144  |  114<H>  |  15  ----------------------------<  187<H>  3.3<L>   |  23  |  0.52    Ca    8.0<L>      02 Feb 2022 08:07  Phos  2.2     02-02  Mg     2.6     02-02    TPro  6.1  /  Alb  1.4<L>  /  TBili  0.5  /  DBili  x   /  AST  207<H>  /  ALT  203<H>  /  AlkPhos  144<H>  02-01      LIVER FUNCTIONS - ( 01 Feb 2022 07:43 )  Alb: 1.4 g/dL / Pro: 6.1 gm/dL / ALK PHOS: 144 U/L / ALT: 203 U/L / AST: 207 U/L / GGT: x                 Culture - Blood (collected 01 Feb 2022 00:50)  Source: .Blood Blood  Preliminary Report (02 Feb 2022 01:01):    No growth to date.    Culture - Sputum (collected 31 Jan 2022 00:37)  Source: .Sputum Sputum  Gram Stain (prelim) (31 Jan 2022 23:06):    Numerous polymorphonuclear leukocytes per low power field    Rare Squamous epithelial cells per low power field    Moderate Gram Negative Rods seen per oil power field  Preliminary Report (31 Jan 2022 23:06):    Few Pseudomonas aeruginosa    Culture - Blood (collected 31 Jan 2022 00:13)  Source: .Blood Blood-Peripheral  Preliminary Report (01 Feb 2022 01:01):    No growth to date.    Culture - Blood (collected 31 Jan 2022 00:13)  Source: .Blood Blood-Peripheral  Preliminary Report (01 Feb 2022 01:01):    No growth to date.          MEDICATIONS:  MEDICATIONS  (STANDING):  amLODIPine   Tablet 10 milliGRAM(s) Oral daily  carvedilol 3.125 milliGRAM(s) Oral every 12 hours  chlorhexidine 0.12% Liquid 15 milliLiter(s) Oral Mucosa every 12 hours  chlorhexidine 2% Cloths 1 Application(s) Topical <User Schedule>  dextrose 40% Gel 15 Gram(s) Oral once  dextrose 5% + sodium chloride 0.45% with potassium chloride 20 mEq/L 1000 milliLiter(s) (42 mL/Hr) IV Continuous <Continuous>  dextrose 5%. 1000 milliLiter(s) (50 mL/Hr) IV Continuous <Continuous>  dextrose 5%. 1000 milliLiter(s) (100 mL/Hr) IV Continuous <Continuous>  dextrose 50% Injectable 25 Gram(s) IV Push once  dextrose 50% Injectable 12.5 Gram(s) IV Push once  dextrose 50% Injectable 25 Gram(s) IV Push once  dorzolamide 2%/timolol 0.5% Ophthalmic Solution 1 Drop(s) Both EYES two times a day  escitalopram 20 milliGRAM(s) Oral daily  glucagon  Injectable 1 milliGRAM(s) IntraMuscular once  levETIRAcetam  IVPB 500 milliGRAM(s) IV Intermittent every 12 hours  levothyroxine Injectable 37.5 MICROGram(s) IV Push at bedtime  pantoprazole  Injectable 40 milliGRAM(s) IV Push every 12 hours  piperacillin/tazobactam IVPB.. 3.375 Gram(s) IV Intermittent every 8 hours    MEDICATIONS  (PRN):  acetaminophen    Suspension .. 650 milliGRAM(s) Oral every 6 hours PRN Temp greater or equal to 38C (100.4F)  sodium chloride 0.9% lock flush 10 milliLiter(s) IV Push every 1 hour PRN Pre/post blood products, medications, blood draw, and to maintain line patency

## 2022-02-02 NOTE — CONSULT NOTE ADULT - PROVIDER SPECIALTY LIST ADULT
Intervent Radiology
Surgery
Cardiology
Critical Care
Gastroenterology
Infectious Disease
Pulmonology
Critical Care
Intervent Radiology
Neurology

## 2022-02-02 NOTE — CONSULT NOTE ADULT - ASSESSMENT
Acute left MCA CVA off AC with new onset Afib   s/p ex lap/colectomy/diverticulitis  HTN  HLD  seizures?    Plan   High risk of Hem conversion, would cont with ASA/Statin for secondary stroke prevention  MRI/MRA head and neck w/o when able  PT/OT/ST  lipid panel  poor prognosis with large left MCA CVA, GOC discussion  will continue to follow  Acute left MCA CVA off AC with new onset Afib   s/p ex lap/colectomy/diverticulitis  HTN  HLD  seizures?    Plan   High risk of Hem conversion, would cont with ASA/Statin for secondary stroke prevention  MRI/MRA head and neck w/o when able  PT/OT/ST  lipid panel  echo  poor prognosis with large left MCA CVA, GOC discussion  will continue to follow

## 2022-02-02 NOTE — PROGRESS NOTE ADULT - ASSESSMENT
From Initial ID consult note: 81F with a PMH of HTN, HLD, hypothyroidism, depression, seizures who presents to the ED for abd pain found to have diverticulitis   has rising leukocytosis   had fever yesterday   tachycardic and now hypoxic   CXR (I personally reviewed) low lung volumes   original CT (I personally reviewed) with diverticulitis   she had a lot of pain in the abdomen on exam     Progress Notes  12/25: s/p surgery for diverticulitis with perforation and abscess and went to the OR. intubated in ICU with vac and needs to go back to the OR, currently on zosyn, s/p one dose of diflucan last night   12/27: s/p repair and ostomy creation yesterday, wbc elevated, cultures sensitive to zosyn and candida albicans is notoriously sensitive to azoles such as fluconazole, wean of pressors and sedation, extubate when ready   12/28: Further increase in white blood cell count but overall the patient appears to be reasonably stable.  No concern of C. difficile at this juncture.  Current antibiotics are reasonable.  Leukocytosis like related to recent surgery, no concern of other superimposed process on the basis of exam.  I do not see a role to alter her antimicrobials.  12/29:  Remains intubated in ICU. still quite edematous and net positive, WBC climbing, small wound dehiscence at bottom of incision, plan for initiation of TPN today, remains on antibiotics    12/30: rising WBC, ostomy not functioning yet, very edematous CT today, may be source control issue so for now can continue same antibiotics and antifungal but may need to change if findings on the CT are worrisome or change in hemodynamics  12/31:Leukocytosis, with collections noted on CT.  However this is being done postoperative day 5, there is some possibility that this could represent postop changes but given the leukocytosis, concur with plans for attempts at percutaneous drainage.  White blood cell count down slightly compared to prior peak, will need to be trended.  Gallbladder is also distended.  Abdominal exam was benign this morning, but a calculus cholecystitis could be the differential diagnosis here as well (though n.p.o. status certainly could explain distended gallbladder, there is also report of gallbladder wall thickening).  Would modify antibiotics based on cultures from drainage, I do not believe that antibiotics and other the cells would be adequate here.  Fortunately she is off pressors, with preserved renal function, and tolerating pressure support.  1/1/22: Postop day 6.  I have some concerns with the appearance of the ostomy, though the abdominal exam overall is otherwise unchanged.  White blood cell count remains elevated, but is lower compared with prior values.  This is despite no change in antibiotic therapy.  Patient also has asymmetric edema of the upper extremities, right greater than left.  Low threshold for duplex ultrasound to exclude DVT.No new surveillance culture data.  1/2: POD7 Ostomy looks better today, UE symmetric. WBC elevated, some slight downward trend overall. Temps <= 100F.   1/3: drainage of fluid collection today, continue zosyn and fluconazole for now, monitor wbc and temps, watch ostomy output, diuresis and address third spacing   1/4: s/p drainage yesterday now growing ecoli and awaiting for sensitivities, wbc is improving, started on tube feeds    1/5: abscess growing ecoli and bacteroides, S to ceftriaxone, wbc 15, weaning trial today, pain management    1/6: awake, lung exercise today, trach planned for tomorrow, ostomy output is good. will stop fluconazole today and change antibiotics to ceftriaxone and flagly. Unclear stop date yet    1/7 trach today, continue antibiotics   1/9: s/p trach, had low grade fever but otherwise doing ok, will continues same antibiotics regimen for now but if fevers continue she will needs to be rescanned as those abscesses can progress.  1/10: low grade fever but doing well on trach-PS, ostomy with stool and gas, shakes head when asked if in pain, discussed with surgery about repeating CT scan given the temp  1/11: Still febrile, favor interval CT as above.  1/12: CT of abdomen showing some signs of improvement and elsewhere  it shows signs of worsening. wbc normal, tube feeds currently via NGT   1/13: no fever, no wbc, Cr and LFTs ok, Ceftriaxone and flagyl continued. No planned IR intervention at this time - fluid collection without access to drain, surgical drain within the collection. Pt possibly will have repeat imaging over the weekend to evaluate progress.   Attending addendum:  agree with above  continue antibiotics   vent weaning  surgical follow up for the abdominal wound  1/14: Low grade temp last night, midline placed, no leukocytosis, Cr and LFTs ok, culture data reviewed. Pt's colostomy with small amount of liquid stool and gas, no stools recorded for two days, no role for po Vancomycin at this time. The pt has been on abx since her admission, will stop all abx and will continue to monitor.    1/18: no fevers since 1/14, WBC better 11.00, cr ok, off abx, now s/p EGD, with PEG replacement.   1/24: spiking low grade fevers, low suspicion for pneumonia given little secretions and doing well on the PS wth low settings, peg site looks clean, trach site OK as well, fevers may be from small fluid collections intra-abdominally vs atelectasis Would suggest aggressive pulmonary toilet including chest vest. follow cultures and trend wbc and fever curve . If persistent fevers would start  Zosyn and PO fluconazole but for now please hold antibiotics   1/25: continues having fevers, low grade temps today, WBC better 18.35, Cr and LFTs ok, + new DVT of right common femoral vein - could be the cause of pt's fevers - on full dose Lovenox. Will continue to monitor off abx, WBC got better without abx.   Attending addendum:  patient with long hospital course and now on the floors with fever and found to have a DVT  wbc came down without antibiotics   fever curve is getting better   continue to monitor off antibiotics   treat DVT with full dose AC   suspect fevers could be from dvt but may also be from fluid collection    1/26: low grade temp today, pt is more awake and alert today, WBC improving 14.2 off abx, Cr and LFTs ok, abd wound with clean base and healing well - vac dressing was changed today during my exam. In favor to continue to monitor off abx for now.   1/27: Pt continues spiking fevers, low grade last night and 101 today, awake and alert during my exam, WBC improving off abx 13.61, recent chest xray with no acute lung disease, COVID 19 is negative. Pt's fevers most likely related to her DVT, will collect two sets of surveillance BC and will continue to monitor off abx for now.   1/28: CTA A/P done last night - There is evidence of active GI bleeding in the gastric antrum, related to peptic ulcer disease or gastritis, possible cystitis. Blood transfusion is in progress during my exam, wound vac is being changed - base of the wound is clean with no evidence of acute infection. Pt had a low grade temp last night, WBC elevated - could be reactive to DVT. Will obtain UA and UC, will hold off on abx for now.   1/29: persistent fevers, received a dose of vanco and a dose of Azactam last evening, WBC high 27.98, BC with no growth to date, UA positive, UC pending. Will start on broad spectrum antibiotic now as unclear it is unclear at this time if fevers caused by UTI only, or in combination with DVT, or other source. Pt's abd was seen with last vac dressing change - incision site wound  is clean and healing well. JPis still draining cloudy drainage.   1/30: lethargic, continues having fevers, WBC better 20.79, all BCs remain with no growth, UC pending, CT head - Acute left MCA infarct is suspected, CT chest and CT a/p performed earlier, pending radiology reading, will continue with Zosyn, Na is slightly elevated, would prefer to have UC available prior switching abx.   1/31: no fevers today, remains lethargic, vented, WBC with improvement 17.82, two sets of BCs were collected yesterday and pending result, Zosyn continued. All BCs with no growth to date, last UC is contaminated.  2/1: afebrile last 24hrs, no IVC filter placement per IR, wbc slightly better, PEG tube loosened and EGD by EGD didnt find bleed, pseudomonas in respiratory culture and CT did show pneumonia but she is doing well on , nonetheless hopefully the zosyn is sensitive,    2/2: low grade temp this morning, WBC improving, Zosyn continued, BCs remain with no growth, sputum culture with pseudomonas, sensitivity is pending.     #RLL Pneumonia   - continue Zosyn IV (day #5)  - trend pt's temperatures  - trend WBC  - MRSA PCR not detected      #Perforated diverticulum of large intestine.   continue Zosyn IV  monitor sodium levels   s/p one time dose of Vancomycin and a dose of Amikacin on 1/28  continue wound vac   CT chest/a/p - + pneumonia, no abdominal pathology       #Fever.   continue Zosyn  trend pt's temperatures   UC - contaminated   +DVT of right common femoral vein   New left MCA infarct on CT head  CT c/a/p - new pneumonia RLL, no acute abd pathology   BC NGTD  repeat BC NGTD  MRSA PCR not detected     #Acute Respiratory Failure  wean off ventilator as tolerated   chest PT  frequent suctioning   chest vest   HOB >30 degrees    #DVT  AC as per protocol From Initial ID consult note: 81F with a PMH of HTN, HLD, hypothyroidism, depression, seizures who presents to the ED for abd pain found to have diverticulitis   has rising leukocytosis   had fever yesterday   tachycardic and now hypoxic   CXR (I personally reviewed) low lung volumes   original CT (I personally reviewed) with diverticulitis   she had a lot of pain in the abdomen on exam     Progress Notes  12/25: s/p surgery for diverticulitis with perforation and abscess and went to the OR. intubated in ICU with vac and needs to go back to the OR, currently on zosyn, s/p one dose of diflucan last night   12/27: s/p repair and ostomy creation yesterday, wbc elevated, cultures sensitive to zosyn and candida albicans is notoriously sensitive to azoles such as fluconazole, wean of pressors and sedation, extubate when ready   12/28: Further increase in white blood cell count but overall the patient appears to be reasonably stable.  No concern of C. difficile at this juncture.  Current antibiotics are reasonable.  Leukocytosis like related to recent surgery, no concern of other superimposed process on the basis of exam.  I do not see a role to alter her antimicrobials.  12/29:  Remains intubated in ICU. still quite edematous and net positive, WBC climbing, small wound dehiscence at bottom of incision, plan for initiation of TPN today, remains on antibiotics    12/30: rising WBC, ostomy not functioning yet, very edematous CT today, may be source control issue so for now can continue same antibiotics and antifungal but may need to change if findings on the CT are worrisome or change in hemodynamics  12/31:Leukocytosis, with collections noted on CT.  However this is being done postoperative day 5, there is some possibility that this could represent postop changes but given the leukocytosis, concur with plans for attempts at percutaneous drainage.  White blood cell count down slightly compared to prior peak, will need to be trended.  Gallbladder is also distended.  Abdominal exam was benign this morning, but a calculus cholecystitis could be the differential diagnosis here as well (though n.p.o. status certainly could explain distended gallbladder, there is also report of gallbladder wall thickening).  Would modify antibiotics based on cultures from drainage, I do not believe that antibiotics and other the cells would be adequate here.  Fortunately she is off pressors, with preserved renal function, and tolerating pressure support.  1/1/22: Postop day 6.  I have some concerns with the appearance of the ostomy, though the abdominal exam overall is otherwise unchanged.  White blood cell count remains elevated, but is lower compared with prior values.  This is despite no change in antibiotic therapy.  Patient also has asymmetric edema of the upper extremities, right greater than left.  Low threshold for duplex ultrasound to exclude DVT.No new surveillance culture data.  1/2: POD7 Ostomy looks better today, UE symmetric. WBC elevated, some slight downward trend overall. Temps <= 100F.   1/3: drainage of fluid collection today, continue zosyn and fluconazole for now, monitor wbc and temps, watch ostomy output, diuresis and address third spacing   1/4: s/p drainage yesterday now growing ecoli and awaiting for sensitivities, wbc is improving, started on tube feeds    1/5: abscess growing ecoli and bacteroides, S to ceftriaxone, wbc 15, weaning trial today, pain management    1/6: awake, lung exercise today, trach planned for tomorrow, ostomy output is good. will stop fluconazole today and change antibiotics to ceftriaxone and flagly. Unclear stop date yet    1/7 trach today, continue antibiotics   1/9: s/p trach, had low grade fever but otherwise doing ok, will continues same antibiotics regimen for now but if fevers continue she will needs to be rescanned as those abscesses can progress.  1/10: low grade fever but doing well on trach-PS, ostomy with stool and gas, shakes head when asked if in pain, discussed with surgery about repeating CT scan given the temp  1/11: Still febrile, favor interval CT as above.  1/12: CT of abdomen showing some signs of improvement and elsewhere  it shows signs of worsening. wbc normal, tube feeds currently via NGT   1/13: no fever, no wbc, Cr and LFTs ok, Ceftriaxone and flagyl continued. No planned IR intervention at this time - fluid collection without access to drain, surgical drain within the collection. Pt possibly will have repeat imaging over the weekend to evaluate progress.   Attending addendum:  agree with above  continue antibiotics   vent weaning  surgical follow up for the abdominal wound  1/14: Low grade temp last night, midline placed, no leukocytosis, Cr and LFTs ok, culture data reviewed. Pt's colostomy with small amount of liquid stool and gas, no stools recorded for two days, no role for po Vancomycin at this time. The pt has been on abx since her admission, will stop all abx and will continue to monitor.    1/18: no fevers since 1/14, WBC better 11.00, cr ok, off abx, now s/p EGD, with PEG replacement.   1/24: spiking low grade fevers, low suspicion for pneumonia given little secretions and doing well on the PS wth low settings, peg site looks clean, trach site OK as well, fevers may be from small fluid collections intra-abdominally vs atelectasis Would suggest aggressive pulmonary toilet including chest vest. follow cultures and trend wbc and fever curve . If persistent fevers would start  Zosyn and PO fluconazole but for now please hold antibiotics   1/25: continues having fevers, low grade temps today, WBC better 18.35, Cr and LFTs ok, + new DVT of right common femoral vein - could be the cause of pt's fevers - on full dose Lovenox. Will continue to monitor off abx, WBC got better without abx.   Attending addendum:  patient with long hospital course and now on the floors with fever and found to have a DVT  wbc came down without antibiotics   fever curve is getting better   continue to monitor off antibiotics   treat DVT with full dose AC   suspect fevers could be from dvt but may also be from fluid collection    1/26: low grade temp today, pt is more awake and alert today, WBC improving 14.2 off abx, Cr and LFTs ok, abd wound with clean base and healing well - vac dressing was changed today during my exam. In favor to continue to monitor off abx for now.   1/27: Pt continues spiking fevers, low grade last night and 101 today, awake and alert during my exam, WBC improving off abx 13.61, recent chest xray with no acute lung disease, COVID 19 is negative. Pt's fevers most likely related to her DVT, will collect two sets of surveillance BC and will continue to monitor off abx for now.   1/28: CTA A/P done last night - There is evidence of active GI bleeding in the gastric antrum, related to peptic ulcer disease or gastritis, possible cystitis. Blood transfusion is in progress during my exam, wound vac is being changed - base of the wound is clean with no evidence of acute infection. Pt had a low grade temp last night, WBC elevated - could be reactive to DVT. Will obtain UA and UC, will hold off on abx for now.   1/29: persistent fevers, received a dose of vanco and a dose of Azactam last evening, WBC high 27.98, BC with no growth to date, UA positive, UC pending. Will start on broad spectrum antibiotic now as unclear it is unclear at this time if fevers caused by UTI only, or in combination with DVT, or other source. Pt's abd was seen with last vac dressing change - incision site wound  is clean and healing well. JPis still draining cloudy drainage.   1/30: lethargic, continues having fevers, WBC better 20.79, all BCs remain with no growth, UC pending, CT head - Acute left MCA infarct is suspected, CT chest and CT a/p performed earlier, pending radiology reading, will continue with Zosyn, Na is slightly elevated, would prefer to have UC available prior switching abx.   1/31: no fevers today, remains lethargic, vented, WBC with improvement 17.82, two sets of BCs were collected yesterday and pending result, Zosyn continued. All BCs with no growth to date, last UC is contaminated.  2/1: afebrile last 24hrs, no IVC filter placement per IR, wbc slightly better, PEG tube loosened and EGD by EGD didnt find bleed, pseudomonas in respiratory culture and CT did show pneumonia but she is doing well on , nonetheless hopefully the zosyn is sensitive,    2/2: low grade temp this morning, WBC improving, Zosyn continued, BCs remain with no growth, sputum culture with pseudomonas, sensitive to Zosyn.     #RLL Pneumonia   - continue Zosyn IV (day #5)  - trend pt's temperatures  - trend WBC  - MRSA PCR not detected      #Perforated diverticulum of large intestine.   continue Zosyn IV  monitor sodium levels   s/p one time dose of Vancomycin and a dose of Amikacin on 1/28  continue wound vac   CT chest/a/p - + pneumonia, no abdominal pathology       #Fever.   continue Zosyn-limit to 7 days   trend pt's temperatures   UC - contaminated   +DVT of right common femoral vein   New left MCA infarct on CT head  CT c/a/p - new pneumonia RLL, no acute abd pathology   BC NGTD  repeat BC NGTD  MRSA PCR not detected     #Acute Respiratory Failure  wean off ventilator as tolerated   chest PT  frequent suctioning   chest vest   HOB >30 degrees    #DVT  AC as per protocol

## 2022-02-02 NOTE — PROGRESS NOTE ADULT - ASSESSMENT
81F with a PMH of HTN, HLD, hypothyroidism, depression, seizures who presents to the ED for abd pain.  Patient states that over the last two days she had developed significant abdominal pain and multiple episodes of watery diarrhea.  Reports one episode of nausea and vomiting earlier today.  Patient has no other complaints.  Vitals stable,  labs show leukocytosis and hypokalemia.  CT abdomen significant for diverticulitis.  Will admit to med surg.     (23 Dec 2021 00:17)      Upper GI Bleed with CT Angio showing bleeding in gastric antrum s/p Protamine.  EGD revealed the PEG tube to be imbedded into the mucosa and an eschar under the PEG tube. The PEG tube subsequently was pushed in after the suture was cut.    1) Monitor Hb ( HGB 7.3 ) , transfuse to Hb >8    2) Continue NPO    3) PPI BID   4) Carafate   5) May restart  anticoagulants 24 hours after the tube has been repositioned.   6) Will speak with surgery. Spoke with NP who will look into it

## 2022-02-02 NOTE — PROGRESS NOTE ADULT - ASSESSMENT
TRESA PIZARRO 81 f [birth year]2021 1942 DR ANTONIO PATTON     REVIEW OF SYMPTOMS      Able to give (reliable) ROS  NO     PHYSICAL EXAM    HEENT Unremarkable  atraumatic   RESP Fair air entry EXP prolonged    Harsh breath sound Resp distres mild   CARDIAC S1 S2 No S3     NO JVD    ABDOMEN SOFT BS PRESENT NOT DISTENDED No hepatosplenomegaly   PEDAL EDEMA present No calf tenderness  NO rash     ______  DOA/CC.  [birth year]2021 81F w/ HTN, HLD, hypothyroidism, depression, seizures. Presented w/ abdominal pain found to have acute sigmoid diverticulitis    PMH.  pmh Hytn  pmh Sz.    PROBLEMS.  Feculent peritonitis poa 12/22  Colostomy 12/26  Initial abio course competed 1/15  R LEFTY removd 1/22/2022  DVT 1/25/2022 r cfv fd lvnx startd   Drop Hb 1/27 Lvnx dced  GI bl peg and ostomy 1/28  NPO 1/28  Zosyn 1/29 1/31 Sp Pseudo  Need for ivcf 1/30/2022 dw Dr Patton  2/1/2022 IR reviewed imaging IR felt that Blood clot is minimal and subaute/chronic and neither IVCF not Anticoagulants are needed   New rll pneum 1/30 ct   Ac L MCA cva 1/30/2022 CT   GI Bleed EGD 2/1/2022 Dr Ness Erosions PEG tube May restart Anticoagulants after 24 h  Elevated LFTS 2/1/2022     MISC ISSUES.  COVID STATUS. 12/24 pcr (-)  ICU STAY. 12/22-1/12/2022   GOC.  1/13/2022 full code       BEST PRACTICE ISSUES.                                                  HEAD OF BED ELEVATION. Yes  DVT PROPHYLAXIS.     1/28/2022 lvnx dced because of active gi bleed    1/25/2022 lvnx 80.2   1/25/2022 V duplx partially occlusive thrombus r cfv ac to subacute   1/7 lvnx 40   LIGHT PROPHYLAXIS.    1/11 protonix 40                                                                                     DIET.   2/1 jevity 1440  1/28/2022 npo   1/20/2022 jevity 1.5 1200  1/18/2022 npo  1/7 vital af 1200 ngt          INFECTION PROPHYLAXIS.   1/7 Chlorhexidine .12%   1/7 chlorhexidine 2%    SPEECH SWALLOW RECOMMENDATIONS.     PATIENT DATA   VITALS/PO/IO/VENT/DRIPS.   2/2/2022 99f 74 130/70   2/2/2022 cpap 5/10/svt 350 r 27     ASSESSMENT/RECOMMENDATIONS.    HEMODYNAMICS.   Monitor bp Target MAP 65 (+)    RESP.   Monitor po Target po 90-95%  1/14/2022 15/450/.4/5 747/38/110       DVT  1/25/2022 V duplx partially occlusive thrombus r cfv ac to subacute   1/25 lvnx 80.2   1/28/2022 lvnx 80.2 dced by PA as Hb had dropped requiring transfusion  2/1/2022 IR reviewed imaging IR felt that Blood clot is minimal and subaute/chronic and neither IVCF not Anticoagulants are needed   2/2/2022 V duplex (-)    FEVER 1/28/2022.  New rll pneum 1/30 ct  Abdominal infection sec perforated sigmoid div feculent peritonitis poa 12/22.  Pt was taken off abio 1/15  1/28/2021 She has been developing progressive leukocytosis and low gd fever   w 1/27-1/28 -  1/29-1/30-1/31-2/1 z15-43-30 - 20-17 - 16  1/30/2022 ct ch new rll pneu  1/21 ctap showed decreased collections cw 1/11  Sputum 1/31 Sp Pseudo  blod cult. 1/31  bc. (-)  1/29/2022 zosyn started by id    SP ABD SURGERY.  81F admitted with Acute Sigmoid Diverticulitis s/p ex lap, sigmoid resection, peritoneal lavage 12/25 and RTOR 12/26 for irrigation of abdominal cavity with closure and creation of colostomy. S/p bedside IR aspiration of fluid collection 1/3. Tracheostomy 1/7.   Deep pelvic abscess noted on CT,  Local drain/ostomy care per nursing  1/22/2022 r lefty removd     ANEMIA.  Hb 1/27-1/27-1/28-1/28-1/29-1/31-2/1-2/2 Hb 9.7-7.7-8.7-9.6 - 8.9-7.3 - 9.6 - 8.5  1/28/2022 lvnx 80.2 dced     TRANSFUSION.  1/28/2022 2u prbc     AMS  1/30/2022 ct h ac l mca cva   suggest neuro eval    TIME SPENT   Over 25 minutes aggregate care time spent on encounter; activities included   direct patient care, counseling and/or coordinating care reviewing notes, lab data/ imaging , discussion with multidisciplinary team/ patient  /family and explaining in detail risks, benefits, alternatives  of the recommendations     TRESA PIZARRO 81 f 12/22/2021 1942 DR ANTONIO PATTON

## 2022-02-02 NOTE — PROGRESS NOTE ADULT - ASSESSMENT
81F with a PMH of HTN, HLD, hypothyroidism, depression, seizures presented with abdominal pain and admitted 12/22 for acute perforated diverticulitis, s/p ex lap 12/24 with sigmoid colectomy and abdominal washout.  She was then transferred to MICU for postop care on mechanical ventilation.  Hospital course complicated by Afib with RVR.  She underwent wound closure and creation of colostomy 12/26, developed abdominal abscess s/p IR drainage 1/3, cultures grew Ecoli and Bacteriodes.  S/p tracheostomy and PEG placement.  1/28 developed bleeding through ostomy, AC stopped and pt transfused.  EGD on 2/1 showed gastritis, PEG tube embedded in mucosa.  Due to fevers, CT Head, C/A/P performed on 1/30, showed Acute left MCA infarct, new RLL pneumonia.  Seen by ID, started on Zosyn.      # Acute Blood Loss Anemia - due to suspected gastric bleeding.  s/p transfusion.  EGD 2/1 showed gastritis.  Monitor H/H.  Resume AC per GI after PEG tube is loosened.  # Acute CVA - L MCA infarct was noted on CT 1/30.  Will consult neurology.  Start on AC, Statin.  # Perforated Acute Diverticulitis / Intraabdominal Abscesses - continue BABAK drain care, wound vac.  Most recent CT without significant new collections.  # S/p Septic Shock - resolved  # RLL pneumonia, Suspected Aspiration Pneumonia - seen on CT chest 1/30.  ID following, continue Zosyn  # Afib with RVR - rate control.  Restart AC  # Chronic Respiratory Failure with Hypoxemia - CPAP settings, pulmonary following.  # RLE DVT - per IR, felt to be more subacute to chronic.  Recommended no IVC filter placement.  Followup LE doppler 2/2 shows no acute DVT.   # Functional Quadriplegia - supportive care.    DVT Proph - ICDs.  Restart AC.     81F with a PMH of HTN, HLD, hypothyroidism, depression, seizures presented with abdominal pain and admitted 12/22 for acute perforated diverticulitis, s/p ex lap 12/24 with sigmoid colectomy and abdominal washout.  She was then transferred to MICU for postop care on mechanical ventilation.  Hospital course complicated by Afib with RVR.  She underwent wound closure and creation of colostomy 12/26, developed abdominal abscess s/p IR drainage 1/3, cultures grew Ecoli and Bacteriodes.  S/p tracheostomy and PEG placement.  1/28 developed bleeding through ostomy, AC stopped and pt transfused.  EGD on 2/1 showed gastritis, PEG tube embedded in mucosa.  Due to fevers, CT Head, C/A/P performed on 1/30, showed Acute left MCA infarct, new RLL pneumonia.  Seen by ID, started on Zosyn.      # Acute Blood Loss Anemia - due to suspected gastric bleeding.  s/p transfusion.  EGD 2/1 showed gastritis.  Monitor H/H.  Resume AC per GI after PEG tube is loosened.  # Acute CVA - L MCA infarct was noted on CT 1/30.  Will consult neurology.  Start on AC, Statin.  # Perforated Acute Diverticulitis / Intraabdominal Abscesses - continue BABAK drain care, wound vac.  Most recent CT without significant new collections.  # S/p Septic Shock - resolved  # RLL pneumonia, Suspected Aspiration Pneumonia - seen on CT chest 1/30.  ID following, continue Zosyn  # Afib with RVR - rate control.  Restart AC  # Chronic Respiratory Failure with Hypoxemia - CPAP settings, pulmonary following.  # RLE DVT - per IR, felt to be more subacute to chronic.  Recommended no IVC filter placement.  Followup LE doppler 2/2 shows no acute DVT.   # Functional Quadriplegia - supportive care.    DVT Proph - ICDs.  Restart AC, discussed with GI.  Heparin ordered.  Repeat labs in am, monitor for s/s of bleeding.

## 2022-02-02 NOTE — CONSULT NOTE ADULT - SUBJECTIVE AND OBJECTIVE BOX
81F with a PMH of HTN, HLD, hypothyroidism, depression, seizures presented with abdominal pain and admitted 12/22 for acute perforated diverticulitis, s/p ex lap 12/24 with sigmoid colectomy and abdominal washout.  She was then transferred to MICU for postop care on mechanical ventilation.  Hospital course complicated by Afib with RVR.  She underwent wound closure and creation of colostomy 12/26, developed abdominal abscess s/p IR drainage 1/3, cultures grew Ecoli and Bacteriodes.  S/p tracheostomy and PEG placement.  1/28 developed bleeding through ostomy, AC stopped and pt transfused.  EGD on 2/1 showed gastritis, PEG tube embedded in mucosa.  Due to fevers, CT Head, C/A/P performed on 1/30, showed Acute left MCA infarct. Pt out of TPA window, and interventional window, infarcted left side.

## 2022-02-02 NOTE — PROGRESS NOTE ADULT - SUBJECTIVE AND OBJECTIVE BOX
JUAREZ ECHOLSNIS    LVS 1B 122 D    Allergies    No Known Allergies    Intolerances        PAST MEDICAL & SURGICAL HISTORY:  Seizure    HTN (hypertension)    Glaucoma    Thyroid disease    Blood clot of neck vein  left side    TIA (transient ischemic attack)  20 years ago    S/P appendectomy        FAMILY HISTORY:  FH: HTN (hypertension)        Home Medications:  amLODIPine 10 mg oral tablet: 1 tab(s) orally once a day (22 Dec 2021 16:46)  aspirin 81 mg oral tablet, chewable: 1 tab(s) orally once a day (22 Dec 2021 16:46)  escitalopram 20 mg oral tablet: 1 tab(s) orally once a day (22 Dec 2021 16:46)  keppera:  (22 Dec 2021 16:46)  levETIRAcetam 500 mg oral tablet: 1 tab(s) orally 2 times a day (22 Dec 2021 16:46)  timolol-dorzolamide 0.5%-2% preservative-free ophthalmic solution: 1 drop(s) to each affected eye 2 times a day (22 Dec 2021 16:46)  Zetia 10 mg oral tablet: 1 tab(s) orally once a day (22 Dec 2021 16:46)      MEDICATIONS  (STANDING):  amLODIPine   Tablet 10 milliGRAM(s) Oral daily  carvedilol 3.125 milliGRAM(s) Oral every 12 hours  chlorhexidine 0.12% Liquid 15 milliLiter(s) Oral Mucosa every 12 hours  chlorhexidine 2% Cloths 1 Application(s) Topical <User Schedule>  dextrose 40% Gel 15 Gram(s) Oral once  dextrose 5% + sodium chloride 0.45% with potassium chloride 20 mEq/L 1000 milliLiter(s) (42 mL/Hr) IV Continuous <Continuous>  dextrose 5%. 1000 milliLiter(s) (50 mL/Hr) IV Continuous <Continuous>  dextrose 5%. 1000 milliLiter(s) (100 mL/Hr) IV Continuous <Continuous>  dextrose 50% Injectable 25 Gram(s) IV Push once  dextrose 50% Injectable 12.5 Gram(s) IV Push once  dextrose 50% Injectable 25 Gram(s) IV Push once  dorzolamide 2%/timolol 0.5% Ophthalmic Solution 1 Drop(s) Both EYES two times a day  escitalopram 20 milliGRAM(s) Oral daily  glucagon  Injectable 1 milliGRAM(s) IntraMuscular once  levETIRAcetam  IVPB 500 milliGRAM(s) IV Intermittent every 12 hours  levothyroxine Injectable 37.5 MICROGram(s) IV Push at bedtime  pantoprazole  Injectable 40 milliGRAM(s) IV Push every 12 hours  piperacillin/tazobactam IVPB.. 3.375 Gram(s) IV Intermittent every 8 hours    MEDICATIONS  (PRN):  acetaminophen    Suspension .. 650 milliGRAM(s) Oral every 6 hours PRN Temp greater or equal to 38C (100.4F)  sodium chloride 0.9% lock flush 10 milliLiter(s) IV Push every 1 hour PRN Pre/post blood products, medications, blood draw, and to maintain line patency      Diet, NPO with Tube Feed:   Tube Feeding Modality: Gastrostomy  Jevity 1.2 Santos  Total Volume for 24 Hours (mL): 1440  Continuous  Starting Tube Feed Rate mL per Hour: 25  Increase Tube Feed Rate by (mL): 5     Every 4 hours  Until Goal Tube Feed Rate (mL per Hour): 60  Tube Feed Duration (in Hours): 24  Tube Feed Start Time: 14:40 (02-01-22 @ 14:39) [Active]          Vital Signs Last 24 Hrs  T(C): 38.1 (02 Feb 2022 05:08), Max: 38.1 (02 Feb 2022 05:08)  T(F): 100.6 (02 Feb 2022 05:08), Max: 100.6 (02 Feb 2022 05:08)  HR: 74 (02 Feb 2022 05:08) (67 - 77)  BP: 135/68 (02 Feb 2022 05:08) (120/69 - 148/71)  BP(mean): --  RR: 20 (01 Feb 2022 17:13) (18 - 20)  SpO2: 100% (02 Feb 2022 05:08) (95% - 100%)        Mode: AC/ CMV (Assist Control/ Continuous Mandatory Ventilation), RR (machine): 15, TV (machine): 450, FiO2: 30, PEEP: 5, ITime: 0.9, MAP: 8, PIP: 18      LABS:                        9.6    16.76 )-----------( 330      ( 01 Feb 2022 07:43 )             30.5     02-01    145  |  115<H>  |  16  ----------------------------<  122<H>  3.7   |  24  |  0.42<L>    Ca    8.5      01 Feb 2022 07:43  Phos  3.0     02-01    TPro  6.1  /  Alb  1.4<L>  /  TBili  0.5  /  DBili  x   /  AST  207<H>  /  ALT  203<H>  /  AlkPhos  144<H>  02-01              WBC:  WBC Count: 16.76 K/uL (02-01 @ 07:43)  WBC Count: 17.82 K/uL (01-31 @ 07:11)  WBC Count: 20.79 K/uL (01-30 @ 07:34)  WBC Count: 23.36 K/uL (01-29 @ 18:09)      MICROBIOLOGY:  RECENT CULTURES:  02-01 .Blood Blood XXXX XXXX   No growth to date.    01-31 .Sputum Sputum XXXX   Numerous polymorphonuclear leukocytes per low power field  Rare Squamous epithelial cells per low power field  Moderate Gram Negative Rods seen per oil power field   Few Pseudomonas aeruginosa    01-31 .Blood Blood-Peripheral XXXX XXXX   No growth to date.    01-28 Catheterized Catheterized XXXX XXXX   >=3 organisms. Probable collection contamination.    01-27 .Blood Blood-Peripheral XXXX XXXX   No Growth Final                    Sodium:  Sodium, Serum: 145 mmol/L (02-01 @ 07:43)  Sodium, Serum: 143 mmol/L (01-31 @ 17:52)  Sodium, Serum: 145 mmol/L (01-31 @ 07:11)  Sodium, Serum: 148 mmol/L (01-30 @ 07:34)      0.42 mg/dL 02-01 @ 07:43  0.52 mg/dL 01-31 @ 17:52  0.56 mg/dL 01-31 @ 07:11  0.81 mg/dL 01-30 @ 07:34      Hemoglobin:  Hemoglobin: 9.6 g/dL (02-01 @ 07:43)  Hemoglobin: 7.3 g/dL (01-31 @ 07:11)  Hemoglobin: 8.2 g/dL (01-30 @ 07:34)  Hemoglobin: 8.9 g/dL (01-29 @ 18:09)      Platelets: Platelet Count - Automated: 330 K/uL (02-01 @ 07:43)  Platelet Count - Automated: 282 K/uL (01-31 @ 07:11)  Platelet Count - Automated: 288 K/uL (01-30 @ 07:34)  Platelet Count - Automated: 277 K/uL (01-29 @ 18:09)      LIVER FUNCTIONS - ( 01 Feb 2022 07:43 )  Alb: 1.4 g/dL / Pro: 6.1 gm/dL / ALK PHOS: 144 U/L / ALT: 203 U/L / AST: 207 U/L / GGT: x                 RADIOLOGY & ADDITIONAL STUDIES:      MICROBIOLOGY:  RECENT CULTURES:  02-01 .Blood Blood XXXX XXXX   No growth to date.    01-31 .Sputum Sputum XXXX   Numerous polymorphonuclear leukocytes per low power field  Rare Squamous epithelial cells per low power field  Moderate Gram Negative Rods seen per oil power field   Few Pseudomonas aeruginosa    01-31 .Blood Blood-Peripheral XXXX XXXX   No growth to date.    01-28 Catheterized Catheterized XXXX XXXX   >=3 organisms. Probable collection contamination.    01-27 .Blood Blood-Peripheral XXXX XXXX   No Growth Final

## 2022-02-02 NOTE — CONSULT NOTE ADULT - CONSULT REQUESTED BY NAME
Dr Mehta
Dr. Lopez
SABI HENDERSON
Dr. Andrea
hospitalist
Annette Coppola PA-C
Dr Che
Dr. Lopez
Gloria

## 2022-02-03 LAB
ALBUMIN SERPL ELPH-MCNC: 1.4 G/DL — LOW (ref 3.3–5)
ALP SERPL-CCNC: 166 U/L — HIGH (ref 40–120)
ALT FLD-CCNC: 179 U/L — HIGH (ref 12–78)
ANION GAP SERPL CALC-SCNC: 4 MMOL/L — LOW (ref 5–17)
AST SERPL-CCNC: 72 U/L — HIGH (ref 15–37)
BILIRUB DIRECT SERPL-MCNC: 0 MG/DL — SIGNIFICANT CHANGE UP (ref 0–0.3)
BILIRUB INDIRECT FLD-MCNC: 0.2 MG/DL — SIGNIFICANT CHANGE UP (ref 0.2–1)
BILIRUB SERPL-MCNC: 0.2 MG/DL — SIGNIFICANT CHANGE UP (ref 0.2–1.2)
BUN SERPL-MCNC: 13 MG/DL — SIGNIFICANT CHANGE UP (ref 7–23)
CALCIUM SERPL-MCNC: 7.8 MG/DL — LOW (ref 8.5–10.1)
CHLORIDE SERPL-SCNC: 115 MMOL/L — HIGH (ref 96–108)
CHOLEST SERPL-MCNC: 158 MG/DL — SIGNIFICANT CHANGE UP
CO2 SERPL-SCNC: 25 MMOL/L — SIGNIFICANT CHANGE UP (ref 22–31)
CREAT SERPL-MCNC: 0.46 MG/DL — LOW (ref 0.5–1.3)
FLUAV AG NPH QL: SIGNIFICANT CHANGE UP
FLUBV AG NPH QL: SIGNIFICANT CHANGE UP
GLUCOSE BLDC GLUCOMTR-MCNC: 129 MG/DL — HIGH (ref 70–99)
GLUCOSE BLDC GLUCOMTR-MCNC: 150 MG/DL — HIGH (ref 70–99)
GLUCOSE BLDC GLUCOMTR-MCNC: 155 MG/DL — HIGH (ref 70–99)
GLUCOSE BLDC GLUCOMTR-MCNC: 167 MG/DL — HIGH (ref 70–99)
GLUCOSE BLDC GLUCOMTR-MCNC: 41 MG/DL — CRITICAL LOW (ref 70–99)
GLUCOSE SERPL-MCNC: 139 MG/DL — HIGH (ref 70–99)
HCT VFR BLD CALC: 29.2 % — LOW (ref 34.5–45)
HDLC SERPL-MCNC: 25 MG/DL — LOW
HGB BLD-MCNC: 9 G/DL — LOW (ref 11.5–15.5)
LIPID PNL WITH DIRECT LDL SERPL: 102 MG/DL — HIGH
MAGNESIUM SERPL-MCNC: 2 MG/DL — SIGNIFICANT CHANGE UP (ref 1.6–2.6)
MCHC RBC-ENTMCNC: 28.1 PG — SIGNIFICANT CHANGE UP (ref 27–34)
MCHC RBC-ENTMCNC: 30.8 G/DL — LOW (ref 32–36)
MCV RBC AUTO: 91.3 FL — SIGNIFICANT CHANGE UP (ref 80–100)
NON HDL CHOLESTEROL: 132 MG/DL — HIGH
NRBC # BLD: 0 /100 WBCS — SIGNIFICANT CHANGE UP (ref 0–0)
PHOSPHATE SERPL-MCNC: 1.8 MG/DL — LOW (ref 2.5–4.5)
PLATELET # BLD AUTO: 357 K/UL — SIGNIFICANT CHANGE UP (ref 150–400)
POTASSIUM SERPL-MCNC: 3.5 MMOL/L — SIGNIFICANT CHANGE UP (ref 3.5–5.3)
POTASSIUM SERPL-SCNC: 3.5 MMOL/L — SIGNIFICANT CHANGE UP (ref 3.5–5.3)
PROT SERPL-MCNC: 6 GM/DL — SIGNIFICANT CHANGE UP (ref 6–8.3)
RBC # BLD: 3.2 M/UL — LOW (ref 3.8–5.2)
RBC # FLD: 16.7 % — HIGH (ref 10.3–14.5)
SARS-COV-2 RNA SPEC QL NAA+PROBE: SIGNIFICANT CHANGE UP
SODIUM SERPL-SCNC: 144 MMOL/L — SIGNIFICANT CHANGE UP (ref 135–145)
TRIGL SERPL-MCNC: 151 MG/DL — HIGH
WBC # BLD: 14.11 K/UL — HIGH (ref 3.8–10.5)
WBC # FLD AUTO: 14.11 K/UL — HIGH (ref 3.8–10.5)

## 2022-02-03 PROCEDURE — 99233 SBSQ HOSP IP/OBS HIGH 50: CPT

## 2022-02-03 PROCEDURE — 99232 SBSQ HOSP IP/OBS MODERATE 35: CPT

## 2022-02-03 RX ORDER — POTASSIUM PHOSPHATE, MONOBASIC POTASSIUM PHOSPHATE, DIBASIC 236; 224 MG/ML; MG/ML
30 INJECTION, SOLUTION INTRAVENOUS ONCE
Refills: 0 | Status: COMPLETED | OUTPATIENT
Start: 2022-02-03 | End: 2022-02-03

## 2022-02-03 RX ADMIN — Medication 37.5 MICROGRAM(S): at 22:18

## 2022-02-03 RX ADMIN — SIMVASTATIN 40 MILLIGRAM(S): 20 TABLET, FILM COATED ORAL at 22:18

## 2022-02-03 RX ADMIN — PIPERACILLIN AND TAZOBACTAM 25 GRAM(S): 4; .5 INJECTION, POWDER, LYOPHILIZED, FOR SOLUTION INTRAVENOUS at 14:08

## 2022-02-03 RX ADMIN — POTASSIUM PHOSPHATE, MONOBASIC POTASSIUM PHOSPHATE, DIBASIC 83.33 MILLIMOLE(S): 236; 224 INJECTION, SOLUTION INTRAVENOUS at 15:37

## 2022-02-03 RX ADMIN — AMLODIPINE BESYLATE 10 MILLIGRAM(S): 2.5 TABLET ORAL at 05:20

## 2022-02-03 RX ADMIN — PIPERACILLIN AND TAZOBACTAM 25 GRAM(S): 4; .5 INJECTION, POWDER, LYOPHILIZED, FOR SOLUTION INTRAVENOUS at 22:18

## 2022-02-03 RX ADMIN — ESCITALOPRAM OXALATE 20 MILLIGRAM(S): 10 TABLET, FILM COATED ORAL at 12:01

## 2022-02-03 RX ADMIN — Medication 81 MILLIGRAM(S): at 12:00

## 2022-02-03 RX ADMIN — CARVEDILOL PHOSPHATE 3.12 MILLIGRAM(S): 80 CAPSULE, EXTENDED RELEASE ORAL at 17:14

## 2022-02-03 RX ADMIN — LEVETIRACETAM 420 MILLIGRAM(S): 250 TABLET, FILM COATED ORAL at 17:14

## 2022-02-03 RX ADMIN — Medication 650 MILLIGRAM(S): at 18:00

## 2022-02-03 RX ADMIN — LEVETIRACETAM 420 MILLIGRAM(S): 250 TABLET, FILM COATED ORAL at 05:22

## 2022-02-03 RX ADMIN — CHLORHEXIDINE GLUCONATE 15 MILLILITER(S): 213 SOLUTION TOPICAL at 05:19

## 2022-02-03 RX ADMIN — PIPERACILLIN AND TAZOBACTAM 25 GRAM(S): 4; .5 INJECTION, POWDER, LYOPHILIZED, FOR SOLUTION INTRAVENOUS at 05:21

## 2022-02-03 RX ADMIN — CARVEDILOL PHOSPHATE 3.12 MILLIGRAM(S): 80 CAPSULE, EXTENDED RELEASE ORAL at 05:20

## 2022-02-03 RX ADMIN — CHLORHEXIDINE GLUCONATE 15 MILLILITER(S): 213 SOLUTION TOPICAL at 17:14

## 2022-02-03 RX ADMIN — PANTOPRAZOLE SODIUM 40 MILLIGRAM(S): 20 TABLET, DELAYED RELEASE ORAL at 05:22

## 2022-02-03 RX ADMIN — DORZOLAMIDE HYDROCHLORIDE TIMOLOL MALEATE 1 DROP(S): 20; 5 SOLUTION/ DROPS OPHTHALMIC at 17:16

## 2022-02-03 RX ADMIN — Medication 650 MILLIGRAM(S): at 19:00

## 2022-02-03 RX ADMIN — DORZOLAMIDE HYDROCHLORIDE TIMOLOL MALEATE 1 DROP(S): 20; 5 SOLUTION/ DROPS OPHTHALMIC at 05:20

## 2022-02-03 RX ADMIN — PANTOPRAZOLE SODIUM 40 MILLIGRAM(S): 20 TABLET, DELAYED RELEASE ORAL at 17:15

## 2022-02-03 RX ADMIN — DEXTROSE MONOHYDRATE, SODIUM CHLORIDE, AND POTASSIUM CHLORIDE 42 MILLILITER(S): 50; .745; 4.5 INJECTION, SOLUTION INTRAVENOUS at 22:19

## 2022-02-03 RX ADMIN — CHLORHEXIDINE GLUCONATE 1 APPLICATION(S): 213 SOLUTION TOPICAL at 05:21

## 2022-02-03 NOTE — PROGRESS NOTE ADULT - ASSESSMENT
TRESA PIZARRO 81 f 12/22/2021 1942 DR ANTONIO PATTON     REVIEW OF SYMPTOMS      Able to give (reliable) ROS  NO     PHYSICAL EXAM    HEENT Unremarkable  atraumatic   RESP Fair air entry EXP prolonged    Harsh breath sound Resp distres mild   CARDIAC S1 S2 No S3     NO JVD    ABDOMEN SOFT BS PRESENT NOT DISTENDED No hepatosplenomegaly   PEDAL EDEMA present No calf tenderness  NO rash   ______  DOA/CC.  [birth year]/22[birth year]2021 81F w/ HTN, HLD, hypothyroidism, depression, seizures. Presented w/ abdominal pain found to have acute sigmoid diverticulitis    PMH.  pmh Hytn  pmh Sz.    PROBLEMS.  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  COVID STATUS. 12/24 pcr (-)  ICU STAY. 12/22-1/12/2022   GOC.  1/13/2022 full code       BEST PRACTICE ISSUES.                                                  HEAD OF BED ELEVATION. Yes  DVT PROPHYLAXIS.     1/28/2022 lvnx dced because of active gi bleed    1/25/2022 lvnx 80.2   1/25/2022 V duplx partially occlusive thrombus r cfv ac to subacute   1/7 lvnx 40   LIGHT PROPHYLAXIS.    1/11 protonix 40                                                                                     DIET.   2/1 jevity 1440  1/28/2022 npo   1/20/2022 jevity 1.5 1200  1/18/2022 npo  1/7 vital af 1200 ngt          INFECTION PROPHYLAXIS.   1/7 Chlorhexidine .12%   1/7 chlorhexidine 2%    SPEECH SWALLOW RECOMMENDATIONS.       PATIENT DATA   VITALS/PO/IO/VENT/DRIPS.   2/3/2022 101 75 140/70   2/3/2022 ac 15/450/5/.3      ASSESSMENT/RECOMMENDATIONS.    HEMODYNAMICS.   Monitor bp Target MAP 65 (+)    RESP.   Monitor po Target po 90-95%  1/14/2022 15/450/.4/5 747/38/110       DVT  1/25/2022 V duplx partially occlusive thrombus r cfv ac to subacute   1/25 lvnx 80.2   1/28/2022 lvnx 80.2 dced by PA as Hb had dropped requiring transfusion  2/1/2022 IR reviewed imaging IR felt that Blood clot is minimal and subaute/chronic and neither IVCF not Anticoagulants are needed   2/2/2022 V duplex (-)  2/3 recommend resuming lovenox full dose or at least pplx dose if ok with gi     FEVER 1/28/2022.  New rll pneum 1/30 ct  Abdominal infection sec perforated sigmoid div feculent peritonitis poa 12/22.  Pt was taken off abio 1/15  1/28/2021 She has been developing progressive leukocytosis and low gd fever   w 1/27-1/28 -  1/29-1/30-1/31-2/1-2/3 l18-10-03 - 20-17 - 16- 14  1/30/2022 ct ch new rll pneu  1/21 ctap showed decreased collections cw 1/11  Sputum 1/31 Sp Pseudo  blod cult. 1/31  bc. (-)  1/29/2022 zosyn started by id    SP ABD SURGERY.  81F admitted with Acute Sigmoid Diverticulitis s/p ex lap, sigmoid resection, peritoneal lavage 12/25 and RTOR 12/26 for irrigation of abdominal cavity with closure and creation of colostomy. S/p bedside IR aspiration of fluid collection 1/3. Tracheostomy 1/7.   Deep pelvic abscess noted on CT,  Local drain/ostomy care per nursing  1/22/2022 r lefty removd     ANEMIA.  Hb 1/27-1/27-1/28-1/28-1/29-1/31-2/1-2/2 Hb 9.7-7.7-8.7-9.6 - 8.9-7.3 - 9.6 - 8.5  1/28/2022 lvnx 80.2 dced     TRANSFUSION.  1/28/2022 2u prbc     AMS  1/30/2022 ct h ac l mca cva   suggest neuro eval    GT placed 1/18    GI BLEED   11/28 peg and ostomy with active bleed  EGD 2/1/2022 Dr Ness Erosions PEG tube May restart Anticoagulants after 24 h    ELEVATED LFTS  LFTS 2/1/2022         Follow with GI    Hypernatremia  Na 1/26-1/27-1/28-1/31/2022 Na 147-148 -147 - 143     IV Fluids.  1/31/2022 d5 1/2 ns 20k 42    TIME SPENT   Over 25 minutes aggregate care time spent on encounter; activities included   direct patient care, counseling and/or coordinating care reviewing notes, lab data/ imaging , discussion with multidisciplinary team/ patient  /family and explaining in detail risks, benefits, alternatives  of the recommendations     TRESA PIZARRO 81 f 12/22/2021 1942 DR ANTONIO PATTON

## 2022-02-03 NOTE — PROGRESS NOTE ADULT - SUBJECTIVE AND OBJECTIVE BOX
Patient: JUAREZ GTZ [id number] 81y Female                            Hospitalist Attending Note    Afebrile. Tm 100.3  Unable to offer complaints.     ____________________PHYSICAL EXAM:  GENERAL:  NAD Awake, does not follow commands.   HEENT: NCAT  CARDIOVASCULAR:  S1, S2  LUNGS: b/l rhonchi.   ABDOMEN:  soft, (-) tenderness, (-) distension, (+) bowel sounds, (-) guarding, (-) rebound (-) rigidity.  Colostomy in place with dark stool.  Midline abdominal wound vac in place.    EXTREMITIES:  no cyanosis / clubbing / edema.   ____________________    VITALS:  Vital Signs Last 24 Hrs  T(C): 36.5 (03 Feb 2022 11:50), Max: 37.9 (03 Feb 2022 05:28)  T(F): 97.7 (03 Feb 2022 11:50), Max: 100.3 (03 Feb 2022 05:28)  HR: 88 (03 Feb 2022 11:50) (71 - 88)  BP: 129/77 (03 Feb 2022 11:50) (129/77 - 144/70)  BP(mean): --  RR: 18 (03 Feb 2022 11:50) (18 - 18)  SpO2: 99% (03 Feb 2022 08:04) (98% - 99%) Daily     Daily   CAPILLARY BLOOD GLUCOSE      POCT Blood Glucose.: 150 mg/dL (03 Feb 2022 10:48)  POCT Blood Glucose.: 129 mg/dL (03 Feb 2022 06:15)  POCT Blood Glucose.: 167 mg/dL (03 Feb 2022 00:15)  POCT Blood Glucose.: 41 mg/dL (03 Feb 2022 00:11)  POCT Blood Glucose.: 154 mg/dL (02 Feb 2022 16:22)    I&O's Summary    02 Feb 2022 07:01  -  03 Feb 2022 07:00  --------------------------------------------------------  IN: 2834 mL / OUT: 653 mL / NET: 2181 mL        LABS:                        9.0    14.11 )-----------( 357      ( 03 Feb 2022 11:27 )             29.2     02-03    144  |  115<H>  |  13  ----------------------------<  139<H>  3.5   |  25  |  0.46<L>    Ca    7.8<L>      03 Feb 2022 11:27  Phos  1.8     02-03  Mg     2.0     02-03    TPro  6.0  /  Alb  1.4<L>  /  TBili  0.2  /  DBili  0.0  /  AST  72<H>  /  ALT  179<H>  /  AlkPhos  166<H>  02-03      LIVER FUNCTIONS - ( 03 Feb 2022 11:27 )  Alb: 1.4 g/dL / Pro: 6.0 gm/dL / ALK PHOS: 166 U/L / ALT: 179 U/L / AST: 72 U/L / GGT: x                   Culture - Blood (collected 01 Feb 2022 00:50)  Source: .Blood Blood  Preliminary Report (02 Feb 2022 01:01):    No growth to date.        MEDICATIONS:  acetaminophen    Suspension .. 650 milliGRAM(s) Oral every 6 hours PRN  amLODIPine   Tablet 10 milliGRAM(s) Oral daily  aspirin  chewable 81 milliGRAM(s) Oral daily  carvedilol 3.125 milliGRAM(s) Oral every 12 hours  chlorhexidine 0.12% Liquid 15 milliLiter(s) Oral Mucosa every 12 hours  chlorhexidine 2% Cloths 1 Application(s) Topical <User Schedule>  dextrose 40% Gel 15 Gram(s) Oral once  dextrose 5% + sodium chloride 0.45% with potassium chloride 20 mEq/L 1000 milliLiter(s) IV Continuous <Continuous>  dextrose 5%. 1000 milliLiter(s) IV Continuous <Continuous>  dextrose 5%. 1000 milliLiter(s) IV Continuous <Continuous>  dextrose 50% Injectable 25 Gram(s) IV Push once  dextrose 50% Injectable 12.5 Gram(s) IV Push once  dextrose 50% Injectable 25 Gram(s) IV Push once  dorzolamide 2%/timolol 0.5% Ophthalmic Solution 1 Drop(s) Both EYES two times a day  escitalopram 20 milliGRAM(s) Oral daily  glucagon  Injectable 1 milliGRAM(s) IntraMuscular once  levETIRAcetam  IVPB 500 milliGRAM(s) IV Intermittent every 12 hours  levothyroxine Injectable 37.5 MICROGram(s) IV Push at bedtime  pantoprazole  Injectable 40 milliGRAM(s) IV Push every 12 hours  piperacillin/tazobactam IVPB.. 3.375 Gram(s) IV Intermittent every 8 hours  potassium phosphate IVPB 30 milliMole(s) IV Intermittent once  simvastatin 40 milliGRAM(s) Oral at bedtime  sodium chloride 0.9% lock flush 10 milliLiter(s) IV Push every 1 hour PRN

## 2022-02-03 NOTE — PROGRESS NOTE ADULT - ASSESSMENT
From Initial ID consult note: 81F with a PMH of HTN, HLD, hypothyroidism, depression, seizures who presents to the ED for abd pain found to have diverticulitis   has rising leukocytosis   had fever yesterday   tachycardic and now hypoxic   CXR (I personally reviewed) low lung volumes   original CT (I personally reviewed) with diverticulitis   she had a lot of pain in the abdomen on exam     Progress Notes  12/25: s/p surgery for diverticulitis with perforation and abscess and went to the OR. intubated in ICU with vac and needs to go back to the OR, currently on zosyn, s/p one dose of diflucan last night   12/27: s/p repair and ostomy creation yesterday, wbc elevated, cultures sensitive to zosyn and candida albicans is notoriously sensitive to azoles such as fluconazole, wean of pressors and sedation, extubate when ready   12/28: Further increase in white blood cell count but overall the patient appears to be reasonably stable.  No concern of C. difficile at this juncture.  Current antibiotics are reasonable.  Leukocytosis like related to recent surgery, no concern of other superimposed process on the basis of exam.  I do not see a role to alter her antimicrobials.  12/29:  Remains intubated in ICU. still quite edematous and net positive, WBC climbing, small wound dehiscence at bottom of incision, plan for initiation of TPN today, remains on antibiotics    12/30: rising WBC, ostomy not functioning yet, very edematous CT today, may be source control issue so for now can continue same antibiotics and antifungal but may need to change if findings on the CT are worrisome or change in hemodynamics  12/31:Leukocytosis, with collections noted on CT.  However this is being done postoperative day 5, there is some possibility that this could represent postop changes but given the leukocytosis, concur with plans for attempts at percutaneous drainage.  White blood cell count down slightly compared to prior peak, will need to be trended.  Gallbladder is also distended.  Abdominal exam was benign this morning, but a calculus cholecystitis could be the differential diagnosis here as well (though n.p.o. status certainly could explain distended gallbladder, there is also report of gallbladder wall thickening).  Would modify antibiotics based on cultures from drainage, I do not believe that antibiotics and other the cells would be adequate here.  Fortunately she is off pressors, with preserved renal function, and tolerating pressure support.  1/1/22: Postop day 6.  I have some concerns with the appearance of the ostomy, though the abdominal exam overall is otherwise unchanged.  White blood cell count remains elevated, but is lower compared with prior values.  This is despite no change in antibiotic therapy.  Patient also has asymmetric edema of the upper extremities, right greater than left.  Low threshold for duplex ultrasound to exclude DVT.No new surveillance culture data.  1/2: POD7 Ostomy looks better today, UE symmetric. WBC elevated, some slight downward trend overall. Temps <= 100F.   1/3: drainage of fluid collection today, continue zosyn and fluconazole for now, monitor wbc and temps, watch ostomy output, diuresis and address third spacing   1/4: s/p drainage yesterday now growing ecoli and awaiting for sensitivities, wbc is improving, started on tube feeds    1/5: abscess growing ecoli and bacteroides, S to ceftriaxone, wbc 15, weaning trial today, pain management    1/6: awake, lung exercise today, trach planned for tomorrow, ostomy output is good. will stop fluconazole today and change antibiotics to ceftriaxone and flagly. Unclear stop date yet    1/7 trach today, continue antibiotics   1/9: s/p trach, had low grade fever but otherwise doing ok, will continues same antibiotics regimen for now but if fevers continue she will needs to be rescanned as those abscesses can progress.  1/10: low grade fever but doing well on trach-PS, ostomy with stool and gas, shakes head when asked if in pain, discussed with surgery about repeating CT scan given the temp  1/11: Still febrile, favor interval CT as above.  1/12: CT of abdomen showing some signs of improvement and elsewhere  it shows signs of worsening. wbc normal, tube feeds currently via NGT   1/13: no fever, no wbc, Cr and LFTs ok, Ceftriaxone and flagyl continued. No planned IR intervention at this time - fluid collection without access to drain, surgical drain within the collection. Pt possibly will have repeat imaging over the weekend to evaluate progress.   Attending addendum:  agree with above  continue antibiotics   vent weaning  surgical follow up for the abdominal wound  1/14: Low grade temp last night, midline placed, no leukocytosis, Cr and LFTs ok, culture data reviewed. Pt's colostomy with small amount of liquid stool and gas, no stools recorded for two days, no role for po Vancomycin at this time. The pt has been on abx since her admission, will stop all abx and will continue to monitor.    1/18: no fevers since 1/14, WBC better 11.00, cr ok, off abx, now s/p EGD, with PEG replacement.   1/24: spiking low grade fevers, low suspicion for pneumonia given little secretions and doing well on the PS wth low settings, peg site looks clean, trach site OK as well, fevers may be from small fluid collections intra-abdominally vs atelectasis Would suggest aggressive pulmonary toilet including chest vest. follow cultures and trend wbc and fever curve . If persistent fevers would start  Zosyn and PO fluconazole but for now please hold antibiotics   1/25: continues having fevers, low grade temps today, WBC better 18.35, Cr and LFTs ok, + new DVT of right common femoral vein - could be the cause of pt's fevers - on full dose Lovenox. Will continue to monitor off abx, WBC got better without abx.   Attending addendum:  patient with long hospital course and now on the floors with fever and found to have a DVT  wbc came down without antibiotics   fever curve is getting better   continue to monitor off antibiotics   treat DVT with full dose AC   suspect fevers could be from dvt but may also be from fluid collection    1/26: low grade temp today, pt is more awake and alert today, WBC improving 14.2 off abx, Cr and LFTs ok, abd wound with clean base and healing well - vac dressing was changed today during my exam. In favor to continue to monitor off abx for now.   1/27: Pt continues spiking fevers, low grade last night and 101 today, awake and alert during my exam, WBC improving off abx 13.61, recent chest xray with no acute lung disease, COVID 19 is negative. Pt's fevers most likely related to her DVT, will collect two sets of surveillance BC and will continue to monitor off abx for now.   1/28: CTA A/P done last night - There is evidence of active GI bleeding in the gastric antrum, related to peptic ulcer disease or gastritis, possible cystitis. Blood transfusion is in progress during my exam, wound vac is being changed - base of the wound is clean with no evidence of acute infection. Pt had a low grade temp last night, WBC elevated - could be reactive to DVT. Will obtain UA and UC, will hold off on abx for now.   1/29: persistent fevers, received a dose of vanco and a dose of Azactam last evening, WBC high 27.98, BC with no growth to date, UA positive, UC pending. Will start on broad spectrum antibiotic now as unclear it is unclear at this time if fevers caused by UTI only, or in combination with DVT, or other source. Pt's abd was seen with last vac dressing change - incision site wound  is clean and healing well. JPis still draining cloudy drainage.   1/30: lethargic, continues having fevers, WBC better 20.79, all BCs remain with no growth, UC pending, CT head - Acute left MCA infarct is suspected, CT chest and CT a/p performed earlier, pending radiology reading, will continue with Zosyn, Na is slightly elevated, would prefer to have UC available prior switching abx.   1/31: no fevers today, remains lethargic, vented, WBC with improvement 17.82, two sets of BCs were collected yesterday and pending result, Zosyn continued. All BCs with no growth to date, last UC is contaminated.  2/1: afebrile last 24hrs, no IVC filter placement per IR, wbc slightly better, PEG tube loosened and EGD by EGD didnt find bleed, pseudomonas in respiratory culture and CT did show pneumonia but she is doing well on , nonetheless hopefully the zosyn is sensitive,    2/2: low grade temp this morning, WBC improving, Zosyn continued, BCs remain with no growth, sputum culture with pseudomonas, sensitive to Zosyn.   Attending addendum:  agree with above   continue zosyn   neuro consulted for MCA infarct   wound seen today after vac was taken off->it is healing well with pink granulation tissue and no discharge    2/3: low grade temperature 100.3, WBC elevated, COVID 19 is negative, BC with no growth to date, Zosyn day #6 continued.     #RLL Pneumonia   - continue Zosyn IV (day #6)  - trend pt's temperatures  - trend WBC  - MRSA PCR not detected      #Perforated diverticulum of large intestine.   continue Zosyn IV  monitor sodium levels   s/p one time dose of Vancomycin and a dose of Amikacin on 1/28  continue wound vac   CT chest/a/p - + pneumonia, no abdominal pathology       #Fever.   continue Zosyn-limit to 7 days   trend pt's temperatures   UC - contaminated   +DVT of right common femoral vein   New left MCA infarct on CT head  CT c/a/p - new pneumonia RLL, no acute abd pathology   BC NGTD  repeat BC NGTD  MRSA PCR not detected     #Acute Respiratory Failure  wean off ventilator as tolerated   chest PT  frequent suctioning   chest vest   HOB >30 degrees    #DVT  AC as per protocol

## 2022-02-03 NOTE — PROGRESS NOTE ADULT - ASSESSMENT
81F with a PMH of HTN, HLD, hypothyroidism, depression, seizures who presents to the ED for abd pain.  Patient states that over the last two days she had developed significant abdominal pain and multiple episodes of watery diarrhea.  Reports one episode of nausea and vomiting earlier today.  Patient has no other complaints.  Vitals stable,  labs show leukocytosis and hypokalemia.  CT abdomen significant for diverticulitis.  Will admit to med surg.     (23 Dec 2021 00:17)          A) Upper GI Bleed with CT Angio showing bleeding in gastric antrum s/p Protamine.  Now with brown stool. EGD revealed the PEG tube to be imbedded into the mucosa and an eschar under the PEG tube. The PEG tube subsequently was pushed in after a suture was cut.  Discussed with surgery. PEG tube was definitely pushed in after one suture was cut. No issues of being too tight at the present time as per surgery  1) Monitor Hb ( HGB 9.0 stable ) , transfuse to Hb >8    2) Tolerating feeds at 50 ml / hr     3) PPI BID   4) Carafate   5) May restart  anticoagulants       B) Elevated LFTs, now on Zosyn.  ( 832170 )  0.1/25/28/72 ---> ( [id number] ) 1.4/207/203/144 1) Ultrasound 2) F/U LFTs  3) ? D/C Zosyn        81F with a PMH of HTN, HLD, hypothyroidism, depression, seizures who presents to the ED for abd pain.  Patient states that over the last two days she had developed significant abdominal pain and multiple episodes of watery diarrhea.  Reports one episode of nausea and vomiting earlier today.  Patient has no other complaints.  Vitals stable,  labs show leukocytosis and hypokalemia.  CT abdomen significant for diverticulitis.  Will admit to med surg.     (23 Dec 2021 00:17)          A) Upper GI Bleed with CT Angio showing bleeding in gastric antrum s/p Protamine.  Now with brown stool. EGD revealed the PEG tube to be imbedded into the mucosa and an eschar under the PEG tube. The PEG tube subsequently was pushed in after a suture was cut.  Discussed with surgery. PEG tube was definitely pushed in after one suture was cut. No issues of being too tight at the present time as per surgery  1) Monitor Hb ( HGB 9.0 stable ) , transfuse to Hb >8    2) Tolerating feeds at 50 ml / hr     3) PPI BID   4) Carafate   5) May restart  anticoagulants Now on ASA      B) Elevated LFTs, now on Zosyn.  ( [id number] )  0.1/25/28/72 ---> ( [id number] ) 1.4/207/203/144 1) Ultrasound 2) F/U LFTs  3) ? D/C Zosyn

## 2022-02-03 NOTE — PROGRESS NOTE ADULT - SUBJECTIVE AND OBJECTIVE BOX
JUAREZ ECHOLSNIS    LVS 1B 122 D    Allergies    No Known Allergies    Intolerances        PAST MEDICAL & SURGICAL HISTORY:  Seizure    HTN (hypertension)    Glaucoma    Thyroid disease    Blood clot of neck vein  left side    TIA (transient ischemic attack)  20 years ago    S/P appendectomy        FAMILY HISTORY:  FH: HTN (hypertension)        Home Medications:  amLODIPine 10 mg oral tablet: 1 tab(s) orally once a day (22 Dec 2021 16:46)  aspirin 81 mg oral tablet, chewable: 1 tab(s) orally once a day (22 Dec 2021 16:46)  escitalopram 20 mg oral tablet: 1 tab(s) orally once a day (22 Dec 2021 16:46)  keppera:  (22 Dec 2021 16:46)  levETIRAcetam 500 mg oral tablet: 1 tab(s) orally 2 times a day (22 Dec 2021 16:46)  timolol-dorzolamide 0.5%-2% preservative-free ophthalmic solution: 1 drop(s) to each affected eye 2 times a day (22 Dec 2021 16:46)  Zetia 10 mg oral tablet: 1 tab(s) orally once a day (22 Dec 2021 16:46)      MEDICATIONS  (STANDING):  amLODIPine   Tablet 10 milliGRAM(s) Oral daily  aspirin  chewable 81 milliGRAM(s) Oral daily  carvedilol 3.125 milliGRAM(s) Oral every 12 hours  chlorhexidine 0.12% Liquid 15 milliLiter(s) Oral Mucosa every 12 hours  chlorhexidine 2% Cloths 1 Application(s) Topical <User Schedule>  dextrose 40% Gel 15 Gram(s) Oral once  dextrose 5% + sodium chloride 0.45% with potassium chloride 20 mEq/L 1000 milliLiter(s) (42 mL/Hr) IV Continuous <Continuous>  dextrose 5%. 1000 milliLiter(s) (50 mL/Hr) IV Continuous <Continuous>  dextrose 5%. 1000 milliLiter(s) (100 mL/Hr) IV Continuous <Continuous>  dextrose 50% Injectable 25 Gram(s) IV Push once  dextrose 50% Injectable 12.5 Gram(s) IV Push once  dextrose 50% Injectable 25 Gram(s) IV Push once  dorzolamide 2%/timolol 0.5% Ophthalmic Solution 1 Drop(s) Both EYES two times a day  escitalopram 20 milliGRAM(s) Oral daily  glucagon  Injectable 1 milliGRAM(s) IntraMuscular once  levETIRAcetam  IVPB 500 milliGRAM(s) IV Intermittent every 12 hours  levothyroxine Injectable 37.5 MICROGram(s) IV Push at bedtime  pantoprazole  Injectable 40 milliGRAM(s) IV Push every 12 hours  piperacillin/tazobactam IVPB.. 3.375 Gram(s) IV Intermittent every 8 hours  simvastatin 40 milliGRAM(s) Oral at bedtime    MEDICATIONS  (PRN):  acetaminophen    Suspension .. 650 milliGRAM(s) Oral every 6 hours PRN Temp greater or equal to 38C (100.4F)  sodium chloride 0.9% lock flush 10 milliLiter(s) IV Push every 1 hour PRN Pre/post blood products, medications, blood draw, and to maintain line patency      Diet, NPO with Tube Feed:   Tube Feeding Modality: Gastrostomy  Jevity 1.2 Santos  Total Volume for 24 Hours (mL): 1440  Continuous  Starting Tube Feed Rate mL per Hour: 25  Increase Tube Feed Rate by (mL): 5     Every 4 hours  Until Goal Tube Feed Rate (mL per Hour): 60  Tube Feed Duration (in Hours): 24  Tube Feed Start Time: 14:40 (02-01-22 @ 14:39) [Active]          Vital Signs Last 24 Hrs  T(C): 38.4 (03 Feb 2022 17:09), Max: 38.4 (03 Feb 2022 17:09)  T(F): 101.1 (03 Feb 2022 17:09), Max: 101.1 (03 Feb 2022 17:09)  HR: 78 (03 Feb 2022 20:34) (71 - 88)  BP: 148/71 (03 Feb 2022 17:09) (129/77 - 148/71)  BP(mean): --  RR: 18 (03 Feb 2022 17:09) (18 - 18)  SpO2: 98% (03 Feb 2022 20:34) (94% - 99%)      02-02-22 @ 07:01  -  02-03-22 @ 07:00  --------------------------------------------------------  IN: 2834 mL / OUT: 653 mL / NET: 2181 mL    02-03-22 @ 07:01  -  02-04-22 @ 00:07  --------------------------------------------------------  IN: 1732 mL / OUT: 1 mL / NET: 1731 mL        Mode: AC/ CMV (Assist Control/ Continuous Mandatory Ventilation), RR (machine): 15, TV (machine): 450, FiO2: 30, PEEP: 5, ITime: 0.9, MAP: 8, PIP: 20      LABS:                        9.0    14.11 )-----------( 357      ( 03 Feb 2022 11:27 )             29.2     02-03    144  |  115<H>  |  13  ----------------------------<  139<H>  3.5   |  25  |  0.46<L>    Ca    7.8<L>      03 Feb 2022 11:27  Phos  1.8     02-03  Mg     2.0     02-03    TPro  6.0  /  Alb  1.4<L>  /  TBili  0.2  /  DBili  0.0  /  AST  72<H>  /  ALT  179<H>  /  AlkPhos  166<H>  02-03              WBC:  WBC Count: 14.11 K/uL (02-03 @ 11:27)  WBC Count: 12.54 K/uL (02-02 @ 08:07)  WBC Count: 16.76 K/uL (02-01 @ 07:43)  WBC Count: 17.82 K/uL (01-31 @ 07:11)      MICROBIOLOGY:  RECENT CULTURES:  02-01 .Blood Blood XXXX XXXX   No growth to date.    01-31 .Sputum Sputum Pseudomonas aeruginosa   Numerous polymorphonuclear leukocytes per low power field  Rare Squamous epithelial cells per low power field  Moderate Gram Negative Rods seen per oil power field   Few Pseudomonas aeruginosa    01-31 .Blood Blood-Peripheral XXXX XXXX   No growth to date.    01-28 Catheterized Catheterized XXXX XXXX   >=3 organisms. Probable collection contamination.                    Sodium:  Sodium, Serum: 144 mmol/L (02-03 @ 11:27)  Sodium, Serum: 144 mmol/L (02-02 @ 08:07)  Sodium, Serum: 145 mmol/L (02-01 @ 07:43)  Sodium, Serum: 143 mmol/L (01-31 @ 17:52)  Sodium, Serum: 145 mmol/L (01-31 @ 07:11)      0.46 mg/dL 02-03 @ 11:27  0.52 mg/dL 02-02 @ 08:07  0.42 mg/dL 02-01 @ 07:43  0.52 mg/dL 01-31 @ 17:52  0.56 mg/dL 01-31 @ 07:11      Hemoglobin:  Hemoglobin: 9.0 g/dL (02-03 @ 11:27)  Hemoglobin: 8.5 g/dL (02-02 @ 08:07)  Hemoglobin: 9.6 g/dL (02-01 @ 07:43)  Hemoglobin: 7.3 g/dL (01-31 @ 07:11)      Platelets: Platelet Count - Automated: 357 K/uL (02-03 @ 11:27)  Platelet Count - Automated: 315 K/uL (02-02 @ 08:07)  Platelet Count - Automated: 330 K/uL (02-01 @ 07:43)  Platelet Count - Automated: 282 K/uL (01-31 @ 07:11)      LIVER FUNCTIONS - ( 03 Feb 2022 11:27 )  Alb: 1.4 g/dL / Pro: 6.0 gm/dL / ALK PHOS: 166 U/L / ALT: 179 U/L / AST: 72 U/L / GGT: x                 RADIOLOGY & ADDITIONAL STUDIES:      MICROBIOLOGY:  RECENT CULTURES:  02-01 .Blood Blood XXXX XXXX   No growth to date.    01-31 .Sputum Sputum Pseudomonas aeruginosa   Numerous polymorphonuclear leukocytes per low power field  Rare Squamous epithelial cells per low power field  Moderate Gram Negative Rods seen per oil power field   Few Pseudomonas aeruginosa    01-31 .Blood Blood-Peripheral XXXX XXXX   No growth to date.    01-28 Catheterized Catheterized XXXX XXXX   >=3 organisms. Probable collection contamination.

## 2022-02-03 NOTE — PROGRESS NOTE ADULT - SUBJECTIVE AND OBJECTIVE BOX
Pt resting comfortably, no acute events.    Physical Exam:   · Neurological	detailed exam  · Mental Status	Awake, not following commands.  · Cranial Nerve	able to move in all directions except crossing midline. decreased blink to threat on the right. Right facial  · Motor	slight right tremor.  moving left side > right. Withdraws on the right  · Gait/Balance	deferred  · Neurological Comments	NIHSS 34 MRS 5    echo 12/25 normal EF, no LA diatation  · Assessment	  Acute left MCA CVA off AC with new onset Afib   s/p ex lap/colectomy/diverticulitis  HTN  HLD  seizures?    Plan   High risk of Hem conversion, would cont with ASA/Statin for secondary stroke prevention  repeat hct for now  MRI/MRA head and neck w/o when able  PT/OT/ST  lipid panel  poor prognosis with large left MCA CVA, GOC discussion

## 2022-02-03 NOTE — PROGRESS NOTE ADULT - ASSESSMENT
81F with a PMH of HTN, HLD, hypothyroidism, depression, seizures presented with abdominal pain and admitted 12/22 for acute perforated diverticulitis, s/p ex lap 12/24 with sigmoid colectomy and abdominal washout.  She was then transferred to MICU for postop care on mechanical ventilation.  Hospital course complicated by Afib with RVR.  She underwent wound closure and creation of colostomy 12/26, developed abdominal abscess s/p IR drainage 1/3, cultures grew Ecoli and Bacteriodes.  S/p tracheostomy and PEG placement.  1/28 developed bleeding through ostomy, AC stopped and pt transfused.  EGD on 2/1 showed gastritis, PEG tube embedded in mucosa.  Due to fevers, CT Head, C/A/P performed on 1/30, showed Acute left MCA infarct, new RLL pneumonia.  Seen by ID, started on Zosyn.      # Acute Blood Loss Anemia - due to suspected gastric bleeding.  s/p transfusion.  EGD 2/1 showed gastritis.  Monitor H/H, has been stable.    # Acute CVA - L MCA infarct was noted on CT 1/30.  Neuro input appreciated.  AC held due to extensive CVA, concern for hemorrhagic conversion.  Continue ASA, Statin.  # Perforated Acute Diverticulitis / Intraabdominal Abscesses - continue BABAK drain care, wound vac.  Most recent CT without significant new collections.  # S/p Septic Shock - resolved  # RLL pneumonia, Suspected Aspiration Pneumonia - seen on CT chest 1/30.  ID following, continue Zosyn  # Afib with RVR - rate control.  Holding AC due to above.   # Chronic Respiratory Failure with Hypoxemia - CPAP settings, pulmonary following.  # RLE DVT - per IR, felt to be more subacute to chronic.  Recommended no IVC filter placement.  Followup LE doppler 2/2 shows no acute DVT.   # Functional Quadriplegia - supportive care.    DVT Proph - ICDs.    I contacted pt's son HCP Blake [phone].  Discussed pt's medical condition and multiple medical issues.  He is in agreement with continued care, but will seek advice of friends / family regarding palliative options and possible comfort measures.

## 2022-02-03 NOTE — PROGRESS NOTE ADULT - SUBJECTIVE AND OBJECTIVE BOX
JUAREZ GTZ  MRN-[id number]    Follow Up:  Pneumonia, fevers, s/p Rosales's procedure, DVT    Interval History: The pt was seen and examined earlier, lethargic, opens her yes. The pt is vented via tracheostomy, had a low grade temp this morning 100.3, WBC elevated.     PAST MEDICAL & SURGICAL HISTORY:  Seizure    HTN (hypertension)    Glaucoma    Thyroid disease    Blood clot of neck vein  left side    TIA (transient ischemic attack)  20 years ago    S/P appendectomy        ROS:    [ x] Unobtainable because: lethargic, vented, tracheostomy   [ ] All other systems negative    Constitutional: no fever, no chills  Head: no trauma  Eyes: no vision changes, no eye pain  ENT:  no sore throat, no rhinorrhea  Cardiovascular:  no chest pain, no palpitation  Respiratory:  no SOB, no cough  GI:  no abd pain, no vomiting, no diarrhea  urinary: no dysuria, no hematuria, no flank pain  musculoskeletal:  no joint pain, no joint swelling  skin:  no rash  neurology:  no headache, no seizure, no change in mental status  psych: no anxiety, no depression         Allergies  No Known Allergies        ANTIMICROBIALS:  piperacillin/tazobactam IVPB.. 3.375 every 8 hours      OTHER MEDS:  acetaminophen    Suspension .. 650 milliGRAM(s) Oral every 6 hours PRN  amLODIPine   Tablet 10 milliGRAM(s) Oral daily  aspirin  chewable 81 milliGRAM(s) Oral daily  carvedilol 3.125 milliGRAM(s) Oral every 12 hours  chlorhexidine 0.12% Liquid 15 milliLiter(s) Oral Mucosa every 12 hours  chlorhexidine 2% Cloths 1 Application(s) Topical <User Schedule>  dextrose 40% Gel 15 Gram(s) Oral once  dextrose 5% + sodium chloride 0.45% with potassium chloride 20 mEq/L 1000 milliLiter(s) IV Continuous <Continuous>  dextrose 5%. 1000 milliLiter(s) IV Continuous <Continuous>  dextrose 5%. 1000 milliLiter(s) IV Continuous <Continuous>  dextrose 50% Injectable 25 Gram(s) IV Push once  dextrose 50% Injectable 12.5 Gram(s) IV Push once  dextrose 50% Injectable 25 Gram(s) IV Push once  dorzolamide 2%/timolol 0.5% Ophthalmic Solution 1 Drop(s) Both EYES two times a day  escitalopram 20 milliGRAM(s) Oral daily  glucagon  Injectable 1 milliGRAM(s) IntraMuscular once  levETIRAcetam  IVPB 500 milliGRAM(s) IV Intermittent every 12 hours  levothyroxine Injectable 37.5 MICROGram(s) IV Push at bedtime  pantoprazole  Injectable 40 milliGRAM(s) IV Push every 12 hours  simvastatin 40 milliGRAM(s) Oral at bedtime  sodium chloride 0.9% lock flush 10 milliLiter(s) IV Push every 1 hour PRN      Vital Signs Last 24 Hrs  T(C): 36.5 (03 Feb 2022 11:50), Max: 37.9 (03 Feb 2022 05:28)  T(F): 97.7 (03 Feb 2022 11:50), Max: 100.3 (03 Feb 2022 05:28)  HR: 88 (03 Feb 2022 11:50) (71 - 88)  BP: 129/77 (03 Feb 2022 11:50) (129/77 - 144/70)  BP(mean): --  RR: 18 (03 Feb 2022 11:50) (18 - 18)  SpO2: 99% (03 Feb 2022 08:04) (98% - 99%)    Physical Exam:  General: Nontoxic-appearing Female in no acute distress. Trached on vent, lethargic   HEENT: AT/NC. Unable to assess pt's mouth or eyes due to non-compliance   Neck: Not rigid. No sense of mass. Trach C/D/I  Nodes: None palpable.  Lungs: diminished breath sounds bilaterally, no rhonchi, no wheezes  Heart: Regular rate and rhythm. +Murmur. No rub. No gallop. No palpable thrill. Right arm midline c/d/i   Abdomen: Bowel sounds now present.  Soft. not distended.  Abd incision with vac dressing.  Ostomy with stool and gas. Drains x1, +PEG tube   Extremities: No cyanosis or clubbing. 1+ edema of bilateral lower extremities   Skin: Warm. Dry No rash. No vasculitic stigmata.  Neuro: opens her eyes   Psychiatric: unable to assess    WBC Count: 14.11 K/uL (02-03 @ 11:27)  WBC Count: 12.54 K/uL (02-02 @ 08:07)  WBC Count: 16.76 K/uL (02-01 @ 07:43)  WBC Count: 17.82 K/uL (01-31 @ 07:11)  WBC Count: 20.79 K/uL (01-30 @ 07:34)  WBC Count: 23.36 K/uL (01-29 @ 18:09)  WBC Count: 27.98 K/uL (01-29 @ 06:33)                            9.0    14.11 )-----------( 357      ( 03 Feb 2022 11:27 )             29.2       02-03    144  |  115<H>  |  13  ----------------------------<  139<H>  3.5   |  25  |  0.46<L>    Ca    7.8<L>      03 Feb 2022 11:27  Phos  1.8     02-03  Mg     2.0     02-03    TPro  6.0  /  Alb  1.4<L>  /  TBili  0.2  /  DBili  0.0  /  AST  72<H>  /  ALT  179<H>  /  AlkPhos  166<H>  02-03          Creatinine Trend: 0.46<--, 0.52<--, 0.42<--, 0.52<--, 0.56<--, 0.81<--      MICROBIOLOGY:  v  .Blood Blood  02-01-22   No growth to date.  --  --      .Sputum Sputum  01-31-22   Few Pseudomonas aeruginosa  --  Pseudomonas aeruginosa      .Blood Blood-Peripheral  01-31-22   No growth to date.  --  --      Catheterized Catheterized  01-28-22   >=3 organisms. Probable collection contamination.  --  --      .Blood Blood-Peripheral  01-27-22   No Growth Final  --  --      .Blood Blood-Peripheral  01-21-22   No Growth Final  --  --      Clean Catch Clean Catch (Midstream)  01-21-22   <10,000 CFU/mL Normal Urogenital Sendy  --  --    SARS-CoV-2 Result: NotDetec (02-03-22 @ 08:16)  SARS-CoV-2 Result: NotDetec (01-30-22 @ 10:40)    SARS-CoV-2: NotDetec (20 Jan 2022 18:59)  SARS-CoV-2: NotDetec (24 Dec 2021 14:38)  SARS-CoV-2: Brian (24 Dec 2021 00:03)    RADIOLOGY:

## 2022-02-03 NOTE — PROGRESS NOTE ADULT - SUBJECTIVE AND OBJECTIVE BOX
Patient is a 81y old  Female who presents with a chief complaint of diverticulitis (02 Feb 2022 19:50)      HPI:  Patient is a 81F with a PMH of HTN, HLD, hypothyroidism, depression, seizures who presents to the ED for abd pain.  Patient states that over the last two days she had developed significant abdominal pain and multiple episodes of watery diarrhea.  Reports one episode of nausea and vomiting earlier today.  Patient has no other complaints.  Vitals stable,  labs show leukocytosis and hypokalemia.  CT abdomen significant for diverticulitis.  Will admit to med surg.     (23 Dec 2021 00:17)      INTERVAL HPI/OVERNIGHT EVENTS:    MEDICATIONS  (STANDING):  amLODIPine   Tablet 10 milliGRAM(s) Oral daily  aspirin  chewable 81 milliGRAM(s) Oral daily  carvedilol 3.125 milliGRAM(s) Oral every 12 hours  chlorhexidine 0.12% Liquid 15 milliLiter(s) Oral Mucosa every 12 hours  chlorhexidine 2% Cloths 1 Application(s) Topical <User Schedule>  dextrose 40% Gel 15 Gram(s) Oral once  dextrose 5% + sodium chloride 0.45% with potassium chloride 20 mEq/L 1000 milliLiter(s) (42 mL/Hr) IV Continuous <Continuous>  dextrose 5%. 1000 milliLiter(s) (50 mL/Hr) IV Continuous <Continuous>  dextrose 5%. 1000 milliLiter(s) (100 mL/Hr) IV Continuous <Continuous>  dextrose 50% Injectable 25 Gram(s) IV Push once  dextrose 50% Injectable 12.5 Gram(s) IV Push once  dextrose 50% Injectable 25 Gram(s) IV Push once  dorzolamide 2%/timolol 0.5% Ophthalmic Solution 1 Drop(s) Both EYES two times a day  escitalopram 20 milliGRAM(s) Oral daily  glucagon  Injectable 1 milliGRAM(s) IntraMuscular once  levETIRAcetam  IVPB 500 milliGRAM(s) IV Intermittent every 12 hours  levothyroxine Injectable 37.5 MICROGram(s) IV Push at bedtime  pantoprazole  Injectable 40 milliGRAM(s) IV Push every 12 hours  piperacillin/tazobactam IVPB.. 3.375 Gram(s) IV Intermittent every 8 hours  simvastatin 40 milliGRAM(s) Oral at bedtime    MEDICATIONS  (PRN):  acetaminophen    Suspension .. 650 milliGRAM(s) Oral every 6 hours PRN Temp greater or equal to 38C (100.4F)  sodium chloride 0.9% lock flush 10 milliLiter(s) IV Push every 1 hour PRN Pre/post blood products, medications, blood draw, and to maintain line patency      FAMILY HISTORY:  FH: HTN (hypertension)        Allergies    No Known Allergies    Intolerances        PMH/PSH:  Seizure    HTN (hypertension)    Glaucoma    Thyroid disease    Blood clot of neck vein    TIA (transient ischemic attack)    S/P appendectomy          REVIEW OF SYSTEMS:  CONSTITUTIONAL: No fever, weight loss, or fatigue  EYES: No eye pain, visual disturbances, or discharge  ENMT:  No difficulty hearing, tinnitus, vertigo; No sinus or throat pain  NECK: No pain or stiffness  BREASTS: No pain, masses, or nipple discharge  RESPIRATORY: No cough, wheezing, chills or hemoptysis; No shortness of breath  CARDIOVASCULAR: No chest pain, palpitations, dizziness, or leg swelling  GASTROINTESTINAL: No abdominal or epigastric pain. No nausea, vomiting, or hematemesis; No diarrhea or constipation. No melena or hematochezia.  GENITOURINARY: No dysuria, frequency, hematuria, or incontinence  NEUROLOGICAL: No headaches, memory loss, loss of strength, numbness, or tremors  SKIN: No itching, burning, rashes, or lesions   LYMPH NODES: No enlarged glands  ENDOCRINE: No heat or cold intolerance; No hair loss  MUSCULOSKELETAL: No joint pain or swelling; No muscle, back, or extremity pain  PSYCHIATRIC: No depression, anxiety, mood swings, or difficulty sleeping  HEME/LYMPH: No easy bruising, or bleeding gums  ALLERGY AND IMMUNOLOGIC: No hives or eczema    Vital Signs Last 24 Hrs  T(C): 37.9 (03 Feb 2022 05:28), Max: 37.9 (03 Feb 2022 05:28)  T(F): 100.3 (03 Feb 2022 05:28), Max: 100.3 (03 Feb 2022 05:28)  HR: 85 (03 Feb 2022 08:04) (71 - 85)  BP: 139/58 (03 Feb 2022 05:28) (137/74 - 144/70)  BP(mean): --  RR: 18 (03 Feb 2022 05:28) (18 - 18)  SpO2: 99% (03 Feb 2022 08:04) (98% - 99%)    PHYSICAL EXAM:  GENERAL: NAD, well-groomed, well-developed  HEAD:  Atraumatic, Normocephalic  EYES: EOMI, PERRLA, conjunctiva and sclera clear  ENMT: No tonsillar erythema, exudates, or enlargement; Moist mucous membranes, Good dentition, No lesions  NECK: Supple, No JVD, Normal thyroid  NERVOUS SYSTEM:  Alert & Oriented X3, Good concentration; Motor Strength 5/5 B/L upper and lower extremities; DTRs 2+ intact and symmetric  CHEST/LUNG: Clear to percussion bilaterally; No rales, rhonchi, wheezing, or rubs  HEART: Regular rate and rhythm; No murmurs, rubs, or gallops  ABDOMEN: Soft, Nontender, Nondistended; Bowel sounds present  EXTREMITIES:  2+ Peripheral Pulses, No clubbing, cyanosis, or edema  LYMPH: No lymphadenopathy noted  SKIN: No rashes or lesions    LAB                          9.0    14.11 )-----------( 357      ( 03 Feb 2022 11:27 )             29.2       CBC:  02-03 @ 11:27  WBC 14.11   Hgb 9.0   Hct 29.2   Plts 357  MCV 91.3  02-02 @ 08:07  WBC 12.54   Hgb 8.5   Hct 27.8   Plts 315  MCV 90.3  02-01 @ 07:43  WBC 16.76   Hgb 9.6   Hct 30.5   Plts 330  MCV 88.7  01-31 @ 07:11  WBC 17.82   Hgb 7.3   Hct 23.8   Plts 282  MCV 89.8  01-30 @ 07:34  WBC 20.79   Hgb 8.2   Hct 26.5   Plts 288  MCV 89.5  01-29 @ 18:09  WBC 23.36   Hgb 8.9   Hct 28.6   Plts 277  MCV 89.1  01-29 @ 06:33  WBC 27.98   Hgb 8.6   Hct 27.4   Plts 294  MCV 88.4  01-28 @ 17:18  WBC 24.37   Hgb 9.6   Hct 30.2   Plts 325  MCV 87.0  01-28 @ 07:07  WBC 19.00   Hgb 8.7   Hct 28.4   Plts CLUMPED  MCV 93.1  01-27 @ 19:58  WBC 16.32   Hgb 7.7   Hct 26.1   Plts 412  MCV 95.6      Chemistry:  02-03 @ 11:27  Na+ 144  K+ 3.5  Cl- 115  CO2 25  BUN 13  Cr 0.46     02-02 @ 08:07  Na+ 144  K+ 3.3  Cl- 114  CO2 23  BUN 15  Cr 0.52     02-01 @ 07:43  Na+ 145  K+ 3.7  Cl- 115  CO2 24  BUN 16  Cr 0.42     01-31 @ 17:52  Na+ 143  K+ 3.3  Cl- 113  CO2 25  BUN 18  Cr 0.52     01-31 @ 07:11  Na+ 145  K+ 2.6  Cl- 114  CO2 24  BUN 21  Cr 0.56     01-30 @ 07:34  Na+ 148  K+ 2.9  Cl- 115  CO2 24  BUN 30  Cr 0.81     01-29 @ 06:33  Na+ 146  K+ 4.0  Cl- 115  CO2 24  BUN 42  Cr 0.76     01-28 @ 07:07  Na+ 147  K+ 5.0  Cl- 115  CO2 24  BUN 46  Cr 0.86         Glucose, Serum: 139 mg/dL (02-03 @ 11:27)  Glucose, Serum: 187 mg/dL (02-02 @ 08:07)  Glucose, Serum: 122 mg/dL (02-01 @ 07:43)  Glucose, Serum: 122 mg/dL (01-31 @ 17:52)  Glucose, Serum: 134 mg/dL (01-31 @ 07:11)  Glucose, Serum: 149 mg/dL (01-30 @ 07:34)  Glucose, Serum: 126 mg/dL (01-29 @ 06:33)  Glucose, Serum: 123 mg/dL (01-28 @ 07:07)      03 Feb 2022 11:27    144    |  115    |  13     ----------------------------<  139    3.5     |  25     |  0.46   02 Feb 2022 08:07    144    |  114    |  15     ----------------------------<  187    3.3     |  23     |  0.52   01 Feb 2022 07:43    145    |  115    |  16     ----------------------------<  122    3.7     |  24     |  0.42   31 Jan 2022 17:52    143    |  113    |  18     ----------------------------<  122    3.3     |  25     |  0.52   31 Jan 2022 07:11    145    |  114    |  21     ----------------------------<  134    2.6     |  24     |  0.56   30 Jan 2022 07:34    148    |  115    |  30     ----------------------------<  149    2.9     |  24     |  0.81   29 Jan 2022 06:33    146    |  115    |  42     ----------------------------<  126    4.0     |  24     |  0.76     Ca    7.8        03 Feb 2022 11:27  Ca    8.0        02 Feb 2022 08:07  Ca    8.5        01 Feb 2022 07:43  Ca    8.4        31 Jan 2022 17:52  Ca    8.4        31 Jan 2022 07:11  Ca    8.5        30 Jan 2022 07:34  Ca    8.5        29 Jan 2022 06:33  Phos  2.2       02 Feb 2022 08:07  Phos  3.0       01 Feb 2022 07:43  Phos  1.9       31 Jan 2022 17:52  Phos  2.2       31 Jan 2022 07:11  Phos  3.2       30 Jan 2022 07:34  Phos  3.5       29 Jan 2022 06:33  Mg     2.6       02 Feb 2022 08:07  Mg     2.1       31 Jan 2022 07:11  Mg     2.4       30 Jan 2022 07:34  Mg     3.0       29 Jan 2022 06:33    TPro  6.1    /  Alb  1.4    /  TBili  0.5    /  DBili  x      /  AST  207    /  ALT  203    /  AlkPhos  144    01 Feb 2022 07:43              CAPILLARY BLOOD GLUCOSE      POCT Blood Glucose.: 150 mg/dL (03 Feb 2022 10:48)  POCT Blood Glucose.: 129 mg/dL (03 Feb 2022 06:15)  POCT Blood Glucose.: 167 mg/dL (03 Feb 2022 00:15)  POCT Blood Glucose.: 41 mg/dL (03 Feb 2022 00:11)  POCT Blood Glucose.: 154 mg/dL (02 Feb 2022 16:22)          RADIOLOGY & ADDITIONAL TESTS:    Imaging Personally Reviewed:  [ ] YES  [ ] NO    Consultant(s) Notes Reviewed:  [ ] YES  [ ] NO    Care Discussed with Consultants/Other Providers [ ] YES  [ ] NO Patient is a 81y old  Female who presents with a chief complaint of diverticulitis (02 Feb 2022 19:50)      HPI:  Patient is a 81F with a PMH of HTN, HLD, hypothyroidism, depression, seizures who presents to the ED for abd pain.  Patient states that over the last two days she had developed significant abdominal pain and multiple episodes of watery diarrhea.  Reports one episode of nausea and vomiting earlier today.  Patient has no other complaints.  Vitals stable,  labs show leukocytosis and hypokalemia.  CT abdomen significant for diverticulitis.  Will admit to med surg.     (23 Dec 2021 00:17)      INTERVAL HPI/OVERNIGHT EVENTS:  Brown liquid stool in ostomy bag. Tolerating PEG tube feeds @ 50 ml / hr. The patient / nurse deny melena, hematochezia, hematemesis, nausea, vomiting, abdominal pain, constipation, diarrhea, or change in bowel movements     MEDICATIONS  (STANDING):  amLODIPine   Tablet 10 milliGRAM(s) Oral daily  aspirin  chewable 81 milliGRAM(s) Oral daily  carvedilol 3.125 milliGRAM(s) Oral every 12 hours  chlorhexidine 0.12% Liquid 15 milliLiter(s) Oral Mucosa every 12 hours  chlorhexidine 2% Cloths 1 Application(s) Topical <User Schedule>  dextrose 40% Gel 15 Gram(s) Oral once  dextrose 5% + sodium chloride 0.45% with potassium chloride 20 mEq/L 1000 milliLiter(s) (42 mL/Hr) IV Continuous <Continuous>  dextrose 5%. 1000 milliLiter(s) (50 mL/Hr) IV Continuous <Continuous>  dextrose 5%. 1000 milliLiter(s) (100 mL/Hr) IV Continuous <Continuous>  dextrose 50% Injectable 25 Gram(s) IV Push once  dextrose 50% Injectable 12.5 Gram(s) IV Push once  dextrose 50% Injectable 25 Gram(s) IV Push once  dorzolamide 2%/timolol 0.5% Ophthalmic Solution 1 Drop(s) Both EYES two times a day  escitalopram 20 milliGRAM(s) Oral daily  glucagon  Injectable 1 milliGRAM(s) IntraMuscular once  levETIRAcetam  IVPB 500 milliGRAM(s) IV Intermittent every 12 hours  levothyroxine Injectable 37.5 MICROGram(s) IV Push at bedtime  pantoprazole  Injectable 40 milliGRAM(s) IV Push every 12 hours  piperacillin/tazobactam IVPB.. 3.375 Gram(s) IV Intermittent every 8 hours  simvastatin 40 milliGRAM(s) Oral at bedtime    MEDICATIONS  (PRN):  acetaminophen    Suspension .. 650 milliGRAM(s) Oral every 6 hours PRN Temp greater or equal to 38C (100.4F)  sodium chloride 0.9% lock flush 10 milliLiter(s) IV Push every 1 hour PRN Pre/post blood products, medications, blood draw, and to maintain line patency      FAMILY HISTORY:  FH: HTN (hypertension)        Allergies    No Known Allergies    Intolerances        PMH/PSH:  Seizure    HTN (hypertension)    Glaucoma    Thyroid disease    Blood clot of neck vein    TIA (transient ischemic attack)    S/P appendectomy          REVIEW OF SYSTEMS:  CONSTITUTIONAL: No weight loss, Febrile 100.3   EYES: No eye pain, visual disturbances, or discharge  ENMT:  No difficulty hearing, tinnitus, vertigo; No sinus or throat pain  NECK: No pain or stiffness  BREASTS: No pain, masses, or nipple discharge  RESPIRATORY: No cough, wheezing, chills or hemoptysis; No shortness of breath  CARDIOVASCULAR: No chest pain, palpitations, dizziness, or leg swelling  GASTROINTESTINAL: See above   GENITOURINARY: No dysuria, frequency, hematuria, or incontinence  NEUROLOGICAL: No headaches, numbness, or tremors  SKIN: No itching, burning, rashes, or lesions   LYMPH NODES: No enlarged glands  ENDOCRINE: No heat or cold intolerance; No hair loss  MUSCULOSKELETAL: No joint pain or swelling; No muscle, back, or extremity pain  PSYCHIATRIC: No depression, anxiety, mood swings, or difficulty sleeping  HEME/LYMPH: No easy bruising, or bleeding gums  ALLERGY AND IMMUNOLOGIC: No hives or eczema    Vital Signs Last 24 Hrs  T(C): 37.9 (03 Feb 2022 05:28), Max: 37.9 (03 Feb 2022 05:28)  T(F): 100.3 (03 Feb 2022 05:28), Max: 100.3 (03 Feb 2022 05:28)  HR: 85 (03 Feb 2022 08:04) (71 - 85)  BP: 139/58 (03 Feb 2022 05:28) (137/74 - 144/70)  BP(mean): --  RR: 18 (03 Feb 2022 05:28) (18 - 18)  SpO2: 99% (03 Feb 2022 08:04) (98% - 99%)    PHYSICAL EXAM:  GENERAL: NAD, well-groomed, well-developed  HEAD:  Atraumatic, Normocephalic  EYES: EOMI, PERRLA, conjunctiva and sclera clear  NECK: Supple, No JVD, Normal thyroid  NERVOUS SYSTEM:  MS unchanged   CHEST/LUNG: Clear to percussion bilaterally; No rales, rhonchi, wheezing, or rubs  HEART: Regular rate and rhythm; No murmurs, rubs, or gallops  ABDOMEN: Soft, Nontender, Nondistended; Bowel sounds present. PEG tube sutured in. Thus, can not be turned  EXTREMITIES:  2+ Peripheral Pulses, No clubbing, cyanosis, or edema  LYMPH: No lymphadenopathy noted  SKIN: No rashes or lesions    LAB                          9.0    14.11 )-----------( 357      ( 03 Feb 2022 11:27 )             29.2       CBC:  02-03 @ 11:27  WBC 14.11   Hgb 9.0   Hct 29.2   Plts 357  MCV 91.3  02-02 @ 08:07  WBC 12.54   Hgb 8.5   Hct 27.8   Plts 315  MCV 90.3  02-01 @ 07:43  WBC 16.76   Hgb 9.6   Hct 30.5   Plts 330  MCV 88.7  01-31 @ 07:11  WBC 17.82   Hgb 7.3   Hct 23.8   Plts 282  MCV 89.8  01-30 @ 07:34  WBC 20.79   Hgb 8.2   Hct 26.5   Plts 288  MCV 89.5  01-29 @ 18:09  WBC 23.36   Hgb 8.9   Hct 28.6   Plts 277  MCV 89.1  01-29 @ 06:33  WBC 27.98   Hgb 8.6   Hct 27.4   Plts 294  MCV 88.4  01-28 @ 17:18  WBC 24.37   Hgb 9.6   Hct 30.2   Plts 325  MCV 87.0  01-28 @ 07:07  WBC 19.00   Hgb 8.7   Hct 28.4   Plts CLUMPED  MCV 93.1  01-27 @ 19:58  WBC 16.32   Hgb 7.7   Hct 26.1   Plts 412  MCV 95.6      Chemistry:  02-03 @ 11:27  Na+ 144  K+ 3.5  Cl- 115  CO2 25  BUN 13  Cr 0.46     02-02 @ 08:07  Na+ 144  K+ 3.3  Cl- 114  CO2 23  BUN 15  Cr 0.52     02-01 @ 07:43  Na+ 145  K+ 3.7  Cl- 115  CO2 24  BUN 16  Cr 0.42     01-31 @ 17:52  Na+ 143  K+ 3.3  Cl- 113  CO2 25  BUN 18  Cr 0.52     01-31 @ 07:11  Na+ 145  K+ 2.6  Cl- 114  CO2 24  BUN 21  Cr 0.56     01-30 @ 07:34  Na+ 148  K+ 2.9  Cl- 115  CO2 24  BUN 30  Cr 0.81     01-29 @ 06:33  Na+ 146  K+ 4.0  Cl- 115  CO2 24  BUN 42  Cr 0.76     01-28 @ 07:07  Na+ 147  K+ 5.0  Cl- 115  CO2 24  BUN 46  Cr 0.86         Glucose, Serum: 139 mg/dL (02-03 @ 11:27)  Glucose, Serum: 187 mg/dL (02-02 @ 08:07)  Glucose, Serum: 122 mg/dL (02-01 @ 07:43)  Glucose, Serum: 122 mg/dL (01-31 @ 17:52)  Glucose, Serum: 134 mg/dL (01-31 @ 07:11)  Glucose, Serum: 149 mg/dL (01-30 @ 07:34)  Glucose, Serum: 126 mg/dL (01-29 @ 06:33)  Glucose, Serum: 123 mg/dL (01-28 @ 07:07)      03 Feb 2022 11:27    144    |  115    |  13     ----------------------------<  139    3.5     |  25     |  0.46   02 Feb 2022 08:07    144    |  114    |  15     ----------------------------<  187    3.3     |  23     |  0.52   01 Feb 2022 07:43    145    |  115    |  16     ----------------------------<  122    3.7     |  24     |  0.42   31 Jan 2022 17:52    143    |  113    |  18     ----------------------------<  122    3.3     |  25     |  0.52   31 Jan 2022 07:11    145    |  114    |  21     ----------------------------<  134    2.6     |  24     |  0.56   30 Jan 2022 07:34    148    |  115    |  30     ----------------------------<  149    2.9     |  24     |  0.81   29 Jan 2022 06:33    146    |  115    |  42     ----------------------------<  126    4.0     |  24     |  0.76     Ca    7.8        03 Feb 2022 11:27  Ca    8.0        02 Feb 2022 08:07  Ca    8.5        01 Feb 2022 07:43  Ca    8.4        31 Jan 2022 17:52  Ca    8.4        31 Jan 2022 07:11  Ca    8.5        30 Jan 2022 07:34  Ca    8.5        29 Jan 2022 06:33  Phos  2.2       02 Feb 2022 08:07  Phos  3.0       01 Feb 2022 07:43  Phos  1.9       31 Jan 2022 17:52  Phos  2.2       31 Jan 2022 07:11  Phos  3.2       30 Jan 2022 07:34  Phos  3.5       29 Jan 2022 06:33  Mg     2.6       02 Feb 2022 08:07  Mg     2.1       31 Jan 2022 07:11  Mg     2.4       30 Jan 2022 07:34  Mg     3.0       29 Jan 2022 06:33    TPro  6.1    /  Alb  1.4    /  TBili  0.5    /  DBili  x      /  AST  207    /  ALT  203    /  AlkPhos  144    01 Feb 2022 07:43              CAPILLARY BLOOD GLUCOSE      POCT Blood Glucose.: 150 mg/dL (03 Feb 2022 10:48)  POCT Blood Glucose.: 129 mg/dL (03 Feb 2022 06:15)  POCT Blood Glucose.: 167 mg/dL (03 Feb 2022 00:15)  POCT Blood Glucose.: 41 mg/dL (03 Feb 2022 00:11)  POCT Blood Glucose.: 154 mg/dL (02 Feb 2022 16:22)          RADIOLOGY & ADDITIONAL TESTS:    Imaging Personally Reviewed:  [ ] YES  [ ] NO    Consultant(s) Notes Reviewed:  [ ] YES  [ ] NO    Care Discussed with Consultants/Other Providers [ ] YES  [ ] NO

## 2022-02-04 LAB
ALBUMIN SERPL ELPH-MCNC: 1.4 G/DL — LOW (ref 3.3–5)
ALP SERPL-CCNC: 142 U/L — HIGH (ref 40–120)
ALT FLD-CCNC: 134 U/L — HIGH (ref 12–78)
ANION GAP SERPL CALC-SCNC: 4 MMOL/L — LOW (ref 5–17)
AST SERPL-CCNC: 49 U/L — HIGH (ref 15–37)
BILIRUB DIRECT SERPL-MCNC: <0.05 — SIGNIFICANT CHANGE UP (ref 0–0.3)
BILIRUB INDIRECT FLD-MCNC: SIGNIFICANT CHANGE UP (ref 0.2–1)
BILIRUB SERPL-MCNC: 0.2 MG/DL — SIGNIFICANT CHANGE UP (ref 0.2–1.2)
BUN SERPL-MCNC: 13 MG/DL — SIGNIFICANT CHANGE UP (ref 7–23)
CALCIUM SERPL-MCNC: 7.8 MG/DL — LOW (ref 8.5–10.1)
CHLORIDE SERPL-SCNC: 117 MMOL/L — HIGH (ref 96–108)
CO2 SERPL-SCNC: 26 MMOL/L — SIGNIFICANT CHANGE UP (ref 22–31)
CREAT SERPL-MCNC: 0.46 MG/DL — LOW (ref 0.5–1.3)
GLUCOSE BLDC GLUCOMTR-MCNC: 109 MG/DL — HIGH (ref 70–99)
GLUCOSE BLDC GLUCOMTR-MCNC: 111 MG/DL — HIGH (ref 70–99)
GLUCOSE BLDC GLUCOMTR-MCNC: 149 MG/DL — HIGH (ref 70–99)
GLUCOSE BLDC GLUCOMTR-MCNC: 152 MG/DL — HIGH (ref 70–99)
GLUCOSE SERPL-MCNC: 104 MG/DL — HIGH (ref 70–99)
HCT VFR BLD CALC: 28.7 % — LOW (ref 34.5–45)
HGB BLD-MCNC: 8.8 G/DL — LOW (ref 11.5–15.5)
MAGNESIUM SERPL-MCNC: 2.2 MG/DL — SIGNIFICANT CHANGE UP (ref 1.6–2.6)
MCHC RBC-ENTMCNC: 28 PG — SIGNIFICANT CHANGE UP (ref 27–34)
MCHC RBC-ENTMCNC: 30.7 G/DL — LOW (ref 32–36)
MCV RBC AUTO: 91.4 FL — SIGNIFICANT CHANGE UP (ref 80–100)
NRBC # BLD: 0 /100 WBCS — SIGNIFICANT CHANGE UP (ref 0–0)
PHOSPHATE SERPL-MCNC: 2.4 MG/DL — LOW (ref 2.5–4.5)
PLATELET # BLD AUTO: 373 K/UL — SIGNIFICANT CHANGE UP (ref 150–400)
POTASSIUM SERPL-MCNC: 3.8 MMOL/L — SIGNIFICANT CHANGE UP (ref 3.5–5.3)
POTASSIUM SERPL-SCNC: 3.8 MMOL/L — SIGNIFICANT CHANGE UP (ref 3.5–5.3)
PROT SERPL-MCNC: 5.7 GM/DL — LOW (ref 6–8.3)
RBC # BLD: 3.14 M/UL — LOW (ref 3.8–5.2)
RBC # FLD: 16.7 % — HIGH (ref 10.3–14.5)
SODIUM SERPL-SCNC: 147 MMOL/L — HIGH (ref 135–145)
WBC # BLD: 13.92 K/UL — HIGH (ref 3.8–10.5)
WBC # FLD AUTO: 13.92 K/UL — HIGH (ref 3.8–10.5)

## 2022-02-04 PROCEDURE — 99233 SBSQ HOSP IP/OBS HIGH 50: CPT

## 2022-02-04 PROCEDURE — 99232 SBSQ HOSP IP/OBS MODERATE 35: CPT

## 2022-02-04 PROCEDURE — 76700 US EXAM ABDOM COMPLETE: CPT | Mod: 26

## 2022-02-04 PROCEDURE — 93306 TTE W/DOPPLER COMPLETE: CPT | Mod: 26

## 2022-02-04 RX ADMIN — ESCITALOPRAM OXALATE 20 MILLIGRAM(S): 10 TABLET, FILM COATED ORAL at 11:45

## 2022-02-04 RX ADMIN — Medication 81 MILLIGRAM(S): at 11:45

## 2022-02-04 RX ADMIN — PANTOPRAZOLE SODIUM 40 MILLIGRAM(S): 20 TABLET, DELAYED RELEASE ORAL at 06:49

## 2022-02-04 RX ADMIN — PIPERACILLIN AND TAZOBACTAM 25 GRAM(S): 4; .5 INJECTION, POWDER, LYOPHILIZED, FOR SOLUTION INTRAVENOUS at 06:50

## 2022-02-04 RX ADMIN — DEXTROSE MONOHYDRATE, SODIUM CHLORIDE, AND POTASSIUM CHLORIDE 42 MILLILITER(S): 50; .745; 4.5 INJECTION, SOLUTION INTRAVENOUS at 22:50

## 2022-02-04 RX ADMIN — PANTOPRAZOLE SODIUM 40 MILLIGRAM(S): 20 TABLET, DELAYED RELEASE ORAL at 17:03

## 2022-02-04 RX ADMIN — Medication 37.5 MICROGRAM(S): at 22:50

## 2022-02-04 RX ADMIN — CARVEDILOL PHOSPHATE 3.12 MILLIGRAM(S): 80 CAPSULE, EXTENDED RELEASE ORAL at 16:59

## 2022-02-04 RX ADMIN — SIMVASTATIN 40 MILLIGRAM(S): 20 TABLET, FILM COATED ORAL at 22:51

## 2022-02-04 RX ADMIN — DORZOLAMIDE HYDROCHLORIDE TIMOLOL MALEATE 1 DROP(S): 20; 5 SOLUTION/ DROPS OPHTHALMIC at 17:02

## 2022-02-04 RX ADMIN — PIPERACILLIN AND TAZOBACTAM 25 GRAM(S): 4; .5 INJECTION, POWDER, LYOPHILIZED, FOR SOLUTION INTRAVENOUS at 13:46

## 2022-02-04 RX ADMIN — LEVETIRACETAM 420 MILLIGRAM(S): 250 TABLET, FILM COATED ORAL at 06:50

## 2022-02-04 RX ADMIN — LEVETIRACETAM 420 MILLIGRAM(S): 250 TABLET, FILM COATED ORAL at 17:01

## 2022-02-04 RX ADMIN — DORZOLAMIDE HYDROCHLORIDE TIMOLOL MALEATE 1 DROP(S): 20; 5 SOLUTION/ DROPS OPHTHALMIC at 06:50

## 2022-02-04 RX ADMIN — CARVEDILOL PHOSPHATE 3.12 MILLIGRAM(S): 80 CAPSULE, EXTENDED RELEASE ORAL at 06:50

## 2022-02-04 RX ADMIN — AMLODIPINE BESYLATE 10 MILLIGRAM(S): 2.5 TABLET ORAL at 06:50

## 2022-02-04 RX ADMIN — CHLORHEXIDINE GLUCONATE 15 MILLILITER(S): 213 SOLUTION TOPICAL at 17:01

## 2022-02-04 RX ADMIN — CHLORHEXIDINE GLUCONATE 15 MILLILITER(S): 213 SOLUTION TOPICAL at 06:48

## 2022-02-04 RX ADMIN — PIPERACILLIN AND TAZOBACTAM 25 GRAM(S): 4; .5 INJECTION, POWDER, LYOPHILIZED, FOR SOLUTION INTRAVENOUS at 22:50

## 2022-02-04 RX ADMIN — CHLORHEXIDINE GLUCONATE 1 APPLICATION(S): 213 SOLUTION TOPICAL at 06:49

## 2022-02-04 NOTE — PROGRESS NOTE ADULT - ASSESSMENT
81F with a PMH of HTN, HLD, hypothyroidism, depression, seizures who presents to the ED for abd pain.  Patient states that over the last two days she had developed significant abdominal pain and multiple episodes of watery diarrhea.  Reports one episode of nausea and vomiting earlier today.  Patient has no other complaints.  Vitals stable,  labs show leukocytosis and hypokalemia.  CT abdomen significant for diverticulitis.  Will admit to med surg.     (23 Dec 2021 00:17)          A) Upper GI Bleed with CT Angio showing bleeding in gastric antrum s/p Protamine.  Now with brown stool. EGD revealed the PEG tube to be imbedded into the mucosa and an eschar under the PEG tube. The PEG tube subsequently was pushed in after a suture was cut.  Discussed with surgery. PEG tube was definitely pushed in after one suture was cut. No issues of being too tight at the present time as per surgery  1) Monitor Hb ( HGB 9.0 --> 8.8 stable ) , transfuse to Hb >8    2) Tolerating feeds at 60 ml / hr     3) PPI BID   4) Carafate   5)  Now on ASA      B) Elevated LFTs, Improving.   ( 219393 )  0.1/25/28/72 ---> ( [id number] ) 1.4/207/203/144 -->0.2/72/179/166 -->  0.2/49/134/142 1) Ultrasound 2) F/U LFTs          81F with a PMH of HTN, HLD, hypothyroidism, depression, seizures who presents to the ED for abd pain.  Patient states that over the last two days she had developed significant abdominal pain and multiple episodes of watery diarrhea.  Reports one episode of nausea and vomiting earlier today.  Patient has no other complaints.  Vitals stable,  labs show leukocytosis and hypokalemia.  CT abdomen significant for diverticulitis.  Will admit to med surg.     (23 Dec 2021 00:17)          A) Upper GI Bleed with CT Angio showing bleeding in gastric antrum s/p Protamine.  Now with brown stool. EGD revealed the PEG tube to be imbedded into the mucosa and an eschar under the PEG tube. The PEG tube subsequently was pushed in after a suture was cut.  Discussed with surgery. PEG tube was definitely pushed in after one suture was cut. No issues of being too tight at the present time as per surgery  1) Monitor Hb ( HGB 9.0 --> 8.8 stable ) , transfuse to Hb >8    2) Tolerating feeds at 60 ml / hr     3) PPI BID   4) Carafate   5) Now on ASA      B) Elevated LFTs, Improving.   ( [id number] )  0.1/25/28/72 ---> ( [id number] ) 1.4/207/203/144 -->0.2/72/179/166 -->  0.2/49/134/142 See Ultrasound results ( gallstones, otherwise negative )  1) F/U LFTs         C) Fever - Chest / abdominal / Pelvic CT scan if source of fever remains unknown

## 2022-02-04 NOTE — PROGRESS NOTE ADULT - ASSESSMENT
From Initial ID consult note: 81F with a PMH of HTN, HLD, hypothyroidism, depression, seizures who presents to the ED for abd pain found to have diverticulitis   has rising leukocytosis   had fever yesterday   tachycardic and now hypoxic   CXR (I personally reviewed) low lung volumes   original CT (I personally reviewed) with diverticulitis   she had a lot of pain in the abdomen on exam     Progress Notes  12/25: s/p surgery for diverticulitis with perforation and abscess and went to the OR. intubated in ICU with vac and needs to go back to the OR, currently on zosyn, s/p one dose of diflucan last night   12/27: s/p repair and ostomy creation yesterday, wbc elevated, cultures sensitive to zosyn and candida albicans is notoriously sensitive to azoles such as fluconazole, wean of pressors and sedation, extubate when ready   12/28: Further increase in white blood cell count but overall the patient appears to be reasonably stable.  No concern of C. difficile at this juncture.  Current antibiotics are reasonable.  Leukocytosis like related to recent surgery, no concern of other superimposed process on the basis of exam.  I do not see a role to alter her antimicrobials.  12/29:  Remains intubated in ICU. still quite edematous and net positive, WBC climbing, small wound dehiscence at bottom of incision, plan for initiation of TPN today, remains on antibiotics    12/30: rising WBC, ostomy not functioning yet, very edematous CT today, may be source control issue so for now can continue same antibiotics and antifungal but may need to change if findings on the CT are worrisome or change in hemodynamics  12/31:Leukocytosis, with collections noted on CT.  However this is being done postoperative day 5, there is some possibility that this could represent postop changes but given the leukocytosis, concur with plans for attempts at percutaneous drainage.  White blood cell count down slightly compared to prior peak, will need to be trended.  Gallbladder is also distended.  Abdominal exam was benign this morning, but a calculus cholecystitis could be the differential diagnosis here as well (though n.p.o. status certainly could explain distended gallbladder, there is also report of gallbladder wall thickening).  Would modify antibiotics based on cultures from drainage, I do not believe that antibiotics and other the cells would be adequate here.  Fortunately she is off pressors, with preserved renal function, and tolerating pressure support.  1/1/22: Postop day 6.  I have some concerns with the appearance of the ostomy, though the abdominal exam overall is otherwise unchanged.  White blood cell count remains elevated, but is lower compared with prior values.  This is despite no change in antibiotic therapy.  Patient also has asymmetric edema of the upper extremities, right greater than left.  Low threshold for duplex ultrasound to exclude DVT.No new surveillance culture data.  1/2: POD7 Ostomy looks better today, UE symmetric. WBC elevated, some slight downward trend overall. Temps <= 100F.   1/3: drainage of fluid collection today, continue zosyn and fluconazole for now, monitor wbc and temps, watch ostomy output, diuresis and address third spacing   1/4: s/p drainage yesterday now growing ecoli and awaiting for sensitivities, wbc is improving, started on tube feeds    1/5: abscess growing ecoli and bacteroides, S to ceftriaxone, wbc 15, weaning trial today, pain management    1/6: awake, lung exercise today, trach planned for tomorrow, ostomy output is good. will stop fluconazole today and change antibiotics to ceftriaxone and flagly. Unclear stop date yet    1/7 trach today, continue antibiotics   1/9: s/p trach, had low grade fever but otherwise doing ok, will continues same antibiotics regimen for now but if fevers continue she will needs to be rescanned as those abscesses can progress.  1/10: low grade fever but doing well on trach-PS, ostomy with stool and gas, shakes head when asked if in pain, discussed with surgery about repeating CT scan given the temp  1/11: Still febrile, favor interval CT as above.  1/12: CT of abdomen showing some signs of improvement and elsewhere  it shows signs of worsening. wbc normal, tube feeds currently via NGT   1/13: no fever, no wbc, Cr and LFTs ok, Ceftriaxone and flagyl continued. No planned IR intervention at this time - fluid collection without access to drain, surgical drain within the collection. Pt possibly will have repeat imaging over the weekend to evaluate progress.   Attending addendum:  agree with above  continue antibiotics   vent weaning  surgical follow up for the abdominal wound  1/14: Low grade temp last night, midline placed, no leukocytosis, Cr and LFTs ok, culture data reviewed. Pt's colostomy with small amount of liquid stool and gas, no stools recorded for two days, no role for po Vancomycin at this time. The pt has been on abx since her admission, will stop all abx and will continue to monitor.    1/18: no fevers since 1/14, WBC better 11.00, cr ok, off abx, now s/p EGD, with PEG replacement.   1/24: spiking low grade fevers, low suspicion for pneumonia given little secretions and doing well on the PS wth low settings, peg site looks clean, trach site OK as well, fevers may be from small fluid collections intra-abdominally vs atelectasis Would suggest aggressive pulmonary toilet including chest vest. follow cultures and trend wbc and fever curve . If persistent fevers would start  Zosyn and PO fluconazole but for now please hold antibiotics   1/25: continues having fevers, low grade temps today, WBC better 18.35, Cr and LFTs ok, + new DVT of right common femoral vein - could be the cause of pt's fevers - on full dose Lovenox. Will continue to monitor off abx, WBC got better without abx.   Attending addendum:  patient with long hospital course and now on the floors with fever and found to have a DVT  wbc came down without antibiotics   fever curve is getting better   continue to monitor off antibiotics   treat DVT with full dose AC   suspect fevers could be from dvt but may also be from fluid collection    1/26: low grade temp today, pt is more awake and alert today, WBC improving 14.2 off abx, Cr and LFTs ok, abd wound with clean base and healing well - vac dressing was changed today during my exam. In favor to continue to monitor off abx for now.   1/27: Pt continues spiking fevers, low grade last night and 101 today, awake and alert during my exam, WBC improving off abx 13.61, recent chest xray with no acute lung disease, COVID 19 is negative. Pt's fevers most likely related to her DVT, will collect two sets of surveillance BC and will continue to monitor off abx for now.   1/28: CTA A/P done last night - There is evidence of active GI bleeding in the gastric antrum, related to peptic ulcer disease or gastritis, possible cystitis. Blood transfusion is in progress during my exam, wound vac is being changed - base of the wound is clean with no evidence of acute infection. Pt had a low grade temp last night, WBC elevated - could be reactive to DVT. Will obtain UA and UC, will hold off on abx for now.   1/29: persistent fevers, received a dose of vanco and a dose of Azactam last evening, WBC high 27.98, BC with no growth to date, UA positive, UC pending. Will start on broad spectrum antibiotic now as unclear it is unclear at this time if fevers caused by UTI only, or in combination with DVT, or other source. Pt's abd was seen with last vac dressing change - incision site wound  is clean and healing well. JPis still draining cloudy drainage.   1/30: lethargic, continues having fevers, WBC better 20.79, all BCs remain with no growth, UC pending, CT head - Acute left MCA infarct is suspected, CT chest and CT a/p performed earlier, pending radiology reading, will continue with Zosyn, Na is slightly elevated, would prefer to have UC available prior switching abx.   1/31: no fevers today, remains lethargic, vented, WBC with improvement 17.82, two sets of BCs were collected yesterday and pending result, Zosyn continued. All BCs with no growth to date, last UC is contaminated.  2/1: afebrile last 24hrs, no IVC filter placement per IR, wbc slightly better, PEG tube loosened and EGD by EGD didnt find bleed, pseudomonas in respiratory culture and CT did show pneumonia but she is doing well on , nonetheless hopefully the zosyn is sensitive,    2/2: low grade temp this morning, WBC improving, Zosyn continued, BCs remain with no growth, sputum culture with pseudomonas, sensitive to Zosyn.   Attending addendum:  agree with above   continue zosyn   neuro consulted for MCA infarct   wound seen today after vac was taken off->it is healing well with pink granulation tissue and no discharge    2/3: low grade temperature 100.3, WBC elevated, COVID 19 is negative, BC with no growth to date, Zosyn day #6 continued.   2/4: no fever today, WBC better 13.92, Cr ok, LFTs better, the pt seems to be more awake today, Zosyn continued - today is day #7 of abx.     #RLL Pneumonia   - stop Zosyn IV after today's doses   - trend pt's temperatures  - trend WBC  - MRSA PCR not detected  - CT chest/a/p - + pneumonia, no abdominal pathology     #Perforated diverticulum of large intestine.   monitor sodium levels   continue wound vac   Drain and colostomy care as per surgery      #Fever.   limit Zosyn to 7 days   trend pt's temperatures   UC - contaminated   +DVT of right common femoral vein   New left MCA infarct on CT head  CT c/a/p - new pneumonia RLL, no acute abd pathology   BC NGTD  repeat BC NGTD  MRSA PCR not detected     #Acute Respiratory Failure  wean off ventilator as tolerated   chest PT  frequent suctioning   chest vest   HOB >30 degrees    #DVT  AC as per protocol From Initial ID consult note: 81F with a PMH of HTN, HLD, hypothyroidism, depression, seizures who presents to the ED for abd pain found to have diverticulitis   has rising leukocytosis   had fever yesterday   tachycardic and now hypoxic   CXR (I personally reviewed) low lung volumes   original CT (I personally reviewed) with diverticulitis   she had a lot of pain in the abdomen on exam     Progress Notes  12/25: s/p surgery for diverticulitis with perforation and abscess and went to the OR. intubated in ICU with vac and needs to go back to the OR, currently on zosyn, s/p one dose of diflucan last night   12/27: s/p repair and ostomy creation yesterday, wbc elevated, cultures sensitive to zosyn and candida albicans is notoriously sensitive to azoles such as fluconazole, wean of pressors and sedation, extubate when ready   12/28: Further increase in white blood cell count but overall the patient appears to be reasonably stable.  No concern of C. difficile at this juncture.  Current antibiotics are reasonable.  Leukocytosis like related to recent surgery, no concern of other superimposed process on the basis of exam.  I do not see a role to alter her antimicrobials.  12/29:  Remains intubated in ICU. still quite edematous and net positive, WBC climbing, small wound dehiscence at bottom of incision, plan for initiation of TPN today, remains on antibiotics    12/30: rising WBC, ostomy not functioning yet, very edematous CT today, may be source control issue so for now can continue same antibiotics and antifungal but may need to change if findings on the CT are worrisome or change in hemodynamics  12/31:Leukocytosis, with collections noted on CT.  However this is being done postoperative day 5, there is some possibility that this could represent postop changes but given the leukocytosis, concur with plans for attempts at percutaneous drainage.  White blood cell count down slightly compared to prior peak, will need to be trended.  Gallbladder is also distended.  Abdominal exam was benign this morning, but a calculus cholecystitis could be the differential diagnosis here as well (though n.p.o. status certainly could explain distended gallbladder, there is also report of gallbladder wall thickening).  Would modify antibiotics based on cultures from drainage, I do not believe that antibiotics and other the cells would be adequate here.  Fortunately she is off pressors, with preserved renal function, and tolerating pressure support.  1/1/22: Postop day 6.  I have some concerns with the appearance of the ostomy, though the abdominal exam overall is otherwise unchanged.  White blood cell count remains elevated, but is lower compared with prior values.  This is despite no change in antibiotic therapy.  Patient also has asymmetric edema of the upper extremities, right greater than left.  Low threshold for duplex ultrasound to exclude DVT.No new surveillance culture data.  1/2: POD7 Ostomy looks better today, UE symmetric. WBC elevated, some slight downward trend overall. Temps <= 100F.   1/3: drainage of fluid collection today, continue zosyn and fluconazole for now, monitor wbc and temps, watch ostomy output, diuresis and address third spacing   1/4: s/p drainage yesterday now growing ecoli and awaiting for sensitivities, wbc is improving, started on tube feeds    1/5: abscess growing ecoli and bacteroides, S to ceftriaxone, wbc 15, weaning trial today, pain management    1/6: awake, lung exercise today, trach planned for tomorrow, ostomy output is good. will stop fluconazole today and change antibiotics to ceftriaxone and flagly. Unclear stop date yet    1/7 trach today, continue antibiotics   1/9: s/p trach, had low grade fever but otherwise doing ok, will continues same antibiotics regimen for now but if fevers continue she will needs to be rescanned as those abscesses can progress.  1/10: low grade fever but doing well on trach-PS, ostomy with stool and gas, shakes head when asked if in pain, discussed with surgery about repeating CT scan given the temp  1/11: Still febrile, favor interval CT as above.  1/12: CT of abdomen showing some signs of improvement and elsewhere  it shows signs of worsening. wbc normal, tube feeds currently via NGT   1/13: no fever, no wbc, Cr and LFTs ok, Ceftriaxone and flagyl continued. No planned IR intervention at this time - fluid collection without access to drain, surgical drain within the collection. Pt possibly will have repeat imaging over the weekend to evaluate progress.   Attending addendum:  agree with above  continue antibiotics   vent weaning  surgical follow up for the abdominal wound  1/14: Low grade temp last night, midline placed, no leukocytosis, Cr and LFTs ok, culture data reviewed. Pt's colostomy with small amount of liquid stool and gas, no stools recorded for two days, no role for po Vancomycin at this time. The pt has been on abx since her admission, will stop all abx and will continue to monitor.    1/18: no fevers since 1/14, WBC better 11.00, cr ok, off abx, now s/p EGD, with PEG replacement.   1/24: spiking low grade fevers, low suspicion for pneumonia given little secretions and doing well on the PS wth low settings, peg site looks clean, trach site OK as well, fevers may be from small fluid collections intra-abdominally vs atelectasis Would suggest aggressive pulmonary toilet including chest vest. follow cultures and trend wbc and fever curve . If persistent fevers would start  Zosyn and PO fluconazole but for now please hold antibiotics   1/25: continues having fevers, low grade temps today, WBC better 18.35, Cr and LFTs ok, + new DVT of right common femoral vein - could be the cause of pt's fevers - on full dose Lovenox. Will continue to monitor off abx, WBC got better without abx.   Attending addendum:  patient with long hospital course and now on the floors with fever and found to have a DVT  wbc came down without antibiotics   fever curve is getting better   continue to monitor off antibiotics   treat DVT with full dose AC   suspect fevers could be from dvt but may also be from fluid collection    1/26: low grade temp today, pt is more awake and alert today, WBC improving 14.2 off abx, Cr and LFTs ok, abd wound with clean base and healing well - vac dressing was changed today during my exam. In favor to continue to monitor off abx for now.   1/27: Pt continues spiking fevers, low grade last night and 101 today, awake and alert during my exam, WBC improving off abx 13.61, recent chest xray with no acute lung disease, COVID 19 is negative. Pt's fevers most likely related to her DVT, will collect two sets of surveillance BC and will continue to monitor off abx for now.   1/28: CTA A/P done last night - There is evidence of active GI bleeding in the gastric antrum, related to peptic ulcer disease or gastritis, possible cystitis. Blood transfusion is in progress during my exam, wound vac is being changed - base of the wound is clean with no evidence of acute infection. Pt had a low grade temp last night, WBC elevated - could be reactive to DVT. Will obtain UA and UC, will hold off on abx for now.   1/29: persistent fevers, received a dose of vanco and a dose of Azactam last evening, WBC high 27.98, BC with no growth to date, UA positive, UC pending. Will start on broad spectrum antibiotic now as unclear it is unclear at this time if fevers caused by UTI only, or in combination with DVT, or other source. Pt's abd was seen with last vac dressing change - incision site wound  is clean and healing well. JPis still draining cloudy drainage.   1/30: lethargic, continues having fevers, WBC better 20.79, all BCs remain with no growth, UC pending, CT head - Acute left MCA infarct is suspected, CT chest and CT a/p performed earlier, pending radiology reading, will continue with Zosyn, Na is slightly elevated, would prefer to have UC available prior switching abx.   1/31: no fevers today, remains lethargic, vented, WBC with improvement 17.82, two sets of BCs were collected yesterday and pending result, Zosyn continued. All BCs with no growth to date, last UC is contaminated.  2/1: afebrile last 24hrs, no IVC filter placement per IR, wbc slightly better, PEG tube loosened and EGD by EGD didnt find bleed, pseudomonas in respiratory culture and CT did show pneumonia but she is doing well on , nonetheless hopefully the zosyn is sensitive,    2/2: low grade temp this morning, WBC improving, Zosyn continued, BCs remain with no growth, sputum culture with pseudomonas, sensitive to Zosyn.   Attending addendum:  agree with above   continue zosyn   neuro consulted for MCA infarct   wound seen today after vac was taken off->it is healing well with pink granulation tissue and no discharge    2/3: low grade temperature 100.3, WBC elevated, COVID 19 is negative, BC with no growth to date, Zosyn day #6 continued.   2/4: no fever today, WBC better 13.92, Cr ok, LFTs better, the pt seems to be more awake today, Zosyn continued - today is day #7 of abx.     #RLL Pneumonia   - stop Zosyn IV after today's doses   - trend pt's temperatures  - trend WBC  - MRSA PCR not detected  - CT chest/a/p - + pneumonia, no abdominal pathology     #Perforated diverticulum of large intestine.   monitor sodium levels   continue wound vac   Drain and colostomy care as per surgery      #Fever.   limit Zosyn to 7 days   trend pt's temperatures   UC - contaminated   +DVT of right common femoral vein   New left MCA infarct on CT head  CT c/a/p - new pneumonia RLL, no acute abd pathology   BC NGTD  repeat BC NGTD  MRSA PCR not detected     #Acute Respiratory Failure  wean off ventilator as tolerated   chest PT  frequent suctioning   chest vest   HOB >30 degrees    #DVT  AC if able and no contraindication

## 2022-02-04 NOTE — CHART NOTE - NSCHARTNOTEFT_GEN_A_CORE
Pt seen for nutrition follow-up. Per chart pt with PMH HTN, depression, seizures, presents with abdominal pain, diarrhea, found with sepsis due to perforated diverticulitis. s/p ex-lap with colon resection  and creation of colostomy ; was unable to be extubated postsurgery; s/p trach - remains trach to vent. s/p EGD with PEG placement . Course further complicated by DVT in right femoral vein, Acute CVA - L MCA infarct was noted on CT , PNA, and acute GIB. s/p EGD with biopsy  with findings of gastritis.     Factors impacting intake: [ ] none [ ] nausea  [ ] vomiting [ ] diarrhea [ ] constipation  [ ]chewing problems [ ] swallowing issues  [x] other: tube feed held for ultrasound today () due to elevated LFTs    Diet Prescription: Diet, NPO with Tube Feed:   Tube Feeding Modality: Gastrostomy  Jevity 1.2 Santos  Total Volume for 24 Hours (mL): 1440  Continuous  Starting Tube Feed Rate {mL per Hour}: 25  Increase Tube Feed Rate by (mL): 5     Every 4 hours  Until Goal Tube Feed Rate (mL per Hour): 60  Tube Feed Duration (in Hours): 24  Tube Feed Start Time: 14:40 (22 @ 14:39)    Intake: tube feed held for ultrasound today (). Was previously receiving Jevity 1.2 @ 50 mL/hr x 24 hrs which provides 1200ml total volume, 1440 kcal, 67 g pro, 972 mL free water.     Current Weight: no recent weights to trend  % Weight Change: n/a    Edema: 1+ left arm; 2+ right arm and left and right feet as per flow sheets    Physical Appearance: debilitated; unable to conduct nutrition focused physical exam as pt lethargic with trach to vent    Pertinent Medications: MEDICATIONS  (STANDING):  amLODIPine   Tablet 10 milliGRAM(s) Oral daily  aspirin  chewable 81 milliGRAM(s) Oral daily  carvedilol 3.125 milliGRAM(s) Oral every 12 hours  chlorhexidine 0.12% Liquid 15 milliLiter(s) Oral Mucosa every 12 hours  chlorhexidine 2% Cloths 1 Application(s) Topical <User Schedule>  dextrose 40% Gel 15 Gram(s) Oral once  dextrose 5% + sodium chloride 0.45% with potassium chloride 20 mEq/L 1000 milliLiter(s) (42 mL/Hr) IV Continuous <Continuous>  dextrose 5%. 1000 milliLiter(s) (50 mL/Hr) IV Continuous <Continuous>  dextrose 5%. 1000 milliLiter(s) (100 mL/Hr) IV Continuous <Continuous>  dextrose 50% Injectable 25 Gram(s) IV Push once  dextrose 50% Injectable 12.5 Gram(s) IV Push once  dextrose 50% Injectable 25 Gram(s) IV Push once  dorzolamide 2%/timolol 0.5% Ophthalmic Solution 1 Drop(s) Both EYES two times a day  escitalopram 20 milliGRAM(s) Oral daily  glucagon  Injectable 1 milliGRAM(s) IntraMuscular once  levETIRAcetam  IVPB 500 milliGRAM(s) IV Intermittent every 12 hours  levothyroxine Injectable 37.5 MICROGram(s) IV Push at bedtime  pantoprazole  Injectable 40 milliGRAM(s) IV Push every 12 hours  piperacillin/tazobactam IVPB.. 3.375 Gram(s) IV Intermittent every 8 hours  simvastatin 40 milliGRAM(s) Oral at bedtime    MEDICATIONS  (PRN):  acetaminophen    Suspension .. 650 milliGRAM(s) Oral every 6 hours PRN Temp greater or equal to 38C (100.4F)  sodium chloride 0.9% lock flush 10 milliLiter(s) IV Push every 1 hour PRN Pre/post blood products, medications, blood draw, and to maintain line patency    Pertinent Labs: - Na147 mmol/L<H> Glu 104 mg/dL<H> K+ 3.8 mmol/L Cr  0.46 mg/dL<L> BUN 13 mg/dL - Phos 2.4 mg/dL<L> 02-04 Alb 1.4 g/dL<L> 02-03 Chol 158 mg/dL LDL --    HDL 25 mg/dL<L> Trig 151 mg/dL<H>      Skin: no pressure injuries per flow sheets    Estimated Needs:   [x] no change since previous assessment on 21 for estimated energy & protein needs (25-30 kcal/kg; 6680-9280 kcal daily & 1.2-1.4 g/kg; 70.86-82.46 gm protein daily); Estimated fluid needs 22: 25-30 ml/k5584-6635 ml fluid daily; high end, as Na remains elevated  [ ] recalculated:     Nutrition Diagnosis:     [x] Inadequate enteral nutrition infusion    Related to: Feedings currently being held    As evidenced by: Feeding not infusing, pt not meeting estimated energy/protein needs    Goal: Pt to meet > 75% energy/protein needs via PEG feedings once medically feasible (not consistently met)      Nutrition Diagnosis is [x] ongoing  [ ] resolved [ ] not applicable     New Nutrition Diagnosis: [x] not applicable      Interventions:   Recommend  [x] When/if medically feasible restart EN: Jevity 1.2 to goal rate 60 ml/hr which provides 1440 ml total volume, 1728 kcal, 80 g protein, 1166 ml water; 150ml water flushes q 6 hours  [ ] Change Diet To:  [ ] Nutrition Supplement  [ ] Nutrition Support  [ ] Other:     Monitoring and Evaluation:   [ ] PO intake [ x ] Tolerance to diet prescription [ x ] weights [ x ] labs[ x ] follow up per protocol  [ ] other: Pt seen for nutrition follow-up. Per chart pt with PMH HTN, depression, seizures, presents with abdominal pain, diarrhea, found with sepsis due to perforated diverticulitis. s/p ex-lap with colon resection  and creation of colostomy ; was unable to be extubated postsurgery; s/p trach - remains trach to vent. s/p EGD with PEG placement . Course further complicated by DVT in right femoral vein, Acute CVA - L MCA infarct was noted on CT , PNA, and acute GIB. s/p EGD with biopsy  with findings of gastritis.     Factors impacting intake: [ ] none [ ] nausea  [ ] vomiting [ ] diarrhea [ ] constipation  [ ]chewing problems [ ] swallowing issues  [x] other: tube feed held for ultrasound today () due to elevated LFTs    Diet Prescription: Diet, NPO with Tube Feed:   Tube Feeding Modality: Gastrostomy  Jevity 1.2 Santos  Total Volume for 24 Hours (mL): 1440  Continuous  Starting Tube Feed Rate {mL per Hour}: 25  Increase Tube Feed Rate by (mL): 5     Every 4 hours  Until Goal Tube Feed Rate (mL per Hour): 60  Tube Feed Duration (in Hours): 24  Tube Feed Start Time: 14:40 (22 @ 14:39)    Intake: tube feed held for ultrasound today (). Was previously receiving Jevity 1.2 @ 50 mL/hr x 24 hrs which provides 1200ml total volume, 1440 kcal, 67 g pro, 972 mL free water.     Current Weight: no recent weights to trend  % Weight Change: n/a    Edema: 1+ left arm; 2+ right arm and left and right feet as per flow sheets    Physical Appearance: debilitated; unable to conduct nutrition focused physical exam as pt lethargic with trach to vent    Pertinent Medications: MEDICATIONS  (STANDING):  amLODIPine   Tablet 10 milliGRAM(s) Oral daily  aspirin  chewable 81 milliGRAM(s) Oral daily  carvedilol 3.125 milliGRAM(s) Oral every 12 hours  chlorhexidine 0.12% Liquid 15 milliLiter(s) Oral Mucosa every 12 hours  chlorhexidine 2% Cloths 1 Application(s) Topical <User Schedule>  dextrose 40% Gel 15 Gram(s) Oral once  dextrose 5% + sodium chloride 0.45% with potassium chloride 20 mEq/L 1000 milliLiter(s) (42 mL/Hr) IV Continuous <Continuous>  dextrose 5%. 1000 milliLiter(s) (50 mL/Hr) IV Continuous <Continuous>  dextrose 5%. 1000 milliLiter(s) (100 mL/Hr) IV Continuous <Continuous>  dextrose 50% Injectable 25 Gram(s) IV Push once  dextrose 50% Injectable 12.5 Gram(s) IV Push once  dextrose 50% Injectable 25 Gram(s) IV Push once  dorzolamide 2%/timolol 0.5% Ophthalmic Solution 1 Drop(s) Both EYES two times a day  escitalopram 20 milliGRAM(s) Oral daily  glucagon  Injectable 1 milliGRAM(s) IntraMuscular once  levETIRAcetam  IVPB 500 milliGRAM(s) IV Intermittent every 12 hours  levothyroxine Injectable 37.5 MICROGram(s) IV Push at bedtime  pantoprazole  Injectable 40 milliGRAM(s) IV Push every 12 hours  piperacillin/tazobactam IVPB.. 3.375 Gram(s) IV Intermittent every 8 hours  simvastatin 40 milliGRAM(s) Oral at bedtime    MEDICATIONS  (PRN):  acetaminophen    Suspension .. 650 milliGRAM(s) Oral every 6 hours PRN Temp greater or equal to 38C (100.4F)  sodium chloride 0.9% lock flush 10 milliLiter(s) IV Push every 1 hour PRN Pre/post blood products, medications, blood draw, and to maintain line patency    Pertinent Labs: - Na147 mmol/L<H> Glu 104 mg/dL<H> K+ 3.8 mmol/L Cr  0.46 mg/dL<L> BUN 13 mg/dL - Phos 2.4 mg/dL<L> 02-04 Alb 1.4 g/dL<L> 02-03 Chol 158 mg/dL LDL --    HDL 25 mg/dL<L> Trig 151 mg/dL<H>      Skin: no pressure injuries per flow sheets    Estimated Needs:   [x] no change since previous assessment on 21 for estimated energy & protein needs (25-30 kcal/kg; 3678-2686 kcal daily & 1.2-1.4 g/kg; 70.86-82.46 gm protein daily); Estimated fluid needs 22: 25-30 ml/k8174-8649 ml fluid daily; high end, as Na remains elevated  [ ] recalculated:     Nutrition Diagnosis:     [x] Inadequate enteral nutrition infusion    Related to: Feedings currently being held    As evidenced by: Feeding not infusing, pt not meeting estimated energy/protein needs    Goal: Pt to meet > 75% energy/protein needs via PEG feedings once medically feasible (not consistently met)      Nutrition Diagnosis is [x] ongoing  [ ] resolved [ ] not applicable     New Nutrition Diagnosis: [x] not applicable      Interventions:   Recommend  [x] When/if medically feasible restart EN: Jevity 1.2 to goal rate 60 ml/hr which provides 1440 ml total volume, 1728 kcal, 80 g protein, 1166 ml water; 150ml water flushes q 6 hours  [ ] Change Diet To:  [ ] Nutrition Supplement  [ ] Nutrition Support  [x] Other: Monitor and replete electrolytes as needed     Monitoring and Evaluation:   [ ] PO intake [ x ] Tolerance to diet prescription [ x ] weights [ x ] labs[ x ] follow up per protocol  [ ] other:

## 2022-02-04 NOTE — PROGRESS NOTE ADULT - ASSESSMENT
81F with a PMH of HTN, HLD, hypothyroidism, depression, seizures presented with abdominal pain and admitted 12/22 for acute perforated diverticulitis, s/p ex lap 12/24 with sigmoid colectomy and abdominal washout.  She was then transferred to MICU for postop care on mechanical ventilation.  Hospital course complicated by Afib with RVR.  She underwent wound closure and creation of colostomy 12/26, developed abdominal abscess s/p IR drainage 1/3, cultures grew Ecoli and Bacteriodes.  S/p tracheostomy and PEG placement.  1/28 developed bleeding through ostomy, AC stopped and pt transfused.  EGD on 2/1 showed gastritis, PEG tube embedded in mucosa.  Due to fevers, CT Head, C/A/P performed on 1/30, showed Acute left MCA infarct, new RLL pneumonia.  Seen by ID, started on Zosyn.      # Acute Blood Loss Anemia - due to suspected gastric bleeding.  s/p transfusion.  EGD 2/1 showed gastritis.  Monitor H/H, has been stable.    # Acute CVA - L MCA infarct was noted on CT 1/30.  Neuro input appreciated.  AC held due to extensive CVA, concern for hemorrhagic conversion.  Continue ASA, Statin.  # Hypernatremia - Would avoid hypotonic fluids in setting of acute CVA.  Increase free water via PEG.   # Perforated Acute Diverticulitis / Intraabdominal Abscesses - continue BABAK drain care, wound vac.  Most recent CT without significant new collections.  # S/p Septic Shock - resolved  # RLL pneumonia, Suspected Aspiration Pneumonia - seen on CT chest 1/30.  ID following, continue Zosyn x 7d.   # Afib with RVR - rate control.  Holding AC due to above.   # Chronic Respiratory Failure with Hypoxemia - CPAP settings, pulmonary following.  # RLE DVT - per IR, felt to be more subacute to chronic.  Recommended no IVC filter placement.  Followup LE doppler 2/2 shows no acute DVT.   # Functional Quadriplegia - supportive care.    DVT Proph - ICDs.    I contacted pt's son HCP Blake [phone] on 2/3.  Awaiting arrival of additional family as they are contemplating comfort measures.    81F with a PMH of HTN, HLD, hypothyroidism, depression, seizures presented with abdominal pain and admitted 12/22 for acute perforated diverticulitis, s/p ex lap 12/24 with sigmoid colectomy and abdominal washout.  She was then transferred to MICU for postop care on mechanical ventilation.  Hospital course complicated by Afib with RVR.  She underwent wound closure and creation of colostomy 12/26, developed abdominal abscess s/p IR drainage 1/3, cultures grew Ecoli and Bacteriodes.  S/p tracheostomy and PEG placement.  1/28 developed bleeding through ostomy, AC stopped and pt transfused.  EGD on 2/1 showed gastritis, PEG tube embedded in mucosa.  Due to fevers, CT Head, C/A/P performed on 1/30, showed Acute left MCA infarct, new RLL pneumonia.  Seen by ID, started on Zosyn.      # Acute Blood Loss Anemia - due to suspected gastric bleeding.  s/p transfusion.  EGD 2/1 showed gastritis.  Monitor H/H, has been stable.    # Acute CVA - L MCA infarct was noted on CT 1/30.  Neuro input appreciated.  AC held due to extensive CVA, concern for hemorrhagic conversion.  Continue ASA, Statin.  # Elevated LFTs - monitor CMP.  Abdominal Sono showed cholelithiasis, no acute GB disease.  GI following.   # Hypernatremia - Would avoid hypotonic fluids in setting of acute CVA.  Increase free water via PEG.   # Perforated Acute Diverticulitis / Intraabdominal Abscesses - continue BABAK drain care, wound vac.  Most recent CT without significant new collections.  # S/p Septic Shock - resolved  # RLL pneumonia, Suspected Aspiration Pneumonia - seen on CT chest 1/30.  ID following, continue Zosyn x 7d.   # Afib with RVR - rate control.  Holding AC due to above.   # Chronic Respiratory Failure with Hypoxemia - CPAP settings, pulmonary following.  # RLE DVT - per IR, felt to be more subacute to chronic.  Recommended no IVC filter placement.  Followup LE doppler 2/2 shows no acute DVT.   # Functional Quadriplegia - supportive care.    DVT Proph - ICDs.    I contacted pt's son HCP Blake [phone] on 2/3.  Awaiting arrival of additional family as they are contemplating comfort measures.

## 2022-02-04 NOTE — PROGRESS NOTE ADULT - SUBJECTIVE AND OBJECTIVE BOX
JUAREZ ECHOLSNIS    LVS 1B 122 D    Allergies    No Known Allergies    Intolerances        PAST MEDICAL & SURGICAL HISTORY:  Seizure    HTN (hypertension)    Glaucoma    Thyroid disease    Blood clot of neck vein  left side    TIA (transient ischemic attack)  20 years ago    S/P appendectomy        FAMILY HISTORY:  FH: HTN (hypertension)        Home Medications:  amLODIPine 10 mg oral tablet: 1 tab(s) orally once a day (22 Dec 2021 16:46)  aspirin 81 mg oral tablet, chewable: 1 tab(s) orally once a day (22 Dec 2021 16:46)  escitalopram 20 mg oral tablet: 1 tab(s) orally once a day (22 Dec 2021 16:46)  keppera:  (22 Dec 2021 16:46)  levETIRAcetam 500 mg oral tablet: 1 tab(s) orally 2 times a day (22 Dec 2021 16:46)  timolol-dorzolamide 0.5%-2% preservative-free ophthalmic solution: 1 drop(s) to each affected eye 2 times a day (22 Dec 2021 16:46)  Zetia 10 mg oral tablet: 1 tab(s) orally once a day (22 Dec 2021 16:46)      MEDICATIONS  (STANDING):  amLODIPine   Tablet 10 milliGRAM(s) Oral daily  aspirin  chewable 81 milliGRAM(s) Oral daily  carvedilol 3.125 milliGRAM(s) Oral every 12 hours  chlorhexidine 0.12% Liquid 15 milliLiter(s) Oral Mucosa every 12 hours  chlorhexidine 2% Cloths 1 Application(s) Topical <User Schedule>  dextrose 40% Gel 15 Gram(s) Oral once  dextrose 5% + sodium chloride 0.45% with potassium chloride 20 mEq/L 1000 milliLiter(s) (42 mL/Hr) IV Continuous <Continuous>  dextrose 5%. 1000 milliLiter(s) (50 mL/Hr) IV Continuous <Continuous>  dextrose 5%. 1000 milliLiter(s) (100 mL/Hr) IV Continuous <Continuous>  dextrose 50% Injectable 25 Gram(s) IV Push once  dextrose 50% Injectable 12.5 Gram(s) IV Push once  dextrose 50% Injectable 25 Gram(s) IV Push once  dorzolamide 2%/timolol 0.5% Ophthalmic Solution 1 Drop(s) Both EYES two times a day  escitalopram 20 milliGRAM(s) Oral daily  glucagon  Injectable 1 milliGRAM(s) IntraMuscular once  levETIRAcetam  IVPB 500 milliGRAM(s) IV Intermittent every 12 hours  levothyroxine Injectable 37.5 MICROGram(s) IV Push at bedtime  pantoprazole  Injectable 40 milliGRAM(s) IV Push every 12 hours  piperacillin/tazobactam IVPB.. 3.375 Gram(s) IV Intermittent every 8 hours  simvastatin 40 milliGRAM(s) Oral at bedtime    MEDICATIONS  (PRN):  acetaminophen    Suspension .. 650 milliGRAM(s) Oral every 6 hours PRN Temp greater or equal to 38C (100.4F)  sodium chloride 0.9% lock flush 10 milliLiter(s) IV Push every 1 hour PRN Pre/post blood products, medications, blood draw, and to maintain line patency      Diet, NPO with Tube Feed:   Tube Feeding Modality: Gastrostomy  Jevity 1.2 Santos  Total Volume for 24 Hours (mL): 1440  Continuous  Starting Tube Feed Rate mL per Hour: 25  Increase Tube Feed Rate by (mL): 5     Every 4 hours  Until Goal Tube Feed Rate (mL per Hour): 60  Tube Feed Duration (in Hours): 24  Tube Feed Start Time: 14:40 (02-01-22 @ 14:39) [Active]          Vital Signs Last 24 Hrs  T(C): 37.4 (04 Feb 2022 05:00), Max: 38.4 (03 Feb 2022 17:09)  T(F): 99.3 (04 Feb 2022 05:00), Max: 101.1 (03 Feb 2022 17:09)  HR: 74 (04 Feb 2022 09:23) (65 - 88)  BP: 129/74 (04 Feb 2022 05:00) (129/74 - 148/71)  BP(mean): --  RR: 19 (04 Feb 2022 05:00) (18 - 19)  SpO2: 98% (04 Feb 2022 09:23) (94% - 100%)      02-03-22 @ 07:01  -  02-04-22 @ 07:00  --------------------------------------------------------  IN: 1732 mL / OUT: 1 mL / NET: 1731 mL        Mode: CPAP with PS, FiO2: 30, PEEP: 5, PS: 10, ITime: 0.9      LABS:                        8.8    13.92 )-----------( 373      ( 04 Feb 2022 08:24 )             28.7     02-04    147<H>  |  117<H>  |  13  ----------------------------<  104<H>  3.8   |  26  |  0.46<L>    Ca    7.8<L>      04 Feb 2022 08:24  Phos  2.4     02-04  Mg     2.2     02-04    TPro  5.7<L>  /  Alb  1.4<L>  /  TBili  0.2  /  DBili  <0.05  /  AST  49<H>  /  ALT  134<H>  /  AlkPhos  142<H>  02-04              WBC:  WBC Count: 13.92 K/uL (02-04 @ 08:24)  WBC Count: 14.11 K/uL (02-03 @ 11:27)  WBC Count: 12.54 K/uL (02-02 @ 08:07)  WBC Count: 16.76 K/uL (02-01 @ 07:43)      MICROBIOLOGY:  RECENT CULTURES:  02-01 .Blood Blood XXXX XXXX   No growth to date.    01-31 .Sputum Sputum Pseudomonas aeruginosa   Numerous polymorphonuclear leukocytes per low power field  Rare Squamous epithelial cells per low power field  Moderate Gram Negative Rods seen per oil power field   Few Pseudomonas aeruginosa    01-31 .Blood Blood-Peripheral XXXX XXXX   No growth to date.    01-28 Catheterized Catheterized XXXX XXXX   >=3 organisms. Probable collection contamination.                    Sodium:  Sodium, Serum: 147 mmol/L (02-04 @ 08:24)  Sodium, Serum: 144 mmol/L (02-03 @ 11:27)  Sodium, Serum: 144 mmol/L (02-02 @ 08:07)  Sodium, Serum: 145 mmol/L (02-01 @ 07:43)  Sodium, Serum: 143 mmol/L (01-31 @ 17:52)      0.46 mg/dL 02-04 @ 08:24  0.46 mg/dL 02-03 @ 11:27  0.52 mg/dL 02-02 @ 08:07  0.42 mg/dL 02-01 @ 07:43  0.52 mg/dL 01-31 @ 17:52      Hemoglobin:  Hemoglobin: 8.8 g/dL (02-04 @ 08:24)  Hemoglobin: 9.0 g/dL (02-03 @ 11:27)  Hemoglobin: 8.5 g/dL (02-02 @ 08:07)  Hemoglobin: 9.6 g/dL (02-01 @ 07:43)      Platelets: Platelet Count - Automated: 373 K/uL (02-04 @ 08:24)  Platelet Count - Automated: 357 K/uL (02-03 @ 11:27)  Platelet Count - Automated: 315 K/uL (02-02 @ 08:07)  Platelet Count - Automated: 330 K/uL (02-01 @ 07:43)      LIVER FUNCTIONS - ( 04 Feb 2022 08:24 )  Alb: 1.4 g/dL / Pro: 5.7 gm/dL / ALK PHOS: 142 U/L / ALT: 134 U/L / AST: 49 U/L / GGT: x                 RADIOLOGY & ADDITIONAL STUDIES:      MICROBIOLOGY:  RECENT CULTURES:  02-01 .Blood Blood XXXX XXXX   No growth to date.    01-31 .Sputum Sputum Pseudomonas aeruginosa   Numerous polymorphonuclear leukocytes per low power field  Rare Squamous epithelial cells per low power field  Moderate Gram Negative Rods seen per oil power field   Few Pseudomonas aeruginosa    01-31 .Blood Blood-Peripheral XXXX XXXX   No growth to date.    01-28 Catheterized Catheterized XXXX XXXX   >=3 organisms. Probable collection contamination.

## 2022-02-04 NOTE — PROGRESS NOTE ADULT - ATTENDING SUPERVISION STATEMENT
ACP

## 2022-02-04 NOTE — PROGRESS NOTE ADULT - SUBJECTIVE AND OBJECTIVE BOX
Patient: JUAREZ GTZ [id number] 81y Female                            Hospitalist Attending Note    Afebrile.   Unable to offer complaints.     ____________________PHYSICAL EXAM:  GENERAL:  NAD Awake, does not follow commands.   HEENT: NCAT  CARDIOVASCULAR:  S1, S2  LUNGS: b/l rhonchi.   ABDOMEN:  soft, (-) tenderness, (-) distension, (+) bowel sounds, (-) guarding, (-) rebound (-) rigidity.  Colostomy in place with dark stool.  Midline abdominal wound vac in place.    EXTREMITIES:  no cyanosis / clubbing / edema.   ____________________    VITALS:  Vital Signs Last 24 Hrs  T(C): 36.5 (04 Feb 2022 11:16), Max: 38.4 (03 Feb 2022 17:09)  T(F): 97.7 (04 Feb 2022 11:16), Max: 101.1 (03 Feb 2022 17:09)  HR: 69 (04 Feb 2022 13:17) (65 - 79)  BP: 145/64 (04 Feb 2022 11:16) (129/74 - 148/71)  BP(mean): --  RR: 17 (04 Feb 2022 11:16) (17 - 19)  SpO2: 99% (04 Feb 2022 13:17) (98% - 100%) Daily     Daily   CAPILLARY BLOOD GLUCOSE      POCT Blood Glucose.: 109 mg/dL (04 Feb 2022 11:06)  POCT Blood Glucose.: 111 mg/dL (04 Feb 2022 06:12)  POCT Blood Glucose.: 152 mg/dL (04 Feb 2022 00:36)  POCT Blood Glucose.: 155 mg/dL (03 Feb 2022 16:37)    I&O's Summary    03 Feb 2022 07:01  -  04 Feb 2022 07:00  --------------------------------------------------------  IN: 1732 mL / OUT: 1 mL / NET: 1731 mL        LABS:                        8.8    13.92 )-----------( 373      ( 04 Feb 2022 08:24 )             28.7     02-04    147<H>  |  117<H>  |  13  ----------------------------<  104<H>  3.8   |  26  |  0.46<L>    Ca    7.8<L>      04 Feb 2022 08:24  Phos  2.4     02-04  Mg     2.2     02-04    TPro  5.7<L>  /  Alb  1.4<L>  /  TBili  0.2  /  DBili  <0.05  /  AST  49<H>  /  ALT  134<H>  /  AlkPhos  142<H>  02-04      LIVER FUNCTIONS - ( 04 Feb 2022 08:24 )  Alb: 1.4 g/dL / Pro: 5.7 gm/dL / ALK PHOS: 142 U/L / ALT: 134 U/L / AST: 49 U/L / GGT: x                     MEDICATIONS:  acetaminophen    Suspension .. 650 milliGRAM(s) Oral every 6 hours PRN  amLODIPine   Tablet 10 milliGRAM(s) Oral daily  aspirin  chewable 81 milliGRAM(s) Oral daily  carvedilol 3.125 milliGRAM(s) Oral every 12 hours  chlorhexidine 0.12% Liquid 15 milliLiter(s) Oral Mucosa every 12 hours  chlorhexidine 2% Cloths 1 Application(s) Topical <User Schedule>  dextrose 40% Gel 15 Gram(s) Oral once  dextrose 5% + sodium chloride 0.45% with potassium chloride 20 mEq/L 1000 milliLiter(s) IV Continuous <Continuous>  dextrose 5%. 1000 milliLiter(s) IV Continuous <Continuous>  dextrose 5%. 1000 milliLiter(s) IV Continuous <Continuous>  dextrose 50% Injectable 25 Gram(s) IV Push once  dextrose 50% Injectable 12.5 Gram(s) IV Push once  dextrose 50% Injectable 25 Gram(s) IV Push once  dorzolamide 2%/timolol 0.5% Ophthalmic Solution 1 Drop(s) Both EYES two times a day  escitalopram 20 milliGRAM(s) Oral daily  glucagon  Injectable 1 milliGRAM(s) IntraMuscular once  levETIRAcetam  IVPB 500 milliGRAM(s) IV Intermittent every 12 hours  levothyroxine Injectable 37.5 MICROGram(s) IV Push at bedtime  pantoprazole  Injectable 40 milliGRAM(s) IV Push every 12 hours  piperacillin/tazobactam IVPB.. 3.375 Gram(s) IV Intermittent every 8 hours  simvastatin 40 milliGRAM(s) Oral at bedtime  sodium chloride 0.9% lock flush 10 milliLiter(s) IV Push every 1 hour PRN

## 2022-02-04 NOTE — PROGRESS NOTE ADULT - ATTENDING COMMENTS
81F PMH HTN, HLD, hypothyroidism, depression, seizures presents for abdominal pain found to have acute sigmoid diverticulitis complicated by perforated sigmoid diverticulitis/peritonitis with abscess formation s/p ex-lap with large bowel resection / abdominal washout / wound vac placement 12/25. Brought back to OR yesterday 12/26 for abdominal closure and colostomy creation. Sepsis with septic shock requiring levophed. Remains intubated for post procedural acute hypoxic respiratory failure. Post op course with a-fib RVR, volume overload    NEURO  maintain off sedation as tolerated  cont pain control for postop state    CV   has not been on pressors today  maintain map>65  a-fib RVR s/p amio converted to sinus rhythm  cont diurese with lasix again today     PULM  tolerating weaning today, cont daily PS trial       GI  NPO  await bowel function recovery  s/p abdominal closure    monitor I/O from BABAK drain and output from colostomy  minimal drainage so far  surgical follow up    ID     cont with fluconazole and zosyn   abd cx+ candida and bacteroides and e-coli  f/u ID reccs      ENDO  cont synthroid IV for underlying hypothyroidism     GEN  full code  DVT prophylaxis: lovenox
Patient seen and examined  Afebrile overnight  Abd is soft, not distended, does not appear to be tender  G-tube in place, 3.5 at bumper  Left drain purulent, right drain serous  Wound VAC holding suction well    - start tube feeds
Patient seen and examined  Trach in place, on vent  Abd is soft, not tender and not distended  Wound VAC in place, holding suction well  Left BABAK in place, scant purulent output  Gastrostomy tube in place, tolerating tube feeds  Ostomy is pink, viable and functioning     - no surgical intervention required at this time  - continue tube feeds as tolerated  - slight drop in H/H since starting AC yesterday, no overt signs of bleeding, can repeat CBC in afternoon
Patient seen and examined with PA  Intubated    Abd is soft, not distended  Midline packing changed, no necrotic tissue  Drains are serous  Ostomy is pink and viable without function yet    Leukocytosis 28    - continue antibiotics  - IR consult for intra-abd collection  - await ostomy function
Patient seen and examined with PAs  Intubated, sedated, no pressors    Abd is soft, not distended, cannot assess tenderness well  Midline is packed, retention sutures in place  Ostomy is pink and viable, without any output      - await ostomy output  - continue local wound care  - continue antibiotics  - appreciate ICU care
Patient seen and examined with PAs  Unable to wean vent  Abd is soft, not distended  Ostomy is pink and functioning  Midline packed, no tissue that requires debridement     - appreciate continued ICU care  - OR planning for tracheostomy tomorrow
Patient seen and examined.    Clinically stable.  Mental status not improving. History of depression and context of prolonged ICU stay/sedation concern for hypoactive delirium.     Behavioral health consult.  Plan for open gastrostomy tube placement tomorrow.
Patient seen and examined.    Keeping eyes open today, nodding appropriately in response to questions. Tolerating CPAP, ostomy functioning. NGT had been replaced, awaiting imaging confirmation before resuming feeds.
Patient seen and examined.    Tolerated CPAP through trach for many hours yesterday.   Off pressors.   Tolerating NG tube feeds at goal with consistent ostomy function, both stool and gas.   Abdomen is soft, retention sutures in place. RUQ drain with 5-10cc output/24hrs, purulent. Left sided drain serous.  Getting treated for anxiety with escitalopram, alprazolam and dexmedetomidine.  Labs reviewed, no leukocytosis.    Continue current ICU management.
Patient seen and examined.    Tolerating tube feeds, mental status unchanged although able to nod and shake head appropriately to some questions.   Worsening leukocytosis and intermittent fevers; recent CT scan unrevealing of potential source. Left drain with purulent output, but not much, and no undrained collection seen on scan. Abdominal wounds healing well, clean, no erythema. Right midline site without signs of phlebitis. Trach site clean and without erythema. Patient was turned and no wounds seen on back. Calves non erythematous, non tender.    Unclear what the source of Patient's apparent ongoing infection is. Appreciate ID and Pulmonology's guidance in further work up.
Patient seen and examined  Doing well  Tolerating tube feeds  Right drain removed  Left drain purulent  Ostomy is pink, viable and functioning    - continue local wound care with wound VAC  - continue tube feeds as tolerated
Patient seen and examined with PAs  Febrile overnight to 38.1  Abd is soft, not distended, does not appear tender  Gtube in place    - if spikes again, obtain CT abd/pelvis  - hold off on using g-tube today and continue to monitor for fevers
Patient seen and examined with PAs  Ostomy pink and viable, with melanic output  Drain is purulent  Appreciate GI consult, follow up endoscopy
Patient seen and examined with PAs  Remains intubated  Abd is soft, not distended,  Ostomy is functioning with stool    - trickle feeds as tolerated  - appreciate IR consultation  - appreciate continued ICU care
Patient seen and examined with PAs  Responds to her name, but not awake/alert  Trach in place, on vent  Abd is soft, not distended  OStomy is pink, viable and functioning  WOund is clean, without any need for debridement at this time    - continue tube feeds  - appreciate ID recommendations regarding abx   - wound vac
Patient seen and examined with PAs  Undergoing spontaneous breathing trials   Off pressors    Abd is soft, not distended  Wound is repacked, no surround erythema  Ostomy is pink, viable, no function yet  Drains are serous    Increasing leukocytosis    - CT abd/pelvis to evaluate for intra-abdominal collections  - continue antibiotics  - await ostomy function  - appreciate ICU care
Patient seen and examined.    Patient still intubated, off pressors, ostomy functioning  C/w enteral nutritional support and with SBT's  Appreciate ICU care
Patient seen and examined. For endoscopically assisted g tube placement today.
81F w/ HTN, HLD, hypothyroidism, depression, seizures. Presented w/ abdominal pain found to have acute sigmoid diverticulitis. PT had worsening overall status with SOB and fever. Repeat imaging showed interval perforated sigmoid diverticulitis w/ abscess s/p ex-lap w/ large bowel resection, abdominal washout and wound vac placement. Pt admitted to ICU post-op for vent and overall mgmt.     #Neuro - sedated w/ propofol and fentanyl; can lighten sedation for SAT; continue w/ keppra IV; anti-depressants on hold as pt is NPO  #CV - on very lose dose phenylephrine, will change to norepi and titrate to MAP>/65; pt had episode of afib w/ RVR prior to OR but no recurrence; f/u TTE  #Pulm - remains intubated post-op, titrate down FiO2 as tolerated; SBT but no plan to extubate since pt going back to OR tomorrow  #ID- on zosyn and vancomcyin s/p diflucan x1; will d/c vancomcyin; blood cx, abdominal cx all pending; ID following  #Renal/metabolic - normal renal function, acceptable electrolytes and UOP, lactate cleared; check urine lytes for mild hypoNa; LR @ 100 cc/hr today, will give albumin x1 today as well  #GI- POD0 for perforated sigmoid diverticulitis s/p ex-lap w/ large bowel resection, abdominal washout and wound vac placement; NGT to ILWS; NPO; protonix; plan for RTOR tomorrow to hopefully close abdomen  #Heme - no active issues  #Endo - continue w/ levothyroxine, FS q6  #Skin - wound vac in place  #PPx - lovenox for ppx  #Dispo- transfer to ICU; prognosis guarded; full code
81F with a PMH of HTN, HLD, hypothyroidism, depression, seizures who initially p/w abd pain, acute diverticulitis. now s/p ex lap, sigmoid resection, peritoneal lavage 12/25 for interval perforated sigmoid diverticulitis and RTOR 12/26 for Irrigation of abdominal cavity with closure and creation of colostomy, s/p IR drainage of abdominal abscesses 1/3/22. post op course c/b shock requiring pressor support and prolonged intubation now s/p trach 1/7/22.   Followed by cardiology for episode of AF with RVR, s/p amio load converted to sinus rhythm while in ICU.     Holding off on full AC now given post op and anemia. Scheduled for PEG.  Continued vent support with weaning protocol per pulm  CV stable otherwise.
Patient seen and examined.    Ongoing intermittent fevers with plateaued WBC. Abdomen benign, ostomy funcitoning, CT scan reviewed and no new findings with G tube seen in stomach, pending final read.   Resume tube feeds. Patient continues to have altered mental status despite cessation of all psychoactive medications save her Lexapro. Consider Neuro consult and pharmacologic treatment of hypoactive delirium.
agree with above   continue zosyn   neuro consulted for MCA infarct   wound seen today after vac was taken off->it is healing well with pink granulation tissue and no discharge    Manolo Dunn, DO  Infectious Disease Attending  Reachable via [employer] Teams or ID office: [phone]  After 5pm/weekends please call [phone] for all inquiries and new consults
agree with above   stop antibiotics   fevers could be from DVTs   latest cultures negative   doing well on vent and does not appear to be symptomatic from pneumonia   if continues to spike fevers can repeat blood cultures, UA and respiratory cultures     Dr. Hector will be covering this weekend. To reach him, please call [phone]     Manolo Dunn, DO  Infectious Disease Attending  Reachable via [employer] Teams or ID office: [phone]  After 5pm/weekends please call [phone] for all inquiries and new consults
patient with long hospital course and now on the floors with fever and found to have a DVT  wbc came down without antibiotics   fever curve is getting better   continue to monitor off antibiotics   treat DVT with full dose AC   suspect fevers could be from dvt but may also be from fluid collections     D/W Dr. Lalo Dunn DO  Infectious Disease Attending  Pager [phone]  After 5pm/weekends please call [phone] for all inquiries and new consults
Agree with above note and plan. Post op care. Tele, labs. BP management.
agree with above  continue antibiotics   vent weaning  surgical follow up for the abdominal wound    Manolo Dunn DO  Infectious Disease Attending  Pager [phone]  After 5pm/weekends please call [phone] for all inquiries and new consults

## 2022-02-04 NOTE — PROGRESS NOTE ADULT - SUBJECTIVE AND OBJECTIVE BOX
JUAREZ GTZ  MRN-[id number]    Follow Up:  PNA, s/p Rosales's procedure, DVT, fevers      Interval History: The pt was seen and examined earlier, no distress, more awake today, does not follow commands, consciously moves left arm out the way during my exam. Temp 101.1 yesterday evening, no fevers today so far, WBC better 13.92.     PAST MEDICAL & SURGICAL HISTORY:  Seizure    HTN (hypertension)    Glaucoma    Thyroid disease    Blood clot of neck vein  left side    TIA (transient ischemic attack)  20 years ago    S/P appendectomy        ROS:    [x ] Unobtainable because: pt is vented via tracheostomy, poor mental status   [ ] All other systems negative    Constitutional: no fever, no chills  Head: no trauma  Eyes: no vision changes, no eye pain  ENT:  no sore throat, no rhinorrhea  Cardiovascular:  no chest pain, no palpitation  Respiratory:  no SOB, no cough  GI:  no abd pain, no vomiting, no diarrhea  urinary: no dysuria, no hematuria, no flank pain  musculoskeletal:  no joint pain, no joint swelling  skin:  no rash  neurology:  no headache, no seizure, no change in mental status  psych: no anxiety, no depression         Allergies  No Known Allergies        ANTIMICROBIALS:  piperacillin/tazobactam IVPB.. 3.375 every 8 hours      OTHER MEDS:  acetaminophen    Suspension .. 650 milliGRAM(s) Oral every 6 hours PRN  amLODIPine   Tablet 10 milliGRAM(s) Oral daily  aspirin  chewable 81 milliGRAM(s) Oral daily  carvedilol 3.125 milliGRAM(s) Oral every 12 hours  chlorhexidine 0.12% Liquid 15 milliLiter(s) Oral Mucosa every 12 hours  chlorhexidine 2% Cloths 1 Application(s) Topical <User Schedule>  dextrose 40% Gel 15 Gram(s) Oral once  dextrose 5% + sodium chloride 0.45% with potassium chloride 20 mEq/L 1000 milliLiter(s) IV Continuous <Continuous>  dextrose 5%. 1000 milliLiter(s) IV Continuous <Continuous>  dextrose 5%. 1000 milliLiter(s) IV Continuous <Continuous>  dextrose 50% Injectable 25 Gram(s) IV Push once  dextrose 50% Injectable 12.5 Gram(s) IV Push once  dextrose 50% Injectable 25 Gram(s) IV Push once  dorzolamide 2%/timolol 0.5% Ophthalmic Solution 1 Drop(s) Both EYES two times a day  escitalopram 20 milliGRAM(s) Oral daily  glucagon  Injectable 1 milliGRAM(s) IntraMuscular once  levETIRAcetam  IVPB 500 milliGRAM(s) IV Intermittent every 12 hours  levothyroxine Injectable 37.5 MICROGram(s) IV Push at bedtime  pantoprazole  Injectable 40 milliGRAM(s) IV Push every 12 hours  simvastatin 40 milliGRAM(s) Oral at bedtime  sodium chloride 0.9% lock flush 10 milliLiter(s) IV Push every 1 hour PRN      Vital Signs Last 24 Hrs  T(C): 36.5 (04 Feb 2022 11:16), Max: 38.4 (03 Feb 2022 17:09)  T(F): 97.7 (04 Feb 2022 11:16), Max: 101.1 (03 Feb 2022 17:09)  HR: 69 (04 Feb 2022 13:17) (65 - 79)  BP: 145/64 (04 Feb 2022 11:16) (129/74 - 148/71)  BP(mean): --  RR: 17 (04 Feb 2022 11:16) (17 - 19)  SpO2: 99% (04 Feb 2022 13:17) (98% - 100%)    Physical Exam:  General: Nontoxic-appearing Female in no acute distress. Trached on vent, more awake today  HEENT: AT/NC. Unable to assess pt's mouth or eyes due to non-compliance   Neck: Not rigid. No sense of mass. Trach C/D/I  Nodes: None palpable.  Lungs: coarse vented breath sounds bilaterally, no wheezes  Heart: Regular rate and rhythm. +Murmur. No rub. No gallop. No palpable thrill. Right arm midline removed   Abdomen: Bowel sounds now present.  Soft. not distended.  Abd incision with vac dressing.  Ostomy with stool and gas. Drains x1, +PEG tube   Extremities: No cyanosis or clubbing. 1+ edema of bilateral lower extremities   Skin: Warm. Dry No rash. No vasculitic stigmata.  Neuro: opens her eyes   Psychiatric: unable to assess    WBC Count: 13.92 K/uL (02-04 @ 08:24)  WBC Count: 14.11 K/uL (02-03 @ 11:27)  WBC Count: 12.54 K/uL (02-02 @ 08:07)  WBC Count: 16.76 K/uL (02-01 @ 07:43)  WBC Count: 17.82 K/uL (01-31 @ 07:11)  WBC Count: 20.79 K/uL (01-30 @ 07:34)  WBC Count: 23.36 K/uL (01-29 @ 18:09)                            8.8    13.92 )-----------( 373      ( 04 Feb 2022 08:24 )             28.7       02-04    147<H>  |  117<H>  |  13  ----------------------------<  104<H>  3.8   |  26  |  0.46<L>    Ca    7.8<L>      04 Feb 2022 08:24  Phos  2.4     02-04  Mg     2.2     02-04    TPro  5.7<L>  /  Alb  1.4<L>  /  TBili  0.2  /  DBili  <0.05  /  AST  49<H>  /  ALT  134<H>  /  AlkPhos  142<H>  02-04          Creatinine Trend: 0.46<--, 0.46<--, 0.52<--, 0.42<--, 0.52<--, 0.56<--      MICROBIOLOGY:  v  .Blood Blood  02-01-22   No growth to date.  --  --      .Sputum Sputum  01-31-22   Few Pseudomonas aeruginosa  --  Pseudomonas aeruginosa      .Blood Blood-Peripheral  01-31-22   No growth to date.  --  --      Catheterized Catheterized  01-28-22   >=3 organisms. Probable collection contamination.  --  --      .Blood Blood-Peripheral  01-27-22   No Growth Final  --  --      .Blood Blood-Peripheral  01-21-22   No Growth Final  --  --      Clean Catch Clean Catch (Midstream)  01-21-22   <10,000 CFU/mL Normal Urogenital Sendy  --  --    SARS-CoV-2 Result: NotDetec (02-03-22 @ 08:16)    SARS-CoV-2: NotDetec (20 Jan 2022 18:59)  SARS-CoV-2: NotDetec (24 Dec 2021 14:38)  SARS-CoV-2: NotDetec (24 Dec 2021 00:03)    RADIOLOGY:

## 2022-02-04 NOTE — PROGRESS NOTE ADULT - SUBJECTIVE AND OBJECTIVE BOX
Patient is a 81y old  Female who presents with a chief complaint of diverticulitis (04 Feb 2022 10:30)      HPI:  Patient is a 81F with a PMH of HTN, HLD, hypothyroidism, depression, seizures who presents to the ED for abd pain.  Patient states that over the last two days she had developed significant abdominal pain and multiple episodes of watery diarrhea.  Reports one episode of nausea and vomiting earlier today.  Patient has no other complaints.  Vitals stable,  labs show leukocytosis and hypokalemia.  CT abdomen significant for diverticulitis.  Will admit to med surg.     (23 Dec 2021 00:17)      INTERVAL HPI/OVERNIGHT EVENTS:  Tolerating feeds at 60 ml / hr. The nurse denies melena, hematochezia, hematemesis, nausea, vomiting, abdominal pain, constipation, diarrhea, or change in bowel movements     MEDICATIONS  (STANDING):  amLODIPine   Tablet 10 milliGRAM(s) Oral daily  aspirin  chewable 81 milliGRAM(s) Oral daily  carvedilol 3.125 milliGRAM(s) Oral every 12 hours  chlorhexidine 0.12% Liquid 15 milliLiter(s) Oral Mucosa every 12 hours  chlorhexidine 2% Cloths 1 Application(s) Topical <User Schedule>  dextrose 40% Gel 15 Gram(s) Oral once  dextrose 5% + sodium chloride 0.45% with potassium chloride 20 mEq/L 1000 milliLiter(s) (42 mL/Hr) IV Continuous <Continuous>  dextrose 5%. 1000 milliLiter(s) (50 mL/Hr) IV Continuous <Continuous>  dextrose 5%. 1000 milliLiter(s) (100 mL/Hr) IV Continuous <Continuous>  dextrose 50% Injectable 25 Gram(s) IV Push once  dextrose 50% Injectable 12.5 Gram(s) IV Push once  dextrose 50% Injectable 25 Gram(s) IV Push once  dorzolamide 2%/timolol 0.5% Ophthalmic Solution 1 Drop(s) Both EYES two times a day  escitalopram 20 milliGRAM(s) Oral daily  glucagon  Injectable 1 milliGRAM(s) IntraMuscular once  levETIRAcetam  IVPB 500 milliGRAM(s) IV Intermittent every 12 hours  levothyroxine Injectable 37.5 MICROGram(s) IV Push at bedtime  pantoprazole  Injectable 40 milliGRAM(s) IV Push every 12 hours  piperacillin/tazobactam IVPB.. 3.375 Gram(s) IV Intermittent every 8 hours  simvastatin 40 milliGRAM(s) Oral at bedtime    MEDICATIONS  (PRN):  acetaminophen    Suspension .. 650 milliGRAM(s) Oral every 6 hours PRN Temp greater or equal to 38C (100.4F)  sodium chloride 0.9% lock flush 10 milliLiter(s) IV Push every 1 hour PRN Pre/post blood products, medications, blood draw, and to maintain line patency      FAMILY HISTORY:  FH: HTN (hypertension)        Allergies    No Known Allergies    Intolerances        PMH/PSH:  Seizure    HTN (hypertension)    Glaucoma    Thyroid disease    Blood clot of neck vein    TIA (transient ischemic attack)    S/P appendectomy          REVIEW OF SYSTEMS:  CONSTITUTIONAL: No weight loss. Febrile (+)  EYES: No eye pain, visual disturbances, or discharge  ENMT:  No difficulty hearing, tinnitus, vertigo; No sinus or throat pain  NECK: No pain or stiffness  BREASTS: No pain, masses, or nipple discharge  RESPIRATORY: No cough, wheezing, chills or hemoptysis; No shortness of breath  CARDIOVASCULAR: No chest pain, palpitations, dizziness, or leg swelling  GASTROINTESTINAL: See above   GENITOURINARY: No dysuria, frequency, hematuria, or incontinence  NEUROLOGICAL: No headaches, memory loss, loss of strength, numbness, or tremors  SKIN: No itching, burning, rashes, or lesions   LYMPH NODES: No enlarged glands  ENDOCRINE: No heat or cold intolerance; No hair loss  MUSCULOSKELETAL: No joint pain or swelling; No muscle, back, or extremity pain  PSYCHIATRIC: No depression, anxiety, mood swings, or difficulty sleeping  HEME/LYMPH: No easy bruising, or bleeding gums  ALLERGY AND IMMUNOLOGIC: No hives or eczema    Vital Signs Last 24 Hrs  T(C): 36.5 (04 Feb 2022 11:16), Max: 38.4 (03 Feb 2022 17:09)  T(F): 97.7 (04 Feb 2022 11:16), Max: 101.1 (03 Feb 2022 17:09)  HR: 69 (04 Feb 2022 13:17) (65 - 79)  BP: 145/64 (04 Feb 2022 11:16) (129/74 - 148/71)  BP(mean): --  RR: 17 (04 Feb 2022 11:16) (17 - 19)  SpO2: 99% (04 Feb 2022 13:17) (98% - 100%)    PHYSICAL EXAM:  GENERAL: NAD, well-groomed, well-developed  HEAD:  Atraumatic, Normocephalic  EYES: EOMI, PERRLA, conjunctiva and sclera clear  NECK: Supple, No JVD, Normal thyroid  NERVOUS SYSTEM:  MS unchanged  CHEST/LUNG: Clear to percussion bilaterally; No rales, rhonchi, wheezing, or rubs  HEART: Regular rate and rhythm; No murmurs, rubs, or gallops  ABDOMEN: Soft, Nontender, Nondistended; Bowel sounds present. PEG site clean  EXTREMITIES:  2+ Peripheral Pulses, No clubbing, cyanosis, or edema  LYMPH: No lymphadenopathy noted  SKIN: No rashes or lesions    LAB                          8.8    13.92 )-----------( 373      ( 04 Feb 2022 08:24 )             28.7       CBC:  02-04 @ 08:24  WBC 13.92   Hgb 8.8   Hct 28.7   Plts 373  MCV 91.4  02-03 @ 11:27  WBC 14.11   Hgb 9.0   Hct 29.2   Plts 357  MCV 91.3  02-02 @ 08:07  WBC 12.54   Hgb 8.5   Hct 27.8   Plts 315  MCV 90.3  02-01 @ 07:43  WBC 16.76   Hgb 9.6   Hct 30.5   Plts 330  MCV 88.7  01-31 @ 07:11  WBC 17.82   Hgb 7.3   Hct 23.8   Plts 282  MCV 89.8  01-30 @ 07:34  WBC 20.79   Hgb 8.2   Hct 26.5   Plts 288  MCV 89.5  01-29 @ 18:09  WBC 23.36   Hgb 8.9   Hct 28.6   Plts 277  MCV 89.1  01-29 @ 06:33  WBC 27.98   Hgb 8.6   Hct 27.4   Plts 294  MCV 88.4  01-28 @ 17:18  WBC 24.37   Hgb 9.6   Hct 30.2   Plts 325  MCV 87.0      Chemistry:  02-04 @ 08:24  Na+ 147  K+ 3.8  Cl- 117  CO2 26  BUN 13  Cr 0.46     02-03 @ 11:27  Na+ 144  K+ 3.5  Cl- 115  CO2 25  BUN 13  Cr 0.46     02-02 @ 08:07  Na+ 144  K+ 3.3  Cl- 114  CO2 23  BUN 15  Cr 0.52     02-01 @ 07:43  Na+ 145  K+ 3.7  Cl- 115  CO2 24  BUN 16  Cr 0.42     01-31 @ 17:52  Na+ 143  K+ 3.3  Cl- 113  CO2 25  BUN 18  Cr 0.52     01-31 @ 07:11  Na+ 145  K+ 2.6  Cl- 114  CO2 24  BUN 21  Cr 0.56     01-30 @ 07:34  Na+ 148  K+ 2.9  Cl- 115  CO2 24  BUN 30  Cr 0.81     01-29 @ 06:33  Na+ 146  K+ 4.0  Cl- 115  CO2 24  BUN 42  Cr 0.76         Glucose, Serum: 104 mg/dL (02-04 @ 08:24)  Glucose, Serum: 139 mg/dL (02-03 @ 11:27)  Glucose, Serum: 187 mg/dL (02-02 @ 08:07)  Glucose, Serum: 122 mg/dL (02-01 @ 07:43)  Glucose, Serum: 122 mg/dL (01-31 @ 17:52)  Glucose, Serum: 134 mg/dL (01-31 @ 07:11)  Glucose, Serum: 149 mg/dL (01-30 @ 07:34)  Glucose, Serum: 126 mg/dL (01-29 @ 06:33)      04 Feb 2022 08:24    147    |  117    |  13     ----------------------------<  104    3.8     |  26     |  0.46   03 Feb 2022 11:27    144    |  115    |  13     ----------------------------<  139    3.5     |  25     |  0.46   02 Feb 2022 08:07    144    |  114    |  15     ----------------------------<  187    3.3     |  23     |  0.52   01 Feb 2022 07:43    145    |  115    |  16     ----------------------------<  122    3.7     |  24     |  0.42   31 Jan 2022 17:52    143    |  113    |  18     ----------------------------<  122    3.3     |  25     |  0.52   31 Jan 2022 07:11    145    |  114    |  21     ----------------------------<  134    2.6     |  24     |  0.56   30 Jan 2022 07:34    148    |  115    |  30     ----------------------------<  149    2.9     |  24     |  0.81     Ca    7.8        04 Feb 2022 08:24  Ca    7.8        03 Feb 2022 11:27  Ca    8.0        02 Feb 2022 08:07  Ca    8.5        01 Feb 2022 07:43  Ca    8.4        31 Jan 2022 17:52  Ca    8.4        31 Jan 2022 07:11  Ca    8.5        30 Jan 2022 07:34  Phos  2.4       04 Feb 2022 08:24  Phos  1.8       03 Feb 2022 11:27  Phos  2.2       02 Feb 2022 08:07  Phos  3.0       01 Feb 2022 07:43  Phos  1.9       31 Jan 2022 17:52  Phos  2.2       31 Jan 2022 07:11  Phos  3.2       30 Jan 2022 07:34  Mg     2.2       04 Feb 2022 08:24  Mg     2.0       03 Feb 2022 11:27  Mg     2.6       02 Feb 2022 08:07  Mg     2.1       31 Jan 2022 07:11  Mg     2.4       30 Jan 2022 07:34  Mg     3.0       29 Jan 2022 06:33    TPro  5.7    /  Alb  1.4    /  TBili  0.2    /  DBili  <0.05  /  AST  49     /  ALT  134    /  AlkPhos  142    04 Feb 2022 08:24  TPro  6.0    /  Alb  1.4    /  TBili  0.2    /  DBili  0.0    /  AST  72     /  ALT  179    /  AlkPhos  166    03 Feb 2022 11:27  TPro  6.1    /  Alb  1.4    /  TBili  0.5    /  DBili  x      /  AST  207    /  ALT  203    /  AlkPhos  144    01 Feb 2022 07:43              CAPILLARY BLOOD GLUCOSE      POCT Blood Glucose.: 109 mg/dL (04 Feb 2022 11:06)  POCT Blood Glucose.: 111 mg/dL (04 Feb 2022 06:12)  POCT Blood Glucose.: 152 mg/dL (04 Feb 2022 00:36)  POCT Blood Glucose.: 155 mg/dL (03 Feb 2022 16:37)          RADIOLOGY & ADDITIONAL TESTS:    Imaging Personally Reviewed:  [ ] YES  [ ] NO    Consultant(s) Notes Reviewed:  [ ] YES  [ ] NO    Care Discussed with Consultants/Other Providers [ ] YES  [ ] NO Patient is a 81y old  Female who presents with a chief complaint of diverticulitis (04 Feb 2022 10:30)      HPI:  Patient is a 81F with a PMH of HTN, HLD, hypothyroidism, depression, seizures who presents to the ED for abd pain.  Patient states that over the last two days she had developed significant abdominal pain and multiple episodes of watery diarrhea.  Reports one episode of nausea and vomiting earlier today.  Patient has no other complaints.  Vitals stable,  labs show leukocytosis and hypokalemia.  CT abdomen significant for diverticulitis.  Will admit to med surg.     (23 Dec 2021 00:17)      INTERVAL HPI/OVERNIGHT EVENTS:  Tolerating feeds at 60 ml / hr. The nurse denies melena, hematochezia, hematemesis, nausea, vomiting, abdominal pain, constipation, diarrhea, or change in bowel movements     MEDICATIONS  (STANDING):  amLODIPine   Tablet 10 milliGRAM(s) Oral daily  aspirin  chewable 81 milliGRAM(s) Oral daily  carvedilol 3.125 milliGRAM(s) Oral every 12 hours  chlorhexidine 0.12% Liquid 15 milliLiter(s) Oral Mucosa every 12 hours  chlorhexidine 2% Cloths 1 Application(s) Topical <User Schedule>  dextrose 40% Gel 15 Gram(s) Oral once  dextrose 5% + sodium chloride 0.45% with potassium chloride 20 mEq/L 1000 milliLiter(s) (42 mL/Hr) IV Continuous <Continuous>  dextrose 5%. 1000 milliLiter(s) (50 mL/Hr) IV Continuous <Continuous>  dextrose 5%. 1000 milliLiter(s) (100 mL/Hr) IV Continuous <Continuous>  dextrose 50% Injectable 25 Gram(s) IV Push once  dextrose 50% Injectable 12.5 Gram(s) IV Push once  dextrose 50% Injectable 25 Gram(s) IV Push once  dorzolamide 2%/timolol 0.5% Ophthalmic Solution 1 Drop(s) Both EYES two times a day  escitalopram 20 milliGRAM(s) Oral daily  glucagon  Injectable 1 milliGRAM(s) IntraMuscular once  levETIRAcetam  IVPB 500 milliGRAM(s) IV Intermittent every 12 hours  levothyroxine Injectable 37.5 MICROGram(s) IV Push at bedtime  pantoprazole  Injectable 40 milliGRAM(s) IV Push every 12 hours  piperacillin/tazobactam IVPB.. 3.375 Gram(s) IV Intermittent every 8 hours  simvastatin 40 milliGRAM(s) Oral at bedtime    MEDICATIONS  (PRN):  acetaminophen    Suspension .. 650 milliGRAM(s) Oral every 6 hours PRN Temp greater or equal to 38C (100.4F)  sodium chloride 0.9% lock flush 10 milliLiter(s) IV Push every 1 hour PRN Pre/post blood products, medications, blood draw, and to maintain line patency      FAMILY HISTORY:  FH: HTN (hypertension)        Allergies    No Known Allergies    Intolerances        PMH/PSH:  Seizure    HTN (hypertension)    Glaucoma    Thyroid disease    Blood clot of neck vein    TIA (transient ischemic attack)    S/P appendectomy          REVIEW OF SYSTEMS:  CONSTITUTIONAL: No weight loss. Febrile (+)  EYES: No eye pain, visual disturbances, or discharge  ENMT:  No difficulty hearing, tinnitus, vertigo; No sinus or throat pain  NECK: No pain or stiffness  BREASTS: No pain, masses, or nipple discharge  RESPIRATORY: No cough, wheezing, chills or hemoptysis; No shortness of breath  CARDIOVASCULAR: No chest pain, palpitations, dizziness, or leg swelling  GASTROINTESTINAL: See above   GENITOURINARY: No dysuria, frequency, hematuria, or incontinence  NEUROLOGICAL: No headaches, memory loss, loss of strength, numbness, or tremors  SKIN: No itching, burning, rashes, or lesions   LYMPH NODES: No enlarged glands  ENDOCRINE: No heat or cold intolerance; No hair loss  MUSCULOSKELETAL: No joint pain or swelling; No muscle, back, or extremity pain  PSYCHIATRIC: No depression, anxiety, mood swings, or difficulty sleeping  HEME/LYMPH: No easy bruising, or bleeding gums  ALLERGY AND IMMUNOLOGIC: No hives or eczema    Vital Signs Last 24 Hrs  T(C): 36.5 (04 Feb 2022 11:16), Max: 38.4 (03 Feb 2022 17:09)  T(F): 97.7 (04 Feb 2022 11:16), Max: 101.1 (03 Feb 2022 17:09)  HR: 69 (04 Feb 2022 13:17) (65 - 79)  BP: 145/64 (04 Feb 2022 11:16) (129/74 - 148/71)  BP(mean): --  RR: 17 (04 Feb 2022 11:16) (17 - 19)  SpO2: 99% (04 Feb 2022 13:17) (98% - 100%)    PHYSICAL EXAM:  GENERAL: NAD, well-groomed, well-developed  HEAD:  Atraumatic, Normocephalic  EYES: EOMI, PERRLA, conjunctiva and sclera clear  NECK: Supple, No JVD, Normal thyroid  NERVOUS SYSTEM:  MS unchanged  CHEST/LUNG: Clear to percussion bilaterally; No rales, rhonchi, wheezing, or rubs  HEART: Regular rate and rhythm; No murmurs, rubs, or gallops  ABDOMEN: Soft, Nontender, Nondistended; Bowel sounds present. PEG site clean  EXTREMITIES:  2+ Peripheral Pulses, No clubbing, cyanosis, or edema  LYMPH: No lymphadenopathy noted  SKIN: No rashes or lesions    LAB                          8.8    13.92 )-----------( 373      ( 04 Feb 2022 08:24 )             28.7       CBC:  02-04 @ 08:24  WBC 13.92   Hgb 8.8   Hct 28.7   Plts 373  MCV 91.4  02-03 @ 11:27  WBC 14.11   Hgb 9.0   Hct 29.2   Plts 357  MCV 91.3  02-02 @ 08:07  WBC 12.54   Hgb 8.5   Hct 27.8   Plts 315  MCV 90.3  02-01 @ 07:43  WBC 16.76   Hgb 9.6   Hct 30.5   Plts 330  MCV 88.7  01-31 @ 07:11  WBC 17.82   Hgb 7.3   Hct 23.8   Plts 282  MCV 89.8  01-30 @ 07:34  WBC 20.79   Hgb 8.2   Hct 26.5   Plts 288  MCV 89.5  01-29 @ 18:09  WBC 23.36   Hgb 8.9   Hct 28.6   Plts 277  MCV 89.1  01-29 @ 06:33  WBC 27.98   Hgb 8.6   Hct 27.4   Plts 294  MCV 88.4  01-28 @ 17:18  WBC 24.37   Hgb 9.6   Hct 30.2   Plts 325  MCV 87.0      Chemistry:  02-04 @ 08:24  Na+ 147  K+ 3.8  Cl- 117  CO2 26  BUN 13  Cr 0.46     02-03 @ 11:27  Na+ 144  K+ 3.5  Cl- 115  CO2 25  BUN 13  Cr 0.46     02-02 @ 08:07  Na+ 144  K+ 3.3  Cl- 114  CO2 23  BUN 15  Cr 0.52     02-01 @ 07:43  Na+ 145  K+ 3.7  Cl- 115  CO2 24  BUN 16  Cr 0.42     01-31 @ 17:52  Na+ 143  K+ 3.3  Cl- 113  CO2 25  BUN 18  Cr 0.52     01-31 @ 07:11  Na+ 145  K+ 2.6  Cl- 114  CO2 24  BUN 21  Cr 0.56     01-30 @ 07:34  Na+ 148  K+ 2.9  Cl- 115  CO2 24  BUN 30  Cr 0.81     01-29 @ 06:33  Na+ 146  K+ 4.0  Cl- 115  CO2 24  BUN 42  Cr 0.76         Glucose, Serum: 104 mg/dL (02-04 @ 08:24)  Glucose, Serum: 139 mg/dL (02-03 @ 11:27)  Glucose, Serum: 187 mg/dL (02-02 @ 08:07)  Glucose, Serum: 122 mg/dL (02-01 @ 07:43)  Glucose, Serum: 122 mg/dL (01-31 @ 17:52)  Glucose, Serum: 134 mg/dL (01-31 @ 07:11)  Glucose, Serum: 149 mg/dL (01-30 @ 07:34)  Glucose, Serum: 126 mg/dL (01-29 @ 06:33)      04 Feb 2022 08:24    147    |  117    |  13     ----------------------------<  104    3.8     |  26     |  0.46   03 Feb 2022 11:27    144    |  115    |  13     ----------------------------<  139    3.5     |  25     |  0.46   02 Feb 2022 08:07    144    |  114    |  15     ----------------------------<  187    3.3     |  23     |  0.52   01 Feb 2022 07:43    145    |  115    |  16     ----------------------------<  122    3.7     |  24     |  0.42   31 Jan 2022 17:52    143    |  113    |  18     ----------------------------<  122    3.3     |  25     |  0.52   31 Jan 2022 07:11    145    |  114    |  21     ----------------------------<  134    2.6     |  24     |  0.56   30 Jan 2022 07:34    148    |  115    |  30     ----------------------------<  149    2.9     |  24     |  0.81     Ca    7.8        04 Feb 2022 08:24  Ca    7.8        03 Feb 2022 11:27  Ca    8.0        02 Feb 2022 08:07  Ca    8.5        01 Feb 2022 07:43  Ca    8.4        31 Jan 2022 17:52  Ca    8.4        31 Jan 2022 07:11  Ca    8.5        30 Jan 2022 07:34  Phos  2.4       04 Feb 2022 08:24  Phos  1.8       03 Feb 2022 11:27  Phos  2.2       02 Feb 2022 08:07  Phos  3.0       01 Feb 2022 07:43  Phos  1.9       31 Jan 2022 17:52  Phos  2.2       31 Jan 2022 07:11  Phos  3.2       30 Jan 2022 07:34  Mg     2.2       04 Feb 2022 08:24  Mg     2.0       03 Feb 2022 11:27  Mg     2.6       02 Feb 2022 08:07  Mg     2.1       31 Jan 2022 07:11  Mg     2.4       30 Jan 2022 07:34  Mg     3.0       29 Jan 2022 06:33    TPro  5.7    /  Alb  1.4    /  TBili  0.2    /  DBili  <0.05  /  AST  49     /  ALT  134    /  AlkPhos  142    04 Feb 2022 08:24  TPro  6.0    /  Alb  1.4    /  TBili  0.2    /  DBili  0.0    /  AST  72     /  ALT  179    /  AlkPhos  166    03 Feb 2022 11:27  TPro  6.1    /  Alb  1.4    /  TBili  0.5    /  DBili  x      /  AST  207    /  ALT  203    /  AlkPhos  144    01 Feb 2022 07:43              CAPILLARY BLOOD GLUCOSE      POCT Blood Glucose.: 109 mg/dL (04 Feb 2022 11:06)  POCT Blood Glucose.: 111 mg/dL (04 Feb 2022 06:12)  POCT Blood Glucose.: 152 mg/dL (04 Feb 2022 00:36)  POCT Blood Glucose.: 155 mg/dL (03 Feb 2022 16:37)          RADIOLOGY & ADDITIONAL TESTS:  < from: US Abdomen Complete (US Abdomen Complete .) (02.04.22 @ 11:13) >    ACC: [id number] EXAM:  US ABDOMEN COMPLETE                          PROCEDURE DATE:  02/04/2022          INTERPRETATION:  CLINICAL INFORMATION: 81 years  Female with Hepatitis.    COMPARISON: None available.    TECHNIQUE: Sonography of the abdomen.    FINDINGS:    Liver: Within normal limits.  Bile ducts: Normal caliber. Common bile duct measures 5.3 mm.  Gallbladder: Cholelithiasis. No wall thickening or pericholecystic fluid.  Pancreas: Obscured by bowel gas.  Spleen: 10.6 cm. Within normal limits.  Right kidney: 9.2 cm. No hydronephrosis.  Left kidney: 10.2 cm.  No hydronephrosis.  Ascites: None.  Aorta and IVC: Visualized portions are within normal limits.  Other: Bilateral pleural effusions.    IMPRESSION:  Lithiasis.    Small bilateral pleural effusions.        --- End of Report ---            TIERRA MAYA MD; Attending Radiologist  This document has been electronically signed. Feb 4 2022 11:23AM    < end of copied text >      Imaging Personally Reviewed:  [ ] YES  [ ] NO    Consultant(s) Notes Reviewed:  [ ] YES  [ ] NO    Care Discussed with Consultants/Other Providers [ ] YES  [ ] NO

## 2022-02-04 NOTE — PROGRESS NOTE ADULT - ASSESSMENT
TRESA PIZARRO 81 f 12/22/2021 1942 DR ANTONIO PATTON     REVIEW OF SYMPTOMS      Able to give (reliable) ROS  NO     PHYSICAL EXAM    HEENT Unremarkable  atraumatic   RESP Fair air entry EXP prolonged    Harsh breath sound Resp distres mild   CARDIAC S1 S2 No S3     NO JVD    ABDOMEN SOFT BS PRESENT NOT DISTENDED No hepatosplenomegaly   PEDAL EDEMA present No calf tenderness  NO rash   ______  DOA/CC.  12/22/2021 81F w/ HTN, HLD, hypothyroidism, depression, seizures. Presented w/ abdominal pain found to have acute sigmoid diverticulitis    PMH.  pmh Hytn  pmh Sz.    PROBLEMS.  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  COVID STATUS. 12/24 pcr (-)  ICU STAY. 12/22-1/12/2022   GOC.  1/13/2022 full code       BEST PRACTICE ISSUES.                                                  HEAD OF BED ELEVATION. Yes  DVT PROPHYLAXIS.     1/28/2022 lvnx dced because of active gi bleed    1/25/2022 lvnx 80.2   1/25/2022 V duplx partially occlusive thrombus r cfv ac to subacute   1/7 lvnx 40   LIGHT PROPHYLAXIS.    1/11 protonix 40                                                                                     DIET.   2/1 jevity 1440  1/28/2022 npo   1/20/2022 jevity 1.5 1200  1/18/2022 npo  1/7 vital af 1200 ngt          INFECTION PROPHYLAXIS.   1/7 Chlorhexidine .12%   1/7 chlorhexidine 2%    SPEECH SWALLOW RECOMMENDATIONS.       PATIENT DATA   VITALS/PO/IO/VENT/DRIPS.   2/4/2022 99f 75 120/70   2/4/2022 cpap 5/10/.3 svt 512 r 25      ASSESSMENT/RECOMMENDATIONS.    HEMODYNAMICS.   Monitor bp Target MAP 65 (+)    RESP.   Monitor po Target po 90-95%  1/14/2022 15/450/.4/5 747/38/110       DVT  1/25/2022 V duplx partially occlusive thrombus r cfv ac to subacute   1/25 lvnx 80.2   1/28/2022 lvnx 80.2 dced by PA as Hb had dropped requiring transfusion  2/1/2022 IR reviewed imaging IR felt that Blood clot is minimal and subaute/chronic and neither IVCF not Anticoagulants are needed   2/2/2022 V duplex (-)  2/3 recommend resuming lovenox full dose or at least pplx dose if ok with gi     OXYGEN REQUIREMENTS.   1/31/2022 fio2 30%  1/24/2022 30%  1/17/2022 40%  1/13/2022 40%    VENT MANAGEMENT.   HOB elevation  Target Pplat 35 (-)  Target PO 90-95%  Target pH 730 (+)    FEVER 1/28/2022.  New rll pneum 1/30 ct  Abdominal infection sec perforated sigmoid div feculent peritonitis poa 12/22.  Pt was taken off abio 1/15  1/28/2021 She has been developing progressive leukocytosis and low gd fever   w 1/27-1/28 -  1/29-1/30-1/31-2/1-2/3-2/4 [id number] - 20-17 - 16- 14-13  1/30/2022 ct ch new rll pneu  1/21 ctap showed decreased collections cw 1/11  Sputum 1/31 Sp Pseudo  blod cult. 1/31  bc. (-)  1/29/2022 zosyn started by id    SP ABD SURGERY.  81F admitted with Acute Sigmoid Diverticulitis s/p ex lap, sigmoid resection, peritoneal lavage 12/25 and RTOR 12/26 for irrigation of abdominal cavity with closure and creation of colostomy. S/p bedside IR aspiration of fluid collection 1/3. Tracheostomy 1/7.   Deep pelvic abscess noted on CT,  Local drain/ostomy care per nursing  1/22/2022 r lefty removd     ANEMIA.  Hb 1/27-1/27-1/28-1/28-1/29-1/31-2/1-2/2 Hb 9.7-7.7-8.7-9.6 - 8.9-7.3 - 9.6 - 8.5  1/28/2022 lvnx 80.2 dced     TRANSFUSION.  1/28/2022 2u prbc     AMS  1/30/2022 ct h ac l mca cva   suggest neuro eval    GT placed 1/18    GI BLEED   11/28 peg and ostomy with active bleed  EGD 2/1/2022 Dr Ness Erosions PEG tube May restart Anticoagulants after 24 h    ELEVATED LFTS  LFTS 2/1-2/4/2022    - 142   - 49   - 134  2/4/2022 us cbf 5 mm no cholecystit  Follow with GI      Hypernatremia  Na 1/26-1/27-1/28-1/31-2/4/2022 Na 147-148 -147 - 143 - 147  2/4/2022 fw started 300.6     IV Fluids.  1/31/2022 d5 1/2 ns 20k 42    TIME SPENT   Over 25 minutes aggregate care time spent on encounter; activities included   direct patient care, counseling and/or coordinating care reviewing notes, lab data/ imaging , discussion with multidisciplinary team/ patient  /family and explaining in detail risks, benefits, alternatives  of the recommendations     TRESA PIZARRO 81 f 12/22/2021 1942 DR ANTONIO PATTON

## 2022-02-05 LAB
ALBUMIN SERPL ELPH-MCNC: 1.1 G/DL — LOW (ref 3.3–5)
ALP SERPL-CCNC: 92 U/L — SIGNIFICANT CHANGE UP (ref 40–120)
ALT FLD-CCNC: 78 U/L — SIGNIFICANT CHANGE UP (ref 12–78)
ANION GAP SERPL CALC-SCNC: 6 MMOL/L — SIGNIFICANT CHANGE UP (ref 5–17)
AST SERPL-CCNC: 35 U/L — SIGNIFICANT CHANGE UP (ref 15–37)
BILIRUB DIRECT SERPL-MCNC: 0 MG/DL — SIGNIFICANT CHANGE UP (ref 0–0.3)
BILIRUB INDIRECT FLD-MCNC: 0.2 MG/DL — SIGNIFICANT CHANGE UP (ref 0.2–1)
BILIRUB SERPL-MCNC: 0.2 MG/DL — SIGNIFICANT CHANGE UP (ref 0.2–1.2)
BUN SERPL-MCNC: 13 MG/DL — SIGNIFICANT CHANGE UP (ref 7–23)
CALCIUM SERPL-MCNC: 7.6 MG/DL — LOW (ref 8.5–10.1)
CHLORIDE SERPL-SCNC: 111 MMOL/L — HIGH (ref 96–108)
CO2 SERPL-SCNC: 25 MMOL/L — SIGNIFICANT CHANGE UP (ref 22–31)
CREAT SERPL-MCNC: 0.35 MG/DL — LOW (ref 0.5–1.3)
CULTURE RESULTS: SIGNIFICANT CHANGE UP
CULTURE RESULTS: SIGNIFICANT CHANGE UP
GLUCOSE BLDC GLUCOMTR-MCNC: 124 MG/DL — HIGH (ref 70–99)
GLUCOSE BLDC GLUCOMTR-MCNC: 130 MG/DL — HIGH (ref 70–99)
GLUCOSE BLDC GLUCOMTR-MCNC: 149 MG/DL — HIGH (ref 70–99)
GLUCOSE BLDC GLUCOMTR-MCNC: 155 MG/DL — HIGH (ref 70–99)
GLUCOSE SERPL-MCNC: 132 MG/DL — HIGH (ref 70–99)
HCT VFR BLD CALC: 29.1 % — LOW (ref 34.5–45)
HGB BLD-MCNC: 8.6 G/DL — LOW (ref 11.5–15.5)
MAGNESIUM SERPL-MCNC: 2 MG/DL — SIGNIFICANT CHANGE UP (ref 1.6–2.6)
MCHC RBC-ENTMCNC: 27.2 PG — SIGNIFICANT CHANGE UP (ref 27–34)
MCHC RBC-ENTMCNC: 29.6 G/DL — LOW (ref 32–36)
MCV RBC AUTO: 92.1 FL — SIGNIFICANT CHANGE UP (ref 80–100)
NRBC # BLD: 0 /100 WBCS — SIGNIFICANT CHANGE UP (ref 0–0)
PHOSPHATE SERPL-MCNC: 2.6 MG/DL — SIGNIFICANT CHANGE UP (ref 2.5–4.5)
PLATELET # BLD AUTO: 241 K/UL — SIGNIFICANT CHANGE UP (ref 150–400)
POTASSIUM SERPL-MCNC: 3.8 MMOL/L — SIGNIFICANT CHANGE UP (ref 3.5–5.3)
POTASSIUM SERPL-SCNC: 3.8 MMOL/L — SIGNIFICANT CHANGE UP (ref 3.5–5.3)
PROT SERPL-MCNC: 4.2 GM/DL — LOW (ref 6–8.3)
RBC # BLD: 3.16 M/UL — LOW (ref 3.8–5.2)
RBC # FLD: 16.6 % — HIGH (ref 10.3–14.5)
SODIUM SERPL-SCNC: 142 MMOL/L — SIGNIFICANT CHANGE UP (ref 135–145)
SPECIMEN SOURCE: SIGNIFICANT CHANGE UP
SPECIMEN SOURCE: SIGNIFICANT CHANGE UP
WBC # BLD: 19.2 K/UL — HIGH (ref 3.8–10.5)
WBC # FLD AUTO: 19.2 K/UL — HIGH (ref 3.8–10.5)

## 2022-02-05 PROCEDURE — 70450 CT HEAD/BRAIN W/O DYE: CPT | Mod: 26

## 2022-02-05 PROCEDURE — 99232 SBSQ HOSP IP/OBS MODERATE 35: CPT

## 2022-02-05 PROCEDURE — 99233 SBSQ HOSP IP/OBS HIGH 50: CPT

## 2022-02-05 RX ADMIN — CARVEDILOL PHOSPHATE 3.12 MILLIGRAM(S): 80 CAPSULE, EXTENDED RELEASE ORAL at 05:11

## 2022-02-05 RX ADMIN — LEVETIRACETAM 420 MILLIGRAM(S): 250 TABLET, FILM COATED ORAL at 21:09

## 2022-02-05 RX ADMIN — AMLODIPINE BESYLATE 10 MILLIGRAM(S): 2.5 TABLET ORAL at 05:11

## 2022-02-05 RX ADMIN — Medication 37.5 MICROGRAM(S): at 21:09

## 2022-02-05 RX ADMIN — CHLORHEXIDINE GLUCONATE 15 MILLILITER(S): 213 SOLUTION TOPICAL at 05:11

## 2022-02-05 RX ADMIN — PANTOPRAZOLE SODIUM 40 MILLIGRAM(S): 20 TABLET, DELAYED RELEASE ORAL at 05:11

## 2022-02-05 RX ADMIN — DORZOLAMIDE HYDROCHLORIDE TIMOLOL MALEATE 1 DROP(S): 20; 5 SOLUTION/ DROPS OPHTHALMIC at 05:11

## 2022-02-05 RX ADMIN — CHLORHEXIDINE GLUCONATE 15 MILLILITER(S): 213 SOLUTION TOPICAL at 19:38

## 2022-02-05 RX ADMIN — DORZOLAMIDE HYDROCHLORIDE TIMOLOL MALEATE 1 DROP(S): 20; 5 SOLUTION/ DROPS OPHTHALMIC at 19:38

## 2022-02-05 RX ADMIN — LEVETIRACETAM 420 MILLIGRAM(S): 250 TABLET, FILM COATED ORAL at 05:11

## 2022-02-05 RX ADMIN — SIMVASTATIN 40 MILLIGRAM(S): 20 TABLET, FILM COATED ORAL at 21:08

## 2022-02-05 RX ADMIN — PIPERACILLIN AND TAZOBACTAM 25 GRAM(S): 4; .5 INJECTION, POWDER, LYOPHILIZED, FOR SOLUTION INTRAVENOUS at 14:38

## 2022-02-05 RX ADMIN — CHLORHEXIDINE GLUCONATE 1 APPLICATION(S): 213 SOLUTION TOPICAL at 05:12

## 2022-02-05 RX ADMIN — PIPERACILLIN AND TAZOBACTAM 25 GRAM(S): 4; .5 INJECTION, POWDER, LYOPHILIZED, FOR SOLUTION INTRAVENOUS at 21:09

## 2022-02-05 RX ADMIN — PANTOPRAZOLE SODIUM 40 MILLIGRAM(S): 20 TABLET, DELAYED RELEASE ORAL at 19:38

## 2022-02-05 RX ADMIN — PIPERACILLIN AND TAZOBACTAM 25 GRAM(S): 4; .5 INJECTION, POWDER, LYOPHILIZED, FOR SOLUTION INTRAVENOUS at 05:10

## 2022-02-05 RX ADMIN — ESCITALOPRAM OXALATE 20 MILLIGRAM(S): 10 TABLET, FILM COATED ORAL at 12:36

## 2022-02-05 RX ADMIN — CARVEDILOL PHOSPHATE 3.12 MILLIGRAM(S): 80 CAPSULE, EXTENDED RELEASE ORAL at 21:08

## 2022-02-05 RX ADMIN — Medication 81 MILLIGRAM(S): at 12:35

## 2022-02-05 NOTE — PROGRESS NOTE ADULT - ASSESSMENT
TRESA PIZARRO 81 f 12/22/2021 1942 DR ANTONIO PATTON     REVIEW OF SYMPTOMS      Able to give (reliable) ROS  NO     PHYSICAL EXAM    HEENT Unremarkable  atraumatic   RESP Fair air entry EXP prolonged    Harsh breath sound Resp distres mild   CARDIAC S1 S2 No S3     NO JVD    ABDOMEN SOFT BS PRESENT NOT DISTENDED No hepatosplenomegaly   PEDAL EDEMA present No calf tenderness  NO rash     ______  DOA/CC.  12/22/2021 81F w/ HTN, HLD, hypothyroidism, depression, seizures. Presented w/ abdominal pain found to have acute sigmoid diverticulitis    PMH.  pmh Hytn  pmh Sz.    PROBLEMS.  Feculent peritonitis poa 12/22  Colostomy 12/26  Initial abio course competed 1/15    R LEFTY removd 1/22/2022  DVT 1/25/2022 r cfv fd lvnx startd   Drop Hb 1/27 Lvnx dced  GI bl peg and ostomy 1/28  NPO 1/28  Zosyn 1/29 1/31 Sp Pseudo  Need for ivcf 1/30/2022 dw Dr Patton  2/1/2022 IR reviewed imaging IR felt that Blood clot is minimal and subaute/chronic and neither IVCF not Anticoagulants are needed   New rll pneum 1/30 ct   Ac L MCA cva 1/30/2022 CT   GI Bleed EGD 2/1/2022 Dr Ness Erosions PEG tube May restart Anticoagulants after 24 h  Elevated LFTS 2/1/2022 2/4 us abd (-)   Fever 2/3/2022     MISC ISSUES.  COVID STATUS. 12/24 pcr (-)  ICU STAY. 12/22-1/12/2022   GOC.  1/13/2022 full code       BEST PRACTICE ISSUES.                                                  HEAD OF BED ELEVATION. Yes  DVT PROPHYLAXIS.     1/28/2022 lvnx dced because of active gi bleed    LIGHT PROPHYLAXIS.    1/11 protonix 40                                                                                     DIET.   2/1 jevity 1440  INFECTION PROPHYLAXIS.   1/7 Chlorhexidine .12%   1/7 chlorhexidine 2%    SPEECH SWALLOW RECOMMENDATIONS.     PATIENT DATA   VITALS/PO/IO/VENT/DRIPS.   2/5/2022 afeb 60 120/60   2/5/2022 ac 15/450/5/.3      ASSESSMENT/RECOMMENDATIONS.    HEMODYNAMICS.   Monitor bp Target MAP 65 (+)    RESP.   Monitor po Target po 90-95%  1/14/2022 15/450/.4/5 747/38/110       DVT  2/1/2022 IR reviewed imaging IR felt that Blood clot is minimal and subaute/chronic and neither IVCF not Anticoagulants are needed   2/2/2022 V duplex (-)  2/3 recommend resuming lovenox full dose or at least pplx dose if ok with gi    VENT MANAGEMENT.   HOB elevation  Target Pplat 35 (-)  Target PO 90-95%  Target pH 730 (+)      FEVER 1/28/2022.  New rll pneum 1/30 ct  Abdominal infection sec perforated sigmoid div feculent peritonitis poa 12/22.  2/5/2022 w 19  1/30/2022 ct ch new rll pneu  Sputum 1/31 Sp Pseudo  blod cult. 1/31  bc. (-)  1/29/2022 zosyn started by id    SP ABD SURGERY.  81F admitted with Acute Sigmoid Diverticulitis s/p ex lap, sigmoid resection, peritoneal lavage 12/25 and RTOR 12/26 for irrigation of abdominal cavity with closure and creation of colostomy. S/p bedside IR aspiration of fluid collection 1/3. Tracheostomy 1/7.   Deep pelvic abscess noted on CT,  Local drain/ostomy care per nursing  1/22/2022 r lefty removd     ANEMIA.  Hb 2/5/2022 Hb 8.6   monitor target hb 7     AMS  2/5/2022 ct h no hrrge   1/30/2022 ct h ac l mca cva       GT placed 1/18    GI BLEED   11/28 peg and ostomy with active bleed  EGD 2/1/2022 Dr Ness Erosions PEG tube May restart Anticoagulants after 24 h    ELEVATED LFTS  LFTS 2/1-2/4/2022    - 142   - 49   - 134  2/4/2022 us cbd 5 mm no cholecystit  Follow with GI    Hypernatremia  Na 1/26-1/27-1/28-1/31-2/4-2/5/2022 Na 147-148 -147 - 143 - 147-142  2/4/2022 fw started 300.6     IV Fluids.  1/31/2022 d5 1/2 ns 20k 42    TIME SPENT   Over 25 minutes aggregate care time spent on encounter; activities included   direct patient care, counseling and/or coordinating care reviewing notes, lab data/ imaging , discussion with multidisciplinary team/ patient  /family and explaining in detail risks, benefits, alternatives  of the recommendations     TRESA PIZARRO 81 f 12/22/2021 1942 DR ANTONIO PATTON

## 2022-02-05 NOTE — PROGRESS NOTE ADULT - SUBJECTIVE AND OBJECTIVE BOX
Pt resting comfortably, no acute events.    Physical Exam:   · Neurological	detailed exam  · Mental Status	Awake, not following commands.  · Cranial Nerve	able to move in all directions except crossing midline. decreased blink to threat on the right. Right facial  · Motor	slight right tremor.  moving left side > right. Withdraws on the right  · Gait/Balance	deferred  · Neurological Comments	NIHSS 34 MRS 5    echo 12/25 normal EF, no LA dilatation      · Assessment	  Acute left MCA CVA off AC with new onset Afib   s/p ex lap/colectomy/diverticulitis  HTN  HLD  seizures?    Plan   High risk of Hem conversion, would cont with ASA/Statin for secondary stroke prevention  repeat hct 2/5 looks stable  PT/OT/ST  lipid panel  MRA /MRI Brain and neck when stable  poor prognosis with large left MCA CVA, GOC discussion

## 2022-02-05 NOTE — PROGRESS NOTE ADULT - SUBJECTIVE AND OBJECTIVE BOX
Patient: JUAREZ GTZ [id number] 81y Female                            Hospitalist Attending Note    Unable to offer complaints.   Tm 99.7  Last fever was 2/3 at ~ 5pm.   On vent support..   ____________________PHYSICAL EXAM:  GENERAL:  NAD Awake, does not follow commands.   HEENT: NCAT, trach in place.   CARDIOVASCULAR:  S1, S2  LUNGS: b/l rhonchi. On vent support.   ABDOMEN:  soft, (-) tenderness, (-) distension, (+) bowel sounds, (-) guarding, (-) rebound (-) rigidity.  Colostomy in place with dark stool.  Midline abdominal wound vac in place.    EXTREMITIES:  no cyanosis / clubbing / edema.   ____________________    VITALS:  Vital Signs Last 24 Hrs  T(C): 36.7 (05 Feb 2022 11:25), Max: 37.6 (04 Feb 2022 23:45)  T(F): 98 (05 Feb 2022 11:25), Max: 99.7 (04 Feb 2022 23:45)  HR: 79 (05 Feb 2022 11:25) (69 - 97)  BP: 130/70 (05 Feb 2022 11:25) (128/71 - 146/71)  BP(mean): --  RR: 18 (05 Feb 2022 11:25) (18 - 18)  SpO2: 97% (05 Feb 2022 11:25) (97% - 99%) Daily     Daily   CAPILLARY BLOOD GLUCOSE      POCT Blood Glucose.: 155 mg/dL (05 Feb 2022 06:48)  POCT Blood Glucose.: 149 mg/dL (05 Feb 2022 00:03)  POCT Blood Glucose.: 149 mg/dL (04 Feb 2022 16:41)    I&O's Summary    04 Feb 2022 07:01  -  05 Feb 2022 07:00  --------------------------------------------------------  IN: 3390 mL / OUT: 1209 mL / NET: 2181 mL        LABS:                        8.6    19.20 )-----------( 241      ( 05 Feb 2022 09:36 )             29.1     02-05    142  |  111<H>  |  13  ----------------------------<  132<H>  3.8   |  25  |  0.35<L>    Ca    7.6<L>      05 Feb 2022 09:36  Phos  2.6     02-05  Mg     2.0     02-05    TPro  4.2<L>  /  Alb  1.1<L>  /  TBili  0.2  /  DBili  0.0  /  AST  35  /  ALT  78  /  AlkPhos  92  02-05      LIVER FUNCTIONS - ( 05 Feb 2022 09:36 )  Alb: 1.1 g/dL / Pro: 4.2 gm/dL / ALK PHOS: 92 U/L / ALT: 78 U/L / AST: 35 U/L / GGT: x                     MEDICATIONS:  acetaminophen    Suspension .. 650 milliGRAM(s) Oral every 6 hours PRN  amLODIPine   Tablet 10 milliGRAM(s) Oral daily  aspirin  chewable 81 milliGRAM(s) Oral daily  carvedilol 3.125 milliGRAM(s) Oral every 12 hours  chlorhexidine 0.12% Liquid 15 milliLiter(s) Oral Mucosa every 12 hours  chlorhexidine 2% Cloths 1 Application(s) Topical <User Schedule>  dextrose 40% Gel 15 Gram(s) Oral once  dextrose 5% + sodium chloride 0.45% with potassium chloride 20 mEq/L 1000 milliLiter(s) IV Continuous <Continuous>  dextrose 5%. 1000 milliLiter(s) IV Continuous <Continuous>  dextrose 5%. 1000 milliLiter(s) IV Continuous <Continuous>  dextrose 50% Injectable 25 Gram(s) IV Push once  dextrose 50% Injectable 12.5 Gram(s) IV Push once  dextrose 50% Injectable 25 Gram(s) IV Push once  dorzolamide 2%/timolol 0.5% Ophthalmic Solution 1 Drop(s) Both EYES two times a day  escitalopram 20 milliGRAM(s) Oral daily  glucagon  Injectable 1 milliGRAM(s) IntraMuscular once  levETIRAcetam  IVPB 500 milliGRAM(s) IV Intermittent every 12 hours  levothyroxine Injectable 37.5 MICROGram(s) IV Push at bedtime  pantoprazole  Injectable 40 milliGRAM(s) IV Push every 12 hours  piperacillin/tazobactam IVPB.. 3.375 Gram(s) IV Intermittent every 8 hours  simvastatin 40 milliGRAM(s) Oral at bedtime  sodium chloride 0.9% lock flush 10 milliLiter(s) IV Push every 1 hour PRN

## 2022-02-05 NOTE — PROGRESS NOTE ADULT - ASSESSMENT
81F with a PMH of HTN, HLD, hypothyroidism, depression, seizures presented with abdominal pain and admitted 12/22 for acute perforated diverticulitis, s/p ex lap 12/24 with sigmoid colectomy and abdominal washout.  She was then transferred to MICU for postop care on mechanical ventilation.  Hospital course complicated by Afib with RVR.  She underwent wound closure and creation of colostomy 12/26, developed abdominal abscess s/p IR drainage 1/3, cultures grew Ecoli and Bacteriodes.  S/p tracheostomy and PEG placement.  1/28 developed bleeding through ostomy, AC stopped and pt transfused.  EGD on 2/1 showed gastritis, PEG tube embedded in mucosa.  Due to fevers, CT Head, C/A/P performed on 1/30, showed Acute left MCA infarct, new RLL pneumonia.  Seen by ID, started on Zosyn.      # RLL pneumonia, Suspected Aspiration Pneumonia - seen on CT chest 1/30.  ID following, continue Zosyn x 7d. No fever since 2/3.  WBC 19, continue to monitor.    # Acute Blood Loss Anemia - due to suspected gastric bleeding.  s/p transfusion.  EGD 2/1 showed gastritis.  Monitor H/H, has been stable.    # Acute CVA - L MCA infarct was noted on CT 1/30.  Neuro input appreciated.  AC held due to extensive CVA, concern for hemorrhagic conversion.  Continue ASA, Statin.  # Elevated LFTs - monitor CMP.  Abdominal Sono showed cholelithiasis, no acute GB disease.  GI following.   # Hypernatremia - Would avoid hypotonic fluids in setting of acute CVA.  Increase free water via PEG. na normalized.   # Perforated Acute Diverticulitis / Intraabdominal Abscesses - continue BABAK drain care, wound vac.  Most recent CT without significant new collections.  # S/p Septic Shock - resolved  # Afib with RVR - rate control.  Holding AC due to above.   # Chronic Respiratory Failure with Hypoxemia - CPAP settings, pulmonary following.  # RLE DVT - per IR, felt to be more subacute to chronic.  Recommended no IVC filter placement.  Followup LE doppler 2/2 shows no acute DVT.   # Functional Quadriplegia - supportive care.    DVT Proph - ICDs.    I contacted pt's son HCP Blake [phone] on 2/3.  Awaiting arrival of additional family as they are contemplating comfort measures.

## 2022-02-05 NOTE — PROGRESS NOTE ADULT - SUBJECTIVE AND OBJECTIVE BOX
JUAREZ ECHOLSNIS    LVS 1B 122 D    Allergies    No Known Allergies    Intolerances        PAST MEDICAL & SURGICAL HISTORY:  Seizure    HTN (hypertension)    Glaucoma    Thyroid disease    Blood clot of neck vein  left side    TIA (transient ischemic attack)  20 years ago    S/P appendectomy        FAMILY HISTORY:  FH: HTN (hypertension)        Home Medications:  amLODIPine 10 mg oral tablet: 1 tab(s) orally once a day (22 Dec 2021 16:46)  aspirin 81 mg oral tablet, chewable: 1 tab(s) orally once a day (22 Dec 2021 16:46)  escitalopram 20 mg oral tablet: 1 tab(s) orally once a day (22 Dec 2021 16:46)  keppera:  (22 Dec 2021 16:46)  levETIRAcetam 500 mg oral tablet: 1 tab(s) orally 2 times a day (22 Dec 2021 16:46)  timolol-dorzolamide 0.5%-2% preservative-free ophthalmic solution: 1 drop(s) to each affected eye 2 times a day (22 Dec 2021 16:46)  Zetia 10 mg oral tablet: 1 tab(s) orally once a day (22 Dec 2021 16:46)      MEDICATIONS  (STANDING):  amLODIPine   Tablet 10 milliGRAM(s) Oral daily  aspirin  chewable 81 milliGRAM(s) Oral daily  carvedilol 3.125 milliGRAM(s) Oral every 12 hours  chlorhexidine 0.12% Liquid 15 milliLiter(s) Oral Mucosa every 12 hours  chlorhexidine 2% Cloths 1 Application(s) Topical <User Schedule>  dextrose 40% Gel 15 Gram(s) Oral once  dextrose 5% + sodium chloride 0.45% with potassium chloride 20 mEq/L 1000 milliLiter(s) (42 mL/Hr) IV Continuous <Continuous>  dextrose 5%. 1000 milliLiter(s) (50 mL/Hr) IV Continuous <Continuous>  dextrose 5%. 1000 milliLiter(s) (100 mL/Hr) IV Continuous <Continuous>  dextrose 50% Injectable 25 Gram(s) IV Push once  dextrose 50% Injectable 12.5 Gram(s) IV Push once  dextrose 50% Injectable 25 Gram(s) IV Push once  dorzolamide 2%/timolol 0.5% Ophthalmic Solution 1 Drop(s) Both EYES two times a day  escitalopram 20 milliGRAM(s) Oral daily  glucagon  Injectable 1 milliGRAM(s) IntraMuscular once  levETIRAcetam  IVPB 500 milliGRAM(s) IV Intermittent every 12 hours  levothyroxine Injectable 37.5 MICROGram(s) IV Push at bedtime  pantoprazole  Injectable 40 milliGRAM(s) IV Push every 12 hours  piperacillin/tazobactam IVPB.. 3.375 Gram(s) IV Intermittent every 8 hours  simvastatin 40 milliGRAM(s) Oral at bedtime    MEDICATIONS  (PRN):  acetaminophen    Suspension .. 650 milliGRAM(s) Oral every 6 hours PRN Temp greater or equal to 38C (100.4F)  sodium chloride 0.9% lock flush 10 milliLiter(s) IV Push every 1 hour PRN Pre/post blood products, medications, blood draw, and to maintain line patency      Diet, NPO with Tube Feed:   Tube Feeding Modality: Gastrostomy  Jevity 1.2 Santos  Total Volume for 24 Hours (mL): 1440  Continuous  Starting Tube Feed Rate mL per Hour: 25  Increase Tube Feed Rate by (mL): 5     Every 4 hours  Until Goal Tube Feed Rate (mL per Hour): 60  Tube Feed Duration (in Hours): 24  Tube Feed Start Time: 14:40 (02-01-22 @ 14:39) [Active]          Vital Signs Last 24 Hrs  T(C): 37.3 (05 Feb 2022 05:00), Max: 37.6 (04 Feb 2022 23:45)  T(F): 99.1 (05 Feb 2022 05:00), Max: 99.7 (04 Feb 2022 23:45)  HR: 69 (05 Feb 2022 05:13) (69 - 97)  BP: 134/68 (05 Feb 2022 05:00) (128/71 - 146/71)  BP(mean): --  RR: 18 (05 Feb 2022 05:00) (17 - 18)  SpO2: 97% (05 Feb 2022 05:13) (97% - 99%)      02-04-22 @ 07:01  -  02-05-22 @ 07:00  --------------------------------------------------------  IN: 3390 mL / OUT: 1209 mL / NET: 2181 mL        Mode: AC/ CMV (Assist Control/ Continuous Mandatory Ventilation), RR (machine): 18, TV (machine): 450, FiO2: 30, PEEP: 5, ITime: 0.9, MAP: 8, PIP: 20      LABS:                        8.8    13.92 )-----------( 373      ( 04 Feb 2022 08:24 )             28.7     02-04    147<H>  |  117<H>  |  13  ----------------------------<  104<H>  3.8   |  26  |  0.46<L>    Ca    7.8<L>      04 Feb 2022 08:24  Phos  2.4     02-04  Mg     2.2     02-04    TPro  5.7<L>  /  Alb  1.4<L>  /  TBili  0.2  /  DBili  <0.05  /  AST  49<H>  /  ALT  134<H>  /  AlkPhos  142<H>  02-04              WBC:  WBC Count: 13.92 K/uL (02-04 @ 08:24)  WBC Count: 14.11 K/uL (02-03 @ 11:27)  WBC Count: 12.54 K/uL (02-02 @ 08:07)      MICROBIOLOGY:  RECENT CULTURES:  02-01 .Blood Blood XXXX XXXX   No growth to date.    01-31 .Sputum Sputum Pseudomonas aeruginosa   Numerous polymorphonuclear leukocytes per low power field  Rare Squamous epithelial cells per low power field  Moderate Gram Negative Rods seen per oil power field   Few Pseudomonas aeruginosa    01-31 .Blood Blood-Peripheral XXXX XXXX   No Growth Final                    Sodium:  Sodium, Serum: 147 mmol/L (02-04 @ 08:24)  Sodium, Serum: 144 mmol/L (02-03 @ 11:27)  Sodium, Serum: 144 mmol/L (02-02 @ 08:07)      0.46 mg/dL 02-04 @ 08:24  0.46 mg/dL 02-03 @ 11:27  0.52 mg/dL 02-02 @ 08:07      Hemoglobin:  Hemoglobin: 8.8 g/dL (02-04 @ 08:24)  Hemoglobin: 9.0 g/dL (02-03 @ 11:27)  Hemoglobin: 8.5 g/dL (02-02 @ 08:07)      Platelets: Platelet Count - Automated: 373 K/uL (02-04 @ 08:24)  Platelet Count - Automated: 357 K/uL (02-03 @ 11:27)  Platelet Count - Automated: 315 K/uL (02-02 @ 08:07)      LIVER FUNCTIONS - ( 04 Feb 2022 08:24 )  Alb: 1.4 g/dL / Pro: 5.7 gm/dL / ALK PHOS: 142 U/L / ALT: 134 U/L / AST: 49 U/L / GGT: x                 RADIOLOGY & ADDITIONAL STUDIES:      MICROBIOLOGY:  RECENT CULTURES:  02-01 .Blood Blood XXXX XXXX   No growth to date.    01-31 .Sputum Sputum Pseudomonas aeruginosa   Numerous polymorphonuclear leukocytes per low power field  Rare Squamous epithelial cells per low power field  Moderate Gram Negative Rods seen per oil power field   Few Pseudomonas aeruginosa    01-31 .Blood Blood-Peripheral XXXX XXXX   No Growth Final

## 2022-02-06 LAB
ANION GAP SERPL CALC-SCNC: 6 MMOL/L — SIGNIFICANT CHANGE UP (ref 5–17)
BUN SERPL-MCNC: 13 MG/DL — SIGNIFICANT CHANGE UP (ref 7–23)
CALCIUM SERPL-MCNC: 8.4 MG/DL — LOW (ref 8.5–10.1)
CHLORIDE SERPL-SCNC: 109 MMOL/L — HIGH (ref 96–108)
CO2 SERPL-SCNC: 25 MMOL/L — SIGNIFICANT CHANGE UP (ref 22–31)
CREAT SERPL-MCNC: 0.47 MG/DL — LOW (ref 0.5–1.3)
CULTURE RESULTS: SIGNIFICANT CHANGE UP
GLUCOSE BLDC GLUCOMTR-MCNC: 117 MG/DL — HIGH (ref 70–99)
GLUCOSE BLDC GLUCOMTR-MCNC: 125 MG/DL — HIGH (ref 70–99)
GLUCOSE BLDC GLUCOMTR-MCNC: 136 MG/DL — HIGH (ref 70–99)
GLUCOSE BLDC GLUCOMTR-MCNC: 140 MG/DL — HIGH (ref 70–99)
GLUCOSE BLDC GLUCOMTR-MCNC: 144 MG/DL — HIGH (ref 70–99)
GLUCOSE BLDC GLUCOMTR-MCNC: 147 MG/DL — HIGH (ref 70–99)
GLUCOSE SERPL-MCNC: 134 MG/DL — HIGH (ref 70–99)
HCT VFR BLD CALC: 31 % — LOW (ref 34.5–45)
HGB BLD-MCNC: 9.6 G/DL — LOW (ref 11.5–15.5)
MCHC RBC-ENTMCNC: 28.1 PG — SIGNIFICANT CHANGE UP (ref 27–34)
MCHC RBC-ENTMCNC: 31 G/DL — LOW (ref 32–36)
MCV RBC AUTO: 90.6 FL — SIGNIFICANT CHANGE UP (ref 80–100)
NRBC # BLD: 0 /100 WBCS — SIGNIFICANT CHANGE UP (ref 0–0)
PLATELET # BLD AUTO: 396 K/UL — SIGNIFICANT CHANGE UP (ref 150–400)
POTASSIUM SERPL-MCNC: 3.8 MMOL/L — SIGNIFICANT CHANGE UP (ref 3.5–5.3)
POTASSIUM SERPL-SCNC: 3.8 MMOL/L — SIGNIFICANT CHANGE UP (ref 3.5–5.3)
RBC # BLD: 3.42 M/UL — LOW (ref 3.8–5.2)
RBC # FLD: 16.2 % — HIGH (ref 10.3–14.5)
SODIUM SERPL-SCNC: 140 MMOL/L — SIGNIFICANT CHANGE UP (ref 135–145)
SPECIMEN SOURCE: SIGNIFICANT CHANGE UP
WBC # BLD: 13.92 K/UL — HIGH (ref 3.8–10.5)
WBC # FLD AUTO: 13.92 K/UL — HIGH (ref 3.8–10.5)

## 2022-02-06 PROCEDURE — 99233 SBSQ HOSP IP/OBS HIGH 50: CPT

## 2022-02-06 PROCEDURE — 99232 SBSQ HOSP IP/OBS MODERATE 35: CPT

## 2022-02-06 RX ADMIN — CARVEDILOL PHOSPHATE 3.12 MILLIGRAM(S): 80 CAPSULE, EXTENDED RELEASE ORAL at 06:09

## 2022-02-06 RX ADMIN — CARVEDILOL PHOSPHATE 3.12 MILLIGRAM(S): 80 CAPSULE, EXTENDED RELEASE ORAL at 18:33

## 2022-02-06 RX ADMIN — PANTOPRAZOLE SODIUM 40 MILLIGRAM(S): 20 TABLET, DELAYED RELEASE ORAL at 06:11

## 2022-02-06 RX ADMIN — DORZOLAMIDE HYDROCHLORIDE TIMOLOL MALEATE 1 DROP(S): 20; 5 SOLUTION/ DROPS OPHTHALMIC at 06:08

## 2022-02-06 RX ADMIN — CHLORHEXIDINE GLUCONATE 15 MILLILITER(S): 213 SOLUTION TOPICAL at 18:41

## 2022-02-06 RX ADMIN — LEVETIRACETAM 420 MILLIGRAM(S): 250 TABLET, FILM COATED ORAL at 18:33

## 2022-02-06 RX ADMIN — ESCITALOPRAM OXALATE 20 MILLIGRAM(S): 10 TABLET, FILM COATED ORAL at 13:24

## 2022-02-06 RX ADMIN — PANTOPRAZOLE SODIUM 40 MILLIGRAM(S): 20 TABLET, DELAYED RELEASE ORAL at 18:41

## 2022-02-06 RX ADMIN — LEVETIRACETAM 420 MILLIGRAM(S): 250 TABLET, FILM COATED ORAL at 06:10

## 2022-02-06 RX ADMIN — AMLODIPINE BESYLATE 10 MILLIGRAM(S): 2.5 TABLET ORAL at 06:09

## 2022-02-06 RX ADMIN — PIPERACILLIN AND TAZOBACTAM 25 GRAM(S): 4; .5 INJECTION, POWDER, LYOPHILIZED, FOR SOLUTION INTRAVENOUS at 13:24

## 2022-02-06 RX ADMIN — DORZOLAMIDE HYDROCHLORIDE TIMOLOL MALEATE 1 DROP(S): 20; 5 SOLUTION/ DROPS OPHTHALMIC at 18:15

## 2022-02-06 RX ADMIN — CHLORHEXIDINE GLUCONATE 1 APPLICATION(S): 213 SOLUTION TOPICAL at 06:07

## 2022-02-06 RX ADMIN — Medication 37.5 MICROGRAM(S): at 21:51

## 2022-02-06 RX ADMIN — CHLORHEXIDINE GLUCONATE 15 MILLILITER(S): 213 SOLUTION TOPICAL at 06:08

## 2022-02-06 RX ADMIN — PIPERACILLIN AND TAZOBACTAM 25 GRAM(S): 4; .5 INJECTION, POWDER, LYOPHILIZED, FOR SOLUTION INTRAVENOUS at 06:09

## 2022-02-06 RX ADMIN — SIMVASTATIN 40 MILLIGRAM(S): 20 TABLET, FILM COATED ORAL at 21:51

## 2022-02-06 RX ADMIN — Medication 81 MILLIGRAM(S): at 13:24

## 2022-02-06 NOTE — PROGRESS NOTE ADULT - SUBJECTIVE AND OBJECTIVE BOX
JUAREZ ECHOLSNIS    LVS 1B 122 D    Allergies    No Known Allergies    Intolerances        PAST MEDICAL & SURGICAL HISTORY:  Seizure    HTN (hypertension)    Glaucoma    Thyroid disease    Blood clot of neck vein  left side    TIA (transient ischemic attack)  20 years ago    S/P appendectomy        FAMILY HISTORY:  FH: HTN (hypertension)        Home Medications:  amLODIPine 10 mg oral tablet: 1 tab(s) orally once a day (22 Dec 2021 16:46)  aspirin 81 mg oral tablet, chewable: 1 tab(s) orally once a day (22 Dec 2021 16:46)  escitalopram 20 mg oral tablet: 1 tab(s) orally once a day (22 Dec 2021 16:46)  keppera:  (22 Dec 2021 16:46)  levETIRAcetam 500 mg oral tablet: 1 tab(s) orally 2 times a day (22 Dec 2021 16:46)  timolol-dorzolamide 0.5%-2% preservative-free ophthalmic solution: 1 drop(s) to each affected eye 2 times a day (22 Dec 2021 16:46)  Zetia 10 mg oral tablet: 1 tab(s) orally once a day (22 Dec 2021 16:46)      MEDICATIONS  (STANDING):  amLODIPine   Tablet 10 milliGRAM(s) Oral daily  aspirin  chewable 81 milliGRAM(s) Oral daily  carvedilol 3.125 milliGRAM(s) Oral every 12 hours  chlorhexidine 0.12% Liquid 15 milliLiter(s) Oral Mucosa every 12 hours  chlorhexidine 2% Cloths 1 Application(s) Topical <User Schedule>  dextrose 40% Gel 15 Gram(s) Oral once  dextrose 5% + sodium chloride 0.45% with potassium chloride 20 mEq/L 1000 milliLiter(s) (42 mL/Hr) IV Continuous <Continuous>  dextrose 5%. 1000 milliLiter(s) (50 mL/Hr) IV Continuous <Continuous>  dextrose 5%. 1000 milliLiter(s) (100 mL/Hr) IV Continuous <Continuous>  dextrose 50% Injectable 25 Gram(s) IV Push once  dextrose 50% Injectable 12.5 Gram(s) IV Push once  dextrose 50% Injectable 25 Gram(s) IV Push once  dorzolamide 2%/timolol 0.5% Ophthalmic Solution 1 Drop(s) Both EYES two times a day  escitalopram 20 milliGRAM(s) Oral daily  glucagon  Injectable 1 milliGRAM(s) IntraMuscular once  levETIRAcetam  IVPB 500 milliGRAM(s) IV Intermittent every 12 hours  levothyroxine Injectable 37.5 MICROGram(s) IV Push at bedtime  pantoprazole  Injectable 40 milliGRAM(s) IV Push every 12 hours  piperacillin/tazobactam IVPB.. 3.375 Gram(s) IV Intermittent every 8 hours  simvastatin 40 milliGRAM(s) Oral at bedtime    MEDICATIONS  (PRN):  acetaminophen    Suspension .. 650 milliGRAM(s) Oral every 6 hours PRN Temp greater or equal to 38C (100.4F)  sodium chloride 0.9% lock flush 10 milliLiter(s) IV Push every 1 hour PRN Pre/post blood products, medications, blood draw, and to maintain line patency      Diet, NPO with Tube Feed:   Tube Feeding Modality: Gastrostomy  Jevity 1.2 Santos  Total Volume for 24 Hours (mL): 1440  Continuous  Starting Tube Feed Rate mL per Hour: 25  Increase Tube Feed Rate by (mL): 5     Every 4 hours  Until Goal Tube Feed Rate (mL per Hour): 60  Tube Feed Duration (in Hours): 24  Tube Feed Start Time: 14:40 (02-01-22 @ 14:39) [Active]          Vital Signs Last 24 Hrs  T(C): 36.9 (06 Feb 2022 05:00), Max: 37.3 (05 Feb 2022 16:44)  T(F): 98.4 (06 Feb 2022 05:00), Max: 99.1 (05 Feb 2022 16:44)  HR: 76 (06 Feb 2022 08:36) (65 - 79)  BP: 135/81 (06 Feb 2022 05:00) (128/69 - 135/81)  BP(mean): --  RR: 18 (06 Feb 2022 05:00) (18 - 18)  SpO2: 98% (06 Feb 2022 08:36) (97% - 99%)        Mode: CPAP with PS, FiO2: 30, PEEP: 5, PS: 10, ITime: 0.9, MAP: 8, PIP: 15      LABS:                        9.6    13.92 )-----------( 396      ( 06 Feb 2022 07:10 )             31.0     02-06    140  |  109<H>  |  13  ----------------------------<  134<H>  3.8   |  25  |  0.47<L>    Ca    8.4<L>      06 Feb 2022 07:10  Phos  2.6     02-05  Mg     2.0     02-05    TPro  4.2<L>  /  Alb  1.1<L>  /  TBili  0.2  /  DBili  0.0  /  AST  35  /  ALT  78  /  AlkPhos  92  02-05              WBC:  WBC Count: 13.92 K/uL (02-06 @ 07:10)  WBC Count: 19.20 K/uL (02-05 @ 09:36)  WBC Count: 13.92 K/uL (02-04 @ 08:24)  WBC Count: 14.11 K/uL (02-03 @ 11:27)      MICROBIOLOGY:  RECENT CULTURES:  02-01 .Blood Blood XXXX XXXX   No Growth Final    01-31 .Sputum Sputum Pseudomonas aeruginosa   Numerous polymorphonuclear leukocytes per low power field  Rare Squamous epithelial cells per low power field  Moderate Gram Negative Rods seen per oil power field   Few Pseudomonas aeruginosa    01-31 .Blood Blood-Peripheral XXXX XXXX   No Growth Final                    Sodium:  Sodium, Serum: 140 mmol/L (02-06 @ 07:10)  Sodium, Serum: 142 mmol/L (02-05 @ 09:36)  Sodium, Serum: 147 mmol/L (02-04 @ 08:24)  Sodium, Serum: 144 mmol/L (02-03 @ 11:27)      0.47 mg/dL 02-06 @ 07:10  0.35 mg/dL 02-05 @ 09:36  0.46 mg/dL 02-04 @ 08:24  0.46 mg/dL 02-03 @ 11:27      Hemoglobin:  Hemoglobin: 9.6 g/dL (02-06 @ 07:10)  Hemoglobin: 8.6 g/dL (02-05 @ 09:36)  Hemoglobin: 8.8 g/dL (02-04 @ 08:24)  Hemoglobin: 9.0 g/dL (02-03 @ 11:27)      Platelets: Platelet Count - Automated: 396 K/uL (02-06 @ 07:10)  Platelet Count - Automated: 241 K/uL (02-05 @ 09:36)  Platelet Count - Automated: 373 K/uL (02-04 @ 08:24)  Platelet Count - Automated: 357 K/uL (02-03 @ 11:27)      LIVER FUNCTIONS - ( 05 Feb 2022 09:36 )  Alb: 1.1 g/dL / Pro: 4.2 gm/dL / ALK PHOS: 92 U/L / ALT: 78 U/L / AST: 35 U/L / GGT: x                 RADIOLOGY & ADDITIONAL STUDIES:      MICROBIOLOGY:  RECENT CULTURES:  02-01 .Blood Blood XXXX XXXX   No Growth Final    01-31 .Sputum Sputum Pseudomonas aeruginosa   Numerous polymorphonuclear leukocytes per low power field  Rare Squamous epithelial cells per low power field  Moderate Gram Negative Rods seen per oil power field   Few Pseudomonas aeruginosa    01-31 .Blood Blood-Peripheral XXXX XXXX   No Growth Final

## 2022-02-06 NOTE — PROGRESS NOTE ADULT - ASSESSMENT
81F with a PMH of HTN, HLD, hypothyroidism, depression, seizures presented with abdominal pain and admitted 12/22 for acute perforated diverticulitis, s/p ex lap 12/24 with sigmoid colectomy and abdominal washout.  She was then transferred to MICU for postop care on mechanical ventilation.  Hospital course complicated by Afib with RVR.  She underwent wound closure and creation of colostomy 12/26, developed abdominal abscess s/p IR drainage 1/3, cultures grew Ecoli and Bacteriodes.  S/p tracheostomy and PEG placement.  1/28 developed bleeding through ostomy, AC stopped and pt transfused.  EGD on 2/1 showed gastritis, PEG tube embedded in mucosa.  Due to fevers, CT Head, C/A/P performed on 1/30, showed Acute left MCA infarct, new RLL pneumonia.  Seen by ID, started on Zosyn.      # RLL pneumonia, Aspiration Pneumonia - seen on CT chest 1/30.  ID following, completed Zosyn. No fever since 2/3.  WBC now 13.  Supportive care.  # Acute Blood Loss Anemia - due to suspected gastric bleeding.  s/p transfusion.  EGD 2/1 showed gastritis.  Monitor H/H, has been stable.    # Acute CVA - L MCA infarct was noted on CT 1/30.  Neuro input appreciated.  AC held due to extensive CVA, concern for hemorrhagic conversion.  Continue ASA, Statin.  MRI / MRA per Neurology.  # Elevated LFTs - monitor CMP.  Abdominal Sono showed cholelithiasis, no acute GB disease.  GI following.   # Hypernatremia - Would avoid hypotonic fluids in setting of acute CVA.  Increase free water via PEG. na normalized.   # Perforated Acute Diverticulitis / Intraabdominal Abscesses - continue BABAK drain care, wound vac.  Most recent CT without significant new collections.  # S/p Septic Shock - resolved  # Afib with RVR - rate control.  Holding AC due to above.   # Chronic Respiratory Failure with Hypoxemia - CPAP settings, pulmonary following.  # RLE DVT - per IR, felt to be more subacute to chronic.  Recommended no IVC filter placement.  Followup LE doppler 2/2 shows no acute DVT.   # Functional Quadriplegia - supportive care.    DVT Proph - ICDs.    I contacted pt's son HCP Blake [phone] on 2/3.  Awaiting arrival of additional family as they are contemplating comfort measures.

## 2022-02-06 NOTE — PROGRESS NOTE ADULT - ASSESSMENT
TRESA PIZARRO 81 f 12/22/2021 1942 DR ANTONIO PTATON     REVIEW OF SYMPTOMS      Able to give (reliable) ROS  NO     PHYSICAL EXAM    HEENT Unremarkable  atraumatic   RESP Fair air entry EXP prolonged    Harsh breath sound Resp distres mild   CARDIAC S1 S2 No S3     NO JVD    ABDOMEN SOFT BS PRESENT NOT DISTENDED No hepatosplenomegaly   PEDAL EDEMA present No calf tenderness  NO rash     ______  DOA/CC.  12/22/2021 81F w/ HTN, HLD, hypothyroidism, depression, seizures. Presented w/ abdominal pain found to have acute sigmoid diverticulitis    PMH.  pmh Hytn  pmh Sz.    PROBLEMS.  Feculent peritonitis poa 12/22  Colostomy 12/26  Initial abio course competed 1/15    R LEFTY removd 1/22/2022    DVT 1/25/2022 r cfv fd lvnx startd   Drop Hb 1/27 Lvnx dced  Need for ivcf 1/30/2022 dw Dr Patton  2/1/2022 IR reviewed imaging IR felt that Blood clot is minimal and subaute/chronic and neither IVCF not Anticoagulants are needed     GI bl peg and ostomy 1/28  NPO 1/28  GI Bleed EGD 2/1/2022 Dr Ness Erosions PEG tube May restart Anticoagulants after 24 h    Zosyn 1/29-2/6 1/31 Sp Pseudo  New rll pneum 1/30 ct   Ac L MCA cva 1/30/2022 CT   Elevated LFTS 2/1/2022  2/4 us abd (-)   Fever 2/3/2022 Abio dced 2/6      MISC ISSUES.  COVID STATUS. 12/24 pcr (-)  ICU STAY. 12/22-1/12/2022   GOC.  1/13/2022 full code       BEST PRACTICE ISSUES.                                                  HEAD OF BED ELEVATION. Yes  DVT PROPHYLAXIS.     1/28/2022 lvnx dced because of active gi bleed    LIGHT PROPHYLAXIS.    1/11 protonix 40                                                                                     DIET.   2/1 jevity 1440  INFECTION PROPHYLAXIS.   1/7 Chlorhexidine .12%   1/7 chlorhexidine 2%    SPEECH SWALLOW RECOMMENDATIONS.     PATIENT DATA   VITALS/PO/IO/VENT/DRIPS.   2/6/2022 afeb 67 130/70  2/6/2022 12p cpap 5/10/30/480/22      ASSESSMENT/RECOMMENDATIONS.    HEMODYNAMICS.   Monitor bp Target MAP 65 (+)    RESP.   Monitor po Target po 90-95%  1/14/2022 15/450/.4/5 747/38/110       DVT  2/1/2022 IR reviewed imaging IR felt that Blood clot is minimal and subaute/chronic and neither IVCF not Anticoagulants are needed   2/2/2022 V duplex (-)  2/3 recommend resuming lovenox full dose or at least pplx dose if ok with gi    VENT MANAGEMENT.   HOB elevation  Target Pplat 35 (-)  Target PO 90-95%  Target pH 730 (+)      FEVER 1/28/2022.  New rll pneum 1/30 ct  Abdominal infection sec perforated sigmoid div feculent peritonitis poa 12/22.  2/5-2/6/2022 w 19 - 12.9   1/30/2022 ct ch new rll pneu  Sputum 1/31 Sp Pseudo  blod cult. 1/31  bc. (-)  1/29-2/6/2022 zosyn started by id  2/6/2022 off abio    SP ABD SURGERY.  81F admitted with Acute Sigmoid Diverticulitis s/p ex lap, sigmoid resection, peritoneal lavage 12/25 and RTOR 12/26 for irrigation of abdominal cavity with closure and creation of colostomy. S/p bedside IR aspiration of fluid collection 1/3. Tracheostomy 1/7.   Deep pelvic abscess noted on CT,  Local drain/ostomy care per nursing  1/22/2022 r lefty removd     ANEMIA.  Hb 2/5-2/6/2022 Hb 8.6 - 9.6   monitor target hb 7     AMS  2/5/2022 ct h no hrrge   1/30/2022 ct h ac l mca cva   2/2 asa 81   2/2 simvastat 40       GT placed 1/18    GI BLEED   11/28 peg and ostomy with active bleed  EGD 2/1/2022 Dr Ness Erosions PEG tube May restart Anticoagulants after 24 h    ELEVATED LFTS  LFTS 2/1-2/4/2022    - 142   - 49   - 134  2/4/2022 us cbd 5 mm no cholecystit  Follow with GI      Hypernatremia  Na 1/26-1/27-1/28-1/31-2/4-2/5/2022 Na 147-148 -147 - 143 - 147-142  2/4/2022 fw started 300.6     IV Fluids.  1/31/2022 d5 1/2 ns 20k 42    TIME SPENT   Over 25 minutes aggregate care time spent on encounter; activities included   direct patient care, counseling and/or coordinating care reviewing notes, lab data/ imaging , discussion with multidisciplinary team/ patient  /family and explaining in detail risks, benefits, alternatives  of the recommendations     TRESA PIZARRO 81 f 12/22/2021 1942 DR ANTONIO PATTON

## 2022-02-06 NOTE — PROGRESS NOTE ADULT - SUBJECTIVE AND OBJECTIVE BOX
Patient: JUAREZ GTZ [id number] 81y Female                            Hospitalist Attending Note    Unable to offer complaints.   Continues to be afebrile.   Last fever was 2/3 at ~ 5pm.   On vent support..     ____________________PHYSICAL EXAM:  GENERAL:  NAD Awake, does not follow commands.   HEENT: NCAT, trach in place.   CARDIOVASCULAR:  S1, S2  LUNGS: b/l rhonchi. On vent support.   ABDOMEN:  soft, (-) tenderness, (-) distension, (+) bowel sounds, (-) guarding, (-) rebound (-) rigidity.  Colostomy in place with dark stool.  Midline abdominal wound vac in place.    EXTREMITIES:  no cyanosis / clubbing / edema.   ____________________    VITALS:  Vital Signs Last 24 Hrs  T(C): 36.9 (06 Feb 2022 05:00), Max: 37.3 (05 Feb 2022 16:44)  T(F): 98.4 (06 Feb 2022 05:00), Max: 99.1 (05 Feb 2022 16:44)  HR: 65 (06 Feb 2022 12:06) (65 - 76)  BP: 135/81 (06 Feb 2022 05:00) (128/69 - 135/81)  BP(mean): --  RR: 18 (06 Feb 2022 05:00) (18 - 18)  SpO2: 98% (06 Feb 2022 12:06) (98% - 99%) Daily     Daily   CAPILLARY BLOOD GLUCOSE      POCT Blood Glucose.: 144 mg/dL (06 Feb 2022 13:00)  POCT Blood Glucose.: 136 mg/dL (06 Feb 2022 08:30)  POCT Blood Glucose.: 147 mg/dL (06 Feb 2022 06:31)  POCT Blood Glucose.: 117 mg/dL (06 Feb 2022 00:23)  POCT Blood Glucose.: 124 mg/dL (05 Feb 2022 17:58)    I&O's Summary      LABS:                        9.6    13.92 )-----------( 396      ( 06 Feb 2022 07:10 )             31.0     02-06    140  |  109<H>  |  13  ----------------------------<  134<H>  3.8   |  25  |  0.47<L>    Ca    8.4<L>      06 Feb 2022 07:10  Phos  2.6     02-05  Mg     2.0     02-05    TPro  4.2<L>  /  Alb  1.1<L>  /  TBili  0.2  /  DBili  0.0  /  AST  35  /  ALT  78  /  AlkPhos  92  02-05      LIVER FUNCTIONS - ( 05 Feb 2022 09:36 )  Alb: 1.1 g/dL / Pro: 4.2 gm/dL / ALK PHOS: 92 U/L / ALT: 78 U/L / AST: 35 U/L / GGT: x                     MEDICATIONS:  acetaminophen    Suspension .. 650 milliGRAM(s) Oral every 6 hours PRN  amLODIPine   Tablet 10 milliGRAM(s) Oral daily  aspirin  chewable 81 milliGRAM(s) Oral daily  carvedilol 3.125 milliGRAM(s) Oral every 12 hours  chlorhexidine 0.12% Liquid 15 milliLiter(s) Oral Mucosa every 12 hours  chlorhexidine 2% Cloths 1 Application(s) Topical <User Schedule>  dextrose 40% Gel 15 Gram(s) Oral once  dextrose 5% + sodium chloride 0.45% with potassium chloride 20 mEq/L 1000 milliLiter(s) IV Continuous <Continuous>  dextrose 5%. 1000 milliLiter(s) IV Continuous <Continuous>  dextrose 5%. 1000 milliLiter(s) IV Continuous <Continuous>  dextrose 50% Injectable 25 Gram(s) IV Push once  dextrose 50% Injectable 12.5 Gram(s) IV Push once  dextrose 50% Injectable 25 Gram(s) IV Push once  dorzolamide 2%/timolol 0.5% Ophthalmic Solution 1 Drop(s) Both EYES two times a day  escitalopram 20 milliGRAM(s) Oral daily  glucagon  Injectable 1 milliGRAM(s) IntraMuscular once  levETIRAcetam  IVPB 500 milliGRAM(s) IV Intermittent every 12 hours  levothyroxine Injectable 37.5 MICROGram(s) IV Push at bedtime  pantoprazole  Injectable 40 milliGRAM(s) IV Push every 12 hours  piperacillin/tazobactam IVPB.. 3.375 Gram(s) IV Intermittent every 8 hours  simvastatin 40 milliGRAM(s) Oral at bedtime  sodium chloride 0.9% lock flush 10 milliLiter(s) IV Push every 1 hour PRN

## 2022-02-07 LAB
ALBUMIN SERPL ELPH-MCNC: 1.5 G/DL — LOW (ref 3.3–5)
ALP SERPL-CCNC: 116 U/L — SIGNIFICANT CHANGE UP (ref 40–120)
ALT FLD-CCNC: 80 U/L — HIGH (ref 12–78)
ANION GAP SERPL CALC-SCNC: 7 MMOL/L — SIGNIFICANT CHANGE UP (ref 5–17)
AST SERPL-CCNC: 39 U/L — HIGH (ref 15–37)
BILIRUB DIRECT SERPL-MCNC: <0.5 MG/DL — HIGH (ref 0–0.3)
BILIRUB INDIRECT FLD-MCNC: >-0.3 MG/DL — LOW (ref 0.2–1)
BILIRUB SERPL-MCNC: 0.2 MG/DL — SIGNIFICANT CHANGE UP (ref 0.2–1.2)
BUN SERPL-MCNC: 13 MG/DL — SIGNIFICANT CHANGE UP (ref 7–23)
CALCIUM SERPL-MCNC: 8.2 MG/DL — LOW (ref 8.5–10.1)
CHLORIDE SERPL-SCNC: 107 MMOL/L — SIGNIFICANT CHANGE UP (ref 96–108)
CO2 SERPL-SCNC: 24 MMOL/L — SIGNIFICANT CHANGE UP (ref 22–31)
CREAT SERPL-MCNC: 0.43 MG/DL — LOW (ref 0.5–1.3)
GLUCOSE BLDC GLUCOMTR-MCNC: 139 MG/DL — HIGH (ref 70–99)
GLUCOSE BLDC GLUCOMTR-MCNC: 152 MG/DL — HIGH (ref 70–99)
GLUCOSE BLDC GLUCOMTR-MCNC: 192 MG/DL — HIGH (ref 70–99)
GLUCOSE SERPL-MCNC: 134 MG/DL — HIGH (ref 70–99)
HCT VFR BLD CALC: 29.5 % — LOW (ref 34.5–45)
HGB BLD-MCNC: 8.9 G/DL — LOW (ref 11.5–15.5)
MCHC RBC-ENTMCNC: 27.6 PG — SIGNIFICANT CHANGE UP (ref 27–34)
MCHC RBC-ENTMCNC: 30.2 G/DL — LOW (ref 32–36)
MCV RBC AUTO: 91.3 FL — SIGNIFICANT CHANGE UP (ref 80–100)
NRBC # BLD: 0 /100 WBCS — SIGNIFICANT CHANGE UP (ref 0–0)
PLATELET # BLD AUTO: 445 K/UL — HIGH (ref 150–400)
POTASSIUM SERPL-MCNC: 3.9 MMOL/L — SIGNIFICANT CHANGE UP (ref 3.5–5.3)
POTASSIUM SERPL-SCNC: 3.9 MMOL/L — SIGNIFICANT CHANGE UP (ref 3.5–5.3)
PROT SERPL-MCNC: 5.9 GM/DL — LOW (ref 6–8.3)
RBC # BLD: 3.23 M/UL — LOW (ref 3.8–5.2)
RBC # FLD: 15.9 % — HIGH (ref 10.3–14.5)
SODIUM SERPL-SCNC: 138 MMOL/L — SIGNIFICANT CHANGE UP (ref 135–145)
WBC # BLD: 13.13 K/UL — HIGH (ref 3.8–10.5)
WBC # FLD AUTO: 13.13 K/UL — HIGH (ref 3.8–10.5)

## 2022-02-07 PROCEDURE — 99232 SBSQ HOSP IP/OBS MODERATE 35: CPT

## 2022-02-07 PROCEDURE — 99233 SBSQ HOSP IP/OBS HIGH 50: CPT

## 2022-02-07 RX ADMIN — CHLORHEXIDINE GLUCONATE 15 MILLILITER(S): 213 SOLUTION TOPICAL at 16:54

## 2022-02-07 RX ADMIN — CARVEDILOL PHOSPHATE 3.12 MILLIGRAM(S): 80 CAPSULE, EXTENDED RELEASE ORAL at 16:54

## 2022-02-07 RX ADMIN — DORZOLAMIDE HYDROCHLORIDE TIMOLOL MALEATE 1 DROP(S): 20; 5 SOLUTION/ DROPS OPHTHALMIC at 17:05

## 2022-02-07 RX ADMIN — Medication 37.5 MICROGRAM(S): at 21:32

## 2022-02-07 RX ADMIN — ESCITALOPRAM OXALATE 20 MILLIGRAM(S): 10 TABLET, FILM COATED ORAL at 16:54

## 2022-02-07 RX ADMIN — LEVETIRACETAM 420 MILLIGRAM(S): 250 TABLET, FILM COATED ORAL at 06:25

## 2022-02-07 RX ADMIN — PANTOPRAZOLE SODIUM 40 MILLIGRAM(S): 20 TABLET, DELAYED RELEASE ORAL at 16:54

## 2022-02-07 RX ADMIN — PANTOPRAZOLE SODIUM 40 MILLIGRAM(S): 20 TABLET, DELAYED RELEASE ORAL at 06:24

## 2022-02-07 RX ADMIN — SIMVASTATIN 40 MILLIGRAM(S): 20 TABLET, FILM COATED ORAL at 21:32

## 2022-02-07 RX ADMIN — CHLORHEXIDINE GLUCONATE 1 APPLICATION(S): 213 SOLUTION TOPICAL at 06:23

## 2022-02-07 RX ADMIN — AMLODIPINE BESYLATE 10 MILLIGRAM(S): 2.5 TABLET ORAL at 06:24

## 2022-02-07 RX ADMIN — CARVEDILOL PHOSPHATE 3.12 MILLIGRAM(S): 80 CAPSULE, EXTENDED RELEASE ORAL at 06:24

## 2022-02-07 RX ADMIN — CHLORHEXIDINE GLUCONATE 15 MILLILITER(S): 213 SOLUTION TOPICAL at 06:26

## 2022-02-07 RX ADMIN — DORZOLAMIDE HYDROCHLORIDE TIMOLOL MALEATE 1 DROP(S): 20; 5 SOLUTION/ DROPS OPHTHALMIC at 06:26

## 2022-02-07 RX ADMIN — Medication 81 MILLIGRAM(S): at 16:54

## 2022-02-07 RX ADMIN — LEVETIRACETAM 420 MILLIGRAM(S): 250 TABLET, FILM COATED ORAL at 16:54

## 2022-02-07 NOTE — PROGRESS NOTE ADULT - TIME BILLING
GI
GI / TPN
GI
GI and TPN
min spent reviewing chart, examining patient, discussing plan with patient and family
GI
GI and TPN
GI
GI and TPN
GI
min spent reviewing chart, examining patient, discussing plan with patient and family

## 2022-02-07 NOTE — PROGRESS NOTE ADULT - ASSESSMENT
81F with a PMH of HTN, HLD, hypothyroidism, depression, seizures presented with abdominal pain and admitted 12/22 for acute perforated diverticulitis, s/p ex lap 12/24 with sigmoid colectomy and abdominal washout.  She was then transferred to MICU for postop care on mechanical ventilation.  Hospital course complicated by Afib with RVR.  She underwent wound closure and creation of colostomy 12/26, developed abdominal abscess s/p IR drainage 1/3, cultures grew Ecoli and Bacteriodes.  S/p tracheostomy and PEG placement.  1/28 developed bleeding through ostomy, AC stopped and pt transfused.  EGD on 2/1 showed gastritis, PEG tube embedded in mucosa.  Due to fevers, CT Head, C/A/P performed on 1/30, showed Acute left MCA infarct, new RLL pneumonia.  Seen by ID, started on Zosyn.      # RLL pneumonia, Aspiration Pneumonia - seen on CT chest 1/30.  ID following, completed Zosyn. No fever since 2/3.  WBC now 13.  Supportive care.  # Acute Blood Loss Anemia - due to suspected gastric bleeding.  s/p transfusion.  EGD 2/1 showed gastritis.  Monitor H/H, has been stable.    # Acute CVA - L MCA infarct was noted on CT 1/30.  Neuro input appreciated.  AC held due to extensive CVA, concern for hemorrhagic conversion.  Continue ASA, Statin.  MRI / MRA per Neurology.  # Elevated LFTs - monitor CMP.  Abdominal Sono showed cholelithiasis, no acute GB disease.  GI following.   # Hypernatremia - Would avoid hypotonic fluids in setting of acute CVA.  Increase free water via PEG. na normalized.   # Perforated Acute Diverticulitis / Intraabdominal Abscesses - continue BABAK drain care, wound vac.  Most recent CT without significant new collections.  # S/p Septic Shock - resolved  # Afib with RVR - rate control.  Holding AC due to above.   # Chronic Respiratory Failure with Hypoxemia - vent support, weaning to trach collar.  Pulmonary following.  # RLE DVT - per IR, felt to be more subacute to chronic.  Recommended no IVC filter placement.  Followup LE doppler 2/2 shows no acute DVT.   # Functional Quadriplegia - supportive care.    DVT Proph - ICDs.    I contacted pt's son HCP Blake [phone] on 2/3, 2/7.  There has been some improvement in pt's ventilatory status.  He wishes for eventual d/c to SNF and will decide at that point regarding comfort care.  D/c palliative evaluation.  See GOC note.

## 2022-02-07 NOTE — PROGRESS NOTE ADULT - SUBJECTIVE AND OBJECTIVE BOX
Patient is a 81y old  Female who presents with a chief complaint of diverticulitis (07 Feb 2022 13:42)      HPI:  Patient is a 81F with a PMH of HTN, HLD, hypothyroidism, depression, seizures who presents to the ED for abd pain.  Patient states that over the last two days she had developed significant abdominal pain and multiple episodes of watery diarrhea.  Reports one episode of nausea and vomiting earlier today.  Patient has no other complaints.  Vitals stable,  labs show leukocytosis and hypokalemia.  CT abdomen significant for diverticulitis.  Will admit to med surg.     (23 Dec 2021 00:17)      INTERVAL HPI/OVERNIGHT EVENTS: patient seen earlier today  The nurse denies melena, hematochezia, hematemesis, nausea, vomiting, abdominal pain, constipation, diarrhea, or change in bowel movements Tolerating feeds at 60 ml / hr.  Brown stool in ostomy.     MEDICATIONS  (STANDING):  amLODIPine   Tablet 10 milliGRAM(s) Oral daily  aspirin  chewable 81 milliGRAM(s) Oral daily  carvedilol 3.125 milliGRAM(s) Oral every 12 hours  chlorhexidine 0.12% Liquid 15 milliLiter(s) Oral Mucosa every 12 hours  chlorhexidine 2% Cloths 1 Application(s) Topical <User Schedule>  dextrose 40% Gel 15 Gram(s) Oral once  dextrose 5% + sodium chloride 0.45% with potassium chloride 20 mEq/L 1000 milliLiter(s) (42 mL/Hr) IV Continuous <Continuous>  dextrose 5%. 1000 milliLiter(s) (50 mL/Hr) IV Continuous <Continuous>  dextrose 5%. 1000 milliLiter(s) (100 mL/Hr) IV Continuous <Continuous>  dextrose 50% Injectable 25 Gram(s) IV Push once  dextrose 50% Injectable 12.5 Gram(s) IV Push once  dextrose 50% Injectable 25 Gram(s) IV Push once  dorzolamide 2%/timolol 0.5% Ophthalmic Solution 1 Drop(s) Both EYES two times a day  escitalopram 20 milliGRAM(s) Oral daily  glucagon  Injectable 1 milliGRAM(s) IntraMuscular once  levETIRAcetam  IVPB 500 milliGRAM(s) IV Intermittent every 12 hours  levothyroxine Injectable 37.5 MICROGram(s) IV Push at bedtime  pantoprazole  Injectable 40 milliGRAM(s) IV Push every 12 hours  simvastatin 40 milliGRAM(s) Oral at bedtime    MEDICATIONS  (PRN):  acetaminophen    Suspension .. 650 milliGRAM(s) Oral every 6 hours PRN Temp greater or equal to 38C (100.4F)  sodium chloride 0.9% lock flush 10 milliLiter(s) IV Push every 1 hour PRN Pre/post blood products, medications, blood draw, and to maintain line patency      FAMILY HISTORY:  FH: HTN (hypertension)        Allergies    No Known Allergies    Intolerances        PMH/PSH:  Seizure    HTN (hypertension)    Glaucoma    Thyroid disease    Blood clot of neck vein    TIA (transient ischemic attack)    S/P appendectomy          REVIEW OF SYSTEMS:  CONSTITUTIONAL: No fever, weight loss, or fatigue  EYES: No eye pain, visual disturbances, or discharge  ENMT:  No difficulty hearing, tinnitus, vertigo; No sinus or throat pain  NECK: No pain or stiffness  BREASTS: No pain, masses, or nipple discharge  RESPIRATORY: No cough, wheezing, chills or hemoptysis; No shortness of breath  CARDIOVASCULAR: No chest pain, palpitations, dizziness, or leg swelling  GASTROINTESTINAL: No abdominal or epigastric pain. No nausea, vomiting, or hematemesis; No diarrhea or constipation. No melena or hematochezia.  GENITOURINARY: No dysuria, frequency, hematuria, or incontinence  NEUROLOGICAL: No headaches, memory loss, loss of strength, numbness, or tremors  SKIN: No itching, burning, rashes, or lesions   LYMPH NODES: No enlarged glands  ENDOCRINE: No heat or cold intolerance; No hair loss  MUSCULOSKELETAL: No joint pain or swelling; No muscle, back, or extremity pain  PSYCHIATRIC: No depression, anxiety, mood swings, or difficulty sleeping  HEME/LYMPH: No easy bruising, or bleeding gums  ALLERGY AND IMMUNOLOGIC: No hives or eczema    Vital Signs Last 24 Hrs  T(C): 36.7 (07 Feb 2022 15:00), Max: 37.2 (06 Feb 2022 17:57)  T(F): 98 (07 Feb 2022 15:00), Max: 98.9 (06 Feb 2022 17:57)  HR: 80 (07 Feb 2022 15:00) (61 - 88)  BP: 120/68 (07 Feb 2022 15:00) (120/68 - 144/63)  BP(mean): --  RR: 20 (07 Feb 2022 15:00) (18 - 20)  SpO2: 98% (07 Feb 2022 13:11) (98% - 100%)    PHYSICAL EXAM:  GENERAL: NAD, well-groomed, well-developed  HEAD:  Atraumatic, Normocephalic  EYES: EOMI, PERRLA, conjunctiva and sclera clear  ENMT: No tonsillar erythema, exudates, or enlargement; Moist mucous membranes, Good dentition, No lesions  NECK: Supple, No JVD, Normal thyroid  NERVOUS SYSTEM:  Alert & Oriented X3, Good concentration; Motor Strength 5/5 B/L upper and lower extremities; DTRs 2+ intact and symmetric  CHEST/LUNG: Clear to percussion bilaterally; No rales, rhonchi, wheezing, or rubs  HEART: Regular rate and rhythm; No murmurs, rubs, or gallops  ABDOMEN: Soft, Nontender, Nondistended; Bowel sounds present  EXTREMITIES:  2+ Peripheral Pulses, No clubbing, cyanosis, or edema  LYMPH: No lymphadenopathy noted  SKIN: No rashes or lesions    LAB                          8.9    13.13 )-----------( 445      ( 07 Feb 2022 07:03 )             29.5       CBC:  02-07 @ 07:03  WBC 13.13   Hgb 8.9   Hct 29.5   Plts 445  MCV 91.3  02-06 @ 07:10  WBC 13.92   Hgb 9.6   Hct 31.0   Plts 396  MCV 90.6  02-05 @ 09:36  WBC 19.20   Hgb 8.6   Hct 29.1   Plts 241  MCV 92.1  02-04 @ 08:24  WBC 13.92   Hgb 8.8   Hct 28.7   Plts 373  MCV 91.4  02-03 @ 11:27  WBC 14.11   Hgb 9.0   Hct 29.2   Plts 357  MCV 91.3  02-02 @ 08:07  WBC 12.54   Hgb 8.5   Hct 27.8   Plts 315  MCV 90.3  02-01 @ 07:43  WBC 16.76   Hgb 9.6   Hct 30.5   Plts 330  MCV 88.7      Chemistry:  02-07 @ 07:03  Na+ 138  K+ 3.9  Cl- 107  CO2 24  BUN 13  Cr 0.43     02-06 @ 07:10  Na+ 140  K+ 3.8  Cl- 109  CO2 25  BUN 13  Cr 0.47     02-05 @ 09:36  Na+ 142  K+ 3.8  Cl- 111  CO2 25  BUN 13  Cr 0.35     02-04 @ 08:24  Na+ 147  K+ 3.8  Cl- 117  CO2 26  BUN 13  Cr 0.46     02-03 @ 11:27  Na+ 144  K+ 3.5  Cl- 115  CO2 25  BUN 13  Cr 0.46     02-02 @ 08:07  Na+ 144  K+ 3.3  Cl- 114  CO2 23  BUN 15  Cr 0.52     02-01 @ 07:43  Na+ 145  K+ 3.7  Cl- 115  CO2 24  BUN 16  Cr 0.42     01-31 @ 17:52  Na+ 143  K+ 3.3  Cl- 113  CO2 25  BUN 18  Cr 0.52         Glucose, Serum: 134 mg/dL (02-07 @ 07:03)  Glucose, Serum: 134 mg/dL (02-06 @ 07:10)  Glucose, Serum: 132 mg/dL (02-05 @ 09:36)  Glucose, Serum: 104 mg/dL (02-04 @ 08:24)  Glucose, Serum: 139 mg/dL (02-03 @ 11:27)  Glucose, Serum: 187 mg/dL (02-02 @ 08:07)  Glucose, Serum: 122 mg/dL (02-01 @ 07:43)  Glucose, Serum: 122 mg/dL (01-31 @ 17:52)      07 Feb 2022 07:03    138    |  107    |  13     ----------------------------<  134    3.9     |  24     |  0.43   06 Feb 2022 07:10    140    |  109    |  13     ----------------------------<  134    3.8     |  25     |  0.47   05 Feb 2022 09:36    142    |  111    |  13     ----------------------------<  132    3.8     |  25     |  0.35   04 Feb 2022 08:24    147    |  117    |  13     ----------------------------<  104    3.8     |  26     |  0.46   03 Feb 2022 11:27    144    |  115    |  13     ----------------------------<  139    3.5     |  25     |  0.46   02 Feb 2022 08:07    144    |  114    |  15     ----------------------------<  187    3.3     |  23     |  0.52   01 Feb 2022 07:43    145    |  115    |  16     ----------------------------<  122    3.7     |  24     |  0.42     Ca    8.2        07 Feb 2022 07:03  Ca    8.4        06 Feb 2022 07:10  Ca    7.6        05 Feb 2022 09:36  Ca    7.8        04 Feb 2022 08:24  Ca    7.8        03 Feb 2022 11:27  Ca    8.0        02 Feb 2022 08:07  Ca    8.5        01 Feb 2022 07:43  Phos  2.6       05 Feb 2022 09:36  Phos  2.4       04 Feb 2022 08:24  Phos  1.8       03 Feb 2022 11:27  Phos  2.2       02 Feb 2022 08:07  Phos  3.0       01 Feb 2022 07:43  Phos  1.9       31 Jan 2022 17:52  Mg     2.0       05 Feb 2022 09:36  Mg     2.2       04 Feb 2022 08:24  Mg     2.0       03 Feb 2022 11:27  Mg     2.6       02 Feb 2022 08:07    TPro  5.9    /  Alb  1.5    /  TBili  0.2    /  DBili  x      /  AST  39     /  ALT  80     /  AlkPhos  116    07 Feb 2022 07:03  TPro  4.2    /  Alb  1.1    /  TBili  0.2    /  DBili  0.0    /  AST  35     /  ALT  78     /  AlkPhos  92     05 Feb 2022 09:36  TPro  5.7    /  Alb  1.4    /  TBili  0.2    /  DBili  <0.05  /  AST  49     /  ALT  134    /  AlkPhos  142    04 Feb 2022 08:24  TPro  6.0    /  Alb  1.4    /  TBili  0.2    /  DBili  0.0    /  AST  72     /  ALT  179    /  AlkPhos  166    03 Feb 2022 11:27  TPro  6.1    /  Alb  1.4    /  TBili  0.5    /  DBili  x      /  AST  207    /  ALT  203    /  AlkPhos  144    01 Feb 2022 07:43              CAPILLARY BLOOD GLUCOSE      POCT Blood Glucose.: 192 mg/dL (07 Feb 2022 11:21)  POCT Blood Glucose.: 152 mg/dL (07 Feb 2022 07:41)  POCT Blood Glucose.: 140 mg/dL (06 Feb 2022 23:56)  POCT Blood Glucose.: 125 mg/dL (06 Feb 2022 18:03)          RADIOLOGY & ADDITIONAL TESTS:    Imaging Personally Reviewed:  [ ] YES  [ ] NO    Consultant(s) Notes Reviewed:  [ ] YES  [ ] NO    Care Discussed with Consultants/Other Providers [ ] YES  [ ] NO Patient is a 81y old  Female who presents with a chief complaint of diverticulitis (07 Feb 2022 13:42)      HPI:  Patient is a 81F with a PMH of HTN, HLD, hypothyroidism, depression, seizures who presents to the ED for abd pain.  Patient states that over the last two days she had developed significant abdominal pain and multiple episodes of watery diarrhea.  Reports one episode of nausea and vomiting earlier today.  Patient has no other complaints.  Vitals stable,  labs show leukocytosis and hypokalemia.  CT abdomen significant for diverticulitis.  Will admit to med surg.     (23 Dec 2021 00:17)      INTERVAL HPI/OVERNIGHT EVENTS: patient seen earlier today  The nurse denies melena, hematochezia, hematemesis, nausea, vomiting, abdominal pain, constipation, diarrhea, or change in bowel movements Tolerating feeds at 60 ml / hr.  Brown stool in ostomy.     MEDICATIONS  (STANDING):  amLODIPine   Tablet 10 milliGRAM(s) Oral daily  aspirin  chewable 81 milliGRAM(s) Oral daily  carvedilol 3.125 milliGRAM(s) Oral every 12 hours  chlorhexidine 0.12% Liquid 15 milliLiter(s) Oral Mucosa every 12 hours  chlorhexidine 2% Cloths 1 Application(s) Topical <User Schedule>  dextrose 40% Gel 15 Gram(s) Oral once  dextrose 5% + sodium chloride 0.45% with potassium chloride 20 mEq/L 1000 milliLiter(s) (42 mL/Hr) IV Continuous <Continuous>  dextrose 5%. 1000 milliLiter(s) (50 mL/Hr) IV Continuous <Continuous>  dextrose 5%. 1000 milliLiter(s) (100 mL/Hr) IV Continuous <Continuous>  dextrose 50% Injectable 25 Gram(s) IV Push once  dextrose 50% Injectable 12.5 Gram(s) IV Push once  dextrose 50% Injectable 25 Gram(s) IV Push once  dorzolamide 2%/timolol 0.5% Ophthalmic Solution 1 Drop(s) Both EYES two times a day  escitalopram 20 milliGRAM(s) Oral daily  glucagon  Injectable 1 milliGRAM(s) IntraMuscular once  levETIRAcetam  IVPB 500 milliGRAM(s) IV Intermittent every 12 hours  levothyroxine Injectable 37.5 MICROGram(s) IV Push at bedtime  pantoprazole  Injectable 40 milliGRAM(s) IV Push every 12 hours  simvastatin 40 milliGRAM(s) Oral at bedtime    MEDICATIONS  (PRN):  acetaminophen    Suspension .. 650 milliGRAM(s) Oral every 6 hours PRN Temp greater or equal to 38C (100.4F)  sodium chloride 0.9% lock flush 10 milliLiter(s) IV Push every 1 hour PRN Pre/post blood products, medications, blood draw, and to maintain line patency      FAMILY HISTORY:  FH: HTN (hypertension)        Allergies    No Known Allergies    Intolerances        PMH/PSH:  Seizure    HTN (hypertension)    Glaucoma    Thyroid disease    Blood clot of neck vein    TIA (transient ischemic attack)    S/P appendectomy          REVIEW OF SYSTEMS:  CONSTITUTIONAL: No fever, weight loss, or fatigue  EYES: No eye pain, visual disturbances, or discharge  ENMT:  No difficulty hearing, tinnitus, vertigo; No sinus or throat pain  NECK: No pain or stiffness  BREASTS: No pain, masses, or nipple discharge  RESPIRATORY: No cough, wheezing, chills or hemoptysis; No shortness of breath  CARDIOVASCULAR: No chest pain, palpitations, dizziness, or leg swelling  GASTROINTESTINAL:  see above   GENITOURINARY: No dysuria, frequency, hematuria, or incontinence  NEUROLOGICAL: No headaches, numbness, or tremors  SKIN: No itching, burning, rashes, or lesions   LYMPH NODES: No enlarged glands  ENDOCRINE: No heat or cold intolerance; No hair loss  MUSCULOSKELETAL: No joint pain or swelling; No muscle, back, or extremity pain  PSYCHIATRIC: No depression, anxiety, mood swings, or difficulty sleeping  HEME/LYMPH: No easy bruising, or bleeding gums  ALLERGY AND IMMUNOLOGIC: No hives or eczema    Vital Signs Last 24 Hrs  T(C): 36.7 (07 Feb 2022 15:00), Max: 37.2 (06 Feb 2022 17:57)  T(F): 98 (07 Feb 2022 15:00), Max: 98.9 (06 Feb 2022 17:57)  HR: 80 (07 Feb 2022 15:00) (61 - 88)  BP: 120/68 (07 Feb 2022 15:00) (120/68 - 144/63)  BP(mean): --  RR: 20 (07 Feb 2022 15:00) (18 - 20)  SpO2: 98% (07 Feb 2022 13:11) (98% - 100%)    PHYSICAL EXAM:  GENERAL: NAD, well-groomed, well-developed  HEAD:  Atraumatic, Normocephalic  EYES: EOMI, PERRLA, conjunctiva and sclera clear  NECK: Supple, No JVD, Normal thyroid  NERVOUS SYSTEM:  MS unchanged  CHEST/LUNG: Clear to percussion bilaterally; No rales, rhonchi, wheezing, or rubs  HEART: Regular rate and rhythm; No murmurs, rubs, or gallops  ABDOMEN: Soft, Nontender, Nondistended; Bowel sounds present  EXTREMITIES:  2+ Peripheral Pulses, No clubbing, cyanosis, or edema  LYMPH: No lymphadenopathy noted  SKIN: No rashes or lesions    LAB                          8.9    13.13 )-----------( 445      ( 07 Feb 2022 07:03 )             29.5       CBC:  02-07 @ 07:03  WBC 13.13   Hgb 8.9   Hct 29.5   Plts 445  MCV 91.3  02-06 @ 07:10  WBC 13.92   Hgb 9.6   Hct 31.0   Plts 396  MCV 90.6  02-05 @ 09:36  WBC 19.20   Hgb 8.6   Hct 29.1   Plts 241  MCV 92.1  02-04 @ 08:24  WBC 13.92   Hgb 8.8   Hct 28.7   Plts 373  MCV 91.4  02-03 @ 11:27  WBC 14.11   Hgb 9.0   Hct 29.2   Plts 357  MCV 91.3  02-02 @ 08:07  WBC 12.54   Hgb 8.5   Hct 27.8   Plts 315  MCV 90.3  02-01 @ 07:43  WBC 16.76   Hgb 9.6   Hct 30.5   Plts 330  MCV 88.7      Chemistry:  02-07 @ 07:03  Na+ 138  K+ 3.9  Cl- 107  CO2 24  BUN 13  Cr 0.43     02-06 @ 07:10  Na+ 140  K+ 3.8  Cl- 109  CO2 25  BUN 13  Cr 0.47     02-05 @ 09:36  Na+ 142  K+ 3.8  Cl- 111  CO2 25  BUN 13  Cr 0.35     02-04 @ 08:24  Na+ 147  K+ 3.8  Cl- 117  CO2 26  BUN 13  Cr 0.46     02-03 @ 11:27  Na+ 144  K+ 3.5  Cl- 115  CO2 25  BUN 13  Cr 0.46     02-02 @ 08:07  Na+ 144  K+ 3.3  Cl- 114  CO2 23  BUN 15  Cr 0.52     02-01 @ 07:43  Na+ 145  K+ 3.7  Cl- 115  CO2 24  BUN 16  Cr 0.42     01-31 @ 17:52  Na+ 143  K+ 3.3  Cl- 113  CO2 25  BUN 18  Cr 0.52         Glucose, Serum: 134 mg/dL (02-07 @ 07:03)  Glucose, Serum: 134 mg/dL (02-06 @ 07:10)  Glucose, Serum: 132 mg/dL (02-05 @ 09:36)  Glucose, Serum: 104 mg/dL (02-04 @ 08:24)  Glucose, Serum: 139 mg/dL (02-03 @ 11:27)  Glucose, Serum: 187 mg/dL (02-02 @ 08:07)  Glucose, Serum: 122 mg/dL (02-01 @ 07:43)  Glucose, Serum: 122 mg/dL (01-31 @ 17:52)      07 Feb 2022 07:03    138    |  107    |  13     ----------------------------<  134    3.9     |  24     |  0.43   06 Feb 2022 07:10    140    |  109    |  13     ----------------------------<  134    3.8     |  25     |  0.47   05 Feb 2022 09:36    142    |  111    |  13     ----------------------------<  132    3.8     |  25     |  0.35   04 Feb 2022 08:24    147    |  117    |  13     ----------------------------<  104    3.8     |  26     |  0.46   03 Feb 2022 11:27    144    |  115    |  13     ----------------------------<  139    3.5     |  25     |  0.46   02 Feb 2022 08:07    144    |  114    |  15     ----------------------------<  187    3.3     |  23     |  0.52   01 Feb 2022 07:43    145    |  115    |  16     ----------------------------<  122    3.7     |  24     |  0.42     Ca    8.2        07 Feb 2022 07:03  Ca    8.4        06 Feb 2022 07:10  Ca    7.6        05 Feb 2022 09:36  Ca    7.8        04 Feb 2022 08:24  Ca    7.8        03 Feb 2022 11:27  Ca    8.0        02 Feb 2022 08:07  Ca    8.5        01 Feb 2022 07:43  Phos  2.6       05 Feb 2022 09:36  Phos  2.4       04 Feb 2022 08:24  Phos  1.8       03 Feb 2022 11:27  Phos  2.2       02 Feb 2022 08:07  Phos  3.0       01 Feb 2022 07:43  Phos  1.9       31 Jan 2022 17:52  Mg     2.0       05 Feb 2022 09:36  Mg     2.2       04 Feb 2022 08:24  Mg     2.0       03 Feb 2022 11:27  Mg     2.6       02 Feb 2022 08:07    TPro  5.9    /  Alb  1.5    /  TBili  0.2    /  DBili  x      /  AST  39     /  ALT  80     /  AlkPhos  116    07 Feb 2022 07:03  TPro  4.2    /  Alb  1.1    /  TBili  0.2    /  DBili  0.0    /  AST  35     /  ALT  78     /  AlkPhos  92     05 Feb 2022 09:36  TPro  5.7    /  Alb  1.4    /  TBili  0.2    /  DBili  <0.05  /  AST  49     /  ALT  134    /  AlkPhos  142    04 Feb 2022 08:24  TPro  6.0    /  Alb  1.4    /  TBili  0.2    /  DBili  0.0    /  AST  72     /  ALT  179    /  AlkPhos  166    03 Feb 2022 11:27  TPro  6.1    /  Alb  1.4    /  TBili  0.5    /  DBili  x      /  AST  207    /  ALT  203    /  AlkPhos  144    01 Feb 2022 07:43              CAPILLARY BLOOD GLUCOSE      POCT Blood Glucose.: 192 mg/dL (07 Feb 2022 11:21)  POCT Blood Glucose.: 152 mg/dL (07 Feb 2022 07:41)  POCT Blood Glucose.: 140 mg/dL (06 Feb 2022 23:56)  POCT Blood Glucose.: 125 mg/dL (06 Feb 2022 18:03)          RADIOLOGY & ADDITIONAL TESTS:    Imaging Personally Reviewed:  [ ] YES  [ ] NO    Consultant(s) Notes Reviewed:  [ ] YES  [ ] NO    Care Discussed with Consultants/Other Providers [ ] YES  [ ] NO

## 2022-02-07 NOTE — PROGRESS NOTE ADULT - ASSESSMENT
81F with a PMH of HTN, HLD, hypothyroidism, depression, seizures who presents to the ED for abd pain.  Patient states that over the last two days she had developed significant abdominal pain and multiple episodes of watery diarrhea.  Reports one episode of nausea and vomiting earlier today.  Patient has no other complaints.  Vitals stable,  labs show leukocytosis and hypokalemia.  CT abdomen significant for diverticulitis.  Will admit to med surg.     (23 Dec 2021 00:17)          A) Upper GI Bleed with CT Angio showing bleeding in gastric antrum s/p Protamine.  Now with brown stool. EGD revealed the PEG tube to be imbedded into the mucosa and an eschar under the PEG tube. The PEG tube subsequently was pushed in after a suture was cut.  Discussed with surgery. PEG tube was definitely pushed in after one suture was cut. No issues of being too tight at the present time as per surgery  1) Monitor Hb ( HGB 9.0 --> 8.8 --> 8.6--> 9.6 --> 8.9 ( stable ) , transfuse to Hb >8    2) Tolerating feeds at 60 ml / hr     3) PPI BID   4) Carafate   5) Now on ASA      B) Elevated LFTs, Improving.   ( [id number] )  0.1/25/28/72 ---> ( [id number] ) 1.4/207/203/144 -->0.2/72/179/166 -->  0.2/49/134/142------> 0.2/39/80/116  See Ultrasound results ( gallstones, otherwise negative )  1) F/U LFTs        C) Fever - Now afebrile   ==== Patient stable - No signs of active GI disease. Will follow on a PRN basis

## 2022-02-07 NOTE — PROGRESS NOTE ADULT - ASSESSMENT
From Initial ID consult note: 81F with a PMH of HTN, HLD, hypothyroidism, depression, seizures who presents to the ED for abd pain found to have diverticulitis   has rising leukocytosis   had fever yesterday   tachycardic and now hypoxic   CXR (I personally reviewed) low lung volumes   original CT (I personally reviewed) with diverticulitis   she had a lot of pain in the abdomen on exam     Progress Notes  12/25: s/p surgery for diverticulitis with perforation and abscess and went to the OR. intubated in ICU with vac and needs to go back to the OR, currently on zosyn, s/p one dose of diflucan last night   12/27: s/p repair and ostomy creation yesterday, wbc elevated, cultures sensitive to zosyn and candida albicans is notoriously sensitive to azoles such as fluconazole, wean of pressors and sedation, extubate when ready   12/28: Further increase in white blood cell count but overall the patient appears to be reasonably stable.  No concern of C. difficile at this juncture.  Current antibiotics are reasonable.  Leukocytosis like related to recent surgery, no concern of other superimposed process on the basis of exam.  I do not see a role to alter her antimicrobials.  12/29:  Remains intubated in ICU. still quite edematous and net positive, WBC climbing, small wound dehiscence at bottom of incision, plan for initiation of TPN today, remains on antibiotics    12/30: rising WBC, ostomy not functioning yet, very edematous CT today, may be source control issue so for now can continue same antibiotics and antifungal but may need to change if findings on the CT are worrisome or change in hemodynamics  12/31:Leukocytosis, with collections noted on CT.  However this is being done postoperative day 5, there is some possibility that this could represent postop changes but given the leukocytosis, concur with plans for attempts at percutaneous drainage.  White blood cell count down slightly compared to prior peak, will need to be trended.  Gallbladder is also distended.  Abdominal exam was benign this morning, but a calculus cholecystitis could be the differential diagnosis here as well (though n.p.o. status certainly could explain distended gallbladder, there is also report of gallbladder wall thickening).  Would modify antibiotics based on cultures from drainage, I do not believe that antibiotics and other the cells would be adequate here.  Fortunately she is off pressors, with preserved renal function, and tolerating pressure support.  1/1/22: Postop day 6.  I have some concerns with the appearance of the ostomy, though the abdominal exam overall is otherwise unchanged.  White blood cell count remains elevated, but is lower compared with prior values.  This is despite no change in antibiotic therapy.  Patient also has asymmetric edema of the upper extremities, right greater than left.  Low threshold for duplex ultrasound to exclude DVT.No new surveillance culture data.  1/2: POD7 Ostomy looks better today, UE symmetric. WBC elevated, some slight downward trend overall. Temps <= 100F.   1/3: drainage of fluid collection today, continue zosyn and fluconazole for now, monitor wbc and temps, watch ostomy output, diuresis and address third spacing   1/4: s/p drainage yesterday now growing ecoli and awaiting for sensitivities, wbc is improving, started on tube feeds    1/5: abscess growing ecoli and bacteroides, S to ceftriaxone, wbc 15, weaning trial today, pain management    1/6: awake, lung exercise today, trach planned for tomorrow, ostomy output is good. will stop fluconazole today and change antibiotics to ceftriaxone and flagly. Unclear stop date yet    1/7 trach today, continue antibiotics   1/9: s/p trach, had low grade fever but otherwise doing ok, will continues same antibiotics regimen for now but if fevers continue she will needs to be rescanned as those abscesses can progress.  1/10: low grade fever but doing well on trach-PS, ostomy with stool and gas, shakes head when asked if in pain, discussed with surgery about repeating CT scan given the temp  1/11: Still febrile, favor interval CT as above.  1/12: CT of abdomen showing some signs of improvement and elsewhere  it shows signs of worsening. wbc normal, tube feeds currently via NGT   1/13: no fever, no wbc, Cr and LFTs ok, Ceftriaxone and flagyl continued. No planned IR intervention at this time - fluid collection without access to drain, surgical drain within the collection. Pt possibly will have repeat imaging over the weekend to evaluate progress.   Attending addendum:  agree with above  continue antibiotics   vent weaning  surgical follow up for the abdominal wound  1/14: Low grade temp last night, midline placed, no leukocytosis, Cr and LFTs ok, culture data reviewed. Pt's colostomy with small amount of liquid stool and gas, no stools recorded for two days, no role for po Vancomycin at this time. The pt has been on abx since her admission, will stop all abx and will continue to monitor.    1/18: no fevers since 1/14, WBC better 11.00, cr ok, off abx, now s/p EGD, with PEG replacement.   1/24: spiking low grade fevers, low suspicion for pneumonia given little secretions and doing well on the PS wth low settings, peg site looks clean, trach site OK as well, fevers may be from small fluid collections intra-abdominally vs atelectasis Would suggest aggressive pulmonary toilet including chest vest. follow cultures and trend wbc and fever curve . If persistent fevers would start  Zosyn and PO fluconazole but for now please hold antibiotics   1/25: continues having fevers, low grade temps today, WBC better 18.35, Cr and LFTs ok, + new DVT of right common femoral vein - could be the cause of pt's fevers - on full dose Lovenox. Will continue to monitor off abx, WBC got better without abx.   Attending addendum:  patient with long hospital course and now on the floors with fever and found to have a DVT  wbc came down without antibiotics   fever curve is getting better   continue to monitor off antibiotics   treat DVT with full dose AC   suspect fevers could be from dvt but may also be from fluid collection    1/26: low grade temp today, pt is more awake and alert today, WBC improving 14.2 off abx, Cr and LFTs ok, abd wound with clean base and healing well - vac dressing was changed today during my exam. In favor to continue to monitor off abx for now.   1/27: Pt continues spiking fevers, low grade last night and 101 today, awake and alert during my exam, WBC improving off abx 13.61, recent chest xray with no acute lung disease, COVID 19 is negative. Pt's fevers most likely related to her DVT, will collect two sets of surveillance BC and will continue to monitor off abx for now.   1/28: CTA A/P done last night - There is evidence of active GI bleeding in the gastric antrum, related to peptic ulcer disease or gastritis, possible cystitis. Blood transfusion is in progress during my exam, wound vac is being changed - base of the wound is clean with no evidence of acute infection. Pt had a low grade temp last night, WBC elevated - could be reactive to DVT. Will obtain UA and UC, will hold off on abx for now.   1/29: persistent fevers, received a dose of vanco and a dose of Azactam last evening, WBC high 27.98, BC with no growth to date, UA positive, UC pending. Will start on broad spectrum antibiotic now as unclear it is unclear at this time if fevers caused by UTI only, or in combination with DVT, or other source. Pt's abd was seen with last vac dressing change - incision site wound  is clean and healing well. JPis still draining cloudy drainage.   1/30: lethargic, continues having fevers, WBC better 20.79, all BCs remain with no growth, UC pending, CT head - Acute left MCA infarct is suspected, CT chest and CT a/p performed earlier, pending radiology reading, will continue with Zosyn, Na is slightly elevated, would prefer to have UC available prior switching abx.   1/31: no fevers today, remains lethargic, vented, WBC with improvement 17.82, two sets of BCs were collected yesterday and pending result, Zosyn continued. All BCs with no growth to date, last UC is contaminated.  2/1: afebrile last 24hrs, no IVC filter placement per IR, wbc slightly better, PEG tube loosened and EGD by EGD didnt find bleed, pseudomonas in respiratory culture and CT did show pneumonia but she is doing well on , nonetheless hopefully the zosyn is sensitive,    2/2: low grade temp this morning, WBC improving, Zosyn continued, BCs remain with no growth, sputum culture with pseudomonas, sensitive to Zosyn.   Attending addendum:  agree with above   continue zosyn   neuro consulted for MCA infarct   wound seen today after vac was taken off->it is healing well with pink granulation tissue and no discharge    2/3: low grade temperature 100.3, WBC elevated, COVID 19 is negative, BC with no growth to date, Zosyn day #6 continued.   2/4: no fever today, WBC better 13.92, Cr ok, LFTs better, the pt seems to be more awake today, Zosyn continued - today is day #7 of abx.   Attending addendum:  agree with above   stop antibiotics   fevers could be from DVTs   latest cultures negative   doing well on vent and does not appear to be symptomatic from pneumonia   if continues to spike fevers can repeat blood cultures, UA and respiratory cultures   2/7: no fevers since 2/3, WBC 13.13, LFTs slightly elevated, completed 7 days course of Zosyn, off abx now, will continue to monitor.     #RLL Pneumonia   - course of Zosyn x 7 days complete   - trend pt's temperatures  - trend WBC  - MRSA PCR not detected  - CT chest/a/p - + pneumonia, no abdominal pathology     #Perforated diverticulum of large intestine.   monitor sodium levels   continue wound vac   Drain and colostomy care as per surgery      #Fever.   7 days Zosyn IV complete   trend pt's temperatures   UC - contaminated   +DVT of right common femoral vein   New left MCA infarct on CT head  CT c/a/p - new pneumonia RLL, no acute abd pathology   BC NGTD  repeat BC NGTD  MRSA PCR not detected     #Acute Respiratory Failure  wean off ventilator as tolerated   chest PT  frequent suctioning   chest vest   HOB >30 degrees    #DVT  AC if able and no contraindication    Msg sent to Dr. Valle

## 2022-02-07 NOTE — PROGRESS NOTE ADULT - SUBJECTIVE AND OBJECTIVE BOX
JUAREZ GTZ  MRN-[id number]    Follow Up:  pna, fevers    Interval History: The pt was seen and examined earlier, no distress, more awake, vented via tracheostomy. The pt is afebrile since 2/3, WBC better 13.13.     PAST MEDICAL & SURGICAL HISTORY:  Seizure    HTN (hypertension)    Glaucoma    Thyroid disease    Blood clot of neck vein  left side    TIA (transient ischemic attack)  20 years ago    S/P appendectomy        ROS:    [x ] Unobtainable because: pt is vented   [ ] All other systems negative    Constitutional: no fever, no chills  Head: no trauma  Eyes: no vision changes, no eye pain  ENT:  no sore throat, no rhinorrhea  Cardiovascular:  no chest pain, no palpitation  Respiratory:  no SOB, no cough  GI:  no abd pain, no vomiting, no diarrhea  urinary: no dysuria, no hematuria, no flank pain  musculoskeletal:  no joint pain, no joint swelling  skin:  no rash  neurology:  no headache, no seizure, no change in mental status  psych: no anxiety, no depression         Allergies  No Known Allergies        ANTIMICROBIALS:      OTHER MEDS:  acetaminophen    Suspension .. 650 milliGRAM(s) Oral every 6 hours PRN  amLODIPine   Tablet 10 milliGRAM(s) Oral daily  aspirin  chewable 81 milliGRAM(s) Oral daily  carvedilol 3.125 milliGRAM(s) Oral every 12 hours  chlorhexidine 0.12% Liquid 15 milliLiter(s) Oral Mucosa every 12 hours  chlorhexidine 2% Cloths 1 Application(s) Topical <User Schedule>  dextrose 40% Gel 15 Gram(s) Oral once  dextrose 5% + sodium chloride 0.45% with potassium chloride 20 mEq/L 1000 milliLiter(s) IV Continuous <Continuous>  dextrose 5%. 1000 milliLiter(s) IV Continuous <Continuous>  dextrose 5%. 1000 milliLiter(s) IV Continuous <Continuous>  dextrose 50% Injectable 25 Gram(s) IV Push once  dextrose 50% Injectable 12.5 Gram(s) IV Push once  dextrose 50% Injectable 25 Gram(s) IV Push once  dorzolamide 2%/timolol 0.5% Ophthalmic Solution 1 Drop(s) Both EYES two times a day  escitalopram 20 milliGRAM(s) Oral daily  glucagon  Injectable 1 milliGRAM(s) IntraMuscular once  levETIRAcetam  IVPB 500 milliGRAM(s) IV Intermittent every 12 hours  levothyroxine Injectable 37.5 MICROGram(s) IV Push at bedtime  pantoprazole  Injectable 40 milliGRAM(s) IV Push every 12 hours  simvastatin 40 milliGRAM(s) Oral at bedtime  sodium chloride 0.9% lock flush 10 milliLiter(s) IV Push every 1 hour PRN      Vital Signs Last 24 Hrs  T(C): 36.7 (07 Feb 2022 15:00), Max: 37.2 (06 Feb 2022 17:57)  T(F): 98 (07 Feb 2022 15:00), Max: 98.9 (06 Feb 2022 17:57)  HR: 80 (07 Feb 2022 15:00) (61 - 88)  BP: 120/68 (07 Feb 2022 15:00) (120/68 - 144/63)  BP(mean): --  RR: 20 (07 Feb 2022 15:00) (18 - 20)  SpO2: 98% (07 Feb 2022 13:11) (98% - 100%)    Physical Exam:  General: Nontoxic-appearing Female in no acute distress. Trached on vent, more awake today  HEENT: AT/NC. Unable to assess pt's mouth or eyes due to non-compliance   Neck: Not rigid. No sense of mass. Trach C/D/I  Nodes: None palpable.  Lungs: coarse vented breath sounds bilaterally, no wheezes  Heart: Regular rate and rhythm. +Murmur. No rub. No gallop. No palpable thrill. Right arm midline removed, with swelling   Abdomen: Bowel sounds now present.  Soft. not distended.  Abd incision with vac dressing.  Ostomy with stool and gas. Drains x1, +PEG tube   Extremities: No cyanosis or clubbing. 1+ edema of bilateral lower extremities   Skin: Warm. Dry No rash. No vasculitic stigmata.  Neuro: opens her eyes   Psychiatric: unable to assess    WBC Count: 13.13 K/uL (02-07 @ 07:03)  WBC Count: 13.92 K/uL (02-06 @ 07:10)  WBC Count: 19.20 K/uL (02-05 @ 09:36)  WBC Count: 13.92 K/uL (02-04 @ 08:24)  WBC Count: 14.11 K/uL (02-03 @ 11:27)  WBC Count: 12.54 K/uL (02-02 @ 08:07)  WBC Count: 16.76 K/uL (02-01 @ 07:43)                            8.9    13.13 )-----------( 445      ( 07 Feb 2022 07:03 )             29.5       02-07    138  |  107  |  13  ----------------------------<  134<H>  3.9   |  24  |  0.43<L>    Ca    8.2<L>      07 Feb 2022 07:03    TPro  5.9<L>  /  Alb  1.5<L>  /  TBili  0.2  /  DBili  <0.5<H>  /  AST  39<H>  /  ALT  80<H>  /  AlkPhos  116  02-07          Creatinine Trend: 0.43<--, 0.47<--, 0.35<--, 0.46<--, 0.46<--, 0.52<--      MICROBIOLOGY:  v  .Blood Blood  02-01-22   No Growth Final  --  --      .Sputum Sputum  01-31-22   Few Pseudomonas aeruginosa  --  Pseudomonas aeruginosa      .Blood Blood-Peripheral  01-31-22   No Growth Final  --  --      Catheterized Catheterized  01-28-22   >=3 organisms. Probable collection contamination.  --  --      .Blood Blood-Peripheral  01-27-22   No Growth Final  --  --      .Blood Blood-Peripheral  01-21-22   No Growth Final  --  --      Clean Catch Clean Catch (Midstream)  01-21-22   <10,000 CFU/mL Normal Urogenital Sendy  --  --    SARS-CoV-2 Result: NotDetec (02-03-22 @ 08:16)    SARS-CoV-2: NotDetec (20 Jan 2022 18:59)  SARS-CoV-2: NotDetec (24 Dec 2021 14:38)  SARS-CoV-2: NotDetec (24 Dec 2021 00:03)    RADIOLOGY:

## 2022-02-07 NOTE — PROGRESS NOTE ADULT - CONVERSATION DETAILS
Goals of care - I had a lengthy conversation with pt's son.   We discussed the comorbid conditions, hospital care, and prognosis.  We also discussed advanced directives - made aware of limited prognosis if patient were to be intubated or resuscitated, in consideration of age and comorbid conditions.  He wished to make no decision on advanced directives - that all interventions be pursued.  Therefore, pt is "full code".    He agreed to f/u with family pending pt's progress at [facility] to [facility].     Total time spent on patient care discussion = 20 minutes.
d/w son pt's current generalized weakness, mechanical vent dependence, failure to wean, IR drainage of abd abscess    All questions answered for son     He states he is not ready for making pt comfort measures yet and would still like full aggressive therapy to continue. states he is leaning towards trach but would like to discuss this with pt's common in law

## 2022-02-07 NOTE — PROGRESS NOTE ADULT - ASSESSMENT
TRESA PIZARRO 81 f [birth year]2021 1942 DR ANTONIO PATTON     REVIEW OF SYMPTOMS      Able to give (reliable) ROS  NO     PHYSICAL EXAM    HEENT Unremarkable  atraumatic   RESP Fair air entry EXP prolonged    Harsh breath sound Resp distres mild   CARDIAC S1 S2 No S3     NO JVD    ABDOMEN SOFT BS PRESENT NOT DISTENDED No hepatosplenomegaly   PEDAL EDEMA present No calf tenderness  NO rash   ______  DOA/CC.  12/22/2019 81F w/ HTN, HLD, hypothyroidism, depression, seizures. Presented w/ abdominal pain found to have acute sigmoid diverticulitis    PMH.  pmh Hytn  pmh Sz.    PROBLEMS.  Feculent peritonitis poa 12/22  Colostomy 12/26  Initial abio course competed 1/15  R LEFTY removd 1/22/2022    DVT 1/25/2022 r cfv fd lvnx startd   Drop Hb 1/27 Lvnx dced  Need for ivcf 1/30/2022 dw Dr Patton  2/1/2022 IR reviewed imaging IR felt that Blood clot is minimal and subaute/chronic and neither IVCF not Anticoagulants are needed     GI bl peg and ostomy 1/28  NPO 1/28  GI Bleed EGD 2/1/2022 Dr Ness Erosions PEG tube May restart Anticoagulants after 24 h      New rll pneum 1/30 ct   Zosyn 1/29-2/6 1/31 Sp Pseudo  Fever 2/3/2022 Abio dced 2/6    Ac L MCA cva 1/30/2022 CT   Elevated LFTS 2/1/2022  2/4 us abd (-)       MISC ISSUES.  COVID STATUS. 12/24 pcr (-)  ICU STAY. 12/22-1/12/2022   GOC.  1/13/2022 full code     BEST PRACTICE ISSUES.                                                  HEAD OF BED ELEVATION. Yes  DVT PROPHYLAXIS.     1/28/2022 lvnx dced because of active gi bleed    LIGHT PROPHYLAXIS.    1/11 protonix 40                                                                                     DIET.   2/1 jevity 1440  INFECTION PROPHYLAXIS.   1/7 Chlorhexidine .12%   1/7 chlorhexidine 2%      PATIENT DATA   VITALS/PO/IO/VENT/DRIPS.   2/7/2022 afeb 80 120/60   2/7/2022 cpap 5/10/.3 419/27      ASSESSMENT/RECOMMENDATIONS.    HEMODYNAMICS.   Monitor bp Target MAP 65 (+)    RESP.   Monitor po Target po 90-95%  1/14/2022 15/450/.4/5 747/38/110         DVT  2/1/2022 IR reviewed imaging IR felt that Blood clot is minimal and subaute/chronic and neither IVCF not Anticoagulants are needed   2/2/2022 V duplex (-)  2/3 recommend resuming lovenox full dose or at least pplx dose if ok with gi and neuro      VENT MANAGEMENT.   HOB elevation  Target Pplat 35 (-)  Target PO 90-95%  Target pH 730 (+)      FEVER 1/28/2022.  New rll pneum 1/30 ct  Abdominal infection sec perforated sigmoid div feculent peritonitis poa 12/22.  2/5-2/6/2022 w 19 - 12.9   1/30/2022 ct ch new rll pneu  Sputum 1/31 Sp Pseudo  blod cult. 1/31  bc. (-)  1/29-2/6/2022 zosyn started by id  2/6/2022 off abio    SP ABD SURGERY.  81F admitted with Acute Sigmoid Diverticulitis s/p ex lap, sigmoid resection, peritoneal lavage 12/25 and RTOR 12/26 for irrigation of abdominal cavity with closure and creation of colostomy. S/p bedside IR aspiration of fluid collection 1/3. Tracheostomy 1/7.   Deep pelvic abscess noted on CT,  Local drain/ostomy care per nursing  1/22/2022 r lefty removd     ANEMIA.  Hb 2/5-2/6-2/7/2022 Hb 8.6 - 9.6 - 8.9   monitor target hb 7     AMS  2/5/2022 ct h no hrrge   1/30/2022 ct h ac l mca cva   2/2 asa 81   2/2 simvastat 40       GT placed 1/18    GI BLEED   11/28 peg and ostomy with active bleed  EGD 2/1/2022 Dr Ness Erosions PEG tube May restart Anticoagulants after 24 h    ELEVATED LFTS  LFTS 2/1-2/4-2/7/2022    - 142-116   - 49 - 39   - 134 - 80   2/4/2022 us cbd 5 mm no cholecystit  Follow with GI      TIME SPENT   Over 25 minutes aggregate care time spent on encounter; activities included   direct patient care, counseling and/or coordinating care reviewing notes, lab data/ imaging , discussion with multidisciplinary team/ patient  /family and explaining in detail risks, benefits, alternatives  of the recommendations     TRESA PIZARRO 81 f [birth year]2021 1940 DR ANTONIO PATTON

## 2022-02-07 NOTE — PROGRESS NOTE ADULT - SUBJECTIVE AND OBJECTIVE BOX
Patient: JUAREZ GTZ [id number] 81y Female                            Hospitalist Attending Note    Unable to offer complaints.   Continues to be afebrile.   On trach collar.     ____________________PHYSICAL EXAM:  GENERAL:  NAD Awake, does not follow commands.   HEENT: NCAT, trach in place.   CARDIOVASCULAR:  S1, S2  LUNGS: b/l rhonchi. On vent support.   ABDOMEN:  soft, (-) tenderness, (-) distension, (+) bowel sounds, (-) guarding, (-) rebound (-) rigidity.  Colostomy in place with dark stool.  Midline abdominal wound vac in place.    EXTREMITIES:  no cyanosis / clubbing / edema.   ____________________    VITALS:  Vital Signs Last 24 Hrs  T(C): 36.9 (07 Feb 2022 06:56), Max: 37.2 (06 Feb 2022 17:57)  T(F): 98.4 (07 Feb 2022 06:56), Max: 98.9 (06 Feb 2022 17:57)  HR: 88 (07 Feb 2022 13:11) (61 - 88)  BP: 138/61 (07 Feb 2022 06:56) (130/73 - 144/63)  BP(mean): --  RR: 18 (07 Feb 2022 06:56) (18 - 18)  SpO2: 98% (07 Feb 2022 13:11) (98% - 100%) Daily     Daily   CAPILLARY BLOOD GLUCOSE      POCT Blood Glucose.: 192 mg/dL (07 Feb 2022 11:21)  POCT Blood Glucose.: 152 mg/dL (07 Feb 2022 07:41)  POCT Blood Glucose.: 140 mg/dL (06 Feb 2022 23:56)  POCT Blood Glucose.: 125 mg/dL (06 Feb 2022 18:03)    I&O's Summary      LABS:                        8.9    13.13 )-----------( 445      ( 07 Feb 2022 07:03 )             29.5     02-07    138  |  107  |  13  ----------------------------<  134<H>  3.9   |  24  |  0.43<L>    Ca    8.2<L>      07 Feb 2022 07:03    TPro  5.9<L>  /  Alb  1.5<L>  /  TBili  0.2  /  DBili  x   /  AST  39<H>  /  ALT  80<H>  /  AlkPhos  116  02-07      LIVER FUNCTIONS - ( 07 Feb 2022 07:03 )  Alb: 1.5 g/dL / Pro: 5.9 gm/dL / ALK PHOS: 116 U/L / ALT: 80 U/L / AST: 39 U/L / GGT: x                     MEDICATIONS:  acetaminophen    Suspension .. 650 milliGRAM(s) Oral every 6 hours PRN  amLODIPine   Tablet 10 milliGRAM(s) Oral daily  aspirin  chewable 81 milliGRAM(s) Oral daily  carvedilol 3.125 milliGRAM(s) Oral every 12 hours  chlorhexidine 0.12% Liquid 15 milliLiter(s) Oral Mucosa every 12 hours  chlorhexidine 2% Cloths 1 Application(s) Topical <User Schedule>  dextrose 40% Gel 15 Gram(s) Oral once  dextrose 5% + sodium chloride 0.45% with potassium chloride 20 mEq/L 1000 milliLiter(s) IV Continuous <Continuous>  dextrose 5%. 1000 milliLiter(s) IV Continuous <Continuous>  dextrose 5%. 1000 milliLiter(s) IV Continuous <Continuous>  dextrose 50% Injectable 25 Gram(s) IV Push once  dextrose 50% Injectable 12.5 Gram(s) IV Push once  dextrose 50% Injectable 25 Gram(s) IV Push once  dorzolamide 2%/timolol 0.5% Ophthalmic Solution 1 Drop(s) Both EYES two times a day  escitalopram 20 milliGRAM(s) Oral daily  glucagon  Injectable 1 milliGRAM(s) IntraMuscular once  levETIRAcetam  IVPB 500 milliGRAM(s) IV Intermittent every 12 hours  levothyroxine Injectable 37.5 MICROGram(s) IV Push at bedtime  pantoprazole  Injectable 40 milliGRAM(s) IV Push every 12 hours  simvastatin 40 milliGRAM(s) Oral at bedtime  sodium chloride 0.9% lock flush 10 milliLiter(s) IV Push every 1 hour PRN

## 2022-02-07 NOTE — PROGRESS NOTE ADULT - SUBJECTIVE AND OBJECTIVE BOX
JUAREZ ECHOLSNIS    LVS 1B 122 D    Allergies    No Known Allergies    Intolerances        PAST MEDICAL & SURGICAL HISTORY:  Seizure    HTN (hypertension)    Glaucoma    Thyroid disease    Blood clot of neck vein  left side    TIA (transient ischemic attack)  20 years ago    S/P appendectomy        FAMILY HISTORY:  FH: HTN (hypertension)        Home Medications:  amLODIPine 10 mg oral tablet: 1 tab(s) orally once a day (22 Dec 2021 16:46)  aspirin 81 mg oral tablet, chewable: 1 tab(s) orally once a day (22 Dec 2021 16:46)  escitalopram 20 mg oral tablet: 1 tab(s) orally once a day (22 Dec 2021 16:46)  keppera:  (22 Dec 2021 16:46)  levETIRAcetam 500 mg oral tablet: 1 tab(s) orally 2 times a day (22 Dec 2021 16:46)  timolol-dorzolamide 0.5%-2% preservative-free ophthalmic solution: 1 drop(s) to each affected eye 2 times a day (22 Dec 2021 16:46)  Zetia 10 mg oral tablet: 1 tab(s) orally once a day (22 Dec 2021 16:46)      MEDICATIONS  (STANDING):  amLODIPine   Tablet 10 milliGRAM(s) Oral daily  aspirin  chewable 81 milliGRAM(s) Oral daily  carvedilol 3.125 milliGRAM(s) Oral every 12 hours  chlorhexidine 0.12% Liquid 15 milliLiter(s) Oral Mucosa every 12 hours  chlorhexidine 2% Cloths 1 Application(s) Topical <User Schedule>  dextrose 40% Gel 15 Gram(s) Oral once  dextrose 5% + sodium chloride 0.45% with potassium chloride 20 mEq/L 1000 milliLiter(s) (42 mL/Hr) IV Continuous <Continuous>  dextrose 5%. 1000 milliLiter(s) (50 mL/Hr) IV Continuous <Continuous>  dextrose 5%. 1000 milliLiter(s) (100 mL/Hr) IV Continuous <Continuous>  dextrose 50% Injectable 25 Gram(s) IV Push once  dextrose 50% Injectable 12.5 Gram(s) IV Push once  dextrose 50% Injectable 25 Gram(s) IV Push once  dorzolamide 2%/timolol 0.5% Ophthalmic Solution 1 Drop(s) Both EYES two times a day  escitalopram 20 milliGRAM(s) Oral daily  glucagon  Injectable 1 milliGRAM(s) IntraMuscular once  levETIRAcetam  IVPB 500 milliGRAM(s) IV Intermittent every 12 hours  levothyroxine Injectable 37.5 MICROGram(s) IV Push at bedtime  pantoprazole  Injectable 40 milliGRAM(s) IV Push every 12 hours  simvastatin 40 milliGRAM(s) Oral at bedtime    MEDICATIONS  (PRN):  acetaminophen    Suspension .. 650 milliGRAM(s) Oral every 6 hours PRN Temp greater or equal to 38C (100.4F)  sodium chloride 0.9% lock flush 10 milliLiter(s) IV Push every 1 hour PRN Pre/post blood products, medications, blood draw, and to maintain line patency      Diet, NPO with Tube Feed:   Tube Feeding Modality: Gastrostomy  Jevity 1.2 Santos  Total Volume for 24 Hours (mL): 1440  Continuous  Starting Tube Feed Rate mL per Hour: 25  Increase Tube Feed Rate by (mL): 5     Every 4 hours  Until Goal Tube Feed Rate (mL per Hour): 60  Tube Feed Duration (in Hours): 24  Tube Feed Start Time: 14:40 (02-01-22 @ 14:39) [Active]          Vital Signs Last 24 Hrs  T(C): 36.9 (07 Feb 2022 06:56), Max: 37.2 (06 Feb 2022 17:57)  T(F): 98.4 (07 Feb 2022 06:56), Max: 98.9 (06 Feb 2022 17:57)  HR: 82 (07 Feb 2022 09:38) (61 - 82)  BP: 138/61 (07 Feb 2022 06:56) (130/73 - 144/63)  BP(mean): --  RR: 18 (07 Feb 2022 06:56) (18 - 18)  SpO2: 98% (07 Feb 2022 09:38) (97% - 100%)        Mode: CPAP with PS, FiO2: 30, PEEP: 5, ITime: 0.9      LABS:                        8.9    13.13 )-----------( 445      ( 07 Feb 2022 07:03 )             29.5     02-07    138  |  107  |  13  ----------------------------<  134<H>  3.9   |  24  |  0.43<L>    Ca    8.2<L>      07 Feb 2022 07:03    TPro  5.9<L>  /  Alb  1.5<L>  /  TBili  0.2  /  DBili  x   /  AST  39<H>  /  ALT  80<H>  /  AlkPhos  116  02-07              WBC:  WBC Count: 13.13 K/uL (02-07 @ 07:03)  WBC Count: 13.92 K/uL (02-06 @ 07:10)  WBC Count: 19.20 K/uL (02-05 @ 09:36)  WBC Count: 13.92 K/uL (02-04 @ 08:24)  WBC Count: 14.11 K/uL (02-03 @ 11:27)      MICROBIOLOGY:  RECENT CULTURES:  02-01 .Blood Blood XXXX XXXX   No Growth Final                    Sodium:  Sodium, Serum: 138 mmol/L (02-07 @ 07:03)  Sodium, Serum: 140 mmol/L (02-06 @ 07:10)  Sodium, Serum: 142 mmol/L (02-05 @ 09:36)  Sodium, Serum: 147 mmol/L (02-04 @ 08:24)  Sodium, Serum: 144 mmol/L (02-03 @ 11:27)      0.43 mg/dL 02-07 @ 07:03  0.47 mg/dL 02-06 @ 07:10  0.35 mg/dL 02-05 @ 09:36  0.46 mg/dL 02-04 @ 08:24  0.46 mg/dL 02-03 @ 11:27      Hemoglobin:  Hemoglobin: 8.9 g/dL (02-07 @ 07:03)  Hemoglobin: 9.6 g/dL (02-06 @ 07:10)  Hemoglobin: 8.6 g/dL (02-05 @ 09:36)  Hemoglobin: 8.8 g/dL (02-04 @ 08:24)  Hemoglobin: 9.0 g/dL (02-03 @ 11:27)      Platelets: Platelet Count - Automated: 445 K/uL (02-07 @ 07:03)  Platelet Count - Automated: 396 K/uL (02-06 @ 07:10)  Platelet Count - Automated: 241 K/uL (02-05 @ 09:36)  Platelet Count - Automated: 373 K/uL (02-04 @ 08:24)  Platelet Count - Automated: 357 K/uL (02-03 @ 11:27)      LIVER FUNCTIONS - ( 07 Feb 2022 07:03 )  Alb: 1.5 g/dL / Pro: 5.9 gm/dL / ALK PHOS: 116 U/L / ALT: 80 U/L / AST: 39 U/L / GGT: x                 RADIOLOGY & ADDITIONAL STUDIES:      MICROBIOLOGY:  RECENT CULTURES:  02-01 .Blood Blood XXXX XXXX   No Growth Final

## 2022-02-08 ENCOUNTER — TRANSCRIPTION ENCOUNTER (OUTPATIENT)
Age: 82
End: 2022-02-08

## 2022-02-08 LAB
ANION GAP SERPL CALC-SCNC: 5 MMOL/L — SIGNIFICANT CHANGE UP (ref 5–17)
BUN SERPL-MCNC: 12 MG/DL — SIGNIFICANT CHANGE UP (ref 7–23)
CALCIUM SERPL-MCNC: 8 MG/DL — LOW (ref 8.5–10.1)
CHLORIDE SERPL-SCNC: 108 MMOL/L — SIGNIFICANT CHANGE UP (ref 96–108)
CO2 SERPL-SCNC: 26 MMOL/L — SIGNIFICANT CHANGE UP (ref 22–31)
CREAT SERPL-MCNC: 0.39 MG/DL — LOW (ref 0.5–1.3)
GLUCOSE BLDC GLUCOMTR-MCNC: 146 MG/DL — HIGH (ref 70–99)
GLUCOSE SERPL-MCNC: 123 MG/DL — HIGH (ref 70–99)
HCT VFR BLD CALC: 28 % — LOW (ref 34.5–45)
HGB BLD-MCNC: 8.5 G/DL — LOW (ref 11.5–15.5)
MCHC RBC-ENTMCNC: 27.9 PG — SIGNIFICANT CHANGE UP (ref 27–34)
MCHC RBC-ENTMCNC: 30.4 G/DL — LOW (ref 32–36)
MCV RBC AUTO: 91.8 FL — SIGNIFICANT CHANGE UP (ref 80–100)
NRBC # BLD: 0 /100 WBCS — SIGNIFICANT CHANGE UP (ref 0–0)
PLATELET # BLD AUTO: 383 K/UL — SIGNIFICANT CHANGE UP (ref 150–400)
POTASSIUM SERPL-MCNC: 4.1 MMOL/L — SIGNIFICANT CHANGE UP (ref 3.5–5.3)
POTASSIUM SERPL-SCNC: 4.1 MMOL/L — SIGNIFICANT CHANGE UP (ref 3.5–5.3)
RBC # BLD: 3.05 M/UL — LOW (ref 3.8–5.2)
RBC # FLD: 16.1 % — HIGH (ref 10.3–14.5)
SODIUM SERPL-SCNC: 139 MMOL/L — SIGNIFICANT CHANGE UP (ref 135–145)
WBC # BLD: 12.25 K/UL — HIGH (ref 3.8–10.5)
WBC # FLD AUTO: 12.25 K/UL — HIGH (ref 3.8–10.5)

## 2022-02-08 PROCEDURE — 99232 SBSQ HOSP IP/OBS MODERATE 35: CPT

## 2022-02-08 PROCEDURE — 70551 MRI BRAIN STEM W/O DYE: CPT | Mod: 26

## 2022-02-08 RX ORDER — LEVOTHYROXINE SODIUM 125 MCG
75 TABLET ORAL DAILY
Refills: 0 | Status: DISCONTINUED | OUTPATIENT
Start: 2022-02-08 | End: 2022-02-11

## 2022-02-08 RX ORDER — PANTOPRAZOLE SODIUM 20 MG/1
40 TABLET, DELAYED RELEASE ORAL
Qty: 0 | Refills: 0 | DISCHARGE
Start: 2022-02-08

## 2022-02-08 RX ORDER — CARVEDILOL PHOSPHATE 80 MG/1
1 CAPSULE, EXTENDED RELEASE ORAL
Qty: 0 | Refills: 0 | DISCHARGE
Start: 2022-02-08

## 2022-02-08 RX ORDER — LEVOTHYROXINE SODIUM 125 MCG
1 TABLET ORAL
Qty: 0 | Refills: 0 | DISCHARGE
Start: 2022-02-08

## 2022-02-08 RX ORDER — LEVETIRACETAM 250 MG/1
500 TABLET, FILM COATED ORAL EVERY 12 HOURS
Refills: 0 | Status: DISCONTINUED | OUTPATIENT
Start: 2022-02-08 | End: 2022-02-11

## 2022-02-08 RX ORDER — PANTOPRAZOLE SODIUM 20 MG/1
40 TABLET, DELAYED RELEASE ORAL EVERY 12 HOURS
Refills: 0 | Status: DISCONTINUED | OUTPATIENT
Start: 2022-02-08 | End: 2022-02-11

## 2022-02-08 RX ADMIN — LEVETIRACETAM 500 MILLIGRAM(S): 250 TABLET, FILM COATED ORAL at 18:38

## 2022-02-08 RX ADMIN — LEVETIRACETAM 420 MILLIGRAM(S): 250 TABLET, FILM COATED ORAL at 05:42

## 2022-02-08 RX ADMIN — DORZOLAMIDE HYDROCHLORIDE TIMOLOL MALEATE 1 DROP(S): 20; 5 SOLUTION/ DROPS OPHTHALMIC at 18:38

## 2022-02-08 RX ADMIN — PANTOPRAZOLE SODIUM 40 MILLIGRAM(S): 20 TABLET, DELAYED RELEASE ORAL at 18:38

## 2022-02-08 RX ADMIN — PANTOPRAZOLE SODIUM 40 MILLIGRAM(S): 20 TABLET, DELAYED RELEASE ORAL at 05:42

## 2022-02-08 RX ADMIN — CARVEDILOL PHOSPHATE 3.12 MILLIGRAM(S): 80 CAPSULE, EXTENDED RELEASE ORAL at 05:42

## 2022-02-08 RX ADMIN — ESCITALOPRAM OXALATE 20 MILLIGRAM(S): 10 TABLET, FILM COATED ORAL at 14:47

## 2022-02-08 RX ADMIN — Medication 81 MILLIGRAM(S): at 14:47

## 2022-02-08 RX ADMIN — DORZOLAMIDE HYDROCHLORIDE TIMOLOL MALEATE 1 DROP(S): 20; 5 SOLUTION/ DROPS OPHTHALMIC at 05:42

## 2022-02-08 RX ADMIN — CHLORHEXIDINE GLUCONATE 1 APPLICATION(S): 213 SOLUTION TOPICAL at 05:41

## 2022-02-08 RX ADMIN — SIMVASTATIN 40 MILLIGRAM(S): 20 TABLET, FILM COATED ORAL at 21:42

## 2022-02-08 RX ADMIN — CHLORHEXIDINE GLUCONATE 15 MILLILITER(S): 213 SOLUTION TOPICAL at 18:39

## 2022-02-08 RX ADMIN — CARVEDILOL PHOSPHATE 3.12 MILLIGRAM(S): 80 CAPSULE, EXTENDED RELEASE ORAL at 18:38

## 2022-02-08 RX ADMIN — CHLORHEXIDINE GLUCONATE 15 MILLILITER(S): 213 SOLUTION TOPICAL at 05:41

## 2022-02-08 RX ADMIN — AMLODIPINE BESYLATE 10 MILLIGRAM(S): 2.5 TABLET ORAL at 05:42

## 2022-02-08 RX ADMIN — Medication 75 MICROGRAM(S): at 21:43

## 2022-02-08 NOTE — PROGRESS NOTE ADULT - ASSESSMENT
81F with a PMH of HTN, HLD, hypothyroidism, depression, seizures presented with abdominal pain and admitted 12/22 for acute perforated diverticulitis, s/p ex lap 12/24 with sigmoid colectomy and abdominal washout.  She was then transferred to MICU for postop care on mechanical ventilation.  Hospital course complicated by Afib with RVR.  She underwent wound closure and creation of colostomy 12/26, developed abdominal abscess s/p IR drainage 1/3, cultures grew Ecoli and Bacteriodes.  S/p tracheostomy and PEG placement.  1/28 developed bleeding through ostomy, AC stopped and pt transfused.  EGD on 2/1 showed gastritis, PEG tube embedded in mucosa.  Due to fevers, CT Head, C/A/P performed on 1/30, showed Acute left MCA infarct, new RLL pneumonia.  Seen by ID, started on Zosyn.      # RLL pneumonia, Aspiration Pneumonia - seen on CT chest 1/30.  ID following, completed Zosyn. No fever since 2/3.  WBC now 13.  Supportive care.  # Acute Blood Loss Anemia - due to suspected gastric bleeding.  s/p transfusion.  EGD 2/1 showed gastritis.  Monitor H/H, has been stable.    # Acute CVA - L MCA infarct was noted on CT 1/30.  Neuro input appreciated.  AC held due to extensive CVA, concern for hemorrhagic conversion.  Continue ASA, Statin.  MRI per Neurology.  # Elevated LFTs - monitor CMP.  Abdominal Sono showed cholelithiasis, no acute GB disease.  GI following.   # Hypernatremia - Would avoid hypotonic fluids in setting of acute CVA.  Increase free water via PEG. na normalized.   # Perforated Acute Diverticulitis / Intraabdominal Abscesses - continue BABAK drain care, wound vac.  Most recent CT without significant new collections.  # S/p Septic Shock - resolved  # Afib with RVR - rate control.  Holding AC due to above.   # Chronic Respiratory Failure with Hypoxemia - vent support, weaning to trach collar.  Pulmonary following.  # RLE DVT - per IR, felt to be more subacute to chronic.  Recommended no IVC filter placement.  Followup LE doppler 2/2 shows no acute DVT.   # Functional Quadriplegia - supportive care.    DVT Proph - ICDs.    I contacted pt's son HCP Blake [phone] on 2/3, 2/7.  SNF / Vent facility placement d/w CM, SW.

## 2022-02-08 NOTE — PROGRESS NOTE ADULT - SUBJECTIVE AND OBJECTIVE BOX
Patient: JUAREZ GTZ [id number] 81y Female                            Hospitalist Attending Note    Unable to offer complaints.   Continues to be afebrile.   Back on CPAP this am.     ____________________PHYSICAL EXAM:  GENERAL:  NAD Awake, does not follow commands.   HEENT: NCAT, trach in place.   CARDIOVASCULAR:  S1, S2  LUNGS: b/l rhonchi. On vent support.   ABDOMEN:  soft, (-) tenderness, (-) distension, (+) bowel sounds, (-) guarding, (-) rebound (-) rigidity.  Colostomy in place with dark stool.  Midline abdominal wound vac in place.    EXTREMITIES:  no cyanosis / clubbing / edema.   ____________________      VITALS:  Vital Signs Last 24 Hrs  T(C): 37 (08 Feb 2022 10:49), Max: 37.8 (07 Feb 2022 18:26)  T(F): 98.6 (08 Feb 2022 10:49), Max: 100 (07 Feb 2022 18:26)  HR: 76 (08 Feb 2022 12:02) (65 - 85)  BP: 119/68 (08 Feb 2022 10:49) (108/64 - 151/74)  BP(mean): --  RR: 18 (08 Feb 2022 10:49) (18 - 18)  SpO2: 98% (08 Feb 2022 12:02) (97% - 99%) Daily     Daily   CAPILLARY BLOOD GLUCOSE      POCT Blood Glucose.: 146 mg/dL (08 Feb 2022 06:29)  POCT Blood Glucose.: 139 mg/dL (07 Feb 2022 23:57)    I&O's Summary    07 Feb 2022 07:01  -  08 Feb 2022 07:00  --------------------------------------------------------  IN: 0 mL / OUT: 850 mL / NET: -850 mL        LABS:                        8.5    12.25 )-----------( 383      ( 08 Feb 2022 07:22 )             28.0     02-08    139  |  108  |  12  ----------------------------<  123<H>  4.1   |  26  |  0.39<L>    Ca    8.0<L>      08 Feb 2022 07:22    TPro  5.9<L>  /  Alb  1.5<L>  /  TBili  0.2  /  DBili  <0.5<H>  /  AST  39<H>  /  ALT  80<H>  /  AlkPhos  116  02-07      LIVER FUNCTIONS - ( 07 Feb 2022 07:03 )  Alb: 1.5 g/dL / Pro: 5.9 gm/dL / ALK PHOS: 116 U/L / ALT: 80 U/L / AST: 39 U/L / GGT: x                     MEDICATIONS:  acetaminophen    Suspension .. 650 milliGRAM(s) Oral every 6 hours PRN  amLODIPine   Tablet 10 milliGRAM(s) Oral daily  aspirin  chewable 81 milliGRAM(s) Oral daily  carvedilol 3.125 milliGRAM(s) Oral every 12 hours  chlorhexidine 0.12% Liquid 15 milliLiter(s) Oral Mucosa every 12 hours  chlorhexidine 2% Cloths 1 Application(s) Topical <User Schedule>  dextrose 40% Gel 15 Gram(s) Oral once  dextrose 5%. 1000 milliLiter(s) IV Continuous <Continuous>  dextrose 5%. 1000 milliLiter(s) IV Continuous <Continuous>  dextrose 50% Injectable 25 Gram(s) IV Push once  dextrose 50% Injectable 12.5 Gram(s) IV Push once  dextrose 50% Injectable 25 Gram(s) IV Push once  dorzolamide 2%/timolol 0.5% Ophthalmic Solution 1 Drop(s) Both EYES two times a day  escitalopram 20 milliGRAM(s) Oral daily  glucagon  Injectable 1 milliGRAM(s) IntraMuscular once  levETIRAcetam  IVPB 500 milliGRAM(s) IV Intermittent every 12 hours  levothyroxine Injectable 37.5 MICROGram(s) IV Push at bedtime  pantoprazole  Injectable 40 milliGRAM(s) IV Push every 12 hours  simvastatin 40 milliGRAM(s) Oral at bedtime  sodium chloride 0.9% lock flush 10 milliLiter(s) IV Push every 1 hour PRN

## 2022-02-08 NOTE — DISCHARGE NOTE PROVIDER - CARE PROVIDERS DIRECT ADDRESSES
,DirectAddress_Unknown,[email or web address],DirectAddress_Unknown,DirectAddress_Unknown,DirectAddress_Unknown

## 2022-02-08 NOTE — DISCHARGE NOTE PROVIDER - HOSPITAL COURSE
81F with a PMH of HTN, HLD, hypothyroidism, depression, seizures presented with abdominal pain and admitted 12/22 for acute perforated diverticulitis, s/p ex lap 12/24 with sigmoid colectomy and abdominal washout.  She was then transferred to MICU for postop care on mechanical ventilation.  Hospital course complicated by Afib with RVR.  She underwent wound closure and creation of colostomy 12/26, developed abdominal abscess s/p IR drainage 1/3, cultures grew Ecoli and Bacteriodes.  S/p tracheostomy and PEG placement.  1/28 developed bleeding through ostomy, AC stopped and pt transfused.  EGD on 2/1 showed gastritis, PEG tube embedded in mucosa.  Due to fevers, CT Head, C/A/P performed on 1/30, showed Acute left MCA infarct, new RLL pneumonia.  Seen by ID, completed antibiotics. 81F with a PMH of HTN, HLD, hypothyroidism, depression, seizures presented with abdominal pain and admitted 12/22 for acute perforated diverticulitis, s/p ex lap 12/24 with sigmoid colectomy and abdominal washout.  She was then transferred to MICU for postop care on mechanical ventilation.  Hospital course complicated by Afib with RVR.  She underwent wound closure and creation of colostomy 12/26, developed abdominal abscess s/p IR drainage 1/3, cultures grew Ecoli and Bacteriodes.  S/p tracheostomy and PEG placement.  1/28 developed bleeding through ostomy, AC stopped and pt transfused.  EGD on 2/1 showed gastritis, PEG tube embedded in mucosa.  Due to fevers, CT Head, C/A/P performed on 1/30, showed Acute left MCA infarct, new RLL pneumonia.  Seen by ID, completed antibiotics.     # RLL pneumonia, Aspiration Pneumonia - seen on CT chest 1/30.  completed Zosyn. No fever since 2/3.  WBC now 13.  Supportive care.  # Acute Blood Loss Anemia - due to suspected gastric bleeding.  s/p transfusion.  EGD 2/1 showed gastritis.  Monitor H/H, has been stable.    # Acute CVA - L MCA infarct was noted on CT 1/30.  Neuro input appreciated.   AC held due to extensive CVA, concern for hemorrhagic conversion and recent GI bleed.   Continue ASA, plavix and  Statin.   cva could be  2/2 to carotid occlusion vs afibb.  #carotid occlusion       Left internal carotid artery is occluded, as noted on prior MRA neck from   7/21/2014.    Right internal carotid artery demonstrates multifocal calcified plaque   with elevated velocities along the proximal, mid, and distal segments   suggestive 50-69% stenosis. Correlation with CTA or MRA neck is   recommended for further characterization.    discussed case with vascular surgeon and patient not currently a surgical candidate at this time. This was relayed to lei and he reports he wouldn't   want surgery for her anyways. can follow up with outpatient   # Elevated LFTs - improved and remains stable.   Abdominal Sono showed cholelithiasis, no acute GB disease.  GI following.   # Perforated Acute Diverticulitis / Intraabdominal Abscesses - s/p BABAK drain care removal on 2/9, cw  wound vac.  Most recent CT without significant new collections. recommend repeat ct scan in 2 weeks .   # Afib with RVR - rate control.  Holding AC due to above. As per neurology can restart a noac on 2/21, but its recommended to Discuss this with Dr. Mccoy prior. Would recommend trending cbc as she has a hx of GI bleed.   # Chronic Respiratory Failure with Hypoxemia - vent support, weaning to trach collar.  Pulmonary following.  # RLE DVT - per IR, felt to be more subacute to chronic.  Recommended no IVC filter placement.  Followup LE doppler 2/2 shows no acute DVT.

## 2022-02-08 NOTE — PROGRESS NOTE ADULT - SUBJECTIVE AND OBJECTIVE BOX
JUAREZ ECHOLSNIS    LVS 1B 122 D    Allergies    No Known Allergies    Intolerances        PAST MEDICAL & SURGICAL HISTORY:  Seizure    HTN (hypertension)    Glaucoma    Thyroid disease    Blood clot of neck vein  left side    TIA (transient ischemic attack)  20 years ago    S/P appendectomy        FAMILY HISTORY:  FH: HTN (hypertension)        Home Medications:  amLODIPine 10 mg oral tablet: 1 tab(s) orally once a day (22 Dec 2021 16:46)  aspirin 81 mg oral tablet, chewable: 1 tab(s) orally once a day (22 Dec 2021 16:46)  escitalopram 20 mg oral tablet: 1 tab(s) orally once a day (22 Dec 2021 16:46)  keppera:  (22 Dec 2021 16:46)  levETIRAcetam 500 mg oral tablet: 1 tab(s) orally 2 times a day (22 Dec 2021 16:46)  timolol-dorzolamide 0.5%-2% preservative-free ophthalmic solution: 1 drop(s) to each affected eye 2 times a day (22 Dec 2021 16:46)  Zetia 10 mg oral tablet: 1 tab(s) orally once a day (22 Dec 2021 16:46)      MEDICATIONS  (STANDING):  amLODIPine   Tablet 10 milliGRAM(s) Oral daily  aspirin  chewable 81 milliGRAM(s) Oral daily  carvedilol 3.125 milliGRAM(s) Oral every 12 hours  chlorhexidine 0.12% Liquid 15 milliLiter(s) Oral Mucosa every 12 hours  chlorhexidine 2% Cloths 1 Application(s) Topical <User Schedule>  dextrose 40% Gel 15 Gram(s) Oral once  dextrose 5% + sodium chloride 0.45% with potassium chloride 20 mEq/L 1000 milliLiter(s) (42 mL/Hr) IV Continuous <Continuous>  dextrose 5%. 1000 milliLiter(s) (50 mL/Hr) IV Continuous <Continuous>  dextrose 5%. 1000 milliLiter(s) (100 mL/Hr) IV Continuous <Continuous>  dextrose 50% Injectable 25 Gram(s) IV Push once  dextrose 50% Injectable 12.5 Gram(s) IV Push once  dextrose 50% Injectable 25 Gram(s) IV Push once  dorzolamide 2%/timolol 0.5% Ophthalmic Solution 1 Drop(s) Both EYES two times a day  escitalopram 20 milliGRAM(s) Oral daily  glucagon  Injectable 1 milliGRAM(s) IntraMuscular once  levETIRAcetam  IVPB 500 milliGRAM(s) IV Intermittent every 12 hours  levothyroxine Injectable 37.5 MICROGram(s) IV Push at bedtime  pantoprazole  Injectable 40 milliGRAM(s) IV Push every 12 hours  simvastatin 40 milliGRAM(s) Oral at bedtime    MEDICATIONS  (PRN):  acetaminophen    Suspension .. 650 milliGRAM(s) Oral every 6 hours PRN Temp greater or equal to 38C (100.4F)  sodium chloride 0.9% lock flush 10 milliLiter(s) IV Push every 1 hour PRN Pre/post blood products, medications, blood draw, and to maintain line patency      Diet, NPO with Tube Feed:   Tube Feeding Modality: Gastrostomy  Jevity 1.2 Santos  Total Volume for 24 Hours (mL): 1440  Continuous  Starting Tube Feed Rate mL per Hour: 25  Increase Tube Feed Rate by (mL): 5     Every 4 hours  Until Goal Tube Feed Rate (mL per Hour): 60  Tube Feed Duration (in Hours): 24  Tube Feed Start Time: 14:40 (02-01-22 @ 14:39) [Active]          Vital Signs Last 24 Hrs  T(C): 37 (08 Feb 2022 05:12), Max: 37.8 (07 Feb 2022 18:26)  T(F): 98.6 (08 Feb 2022 05:12), Max: 100 (07 Feb 2022 18:26)  HR: 66 (08 Feb 2022 08:26) (65 - 88)  BP: 132/71 (08 Feb 2022 05:12) (108/64 - 151/74)  BP(mean): --  RR: 18 (08 Feb 2022 05:12) (18 - 20)  SpO2: 97% (08 Feb 2022 08:26) (97% - 99%)      02-07-22 @ 07:01  -  02-08-22 @ 07:00  --------------------------------------------------------  IN: 0 mL / OUT: 850 mL / NET: -850 mL        Mode: CPAP with PS, FiO2: 30, PEEP: 5, PS: 5, ITime: 1, MAP: 6, PIP: 10      LABS:                        8.5    12.25 )-----------( 383      ( 08 Feb 2022 07:22 )             28.0     02-08    139  |  108  |  12  ----------------------------<  123<H>  4.1   |  26  |  0.39<L>    Ca    8.0<L>      08 Feb 2022 07:22    TPro  5.9<L>  /  Alb  1.5<L>  /  TBili  0.2  /  DBili  <0.5<H>  /  AST  39<H>  /  ALT  80<H>  /  AlkPhos  116  02-07              WBC:  WBC Count: 12.25 K/uL (02-08 @ 07:22)  WBC Count: 13.13 K/uL (02-07 @ 07:03)  WBC Count: 13.92 K/uL (02-06 @ 07:10)  WBC Count: 19.20 K/uL (02-05 @ 09:36)      MICROBIOLOGY:  RECENT CULTURES:                  Sodium:  Sodium, Serum: 139 mmol/L (02-08 @ 07:22)  Sodium, Serum: 138 mmol/L (02-07 @ 07:03)  Sodium, Serum: 140 mmol/L (02-06 @ 07:10)  Sodium, Serum: 142 mmol/L (02-05 @ 09:36)      0.39 mg/dL 02-08 @ 07:22  0.43 mg/dL 02-07 @ 07:03  0.47 mg/dL 02-06 @ 07:10  0.35 mg/dL 02-05 @ 09:36      Hemoglobin:  Hemoglobin: 8.5 g/dL (02-08 @ 07:22)  Hemoglobin: 8.9 g/dL (02-07 @ 07:03)  Hemoglobin: 9.6 g/dL (02-06 @ 07:10)  Hemoglobin: 8.6 g/dL (02-05 @ 09:36)      Platelets: Platelet Count - Automated: 383 K/uL (02-08 @ 07:22)  Platelet Count - Automated: 445 K/uL (02-07 @ 07:03)  Platelet Count - Automated: 396 K/uL (02-06 @ 07:10)  Platelet Count - Automated: 241 K/uL (02-05 @ 09:36)      LIVER FUNCTIONS - ( 07 Feb 2022 07:03 )  Alb: 1.5 g/dL / Pro: 5.9 gm/dL / ALK PHOS: 116 U/L / ALT: 80 U/L / AST: 39 U/L / GGT: x                 RADIOLOGY & ADDITIONAL STUDIES:      MICROBIOLOGY:  RECENT CULTURES:

## 2022-02-08 NOTE — DISCHARGE NOTE PROVIDER - NSDCMRMEDTOKEN_GEN_ALL_CORE_FT
amLODIPine 10 mg oral tablet: 1 tab(s) orally once a day  aspirin 81 mg oral tablet, chewable: 1 tab(s) orally once a day  carvedilol 3.125 mg oral tablet: 1 tab(s) orally every 12 hours  escitalopram 20 mg oral tablet: 1 tab(s) orally once a day  levETIRAcetam 500 mg oral tablet: 1 tab(s) orally 2 times a day  levothyroxine 75 mcg (0.075 mg) oral tablet: 1 tab(s) orally once a day  pantoprazole 40 mg oral granule, delayed release: 40 milligram(s) by gastrostomy tube once a day  timolol-dorzolamide 0.5%-2% preservative-free ophthalmic solution: 1 drop(s) to each affected eye 2 times a day  Zetia 10 mg oral tablet: 1 tab(s) orally once a day   amLODIPine 10 mg oral tablet: 1 tab(s) orally once a day  aspirin 81 mg oral tablet, chewable: 1 tab(s) orally once a day  atorvastatin 20 mg oral tablet: 1 tab(s) orally once a day (at bedtime)  carvedilol 3.125 mg oral tablet: 1 tab(s) orally every 12 hours  clopidogrel 75 mg oral tablet: 1 tab(s) orally once a day  escitalopram 20 mg oral tablet: 1 tab(s) orally once a day  levETIRAcetam 500 mg oral tablet: 1 tab(s) orally 2 times a day  levothyroxine 75 mcg (0.075 mg) oral tablet: 1 tab(s) orally once a day  pantoprazole 40 mg oral granule, delayed release: 40 milligram(s) by gastrostomy tube once a day  timolol-dorzolamide 0.5%-2% preservative-free ophthalmic solution: 1 drop(s) to each affected eye 2 times a day

## 2022-02-08 NOTE — PROGRESS NOTE ADULT - SUBJECTIVE AND OBJECTIVE BOX
JUAREZ GTZ  MRN-[id number]    Follow Up:  PNA    Interval History: The pt was seen and examined earlier, no distress, awake, not alert, vented via tracheostomy. The pt remains afebrile, Tmax 100 in 24 hrs, WBC better 12.25.    PAST MEDICAL & SURGICAL HISTORY:  Seizure    HTN (hypertension)    Glaucoma    Thyroid disease    Blood clot of neck vein  left side    TIA (transient ischemic attack)  20 years ago    S/P appendectomy        ROS:    [x ] Unobtainable because: vented via tracheostomy  [ ] All other systems negative    Constitutional: no fever, no chills  Head: no trauma  Eyes: no vision changes, no eye pain  ENT:  no sore throat, no rhinorrhea  Cardiovascular:  no chest pain, no palpitation  Respiratory:  no SOB, no cough  GI:  no abd pain, no vomiting, no diarrhea  urinary: no dysuria, no hematuria, no flank pain  musculoskeletal:  no joint pain, no joint swelling  skin:  no rash  neurology:  no headache, no seizure, no change in mental status  psych: no anxiety, no depression         Allergies  No Known Allergies        ANTIMICROBIALS:      OTHER MEDS:  acetaminophen    Suspension .. 650 milliGRAM(s) Oral every 6 hours PRN  amLODIPine   Tablet 10 milliGRAM(s) Oral daily  aspirin  chewable 81 milliGRAM(s) Oral daily  carvedilol 3.125 milliGRAM(s) Oral every 12 hours  chlorhexidine 0.12% Liquid 15 milliLiter(s) Oral Mucosa every 12 hours  chlorhexidine 2% Cloths 1 Application(s) Topical <User Schedule>  dextrose 40% Gel 15 Gram(s) Oral once  dextrose 5%. 1000 milliLiter(s) IV Continuous <Continuous>  dextrose 5%. 1000 milliLiter(s) IV Continuous <Continuous>  dextrose 50% Injectable 25 Gram(s) IV Push once  dextrose 50% Injectable 12.5 Gram(s) IV Push once  dextrose 50% Injectable 25 Gram(s) IV Push once  dorzolamide 2%/timolol 0.5% Ophthalmic Solution 1 Drop(s) Both EYES two times a day  escitalopram 20 milliGRAM(s) Oral daily  glucagon  Injectable 1 milliGRAM(s) IntraMuscular once  levETIRAcetam  IVPB 500 milliGRAM(s) IV Intermittent every 12 hours  levothyroxine Injectable 37.5 MICROGram(s) IV Push at bedtime  pantoprazole  Injectable 40 milliGRAM(s) IV Push every 12 hours  simvastatin 40 milliGRAM(s) Oral at bedtime  sodium chloride 0.9% lock flush 10 milliLiter(s) IV Push every 1 hour PRN      Vital Signs Last 24 Hrs  T(C): 37 (08 Feb 2022 10:49), Max: 37.8 (07 Feb 2022 18:26)  T(F): 98.6 (08 Feb 2022 10:49), Max: 100 (07 Feb 2022 18:26)  HR: 76 (08 Feb 2022 12:02) (65 - 85)  BP: 119/68 (08 Feb 2022 10:49) (108/64 - 151/74)  BP(mean): --  RR: 18 (08 Feb 2022 10:49) (18 - 18)  SpO2: 98% (08 Feb 2022 12:02) (97% - 99%)    Physical Exam:  General: Nontoxic-appearing Female in no acute distress. Trached on vent, awake, not alert  HEENT: AT/NC. Unable to assess pt's mouth or eyes due to non-compliance   Neck: Not rigid. No sense of mass. Trach C/D/I  Nodes: None palpable.  Lungs: diminished breath sounds bilaterally, no wheezes  Heart: Regular rate and rhythm. +Murmur. No rub. No gallop. No palpable thrill. Right arm midline removed, with swelling   Abdomen: Bowel sounds now present.  Soft. not distended.  Abd incision with vac dressing.  Ostomy with stool and gas. Drains x1, +PEG tube   Extremities: No cyanosis or clubbing. 1+ edema of bilateral lower extremities   Skin: Warm. Dry No rash. No vasculitic stigmata.  Neuro: opens her eyes   Psychiatric: unable to assess    WBC Count: 12.25 K/uL (02-08 @ 07:22)  WBC Count: 13.13 K/uL (02-07 @ 07:03)  WBC Count: 13.92 K/uL (02-06 @ 07:10)  WBC Count: 19.20 K/uL (02-05 @ 09:36)  WBC Count: 13.92 K/uL (02-04 @ 08:24)  WBC Count: 14.11 K/uL (02-03 @ 11:27)  WBC Count: 12.54 K/uL (02-02 @ 08:07)                            8.5    12.25 )-----------( 383      ( 08 Feb 2022 07:22 )             28.0       02-08    139  |  108  |  12  ----------------------------<  123<H>  4.1   |  26  |  0.39<L>    Ca    8.0<L>      08 Feb 2022 07:22    TPro  5.9<L>  /  Alb  1.5<L>  /  TBili  0.2  /  DBili  <0.5<H>  /  AST  39<H>  /  ALT  80<H>  /  AlkPhos  116  02-07          Creatinine Trend: 0.39<--, 0.43<--, 0.47<--, 0.35<--, 0.46<--, 0.46<--      MICROBIOLOGY:  v  .Blood Blood  02-01-22   No Growth Final  --  --      .Sputum Sputum  01-31-22   Few Pseudomonas aeruginosa  --  Pseudomonas aeruginosa      .Blood Blood-Peripheral  01-31-22   No Growth Final  --  --      Catheterized Catheterized  01-28-22   >=3 organisms. Probable collection contamination.  --  --      .Blood Blood-Peripheral  01-27-22   No Growth Final  --  --      .Blood Blood-Peripheral  01-21-22   No Growth Final  --  --      Clean Catch Clean Catch (Midstream)  01-21-22   <10,000 CFU/mL Normal Urogenital Sendy  --  --    SARS-CoV-2: NotDetec (20 Jan 2022 18:59)  SARS-CoV-2: NotDetec (24 Dec 2021 14:38)  SARS-CoV-2: NotDetec (24 Dec 2021 00:03)    RADIOLOGY:

## 2022-02-08 NOTE — DISCHARGE NOTE PROVIDER - NSDCCPCAREPLAN_GEN_ALL_CORE_FT
PRINCIPAL DISCHARGE DIAGNOSIS  Diagnosis: Diverticulitis  Assessment and Plan of Treatment:       SECONDARY DISCHARGE DIAGNOSES  Diagnosis: Seizure disorder  Assessment and Plan of Treatment:     Diagnosis: Major depression  Assessment and Plan of Treatment:      PRINCIPAL DISCHARGE DIAGNOSIS  Diagnosis: Diverticulitis  Assessment and Plan of Treatment: 81F with a PMH of HTN, HLD, hypothyroidism, depression, seizures presented with abdominal pain and admitted 12/22 for acute perforated diverticulitis, s/p ex lap 12/24 with sigmoid colectomy and abdominal washout.  She was then transferred to MICU for postop care on mechanical ventilation.  Hospital course complicated by Afib with RVR.   wound closure and creation of colostomy 12/26, developed abdominal abscess s/p IR drainage 1/3, cultures grew Ecoli and Bacteriodes.  S/p tracheostomy and PEG placement.  1/28 developed bleeding through ostomy, AC stopped and pt transfused.  EGD on 2/1 showed gastritis,   .  # Acute Blood Loss Anemia - due to suspected gastric bleeding.  s/p transfusion.  EGD 2/1 showed gastritis.  Monitor H/H, has been stable.    # Acute CVA - L MCA infarct was noted on CT 1/30.  Neuro input appreciated.   AC held due to extensive CVA, concern for hemorrhagic conversion and recent GI bleed.   Continue ASA, plavix and  Statin.   cva could be  2/2 to carotid occlusion vs afibb.  #carotid occlusion     Left internal carotid artery is occluded, as noted on prior MRA neck from   7/21/2014.  Right internal carotid artery demonstrates multifocal calcified plaque   with elevated velocities along the proximal, mid, and distal segments   suggestive 50-69% stenosis.  discussed case with vascular surgeon and patient not currently a surgical candidate at this time. This was relayed to lei and he reports he wouldn't   want surgery for her anyways. can follow up with outpatient         SECONDARY DISCHARGE DIAGNOSES  Diagnosis: Major depression  Assessment and Plan of Treatment: # Elevated LFTs - improved and remains stable.   Abdominal Sono showed cholelithiasis, no acute GB disease.  GI following.   # Perforated Acute Diverticulitis / Intraabdominal Abscesses - s/p BABAK drain care removal on 2/9, cw  wound vac.  Most recent CT without significant new collections. recommend repeat ct scan in 2 weeks .   # Afib with RVR - rate control.  Holding AC due to above. As per neurology can restart a noac on 2/21, but its recommended to Discuss this with Dr. Mccoy prior. Would recommend trending cbc as she has a hx of GI bleed.    Diagnosis: Seizure disorder  Assessment and Plan of Treatment:

## 2022-02-08 NOTE — DISCHARGE NOTE PROVIDER - CARE PROVIDER_API CALL
April Mccoy  NEUROLOGY  [address] [place] [address]  Phone: [phone]  Fax: [phone]  Follow Up Time:     Aiden Justice)  Critical Care Medicine; Internal Medicine; Pulmonary Disease  [address] [place] [address]  Phone: [phone]  Fax: [phone]  Follow Up Time:    Aiden Justice)  Critical Care Medicine; Internal Medicine; Pulmonary Disease  [facility] [place] [address]  Phone: [phone]  Fax: [phone]  Follow Up Time:     Clarke Mccoy (DO)  Neurology  [address] [place] [address]  Phone: [phone]  Fax: [phone]  Follow Up Time:     Caleb Rodney)  Surgery  [address] [place] [address], [address] [place] [id number]  Phone: [phone]  Fax: [phone]  Follow Up Time:     Feroz Lu)  Surgery  General  [address] [place] [address]  Phone: [phone]  Fax: [phone]  Follow Up Time:

## 2022-02-08 NOTE — PROGRESS NOTE ADULT - SUBJECTIVE AND OBJECTIVE BOX
Pt resting comfortably, no acute events.    Physical Exam:   · Neurological	detailed exam  · Mental Status	Awake, not following commands.  · Cranial Nerve	able to move in all directions except crossing midline. decreased blink to threat on the right. Right facial  · Motor	slight right tremor.  moving left side > right. Withdraws on the right  · Gait/Balance	deferred  · Neurological Comments	NIHSS 34 MRS 5    echo 12/25 normal EF, no LA dilatation    MRI brain 2/7 shows L MCA CVA no hem conversion    · Assessment	  Acute left MCA CVA off AC with new onset Afib   s/p ex lap/colectomy/diverticulitis  HTN  HLD  seizures?    Plan   High risk of Hem conversion, would cont with ASA/Statin for secondary stroke prevention  repeat hct 2/5 looks stable  PT/OT/ST  carotid doppler  poor prognosis with large left MCA CVA, GOC discussion  neuro stable

## 2022-02-08 NOTE — PROGRESS NOTE ADULT - ASSESSMENT
TRESA PIZARRO 81 f 12/22/2021 1942 DR ANTONIO PATTON     REVIEW OF SYMPTOMS      Able to give (reliable) ROS  NO     PHYSICAL EXAM    HEENT Unremarkable  atraumatic   RESP Fair air entry EXP prolonged    Harsh breath sound Resp distres mild   CARDIAC S1 S2 No S3     NO JVD    ABDOMEN SOFT BS PRESENT NOT DISTENDED No hepatosplenomegaly   PEDAL EDEMA present No calf tenderness  NO rash     ______  DOA/CC.  12/22/2021 81F w/ HTN, HLD, hypothyroidism, depression, seizures. Presented w/ abdominal pain found to have acute sigmoid diverticulitis    PMH.  pmh Hytn  pmh Sz.    PROBLEMS.  Feculent peritonitis poa 12/22  Colostomy 12/26  Initial abio course competed 1/15  R LEFTY removd 1/22/2022    Trach done 1/17  VDRF Since abd surgery 12/26        DVT 1/25/2022 r cfv fd lvnx startd   Drop Hb 1/27 Lvnx dced  Need for ivcf 1/30/2022 dw Dr Patton  2/1/2022 IR reviewed imaging IR felt that Blood clot is minimal and subaute/chronic and neither IVCF not Anticoagulants are needed     GI bl peg and ostomy 1/28  NPO 1/28  GI Bleed EGD 2/1/2022 Dr Ness Erosions PEG tube May restart Anticoagulants after 24 h      New rll pneum 1/30 ct   Zosyn 1/29-2/6 1/31 Sp Pseudo  Fever 2/3/2022 Abio dced 2/6    Ac L MCA cva 1/30/2022 CT   Elevated LFTS 2/1/2022  2/4 us abd (-)       MISC ISSUES.  COVID STATUS. 12/24 pcr (-)  ICU STAY. 12/22-1/12/2022   GOC.  1/13/2022 full code     BEST PRACTICE ISSUES.                                                  HEAD OF BED ELEVATION. Yes  DVT PROPHYLAXIS.     1/28/2022 lvnx dced because of active gi bleed    LIGHT PROPHYLAXIS.    1/11 protonix 40                                                                                     DIET.   2/1 jevity 1440  INFECTION PROPHYLAXIS.   1/7 Chlorhexidine .12%   1/7 chlorhexidine 2%    SPEECH SWALLOW RECOMMENDATIONS.     PATIENT DATA   VITALS/PO/IO/VENT/DRIPS.   2/8/2022 99f 68 120/70   2/8/2022 ac 15/450/5/.3      ASSESSMENT/RECOMMENDATIONS.    HEMODYNAMICS.   Monitor bp Target MAP 65 (+)    RESP.   Monitor po Target po 90-95%  1/14/2022 15/450/.4/5 747/38/110     DVT  2/1/2022 IR reviewed imaging IR felt that Blood clot is minimal and subaute/chronic and neither IVCF not Anticoagulants are needed   2/2/2022 V duplex (-)  2/3 recommend resuming lovenox full dose or at least pplx dose      VENT MANAGEMENT.   HOB elevation  Target Pplat 35 (-)  Target PO 90-95%  Target pH 730 (+)      INFECTION   FEVER 1/28/2022.  New rll pneum 1/30 ct  Abdominal infection sec perforated sigmoid div feculent peritonitis poa 12/22 ABIO completed 1/15.  2/5-2/6/2022 w 19 - 12.9   1/30/2022 ct ch new rll pneu  Sputum 1/31 Sp Pseudo  blod cult. 1/31  bc. (-)  1/29-2/6/2022 zosyn started by id  2/6/2022 off abio    SP ABD SURGERY.  81 F admitted with Acute Sigmoid Diverticulitis s/p ex lap, sigmoid resection, peritoneal lavage 12/25 and RTOR 12/26 for irrigation of abdominal cavity with closure and creation of colostomy. S/p bedside IR aspiration of fluid collection 1/3. Tracheostomy 1/7.   Deep pelvic abscess noted on CT,  Local drain/ostomy care per nursing  1/22/2022 r lefty removd     ANEMIA.  Hb 2/5-2/6-2/7-2/8/2022 Hb 8.6 - 9.6 - 8.9 - 8.5   monitor target hb 7     CVA  2/5/2022 ct h no hrrge   1/30/2022 ct h ac l mca cva   2/2 asa 81   2/2 simvastat 40     GT placed 1/18    GI BLEED   11/28 peg and ostomy with active bleed  EGD 2/1/2022 Dr Ness Erosions PEG tube May restart Anticoagulants after 24 h    ELEVATED LFTS  LFTS 2/1-2/4-2/7/2022    - 142-116   - 49 - 39   - 134 - 80   2/4/2022 us cbd 5 mm no cholecystit  Follow with GI      TIME SPENT   Over 25 minutes aggregate care time spent on encounter; activities included   direct patient care, counseling and/or coordinating care reviewing notes, lab data/ imaging , discussion with multidisciplinary team/ patient  /family and explaining in detail risks, benefits, alternatives  of the recommendations     TRESA PIZARRO 81 f 12/22/2021 1942 DR ANTONIO PATTON

## 2022-02-08 NOTE — PROGRESS NOTE ADULT - ASSESSMENT
From Initial ID consult note: 81F with a PMH of HTN, HLD, hypothyroidism, depression, seizures who presents to the ED for abd pain found to have diverticulitis   has rising leukocytosis   had fever yesterday   tachycardic and now hypoxic   CXR (I personally reviewed) low lung volumes   original CT (I personally reviewed) with diverticulitis   she had a lot of pain in the abdomen on exam     Progress Notes  12/25: s/p surgery for diverticulitis with perforation and abscess and went to the OR. intubated in ICU with vac and needs to go back to the OR, currently on zosyn, s/p one dose of diflucan last night   12/27: s/p repair and ostomy creation yesterday, wbc elevated, cultures sensitive to zosyn and candida albicans is notoriously sensitive to azoles such as fluconazole, wean of pressors and sedation, extubate when ready   12/28: Further increase in white blood cell count but overall the patient appears to be reasonably stable.  No concern of C. difficile at this juncture.  Current antibiotics are reasonable.  Leukocytosis like related to recent surgery, no concern of other superimposed process on the basis of exam.  I do not see a role to alter her antimicrobials.  12/29:  Remains intubated in ICU. still quite edematous and net positive, WBC climbing, small wound dehiscence at bottom of incision, plan for initiation of TPN today, remains on antibiotics    12/30: rising WBC, ostomy not functioning yet, very edematous CT today, may be source control issue so for now can continue same antibiotics and antifungal but may need to change if findings on the CT are worrisome or change in hemodynamics  12/31:Leukocytosis, with collections noted on CT.  However this is being done postoperative day 5, there is some possibility that this could represent postop changes but given the leukocytosis, concur with plans for attempts at percutaneous drainage.  White blood cell count down slightly compared to prior peak, will need to be trended.  Gallbladder is also distended.  Abdominal exam was benign this morning, but a calculus cholecystitis could be the differential diagnosis here as well (though n.p.o. status certainly could explain distended gallbladder, there is also report of gallbladder wall thickening).  Would modify antibiotics based on cultures from drainage, I do not believe that antibiotics and other the cells would be adequate here.  Fortunately she is off pressors, with preserved renal function, and tolerating pressure support.  1/1/22: Postop day 6.  I have some concerns with the appearance of the ostomy, though the abdominal exam overall is otherwise unchanged.  White blood cell count remains elevated, but is lower compared with prior values.  This is despite no change in antibiotic therapy.  Patient also has asymmetric edema of the upper extremities, right greater than left.  Low threshold for duplex ultrasound to exclude DVT.No new surveillance culture data.  1/2: POD7 Ostomy looks better today, UE symmetric. WBC elevated, some slight downward trend overall. Temps <= 100F.   1/3: drainage of fluid collection today, continue zosyn and fluconazole for now, monitor wbc and temps, watch ostomy output, diuresis and address third spacing   1/4: s/p drainage yesterday now growing ecoli and awaiting for sensitivities, wbc is improving, started on tube feeds    1/5: abscess growing ecoli and bacteroides, S to ceftriaxone, wbc 15, weaning trial today, pain management    1/6: awake, lung exercise today, trach planned for tomorrow, ostomy output is good. will stop fluconazole today and change antibiotics to ceftriaxone and flagly. Unclear stop date yet    1/7 trach today, continue antibiotics   1/9: s/p trach, had low grade fever but otherwise doing ok, will continues same antibiotics regimen for now but if fevers continue she will needs to be rescanned as those abscesses can progress.  1/10: low grade fever but doing well on trach-PS, ostomy with stool and gas, shakes head when asked if in pain, discussed with surgery about repeating CT scan given the temp  1/11: Still febrile, favor interval CT as above.  1/12: CT of abdomen showing some signs of improvement and elsewhere  it shows signs of worsening. wbc normal, tube feeds currently via NGT   1/13: no fever, no wbc, Cr and LFTs ok, Ceftriaxone and flagyl continued. No planned IR intervention at this time - fluid collection without access to drain, surgical drain within the collection. Pt possibly will have repeat imaging over the weekend to evaluate progress.   Attending addendum:  agree with above  continue antibiotics   vent weaning  surgical follow up for the abdominal wound  1/14: Low grade temp last night, midline placed, no leukocytosis, Cr and LFTs ok, culture data reviewed. Pt's colostomy with small amount of liquid stool and gas, no stools recorded for two days, no role for po Vancomycin at this time. The pt has been on abx since her admission, will stop all abx and will continue to monitor.    1/18: no fevers since 1/14, WBC better 11.00, cr ok, off abx, now s/p EGD, with PEG replacement.   1/24: spiking low grade fevers, low suspicion for pneumonia given little secretions and doing well on the PS wth low settings, peg site looks clean, trach site OK as well, fevers may be from small fluid collections intra-abdominally vs atelectasis Would suggest aggressive pulmonary toilet including chest vest. follow cultures and trend wbc and fever curve . If persistent fevers would start  Zosyn and PO fluconazole but for now please hold antibiotics   1/25: continues having fevers, low grade temps today, WBC better 18.35, Cr and LFTs ok, + new DVT of right common femoral vein - could be the cause of pt's fevers - on full dose Lovenox. Will continue to monitor off abx, WBC got better without abx.   Attending addendum:  patient with long hospital course and now on the floors with fever and found to have a DVT  wbc came down without antibiotics   fever curve is getting better   continue to monitor off antibiotics   treat DVT with full dose AC   suspect fevers could be from dvt but may also be from fluid collection    1/26: low grade temp today, pt is more awake and alert today, WBC improving 14.2 off abx, Cr and LFTs ok, abd wound with clean base and healing well - vac dressing was changed today during my exam. In favor to continue to monitor off abx for now.   1/27: Pt continues spiking fevers, low grade last night and 101 today, awake and alert during my exam, WBC improving off abx 13.61, recent chest xray with no acute lung disease, COVID 19 is negative. Pt's fevers most likely related to her DVT, will collect two sets of surveillance BC and will continue to monitor off abx for now.   1/28: CTA A/P done last night - There is evidence of active GI bleeding in the gastric antrum, related to peptic ulcer disease or gastritis, possible cystitis. Blood transfusion is in progress during my exam, wound vac is being changed - base of the wound is clean with no evidence of acute infection. Pt had a low grade temp last night, WBC elevated - could be reactive to DVT. Will obtain UA and UC, will hold off on abx for now.   1/29: persistent fevers, received a dose of vanco and a dose of Azactam last evening, WBC high 27.98, BC with no growth to date, UA positive, UC pending. Will start on broad spectrum antibiotic now as unclear it is unclear at this time if fevers caused by UTI only, or in combination with DVT, or other source. Pt's abd was seen with last vac dressing change - incision site wound  is clean and healing well. JPis still draining cloudy drainage.   1/30: lethargic, continues having fevers, WBC better 20.79, all BCs remain with no growth, UC pending, CT head - Acute left MCA infarct is suspected, CT chest and CT a/p performed earlier, pending radiology reading, will continue with Zosyn, Na is slightly elevated, would prefer to have UC available prior switching abx.   1/31: no fevers today, remains lethargic, vented, WBC with improvement 17.82, two sets of BCs were collected yesterday and pending result, Zosyn continued. All BCs with no growth to date, last UC is contaminated.  2/1: afebrile last 24hrs, no IVC filter placement per IR, wbc slightly better, PEG tube loosened and EGD by EGD didnt find bleed, pseudomonas in respiratory culture and CT did show pneumonia but she is doing well on , nonetheless hopefully the zosyn is sensitive,    2/2: low grade temp this morning, WBC improving, Zosyn continued, BCs remain with no growth, sputum culture with pseudomonas, sensitive to Zosyn.   Attending addendum:  agree with above   continue zosyn   neuro consulted for MCA infarct   wound seen today after vac was taken off->it is healing well with pink granulation tissue and no discharge    2/3: low grade temperature 100.3, WBC elevated, COVID 19 is negative, BC with no growth to date, Zosyn day #6 continued.   2/4: no fever today, WBC better 13.92, Cr ok, LFTs better, the pt seems to be more awake today, Zosyn continued - today is day #7 of abx.   Attending addendum:  agree with above   stop antibiotics   fevers could be from DVTs   latest cultures negative   doing well on vent and does not appear to be symptomatic from pneumonia   if continues to spike fevers can repeat blood cultures, UA and respiratory cultures   2/7: no fevers since 2/3, WBC 13.13, LFTs slightly elevated, completed 7 days course of Zosyn, off abx now, will continue to monitor.   2/8: remains afebrile, WBC continues to improve 12.25, all blood cultures with no growth to date, no role for more abx at this time.     #RLL Pneumonia   - Zosyn x 7 days complete   - trend pt's temperatures  - trend WBC  - MRSA PCR not detected  - CT chest/a/p - + pneumonia, no abdominal pathology     #Perforated diverticulum of large intestine.   monitor sodium levels   continue wound vac   Drain and colostomy care as per surgery      #Fever.   7 days Zosyn IV complete   trend pt's temperatures   UC - contaminated   +DVT of right common femoral vein   New left MCA infarct on CT head  CT c/a/p - new pneumonia RLL, no acute abd pathology   BC NGTD  repeat BC NGTD  MRSA PCR not detected     #Acute Respiratory Failure  wean off ventilator as tolerated   chest PT  frequent suctioning   chest vest   HOB >30 degrees    #DVT  AC if able and no contraindication    will sign off, re-consult as needed  Discussed with Dr. Dunn  Discussed with Dr. Valle

## 2022-02-08 NOTE — DISCHARGE NOTE PROVIDER - PROVIDER TOKENS
PROVIDER:[TOKEN:[[id number]]],PROVIDER:[TOKEN:[[id number]]] PROVIDER:[TOKEN:[[id number]]],PROVIDER:[TOKEN:[[id number]]],PROVIDER:[TOKEN:[[id number]]],PROVIDER:[TOKEN:[[id number]]]

## 2022-02-09 LAB
ANION GAP SERPL CALC-SCNC: 6 MMOL/L — SIGNIFICANT CHANGE UP (ref 5–17)
BUN SERPL-MCNC: 13 MG/DL — SIGNIFICANT CHANGE UP (ref 7–23)
CALCIUM SERPL-MCNC: 8.1 MG/DL — LOW (ref 8.5–10.1)
CHLORIDE SERPL-SCNC: 103 MMOL/L — SIGNIFICANT CHANGE UP (ref 96–108)
CO2 SERPL-SCNC: 27 MMOL/L — SIGNIFICANT CHANGE UP (ref 22–31)
CREAT SERPL-MCNC: 0.32 MG/DL — LOW (ref 0.5–1.3)
GLUCOSE BLDC GLUCOMTR-MCNC: 137 MG/DL — HIGH (ref 70–99)
GLUCOSE SERPL-MCNC: 127 MG/DL — HIGH (ref 70–99)
HCT VFR BLD CALC: 27.7 % — LOW (ref 34.5–45)
HGB BLD-MCNC: 8.4 G/DL — LOW (ref 11.5–15.5)
MCHC RBC-ENTMCNC: 27.7 PG — SIGNIFICANT CHANGE UP (ref 27–34)
MCHC RBC-ENTMCNC: 30.3 G/DL — LOW (ref 32–36)
MCV RBC AUTO: 91.4 FL — SIGNIFICANT CHANGE UP (ref 80–100)
NRBC # BLD: 0 /100 WBCS — SIGNIFICANT CHANGE UP (ref 0–0)
PLATELET # BLD AUTO: 419 K/UL — HIGH (ref 150–400)
POTASSIUM SERPL-MCNC: 3.8 MMOL/L — SIGNIFICANT CHANGE UP (ref 3.5–5.3)
POTASSIUM SERPL-SCNC: 3.8 MMOL/L — SIGNIFICANT CHANGE UP (ref 3.5–5.3)
RBC # BLD: 3.03 M/UL — LOW (ref 3.8–5.2)
RBC # FLD: 16.1 % — HIGH (ref 10.3–14.5)
SODIUM SERPL-SCNC: 136 MMOL/L — SIGNIFICANT CHANGE UP (ref 135–145)
WBC # BLD: 12.55 K/UL — HIGH (ref 3.8–10.5)
WBC # FLD AUTO: 12.55 K/UL — HIGH (ref 3.8–10.5)

## 2022-02-09 PROCEDURE — 99233 SBSQ HOSP IP/OBS HIGH 50: CPT

## 2022-02-09 PROCEDURE — 99232 SBSQ HOSP IP/OBS MODERATE 35: CPT

## 2022-02-09 RX ORDER — ATORVASTATIN CALCIUM 80 MG/1
20 TABLET, FILM COATED ORAL AT BEDTIME
Refills: 0 | Status: DISCONTINUED | OUTPATIENT
Start: 2022-02-09 | End: 2022-02-11

## 2022-02-09 RX ADMIN — PANTOPRAZOLE SODIUM 40 MILLIGRAM(S): 20 TABLET, DELAYED RELEASE ORAL at 18:47

## 2022-02-09 RX ADMIN — ESCITALOPRAM OXALATE 20 MILLIGRAM(S): 10 TABLET, FILM COATED ORAL at 11:05

## 2022-02-09 RX ADMIN — ATORVASTATIN CALCIUM 20 MILLIGRAM(S): 80 TABLET, FILM COATED ORAL at 21:18

## 2022-02-09 RX ADMIN — CHLORHEXIDINE GLUCONATE 15 MILLILITER(S): 213 SOLUTION TOPICAL at 05:34

## 2022-02-09 RX ADMIN — CHLORHEXIDINE GLUCONATE 1 APPLICATION(S): 213 SOLUTION TOPICAL at 05:34

## 2022-02-09 RX ADMIN — PANTOPRAZOLE SODIUM 40 MILLIGRAM(S): 20 TABLET, DELAYED RELEASE ORAL at 05:34

## 2022-02-09 RX ADMIN — AMLODIPINE BESYLATE 10 MILLIGRAM(S): 2.5 TABLET ORAL at 05:33

## 2022-02-09 RX ADMIN — CARVEDILOL PHOSPHATE 3.12 MILLIGRAM(S): 80 CAPSULE, EXTENDED RELEASE ORAL at 18:47

## 2022-02-09 RX ADMIN — CARVEDILOL PHOSPHATE 3.12 MILLIGRAM(S): 80 CAPSULE, EXTENDED RELEASE ORAL at 05:33

## 2022-02-09 RX ADMIN — Medication 81 MILLIGRAM(S): at 11:05

## 2022-02-09 RX ADMIN — LEVETIRACETAM 500 MILLIGRAM(S): 250 TABLET, FILM COATED ORAL at 05:33

## 2022-02-09 RX ADMIN — CHLORHEXIDINE GLUCONATE 15 MILLILITER(S): 213 SOLUTION TOPICAL at 18:47

## 2022-02-09 RX ADMIN — DORZOLAMIDE HYDROCHLORIDE TIMOLOL MALEATE 1 DROP(S): 20; 5 SOLUTION/ DROPS OPHTHALMIC at 18:49

## 2022-02-09 RX ADMIN — LEVETIRACETAM 500 MILLIGRAM(S): 250 TABLET, FILM COATED ORAL at 18:47

## 2022-02-09 RX ADMIN — Medication 75 MICROGRAM(S): at 05:33

## 2022-02-09 RX ADMIN — DORZOLAMIDE HYDROCHLORIDE TIMOLOL MALEATE 1 DROP(S): 20; 5 SOLUTION/ DROPS OPHTHALMIC at 05:33

## 2022-02-09 NOTE — PROGRESS NOTE ADULT - SUBJECTIVE AND OBJECTIVE BOX
JUAREZ ECHOLSNIS    LVS 1B 122 D    Allergies    No Known Allergies    Intolerances        PAST MEDICAL & SURGICAL HISTORY:  Seizure    HTN (hypertension)    Glaucoma    Thyroid disease    Blood clot of neck vein  left side    TIA (transient ischemic attack)  20 years ago    S/P appendectomy        FAMILY HISTORY:  FH: HTN (hypertension)        Home Medications:  amLODIPine 10 mg oral tablet: 1 tab(s) orally once a day (22 Dec 2021 16:46)  aspirin 81 mg oral tablet, chewable: 1 tab(s) orally once a day (22 Dec 2021 16:46)  carvedilol 3.125 mg oral tablet: 1 tab(s) orally every 12 hours (08 Feb 2022 18:16)  escitalopram 20 mg oral tablet: 1 tab(s) orally once a day (22 Dec 2021 16:46)  levETIRAcetam 500 mg oral tablet: 1 tab(s) orally 2 times a day (22 Dec 2021 16:46)  levothyroxine 75 mcg (0.075 mg) oral tablet: 1 tab(s) orally once a day (08 Feb 2022 18:16)  pantoprazole 40 mg oral granule, delayed release: 40 milligram(s) by gastrostomy tube once a day (08 Feb 2022 18:16)  timolol-dorzolamide 0.5%-2% preservative-free ophthalmic solution: 1 drop(s) to each affected eye 2 times a day (22 Dec 2021 16:46)  Zetia 10 mg oral tablet: 1 tab(s) orally once a day (22 Dec 2021 16:46)      MEDICATIONS  (STANDING):  amLODIPine   Tablet 10 milliGRAM(s) Oral daily  aspirin  chewable 81 milliGRAM(s) Oral daily  carvedilol 3.125 milliGRAM(s) Oral every 12 hours  chlorhexidine 0.12% Liquid 15 milliLiter(s) Oral Mucosa every 12 hours  chlorhexidine 2% Cloths 1 Application(s) Topical <User Schedule>  dextrose 40% Gel 15 Gram(s) Oral once  dextrose 5%. 1000 milliLiter(s) (100 mL/Hr) IV Continuous <Continuous>  dextrose 5%. 1000 milliLiter(s) (50 mL/Hr) IV Continuous <Continuous>  dextrose 50% Injectable 25 Gram(s) IV Push once  dextrose 50% Injectable 12.5 Gram(s) IV Push once  dextrose 50% Injectable 25 Gram(s) IV Push once  dorzolamide 2%/timolol 0.5% Ophthalmic Solution 1 Drop(s) Both EYES two times a day  escitalopram 20 milliGRAM(s) Oral daily  glucagon  Injectable 1 milliGRAM(s) IntraMuscular once  levETIRAcetam  Solution 500 milliGRAM(s) Enteral Tube every 12 hours  levothyroxine 75 MICROGram(s) Enteral Tube daily  pantoprazole   Suspension 40 milliGRAM(s) Enteral Tube every 12 hours  simvastatin 40 milliGRAM(s) Oral at bedtime    MEDICATIONS  (PRN):  acetaminophen    Suspension .. 650 milliGRAM(s) Oral every 6 hours PRN Temp greater or equal to 38C (100.4F)  sodium chloride 0.9% lock flush 10 milliLiter(s) IV Push every 1 hour PRN Pre/post blood products, medications, blood draw, and to maintain line patency      Diet, NPO with Tube Feed:   Tube Feeding Modality: Gastrostomy  Jevity 1.2 Santos  Total Volume for 24 Hours (mL): 1440  Continuous  Starting Tube Feed Rate mL per Hour: 25  Increase Tube Feed Rate by (mL): 5     Every 4 hours  Until Goal Tube Feed Rate (mL per Hour): 60  Tube Feed Duration (in Hours): 24  Tube Feed Start Time: 14:40 (02-01-22 @ 14:39) [Active]          Vital Signs Last 24 Hrs  T(C): 36.6 (09 Feb 2022 05:20), Max: 37.3 (08 Feb 2022 17:14)  T(F): 97.8 (09 Feb 2022 05:20), Max: 99.2 (08 Feb 2022 17:14)  HR: 73 (09 Feb 2022 05:20) (66 - 92)  BP: 124/66 (09 Feb 2022 05:20) (119/68 - 126/70)  BP(mean): --  RR: 18 (09 Feb 2022 05:20) (18 - 22)  SpO2: 99% (09 Feb 2022 05:20) (93% - 99%)      02-08-22 @ 07:01  -  02-09-22 @ 07:00  --------------------------------------------------------  IN: 1560 mL / OUT: 1230 mL / NET: 330 mL        Mode: AC/ CMV (Assist Control/ Continuous Mandatory Ventilation), RR (machine): 15, TV (machine): 450, FiO2: 30, PEEP: 5, ITime: 1, MAP: 8, PIP: 18      LABS:                        8.5    12.25 )-----------( 383      ( 08 Feb 2022 07:22 )             28.0     02-08    139  |  108  |  12  ----------------------------<  123<H>  4.1   |  26  |  0.39<L>    Ca    8.0<L>      08 Feb 2022 07:22                WBC:  WBC Count: 12.25 K/uL (02-08 @ 07:22)  WBC Count: 13.13 K/uL (02-07 @ 07:03)  WBC Count: 13.92 K/uL (02-06 @ 07:10)  WBC Count: 19.20 K/uL (02-05 @ 09:36)      MICROBIOLOGY:  RECENT CULTURES:                  Sodium:  Sodium, Serum: 139 mmol/L (02-08 @ 07:22)  Sodium, Serum: 138 mmol/L (02-07 @ 07:03)  Sodium, Serum: 140 mmol/L (02-06 @ 07:10)  Sodium, Serum: 142 mmol/L (02-05 @ 09:36)      0.39 mg/dL 02-08 @ 07:22  0.43 mg/dL 02-07 @ 07:03  0.47 mg/dL 02-06 @ 07:10  0.35 mg/dL 02-05 @ 09:36      Hemoglobin:  Hemoglobin: 8.5 g/dL (02-08 @ 07:22)  Hemoglobin: 8.9 g/dL (02-07 @ 07:03)  Hemoglobin: 9.6 g/dL (02-06 @ 07:10)  Hemoglobin: 8.6 g/dL (02-05 @ 09:36)      Platelets: Platelet Count - Automated: 383 K/uL (02-08 @ 07:22)  Platelet Count - Automated: 445 K/uL (02-07 @ 07:03)  Platelet Count - Automated: 396 K/uL (02-06 @ 07:10)  Platelet Count - Automated: 241 K/uL (02-05 @ 09:36)              RADIOLOGY & ADDITIONAL STUDIES:      MICROBIOLOGY:  RECENT CULTURES:

## 2022-02-09 NOTE — PROGRESS NOTE ADULT - ASSESSMENT
TRESA PIZARRO 81 f 12/22/2021 1942 DR ANTONIO PATTON     REVIEW OF SYMPTOMS      Able to give (reliable) ROS  NO     PHYSICAL EXAM    HEENT Unremarkable  atraumatic   RESP Fair air entry EXP prolonged    Harsh breath sound Resp distres mild   CARDIAC S1 S2 No S3     NO JVD    ABDOMEN SOFT BS PRESENT NOT DISTENDED No hepatosplenomegaly   PEDAL EDEMA present No calf tenderness  NO rash     ______  DOA/CC.  12/22/2021 81F w/ HTN, HLD, hypothyroidism, depression, seizures. Presented w/ abdominal pain found to have acute sigmoid diverticulitis    PMH.  pmh Hytn  pmh Sz.    PROBLEMS.  Feculent peritonitis poa 12/22  Colostomy 12/26  Initial abio course competed 1/15  R LEFTY removd 1/22/2022    Trach done 1/17  VDRF Since abd surgery 12/26      DVT 1/25/2022 r cfv fd lvnx startd   Drop Hb 1/27 Lvnx dced  Need for ivcf 1/30/2022 dw Dr Patton  2/1/2022 IR reviewed imaging IR felt that Blood clot is minimal and subaute/chronic and neither IVCF not Anticoagulants are needed     GI bl peg and ostomy 1/28  NPO 1/28  GI Bleed EGD 2/1/2022 Dr Ness Erosions PEG tube May restart Anticoagulants after 24 h      New rll pneum 1/30 ct   Zosyn 1/29-2/6 1/31 Sp Pseudo  Fever 2/3/2022 Abio dced 2/6    MR brain 2/8 subac ischemic changes l mca territory no hrrge   Ac L MCA cva 1/30/2022 CT   Elevated LFTS 2/1/2022  2/4 us abd (-)       MISC ISSUES.  COVID STATUS. 12/24 pcr (-)  ICU STAY. 12/22-1/12/2022   GOC.  1/13/2022 full code     BEST PRACTICE ISSUES.                                                  HEAD OF BED ELEVATION. Yes  DVT PROPHYLAXIS.     1/28/2022 lvnx dced because of active gi bleed    LIGHT PROPHYLAXIS.    1/11 protonix 40                                                                                     DIET.   2/1 jevity 1440  INFECTION PROPHYLAXIS.   1/7 Chlorhexidine .12%   1/7 chlorhexidine 2%      PATIENT DATA   VITALS/PO/IO/VENT/DRIPS.   2/9/2022 afeb 70 130/70   2/9/2022 cpap 5/5/.3 339/21      ASSESSMENT/RECOMMENDATIONS.    HEMODYNAMICS.   Monitor bp Target MAP 65 (+)    RESP.   Monitor po Target po 90-95%  1/14/2022 15/450/.4/5 747/38/110       DVT  2/1/2022 IR reviewed imaging IR felt that Blood clot is minimal and subaute/chronic and neither IVCF not Anticoagulants are needed   2/2/2022 V duplex (-)  2/3 recommend resuming lovenox full dose or at least pplx dose    VENT MANAGEMENT.   HOB elevation  Target Pplat 35 (-)  Target PO 90-95%  Target pH 730 (+)      INFECTION   FEVER 1/28/2022.  New rll pneum 1/30 ct  Abdominal infection sec perforated sigmoid div feculent peritonitis poa 12/22 ABIO completed 1/15.  2/5-2/6-2/9/2022 w 19 - 12.9-12.5    1/30/2022 ct ch new rll pneu  Sputum 1/31 Sp Pseudo  blod cult. 1/31  bc. (-)  1/29-2/6/2022 zosyn started by id  2/6/2022 off abio    SP ABD SURGERY.  81 F admitted with Acute Sigmoid Diverticulitis s/p ex lap, sigmoid resection, peritoneal lavage 12/25 and RTOR 12/26 for irrigation of abdominal cavity with closure and creation of colostomy. S/p bedside IR aspiration of fluid collection 1/3. Tracheostomy 1/7.   Deep pelvic abscess noted on CT,  Local drain/ostomy care per nursing  1/22/2022 r lefty removd     ANEMIA.  Hb 2/5-2/6-2/7-2/8-2/9/2022 Hb 8.6 - 9.6 - 8.9 - 8.5 -8.4  monitor target hb 7     CVA  2/5/2022 ct h no hrrge   1/30/2022 ct h ac l mca cva   MR brain 2/8 subac ischemic changes l mca territory no hrrge   2/2 asa 81   2/2 simvastat 40     GT placed 1/18    GI BLEED   11/28 peg and ostomy with active bleed  EGD 2/1/2022 Dr Bienstock Erosions PEG tube May restart Anticoagulants after 24 h    ELEVATED LFTS  LFTS 2/1-2/4-2/7/2022    - 142-116   - 49 - 39   - 134 - 80   2/4/2022 us cbd 5 mm no cholecystit  Follow with GI      Hypernatremia  Na 1/26-1/27-1/28-1/31-2/4-2/5/2022 Na 147-148 -147 - 143 - 147-142  2/4/2022 fw started 300.6       TIME SPENT   Over 25 minutes aggregate care time spent on encounter; activities included   direct patient care, counseling and/or coordinating care reviewing notes, lab data/ imaging , discussion with multidisciplinary team/ patient  /family and explaining in detail risks, benefits, alternatives  of the recommendations     TRESA PIZARRO 81 f 12/22/2021 1942 DR ANTONIO PATTON

## 2022-02-09 NOTE — PROGRESS NOTE ADULT - SUBJECTIVE AND OBJECTIVE BOX
Pt resting comfortably, no acute events.    Physical Exam:   · Neurological	detailed exam  · Mental Status	Awake, not following commands. Tracks.  · Cranial Nerve	able to move in all directions except crossing midline. decreased blink to threat on the right. Right facial  · Motor	  moving left side > right. Withdraws on the right  · Gait/Balance	deferred  	    echo 12/25 normal EF, no LA dilatation    MRI brain 2/7 shows L MCA CVA no hem conversion    · Assessment	  Acute left MCA CVA off AC with new onset Afib   s/p ex lap/colectomy/diverticulitis  HTN  HLD  seizures?    Plan   High risk of Hem conversion, would cont with ASA/Statin for secondary stroke prevention  repeat hct 2/5 looks stable  PT/OT/ST  carotid doppler  poor prognosis with large left MCA CVA, GOC discussion  neuro stable, follow up outpt

## 2022-02-09 NOTE — PHARMACOTHERAPY INTERVENTION NOTE - COMMENTS
Recommended switching from IV levothyroxine 25 mcg to PO levothyroxine 50 mcg due to shortage on IV levothyroxine in the pharmacy. Pt will start PO levothyroxine tomorrow since the pt underwent tracheostomy today.
Recommended switching to levothyroxine 50 mcg once daily PO via nasogastric tube since the pt was on it outpatient and inpatient as IV levothyroxine 25 mcg once daily (a IV:PO conversion of 1:2).
Recommended switching from levetiracetam 500 mg IVPB BID to levetiracetam 500 mg oral BID via nasogastric tube as per IV to PO pharmacy policy since the pt is tolerating oral medications.
Per policy, metronidazole 500 mg IV q8h transitioned to PO formulation since patient is tolerating feeding and/or other medications via enteral tube. Note: metronidazole IV is currently in shortage.
Per policy, pantoprazole IVP 40 mg daily transitioned to PO formulation since patient is tolerating feeding and/or other medications via enteral tube.
Recommended atorvastatin since patient on Amlodipine

## 2022-02-09 NOTE — PROGRESS NOTE ADULT - ASSESSMENT
81F with a PMH of HTN, HLD, hypothyroidism, depression, seizures presented with abdominal pain and admitted 12/22 for acute perforated diverticulitis, s/p ex lap 12/24 with sigmoid colectomy and abdominal washout.  She was then transferred to MICU for postop care on mechanical ventilation.  Hospital course complicated by Afib with RVR.  She underwent wound closure and creation of colostomy 12/26, developed abdominal abscess s/p IR drainage 1/3, cultures grew Ecoli and Bacteriodes.  S/p tracheostomy and PEG placement.  1/28 developed bleeding through ostomy, AC stopped and pt transfused.  EGD on 2/1 showed gastritis, PEG tube embedded in mucosa.  Due to fevers, CT Head, C/A/P performed on 1/30, showed Acute left MCA infarct, new RLL pneumonia.  Seen by ID, started on Zosyn.      # RLL pneumonia, Aspiration Pneumonia - seen on CT chest 1/30.  ID following, completed Zosyn. No fever since 2/3.  WBC now 13.  Supportive care.  # Acute Blood Loss Anemia - due to suspected gastric bleeding.  s/p transfusion.  EGD 2/1 showed gastritis.  Monitor H/H, has been stable.    # Acute CVA - L MCA infarct was noted on CT 1/30.  Neuro input appreciated.  AC held due to extensive CVA, concern for hemorrhagic conversion.  Continue ASA, Statin.    # Elevated LFTs - improved and remains stable.   Abdominal Sono showed cholelithiasis, no acute GB disease.  GI following.   # Hypernatremia - Would avoid hypotonic fluids in setting of acute CVA.  Increase free water via PEG. na normalized.   # Perforated Acute Diverticulitis / Intraabdominal Abscesses - continue BABAK drain care, wound vac.  Most recent CT without significant new collections. reached out to surgery PA today to discuss plan with BABAK .   # S/p Septic Shock - resolved  # Afib with RVR - rate control.  Holding AC due to above.   # Chronic Respiratory Failure with Hypoxemia - vent support, weaning to trach collar.  Pulmonary following.  # RLE DVT - per IR, felt to be more subacute to chronic.  Recommended no IVC filter placement.  Followup LE doppler 2/2 shows no acute DVT.   # Functional Quadriplegia - supportive care.    DVT Proph - ICDs.     son HCP [facility] [phone]   SNF / Vent facility placement d/w CM, SW. pending surgical clearance

## 2022-02-09 NOTE — PROGRESS NOTE ADULT - SUBJECTIVE AND OBJECTIVE BOX
Pt seen bedside for surgical Follow up as requested by primary team  LLQ drain indwelling  No new events    T(F): 98 (02-09-22 @ 10:45), Max: 99.2 (02-08-22 @ 17:14)  HR: 73 (02-09-22 @ 10:45) (67 - 92)  BP: 139/71 (02-09-22 @ 10:45) (121/70 - 139/71)  RR: 18 (02-09-22 @ 10:45) (18 - 22)  SpO2: 97% (02-09-22 @ 10:45) (93% - 99%)  Wt(kg): --  POCT Blood Glucose.: 137 mg/dL (09 Feb 2022 00:36)      PHYSICAL EXAM:  General: NAD  HEENT: trache to vent  CV: +S1+S2   Lung: vented  Abdomen: LLQ sher drain with cloudy serous output, 30cc recorded over past 24 hrs. Tubing is noted to be pulled out, as suture is dislodged from skin.   PEG intact, tube feeds running. Ostomy pink and viable with function. Midline VAC intact.    LABS:                        8.4    12.55 )-----------( 419      ( 09 Feb 2022 06:48 )             27.7     02-09    136  |  103  |  13  ----------------------------<  127<H>  3.8   |  27  |  0.32<L>    Ca    8.1<L>      09 Feb 2022 06:48      A/P: 81F s/p ex lap, sigmoid rxn, abthera placement 12/25, RTOR for completion of hartmanns procedure and closure of abdominal wall 12/26, IR  abscess aspiration 1/3, trach 1/7, PEG 1/18.   CT 1/21 with improving fluid collections, repeat CT 1/30 with no IA collections  course with RLE DVT, s/p GIB and EGD 2/1   now seen with dislodged LLQ sher drain, removed at bedside as discussed with Dr Lu  no further surgical intervention indicated  continue VAC therapy and tube feeds  medical management, d/c planning per primary team  discussed with Dr Munoz   Pt seen bedside for surgical Follow up as requested by primary team.  L sided drain indwelling.  No new events.  Pt is resting comfortably.    T(F): 98 (02-09-22 @ 10:45), Max: 99.2 (02-08-22 @ 17:14)  HR: 73 (02-09-22 @ 10:45) (67 - 92)  BP: 139/71 (02-09-22 @ 10:45) (121/70 - 139/71)  RR: 18 (02-09-22 @ 10:45) (18 - 22)  SpO2: 97% (02-09-22 @ 10:45) (93% - 99%)  Wt(kg): --  POCT Blood Glucose.: 137 mg/dL (09 Feb 2022 00:36)      PHYSICAL EXAM:  General: NAD  HEENT: trache to vent  CV: +S1+S2   Lung: vented  Abdomen: L abdominal sher drain with cloudy serous output, 30cc recorded over past 24 hrs. Tubing is noted to be pulled out, as suture is dislodged from skin and black marker noted externally.  PEG intact, tube feeds running. Ostomy pink and viable with function. Midline VAC intact.    LABS:                        8.4    12.55 )-----------( 419      ( 09 Feb 2022 06:48 )             27.7     02-09    136  |  103  |  13  ----------------------------<  127<H>  3.8   |  27  |  0.32<L>    Ca    8.1<L>      09 Feb 2022 06:48      A/P: 81F s/p ex lap, sigmoid rxn, abthera placement 12/25, RTOR for completion of hartmanns procedure and closure of abdominal wall 12/26, IR  abscess aspiration 1/3, trach 1/7, PEG 1/18.   CT 1/21 with improving fluid collections, repeat CT 1/30 with no IA collections  course with RLE DVT, s/p GIB and EGD 2/1   now seen with dislodged Left sided sher drain, removed at bedside as discussed with Dr Lu  no further surgical intervention indicated  continue VAC therapy and tube feeds  medical management, d/c planning per primary team  discussed with Dr Munoz

## 2022-02-09 NOTE — PROGRESS NOTE ADULT - SUBJECTIVE AND OBJECTIVE BOX
no events     MEDICATIONS  (STANDING):  amLODIPine   Tablet 10 milliGRAM(s) Oral daily  aspirin  chewable 81 milliGRAM(s) Oral daily  carvedilol 3.125 milliGRAM(s) Oral every 12 hours  chlorhexidine 0.12% Liquid 15 milliLiter(s) Oral Mucosa every 12 hours  chlorhexidine 2% Cloths 1 Application(s) Topical <User Schedule>  dextrose 40% Gel 15 Gram(s) Oral once  dextrose 5%. 1000 milliLiter(s) (50 mL/Hr) IV Continuous <Continuous>  dextrose 5%. 1000 milliLiter(s) (100 mL/Hr) IV Continuous <Continuous>  dextrose 50% Injectable 25 Gram(s) IV Push once  dextrose 50% Injectable 12.5 Gram(s) IV Push once  dextrose 50% Injectable 25 Gram(s) IV Push once  dorzolamide 2%/timolol 0.5% Ophthalmic Solution 1 Drop(s) Both EYES two times a day  escitalopram 20 milliGRAM(s) Oral daily  glucagon  Injectable 1 milliGRAM(s) IntraMuscular once  levETIRAcetam  Solution 500 milliGRAM(s) Enteral Tube every 12 hours  levothyroxine 75 MICROGram(s) Enteral Tube daily  pantoprazole   Suspension 40 milliGRAM(s) Enteral Tube every 12 hours  simvastatin 40 milliGRAM(s) Oral at bedtime    MEDICATIONS  (PRN):  acetaminophen    Suspension .. 650 milliGRAM(s) Oral every 6 hours PRN Temp greater or equal to 38C (100.4F)  sodium chloride 0.9% lock flush 10 milliLiter(s) IV Push every 1 hour PRN Pre/post blood products, medications, blood draw, and to maintain line patency      PHYSICAL EXAM:      Vital Signs Last 24 Hrs  T(C): 36.7 (09 Feb 2022 10:45), Max: 37.3 (08 Feb 2022 17:14)  T(F): 98 (09 Feb 2022 10:45), Max: 99.2 (08 Feb 2022 17:14)  HR: 73 (09 Feb 2022 10:45) (67 - 92)  BP: 139/71 (09 Feb 2022 10:45) (121/70 - 139/71)  BP(mean): --  RR: 18 (09 Feb 2022 10:45) (18 - 22)  SpO2: 97% (09 Feb 2022 10:45) (93% - 99%)      GENERAL:  NAD Awake, does not follow commands.   HEENT: NCAT, trach in place.   CARDIOVASCULAR:  S1, S2  LUNGS: b/l rhonchi. On vent support.   ABDOMEN:  soft, (-) tenderness, (-) distension, (+) bowel sounds, (-) guarding, (-) rebound (-) rigidity.  Colostomy in place with dark stool.  Midline abdominal wound vac in place.    EXTREMITIES:  no cyanosis / clubbing / edema.   ____________________                                  8.4    12.55 )-----------( 419      ( 09 Feb 2022 06:48 )             27.7     02-09    136  |  103  |  13  ----------------------------<  127<H>  3.8   |  27  |  0.32<L>    Ca    8.1<L>      09 Feb 2022 06:48                   CHIEF COMPLAINT:   Patient presents with:  Sore Throat: x last night. no cough/congestion  Fever: x this am. max temp 102.8      HPI:   Faustina Kussmaul is a 11year old female presents to clinic with symptoms of sore throat. Patient has had for 1 days.  Sympt GENERAL: well developed, well nourished,in no apparent distress  SKIN: no rashes,no suspicious lesions  HEAD: atraumatic, normocephalic  EYES: conjunctiva clear, EOM intact  EARS: Only slight sliver of TM's visualized due to cerumen, does not appear erythe Risks, benefits, and side effects of medication explained and discussed. Patient Instructions       Pharyngitis: Strep Confirmed (Child)  Pharyngitis is a sore throat.  Sore throat is a common condition in children. It can be caused by an infection with · Read the label before giving fever medicine. This is to make sure that you are giving the right dose. The dose should be right for your child’s age and weight. · If your child is taking other medicine, check the list of ingredients.  Look for acetaminoph · Older children may gargle with warm salt water to ease throat pain. Have your child spit out the gargle afterward and not swallow it.   · Tell people who may have had contact with your child about his or her illness.  This may include school officials and Here are guidelines for fever temperature. Ear temperatures aren’t accurate before 10months of age. Don’t take an oral temperature until your child is at least 3years old.   Infant under 3 months old:  · Ask your child’s healthcare provider how you should

## 2022-02-10 LAB — GLUCOSE BLDC GLUCOMTR-MCNC: 141 MG/DL — HIGH (ref 70–99)

## 2022-02-10 PROCEDURE — 99232 SBSQ HOSP IP/OBS MODERATE 35: CPT

## 2022-02-10 PROCEDURE — 99233 SBSQ HOSP IP/OBS HIGH 50: CPT

## 2022-02-10 RX ADMIN — LEVETIRACETAM 500 MILLIGRAM(S): 250 TABLET, FILM COATED ORAL at 17:45

## 2022-02-10 RX ADMIN — ESCITALOPRAM OXALATE 20 MILLIGRAM(S): 10 TABLET, FILM COATED ORAL at 13:46

## 2022-02-10 RX ADMIN — PANTOPRAZOLE SODIUM 40 MILLIGRAM(S): 20 TABLET, DELAYED RELEASE ORAL at 05:27

## 2022-02-10 RX ADMIN — ATORVASTATIN CALCIUM 20 MILLIGRAM(S): 80 TABLET, FILM COATED ORAL at 21:25

## 2022-02-10 RX ADMIN — Medication 81 MILLIGRAM(S): at 13:45

## 2022-02-10 RX ADMIN — Medication 75 MICROGRAM(S): at 05:28

## 2022-02-10 RX ADMIN — LEVETIRACETAM 500 MILLIGRAM(S): 250 TABLET, FILM COATED ORAL at 05:25

## 2022-02-10 RX ADMIN — CHLORHEXIDINE GLUCONATE 15 MILLILITER(S): 213 SOLUTION TOPICAL at 05:25

## 2022-02-10 RX ADMIN — CHLORHEXIDINE GLUCONATE 1 APPLICATION(S): 213 SOLUTION TOPICAL at 05:25

## 2022-02-10 RX ADMIN — PANTOPRAZOLE SODIUM 40 MILLIGRAM(S): 20 TABLET, DELAYED RELEASE ORAL at 17:45

## 2022-02-10 RX ADMIN — CARVEDILOL PHOSPHATE 3.12 MILLIGRAM(S): 80 CAPSULE, EXTENDED RELEASE ORAL at 17:45

## 2022-02-10 RX ADMIN — DORZOLAMIDE HYDROCHLORIDE TIMOLOL MALEATE 1 DROP(S): 20; 5 SOLUTION/ DROPS OPHTHALMIC at 05:25

## 2022-02-10 RX ADMIN — AMLODIPINE BESYLATE 10 MILLIGRAM(S): 2.5 TABLET ORAL at 05:25

## 2022-02-10 RX ADMIN — CARVEDILOL PHOSPHATE 3.12 MILLIGRAM(S): 80 CAPSULE, EXTENDED RELEASE ORAL at 05:25

## 2022-02-10 NOTE — PROGRESS NOTE ADULT - SUBJECTIVE AND OBJECTIVE BOX
no events     MEDICATIONS  (STANDING):  amLODIPine   Tablet 10 milliGRAM(s) Oral daily  aspirin  chewable 81 milliGRAM(s) Oral daily  carvedilol 3.125 milliGRAM(s) Oral every 12 hours  chlorhexidine 0.12% Liquid 15 milliLiter(s) Oral Mucosa every 12 hours  chlorhexidine 2% Cloths 1 Application(s) Topical <User Schedule>  dextrose 40% Gel 15 Gram(s) Oral once  dextrose 5%. 1000 milliLiter(s) (50 mL/Hr) IV Continuous <Continuous>  dextrose 5%. 1000 milliLiter(s) (100 mL/Hr) IV Continuous <Continuous>  dextrose 50% Injectable 25 Gram(s) IV Push once  dextrose 50% Injectable 12.5 Gram(s) IV Push once  dextrose 50% Injectable 25 Gram(s) IV Push once  dorzolamide 2%/timolol 0.5% Ophthalmic Solution 1 Drop(s) Both EYES two times a day  escitalopram 20 milliGRAM(s) Oral daily  glucagon  Injectable 1 milliGRAM(s) IntraMuscular once  levETIRAcetam  Solution 500 milliGRAM(s) Enteral Tube every 12 hours  levothyroxine 75 MICROGram(s) Enteral Tube daily  pantoprazole   Suspension 40 milliGRAM(s) Enteral Tube every 12 hours  simvastatin 40 milliGRAM(s) Oral at bedtime    MEDICATIONS  (PRN):  acetaminophen    Suspension .. 650 milliGRAM(s) Oral every 6 hours PRN Temp greater or equal to 38C (100.4F)  sodium chloride 0.9% lock flush 10 milliLiter(s) IV Push every 1 hour PRN Pre/post blood products, medications, blood draw, and to maintain line patency      PHYSICAL EXAM:    Vital Signs Last 24 Hrs  T(C): 37.2 (10 Feb 2022 05:54), Max: 37.6 (09 Feb 2022 17:23)  T(F): 98.9 (10 Feb 2022 05:54), Max: 99.7 (09 Feb 2022 17:23)  HR: 72 (10 Feb 2022 12:22) (69 - 89)  BP: 111/68 (10 Feb 2022 05:54) (111/68 - 135/70)  BP(mean): --  RR: 18 (10 Feb 2022 05:54) (18 - 18)  SpO2: 99% (10 Feb 2022 12:22) (98% - 99%)    GENERAL:  NAD Awake, does not follow commands.   HEENT: NCAT, trach in place.   CARDIOVASCULAR:  S1, S2  LUNGS: b/l rhonchi. On vent support.   ABDOMEN:  soft, (-) tenderness, (-) distension, (+) bowel sounds, (-) guarding, (-) rebound (-) rigidity.  Colostomy in place with dark stool.  Midline abdominal wound vac in place.    EXTREMITIES:  no cyanosis / clubbing / edema.   ____________________                                  8.4    12.55 )-----------( 419      ( 09 Feb 2022 06:48 )             27.7     02-09    136  |  103  |  13  ----------------------------<  127<H>  3.8   |  27  |  0.32<L>    Ca    8.1<L>      09 Feb 2022 06:48

## 2022-02-10 NOTE — PROGRESS NOTE ADULT - SUBJECTIVE AND OBJECTIVE BOX
JUAREZ GTZ    LVS 1B 122 D    Allergies    No Known Allergies    Intolerances        PAST MEDICAL & SURGICAL HISTORY:  Seizure    HTN (hypertension)    Glaucoma    Thyroid disease    Blood clot of neck vein  left side    TIA (transient ischemic attack)  20 years ago    S/P appendectomy        FAMILY HISTORY:  FH: HTN (hypertension)        Home Medications:  amLODIPine 10 mg oral tablet: 1 tab(s) orally once a day (22 Dec 2021 16:46)  aspirin 81 mg oral tablet, chewable: 1 tab(s) orally once a day (22 Dec 2021 16:46)  carvedilol 3.125 mg oral tablet: 1 tab(s) orally every 12 hours (08 Feb 2022 18:16)  escitalopram 20 mg oral tablet: 1 tab(s) orally once a day (22 Dec 2021 16:46)  levETIRAcetam 500 mg oral tablet: 1 tab(s) orally 2 times a day (22 Dec 2021 16:46)  levothyroxine 75 mcg (0.075 mg) oral tablet: 1 tab(s) orally once a day (08 Feb 2022 18:16)  pantoprazole 40 mg oral granule, delayed release: 40 milligram(s) by gastrostomy tube once a day (08 Feb 2022 18:16)  timolol-dorzolamide 0.5%-2% preservative-free ophthalmic solution: 1 drop(s) to each affected eye 2 times a day (22 Dec 2021 16:46)  Zetia 10 mg oral tablet: 1 tab(s) orally once a day (22 Dec 2021 16:46)      MEDICATIONS  (STANDING):  amLODIPine   Tablet 10 milliGRAM(s) Oral daily  aspirin  chewable 81 milliGRAM(s) Oral daily  atorvastatin 20 milliGRAM(s) Oral at bedtime  carvedilol 3.125 milliGRAM(s) Oral every 12 hours  chlorhexidine 0.12% Liquid 15 milliLiter(s) Oral Mucosa every 12 hours  chlorhexidine 2% Cloths 1 Application(s) Topical <User Schedule>  dextrose 40% Gel 15 Gram(s) Oral once  dextrose 5%. 1000 milliLiter(s) (50 mL/Hr) IV Continuous <Continuous>  dextrose 5%. 1000 milliLiter(s) (100 mL/Hr) IV Continuous <Continuous>  dextrose 50% Injectable 25 Gram(s) IV Push once  dextrose 50% Injectable 12.5 Gram(s) IV Push once  dextrose 50% Injectable 25 Gram(s) IV Push once  dorzolamide 2%/timolol 0.5% Ophthalmic Solution 1 Drop(s) Both EYES two times a day  escitalopram 20 milliGRAM(s) Oral daily  glucagon  Injectable 1 milliGRAM(s) IntraMuscular once  levETIRAcetam  Solution 500 milliGRAM(s) Enteral Tube every 12 hours  levothyroxine 75 MICROGram(s) Enteral Tube daily  pantoprazole   Suspension 40 milliGRAM(s) Enteral Tube every 12 hours    MEDICATIONS  (PRN):  acetaminophen    Suspension .. 650 milliGRAM(s) Oral every 6 hours PRN Temp greater or equal to 38C (100.4F)  sodium chloride 0.9% lock flush 10 milliLiter(s) IV Push every 1 hour PRN Pre/post blood products, medications, blood draw, and to maintain line patency      Diet, NPO with Tube Feed:   Tube Feeding Modality: Gastrostomy  Jevity 1.2 Santos  Total Volume for 24 Hours (mL): 1440  Continuous  Starting Tube Feed Rate mL per Hour: 25  Increase Tube Feed Rate by (mL): 5     Every 4 hours  Until Goal Tube Feed Rate (mL per Hour): 60  Tube Feed Duration (in Hours): 24  Tube Feed Start Time: 14:40 (02-01-22 @ 14:39) [Active]          Vital Signs Last 24 Hrs  T(C): 37.2 (10 Feb 2022 05:54), Max: 37.6 (09 Feb 2022 17:23)  T(F): 98.9 (10 Feb 2022 05:54), Max: 99.7 (09 Feb 2022 17:23)  HR: 83 (10 Feb 2022 08:40) (69 - 89)  BP: 111/68 (10 Feb 2022 05:54) (111/68 - 139/71)  BP(mean): --  RR: 18 (10 Feb 2022 05:54) (18 - 18)  SpO2: 98% (10 Feb 2022 08:40) (97% - 99%)      02-09-22 @ 07:01  -  02-10-22 @ 07:00  --------------------------------------------------------  IN: 2000 mL / OUT: 1050 mL / NET: 950 mL        Mode: CPAP with PS, FiO2: 30, PEEP: 5, PS: 5      LABS:                        8.4    12.55 )-----------( 419      ( 09 Feb 2022 06:48 )             27.7     02-09    136  |  103  |  13  ----------------------------<  127<H>  3.8   |  27  |  0.32<L>    Ca    8.1<L>      09 Feb 2022 06:48                WBC:  WBC Count: 12.55 K/uL (02-09 @ 06:48)  WBC Count: 12.25 K/uL (02-08 @ 07:22)  WBC Count: 13.13 K/uL (02-07 @ 07:03)      MICROBIOLOGY:  RECENT CULTURES:                  Sodium:  Sodium, Serum: 136 mmol/L (02-09 @ 06:48)  Sodium, Serum: 139 mmol/L (02-08 @ 07:22)  Sodium, Serum: 138 mmol/L (02-07 @ 07:03)      0.32 mg/dL 02-09 @ 06:48  0.39 mg/dL 02-08 @ 07:22  0.43 mg/dL 02-07 @ 07:03      Hemoglobin:  Hemoglobin: 8.4 g/dL (02-09 @ 06:48)  Hemoglobin: 8.5 g/dL (02-08 @ 07:22)  Hemoglobin: 8.9 g/dL (02-07 @ 07:03)      Platelets: Platelet Count - Automated: 419 K/uL (02-09 @ 06:48)  Platelet Count - Automated: 383 K/uL (02-08 @ 07:22)  Platelet Count - Automated: 445 K/uL (02-07 @ 07:03)              RADIOLOGY & ADDITIONAL STUDIES:      MICROBIOLOGY:  RECENT CULTURES:

## 2022-02-10 NOTE — PROGRESS NOTE ADULT - SUBJECTIVE AND OBJECTIVE BOX
Pt resting comfortably, no acute events.    Physical Exam:   · Neurological	detailed exam  · Mental Status	Awake, not following commands. Tracks.  · Cranial Nerve	able to move in all directions except crossing midline. decreased blink to threat on the right. Right facial  · Motor	  moving left side > right. Withdraws on the right  · Gait/Balance	deferred  	    echo 12/25 normal EF, no LA dilatation    MRI brain 2/7 shows L MCA CVA no hem conversion    · Assessment	  Acute left MCA CVA off AC with new onset Afib   s/p ex lap/colectomy/diverticulitis  HTN  HLD  seizures?    Plan   High risk of Hem conversion, would cont with ASA/Statin for secondary stroke prevention  Can start AC 3 weeks from CVA ~ 2/21  repeat hct 2/5 looks stable  PT/OT/ST  carotid doppler  poor prognosis with large left MCA CVA, GOC discussion  neuro stable, follow up outpt

## 2022-02-10 NOTE — PROGRESS NOTE ADULT - ASSESSMENT
TRESA PIZARRO 81 f 12/22/2021 1942 DR ANTONIO PATTON     REVIEW OF SYMPTOMS      Able to give (reliable) ROS  NO     PHYSICAL EXAM    HEENT Unremarkable  atraumatic   RESP Fair air entry EXP prolonged    Harsh breath sound Resp distres mild   CARDIAC S1 S2 No S3     NO JVD    ABDOMEN SOFT BS PRESENT NOT DISTENDED No hepatosplenomegaly   PEDAL EDEMA present No calf tenderness  NO rash     ______  DOA/CC.  12/22/2021 81F w/ HTN, HLD, hypothyroidism, depression, seizures. Presented w/ abdominal pain found to have acute sigmoid diverticulitis    PMH.  pmh Hytn  pmh Sz.    PROBLEMS.  Feculent peritonitis poa 12/22  Colostomy 12/26  Initial abio course competed 1/15  R LEFTY removd 1/22/2022    Trach done 1/17  VDRF Since abd surgery 12/26        DVT 1/25/2022 r cfv fd lvnx startd   Drop Hb 1/27 Lvnx dced  Need for ivcf 1/30/2022 dw Dr Patton  2/1/2022 IR reviewed imaging IR felt that Blood clot is minimal and subaute/chronic and neither IVCF not Anticoagulants are needed     GI bl peg and ostomy 1/28  NPO 1/28  GI Bleed EGD 2/1/2022 Dr Ness Erosions PEG tube May restart Anticoagulants after 24 h      New rll pneum 1/30 ct   Zosyn 1/29-2/6 1/31 Sp Pseudo  Fever 2/3/2022 Abio dced 2/6    MR brain 2/8 subac ischemic changes l mca territory no hrrge   Ac L MCA cva 1/30/2022 CT   Elevated LFTS 2/1/2022  2/4 us abd (-)       MISC ISSUES.  COVID STATUS. 12/24 pcr (-)  ICU STAY. 12/22-1/12/2022   GOC.  1/13/2022 full code     BEST PRACTICE ISSUES.                                                  HEAD OF BED ELEVATION. Yes  DVT PROPHYLAXIS.     1/28/2022 lvnx dced because of active gi bleed    LIGHT PROPHYLAXIS.    1/11 protonix 40                                                                                     DIET.   2/1 jevity 1440  INFECTION PROPHYLAXIS.   1/7 Chlorhexidine .12%   1/7 chlorhexidine 2%    SPEECH SWALLOW RECOMMENDATIONS.     PATIENT DATA   VITALS/PO/IO/VENT/DRIPS.   2/10/2022 afeb 70 130/70   2/10/2022 cpcp 5/5/.3 364/18      ASSESSMENT/RECOMMENDATIONS.    HEMODYNAMICS.   Monitor bp Target MAP 65 (+)    RESP.   Monitor po Target po 90-95%  1/14/2022 15/450/.4/5 747/38/110       DVT  2/1/2022 IR reviewed imaging IR felt that Blood clot is minimal and subaute/chronic and neither IVCF not Anticoagulants are needed   2/2/2022 V duplex (-)  2/3 recommend resuming lovenox full dose or at least pplx dose      VENT MANAGEMENT.   HOB elevation  Target Pplat 35 (-)  Target PO 90-95%  Target pH 730 (+)      INFECTION   FEVER 1/28/2022.  New rll pneum 1/30 ct  Abdominal infection sec perforated sigmoid div feculent peritonitis poa 12/22 ABIO completed 1/15.  2/5-2/6-2/9/2022 w 19 - 12.9-12.5    1/30/2022 ct ch new rll pneu  Sputum 1/31 Sp Pseudo  blod cult. 1/31  bc. (-)  1/29-2/6/2022 zosyn started by id  2/6/2022 off abio    SP ABD SURGERY.  81 F admitted with Acute Sigmoid Diverticulitis s/p ex lap, sigmoid resection, peritoneal lavage 12/25 and RTOR 12/26 for irrigation of abdominal cavity with closure and creation of colostomy. S/p bedside IR aspiration of fluid collection 1/3. Tracheostomy 1/7.   Deep pelvic abscess noted on CT,  Local drain/ostomy care per nursing  1/22/2022 r lefty removd     ANEMIA.  Hb 2/5-2/6-2/7-2/8-2/9/2022 Hb 8.6 - 9.6 - 8.9 - 8.5 -8.4  monitor target hb 7     CVA  2/5/2022 ct h no hrrge   1/30/2022 ct h ac l mca cva   MR brain 2/8 subac ischemic changes l mca territory no hrrge   2/2 asa 81   2/2 simvastat 40       TIME SPENT   Over 25 minutes aggregate care time spent on encounter; activities included   direct patient care, counseling and/or coordinating care reviewing notes, lab data/ imaging , discussion with multidisciplinary team/ patient  /family and explaining in detail risks, benefits, alternatives  of the recommendations     TRESA PIZARRO 81 f 12/22/2021 1942 DR ANTONIO PATTON

## 2022-02-10 NOTE — PROGRESS NOTE ADULT - ASSESSMENT
81F with a PMH of HTN, HLD, hypothyroidism, depression, seizures presented with abdominal pain and admitted 12/22 for acute perforated diverticulitis, s/p ex lap 12/24 with sigmoid colectomy and abdominal washout.  She was then transferred to MICU for postop care on mechanical ventilation.  Hospital course complicated by Afib with RVR.  She underwent wound closure and creation of colostomy 12/26, developed abdominal abscess s/p IR drainage 1/3, cultures grew Ecoli and Bacteriodes.  S/p tracheostomy and PEG placement.  1/28 developed bleeding through ostomy, AC stopped and pt transfused.  EGD on 2/1 showed gastritis, PEG tube embedded in mucosa.  Due to fevers, CT Head, C/A/P performed on 1/30, showed Acute left MCA infarct, new RLL pneumonia.  Seen by ID, started on Zosyn.      # RLL pneumonia, Aspiration Pneumonia - seen on CT chest 1/30.  ID following, completed Zosyn. No fever since 2/3.  WBC now 13.  Supportive care.  # Acute Blood Loss Anemia - due to suspected gastric bleeding.  s/p transfusion.  EGD 2/1 showed gastritis.  Monitor H/H, has been stable.    # Acute CVA - L MCA infarct was noted on CT 1/30.  Neuro input appreciated.  AC held due to extensive CVA, concern for hemorrhagic conversion.  Continue ASA, Statin.  f.u carotid doppler  # Elevated LFTs - improved and remains stable.   Abdominal Sono showed cholelithiasis, no acute GB disease.  GI following.   # Hypernatremia - Would avoid hypotonic fluids in setting of acute CVA.  Increase free water via PEG. na normalized.   # Perforated Acute Diverticulitis / Intraabdominal Abscesses - s/p BABAK drain care removal on 2/9, cw  wound vac.  Most recent CT without significant new collections. recommend repeat ct scan in 2 weeks .   # S/p Septic Shock - resolved  # Afib with RVR - rate control.  Holding AC due to above.   # Chronic Respiratory Failure with Hypoxemia - vent support, weaning to trach collar.  Pulmonary following.  # RLE DVT - per IR, felt to be more subacute to chronic.  Recommended no IVC filter placement.  Followup LE doppler 2/2 shows no acute DVT.   # Functional Quadriplegia - supportive care.    DVT Proph - ICDs.     son [facility] [phone]   SNF / Vent facility placement d/w CM, SW.  remains full code

## 2022-02-11 ENCOUNTER — TRANSCRIPTION ENCOUNTER (OUTPATIENT)
Age: 82
End: 2022-02-11

## 2022-02-11 VITALS — HEART RATE: 80 BPM | OXYGEN SATURATION: 95 %

## 2022-02-11 LAB
GLUCOSE BLDC GLUCOMTR-MCNC: 158 MG/DL — HIGH (ref 70–99)
HCT VFR BLD CALC: 26.6 % — LOW (ref 34.5–45)
HGB BLD-MCNC: 8.2 G/DL — LOW (ref 11.5–15.5)
MCHC RBC-ENTMCNC: 27.4 PG — SIGNIFICANT CHANGE UP (ref 27–34)
MCHC RBC-ENTMCNC: 30.8 G/DL — LOW (ref 32–36)
MCV RBC AUTO: 89 FL — SIGNIFICANT CHANGE UP (ref 80–100)
NRBC # BLD: 0 /100 WBCS — SIGNIFICANT CHANGE UP (ref 0–0)
PLATELET # BLD AUTO: 408 K/UL — HIGH (ref 150–400)
RBC # BLD: 2.99 M/UL — LOW (ref 3.8–5.2)
RBC # FLD: 15.9 % — HIGH (ref 10.3–14.5)
WBC # BLD: 12.53 K/UL — HIGH (ref 3.8–10.5)
WBC # FLD AUTO: 12.53 K/UL — HIGH (ref 3.8–10.5)

## 2022-02-11 PROCEDURE — 99239 HOSP IP/OBS DSCHRG MGMT >30: CPT

## 2022-02-11 PROCEDURE — 93880 EXTRACRANIAL BILAT STUDY: CPT | Mod: 26

## 2022-02-11 RX ORDER — ATORVASTATIN CALCIUM 80 MG/1
1 TABLET, FILM COATED ORAL
Qty: 0 | Refills: 0 | DISCHARGE
Start: 2022-02-11

## 2022-02-11 RX ORDER — EZETIMIBE 10 MG/1
1 TABLET ORAL
Qty: 0 | Refills: 0 | DISCHARGE

## 2022-02-11 RX ORDER — CLOPIDOGREL BISULFATE 75 MG/1
1 TABLET, FILM COATED ORAL
Qty: 0 | Refills: 0 | DISCHARGE
Start: 2022-02-11

## 2022-02-11 RX ORDER — CLOPIDOGREL BISULFATE 75 MG/1
75 TABLET, FILM COATED ORAL DAILY
Refills: 0 | Status: DISCONTINUED | OUTPATIENT
Start: 2022-02-11 | End: 2022-02-11

## 2022-02-11 RX ADMIN — AMLODIPINE BESYLATE 10 MILLIGRAM(S): 2.5 TABLET ORAL at 05:12

## 2022-02-11 RX ADMIN — CARVEDILOL PHOSPHATE 3.12 MILLIGRAM(S): 80 CAPSULE, EXTENDED RELEASE ORAL at 05:12

## 2022-02-11 RX ADMIN — Medication 75 MICROGRAM(S): at 05:12

## 2022-02-11 RX ADMIN — CHLORHEXIDINE GLUCONATE 15 MILLILITER(S): 213 SOLUTION TOPICAL at 05:13

## 2022-02-11 RX ADMIN — PANTOPRAZOLE SODIUM 40 MILLIGRAM(S): 20 TABLET, DELAYED RELEASE ORAL at 05:12

## 2022-02-11 RX ADMIN — ESCITALOPRAM OXALATE 20 MILLIGRAM(S): 10 TABLET, FILM COATED ORAL at 12:12

## 2022-02-11 RX ADMIN — DORZOLAMIDE HYDROCHLORIDE TIMOLOL MALEATE 1 DROP(S): 20; 5 SOLUTION/ DROPS OPHTHALMIC at 05:14

## 2022-02-11 RX ADMIN — CHLORHEXIDINE GLUCONATE 1 APPLICATION(S): 213 SOLUTION TOPICAL at 05:13

## 2022-02-11 RX ADMIN — LEVETIRACETAM 500 MILLIGRAM(S): 250 TABLET, FILM COATED ORAL at 05:12

## 2022-02-11 RX ADMIN — Medication 81 MILLIGRAM(S): at 12:12

## 2022-02-11 NOTE — PROGRESS NOTE ADULT - SUBJECTIVE AND OBJECTIVE BOX
JURAEZ GTZ    LVS 1B 122 D    Allergies    No Known Allergies    Intolerances        PAST MEDICAL & SURGICAL HISTORY:  Seizure    HTN (hypertension)    Glaucoma    Thyroid disease    Blood clot of neck vein  left side    TIA (transient ischemic attack)  20 years ago    S/P appendectomy        FAMILY HISTORY:  FH: HTN (hypertension)        Home Medications:  amLODIPine 10 mg oral tablet: 1 tab(s) orally once a day (22 Dec 2021 16:46)  aspirin 81 mg oral tablet, chewable: 1 tab(s) orally once a day (22 Dec 2021 16:46)  carvedilol 3.125 mg oral tablet: 1 tab(s) orally every 12 hours (08 Feb 2022 18:16)  escitalopram 20 mg oral tablet: 1 tab(s) orally once a day (22 Dec 2021 16:46)  levETIRAcetam 500 mg oral tablet: 1 tab(s) orally 2 times a day (22 Dec 2021 16:46)  levothyroxine 75 mcg (0.075 mg) oral tablet: 1 tab(s) orally once a day (08 Feb 2022 18:16)  pantoprazole 40 mg oral granule, delayed release: 40 milligram(s) by gastrostomy tube once a day (08 Feb 2022 18:16)  timolol-dorzolamide 0.5%-2% preservative-free ophthalmic solution: 1 drop(s) to each affected eye 2 times a day (22 Dec 2021 16:46)  Zetia 10 mg oral tablet: 1 tab(s) orally once a day (22 Dec 2021 16:46)      MEDICATIONS  (STANDING):  amLODIPine   Tablet 10 milliGRAM(s) Oral daily  aspirin  chewable 81 milliGRAM(s) Oral daily  atorvastatin 20 milliGRAM(s) Oral at bedtime  carvedilol 3.125 milliGRAM(s) Oral every 12 hours  chlorhexidine 0.12% Liquid 15 milliLiter(s) Oral Mucosa every 12 hours  chlorhexidine 2% Cloths 1 Application(s) Topical <User Schedule>  dextrose 40% Gel 15 Gram(s) Oral once  dextrose 5%. 1000 milliLiter(s) (100 mL/Hr) IV Continuous <Continuous>  dextrose 5%. 1000 milliLiter(s) (50 mL/Hr) IV Continuous <Continuous>  dextrose 50% Injectable 25 Gram(s) IV Push once  dextrose 50% Injectable 12.5 Gram(s) IV Push once  dextrose 50% Injectable 25 Gram(s) IV Push once  dorzolamide 2%/timolol 0.5% Ophthalmic Solution 1 Drop(s) Both EYES two times a day  escitalopram 20 milliGRAM(s) Oral daily  glucagon  Injectable 1 milliGRAM(s) IntraMuscular once  levETIRAcetam  Solution 500 milliGRAM(s) Enteral Tube every 12 hours  levothyroxine 75 MICROGram(s) Enteral Tube daily  pantoprazole   Suspension 40 milliGRAM(s) Enteral Tube every 12 hours    MEDICATIONS  (PRN):  acetaminophen    Suspension .. 650 milliGRAM(s) Oral every 6 hours PRN Temp greater or equal to 38C (100.4F)  sodium chloride 0.9% lock flush 10 milliLiter(s) IV Push every 1 hour PRN Pre/post blood products, medications, blood draw, and to maintain line patency      Diet, NPO with Tube Feed:   Tube Feeding Modality: Gastrostomy  Jevity 1.2 Santos  Total Volume for 24 Hours (mL): 1440  Continuous  Starting Tube Feed Rate mL per Hour: 25  Increase Tube Feed Rate by (mL): 5     Every 4 hours  Until Goal Tube Feed Rate (mL per Hour): 60  Tube Feed Duration (in Hours): 24  Tube Feed Start Time: 14:40 (02-01-22 @ 14:39) [Active]          Vital Signs Last 24 Hrs  T(C): 36.8 (11 Feb 2022 04:58), Max: 37.1 (10 Feb 2022 17:53)  T(F): 98.2 (11 Feb 2022 04:58), Max: 98.8 (10 Feb 2022 17:53)  HR: 76 (11 Feb 2022 08:07) (72 - 81)  BP: 134/74 (11 Feb 2022 04:58) (116/69 - 136/67)  BP(mean): --  RR: 18 (11 Feb 2022 04:58) (18 - 18)  SpO2: 97% (11 Feb 2022 08:07) (96% - 99%)      02-10-22 @ 07:01  -  02-11-22 @ 07:00  --------------------------------------------------------  IN: 0 mL / OUT: 660 mL / NET: -660 mL        Mode: CPAP with PS, FiO2: 30, PEEP: 5, PS: 5, ITime: 0.9, MAP: 7, PIP: 10      LABS:                        8.2    12.53 )-----------( 408      ( 11 Feb 2022 08:02 )             26.6                     WBC:  WBC Count: 12.53 K/uL (02-11 @ 08:02)  WBC Count: 12.55 K/uL (02-09 @ 06:48)  WBC Count: 12.25 K/uL (02-08 @ 07:22)      MICROBIOLOGY:  RECENT CULTURES:                  Sodium:  Sodium, Serum: 136 mmol/L (02-09 @ 06:48)  Sodium, Serum: 139 mmol/L (02-08 @ 07:22)      0.32 mg/dL 02-09 @ 06:48  0.39 mg/dL 02-08 @ 07:22      Hemoglobin:  Hemoglobin: 8.2 g/dL (02-11 @ 08:02)  Hemoglobin: 8.4 g/dL (02-09 @ 06:48)  Hemoglobin: 8.5 g/dL (02-08 @ 07:22)      Platelets: Platelet Count - Automated: 408 K/uL (02-11 @ 08:02)  Platelet Count - Automated: 419 K/uL (02-09 @ 06:48)  Platelet Count - Automated: 383 K/uL (02-08 @ 07:22)              RADIOLOGY & ADDITIONAL STUDIES:      MICROBIOLOGY:  RECENT CULTURES:

## 2022-02-11 NOTE — DISCHARGE NOTE NURSING/CASE MANAGEMENT/SOCIAL WORK - PATIENT PORTAL LINK FT
You can access the FollowMyHealth Patient Portal offered by [facility] by registering at the following website: [email or web address]. By joining [facility]’s FollowMyHealth portal, you will also be able to view your health information using other applications (apps) compatible with our system.

## 2022-02-11 NOTE — DISCHARGE NOTE NURSING/CASE MANAGEMENT/SOCIAL WORK - NSDCPEFALRISK_GEN_ALL_CORE
For information on Fall & Injury Prevention, visit: [email or web address] OR  [email or web address] OR  https://www.cdc.gov/steadi/patient.html

## 2022-02-11 NOTE — DISCHARGE NOTE NURSING/CASE MANAGEMENT/SOCIAL WORK - NSDCPEPT PROEDMA_GEN_ALL_CORE
DISPLAY PLAN FREE TEXT DISPLAY PLAN FREE TEXT Yes DISPLAY PLAN FREE TEXT DISPLAY PLAN FREE TEXT DISPLAY PLAN FREE TEXT DISPLAY PLAN FREE TEXT DISPLAY PLAN FREE TEXT

## 2022-02-11 NOTE — CHART NOTE - NSCHARTNOTEFT_GEN_A_CORE
Pt seen for nutrition follow-up. Per chart pt with PMH HTN, depression, seizures, presents with abdominal pain, diarrhea, found with sepsis due to perforated diverticulitis. s/p ex-lap with colon resection  and creation of colostomy ; was unable to be extubated postsurgery; s/p trach - remains trach to vent. s/p EGD with PEG placement . Course further complicated by DVT in right femoral vein, Acute CVA - L MCA infarct was noted on CT  with new onset Afib, PNA, and acute GIB. s/p EGD with biopsy  with findings of gastritis. Discharge planning to vent facility.     Factors impacting intake: [ ] none [ ] nausea  [ ] vomiting [ ] diarrhea [ ] constipation  [ ]chewing problems [ ] swallowing issues  [ ] other:     Diet Prescription: Diet, NPO with Tube Feed:   Tube Feeding Modality: Gastrostomy  Jevity 1.2 Santos  Total Volume for 24 Hours (mL): 1440  Continuous  Starting Tube Feed Rate {mL per Hour}: 25  Increase Tube Feed Rate by (mL): 5     Every 4 hours  Until Goal Tube Feed Rate (mL per Hour): 60  Tube Feed Duration (in Hours): 24  Tube Feed Start Time: 14:40 (22 @ 14:39)    Intake: Tube feed as ordered: Jevity 1.2 to goal rate 60 ml/hr which provides 1440 ml total volume, 1728 kcal, 80 g protein, 1166 ml water    Current Weight: no recent weights to trend  % Weight Change: n/a    Edema: 2+ left and right legs and 3+ left and right feet as per flow sheets    Physical Appearance: debilitated; unable to conduct nutrition focused physical exam as pt lethargic with trach to vent    Pertinent Medications: MEDICATIONS  (STANDING):  amLODIPine   Tablet 10 milliGRAM(s) Oral daily  aspirin  chewable 81 milliGRAM(s) Oral daily  atorvastatin 20 milliGRAM(s) Oral at bedtime  carvedilol 3.125 milliGRAM(s) Oral every 12 hours  chlorhexidine 0.12% Liquid 15 milliLiter(s) Oral Mucosa every 12 hours  chlorhexidine 2% Cloths 1 Application(s) Topical <User Schedule>  dextrose 40% Gel 15 Gram(s) Oral once  dextrose 5%. 1000 milliLiter(s) (50 mL/Hr) IV Continuous <Continuous>  dextrose 5%. 1000 milliLiter(s) (100 mL/Hr) IV Continuous <Continuous>  dextrose 50% Injectable 25 Gram(s) IV Push once  dextrose 50% Injectable 12.5 Gram(s) IV Push once  dextrose 50% Injectable 25 Gram(s) IV Push once  dorzolamide 2%/timolol 0.5% Ophthalmic Solution 1 Drop(s) Both EYES two times a day  escitalopram 20 milliGRAM(s) Oral daily  glucagon  Injectable 1 milliGRAM(s) IntraMuscular once  levETIRAcetam  Solution 500 milliGRAM(s) Enteral Tube every 12 hours  levothyroxine 75 MICROGram(s) Enteral Tube daily  pantoprazole   Suspension 40 milliGRAM(s) Enteral Tube every 12 hours    MEDICATIONS  (PRN):  acetaminophen    Suspension .. 650 milliGRAM(s) Oral every 6 hours PRN Temp greater or equal to 38C (100.4F)  sodium chloride 0.9% lock flush 10 milliLiter(s) IV Push every 1 hour PRN Pre/post blood products, medications, blood draw, and to maintain line patency    Pertinent Labs:  Na136 mmol/L Glu 127 mg/dL<H> K+ 3.8 mmol/L Cr  0.32 mg/dL<L> BUN 13 mg/dL - Phos 2.6 mg/dL - Alb 1.5 g/dL<L> 02-03 Chol 158 mg/dL LDL --    HDL 25 mg/dL<L> Trig 151 mg/dL<H>      Skin: no pressure injuries per flow sheets    Estimated Needs:   [x] no change since previous assessment on 21 for estimated energy & protein needs (25-30 kcal/kg; 9706-7141 kcal daily & 1.2-1.4 g/kg; 70.86-82.46 gm protein daily); Estimated fluid needs 22: 25-30 ml/k8043-0317 ml fluid daily; high end, as Na remains elevated  [ ] recalculated:     Nutrition Diagnosis:     [x] Inadequate enteral nutrition infusion    Related to: Feedings currently being held    As evidenced by: Feeding not infusing, pt not meeting estimated energy/protein needs    Goal: Pt to meet > 75% energy/protein needs via PEG feedings once medically feasible (not consistently met)      Nutrition Diagnosis is [x] ongoing  [ ] resolved [ ] not applicable     New Nutrition Diagnosis: [x] not applicable      Interventions:   Recommend  [ ] Change Diet To:  [ ] Nutrition Supplement  [ ] Nutrition Support  [ ] Other:     Monitoring and Evaluation:   [ ] PO intake [ x ] Tolerance to diet prescription [ x ] weights [ x ] labs[ x ] follow up per protocol  [ ] other: Pt seen for nutrition follow-up. Per chart pt with PMH HTN, depression, seizures, presents with abdominal pain, diarrhea, found with sepsis due to perforated diverticulitis. s/p ex-lap with colon resection  and creation of colostomy ; was unable to be extubated postsurgery; s/p trach - remains trach to vent. s/p EGD with PEG placement . Course further complicated by DVT in right femoral vein, Acute CVA - L MCA infarct was noted on CT  with new onset Afib, PNA, and acute GIB. s/p EGD with biopsy  with findings of gastritis. Discharge planning to vent facility.     Factors impacting intake: [ ] none [ ] nausea  [ ] vomiting [ ] diarrhea [ ] constipation  [ ]chewing problems [ ] swallowing issues  [x] other: pt trached to vent    Diet Prescription: Diet, NPO with Tube Feed:   Tube Feeding Modality: Gastrostomy  Jevity 1.2 Santos  Total Volume for 24 Hours (mL): 1440  Continuous  Starting Tube Feed Rate {mL per Hour}: 25  Increase Tube Feed Rate by (mL): 5     Every 4 hours  Until Goal Tube Feed Rate (mL per Hour): 60  Tube Feed Duration (in Hours): 24  Tube Feed Start Time: 14:40 (22 @ 14:39)    Intake: Tube feed infusing @ goal rate at time of visit. Tube feed as ordered: Jevity 1.2 to goal rate 60 ml/hr which provides 1440 ml total volume, 1728 kcal, 80 g protein, 1166 ml water    Current Weight: no recent weights to trend  % Weight Change: n/a    Edema: 2+ left and right legs and 3+ left and right feet as per flow sheets    Physical Appearance: debilitated; unable to conduct nutrition focused physical exam as pt lethargic with trach to vent    Pertinent Medications: MEDICATIONS  (STANDING):  amLODIPine   Tablet 10 milliGRAM(s) Oral daily  aspirin  chewable 81 milliGRAM(s) Oral daily  atorvastatin 20 milliGRAM(s) Oral at bedtime  carvedilol 3.125 milliGRAM(s) Oral every 12 hours  chlorhexidine 0.12% Liquid 15 milliLiter(s) Oral Mucosa every 12 hours  chlorhexidine 2% Cloths 1 Application(s) Topical <User Schedule>  dextrose 40% Gel 15 Gram(s) Oral once  dextrose 5%. 1000 milliLiter(s) (50 mL/Hr) IV Continuous <Continuous>  dextrose 5%. 1000 milliLiter(s) (100 mL/Hr) IV Continuous <Continuous>  dextrose 50% Injectable 25 Gram(s) IV Push once  dextrose 50% Injectable 12.5 Gram(s) IV Push once  dextrose 50% Injectable 25 Gram(s) IV Push once  dorzolamide 2%/timolol 0.5% Ophthalmic Solution 1 Drop(s) Both EYES two times a day  escitalopram 20 milliGRAM(s) Oral daily  glucagon  Injectable 1 milliGRAM(s) IntraMuscular once  levETIRAcetam  Solution 500 milliGRAM(s) Enteral Tube every 12 hours  levothyroxine 75 MICROGram(s) Enteral Tube daily  pantoprazole   Suspension 40 milliGRAM(s) Enteral Tube every 12 hours    MEDICATIONS  (PRN):  acetaminophen    Suspension .. 650 milliGRAM(s) Oral every 6 hours PRN Temp greater or equal to 38C (100.4F)  sodium chloride 0.9% lock flush 10 milliLiter(s) IV Push every 1 hour PRN Pre/post blood products, medications, blood draw, and to maintain line patency    Pertinent Labs:  Na136 mmol/L Glu 127 mg/dL<H> K+ 3.8 mmol/L Cr  0.32 mg/dL<L> BUN 13 mg/dL  Phos 2.6 mg/dL  Alb 1.5 g/dL<L>  Chol 158 mg/dL LDL --    HDL 25 mg/dL<L> Trig 151 mg/dL<H>      Skin: no pressure injuries per flow sheets    Estimated Needs:   [x] no change since previous assessment on 21 for estimated energy & protein needs (25-30 kcal/kg; 6521-5159 kcal daily & 1.2-1.4 g/kg; 70.86-82.46 gm protein daily); Estimated fluid needs 22: 25-30 ml/k0862-4990 ml fluid daily; high end, as Na remains elevated  [ ] recalculated:     Nutrition Diagnosis:     [x] Inadequate enteral nutrition infusion    Related to: Feedings currently being held    As evidenced by: Feeding not infusing, pt not meeting estimated energy/protein needs    Goal: Pt to meet > 75% energy/protein needs via PEG feedings once medically feasible (met)      Nutrition Diagnosis is [x] ongoing  [ ] resolved [ ] not applicable     New Nutrition Diagnosis: [x] not applicable      Interventions:   Recommend  [x] Continue current tube feed as ordered  [ ] Change Diet To:  [ ] Nutrition Supplement  [ ] Nutrition Support  [ ] Other:     Monitoring and Evaluation:   [ ] PO intake [ x ] Tolerance to diet prescription [ x ] weights [ x ] labs[ x ] follow up per protocol  [ ] other:

## 2022-02-11 NOTE — PROGRESS NOTE ADULT - SUBJECTIVE AND OBJECTIVE BOX
no events     MEDICATIONS  (STANDING):  amLODIPine   Tablet 10 milliGRAM(s) Oral daily  aspirin  chewable 81 milliGRAM(s) Oral daily  carvedilol 3.125 milliGRAM(s) Oral every 12 hours  chlorhexidine 0.12% Liquid 15 milliLiter(s) Oral Mucosa every 12 hours  chlorhexidine 2% Cloths 1 Application(s) Topical <User Schedule>  dextrose 40% Gel 15 Gram(s) Oral once  dextrose 5%. 1000 milliLiter(s) (50 mL/Hr) IV Continuous <Continuous>  dextrose 5%. 1000 milliLiter(s) (100 mL/Hr) IV Continuous <Continuous>  dextrose 50% Injectable 25 Gram(s) IV Push once  dextrose 50% Injectable 12.5 Gram(s) IV Push once  dextrose 50% Injectable 25 Gram(s) IV Push once  dorzolamide 2%/timolol 0.5% Ophthalmic Solution 1 Drop(s) Both EYES two times a day  escitalopram 20 milliGRAM(s) Oral daily  glucagon  Injectable 1 milliGRAM(s) IntraMuscular once  levETIRAcetam  Solution 500 milliGRAM(s) Enteral Tube every 12 hours  levothyroxine 75 MICROGram(s) Enteral Tube daily  pantoprazole   Suspension 40 milliGRAM(s) Enteral Tube every 12 hours  simvastatin 40 milliGRAM(s) Oral at bedtime    MEDICATIONS  (PRN):  acetaminophen    Suspension .. 650 milliGRAM(s) Oral every 6 hours PRN Temp greater or equal to 38C (100.4F)  sodium chloride 0.9% lock flush 10 milliLiter(s) IV Push every 1 hour PRN Pre/post blood products, medications, blood draw, and to maintain line patency      PHYSICAL EXAM:    Vital Signs Last 24 Hrs  T(C): 36.6 (11 Feb 2022 13:00), Max: 37.1 (10 Feb 2022 17:53)  T(F): 97.8 (11 Feb 2022 13:00), Max: 98.8 (10 Feb 2022 17:53)  HR: 76 (11 Feb 2022 13:00) (73 - 81)  BP: 137/96 (11 Feb 2022 13:00) (116/69 - 137/96)  BP(mean): --  RR: 18 (11 Feb 2022 13:00) (18 - 18)  SpO2: 95% (11 Feb 2022 13:00) (95% - 99%)      GENERAL:  NAD Awake, does not follow commands.   HEENT: NCAT, trach in place.   CARDIOVASCULAR:  S1, S2  LUNGS: b/l rhonchi. On vent support.   ABDOMEN:  soft, (-) tenderness, (-) distension, (+) bowel sounds, (-) guarding, (-) rebound (-) rigidity.  Colostomy in place with dark stool.  Midline abdominal wound vac in place.    EXTREMITIES:  no cyanosis / clubbing / edema.   ____________________                                8.2    12.53 )-----------( 408      ( 11 Feb 2022 08:02 )             26.6

## 2022-02-11 NOTE — PROGRESS NOTE ADULT - PROVIDER SPECIALTY LIST ADULT
Critical Care
Gastroenterology
Hospitalist
Infectious Disease
Internal Medicine
Neurology
Pulmonology
Surgery
Cardiology
Gastroenterology
Gastroenterology
Hospitalist
Hospitalist
Infectious Disease
Internal Medicine
Neurology
Pulmonology
Surgery
Critical Care
Gastroenterology
Hospitalist
Hospitalist
Infectious Disease
Intervent Radiology
Neurology
Pulmonology
Surgery
Cardiology
Cardiology
Critical Care
Gastroenterology
Hospitalist
Infectious Disease
Internal Medicine
Intervent Radiology
Surgery
Surgery
Critical Care
Gastroenterology
Critical Care
Gastroenterology
Hospitalist
Infectious Disease

## 2022-02-11 NOTE — PROGRESS NOTE ADULT - ASSESSMENT
81F with a PMH of HTN, HLD, hypothyroidism, depression, seizures presented with abdominal pain and admitted 12/22 for acute perforated diverticulitis, s/p ex lap 12/24 with sigmoid colectomy and abdominal washout.  She was then transferred to MICU for postop care on mechanical ventilation.  Hospital course complicated by Afib with RVR.  She underwent wound closure and creation of colostomy 12/26, developed abdominal abscess s/p IR drainage 1/3, cultures grew Ecoli and Bacteriodes.  S/p tracheostomy and PEG placement.  1/28 developed bleeding through ostomy, AC stopped and pt transfused.  EGD on 2/1 showed gastritis, PEG tube embedded in mucosa.  Due to fevers, CT Head, C/A/P performed on 1/30, showed Acute left MCA infarct, new RLL pneumonia.  Seen by ID, started on Zosyn.      # RLL pneumonia, Aspiration Pneumonia - seen on CT chest 1/30.  ID following, completed Zosyn. No fever since 2/3.  WBC now 13.  Supportive care.  # Acute Blood Loss Anemia - due to suspected gastric bleeding.  s/p transfusion.  EGD 2/1 showed gastritis.  Monitor H/H, has been stable.    # Acute CVA - L MCA infarct was noted on CT 1/30.  Neuro input appreciated.  AC held due to extensive CVA, concern for hemorrhagic conversion.  Continue ASA, Statin.   carotid doppler perfomed and awaiting results   # Elevated LFTs - improved and remains stable.   Abdominal Sono showed cholelithiasis, no acute GB disease.  GI following.   # Hypernatremia - Would avoid hypotonic fluids in setting of acute CVA.  Increase free water via PEG. na normalized.   # Perforated Acute Diverticulitis / Intraabdominal Abscesses - s/p BABAK drain care removal on 2/9, cw  wound vac.  Most recent CT without significant new collections. recommend repeat ct scan in 2 weeks .   # S/p Septic Shock - resolved  # Afib with RVR - rate control.  Holding AC due to above.   # Chronic Respiratory Failure with Hypoxemia - vent support, weaning to trach collar.  Pulmonary following.  # RLE DVT - per IR, felt to be more subacute to chronic.  Recommended no IVC filter placement.  Followup LE doppler 2/2 shows no acute DVT.   # Functional Quadriplegia - supportive care.    DVT Proph - ICDs.     son HCP [facility] [phone]   SNF / Vent facility placement d/w CM, SW.  remains full code

## 2022-02-11 NOTE — CHART NOTE - NSCHARTNOTESELECT_GEN_ALL_CORE
Event Note
Nutrition Services
Surgery
Event Note
Nutrition Services
Off Service Note
POCUS/Event Note
update/Event Note
update/Event Note
Transfer Note

## 2022-02-11 NOTE — PROGRESS NOTE ADULT - REASON FOR ADMISSION
diverticulitis

## 2022-02-24 DIAGNOSIS — R53.2 FUNCTIONAL QUADRIPLEGIA: ICD-10-CM

## 2022-02-24 DIAGNOSIS — E83.42 HYPOMAGNESEMIA: ICD-10-CM

## 2022-02-24 DIAGNOSIS — R29.734 NIHSS SCORE 34: ICD-10-CM

## 2022-02-24 DIAGNOSIS — A41.9 SEPSIS, UNSPECIFIED ORGANISM: ICD-10-CM

## 2022-02-24 DIAGNOSIS — K65.9 PERITONITIS, UNSPECIFIED: ICD-10-CM

## 2022-02-24 DIAGNOSIS — I48.91 UNSPECIFIED ATRIAL FIBRILLATION: ICD-10-CM

## 2022-02-24 DIAGNOSIS — Z87.891 PERSONAL HISTORY OF NICOTINE DEPENDENCE: ICD-10-CM

## 2022-02-24 DIAGNOSIS — E78.5 HYPERLIPIDEMIA, UNSPECIFIED: ICD-10-CM

## 2022-02-24 DIAGNOSIS — T81.31XA DISRUPTION OF EXTERNAL OPERATION (SURGICAL) WOUND, NOT ELSEWHERE CLASSIFIED, INITIAL ENCOUNTER: ICD-10-CM

## 2022-02-24 DIAGNOSIS — K29.71 GASTRITIS, UNSPECIFIED, WITH BLEEDING: ICD-10-CM

## 2022-02-24 DIAGNOSIS — B96.20 UNSPECIFIED ESCHERICHIA COLI [E. COLI] AS THE CAUSE OF DISEASES CLASSIFIED ELSEWHERE: ICD-10-CM

## 2022-02-24 DIAGNOSIS — J18.9 PNEUMONIA, UNSPECIFIED ORGANISM: ICD-10-CM

## 2022-02-24 DIAGNOSIS — F41.9 ANXIETY DISORDER, UNSPECIFIED: ICD-10-CM

## 2022-02-24 DIAGNOSIS — G62.9 POLYNEUROPATHY, UNSPECIFIED: ICD-10-CM

## 2022-02-24 DIAGNOSIS — Z86.73 PERSONAL HISTORY OF TRANSIENT ISCHEMIC ATTACK (TIA), AND CEREBRAL INFARCTION WITHOUT RESIDUAL DEFICITS: ICD-10-CM

## 2022-02-24 DIAGNOSIS — I63.512 CEREBRAL INFARCTION DUE TO UNSPECIFIED OCCLUSION OR STENOSIS OF LEFT MIDDLE CEREBRAL ARTERY: ICD-10-CM

## 2022-02-24 DIAGNOSIS — G40.909 EPILEPSY, UNSPECIFIED, NOT INTRACTABLE, WITHOUT STATUS EPILEPTICUS: ICD-10-CM

## 2022-02-24 DIAGNOSIS — H40.9 UNSPECIFIED GLAUCOMA: ICD-10-CM

## 2022-02-24 DIAGNOSIS — E03.9 HYPOTHYROIDISM, UNSPECIFIED: ICD-10-CM

## 2022-02-24 DIAGNOSIS — F32.9 MAJOR DEPRESSIVE DISORDER, SINGLE EPISODE, UNSPECIFIED: ICD-10-CM

## 2022-02-24 DIAGNOSIS — R62.7 ADULT FAILURE TO THRIVE: ICD-10-CM

## 2022-02-24 DIAGNOSIS — G93.41 METABOLIC ENCEPHALOPATHY: ICD-10-CM

## 2022-02-24 DIAGNOSIS — I10 ESSENTIAL (PRIMARY) HYPERTENSION: ICD-10-CM

## 2022-02-24 DIAGNOSIS — R10.9 UNSPECIFIED ABDOMINAL PAIN: ICD-10-CM

## 2022-02-24 DIAGNOSIS — D62 ACUTE POSTHEMORRHAGIC ANEMIA: ICD-10-CM

## 2022-02-24 DIAGNOSIS — E87.6 HYPOKALEMIA: ICD-10-CM

## 2022-02-24 DIAGNOSIS — E83.39 OTHER DISORDERS OF PHOSPHORUS METABOLISM: ICD-10-CM

## 2022-02-24 DIAGNOSIS — Z79.82 LONG TERM (CURRENT) USE OF ASPIRIN: ICD-10-CM

## 2022-02-24 DIAGNOSIS — J95.821 ACUTE POSTPROCEDURAL RESPIRATORY FAILURE: ICD-10-CM

## 2022-02-24 DIAGNOSIS — E87.2 ACIDOSIS: ICD-10-CM

## 2022-02-24 DIAGNOSIS — I82.411 ACUTE EMBOLISM AND THROMBOSIS OF RIGHT FEMORAL VEIN: ICD-10-CM

## 2022-02-24 DIAGNOSIS — E87.0 HYPEROSMOLALITY AND HYPERNATREMIA: ICD-10-CM

## 2022-02-24 DIAGNOSIS — R65.21 SEVERE SEPSIS WITH SEPTIC SHOCK: ICD-10-CM

## 2022-02-24 DIAGNOSIS — K57.20 DIVERTICULITIS OF LARGE INTESTINE WITH PERFORATION AND ABSCESS WITHOUT BLEEDING: ICD-10-CM

## 2022-03-09 NOTE — CHART NOTE - NSCHARTNOTEFT_GEN_A_CORE
PSYCHIATRY ATTENDING NOTE: Dr Andrea requested psychiatric consultation for depression. Patient is scheduled for the OR today for an Endoscopic-assisted gastrostomy tube placement. Patient will be psychiatric evaluated post-procedure once he is recuperated and can tolerate a dynamic conversation. Acid reflux

## 2022-03-26 ENCOUNTER — INPATIENT (INPATIENT)
Facility: HOSPITAL | Age: 82
LOS: 26 days | Discharge: NURSING, REHAB OR LONG-TERM CARE | End: 2022-04-22
Attending: INTERNAL MEDICINE | Admitting: INTERNAL MEDICINE
Payer: MEDICARE

## 2022-03-26 VITALS
DIASTOLIC BLOOD PRESSURE: 75 MMHG | HEART RATE: 78 BPM | RESPIRATION RATE: 18 BRPM | SYSTOLIC BLOOD PRESSURE: 163 MMHG | HEIGHT: 66 IN | OXYGEN SATURATION: 100 %

## 2022-03-26 DIAGNOSIS — Z90.49 ACQUIRED ABSENCE OF OTHER SPECIFIED PARTS OF DIGESTIVE TRACT: Chronic | ICD-10-CM

## 2022-03-26 LAB
ALBUMIN SERPL ELPH-MCNC: 3.7 G/DL — SIGNIFICANT CHANGE UP (ref 3.3–5)
ALP SERPL-CCNC: 92 U/L — SIGNIFICANT CHANGE UP (ref 40–120)
ALT FLD-CCNC: 19 U/L — SIGNIFICANT CHANGE UP (ref 4–33)
ANION GAP SERPL CALC-SCNC: 13 MMOL/L — SIGNIFICANT CHANGE UP (ref 7–14)
APPEARANCE UR: ABNORMAL
APTT BLD: 30.4 SEC — SIGNIFICANT CHANGE UP (ref 27–36.3)
AST SERPL-CCNC: 24 U/L — SIGNIFICANT CHANGE UP (ref 4–32)
BASOPHILS # BLD AUTO: 0.03 K/UL — SIGNIFICANT CHANGE UP (ref 0–0.2)
BASOPHILS NFR BLD AUTO: 0.3 % — SIGNIFICANT CHANGE UP (ref 0–2)
BILIRUB SERPL-MCNC: 0.4 MG/DL — SIGNIFICANT CHANGE UP (ref 0.2–1.2)
BILIRUB UR-MCNC: NEGATIVE — SIGNIFICANT CHANGE UP
BLOOD GAS VENOUS COMPREHENSIVE RESULT: SIGNIFICANT CHANGE UP
BUN SERPL-MCNC: 14 MG/DL — SIGNIFICANT CHANGE UP (ref 7–23)
CALCIUM SERPL-MCNC: 9.7 MG/DL — SIGNIFICANT CHANGE UP (ref 8.4–10.5)
CHLORIDE SERPL-SCNC: 102 MMOL/L — SIGNIFICANT CHANGE UP (ref 98–107)
CO2 SERPL-SCNC: 27 MMOL/L — SIGNIFICANT CHANGE UP (ref 22–31)
COLOR SPEC: YELLOW — SIGNIFICANT CHANGE UP
CREAT SERPL-MCNC: 0.4 MG/DL — LOW (ref 0.5–1.3)
DIFF PNL FLD: NEGATIVE — SIGNIFICANT CHANGE UP
EGFR: 101 ML/MIN/1.73M2 — SIGNIFICANT CHANGE UP
EOSINOPHIL # BLD AUTO: 0.22 K/UL — SIGNIFICANT CHANGE UP (ref 0–0.5)
EOSINOPHIL NFR BLD AUTO: 2.3 % — SIGNIFICANT CHANGE UP (ref 0–6)
GLUCOSE SERPL-MCNC: 109 MG/DL — HIGH (ref 70–99)
GLUCOSE UR QL: NEGATIVE — SIGNIFICANT CHANGE UP
HCT VFR BLD CALC: 35.7 % — SIGNIFICANT CHANGE UP (ref 34.5–45)
HGB BLD-MCNC: 11 G/DL — LOW (ref 11.5–15.5)
IANC: 7.5 K/UL — HIGH (ref 1.8–7.4)
IMM GRANULOCYTES NFR BLD AUTO: 0.4 % — SIGNIFICANT CHANGE UP (ref 0–1.5)
INR BLD: 1.06 RATIO — SIGNIFICANT CHANGE UP (ref 0.88–1.16)
KETONES UR-MCNC: NEGATIVE — SIGNIFICANT CHANGE UP
LEUKOCYTE ESTERASE UR-ACNC: NEGATIVE — SIGNIFICANT CHANGE UP
LYMPHOCYTES # BLD AUTO: 1.28 K/UL — SIGNIFICANT CHANGE UP (ref 1–3.3)
LYMPHOCYTES # BLD AUTO: 13.1 % — SIGNIFICANT CHANGE UP (ref 13–44)
MCHC RBC-ENTMCNC: 27.1 PG — SIGNIFICANT CHANGE UP (ref 27–34)
MCHC RBC-ENTMCNC: 30.8 GM/DL — LOW (ref 32–36)
MCV RBC AUTO: 87.9 FL — SIGNIFICANT CHANGE UP (ref 80–100)
MONOCYTES # BLD AUTO: 0.69 K/UL — SIGNIFICANT CHANGE UP (ref 0–0.9)
MONOCYTES NFR BLD AUTO: 7.1 % — SIGNIFICANT CHANGE UP (ref 2–14)
NEUTROPHILS # BLD AUTO: 7.5 K/UL — HIGH (ref 1.8–7.4)
NEUTROPHILS NFR BLD AUTO: 76.8 % — SIGNIFICANT CHANGE UP (ref 43–77)
NITRITE UR-MCNC: NEGATIVE — SIGNIFICANT CHANGE UP
NRBC # BLD: 0 /100 WBCS — SIGNIFICANT CHANGE UP
NRBC # FLD: 0 K/UL — SIGNIFICANT CHANGE UP
PH UR: 8 — SIGNIFICANT CHANGE UP (ref 5–8)
PLATELET # BLD AUTO: 361 K/UL — SIGNIFICANT CHANGE UP (ref 150–400)
POTASSIUM SERPL-MCNC: 3.2 MMOL/L — LOW (ref 3.5–5.3)
POTASSIUM SERPL-SCNC: 3.2 MMOL/L — LOW (ref 3.5–5.3)
PROT SERPL-MCNC: 7.2 G/DL — SIGNIFICANT CHANGE UP (ref 6–8.3)
PROT UR-MCNC: ABNORMAL
PROTHROM AB SERPL-ACNC: 12.3 SEC — SIGNIFICANT CHANGE UP (ref 10.5–13.4)
RBC # BLD: 4.06 M/UL — SIGNIFICANT CHANGE UP (ref 3.8–5.2)
RBC # FLD: 15.9 % — HIGH (ref 10.3–14.5)
SODIUM SERPL-SCNC: 142 MMOL/L — SIGNIFICANT CHANGE UP (ref 135–145)
SP GR SPEC: 1.02 — SIGNIFICANT CHANGE UP (ref 1–1.05)
UROBILINOGEN FLD QL: SIGNIFICANT CHANGE UP
WBC # BLD: 9.76 K/UL — SIGNIFICANT CHANGE UP (ref 3.8–10.5)
WBC # FLD AUTO: 9.76 K/UL — SIGNIFICANT CHANGE UP (ref 3.8–10.5)

## 2022-03-26 PROCEDURE — 99285 EMERGENCY DEPT VISIT HI MDM: CPT

## 2022-03-26 PROCEDURE — 71045 X-RAY EXAM CHEST 1 VIEW: CPT | Mod: 26

## 2022-03-26 RX ORDER — SODIUM CHLORIDE 9 MG/ML
1000 INJECTION INTRAMUSCULAR; INTRAVENOUS; SUBCUTANEOUS ONCE
Refills: 0 | Status: COMPLETED | OUTPATIENT
Start: 2022-03-26 | End: 2022-03-26

## 2022-03-26 RX ORDER — PIPERACILLIN AND TAZOBACTAM 4; .5 G/20ML; G/20ML
3.38 INJECTION, POWDER, LYOPHILIZED, FOR SOLUTION INTRAVENOUS ONCE
Refills: 0 | Status: COMPLETED | OUTPATIENT
Start: 2022-03-26 | End: 2022-03-26

## 2022-03-26 RX ORDER — VANCOMYCIN HCL 1 G
1000 VIAL (EA) INTRAVENOUS ONCE
Refills: 0 | Status: COMPLETED | OUTPATIENT
Start: 2022-03-26 | End: 2022-03-26

## 2022-03-26 RX ADMIN — PIPERACILLIN AND TAZOBACTAM 200 GRAM(S): 4; .5 INJECTION, POWDER, LYOPHILIZED, FOR SOLUTION INTRAVENOUS at 23:43

## 2022-03-26 RX ADMIN — SODIUM CHLORIDE 1000 MILLILITER(S): 9 INJECTION INTRAMUSCULAR; INTRAVENOUS; SUBCUTANEOUS at 23:43

## 2022-03-26 NOTE — ED ADULT TRIAGE NOTE - CHIEF COMPLAINT QUOTE
Pt brought in by EMS from [facility] for dislodged peg tube. As per EMS, staff members at facility noted pt to have blood to her abdomen. Pt has trach, on 2L oxygen. Pt does take Clopidogrel. PMH HTN, Seizure, cerebral infarction

## 2022-03-26 NOTE — ED PROVIDER NOTE - CLINICAL SUMMARY MEDICAL DECISION MAKING FREE TEXT BOX
80y/o F full code w/ h/o HTN, HLD, hypothyroidism, depression, seizures, CVA, diverticulitis s/p colostomy, bedbound w/ PEG tube and tracheostomy from [facility] for dislodged PEG tube. As per EMS they are unsure what size PEG tube was, when or how tube was pulled, or when PEG tube was placed. Has been at baseline mental status. bloody hole over stomach with clots, colostomy bag with no gas or stool, guarding, distended, diffusely tender 82y/o F full code w/ h/o HTN, HLD, hypothyroidism, depression, seizures, diverticulitis c/b abscess s/p colostomy w/ recent hospital course c/b hypoxic/hypercarbic respiratory failure and CVA functional quadriplegic bedbound w/ PEG tube and tracheostomy from [email or web address] BIB EMS for dislodged PEG tube. As per EMS they are unsure what size PEG tube was, when or how tube was pulled, or when PEG tube was placed. Has been at baseline mental status. bloody hole over stomach with clots, colostomy bag with no gas or stool, guarding, distended, diffusely tender. Will not replace tube as unsure of time or when dislodged. Will check sepsis labs, CTAP and start broad spectrum abx and admission for GI.

## 2022-03-26 NOTE — ED ADULT NURSE NOTE - CHIEF COMPLAINT QUOTE
Pt brought in by EMS from [facility] for dislodged peg tube. As per EMS, staff members at facility noted pt to have blood to her abdomen. Pt has trach, on 2L oxygen. Pt does take Clopidogrel. PMH HTN, Seizure, cerebral infarction

## 2022-03-26 NOTE — ED PROVIDER NOTE - PHYSICAL EXAMINATION
Gen: non-toxic appearing  Head: normal appearing  HEENT: tracheostomy in place, normal conjunctiva, oral mucosa dry  Lung: no respiratory distress, crackles at bases b/l  CV: regular rate and rhythm, no murmurs  Abd: bloody hole over stomach with clots, colostomy bag with no gas or stool, guarding, distended, diffusely tender   MSK: no visible deformities  Neuro: No focal deficits, AAOx0  Skin: Warm  Psych: normal affect Attending with Gen: non-toxic appearing  Head: normal appearing  HEENT: tracheostomy in place CDI, normal conjunctiva, oral mucosa dry  Lung: no respiratory distress, crackles at bases b/l  CV: regular rate and rhythm, no murmurs  Abd: blood clotted over peg site without surrounding erythema, colostomy bag with no blood or stool, + guarding of site, minimally distended, diffusely tender abdomen  MSK: no visible deformities  Neuro: No focal deficits, AAOx0  Skin: Warm to palpation  Psych: normal affect

## 2022-03-26 NOTE — ED PROVIDER NOTE - ATTENDING CONTRIBUTION TO CARE
I have personally performed a history and physical examination of the patient and discussed management with the resident as well as the patient.  I reviewed the resident's note and agree with the documented findings and plan of care.  I have authored and modified critical sections of the Provider Note, including but not limited to HPI, Physical Exam and MDM.    82y/o F full code w/ h/o HTN, HLD, hypothyroidism, depression, seizures, CVA, diverticulitis s/p colostomy, bedbound w/ PEG tube and tracheostomy from [facility] for dislodged PEG tube. As per EMS they are unsure what size PEG tube was, when or how tube was pulled, or when PEG tube was placed. Has been at baseline mental status. Confirmed with nursing facility unknown time tube has been out. Pt reported as baseline mental status (opens eyes with some tracking). No other collateral information at this time. As per EMS they are unsure what size PEG tube was, when or how tube was pulled, or when PEG tube was placed. Given lack of information and inability to visualize tract, will not place anything in PEG site at this time. Likely septic of unknown etiology, suspicious for intraabdominal infection. Obtain cbc, cmp, lactate, vbg, bc, ua, uc, pt, ptt, inr, ekg, cxr, CT abd/pelv, iv fluids, IV abx. Admit.

## 2022-03-26 NOTE — ED PROVIDER NOTE - PROGRESS NOTE DETAILS
Received on sign out, pt stable, pending surgery recommendation and admission. Endorse to day team Anthony, PGY-3 (FERNANDEZ): signout received, likely peg tube replacement. Multiple pages to surg, however they had numerous consults and pages overnight, and after receiving sign-out are still catching up. Surgery resident will staff case with his attending (he hasn't responded yet).

## 2022-03-26 NOTE — ED ADULT NURSE NOTE - OBJECTIVE STATEMENT
Slime RN: Patient is a 81-year-old female, arrives from Nursing home the [facility], for a dislodged PEG tube. Pt also arrives with Trach set to 2L O2. Pt with blood noted to L side of abdomen area where PEG was in place. PEG no longer in place. Dime size opening with blood to area. PHX of TIA, HTN, Seizure, blood clot (On Clopidogrel). Pt is non-verbal. Responds to verbal/painful stimuli. 18 G placed in R AC. UA/UC sent. Rectal temp 99.6. Placed on cardiac monitor. Report to primary RN Nan. Safety maintained. will continue to monitor.

## 2022-03-26 NOTE — ED PROVIDER NOTE - OBJECTIVE STATEMENT
80y/o F full code w/ h/o HTN, HLD, hypothyroidism, depression, seizures, CVA, diverticulitis s/p colostomy, bedbound w/ PEG tube and tracheostomy from [facility] for dislodged PEG tube. As per EMS they are unsure what size PEG tube was, when or how tube was pulled, or when PEG tube was placed. Has been at baseline mental status. 80y/o F full code w/ h/o HTN, HLD, hypothyroidism, depression, seizures, CVA, diverticulitis s/p colostomy, bedbound w/ PEG tube and tracheostomy from [facility] for dislodged PEG tube. As per EMS they are unsure what size PEG tube was, when or how tube was pulled, or when PEG tube was placed. Has been at baseline mental status. Confirmed with nursing facility unknown time tube has been out. Pt reported as baseline mental status (opens eyes with some tracking). No other collateral information at this time. 80y/o F full code w/ h/o HTN, HLD, hypothyroidism, depression, seizures, diverticulitis c/b abscess s/p colostomy w/ recent hospital course c/b hypoxic/hypercarbic respiratory failure and CVA functional quadriplegic bedbound w/ PEG tube and tracheostomy from [facility] BIB EMS for dislodged PEG tube. As per EMS they are unsure what size PEG tube was, when or how tube was pulled, or when PEG tube was placed. Has been at baseline mental status. Confirmed with nursing facility unknown time tube has been out. Pt reported as baseline mental status (opens eyes with some tracking). No other collateral information at this time.

## 2022-03-26 NOTE — ED ADULT NURSE NOTE - NSIMPLEMENTINTERV_GEN_ALL_ED
Implemented All Fall with Harm Risk Interventions:  [place] to call system. Call bell, personal items and telephone within reach. Instruct patient to call for assistance. Room bathroom lighting operational. Non-slip footwear when patient is off stretcher. Physically safe environment: no spills, clutter or unnecessary equipment. Stretcher in lowest position, wheels locked, appropriate side rails in place. Provide visual cue, wrist band, yellow gown, etc. Monitor gait and stability. Monitor for mental status changes and reorient to person, place, and time. Review medications for side effects contributing to fall risk. Reinforce activity limits and safety measures with patient and family. Provide visual clues: red socks.

## 2022-03-27 DIAGNOSIS — Z93.1 GASTROSTOMY STATUS: Chronic | ICD-10-CM

## 2022-03-27 DIAGNOSIS — I10 ESSENTIAL (PRIMARY) HYPERTENSION: ICD-10-CM

## 2022-03-27 DIAGNOSIS — I82.409 ACUTE EMBOLISM AND THROMBOSIS OF UNSPECIFIED DEEP VEINS OF UNSPECIFIED LOWER EXTREMITY: ICD-10-CM

## 2022-03-27 DIAGNOSIS — J96.11 CHRONIC RESPIRATORY FAILURE WITH HYPOXIA: ICD-10-CM

## 2022-03-27 DIAGNOSIS — K94.23 GASTROSTOMY MALFUNCTION: ICD-10-CM

## 2022-03-27 DIAGNOSIS — E03.9 HYPOTHYROIDISM, UNSPECIFIED: ICD-10-CM

## 2022-03-27 DIAGNOSIS — I63.9 CEREBRAL INFARCTION, UNSPECIFIED: ICD-10-CM

## 2022-03-27 DIAGNOSIS — K57.80 DIVERTICULITIS OF INTESTINE, PART UNSPECIFIED, WITH PERFORATION AND ABSCESS WITHOUT BLEEDING: ICD-10-CM

## 2022-03-27 DIAGNOSIS — Z93.3 COLOSTOMY STATUS: Chronic | ICD-10-CM

## 2022-03-27 DIAGNOSIS — Z93.0 TRACHEOSTOMY STATUS: Chronic | ICD-10-CM

## 2022-03-27 DIAGNOSIS — E87.6 HYPOKALEMIA: ICD-10-CM

## 2022-03-27 DIAGNOSIS — G40.909 EPILEPSY, UNSPECIFIED, NOT INTRACTABLE, WITHOUT STATUS EPILEPTICUS: ICD-10-CM

## 2022-03-27 DIAGNOSIS — Z86.79 PERSONAL HISTORY OF OTHER DISEASES OF THE CIRCULATORY SYSTEM: ICD-10-CM

## 2022-03-27 DIAGNOSIS — H40.9 UNSPECIFIED GLAUCOMA: ICD-10-CM

## 2022-03-27 PROCEDURE — 74177 CT ABD & PELVIS W/CONTRAST: CPT | Mod: 26,MA

## 2022-03-27 PROCEDURE — 99223 1ST HOSP IP/OBS HIGH 75: CPT

## 2022-03-27 RX ORDER — POTASSIUM CHLORIDE 20 MEQ
10 PACKET (EA) ORAL
Refills: 0 | Status: COMPLETED | OUTPATIENT
Start: 2022-03-27 | End: 2022-03-27

## 2022-03-27 RX ORDER — TIMOLOL 0.5 %
1 DROPS OPHTHALMIC (EYE)
Refills: 0 | Status: DISCONTINUED | OUTPATIENT
Start: 2022-03-27 | End: 2022-04-22

## 2022-03-27 RX ORDER — LEVETIRACETAM 250 MG/1
500 TABLET, FILM COATED ORAL EVERY 12 HOURS
Refills: 0 | Status: DISCONTINUED | OUTPATIENT
Start: 2022-03-27 | End: 2022-04-22

## 2022-03-27 RX ORDER — LATANOPROST 0.05 MG/ML
1 SOLUTION/ DROPS OPHTHALMIC; TOPICAL
Qty: 0 | Refills: 0 | DISCHARGE

## 2022-03-27 RX ORDER — SODIUM CHLORIDE 9 MG/ML
1000 INJECTION, SOLUTION INTRAVENOUS
Refills: 0 | Status: DISCONTINUED | OUTPATIENT
Start: 2022-03-27 | End: 2022-03-28

## 2022-03-27 RX ORDER — TIMOLOL 0.5 %
1 DROPS OPHTHALMIC (EYE)
Qty: 0 | Refills: 0 | DISCHARGE

## 2022-03-27 RX ORDER — LATANOPROST 0.05 MG/ML
1 SOLUTION/ DROPS OPHTHALMIC; TOPICAL AT BEDTIME
Refills: 0 | Status: DISCONTINUED | OUTPATIENT
Start: 2022-03-27 | End: 2022-04-22

## 2022-03-27 RX ORDER — FAMOTIDINE 10 MG/ML
1 INJECTION INTRAVENOUS
Qty: 0 | Refills: 0 | DISCHARGE

## 2022-03-27 RX ORDER — METOPROLOL TARTRATE 50 MG
2.5 TABLET ORAL EVERY 6 HOURS
Refills: 0 | Status: DISCONTINUED | OUTPATIENT
Start: 2022-03-27 | End: 2022-03-28

## 2022-03-27 RX ORDER — DORZOLAMIDE HYDROCHLORIDE 20 MG/ML
1 SOLUTION/ DROPS OPHTHALMIC
Qty: 0 | Refills: 0 | DISCHARGE

## 2022-03-27 RX ORDER — ATORVASTATIN CALCIUM 80 MG/1
1 TABLET, FILM COATED ORAL
Qty: 0 | Refills: 0 | DISCHARGE

## 2022-03-27 RX ORDER — SODIUM CHLORIDE 9 MG/ML
1000 INJECTION INTRAMUSCULAR; INTRAVENOUS; SUBCUTANEOUS
Refills: 0 | Status: DISCONTINUED | OUTPATIENT
Start: 2022-03-27 | End: 2022-03-28

## 2022-03-27 RX ORDER — DORZOLAMIDE HYDROCHLORIDE 20 MG/ML
1 SOLUTION/ DROPS OPHTHALMIC
Refills: 0 | Status: DISCONTINUED | OUTPATIENT
Start: 2022-03-27 | End: 2022-04-22

## 2022-03-27 RX ADMIN — LEVETIRACETAM 400 MILLIGRAM(S): 250 TABLET, FILM COATED ORAL at 18:48

## 2022-03-27 RX ADMIN — Medication 250 MILLIGRAM(S): at 00:40

## 2022-03-27 RX ADMIN — Medication 100 MILLIEQUIVALENT(S): at 14:55

## 2022-03-27 RX ADMIN — SODIUM CHLORIDE 100 MILLILITER(S): 9 INJECTION, SOLUTION INTRAVENOUS at 05:18

## 2022-03-27 RX ADMIN — SODIUM CHLORIDE 75 MILLILITER(S): 9 INJECTION INTRAMUSCULAR; INTRAVENOUS; SUBCUTANEOUS at 14:55

## 2022-03-27 RX ADMIN — Medication 2.5 MILLIGRAM(S): at 18:49

## 2022-03-27 RX ADMIN — Medication 100 MILLIEQUIVALENT(S): at 20:25

## 2022-03-27 RX ADMIN — Medication 1 DROP(S): at 18:49

## 2022-03-27 RX ADMIN — DORZOLAMIDE HYDROCHLORIDE 1 DROP(S): 20 SOLUTION/ DROPS OPHTHALMIC at 18:48

## 2022-03-27 RX ADMIN — Medication 100 MILLIEQUIVALENT(S): at 18:48

## 2022-03-27 RX ADMIN — Medication 2.5 MILLIGRAM(S): at 23:23

## 2022-03-27 NOTE — H&P ADULT - PROBLEM SELECTOR PROBLEM 5
History of atrial fibrillation Diverticulitis of intestine with perforation and abscess without bleeding

## 2022-03-27 NOTE — H&P ADULT - NSICDXPASTSURGICALHX_GEN_ALL_CORE_FT
PAST SURGICAL HISTORY:  S/P appendectomy     S/P colostomy perforated diverticulitis    S/P percutaneous endoscopic gastrostomy (PEG) tube placement 1/18/22 in [place]    Status post Rosales procedure 12/25/22    Status post tracheostomy

## 2022-03-27 NOTE — H&P ADULT - ASSESSMENT
82 yo F w/ PMHx of perforated diverticulitis s/p Rosales procedure, colostomy, abdominal abscess with wound s/p IR drainage, Respiratory Failure s/p Trache, CVA w/ functional quadriplegia, aphasia, dysphagia s/p PEG (on Jevity 1.5 @ 70cchr x 18 hrs, & water flushes 325ml q 6 hrs, Afib, GIB, HTN, HLD, Seizure d/o, Hypothyroidism, Glaucoma, Depression p/w PEG tube dislodgement for unknown amount of time

## 2022-03-27 NOTE — H&P ADULT - NSICDXPASTMEDICALHX_GEN_ALL_CORE_FT
PAST MEDICAL HISTORY:  Atrial fibrillation     Blood clot of neck vein left side    Chronic respiratory failure with hypoxia and hypercapnia s/p Trache on 2L NC O2    Gastritis     Glaucoma     HTN (hypertension)     Hypothyroidism     MDD (major depressive disorder)     Seizure     Stroke Large MCA infarct 1/30/22- residual Rt sided weakness, aphasia, dysphagia s/p PEG    TIA (transient ischemic attack) 20 years ago

## 2022-03-27 NOTE — H&P ADULT - PROBLEM SELECTOR PLAN 8
Resume Levothyroxine when PEG replaced ?Subacute vs Chronic, not started on A/C as per chart due to recent GIB

## 2022-03-27 NOTE — CONSULT NOTE ADULT - ASSESSMENT
ASSESSMENT: Patient is a 81F HTN, HLD, Depressin, Seizures, perforated diverticulitis s/p Varela 12/25/21, washout 12/26/21 course complicated by afib w/ rvr, tracheostoym 1/7/22, PEG 1/18/22, large mca infarct on 1/30 (weakness on right side, also has lle weakness), GI bleed s/p EGD 2/1/22 presenting from [facility] rehab [facility] nursing facility with a dislodged PEG tube. Does not appear to have sepsis based on qsofa and sirs parameters although has a new pelvic collection.     PLAN:    - Final plan pending discussion with attending  -     Audie Miranda  General Surgery  B Team  [phone]    . ASSESSMENT: Patient is a 81F HTN, HLD, Depressin, Seizures, perforated diverticulitis s/p Varela 12/25/21, washout 12/26/21 course complicated by afib w/ rvr, tracheostoym 1/7/22, PEG 1/18/22, large mca infarct on 1/30 (weakness on right side, also has lle weakness), GI bleed s/p EGD 2/1/22 presenting from [facility] rehab and nursing facility with a dislodged PEG tube. Does not appear to have sepsis based on qsofa and sirs parameters although has a new pelvic collection.     PLAN:    - Okay for medicine admission  - Recommend IR consultation for replacement of PEG  - Patient with small pelvic fluid collection but nontoxic in appearance, no intervention needed for fluid collection unless clinically worsens  - If drainage needed would be with IR    Audie Miranda  General Surgery  B Team  [phone]    .

## 2022-03-27 NOTE — H&P ADULT - PROBLEM SELECTOR PLAN 1
Admit to Medicine, Hold PEG tube feeds, meds for now, Surgery c/s in chart- Obtain IR c/s for replacement of PEG. Admit to Medicine, Hold PEG tube feeds & meds for now, IVF, Surgery c/s in chart- Obtain IR c/s for replacement of PEG.

## 2022-03-27 NOTE — H&P ADULT - PROBLEM SELECTOR PLAN 5
check EKG, Pt off A/C due to recent GIB as per chart Surgery c/s in chart- small fluid collection but nontoxic, if drainage needed would be done by IR.  Wound care eval

## 2022-03-27 NOTE — CONSULT NOTE ADULT - SUBJECTIVE AND OBJECTIVE BOX
GENERAL SURGERY CONSULT NOTE  --------------------------------------------------------------------------------------------    HPI:   Patient is a 81y old  Female who presents with a chief complaint of dislodged PEG    HPI:    81F pmh HTN, HLD, Depressin, Seizures, perforated diverticulitis s/p Varela 21, washout 21 course complicated by afib w/ rvr, tracheostoym 22, PEG 22, large mca infarct on  (weakness on right side, also has lle weakness), GI bleed s/p EGD 22 [facility].    Patient has difficulty verbalizing secondary to tracheostomy, cva but seems to endorse that the PEG tube fell out accidentally. Endorsing f/n/c but no abdominal pain or chest pain.    ROS: 10-system review is otherwise negative except HPI above.      PAST MEDICAL & SURGICAL HISTORY:  Seizure    HTN (hypertension)    Glaucoma    Thyroid disease    Blood clot of neck vein  left side    TIA (transient ischemic attack)  20 years ago    S/P appendectomy      FAMILY HISTORY:  FH: HTN (hypertension)    [] Family history not pertinent as reviewed with the patient and family    SOCIAL HISTORY:    [facility]    ALLERGIES: No Known Allergies      HOME MEDICATIONS:   amLODIPine 10 mg oral tablet: 1 tab(s) orally once a day (22 Dec 2021 16:46)  aspirin 81 mg oral tablet, chewable: 1 tab(s) orally once a day (22 Dec 2021 16:46)  atorvastatin 20 mg oral tablet: 1 tab(s) orally once a day (at bedtime) (2022 16:03)  carvedilol 3.125 mg oral tablet: 1 tab(s) orally every 12 hours (2022 18:16)  clopidogrel 75 mg oral tablet: 1 tab(s) orally once a day (2022 16:03)  escitalopram 20 mg oral tablet: 1 tab(s) orally once a day (22 Dec 2021 16:46)  levETIRAcetam 500 mg oral tablet: 1 tab(s) orally 2 times a day (22 Dec 2021 16:46)  levothyroxine 75 mcg (0.075 mg) oral tablet: 1 tab(s) orally once a day (2022 18:16)  pantoprazole 40 mg oral granule, delayed release: 40 milligram(s) by gastrostomy tube once a day (2022 18:16)  timolol-dorzolamide 0.5%-2% preservative-free ophthalmic solution: 1 drop(s) to each affected eye 2 times a day (22 Dec 2021 16:46)      CURRENT MEDICATIONS  MEDICATIONS (STANDING): dextrose 5% + sodium chloride 0.9%. 1000 milliLiter(s) IV Continuous <Continuous>    MEDICATIONS (PRN):  --------------------------------------------------------------------------------------------    Vitals:   T(C): 37.3 (22 @ 06:23), Max: 37.6 (22 @ 23:24)  HR: 84 (22 @ 06:23) (73 - 84)  BP: 159/97 (22 @ 06:23) (159/97 - 177/82)  RR: 20 (22 @ 06:23) (18 - 20)  SpO2: 100% (22 @ 06:23) (100% - 100%)  CAPILLARY BLOOD GLUCOSE      POCT Blood Glucose.: 144 mg/dL (27 Mar 2022 06:21)      Height (cm): 167.6 ( @ 22:43)    PHYSICAL EXAM:   General: NAD, Lying in bed comfortably  Neuro: Nods that she knows the year and that she is in a hospital  Cardio: rate regular, bp stable  Resp: Good effort on tracheostomy  GI/Abd: Soft, nondistended, nontender, tract where PEG ws in upper abdomen, mucous fistula with granulatin tissue covered in xeroform, ostomy appears healthy with stool output  Vascular: All 4 extremities warm.  --------------------------------------------------------------------------------------------    LABS  CBC ( 23:25)                              11.0<L>                         9.76    )----------------(  361        76.8  % Neutrophils, 13.1  % Lymphocytes, ANC: 7.50<H>                              35.7      BMP ( @ 23:25)             142     |  102     |  14    		Ca++ --      Ca 9.7                ---------------------------------( 109<H>		Mg --                 3.2<L>  |  27      |  0.40<L>			Ph --        LFTs ( @ 23:25)      TPro 7.2 / Alb 3.7 / TBili 0.4 / DBili -- / AST 24 / ALT 19 / AlkPhos 92    Coags ( @ 23:25)  aPTT 30.4 / INR 1.06 / PT 12.3        VBG ( 23:25)     7.46<H> / 43<H> / 58 / 31<H> / 6.1<H> / 89.0%     Lactate: 1.2    --------------------------------------------------------------------------------------------    MICROBIOLOGY  Urinalysis ( 23:19):     Color: Yellow / Appearance: Slightly Turbid<!> / S.025 / pH: 8.0 / Gluc: Negative / Ketones: Negative / Bili: Negative / Urobili: <2 mg/dL / Protein :100 mg/dL<!> / Nitrites: Negative / Leuk.Est: Negative / RBC: 0-1 / WBC: 0-1 / Sq Epi:  / Non Sq Epi:  / Bacteria          --------------------------------------------------------------------------------------------    IMAGING  < from: CT Abdomen and Pelvis w/ IV Cont (22 @ 01:59) >  IMPRESSION:    PEG tube removed/dislodged, no evidence for pneumoperitoneum.    No bowel obstruction.    Thick-walled fluid collection in the pelvic cul-de-sac measuring 3.1 x   3.5 x 3 cm.    Interval slight improvement in additional small loculated fluid   collections, including significant improvement in inflammatory changes in   the right lower quadrant and pelvis status post removal of surgical drain.    Ill-defined hyperdensity/enhancing nodularity along the right posterior   bladder lumen, cannot exclude a neoplastic process, suggest correlation   with cystoscopy on a nonemergent basis.    < end of copied text >      --------------------------------------------------------------------------------------------     GENERAL SURGERY CONSULT NOTE  --------------------------------------------------------------------------------------------    HPI:   Patient is a 81y old  Female who presents with a chief complaint of dislodged PEG    HPI:    81F pmh HTN, HLD, Depressin, Seizures, perforated diverticulitis s/p Varela 21, washout 21 course complicated by afib w/ rvr, tracheostoym 22, PEG 22, large mca infarct on  (weakness on right side, also has lle weakness), GI bleed s/p EGD 22 [facility].    Patient has difficulty verbalizing secondary to tracheostomy, cva but seems to endorse that the PEG tube fell out accidentally. Endorsing f/n/c but no abdominal pain or chest pain.    ROS: 10-system review is otherwise negative except HPI above.      PAST MEDICAL & SURGICAL HISTORY:  Seizure    HTN (hypertension)    Glaucoma    Thyroid disease    Blood clot of neck vein  left side    TIA (transient ischemic attack)  20 years ago    S/P appendectomy      FAMILY HISTORY:  FH: HTN (hypertension)    [] Family history not pertinent as reviewed with the patient and family    [facility]IAL HISTORY:    [facility]    ALLERGIES: No Known Allergies      HOME MEDICATIONS:   amLODIPine 10 mg oral tablet: 1 tab(s) orally once a day (22 Dec 2021 16:46)  aspirin 81 mg oral tablet, chewable: 1 tab(s) orally once a day (22 Dec 2021 16:46)  atorvastatin 20 mg oral tablet: 1 tab(s) orally once a day (at bedtime) (2022 16:03)  carvedilol 3.125 mg oral tablet: 1 tab(s) orally every 12 hours (2022 18:16)  clopidogrel 75 mg oral tablet: 1 tab(s) orally once a day (2022 16:03)  escitalopram 20 mg oral tablet: 1 tab(s) orally once a day (22 Dec 2021 16:46)  levETIRAcetam 500 mg oral tablet: 1 tab(s) orally 2 times a day (22 Dec 2021 16:46)  levothyroxine 75 mcg (0.075 mg) oral tablet: 1 tab(s) orally once a day (2022 18:16)  pantoprazole 40 mg oral granule, delayed release: 40 milligram(s) by gastrostomy tube once a day (2022 18:16)  timolol-dorzolamide 0.5%-2% preservative-free ophthalmic solution: 1 drop(s) to each affected eye 2 times a day (22 Dec 2021 16:46)      CURRENT MEDICATIONS  MEDICATIONS (STANDING): dextrose 5% + sodium chloride 0.9%. 1000 milliLiter(s) IV Continuous <Continuous>    MEDICATIONS (PRN):  --------------------------------------------------------------------------------------------    Vitals:   T(C): 37.3 (22 @ 06:23), Max: 37.6 (22 @ 23:24)  HR: 84 (22 @ 06:23) (73 - 84)  BP: 159/97 (22 @ 06:23) (159/97 - 177/82)  RR: 20 (22 @ 06:23) (18 - 20)  SpO2: 100% (22 @ 06:23) (100% - 100%)  CAPILLARY BLOOD GLUCOSE      POCT Blood Glucose.: 144 mg/dL (27 Mar 2022 06:21)      Height (cm): 167.6 ( @ 22:43)    PHYSICAL EXAM:   General: NAD, Lying in bed comfortably  Neuro: Nods that she knows the year and that she is in a hospital  Cardio: rate regular, bp stable  Resp: Good effort on tracheostomy  GI/Abd: Soft, nondistended, nontender, tract where PEG ws in upper abdomen, mucous fistula with granulatin tissue covered in xeroform, ostomy appears healthy with stool output  Vascular: All 4 extremities warm.  --------------------------------------------------------------------------------------------    LABS  CBC ( 23:25)                              11.0<L>                         9.76    )----------------(  361        76.8  % Neutrophils, 13.1  % Lymphocytes, ANC: 7.50<H>                              35.7      BMP ( @ 23:25)             142     |  102     |  14    		Ca++ --      Ca 9.7                ---------------------------------( 109<H>		Mg --                 3.2<L>  |  27      |  0.40<L>			Ph --        LFTs ( @ 23:25)      TPro 7.2 / Alb 3.7 / TBili 0.4 / DBili -- / AST 24 / ALT 19 / AlkPhos 92    Coags ( @ 23:25)  aPTT 30.4 / INR 1.06 / PT 12.3        VBG ( 23:25)     7.46<H> / 43<H> / 58 / 31<H> / 6.1<H> / 89.0%     Lactate: 1.2    --------------------------------------------------------------------------------------------    MICROBIOLOGY  Urinalysis ( 23:19):     Color: Yellow / Appearance: Slightly Turbid<!> / S.025 / pH: 8.0 / Gluc: Negative / Ketones: Negative / Bili: Negative / Urobili: <2 mg/dL / Protein :100 mg/dL<!> / Nitrites: Negative / Leuk.Est: Negative / RBC: 0-1 / WBC: 0-1 / Sq Epi:  / Non Sq Epi:  / Bacteria          --------------------------------------------------------------------------------------------    IMAGING  < from: CT Abdomen and Pelvis w/ IV Cont (22 @ 01:59) >  IMPRESSION:    PEG tube removed/dislodged, no evidence for pneumoperitoneum.    No bowel obstruction.    Thick-walled fluid collection in the pelvic cul-de-sac measuring 3.1 x   3.5 x 3 cm.    Interval slight improvement in additional small loculated fluid   collections, including significant improvement in inflammatory changes in   the right lower quadrant and pelvis status post removal of surgical drain.    Ill-defined hyperdensity/enhancing nodularity along the right posterior   bladder lumen, cannot exclude a neoplastic process, suggest correlation   with cystoscopy on a nonemergent basis.    < end of copied text >      --------------------------------------------------------------------------------------------

## 2022-03-27 NOTE — H&P ADULT - HISTORY OF PRESENT ILLNESS
80 yo F with PMHx HTN, HLD, Hypothyroidism, Depression, seizure d/o, RLE DVT, Cholelithiasis s/p recent Hospitalization in [facility] from 12/22 -> 2/11/22 for acute perforated diverticulitis s/p Rosales 12/25, colostomy bag 12/26, hospitalization c/b IR drainage for abdominal abscess 1/3 Cx grew e.coli & bacteriodes, also required Mechanical Ventilation for Acute Respiratory Failure with Tracheostomy done 1/7.  PEG done 1/18, developed bleeding through ostomy for which she was transfused 1/28, EGD on 2/1 showed gastritis. Developed fevers on 1/30 and CT's showed Acute left MCA infarct- could be 2/2 to carotid occlusion vs afib, new RLL aspiration pneumonia, seen by ID, completed antibiotics, seen by [facility], not surgical candidate. [facility], after staff noted pt has blood to her abdomen.

## 2022-03-27 NOTE — PROVIDER CONTACT NOTE (OTHER) - ACTION/TREATMENT ORDERED:
Provider made aware and want BP to be checked with one hour manually: 8:20pm. Provider made aware and want BP to be checked with one hour manually: 8:20pm.  SHERI Comer MADE AWARE OF ORDERS.

## 2022-03-27 NOTE — H&P ADULT - NS ATTEND AMEND GEN_ALL_CORE FT
HPI: Hx obtained per chart patient aphasic. Patient 81 w/ history of HTN, HLD, hypothyroid, depression, seizures, L MCA CVA, perforated diverticulitis s/p Varela 12/25/21 s/p colostomy, washout 12/26/21 course complicated by afib w/ rvr not on AC due to GI bleed 2/1/22, trach 1/7/22, PEG 1/18/22, bedbound. Per neuro note 2/10/2022, neuro exam at that time (awake, not following commands, tracks, decreased blink to threat on right, right facial droop, moving left side > right). Patient presenting w/ PEG tube dislodgement from facility. Complaints limited from patient due to tracheostomy, but denies pain, shortness of breath. When asked if she cannot move her right arm due to stroke she nods her head yes. When asked if her left arm is stronger than her right she nods yes. Intermittently follows commands and nods head to questions.     PE: Laying in bed chronically ill appearing, regular rate, no murmurs, lungs clear to auscultation bilaterally, abdomen soft non-tender, tract where PEG was in upper abdomen w/ mucous and granulation tissue, ostomy C/D/I with stool output, All extremities warm. Neuro exam limited by participation pupils equal and reactive to light, right upper extremity 0/5, left upper extremity 3-/5. Not moving either lower extremity when asked.     MDM: High Complexity     #PEG Tube Dislodgement   - IR consulted for PEG replacement, maintain NPO    #Pelvic Fluid Collection seen on CT   - surgery recs appreciated, patient meeting 0/4 SIRS criteria, small pelvic fluid collection appreciated on CT scan. Patient non-toxic. per surg no intervention needed unless clinically worsens. Any drainage would be by the interventional radiology service.   - if patient becomes toxic, develops elevated WBC or becomes febrile will start IV zosyn and consult IR for drainage     #Hypokalemia--> K 3.2  - replete IV     Rest of Plan as Detailed Above

## 2022-03-27 NOTE — H&P ADULT - PROBLEM SELECTOR PLAN 4
Surgery c/s in chart- small fluid collection but nontoxic, if drainage needed would be done by IR.  Wound care eval Pt on 2l NC via Trache collar

## 2022-03-27 NOTE — H&P ADULT - PROBLEM SELECTOR PROBLEM 4
Diverticulitis of intestine with perforation and abscess without bleeding Chronic respiratory failure with hypoxia

## 2022-03-28 LAB
ALBUMIN SERPL ELPH-MCNC: 3.3 G/DL — SIGNIFICANT CHANGE UP (ref 3.3–5)
ALP SERPL-CCNC: 80 U/L — SIGNIFICANT CHANGE UP (ref 40–120)
ALT FLD-CCNC: 16 U/L — SIGNIFICANT CHANGE UP (ref 4–33)
ANION GAP SERPL CALC-SCNC: 12 MMOL/L — SIGNIFICANT CHANGE UP (ref 7–14)
ANION GAP SERPL CALC-SCNC: 13 MMOL/L — SIGNIFICANT CHANGE UP (ref 7–14)
ANION GAP SERPL CALC-SCNC: 14 MMOL/L — SIGNIFICANT CHANGE UP (ref 7–14)
APTT BLD: 28.9 SEC — SIGNIFICANT CHANGE UP (ref 27–36.3)
AST SERPL-CCNC: 19 U/L — SIGNIFICANT CHANGE UP (ref 4–32)
BILIRUB SERPL-MCNC: 0.4 MG/DL — SIGNIFICANT CHANGE UP (ref 0.2–1.2)
BUN SERPL-MCNC: 10 MG/DL — SIGNIFICANT CHANGE UP (ref 7–23)
BUN SERPL-MCNC: 8 MG/DL — SIGNIFICANT CHANGE UP (ref 7–23)
BUN SERPL-MCNC: 9 MG/DL — SIGNIFICANT CHANGE UP (ref 7–23)
CALCIUM SERPL-MCNC: 7.5 MG/DL — LOW (ref 8.4–10.5)
CALCIUM SERPL-MCNC: 7.6 MG/DL — LOW (ref 8.4–10.5)
CALCIUM SERPL-MCNC: 8.7 MG/DL — SIGNIFICANT CHANGE UP (ref 8.4–10.5)
CHLORIDE SERPL-SCNC: 104 MMOL/L — SIGNIFICANT CHANGE UP (ref 98–107)
CHLORIDE SERPL-SCNC: 109 MMOL/L — HIGH (ref 98–107)
CHLORIDE SERPL-SCNC: 110 MMOL/L — HIGH (ref 98–107)
CO2 SERPL-SCNC: 20 MMOL/L — LOW (ref 22–31)
CO2 SERPL-SCNC: 21 MMOL/L — LOW (ref 22–31)
CO2 SERPL-SCNC: 23 MMOL/L — SIGNIFICANT CHANGE UP (ref 22–31)
CREAT SERPL-MCNC: 0.32 MG/DL — LOW (ref 0.5–1.3)
CREAT SERPL-MCNC: 0.32 MG/DL — LOW (ref 0.5–1.3)
CREAT SERPL-MCNC: 0.37 MG/DL — LOW (ref 0.5–1.3)
CULTURE RESULTS: NO GROWTH — SIGNIFICANT CHANGE UP
EGFR: 103 ML/MIN/1.73M2 — SIGNIFICANT CHANGE UP
EGFR: 106 ML/MIN/1.73M2 — SIGNIFICANT CHANGE UP
EGFR: 106 ML/MIN/1.73M2 — SIGNIFICANT CHANGE UP
GLUCOSE SERPL-MCNC: 103 MG/DL — HIGH (ref 70–99)
GLUCOSE SERPL-MCNC: 95 MG/DL — SIGNIFICANT CHANGE UP (ref 70–99)
GLUCOSE SERPL-MCNC: 97 MG/DL — SIGNIFICANT CHANGE UP (ref 70–99)
HCT VFR BLD CALC: 36.1 % — SIGNIFICANT CHANGE UP (ref 34.5–45)
HGB BLD-MCNC: 10.8 G/DL — LOW (ref 11.5–15.5)
INR BLD: 1.13 RATIO — SIGNIFICANT CHANGE UP (ref 0.88–1.16)
MAGNESIUM SERPL-MCNC: 1.4 MG/DL — LOW (ref 1.6–2.6)
MAGNESIUM SERPL-MCNC: 2.2 MG/DL — SIGNIFICANT CHANGE UP (ref 1.6–2.6)
MAGNESIUM SERPL-MCNC: 2.9 MG/DL — HIGH (ref 1.6–2.6)
MCHC RBC-ENTMCNC: 26.9 PG — LOW (ref 27–34)
MCHC RBC-ENTMCNC: 29.9 GM/DL — LOW (ref 32–36)
MCV RBC AUTO: 90 FL — SIGNIFICANT CHANGE UP (ref 80–100)
NRBC # BLD: 0 /100 WBCS — SIGNIFICANT CHANGE UP
NRBC # FLD: 0 K/UL — SIGNIFICANT CHANGE UP
PHOSPHATE SERPL-MCNC: 2.3 MG/DL — LOW (ref 2.5–4.5)
PHOSPHATE SERPL-MCNC: 2.5 MG/DL — SIGNIFICANT CHANGE UP (ref 2.5–4.5)
PHOSPHATE SERPL-MCNC: 2.9 MG/DL — SIGNIFICANT CHANGE UP (ref 2.5–4.5)
PLATELET # BLD AUTO: 347 K/UL — SIGNIFICANT CHANGE UP (ref 150–400)
POTASSIUM SERPL-MCNC: 2.4 MMOL/L — CRITICAL LOW (ref 3.5–5.3)
POTASSIUM SERPL-MCNC: 2.9 MMOL/L — CRITICAL LOW (ref 3.5–5.3)
POTASSIUM SERPL-MCNC: 3.2 MMOL/L — LOW (ref 3.5–5.3)
POTASSIUM SERPL-SCNC: 2.4 MMOL/L — CRITICAL LOW (ref 3.5–5.3)
POTASSIUM SERPL-SCNC: 2.9 MMOL/L — CRITICAL LOW (ref 3.5–5.3)
POTASSIUM SERPL-SCNC: 3.2 MMOL/L — LOW (ref 3.5–5.3)
PROT SERPL-MCNC: 6.3 G/DL — SIGNIFICANT CHANGE UP (ref 6–8.3)
PROTHROM AB SERPL-ACNC: 13.1 SEC — SIGNIFICANT CHANGE UP (ref 10.5–13.4)
RBC # BLD: 4.01 M/UL — SIGNIFICANT CHANGE UP (ref 3.8–5.2)
RBC # FLD: 15.9 % — HIGH (ref 10.3–14.5)
SODIUM SERPL-SCNC: 140 MMOL/L — SIGNIFICANT CHANGE UP (ref 135–145)
SODIUM SERPL-SCNC: 142 MMOL/L — SIGNIFICANT CHANGE UP (ref 135–145)
SODIUM SERPL-SCNC: 144 MMOL/L — SIGNIFICANT CHANGE UP (ref 135–145)
SPECIMEN SOURCE: SIGNIFICANT CHANGE UP
WBC # BLD: 8.72 K/UL — SIGNIFICANT CHANGE UP (ref 3.8–10.5)
WBC # FLD AUTO: 8.72 K/UL — SIGNIFICANT CHANGE UP (ref 3.8–10.5)

## 2022-03-28 PROCEDURE — 99232 SBSQ HOSP IP/OBS MODERATE 35: CPT | Mod: GC

## 2022-03-28 RX ORDER — DEXTROSE MONOHYDRATE, SODIUM CHLORIDE, AND POTASSIUM CHLORIDE 50; .745; 4.5 G/1000ML; G/1000ML; G/1000ML
1000 INJECTION, SOLUTION INTRAVENOUS
Refills: 0 | Status: DISCONTINUED | OUTPATIENT
Start: 2022-03-28 | End: 2022-03-29

## 2022-03-28 RX ORDER — MAGNESIUM SULFATE 500 MG/ML
2 VIAL (ML) INJECTION EVERY 4 HOURS
Refills: 0 | Status: COMPLETED | OUTPATIENT
Start: 2022-03-28 | End: 2022-03-28

## 2022-03-28 RX ORDER — METOPROLOL TARTRATE 50 MG
5 TABLET ORAL EVERY 6 HOURS
Refills: 0 | Status: DISCONTINUED | OUTPATIENT
Start: 2022-03-28 | End: 2022-03-31

## 2022-03-28 RX ORDER — POTASSIUM CHLORIDE 20 MEQ
10 PACKET (EA) ORAL
Refills: 0 | Status: COMPLETED | OUTPATIENT
Start: 2022-03-28 | End: 2022-03-28

## 2022-03-28 RX ORDER — HYDRALAZINE HCL 50 MG
5 TABLET ORAL ONCE
Refills: 0 | Status: COMPLETED | OUTPATIENT
Start: 2022-03-28 | End: 2022-03-28

## 2022-03-28 RX ORDER — HYDRALAZINE HCL 50 MG
10 TABLET ORAL ONCE
Refills: 0 | Status: COMPLETED | OUTPATIENT
Start: 2022-03-28 | End: 2022-03-28

## 2022-03-28 RX ORDER — HYDRALAZINE HCL 50 MG
5 TABLET ORAL EVERY 6 HOURS
Refills: 0 | Status: DISCONTINUED | OUTPATIENT
Start: 2022-03-28 | End: 2022-03-31

## 2022-03-28 RX ADMIN — Medication 1 DROP(S): at 17:42

## 2022-03-28 RX ADMIN — Medication 5 MILLIGRAM(S): at 10:47

## 2022-03-28 RX ADMIN — Medication 100 MILLIEQUIVALENT(S): at 11:57

## 2022-03-28 RX ADMIN — Medication 100 MILLIEQUIVALENT(S): at 19:43

## 2022-03-28 RX ADMIN — Medication 1 DROP(S): at 05:46

## 2022-03-28 RX ADMIN — Medication 25 GRAM(S): at 15:05

## 2022-03-28 RX ADMIN — Medication 2.5 MILLIGRAM(S): at 12:14

## 2022-03-28 RX ADMIN — Medication 2.5 MILLIGRAM(S): at 05:37

## 2022-03-28 RX ADMIN — LEVETIRACETAM 400 MILLIGRAM(S): 250 TABLET, FILM COATED ORAL at 17:42

## 2022-03-28 RX ADMIN — DORZOLAMIDE HYDROCHLORIDE 1 DROP(S): 20 SOLUTION/ DROPS OPHTHALMIC at 19:43

## 2022-03-28 RX ADMIN — Medication 25 GRAM(S): at 10:30

## 2022-03-28 RX ADMIN — Medication 5 MILLIGRAM(S): at 17:50

## 2022-03-28 RX ADMIN — DORZOLAMIDE HYDROCHLORIDE 1 DROP(S): 20 SOLUTION/ DROPS OPHTHALMIC at 09:58

## 2022-03-28 RX ADMIN — LEVETIRACETAM 400 MILLIGRAM(S): 250 TABLET, FILM COATED ORAL at 05:37

## 2022-03-28 RX ADMIN — Medication 100 MILLIEQUIVALENT(S): at 17:42

## 2022-03-28 RX ADMIN — Medication 100 MILLIEQUIVALENT(S): at 09:57

## 2022-03-28 RX ADMIN — Medication 100 MILLIEQUIVALENT(S): at 21:03

## 2022-03-28 RX ADMIN — LATANOPROST 1 DROP(S): 0.05 SOLUTION/ DROPS OPHTHALMIC; TOPICAL at 21:04

## 2022-03-28 RX ADMIN — DEXTROSE MONOHYDRATE, SODIUM CHLORIDE, AND POTASSIUM CHLORIDE 75 MILLILITER(S): 50; .745; 4.5 INJECTION, SOLUTION INTRAVENOUS at 17:41

## 2022-03-28 RX ADMIN — Medication 10 MILLIGRAM(S): at 16:02

## 2022-03-28 RX ADMIN — Medication 100 MILLIEQUIVALENT(S): at 10:57

## 2022-03-28 NOTE — PROGRESS NOTE ADULT - SUBJECTIVE AND OBJECTIVE BOX
Surgery Progress Note     Subjective/24hour Events:   Patient seen and examined.   Attempted replacement of PEG with 14Fr Boucher this AM without success, tract too small.     Vital Signs:  Vital Signs Last 24 Hrs  T(C): 36.6 (28 Mar 2022 10:03), Max: 37 (27 Mar 2022 13:20)  T(F): 97.9 (28 Mar 2022 10:03), Max: 98.6 (27 Mar 2022 13:20)  HR: 67 (28 Mar 2022 10:03) (67 - 86)  BP: 148/69 (28 Mar 2022 12:00) (148/69 - 183/87)  BP(mean): --  RR: 18 (28 Mar 2022 10:03) (18 - 22)  SpO2: 99% (28 Mar 2022 10:03) (99% - 100%)    CAPILLARY BLOOD GLUCOSE      POCT Blood Glucose.: 151 mg/dL (27 Mar 2022 13:55)      I&O's Detail    27 Mar 2022 07:01  -  28 Mar 2022 07:00  --------------------------------------------------------  IN:    dextrose 5% + sodium chloride 0.9%: 100 mL    sodium chloride 0.9%: 300 mL  Total IN: 400 mL    OUT:  Total OUT: 0 mL    Total NET: 400 mL          MEDICATIONS  (STANDING):  dextrose 5% + sodium chloride 0.9%. 1000 milliLiter(s) (100 mL/Hr) IV Continuous <Continuous>  dorzolamide 2% Ophthalmic Solution 1 Drop(s) Both EYES <User Schedule>  latanoprost 0.005% Ophthalmic Solution 1 Drop(s) Both EYES at bedtime  levETIRAcetam  IVPB 500 milliGRAM(s) IV Intermittent every 12 hours  magnesium sulfate  IVPB 2 Gram(s) IV Intermittent every 4 hours  metoprolol tartrate Injectable 2.5 milliGRAM(s) IV Push every 6 hours  sodium chloride 0.9%. 1000 milliLiter(s) (75 mL/Hr) IV Continuous <Continuous>  timolol 0.5% Solution 1 Drop(s) Both EYES two times a day    MEDICATIONS  (PRN):      Physical Exam:  General: NAD, Lying in bed comfortably  Neuro: Nods that she knows the year and that she is in a hospital  Cardio: rate regular, bp stable  Resp: Good effort on tracheostomy  GI/Abd: Soft, nondistended, nontender, tract where PEG ws in upper abdomen, mucous fistula with granulatin tissue covered in xeroform, ostomy appears healthy with stool output  Vascular: All 4 extremities warm.    Labs:    03-28    144  |  109<H>  |  9   ----------------------------<  95  2.4<LL>   |  21<L>  |  0.32<L>    Ca    7.6<L>      28 Mar 2022 07:28  Phos  2.5     03-28  Mg     1.40     03-28    TPro  6.3  /  Alb  3.3  /  TBili  0.4  /  DBili  x   /  AST  19  /  ALT  16  /  AlkPhos  80  03-28    LIVER FUNCTIONS - ( 28 Mar 2022 07:28 )  Alb: 3.3 g/dL / Pro: 6.3 g/dL / ALK PHOS: 80 U/L / ALT: 16 U/L / AST: 19 U/L / GGT: x                                 10.8   8.72  )-----------( 347      ( 28 Mar 2022 07:28 )             36.1     PT/INR - ( 28 Mar 2022 07:28 )   PT: 13.1 sec;   INR: 1.13 ratio         PTT - ( 28 Mar 2022 07:28 )  PTT:28.9 sec

## 2022-03-28 NOTE — CONSULT NOTE ADULT - NS ATTEND AMEND GEN_ALL_CORE FT
Patient seen and examined.  Agree with above.   Recent admission noted - pt. ac was held due to risk of bleeding with GIB and the patient was maintained on dapt  BP elevated in the setting of PEG dislodgement and inability for oral medications - on IV metoprolol, hydralazine to be added  Follow up GI  Optimized from cv perspective for PEG placement    Danny Casiano MD

## 2022-03-28 NOTE — PROGRESS NOTE ADULT - ASSESSMENT
82 yo F w/ PMHx of perforated diverticulitis s/p Rosales procedure, colostomy, abdominal abscess with wound s/p IR drainage, Respiratory Failure s/p Trache, CVA w/ functional quadriplegia, aphasia, dysphagia s/p PEG (on Jevity 1.5 @ 70cchr x 18 hrs, & water flushes 325ml q 6 hrs, Afib, GIB, HTN, HLD, Seizure d/o, Hypothyroidism, Glaucoma, Depression p/w PEG tube dislodgement for unknown amount of time     Problem/Plan - 1:  ·  Problem: PEG tube malfunction.   ·  Plan:  IR planning  replacement of PEG tomorrow .     Problem/Plan - 2:  ·  Problem: Hypokalemia.   ·  Plan: supplement K, recheck BMP in AM.     Problem/Plan - 3:  ·  Problem: Stroke.   ·  Plan: Resume ASA, Plavix, statin once PEG tube replaced.     Problem/Plan - 4:  ·  Problem: Chronic respiratory failure with hypoxia.   ·  Plan: Pt on 2l NC via Trache collar.     Problem/Plan - 5:  ·  Problem: Diverticulitis of intestine with perforation and abscess without bleeding.   ·  Plan: Surgery c/s in chart- small fluid collection but nontoxic, if drainage needed would be done by IR.  Wound care eval.     Problem/Plan - 6:  ·  Problem: History of atrial fibrillation.   ·  Plan: check EKG, Pt off A/C due to recent GIB as per chart.     Problem/Plan - 7:  ·  Problem: Seizure disorder.   ·  Plan: continue levetiracetam bid.     Problem/Plan - 8:  ·  Problem: DVT, lower extremity.   ·  Plan: ?Subacute vs Chronic, not started on A/C as per chart due to recent GIB.     Problem/Plan - 9:  ·  Problem: HTN (hypertension).   ·  Plan: Resume carvedilol & amlodipine when PEG replaced.     Problem/Plan - 10:  ·  Problem: Hypothyroidism.   ·  Plan; Resume Levothyroxine when PEG replaced.     Problem/Plan - 11:  ·  Problem: Glaucoma.   ·  Plan: continue Timolol, Dorzolamide, Latanoprost.

## 2022-03-28 NOTE — CONSULT NOTE ADULT - SUBJECTIVE AND OBJECTIVE BOX
date of service 3/28/22    HISTORY OF PRESENT ILLNESS: HPI:  82 yo F with PMHx HTN, HLD, Hypothyroidism, Depression, seizure d/o, RLE DVT, Cholelithiasis s/p recent Hospitalization in [facility] from 12/22 -> 2/11/22 for acute perforated diverticulitis s/p Rosales 12/25, colostomy bag 12/26, hospitalization c/b IR drainage for abdominal abscess 1/3 Cx grew e.coli & bacteriodes, also required Mechanical Ventilation for Acute Respiratory Failure with Tracheostomy done 1/7.  PEG done 1/18, developed bleeding through ostomy for which she was transfused 1/28, EGD on 2/1 showed gastritis. Developed fevers on 1/30 and CT's showed Acute left MCA infarct- could be 2/2 to carotid occlusion vs afib, new RLL aspiration pneumonia, seen by ID, completed antibiotics, seen by VascSx, not surgical candidate. Pt aphasic sent from [facility] for dislodged Peg tube, after staff noted pt has blood to her abdomen.  (27 Mar 2022 12:33)    Cardiology consulted for assistance with elevated BP given dislodged PEG/NPO.  Started on IV Lopressor.  PT aphasic, unable to obtain ROS, hx obtained from chart.    PAST MEDICAL & SURGICAL HISTORY:  Seizure    HTN (hypertension)    Glaucoma    Blood clot of neck vein  left side    TIA (transient ischemic attack)  20 years ago    Hypothyroidism    Stroke  Large MCA infarct 1/30/22- residual Rt sided weakness, aphasia, dysphagia s/p PEG    Chronic respiratory failure with hypoxia and hypercapnia  s/p Trache on 2L NC O2    Atrial fibrillation    MDD (major depressive disorder)    Gastritis    S/P appendectomy    Status post Rosales procedure  12/25/22    S/P colostomy  perforated diverticulitis    S/P percutaneous endoscopic gastrostomy (PEG) tube placement  1/18/22 in [place]    Status post tracheostomy    MEDICATIONS  (STANDING):  dextrose 5% + sodium chloride 0.9%. 1000 milliLiter(s) (100 mL/Hr) IV Continuous <Continuous>  dorzolamide 2% Ophthalmic Solution 1 Drop(s) Both EYES <User Schedule>  latanoprost 0.005% Ophthalmic Solution 1 Drop(s) Both EYES at bedtime  levETIRAcetam  IVPB 500 milliGRAM(s) IV Intermittent every 12 hours  magnesium sulfate  IVPB 2 Gram(s) IV Intermittent every 4 hours  metoprolol tartrate Injectable 2.5 milliGRAM(s) IV Push every 6 hours  sodium chloride 0.9%. 1000 milliLiter(s) (75 mL/Hr) IV Continuous <Continuous>  timolol 0.5% Solution 1 Drop(s) Both EYES two times a day    Allergies  No Known Allergies      FAMILY HISTORY:  FH: HTN (hypertension)  Noncontributory for premature coronary disease or sudden cardiac death    SOCIAL HISTORY:    [x ] Non-smoker  [ ] Smoker  [ ] Alcohol    FLU VACCINE THIS YEAR STARTS IN AUGUST:  [ ] Yes    [ ] No    IF OVER 65 HAVE YOU EVER HAD A PNA VACCINE:  [ ] Yes    [ ] No       [ ] N/A      REVIEW OF SYSTEMS:  [ ]chest pain  [  ]shortness of breath  [  ]palpitations  [  ]syncope  [ ]near syncope [ ]upper extremity weakness   [ ] lower extremity weakness  [  ]diplopia  [  ]altered mental status   [  ]fevers  [ ]chills [ ]nausea  [ ]vomiting  [  ]dysphagia    [ ]abdominal pain  [ ]melena  [ ]BRBPR    [  ]epistaxis  [  ]rash    [ ]lower extremity edema        [ ] All others negative	  [x ] Unable to obtain      LABS:	 	                        10.8   8.72  )-----------( 347      ( 28 Mar 2022 07:28 )             36.1     144  |  109<H>  |  9   ----------------------------<  95  2.4<LL>   |  21<L>  |  0.32<L>    Ca    7.6<L>      28 Mar 2022 07:28  Phos  2.5     03-28  Mg     1.40     03-28    TPro  6.3  /  Alb  3.3  /  TBili  0.4  /  DBili  x   /  AST  19  /  ALT  16  /  AlkPhos  80  03-28  Creatinine Trend: 0.32<--, 0.40<--  Coags:  PT/INR - ( 28 Mar 2022 07:28 )   PT: 13.1 sec;   INR: 1.13 ratio      PTT - ( 28 Mar 2022 07:28 )  PTT:28.9 sec      PHYSICAL EXAM:  T(C): 36.6 (03-28-22 @ 10:03), Max: 37 (03-27-22 @ 13:20)  HR: 67 (03-28-22 @ 10:03) (67 - 86)  BP: 183/87 (03-28-22 @ 10:03) (163/69 - 183/87)  RR: 18 (03-28-22 @ 10:03) (18 - 22)  SpO2: 99% (03-28-22 @ 10:03) (99% - 100%)  Wt(kg): --   BMI (kg/m2): 27.1 (03-27-22 @ 12:33)    Gen: Appears comfortable   HEENT:  no JVD   CV: N S1 S2 1/6 DENIA (+)2 Pulses B/l  Resp:  Clear to ausculatation B/L, normal effort  GI: (+) BS Soft, NT, ND  Lymph:  (-)Edema, (-)obvious lymphadenopathy  Skin: Warm to touch, Normal turgor  Psych: unable to assess    ECG:  pending	    < from: CT Abdomen and Pelvis w/ IV Cont (03.27.22 @ 01:59) >  IMPRESSION:    PEG tube removed/dislodged, no evidence for pneumoperitoneum.    No bowel obstruction.    Thick-walled fluid collection in the pelvic cul-de-sac measuring 3.1 x   3.5 x 3 cm.    Interval slight improvement in additional small loculated fluid   collections, including significant improvement in inflammatory changes in   the right lower quadrant and pelvis status post removal of surgical drain.    Ill-defined hyperdensity/enhancing nodularity along the right posterior   bladder lumen, cannot exclude a neoplastic process, suggest correlation   with cystoscopy on a nonemergent basis.    < end of copied text >      ASSESSMENT/PLAN: 	82 yo F with PMHx AFIB, HTN, HLD, Hypothyroidism, Depression, seizure d/o, RLE DVT, Cholelithiasis s/p recent Hospitalization in [facility] from 12/22 -> 2/11/22 for acute perforated diverticulitis s/p Rosales 12/25, colostomy 12/26, c/b IR drainage for abdominal abscess 1/3, s/p Acute Respiratory Failure with Tracheostomy done 1/7,  PEG done 1/18, developed bleeding through ostomy for which she was transfused 1/28, EGD on 2/1 showed gastritis,  Acute left MCA infarct- could be 2/2 to carotid occlusion vs afib, US with LICA 100% occluded and MILLER 50-69% occluded, seen by VascSx, not surgical candidate,  s/p RLL aspiration pneumonia, now sent from [facility] for dislodged Peg tube    --Cardiology consulted for elevated BP while Peg dislodged  --can add IV hydralazine PRN  --cont IV Lopressor  --check 12 lead EKG  --prior chart reviewed, pt with recnt CVA and found with AFib and Carotid occlusion.  AC held initially while monitoring for hemorrhagic conversion and then in the setting of GI bleed req PRBCs.  She was continued on DAPT.

## 2022-03-28 NOTE — PROGRESS NOTE ADULT - ASSESSMENT
Patient is a 81F HTN, HLD, Depressin, Seizures, perforated diverticulitis s/p Varela 12/25/21, washout 12/26/21 course complicated by afib w/ rvr, tracheostoym 1/7/22, PEG 1/18/22, large mca infarct on 1/30 (weakness on right side, also has lle weakness), GI bleed s/p EGD 2/1/22 presenting from [facility] with a dislodged PEG tube.    PLAN:    - PEG unable to be replaced at bedside.   - IR evaluation for PEG replacement, if unable to place would recommend GI evaluation.   - Please do not hesitate to call back for any additional Surgical questions or concerns.     B Team Surgery Pager #[phone]

## 2022-03-28 NOTE — CONSULT NOTE ADULT - ASSESSMENT
82 yo F with PMHx HTN, HLD, Hypothyroidism, Depression, seizure d/o, RLE DVT, Cholelithiasis s/p recent Hospitalization in [facility] from 12/22 -> 2/11/22 for acute perforated diverticulitis s/p Rosales 12/25, colostomy bag 12/26, hospitalization c/b IR drainage for abdominal abscess 1/3 Cx grew e.coli & bacteriodes, also required Mechanical Ventilation for Acute Respiratory Failure with Tracheostomy done 1/7.  PEG done 1/18, developed bleeding through ostomy, EGD on 2/1 showed gastritis. T's showed Acute left MCA infarct- could be 2/2 to carotid occlusion vs afib, new RLL aspiration pneumonia, seen by ID, completed antibiotics, seen by VascSx, not surgical candidate. Pt aphasic sent from [facility] for dislodged Peg tube, after staff noted pt has blood to her abdomen. Nephrology consulted for Hypokalemia.     A/P    Hypokalemia    80 yo F with PMHx HTN, HLD, Hypothyroidism, Depression, seizure d/o, RLE DVT, Cholelithiasis s/p recent Hospitalization in [facility] from 12/22 -> 2/11/22 for acute perforated diverticulitis s/p Rosales 12/25, colostomy bag 12/26, hospitalization c/b IR drainage for abdominal abscess 1/3 Cx grew e.coli & bacteriodes, also required Mechanical Ventilation for Acute Respiratory Failure with Tracheostomy done 1/7.  PEG done 1/18, developed bleeding through ostomy, EGD on 2/1 showed gastritis. T's showed Acute left MCA infarct- could be 2/2 to carotid occlusion vs afib, new RLL aspiration pneumonia, seen by ID, completed antibiotics, seen by VascSx, not surgical candidate. Pt aphasic sent from [facility] for dislodged Peg tube, after staff noted pt has blood to her abdomen. Nephrology consulted for Hypokalemia.     A/P    Hypokalemia   likely 2/2 GI loss   monitor input and output closely  80 yo F with PMHx HTN, HLD, Hypothyroidism, Depression, seizure d/o, RLE DVT, Cholelithiasis s/p recent Hospitalization in [facility] from 12/22 -> 2/11/22 for acute perforated diverticulitis s/p Rosales 12/25, colostomy bag 12/26, hospitalization c/b IR drainage for abdominal abscess 1/3 Cx grew e.coli & bacteriodes, also required Mechanical Ventilation for Acute Respiratory Failure with Tracheostomy done 1/7.  PEG done 1/18, developed bleeding through ostomy, EGD on 2/1 showed gastritis. T's showed Acute left MCA infarct- could be 2/2 to carotid occlusion vs afib, new RLL aspiration pneumonia, seen by ID, completed antibiotics, seen by VascSx, not surgical candidate. Pt aphasic sent from [facility] for dislodged Peg tube, after staff noted pt has blood to her abdomen. Nephrology consulted for Hypokalemia.     A/P    Hypokalemia   likely 2/2 GI loss   monitor input and output closely   s/p 10meq [id number]   repeat K is pending, if it stays low add kcl 10meq in IVF   monitor K closely     Hypomagnesia   s/p Mg 2g ivp x 2doses   replete as needed  monitor Mg     Hypocalcemia   get Vit D, PTH     HTN   elevated    suggests to increase to metoprolol 5mg IV  monitor BP closely    80 yo F with PMHx HTN, HLD, Hypothyroidism, Depression, seizure d/o, RLE DVT, Cholelithiasis s/p recent Hospitalization in [facility] from 12/22 -> 2/11/22 for acute perforated diverticulitis s/p Rosales 12/25, colostomy bag 12/26, hospitalization c/b IR drainage for abdominal abscess 1/3 Cx grew e.coli & bacteriodes, also required Mechanical Ventilation for Acute Respiratory Failure with Tracheostomy done 1/7.  PEG done 1/18, developed bleeding through ostomy, EGD on 2/1 showed gastritis. T's showed Acute left MCA infarct- could be 2/2 to carotid occlusion vs afib, new RLL aspiration pneumonia, seen by ID, completed antibiotics, seen by VascSx, not surgical candidate. Pt aphasic sent from [facility] for dislodged Peg tube, after staff noted pt has blood to her abdomen. Nephrology consulted for Hypokalemia.     A/P    Hypokalemia   likely 2/2 GI loss   monitor input and output closely   s/p 10meq [id number]   repeat K is pending, if it stays low add kcl 20meq in IVF   monitor K closely     Hypomagnesia   s/p Mg 2g ivp x 2doses   replete as needed  monitor Mg     Hypocalcemia   get Vit D, PTH     HTN   elevated    suggests to increase to metoprolol 5mg IV  monitor BP closely

## 2022-03-28 NOTE — CHART NOTE - NSCHARTNOTEFT_GEN_A_CORE
PRE-INTERVENTIONAL RADIOLOGY PROCEDURE NOTE    Patient Age: 81y  Patient Gender: Female    Procedure: PEG placement    Diagnosis / Indication: PEG dislodged    Interventional Radiology Attending Physician: Dr. Suzi Dobbins  Ordering Attending Physician: Brennen Bruner    Pertinent medical history: PEG/Trach    Pertinent labs:                       10.8   8.72  )-----------( 347      ( 28 Mar 2022 07:28 )             36.1       03-28    144  |  109<H>  |  9   ----------------------------<  95  2.4<LL>   |  21<L>  |  0.32<L>    Ca    7.6<L>      28 Mar 2022 07:28  Phos  2.5     03-28  Mg     1.40     03-28    TPro  6.3  /  Alb  3.3  /  TBili  0.4  /  DBili  x   /  AST  19  /  ALT  16  /  AlkPhos  80  03-28      PT/INR - ( 28 Mar 2022 07:28 )   PT: 13.1 sec;   INR: 1.13 ratio         PTT - ( 28 Mar 2022 07:28 )  PTT:28.9 sec    Patient and Family aware? Yes      Tirso Calix PA-C   Contact #: pgr [phone]

## 2022-03-28 NOTE — CONSULT NOTE ADULT - SUBJECTIVE AND OBJECTIVE BOX
[facility]   MD ELOISE ALVARADO MD, PA INJGALINDO BUNNY NP    TEL:  OFFICE: [phone]    From 5pm-7am answering service [phone]    --- INITIAL RENAL CONSULT NOTE ---date of service 22 @ 15:13    HPI:  82 yo F with PMHx HTN, HLD, Hypothyroidism, Depression, seizure d/o, RLE DVT, Cholelithiasis s/p recent Hospitalization in [facility] from  -> 22 for acute perforated diverticulitis s/p Rosales , colostomy bag , hospitalization c/b IR drainage for abdominal abscess 1/3 Cx grew e.coli & bacteriodes, also required Mechanical Ventilation for Acute Respiratory Failure with Tracheostomy done .  PEG done , developed bleeding through ostomy for which she was transfused , EGD on  showed gastritis. Developed fevers on  and CT's showed Acute left MCA infarct- could be 2/2 to carotid occlusion vs afib, new RLL aspiration pneumonia, seen by ID, completed antibiotics, seen by VascSx, not surgical candidate. Pt aphasic sent from [facility] for dislodged Peg tube, after staff noted pt has blood to her abdomen. Nephrology consulted for Hypokalemia.         Allergies:  No Known Allergies      PAST MEDICAL & SURGICAL HISTORY:  Seizure    HTN (hypertension)    Glaucoma    Blood clot of neck vein  left side    TIA (transient ischemic attack)  20 years ago    Hypothyroidism    Stroke  Large MCA infarct 22- residual Rt sided weakness, aphasia, dysphagia s/p PEG    Chronic respiratory failure with hypoxia and hypercapnia  s/p Trache on 2L NC O2    Atrial fibrillation    MDD (major depressive disorder)    Gastritis    S/P appendectomy    Status post Rosales procedure  22    S/P colostomy  perforated diverticulitis    S/P percutaneous endoscopic gastrostomy (PEG) tube placement  22 in [place]    Status post tracheostomy        Home Medications Reviewed    Hospital Medications:   MEDICATIONS  (STANDING):  dextrose 5% + sodium chloride 0.9%. 1000 milliLiter(s) (100 mL/Hr) IV Continuous <Continuous>  dorzolamide 2% Ophthalmic Solution 1 Drop(s) Both EYES <User Schedule>  latanoprost 0.005% Ophthalmic Solution 1 Drop(s) Both EYES at bedtime  levETIRAcetam  IVPB 500 milliGRAM(s) IV Intermittent every 12 hours  metoprolol tartrate Injectable 2.5 milliGRAM(s) IV Push every 6 hours  sodium chloride 0.9%. 1000 milliLiter(s) (75 mL/Hr) IV Continuous <Continuous>  timolol 0.5% Solution 1 Drop(s) Both EYES two times a day      SOCIAL HISTORY:  Denies ETOh, Smoking,     FAMILY HISTORY:  FH: HTN (hypertension)        REVIEW OF SYSTEMS:  CONSTITUTIONAL: No weakness, fevers or chills  EYES/ENT: No visual changes;  No vertigo or throat pain   NECK: No pain or stiffness  RESPIRATORY: No cough, wheezing, hemoptysis; No shortness of breath  CARDIOVASCULAR: No chest pain or palpitations.  GASTROINTESTINAL: No abdominal or epigastric pain. No nausea, vomiting, or hematemesis; No diarrhea or constipation. No melena or hematochezia.  GENITOURINARY: No dysuria, frequency, foamy urine, urinary urgency, incontinence or hematuria  NEUROLOGICAL: No numbness or weakness  SKIN: No itching, burning, rashes, or lesions   VASCULAR: No bilateral lower extremity edema.   All other review of systems is negative unless indicated above.    VITALS:  T(F): 98.3 (22 @ 14:48), Max: 98.3 (22 @ 18:55)  HR: 75 (22 @ 14:48)  BP: 183/83 (22 @ 14:48)  RR: 17 (22 @ 14:48)  SpO2: 99% (22 @ 14:48)  Wt(kg): --     @ 07:01  -   @ 07:00  --------------------------------------------------------  IN: 400 mL / OUT: 0 mL / NET: 400 mL          PHYSICAL EXAM:  Constitutional: NAD  HEENT: anicteric sclera, oropharynx clear, MMM  Neck: No JVD  Respiratory: CTAB, no wheezes, rales or rhonchi  Cardiovascular: S1, S2, RRR  Gastrointestinal: BS+, soft, NT/ND  Extremities: No cyanosis or clubbing. No peripheral edema  Neurological: A/O x 3, no focal deficits  Psychiatric: Normal mood, normal affect  : No CVA tenderness. No mc.   Skin: No rashes  Vascular Access:    LABS:      144  |  109<H>  |  9   ----------------------------<  95  2.4<LL>   |  21<L>  |  0.32<L>    Ca    7.6<L>      28 Mar 2022 07:28  Phos  2.5       Mg     1.40         TPro  6.3  /  Alb  3.3  /  TBili  0.4  /  DBili      /  AST  19  /  ALT  16  /  AlkPhos  80      Creatinine Trend: 0.32 <--, 0.40 <--                        10.8   8.72  )-----------( 347      ( 28 Mar 2022 07:28 )             36.1     Urine Studies:  Urinalysis Basic - ( 26 Mar 2022 23:19 )    Color: Yellow / Appearance: Slightly Turbid / S.025 / pH:   Gluc:  / Ketone: Negative  / Bili: Negative / Urobili: <2 mg/dL   Blood:  / Protein: 100 mg/dL / Nitrite: Negative   Leuk Esterase: Negative / RBC: 0-1 /HPF / WBC 0-1 /HPF   Sq Epi:  / Non Sq Epi:  / Bacteria:           RADIOLOGY & ADDITIONAL STUDIES:                 [facility]   MD ELOISE ALVARADO MD, PA INJGALINDO BUNNY NP    TEL:  OFFICE: [phone]    From 5pm-7am answering service [phone]    --- INITIAL RENAL CONSULT NOTE ---date of service 22 @ 15:13    HPI:  80 yo F with PMHx HTN, HLD, Hypothyroidism, Depression, seizure d/o, RLE DVT, Cholelithiasis s/p recent Hospitalization in [facility] from  -> 22 for acute perforated diverticulitis s/p Rosales , colostomy bag , hospitalization c/b IR drainage for abdominal abscess 1/3 Cx grew e.coli & bacteriodes, also required Mechanical Ventilation for Acute Respiratory Failure with Tracheostomy done .  PEG done , developed bleeding through ostomy for which she was transfused , EGD on  showed gastritis. Developed fevers on  and CT's showed Acute left MCA infarct- could be 2/2 to carotid occlusion vs afib, new RLL aspiration pneumonia, seen by ID, completed antibiotics, seen by VascSx, not surgical candidate. Pt aphasic sent from [facility] for dislodged Peg tube, after staff noted pt has blood to her abdomen. Nephrology consulted for Hypokalemia.         Allergies:  No Known Allergies      PAST MEDICAL & SURGICAL HISTORY:  Seizure    HTN (hypertension)    Glaucoma    Blood clot of neck vein  left side    TIA (transient ischemic attack)  20 years ago    Hypothyroidism    Stroke  Large MCA infarct 22- residual Rt sided weakness, aphasia, dysphagia s/p PEG    Chronic respiratory failure with hypoxia and hypercapnia  s/p Trache on 2L NC O2    Atrial fibrillation    MDD (major depressive disorder)    Gastritis    S/P appendectomy    Status post Rosales procedure  22    S/P colostomy  perforated diverticulitis    S/P percutaneous endoscopic gastrostomy (PEG) tube placement  22 in [place]    Status post tracheostomy        Home Medications Reviewed    Hospital Medications:   MEDICATIONS  (STANDING):  dextrose 5% + sodium chloride 0.9%. 1000 milliLiter(s) (100 mL/Hr) IV Continuous <Continuous>  dorzolamide 2% Ophthalmic Solution 1 Drop(s) Both EYES <User Schedule>  latanoprost 0.005% Ophthalmic Solution 1 Drop(s) Both EYES at bedtime  levETIRAcetam  IVPB 500 milliGRAM(s) IV Intermittent every 12 hours  metoprolol tartrate Injectable 2.5 milliGRAM(s) IV Push every 6 hours  sodium chloride 0.9%. 1000 milliLiter(s) (75 mL/Hr) IV Continuous <Continuous>  timolol 0.5% Solution 1 Drop(s) Both EYES two times a day      SOCIAL HISTORY:  Denies ETOh, Smoking,     FAMILY HISTORY:  FH: HTN (hypertension)        REVIEW OF SYSTEMS   on tracheostomy, unable to obtain     VITALS:  T(F): 98.3 (22 @ 14:48), Max: 98.3 (22 @ 18:55)  HR: 75 (22 @ 14:48)  BP: 183/83 (22 @ 14:48)  RR: 17 (22 @ 14:48)  SpO2: 99% (22 @ 14:48)  Wt(kg): --     @ 07:01  -   @ 07:00  --------------------------------------------------------  IN: 400 mL / OUT: 0 mL / NET: 400 mL          PHYSICAL EXAM:  Constitutional: NAD  HEENT: anicteric sclera, oropharynx clear, MMM  Neck: No JVD  Respiratory: CTAB, no wheezes, rales or rhonchi  Cardiovascular: S1, S2, RRR  Gastrointestinal: BS+, soft, NT/ND, colostomy bag   Extremities: No cyanosis or clubbing. No peripheral edema  Neurological: unable to assess   Psychiatric: unable to assess   : No CVA tenderness. No mc.   Skin: No rashes      LABS:      144  |  109<H>  |  9   ----------------------------<  95  2.4<LL>   |  21<L>  |  0.32<L>    Ca    7.6<L>      28 Mar 2022 07:  Phos  2.5       Mg     1.40         TPro  6.3  /  Alb  3.3  /  TBili  0.4  /  DBili      /  AST  19  /  ALT  16  /  AlkPhos  80      Creatinine Trend: 0.32 <--, 0.40 <--                        10.8   8.72  )-----------( 347      ( 28 Mar 2022 07:28 )             36.1     Urine Studies:  Urinalysis Basic - ( 26 Mar 2022 23:19 )    Color: Yellow / Appearance: Slightly Turbid / S.025 / pH:   Gluc:  / Ketone: Negative  / Bili: Negative / Urobili: <2 mg/dL   Blood:  / Protein: 100 mg/dL / Nitrite: Negative   Leuk Esterase: Negative / RBC: 0-1 /HPF / WBC 0-1 /HPF   Sq Epi:  / Non Sq Epi:  / Bacteria:           RADIOLOGY & ADDITIONAL STUDIES:                 [facility] [place] [facility]   MD ELOISE ALVARADO MD, PA INJGALINDO BUNNY NP    TEL:  OFFICE: [phone]    From 5pm-7am answering service [phone]    --- INITIAL RENAL CONSULT NOTE ---date of service 22 @ 15:13    HPI:  80 yo F with PMHx HTN, HLD, Hypothyroidism, Depression, seizure d/o, RLE DVT, Cholelithiasis s/p recent Hospitalization in [facility] from  -> 22 for acute perforated diverticulitis s/p Rosales , colostomy bag , hospitalization c/b IR drainage for abdominal abscess 1/3 Cx grew e.coli & bacteriodes, also required Mechanical Ventilation for Acute Respiratory Failure with Tracheostomy done .  PEG done , developed bleeding through ostomy for which she was transfused , EGD on  showed gastritis. Developed fevers on  and CT's showed Acute left MCA infarct- could be 2/2 to carotid occlusion vs afib, new RLL aspiration pneumonia, seen by ID, completed antibiotics, seen by VascSx, not surgical candidate. Pt aphasic sent from [facility] for dislodged Peg tube, after staff noted pt has blood to her abdomen. Nephrology consulted for Hypokalemia.         Allergies:  No Known Allergies      PAST MEDICAL & SURGICAL HISTORY:  Seizure    HTN (hypertension)    Glaucoma    Blood clot of neck vein  left side    TIA (transient ischemic attack)  20 years ago    Hypothyroidism    Stroke  Large MCA infarct 22- residual Rt sided weakness, aphasia, dysphagia s/p PEG    Chronic respiratory failure with hypoxia and hypercapnia  s/p Trache on 2L NC O2    Atrial fibrillation    MDD (major depressive disorder)    Gastritis    S/P appendectomy    Status post Rosales procedure  22    S/P colostomy  perforated diverticulitis    S/P percutaneous endoscopic gastrostomy (PEG) tube placement  22 in [place]    Status post tracheostomy        Home Medications Reviewed    Hospital Medications:   MEDICATIONS  (STANDING):  dextrose 5% + sodium chloride 0.9%. 1000 milliLiter(s) (100 mL/Hr) IV Continuous <Continuous>  dorzolamide 2% Ophthalmic Solution 1 Drop(s) Both EYES <User Schedule>  latanoprost 0.005% Ophthalmic Solution 1 Drop(s) Both EYES at bedtime  levETIRAcetam  IVPB 500 milliGRAM(s) IV Intermittent every 12 hours  metoprolol tartrate Injectable 2.5 milliGRAM(s) IV Push every 6 hours  sodium chloride 0.9%. 1000 milliLiter(s) (75 mL/Hr) IV Continuous <Continuous>  timolol 0.5% Solution 1 Drop(s) Both EYES two times a day      SOCIAL HISTORY:  Denies ETOh, Smoking,     FAMILY HISTORY:  FH: HTN (hypertension)        REVIEW OF SYSTEMS   on tracheostomy, unable to obtain     VITALS:  T(F): 98.3 (22 @ 14:48), Max: 98.3 (22 @ 18:55)  HR: 75 (22 @ 14:48)  BP: 183/83 (22 @ 14:48)  RR: 17 (22 @ 14:48)  SpO2: 99% (22 @ 14:48)  Wt(kg): --     @ 07:01  -   @ 07:00  --------------------------------------------------------  IN: 400 mL / OUT: 0 mL / NET: 400 mL          PHYSICAL EXAM:  Constitutional: NAD  HEENT: anicteric sclera, oropharynx clear, MMM  Neck: No JVD, tracheostomy   Respiratory: CTAB, no wheezes, rales or rhonchi  Cardiovascular: S1, S2, RRR  Gastrointestinal: BS+, soft, NT/ND, colostomy bag   Extremities: No cyanosis or clubbing. No peripheral edema  Neurological: unable to assess   Psychiatric: unable to assess   : No CVA tenderness. No mc.   Skin: No rashes      LABS:      144  |  109<H>  |  9   ----------------------------<  95  2.4<LL>   |  21<L>  |  0.32<L>    Ca    7.6<L>      28 Mar 2022 07:28  Phos  2.5       Mg     1.40         TPro  6.3  /  Alb  3.3  /  TBili  0.4  /  DBili      /  AST  19  /  ALT  16  /  AlkPhos  80      Creatinine Trend: 0.32 <--, 0.40 <--                        10.8   8.72  )-----------( 347      ( 28 Mar 2022 07:28 )             36.1     Urine Studies:  Urinalysis Basic - ( 26 Mar 2022 23:19 )    Color: Yellow / Appearance: Slightly Turbid / S.025 / pH:   Gluc:  / Ketone: Negative  / Bili: Negative / Urobili: <2 mg/dL   Blood:  / Protein: 100 mg/dL / Nitrite: Negative   Leuk Esterase: Negative / RBC: 0-1 /HPF / WBC 0-1 /HPF   Sq Epi:  / Non Sq Epi:  / Bacteria:           RADIOLOGY & ADDITIONAL STUDIES:

## 2022-03-28 NOTE — PROGRESS NOTE ADULT - SUBJECTIVE AND OBJECTIVE BOX
Date of Service  : 22 @ 11:33    INTERVAL HPI/OVERNIGHT EVENTS:  Vital Signs Last 24 Hrs  T(C): 36.6 (28 Mar 2022 10:03), Max: 37 (27 Mar 2022 13:20)  T(F): 97.9 (28 Mar 2022 10:03), Max: 98.6 (27 Mar 2022 13:20)  HR: 67 (28 Mar 2022 10:03) (67 - 86)  BP: 183/87 (28 Mar 2022 10:03) (163/69 - 183/87)  BP(mean): --  RR: 18 (28 Mar 2022 10:03) (18 - 22)  SpO2: 99% (28 Mar 2022 10:03) (99% - 100%)  I&O's Summary    27 Mar 2022 07:01  -  28 Mar 2022 07:00  --------------------------------------------------------  IN: 400 mL / OUT: 0 mL / NET: 400 mL      MEDICATIONS  (STANDING):  dextrose 5% + sodium chloride 0.9%. 1000 milliLiter(s) (100 mL/Hr) IV Continuous <Continuous>  dorzolamide 2% Ophthalmic Solution 1 Drop(s) Both EYES <User Schedule>  latanoprost 0.005% Ophthalmic Solution 1 Drop(s) Both EYES at bedtime  levETIRAcetam  IVPB 500 milliGRAM(s) IV Intermittent every 12 hours  magnesium sulfate  IVPB 2 Gram(s) IV Intermittent every 4 hours  metoprolol tartrate Injectable 2.5 milliGRAM(s) IV Push every 6 hours  sodium chloride 0.9%. 1000 milliLiter(s) (75 mL/Hr) IV Continuous <Continuous>  timolol 0.5% Solution 1 Drop(s) Both EYES two times a day    MEDICATIONS  (PRN):    LABS:                        10.8   8.72  )-----------( 347      ( 28 Mar 2022 07:28 )             36.1     -    144  |  109<H>  |  9   ----------------------------<  95  2.4<LL>   |  21<L>  |  0.32<L>    Ca    7.6<L>      28 Mar 2022 07:28  Phos  2.5       Mg     1.40         TPro  6.3  /  Alb  3.3  /  TBili  0.4  /  DBili  x   /  AST  19  /  ALT  16  /  AlkPhos  80      PT/INR - ( 28 Mar 2022 07:28 )   PT: 13.1 sec;   INR: 1.13 ratio         PTT - ( 28 Mar 2022 07:28 )  PTT:28.9 sec  Urinalysis Basic - ( 26 Mar 2022 23:19 )    Color: Yellow / Appearance: Slightly Turbid / S.025 / pH: x  Gluc: x / Ketone: Negative  / Bili: Negative / Urobili: <2 mg/dL   Blood: x / Protein: 100 mg/dL / Nitrite: Negative   Leuk Esterase: Negative / RBC: 0-1 /HPF / WBC 0-1 /HPF   Sq Epi: x / Non Sq Epi: x / Bacteria: x      CAPILLARY BLOOD GLUCOSE      POCT Blood Glucose.: 151 mg/dL (27 Mar 2022 13:55)        Urinalysis Basic - ( 26 Mar 2022 23:19 )    Color: Yellow / Appearance: Slightly Turbid / S.025 / pH: x  Gluc: x / Ketone: Negative  / Bili: Negative / Urobili: <2 mg/dL   Blood: x / Protein: 100 mg/dL / Nitrite: Negative   Leuk Esterase: Negative / RBC: 0-1 /HPF / WBC 0-1 /HPF   Sq Epi: x / Non Sq Epi: x / Bacteria: x      REVIEW OF SYSTEMS:  CONSTITUTIONAL: No fever, weight loss, or fatigue  EYES: No eye pain, visual disturbances, or discharge  ENMT:  No difficulty hearing, tinnitus, vertigo; No sinus or throat pain  NECK: No pain or stiffness  RESPIRATORY: No cough, wheezing, chills or hemoptysis; No shortness of breath  CARDIOVASCULAR: No chest pain, palpitations, dizziness, or leg swelling  GASTROINTESTINAL: No abdominal or epigastric pain. No nausea, vomiting, or hematemesis; No diarrhea or constipation. No melena or hematochezia.  GENITOURINARY: No dysuria, frequency, hematuria, or incontinence  NEUROLOGICAL: No headaches, memory loss, loss of strength, numbness, or tremors  SKIN: No itching, burning, rashes, or lesions   ENDOCRINE: No heat or cold intolerance; No hair loss  MUSCULOSKELETAL: No joint pain or swelling; No muscle, back, or extremity pain  PSYCHIATRIC: No depression, anxiety, mood swings, or difficulty sleeping  HEME/LYMPH: No easy bruising, or bleeding gums  ALLERY AND IMMUNOLOGIC: No hives or eczema    RADIOLOGY & ADDITIONAL TESTS:    Consultant(s) Notes Reviewed:  [x ] YES  [ ] NO    PHYSICAL EXAM:  GENERAL: NAD, well-groomed, well-developed,not in any distress ,  HEAD:  Atraumatic, Normocephalic  EYES: EOMI, PERRLA, conjunctiva and sclera clear  ENMT: No tonsillar erythema, exudates, or enlargement; Moist mucous membranes, Good dentition, No lesions  NECK: Supple, No JVD, Normal thyroid  NERVOUS SYSTEM:  Alert & Oriented X3, No focal deficit   CHEST/LUNG: Good air entry bilateral with no  rales, rhonchi, wheezing, or rubs  HEART: Regular rate and rhythm; No murmurs, rubs, or gallops  ABDOMEN: Soft, Nontender, Nondistended; Bowel sounds present  EXTREMITIES:  2+ Peripheral Pulses, No clubbing, cyanosis, or edema  SKIN: No rashes or lesions    Care Discussed with Consultants/Other Providers [ x] YES  [ ] NO Date of Service  : 22     INTERVAL HPI/OVERNIGHT EVENTS: No new concerns per staff   Vital Signs Last 24 Hrs  T(C): 36.6 (28 Mar 2022 10:03), Max: 37 (27 Mar 2022 13:20)  T(F): 97.9 (28 Mar 2022 10:03), Max: 98.6 (27 Mar 2022 13:20)  HR: 67 (28 Mar 2022 10:03) (67 - 86)  BP: 183/87 (28 Mar 2022 10:03) (163/69 - 183/87)  BP(mean): --  RR: 18 (28 Mar 2022 10:03) (18 - 22)  SpO2: 99% (28 Mar 2022 10:03) (99% - 100%)  I&O's Summary    27 Mar 2022 07:01  -  28 Mar 2022 07:00  --------------------------------------------------------  IN: 400 mL / OUT: 0 mL / NET: 400 mL      MEDICATIONS  (STANDING):  dextrose 5% + sodium chloride 0.9%. 1000 milliLiter(s) (100 mL/Hr) IV Continuous <Continuous>  dorzolamide 2% Ophthalmic Solution 1 Drop(s) Both EYES <User Schedule>  latanoprost 0.005% Ophthalmic Solution 1 Drop(s) Both EYES at bedtime  levETIRAcetam  IVPB 500 milliGRAM(s) IV Intermittent every 12 hours  magnesium sulfate  IVPB 2 Gram(s) IV Intermittent every 4 hours  metoprolol tartrate Injectable 2.5 milliGRAM(s) IV Push every 6 hours  sodium chloride 0.9%. 1000 milliLiter(s) (75 mL/Hr) IV Continuous <Continuous>  timolol 0.5% Solution 1 Drop(s) Both EYES two times a day    MEDICATIONS  (PRN):    LABS:                        10.8   8.72  )-----------( 347      ( 28 Mar 2022 07:28 )             36.1     -    144  |  109<H>  |  9   ----------------------------<  95  2.4<LL>   |  21<L>  |  0.32<L>    Ca    7.6<L>      28 Mar 2022 07:28  Phos  2.5     -  Mg     1.40         TPro  6.3  /  Alb  3.3  /  TBili  0.4  /  DBili  x   /  AST  19  /  ALT  16  /  AlkPhos  80  -    PT/INR - ( 28 Mar 2022 07:28 )   PT: 13.1 sec;   INR: 1.13 ratio         PTT - ( 28 Mar 2022 07:28 )  PTT:28.9 sec  Urinalysis Basic - ( 26 Mar 2022 23:19 )    Color: Yellow / Appearance: Slightly Turbid / S.025 / pH: x  Gluc: x / Ketone: Negative  / Bili: Negative / Urobili: <2 mg/dL   Blood: x / Protein: 100 mg/dL / Nitrite: Negative   Leuk Esterase: Negative / RBC: 0-1 /HPF / WBC 0-1 /HPF   Sq Epi: x / Non Sq Epi: x / Bacteria: x      CAPILLARY BLOOD GLUCOSE      POCT Blood Glucose.: 151 mg/dL (27 Mar 2022 13:55)        Urinalysis Basic - ( 26 Mar 2022 23:19 )    Color: Yellow / Appearance: Slightly Turbid / S.025 / pH: x  Gluc: x / Ketone: Negative  / Bili: Negative / Urobili: <2 mg/dL   Blood: x / Protein: 100 mg/dL / Nitrite: Negative   Leuk Esterase: Negative / RBC: 0-1 /HPF / WBC 0-1 /HPF   Sq Epi: x / Non Sq Epi: x / Bacteria: x          Consultant(s) Notes Reviewed:  [x ] YES  [ ] NO    PHYSICAL EXAM:  GENERAL: NAD,   HEAD:  Atraumatic, Normocephalic  NECK: Trach+  NERVOUS SYSTEM:  Alert   CHEST/LUNG: Good air entry bilateral with no  rales, rhonchi, wheezing, or rubs  HEART: Regular rate and rhythm; No murmurs, rubs, or gallops  ABDOMEN: Soft, Nontender, Nondistended; Bowel sounds present, Ostomy and PEG+  EXTREMITIES:  2+ Peripheral Pulses, No clubbing, cyanosis, or edema  SKIN: No rashes or lesions    Care Discussed with Consultants/Other Providers [ x] YES  [ ] NO

## 2022-03-28 NOTE — PROGRESS NOTE ADULT - ATTENDING COMMENTS
I have personally interviewed and examined this patient, reviewed pertinent labs and imaging, and discussed the case with colleagues, residents, and physician assistants on B Team rounds.  More than 50% of this 35 minute encounter including face to face with the patient was spent counseling and/or coordination of care on displaced PEG.  Time included review of vitals, labs, imaging, discussion with consultants and coordination with the operating room/emergency department.    80yo F readmitted from rehab with dislodged PEG. PEG site appears nearly closed with small tract. Bedside replacement unsuccessful. Agree with IR replacement of PEG.     - Please call with any questions or concerns    The active care issues are:  1. displaced PEG    The Acute Care Surgery (B Team) Attending Group Practice:  Dr. Meg Fitch    urgent issues - spectra [id number]  nonurgent issues - [phone]  patient appointments or afterhours - [phone]

## 2022-03-28 NOTE — CONSULT NOTE ADULT - ASSESSMENT
Interventional Radiology    Evaluate for Procedure:     HPI: 80 yo F with PMHx HTN, HLD, Hypothyroidism, Depression, seizure d/o, RLE DVT, Cholelithiasis s/p recent Hospitalization in [facility] from 12/22 -> 2/11/22 for acute perforated diverticulitis s/p Rosales 12/25, colostomy bag 12/26, hospitalization c/b IR drainage for abdominal abscess 1/3 Cx grew e.coli & bacteriodes, also required Mechanical Ventilation for Acute Respiratory Failure with Tracheostomy done 1/7.  PEG done 1/18, developed bleeding through ostomy for which she was transfused 1/28, EGD on 2/1 showed gastritis. Developed fevers on 1/30 and CT's showed Acute left MCA infarct- could be 2/2 to carotid occlusion vs afib, new RLL aspiration pneumonia, seen by ID, completed antibiotics, seen by VascSx, not surgical candidate. Pt aphasic sent from [facility] for dislodged Peg tube, after staff noted pt has blood to her abdomen. IR c/s for Gtube replacement.    Allergies:   Medications (Abx/Cardiac/Anticoagulation/Blood Products)    hydrALAZINE Injectable: 5 milliGRAM(s) IV Push (03-28 @ 10:47)  metoprolol tartrate Injectable: 2.5 milliGRAM(s) IV Push (03-28 @ 12:14)  piperacillin/tazobactam IVPB...: 200 mL/Hr IV Intermittent (03-26 @ 23:43)  vancomycin  IVPB.: 250 mL/Hr IV Intermittent (03-27 @ 00:40)    Data:    T(C): 36.6  HR: 67  BP: 148/69  RR: 18  SpO2: 99%    -WBC 8.72 / HgB 10.8 / Hct 36.1 / Plt 347  -Na 144 / Cl 109 / BUN 9 / Glucose 95  -K 2.4 / CO2 21 / Cr 0.32  -ALT 16 / Alk Phos 80 / T.Bili 0.4  -INR 1.13 / PTT 28.9      Radiology:     Assessment/Plan: 80 yo F with PMHx HTN, HLD, Hypothyroidism, Depression, seizure d/o, RLE DVT, Cholelithiasis s/p recent Hospitalization in [facility] from 12/22 -> 2/11/22 for acute perforated diverticulitis s/p Rosales 12/25, colostomy bag 12/26, hospitalization c/b IR drainage for abdominal abscess 1/3 Cx grew e.coli & bacteriodes, also required Mechanical Ventilation for Acute Respiratory Failure with Tracheostomy done 1/7.  PEG done 1/18, developed bleeding through ostomy for which she was transfused 1/28, EGD on 2/1 showed gastritis. Developed fevers on 1/30 and CT's showed Acute left MCA infarct- could be 2/2 to carotid occlusion vs afib, new RLL aspiration pneumonia, seen by ID, completed antibiotics, seen by VascSx, not surgical candidate. Pt aphasic sent from [facility] for dislodged Peg tube, after staff noted pt has blood to her abdomen. IR c/s for Gtube replacement.    -- IR will plan to perform 3/29  -- NPO at midnight on 3/29  -- 4 am CBC, BMP, Coags 3/29  -- Patient has up to date COVID test  -- Please write pre-procedure note  -- Please place IR procedure request order under Dr. Dobbins

## 2022-03-29 LAB
ANION GAP SERPL CALC-SCNC: 11 MMOL/L — SIGNIFICANT CHANGE UP (ref 7–14)
ANION GAP SERPL CALC-SCNC: 13 MMOL/L — SIGNIFICANT CHANGE UP (ref 7–14)
APTT BLD: 28.8 SEC — SIGNIFICANT CHANGE UP (ref 27–36.3)
BUN SERPL-MCNC: 10 MG/DL — SIGNIFICANT CHANGE UP (ref 7–23)
BUN SERPL-MCNC: 10 MG/DL — SIGNIFICANT CHANGE UP (ref 7–23)
CALCIUM SERPL-MCNC: 8.6 MG/DL — SIGNIFICANT CHANGE UP (ref 8.4–10.5)
CALCIUM SERPL-MCNC: 8.9 MG/DL — SIGNIFICANT CHANGE UP (ref 8.4–10.5)
CHLORIDE SERPL-SCNC: 103 MMOL/L — SIGNIFICANT CHANGE UP (ref 98–107)
CHLORIDE SERPL-SCNC: 104 MMOL/L — SIGNIFICANT CHANGE UP (ref 98–107)
CO2 SERPL-SCNC: 22 MMOL/L — SIGNIFICANT CHANGE UP (ref 22–31)
CO2 SERPL-SCNC: 22 MMOL/L — SIGNIFICANT CHANGE UP (ref 22–31)
CREAT SERPL-MCNC: 0.36 MG/DL — LOW (ref 0.5–1.3)
CREAT SERPL-MCNC: 0.39 MG/DL — LOW (ref 0.5–1.3)
EGFR: 101 ML/MIN/1.73M2 — SIGNIFICANT CHANGE UP
EGFR: 103 ML/MIN/1.73M2 — SIGNIFICANT CHANGE UP
GLUCOSE SERPL-MCNC: 103 MG/DL — HIGH (ref 70–99)
GLUCOSE SERPL-MCNC: 143 MG/DL — HIGH (ref 70–99)
HCT VFR BLD CALC: 37.2 % — SIGNIFICANT CHANGE UP (ref 34.5–45)
HGB BLD-MCNC: 11.4 G/DL — LOW (ref 11.5–15.5)
INR BLD: 1.1 RATIO — SIGNIFICANT CHANGE UP (ref 0.88–1.16)
MAGNESIUM SERPL-MCNC: 2 MG/DL — SIGNIFICANT CHANGE UP (ref 1.6–2.6)
MAGNESIUM SERPL-MCNC: 2.1 MG/DL — SIGNIFICANT CHANGE UP (ref 1.6–2.6)
MCHC RBC-ENTMCNC: 26.9 PG — LOW (ref 27–34)
MCHC RBC-ENTMCNC: 30.6 GM/DL — LOW (ref 32–36)
MCV RBC AUTO: 87.7 FL — SIGNIFICANT CHANGE UP (ref 80–100)
NRBC # BLD: 0 /100 WBCS — SIGNIFICANT CHANGE UP
NRBC # FLD: 0 K/UL — SIGNIFICANT CHANGE UP
PHOSPHATE SERPL-MCNC: 2.8 MG/DL — SIGNIFICANT CHANGE UP (ref 2.5–4.5)
PHOSPHATE SERPL-MCNC: 3.1 MG/DL — SIGNIFICANT CHANGE UP (ref 2.5–4.5)
PLATELET # BLD AUTO: 395 K/UL — SIGNIFICANT CHANGE UP (ref 150–400)
POTASSIUM SERPL-MCNC: 3.3 MMOL/L — LOW (ref 3.5–5.3)
POTASSIUM SERPL-MCNC: 4.9 MMOL/L — SIGNIFICANT CHANGE UP (ref 3.5–5.3)
POTASSIUM SERPL-SCNC: 3.3 MMOL/L — LOW (ref 3.5–5.3)
POTASSIUM SERPL-SCNC: 4.9 MMOL/L — SIGNIFICANT CHANGE UP (ref 3.5–5.3)
PROTHROM AB SERPL-ACNC: 12.8 SEC — SIGNIFICANT CHANGE UP (ref 10.5–13.4)
RBC # BLD: 4.24 M/UL — SIGNIFICANT CHANGE UP (ref 3.8–5.2)
RBC # FLD: 15.8 % — HIGH (ref 10.3–14.5)
SODIUM SERPL-SCNC: 136 MMOL/L — SIGNIFICANT CHANGE UP (ref 135–145)
SODIUM SERPL-SCNC: 139 MMOL/L — SIGNIFICANT CHANGE UP (ref 135–145)
WBC # BLD: 10.05 K/UL — SIGNIFICANT CHANGE UP (ref 3.8–10.5)
WBC # FLD AUTO: 10.05 K/UL — SIGNIFICANT CHANGE UP (ref 3.8–10.5)

## 2022-03-29 RX ORDER — INFLUENZA VIRUS VACCINE 15; 15; 15; 15 UG/.5ML; UG/.5ML; UG/.5ML; UG/.5ML
0.7 SUSPENSION INTRAMUSCULAR ONCE
Refills: 0 | Status: DISCONTINUED | OUTPATIENT
Start: 2022-03-29 | End: 2022-04-22

## 2022-03-29 RX ORDER — POTASSIUM CHLORIDE 20 MEQ
10 PACKET (EA) ORAL
Refills: 0 | Status: COMPLETED | OUTPATIENT
Start: 2022-03-29 | End: 2022-03-29

## 2022-03-29 RX ORDER — SODIUM CHLORIDE 9 MG/ML
1000 INJECTION, SOLUTION INTRAVENOUS
Refills: 0 | Status: DISCONTINUED | OUTPATIENT
Start: 2022-03-29 | End: 2022-03-30

## 2022-03-29 RX ADMIN — Medication 100 MILLIEQUIVALENT(S): at 02:30

## 2022-03-29 RX ADMIN — Medication 5 MILLIGRAM(S): at 06:55

## 2022-03-29 RX ADMIN — Medication 1 DROP(S): at 19:13

## 2022-03-29 RX ADMIN — Medication 5 MILLIGRAM(S): at 11:41

## 2022-03-29 RX ADMIN — DORZOLAMIDE HYDROCHLORIDE 1 DROP(S): 20 SOLUTION/ DROPS OPHTHALMIC at 19:12

## 2022-03-29 RX ADMIN — LEVETIRACETAM 400 MILLIGRAM(S): 250 TABLET, FILM COATED ORAL at 07:00

## 2022-03-29 RX ADMIN — LATANOPROST 1 DROP(S): 0.05 SOLUTION/ DROPS OPHTHALMIC; TOPICAL at 21:40

## 2022-03-29 RX ADMIN — Medication 100 MILLIEQUIVALENT(S): at 03:26

## 2022-03-29 RX ADMIN — DORZOLAMIDE HYDROCHLORIDE 1 DROP(S): 20 SOLUTION/ DROPS OPHTHALMIC at 09:36

## 2022-03-29 RX ADMIN — Medication 100 MILLIEQUIVALENT(S): at 09:36

## 2022-03-29 RX ADMIN — Medication 100 MILLIEQUIVALENT(S): at 08:18

## 2022-03-29 RX ADMIN — Medication 1 DROP(S): at 07:00

## 2022-03-29 RX ADMIN — Medication 5 MILLIGRAM(S): at 09:47

## 2022-03-29 RX ADMIN — Medication 100 MILLIEQUIVALENT(S): at 00:43

## 2022-03-29 RX ADMIN — LEVETIRACETAM 400 MILLIGRAM(S): 250 TABLET, FILM COATED ORAL at 19:12

## 2022-03-29 RX ADMIN — SODIUM CHLORIDE 75 MILLILITER(S): 9 INJECTION, SOLUTION INTRAVENOUS at 09:57

## 2022-03-29 RX ADMIN — Medication 5 MILLIGRAM(S): at 19:12

## 2022-03-29 RX ADMIN — Medication 100 MILLIEQUIVALENT(S): at 06:51

## 2022-03-29 RX ADMIN — Medication 5 MILLIGRAM(S): at 00:59

## 2022-03-29 NOTE — PATIENT PROFILE ADULT - FALL HARM RISK - HARM RISK INTERVENTIONS

## 2022-03-29 NOTE — PROGRESS NOTE ADULT - ASSESSMENT
82 yo F w/ PMHx of perforated diverticulitis s/p Rosales procedure, colostomy, abdominal abscess with wound s/p IR drainage, Respiratory Failure s/p Trache, CVA w/ functional quadriplegia, aphasia, dysphagia s/p PEG (on Jevity 1.5 @ 70cchr x 18 hrs, & water flushes 325ml q 6 hrs, Afib, GIB, HTN, HLD, Seizure d/o, Hypothyroidism, Glaucoma, Depression p/w PEG tube dislodgement for unknown amount of time     Problem/Plan - 1:  ·  Problem: PEG tube malfunction.   ·  Plan:  IR planning  replacement of PEG today .      Problem/Plan - 2:  ·  Problem: Hypokalemia.   ·  Plan: Normal now.      Problem/Plan - 3:  ·  Problem: Stroke.   ·  Plan: Resume ASA, Plavix, statin once PEG tube replaced.     Problem/Plan - 4:  ·  Problem: Chronic respiratory failure with hypoxia.   ·  Plan: Pt on 2l NC via Trache collar.     Problem/Plan - 5:  ·  Problem: Diverticulitis of intestine with perforation and abscess without bleeding.   ·  Plan: Surgery c/s in chart- small fluid collection but nontoxic, if drainage needed would be done by IR.  Wound care eval.     Problem/Plan - 6:  ·  Problem: History of atrial fibrillation.   ·  Plan: check EKG, Pt off A/C due to recent GIB as per chart.     Problem/Plan - 7:  ·  Problem: Seizure disorder.   ·  Plan: continue levetiracetam bid.     Problem/Plan - 8:  ·  Problem: DVT, lower extremity.   ·  Plan: ?Subacute vs Chronic, not started on A/C as per chart due to recent GIB.     Problem/Plan - 9:  ·  Problem: HTN (hypertension).   ·  Plan: Resume carvedilol & amlodipine when PEG replaced.     Problem/Plan - 10:  ·  Problem: Hypothyroidism.   ·  Plan; Resume Levothyroxine when PEG replaced.     Problem/Plan - 11:  ·  Problem: Glaucoma.   ·  Plan: continue Timolol, Dorzolamide, Latanoprost.

## 2022-03-29 NOTE — PROGRESS NOTE ADULT - SUBJECTIVE AND OBJECTIVE BOX
[facility] [place] [facility]   MD ELOISE ALVARADO MD, PA INJUNG KO NP    TEL:  OFFICE: [phone]    From 5pm-7am Answering Service [phone]    -- RENAL FOLLOW UP NOTE ---Date of Service 03-29-22 @ 13:41    Patient is a 81y old  Female who presents with a chief complaint of     Patient seen and examined at bedside. No chest pain/sob    VITALS:  T(F): 97.4 (03-29-22 @ 11:44), Max: 98.7 (03-28-22 @ 17:35)  HR: 66 (03-29-22 @ 11:44)  BP: 152/71 (03-29-22 @ 11:44)  RR: 17 (03-29-22 @ 11:44)  SpO2: 97% (03-29-22 @ 11:44)  Wt(kg): --    03-28 @ 07:01  -  03-29 @ 07:00  --------------------------------------------------------  IN: 2675 mL / OUT: 50 mL / NET: 2625 mL    03-29 @ 07:01  -  03-29 @ 13:41  --------------------------------------------------------  IN: 225 mL / OUT: 0 mL / NET: 225 mL      PHYSICAL EXAM:  Constitutional: NAD  Neck: No JVD  Respiratory: CTAB, no wheezes, rales or rhonchi  Cardiovascular: S1, S2, RRR  Gastrointestinal: BS+, soft, NT/ND  Extremities: No peripheral edema    Hospital Medications:   MEDICATIONS  (STANDING):  dextrose 5% + sodium chloride 0.9%. 1000 milliLiter(s) (75 mL/Hr) IV Continuous <Continuous>  dorzolamide 2% Ophthalmic Solution 1 Drop(s) Both EYES <User Schedule>  influenza  Vaccine (HIGH DOSE) 0.7 milliLiter(s) IntraMuscular once  latanoprost 0.005% Ophthalmic Solution 1 Drop(s) Both EYES at bedtime  levETIRAcetam  IVPB 500 milliGRAM(s) IV Intermittent every 12 hours  metoprolol tartrate Injectable 5 milliGRAM(s) IV Push every 6 hours  timolol 0.5% Solution 1 Drop(s) Both EYES two times a day      LABS:  03-29    136  |  103  |  10  ----------------------------<  143<H>  4.9   |  22  |  0.36<L>    Ca    8.6      29 Mar 2022 07:59  Phos  2.8     03-29  Mg     2.00     03-29    TPro  6.3  /  Alb  3.3  /  TBili  0.4  /  DBili      /  AST  19  /  ALT  16  /  AlkPhos  80  03-28    Creatinine Trend: 0.36 <--, 0.39 <--, 0.37 <--, 0.32 <--, 0.32 <--, 0.40 <--    Phosphorus Level, Serum: 2.8 mg/dL (03-29 @ 07:59)  Phosphorus Level, Serum: 3.1 mg/dL (03-29 @ 04:03)  Phosphorus Level, Serum: 2.9 mg/dL (03-28 @ 22:41)  Phosphorus Level, Serum: 2.3 mg/dL (03-28 @ 16:35)                              11.4   10.05 )-----------( 395      ( 29 Mar 2022 04:03 )             37.2     Urine Studies:  Urinalysis - [03-26-22 @ 23:19]      Color Yellow / Appearance Slightly Turbid / SG 1.025 / pH 8.0      Gluc Negative / Ketone Negative  / Bili Negative / Urobili <2 mg/dL       Blood Negative / Protein 100 mg/dL / Leuk Est Negative / Nitrite Negative      RBC 0-1 / WBC 0-1 / Hyaline  / Gran  / Sq Epi  / Non Sq Epi  / Bacteria       TSH 12.200      [01-14-22 @ 08:33]  Lipid: chol 158, , HDL 25, LDL --      [02-03-22 @ 15:22]        RADIOLOGY & ADDITIONAL STUDIES:

## 2022-03-29 NOTE — DIETITIAN INITIAL EVALUATION ADULT. - ENTERAL
1. When able to initiate EN, suggest Jevity 1.2 @ 25 mL/hr, increase as tolerated to the goal rate of 65 mL X 23 hrs. This will provide 1794 kcal/83 gm pro.  2. Suggest check HgA1C level.

## 2022-03-29 NOTE — PROGRESS NOTE ADULT - SUBJECTIVE AND OBJECTIVE BOX
Date of service: 03/29/22    chief complaint: dislodged PEG tube     extended hpi: 82 yo F with PMHx HTN, HLD, Hypothyroidism, Depression, seizure d/o, RLE DVT, Cholelithiasis s/p recent Hospitalization in [facility] from 12/22 -> 2/11/22 for acute perforated diverticulitis s/p Rosales 12/25, colostomy bag 12/26, hospitalization c/b IR drainage for abdominal abscess 1/3 Cx grew e.coli & bacteriodes, also required Mechanical Ventilation for Acute Respiratory Failure with Tracheostomy done 1/7.  PEG done 1/18, developed bleeding through ostomy for which she was transfused 1/28, EGD on 2/1 showed gastritis. Developed fevers on 1/30 and CT's showed Acute left MCA infarct- could be 2/2 to carotid occlusion vs afib, new RLL aspiration pneumonia, seen by ID, completed antibiotics, seen by VascSx, not surgical candidate. Pt aphasic sent [facility] for [facility]lodged Peg tube, after staff noted pt has blood to her abdomen.     S: pt. aphasic, no acute distress; ros limited.     Review of Systems:   Constitutional: [ ] fevers, [ ] chills.   Skin: [ ] dry skin. [ ] rashes.  Psychiatric: [ ] depression, [ ] anxiety.   Gastrointestinal: [ ] BRBPR, [ ] melena.   Neurological: [ ] confusion. [ ] seizures. [ ] shuffling gait.   Ears,Nose,Mouth and Throat: [ ] ear pain [ ] sore throat.   Eyes: [ ] diplopia.   Respiratory: [ ] hemoptysis. [ ] shortness of breath  Cardiovascular: See HPI above  Hematologic/Lymphatic: [ ] anemia. [ ] painful nodes. [ ] prolonged bleeding.   Genitourinary: [ ] hematuria. [ ] flank pain.   Endocrine: [ ] significant change in weight. [ ] intolerance to heat and cold.     Review of systems [ ] otherwise negative, [x ] otherwise unable to obtain    FH: no family history of sudden cardiac death in first degree relatives    SH: [ ] tobacco, [ ] alcohol, [ ] drugs    dextrose 5% + sodium chloride 0.9%. 1000 milliLiter(s) IV Continuous <Continuous>  dorzolamide 2% Ophthalmic Solution 1 Drop(s) Both EYES <User Schedule>  hydrALAZINE Injectable 5 milliGRAM(s) IV Push every 6 hours PRN  influenza  Vaccine (HIGH DOSE) 0.7 milliLiter(s) IntraMuscular once  latanoprost 0.005% Ophthalmic Solution 1 Drop(s) Both EYES at bedtime  levETIRAcetam  IVPB 500 milliGRAM(s) IV Intermittent every 12 hours  metoprolol tartrate Injectable 5 milliGRAM(s) IV Push every 6 hours  timolol 0.5% Solution 1 Drop(s) Both EYES two times a day                            11.4   10.05 )-----------( 395      ( 29 Mar 2022 04:03 )             37.2       03-29    136  |  103  |  10  ----------------------------<  143<H>  4.9   |  22  |  0.36<L>    Ca    8.6      29 Mar 2022 07:59  Phos  2.8     03-29  Mg     2.00     03-29    TPro  6.3  /  Alb  3.3  /  TBili  0.4  /  DBili  x   /  AST  19  /  ALT  16  /  AlkPhos  80  03-28            T(C): 36.3 (03-29-22 @ 11:44), Max: 37.1 (03-28-22 @ 17:35)  HR: 66 (03-29-22 @ 11:44) (62 - 80)  BP: 152/71 (03-29-22 @ 11:44) (148/70 - 183/83)  RR: 17 (03-29-22 @ 11:44) (17 - 19)  SpO2: 97% (03-29-22 @ 11:44) (95% - 99%)  Wt(kg): --    I&O's Summary    28 Mar 2022 07:01  -  29 Mar 2022 07:00  --------------------------------------------------------  IN: 2675 mL / OUT: 50 mL / NET: 2625 mL    29 Mar 2022 07:01  -  29 Mar 2022 13:47  --------------------------------------------------------  IN: 225 mL / OUT: 0 mL / NET: 225 mL        General: no acute distress   Head: Normocephalic and atraumatic.   Neck: No JVD. No bruits. Supple. Does not appear to be enlarged.   Cardiovascular: + S1,S2 ; RRR Soft systolic murmur at the left lower sternal border. No rubs noted.    Lungs: CTA b/l. No rhonchi, rales or wheezes.   Abdomen: + BS, soft. Non tender. Non distended. No rebound. No guarding.   Extremities: No clubbing/cyanosis/edema.   Psychiatric: unable to assess     TTE: 1. Left ventricular ejection fraction, by visual estimation, is 60 to   65%.   2. Normal global left ventricular systolic function.   3. Mild mitral valve regurgitation.   4.Mild thickening of the anterior and posterior mitral valve leaflets.   5. Mild tricuspid regurgitation.   6. Mild to moderate aortic regurgitation.   7. Sclerotic aortic valve with normal opening.    A/P:  82 yo F with PMHx HTN, HLD, Hypothyroidism, Depression, seizure d/o, RLE DVT, Cholelithiasis s/p recent Hospitalization in [facility] from 12/22 -> 2/11/22 for acute perforated diverticulitis s/p Rosales 12/25, colostomy bag 12/26, hospitalization c/b IR drainage for abdominal abscess 1/3 Cx grew e.coli & bacteriodes, also required Mechanical Ventilation for Acute Respiratory Failure with Tracheostomy done 1/7.  PEG done 1/18, developed bleeding through ostomy for which she was transfused 1/28, EGD on 2/1 showed gastritis. Developed fevers on 1/30 and CT's showed Acute left MCA infarct- could be 2/2 to carotid occlusion vs afib, new RLL aspiration pneumonia, seen by ID, completed antibiotics, seen by Lou, not surgical candidate. Pt aphasic sent from [facility] for dislodged Peg tube, after staff noted pt has blood to her abdomen.     --Cardiology consulted for elevated BP while Peg dislodged  --Currently on IV hydralazine and BB - restart PO medications when able to tolerate   --check 12 lead EKG  --prior chart reviewed, pt with recnt CVA and found with AFib and Carotid occlusion.  AC held initially while monitoring for hemorrhagic conversion and then in the setting of GI bleed req PRBCs.  She was continued on DAPT.      Danny Casiano MD

## 2022-03-29 NOTE — DIETITIAN INITIAL EVALUATION ADULT. - PROBLEM SELECTOR PLAN 1
Admit to Medicine, Hold PEG tube feeds & meds for now, IVF, Surgery c/s in chart- Obtain IR c/s for replacement of PEG.

## 2022-03-29 NOTE — DIETITIAN INITIAL EVALUATION ADULT. - PROBLEM SELECTOR PROBLEM 10
Problem: Cervical Spine Surgery  Goal: Neurological function is maintained/restored to baseline  Outcome: Outcome Not Met, Continue to Monitor  Goal: Vital signs are maintained within parameters  Outcome: Outcome Not Met, Continue to Monitor  Goal: Oral intake is resumed and tolerated  Outcome: Outcome Not Met, Continue to Monitor  Goal: Mobility and self-care ability maintained/returned to baseline  Outcome: Outcome Not Met, Continue to Monitor  Goal: Verbalizes understanding of post-op cervical spine surgery care  Outcome: Outcome Not Met, Continue to Monitor     Problem: Pain  Goal: Acceptable pain/comfort level is achieved/maintained at rest (based on Pain Behaviors Scale)  Description: This goal is used for patients who are not able to self-report pain and are assessed for pain using the Pain Behaviors Scale  Outcome: Outcome Not Met, Continue to Monitor  Goal: # Verbalizes understanding of pain management  Description: Documented in Patient Education Activity  Outcome: Outcome Not Met, Continue to Monitor     Problem: At Risk for Falls  Goal: # Patient does not fall  Outcome: Outcome Not Met, Continue to Monitor  Goal: # Takes action to control fall-related risks  Outcome: Outcome Not Met, Continue to Monitor  Goal: # Verbalizes understanding of fall risk/precautions  Description: Document education using the patient education activity  Outcome: Outcome Not Met, Continue to Monitor     Problem: At Risk for Injury Due to Fall  Goal: # Patient does not fall  Outcome: Outcome Not Met, Continue to Monitor  Goal: # Takes action to control condition specific risks  Outcome: Outcome Not Met, Continue to Monitor  Goal: # Verbalizes understanding of fall-related injury personal risks  Description: Document education using the patient education activity  Outcome: Outcome Not Met, Continue to Monitor     Problem: Respiratory Function, Ineffective (with Ventilator)  Goal: Oxygenation/ventilation parameters are  achieved/maintained without ventilator support (with/without artificial airway)  Description: Patients are considered \"ready\" for weaning when there is the is some resolution in the acute phase of current illness, cardiac stability (HR<140) without vasopressors, afebrile (<38C), and adequate gas exchange (PaO2/FIO2 ratio>150-200 on lower PEEP/CPAP), and able to initiate inspiratory effort (Yvan, 2001)  Outcome: Outcome Not Met, Continue to Monitor  Goal: Verbalizes/demonstrates understanding of treatment plan and their role in successful weaning  Description: Document on Patient Education Activity  Outcome: Outcome Not Met, Continue to Monitor     Problem: Pressure Injury, Risk for  Goal: # Skin remains intact  Outcome: Outcome Not Met, Continue to Monitor  Goal: No new pressure injury (PI) development  Outcome: Outcome Not Met, Continue to Monitor  Goal: # Verbalizes understanding of PI risk factors and prevention strategies  Description: Document education using the patient education activity.   Outcome: Outcome Not Met, Continue to Monitor  Goal: Comfort optimized with pressure injury prevention strategies guided by patient/family preference. (Hospice)  Outcome: Outcome Not Met, Continue to Monitor     Problem: VTE, Risk for  Goal: # No s/s of VTE  Outcome: Outcome Not Met, Continue to Monitor  Goal: # Verbalizes understanding of VTE risk factors and prevention  Description: Document education using the patient education activity.   Outcome: Outcome Not Met, Continue to Monitor  Goal: Demonstrates ability to administer injectable anticoagulants if ordered for d/c  Description: Document education using the patient education activity.  Outcome: Outcome Not Met, Continue to Monitor     Problem: Urinary Incontinence  Goal: # Fewer urinary incontinence episodes per day  Description: Select this goal if  goal is reduced incontinence (patient unable to control).  Outcome: Outcome Not Met, Continue to Monitor  Goal:  Zero or no urinary incontinence episodes per day  Description: Select this row if patient goal is no incontinenceSelect this goal if patient goal is no incontinence.  Outcome: Outcome Not Met, Continue to Monitor  Goal: #Verbalizes urinary incontinence management & associated care  Description: Document on Patient Education Activity  Outcome: Outcome Not Met, Continue to Monitor      Hypothyroidism

## 2022-03-29 NOTE — DIETITIAN INITIAL EVALUATION ADULT. - ORAL INTAKE PTA/DIET HISTORY
Per chart---Pt from [facility] [place]  EN regimen: Jevity 1.5 @ 70 ml/hr x18 hrs (providing 1890 kcal/80 gm pro) Pt from [facility]  Per H/P--->EN regimen: Jevity 1.5 @ 70 ml/hr x18 hrs (providing 1890 kcal/80 gm pro), 325 mL water flushes 4x daily, 1300 mL total free water.

## 2022-03-29 NOTE — DIETITIAN INITIAL EVALUATION ADULT. - PERTINENT MEDS FT
MEDICATIONS  (STANDING):  dextrose 5% + sodium chloride 0.9%. 1000 milliLiter(s) (75 mL/Hr) IV Continuous <Continuous>  dorzolamide 2% Ophthalmic Solution 1 Drop(s) Both EYES <User Schedule>  influenza  Vaccine (HIGH DOSE) 0.7 milliLiter(s) IntraMuscular once  latanoprost 0.005% Ophthalmic Solution 1 Drop(s) Both EYES at bedtime  levETIRAcetam  IVPB 500 milliGRAM(s) IV Intermittent every 12 hours  metoprolol tartrate Injectable 5 milliGRAM(s) IV Push every 6 hours  timolol 0.5% Solution 1 Drop(s) Both EYES two times a day

## 2022-03-29 NOTE — DIETITIAN INITIAL EVALUATION ADULT. - PROBLEM SELECTOR PLAN 5
Surgery c/s in chart- small fluid collection but nontoxic, if drainage needed would be done by IR.  Wound care eval

## 2022-03-29 NOTE — DIETITIAN INITIAL EVALUATION ADULT. - PERTINENT LABORATORY DATA
03-29 @ 07:59: Na 136, BUN 10, Cr 0.36<L>, <H>, K+ 4.9, Phos 2.8, Mg 2.00     CAPILLARY BLOOD GLUCOSE  POCT Blood Glucose.: 151 mg/dL (27 Mar 2022 13:55)

## 2022-03-29 NOTE — PROGRESS NOTE ADULT - SUBJECTIVE AND OBJECTIVE BOX
Date of Service  : 03-29-22     INTERVAL HPI/OVERNIGHT EVENTS: More awake and responding to verbal command   Vital Signs Last 24 Hrs  T(C): 37.3 (29 Mar 2022 17:31), Max: 37.3 (29 Mar 2022 17:31)  T(F): 99.1 (29 Mar 2022 17:31), Max: 99.1 (29 Mar 2022 17:31)  HR: 86 (29 Mar 2022 17:31) (62 - 86)  BP: 165/80 (29 Mar 2022 17:31) (148/70 - 175/73)  BP(mean): --  RR: 18 (29 Mar 2022 17:31) (17 - 19)  SpO2: 98% (29 Mar 2022 17:31) (95% - 99%)  I&O's Summary    28 Mar 2022 07:01  -  29 Mar 2022 07:00  --------------------------------------------------------  IN: 2675 mL / OUT: 50 mL / NET: 2625 mL    29 Mar 2022 07:01  -  29 Mar 2022 17:52  --------------------------------------------------------  IN: 525 mL / OUT: 100 mL / NET: 425 mL      MEDICATIONS  (STANDING):  dextrose 5% + sodium chloride 0.9%. 1000 milliLiter(s) (75 mL/Hr) IV Continuous <Continuous>  dorzolamide 2% Ophthalmic Solution 1 Drop(s) Both EYES <User Schedule>  influenza  Vaccine (HIGH DOSE) 0.7 milliLiter(s) IntraMuscular once  latanoprost 0.005% Ophthalmic Solution 1 Drop(s) Both EYES at bedtime  levETIRAcetam  IVPB 500 milliGRAM(s) IV Intermittent every 12 hours  metoprolol tartrate Injectable 5 milliGRAM(s) IV Push every 6 hours  timolol 0.5% Solution 1 Drop(s) Both EYES two times a day    MEDICATIONS  (PRN):  hydrALAZINE Injectable 5 milliGRAM(s) IV Push every 6 hours PRN SBP > 160    LABS:                        11.4   10.05 )-----------( 395      ( 29 Mar 2022 04:03 )             37.2     03-29    136  |  103  |  10  ----------------------------<  143<H>  4.9   |  22  |  0.36<L>    Ca    8.6      29 Mar 2022 07:59  Phos  2.8     03-29  Mg     2.00     03-29    TPro  6.3  /  Alb  3.3  /  TBili  0.4  /  DBili  x   /  AST  19  /  ALT  16  /  AlkPhos  80  03-28    PT/INR - ( 29 Mar 2022 04:03 )   PT: 12.8 sec;   INR: 1.10 ratio         PTT - ( 29 Mar 2022 04:03 )  PTT:28.8 sec    CAPILLARY BLOOD GLUCOSE                  Consultant(s) Notes Reviewed:  [x ] YES  [ ] NO    PHYSICAL EXAM:  GENERAL: NAD, well-groomed, well-developed, not in any distress ,  HEAD:  Atraumatic, Normocephalic  NECK: Trach+ oxygen  NERVOUS SYSTEM:  Alert   CHEST/LUNG: Good air entry bilateral with no  rales, rhonchi, wheezing, or rubs  HEART: Regular rate and rhythm; No murmurs, rubs, or gallops  ABDOMEN: Soft, Nontender, Nondistended; Bowel sounds present, PEG and Ostomy +  EXTREMITIES: No clubbing, cyanosis, or edema      Care Discussed with Consultants/Other Providers [ x] YES  [ ] NO

## 2022-03-29 NOTE — DIETITIAN INITIAL EVALUATION ADULT. - OTHER INFO
Per chart---Patient is a 81F HTN, HLD, Depression, Seizures, perforated diverticulitis s/p Varela 12/25/21, washout 12/26/21 course complicated by afib w/ rvr, tracheostomy 1/7/22, PEG 1/18/22, large mca infarct on 1/30 (weakness on right side, also has lle weakness), GI bleed s/p EGD 2/1/22 presenting from [facility] with a dislodged PEG tube.    Per chart, PEG unable to be replaced at bedside, plan is for procedure with IR. Pt is non verbal, observed sleeping at time of visit. No visible signs of muscle/fat wasting seen. Noted hx of elevated POCT levels, will suggest to check HgA1C result.  Also to note, Pt with medication hx of being on Levothyroxine. Will suggest TF for 23 hrs if Levothyroxine is initiated in house.

## 2022-03-29 NOTE — PROGRESS NOTE ADULT - ASSESSMENT
80 yo F with PMHx HTN, HLD, Hypothyroidism, Depression, seizure d/o, RLE DVT, Cholelithiasis s/p recent Hospitalization in [facility] from 12/22 -> 2/11/22 for acute perforated diverticulitis s/p Rosales 12/25, colostomy bag 12/26, hospitalization c/b IR drainage for abdominal abscess 1/3 Cx grew e.coli & bacteriodes, also required Mechanical Ventilation for Acute Respiratory Failure with Tracheostomy done 1/7.  PEG done 1/18, developed bleeding through ostomy, EGD on 2/1 showed gastritis. T's showed Acute left MCA infarct- could be 2/2 to carotid occlusion vs afib, new RLL aspiration pneumonia, seen by ID, completed antibiotics, seen by VascSx, not surgical candidate. Pt aphasic sent from [facility] for dislodged Peg tube, after staff noted pt has blood to her abdomen. Nephrology consulted for Hypokalemia.     A/P    Hypokalemia   likely 2/2 GI loss   monitor input and output closely   s/p 10meq l3loles   s/p kcl 20meq in ivf   Improved    monitor K closely     Hypomagnesia   s/p Mg 2g ivp x 2doses   replete as needed  monitor Mg     Hypocalcemia   get Vit D, PTH     HTN   acceptable   monitor BP closely

## 2022-03-30 LAB
ANION GAP SERPL CALC-SCNC: 13 MMOL/L — SIGNIFICANT CHANGE UP (ref 7–14)
ANION GAP SERPL CALC-SCNC: 14 MMOL/L — SIGNIFICANT CHANGE UP (ref 7–14)
ANION GAP SERPL CALC-SCNC: 16 MMOL/L — HIGH (ref 7–14)
BUN SERPL-MCNC: 6 MG/DL — LOW (ref 7–23)
BUN SERPL-MCNC: 7 MG/DL — SIGNIFICANT CHANGE UP (ref 7–23)
BUN SERPL-MCNC: 8 MG/DL — SIGNIFICANT CHANGE UP (ref 7–23)
CALCIUM SERPL-MCNC: 8.7 MG/DL — SIGNIFICANT CHANGE UP (ref 8.4–10.5)
CALCIUM SERPL-MCNC: 8.9 MG/DL — SIGNIFICANT CHANGE UP (ref 8.4–10.5)
CALCIUM SERPL-MCNC: 9.1 MG/DL — SIGNIFICANT CHANGE UP (ref 8.4–10.5)
CHLORIDE SERPL-SCNC: 101 MMOL/L — SIGNIFICANT CHANGE UP (ref 98–107)
CHLORIDE SERPL-SCNC: 104 MMOL/L — SIGNIFICANT CHANGE UP (ref 98–107)
CHLORIDE SERPL-SCNC: 106 MMOL/L — SIGNIFICANT CHANGE UP (ref 98–107)
CO2 SERPL-SCNC: 20 MMOL/L — LOW (ref 22–31)
CO2 SERPL-SCNC: 21 MMOL/L — LOW (ref 22–31)
CO2 SERPL-SCNC: 21 MMOL/L — LOW (ref 22–31)
CREAT SERPL-MCNC: 0.36 MG/DL — LOW (ref 0.5–1.3)
CREAT SERPL-MCNC: 0.37 MG/DL — LOW (ref 0.5–1.3)
CREAT SERPL-MCNC: 0.39 MG/DL — LOW (ref 0.5–1.3)
EGFR: 101 ML/MIN/1.73M2 — SIGNIFICANT CHANGE UP
EGFR: 103 ML/MIN/1.73M2 — SIGNIFICANT CHANGE UP
EGFR: 103 ML/MIN/1.73M2 — SIGNIFICANT CHANGE UP
GLUCOSE SERPL-MCNC: 100 MG/DL — HIGH (ref 70–99)
GLUCOSE SERPL-MCNC: 111 MG/DL — HIGH (ref 70–99)
GLUCOSE SERPL-MCNC: 113 MG/DL — HIGH (ref 70–99)
HCT VFR BLD CALC: 38.3 % — SIGNIFICANT CHANGE UP (ref 34.5–45)
HGB BLD-MCNC: 11.8 G/DL — SIGNIFICANT CHANGE UP (ref 11.5–15.5)
MAGNESIUM SERPL-MCNC: 1.6 MG/DL — SIGNIFICANT CHANGE UP (ref 1.6–2.6)
MAGNESIUM SERPL-MCNC: 1.8 MG/DL — SIGNIFICANT CHANGE UP (ref 1.6–2.6)
MAGNESIUM SERPL-MCNC: 1.8 MG/DL — SIGNIFICANT CHANGE UP (ref 1.6–2.6)
MCHC RBC-ENTMCNC: 27 PG — SIGNIFICANT CHANGE UP (ref 27–34)
MCHC RBC-ENTMCNC: 30.8 GM/DL — LOW (ref 32–36)
MCV RBC AUTO: 87.6 FL — SIGNIFICANT CHANGE UP (ref 80–100)
NRBC # BLD: 0 /100 WBCS — SIGNIFICANT CHANGE UP
NRBC # FLD: 0 K/UL — SIGNIFICANT CHANGE UP
PHOSPHATE SERPL-MCNC: 2.8 MG/DL — SIGNIFICANT CHANGE UP (ref 2.5–4.5)
PHOSPHATE SERPL-MCNC: 3.4 MG/DL — SIGNIFICANT CHANGE UP (ref 2.5–4.5)
PHOSPHATE SERPL-MCNC: 3.4 MG/DL — SIGNIFICANT CHANGE UP (ref 2.5–4.5)
PLATELET # BLD AUTO: 402 K/UL — HIGH (ref 150–400)
POTASSIUM SERPL-MCNC: 2.8 MMOL/L — CRITICAL LOW (ref 3.5–5.3)
POTASSIUM SERPL-MCNC: 3.2 MMOL/L — LOW (ref 3.5–5.3)
POTASSIUM SERPL-MCNC: 3.3 MMOL/L — LOW (ref 3.5–5.3)
POTASSIUM SERPL-SCNC: 2.8 MMOL/L — CRITICAL LOW (ref 3.5–5.3)
POTASSIUM SERPL-SCNC: 3.2 MMOL/L — LOW (ref 3.5–5.3)
POTASSIUM SERPL-SCNC: 3.3 MMOL/L — LOW (ref 3.5–5.3)
PTH-INTACT FLD-MCNC: 32 PG/ML — SIGNIFICANT CHANGE UP (ref 15–65)
RBC # BLD: 4.37 M/UL — SIGNIFICANT CHANGE UP (ref 3.8–5.2)
RBC # FLD: 15.9 % — HIGH (ref 10.3–14.5)
SODIUM SERPL-SCNC: 138 MMOL/L — SIGNIFICANT CHANGE UP (ref 135–145)
SODIUM SERPL-SCNC: 138 MMOL/L — SIGNIFICANT CHANGE UP (ref 135–145)
SODIUM SERPL-SCNC: 140 MMOL/L — SIGNIFICANT CHANGE UP (ref 135–145)
VIT D25+D1,25 OH+D1,25 PNL SERPL-MCNC: 48 PG/ML — SIGNIFICANT CHANGE UP (ref 19.9–79.3)
WBC # BLD: 12.43 K/UL — HIGH (ref 3.8–10.5)
WBC # FLD AUTO: 12.43 K/UL — HIGH (ref 3.8–10.5)

## 2022-03-30 PROCEDURE — 49450 REPLACE G/C TUBE PERC: CPT

## 2022-03-30 RX ORDER — MAGNESIUM SULFATE 500 MG/ML
1 VIAL (ML) INJECTION ONCE
Refills: 0 | Status: COMPLETED | OUTPATIENT
Start: 2022-03-30 | End: 2022-03-31

## 2022-03-30 RX ORDER — POTASSIUM CHLORIDE 20 MEQ
10 PACKET (EA) ORAL
Refills: 0 | Status: DISCONTINUED | OUTPATIENT
Start: 2022-03-30 | End: 2022-03-30

## 2022-03-30 RX ORDER — POTASSIUM CHLORIDE 20 MEQ
10 PACKET (EA) ORAL
Refills: 0 | Status: COMPLETED | OUTPATIENT
Start: 2022-03-30 | End: 2022-03-31

## 2022-03-30 RX ORDER — HEPARIN SODIUM 5000 [USP'U]/ML
5000 INJECTION INTRAVENOUS; SUBCUTANEOUS EVERY 12 HOURS
Refills: 0 | Status: DISCONTINUED | OUTPATIENT
Start: 2022-03-30 | End: 2022-03-31

## 2022-03-30 RX ORDER — POTASSIUM CHLORIDE 20 MEQ
10 PACKET (EA) ORAL
Refills: 0 | Status: COMPLETED | OUTPATIENT
Start: 2022-03-30 | End: 2022-03-30

## 2022-03-30 RX ORDER — SODIUM CHLORIDE 9 MG/ML
1000 INJECTION, SOLUTION INTRAVENOUS
Refills: 0 | Status: DISCONTINUED | OUTPATIENT
Start: 2022-03-30 | End: 2022-03-31

## 2022-03-30 RX ADMIN — Medication 5 MILLIGRAM(S): at 18:52

## 2022-03-30 RX ADMIN — DORZOLAMIDE HYDROCHLORIDE 1 DROP(S): 20 SOLUTION/ DROPS OPHTHALMIC at 12:01

## 2022-03-30 RX ADMIN — Medication 100 MILLIEQUIVALENT(S): at 11:50

## 2022-03-30 RX ADMIN — Medication 100 MILLIEQUIVALENT(S): at 10:23

## 2022-03-30 RX ADMIN — Medication 1 DROP(S): at 08:42

## 2022-03-30 RX ADMIN — SODIUM CHLORIDE 75 MILLILITER(S): 9 INJECTION, SOLUTION INTRAVENOUS at 18:52

## 2022-03-30 RX ADMIN — DORZOLAMIDE HYDROCHLORIDE 1 DROP(S): 20 SOLUTION/ DROPS OPHTHALMIC at 18:54

## 2022-03-30 RX ADMIN — LATANOPROST 1 DROP(S): 0.05 SOLUTION/ DROPS OPHTHALMIC; TOPICAL at 22:21

## 2022-03-30 RX ADMIN — LEVETIRACETAM 400 MILLIGRAM(S): 250 TABLET, FILM COATED ORAL at 22:08

## 2022-03-30 RX ADMIN — Medication 5 MILLIGRAM(S): at 00:00

## 2022-03-30 RX ADMIN — Medication 1 DROP(S): at 18:53

## 2022-03-30 RX ADMIN — LEVETIRACETAM 400 MILLIGRAM(S): 250 TABLET, FILM COATED ORAL at 10:22

## 2022-03-30 RX ADMIN — Medication 100 MILLIEQUIVALENT(S): at 09:07

## 2022-03-30 RX ADMIN — HEPARIN SODIUM 5000 UNIT(S): 5000 INJECTION INTRAVENOUS; SUBCUTANEOUS at 18:52

## 2022-03-30 RX ADMIN — Medication 5 MILLIGRAM(S): at 12:02

## 2022-03-30 RX ADMIN — Medication 5 MILLIGRAM(S): at 08:42

## 2022-03-30 NOTE — PROGRESS NOTE ADULT - ASSESSMENT
80 yo F w/ PMHx of perforated diverticulitis s/p Rosales procedure, colostomy, abdominal abscess with wound s/p IR drainage, Respiratory Failure s/p Trache, CVA w/ functional quadriplegia, aphasia, dysphagia s/p PEG (on Jevity 1.5 @ 70cchr x 18 hrs, & water flushes 325ml q 6 hrs, Afib, GIB, HTN, HLD, Seizure d/o, Hypothyroidism, Glaucoma, Depression p/w PEG tube dislodgement for unknown amount of time     Problem/Plan - 1:  ·  Problem: PEG tube malfunction.   ·  Plan:  S/P  PEG .      Problem/Plan - 2:  ·  Problem: Hypokalemia.   ·  Plan: Normal now.      Problem/Plan - 3:  ·  Problem: Stroke.   ·  Plan: Resume ASA, Plavix, statin once PEG tube replaced.     Problem/Plan - 4:  ·  Problem: Chronic respiratory failure with hypoxia.   ·  Plan: Pt on 2l NC via Trache collar.     Problem/Plan - 5:  ·  Problem: Diverticulitis of intestine with perforation and abscess without bleeding.   ·  Plan: Surgery c/s in chart- small fluid collection but nontoxic, if drainage needed would be done by IR.  Wound care eval.     Problem/Plan - 6:  ·  Problem: History of atrial fibrillation.   ·  Plan: check EKG, Pt off A/C due to recent GIB as per chart.     Problem/Plan - 7:  ·  Problem: Seizure disorder.   ·  Plan: continue levetiracetam bid.     Problem/Plan - 8:  ·  Problem: DVT, lower extremity.   ·  Plan: ?Subacute vs Chronic, not started on A/C as per chart due to recent GIB.     Problem/Plan - 9:  ·  Problem: HTN (hypertension).   ·  Plan: Resume carvedilol & amlodipine when PEG replaced.     Problem/Plan - 10:  ·  Problem: Hypothyroidism.   ·  Plan; Resume Levothyroxine when PEG replaced.     Problem/Plan - 11:  ·  Problem: Glaucoma.   ·  Plan: continue Timolol, Dorzolamide, Latanoprost.       D/W pts son over the phone in detail . Also discussed about holding PO meds till restarting TF .

## 2022-03-30 NOTE — PROGRESS NOTE ADULT - SUBJECTIVE AND OBJECTIVE BOX
Date of Service  : 03-30-22     INTERVAL HPI/OVERNIGHT EVENTS: S/P PEG Replacement.   Vital Signs Last 24 Hrs  T(C): 36.5 (30 Mar 2022 17:39), Max: 37.3 (29 Mar 2022 21:31)  T(F): 97.7 (30 Mar 2022 17:39), Max: 99.1 (29 Mar 2022 21:31)  HR: 88 (30 Mar 2022 17:39) (76 - 90)  BP: 152/87 (30 Mar 2022 17:39) (148/78 - 167/88)  BP(mean): --  RR: 18 (30 Mar 2022 17:39) (17 - 18)  SpO2: 100% (30 Mar 2022 17:39) (97% - 100%)  I&O's Summary    29 Mar 2022 07:01  -  30 Mar 2022 07:00  --------------------------------------------------------  IN: 925 mL / OUT: 600 mL / NET: 325 mL    30 Mar 2022 07:01  -  30 Mar 2022 21:20  --------------------------------------------------------  IN: 1000 mL / OUT: 300 mL / NET: 700 mL      MEDICATIONS  (STANDING):  dorzolamide 2% Ophthalmic Solution 1 Drop(s) Both EYES <User Schedule>  heparin   Injectable 5000 Unit(s) SubCutaneous every 12 hours  influenza  Vaccine (HIGH DOSE) 0.7 milliLiter(s) IntraMuscular once  latanoprost 0.005% Ophthalmic Solution 1 Drop(s) Both EYES at bedtime  levETIRAcetam  IVPB 500 milliGRAM(s) IV Intermittent every 12 hours  metoprolol tartrate Injectable 5 milliGRAM(s) IV Push every 6 hours  sodium chloride 0.9% 1000 milliLiter(s) (75 mL/Hr) IV Continuous <Continuous>  timolol 0.5% Solution 1 Drop(s) Both EYES two times a day    MEDICATIONS  (PRN):  hydrALAZINE Injectable 5 milliGRAM(s) IV Push every 6 hours PRN SBP > 160    LABS:                        11.8   12.43 )-----------( 402      ( 30 Mar 2022 07:35 )             38.3     03-30    138  |  104  |  7   ----------------------------<  111<H>  3.3<L>   |  21<L>  |  0.39<L>    Ca    9.1      30 Mar 2022 14:56  Phos  3.4     03-30  Mg     1.80     03-30      PT/INR - ( 29 Mar 2022 04:03 )   PT: 12.8 sec;   INR: 1.10 ratio         PTT - ( 29 Mar 2022 04:03 )  PTT:28.8 sec        Consultant(s) Notes Reviewed:  [x ] YES  [ ] NO    PHYSICAL EXAM:  GENERAL: NAD, Oxygen.   HEAD:  Atraumatic, Normocephalic  NECK: Supple, No JVD, Normal thyroid  NERVOUS SYSTEM:  Alert   CHEST/LUNG: Good air entry bilateral   HEART: Regular rate and rhythm; No murmurs, rubs, or gallops  ABDOMEN: Soft, Nontender, Nondistended; Bowel sounds present. Ostomy and pEG+  EXTREMITIES:   No clubbing, cyanosis, or edema      Care Discussed with Consultants/Other Providers [ x] YES  [ ] NO

## 2022-03-30 NOTE — PROGRESS NOTE ADULT - ASSESSMENT
82 yo F with PMHx HTN, HLD, Hypothyroidism, Depression, seizure d/o, RLE DVT, Cholelithiasis s/p recent Hospitalization in [facility] from 12/22 -> 2/11/22 for acute perforated diverticulitis s/p Rosales 12/25, colostomy bag 12/26, hospitalization c/b IR drainage for abdominal abscess 1/3 Cx grew e.coli & bacteriodes, also required Mechanical Ventilation for Acute Respiratory Failure with Tracheostomy done 1/7.  PEG done 1/18, developed bleeding through ostomy, EGD on 2/1 showed gastritis. T's showed Acute left MCA infarct- could be 2/2 to carotid occlusion vs afib, new RLL aspiration pneumonia, seen by ID, completed antibiotics, seen by VascSx, not surgical candidate. Pt aphasic sent from [facility] for dislodged Peg tube, after staff noted pt has blood to her abdomen. Nephrology consulted for Hypokalemia.     A/P    Hypokalemia   likely 2/2 GI loss   monitor input and output closely   low today   s/p 10meq [id number]   suggest to add kcl 20meq in ivf, repeat K at 5pm   monitor K closely     Hypomagnesia   s/p Mg 2g ivp x 2doses   replete as needed  monitor Mg     Hypocalcemia   better     HTN   optimal   monitor BP closely    82 yo F with PMHx HTN, HLD, Hypothyroidism, Depression, seizure d/o, RLE DVT, Cholelithiasis s/p recent Hospitalization in [facility] from 12/22 -> 2/11/22 for acute perforated diverticulitis s/p Rosales 12/25, colostomy bag 12/26, hospitalization c/b IR drainage for abdominal abscess 1/3 Cx grew e.coli & bacteriodes, also required Mechanical Ventilation for Acute Respiratory Failure with Tracheostomy done 1/7.  PEG done 1/18, developed bleeding through ostomy, EGD on 2/1 showed gastritis. T's showed Acute left MCA infarct- could be 2/2 to carotid occlusion vs afib, new RLL aspiration pneumonia, seen by ID, completed antibiotics, seen by VascSx, not surgical candidate. Pt aphasic sent from [facility] for dislodged Peg tube, after staff noted pt has blood to her abdomen. Nephrology consulted for Hypokalemia.     A/P    Hypokalemia   likely 2/2 GI loss   monitor input and output closely   low today   s/p 10meq [id number]   suggest to add kcl 10meq in ivf, repeat K at 5pm   monitor K closely     Hypomagnesia   s/p Mg 2g ivp x 2doses   replete as needed  monitor Mg     Hypocalcemia   better     HTN   optimal   monitor BP closely

## 2022-03-30 NOTE — PROGRESS NOTE ADULT - SUBJECTIVE AND OBJECTIVE BOX
[facility]   MD ELOISE ALVARADO MD, PA INJGALINDO BUNNY HERNANDEZ    TEL:  OFFICE: [phone]    From 5pm-7am Answering Service [phone]    -- RENAL FOLLOW UP NOTE ---Date of Service 03-30-22 @ 12:09    Patient is a 81y old  Female who presents with a chief complaint of     Patient seen and examined at bedside. No chest pain/sob    VITALS:  T(F): 97.9 (03-30-22 @ 10:26), Max: 99.1 (03-29-22 @ 17:31)  HR: 76 (03-30-22 @ 10:26)  BP: 148/78 (03-30-22 @ 10:26)  RR: 18 (03-30-22 @ 10:26)  SpO2: 100% (03-30-22 @ 10:26)  Wt(kg): --    03-29 @ 07:01  -  03-30 @ 07:00  --------------------------------------------------------  IN: 925 mL / OUT: 600 mL / NET: 325 mL          PHYSICAL EXAM:  Constitutional: NAD  Neck: No JVD  Respiratory: CTAB, no wheezes, rales or rhonchi  Cardiovascular: S1, S2, RRR  Gastrointestinal: BS+, soft, NT/ND  Extremities: No peripheral edema    Hospital Medications:   MEDICATIONS  (STANDING):  dextrose 5% + sodium chloride 0.9%. 1000 milliLiter(s) (75 mL/Hr) IV Continuous <Continuous>  dorzolamide 2% Ophthalmic Solution 1 Drop(s) Both EYES <User Schedule>  influenza  Vaccine (HIGH DOSE) 0.7 milliLiter(s) IntraMuscular once  latanoprost 0.005% Ophthalmic Solution 1 Drop(s) Both EYES at bedtime  levETIRAcetam  IVPB 500 milliGRAM(s) IV Intermittent every 12 hours  metoprolol tartrate Injectable 5 milliGRAM(s) IV Push every 6 hours  timolol 0.5% Solution 1 Drop(s) Both EYES two times a day      LABS:  03-30    138  |  101  |  8   ----------------------------<  113<H>  2.8<LL>   |  21<L>  |  0.37<L>    Ca    8.9      30 Mar 2022 07:35  Phos  2.8     03-30  Mg     1.80     03-30      Creatinine Trend: 0.37 <--, 0.36 <--, 0.39 <--, 0.37 <--, 0.32 <--, 0.32 <--, 0.40 <--    Phosphorus Level, Serum: 2.8 mg/dL (03-30 @ 07:35)    calcium--  intact pth32  parathyroid hormone intact, serum--                            11.8   12.43 )-----------( 402      ( 30 Mar 2022 07:35 )             38.3     Urine Studies:  Urinalysis - [03-26-22 @ 23:19]      Color Yellow / Appearance Slightly Turbid / SG 1.025 / pH 8.0      Gluc Negative / Ketone Negative  / Bili Negative / Urobili <2 mg/dL       Blood Negative / Protein 100 mg/dL / Leuk Est Negative / Nitrite Negative      RBC 0-1 / WBC 0-1 / Hyaline  / Gran  / Sq Epi  / Non Sq Epi  / Bacteria       PTH -- (Ca --)      [03-30-22 @ 07:35]   32  TSH 12.200      [01-14-22 @ 08:33]  Lipid: chol 158, , HDL 25, LDL --      [02-03-22 @ 15:22]        RADIOLOGY & ADDITIONAL STUDIES:

## 2022-03-30 NOTE — PROGRESS NOTE ADULT - SUBJECTIVE AND OBJECTIVE BOX
Date of service: 03/30/22    chief complaint: dislodged PEG tube     extended hpi: 80 yo F with PMHx HTN, HLD, Hypothyroidism, Depression, seizure d/o, RLE DVT, Cholelithiasis s/p recent Hospitalization in [facility] from 12/22 -> 2/11/22 for acute perforated diverticulitis s/p Rosales 12/25, colostomy bag 12/26, hospitalization c/b IR drainage for abdominal abscess 1/3 Cx grew e.coli & bacteriodes, also required Mechanical Ventilation for Acute Respiratory Failure with Tracheostomy done 1/7.  PEG done 1/18, developed bleeding through ostomy for which she was transfused 1/28, EGD on 2/1 showed gastritis. Developed fevers on 1/30 and CT's showed Acute left MCA infarct- could be 2/2 to carotid occlusion vs afib, new RLL aspiration pneumonia, seen by ID, completed antibiotics, seen by VascSx, not surgical candidate. Pt aphasic sent from [facility] for dislodged Peg tube, after staff noted pt has blood to her abdomen.     S: pt. aphasic, no acute distress; ros limited.     Review of Systems:   Constitutional: [ ] fevers, [ ] chills.   Skin: [ ] dry skin. [ ] rashes.  Psychiatric: [ ] depression, [ ] anxiety.   Gastrointestinal: [ ] BRBPR, [ ] melena.   Neurological: [ ] confusion. [ ] seizures. [ ] shuffling gait.   Ears,Nose,Mouth and Throat: [ ] ear pain [ ] sore throat.   Eyes: [ ] diplopia.   Respiratory: [ ] hemoptysis. [ ] shortness of breath  Cardiovascular: See HPI above  Hematologic/Lymphatic: [ ] anemia. [ ] painful nodes. [ ] prolonged bleeding.   Genitourinary: [ ] hematuria. [ ] flank pain.   Endocrine: [ ] significant change in weight. [ ] intolerance to heat and cold.     Review of systems [ ] otherwise negative, [x ] otherwise unable to obtain    FH: no family history of sudden cardiac death in first degree relatives    SH: [ ] tobacco, [ ] alcohol, [ ] drugs        dextrose 5% + sodium chloride 0.9%. 1000 milliLiter(s) IV Continuous <Continuous>  dorzolamide 2% Ophthalmic Solution 1 Drop(s) Both EYES <User Schedule>  hydrALAZINE Injectable 5 milliGRAM(s) IV Push every 6 hours PRN  influenza  Vaccine (HIGH DOSE) 0.7 milliLiter(s) IntraMuscular once  latanoprost 0.005% Ophthalmic Solution 1 Drop(s) Both EYES at bedtime  levETIRAcetam  IVPB 500 milliGRAM(s) IV Intermittent every 12 hours  metoprolol tartrate Injectable 5 milliGRAM(s) IV Push every 6 hours  timolol 0.5% Solution 1 Drop(s) Both EYES two times a day                            11.8   12.43 )-----------( 402      ( 30 Mar 2022 07:35 )             38.3       03-30    138  |  101  |  8   ----------------------------<  113<H>  2.8<LL>   |  21<L>  |  0.37<L>    Ca    8.9      30 Mar 2022 07:35  Phos  2.8     03-30  Mg     1.80     03-30              T(C): 36.4 (03-30-22 @ 12:02), Max: 37.3 (03-29-22 @ 17:31)  HR: 78 (03-30-22 @ 12:02) (65 - 90)  BP: 167/88 (03-30-22 @ 12:02) (148/78 - 167/88)  RR: 17 (03-30-22 @ 12:02) (17 - 18)  SpO2: 100% (03-30-22 @ 12:02) (97% - 100%)  Wt(kg): --    I&O's Summary    29 Mar 2022 07:01  -  30 Mar 2022 07:00  --------------------------------------------------------  IN: 925 mL / OUT: 600 mL / NET: 325 mL        General: no acute distress   Head: Normocephalic and atraumatic.   Neck: No JVD. No bruits. Supple. Does not appear to be enlarged.   Cardiovascular: + S1,S2 ; RRR Soft systolic murmur at the left lower sternal border. No rubs noted.    Lungs: CTA b/l. No rhonchi, rales or wheezes.   Abdomen: + BS, soft. Non tender. Non distended. No rebound. No guarding.   Extremities: No clubbing/cyanosis/edema.   Psychiatric: unable to assess     TTE: 1. Left ventricular ejection fraction, by visual estimation, is 60 to   65%.   2. Normal global left ventricular systolic function.   3. Mild mitral valve regurgitation.   4.Mild thickening of the anterior and posterior mitral valve leaflets.   5. Mild tricuspid regurgitation.   6. Mild to moderate aortic regurgitation.   7. Sclerotic aortic valve with normal opening.    A/P:  80 yo F with PMHx HTN, HLD, Hypothyroidism, Depression, seizure d/o, RLE DVT, Cholelithiasis s/p recent Hospitalization in [facility] from 12/22 -> 2/11/22 for acute perforated diverticulitis s/p Rosales 12/25, colostomy bag 12/26, hospitalization c/b IR drainage for abdominal abscess 1/3 Cx grew e.coli & bacteriodes, also required Mechanical Ventilation for Acute Respiratory Failure with Tracheostomy done 1/7.  PEG done 1/18, developed bleeding through ostomy for which she was transfused 1/28, EGD on 2/1 showed gastritis. Developed fevers on 1/30 and CT's showed Acute left MCA infarct- could be 2/2 to carotid occlusion vs afib, new RLL aspiration pneumonia, seen by ID, completed antibiotics, seen by VascSx, not surgical candidate. [facility], after staff noted pt has blood to her abdomen.     --Cardiology consulted for elevated BP while Peg dislodged  --Currently on IV hydralazine and BB - restart PO medications when able to tolerate   --check 12 lead EKG  --prior chart reviewed, pt with recnt CVA and found with AFib and Carotid occlusion.  AC held initially while monitoring for hemorrhagic conversion and then in the setting of GI bleed req PRBCs.  She was continued on DAPT.  --further workup pending above       Danny Casiano MD

## 2022-03-31 ENCOUNTER — TRANSCRIPTION ENCOUNTER (OUTPATIENT)
Age: 82
End: 2022-03-31

## 2022-03-31 LAB
A1C WITH ESTIMATED AVERAGE GLUCOSE RESULT: 4.7 % — SIGNIFICANT CHANGE UP (ref 4–5.6)
ANION GAP SERPL CALC-SCNC: 14 MMOL/L — SIGNIFICANT CHANGE UP (ref 7–14)
BUN SERPL-MCNC: 6 MG/DL — LOW (ref 7–23)
CALCIUM SERPL-MCNC: 8.6 MG/DL — SIGNIFICANT CHANGE UP (ref 8.4–10.5)
CHLORIDE SERPL-SCNC: 106 MMOL/L — SIGNIFICANT CHANGE UP (ref 98–107)
CO2 SERPL-SCNC: 21 MMOL/L — LOW (ref 22–31)
CREAT SERPL-MCNC: 0.41 MG/DL — LOW (ref 0.5–1.3)
EGFR: 100 ML/MIN/1.73M2 — SIGNIFICANT CHANGE UP
ESTIMATED AVERAGE GLUCOSE: 88 — SIGNIFICANT CHANGE UP
GLUCOSE SERPL-MCNC: 86 MG/DL — SIGNIFICANT CHANGE UP (ref 70–99)
HCT VFR BLD CALC: 33.1 % — LOW (ref 34.5–45)
HGB BLD-MCNC: 10.2 G/DL — LOW (ref 11.5–15.5)
MAGNESIUM SERPL-MCNC: 1.7 MG/DL — SIGNIFICANT CHANGE UP (ref 1.6–2.6)
MCHC RBC-ENTMCNC: 27.2 PG — SIGNIFICANT CHANGE UP (ref 27–34)
MCHC RBC-ENTMCNC: 30.8 GM/DL — LOW (ref 32–36)
MCV RBC AUTO: 88.3 FL — SIGNIFICANT CHANGE UP (ref 80–100)
NRBC # BLD: 0 /100 WBCS — SIGNIFICANT CHANGE UP
NRBC # FLD: 0 K/UL — SIGNIFICANT CHANGE UP
PHOSPHATE SERPL-MCNC: 3.7 MG/DL — SIGNIFICANT CHANGE UP (ref 2.5–4.5)
PLATELET # BLD AUTO: 367 K/UL — SIGNIFICANT CHANGE UP (ref 150–400)
POTASSIUM SERPL-MCNC: 3.2 MMOL/L — LOW (ref 3.5–5.3)
POTASSIUM SERPL-SCNC: 3.2 MMOL/L — LOW (ref 3.5–5.3)
RBC # BLD: 3.75 M/UL — LOW (ref 3.8–5.2)
RBC # FLD: 15.9 % — HIGH (ref 10.3–14.5)
SODIUM SERPL-SCNC: 141 MMOL/L — SIGNIFICANT CHANGE UP (ref 135–145)
WBC # BLD: 6.81 K/UL — SIGNIFICANT CHANGE UP (ref 3.8–10.5)
WBC # FLD AUTO: 6.81 K/UL — SIGNIFICANT CHANGE UP (ref 3.8–10.5)

## 2022-03-31 PROCEDURE — 93010 ELECTROCARDIOGRAM REPORT: CPT

## 2022-03-31 RX ORDER — APIXABAN 2.5 MG/1
5 TABLET, FILM COATED ORAL EVERY 12 HOURS
Refills: 0 | Status: DISCONTINUED | OUTPATIENT
Start: 2022-04-01 | End: 2022-04-13

## 2022-03-31 RX ORDER — CARVEDILOL PHOSPHATE 80 MG/1
3.12 CAPSULE, EXTENDED RELEASE ORAL EVERY 12 HOURS
Refills: 0 | Status: DISCONTINUED | OUTPATIENT
Start: 2022-03-31 | End: 2022-04-22

## 2022-03-31 RX ORDER — AMLODIPINE BESYLATE 2.5 MG/1
10 TABLET ORAL DAILY
Refills: 0 | Status: DISCONTINUED | OUTPATIENT
Start: 2022-03-31 | End: 2022-04-22

## 2022-03-31 RX ORDER — POTASSIUM CHLORIDE 20 MEQ
10 PACKET (EA) ORAL
Refills: 0 | Status: COMPLETED | OUTPATIENT
Start: 2022-03-31 | End: 2022-03-31

## 2022-03-31 RX ORDER — CLOPIDOGREL BISULFATE 75 MG/1
75 TABLET, FILM COATED ORAL DAILY
Refills: 0 | Status: DISCONTINUED | OUTPATIENT
Start: 2022-03-31 | End: 2022-04-13

## 2022-03-31 RX ORDER — APIXABAN 2.5 MG/1
1 TABLET, FILM COATED ORAL
Qty: 60 | Refills: 0
Start: 2022-03-31 | End: 2022-04-29

## 2022-03-31 RX ORDER — DEXTROSE MONOHYDRATE, SODIUM CHLORIDE, AND POTASSIUM CHLORIDE 50; .745; 4.5 G/1000ML; G/1000ML; G/1000ML
1000 INJECTION, SOLUTION INTRAVENOUS
Refills: 0 | Status: DISCONTINUED | OUTPATIENT
Start: 2022-03-31 | End: 2022-04-01

## 2022-03-31 RX ORDER — AMLODIPINE BESYLATE 2.5 MG/1
10 TABLET ORAL DAILY
Refills: 0 | Status: DISCONTINUED | OUTPATIENT
Start: 2022-03-31 | End: 2022-03-31

## 2022-03-31 RX ORDER — ATORVASTATIN CALCIUM 80 MG/1
40 TABLET, FILM COATED ORAL AT BEDTIME
Refills: 0 | Status: DISCONTINUED | OUTPATIENT
Start: 2022-03-31 | End: 2022-04-22

## 2022-03-31 RX ORDER — ASPIRIN/CALCIUM CARB/MAGNESIUM 324 MG
81 TABLET ORAL DAILY
Refills: 0 | Status: DISCONTINUED | OUTPATIENT
Start: 2022-03-31 | End: 2022-04-13

## 2022-03-31 RX ORDER — CARVEDILOL PHOSPHATE 80 MG/1
3.12 CAPSULE, EXTENDED RELEASE ORAL EVERY 12 HOURS
Refills: 0 | Status: DISCONTINUED | OUTPATIENT
Start: 2022-03-31 | End: 2022-03-31

## 2022-03-31 RX ADMIN — Medication 100 GRAM(S): at 07:39

## 2022-03-31 RX ADMIN — Medication 1 DROP(S): at 06:02

## 2022-03-31 RX ADMIN — Medication 100 MILLIEQUIVALENT(S): at 00:58

## 2022-03-31 RX ADMIN — Medication 5 MILLIGRAM(S): at 00:33

## 2022-03-31 RX ADMIN — HEPARIN SODIUM 5000 UNIT(S): 5000 INJECTION INTRAVENOUS; SUBCUTANEOUS at 06:03

## 2022-03-31 RX ADMIN — Medication 5 MILLIGRAM(S): at 06:02

## 2022-03-31 RX ADMIN — Medication 100 MILLIEQUIVALENT(S): at 08:56

## 2022-03-31 RX ADMIN — Medication 100 MILLIEQUIVALENT(S): at 11:35

## 2022-03-31 RX ADMIN — CARVEDILOL PHOSPHATE 3.12 MILLIGRAM(S): 80 CAPSULE, EXTENDED RELEASE ORAL at 17:55

## 2022-03-31 RX ADMIN — ATORVASTATIN CALCIUM 40 MILLIGRAM(S): 80 TABLET, FILM COATED ORAL at 22:31

## 2022-03-31 RX ADMIN — DORZOLAMIDE HYDROCHLORIDE 1 DROP(S): 20 SOLUTION/ DROPS OPHTHALMIC at 18:32

## 2022-03-31 RX ADMIN — Medication 100 MILLIEQUIVALENT(S): at 09:58

## 2022-03-31 RX ADMIN — Medication 100 MILLIEQUIVALENT(S): at 01:59

## 2022-03-31 RX ADMIN — Medication 100 MILLIEQUIVALENT(S): at 02:59

## 2022-03-31 RX ADMIN — DEXTROSE MONOHYDRATE, SODIUM CHLORIDE, AND POTASSIUM CHLORIDE 50 MILLILITER(S): 50; .745; 4.5 INJECTION, SOLUTION INTRAVENOUS at 17:56

## 2022-03-31 RX ADMIN — AMLODIPINE BESYLATE 10 MILLIGRAM(S): 2.5 TABLET ORAL at 17:55

## 2022-03-31 RX ADMIN — DORZOLAMIDE HYDROCHLORIDE 1 DROP(S): 20 SOLUTION/ DROPS OPHTHALMIC at 08:56

## 2022-03-31 RX ADMIN — HEPARIN SODIUM 5000 UNIT(S): 5000 INJECTION INTRAVENOUS; SUBCUTANEOUS at 17:54

## 2022-03-31 RX ADMIN — LEVETIRACETAM 400 MILLIGRAM(S): 250 TABLET, FILM COATED ORAL at 22:31

## 2022-03-31 RX ADMIN — Medication 81 MILLIGRAM(S): at 17:54

## 2022-03-31 RX ADMIN — LATANOPROST 1 DROP(S): 0.05 SOLUTION/ DROPS OPHTHALMIC; TOPICAL at 22:21

## 2022-03-31 RX ADMIN — Medication 1 DROP(S): at 17:55

## 2022-03-31 RX ADMIN — Medication 5 MILLIGRAM(S): at 11:43

## 2022-03-31 RX ADMIN — CLOPIDOGREL BISULFATE 75 MILLIGRAM(S): 75 TABLET, FILM COATED ORAL at 17:54

## 2022-03-31 RX ADMIN — LEVETIRACETAM 400 MILLIGRAM(S): 250 TABLET, FILM COATED ORAL at 09:58

## 2022-03-31 NOTE — DISCHARGE NOTE PROVIDER - NSDCMRMEDTOKEN_GEN_ALL_CORE_FT
amLODIPine 10 mg oral tablet: 1 tab(s) by gastrostomy tube once a day  aspirin 81 mg oral tablet, chewable: 1 tab(s) by gastrostomy tube once a day  atorvastatin 40 mg oral tablet: 1 tab(s) by gastrostomy tube once a day (at bedtime)  carvedilol 3.125 mg oral tablet: 1 tab(s) by gastrostomy tube every 12 hours  clopidogrel 75 mg oral tablet: 1 tab(s) by gastrostomy tube once a day  dorzolamide 2% ophthalmic solution: 1 drop(s) to each eye 2 times a day  doxazosin 2 mg oral tablet: 1 tab(s) orally once a day (at bedtime)  escitalopram 20 mg oral tablet: 1 tab(s) by gastrostomy tube once a day  famotidine 40 mg oral tablet: 1 tab(s) by gastrostomy tube once a day (at bedtime)  latanoprost 0.005% ophthalmic solution: 1 drop(s) to each eye once a day (in the evening)  levETIRAcetam 500 mg oral tablet: 1 tab(s) by gastrostomy tube 2 times a day  levothyroxine 75 mcg (0.075 mg) oral tablet: 1 tab(s) by gastrostomy tube once a day  pantoprazole 40 mg oral granule, delayed release: 40 milligram(s) by gastrostomy tube once a day  timolol hemihydrate 0.5% ophthalmic solution: 1 drop(s) to each eye 2 times a day   amLODIPine 10 mg oral tablet: 1 tab(s) by gastrostomy tube once a day  aspirin 81 mg oral tablet, chewable: 1 tab(s) by gastrostomy tube once a day  atorvastatin 40 mg oral tablet: 1 tab(s) by gastrostomy tube once a day (at bedtime)  carvedilol 3.125 mg oral tablet: 1 tab(s) by gastrostomy tube every 12 hours  clopidogrel 75 mg oral tablet: 1 tab(s) by gastrostomy tube once a day  dorzolamide 2% ophthalmic solution: 1 drop(s) to each eye 2 times a day  doxazosin 2 mg oral tablet: 1 tab(s) orally once a day (at bedtime)  Eliquis 5 mg oral tablet: 1 tab(s) orally every 12 hours     **Price Check- pgr [phone]  escitalopram 20 mg oral tablet: 1 tab(s) by gastrostomy tube once a day  famotidine 40 mg oral tablet: 1 tab(s) by gastrostomy tube once a day (at bedtime)  latanoprost 0.005% ophthalmic solution: 1 drop(s) to each eye once a day (in the evening)  levETIRAcetam 500 mg oral tablet: 1 tab(s) by gastrostomy tube 2 times a day  levothyroxine 75 mcg (0.075 mg) oral tablet: 1 tab(s) by gastrostomy tube once a day  pantoprazole 40 mg oral granule, delayed release: 40 milligram(s) by gastrostomy tube once a day  timolol hemihydrate 0.5% ophthalmic solution: 1 drop(s) to each eye 2 times a day   amLODIPine 10 mg oral tablet: 1 tab(s) by gastrostomy tube once a day  aspirin 81 mg oral tablet, chewable: 1 tab(s) by gastrostomy tube once a day  atorvastatin 40 mg oral tablet: 1 tab(s) by gastrostomy tube once a day (at bedtime)  carvedilol 3.125 mg oral tablet: 1 tab(s) by gastrostomy tube every 12 hours  clopidogrel 75 mg oral tablet: 1 tab(s) by gastrostomy tube once a day  dorzolamide 2% ophthalmic solution: 1 drop(s) to each eye 2 times a day  doxazosin 2 mg oral tablet: 1 tab(s) orally once a day (at bedtime)  Eliquis 5 mg oral tablet: 1 tab(s) orally every 12 hours     **Price Check- pgr [phone]  escitalopram 20 mg oral tablet: 1 tab(s) by gastrostomy tube once a day  famotidine 40 mg oral tablet: 1 tab(s) by gastrostomy tube once a day (at bedtime)  latanoprost 0.005% ophthalmic solution: 1 drop(s) to each eye once a day (in the evening)  levETIRAcetam 500 mg oral tablet: 1 tab(s) by gastrostomy tube 2 times a day  levothyroxine 75 mcg (0.075 mg) oral tablet: 1 tab(s) by gastrostomy tube once a day  pantoprazole 40 mg oral granule, delayed release: 40 milligram(s) by gastrostomy tube once a day  polyethylene glycol 3350 oral powder for reconstitution: 1 application orally once a day  timolol hemihydrate 0.5% ophthalmic solution: 1 drop(s) to each eye 2 times a day  traZODone: 25 milligram(s) orally once a day (at bedtime)   acetaminophen 325 mg oral tablet: 2 tab(s) orally every 6 hours, As needed, Mild Pain (1 - 3), Moderate Pain (4 - 6)  amLODIPine 10 mg oral tablet: 1 tab(s) by gastrostomy tube once a day  aspirin 81 mg oral tablet, chewable: 1 tab(s) by gastrostomy tube once a day  atorvastatin 40 mg oral tablet: 1 tab(s) by gastrostomy tube once a day (at bedtime)  carvedilol 3.125 mg oral tablet: 1 tab(s) by gastrostomy tube every 12 hours  dorzolamide 2% ophthalmic solution: 1 drop(s) to each eye 2 times a day  Eliquis 5 mg oral tablet: 1 tab(s) orally every 12 hours     **Price Check- pgr [phone]  famotidine 40 mg oral tablet: 1 tab(s) by gastrostomy tube once a day (at bedtime)  latanoprost 0.005% ophthalmic solution: 1 drop(s) to each eye once a day (in the evening)  levETIRAcetam 500 mg oral tablet: 1 tab(s) by gastrostomy tube 2 times a day  levothyroxine 75 mcg (0.075 mg) oral tablet: 1 tab(s) by gastrostomy tube once a day  melatonin 3 mg oral tablet: 1 tab(s) by gastrostomy tube once a day (at bedtime) at 8pm  OLANZapine 2.5 mg oral tablet: 1 tab(s) orally every 8 hours, As needed, agitation  polyethylene glycol 3350 oral powder for reconstitution: 1 application by gastrostomy tube once a day  timolol hemihydrate 0.5% ophthalmic solution: 1 drop(s) to each eye 2 times a day  traZODone 50 mg oral tablet: 1 tab(s) orally once a day (at bedtime) at 8 pm   acetaminophen 325 mg oral tablet: 2 tab(s) orally every 6 hours, As needed, Mild Pain (1 - 3), Moderate Pain (4 - 6)  amLODIPine 10 mg oral tablet: 1 tab(s) by gastrostomy tube once a day  aspirin 81 mg oral tablet, chewable: 1 tab(s) by gastrostomy tube once a day  atorvastatin 40 mg oral tablet: 1 tab(s) by gastrostomy tube once a day (at bedtime)  carvedilol 3.125 mg oral tablet: 1 tab(s) by gastrostomy tube every 12 hours  dorzolamide 2% ophthalmic solution: 1 drop(s) to each eye 2 times a day  Eliquis 5 mg oral tablet: 1 tab(s) orally every 12 hours     **Price Check- pgr [phone]  famotidine 40 mg oral tablet: 1 tab(s) by gastrostomy tube once a day (at bedtime)  latanoprost 0.005% ophthalmic solution: 1 drop(s) to each eye once a day (in the evening)  levETIRAcetam 500 mg oral tablet: 1 tab(s) by gastrostomy tube 2 times a day  levothyroxine 75 mcg (0.075 mg) oral tablet: 1 tab(s) by gastrostomy tube once a day  melatonin 3 mg oral tablet: 1 tab(s) by gastrostomy tube once a day (at bedtime) at 8pm  OLANZapine 2.5 mg oral tablet: 1 tab(s) orally every 8 hours, As needed, agitation  polyethylene glycol 3350 oral powder for reconstitution: 1 application by gastrostomy tube once a day  QUEtiapine 25 mg oral tablet: 1 tab(s) orally every 6 hours, As needed, anxiety  timolol hemihydrate 0.5% ophthalmic solution: 1 drop(s) to each eye 2 times a day  traZODone 50 mg oral tablet: 1 tab(s) orally once a day (at bedtime) at 8 pm

## 2022-03-31 NOTE — PROGRESS NOTE ADULT - SUBJECTIVE AND OBJECTIVE BOX
[facility] [place] [facility]   MD ELOISE ALVARADO MD, PA INJGALINDO BUNNY HERNANDEZ    TEL:  OFFICE: [phone]    From 5pm-7am Answering Service [phone]    -- RENAL FOLLOW UP NOTE ---Date of Service 03-31-22 @ 14:46    Patient is a 81y old  Female who presents with a chief complaint of     Patient seen and examined at bedside. No chest pain/sob    VITALS:  T(F): 98.6 (03-31-22 @ 11:40), Max: 98.6 (03-31-22 @ 11:40)  HR: 68 (03-31-22 @ 11:40)  BP: 154/91 (03-31-22 @ 11:40)  RR: 17 (03-31-22 @ 11:40)  SpO2: 99% (03-31-22 @ 11:40)  Wt(kg): --    03-30 @ 07:01  -  03-31 @ 07:00  --------------------------------------------------------  IN: 1000 mL / OUT: 530 mL / NET: 470 mL      PHYSICAL EXAM:  Constitutional: NAD  Neck: No JVD  Respiratory: CTAB, no wheezes, rales or rhonchi  Cardiovascular: S1, S2, RRR  Gastrointestinal: BS+, soft, NT/ND  Extremities: No peripheral edema    Hospital Medications:   MEDICATIONS  (STANDING):  amLODIPine   Tablet 10 milliGRAM(s) Oral daily  aspirin  chewable 81 milliGRAM(s) Enteral Tube daily  atorvastatin 40 milliGRAM(s) Oral at bedtime  carvedilol 3.125 milliGRAM(s) Oral every 12 hours  clopidogrel Tablet 75 milliGRAM(s) Enteral Tube daily  dextrose 5% with potassium chloride 20 mEq/L 1000 milliLiter(s) (50 mL/Hr) IV Continuous <Continuous>  dorzolamide 2% Ophthalmic Solution 1 Drop(s) Both EYES <User Schedule>  heparin   Injectable 5000 Unit(s) SubCutaneous every 12 hours  influenza  Vaccine (HIGH DOSE) 0.7 milliLiter(s) IntraMuscular once  latanoprost 0.005% Ophthalmic Solution 1 Drop(s) Both EYES at bedtime  levETIRAcetam  IVPB 500 milliGRAM(s) IV Intermittent every 12 hours  timolol 0.5% Solution 1 Drop(s) Both EYES two times a day      LABS:  03-31    141  |  106  |  6<L>  ----------------------------<  86  3.2<L>   |  21<L>  |  0.41<L>    Ca    8.6      31 Mar 2022 07:13  Phos  3.7     03-31  Mg     1.70     03-31      Creatinine Trend: 0.41 <--, 0.36 <--, 0.39 <--, 0.37 <--, 0.36 <--, 0.39 <--, 0.37 <--, 0.32 <--, 0.32 <--, 0.40 <--    Phosphorus Level, Serum: 3.7 mg/dL (03-31 @ 07:13)  Phosphorus Level, Serum: 3.4 mg/dL (03-30 @ 22:58)  Phosphorus Level, Serum: 3.4 mg/dL (03-30 @ 14:56)                              10.2   6.81  )-----------( 367      ( 31 Mar 2022 07:13 )             33.1     Urine Studies:  Urinalysis - [03-26-22 @ 23:19]      Color Yellow / Appearance Slightly Turbid / SG 1.025 / pH 8.0      Gluc Negative / Ketone Negative  / Bili Negative / Urobili <2 mg/dL       Blood Negative / Protein 100 mg/dL / Leuk Est Negative / Nitrite Negative      RBC 0-1 / WBC 0-1 / Hyaline  / Gran  / Sq Epi  / Non Sq Epi  / Bacteria       PTH -- (Ca --)      [03-30-22 @ 07:35]   32  TSH 12.200      [01-14-22 @ 08:33]  Lipid: chol 158, , HDL 25, LDL --      [02-03-22 @ 15:22]        RADIOLOGY & ADDITIONAL STUDIES:

## 2022-03-31 NOTE — PROGRESS NOTE ADULT - ASSESSMENT
80 yo F w/ PMHx of perforated diverticulitis s/p Rosales procedure, colostomy, abdominal abscess with wound s/p IR drainage, Respiratory Failure s/p Trache, CVA w/ functional quadriplegia, aphasia, dysphagia s/p PEG (on Jevity 1.5 @ 70cchr x 18 hrs, & water flushes 325ml q 6 hrs, Afib, GIB, HTN, HLD, Seizure d/o, Hypothyroidism, Glaucoma, Depression p/w PEG tube dislodgement for unknown amount of time     Problem/Plan - 1:  ·  Problem: PEG tube malfunction.   ·  Plan:  S/P  PEG . TF started.      Problem/Plan - 2:  ·  Problem: Hypokalemia.   ·  Plan: Replacing.      Problem/Plan - 3:  ·  Problem: Stroke.   ·  Plan: Resume ASA, Plavix, statin as PEG tube replaced.     Problem/Plan - 4:  ·  Problem: Chronic respiratory failure with hypoxia.   ·  Plan: Pt on 2l NC via Trache collar.     Problem/Plan - 5:  ·  Problem: Diverticulitis of intestine with perforation and abscess without bleeding.   ·  Plan: Surgery c/s in chart- small fluid collection but nontoxic, if drainage needed would be done by IR.  Wound care eval.     Problem/Plan - 6:  ·  Problem: History of atrial fibrillation.   ·  Plan: Cards helping and recommended AC .   D/W Son and okay to start.      Problem/Plan - 7:  ·  Problem: Seizure disorder.   ·  Plan: continue levetiracetam bid.     Problem/Plan - 8:  ·  Problem: DVT, lower extremity.   ·  Plan: ?Subacute vs Chronic, not started on A/C as per chart due to recent GIB.     Problem/Plan - 9:  ·  Problem: HTN (hypertension).   ·  Plan: Resume carvedilol & amlodipine when PEG replaced.     Problem/Plan - 10:  ·  Problem: Hypothyroidism.   ·  Plan; Resume Levothyroxine when PEG replaced.     Problem/Plan - 11:  ·  Problem: Glaucoma.   ·  Plan: continue Timolol, Dorzolamide, Latanoprost.       Disposition : DC  planning .

## 2022-03-31 NOTE — DISCHARGE NOTE PROVIDER - CARE PROVIDER_API CALL
Provider at facility,   Phone: (   )    -  Fax: (   )    -  Follow Up Time:     Danny Casiano)  Cardiovascular Disease; Internal Medicine; Interventional Cardiology  [address] [place] [address]  Phone: [phone]  Fax: [phone]  Follow Up Time:

## 2022-03-31 NOTE — DISCHARGE NOTE PROVIDER - NSDCCPCAREPLAN_GEN_ALL_CORE_FT
PRINCIPAL DISCHARGE DIAGNOSIS  Diagnosis: PEG tube malfunction  Assessment and Plan of Treatment: You presented to the hospital bectommiese your PEG tube was not placed correctly. You had a surigcal procedur eto replace your PEG tube, and your tube feedings were re-started. Please continue tube feeds as directed, and monitor for signs of infection such as redness, swelling, pain, or drainage around the PEG tube site      SECONDARY DISCHARGE DIAGNOSES  Diagnosis: Diverticulitis of intestine with perforation and abscess without bleeding  Assessment and Plan of Treatment: You have a history of diverticulitis, which are outpouchings in your colon. You did not have evidence of bleeding in your GI tract. You were seen by a surgery team in the hospital and a small fluid collection was seen, but no drainage was needed at this time. please follow up with you PCP and gastroenterologist outpatient, and notify a provider if you develop blood in your stool, very dark or black stool, abdominal pain or bloating, changes in your bowel habits, or vomiting    Diagnosis: HTN (hypertension)  Assessment and Plan of Treatment: Continue blood pressure medication regimen as directed. Monitor for any visual changes, headaches or dizziness.  Monitor blood pressure regularly.  Follow up with your primary care provider for further management for high blood pressure.    Diagnosis: Hypothyroidism  Assessment and Plan of Treatment: Please cotninue taking your synthroid as prescribed    Diagnosis: Stroke  Assessment and Plan of Treatment: You were diagnosed with a stroke in Jan 2022. Please continue taking your aspirin, plavix and atorvastatin as prescribed and follow up with your PCP    Diagnosis: Glaucoma  Assessment and Plan of Treatment: Please continue using your eye drops as prescribed, and follow up with your ophthomologist regularly    Diagnosis: History of atrial fibrillation  Assessment and Plan of Treatment: You have an irregular heart rhythm. You were seen by a cardiologist. You were put on Eliquis 5 mg twice daily. Please continue taking this medication to prevent blood clots    Diagnosis: Seizure disorder  Assessment and Plan of Treatment: Please continue taking oyur keppre medication as prescribed to prevent seizures    Diagnosis: Hypokalemia  Assessment and Plan of Treatment: You had low potassium levels in the hospital. We gave you potassium, and your levels are now normal. Please make sure to follow up outpatient to get repeat bloodwork    Diagnosis: DVT, lower extremity  Assessment and Plan of Treatment: You had a history of a clot n your leg. You were recommended to be on blood thinners, which were started in the hospital. {lease continue taking Eliquis 5 mg twice daily for blood clot prevention    Diagnosis: Chronic respiratory failure with hypoxia  Assessment and Plan of Treatment: You had difficulty breathing in the hospital. You were placed on oxygen through a tracheostomy tube. Please continue with getting oxygen through your tracheostomy, and you will get tracheostomy care at the facility     PRINCIPAL DISCHARGE DIAGNOSIS  Diagnosis: PEG tube malfunction  Assessment and Plan of Treatment: You presented to the hospital becuase your PEG tube was displaced. You had a surigcal procedure to replace your PEG tube, and your tube feedings were re-started. Please continue tube feeds as directed, and monitor for signs of infection such as redness, swelling, pain, or drainage around the PEG tube site      SECONDARY DISCHARGE DIAGNOSES  Diagnosis: Chronic respiratory failure with hypoxia  Assessment and Plan of Treatment: You had difficulty breathing in the hospital. You were placed on oxygen through a tracheostomy tube. Please continue with getting oxygen through your tracheostomy, and you will get tracheostomy care at the facility    Diagnosis: Rectal bleed  Assessment and Plan of Treatment: Patient blood transfusion in the hospital.  CT Abd/Pelvis- showed Rectal active Hemorrhage. Patient seen by GI, had Sigmoidoscopy done showed visible vessel found in distal rectum; Clip placed in distal rectum. H/H stable, continue taking ASA 81mg chewable daily. Hold off Plavix & Elquis.  - per GI avoid rectal manipulation, suppositories or enemas    - PEG feeds w/aspiration precautions. DNR/DNI    Diagnosis: Diverticulitis of intestine with perforation and abscess without bleeding  Assessment and Plan of Treatment: You have a history of diverticulitis, which are outpouchings in your colon. You did not have evidence of bleeding in your GI tract. You were seen by a surgery team in the hospital and a small fluid collection was seen, but no drainage was needed at this time. please follow up with you PCP and gastroenterologist outpatient, and notify a provider if you develop blood in your stool, very dark or black stool, abdominal pain or bloating, changes in your bowel habits, or vomiting    Diagnosis: Hypothyroidism  Assessment and Plan of Treatment: Please cotninue taking your Synthroid as prescribed. Monitor Thyroid function panel every 6 weeks    Diagnosis: Stroke  Assessment and Plan of Treatment: You were diagnosed with a stroke in Jan 2022. Please continue taking your Aspirin 81mg chewable daily and Atorvastatin as prescribed and follow up with your PCP. Plavix and Eliquis was discontinued 2/2 GI bleed    Diagnosis: HTN (hypertension)  Assessment and Plan of Treatment: Continue blood pressure medication regimen as directed. Monitor for any visual changes, headaches or dizziness.  Monitor blood pressure regularly.  Follow up with your primary care provider for further management for high blood pressure.    Diagnosis: Seizure disorder  Assessment and Plan of Treatment: Please continue taking your Keppra 500mg via PEG 2x daily medication as prescribed to prevent seizures. Follow up with Neuro as outpatient    Diagnosis: Glaucoma  Assessment and Plan of Treatment: Please continue using your eye drops as prescribed, and follow up with your optholmologist regularly    Diagnosis: DVT, lower extremity  Assessment and Plan of Treatment: You had a history of a clot n your leg. You were recommended to be on blood thinners, which were started in the hospital ????. Please continue taking Eliquis 5 mg twice daily for blood clot prevention    Diagnosis: History of atrial fibrillation  Assessment and Plan of Treatment: You have an irregular heart rhythm. You were seen by a cardiologist. You were put on Eliquis 5 mg twice daily???. Please continue taking this medication to prevent blood clots    Diagnosis: Hypokalemia  Assessment and Plan of Treatment: You had low potassium levels in the hospital. We gave you potassium, and your levels are now normal. Please make sure to follow up outpatient to get repeat bloodwork     PRINCIPAL DISCHARGE DIAGNOSIS  Diagnosis: PEG tube malfunction  Assessment and Plan of Treatment: You presented to the hospital becuase your PEG tube was displaced. You had a surigcal procedure to replace your PEG tube, and your tube feedings were re-started. Please continue tube feeds as directed, and monitor for signs of infection such as redness, swelling, pain, or drainage around the PEG tube site      SECONDARY DISCHARGE DIAGNOSES  Diagnosis: Chronic respiratory failure with hypoxia  Assessment and Plan of Treatment: You had difficulty breathing in the hospital. You were placed on oxygen through a tracheostomy tube. Please continue with getting oxygen through your tracheostomy, and you will get tracheostomy care at the facility    Diagnosis: Rectal bleed  Assessment and Plan of Treatment: Patient blood transfusion in the hospital.  CT Abd/Pelvis- showed Rectal active Hemorrhage. Patient seen by GI, had Sigmoidoscopy done showed visible vessel found in distal rectum; Clip placed in distal rectum. H/H stable, continue taking ASA 81mg chewable daily. Hold off Plavix, Elquis resumed on 4/21.  - per GI avoid rectal manipulation, suppositories or enemas    - PEG feeds w/aspiration precautions. DNR/DNI    Diagnosis: Diverticulitis of intestine with perforation and abscess without bleeding  Assessment and Plan of Treatment: You have a history of diverticulitis, which are outpouchings in your colon. You did not have evidence of bleeding in your GI tract. You were seen by a surgery team in the hospital and a small fluid collection was seen, but no drainage was needed at this time. please follow up with you PCP and gastroenterologist outpatient, and notify a provider if you develop blood in your stool, very dark or black stool, abdominal pain or bloating, changes in your bowel habits, or vomiting    Diagnosis: Hypothyroidism  Assessment and Plan of Treatment: Please cotninue taking your Synthroid as prescribed. Monitor Thyroid function panel every 6 weeks    Diagnosis: Stroke  Assessment and Plan of Treatment: You were diagnosed with a stroke in Jan 2022. Please continue taking your Aspirin 81mg chewable daily and Atorvastatin as prescribed and follow up with your PCP. Plavix and Eliquis was discontinued 2/2 GI bleed. Per son agreement Eliquis was resumed on 4/21/22    Diagnosis: HTN (hypertension)  Assessment and Plan of Treatment: Continue blood pressure medication regimen as directed. Monitor for any visual changes, headaches or dizziness.  Monitor blood pressure regularly.  Follow up with your primary care provider for further management for high blood pressure.    Diagnosis: Seizure disorder  Assessment and Plan of Treatment: Please continue taking your Keppra 500mg via PEG 2x daily medication as prescribed to prevent seizures. Follow up with Neuro as outpatient    Diagnosis: Glaucoma  Assessment and Plan of Treatment: Please continue using your eye drops as prescribed, and follow up with your optholmologist regularly    Diagnosis: DVT, lower extremity  Assessment and Plan of Treatment: You had a history of a clot n your leg. You were recommended to be on blood thinners, which were started in the hospital. Please continue taking Eliquis 5 mg twice daily for blood clot prevention    Diagnosis: History of atrial fibrillation  Assessment and Plan of Treatment: You have an irregular heart rhythm. You were seen by a cardiologist. You were put on Eliquis 5 mg twice daily. Please continue taking this medication to prevent blood clots    Diagnosis: Hypokalemia  Assessment and Plan of Treatment: You had low potassium levels in the hospital. We gave you potassium, and your levels are now normal. Please make sure to follow up outpatient to get repeat bloodwork

## 2022-03-31 NOTE — PROGRESS NOTE ADULT - SUBJECTIVE AND OBJECTIVE BOX
Date of service: 03/31/22    chief complaint: dislodged PEG tube     extended hpi: 80 yo F with PMHx HTN, HLD, Hypothyroidism, Depression, seizure d/o, RLE DVT, Cholelithiasis s/p recent Hospitalization in [facility] from 12/22 -> 2/11/22 for acute perforated diverticulitis s/p Rosales 12/25, colostomy bag 12/26, hospitalization c/b IR drainage for abdominal abscess 1/3 Cx grew e.coli & bacteriodes, also required Mechanical Ventilation for Acute Respiratory Failure with Tracheostomy done 1/7.  PEG done 1/18, developed bleeding through ostomy for which she was transfused 1/28, EGD on 2/1 showed gastritis. Developed fevers on 1/30 and CT's showed Acute left MCA infarct- could be 2/2 to carotid occlusion vs afib, new RLL aspiration pneumonia, seen by ID, completed antibiotics, seen by VascSx, not surgical candidate. Pt aphasic sent from [facility] for dislodged Peg tube, after staff noted pt has blood to her abdomen.     S: pt. aphasic, no acute distress; ros limited.     Review of Systems:   Constitutional: [ ] fevers, [ ] chills.   Skin: [ ] dry skin. [ ] rashes.  Psychiatric: [ ] depression, [ ] anxiety.   Gastrointestinal: [ ] BRBPR, [ ] melena.   Neurological: [ ] confusion. [ ] seizures. [ ] shuffling gait.   Ears,Nose,Mouth and Throat: [ ] ear pain [ ] sore throat.   Eyes: [ ] diplopia.   Respiratory: [ ] hemoptysis. [ ] shortness of breath  Cardiovascular: See HPI above  Hematologic/Lymphatic: [ ] anemia. [ ] painful nodes. [ ] prolonged bleeding.   Genitourinary: [ ] hematuria. [ ] flank pain.   Endocrine: [ ] significant change in weight. [ ] intolerance to heat and cold.     Review of systems [x ] otherwise negative, [ ] otherwise unable to obtain    FH: no family history of sudden cardiac death in first degree relatives    SH: [ ] tobacco, [ ] alcohol, [ ] drugs    aspirin  chewable 81 milliGRAM(s) Enteral Tube daily  clopidogrel Tablet 75 milliGRAM(s) Enteral Tube daily  dextrose 5% with potassium chloride 20 mEq/L 1000 milliLiter(s) IV Continuous <Continuous>  dorzolamide 2% Ophthalmic Solution 1 Drop(s) Both EYES <User Schedule>  heparin   Injectable 5000 Unit(s) SubCutaneous every 12 hours  hydrALAZINE Injectable 5 milliGRAM(s) IV Push every 6 hours PRN  influenza  Vaccine (HIGH DOSE) 0.7 milliLiter(s) IntraMuscular once  latanoprost 0.005% Ophthalmic Solution 1 Drop(s) Both EYES at bedtime  levETIRAcetam  IVPB 500 milliGRAM(s) IV Intermittent every 12 hours  metoprolol tartrate Injectable 5 milliGRAM(s) IV Push every 6 hours  timolol 0.5% Solution 1 Drop(s) Both EYES two times a day                          10.2   6.81  )-----------( 367      ( 31 Mar 2022 07:13 )             33.1       141  |  106  |  6<L>  ----------------------------<  86  3.2<L>   |  21<L>  |  0.41<L>    Ca    8.6      31 Mar 2022 07:13  Phos  3.7     03-31  Mg     1.70     03-31      T(C): 37 (03-31-22 @ 11:40), Max: 37 (03-31-22 @ 11:40)  HR: 68 (03-31-22 @ 11:40) (59 - 88)  BP: 154/91 (03-31-22 @ 11:40) (141/58 - 164/80)  RR: 17 (03-31-22 @ 11:40) (17 - 18)  SpO2: 99% (03-31-22 @ 11:40) (98% - 100%)    General: no acute distress   Head: Normocephalic and atraumatic.   Neck: No JVD. No bruits. Supple. Does not appear to be enlarged.   Cardiovascular: + S1,S2 ; RRR Soft systolic murmur at the left lower sternal border. No rubs noted.    Lungs: CTA b/l. No rhonchi, rales or wheezes.   Abdomen: + BS, soft. Non tender. Non distended. No rebound. No guarding.   Extremities: No clubbing/cyanosis/edema.   Psychiatric: unable to assess     < from: TTE Echo Complete w/o Contrast w/ Doppler (02.04.22 @ 15:28) >  Summary:   1. Left ventricular ejection fraction, by visual estimation, is 60 to   65%.   2. Normal global left ventricular systolic function.   3. Mild mitral valve regurgitation.   4.Mild thickening of the anterior and posterior mitral valve leaflets.   5. Mild tricuspid regurgitation.   6. Mild to moderate aortic regurgitation.   7. Sclerotic aortic valve with normal opening.  [id number] Colin Butcher MD, [facility]  Electronically signed on 2/5/2022 at 3:03:14 PM  < end of copied text >      A/P:  80 yo F with PMHx HTN, HLD, Hypothyroidism, Depression, seizure d/o, RLE DVT, Cholelithiasis s/p recent Hospitalization in [facility] from 12/22 -> 2/11/22 for acute perforated diverticulitis s/p Rosales 12/25, colostomy bag 12/26, hospitalization c/b IR drainage for abdominal abscess 1/3 Cx grew e.coli & bacteriodes, also required Mechanical Ventilation for Acute Respiratory Failure with Tracheostomy done 1/7.  PEG done 1/18, developed bleeding through ostomy for which she was transfused 1/28, EGD on 2/1 showed gastritis. Developed fevers on 1/30 and CT's showed Acute left MCA infarct- could be 2/2 to carotid occlusion vs afib, new RLL aspiration pneumonia, seen by ID, completed antibiotics, seen by VascSx, not surgical candidate. [facility] for dislodged Peg tube, after staff noted pt has blood to her abdomen.     --Cardiology consulted for elevated BP while Peg dislodged  --Currently on IV hydralazine and BB - restart home meds per PEG when able  --check 12 lead EKG-d/w RN, ordered  --prior chart reviewed, pt with recnt CVA and found with AFib and Carotid occlusion.  AC held initially while monitoring for hemorrhagic conversion and then in the setting of GI bleed req PRBCs.  She was continued on DAPT.  -given elevated [id number], recommend lifelong AC for AF if able to tolerate

## 2022-03-31 NOTE — PROGRESS NOTE ADULT - ASSESSMENT
82 yo F with PMHx HTN, HLD, Hypothyroidism, Depression, seizure d/o, RLE DVT, Cholelithiasis s/p recent Hospitalization in [facility] from 12/22 -> 2/11/22 for acute perforated diverticulitis s/p Rosales 12/25, colostomy bag 12/26, hospitalization c/b IR drainage for abdominal abscess 1/3 Cx grew e.coli & bacteriodes, also required Mechanical Ventilation for Acute Respiratory Failure with Tracheostomy done 1/7.  PEG done 1/18, developed bleeding through ostomy, EGD on 2/1 showed gastritis. T's showed Acute left MCA infarct- could be 2/2 to carotid occlusion vs afib, new RLL aspiration pneumonia, seen by ID, completed antibiotics, seen by VascSx, not surgical candidate. Pt aphasic sent from [facility] for dislodged Peg tube, after staff noted pt has blood to her abdomen. Nephrology consulted for Hypokalemia.     A/P    Hypokalemia   likely 2/2 GI loss   monitor input and output closely   low today   s/p 10meq [id number]   suggest to add kcl 20meq in D5 50cc/hr,  monitor K closely     Hypomagnesia   replete as needed  monitor Mg     Hypocalcemia   better     HTN   acceptable   monitor BP closely    80 yo F with PMHx HTN, HLD, Hypothyroidism, Depression, seizure d/o, RLE DVT, Cholelithiasis s/p recent Hospitalization in [facility] from 12/22 -> 2/11/22 for acute perforated diverticulitis s/p Rosales 12/25, colostomy bag 12/26, hospitalization c/b IR drainage for abdominal abscess 1/3 Cx grew e.coli & bacteriodes, also required Mechanical Ventilation for Acute Respiratory Failure with Tracheostomy done 1/7.  PEG done 1/18, developed bleeding through ostomy, EGD on 2/1 showed gastritis. T's showed Acute left MCA infarct- could be 2/2 to carotid occlusion vs afib, new RLL aspiration pneumonia, seen by ID, completed antibiotics, seen by VascSx, not surgical candidate. Pt aphasic sent from [facility] for dislodged Peg tube, after staff noted pt has blood to her abdomen. Nephrology consulted for Hypokalemia.     A/P    Hypokalemia   likely 2/2 GI loss   monitor input and output closely   low today   s/p 10meq [id number]   suggest to add kcl 20meq in D5 50cc/hr,  monitor K closely   Repeat K after 3 doses and replete again as needed    Hypomagnesia   replete as needed  monitor Mg     Hypocalcemia   better     HTN   acceptable   monitor BP closely

## 2022-03-31 NOTE — DISCHARGE NOTE PROVIDER - HOSPITAL COURSE
80 yo F w/ PMHx of perforated diverticulitis s/p Rosales procedure, colostomy, abdominal abscess with wound s/p IR drainage, Respiratory Failure s/p Trache, CVA w/ functional quadriplegia, aphasia, dysphagia s/p PEG (on Jevity 1.5 @ 70cchr x 18 hrs, & water flushes 325ml q 6 hrs, Afib, GIB, HTN, HLD, Seizure d/o, Hypothyroidism, Glaucoma, Depression p/w PEG tube dislodgement for unknown amount of time    PEG tube malfunction.   ·  PEG replaced on 3/30  - Tube feeds re-started on 3/31 per dietitian recs- Jevity 1.2 @ 25 mL/hr, increase as tolerated to the goal rate of 65 mL X 23 hrs. This will provide 1794 kcal/83 gm pro   - Bolus feeds recs_________________    Hypokalemia.  - supplemented K and monitored BMP- now improved     Stroke.  - history of CVA in Jan 2022·    -Resumed ASA, Plavix, statin s/p  PEG tube replacement  -A1C 4.7%    Chronic respiratory failure with hypoxia.  -Pt on 2l NC via Trache collar.  - BCx, UCx NGTD    Diverticulitis of intestine with perforation and abscess without bleeding.  -Surgery c/s in chart- small fluid collection but nontoxic, if drainage needed would be done by IR.    History of atrial fibrillation.  - Pt off A/C due to recent GIB as per chart.- Per patient's son & past charts, no history of true GIB.   - Start lifelong AC per cards and med attng (Eliquis 5 mg BID)    Seizure disorder.  -continue levetiracetam bid.    DVT, lower extremity.  ·  ?Subacute vs Chronic, not started on A/C as per chart due to recent GIB- technically not true GI bleed per GI notes from last admission. Most likely was an issue with PEG placement in Feb 2022- OK to start Eliquis 5 mg BID per med attng    HTN (hypertension).  ·  Resumed home carvedilol & amlodipine s/p PEG replaced.    Hypothyroidism.   - c/w home levothyroxine    Pt is medically cleared for discharge to ____as discussed with ___ on ___     80 yo F w/ PMHx of perforated diverticulitis s/p Rosales procedure, colostomy, abdominal abscess with wound s/p IR drainage, Respiratory Failure s/p Trache, CVA w/ functional quadriplegia, aphasia, dysphagia s/p PEG (on Jevity 1.5 @ 70cchr x 18 hrs, & water flushes 325ml q 6 hrs, Afib, GIB, HTN, HLD, Seizure d/o, Hypothyroidism, Glaucoma, Depression p/w PEG tube dislodgement for unknown amount of time    PEG tube malfunction.   ·  PEG replaced on 3/30  - Tube feeds re-started on 3/31 per dietitian recs- Jevity 1.2 @ 25 mL/hr, increase as tolerated to the goal rate of 65 mL X 23 hrs. This will provide 1794 kcal/83 gm pro   - Bolus feeds recs_________________    Hypokalemia.  - supplemented K and monitored BMP- now improved     Stroke.  - history of CVA in Jan 2022·    -Resumed ASA, Plavix, statin s/p  PEG tube replacement  -A1C 4.7%    Chronic respiratory failure with hypoxia.  -Pt on 2l NC via Trache collar.  - BCx, UCx NGTD    Diverticulitis of intestine with perforation and abscess without bleeding.  -Surgery c/s in chart- small fluid collection but nontoxic, if drainage needed would be done by IR.  CT Abd:  thick walled collection in the cul-de-sac and small collections along the Right paracolic gutter.  Collection is too small to be drained by IR.  Rectal Bleeding- s/p blood transfusion.  CT Abd/Pelvis- showed Rectal active Hemorrhage.  Sigmoidoscopy:  Visible vessel found in distal rectum; Clip placed    History of atrial fibrillation.  - Pt off A/C due to recent GIB as per chart.- Per patient's son & past charts, no history of true GIB. Since rectal bleed resolved, we  will start on Eliquis  - Start lifelong AC per cards and med attng (Eliquis 5 mg BID)    Seizure disorder.  -continue levetiracetam bid.    DVT, lower extremity.  ·  ?Subacute vs Chronic, not started on A/C as per chart due to recent GIB- technically not true GI bleed per GI notes from last admission. Most likely was an issue with PEG placement in Feb 2022- OK to start Eliquis 5 mg BID per med attng    HTN (hypertension).  ·  Resumed home carvedilol & amlodipine s/p PEG replaced.    Hypothyroidism.   - c/w home levothyroxine    Pt is medically cleared for discharge to ____as discussed with ___ on ___     80 yo F w/ PMHx of perforated diverticulitis s/p Rosales procedure, colostomy, abdominal abscess with wound s/p IR drainage, Respiratory Failure s/p Trache, CVA w/ functional quadriplegia, aphasia, dysphagia s/p PEG (on Jevity 1.5 @ 70cchr x 18 hrs, & water flushes 325ml q 6 hrs, Afib, GIB, HTN, HLD, Seizure d/o, Hypothyroidism, Glaucoma, Depression p/w PEG tube dislodgement for unknown amount of time    PEG tube malfunction.   ·  PEG replaced on 3/30  - Tube feeds re-started on 3/31 per dietitian recommendations - Jevity 1.2 @ 25 mL/hr, increase as tolerated to the goal rate of 65 mL X 23 hrs. This will provide 1794 kcal/83 gm pro   - Bolus feeds recs_________________    Hypokalemia.  - supplemented K and monitored BMP- now improved     Stroke.  - history of CVA in Jan 2022·    -Resumed ASA, Plavix, statin s/p  PEG tube replacement  -A1C 4.7%    Chronic respiratory failure with hypoxia.  -Pt on 2l NC via Trache collar.  - BCx, UCx NGTD    Diverticulitis of intestine with perforation and abscess without bleeding.  -Surgery c/s in chart- small fluid collection but nontoxic, if drainage needed would be done by IR.  CT Abd:  thick walled collection in the cul-de-sac and small collections along the Right paracolic gutter.  Collection is too small to be drained by IR.      Rectal Bleeding- s/p blood transfusion.  CT Abd/Pelvis- showed Rectal active Hemorrhage.  Sigmoidoscopy:  Visible vessel found in distal rectum; Clip placed    History of atrial fibrillation.  - Pt off A/C due to recent GIB as per chart.- Per patient's son & past charts, no history of true GIB. Since rectal bleed resolved, we  will start on Eliquis  - Start lifelong AC per cards and med attng (Eliquis 5 mg BID)    Seizure disorder.  -continue levetiracetam twice a day    DVT, lower extremity.  ·  ?Subacute vs Chronic, not started on A/C as per chart due to recent GIB- technically not true GI bleed per GI notes from last admission. Most likely was an issue with PEG placement in Feb 2022- OK to start Eliquis 5 mg BID per med attng    HTN (hypertension).  ·  Resumed home carvedilol & amlodipine s/p PEG replaced.    Hypothyroidism.   - c/w home levothyroxine    Pt is medically cleared for discharge to nursing home with hospice services , patient medically stable, ___as discussed with ___ on ___     81 f with HTN, HLD, Hypothyroidism, Depression, seizure d/o, RLE DVT, Cholelithiasis s/p recent Hospitalization at [facility] 12/22 -> 2/11/22 for acute perforated diverticulitis s/p Rosales 12/25, colostomy bag 12/26, hospitalization c/b abd abscess s/p IR drainage 1/3 Cx with E.coli & bacteroides and candida albicans,  trach 1/7, PEG, Acute left MCA infarct, aspiration pneumonia, sent 3/27 from rehab for dislodged PEG, initially afebrile, normal WBc, negative blood and urine cx  CT 3/27: PEG tube removed/dislodged, no evidence for pneumoperitoneum, 3.5 cm thick-walled fluid collection in the pelvic cul-de-sac, Interval slight improvement in additional small loculated fluid   collections, including significant improvement in inflammatory changes in RLQ and pelvis status post removal of surgical drain. Ill-defined hyperdensity/enhancing nodularity along the right posterior   bladder lumen, cannot exclude a neoplastic process  - s/p PEG placement by IR 3/30  - on 4/4 pt spiked to 102.4 with tachycardia and new leukocytosis to 16. ID Dr Rose consulted  - Pt has Hx of recent perforated diverticulitis s/p Varela, c/b abd abscess s/p IR drainage, cx with E-coli, bacteroides and candida now admitted for PEG dislodgement s/p IR placement 3/30 now with new fever, tachycardia, leukocytosis, sepsis due to KPC klebsiella bacteremia, S to vabomere, acyvaz, tigecyline, minocycline and genta, repeat cx 4/5 negative  - CT- Dependent high attenuation within the urinary bladder, which may represent small stones and/or debris. An underlying mass cannot be excluded. Thick-walled collection in the cul-de-sac and small collections along the right paracolic gutter, not significantly changed since 3/27/2022  - IR stated that abscess are too small to be drained  rectal bleed, CTA showed small active rectal bleed, unchanged collections, s/p flex sig 4/14, bleeding vessel was clipped   new fever but WBC normal, blood cx negative   - pt was on vabomere, blood cx cleared 4/5, so day 16, course extended as pt had a fever but repeat blood cx negative and no more fever so discontinue vabomere    Stroke- history of CVA in Jan 2022. Resumed ASA, Plavix, statin s/p  PEG tube replacement. A1C 4.7%    Chronic respiratory failure with hypoxia- Pt on 2l NC via Trache collar    Diverticulitis of intestine with perforation and abscess without bleeding- Surgery c/s in chart- small fluid collection but nontoxic, if drainage needed would be done by IR.  CT Abd:  thick walled collection in the cul-de-sac and small collections along the Right paracolic gutter.  Collection is too small to be drained by IR    Rectal Bleeding- s/p blood transfusion.  CT Abd/Pelvis- showed Rectal active Hemorrhage.  Sigmoidoscopy:  Visible vessel found in distal rectum; Clip placed  - Seen by GI Dr Duran -s/p Flex Sig with clip placed in distal rectum, CBC stable on ASA 81mg  - per GI avoid rectal manipulation, suppositories or enemas    - PEG feeds w/aspiration precautions. DNR/DNI; Hospice planning    History of atrial fibrillation- Pt off A/C due to recent GIB as per chart.- Per patient's son & past charts, no history of true GIB. Since rectal bleed resolved  - per GI - no objection to A/C with daily Protonix if in line w/family GOC  - Start lifelong AC per cards and med attng (Eliquis 5 mg BID)    Seizure disorder- continue Levetiracetam twice a day    DVT, lower extremity- Subacute vs Chronic, not started on A/C as per chart due to recent GIB- technically not true GI bleed per GI notes from last admission. Most likely was an issue with PEG placement in Feb 2022- OK to start Eliquis 5 mg BID per med attng    HTN - Resumed home carvedilol & amlodipine s/p PEG replaced.    Hypothyroidism - c/w home levothyroxine    Pt is medically cleared for discharge to nursing home with hospice services , patient medically stable, As discussed with Dr Lew pt was discharged on    81 f with HTN, HLD, Hypothyroidism, Depression, seizure d/o, RLE DVT, Cholelithiasis s/p recent Hospitalization at [facility] 12/22 -> 2/11/22 for acute perforated diverticulitis s/p Rosales 12/25, colostomy bag 12/26, hospitalization c/b abd abscess s/p IR drainage 1/3 Cx with E.coli & bacteroides and candida albicans,  trach 1/7, PEG, Acute left MCA infarct, aspiration pneumonia, sent 3/27 from rehab for dislodged PEG, initially afebrile, normal WBc, negative blood and urine cx  CT 3/27: PEG tube removed/dislodged, no evidence for pneumoperitoneum, 3.5 cm thick-walled fluid collection in the pelvic cul-de-sac, Interval slight improvement in additional small loculated fluid   collections, including significant improvement in inflammatory changes in RLQ and pelvis status post removal of surgical drain. Ill-defined hyperdensity/enhancing nodularity along the right posterior   bladder lumen, cannot exclude a neoplastic process  - s/p PEG placement by IR 3/30  - on 4/4 pt spiked to 102.4 with tachycardia and new leukocytosis to 16. ID Dr Rose consulted  - Pt has Hx of recent perforated diverticulitis s/p Varela, c/b abd abscess s/p IR drainage, cx with E-coli, bacteroides and candida now admitted for PEG dislodgement s/p IR placement 3/30 now with new fever, tachycardia, leukocytosis, sepsis due to KPC klebsiella bacteremia, S to vabomere, acyvaz, tigecyline, minocycline and genta, repeat cx 4/5 negative  - CT- Dependent high attenuation within the urinary bladder, which may represent small stones and/or debris. An underlying mass cannot be excluded. Thick-walled collection in the cul-de-sac and small collections along the right paracolic gutter, not significantly changed since 3/27/2022  - IR stated that abscess are too small to be drained  rectal bleed, CTA showed small active rectal bleed, unchanged collections, s/p flex sig 4/14, bleeding vessel was clipped   new fever but WBC normal, blood cx negative   - pt was on vabomere, blood cx cleared 4/5, so day 16, course extended as pt had a fever but repeat blood cx negative and no more fever so discontinue vabomere    Stroke- history of CVA in Jan 2022. Resumed ASA, statin s/p  PEG tube replacement. A1C 4.7%. Plavix discontinued, Eliquis resumed on 4/21    Chronic respiratory failure with hypoxia- Pt on 2l NC via Trache collar    Diverticulitis of intestine with perforation and abscess without bleeding- Surgery c/s in chart- small fluid collection but nontoxic, if drainage needed would be done by IR.  CT Abd:  thick walled collection in the cul-de-sac and small collections along the Right paracolic gutter.  Collection is too small to be drained by IR    Rectal Bleeding- s/p blood transfusion.  CT Abd/Pelvis- showed Rectal active Hemorrhage.  Sigmoidoscopy:  Visible vessel found in distal rectum; Clip placed  - Seen by GI Dr Duran -s/p Flex Sig with clip placed in distal rectum, CBC stable on ASA 81mg, Eliquis resumed on 4/21 per son request, GI ok with resuming with Protonix  - per GI avoid rectal manipulation, suppositories or enemas    - PEG feeds w/aspiration precautions. DNR/DNI; Hospice planning    History of atrial fibrillation- Pt off A/C due to recent GIB as per chart.- Per patient's son & past charts, no history of true GIB. Since rectal bleed resolved  - per GI - no objection to A/C with daily Protonix if in line w/family GOC  - Start lifelong AC per cards and med attng (Eliquis 5 mg BID)    Seizure disorder- continue Levetiracetam twice a day    DVT, lower extremity- Subacute vs Chronic, not started on A/C as per chart due to recent GIB- technically not true GI bleed per GI notes from last admission. Most likely was an issue with PEG placement in Feb 2022- OK to start Eliquis 5 mg BID per med attng    HTN - Resumed home carvedilol & amlodipine s/p PEG replaced.    Hypothyroidism - c/w home levothyroxine    Pt is medically cleared for discharge to nursing home with hospice services , patient medically stable, As discussed with Dr Lew pt was discharged on    81 f with HTN, HLD, Hypothyroidism, Depression, seizure d/o, RLE DVT, Cholelithiasis s/p recent Hospitalization at [facility] 12/22 -> 2/11/22 for acute perforated diverticulitis s/p Rosales 12/25, colostomy bag 12/26, hospitalization c/b abd abscess s/p IR drainage 1/3 Cx with E.coli & bacteroides and candida albicans,  trach 1/7, PEG, Acute left MCA infarct, aspiration pneumonia, sent 3/27 from rehab for dislodged PEG, initially afebrile, normal WBc, negative blood and urine cx  CT 3/27: PEG tube removed/dislodged, no evidence for pneumoperitoneum, 3.5 cm thick-walled fluid collection in the pelvic cul-de-sac, Interval slight improvement in additional small loculated fluid   collections, including significant improvement in inflammatory changes in RLQ and pelvis status post removal of surgical drain. Ill-defined hyperdensity/enhancing nodularity along the right posterior   bladder lumen, cannot exclude a neoplastic process  - s/p PEG placement by IR 3/30  - on 4/4 pt spiked to 102.4 with tachycardia and new leukocytosis to 16. ID Dr Rose consulted  - Pt has Hx of recent perforated diverticulitis s/p Varela, c/b abd abscess s/p IR drainage, cx with E-coli, bacteroides and candida now admitted for PEG dislodgement s/p IR placement 3/30 now with new fever, tachycardia, leukocytosis, sepsis due to KPC klebsiella bacteremia, S to vabomere, acyvaz, tigecyline, minocycline and genta, repeat cx 4/5 negative  - CT- Dependent high attenuation within the urinary bladder, which may represent small stones and/or debris. An underlying mass cannot be excluded. Thick-walled collection in the cul-de-sac and small collections along the right paracolic gutter, not significantly changed since 3/27/2022  - IR stated that abscess are too small to be drained  rectal bleed, CTA showed small active rectal bleed, unchanged collections, s/p flex sig 4/14, bleeding vessel was clipped   new fever but WBC normal, blood cx negative   - pt was on vabomere, blood cx cleared 4/5, so day 16, course extended as pt had a fever but repeat blood cx negative and no more fever so discontinue vabomere    Stroke- history of CVA in Jan 2022. Resumed ASA, statin s/p  PEG tube replacement. A1C 4.7%. Plavix discontinued, Eliquis resumed on 4/21    Chronic respiratory failure with hypoxia- Pt on 2l NC via Trache collar    Diverticulitis of intestine with perforation and abscess without bleeding- Surgery c/s in chart- small fluid collection but nontoxic, if drainage needed would be done by IR.  CT Abd:  thick walled collection in the cul-de-sac and small collections along the Right paracolic gutter.  Collection is too small to be drained by IR    Rectal Bleeding- s/p blood transfusion.  CT Abd/Pelvis- showed Rectal active Hemorrhage.  Sigmoidoscopy:  Visible vessel found in distal rectum; Clip placed  - Seen by GI Dr Duran -s/p Flex Sig with clip placed in distal rectum, CBC stable on ASA 81mg, Eliquis resumed on 4/21 per son request, GI ok with resuming with Protonix  - per GI avoid rectal manipulation, suppositories or enemas    - PEG feeds w/aspiration precautions. DNR/DNI; Hospice planning    History of atrial fibrillation- Pt off A/C due to recent GIB as per chart.- Per patient's son & past charts, no history of true GIB. Since rectal bleed resolved  - per GI - no objection to A/C with daily Protonix if in line w/family GOC  - Start lifelong AC per cards and med attng (Eliquis 5 mg BID)    Seizure disorder- continue Levetiracetam twice a day    DVT, lower extremity- Subacute vs Chronic, not started on A/C as per chart due to recent GIB- technically not true GI bleed per GI notes from last admission. Most likely was an issue with PEG placement in Feb 2022- OK to start Eliquis 5 mg BID per med attng    HTN - Resumed home carvedilol & amlodipine s/p PEG replaced.    Hypothyroidism - c/w home levothyroxine    Pt is medically cleared for discharge to nursing home with hospice services , patient medically stable, As discussed with Dr Lew pt was discharged on 4/22/2022 to nursing home with hospice services   81 f with HTN, HLD, Hypothyroidism, Depression, seizure d/o, RLE DVT, Cholelithiasis s/p recent Hospitalization at [facility] 12/22 -> 2/11/22 for acute perforated diverticulitis s/p Rosales 12/25, colostomy bag 12/26, hospitalization c/b abd abscess s/p IR drainage 1/3 Cx with E.coli & bacteroides and candida albicans,  trach 1/7, PEG, Acute left MCA infarct, aspiration pneumonia, sent 3/27 from rehab for dislodged PEG, initially afebrile, normal WBc, negative blood and urine cx  CT 3/27: PEG tube removed/dislodged, no evidence for pneumoperitoneum, 3.5 cm thick-walled fluid collection in the pelvic cul-de-sac, Interval slight improvement in additional small loculated fluid   collections, including significant improvement in inflammatory changes in RLQ and pelvis status post removal of surgical drain. Ill-defined hyperdensity/enhancing nodularity along the right posterior   bladder lumen, cannot exclude a neoplastic process  - s/p PEG placement by IR 3/30  - on 4/4 pt spiked to 102.4 with tachycardia and new leukocytosis to 16. ID Dr Rose consulted  - Pt has Hx of recent perforated diverticulitis s/p Varela, c/b abd abscess s/p IR drainage, cx with E-coli, bacteroides and candida now admitted for PEG dislodgement s/p IR placement 3/30 now with new fever, tachycardia, leukocytosis, sepsis due to KPC klebsiella bacteremia, S to vabomere, acyvaz, tigecyline, minocycline and genta, repeat cx 4/5 negative  - CT- Dependent high attenuation within the urinary bladder, which may represent small stones and/or debris. An underlying mass cannot be excluded. Thick-walled collection in the cul-de-sac and small collections along the right paracolic gutter, not significantly changed since 3/27/2022  - IR stated that abscess are too small to be drained  rectal bleed, CTA showed small active rectal bleed, unchanged collections, s/p flex sig 4/14, bleeding vessel was clipped   new fever but WBC normal, blood cx negative   - pt was on vabomere, blood cx cleared 4/5, so day 16, course extended as pt had a fever but repeat blood cx negative and no more fever so discontinue vabomere    Stroke- history of CVA in Jan 2022. Resumed ASA, statin s/p  PEG tube replacement. A1C 4.7%. Plavix discontinued, Eliquis resumed on 4/21    Chronic respiratory failure with hypoxia- Pt on 2l NC via Trache collar    Diverticulitis of intestine with perforation and abscess without bleeding- Surgery c/s in chart- small fluid collection but nontoxic, if drainage needed would be done by IR.  CT Abd:  thick walled collection in the cul-de-sac and small collections along the Right paracolic gutter.  Collection is too small to be drained by IR    Rectal Bleeding- s/p blood transfusion.  CT Abd/Pelvis- showed Rectal active Hemorrhage.  Sigmoidoscopy:  Visible vessel found in distal rectum; Clip placed  - Seen by GI Dr Duran -s/p Flex Sig with clip placed in distal rectum, CBC stable on ASA 81mg, Eliquis resumed on 4/21 per son request, GI ok with resuming with Protonix  - per GI avoid rectal manipulation, suppositories or enemas    - PEG feeds w/aspiration precautions. DNR/DNI; Hospice planning    History of atrial fibrillation- Pt off A/C due to recent GIB as per chart.- Per patient's son & past charts, no history of true GIB. Since rectal bleed resolved  - per GI - no objection to A/C with daily Protonix if in line w/family GOC  - Start lifelong AC per cards and med attng (Eliquis 5 mg BID)    Seizure disorder- continue Levetiracetam twice a day    DVT, lower extremity- Subacute vs Chronic, not started on A/C as per chart due to recent GIB- technically not true GI bleed per GI notes from last admission. Most likely was an issue with PEG placement in Feb 2022- OK to start Eliquis 5 mg BID per med attng    HTN - Resumed home carvedilol & amlodipine s/p PEG replaced.    Hypothyroidism - c/w home levothyroxine    Pt is medically cleared for discharge to nursing home with hospice services , patient medically stable, As discussed with Dr Lew pt was discharged on 4/22/2022 to nursing home with hospice services.

## 2022-03-31 NOTE — PROGRESS NOTE ADULT - SUBJECTIVE AND OBJECTIVE BOX
Date of Service  : 03-31-22     INTERVAL HPI/OVERNIGHT EVENTS: TF started.   Vital Signs Last 24 Hrs  T(C): 37 (31 Mar 2022 11:40), Max: 37 (31 Mar 2022 11:40)  T(F): 98.6 (31 Mar 2022 11:40), Max: 98.6 (31 Mar 2022 11:40)  HR: 68 (31 Mar 2022 11:40) (59 - 88)  BP: 154/91 (31 Mar 2022 11:40) (141/58 - 164/80)  BP(mean): --  RR: 17 (31 Mar 2022 11:40) (17 - 18)  SpO2: 99% (31 Mar 2022 11:40) (98% - 100%)  I&O's Summary    30 Mar 2022 07:01  -  31 Mar 2022 07:00  --------------------------------------------------------  IN: 1000 mL / OUT: 530 mL / NET: 470 mL      MEDICATIONS  (STANDING):  amLODIPine   Tablet 10 milliGRAM(s) Oral daily  aspirin  chewable 81 milliGRAM(s) Enteral Tube daily  atorvastatin 40 milliGRAM(s) Oral at bedtime  carvedilol 3.125 milliGRAM(s) Oral every 12 hours  clopidogrel Tablet 75 milliGRAM(s) Enteral Tube daily  dextrose 5% with potassium chloride 20 mEq/L 1000 milliLiter(s) (50 mL/Hr) IV Continuous <Continuous>  dorzolamide 2% Ophthalmic Solution 1 Drop(s) Both EYES <User Schedule>  heparin   Injectable 5000 Unit(s) SubCutaneous every 12 hours  influenza  Vaccine (HIGH DOSE) 0.7 milliLiter(s) IntraMuscular once  latanoprost 0.005% Ophthalmic Solution 1 Drop(s) Both EYES at bedtime  levETIRAcetam  IVPB 500 milliGRAM(s) IV Intermittent every 12 hours  timolol 0.5% Solution 1 Drop(s) Both EYES two times a day    MEDICATIONS  (PRN):    LABS:                        10.2   6.81  )-----------( 367      ( 31 Mar 2022 07:13 )             33.1     03-31    141  |  106  |  6<L>  ----------------------------<  86  3.2<L>   |  21<L>  |  0.41<L>    Ca    8.6      31 Mar 2022 07:13  Phos  3.7     03-31  Mg     1.70     03-31          CAPILLARY BLOOD GLUCOSE                  Consultant(s) Notes Reviewed:  [x ] YES  [ ] NO    PHYSICAL EXAM:  GENERAL: NAD, ,not in any distress ,  HEAD:  Atraumatic, Normocephalic  NECK: Supple, No JVD, Normal thyroid  NERVOUS SYSTEM:  Alert   CHEST/LUNG: Good air entry bilateral   HEART: Regular rate and rhythm; No murmurs, rubs, or gallops  ABDOMEN: Soft, Nontender, Nondistended; Bowel sounds present, PEG+ and ostomy+  EXTREMITIES:  2+ Peripheral Pulses, No clubbing, cyanosis, or edema      Care Discussed with Consultants/Other Providers [ x] YES  [ ] NO

## 2022-03-31 NOTE — DISCHARGE NOTE PROVIDER - PROVIDER TOKENS
FREE:[LAST:[Provider at facility],PHONE:[(   )    -],FAX:[(   )    -]],PROVIDER:[TOKEN:[[id number]]]

## 2022-04-01 LAB
ANION GAP SERPL CALC-SCNC: 12 MMOL/L — SIGNIFICANT CHANGE UP (ref 7–14)
BUN SERPL-MCNC: 9 MG/DL — SIGNIFICANT CHANGE UP (ref 7–23)
CALCIUM SERPL-MCNC: 8.9 MG/DL — SIGNIFICANT CHANGE UP (ref 8.4–10.5)
CHLORIDE SERPL-SCNC: 104 MMOL/L — SIGNIFICANT CHANGE UP (ref 98–107)
CO2 SERPL-SCNC: 23 MMOL/L — SIGNIFICANT CHANGE UP (ref 22–31)
CREAT SERPL-MCNC: 0.4 MG/DL — LOW (ref 0.5–1.3)
CULTURE RESULTS: SIGNIFICANT CHANGE UP
CULTURE RESULTS: SIGNIFICANT CHANGE UP
EGFR: 101 ML/MIN/1.73M2 — SIGNIFICANT CHANGE UP
GLUCOSE SERPL-MCNC: 145 MG/DL — HIGH (ref 70–99)
POTASSIUM SERPL-MCNC: 4.1 MMOL/L — SIGNIFICANT CHANGE UP (ref 3.5–5.3)
POTASSIUM SERPL-SCNC: 4.1 MMOL/L — SIGNIFICANT CHANGE UP (ref 3.5–5.3)
SARS-COV-2 RNA SPEC QL NAA+PROBE: SIGNIFICANT CHANGE UP
SODIUM SERPL-SCNC: 139 MMOL/L — SIGNIFICANT CHANGE UP (ref 135–145)
SPECIMEN SOURCE: SIGNIFICANT CHANGE UP
SPECIMEN SOURCE: SIGNIFICANT CHANGE UP

## 2022-04-01 RX ORDER — POTASSIUM CHLORIDE 20 MEQ
10 PACKET (EA) ORAL
Refills: 0 | Status: COMPLETED | OUTPATIENT
Start: 2022-04-01 | End: 2022-04-01

## 2022-04-01 RX ORDER — POTASSIUM CHLORIDE 20 MEQ
10 PACKET (EA) ORAL
Refills: 0 | Status: DISCONTINUED | OUTPATIENT
Start: 2022-04-01 | End: 2022-04-01

## 2022-04-01 RX ADMIN — ATORVASTATIN CALCIUM 40 MILLIGRAM(S): 80 TABLET, FILM COATED ORAL at 22:39

## 2022-04-01 RX ADMIN — LEVETIRACETAM 400 MILLIGRAM(S): 250 TABLET, FILM COATED ORAL at 10:47

## 2022-04-01 RX ADMIN — APIXABAN 5 MILLIGRAM(S): 2.5 TABLET, FILM COATED ORAL at 06:56

## 2022-04-01 RX ADMIN — APIXABAN 5 MILLIGRAM(S): 2.5 TABLET, FILM COATED ORAL at 18:18

## 2022-04-01 RX ADMIN — LATANOPROST 1 DROP(S): 0.05 SOLUTION/ DROPS OPHTHALMIC; TOPICAL at 22:40

## 2022-04-01 RX ADMIN — CARVEDILOL PHOSPHATE 3.12 MILLIGRAM(S): 80 CAPSULE, EXTENDED RELEASE ORAL at 18:18

## 2022-04-01 RX ADMIN — Medication 81 MILLIGRAM(S): at 12:35

## 2022-04-01 RX ADMIN — CLOPIDOGREL BISULFATE 75 MILLIGRAM(S): 75 TABLET, FILM COATED ORAL at 12:36

## 2022-04-01 RX ADMIN — Medication 1 DROP(S): at 06:56

## 2022-04-01 RX ADMIN — DORZOLAMIDE HYDROCHLORIDE 1 DROP(S): 20 SOLUTION/ DROPS OPHTHALMIC at 19:54

## 2022-04-01 RX ADMIN — DORZOLAMIDE HYDROCHLORIDE 1 DROP(S): 20 SOLUTION/ DROPS OPHTHALMIC at 10:47

## 2022-04-01 RX ADMIN — LEVETIRACETAM 400 MILLIGRAM(S): 250 TABLET, FILM COATED ORAL at 22:39

## 2022-04-01 RX ADMIN — DEXTROSE MONOHYDRATE, SODIUM CHLORIDE, AND POTASSIUM CHLORIDE 50 MILLILITER(S): 50; .745; 4.5 INJECTION, SOLUTION INTRAVENOUS at 07:54

## 2022-04-01 RX ADMIN — Medication 100 MILLIEQUIVALENT(S): at 12:35

## 2022-04-01 RX ADMIN — CARVEDILOL PHOSPHATE 3.12 MILLIGRAM(S): 80 CAPSULE, EXTENDED RELEASE ORAL at 06:41

## 2022-04-01 RX ADMIN — Medication 100 MILLIEQUIVALENT(S): at 10:46

## 2022-04-01 RX ADMIN — AMLODIPINE BESYLATE 10 MILLIGRAM(S): 2.5 TABLET ORAL at 06:41

## 2022-04-01 RX ADMIN — Medication 100 MILLIEQUIVALENT(S): at 13:42

## 2022-04-01 RX ADMIN — Medication 1 DROP(S): at 19:55

## 2022-04-01 RX ADMIN — Medication 50 MILLIEQUIVALENT(S): at 09:40

## 2022-04-01 NOTE — PROGRESS NOTE ADULT - ASSESSMENT
82 yo F with PMHx HTN, HLD, Hypothyroidism, Depression, seizure d/o, RLE DVT, Cholelithiasis s/p recent Hospitalization in [facility] from 12/22 -> 2/11/22 for acute perforated diverticulitis s/p Rosales 12/25, colostomy bag 12/26, hospitalization c/b IR drainage for abdominal abscess 1/3 Cx grew e.coli & bacteriodes, also required Mechanical Ventilation for Acute Respiratory Failure with Tracheostomy done 1/7.  PEG done 1/18, developed bleeding through ostomy, EGD on 2/1 showed gastritis. T's showed Acute left MCA infarct- could be 2/2 to carotid occlusion vs afib, new RLL aspiration pneumonia, seen by ID, completed antibiotics, seen by VascSx, not surgical candidate. Pt aphasic sent from [facility] for dislodged Peg tube, after staff noted pt has blood to her abdomen. Nephrology consulted for Hypokalemia.     A/P    Hypokalemia   likely 2/2 GI loss   monitor input and output closely   diarrhea is improving according to primary team,   s/p D5 with KCL 20meq KCL 30meq   no labs today-  call nephrology with the result   monitor K closely    Hypomagnesia   replete as needed  monitor Mg     Hypocalcemia   improved     HTN   optimal   monitor BP closely    80 yo F with PMHx HTN, HLD, Hypothyroidism, Depression, seizure d/o, RLE DVT, Cholelithiasis s/p recent Hospitalization in [facility] from 12/22 -> 2/11/22 for acute perforated diverticulitis s/p Rosales 12/25, colostomy bag 12/26, hospitalization c/b IR drainage for abdominal abscess 1/3 Cx grew e.coli & bacteriodes, also required Mechanical Ventilation for Acute Respiratory Failure with Tracheostomy done 1/7.  PEG done 1/18, developed bleeding through ostomy, EGD on 2/1 showed gastritis. T's showed Acute left MCA infarct- could be 2/2 to carotid occlusion vs afib, new RLL aspiration pneumonia, seen by ID, completed antibiotics, seen by VascSx, not surgical candidate. Pt aphasic sent from [facility] for dislodged Peg tube, after staff noted pt has blood to her abdomen. Nephrology consulted for Hypokalemia.     A/P  Hypokalemia   likely 2/2 GI loss   monitor input and output closely   diarrhea is improving according to primary team,   s/p D5 with KCL 20meq KCL 30meq   no labs today-  call nephrology with the result   monitor K closely    Hypomagnesia   replete as needed  monitor Mg     Hypocalcemia   improved     HTN   optimal   monitor BP closely

## 2022-04-01 NOTE — PROGRESS NOTE ADULT - SUBJECTIVE AND OBJECTIVE BOX
Date of service: 4/1/22    chief complaint: dislodged PEG tube     extended hpi: 80 yo F with PMHx HTN, HLD, Hypothyroidism, Depression, seizure d/o, RLE DVT, Cholelithiasis s/p recent Hospitalization in [facility] from 12/22 -> 2/11/22 for acute perforated diverticulitis s/p Rosales 12/25, colostomy bag 12/26, hospitalization c/b IR drainage for abdominal abscess 1/3 Cx grew e.coli & bacteriodes, also required Mechanical Ventilation for Acute Respiratory Failure with Tracheostomy done 1/7.  PEG done 1/18, developed bleeding through ostomy for which she was transfused 1/28, EGD on 2/1 showed gastritis. Developed fevers on 1/30 and CT's showed Acute left MCA infarct- could be 2/2 to carotid occlusion vs afib, new RLL aspiration pneumonia, seen by ID, completed antibiotics, seen by VascSx, not surgical candidate. Pt aphasic sent from [facility] for dislodged Peg tube, after staff noted pt has blood to her abdomen.     S: pt. aphasic, no acute distress; ros limited.     Review of Systems:   Constitutional: [ ] fevers, [ ] chills.   Skin: [ ] dry skin. [ ] rashes.  Psychiatric: [ ] depression, [ ] anxiety.   Gastrointestinal: [ ] BRBPR, [ ] melena.   Neurological: [ ] confusion. [ ] seizures. [ ] shuffling gait.   Ears,Nose,Mouth and Throat: [ ] ear pain [ ] sore throat.   Eyes: [ ] diplopia.   Respiratory: [ ] hemoptysis. [ ] shortness of breath  Cardiovascular: See HPI above  Hematologic/Lymphatic: [ ] anemia. [ ] painful nodes. [ ] prolonged bleeding.   Genitourinary: [ ] hematuria. [ ] flank pain.   Endocrine: [ ] significant change in weight. [ ] intolerance to heat and cold.     Review of systems [x ] otherwise negative, [ ] otherwise unable to obtain    FH: no family history of sudden cardiac death in first degree relatives    SH: [ ] tobacco, [ ] alcohol, [ ] drugs    amLODIPine   Tablet 10 milliGRAM(s) Oral daily  apixaban 5 milliGRAM(s) Oral every 12 hours  aspirin  chewable 81 milliGRAM(s) Enteral Tube daily  atorvastatin 40 milliGRAM(s) Oral at bedtime  carvedilol 3.125 milliGRAM(s) Oral every 12 hours  clopidogrel Tablet 75 milliGRAM(s) Enteral Tube daily  dextrose 5% with potassium chloride 20 mEq/L 1000 milliLiter(s) IV Continuous <Continuous>  dorzolamide 2% Ophthalmic Solution 1 Drop(s) Both EYES <User Schedule>  influenza  Vaccine (HIGH DOSE) 0.7 milliLiter(s) IntraMuscular once  latanoprost 0.005% Ophthalmic Solution 1 Drop(s) Both EYES at bedtime  levETIRAcetam  IVPB 500 milliGRAM(s) IV Intermittent every 12 hours  potassium chloride  10 mEq/100 mL IVPB 10 milliEquivalent(s) IV Intermittent every 1 hour  timolol 0.5% Solution 1 Drop(s) Both EYES two times a day                            10.2   6.81  )-----------( 367      ( 31 Mar 2022 07:13 )             33.1       03-31    141  |  106  |  6<L>  ----------------------------<  86  3.2<L>   |  21<L>  |  0.41<L>    Ca    8.6      31 Mar 2022 07:13  Phos  3.7     03-31  Mg     1.70     03-31      T(C): 36.6 (04-01-22 @ 09:38), Max: 37.1 (03-31-22 @ 18:15)  HR: 71 (04-01-22 @ 09:38) (68 - 74)  BP: 135/60 (04-01-22 @ 09:38) (135/60 - 162/68)  RR: 18 (04-01-22 @ 09:38) (17 - 18)  SpO2: 100% (04-01-22 @ 09:38) (98% - 100%)  Wt(kg): --    I&O's Summary    31 Mar 2022 07:01  -  01 Apr 2022 07:00  --------------------------------------------------------  IN: 700 mL / OUT: 100 mL / NET: 600 mL        General: no acute distress   Head: Normocephalic and atraumatic.   Neck: No JVD. No bruits. Supple. Does not appear to be enlarged.   Cardiovascular: + S1,S2 ; RRR Soft systolic murmur at the left lower sternal border. No rubs noted.    Lungs: CTA b/l. No rhonchi, rales or wheezes.   Abdomen: + BS, soft. Non tender. Non distended. No rebound. No guarding.   Extremities: No clubbing/cyanosis/edema.   Psychiatric: unable to assess     < from: TTE Echo Complete w/o Contrast w/ Doppler (02.04.22 @ 15:28) >  Summary:   1. Left ventricular ejection fraction, by visual estimation, is 60 to   65%.   2. Normal global left ventricular systolic function.   3. Mild mitral valve regurgitation.   4.Mild thickening of the anterior and posterior mitral valve leaflets.   5. Mild tricuspid regurgitation.   6. Mild to moderate aortic regurgitation.   7. Sclerotic aortic valve with normal opening.  [id number] Colin Butcher MD, FACC  Electronically signed on 2/5/2022 at 3:03:14 PM  < end of copied text >      A/P:  80 yo F with PMHx HTN, HLD, Hypothyroidism, Depression, seizure d/o, RLE DVT, Cholelithiasis s/p recent Hospitalization in [facility] from 12/22 -> 2/11/22 for acute perforated diverticulitis s/p Rosales 12/25, colostomy bag 12/26, hospitalization c/b IR drainage for abdominal abscess 1/3 Cx grew e.coli & bacteriodes, also required Mechanical Ventilation for Acute Respiratory Failure with Tracheostomy done 1/7.  PEG done 1/18, developed bleeding through ostomy for which she was transfused 1/28, EGD on 2/1 showed gastritis. Developed fevers on 1/30 and CT's showed Acute left MCA infarct- could be 2/2 to carotid occlusion vs afib, new RLL aspiration pneumonia, seen by ID, completed antibiotics, seen by VascSx, not surgical candidate. Pt aphasic sent from [facility] for dislodged Peg tube, after staff noted pt has blood to her abdomen.     --Cardiology consulted for elevated BP while Peg dislodged  --Currently on IV hydralazine and BB - restart home meds per PEG when able  --check 12 lead EKG-d/w RN, ordered  --prior chart reviewed, pt with recnt CVA and found with AFib and Carotid occlusion.  AC held initially while monitoring for hemorrhagic conversion and then in the setting of GI bleed req PRBCs.  She was continued on DAPT.  -given elevated [id number], recommend lifelong AC for AF if able to tolerate  - replace K to 4-4.5, Mg to 2, ideally via PEG route.    Shamar Anderson M.D.  Cardiac Electrophysiology  [phone]

## 2022-04-01 NOTE — PROGRESS NOTE ADULT - ASSESSMENT
80 yo F w/ PMHx of perforated diverticulitis s/p Rosales procedure, colostomy, abdominal abscess with wound s/p IR drainage, Respiratory Failure s/p Trache, CVA w/ functional quadriplegia, aphasia, dysphagia s/p PEG (on Jevity 1.5 @ 70cchr x 18 hrs, & water flushes 325ml q 6 hrs, Afib, GIB, HTN, HLD, Seizure d/o, Hypothyroidism, Glaucoma, Depression p/w PEG tube dislodgement for unknown amount of time     Problem/Plan - 1:  ·  Problem: PEG tube malfunction.   ·  Plan:  S/P  PEG . TF started.      Problem/Plan - 2:  ·  Problem: Hypokalemia.   ·  Plan: Replacing. May need K on DC .     Problem/Plan - 3:  ·  Problem: Stroke.   ·  Plan: Resume ASA, Plavix, statin as PEG tube replaced.     Problem/Plan - 4:  ·  Problem: Chronic respiratory failure with hypoxia.   ·  Plan: Pt on 2l NC via Trache collar.     Problem/Plan - 5:  ·  Problem: Diverticulitis of intestine with perforation and abscess without bleeding.   ·  Plan: Surgery c/s in chart- small fluid collection but nontoxic, if drainage needed would be done by IR.  Wound care eval.     Problem/Plan - 6:  ·  Problem: History of atrial fibrillation.   ·  Plan: Cards helping and recommended AC .   D/W Son and okay to start.      Problem/Plan - 7:  ·  Problem: Seizure disorder.   ·  Plan: continue levetiracetam bid.     Problem/Plan - 8:  ·  Problem: DVT, lower extremity.   ·  Plan: ?Subacute vs Chronic, not started on A/C as per chart due to recent GIB.     Problem/Plan - 9:  ·  Problem: HTN (hypertension).   ·  Plan: Resuming carvedilol & amlodipine as PEG replaced.     Problem/Plan - 10:  ·  Problem: Hypothyroidism.   ·  Plan; Resume Levothyroxine when PEG replaced.     Problem/Plan - 11:  ·  Problem: Glaucoma.   ·  Plan: continue Timolol, Dorzolamide, Latanoprost.       Disposition : DC  planning .

## 2022-04-01 NOTE — PROGRESS NOTE ADULT - SUBJECTIVE AND OBJECTIVE BOX
[facility]   MD ELOISE ALVARADO MD, PA INJGALINDO BUNNY HERNANDEZ    TEL:  OFFICE: [phone]    From 5pm-7am Answering Service [phone]    -- RENAL FOLLOW UP NOTE ---Date of Service 04-01-22 @ 13:04    Patient is a 81y old  Female who presents with a chief complaint of     Patient seen and examined at bedside. No chest pain/sob    VITALS:  T(F): 97.9 (04-01-22 @ 09:38), Max: 98.8 (03-31-22 @ 18:15)  HR: 71 (04-01-22 @ 09:38)  BP: 135/60 (04-01-22 @ 09:38)  RR: 18 (04-01-22 @ 09:38)  SpO2: 100% (04-01-22 @ 09:38)  Wt(kg): --    03-31 @ 07:01  -  04-01 @ 07:00  --------------------------------------------------------  IN: 700 mL / OUT: 100 mL / NET: 600 mL          PHYSICAL EXAM:  Constitutional: NAD  Neck: No JVD  Respiratory: CTAB, no wheezes, rales or rhonchi  Cardiovascular: S1, S2, RRR  Gastrointestinal: BS+, soft, NT/ND, PEG   Extremities: No peripheral edema    Hospital Medications:   MEDICATIONS  (STANDING):  amLODIPine   Tablet 10 milliGRAM(s) Oral daily  apixaban 5 milliGRAM(s) Oral every 12 hours  aspirin  chewable 81 milliGRAM(s) Enteral Tube daily  atorvastatin 40 milliGRAM(s) Oral at bedtime  carvedilol 3.125 milliGRAM(s) Oral every 12 hours  clopidogrel Tablet 75 milliGRAM(s) Enteral Tube daily  dextrose 5% with potassium chloride 20 mEq/L 1000 milliLiter(s) (50 mL/Hr) IV Continuous <Continuous>  dorzolamide 2% Ophthalmic Solution 1 Drop(s) Both EYES <User Schedule>  influenza  Vaccine (HIGH DOSE) 0.7 milliLiter(s) IntraMuscular once  latanoprost 0.005% Ophthalmic Solution 1 Drop(s) Both EYES at bedtime  levETIRAcetam  IVPB 500 milliGRAM(s) IV Intermittent every 12 hours  potassium chloride  10 mEq/100 mL IVPB 10 milliEquivalent(s) IV Intermittent every 1 hour  timolol 0.5% Solution 1 Drop(s) Both EYES two times a day      LABS:  03-31    141  |  106  |  6<L>  ----------------------------<  86  3.2<L>   |  21<L>  |  0.41<L>    Ca    8.6      31 Mar 2022 07:13  Phos  3.7     03-31  Mg     1.70     03-31      Creatinine Trend: 0.41 <--, 0.36 <--, 0.39 <--, 0.37 <--, 0.36 <--, 0.39 <--, 0.37 <--, 0.32 <--, 0.32 <--, 0.40 <--                                10.2   6.81  )-----------( 367      ( 31 Mar 2022 07:13 )             33.1     Urine Studies:  Urinalysis - [03-26-22 @ 23:19]      Color Yellow / Appearance Slightly Turbid / SG 1.025 / pH 8.0      Gluc Negative / Ketone Negative  / Bili Negative / Urobili <2 mg/dL       Blood Negative / Protein 100 mg/dL / Leuk Est Negative / Nitrite Negative      RBC 0-1 / WBC 0-1 / Hyaline  / Gran  / Sq Epi  / Non Sq Epi  / Bacteria       PTH -- (Ca --)      [03-30-22 @ 07:35]   32  TSH 12.200      [01-14-22 @ 08:33]  Lipid: chol 158, , HDL 25, LDL --      [02-03-22 @ 15:22]        RADIOLOGY & ADDITIONAL STUDIES:

## 2022-04-01 NOTE — PROGRESS NOTE ADULT - SUBJECTIVE AND OBJECTIVE BOX
Date of Service  : 04-01-22     INTERVAL HPI/OVERNIGHT EVENTS: No new concerns.   Vital Signs Last 24 Hrs  T(C): 36.9 (01 Apr 2022 17:30), Max: 37.1 (31 Mar 2022 18:15)  T(F): 98.5 (01 Apr 2022 17:30), Max: 98.8 (31 Mar 2022 18:15)  HR: 79 (01 Apr 2022 17:30) (71 - 79)  BP: 160/71 (01 Apr 2022 17:30) (135/60 - 162/68)  BP(mean): --  RR: 18 (01 Apr 2022 17:30) (17 - 18)  SpO2: 100% (01 Apr 2022 17:30) (98% - 100%)  I&O's Summary    31 Mar 2022 07:01  -  01 Apr 2022 07:00  --------------------------------------------------------  IN: 700 mL / OUT: 100 mL / NET: 600 mL    01 Apr 2022 07:01  -  01 Apr 2022 18:01  --------------------------------------------------------  IN: 650 mL / OUT: 50 mL / NET: 600 mL      MEDICATIONS  (STANDING):  amLODIPine   Tablet 10 milliGRAM(s) Oral daily  apixaban 5 milliGRAM(s) Oral every 12 hours  aspirin  chewable 81 milliGRAM(s) Enteral Tube daily  atorvastatin 40 milliGRAM(s) Oral at bedtime  carvedilol 3.125 milliGRAM(s) Oral every 12 hours  clopidogrel Tablet 75 milliGRAM(s) Enteral Tube daily  dorzolamide 2% Ophthalmic Solution 1 Drop(s) Both EYES <User Schedule>  influenza  Vaccine (HIGH DOSE) 0.7 milliLiter(s) IntraMuscular once  latanoprost 0.005% Ophthalmic Solution 1 Drop(s) Both EYES at bedtime  levETIRAcetam  IVPB 500 milliGRAM(s) IV Intermittent every 12 hours  timolol 0.5% Solution 1 Drop(s) Both EYES two times a day    MEDICATIONS  (PRN):    LABS:                        10.2   6.81  )-----------( 367      ( 31 Mar 2022 07:13 )             33.1     04-01    139  |  104  |  9   ----------------------------<  145<H>  4.1   |  23  |  0.40<L>    Ca    8.9      01 Apr 2022 15:26  Phos  3.7     03-31  Mg     1.70     03-31              Consultant(s) Notes Reviewed:  [x ] YES  [ ] NO    PHYSICAL EXAM:  GENERAL: NAD, well-groomed, well-developed,not in any distress ,  HEAD:  Atraumatic, Normocephalic  NECK: Trach+  NERVOUS SYSTEM:  Alert   CHEST/LUNG: Good air entry bilateral with no  rales, rhonchi, wheezing, or rubs  HEART: Regular rate and rhythm; No murmurs, rubs, or gallops  ABDOMEN: Soft, Nontender, Nondistended; Bowel sounds present, PEG+, Ostomy +  EXTREMITIES:  2+ Peripheral Pulses, No clubbing, cyanosis, or edema      Care Discussed with Consultants/Other Providers [ x] YES  [ ] NO

## 2022-04-01 NOTE — CHART NOTE - NSCHARTNOTEFT_GEN_A_CORE
Nutrition consult received for Bolus TF recommendations. Pt remains s/p PEG tube replaced 3/30 and continuous feeds started 3/31. Pt is on Jevity 1.2 @65 mL x23 hrs. Currently at goal rate and feeds tolerated without nausea, vomiting per RN. Last BM 3/29 per nursing flow sheet.   As per discussion with  Pt was admitted from [facility] and plan is for discharge to same facility. Pt was receiving continuous feedings x18 hrs prior to admission (per H/P notes). Considering plans to d/c to rehab, bolus feeding regimen is not warranted. Would suggest continue with current EN as ordered.     DIET : NPO with Tube Feed:   Tube Feeding Modality: Gastrostomy  Jevity 1.2 Santos (JEVITY1.2RTH)  Total Volume for 24 Hours (mL): 1495  Continuous  Starting Tube Feed Rate {mL per Hour}: 25  Increase Tube Feed Rate by (mL): 10     Every 4 hours  Until Goal Tube Feed Rate (mL per Hour): 65  Tube Feed Duration (in Hours): 23  Tube Feed Start Time: 11:45 (03-31-22 @ 11:43)  Diet, NPO:   NPO for Procedure/Test     NPO Start Date: 27-Mar-2022,   NPO Start Time: 14:00 (03-27-22 @ 14:11)    WEIGHT: Weight (kg): 76.2 (03-27 @ 12:33)     PERTINENT MEDICATIONS: MEDICATIONS  (STANDING):  amLODIPine   Tablet 10 milliGRAM(s) Oral daily  apixaban 5 milliGRAM(s) Oral every 12 hours  aspirin  chewable 81 milliGRAM(s) Enteral Tube daily  atorvastatin 40 milliGRAM(s) Oral at bedtime  carvedilol 3.125 milliGRAM(s) Oral every 12 hours  clopidogrel Tablet 75 milliGRAM(s) Enteral Tube daily  dextrose 5% with potassium chloride 20 mEq/L 1000 milliLiter(s) (50 mL/Hr) IV Continuous <Continuous>  dorzolamide 2% Ophthalmic Solution 1 Drop(s) Both EYES <User Schedule>  influenza  Vaccine (HIGH DOSE) 0.7 milliLiter(s) IntraMuscular once  latanoprost 0.005% Ophthalmic Solution 1 Drop(s) Both EYES at bedtime  levETIRAcetam  IVPB 500 milliGRAM(s) IV Intermittent every 12 hours  potassium chloride  10 mEq/50 mL IVPB 10 milliEquivalent(s) IV Intermittent every 2 hours  timolol 0.5% Solution 1 Drop(s) Both EYES two times a day    LABS:  03-31 Na141 mmol/L Glu 86 mg/dL K+ 3.2 mmol/L<L> Cr  0.41 mg/dL<L> BUN 6 mg/dL<L> 03-31 Phos 3.7 mg/dL  HgA1C 4.7 (WNL)     SKIN: no pressure injury    EDEMA: 2+ Edema R arm and R Leg    Nutrient Needs:  [X] No Change from Initial Assessment.     Previous Nutrition Diagnosis:   Inadequate Energy Intake    Nutrition Diagnosis is Resolved                                        ~~~~~RECOMMEND~~~~~  1.  Continue current diet as ordered.   2.  Monitor EN tolerance, skin integrity and pertinent labs.    3.  Please obtain current weight.    CAL Newell RD, CDN, [facility]

## 2022-04-02 LAB
ANION GAP SERPL CALC-SCNC: 15 MMOL/L — HIGH (ref 7–14)
BUN SERPL-MCNC: 11 MG/DL — SIGNIFICANT CHANGE UP (ref 7–23)
CALCIUM SERPL-MCNC: 9.1 MG/DL — SIGNIFICANT CHANGE UP (ref 8.4–10.5)
CHLORIDE SERPL-SCNC: 103 MMOL/L — SIGNIFICANT CHANGE UP (ref 98–107)
CO2 SERPL-SCNC: 20 MMOL/L — LOW (ref 22–31)
CREAT SERPL-MCNC: 0.39 MG/DL — LOW (ref 0.5–1.3)
EGFR: 101 ML/MIN/1.73M2 — SIGNIFICANT CHANGE UP
GLUCOSE SERPL-MCNC: 130 MG/DL — HIGH (ref 70–99)
HCT VFR BLD CALC: 37.1 % — SIGNIFICANT CHANGE UP (ref 34.5–45)
HGB BLD-MCNC: 11.3 G/DL — LOW (ref 11.5–15.5)
MAGNESIUM SERPL-MCNC: 1.8 MG/DL — SIGNIFICANT CHANGE UP (ref 1.6–2.6)
MCHC RBC-ENTMCNC: 27.4 PG — SIGNIFICANT CHANGE UP (ref 27–34)
MCHC RBC-ENTMCNC: 30.5 GM/DL — LOW (ref 32–36)
MCV RBC AUTO: 89.8 FL — SIGNIFICANT CHANGE UP (ref 80–100)
NRBC # BLD: 0 /100 WBCS — SIGNIFICANT CHANGE UP
NRBC # FLD: 0 K/UL — SIGNIFICANT CHANGE UP
PHOSPHATE SERPL-MCNC: 4 MG/DL — SIGNIFICANT CHANGE UP (ref 2.5–4.5)
PLATELET # BLD AUTO: 386 K/UL — SIGNIFICANT CHANGE UP (ref 150–400)
POTASSIUM SERPL-MCNC: 3.8 MMOL/L — SIGNIFICANT CHANGE UP (ref 3.5–5.3)
POTASSIUM SERPL-SCNC: 3.8 MMOL/L — SIGNIFICANT CHANGE UP (ref 3.5–5.3)
RBC # BLD: 4.13 M/UL — SIGNIFICANT CHANGE UP (ref 3.8–5.2)
RBC # FLD: 15.9 % — HIGH (ref 10.3–14.5)
SODIUM SERPL-SCNC: 138 MMOL/L — SIGNIFICANT CHANGE UP (ref 135–145)
WBC # BLD: 10.29 K/UL — SIGNIFICANT CHANGE UP (ref 3.8–10.5)
WBC # FLD AUTO: 10.29 K/UL — SIGNIFICANT CHANGE UP (ref 3.8–10.5)

## 2022-04-02 RX ADMIN — DORZOLAMIDE HYDROCHLORIDE 1 DROP(S): 20 SOLUTION/ DROPS OPHTHALMIC at 18:30

## 2022-04-02 RX ADMIN — APIXABAN 5 MILLIGRAM(S): 2.5 TABLET, FILM COATED ORAL at 06:02

## 2022-04-02 RX ADMIN — AMLODIPINE BESYLATE 10 MILLIGRAM(S): 2.5 TABLET ORAL at 06:02

## 2022-04-02 RX ADMIN — LEVETIRACETAM 400 MILLIGRAM(S): 250 TABLET, FILM COATED ORAL at 09:50

## 2022-04-02 RX ADMIN — LEVETIRACETAM 400 MILLIGRAM(S): 250 TABLET, FILM COATED ORAL at 21:48

## 2022-04-02 RX ADMIN — Medication 81 MILLIGRAM(S): at 11:49

## 2022-04-02 RX ADMIN — Medication 1 DROP(S): at 06:02

## 2022-04-02 RX ADMIN — APIXABAN 5 MILLIGRAM(S): 2.5 TABLET, FILM COATED ORAL at 18:30

## 2022-04-02 RX ADMIN — Medication 1 DROP(S): at 18:30

## 2022-04-02 RX ADMIN — CLOPIDOGREL BISULFATE 75 MILLIGRAM(S): 75 TABLET, FILM COATED ORAL at 11:49

## 2022-04-02 RX ADMIN — LATANOPROST 1 DROP(S): 0.05 SOLUTION/ DROPS OPHTHALMIC; TOPICAL at 21:48

## 2022-04-02 RX ADMIN — CARVEDILOL PHOSPHATE 3.12 MILLIGRAM(S): 80 CAPSULE, EXTENDED RELEASE ORAL at 18:30

## 2022-04-02 RX ADMIN — CARVEDILOL PHOSPHATE 3.12 MILLIGRAM(S): 80 CAPSULE, EXTENDED RELEASE ORAL at 06:02

## 2022-04-02 RX ADMIN — ATORVASTATIN CALCIUM 40 MILLIGRAM(S): 80 TABLET, FILM COATED ORAL at 21:47

## 2022-04-02 RX ADMIN — DORZOLAMIDE HYDROCHLORIDE 1 DROP(S): 20 SOLUTION/ DROPS OPHTHALMIC at 09:50

## 2022-04-02 NOTE — PROGRESS NOTE ADULT - SUBJECTIVE AND OBJECTIVE BOX
[facility] [place] [facility]   MD ELOISE ALVARADO MD, PA INJUNG KO NP    TEL:  OFFICE: [phone]    From 5pm-7am Answering Service [phone]    -- RENAL FOLLOW UP NOTE ---Date of Service 04-02-22 @ 13:44    Patient is a 81y old  Female who presents with a chief complaint of     Patient seen and examined at bedside. No chest pain/sob    VITALS:  T(F): 97.9 (04-02-22 @ 06:00), Max: 98.5 (04-01-22 @ 17:30)  HR: 75 (04-02-22 @ 06:00)  BP: 169/74 (04-02-22 @ 06:00)  RR: 18 (04-02-22 @ 06:00)  SpO2: 98% (04-02-22 @ 06:00)  Wt(kg): --    04-01 @ 07:01  -  04-02 @ 07:00  --------------------------------------------------------  IN: 1040 mL / OUT: 100 mL / NET: 940 mL    04-02 @ 07:01  -  04-02 @ 13:44  --------------------------------------------------------  IN: 610 mL / OUT: 100 mL / NET: 510 mL          PHYSICAL EXAM:  Constitutional: NAD  Neck: No JVD  Respiratory: CTAB, no wheezes, rales or rhonchi  Cardiovascular: S1, S2, RRR  Gastrointestinal: BS+, soft, NT/ND  Extremities: No peripheral edema    Hospital Medications:   MEDICATIONS  (STANDING):  amLODIPine   Tablet 10 milliGRAM(s) Oral daily  apixaban 5 milliGRAM(s) Oral every 12 hours  aspirin  chewable 81 milliGRAM(s) Enteral Tube daily  atorvastatin 40 milliGRAM(s) Oral at bedtime  carvedilol 3.125 milliGRAM(s) Oral every 12 hours  clopidogrel Tablet 75 milliGRAM(s) Enteral Tube daily  dorzolamide 2% Ophthalmic Solution 1 Drop(s) Both EYES <User Schedule>  influenza  Vaccine (HIGH DOSE) 0.7 milliLiter(s) IntraMuscular once  latanoprost 0.005% Ophthalmic Solution 1 Drop(s) Both EYES at bedtime  levETIRAcetam  IVPB 500 milliGRAM(s) IV Intermittent every 12 hours  timolol 0.5% Solution 1 Drop(s) Both EYES two times a day      LABS:  04-02    138  |  103  |  11  ----------------------------<  130<H>  3.8   |  20<L>  |  0.39<L>    Ca    9.1      02 Apr 2022 06:59  Phos  4.0     04-02  Mg     1.80     04-02      Creatinine Trend: 0.39 <--, 0.40 <--, 0.41 <--, 0.36 <--, 0.39 <--, 0.37 <--, 0.36 <--, 0.39 <--, 0.37 <--, 0.32 <--, 0.32 <--, 0.40 <--    Phosphorus Level, Serum: 4.0 mg/dL (04-02 @ 06:59)                              11.3   10.29 )-----------( 386      ( 02 Apr 2022 06:59 )             37.1     Urine Studies:  Urinalysis - [03-26-22 @ 23:19]      Color Yellow / Appearance Slightly Turbid / SG 1.025 / pH 8.0      Gluc Negative / Ketone Negative  / Bili Negative / Urobili <2 mg/dL       Blood Negative / Protein 100 mg/dL / Leuk Est Negative / Nitrite Negative      RBC 0-1 / WBC 0-1 / Hyaline  / Gran  / Sq Epi  / Non Sq Epi  / Bacteria       PTH -- (Ca --)      [03-30-22 @ 07:35]   32  TSH 12.200      [01-14-22 @ 08:33]  Lipid: chol 158, , HDL 25, LDL --      [02-03-22 @ 15:22]        RADIOLOGY & ADDITIONAL STUDIES:

## 2022-04-02 NOTE — PROGRESS NOTE ADULT - ASSESSMENT
80 yo F with PMHx HTN, HLD, Hypothyroidism, Depression, seizure d/o, RLE DVT, Cholelithiasis s/p recent Hospitalization in [facility] from 12/22 -> 2/11/22 for acute perforated diverticulitis s/p Rosales 12/25, colostomy bag 12/26, hospitalization c/b IR drainage for abdominal abscess 1/3 Cx grew e.coli & bacteriodes, also required Mechanical Ventilation for Acute Respiratory Failure with Tracheostomy done 1/7.  PEG done 1/18, developed bleeding through ostomy, EGD on 2/1 showed gastritis. T's showed Acute left MCA infarct- could be 2/2 to carotid occlusion vs afib, new RLL aspiration pneumonia, seen by ID, completed antibiotics, seen by VascSx, not surgical candidate. Pt aphasic sent from [facility] for dislodged Peg tube, after staff noted pt has blood to her abdomen. Nephrology consulted for Hypokalemia.     A/P  Hypokalemia   likely 2/2 GI loss   monitor input and output closely   diarrhea is improving according to primary team,   improving;   monitor K closely    Hypomagnesia   replete as needed  monitor Mg     Hypocalcemia   improved     HTN   suboptimal   titrate up carvedilol   monitor BP closely

## 2022-04-02 NOTE — PROGRESS NOTE ADULT - SUBJECTIVE AND OBJECTIVE BOX
Date of Service  : 04-02-22     INTERVAL HPI/OVERNIGHT EVENTS: No new concerns per staff.   Vital Signs Last 24 Hrs  T(C): 36.6 (02 Apr 2022 17:37), Max: 36.6 (01 Apr 2022 21:30)  T(F): 97.9 (02 Apr 2022 17:37), Max: 97.9 (02 Apr 2022 06:00)  HR: 86 (02 Apr 2022 17:37) (70 - 86)  BP: 161/67 (02 Apr 2022 17:37) (141/70 - 170/80)  BP(mean): --  RR: 18 (02 Apr 2022 17:37) (17 - 18)  SpO2: 100% (02 Apr 2022 17:37) (98% - 100%)  I&O's Summary    01 Apr 2022 07:01  -  02 Apr 2022 07:00  --------------------------------------------------------  IN: 1040 mL / OUT: 100 mL / NET: 940 mL    02 Apr 2022 07:01  -  02 Apr 2022 19:58  --------------------------------------------------------  IN: 1380 mL / OUT: 850 mL / NET: 530 mL      MEDICATIONS  (STANDING):  amLODIPine   Tablet 10 milliGRAM(s) Oral daily  apixaban 5 milliGRAM(s) Oral every 12 hours  aspirin  chewable 81 milliGRAM(s) Enteral Tube daily  atorvastatin 40 milliGRAM(s) Oral at bedtime  carvedilol 3.125 milliGRAM(s) Oral every 12 hours  clopidogrel Tablet 75 milliGRAM(s) Enteral Tube daily  dorzolamide 2% Ophthalmic Solution 1 Drop(s) Both EYES <User Schedule>  influenza  Vaccine (HIGH DOSE) 0.7 milliLiter(s) IntraMuscular once  latanoprost 0.005% Ophthalmic Solution 1 Drop(s) Both EYES at bedtime  levETIRAcetam  IVPB 500 milliGRAM(s) IV Intermittent every 12 hours  timolol 0.5% Solution 1 Drop(s) Both EYES two times a day    MEDICATIONS  (PRN):    LABS:                        11.3   10.29 )-----------( 386      ( 02 Apr 2022 06:59 )             37.1     04-02    138  |  103  |  11  ----------------------------<  130<H>  3.8   |  20<L>  |  0.39<L>    Ca    9.1      02 Apr 2022 06:59  Phos  4.0     04-02  Mg     1.80     04-02              Consultant(s) Notes Reviewed:  [x ] YES  [ ] NO    PHYSICAL EXAM:  GENERAL: NAD, well-groomed, well-developed,not in any distress ,  HEAD:  Atraumatic, Normocephalic  NECK: Supple, No JVD, Normal thyroid  NERVOUS SYSTEM:  Alert &  No focal deficit   CHEST/LUNG: Good air entry bilateral with no  rales, rhonchi, wheezing, or rubs  HEART: Regular rate and rhythm; No murmurs, rubs, or gallops  ABDOMEN: Soft, Nontender, Nondistended; Bowel sounds present, PEG+ ,Ostomy+  EXTREMITIES:  2+ Peripheral Pulses, No clubbing, cyanosis, or edema      Care Discussed with Consultants/Other Providers [ x] YES  [ ] NO

## 2022-04-02 NOTE — PROGRESS NOTE ADULT - SUBJECTIVE AND OBJECTIVE BOX
Date of service: 4/2/22    chief complaint: dislodged PEG tube     extended hpi: 80 yo F with PMHx HTN, HLD, Hypothyroidism, Depression, seizure d/o, RLE DVT, Cholelithiasis s/p recent Hospitalization in [facility] from 12/22 -> 2/11/22 for acute perforated diverticulitis s/p Rosales 12/25, colostomy bag 12/26, hospitalization c/b IR drainage for abdominal abscess 1/3 Cx grew e.coli & bacteriodes, also required Mechanical Ventilation for Acute Respiratory Failure with Tracheostomy done 1/7.  PEG done 1/18, developed bleeding through ostomy for which she was transfused 1/28, EGD on 2/1 showed gastritis. Developed fevers on 1/30 and CT's showed Acute left MCA infarct- could be 2/2 to carotid occlusion vs afib, new RLL aspiration pneumonia, seen by ID, completed antibiotics, seen by VascSx, not surgical candidate. Pt aphasic sent from [facility] for dislodged Peg tube, after staff noted pt has blood to her abdomen.     S: pt. aphasic, no acute distress; ros limited.     Review of Systems:   Constitutional: [ ] fevers, [ ] chills.   Skin: [ ] dry skin. [ ] rashes.  Psychiatric: [ ] depression, [ ] anxiety.   Gastrointestinal: [ ] BRBPR, [ ] melena.   Neurological: [ ] confusion. [ ] seizures. [ ] shuffling gait.   Ears,Nose,Mouth and Throat: [ ] ear pain [ ] sore throat.   Eyes: [ ] diplopia.   Respiratory: [ ] hemoptysis. [ ] shortness of breath  Cardiovascular: See HPI above  Hematologic/Lymphatic: [ ] anemia. [ ] painful nodes. [ ] prolonged bleeding.   Genitourinary: [ ] hematuria. [ ] flank pain.   Endocrine: [ ] significant change in weight. [ ] intolerance to heat and cold.     Review of systems [ x] otherwise negative, [ ] otherwise unable to obtain    FH: no family history of sudden cardiac death in first degree relatives    SH: [ ] tobacco, [ ] alcohol, [ ] drugs    amLODIPine   Tablet 10 milliGRAM(s) Oral daily  apixaban 5 milliGRAM(s) Oral every 12 hours  aspirin  chewable 81 milliGRAM(s) Enteral Tube daily  atorvastatin 40 milliGRAM(s) Oral at bedtime  carvedilol 3.125 milliGRAM(s) Oral every 12 hours  clopidogrel Tablet 75 milliGRAM(s) Enteral Tube daily  dorzolamide 2% Ophthalmic Solution 1 Drop(s) Both EYES <User Schedule>  influenza  Vaccine (HIGH DOSE) 0.7 milliLiter(s) IntraMuscular once  latanoprost 0.005% Ophthalmic Solution 1 Drop(s) Both EYES at bedtime  levETIRAcetam  IVPB 500 milliGRAM(s) IV Intermittent every 12 hours  timolol 0.5% Solution 1 Drop(s) Both EYES two times a day                            11.3   10.29 )-----------( 386      ( 02 Apr 2022 06:59 )             37.1     138  |  103  |  11  ----------------------------<  130<H>  3.8   |  20<L>  |  0.39<L>    Ca    9.1      02 Apr 2022 06:59  Phos  4.0     04-02  Mg     1.80     04-02    T(C): 36.4 (04-02-22 @ 13:48), Max: 36.9 (04-01-22 @ 17:30)  HR: 74 (04-02-22 @ 13:48) (70 - 79)  BP: 141/70 (04-02-22 @ 13:48) (141/70 - 170/80)  RR: 18 (04-02-22 @ 13:48) (17 - 18)  SpO2: 100% (04-02-22 @ 13:48) (98% - 100%)  Wt(kg): --    General: no acute distress   Head: Normocephalic and atraumatic.   Neck: No JVD. No bruits. Supple. Does not appear to be enlarged.   Cardiovascular: + S1,S2 ; RRR Soft systolic murmur at the left lower sternal border. No rubs noted.    Lungs: CTA b/l. No rhonchi, rales or wheezes.   Abdomen: + BS, soft. Non tender. Non distended. No rebound. No guarding.   Extremities: No clubbing/cyanosis/edema.   Psychiatric: unable to assess     < from: TTE Echo Complete w/o Contrast w/ Doppler (02.04.22 @ 15:28) >  Summary:   1. Left ventricular ejection fraction, by visual estimation, is 60 to   65%.   2. Normal global left ventricular systolic function.   3. Mild mitral valve regurgitation.   4.Mild thickening of the anterior and posterior mitral valve leaflets.   5. Mild tricuspid regurgitation.   6. Mild to moderate aortic regurgitation.   7. Sclerotic aortic valve with normal opening.  [id number] Colin Butcher MD, [facility]  Electronically signed on 2/5/2022 at 3:03:14 PM  < end of copied text >      A/P:  80 yo F with PMHx HTN, HLD, Hypothyroidism, Depression, seizure d/o, RLE DVT, Cholelithiasis s/p recent Hospitalization in [facility] from 12/22 -> 2/11/22 for acute perforated diverticulitis s/p Rosales 12/25, colostomy bag 12/26, hospitalization c/b IR drainage for abdominal abscess 1/3 Cx grew e.coli & bacteriodes, also required Mechanical Ventilation for Acute Respiratory Failure with Tracheostomy done 1/7.  PEG done 1/18, developed bleeding through ostomy for which she was transfused 1/28, EGD on 2/1 showed gastritis. Developed fevers on 1/30 and CT's showed Acute left MCA infarct- could be 2/2 to carotid occlusion vs afib, new RLL aspiration pneumonia, seen by ID, completed antibiotics, seen by VascSx, not surgical candidate. Pt aphasic sent from [facility] for dislodged Peg tube, after staff noted pt has blood to her abdomen.     --Cardiology consulted for elevated BP while Peg dislodged  --Currently on IV hydralazine and BB - restart home meds per PEG when able  --check 12 lead EKG-d/w RN, ordered  --prior chart reviewed, pt with recnt CVA and found with AFib and Carotid occlusion.  AC held initially while monitoring for hemorrhagic conversion and then in the setting of GI bleed req PRBCs.  She was continued on DAPT.  -given elevated [id number], recommend lifelong AC for AF if able to tolerate  - Eliquis started  - pt with mild bleeding around trach site, if no indication for DAPT would just cont Plavix and Eliquis

## 2022-04-02 NOTE — PROGRESS NOTE ADULT - ASSESSMENT
CARDIOLOGY ATTENDING    Agree with above. No further inpatient cardiac workup expected. Continue lifelong a/c for AF.

## 2022-04-03 LAB
ANION GAP SERPL CALC-SCNC: 15 MMOL/L — HIGH (ref 7–14)
BUN SERPL-MCNC: 14 MG/DL — SIGNIFICANT CHANGE UP (ref 7–23)
CALCIUM SERPL-MCNC: 9.4 MG/DL — SIGNIFICANT CHANGE UP (ref 8.4–10.5)
CHLORIDE SERPL-SCNC: 102 MMOL/L — SIGNIFICANT CHANGE UP (ref 98–107)
CO2 SERPL-SCNC: 23 MMOL/L — SIGNIFICANT CHANGE UP (ref 22–31)
CREAT SERPL-MCNC: 0.41 MG/DL — LOW (ref 0.5–1.3)
EGFR: 100 ML/MIN/1.73M2 — SIGNIFICANT CHANGE UP
GLUCOSE BLDC GLUCOMTR-MCNC: 116 MG/DL — HIGH (ref 70–99)
GLUCOSE SERPL-MCNC: 134 MG/DL — HIGH (ref 70–99)
HCT VFR BLD CALC: 36.4 % — SIGNIFICANT CHANGE UP (ref 34.5–45)
HGB BLD-MCNC: 11.4 G/DL — LOW (ref 11.5–15.5)
MAGNESIUM SERPL-MCNC: 1.9 MG/DL — SIGNIFICANT CHANGE UP (ref 1.6–2.6)
MCHC RBC-ENTMCNC: 27.8 PG — SIGNIFICANT CHANGE UP (ref 27–34)
MCHC RBC-ENTMCNC: 31.3 GM/DL — LOW (ref 32–36)
MCV RBC AUTO: 88.8 FL — SIGNIFICANT CHANGE UP (ref 80–100)
NRBC # BLD: 0 /100 WBCS — SIGNIFICANT CHANGE UP
NRBC # FLD: 0 K/UL — SIGNIFICANT CHANGE UP
PHOSPHATE SERPL-MCNC: 4.1 MG/DL — SIGNIFICANT CHANGE UP (ref 2.5–4.5)
PLATELET # BLD AUTO: 426 K/UL — HIGH (ref 150–400)
POTASSIUM SERPL-MCNC: 4 MMOL/L — SIGNIFICANT CHANGE UP (ref 3.5–5.3)
POTASSIUM SERPL-SCNC: 4 MMOL/L — SIGNIFICANT CHANGE UP (ref 3.5–5.3)
RBC # BLD: 4.1 M/UL — SIGNIFICANT CHANGE UP (ref 3.8–5.2)
RBC # FLD: 16.2 % — HIGH (ref 10.3–14.5)
SODIUM SERPL-SCNC: 140 MMOL/L — SIGNIFICANT CHANGE UP (ref 135–145)
WBC # BLD: 10.13 K/UL — SIGNIFICANT CHANGE UP (ref 3.8–10.5)
WBC # FLD AUTO: 10.13 K/UL — SIGNIFICANT CHANGE UP (ref 3.8–10.5)

## 2022-04-03 RX ORDER — PIPERACILLIN AND TAZOBACTAM 4; .5 G/20ML; G/20ML
3.38 INJECTION, POWDER, LYOPHILIZED, FOR SOLUTION INTRAVENOUS ONCE
Refills: 0 | Status: COMPLETED | OUTPATIENT
Start: 2022-04-03 | End: 2022-04-03

## 2022-04-03 RX ORDER — SODIUM CHLORIDE 9 MG/ML
1000 INJECTION, SOLUTION INTRAVENOUS ONCE
Refills: 0 | Status: COMPLETED | OUTPATIENT
Start: 2022-04-03 | End: 2022-04-03

## 2022-04-03 RX ORDER — PIPERACILLIN AND TAZOBACTAM 4; .5 G/20ML; G/20ML
3.38 INJECTION, POWDER, LYOPHILIZED, FOR SOLUTION INTRAVENOUS EVERY 8 HOURS
Refills: 0 | Status: ACTIVE | OUTPATIENT
Start: 2022-04-03 | End: 2022-04-10

## 2022-04-03 RX ORDER — ACETAMINOPHEN 500 MG
1000 TABLET ORAL ONCE
Refills: 0 | Status: COMPLETED | OUTPATIENT
Start: 2022-04-03 | End: 2022-04-03

## 2022-04-03 RX ADMIN — LATANOPROST 1 DROP(S): 0.05 SOLUTION/ DROPS OPHTHALMIC; TOPICAL at 22:12

## 2022-04-03 RX ADMIN — Medication 1 DROP(S): at 18:09

## 2022-04-03 RX ADMIN — Medication 1 DROP(S): at 06:21

## 2022-04-03 RX ADMIN — ATORVASTATIN CALCIUM 40 MILLIGRAM(S): 80 TABLET, FILM COATED ORAL at 22:12

## 2022-04-03 RX ADMIN — CLOPIDOGREL BISULFATE 75 MILLIGRAM(S): 75 TABLET, FILM COATED ORAL at 11:14

## 2022-04-03 RX ADMIN — DORZOLAMIDE HYDROCHLORIDE 1 DROP(S): 20 SOLUTION/ DROPS OPHTHALMIC at 18:10

## 2022-04-03 RX ADMIN — Medication 81 MILLIGRAM(S): at 11:13

## 2022-04-03 RX ADMIN — APIXABAN 5 MILLIGRAM(S): 2.5 TABLET, FILM COATED ORAL at 06:20

## 2022-04-03 RX ADMIN — DORZOLAMIDE HYDROCHLORIDE 1 DROP(S): 20 SOLUTION/ DROPS OPHTHALMIC at 09:01

## 2022-04-03 RX ADMIN — Medication 1000 MILLIGRAM(S): at 00:00

## 2022-04-03 RX ADMIN — APIXABAN 5 MILLIGRAM(S): 2.5 TABLET, FILM COATED ORAL at 18:10

## 2022-04-03 RX ADMIN — LEVETIRACETAM 400 MILLIGRAM(S): 250 TABLET, FILM COATED ORAL at 09:55

## 2022-04-03 RX ADMIN — CARVEDILOL PHOSPHATE 3.12 MILLIGRAM(S): 80 CAPSULE, EXTENDED RELEASE ORAL at 06:20

## 2022-04-03 RX ADMIN — PIPERACILLIN AND TAZOBACTAM 25 GRAM(S): 4; .5 INJECTION, POWDER, LYOPHILIZED, FOR SOLUTION INTRAVENOUS at 08:00

## 2022-04-03 RX ADMIN — LEVETIRACETAM 400 MILLIGRAM(S): 250 TABLET, FILM COATED ORAL at 22:12

## 2022-04-03 RX ADMIN — PIPERACILLIN AND TAZOBACTAM 25 GRAM(S): 4; .5 INJECTION, POWDER, LYOPHILIZED, FOR SOLUTION INTRAVENOUS at 16:00

## 2022-04-03 RX ADMIN — SODIUM CHLORIDE 1000 MILLILITER(S): 9 INJECTION, SOLUTION INTRAVENOUS at 23:30

## 2022-04-03 RX ADMIN — CARVEDILOL PHOSPHATE 3.12 MILLIGRAM(S): 80 CAPSULE, EXTENDED RELEASE ORAL at 18:10

## 2022-04-03 RX ADMIN — AMLODIPINE BESYLATE 10 MILLIGRAM(S): 2.5 TABLET ORAL at 06:20

## 2022-04-03 RX ADMIN — Medication 400 MILLIGRAM(S): at 23:30

## 2022-04-03 NOTE — PROGRESS NOTE ADULT - ASSESSMENT
82 yo F w/ PMHx of perforated diverticulitis s/p Rosales procedure, colostomy, abdominal abscess with wound s/p IR drainage, Respiratory Failure s/p Trache, CVA w/ functional quadriplegia, aphasia, dysphagia s/p PEG (on Jevity 1.5 @ 70cchr x 18 hrs, & water flushes 325ml q 6 hrs, Afib, GIB, HTN, HLD, Seizure d/o, Hypothyroidism, Glaucoma, Depression p/w PEG tube dislodgement for unknown amount of time     Problem/Plan - 1:  ·  Problem: PEG tube malfunction.   ·  Plan:  S/P  PEG . TF started.      Problem/Plan - 2:  ·  Problem: Hypokalemia.   ·  Plan: Corrected.      Problem/Plan - 3:  ·  Problem: Stroke.   ·  Plan: Resume ASA, Plavix, statin as PEG tube replaced.     Problem/Plan - 4:  ·  Problem: Chronic respiratory failure with hypoxia.   ·  Plan: Pt on 2l NC via Trache collar.     Problem/Plan - 5:  ·  Problem: Diverticulitis of intestine with perforation and abscess without bleeding.   ·  Plan: Surgery c/s in chart- small fluid collection but nontoxic, if drainage needed would be done by IR.  Wound care eval.     Problem/Plan - 6:  ·  Problem: History of atrial fibrillation.   ·  Plan: Cards helping and now on AC .   D/W Son and okay to start.      Problem/Plan - 7:  ·  Problem: Seizure disorder.   ·  Plan: continue levetiracetam bid.     Problem/Plan - 8:  ·  Problem: DVT, lower extremity.   ·  Plan: ?Subacute vs Chronic, not started on A/C as per chart due to recent GIB.     Problem/Plan - 9:  ·  Problem: HTN (hypertension).   ·  Plan: Resuming carvedilol & amlodipine as PEG replaced.     Problem/Plan - 10:  ·  Problem: Hypothyroidism.   ·  Plan; Resume Levothyroxine when PEG replaced.     Problem/Plan - 11:  ·  Problem: Glaucoma.   ·  Plan: continue Timolol, Dorzolamide, Latanoprost.       Disposition : DC  planning .

## 2022-04-03 NOTE — PROGRESS NOTE ADULT - SUBJECTIVE AND OBJECTIVE BOX
Date of service: 4/3/22    chief complaint: dislodged PEG tube     extended hpi: 82 yo F with PMHx HTN, HLD, Hypothyroidism, Depression, seizure d/o, RLE DVT, Cholelithiasis s/p recent Hospitalization in [facility] from 12/22 -> 2/11/22 for acute perforated diverticulitis s/p Rosales 12/25, colostomy bag 12/26, hospitalization c/b IR drainage for abdominal abscess 1/3 Cx grew e.coli & bacteriodes, also required Mechanical Ventilation for Acute Respiratory Failure with Tracheostomy done 1/7.  PEG done 1/18, developed bleeding through ostomy for which she was transfused 1/28, EGD on 2/1 showed gastritis. Developed fevers on 1/30 and CT's showed Acute left MCA infarct- could be 2/2 to carotid occlusion vs afib, new RLL aspiration pneumonia, seen by ID, completed antibiotics, seen by VascSx, not surgical candidate. Pt aphasic sent from [facility] for dislodged Peg tube, after staff noted pt has blood to her abdomen.     S: pt. aphasic, no acute distress; ros limited.     Review of Systems:   Constitutional: [ ] fevers, [ ] chills.   Skin: [ ] dry skin. [ ] rashes.  Psychiatric: [ ] depression, [ ] anxiety.   Gastrointestinal: [ ] BRBPR, [ ] melena.   Neurological: [ ] confusion. [ ] seizures. [ ] shuffling gait.   Ears,Nose,Mouth and Throat: [ ] ear pain [ ] sore throat.   Eyes: [ ] diplopia.   Respiratory: [ ] hemoptysis. [ ] shortness of breath  Cardiovascular: See HPI above  Hematologic/Lymphatic: [ ] anemia. [ ] painful nodes. [ ] prolonged bleeding.   Genitourinary: [ ] hematuria. [ ] flank pain.   Endocrine: [ ] significant change in weight. [ ] intolerance to heat and cold.     Review of systems [ x] otherwise negative, [ ] otherwise unable to obtain    FH: no family history of sudden cardiac death in first degree relatives    SH: [ ] tobacco, [ ] alcohol, [ ] drugs         amLODIPine   Tablet 10 milliGRAM(s) Oral daily  apixaban 5 milliGRAM(s) Oral every 12 hours  aspirin  chewable 81 milliGRAM(s) Enteral Tube daily  atorvastatin 40 milliGRAM(s) Oral at bedtime  carvedilol 3.125 milliGRAM(s) Oral every 12 hours  clopidogrel Tablet 75 milliGRAM(s) Enteral Tube daily  dorzolamide 2% Ophthalmic Solution 1 Drop(s) Both EYES <User Schedule>  influenza  Vaccine (HIGH DOSE) 0.7 milliLiter(s) IntraMuscular once  latanoprost 0.005% Ophthalmic Solution 1 Drop(s) Both EYES at bedtime  levETIRAcetam  IVPB 500 milliGRAM(s) IV Intermittent every 12 hours  timolol 0.5% Solution 1 Drop(s) Both EYES two times a day                            11.4   10.13 )-----------( 426      ( 03 Apr 2022 06:17 )             36.4       Hemoglobin: 11.4 g/dL (04-03 @ 06:17)  Hemoglobin: 11.3 g/dL (04-02 @ 06:59)  Hemoglobin: 10.2 g/dL (03-31 @ 07:13)  Hemoglobin: 11.8 g/dL (03-30 @ 07:35)      04-03    140  |  102  |  14  ----------------------------<  134<H>  4.0   |  23  |  0.41<L>    Ca    9.4      03 Apr 2022 06:17  Phos  4.1     04-03  Mg     1.90     04-03      Creatinine Trend: 0.41<--, 0.39<--, 0.40<--, 0.41<--, 0.36<--, 0.39<--    COAGS:           T(C): 36.7 (04-03-22 @ 09:48), Max: 36.8 (04-03-22 @ 06:00)  HR: 73 (04-03-22 @ 09:48) (71 - 86)  BP: 146/67 (04-03-22 @ 09:48) (141/70 - 161/67)  RR: 18 (04-03-22 @ 09:48) (17 - 18)  SpO2: 100% (04-03-22 @ 09:48) (100% - 100%)  Wt(kg): --    I&O's Summary    02 Apr 2022 07:01  -  03 Apr 2022 07:00  --------------------------------------------------------  IN: 2610 mL / OUT: 1550 mL / NET: 1060 mL        General: no acute distress   Head: Normocephalic and atraumatic.   Neck: No JVD. No bruits. Supple. Does not appear to be enlarged.   Cardiovascular: + S1,S2 ; RRR Soft systolic murmur at the left lower sternal border. No rubs noted.    Lungs: CTA b/l. No rhonchi, rales or wheezes.   Abdomen: + BS, soft. Non tender. Non distended. No rebound. No guarding.   Extremities: No clubbing/cyanosis/edema.   Psychiatric: unable to assess     < from: TTE Echo Complete w/o Contrast w/ Doppler (02.04.22 @ 15:28) >  Summary:   1. Left ventricular ejection fraction, by visual estimation, is 60 to   65%.   2. Normal global left ventricular systolic function.   3. Mild mitral valve regurgitation.   4.Mild thickening of the anterior and posterior mitral valve leaflets.   5. Mild tricuspid regurgitation.   6. Mild to moderate aortic regurgitation.   7. Sclerotic aortic valve with normal opening.  [id number] Colin Butcher MD, [facility]  Electronically signed on 2/5/2022 at 3:03:14 PM  < end of copied text >      A/P:  82 yo F with PMHx HTN, HLD, Hypothyroidism, Depression, seizure d/o, RLE DVT, Cholelithiasis s/p recent Hospitalization in [facility] from 12/22 -> 2/11/22 for acute perforated diverticulitis s/p Rosales 12/25, colostomy bag 12/26, hospitalization c/b IR drainage for abdominal abscess 1/3 Cx grew e.coli & bacteriodes, also required Mechanical Ventilation for Acute Respiratory Failure with Tracheostomy done 1/7.  PEG done 1/18, developed bleeding through ostomy for which she was transfused 1/28, EGD on 2/1 showed gastritis. Developed fevers on 1/30 and CT's showed Acute left MCA infarct- could be 2/2 to carotid occlusion vs afib, new RLL aspiration pneumonia, seen by ID, completed antibiotics, seen by VascSx, not surgical candidate. Pt aphasic sent from [facility] for dislodged Peg tube, after staff noted pt has blood to her abdomen.       -- bp better now on home meds via peg   --prior chart reviewed, pt with recnt CVA and found with AFib and Carotid occlusion.  AC held initially while monitoring for hemorrhagic conversion and then in the setting of GI bleed req PRBCs.  She was continued on DAPT.  -given elevated [id number], recommend lifelong AC for AF if able to tolerate  - Eliquis  for PAF   - pt with mild bleeding around trach site, if no indication for DAPT would just cont Plavix and Eliquis

## 2022-04-03 NOTE — PROGRESS NOTE ADULT - SUBJECTIVE AND OBJECTIVE BOX
Date of Service  : 04-03-22     INTERVAL HPI/OVERNIGHT EVENTS:  No new  concerns.   Vital Signs Last 24 Hrs  T(C): 36.7 (03 Apr 2022 09:48), Max: 36.8 (03 Apr 2022 06:00)  T(F): 98.1 (03 Apr 2022 09:48), Max: 98.2 (03 Apr 2022 06:00)  HR: 73 (03 Apr 2022 09:48) (71 - 86)  BP: 146/67 (03 Apr 2022 09:48) (146/67 - 161/67)  BP(mean): --  RR: 18 (03 Apr 2022 09:48) (17 - 18)  SpO2: 100% (03 Apr 2022 09:48) (100% - 100%)  I&O's Summary    02 Apr 2022 07:01  -  03 Apr 2022 07:00  --------------------------------------------------------  IN: 2610 mL / OUT: 1550 mL / NET: 1060 mL    03 Apr 2022 07:01  -  03 Apr 2022 14:08  --------------------------------------------------------  IN: 545 mL / OUT: 300 mL / NET: 245 mL      MEDICATIONS  (STANDING):  amLODIPine   Tablet 10 milliGRAM(s) Oral daily  apixaban 5 milliGRAM(s) Oral every 12 hours  aspirin  chewable 81 milliGRAM(s) Enteral Tube daily  atorvastatin 40 milliGRAM(s) Oral at bedtime  carvedilol 3.125 milliGRAM(s) Oral every 12 hours  clopidogrel Tablet 75 milliGRAM(s) Enteral Tube daily  dorzolamide 2% Ophthalmic Solution 1 Drop(s) Both EYES <User Schedule>  influenza  Vaccine (HIGH DOSE) 0.7 milliLiter(s) IntraMuscular once  latanoprost 0.005% Ophthalmic Solution 1 Drop(s) Both EYES at bedtime  levETIRAcetam  IVPB 500 milliGRAM(s) IV Intermittent every 12 hours  timolol 0.5% Solution 1 Drop(s) Both EYES two times a day    MEDICATIONS  (PRN):    LABS:                        11.4   10.13 )-----------( 426      ( 03 Apr 2022 06:17 )             36.4     04-03    140  |  102  |  14  ----------------------------<  134<H>  4.0   |  23  |  0.41<L>    Ca    9.4      03 Apr 2022 06:17  Phos  4.1     04-03  Mg     1.90     04-03          CAPILLARY BLOOD GLUCOSE                  Consultant(s) Notes Reviewed:  [x ] YES  [ ] NO    PHYSICAL EXAM:  GENERAL: NAD,  HEAD:  Atraumatic, Normocephalic  NECK: Trach   NERVOUS SYSTEM:  Alert   CHEST/LUNG: Good air entry bilateral with no  rales, rhonchi, wheezing, or rubs  HEART: Regular rate and rhythm; No murmurs, rubs, or gallops  ABDOMEN: Soft, Nontender, Nondistended; Bowel sounds present, PEG and Ostomy+  EXTREMITIES:  2+ Peripheral Pulses, No clubbing, cyanosis, or edema      Care Discussed with Consultants/Other Providers [ x] YES  [ ] NO

## 2022-04-03 NOTE — PROGRESS NOTE ADULT - SUBJECTIVE AND OBJECTIVE BOX
[facility]   MD ELOISE ALVARADO MD RUORU WONG, PA    TEL:  OFFICE: [phone]      RENAL FOLLOW UP NOTE-- Date of Service ;; 04-03-22 @ 12:23  --------------------------------------------------------------------------------  HPI:  Pt seen and examined at bedside  No SOB, (+) trach.         PAST HISTORY  --------------------------------------------------------------------------------  No significant changes to PMH, PSH, FHx, SHx, unless otherwise noted    ALLERGIES & MEDICATIONS  --------------------------------------------------------------------------------  Allergies    No Known Allergies    Intolerances      Standing Inpatient Medications  amLODIPine   Tablet 10 milliGRAM(s) Oral daily  apixaban 5 milliGRAM(s) Oral every 12 hours  aspirin  chewable 81 milliGRAM(s) Enteral Tube daily  atorvastatin 40 milliGRAM(s) Oral at bedtime  carvedilol 3.125 milliGRAM(s) Oral every 12 hours  clopidogrel Tablet 75 milliGRAM(s) Enteral Tube daily  dorzolamide 2% Ophthalmic Solution 1 Drop(s) Both EYES <User Schedule>  influenza  Vaccine (HIGH DOSE) 0.7 milliLiter(s) IntraMuscular once  latanoprost 0.005% Ophthalmic Solution 1 Drop(s) Both EYES at bedtime  levETIRAcetam  IVPB 500 milliGRAM(s) IV Intermittent every 12 hours  timolol 0.5% Solution 1 Drop(s) Both EYES two times a day    PRN Inpatient Medications      REVIEW OF SYSTEMS  --------------------------------------------------------------------------------  General: no fever  CVS: no chest pain  RESP: no sob, no cough, (+) trach  ABD: no abdominal pain  : no dysuria,  MSK: no edema     VITALS/PHYSICAL EXAM  --------------------------------------------------------------------------------  T(C): 36.7 (04-03-22 @ 09:48), Max: 36.8 (04-03-22 @ 06:00)  HR: 73 (04-03-22 @ 09:48) (71 - 86)  BP: 146/67 (04-03-22 @ 09:48) (141/70 - 161/67)  RR: 18 (04-03-22 @ 09:48) (17 - 18)  SpO2: 100% (04-03-22 @ 09:48) (100% - 100%)  Wt(kg): --        04-02-22 @ 07:01  -  04-03-22 @ 07:00  --------------------------------------------------------  IN: 2610 mL / OUT: 1550 mL / NET: 1060 mL    04-03-22 @ 07:01  -  04-03-22 @ 12:23  --------------------------------------------------------  IN: 545 mL / OUT: 300 mL / NET: 245 mL      Physical Exam:  	Gen: NAD  	HEENT: MMM  	Pulm: CTA B/L. (+) trach  	CV: S1S2  	Abd: Soft, +BS, (+) GT, (+) Colostomy  	Ext: No LE edema B/L                      Neuro: Awake   	Skin: Warm and Dry   	Vascular access: None           [facility]    LABS/STUDIES  --------------------------------------------------------------------------------              11.4   10.13 >-----------<  426      [04-03-22 @ 06:17]              36.4     140  |  102  |  14  ----------------------------<  134      [04-03-22 @ 06:17]  4.0   |  23  |  0.41        Ca     9.4     [04-03-22 @ 06:17]      Mg     1.90     [04-03-22 @ 06:17]      Phos  4.1     [04-03-22 @ 06:17]            Creatinine Trend:  SCr 0.41 [04-03 @ 06:17]  SCr 0.39 [04-02 @ 06:59]  SCr 0.40 [04-01 @ 15:26]  SCr 0.41 [03-31 @ 07:13]  SCr 0.36 [03-30 @ 22:58]    Urinalysis - [03-26-22 @ 23:19]      Color Yellow / Appearance Slightly Turbid / SG 1.025 / pH 8.0      Gluc Negative / Ketone Negative  / Bili Negative / Urobili <2 mg/dL       Blood Negative / Protein 100 mg/dL / Leuk Est Negative / Nitrite Negative      RBC 0-1 / WBC 0-1 / Hyaline  / Gran  / Sq Epi  / Non Sq Epi  / Bacteria       PTH -- (Ca --)      [03-30-22 @ 07:35]   32  TSH 12.200      [01-14-22 @ 08:33]  Lipid: chol 158, , HDL 25, LDL --      [02-03-22 @ 15:22]

## 2022-04-03 NOTE — PROGRESS NOTE ADULT - ASSESSMENT
82 yo F with PMHx HTN, HLD, Hypothyroidism, Depression, seizure d/o, RLE DVT, Cholelithiasis s/p recent Hospitalization in [facility] from 12/22 -> 2/11/22 for acute perforated diverticulitis s/p Rosales 12/25, colostomy bag 12/26, hospitalization c/b IR drainage for abdominal abscess 1/3 Cx grew e.coli & bacteriodes, also required Mechanical Ventilation for Acute Respiratory Failure with Tracheostomy done 1/7.  PEG done 1/18, developed bleeding through ostomy, EGD on 2/1 showed gastritis. T's showed Acute left MCA infarct- could be 2/2 to carotid occlusion vs afib, new RLL aspiration pneumonia, seen by ID, completed antibiotics, seen by VascSx, not surgical candidate. Pt aphasic sent from [facility] for dislodged Peg tube, after staff noted pt has blood to her abdomen. Nephrology consulted for Hypokalemia.     A/P  Hypokalemia   likely 2/2 GI loss   Improved  monitor input and output closely   No stool seen in bag at time of assessment  monitor K closely    Hypomagnesia   Improved  replete as needed  monitor Mg     Hypocalcemia   improved   Monitor    HTN   Acceptable  titrate up carvedilol   monitor BP closely

## 2022-04-03 NOTE — PROGRESS NOTE ADULT - ASSESSMENT
CARDIOLOGY ATTENDING    Agree with above. No further inpatient cardiac workup expected. Continue lifelong a/c for AF. CARDIOLOGY ATTENDING    Agree with above. No further inpatient cardiac workup expected. Continue lifelong a/c for AF

## 2022-04-04 LAB
-  CARBAPENEM RESISTANCE: SIGNIFICANT CHANGE UP
-  K. PNEUMONIAE GROUP: SIGNIFICANT CHANGE UP
-  KPC RESISTANCE GENE: SIGNIFICANT CHANGE UP
ALBUMIN SERPL ELPH-MCNC: 2.6 G/DL — LOW (ref 3.3–5)
ALBUMIN SERPL ELPH-MCNC: 3.1 G/DL — LOW (ref 3.3–5)
ALBUMIN SERPL ELPH-MCNC: 4.1 G/DL — SIGNIFICANT CHANGE UP (ref 3.3–5)
ALP SERPL-CCNC: 109 U/L — SIGNIFICANT CHANGE UP (ref 40–120)
ALP SERPL-CCNC: 115 U/L — SIGNIFICANT CHANGE UP (ref 40–120)
ALP SERPL-CCNC: 93 U/L — SIGNIFICANT CHANGE UP (ref 40–120)
ALT FLD-CCNC: 15 U/L — SIGNIFICANT CHANGE UP (ref 4–33)
ALT FLD-CCNC: 22 U/L — SIGNIFICANT CHANGE UP (ref 4–33)
ALT FLD-CCNC: 67 U/L — HIGH (ref 4–33)
ANION GAP SERPL CALC-SCNC: 16 MMOL/L — HIGH (ref 7–14)
ANION GAP SERPL CALC-SCNC: 16 MMOL/L — HIGH (ref 7–14)
ANION GAP SERPL CALC-SCNC: 19 MMOL/L — HIGH (ref 7–14)
APPEARANCE UR: CLEAR — SIGNIFICANT CHANGE UP
APTT BLD: 28.7 SEC — SIGNIFICANT CHANGE UP (ref 27–36.3)
AST SERPL-CCNC: 103 U/L — HIGH (ref 4–32)
AST SERPL-CCNC: 20 U/L — SIGNIFICANT CHANGE UP (ref 4–32)
AST SERPL-CCNC: 35 U/L — HIGH (ref 4–32)
B PERT DNA SPEC QL NAA+PROBE: SIGNIFICANT CHANGE UP
B PERT+PARAPERT DNA PNL SPEC NAA+PROBE: SIGNIFICANT CHANGE UP
BACTERIA # UR AUTO: NEGATIVE — SIGNIFICANT CHANGE UP
BASE EXCESS BLDV CALC-SCNC: -0.1 MMOL/L — SIGNIFICANT CHANGE UP (ref -2–3)
BASE EXCESS BLDV CALC-SCNC: 0.9 MMOL/L — SIGNIFICANT CHANGE UP (ref -2–3)
BASOPHILS # BLD AUTO: 0.02 K/UL — SIGNIFICANT CHANGE UP (ref 0–0.2)
BASOPHILS # BLD AUTO: 0.02 K/UL — SIGNIFICANT CHANGE UP (ref 0–0.2)
BASOPHILS NFR BLD AUTO: 0.3 % — SIGNIFICANT CHANGE UP (ref 0–2)
BASOPHILS NFR BLD AUTO: 0.3 % — SIGNIFICANT CHANGE UP (ref 0–2)
BILIRUB DIRECT SERPL-MCNC: <0.2 MG/DL — SIGNIFICANT CHANGE UP (ref 0–0.3)
BILIRUB INDIRECT FLD-MCNC: >0.1 MG/DL — SIGNIFICANT CHANGE UP (ref 0–1)
BILIRUB SERPL-MCNC: 0.3 MG/DL — SIGNIFICANT CHANGE UP (ref 0.2–1.2)
BILIRUB SERPL-MCNC: 0.3 MG/DL — SIGNIFICANT CHANGE UP (ref 0.2–1.2)
BILIRUB SERPL-MCNC: 0.4 MG/DL — SIGNIFICANT CHANGE UP (ref 0.2–1.2)
BILIRUB UR-MCNC: NEGATIVE — SIGNIFICANT CHANGE UP
BLD GP AB SCN SERPL QL: NEGATIVE — SIGNIFICANT CHANGE UP
BLOOD GAS VENOUS COMPREHENSIVE RESULT: SIGNIFICANT CHANGE UP
BLOOD GAS VENOUS COMPREHENSIVE RESULT: SIGNIFICANT CHANGE UP
BORDETELLA PARAPERTUSSIS (RAPRVP): SIGNIFICANT CHANGE UP
BUN SERPL-MCNC: 15 MG/DL — SIGNIFICANT CHANGE UP (ref 7–23)
BUN SERPL-MCNC: 16 MG/DL — SIGNIFICANT CHANGE UP (ref 7–23)
BUN SERPL-MCNC: 17 MG/DL — SIGNIFICANT CHANGE UP (ref 7–23)
C PNEUM DNA SPEC QL NAA+PROBE: SIGNIFICANT CHANGE UP
CALCIUM SERPL-MCNC: 8.1 MG/DL — LOW (ref 8.4–10.5)
CALCIUM SERPL-MCNC: 8.7 MG/DL — SIGNIFICANT CHANGE UP (ref 8.4–10.5)
CALCIUM SERPL-MCNC: 9.4 MG/DL — SIGNIFICANT CHANGE UP (ref 8.4–10.5)
CHLORIDE BLDV-SCNC: 102 MMOL/L — SIGNIFICANT CHANGE UP (ref 96–108)
CHLORIDE BLDV-SCNC: 99 MMOL/L — SIGNIFICANT CHANGE UP (ref 96–108)
CHLORIDE SERPL-SCNC: 100 MMOL/L — SIGNIFICANT CHANGE UP (ref 98–107)
CHLORIDE SERPL-SCNC: 102 MMOL/L — SIGNIFICANT CHANGE UP (ref 98–107)
CHLORIDE SERPL-SCNC: 99 MMOL/L — SIGNIFICANT CHANGE UP (ref 98–107)
CO2 BLDV-SCNC: 24.9 MMOL/L — SIGNIFICANT CHANGE UP (ref 22–26)
CO2 BLDV-SCNC: 28 MMOL/L — HIGH (ref 22–26)
CO2 SERPL-SCNC: 19 MMOL/L — LOW (ref 22–31)
CO2 SERPL-SCNC: 21 MMOL/L — LOW (ref 22–31)
CO2 SERPL-SCNC: 22 MMOL/L — SIGNIFICANT CHANGE UP (ref 22–31)
COLOR SPEC: SIGNIFICANT CHANGE UP
CREAT SERPL-MCNC: 0.44 MG/DL — LOW (ref 0.5–1.3)
CREAT SERPL-MCNC: 0.44 MG/DL — LOW (ref 0.5–1.3)
CREAT SERPL-MCNC: 0.5 MG/DL — SIGNIFICANT CHANGE UP (ref 0.5–1.3)
DIFF PNL FLD: NEGATIVE — SIGNIFICANT CHANGE UP
EGFR: 95 ML/MIN/1.73M2 — SIGNIFICANT CHANGE UP
EGFR: 98 ML/MIN/1.73M2 — SIGNIFICANT CHANGE UP
EGFR: 98 ML/MIN/1.73M2 — SIGNIFICANT CHANGE UP
EOSINOPHIL # BLD AUTO: 0.06 K/UL — SIGNIFICANT CHANGE UP (ref 0–0.5)
EOSINOPHIL # BLD AUTO: 0.2 K/UL — SIGNIFICANT CHANGE UP (ref 0–0.5)
EOSINOPHIL NFR BLD AUTO: 1 % — SIGNIFICANT CHANGE UP (ref 0–6)
EOSINOPHIL NFR BLD AUTO: 3.3 % — SIGNIFICANT CHANGE UP (ref 0–6)
EPI CELLS # UR: 1 /HPF — SIGNIFICANT CHANGE UP (ref 0–5)
FLUAV SUBTYP SPEC NAA+PROBE: SIGNIFICANT CHANGE UP
FLUBV RNA SPEC QL NAA+PROBE: SIGNIFICANT CHANGE UP
GAS PNL BLDV: 131 MMOL/L — LOW (ref 136–145)
GAS PNL BLDV: 135 MMOL/L — LOW (ref 136–145)
GAS PNL BLDV: SIGNIFICANT CHANGE UP
GLUCOSE BLDC GLUCOMTR-MCNC: 138 MG/DL — HIGH (ref 70–99)
GLUCOSE BLDV-MCNC: 110 MG/DL — HIGH (ref 70–99)
GLUCOSE BLDV-MCNC: 121 MG/DL — HIGH (ref 70–99)
GLUCOSE SERPL-MCNC: 104 MG/DL — HIGH (ref 70–99)
GLUCOSE SERPL-MCNC: 118 MG/DL — HIGH (ref 70–99)
GLUCOSE SERPL-MCNC: 147 MG/DL — HIGH (ref 70–99)
GLUCOSE UR QL: NEGATIVE — SIGNIFICANT CHANGE UP
GRAM STN FLD: SIGNIFICANT CHANGE UP
HADV DNA SPEC QL NAA+PROBE: SIGNIFICANT CHANGE UP
HCO3 BLDV-SCNC: 24 MMOL/L — SIGNIFICANT CHANGE UP (ref 22–29)
HCO3 BLDV-SCNC: 27 MMOL/L — SIGNIFICANT CHANGE UP (ref 22–29)
HCOV 229E RNA SPEC QL NAA+PROBE: SIGNIFICANT CHANGE UP
HCOV HKU1 RNA SPEC QL NAA+PROBE: SIGNIFICANT CHANGE UP
HCOV NL63 RNA SPEC QL NAA+PROBE: SIGNIFICANT CHANGE UP
HCOV OC43 RNA SPEC QL NAA+PROBE: SIGNIFICANT CHANGE UP
HCT VFR BLD CALC: 28.6 % — LOW (ref 34.5–45)
HCT VFR BLD CALC: 32.5 % — LOW (ref 34.5–45)
HCT VFR BLD CALC: 38.1 % — SIGNIFICANT CHANGE UP (ref 34.5–45)
HCT VFR BLDA CALC: 29 % — LOW (ref 34.5–46.5)
HCT VFR BLDA CALC: 36 % — SIGNIFICANT CHANGE UP (ref 34.5–46.5)
HGB BLD CALC-MCNC: 12.1 G/DL — SIGNIFICANT CHANGE UP (ref 11.5–15.5)
HGB BLD CALC-MCNC: 9.6 G/DL — LOW (ref 11.5–15.5)
HGB BLD-MCNC: 10 G/DL — LOW (ref 11.5–15.5)
HGB BLD-MCNC: 11.9 G/DL — SIGNIFICANT CHANGE UP (ref 11.5–15.5)
HGB BLD-MCNC: 8.7 G/DL — LOW (ref 11.5–15.5)
HMPV RNA SPEC QL NAA+PROBE: SIGNIFICANT CHANGE UP
HPIV1 RNA SPEC QL NAA+PROBE: SIGNIFICANT CHANGE UP
HPIV2 RNA SPEC QL NAA+PROBE: SIGNIFICANT CHANGE UP
HPIV3 RNA SPEC QL NAA+PROBE: SIGNIFICANT CHANGE UP
HPIV4 RNA SPEC QL NAA+PROBE: SIGNIFICANT CHANGE UP
IANC: 5.01 K/UL — SIGNIFICANT CHANGE UP (ref 1.8–7.4)
IANC: 5.9 K/UL — SIGNIFICANT CHANGE UP (ref 1.8–7.4)
IMM GRANULOCYTES NFR BLD AUTO: 0.3 % — SIGNIFICANT CHANGE UP (ref 0–1.5)
IMM GRANULOCYTES NFR BLD AUTO: 0.6 % — SIGNIFICANT CHANGE UP (ref 0–1.5)
INR BLD: 1.55 RATIO — HIGH (ref 0.88–1.16)
KETONES UR-MCNC: NEGATIVE — SIGNIFICANT CHANGE UP
LACTATE BLDV-MCNC: 4.8 MMOL/L — CRITICAL HIGH (ref 0.5–2)
LACTATE BLDV-MCNC: 5.7 MMOL/L — CRITICAL HIGH (ref 0.5–2)
LACTATE SERPL-SCNC: 3.4 MMOL/L — HIGH (ref 0.5–2)
LACTATE SERPL-SCNC: 5.6 MMOL/L — CRITICAL HIGH (ref 0.5–2)
LEUKOCYTE ESTERASE UR-ACNC: NEGATIVE — SIGNIFICANT CHANGE UP
LYMPHOCYTES # BLD AUTO: 0.15 K/UL — LOW (ref 1–3.3)
LYMPHOCYTES # BLD AUTO: 0.76 K/UL — LOW (ref 1–3.3)
LYMPHOCYTES # BLD AUTO: 12.5 % — LOW (ref 13–44)
LYMPHOCYTES # BLD AUTO: 2.4 % — LOW (ref 13–44)
M PNEUMO DNA SPEC QL NAA+PROBE: SIGNIFICANT CHANGE UP
MAGNESIUM SERPL-MCNC: 1.3 MG/DL — LOW (ref 1.6–2.6)
MAGNESIUM SERPL-MCNC: 1.7 MG/DL — SIGNIFICANT CHANGE UP (ref 1.6–2.6)
MAGNESIUM SERPL-MCNC: 1.7 MG/DL — SIGNIFICANT CHANGE UP (ref 1.6–2.6)
MCHC RBC-ENTMCNC: 27.3 PG — SIGNIFICANT CHANGE UP (ref 27–34)
MCHC RBC-ENTMCNC: 27.5 PG — SIGNIFICANT CHANGE UP (ref 27–34)
MCHC RBC-ENTMCNC: 27.7 PG — SIGNIFICANT CHANGE UP (ref 27–34)
MCHC RBC-ENTMCNC: 30.4 GM/DL — LOW (ref 32–36)
MCHC RBC-ENTMCNC: 30.8 GM/DL — LOW (ref 32–36)
MCHC RBC-ENTMCNC: 31.2 GM/DL — LOW (ref 32–36)
MCV RBC AUTO: 88 FL — SIGNIFICANT CHANGE UP (ref 80–100)
MCV RBC AUTO: 89.7 FL — SIGNIFICANT CHANGE UP (ref 80–100)
MCV RBC AUTO: 90 FL — SIGNIFICANT CHANGE UP (ref 80–100)
METHOD TYPE: SIGNIFICANT CHANGE UP
MONOCYTES # BLD AUTO: 0.07 K/UL — SIGNIFICANT CHANGE UP (ref 0–0.9)
MONOCYTES # BLD AUTO: 0.08 K/UL — SIGNIFICANT CHANGE UP (ref 0–0.9)
MONOCYTES NFR BLD AUTO: 1.2 % — LOW (ref 2–14)
MONOCYTES NFR BLD AUTO: 1.3 % — LOW (ref 2–14)
NEUTROPHILS # BLD AUTO: 5.01 K/UL — SIGNIFICANT CHANGE UP (ref 1.8–7.4)
NEUTROPHILS # BLD AUTO: 5.9 K/UL — SIGNIFICANT CHANGE UP (ref 1.8–7.4)
NEUTROPHILS NFR BLD AUTO: 82.4 % — HIGH (ref 43–77)
NEUTROPHILS NFR BLD AUTO: 94.4 % — HIGH (ref 43–77)
NITRITE UR-MCNC: NEGATIVE — SIGNIFICANT CHANGE UP
NRBC # BLD: 0 /100 WBCS — SIGNIFICANT CHANGE UP
NRBC # FLD: 0 K/UL — SIGNIFICANT CHANGE UP
PCO2 BLDV: 35 MMHG — LOW (ref 39–42)
PCO2 BLDV: 46 MMHG — HIGH (ref 39–42)
PH BLDV: 7.37 — SIGNIFICANT CHANGE UP (ref 7.32–7.43)
PH BLDV: 7.44 — HIGH (ref 7.32–7.43)
PH UR: 7.5 — SIGNIFICANT CHANGE UP (ref 5–8)
PHOSPHATE SERPL-MCNC: 2.2 MG/DL — LOW (ref 2.5–4.5)
PHOSPHATE SERPL-MCNC: 3.5 MG/DL — SIGNIFICANT CHANGE UP (ref 2.5–4.5)
PHOSPHATE SERPL-MCNC: 4.4 MG/DL — SIGNIFICANT CHANGE UP (ref 2.5–4.5)
PLATELET # BLD AUTO: 192 K/UL — SIGNIFICANT CHANGE UP (ref 150–400)
PLATELET # BLD AUTO: 295 K/UL — SIGNIFICANT CHANGE UP (ref 150–400)
PLATELET # BLD AUTO: 400 K/UL — SIGNIFICANT CHANGE UP (ref 150–400)
PO2 BLDV: 36 MMHG — SIGNIFICANT CHANGE UP
PO2 BLDV: 47 MMHG — SIGNIFICANT CHANGE UP
POTASSIUM BLDV-SCNC: 4.1 MMOL/L — SIGNIFICANT CHANGE UP (ref 3.5–5.1)
POTASSIUM BLDV-SCNC: 4.5 MMOL/L — SIGNIFICANT CHANGE UP (ref 3.5–5.1)
POTASSIUM SERPL-MCNC: 3.6 MMOL/L — SIGNIFICANT CHANGE UP (ref 3.5–5.3)
POTASSIUM SERPL-MCNC: 4 MMOL/L — SIGNIFICANT CHANGE UP (ref 3.5–5.3)
POTASSIUM SERPL-MCNC: 4.7 MMOL/L — SIGNIFICANT CHANGE UP (ref 3.5–5.3)
POTASSIUM SERPL-SCNC: 3.6 MMOL/L — SIGNIFICANT CHANGE UP (ref 3.5–5.3)
POTASSIUM SERPL-SCNC: 4 MMOL/L — SIGNIFICANT CHANGE UP (ref 3.5–5.3)
POTASSIUM SERPL-SCNC: 4.7 MMOL/L — SIGNIFICANT CHANGE UP (ref 3.5–5.3)
PROT SERPL-MCNC: 4.9 G/DL — LOW (ref 6–8.3)
PROT SERPL-MCNC: 6 G/DL — SIGNIFICANT CHANGE UP (ref 6–8.3)
PROT SERPL-MCNC: 7.6 G/DL — SIGNIFICANT CHANGE UP (ref 6–8.3)
PROT UR-MCNC: ABNORMAL
PROTHROM AB SERPL-ACNC: 18.1 SEC — HIGH (ref 10.5–13.4)
RAPID RVP RESULT: SIGNIFICANT CHANGE UP
RBC # BLD: 3.19 M/UL — LOW (ref 3.8–5.2)
RBC # BLD: 3.61 M/UL — LOW (ref 3.8–5.2)
RBC # BLD: 4.33 M/UL — SIGNIFICANT CHANGE UP (ref 3.8–5.2)
RBC # FLD: 16.2 % — HIGH (ref 10.3–14.5)
RBC # FLD: 16.4 % — HIGH (ref 10.3–14.5)
RBC # FLD: 16.4 % — HIGH (ref 10.3–14.5)
RBC CASTS # UR COMP ASSIST: 6 /HPF — HIGH (ref 0–4)
RH IG SCN BLD-IMP: POSITIVE — SIGNIFICANT CHANGE UP
RSV RNA SPEC QL NAA+PROBE: SIGNIFICANT CHANGE UP
RV+EV RNA SPEC QL NAA+PROBE: SIGNIFICANT CHANGE UP
SAO2 % BLDV: 53.4 % — SIGNIFICANT CHANGE UP
SAO2 % BLDV: 77.3 % — SIGNIFICANT CHANGE UP
SARS-COV-2 RNA SPEC QL NAA+PROBE: SIGNIFICANT CHANGE UP
SODIUM SERPL-SCNC: 137 MMOL/L — SIGNIFICANT CHANGE UP (ref 135–145)
SODIUM SERPL-SCNC: 138 MMOL/L — SIGNIFICANT CHANGE UP (ref 135–145)
SODIUM SERPL-SCNC: 139 MMOL/L — SIGNIFICANT CHANGE UP (ref 135–145)
SP GR SPEC: 1.01 — SIGNIFICANT CHANGE UP (ref 1–1.05)
SPECIMEN SOURCE: SIGNIFICANT CHANGE UP
SPECIMEN SOURCE: SIGNIFICANT CHANGE UP
UROBILINOGEN FLD QL: SIGNIFICANT CHANGE UP
WBC # BLD: 16.23 K/UL — HIGH (ref 3.8–10.5)
WBC # BLD: 6.08 K/UL — SIGNIFICANT CHANGE UP (ref 3.8–10.5)
WBC # BLD: 6.25 K/UL — SIGNIFICANT CHANGE UP (ref 3.8–10.5)
WBC # FLD AUTO: 16.23 K/UL — HIGH (ref 3.8–10.5)
WBC # FLD AUTO: 6.08 K/UL — SIGNIFICANT CHANGE UP (ref 3.8–10.5)
WBC # FLD AUTO: 6.25 K/UL — SIGNIFICANT CHANGE UP (ref 3.8–10.5)
WBC UR QL: 1 /HPF — SIGNIFICANT CHANGE UP (ref 0–5)

## 2022-04-04 PROCEDURE — 74177 CT ABD & PELVIS W/CONTRAST: CPT | Mod: 26

## 2022-04-04 PROCEDURE — 70496 CT ANGIOGRAPHY HEAD: CPT | Mod: 26

## 2022-04-04 PROCEDURE — 70498 CT ANGIOGRAPHY NECK: CPT | Mod: 26

## 2022-04-04 PROCEDURE — 95720 EEG PHY/QHP EA INCR W/VEEG: CPT

## 2022-04-04 PROCEDURE — 99223 1ST HOSP IP/OBS HIGH 75: CPT

## 2022-04-04 PROCEDURE — 70450 CT HEAD/BRAIN W/O DYE: CPT | Mod: 26,59

## 2022-04-04 PROCEDURE — 71045 X-RAY EXAM CHEST 1 VIEW: CPT | Mod: 26

## 2022-04-04 PROCEDURE — 71260 CT THORAX DX C+: CPT | Mod: 26

## 2022-04-04 RX ORDER — ACETAMINOPHEN 500 MG
1000 TABLET ORAL ONCE
Refills: 0 | Status: COMPLETED | OUTPATIENT
Start: 2022-04-04 | End: 2022-04-04

## 2022-04-04 RX ORDER — VANCOMYCIN HCL 1 G
1000 VIAL (EA) INTRAVENOUS EVERY 12 HOURS
Refills: 0 | Status: DISCONTINUED | OUTPATIENT
Start: 2022-04-04 | End: 2022-04-04

## 2022-04-04 RX ORDER — KETOROLAC TROMETHAMINE 30 MG/ML
15 SYRINGE (ML) INJECTION ONCE
Refills: 0 | Status: DISCONTINUED | OUTPATIENT
Start: 2022-04-04 | End: 2022-04-04

## 2022-04-04 RX ORDER — POTASSIUM PHOSPHATE, MONOBASIC POTASSIUM PHOSPHATE, DIBASIC 236; 224 MG/ML; MG/ML
15 INJECTION, SOLUTION INTRAVENOUS ONCE
Refills: 0 | Status: COMPLETED | OUTPATIENT
Start: 2022-04-04 | End: 2022-04-04

## 2022-04-04 RX ORDER — SODIUM CHLORIDE 9 MG/ML
1000 INJECTION, SOLUTION INTRAVENOUS
Refills: 0 | Status: DISCONTINUED | OUTPATIENT
Start: 2022-04-04 | End: 2022-04-20

## 2022-04-04 RX ORDER — MAGNESIUM SULFATE 500 MG/ML
2 VIAL (ML) INJECTION ONCE
Refills: 0 | Status: COMPLETED | OUTPATIENT
Start: 2022-04-04 | End: 2022-04-04

## 2022-04-04 RX ORDER — VANCOMYCIN HCL 1 G
1000 VIAL (EA) INTRAVENOUS EVERY 24 HOURS
Refills: 0 | Status: DISCONTINUED | OUTPATIENT
Start: 2022-04-04 | End: 2022-04-05

## 2022-04-04 RX ADMIN — Medication 15 MILLIGRAM(S): at 01:20

## 2022-04-04 RX ADMIN — Medication 1 DROP(S): at 18:12

## 2022-04-04 RX ADMIN — CLOPIDOGREL BISULFATE 75 MILLIGRAM(S): 75 TABLET, FILM COATED ORAL at 11:05

## 2022-04-04 RX ADMIN — LEVETIRACETAM 400 MILLIGRAM(S): 250 TABLET, FILM COATED ORAL at 11:19

## 2022-04-04 RX ADMIN — ATORVASTATIN CALCIUM 40 MILLIGRAM(S): 80 TABLET, FILM COATED ORAL at 23:40

## 2022-04-04 RX ADMIN — Medication 81 MILLIGRAM(S): at 11:05

## 2022-04-04 RX ADMIN — DORZOLAMIDE HYDROCHLORIDE 1 DROP(S): 20 SOLUTION/ DROPS OPHTHALMIC at 18:12

## 2022-04-04 RX ADMIN — PIPERACILLIN AND TAZOBACTAM 25 GRAM(S): 4; .5 INJECTION, POWDER, LYOPHILIZED, FOR SOLUTION INTRAVENOUS at 16:05

## 2022-04-04 RX ADMIN — PIPERACILLIN AND TAZOBACTAM 25 GRAM(S): 4; .5 INJECTION, POWDER, LYOPHILIZED, FOR SOLUTION INTRAVENOUS at 00:00

## 2022-04-04 RX ADMIN — POTASSIUM PHOSPHATE, MONOBASIC POTASSIUM PHOSPHATE, DIBASIC 62.5 MILLIMOLE(S): 236; 224 INJECTION, SOLUTION INTRAVENOUS at 18:23

## 2022-04-04 RX ADMIN — PIPERACILLIN AND TAZOBACTAM 200 GRAM(S): 4; .5 INJECTION, POWDER, LYOPHILIZED, FOR SOLUTION INTRAVENOUS at 00:31

## 2022-04-04 RX ADMIN — APIXABAN 5 MILLIGRAM(S): 2.5 TABLET, FILM COATED ORAL at 06:47

## 2022-04-04 RX ADMIN — CARVEDILOL PHOSPHATE 3.12 MILLIGRAM(S): 80 CAPSULE, EXTENDED RELEASE ORAL at 18:11

## 2022-04-04 RX ADMIN — AMLODIPINE BESYLATE 10 MILLIGRAM(S): 2.5 TABLET ORAL at 06:47

## 2022-04-04 RX ADMIN — Medication 15 MILLIGRAM(S): at 01:50

## 2022-04-04 RX ADMIN — PIPERACILLIN AND TAZOBACTAM 25 GRAM(S): 4; .5 INJECTION, POWDER, LYOPHILIZED, FOR SOLUTION INTRAVENOUS at 08:00

## 2022-04-04 RX ADMIN — Medication 25 GRAM(S): at 16:05

## 2022-04-04 RX ADMIN — Medication 1 DROP(S): at 06:47

## 2022-04-04 RX ADMIN — LEVETIRACETAM 400 MILLIGRAM(S): 250 TABLET, FILM COATED ORAL at 23:39

## 2022-04-04 RX ADMIN — SODIUM CHLORIDE 75 MILLILITER(S): 9 INJECTION, SOLUTION INTRAVENOUS at 11:05

## 2022-04-04 RX ADMIN — DORZOLAMIDE HYDROCHLORIDE 1 DROP(S): 20 SOLUTION/ DROPS OPHTHALMIC at 09:40

## 2022-04-04 RX ADMIN — APIXABAN 5 MILLIGRAM(S): 2.5 TABLET, FILM COATED ORAL at 18:11

## 2022-04-04 RX ADMIN — Medication 400 MILLIGRAM(S): at 13:41

## 2022-04-04 RX ADMIN — Medication 250 MILLIGRAM(S): at 14:40

## 2022-04-04 RX ADMIN — CARVEDILOL PHOSPHATE 3.12 MILLIGRAM(S): 80 CAPSULE, EXTENDED RELEASE ORAL at 06:48

## 2022-04-04 NOTE — CHART NOTE - NSCHARTNOTEFT_GEN_A_CORE
Rapid response called for unresponsiveness. Patient was noted to be awake and eyes were open earlier but now noticed to have eyes closed and not following any commands. POCT glucose 138.   Vital Signs Last 24 Hrs  T(C): 37 (04-04-22 @ 09:47), Max: 39.1 (04-04-22 @ 00:45)  T(F): 98.6 (04-04-22 @ 09:47), Max: 102.4 (04-04-22 @ 00:45)  HR: 76 (04-04-22 @ 09:47) (74 - 130)  BP: 125/56 (04-04-22 @ 09:47) (125/56 - 197/94)  BP(mean): --  RR: 18 (04-04-22 @ 13:28) (17 - 24)  SpO2: 95% (04-04-22 @ 13:28) (95% - 100%)  Noted to be febrile to 100.7 axillary. Rapid called overnight for rigors and blood cultures done overnight. Sputum cx ordered. Zosyn/Vanc as ordered. CT head/chest/abd/pelvis. CT head non con and CT angio head/neck ordered per Neuro recs. Son called and updated about condition. Attending was at bedside during rapid and aware. Will continue to monitor patient closely.    -Larry WAGGONER [id number]

## 2022-04-04 NOTE — CONSULT NOTE ADULT - ASSESSMENT
Patient is a 80 yo F who neurology was consulted for concern of decreased responsiveness. Patient with PMHx HTN, HLD, Hypothyroidism, Depression, seizure d/o, RLE DVT, Cholelithiasis s/p recent Hospitalization in [facility] from 12/22 -> 2/11/22 for acute perforated diverticulitis s/p Rosales 12/25, colostomy bag 12/26, hospitalization c/b IR drainage for abdominal abscess 1/3 Cx grew e.coli & bacteriodes, also required Mechanical Ventilation for Acute Respiratory Failure with Tracheostomy done 1/7.  PEG done 1/18, developed bleeding through ostomy for which she was transfused 1/28, EGD on 2/1 showed gastritis. Developed fevers on 1/30 and CT's showed Acute left MCA infarct- could be 2/2 likely 2/2 afib. AC initially held due to risk of hemorrhagic conversion, restarted 3 weeks after stroke. New RLL aspiration pneumonia, seen by ID, completed antibiotics, seen by VascSx, not surgical candidate. Pt aphasic sent from [facility] for dislodged Peg tube, after staff noted pt has blood to her abdomen. CT 3/27: PEG tube removed/dislodged, no evidence for pneumoperitoneum, 3.5 cm thick-walled fluid collection in the pelvic cul-de-sac, Interval slight improvement in additional small loculated fluid collections, including significant improvement in inflammatory changes in RLQ and pelvis status post removal of surgical drain. Ill-defined hyperdensity/enhancing nodularity along the right posterior bladder lumen, cannot exclude a neoplastic process s/p PEG placement by IR 3/30. On 4/4 Patient had RRT called around 1am for rigors, noted to have temp of 101.6 rectal and SBP of 200. Patient had RRT call again in afternoon for decreased responsivenss, pt spiked to 102.4 with tachycardia and new leukocytosis to 16. Neuro exam as above limited due to mental status.     Impression  Acute onset encephalopathy likely in the setting of sepsis with fevers. r/o ischemic vascular event     Recommendations   - CTH  - CTA H/N   - Management of sepsis per primary team   - If patient not improve management of sepsis, fever, infections then consider MR brain without contrast as well as rEEG (ordered as awake and asleep EEG in sunrise)  - Continue home Eliquis   - Continue home statin   - IVF as needed  - ID recs appreciated   - Rest of care per primary team       Case to be discussed with neurology attending Dr. Gonzales  Patient is a 80 yo F who neurology was consulted for concern of decreased responsiveness. Patient with PMHx HTN, HLD, Hypothyroidism, Depression, seizure d/o, RLE DVT, Cholelithiasis s/p recent Hospitalization in [facility] from 12/22 -> 2/11/22 for acute perforated diverticulitis s/p Rosales 12/25, colostomy bag 12/26, hospitalization c/b IR drainage for abdominal abscess 1/3 Cx grew e.coli & bacteriodes, also required Mechanical Ventilation for Acute Respiratory Failure with Tracheostomy done 1/7.  PEG done 1/18, developed bleeding through ostomy for which she was transfused 1/28, EGD on 2/1 showed gastritis. Developed fevers on 1/30 and CT's showed Acute left MCA infarct- could be 2/2 likely 2/2 afib. AC initially held due to risk of hemorrhagic conversion, restarted 3 weeks after stroke. New RLL aspiration pneumonia, seen by ID, completed antibiotics, seen by VascSx, not surgical candidate. Pt aphasic sent from [facility] for dislodged Peg tube, after staff noted pt has blood to her abdomen. CT 3/27: PEG tube removed/dislodged, no evidence for pneumoperitoneum, 3.5 cm thick-walled fluid collection in the pelvic cul-de-sac, Interval slight improvement in additional small loculated fluid collections, including significant improvement in inflammatory changes in RLQ and pelvis status post removal of surgical drain. Ill-defined hyperdensity/enhancing nodularity along the right posterior bladder lumen, cannot exclude a neoplastic process s/p PEG placement by IR 3/30. On 4/4 Patient had RRT called around 1am for rigors, noted to have temp of 101.6 rectal and SBP of 200. Patient had RRT call again in afternoon for decreased responsivenss, pt spiked to 102.4 with tachycardia and new leukocytosis to 16. Neuro exam as above limited due to mental status.     Impression  Acute onset encephalopathy likely in the setting of sepsis with fevers. r/o ischemic vascular event. Low suspicion for seizure at this time     Recommendations   - CTH  - CTA H/N   - Management of sepsis per primary team   - If patient not improve management of sepsis, fever, infections then consider MR brain without contrast as well as rEEG (ordered as awake and asleep EEG in sunrise)  - Continue home Eliquis   - Continue home statin   - IVF as needed  - ID recs appreciated   - Rest of care per primary team       Case to be discussed with neurology attending Dr. Gonzales  Patient is a 80 yo F who neurology was consulted for concern of decreased responsiveness. Patient with PMHx HTN, HLD, Hypothyroidism, Depression, seizure d/o, RLE DVT, Cholelithiasis s/p recent Hospitalization in [facility] from 12/22 -> 2/11/22 for acute perforated diverticulitis s/p Rosales 12/25, colostomy bag 12/26, hospitalization c/b IR drainage for abdominal abscess 1/3 Cx grew e.coli & bacteriodes, also required Mechanical Ventilation for Acute Respiratory Failure with Tracheostomy done 1/7.  PEG done 1/18, developed bleeding through ostomy for which she was transfused 1/28, EGD on 2/1 showed gastritis. Developed fevers on 1/30 and CT's showed Acute left MCA infarct- could be 2/2 likely 2/2 afib. AC initially held due to risk of hemorrhagic conversion, restarted 3 weeks after stroke. New RLL aspiration pneumonia, seen by ID, completed antibiotics, seen by VascSx, not surgical candidate. Pt aphasic sent from [facility] for dislodged Peg tube, after staff noted pt has blood to her abdomen. CT 3/27: PEG tube removed/dislodged, no evidence for pneumoperitoneum, 3.5 cm thick-walled fluid collection in the pelvic cul-de-sac, Interval slight improvement in additional small loculated fluid collections, including significant improvement in inflammatory changes in RLQ and pelvis status post removal of surgical drain. Ill-defined hyperdensity/enhancing nodularity along the right posterior bladder lumen, cannot exclude a neoplastic process s/p PEG placement by IR 3/30. On 4/4 Patient had RRT called around 1am for rigors, noted to have temp of 101.6 rectal and SBP of 200. Patient had RRT call again in afternoon for decreased responsivenss, pt spiked to 102.4 with tachycardia and new leukocytosis to 16. Neuro exam as above limited due to mental status.     Impression  Acute onset encephalopathy likely in the setting of sepsis with fevers. r/o ischemic vascular event. Low suspicion for seizure at this time     Recommendations   - CTH  - CTA H/N   - Management of sepsis per primary team   - If patient not improve management of sepsis, fever, infections then consider MR brain without contrast as well as rEEG (ordered as awake and asleep EEG in sunrise)  - UA, CXR, BCx, CBC, CMP, Coag, Mag, phos, syphillis screen, NH3, TSH, Folate, B12, lactate, ck)  - Continue home Eliquis   - Continue home statin   - IVF as needed  - ID recs appreciated   - Rest of care per primary team       Case to be discussed with neurology attending Dr. Gonzales  Patient is a 82 yo F who neurology was consulted for concern of decreased responsiveness. Patient with PMHx HTN, HLD, Hypothyroidism, Depression, seizure d/o, RLE DVT, Cholelithiasis s/p recent Hospitalization in [facility] from 12/22 -> 2/11/22 for acute perforated diverticulitis s/p Rosales 12/25, colostomy bag 12/26, hospitalization c/b IR drainage for abdominal abscess 1/3 Cx grew e.coli & bacteriodes, also required Mechanical Ventilation for Acute Respiratory Failure with Tracheostomy done 1/7.  PEG done 1/18, developed bleeding through ostomy for which she was transfused 1/28, EGD on 2/1 showed gastritis. Developed fevers on 1/30 and CT's showed Acute left MCA infarct- could be 2/2 likely 2/2 afib. AC initially held due to risk of hemorrhagic conversion, restarted 3 weeks after stroke. New RLL aspiration pneumonia, seen by ID, completed antibiotics, seen by VascSx, not surgical candidate. Pt aphasic sent from [facility] for dislodged Peg tube, after staff noted pt has blood to her abdomen. CT 3/27: PEG tube removed/dislodged, no evidence for pneumoperitoneum, 3.5 cm thick-walled fluid collection in the pelvic cul-de-sac, Interval slight improvement in additional small loculated fluid collections, including significant improvement in inflammatory changes in RLQ and pelvis status post removal of surgical drain. Ill-defined hyperdensity/enhancing nodularity along the right posterior bladder lumen, cannot exclude a neoplastic process s/p PEG placement by IR 3/30. On 4/4 Patient had RRT called around 1am for rigors, noted to have temp of 101.6 rectal and SBP of 200. Patient had RRT call again in afternoon for decreased responsivenss, pt spiked to 102.4 with tachycardia and new leukocytosis to 16. Neuro exam as above limited due to mental status.     Impression  Acute onset encephalopathy likely in the setting of sepsis with fevers. r/o ischemic vascular event. Low suspicion for seizure at this time     Recommendations   - CTH  - CTA H/N   - Management of sepsis per primary team   - If patient not improve management of sepsis, fever, infections then consider MR brain without contrast as well as rEEG (ordered as awake and asleep EEG in sunrise)  - UA, CXR, BCx, CBC, CMP, Coag, Mag, phos, syphillis screen, NH3, TSH, Folate, B12, lactate, ck, procalcitonin  - Continue home Eliquis   - Continue home statin   - IVF as needed  - ID recs appreciated   - Rest of care per primary team       Case to be discussed with neurology attending Dr. Gonzales

## 2022-04-04 NOTE — PROVIDER CONTACT NOTE (CRITICAL VALUE NOTIFICATION) - RECOMMENDATIONS
Give K runs and repeat BMP
K level will be replaced as ordered.
more potassium IVPB?
Per RRT blood cultures drawn
Continue IV fluids

## 2022-04-04 NOTE — PROVIDER CONTACT NOTE (CRITICAL VALUE NOTIFICATION) - TEST AND RESULT REPORTED:
Lactate 5.6
Potassium 2.4
Potassium
Blood culture anaerobic growth gram negative rods noted
Lactate
Potassium 2.8

## 2022-04-04 NOTE — CONSULT NOTE ADULT - ATTENDING COMMENTS
clinical pic suggests encephalopathy from poss sepsis  cta does not show R sided embolic cva.  Pt w known L mca cva and ica occlusion    no acute vasc intervention.
Date of service: 4/5/2022    80 yo female with multiple comorbid medical disorders, including hx of atrial fibrillation (on apixaban), seizure disorder (on keppra) and recent prolonged and complicated medical course for acute perforated diverticulitis, sepsis, intubation requiring PEG/Trach and acute L MCA ischemic infarct with residual deficits -aphasia and right hemiplegia (2/2 carotid occlusion vs A.Fib), now admitted from rehab for PEG tube dislodgment and neurology consulted 4/4 for acute change in mental statu with decreased responsiveness. Patient also noted to have elevated temp (101.6 rectally), , and was warm to touch. She was also tachycardic and labs significant for leukocytosis (16k). Patient was found to be stuporous and prior deficits noted on exam. Other significant labs: elevated lactate, Elevated AST and ALT.     CT head 4/4 with evolution of prior large L MCA stroke, and ? new hypodensity in region of left cerebellum and left brachium pontis concerning for possible acute infarct ?- unclear if this is truly a new hypodensity vs an artifact, as similar hypodensity is also noted on the right as well.   CTA H/N: 70% short segment stenosis of proximal R ICA. Occlusion of proximal left ICA with patent flow in L MCA.     Routine EEG with mild to moderate diffuse slowing, more prominent over left hemisphere - likely in the setting of underlying structural/functional abnormality (L MCA infarct). Occasional sharps over right frontal region suggestive of increase risk of seizures from right frontal lobe (known seizure disorder). No seizures seen.     On exam this AM, patient more awake and makes eye contact and tracks examiner in room. She is non verbal. Has a Trach in place. She does not follow any commands. Purposely moves LUE. Plegic RUE and RLE with some increased tone in RUE. Right facial droop noted. Withdraws to pain in LLE.     Imp: Acute encephalopathy likely in the setting of infectious +/- metabolic etiologies.          Low suspicion for cerebrovascular event, however given report of questionable new hypodensity in left cerebellum- recommend MRI brain w/o contrast, when able, to rule out ischemic stroke. If unable to repeat MRI brain, please obtain a repeat CT head without contrast to confirm findings.          Low suspicion for seizure, no clinical seizure events noted. However, patient does have a hx of seizure disorder and the seizure threshold can decrease in the setting of infection. Would recommend medical/infectious management as per primary team. EEG did not show any seizures. Continue keppra 500 mg BID.     Neurology will follow with studies. Please call with any questions or concerns.

## 2022-04-04 NOTE — PROGRESS NOTE ADULT - SUBJECTIVE AND OBJECTIVE BOX
Date of Service  : 22     INTERVAL HPI/OVERNIGHT EVENTS: Unresponsive so RRT called.   Vital Signs Last 24 Hrs  T(C): 37 (2022 09:47), Max: 39.1 (2022 00:45)  T(F): 98.6 (2022 09:47), Max: 102.4 (2022 00:45)  HR: 76 (2022 09:47) (74 - 130)  BP: 125/56 (2022 09:47) (125/56 - 197/94)  BP(mean): --  RR: 18 (2022 13:28) (17 - 24)  SpO2: 95% (2022 13:28) (95% - 100%)  I&O's Summary    2022 07:01  -  2022 07:00  --------------------------------------------------------  IN: 2085 mL / OUT: 1650 mL / NET: 435 mL    2022 07:01  -  2022 13:58  --------------------------------------------------------  IN: 720 mL / OUT: 205 mL / NET: 515 mL      MEDICATIONS  (STANDING):  acetaminophen   IVPB .. 1000 milliGRAM(s) IV Intermittent once  amLODIPine   Tablet 10 milliGRAM(s) Oral daily  apixaban 5 milliGRAM(s) Oral every 12 hours  aspirin  chewable 81 milliGRAM(s) Enteral Tube daily  atorvastatin 40 milliGRAM(s) Oral at bedtime  carvedilol 3.125 milliGRAM(s) Oral every 12 hours  clopidogrel Tablet 75 milliGRAM(s) Enteral Tube daily  dorzolamide 2% Ophthalmic Solution 1 Drop(s) Both EYES <User Schedule>  influenza  Vaccine (HIGH DOSE) 0.7 milliLiter(s) IntraMuscular once  lactated ringers. 1000 milliLiter(s) (75 mL/Hr) IV Continuous <Continuous>  latanoprost 0.005% Ophthalmic Solution 1 Drop(s) Both EYES at bedtime  levETIRAcetam  IVPB 500 milliGRAM(s) IV Intermittent every 12 hours  piperacillin/tazobactam IVPB.. 3.375 Gram(s) IV Intermittent every 8 hours  timolol 0.5% Solution 1 Drop(s) Both EYES two times a day  vancomycin  IVPB 1000 milliGRAM(s) IV Intermittent every 24 hours    MEDICATIONS  (PRN):    LABS:                        8.7    x     )-----------( 192      ( 2022 13:46 )             28.6     04-04    138  |  100  |  17  ----------------------------<  147<H>  3.6   |  22  |  0.50    Ca    8.7      2022 07:22  Phos  3.5     04-04  Mg     1.70     04-04    TPro  6.0  /  Alb  3.1<L>  /  TBili  0.3  /  DBili  <0.2  /  AST  35<H>  /  ALT  22  /  AlkPhos  93  04-04      Urinalysis Basic - ( 2022 01:59 )    Color: Light Yellow / Appearance: Clear / S.012 / pH: x  Gluc: x / Ketone: Negative  / Bili: Negative / Urobili: <2 mg/dL   Blood: x / Protein: Trace / Nitrite: Negative   Leuk Esterase: Negative / RBC: 6 /HPF / WBC 1 /HPF   Sq Epi: x / Non Sq Epi: 1 /HPF / Bacteria: Negative      CAPILLARY BLOOD GLUCOSE      POCT Blood Glucose.: 138 mg/dL (2022 13:17)  POCT Blood Glucose.: 116 mg/dL (2022 23:30)        Urinalysis Basic - ( 2022 01:59 )    Color: Light Yellow / Appearance: Clear / S.012 / pH: x  Gluc: x / Ketone: Negative  / Bili: Negative / Urobili: <2 mg/dL   Blood: x / Protein: Trace / Nitrite: Negative   Leuk Esterase: Negative / RBC: 6 /HPF / WBC 1 /HPF   Sq Epi: x / Non Sq Epi: 1 /HPF / Bacteria: Negative        Consultant(s) Notes Reviewed:  [x ] YES  [ ] NO    PHYSICAL EXAM:  GENERAL: NAD, well-groomed, well-developed,not in any distress ,  HEAD:  Atraumatic, Normocephalic  NECK: Supple, No JVD, Normal thyroid  NERVOUS SYSTEM: unresponsive   CHEST/LUNG: Good air entry bilateral with no  rales, rhonchi, wheezing, or rubs  HEART: Regular rate and rhythm; No murmurs, rubs, or gallops  ABDOMEN: Soft, Nontender, Nondistended; Bowel sounds present, PEG and ostomy   EXTREMITIES:  2+ Peripheral Pulses, No clubbing, cyanosis, or edema      Care Discussed with Consultants/Other Providers [ x] YES  [ ] NO

## 2022-04-04 NOTE — CONSULT NOTE ADULT - SUBJECTIVE AND OBJECTIVE BOX
HPI:  81 f with HTN, HLD, Hypothyroidism, Depression, seizure d/o, RLE DVT, Cholelithiasis s/p recent Hospitalization at [facility]  -> 22 for acute perforated diverticulitis s/p Rosales , colostomy bag , hospitalization c/b abd abscess s/p IR drainage 1/3 Cx with E.coli & bacteroides and candida albicans,  trach , PEG, Acute left MCA infarct, aspiration pneumonia, sent 3/27 from rehab for dislodged PEG, initially afebrile, normal WBc, negative blood and urine cx  CT 3/27: PEG tube removed/dislodged, no evidence for pneumoperitoneum, 3.5 cm thick-walled fluid collection in the pelvic cul-de-sac, Interval slight improvement in additional small loculated fluid   collections, including significant improvement in inflammatory changes in RLQ and pelvis status post removal of surgical drain. Ill-defined hyperdensity/enhancing nodularity along the right posterior   bladder lumen, cannot exclude a neoplastic process  s/p PEG placement by IR 3/30  on  pt spiked to 102.4 with tachycardia and new leukocytosis to 16      PAST MEDICAL & SURGICAL HISTORY:  Seizure    HTN (hypertension)    Glaucoma    Blood clot of neck vein  left side    TIA (transient ischemic attack)  20 years ago    Hypothyroidism    Stroke  Large MCA infarct 22- residual Rt sided weakness, aphasia, dysphagia s/p PEG    Chronic respiratory failure with hypoxia and hypercapnia  s/p Trache on 2L NC O2    Atrial fibrillation    MDD (major depressive disorder)    Gastritis    S/P appendectomy    Status post Rosales procedure  22    S/P colostomy  perforated diverticulitis    S/P percutaneous endoscopic gastrostomy (PEG) tube placement  22 in [place]    Status post tracheostomy        Allergies    No Known Allergies    Intolerances        ANTIMICROBIALS:  piperacillin/tazobactam IVPB.. 3.375 every 8 hours  vancomycin  IVPB 1000 every 12 hours      OTHER MEDS:  amLODIPine   Tablet 10 milliGRAM(s) Oral daily  apixaban 5 milliGRAM(s) Oral every 12 hours  aspirin  chewable 81 milliGRAM(s) Enteral Tube daily  atorvastatin 40 milliGRAM(s) Oral at bedtime  carvedilol 3.125 milliGRAM(s) Oral every 12 hours  clopidogrel Tablet 75 milliGRAM(s) Enteral Tube daily  dorzolamide 2% Ophthalmic Solution 1 Drop(s) Both EYES <User Schedule>  influenza  Vaccine (HIGH DOSE) 0.7 milliLiter(s) IntraMuscular once  lactated ringers. 1000 milliLiter(s) IV Continuous <Continuous>  latanoprost 0.005% Ophthalmic Solution 1 Drop(s) Both EYES at bedtime  levETIRAcetam  IVPB 500 milliGRAM(s) IV Intermittent every 12 hours  timolol 0.5% Solution 1 Drop(s) Both EYES two times a day      SOCIAL HISTORY:  was at rehab  no smoking, alcohol or drug abuse  no recent travel    FAMILY HISTORY:  FH: HTN (hypertension)        ROS:  Unobtainable because:  non verbal    Physical Exam:    General:    NAD, non toxic  Head: atraumatic, normocephalic  Eyes: normal sclera and conjunctiva  ENT:  trach  Cardio:    regular S1,S2  Respiratory:   tachypneic with b/l coarse breath sounds  abd:   soft, BS +, not tender  :     no CVAT, no suprapubic tenderness, no mc  Musculoskeletal : no joint swelling, no edema  Skin:    no rash  vascular: no phlebitis  Neurologic:   awake but non verbal  psych: unable to assess      Drug Dosing Weight  Height (cm): 167.6 (26 Mar 2022 22:43)  Weight (kg): 76.2 (27 Mar 2022 12:33)  BMI (kg/m2): 27.1 (27 Mar 2022 12:33)  BSA (m2): 1.86 (27 Mar 2022 12:33)    Vital Signs Last 24 Hrs  T(F): 98.6 (22 @ 09:47), Max: 102.4 (22 @ 00:45)    Vital Signs Last 24 Hrs  HR: 76 (22 @ 09:47) (74 - 130)  BP: 125/56 (22 @ 09:47) (125/56 - 197/94)  RR: 18 (22 @ 09:47)  SpO2: 98% (22 @ 09:47) (98% - 100%)  Wt(kg): --                          10.0   16.23 )-----------( 295      ( 2022 07:22 )             32.5       04-    138  |  100  |  17  ----------------------------<  147<H>  3.6   |  22  |  0.50    Ca    8.7      2022 07:22  Phos  3.5     04-  Mg     1.70     -    TPro  6.0  /  Alb  3.1<L>  /  TBili  0.3  /  DBili  <0.2  /  AST  35<H>  /  ALT  22  /  AlkPhos  93  04-04      Urinalysis Basic - ( 2022 01:59 )    Color: Light Yellow / Appearance: Clear / S.012 / pH: x  Gluc: x / Ketone: Negative  / Bili: Negative / Urobili: <2 mg/dL   Blood: x / Protein: Trace / Nitrite: Negative   Leuk Esterase: Negative / RBC: 6 /HPF / WBC 1 /HPF   Sq Epi: x / Non Sq Epi: 1 /HPF / Bacteria: Negative        MICROBIOLOGY:  v  .Blood Blood-Peripheral  22   No Growth Final  --  --      .Blood Blood-Peripheral  22   No Growth Final  --  --          Rapid RVP Result: NotDetec ( @ 02:02)          RADIOLOGY:    Images independently visualized and reviewed personally,  findings as below    < from: Xray Chest 1 View- PORTABLE-Urgent (Xray Chest 1 View- PORTABLE-Urgent .) (22 @ 01:11) >  IMPRESSION:    Clear lungs.    < end of copied text >  < from: CT Abdomen and Pelvis w/ IV Cont (22 @ 01:59) >  IMPRESSION:    PEG tube removed/dislodged, no evidence for pneumoperitoneum.    No bowel obstruction.    Thick-walled fluid collection in the pelvic cul-de-sac measuring 3.1 x   3.5 x 3 cm.    Interval slight improvement in additional small loculated fluid   collections, including significant improvement in inflammatory changes in   the right lower quadrant and pelvis status post removal of surgical drain.    Ill-defined hyperdensity/enhancing nodularity along the right posterior   bladder lumen, cannot exclude a neoplastic process, suggest correlation   with cystoscopy on a nonemergent basis.    < end of copied text >

## 2022-04-04 NOTE — RAPID RESPONSE TEAM SUMMARY - NSOTHERINTERVENTIONSRRT_GEN_ALL_CORE
- neurology consult re: notion of Keppra load - primary team to f/u recs  - rec EEG  - ID consulted  - primary team to f/u lactate, labs sent during RRT which are cbc, cmp, T&S, coags, and vbg comp.  - sepsis management and w/u  - agree vanc/zosyn  - primary team to obtain CT C/A/P w/ cont, and CT head  - rest of workup per primary team

## 2022-04-04 NOTE — PROGRESS NOTE ADULT - ASSESSMENT
80 yo F with PMHx HTN, HLD, Hypothyroidism, Depression, seizure d/o, RLE DVT, Cholelithiasis s/p recent Hospitalization in [facility] from 12/22 -> 2/11/22 for acute perforated diverticulitis s/p Rosales 12/25, colostomy bag 12/26, hospitalization c/b IR drainage for abdominal abscess 1/3 Cx grew e.coli & bacteriodes, also required Mechanical Ventilation for Acute Respiratory Failure with Tracheostomy done 1/7.  PEG done 1/18, developed bleeding through ostomy, EGD on 2/1 showed gastritis. T's showed Acute left MCA infarct- could be 2/2 to carotid occlusion vs afib, new RLL aspiration pneumonia, seen by ID, completed antibiotics, seen by VascSx, not surgical candidate. Pt aphasic sent from [facility] for dislodged Peg tube, after staff noted pt has blood to her abdomen. Nephrology consulted for Hypokalemia.     A/P  Hypokalemia   likely 2/2 GI loss   Improved  monitor input and output closely   diarrhea improved   monitor K closely    Hypomagnesia   replete 2g iv  replete as needed  monitor Mg     hypophosphatemia   replete potassium phosphate 15mol iv  replete as needed  monitor     Hypocalcemia   corrected Ca WNL   Monitor    Acidosis with anion gap   etiology?  monitor at present     HTN   optimal   monitor BP closely

## 2022-04-04 NOTE — RAPID RESPONSE TEAM SUMMARY - NSSITUATIONBACKGROUNDRRT_GEN_ALL_CORE
80 yo F w/ PMHx of perforated diverticulitis s/p Rosales procedure, colostomy, abdominal abscess with wound s/p IR drainage, Respiratory Failure s/p Trache, CVA w/ functional quadriplegia, aphasia, dysphagia s/p PEG (on Jevity 1.5 @ 70cchr x 18 hrs, & water flushes 325ml q 6 hrs, Afib, GIB, HTN, HLD, Seizure d/o, Hypothyroidism, Glaucoma, Depression p/w PEG tube dislodgement for unknown amount of time. RRT Called for rigors.

## 2022-04-04 NOTE — CONSULT NOTE ADULT - ASSESSMENT
81 f with HTN, HLD, Hypothyroidism, Depression, seizure d/o, RLE DVT, Cholelithiasis s/p recent Hospitalization at [facility] 12/22 -> 2/11/22 for acute perforated diverticulitis s/p Rosales 12/25, colostomy bag 12/26, hospitalization c/b abd abscess s/p IR drainage 1/3 Cx with E.coli & bacteroides and candida albicans,  trach 1/7, PEG, Acute left MCA infarct, aspiration pneumonia, sent 3/27 from rehab for dislodged PEG, initially afebrile, normal WBc, negative blood and urine cx  CT 3/27: PEG tube removed/dislodged, no evidence for pneumoperitoneum, 3.5 cm thick-walled fluid collection in the pelvic cul-de-sac, Interval slight improvement in additional small loculated fluid   collections, including significant improvement in inflammatory changes in RLQ and pelvis status post removal of surgical drain. Ill-defined hyperdensity/enhancing nodularity along the right posterior   bladder lumen, cannot exclude a neoplastic process  s/p PEG placement by IR 3/30  on 4/4 pt spiked to 102.4 with tachycardia and new leukocytosis to 16    recent perforated diverticulitis s/p huitron, c/b abd abscess s/p IR drainage, cx with E-coli, bacteroides and candida now admitted for PEG dislodgeent s/p IR placement 3/30 now with new fever, tachycardia, leukocytosis, sepsis    * blood and urine cx  * chest/abd/pelvis CT with contrast  * c/w zosyn and vanco 1 qd for now  * monitor vanco trough and renal function to prevent nephrotoxicity    The above assessment and plan was discussed with the primary team    Ivy Rose MD  contact on teams  After 5pm and on weekends call [phone]

## 2022-04-04 NOTE — PROVIDER CONTACT NOTE (CRITICAL VALUE NOTIFICATION) - ASSESSMENT
Assess for signs and symptoms of hypokelemia
Pt is stable.
Patient noted with altered mental status, febrile with axillary temp 100.7, rigors
patient noted with rigors, altered mental status, febrile with axillary temp 100.7. Labs drawn per RRT

## 2022-04-04 NOTE — PROGRESS NOTE ADULT - ASSESSMENT
80 yo F w/ PMHx of perforated diverticulitis s/p Rosales procedure, colostomy, abdominal abscess with wound s/p IR drainage, Respiratory Failure s/p Trache, CVA w/ functional quadriplegia, aphasia, dysphagia s/p PEG (on Jevity 1.5 @ 70cchr x 18 hrs, & water flushes 325ml q 6 hrs, Afib, GIB, HTN, HLD, Seizure d/o, Hypothyroidism, Glaucoma, Depression p/w PEG tube dislodgement for unknown amount of time     Problem/Plan - 1:  ·  Problem: Sepsis .   ·  Plan:  Work up in progress . IV Zosyn and Vancomycin .   ID help appreciated.      Problem/Plan - 2:  ·  Problem: Metabolic Encephalopathy    ·  Plan: Infection VS Seizure . Will consult Neurology and get EEG .     Problem/Plan - 3:  ·  Problem: Stroke.   ·  Plan: Resume ASA, Plavix, statin as PEG tube replaced.     Problem/Plan - 4:  ·  Problem: Chronic respiratory failure with hypoxia.   ·  Plan: Pt on 2l NC via Trache collar.     Problem/Plan - 5:  ·  Problem: Diverticulitis of intestine with perforation and abscess without bleeding.   ·  Plan: Surgery c/s in chart- small fluid collection but nontoxic, if drainage needed would be done by IR.  Wound care eval.     Problem/Plan - 6:  ·  Problem: History of atrial fibrillation.   ·  Plan: Cards helping and now on AC .   D/W Son and okay to start.      Problem/Plan - 7:  ·  Problem: Seizure disorder.   ·  Plan: continue levetiracetam bid.     Problem/Plan - 8:  ·  Problem: DVT, lower extremity.   ·  Plan: ?Subacute vs Chronic, not started on A/C as per chart due to recent GIB.     Problem/Plan - 9:  ·  Problem: HTN (hypertension).   ·  Plan: Resuming carvedilol & amlodipine as PEG replaced.     Problem/Plan - 10:  ·  Problem: Hypothyroidism.   ·  Plan; Resume Levothyroxine when PEG replaced.     Problem/Plan - 11:  ·  Problem: Glaucoma.   ·  Plan: continue Timolol, Dorzolamide, Latanoprost.      Problem/Plan - 12:  ·  Problem: PEG tube malfunction.   ·  Plan:  S/P  PEG . TF started.      Problem/Plan - 13:  ·  Problem: Hypokalemia.   ·  Plan: Corrected.     Disposition : DC  planning pending above.   D/W Son in great detail.

## 2022-04-04 NOTE — PROVIDER CONTACT NOTE (CRITICAL VALUE NOTIFICATION) - BACKGROUND
Patient admitted for dislodged PEG
Patient admitted 3/27 for dislodged PEG tube. Patient was RRT yesterday
Patient is on IVF her K level is decreased
s/p Rosales, abdominal abscess w/ wound IR drainage; was RRT yesterday evening as well
Pt admitted for dislodged PEG tube. Pt awaiting Peg placement.

## 2022-04-04 NOTE — RAPID RESPONSE TEAM SUMMARY - NSMEDICATIONSRRT_GEN_ALL_CORE
- Ofirmev 1 g  - tube feeds held  - vanco ordered by primary team in addition to Zosyn  
- Tylenol 1 g IV, NS 1L fluids, and zosyn 3.375 g IV

## 2022-04-04 NOTE — PROVIDER CONTACT NOTE (CHANGE IN STATUS NOTIFICATION) - SITUATION
Pt found to be unresponsive, sternum rub was initiated and pt still was unresponsive. Rapid response was called.

## 2022-04-04 NOTE — CONSULT NOTE ADULT - SUBJECTIVE AND OBJECTIVE BOX
SURGERY CONSULT NOTE    HPI:  80 yo F with PMHx HTN, HLD, Hypothyroidism, Depression, seizure d/o, RLE DVT, Cholelithiasis s/p recent Hospitalization in [facility] from  -> 22 for acute perforated diverticulitis s/p Rosales , colostomy bag , hospitalization c/b IR drainage for abdominal abscess 1/3 Cx grew e.coli & bacteriodes, also required Mechanical Ventilation for Acute Respiratory Failure with Tracheostomy done .  PEG done , developed bleeding through ostomy for which she was transfused , EGD on  showed gastritis. Developed fevers on  and CT's showed Acute left MCA infarct- could be 2/2 to carotid occlusion vs afib, new RLL aspiration pneumonia, seen by ID, completed antibiotics, seen by VascSx, not surgical candidate. Pt aphasic sent from [facility] for dislodged Peg tube, after staff noted pt has blood to her abdomen.  (27 Mar 2022 12:33)    Vascular Surgery consulted for ICA stenosis. Patient had RRT called today for altered mental status, encephalopathy vs. acute stroke per neurology note. At time of encounter, patient not responding to questions, unable to participate in exam.       PAST MEDICAL & SURGICAL HISTORY:  Seizure    HTN (hypertension)    Glaucoma    Blood clot of neck vein  left side    TIA (transient ischemic attack)  20 years ago    Hypothyroidism    Stroke  Large MCA infarct 22- residual Rt sided weakness, aphasia, dysphagia s/p PEG    Chronic respiratory failure with hypoxia and hypercapnia  s/p Trache on 2L NC O2    Atrial fibrillation    MDD (major depressive disorder)    Gastritis    S/P appendectomy    Status post Rosales procedure  22    S/P colostomy  perforated diverticulitis    S/P percutaneous endoscopic gastrostomy (PEG) tube placement  22 in [place]    Status post tracheostomy        MEDICATIONS  (STANDING):  amLODIPine   Tablet 10 milliGRAM(s) Oral daily  apixaban 5 milliGRAM(s) Oral every 12 hours  aspirin  chewable 81 milliGRAM(s) Enteral Tube daily  atorvastatin 40 milliGRAM(s) Oral at bedtime  carvedilol 3.125 milliGRAM(s) Oral every 12 hours  clopidogrel Tablet 75 milliGRAM(s) Enteral Tube daily  dorzolamide 2% Ophthalmic Solution 1 Drop(s) Both EYES <User Schedule>  influenza  Vaccine (HIGH DOSE) 0.7 milliLiter(s) IntraMuscular once  lactated ringers. 1000 milliLiter(s) (75 mL/Hr) IV Continuous <Continuous>  latanoprost 0.005% Ophthalmic Solution 1 Drop(s) Both EYES at bedtime  levETIRAcetam  IVPB 500 milliGRAM(s) IV Intermittent every 12 hours  piperacillin/tazobactam IVPB.. 3.375 Gram(s) IV Intermittent every 8 hours  timolol 0.5% Solution 1 Drop(s) Both EYES two times a day  vancomycin  IVPB 1000 milliGRAM(s) IV Intermittent every 24 hours    MEDICATIONS  (PRN):      Allergies    No Known Allergies    Intolerances        SOCIAL HISTORY:    FAMILY HISTORY:  FH: HTN (hypertension)        Physical Exam:  General: NAD, not responding to questions, eyes open  HEENT: NC/AT, EOMI, normal hearing, no oral lesions, no LAD, neck supple  Pulmonary: normal resp effort, CTA-B  Cardiovascular: NSR, no murmurs  Abdominal: soft, ND/NT, no organomegaly  Extremities: WWP, normal strength, no clubbing/cyanosis/edema  Neuro: ANOx0, eye open, nonresponsive to commans    Vital Signs Last 24 Hrs  T(C): 37.3 (2022 17:43), Max: 39.1 (2022 00:45)  T(F): 99.2 (2022 17:43), Max: 102.4 (2022 00:45)  HR: 86 (2022 17:43) (74 - 130)  BP: 98/50 (2022 17:43) (98/50 - 197/94)  BP(mean): --  RR: 18 (2022 17:43) (17 - 24)  SpO2: 97% (2022 17:43) (95% - 100%)    I&O's Summary    2022 07:01  -  2022 07:00  --------------------------------------------------------  IN: 2085 mL / OUT: 1650 mL / NET: 435 mL    2022 07:01  -  2022 18:36  --------------------------------------------------------  IN: 720 mL / OUT: 705 mL / NET: 15 mL            LABS:                        8.7    6.25  )-----------( 192      ( 2022 13:46 )             28.6     -04    137  |  102  |  16  ----------------------------<  104<H>  4.0   |  19<L>  |  0.44<L>    Ca    8.1<L>      2022 13:46  Phos  2.2     -04  Mg     1.30     -04    TPro  4.9<L>  /  Alb  2.6<L>  /  TBili  0.4  /  DBili  x   /  AST  103<H>  /  ALT  67<H>  /  AlkPhos  109  04-04    PT/INR - ( 2022 13:46 )   PT: 18.1 sec;   INR: 1.55 ratio         PTT - ( 2022 13:46 )  PTT:28.7 sec  Urinalysis Basic - ( 2022 01:59 )    Color: Light Yellow / Appearance: Clear / S.012 / pH: x  Gluc: x / Ketone: Negative  / Bili: Negative / Urobili: <2 mg/dL   Blood: x / Protein: Trace / Nitrite: Negative   Leuk Esterase: Negative / RBC: 6 /HPF / WBC 1 /HPF   Sq Epi: x / Non Sq Epi: 1 /HPF / Bacteria: Negative      CAPILLARY BLOOD GLUCOSE      POCT Blood Glucose.: 138 mg/dL (2022 13:17)  POCT Blood Glucose.: 116 mg/dL (2022 23:30)    LIVER FUNCTIONS - ( 2022 13:46 )  Alb: 2.6 g/dL / Pro: 4.9 g/dL / ALK PHOS: 109 U/L / ALT: 67 U/L / AST: 103 U/L / GGT: x             Cultures:  Culture Results:   Growth in anaerobic bottle: Gram Negative Rods  Growth in aerobic bottle: Gram Negative Rods  ***Blood Panel PCR results on this specimen are available  approximately 3 hours after the Gram stain result.***  Gram stain, PCR, and/or culture results may not always  correspond due to difference in methodologies.  ************************************************************  This PCR assay was performed by multiplex PCR. This  Assay tests for 66 bacterial and resistance gene targets.  Please refer to the [facility] test directory  at [email or web address] for details. ( @ 06:35)      RADIOLOGY & ADDITIONAL STUDIES:  < from: CT Angio Head w/ IV Cont (22 @ 14:19) >    IMPRESSION: (Please see below)        CTA [place] OF CAI WITH CONTRAST    COMPARISON EXAM: Prior noncontrast CT brain dated 3/23/2017. Prior MRI of   the brain dated 3/23/2017.      TECHNIQUE: Helical acquisition technique obtained following   administration of IV contrast with axial, coronal, and sagittal   reconstruction imaging performed.      CTA COW:    Flow of contrast is appreciated within the petrous, precavernous,   cavernous, and supraclinoid portions of the right internal carotid   artery. Deposition of calcified plaque is noted within the cavernous   portions of the bilateral internal carotid arteries. No flow within the   petrous, precavernous, cavernous or supraclinoid portions of the left   internal carotid artery.    Flow within the left middle cerebral artery is decreased in caliber when   compared with the right. Whilepresence of contrast is appreciated within   the left A1 segment, this is attenuated appearing/hypoplastic.   Attenuated/hypoplastic flow of contrast is also appreciated within the   left posterior communicating artery.    A right A1 segment of the anterior cerebral artery is appreciated.   Bilateral A2 segments are visualized. However, the left A2 segment is   hypoplastic when compared with the right.    Distal intracranial bilateral vertebral arteries are appreciated.   Vertebrobasilar confluence is unremarkable. Bilateral superior cerebellar   arteries and bilateral posterior cerebral arteries emanate from the   distal aspect of the basilar artery. The right posterior communicating   artery contributes to flow within the right posterior cerebral artery.    There is no evidence for aneurysm about the [place]. Tiny   aneurysms can be beyond the resolution of CTA technique.    IMPRESSIONS:      CTA neck:  1. Short segment 70% stenosis in diameter at the proximal aspect of the   right internal carotid artery.  2. The left internal carotid artery is occluded at a distance of   approximately 7 mm beyond the origin of this vessel.  3. Bilateral vertebral arteries are appreciated. Left vertebral artery is   dominant.    CTA COW:  1. There is no flow within the petrous, precavernous or cavernous   portions of the left internal carotid artery.  2. Flow is appreciated within the left middle cerebral artery,   significantly decreased in caliber when compared with the right middle   cerebral artery. Furthermore, only an attenuated/hypoplastic flow pattern   is appreciated in association with the left A1 segment as well as the   left posterior communicating artery.  3. Hypoplastic/attenuated pattern of flow within the left A 2 segment of   the anterior cerebral artery.    < end of copied text >        Plan:  80 yo F with PMHx HTN, HLD, Hypothyroidism, Depression, seizure d/o, RLE DVT, Cholelithiasis s/p recent Hospitalization in [facility] from  -> 22 for acute perforated diverticulitis s/p Rosales , colostomy bag , hospitalization c/b IR drainage for abdominal abscess 1/3 Cx grew e.coli & bacteriodes, also required Mechanical Ventilation for Acute Respiratory Failure with Tracheostomy done . Vascular Surgery consulted for ICA stenosis on CTA. Pt with prior hx of stroke and carotid stenosis.     - follow up neurology evaluation of encephalopathy vs. acute stroke  - recommend ctd medical management: ASA/statin  - given patient's functional status, not likely a good surgical candidate    d/w Dr. Garcia, vascular fellow    C Team, [id number]

## 2022-04-04 NOTE — PROVIDER CONTACT NOTE (CRITICAL VALUE NOTIFICATION) - ACTION/TREATMENT ORDERED:
Notify DAVID Perry of patient K level of 2.8
PA aware. will order more potassium IVPB as well as change IVF to include potassium.
Provider made aware
ACP on floor and made aware.
Provider aware, K runs ordered.
Provider made aware

## 2022-04-04 NOTE — PROVIDER CONTACT NOTE (OTHER) - ASSESSMENT
Pt is nonverbal. no distress noted. Pt was a rapid in the afternoon, Pt currently being monitored with EEG.

## 2022-04-04 NOTE — CHART NOTE - NSCHARTNOTEFT_GEN_A_CORE
Rapid Response Team called by primary RN for seizure like activity. Upon arrival patient was seen at bedside with apparent rigors. Vital signs Rapid Response Team called by primary RN for seizure like activity.     Upon arrival patient was seen at bedside with apparent rigors. Vital signs revealed patient to be febrile to 101.6F, tachycardic to 139bpm with a blood pressure of 200/90 with adequate oxygen saturation, withdrawing from painful stimuli.     - IV Tylenol, zosyn and lactated ringers bolus ordered.   - Ice packs applied to patient  - Labs including CBC, CMP, Blood Cultures, VBG with Lactate  - UA and RVP ordered  - CXR ordered  - Vitals q4hrs  - Consult House ID in AM    Will continue to monitor and follow up results. Attending Dr. Lew made aware.     Antoine Valenzuela PA-C  Medicine ACP  [id number]

## 2022-04-04 NOTE — CONSULT NOTE ADULT - SUBJECTIVE AND OBJECTIVE BOX
HPI:  Patient is a 80 yo F who neurology was consulted for concern of decreased responsiveness. Patient with PMHx HTN, HLD, Hypothyroidism, Depression, seizure d/o, RLE DVT, Cholelithiasis s/p recent Hospitalization in [facility] from  -> 22 for acute perforated diverticulitis s/p Rosales , colostomy bag , hospitalization c/b IR drainage for abdominal abscess 1/3 Cx grew e.coli & bacteriodes, also required Mechanical Ventilation for Acute Respiratory Failure with Tracheostomy done .  PEG done , developed bleeding through ostomy for which she was transfused , EGD on  showed gastritis. Developed fevers on  and CT's showed Acute left MCA infarct- could be 2/2 to carotid occlusion vs afib, new RLL aspiration pneumonia, seen by ID, completed antibiotics, seen by VascSx, not surgical candidate. Pt aphasic sent from [facility] for dislodged Peg tube, after staff noted pt has blood to her abdomen. CT 3/27: PEG tube removed/dislodged, no evidence for pneumoperitoneum, 3.5 cm thick-walled fluid collection in the pelvic cul-de-sac, Interval slight improvement in additional small loculated fluid collections, including significant improvement in inflammatory changes in RLQ and pelvis status post removal of surgical drain. Ill-defined hyperdensity/enhancing nodularity along the right posterior bladder lumen, cannot exclude a neoplastic process s/p PEG placement by IR 3/30. On  Patient had RRT called around 1am for rigors, noted to have temp of 101.6 rectal and SBP of 200. Patient had RRT call again in afternoon for decreased responsivenss, pt spiked to 102.4 with tachycardia and new leukocytosis to 16.         ROS: A 10-system ROS was performed and is negative except for those items noted above and/or in the HPI.    PAST MEDICAL & SURGICAL HISTORY:  Seizure    HTN (hypertension)    Glaucoma    Blood clot of neck vein  left side    TIA (transient ischemic attack)  20 years ago    Hypothyroidism    Stroke  Large MCA infarct 22- residual Rt sided weakness, aphasia, dysphagia s/p PEG    Chronic respiratory failure with hypoxia and hypercapnia  s/p Trache on 2L NC O2    Atrial fibrillation    MDD (major depressive disorder)    Gastritis    S/P appendectomy    Status post Rosales procedure  22    S/P colostomy  perforated diverticulitis    S/P percutaneous endoscopic gastrostomy (PEG) tube placement  22 in [place]    Status post tracheostomy      FAMILY HISTORY:  FH: HTN (hypertension)        SOCIAL HISTORY: SOCIAL HISTORY:     Marital Status: (  )   (  ) Single  (  )   (  )      Occupation:      Lives: (  ) alone  (  ) with children   (  ) with spouse  (  ) with parents  (  ) other     Illicit Drug Use: (  ) never used  (  ) other _____     Tobacco Use:  (  ) never smoked  (  ) former smoker  (  ) current smoker  (  ) pack year  (  ) last cigarette date     Alcohol Use:      Sexual History:        MEDICATIONS  Home Medications:  amLODIPine 10 mg oral tablet: 1 tab(s) by gastrostomy tube once a day (27 Mar 2022 13:06)  aspirin 81 mg oral tablet, chewable: 1 tab(s) by gastrostomy tube once a day (27 Mar 2022 13:07)  atorvastatin 40 mg oral tablet: 1 tab(s) by gastrostomy tube once a day (at bedtime) (27 Mar 2022 13:06)  carvedilol 3.125 mg oral tablet: 1 tab(s) by gastrostomy tube every 12 hours (27 Mar 2022 13:06)  clopidogrel 75 mg oral tablet: 1 tab(s) by gastrostomy tube once a day (27 Mar 2022 13:06)  dorzolamide 2% ophthalmic solution: 1 drop(s) to each eye 2 times a day (27 Mar 2022 13:09)  doxazosin 2 mg oral tablet: 1 tab(s) orally once a day (at bedtime) (27 Mar 2022 13:08)  escitalopram 20 mg oral tablet: 1 tab(s) by gastrostomy tube once a day (27 Mar 2022 13:07)  famotidine 40 mg oral tablet: 1 tab(s) by gastrostomy tube once a day (at bedtime) (27 Mar 2022 13:08)  latanoprost 0.005% ophthalmic solution: 1 drop(s) to each eye once a day (in the evening) (27 Mar 2022 13:09)  levETIRAcetam 500 mg oral tablet: 1 tab(s) by gastrostomy tube 2 times a day (27 Mar 2022 13:07)  levothyroxine 75 mcg (0.075 mg) oral tablet: 1 tab(s) by gastrostomy tube once a day (27 Mar 2022 13:06)  pantoprazole 40 mg oral granule, delayed release: 40 milligram(s) by gastrostomy tube once a day (2022 18:16)  timolol hemihydrate 0.5% ophthalmic solution: 1 drop(s) to each eye 2 times a day (27 Mar 2022 13:10)      MEDICATIONS  (STANDING):  acetaminophen   IVPB .. 1000 milliGRAM(s) IV Intermittent once  amLODIPine   Tablet 10 milliGRAM(s) Oral daily  apixaban 5 milliGRAM(s) Oral every 12 hours  aspirin  chewable 81 milliGRAM(s) Enteral Tube daily  atorvastatin 40 milliGRAM(s) Oral at bedtime  carvedilol 3.125 milliGRAM(s) Oral every 12 hours  clopidogrel Tablet 75 milliGRAM(s) Enteral Tube daily  dorzolamide 2% Ophthalmic Solution 1 Drop(s) Both EYES <User Schedule>  influenza  Vaccine (HIGH DOSE) 0.7 milliLiter(s) IntraMuscular once  lactated ringers. 1000 milliLiter(s) (75 mL/Hr) IV Continuous <Continuous>  latanoprost 0.005% Ophthalmic Solution 1 Drop(s) Both EYES at bedtime  levETIRAcetam  IVPB 500 milliGRAM(s) IV Intermittent every 12 hours  piperacillin/tazobactam IVPB.. 3.375 Gram(s) IV Intermittent every 8 hours  timolol 0.5% Solution 1 Drop(s) Both EYES two times a day  vancomycin  IVPB 1000 milliGRAM(s) IV Intermittent every 24 hours    MEDICATIONS  (PRN):      ALLERGIES/INTOLERANCES:  Allergies  No Known Allergies    Intolerances      OBJECTIVE:  VITALS   Vital Signs Last 24 Hrs  T(C): 37 (2022 09:47), Max: 39.1 (2022 00:45)  T(F): 98.6 (2022 09:47), Max: 102.4 (2022 00:45)  HR: 76 (2022 09:47) (74 - 130)  BP: 125/56 (2022 09:47) (125/56 - 197/94)  BP(mean): --  RR: 18 (2022 13:28) (17 - 24)  SpO2: 95% (2022 13:28) (95% - 100%)    PHYSICAL EXAM:  General: Well developed, in NAD   Head: atraumatic   Respiratory: non-laboured breathing, regular rate  GI: Nondistended   Integumentary: Warm and Dry   Psychiatric: Normal Affect     Neurological Exam:  Mental Status: Stuporous, no verbal output, does not follow commands.   Cranial Nerves: PERRL, oculovestibular response slightly intact, however maybe confounded as patient awake.  No facial asymmetry   Motor: Spontaneous movement of LUE (antigravity with purposeful movement). No spontaneous movement of other extremities. Withdraws RUE mildly antigravity to painful stimuli. No movement of b/l LEs to painful stimuli   Pronator drift: Unable to assess   Dysmetria: Unable to assess   Tremor: No resting tremor, no myoclonus  Sensation: grimaces to pain throughout  Deep Tendon Reflexes: 2+ bilateral biceps, triceps, brachioradialis, 1+ knee and mute ankle  Toes mute bilaterally  Gait: unable to assess     LABORATORY:  CBC                       8.7    x     )-----------( 192      ( 2022 13:46 )             28.6     Chem 04-04    138  |  100  |  17  ----------------------------<  147<H>  3.6   |  22  |  0.50    Ca    8.7      2022 07:22  Phos  3.5     04-04  Mg     1.70     04-04    TPro  6.0  /  Alb  3.1<L>  /  TBili  0.3  /  DBili  <0.2  /  AST  35<H>  /  ALT  22  /  AlkPhos  93  04-04    LFTs LIVER FUNCTIONS - ( 2022 07:25 )  Alb: 3.1 g/dL / Pro: 6.0 g/dL / ALK PHOS: 93 U/L / ALT: 22 U/L / AST: 35 U/L / GGT: x           Coagulopathy   Lipid Panel   A1c   Cardiac enzymes     U/A Urinalysis Basic - ( 2022 01:59 )    Color: Light Yellow / Appearance: Clear / S.012 / pH: x  Gluc: x / Ketone: Negative  / Bili: Negative / Urobili: <2 mg/dL   Blood: x / Protein: Trace / Nitrite: Negative   Leuk Esterase: Negative / RBC: 6 /HPF / WBC 1 /HPF   Sq Epi: x / Non Sq Epi: 1 /HPF / Bacteria: Negative      CSF  Immunological  Other    STUDIES & IMAGING:  Studies (EKG, EEG, EMG, etc):       Radiology (XR, CT, MR, U/S, TTE/MERY):

## 2022-04-04 NOTE — RAPID RESPONSE TEAM SUMMARY - NSADDTLFINDINGSRRT_GEN_ALL_CORE
On arrival pt noted to have rigors with oral temperature of 101.1 F, , BP elevated to 200/90, % trach collar, fingerstick 116.  On arrival pt noted to have rigors with oral temperature of 101.1 F (rectal temp 101.6 F), , BP elevated to 200/90, % trach collar, fingerstick 116. Pt at baseline mental status, alert. Pt treated with tylenol 1 g IV and NS 1L fluids. Ordered for labs (CBC, CMP, VBG with lactate, and blood cultures). HR improved to 110s and rigors subsided after tylenol.  On arrival pt noted to have rigors with oral temperature of 101.1 F (rectal temp 101.6 F), , BP elevated to 200/90, % trach collar, fingerstick 116. Pt at baseline mental status, alert. Pt treated with tylenol 1 g IV and NS 1L fluids, and ice packs were applied. Ordered for labs (CBC, CMP, VBG with lactate, and blood cultures). HR improved to 110s and rigors subsided after tylenol.

## 2022-04-04 NOTE — PROGRESS NOTE ADULT - SUBJECTIVE AND OBJECTIVE BOX
[facility] [place] [facility]   MD ELOISE ALVARADO MD, PA INJUNG KO NP    TEL:  OFFICE: [phone]    From 5pm-7am Answering Service [phone]    -- RENAL FOLLOW UP NOTE ---Date of Service 04-04-22 @ 14:47    Patient is a 81y old  Female who presents with a chief complaint of     Patient seen and examined at bedside.     VITALS:  T(F): 98.6 (04-04-22 @ 09:47), Max: 102.4 (04-04-22 @ 00:45)  HR: 76 (04-04-22 @ 09:47)  BP: 125/56 (04-04-22 @ 09:47)  RR: 18 (04-04-22 @ 13:28)  SpO2: 95% (04-04-22 @ 13:28)  Wt(kg): --    04-03 @ 07:01  -  04-04 @ 07:00  --------------------------------------------------------  IN: 2085 mL / OUT: 1650 mL / NET: 435 mL    04-04 @ 07:01  -  04-04 @ 14:47  --------------------------------------------------------  IN: 720 mL / OUT: 205 mL / NET: 515 mL          PHYSICAL EXAM:  Constitutional: NAD  Neck: No JVD  Respiratory: CTAB, no wheezes, rales or rhonchi  Cardiovascular: S1, S2, RRR  Gastrointestinal: BS+, soft, NT/ND  Extremities: No peripheral edema    Hospital Medications:   MEDICATIONS  (STANDING):  amLODIPine   Tablet 10 milliGRAM(s) Oral daily  apixaban 5 milliGRAM(s) Oral every 12 hours  aspirin  chewable 81 milliGRAM(s) Enteral Tube daily  atorvastatin 40 milliGRAM(s) Oral at bedtime  carvedilol 3.125 milliGRAM(s) Oral every 12 hours  clopidogrel Tablet 75 milliGRAM(s) Enteral Tube daily  dorzolamide 2% Ophthalmic Solution 1 Drop(s) Both EYES <User Schedule>  influenza  Vaccine (HIGH DOSE) 0.7 milliLiter(s) IntraMuscular once  lactated ringers. 1000 milliLiter(s) (75 mL/Hr) IV Continuous <Continuous>  latanoprost 0.005% Ophthalmic Solution 1 Drop(s) Both EYES at bedtime  levETIRAcetam  IVPB 500 milliGRAM(s) IV Intermittent every 12 hours  magnesium sulfate  IVPB 2 Gram(s) IV Intermittent once  piperacillin/tazobactam IVPB.. 3.375 Gram(s) IV Intermittent every 8 hours  potassium phosphate IVPB 15 milliMole(s) IV Intermittent once  timolol 0.5% Solution 1 Drop(s) Both EYES two times a day  vancomycin  IVPB 1000 milliGRAM(s) IV Intermittent every 24 hours      LABS:  04-04    137  |  102  |  16  ----------------------------<  104<H>  4.0   |  19<L>  |  0.44<L>    Ca    8.1<L>      04 Apr 2022 13:46  Phos  2.2     04-04  Mg     1.30     04-04    TPro  4.9<L>  /  Alb  2.6<L>  /  TBili  0.4  /  DBili      /  AST  103<H>  /  ALT  67<H>  /  AlkPhos  109  04-04    Creatinine Trend: 0.44 <--, 0.50 <--, 0.44 <--, 0.41 <--, 0.39 <--, 0.40 <--, 0.41 <--, 0.36 <--, 0.39 <--, 0.37 <--, 0.36 <--, 0.39 <--, 0.37 <--, 0.32 <--    Albumin, Serum: 2.6 g/dL (04-04 @ 13:46)  Phosphorus Level, Serum: 2.2 mg/dL (04-04 @ 13:46)  Albumin, Serum: 3.1 g/dL (04-04 @ 07:25)  Phosphorus Level, Serum: 3.5 mg/dL (04-04 @ 07:22)  Albumin, Serum: 4.1 g/dL (04-04 @ 00:01)  Phosphorus Level, Serum: 4.4 mg/dL (04-04 @ 00:01)                              8.7    6.25  )-----------( 192      ( 04 Apr 2022 13:46 )             28.6     Urine Studies:  Urinalysis - [04-04-22 @ 01:59]      Color Light Yellow / Appearance Clear / SG 1.012 / pH 7.5      Gluc Negative / Ketone Negative  / Bili Negative / Urobili <2 mg/dL       Blood Negative / Protein Trace / Leuk Est Negative / Nitrite Negative      RBC 6 / WBC 1 / Hyaline  / Gran  / Sq Epi  / Non Sq Epi 1 / Bacteria Negative      PTH -- (Ca --)      [03-30-22 @ 07:35]   32  TSH 12.200      [01-14-22 @ 08:33]  Lipid: chol 158, , HDL 25, LDL --      [02-03-22 @ 15:22]        RADIOLOGY & ADDITIONAL STUDIES:

## 2022-04-04 NOTE — PROVIDER CONTACT NOTE (CRITICAL VALUE NOTIFICATION) - SITUATION
Patient potassium is 2.4
Pt. RRT; lactate came back 4.8.
Patient lactate is 5.6
Pt. potassium at 2.9.
Blood culture drawn on 4/3 resulted anaerobic growth gram negative rods
Patient K level is 2.8

## 2022-04-04 NOTE — RAPID RESPONSE TEAM SUMMARY - NSSITUATIONBACKGROUNDRRT_GEN_ALL_CORE
81 f with HTN, HLD, Hypothyroidism, Depression, seizure d/o, RLE DVT, Cholelithiasis s/p recent Hospitalization at [facility] 12/22 -> 2/11/22 for acute perforated diverticulitis s/p Rosales 12/25, colostomy bag 12/26, hospitalization c/b abd abscess s/p IR drainage 1/3 Cx with E.coli & bacteroides and candida albicans,  trach 1/7, PEG, Acute left MCA infarct, aspiration pneumonia, sent 3/27 from rehab for dislodged PEG, initially afebrile, normal WBc, negative blood and urine cx CT 3/27: PEG tube removed/dislodged, no evidence for pneumoperitoneum, 3.5 cm thick-walled fluid collection in the pelvic cul-de-sac, Interval slight improvement in additional small loculated fluid collections, including significant improvement in inflammatory changes in RLQ and pelvis status post removal of surgical drain. Ill-defined hyperdensity/enhancing nodularity along the right posterior bladder lumen, cannot exclude a neoplastic process. s/p PEG placement by IR 3/30 on 4/4 pt spiked to 102.4 with tachycardia and new leukocytosis to 16. Recent perforated diverticulitis s/p Varela, c/b abd abscess s/p IR drainage, cx with E-coli, bacteroides and candida now admitted for PEG dislodgement s/p IR placement 3/30 now with new fever, tachycardia, leukocytosis, sepsis. RRT called for non-alertness. Patient alert at baseline and somewhat communicative with nonverbal cues, but non-oriented, aphasic, and RUE paresis, bilat LE paresis. Currently not alert, but localized pain with LUE, moans. GCS is 9-10. Trach in place, vitals stable, +corneal, pupils equal. Of note RRT overnight for sepsis, now still with sepsis. PEG in place, ostomy pouch with brown stool. Belly mildly tender to palpation. +urin output in PrimaFit. Tube feeds held. Started on Zosyn overnight, BCx x 2 in lab for morning of 4/4. On Keppra 500 mg BID for seizure ppx and LR @ 75 ml/hr. U/A negative, no UCx sent   81 f with HTN, HLD, Hypothyroidism, Depression, seizure d/o, RLE DVT, Cholelithiasis s/p recent Hospitalization at [facility] 12/22 -> 2/11/22 for acute perforated diverticulitis s/p Rosales 12/25, colostomy bag 12/26, hospitalization c/b abd abscess s/p IR drainage 1/3 Cx with E.coli & bacteroides and candida albicans,  trach 1/7, PEG, Acute left MCA infarct, aspiration pneumonia, sent 3/27 from rehab for dislodged PEG, initially afebrile, normal WBc, negative blood and urine cx CT 3/27: PEG tube removed/dislodged, no evidence for pneumoperitoneum, 3.5 cm thick-walled fluid collection in the pelvic cul-de-sac, Interval slight improvement in additional small loculated fluid collections, including significant improvement in inflammatory changes in RLQ and pelvis status post removal of surgical drain. Ill-defined hyperdensity/enhancing nodularity along the right posterior bladder lumen, cannot exclude a neoplastic process. s/p PEG placement by IR 3/30 on 4/4 pt spiked to 102.4 with tachycardia and new leukocytosis to 16. Recent perforated diverticulitis s/p Varela, c/b abd abscess s/p IR drainage, cx with E-coli, bacteroides and candida now admitted for PEG dislodgement s/p IR placement 3/30 now with new fever, tachycardia, leukocytosis, sepsis. RRT called for non-alertness. Patient alert at baseline and somewhat communicative with nonverbal cues, but non-oriented, aphasic, and RUE paresis, bilat LE paresis. Currently not alert, but localized pain with LUE, moans. GCS is 9-10. Trach in place, vitals stable, +corneal, pupils equal. Of note RRT overnight for sepsis, now still with sepsis. PEG in place, ostomy pouch with brown stool. Belly mildly tender to palpation. +urin output in PrimaFit. Tube feeds held. Started on Zosyn overnight, BCx x 2 in lab for morning of 4/4. On Keppra 500 mg BID for seizure ppx and LR @ 75 ml/hr. U/A negative, no UCx sent.   Pt febrile to 100.7 F axillary temp, other vitals stable. Patient has high probability of experiencing seizure in setting of sepsis vs toxic/metabolic encephalopathy. PT otherwise hemodynamically stable and not in extremis

## 2022-04-04 NOTE — PROVIDER CONTACT NOTE (CHANGE IN STATUS NOTIFICATION) - ACTION/TREATMENT ORDERED:
Provider present at bedside during rapid. Labs ordered, Tylenol given and pt taken to CT scan,. ECG order for bedside.

## 2022-04-04 NOTE — PROGRESS NOTE ADULT - SUBJECTIVE AND OBJECTIVE BOX
Date of service: 4/4/22    chief complaint: dislodged PEG tube     extended hpi: 80 yo F with PMHx HTN, HLD, Hypothyroidism, Depression, seizure d/o, RLE DVT, Cholelithiasis s/p recent Hospitalization in [facility] from 12/22 -> 2/11/22 for acute perforated diverticulitis s/p Rosales 12/25, colostomy bag 12/26, hospitalization c/b IR drainage for abdominal abscess 1/3 Cx grew e.coli & bacteriodes, also required Mechanical Ventilation for Acute Respiratory Failure with Tracheostomy done 1/7.  PEG done 1/18, developed bleeding through ostomy for which she was transfused 1/28, EGD on 2/1 showed gastritis. Developed fevers on 1/30 and CT's showed Acute left MCA infarct- could be 2/2 to carotid occlusion vs afib, new RLL aspiration pneumonia, seen by ID, completed antibiotics, seen by VascSx, not surgical candidate. Pt aphasic sent from [facility] for dislodged Peg tube, after staff noted pt has blood to her abdomen.     S: pt. aphasic, events noted this AM    Review of Systems:   Constitutional: [ ] fevers, [ ] chills.   Skin: [ ] dry skin. [ ] rashes.  Psychiatric: [ ] depression, [ ] anxiety.   Gastrointestinal: [ ] BRBPR, [ ] melena.   Neurological: [ ] confusion. [ ] seizures. [ ] shuffling gait.   Ears,Nose,Mouth and Throat: [ ] ear pain [ ] sore throat.   Eyes: [ ] diplopia.   Respiratory: [ ] hemoptysis. [ ] shortness of breath  Cardiovascular: See HPI above  Hematologic/Lymphatic: [ ] anemia. [ ] painful nodes. [ ] prolonged bleeding.   Genitourinary: [ ] hematuria. [ ] flank pain.   Endocrine: [ ] significant change in weight. [ ] intolerance to heat and cold.     Review of systems [ x] otherwise negative, [ ] otherwise unable to obtain    FH: no family history of sudden cardiac death in first degree relatives    SH: [ ] tobacco, [ ] alcohol, [ ] drugs    acetaminophen   IVPB .. 1000 milliGRAM(s) IV Intermittent once  amLODIPine   Tablet 10 milliGRAM(s) Oral daily  apixaban 5 milliGRAM(s) Oral every 12 hours  aspirin  chewable 81 milliGRAM(s) Enteral Tube daily  atorvastatin 40 milliGRAM(s) Oral at bedtime  carvedilol 3.125 milliGRAM(s) Oral every 12 hours  clopidogrel Tablet 75 milliGRAM(s) Enteral Tube daily  dorzolamide 2% Ophthalmic Solution 1 Drop(s) Both EYES <User Schedule>  influenza  Vaccine (HIGH DOSE) 0.7 milliLiter(s) IntraMuscular once  lactated ringers. 1000 milliLiter(s) IV Continuous <Continuous>  latanoprost 0.005% Ophthalmic Solution 1 Drop(s) Both EYES at bedtime  levETIRAcetam  IVPB 500 milliGRAM(s) IV Intermittent every 12 hours  piperacillin/tazobactam IVPB.. 3.375 Gram(s) IV Intermittent every 8 hours  timolol 0.5% Solution 1 Drop(s) Both EYES two times a day                            10.0   16.23 )-----------( 295      ( 04 Apr 2022 07:22 )             32.5       04-04    138  |  100  |  17  ----------------------------<  147<H>  3.6   |  22  |  0.50    Ca    8.7      04 Apr 2022 07:22  Phos  3.5     04-04  Mg     1.70     04-04    TPro  6.0  /  Alb  3.1<L>  /  TBili  0.3  /  DBili  <0.2  /  AST  35<H>  /  ALT  22  /  AlkPhos  93  04-04    T(C): 37 (04-04-22 @ 09:47), Max: 39.1 (04-04-22 @ 00:45)  HR: 76 (04-04-22 @ 09:47) (74 - 130)  BP: 125/56 (04-04-22 @ 09:47) (125/56 - 197/94)  RR: 18 (04-04-22 @ 13:28) (17 - 24)  SpO2: 95% (04-04-22 @ 13:28) (95% - 100%)    General: no acute distress   Head: Normocephalic and atraumatic.   Neck: No JVD. No bruits. Supple. Does not appear to be enlarged.   Cardiovascular: + S1,S2 ; RRR Soft systolic murmur at the left lower sternal border. No rubs noted.    Lungs: CTA b/l. No rhonchi, rales or wheezes.   Abdomen: + BS, soft. Non tender. Non distended. No rebound. No guarding.   Extremities: No clubbing/cyanosis/edema.   Psychiatric: unable to assess     < from: TTE Echo Complete w/o Contrast w/ Doppler (02.04.22 @ 15:28) >  Summary:   1. Left ventricular ejection fraction, by visual estimation, is 60 to   65%.   2. Normal global left ventricular systolic function.   3. Mild mitral valve regurgitation.   4.Mild thickening of the anterior and posterior mitral valve leaflets.   5. Mild tricuspid regurgitation.   6. Mild to moderate aortic regurgitation.   7. Sclerotic aortic valve with normal opening.  [id number] Colin Butcher MD, [facility]  Electronically signed on 2/5/2022 at 3:03:14 PM  < end of copied text >    EKG 3-31 NSR      A/P:  80 yo F with PMHx HTN, HLD, Hypothyroidism, Depression, seizure d/o, RLE DVT, Cholelithiasis s/p recent Hospitalization in [facility] from 12/22 -> 2/11/22 for acute perforated diverticulitis s/p Rosales 12/25, colostomy bag 12/26, hospitalization c/b IR drainage for abdominal abscess 1/3 Cx grew e.coli & bacteriodes, also required Mechanical Ventilation for Acute Respiratory Failure with Tracheostomy done 1/7.  PEG done 1/18, developed bleeding through ostomy for which she was transfused 1/28, EGD on 2/1 showed gastritis. Developed fevers on 1/30 and CT's showed Acute left MCA infarct- could be 2/2 to carotid occlusion vs afib, new RLL aspiration pneumonia, seen by ID, completed antibiotics, seen by VascSx, not surgical candidate. Pt aphasic sent from [facility] for dislodged Peg tube, after staff noted pt has blood to her abdomen.     --Cardiology consulted for elevated BP while Peg dislodged  --Currently on IV hydralazine and BB - restart home meds per PEG when able  --prior chart reviewed, pt with recent CVA and found with AFib and Carotid occlusion.  AC held initially while monitoring for hemorrhagic conversion and then in the setting of GI bleed req PRBCs.  She was continued on DAPT.  -given elevated [id number], recommend lifelong AC for AF if able to tolerate  - Eliquis started  - Would consider Neuro eval for APT management/recommendations post CVA, ?SAPT  --s/p RRT for fever, ID work up in progress

## 2022-04-05 LAB
ALBUMIN SERPL ELPH-MCNC: 2.9 G/DL — LOW (ref 3.3–5)
ALP SERPL-CCNC: 105 U/L — SIGNIFICANT CHANGE UP (ref 40–120)
ALT FLD-CCNC: 68 U/L — HIGH (ref 4–33)
AMMONIA BLD-MCNC: 27 UMOL/L — SIGNIFICANT CHANGE UP (ref 11–55)
ANION GAP SERPL CALC-SCNC: 10 MMOL/L — SIGNIFICANT CHANGE UP (ref 7–14)
AST SERPL-CCNC: 68 U/L — HIGH (ref 4–32)
BASOPHILS # BLD AUTO: 0.03 K/UL — SIGNIFICANT CHANGE UP (ref 0–0.2)
BASOPHILS NFR BLD AUTO: 0.3 % — SIGNIFICANT CHANGE UP (ref 0–2)
BILIRUB SERPL-MCNC: 0.3 MG/DL — SIGNIFICANT CHANGE UP (ref 0.2–1.2)
BUN SERPL-MCNC: 18 MG/DL — SIGNIFICANT CHANGE UP (ref 7–23)
CALCIUM SERPL-MCNC: 8.7 MG/DL — SIGNIFICANT CHANGE UP (ref 8.4–10.5)
CHLORIDE SERPL-SCNC: 98 MMOL/L — SIGNIFICANT CHANGE UP (ref 98–107)
CK SERPL-CCNC: 145 U/L — SIGNIFICANT CHANGE UP (ref 25–170)
CO2 SERPL-SCNC: 24 MMOL/L — SIGNIFICANT CHANGE UP (ref 22–31)
CREAT SERPL-MCNC: 0.54 MG/DL — SIGNIFICANT CHANGE UP (ref 0.5–1.3)
EGFR: 94 ML/MIN/1.73M2 — SIGNIFICANT CHANGE UP
EOSINOPHIL # BLD AUTO: 0.54 K/UL — HIGH (ref 0–0.5)
EOSINOPHIL NFR BLD AUTO: 5.5 % — SIGNIFICANT CHANGE UP (ref 0–6)
FOLATE SERPL-MCNC: 7.6 NG/ML — SIGNIFICANT CHANGE UP (ref 3.1–17.5)
GLUCOSE SERPL-MCNC: 120 MG/DL — HIGH (ref 70–99)
HCT VFR BLD CALC: 27.5 % — LOW (ref 34.5–45)
HGB BLD-MCNC: 8.5 G/DL — LOW (ref 11.5–15.5)
IANC: 8.04 K/UL — HIGH (ref 1.8–7.4)
IMM GRANULOCYTES NFR BLD AUTO: 0.4 % — SIGNIFICANT CHANGE UP (ref 0–1.5)
LYMPHOCYTES # BLD AUTO: 0.75 K/UL — LOW (ref 1–3.3)
LYMPHOCYTES # BLD AUTO: 7.6 % — LOW (ref 13–44)
MAGNESIUM SERPL-MCNC: 2.1 MG/DL — SIGNIFICANT CHANGE UP (ref 1.6–2.6)
MCHC RBC-ENTMCNC: 27.4 PG — SIGNIFICANT CHANGE UP (ref 27–34)
MCHC RBC-ENTMCNC: 30.9 GM/DL — LOW (ref 32–36)
MCV RBC AUTO: 88.7 FL — SIGNIFICANT CHANGE UP (ref 80–100)
MONOCYTES # BLD AUTO: 0.49 K/UL — SIGNIFICANT CHANGE UP (ref 0–0.9)
MONOCYTES NFR BLD AUTO: 5 % — SIGNIFICANT CHANGE UP (ref 2–14)
NEUTROPHILS # BLD AUTO: 8.04 K/UL — HIGH (ref 1.8–7.4)
NEUTROPHILS NFR BLD AUTO: 81.2 % — HIGH (ref 43–77)
NRBC # BLD: 0 /100 WBCS — SIGNIFICANT CHANGE UP
NRBC # FLD: 0 K/UL — SIGNIFICANT CHANGE UP
PHOSPHATE SERPL-MCNC: 4 MG/DL — SIGNIFICANT CHANGE UP (ref 2.5–4.5)
PLATELET # BLD AUTO: 220 K/UL — SIGNIFICANT CHANGE UP (ref 150–400)
POTASSIUM SERPL-MCNC: 4.4 MMOL/L — SIGNIFICANT CHANGE UP (ref 3.5–5.3)
POTASSIUM SERPL-SCNC: 4.4 MMOL/L — SIGNIFICANT CHANGE UP (ref 3.5–5.3)
PROT SERPL-MCNC: 5.7 G/DL — LOW (ref 6–8.3)
RBC # BLD: 3.1 M/UL — LOW (ref 3.8–5.2)
RBC # FLD: 16.7 % — HIGH (ref 10.3–14.5)
SODIUM SERPL-SCNC: 132 MMOL/L — LOW (ref 135–145)
T PALLIDUM AB TITR SER: NEGATIVE — SIGNIFICANT CHANGE UP
T3 SERPL-MCNC: 64 NG/DL — LOW (ref 80–200)
T4 FREE SERPL-MCNC: 0.9 NG/DL — SIGNIFICANT CHANGE UP (ref 0.9–1.8)
TSH SERPL-MCNC: 7.65 UIU/ML — HIGH (ref 0.27–4.2)
VIT B12 SERPL-MCNC: 1163 PG/ML — HIGH (ref 200–900)
WBC # BLD: 9.89 K/UL — SIGNIFICANT CHANGE UP (ref 3.8–10.5)
WBC # FLD AUTO: 9.89 K/UL — SIGNIFICANT CHANGE UP (ref 3.8–10.5)

## 2022-04-05 PROCEDURE — 99233 SBSQ HOSP IP/OBS HIGH 50: CPT

## 2022-04-05 PROCEDURE — 99223 1ST HOSP IP/OBS HIGH 75: CPT

## 2022-04-05 RX ORDER — ACETAMINOPHEN 500 MG
650 TABLET ORAL EVERY 6 HOURS
Refills: 0 | Status: DISCONTINUED | OUTPATIENT
Start: 2022-04-05 | End: 2022-04-22

## 2022-04-05 RX ORDER — MEROPENEM-VABORBACTAM 1; 1 G/2G; G/2G
4 INJECTION, POWDER, FOR SOLUTION INTRAVENOUS EVERY 8 HOURS
Refills: 0 | Status: DISCONTINUED | OUTPATIENT
Start: 2022-04-05 | End: 2022-04-20

## 2022-04-05 RX ORDER — PANTOPRAZOLE SODIUM 20 MG/1
40 TABLET, DELAYED RELEASE ORAL
Refills: 0 | Status: DISCONTINUED | OUTPATIENT
Start: 2022-04-05 | End: 2022-04-05

## 2022-04-05 RX ORDER — PANTOPRAZOLE SODIUM 20 MG/1
40 TABLET, DELAYED RELEASE ORAL DAILY
Refills: 0 | Status: DISCONTINUED | OUTPATIENT
Start: 2022-04-05 | End: 2022-04-22

## 2022-04-05 RX ORDER — LEVOTHYROXINE SODIUM 125 MCG
75 TABLET ORAL DAILY
Refills: 0 | Status: DISCONTINUED | OUTPATIENT
Start: 2022-04-05 | End: 2022-04-22

## 2022-04-05 RX ADMIN — Medication 81 MILLIGRAM(S): at 11:09

## 2022-04-05 RX ADMIN — PIPERACILLIN AND TAZOBACTAM 25 GRAM(S): 4; .5 INJECTION, POWDER, LYOPHILIZED, FOR SOLUTION INTRAVENOUS at 00:02

## 2022-04-05 RX ADMIN — Medication 1 DROP(S): at 05:49

## 2022-04-05 RX ADMIN — MEROPENEM-VABORBACTAM 83.33 GRAM(S): 1; 1 INJECTION, POWDER, FOR SOLUTION INTRAVENOUS at 11:08

## 2022-04-05 RX ADMIN — PIPERACILLIN AND TAZOBACTAM 25 GRAM(S): 4; .5 INJECTION, POWDER, LYOPHILIZED, FOR SOLUTION INTRAVENOUS at 07:06

## 2022-04-05 RX ADMIN — AMLODIPINE BESYLATE 10 MILLIGRAM(S): 2.5 TABLET ORAL at 05:48

## 2022-04-05 RX ADMIN — DORZOLAMIDE HYDROCHLORIDE 1 DROP(S): 20 SOLUTION/ DROPS OPHTHALMIC at 09:31

## 2022-04-05 RX ADMIN — CLOPIDOGREL BISULFATE 75 MILLIGRAM(S): 75 TABLET, FILM COATED ORAL at 11:09

## 2022-04-05 RX ADMIN — LEVETIRACETAM 400 MILLIGRAM(S): 250 TABLET, FILM COATED ORAL at 11:09

## 2022-04-05 RX ADMIN — CARVEDILOL PHOSPHATE 3.12 MILLIGRAM(S): 80 CAPSULE, EXTENDED RELEASE ORAL at 05:48

## 2022-04-05 RX ADMIN — ATORVASTATIN CALCIUM 40 MILLIGRAM(S): 80 TABLET, FILM COATED ORAL at 22:01

## 2022-04-05 RX ADMIN — LEVETIRACETAM 400 MILLIGRAM(S): 250 TABLET, FILM COATED ORAL at 22:36

## 2022-04-05 RX ADMIN — MEROPENEM-VABORBACTAM 83.33 GRAM(S): 1; 1 INJECTION, POWDER, FOR SOLUTION INTRAVENOUS at 19:07

## 2022-04-05 RX ADMIN — DORZOLAMIDE HYDROCHLORIDE 1 DROP(S): 20 SOLUTION/ DROPS OPHTHALMIC at 18:24

## 2022-04-05 RX ADMIN — APIXABAN 5 MILLIGRAM(S): 2.5 TABLET, FILM COATED ORAL at 05:48

## 2022-04-05 RX ADMIN — CARVEDILOL PHOSPHATE 3.12 MILLIGRAM(S): 80 CAPSULE, EXTENDED RELEASE ORAL at 18:25

## 2022-04-05 RX ADMIN — Medication 1 DROP(S): at 18:24

## 2022-04-05 RX ADMIN — APIXABAN 5 MILLIGRAM(S): 2.5 TABLET, FILM COATED ORAL at 18:24

## 2022-04-05 RX ADMIN — LATANOPROST 1 DROP(S): 0.05 SOLUTION/ DROPS OPHTHALMIC; TOPICAL at 22:01

## 2022-04-05 RX ADMIN — LATANOPROST 1 DROP(S): 0.05 SOLUTION/ DROPS OPHTHALMIC; TOPICAL at 00:32

## 2022-04-05 NOTE — CHART NOTE - NSCHARTNOTEFT_GEN_A_CORE
I had a long discussion with patient's son Blake about patient's condition , prognosis, including the latest update about the multidrug resistance bacteremia and evolving CVA. Blake who is patient's health care proxy,  is interested in hospice care, after seeing patient's condition worsen since her stroke. SW informed of interest in nursing home with hospice care. Will also reach out to hospice care RN after Blake speaks to Jeffrey and Shobha, patient's friends. I had a long discussion with patient's son Blake about patient's condition , prognosis, including the latest update on the multidrug resistance bacteremia and evolving CVA. Blake who is patient's health care proxy,  is interested in hospice care, after seeing patient's condition worsen since her stroke. SW informed of interest in nursing home with hospice care. Will also reach out to hospice care RN after Blake speaks to Jeffrey and Shobha, patient's friends. I had a long discussion with patient's son Blake Allred about patient's condition , prognosis, including the latest update on the multidrug resistance bacteremia and evolving CVA. Blake who is patient's health care proxy,  is interested in hospice care, after seeing patient's condition worsen since her stroke. SW informed of interest in nursing home with hospice care. Will also reach out to hospice care RN.

## 2022-04-05 NOTE — PROGRESS NOTE ADULT - ASSESSMENT
82 yo F with PMHx HTN, HLD, Hypothyroidism, Depression, seizure d/o, RLE DVT, Cholelithiasis s/p recent Hospitalization in [facility] from 12/22 -> 2/11/22 for acute perforated diverticulitis s/p [place] 12/25, colostomy bag 12/26, hospitalization c/b IR drainage for abdominal abscess 1/3 Cx grew e.coli & bacteriodes, also required Mechanical Ventilation for Acute Respiratory Failure with Tracheostomy done 1/7.  PEG done 1/18, developed bleeding through ostomy, EGD on 2/1 showed gastritis. T's showed Acute left MCA infarct- could be 2/2 to carotid occlusion vs afib, new RLL aspiration pneumonia, seen by ID, completed antibiotics, seen by VascSx, not surgical candidate. Pt aphasic sent from [facility] for dislodged Peg tube, after staff noted pt has blood to her abdomen. Nephrology consulted for Hypokalemia.     A/P  Hypokalemia   likely 2/2 GI loss   Improved  monitor input and output closely   diarrhea improved   monitor K closely    Hypomagnesia   better today   replete as needed  monitor Mg     hypophosphatemia   better today  replete as needed  monitor     Hypocalcemia   corrected Ca WNL   Monitor    Acidosis with anion gap   etiology?  monitor at present     HTN   optimal   monitor BP closely

## 2022-04-05 NOTE — CONSULT NOTE ADULT - ASSESSMENT
82yo female with complicated medical course    Abnormal CT   agree with abx per ID   pain control prn   recent IR peg exchange 3/30; reconsult if issues  brown stool and tolerating PEG feeds  d/w attg, no acute gi interventions given multiple medical issues  continue w/supportive care     Sepsis   per primary team and ID     AFib   rate control   protonix daily while on a/c

## 2022-04-05 NOTE — PROGRESS NOTE ADULT - SUBJECTIVE AND OBJECTIVE BOX
[facility]   MD ELOISE ALVARADO MD, PA INJUNG KO NP    TEL:  OFFICE: [phone]    From 5pm-7am Answering Service [phone]    -- RENAL FOLLOW UP NOTE ---Date of Service 04-05-22 @ 15:31    Patient is a 81y old  Female who presents with a chief complaint of     Patient seen and examined at bedside. No chest pain/sob    VITALS:  T(F): 98 (04-05-22 @ 15:06), Max: 99.2 (04-04-22 @ 17:43)  HR: 82 (04-05-22 @ 15:06)  BP: 129/59 (04-05-22 @ 15:06)  RR: 18 (04-05-22 @ 15:06)  SpO2: 100% (04-05-22 @ 15:06)  Wt(kg): --    04-04 @ 07:01  -  04-05 @ 07:00  --------------------------------------------------------  IN: 4390 mL / OUT: 1905 mL / NET: 2485 mL    04-05 @ 07:01  -  04-05 @ 15:31  --------------------------------------------------------  IN: 1120 mL / OUT: 600 mL / NET: 520 mL          PHYSICAL EXAM:  Constitutional: NAD  Neck: No JVD  Respiratory: CTAB, no wheezes, rales or rhonchi  Cardiovascular: S1, S2, RRR  Gastrointestinal: BS+, soft, NT/ND  Extremities: No peripheral edema    Hospital Medications:   MEDICATIONS  (STANDING):  amLODIPine   Tablet 10 milliGRAM(s) Oral daily  apixaban 5 milliGRAM(s) Oral every 12 hours  aspirin  chewable 81 milliGRAM(s) Enteral Tube daily  atorvastatin 40 milliGRAM(s) Oral at bedtime  carvedilol 3.125 milliGRAM(s) Oral every 12 hours  clopidogrel Tablet 75 milliGRAM(s) Enteral Tube daily  dorzolamide 2% Ophthalmic Solution 1 Drop(s) Both EYES <User Schedule>  influenza  Vaccine (HIGH DOSE) 0.7 milliLiter(s) IntraMuscular once  lactated ringers. 1000 milliLiter(s) (75 mL/Hr) IV Continuous <Continuous>  latanoprost 0.005% Ophthalmic Solution 1 Drop(s) Both EYES at bedtime  levETIRAcetam  IVPB 500 milliGRAM(s) IV Intermittent every 12 hours  levothyroxine 75 MICROGram(s) Oral daily  meropenem/vaborbactam IVPB 4 Gram(s) IV Intermittent every 8 hours  pantoprazole    Tablet 40 milliGRAM(s) Oral before breakfast  timolol 0.5% Solution 1 Drop(s) Both EYES two times a day      LABS:  04-05    132<L>  |  98  |  18  ----------------------------<  120<H>  4.4   |  24  |  0.54    Ca    8.7      05 Apr 2022 07:40  Phos  4.0     04-05  Mg     2.10     04-05    TPro  5.7<L>  /  Alb  2.9<L>  /  TBili  0.3  /  DBili      /  AST  68<H>  /  ALT  68<H>  /  AlkPhos  105  04-05    Creatinine Trend: 0.54 <--, 0.44 <--, 0.50 <--, 0.44 <--, 0.41 <--, 0.39 <--, 0.40 <--, 0.41 <--, 0.36 <--, 0.39 <--, 0.37 <--    Albumin, Serum: 2.9 g/dL (04-05 @ 07:40)  Phosphorus Level, Serum: 4.0 mg/dL (04-05 @ 07:40)                              8.5    9.89  )-----------( 220      ( 05 Apr 2022 07:40 )             27.5     Urine Studies:  Urinalysis - [04-04-22 @ 01:59]      Color Light Yellow / Appearance Clear / SG 1.012 / pH 7.5      Gluc Negative / Ketone Negative  / Bili Negative / Urobili <2 mg/dL       Blood Negative / Protein Trace / Leuk Est Negative / Nitrite Negative      RBC 6 / WBC 1 / Hyaline  / Gran  / Sq Epi  / Non Sq Epi 1 / Bacteria Negative      PTH -- (Ca --)      [03-30-22 @ 07:35]   32  TSH 7.65      [04-05-22 @ 07:40]  Lipid: chol 158, , HDL 25, LDL --      [02-03-22 @ 15:22]      Syphilis Screen (Treponema Pallidum Ab) Negative      [04-05-22 @ 09:41]    RADIOLOGY & ADDITIONAL STUDIES:

## 2022-04-05 NOTE — PROGRESS NOTE ADULT - SUBJECTIVE AND OBJECTIVE BOX
Date of Service  : 22     INTERVAL HPI/OVERNIGHT EVENTS: No new concerns per staff.   Vital Signs Last 24 Hrs  T(C): 36.7 (2022 17:32), Max: 36.9 (2022 21:13)  T(F): 98.1 (:32), Max: 98.4 (2022 21:13)  HR: 85 (:32) (72 - 85)  BP: 138/76 (:32) (105/52 - 138/76)  BP(mean): --  RR: 18 (2022 17:32) (18 - 19)  SpO2: 99% (:32) (99% - 100%)  I&O's Summary    2022 07:01  -  2022 07:00  --------------------------------------------------------  IN: 4390 mL / OUT: 1905 mL / NET: 2485 mL    2022 07:01  -  2022 20:54  --------------------------------------------------------  IN: 1830 mL / OUT: 1100 mL / NET: 730 mL      MEDICATIONS  (STANDING):  amLODIPine   Tablet 10 milliGRAM(s) Oral daily  apixaban 5 milliGRAM(s) Oral every 12 hours  aspirin  chewable 81 milliGRAM(s) Enteral Tube daily  atorvastatin 40 milliGRAM(s) Oral at bedtime  carvedilol 3.125 milliGRAM(s) Oral every 12 hours  clopidogrel Tablet 75 milliGRAM(s) Enteral Tube daily  dorzolamide 2% Ophthalmic Solution 1 Drop(s) Both EYES <User Schedule>  influenza  Vaccine (HIGH DOSE) 0.7 milliLiter(s) IntraMuscular once  lactated ringers. 1000 milliLiter(s) (75 mL/Hr) IV Continuous <Continuous>  latanoprost 0.005% Ophthalmic Solution 1 Drop(s) Both EYES at bedtime  levETIRAcetam  IVPB 500 milliGRAM(s) IV Intermittent every 12 hours  levothyroxine 75 MICROGram(s) Oral daily  meropenem/vaborbactam IVPB 4 Gram(s) IV Intermittent every 8 hours  pantoprazole   Suspension 40 milliGRAM(s) Oral daily  timolol 0.5% Solution 1 Drop(s) Both EYES two times a day    MEDICATIONS  (PRN):  acetaminophen     Tablet .. 650 milliGRAM(s) Oral every 6 hours PRN Mild Pain (1 - 3), Moderate Pain (4 - 6)    LABS:                        8.5    9.89  )-----------( 220      ( 2022 07:40 )             27.5     04-05    132<L>  |  98  |  18  ----------------------------<  120<H>  4.4   |  24  |  0.54    Ca    8.7      2022 07:40  Phos  4.0     04-05  Mg     2.10     04-05    TPro  5.7<L>  /  Alb  2.9<L>  /  TBili  0.3  /  DBili  x   /  AST  68<H>  /  ALT  68<H>  /  AlkPhos  105  04-05    PT/INR - ( 2022 13:46 )   PT: 18.1 sec;   INR: 1.55 ratio         PTT - ( 2022 13:46 )  PTT:28.7 sec  Urinalysis Basic - ( 2022 01:59 )    Color: Light Yellow / Appearance: Clear / S.012 / pH: x  Gluc: x / Ketone: Negative  / Bili: Negative / Urobili: <2 mg/dL   Blood: x / Protein: Trace / Nitrite: Negative   Leuk Esterase: Negative / RBC: 6 /HPF / WBC 1 /HPF   Sq Epi: x / Non Sq Epi: 1 /HPF / Bacteria: Negative      CAPILLARY BLOOD GLUCOSE            Urinalysis Basic - ( 2022 01:59 )    Color: Light Yellow / Appearance: Clear / S.012 / pH: x  Gluc: x / Ketone: Negative  / Bili: Negative / Urobili: <2 mg/dL   Blood: x / Protein: Trace / Nitrite: Negative   Leuk Esterase: Negative / RBC: 6 /HPF / WBC 1 /HPF   Sq Epi: x / Non Sq Epi: 1 /HPF / Bacteria: Negative        Consultant(s) Notes Reviewed:  [x ] YES  [ ] NO    PHYSICAL EXAM:  GENERAL: NAD, well-groomed, well-developed,not in any distress ,  HEAD:  Atraumatic, Normocephalic  NECK: Supple, No JVD, Normal thyroid  NERVOUS SYSTEM:  Alert   CHEST/LUNG: Good air entry bilateral with no  rales, rhonchi, wheezing, or rubs  HEART: Regular rate and rhythm; No murmurs, rubs, or gallops  ABDOMEN: Soft, Nontender, Nondistended; Bowel sounds present, PEG+ and Ostomy+  EXTREMITIES:  2+ Peripheral Pulses, No clubbing, cyanosis, or edema      Care Discussed with Consultants/Other Providers [ x] YES  [ ] NO

## 2022-04-05 NOTE — EEG REPORT - NS EEG TEXT BOX
[facility]   COMPREHENSIVE EPILEPSY CENTER   REPORT OF CONTINUOUS VIDEO EEG     [facility] [place] [facility]: [address], [place] [address], Ph#: [phone]  [facility]: [address] [place] [address], Ph#: [phone]  [facility]: [address] [place] [address], Ph#: [phone]    Patient Name: JUAREZ GTZ  Age and : 81y ([birth year])  MRN #: [id number]  Location: [place] [address] A  Referring Physician: Brennen Lew    Start Time/Date: 15:17 on 22  End Time/Date: 08:00 on 22  Duration: 16 hours 24 mins  (leads dislodged around 22:00, recording technically difficult after that point)    _____________________________________________________________  STUDY INFORMATION    EEG Recording Technique:  The patient underwent continuous Video-EEG monitoring, using Telemetry System hardware on the XLTek Digital System. EEG and video data were stored on a computer hard drive with important events saved in digital archive files. The material was reviewed by a physician (electroencephalographer / epileptologist) on a daily basis. Burak and seizure detection algorithms were utilized and reviewed. An EEG Technician attended to the patient, and was available throughout daytime work hours.  The epilepsy center neurologist was available in person or on call 24-hours per day.    EEG Placement and Labeling of Electrodes:  The EEG was performed utilizing 20 channel referential EEG connections (coronal over temporal over parasagittal montage) using all standard 10-20 electrode placements with EKG, with additional electrodes placed in the inferior temporal region using the modified 10-10 montage electrode placements for elective admissions, or if deemed necessary. Recording was at a sampling rate of 256 samples per second per channel. Time synchronized digital video recording was done simultaneously with EEG recording. A low light infrared camera was used for low light recording.     _____________________________________________________________  HISTORY    Patient is a 81y old  Female who presents with a chief complaint of     PERTINENT MEDICATION:  levETIRAcetam  IVPB 500 milliGRAM(s) IV Intermittent every 12 hours    _____________________________________________________________  STUDY INTERPRETATION    Findings: The background was continuous and reactive. During wakefulness, the posterior dominant rhythm consisted of assymmetric, poorly-modulated 6.5 Hz activity, only seen over the right.     Background Slowing:  Diffuse irregular theta and polymorphic delta slowing.    Focal Slowing:   Continuous polymorphic theta/delta slowing over left hemisphere.    Sleep Background:  Drowsiness and stage II sleep transients were not recorded.    Other Non-Epileptiform Findings:  None were present.    Interictal Epileptiform Activity:   Occasional sharp waves over right frontal region, Fp2/F4/F8.    Events:  Clinical events: None recorded.  Seizures: None recorded.    Activation Procedures:   Hyperventilation was not performed.    Photic stimulation was not performed.     Artifacts:  Intermittent myogenic and movement artifacts were noted.    ECG:  The heart rate on single channel ECG was predominantly between 60-70 BPM.    _____________________________________________________________  EEG SUMMARY/CLASSIFICATION    Abnormal EEG in an encephalopathic patient.    - Occasional sharp waves over right frontal region, Fp2/F4/F8.  - Continuous polymorphic theta/delta slowing over left hemisphere.  - Background slowing, generalized.    _____________________________________________________________  EEG IMPRESSION/CLINICAL CORRELATE    Abnormal EEG study.     - Risk of seizures from right frontal region.  - Structural / functional abnormality in the left hemisphere.  - Mild to moderate nonspecific diffuse or multifocal cerebral dysfunction.   - No seizures were recorded x 1 day.    *** PRELIMINARY REPORT - PENDING EPILEPSY ATTENDING REVIEW ***  _____________________________________________________________    Satish Alvarez MD, BURKE  Fellow, [facility] [place]     [facility]   COMPREHENSIVE EPILEPSY CENTER   REPORT OF CONTINUOUS VIDEO EEG     [facility]: [address], [place] [address], Ph#: [phone]  [facility]: [address] [place] [address], Ph#: [phone]  [facility]: [address] [place] [address], Ph#: [phone]    Patient Name: JUAREZ GTZ  Age and : 81y (42)  MRN #: [id number]  Location: [place] [address] A  Referring Physician: Brennen Lew    Start Time/Date: 15:17 on 22  End Time/Date: 08:00 on 22  Duration: 16 hours 24 mins  (leads dislodged around 22:00, recording technically difficult after that point)    _____________________________________________________________  STUDY INFORMATION    EEG Recording Technique:  The patient underwent continuous Video-EEG monitoring, using Telemetry System hardware on the XLTek Digital System. EEG and video data were stored on a computer hard drive with important events saved in digital archive files. The material was reviewed by a physician (electroencephalographer / epileptologist) on a daily basis. Burak and seizure detection algorithms were utilized and reviewed. An EEG Technician attended to the patient, and was available throughout daytime work hours.  The epilepsy center neurologist was available in person or on call 24-hours per day.    EEG Placement and Labeling of Electrodes:  The EEG was performed utilizing 20 channel referential EEG connections (coronal over temporal over parasagittal montage) using all standard 10-20 electrode placements with EKG, with additional electrodes placed in the inferior temporal region using the modified 10-10 montage electrode placements for elective admissions, or if deemed necessary. Recording was at a sampling rate of 256 samples per second per channel. Time synchronized digital video recording was done simultaneously with EEG recording. A low light infrared camera was used for low light recording.     _____________________________________________________________  HISTORY    Patient is a 81y old  Female who presents with a chief complaint of     PERTINENT MEDICATION:  levETIRAcetam  IVPB 500 milliGRAM(s) IV Intermittent every 12 hours    _____________________________________________________________  STUDY INTERPRETATION    Findings: The background was continuous and reactive. During wakefulness, the posterior dominant rhythm consisted of assymmetric, poorly-modulated 6.5 Hz activity, only seen over the right.     Background Slowing:  Diffuse irregular theta and polymorphic delta slowing.    Focal Slowing:   Continuous polymorphic theta/delta slowing over left hemisphere.    Sleep Background:  Drowsiness and stage II sleep transients were not recorded.    Other Non-Epileptiform Findings:  None were present.    Interictal Epileptiform Activity:   Occasional sharp waves over right frontal region, Fp2/F4/F8.    Events:  Clinical events: None recorded.  Seizures: None recorded.    Activation Procedures:   Hyperventilation was not performed.    Photic stimulation was not performed.     Artifacts:  Intermittent myogenic and movement artifacts were noted.    ECG:  The heart rate on single channel ECG was predominantly between 60-70 BPM.    _____________________________________________________________  EEG SUMMARY/CLASSIFICATION    Abnormal EEG in an encephalopathic patient.    - Occasional sharp waves over right frontal region, Fp2/F4/F8.  - Continuous polymorphic theta/delta slowing over left hemisphere.  - Background slowing, generalized.    _____________________________________________________________  EEG IMPRESSION/CLINICAL CORRELATE    Abnormal EEG study.     - Risk of seizures from right frontal region.  - Structural / functional abnormality in the left hemisphere.  - Mild to moderate nonspecific diffuse or multifocal cerebral dysfunction.   - No seizures were recorded x 1 day.  _____________________________________________________________    Satish Alvarez MD, BURKE  Fellow, [facility]    Shon Morel MD  EEG/Epilepsy Attending

## 2022-04-05 NOTE — PROGRESS NOTE ADULT - SUBJECTIVE AND OBJECTIVE BOX
Date of service: 4/5/22    chief complaint: dislodged PEG tube     extended hpi: 80 yo F with PMHx HTN, HLD, Hypothyroidism, Depression, seizure d/o, RLE DVT, Cholelithiasis s/p recent Hospitalization in [facility] from 12/22 -> 2/11/22 for acute perforated diverticulitis s/p Rosales 12/25, colostomy bag 12/26, hospitalization c/b IR drainage for abdominal abscess 1/3 Cx grew e.coli & bacteriodes, also required Mechanical Ventilation for Acute Respiratory Failure with Tracheostomy done 1/7.  PEG done 1/18, developed bleeding through ostomy for which she was transfused 1/28, EGD on 2/1 showed gastritis. Developed fevers on 1/30 and CT's showed Acute left MCA infarct- could be 2/2 to carotid occlusion vs afib, new RLL aspiration pneumonia, seen by ID, completed antibiotics, seen by VascSx, not surgical candidate. Pt aphasic sent from [facility] for dislodged Peg tube, after staff noted pt has blood to her abdomen.     S: pt. aphasic but appears more alert today answering questions by nodding or shaking her head; ros limited.     Review of Systems:   Constitutional: [ ] fevers, [ ] chills.   Skin: [ ] dry skin. [ ] rashes.  Psychiatric: [ ] depression, [ ] anxiety.   Gastrointestinal: [ ] BRBPR, [ ] melena.   Neurological: [ ] confusion. [ ] seizures. [ ] shuffling gait.   Ears,Nose,Mouth and Throat: [ ] ear pain [ ] sore throat.   Eyes: [ ] diplopia.   Respiratory: [ ] hemoptysis. [ ] shortness of breath  Cardiovascular: See HPI above  Hematologic/Lymphatic: [ ] anemia. [ ] painful nodes. [ ] prolonged bleeding.   Genitourinary: [ ] hematuria. [ ] flank pain.   Endocrine: [ ] significant change in weight. [ ] intolerance to heat and cold.     Review of systems [ ] otherwise negative, [x ] otherwise unable to obtain    FH: no family history of sudden cardiac death in first degree relatives    SH: [ ] tobacco, [ ] alcohol, [ ] drugs      amLODIPine   Tablet 10 milliGRAM(s) Oral daily  apixaban 5 milliGRAM(s) Oral every 12 hours  aspirin  chewable 81 milliGRAM(s) Enteral Tube daily  atorvastatin 40 milliGRAM(s) Oral at bedtime  carvedilol 3.125 milliGRAM(s) Oral every 12 hours  clopidogrel Tablet 75 milliGRAM(s) Enteral Tube daily  dorzolamide 2% Ophthalmic Solution 1 Drop(s) Both EYES <User Schedule>  influenza  Vaccine (HIGH DOSE) 0.7 milliLiter(s) IntraMuscular once  lactated ringers. 1000 milliLiter(s) IV Continuous <Continuous>  latanoprost 0.005% Ophthalmic Solution 1 Drop(s) Both EYES at bedtime  levETIRAcetam  IVPB 500 milliGRAM(s) IV Intermittent every 12 hours  levothyroxine 75 MICROGram(s) Oral daily  meropenem/vaborbactam IVPB 4 Gram(s) IV Intermittent every 8 hours  timolol 0.5% Solution 1 Drop(s) Both EYES two times a day                            8.5    9.89  )-----------( 220      ( 05 Apr 2022 07:40 )             27.5       04-05    132<L>  |  98  |  18  ----------------------------<  120<H>  4.4   |  24  |  0.54    Ca    8.7      05 Apr 2022 07:40  Phos  4.0     04-05  Mg     2.10     04-05    TPro  5.7<L>  /  Alb  2.9<L>  /  TBili  0.3  /  DBili  x   /  AST  68<H>  /  ALT  68<H>  /  AlkPhos  105  04-05      CARDIAC MARKERS ( 05 Apr 2022 07:40 )  x     / x     / 145 U/L / x     / x            T(C): 36.8 (04-05-22 @ 09:17), Max: 37.3 (04-04-22 @ 17:43)  HR: 81 (04-05-22 @ 09:17) (72 - 86)  BP: 122/57 (04-05-22 @ 09:17) (98/50 - 122/57)  RR: 18 (04-05-22 @ 09:17) (18 - 19)  SpO2: 99% (04-05-22 @ 09:17) (95% - 100%)  Wt(kg): --    I&O's Summary    04 Apr 2022 07:01  -  05 Apr 2022 07:00  --------------------------------------------------------  IN: 4390 mL / OUT: 1905 mL / NET: 2485 mL        General: no acute distress   Head: Normocephalic and atraumatic.   Neck: No JVD. No bruits. Supple. Does not appear to be enlarged.   Cardiovascular: + S1,S2 ; RRR Soft systolic murmur at the left lower sternal border. No rubs noted.    Lungs: CTA b/l. No rhonchi, rales or wheezes.   Abdomen: + BS, soft. Non tender. Non distended. No rebound. No guarding.   Extremities: No clubbing/cyanosis/edema.   Psychiatric: unable to assess     < from: TTE Echo Complete w/o Contrast w/ Doppler (02.04.22 @ 15:28) >  Summary:   1. Left ventricular ejection fraction, by visual estimation, is 60 to   65%.   2. Normal global left ventricular systolic function.   3. Mild mitral valve regurgitation.   4.Mild thickening of the anterior and posterior mitral valve leaflets.   5. Mild tricuspid regurgitation.   6. Mild to moderate aortic regurgitation.   7. Sclerotic aortic valve with normal opening.  [id number] Colin Butcher MD, [facility]  Electronically signed on 2/5/2022 at 3:03:14 PM  < end of copied text >    EKG 3-31 NSR      A/P:  80 yo F with PMHx HTN, HLD, Hypothyroidism, Depression, seizure d/o, RLE DVT, Cholelithiasis s/p recent Hospitalization in [facility] from 12/22 -> 2/11/22 for acute perforated diverticulitis s/p Rosales 12/25, colostomy bag 12/26, hospitalization c/b IR drainage for abdominal abscess 1/3 Cx grew e.coli & bacteriodes, also required Mechanical Ventilation for Acute Respiratory Failure with Tracheostomy done 1/7.  PEG done 1/18, developed bleeding through ostomy for which she was transfused 1/28, EGD on 2/1 showed gastritis. Developed fevers on 1/30 and CT's showed Acute left MCA infarct- could be 2/2 to carotid occlusion vs afib, new RLL aspiration pneumonia, seen by ID, completed antibiotics, seen by VascSx, not surgical candidate. Pt aphasic sent from [facility] for dislodged Peg tube, after staff noted pt has blood to her abdomen.       --prior chart reviewed, pt with recent CVA and found with AFib and Carotid occlusion.  AC held initially while monitoring for hemorrhagic conversion and then in the setting of GI bleed req PRBCs.  She was continued on DAPT.  --given elevated [id number], recommend lifelong AC for AF if able to tolerate  - Eliquis started - monitor h/h   - Would consider Neuro eval for APT management/recommendations post CVA, ?SAPT  --s/p RRT on 04/04/22 for fever - BCx noted - follow up ID  --CTH results noted - follow up neurology regarding further workup including possible MRI  --Vascular surgery eval noted  --Further workup pending clinical course and overall [facility]     Danny Casiano MD

## 2022-04-05 NOTE — PROGRESS NOTE ADULT - SUBJECTIVE AND OBJECTIVE BOX
Follow Up:  sepsis    Interval History: blood cx came back with KPC klebsiella    ROS:    Unobtainable because of mental status        Allergies  No Known Allergies        ANTIMICROBIALS:  meropenem/vaborbactam IVPB 4 every 8 hours      OTHER MEDS:  amLODIPine   Tablet 10 milliGRAM(s) Oral daily  apixaban 5 milliGRAM(s) Oral every 12 hours  aspirin  chewable 81 milliGRAM(s) Enteral Tube daily  atorvastatin 40 milliGRAM(s) Oral at bedtime  carvedilol 3.125 milliGRAM(s) Oral every 12 hours  clopidogrel Tablet 75 milliGRAM(s) Enteral Tube daily  dorzolamide 2% Ophthalmic Solution 1 Drop(s) Both EYES <User Schedule>  influenza  Vaccine (HIGH DOSE) 0.7 milliLiter(s) IntraMuscular once  lactated ringers. 1000 milliLiter(s) IV Continuous <Continuous>  latanoprost 0.005% Ophthalmic Solution 1 Drop(s) Both EYES at bedtime  levETIRAcetam  IVPB 500 milliGRAM(s) IV Intermittent every 12 hours  levothyroxine 75 MICROGram(s) Oral daily  timolol 0.5% Solution 1 Drop(s) Both EYES two times a day      Vital Signs Last 24 Hrs  T(C): 36.8 (2022 09:17), Max: 37.3 (2022 17:43)  T(F): 98.3 (2022 09:17), Max: 99.2 (2022 17:43)  HR: 81 (2022 09:17) (72 - 86)  BP: 122/57 (2022 09:17) (98/50 - 122/57)  BP(mean): --  RR: 18 (2022 09:17) (18 - 19)  SpO2: 99% (2022 09:17) (97% - 100%)    Physical Exam:  General:    NAD, non toxic  Cardio:    regular S1,S2  Respiratory:   trach  abd:   soft, BS +, not tender  :     no CVAT, no suprapubic tenderness, no mc  Musculoskeletal : no joint swelling, no edema  Skin:    no rash  vascular: no phlebitis                            8.5    9.89  )-----------( 220      ( 2022 07:40 )             27.5       04-05    132<L>  |  98  |  18  ----------------------------<  120<H>  4.4   |  24  |  0.54    Ca    8.7      2022 07:40  Phos  4.0     04-05  Mg     2.10     -    TPro  5.7<L>  /  Alb  2.9<L>  /  TBili  0.3  /  DBili  x   /  AST  68<H>  /  ALT  68<H>  /  AlkPhos  105  04-05      Urinalysis Basic - ( 2022 01:59 )    Color: Light Yellow / Appearance: Clear / S.012 / pH: x  Gluc: x / Ketone: Negative  / Bili: Negative / Urobili: <2 mg/dL   Blood: x / Protein: Trace / Nitrite: Negative   Leuk Esterase: Negative / RBC: 6 /HPF / WBC 1 /HPF   Sq Epi: x / Non Sq Epi: 1 /HPF / Bacteria: Negative        MICROBIOLOGY:  v  .Blood Blood-Peripheral  22   Growth in anaerobic bottle: Gram Negative Rods  --  Blood Culture PCR      .Blood Blood-Peripheral  22   No Growth Final  --  --      .Blood Blood-Peripheral  22   No Growth Final  --  --          Rapid RVP Result: NotDetec ( @ 02:02)        RADIOLOGY:  Images independently visualized and reviewed personally, findings as below  < from: CT Abdomen and Pelvis w/ IV Cont (22 @ 14:24) >    IMPRESSION:  Trace bilateral pleural effusions.    Improved aeration in the lower lobes bilaterally since 2022.   Nonspecific patchy bilateral groundglass and linear opacities are noted.    Enhancement of the wall of the cecum and ascending colon with mild   infiltration of the adjacent fat is similar in appearance to prior study   dated 3/27/2022. An inflammatory or infectious process is considered.    Dependent high attenuation within the urinary bladder, which may   represent small stones and/or debris. An underlying mass cannot be   excluded.    Thick-walled collection in the cul-de-sac and small collections along the   right paracolic gutter, not significantly changed since 3/27/2022.    < end of copied text >   none

## 2022-04-05 NOTE — PROGRESS NOTE ADULT - ASSESSMENT
81 f with HTN, HLD, Hypothyroidism, Depression, seizure d/o, RLE DVT, Cholelithiasis s/p recent Hospitalization at [facility] 12/22 -> 2/11/22 for acute perforated diverticulitis s/p Rosales 12/25, colostomy bag 12/26, hospitalization c/b abd abscess s/p IR drainage 1/3 Cx with E.coli & bacteroides and candida albicans,  trach 1/7, PEG, Acute left MCA infarct, aspiration pneumonia, sent 3/27 from rehab for dislodged PEG, initially afebrile, normal WBc, negative blood and urine cx  CT 3/27: PEG tube removed/dislodged, no evidence for pneumoperitoneum, 3.5 cm thick-walled fluid collection in the pelvic cul-de-sac, Interval slight improvement in additional small loculated fluid   collections, including significant improvement in inflammatory changes in RLQ and pelvis status post removal of surgical drain. Ill-defined hyperdensity/enhancing nodularity along the right posterior   bladder lumen, cannot exclude a neoplastic process  s/p PEG placement by IR 3/30  on 4/4 pt spiked to 102.4 with tachycardia and new leukocytosis to 16    recent perforated diverticulitis s/p huitron, c/b abd abscess s/p IR drainage, cx with E-coli, bacteroides and candida now admitted for PEG dislodgement s/p IR placement 3/30 now with new fever, tachycardia, leukocytosis, sepsis due to KPC klebsiella bacteremia  CT: Dependent high attenuation within the urinary bladder, which may represent small stones and/or debris. An underlying mass cannot be excluded. Thick-walled collection in the cul-de-sac and small collections along the right paracolic gutter, not significantly changed since 3/27/2022    * discontinue vanco and zosyn, start vabomere  * repeat blood cx  * IR eval to see if collections are drainable    The above assessment and plan was discussed with the primary team    Ivy Rose MD  contact on teams  After 5pm and on weekends call [phone]

## 2022-04-05 NOTE — PHARMACOTHERAPY INTERVENTION NOTE - COMMENTS
81 F with KPC Kleb bacteremia (detected by PCR).    Recommendation(s):  1) Consider changing pip/tazo to meropenem/vaborbactam (Vabomere) 4 g IV q8h.    Rasta Adames, PharmD, [facility]  Clinical Pharmacy Specialist, Infectious Diseases  Spectra extension [phone]  .

## 2022-04-05 NOTE — CONSULT NOTE ADULT - SUBJECTIVE AND OBJECTIVE BOX
Interventional Radiology    HPI: 81 M with recent perforated diverticulitis s/p huitron, c/b abd abscess s/p IR drainage, cx with E-coli, bacteroides and candida now admitted for PEG dislodgement s/p IR placement 3/30 now with new fever, tachycardia, leukocytosis, sepsis due to KPC klebsiella bacteremia    Allergies:   Medications (Abx/Cardiac/Anticoagulation/Blood Products)    amLODIPine   Tablet: 10 milliGRAM(s) Oral (04-05 @ 05:48)  apixaban: 5 milliGRAM(s) Oral (04-05 @ 05:48)  aspirin  chewable: 81 milliGRAM(s) Enteral Tube (04-05 @ 11:09)  carvedilol: 3.125 milliGRAM(s) Oral (04-05 @ 05:48)  clopidogrel Tablet: 75 milliGRAM(s) Enteral Tube (04-05 @ 11:09)  meropenem/vaborbactam IVPB: 83.33 mL/Hr IV Intermittent (04-05 @ 11:08)  piperacillin/tazobactam IVPB.: 200 mL/Hr IV Intermittent (04-04 @ 00:31)  piperacillin/tazobactam IVPB..: 25 mL/Hr IV Intermittent (04-05 @ 07:06)  vancomycin  IVPB: 250 mL/Hr IV Intermittent (04-04 @ 14:40)    Data:    T(C): 36.7  HR: 82  BP: 129/59  RR: 18  SpO2: 100%    -WBC 9.89 / HgB 8.5 / Hct 27.5 / Plt 220  -Na 132 / Cl 98 / BUN 18 / Glucose 120  -K 4.4 / CO2 24 / Cr 0.54  -ALT 68 / Alk Phos 105 / T.Bili 0.3  -INR 1.55 / PTT 28.7      Imaging: [email or web address].     -----------------------------------------------------------------------------------------------------------------------------------------------------------------------    Assessment/Plan: 81 M with recent perforated diverticulitis s/p huitron, c/b abd abscess s/p IR drainage, cx with E-coli, bacteroides and candida now admitted for PEG dislodgement s/p IR placement 3/30 now with new fever, tachycardia, leukocytosis, sepsis due to KPC klebsiella bacteremia    -- Imaging reviewed and case discussed with attending. Abdominal collections are too small to drain.    Thank You for the consultation.      Allen Dewitt D.O.  Radiology Resident (PGY-2)   IR Pager #[phone]

## 2022-04-05 NOTE — PROGRESS NOTE ADULT - ASSESSMENT
82 yo F w/ PMHx of perforated diverticulitis s/p Rosales procedure, colostomy, abdominal abscess with wound s/p IR drainage, Respiratory Failure s/p Trache, CVA w/ functional quadriplegia, aphasia, dysphagia s/p PEG (on Jevity 1.5 @ 70cchr x 18 hrs, & water flushes 325ml q 6 hrs, Afib, GIB, HTN, HLD, Seizure d/o, Hypothyroidism, Glaucoma, Depression p/w PEG tube dislodgement for unknown amount of time     Problem/Plan - 1:  ·  Problem: Sepsis sec to KPC K Pneumoniae Bacteremia .   ·  Plan:  IV Abxs per ID .   ID help appreciated.   < from: CT Abdomen and Pelvis w/ IV Cont (04.04.22 @ 14:24) >  IMPRESSION:  Trace bilateral pleural effusions.    Improved aeration in the lower lobes bilaterally since 1/30/2022.   Nonspecific patchy bilateral groundglass and linear opacities are noted.    Enhancement of the wall of the cecum and ascending colon with mild   infiltration of the adjacent fat is similar in appearance to prior study   dated 3/27/2022. An inflammatory or infectious process is considered.    Dependent high attenuation within the urinary bladder, which may   represent small stones and/or debris. An underlying mass cannot be   excluded.    Thick-walled collection in the cul-de-sac and small collections along the   right paracolic gutter, not significantly changed since 3/27/2022.    < end of copied text >     Problem/Plan - 2:  ·  Problem: Metabolic Encephalopathy    ·  Plan: Infection VS Seizure . Will consult Neurology and get EEG .     Problem/Plan - 3:  ·  Problem: Stroke.   ·  Plan: Resume ASA, Plavix, statin as PEG tube replaced.     Problem/Plan - 4:  ·  Problem: Chronic respiratory failure with hypoxia.   ·  Plan: Pt on 2l NC via Trache collar.     Problem/Plan - 5:  ·  Problem: Diverticulitis of intestine with perforation and abscess without bleeding.   ·  Plan: Surgery c/s in chart- small fluid collection but nontoxic, if drainage needed would be done by IR.  Wound care eval.     Problem/Plan - 6:  ·  Problem: History of atrial fibrillation.   ·  Plan: Cards helping and now on AC .   D/W Son and okay to start.      Problem/Plan - 7:  ·  Problem: Seizure disorder.   ·  Plan: continue levetiracetam bid.     Problem/Plan - 8:  ·  Problem: DVT, lower extremity.   ·  Plan: ?Subacute vs Chronic, not started on A/C as per chart due to recent GIB.     Problem/Plan - 9:  ·  Problem: HTN (hypertension).   ·  Plan: Resuming carvedilol & amlodipine as PEG replaced.     Problem/Plan - 10:  ·  Problem: Hypothyroidism.   ·  Plan; Resume Levothyroxine when PEG replaced.     Problem/Plan - 11:  ·  Problem: Glaucoma.   ·  Plan: continue Timolol, Dorzolamide, Latanoprost.      Problem/Plan - 12:  ·  Problem: PEG tube malfunction.   ·  Plan:  S/P  PEG . TF started.      Problem/Plan - 13:  ·  Problem: Hypokalemia.   ·  Plan: Corrected.     Disposition : DC  planning pending above.   D/W Son in great detail.

## 2022-04-05 NOTE — CONSULT NOTE ADULT - SUBJECTIVE AND OBJECTIVE BOX
Chief Complaint:  Patient is a 81y old  Female who presents with a chief complaint of   Seizure    HTN (hypertension)    Glaucoma    Thyroid disease    Blood clot of neck vein    TIA (transient ischemic attack)    Hypothyroidism    Stroke    Chronic respiratory failure with hypoxia and hypercapnia    Atrial fibrillation    MDD (major depressive disorder)    Gastritis    S/P appendectomy    Status post Rosales procedure    S/P colostomy    S/P percutaneous endoscopic gastrostomy (PEG) tube placement    Status post tracheostomy       HPI:  80 yo F with PMHx HTN, HLD, Hypothyroidism, Depression, seizure d/o, RLE DVT, Cholelithiasis s/p recent Hospitalization in [facility] from  -> 22 for acute perforated diverticulitis s/p Rosales , colostomy bag , hospitalization c/b IR drainage for abdominal abscess 1/3 Cx grew e.coli & bacteriodes, also required Mechanical Ventilation for Acute Respiratory Failure with Tracheostomy done .  PEG done , developed bleeding through ostomy for which she was transfused , EGD on  showed gastritis. Developed fevers on  and CT's showed Acute left MCA infarct- could be 2/2 to carotid occlusion vs afib, new RLL aspiration pneumonia, seen by ID, completed antibiotics, seen by VascSx, not surgical candidate. Pt aphasic sent from [facility] for dislodged Peg tube, after staff noted pt has blood to her abdomen.  (27 Mar 2022 12:33) GI Consulted for abnormal CT      No Known Allergies      amLODIPine   Tablet 10 milliGRAM(s) Oral daily  apixaban 5 milliGRAM(s) Oral every 12 hours  aspirin  chewable 81 milliGRAM(s) Enteral Tube daily  atorvastatin 40 milliGRAM(s) Oral at bedtime  carvedilol 3.125 milliGRAM(s) Oral every 12 hours  clopidogrel Tablet 75 milliGRAM(s) Enteral Tube daily  dorzolamide 2% Ophthalmic Solution 1 Drop(s) Both EYES <User Schedule>  influenza  Vaccine (HIGH DOSE) 0.7 milliLiter(s) IntraMuscular once  lactated ringers. 1000 milliLiter(s) IV Continuous <Continuous>  latanoprost 0.005% Ophthalmic Solution 1 Drop(s) Both EYES at bedtime  levETIRAcetam  IVPB 500 milliGRAM(s) IV Intermittent every 12 hours  levothyroxine 75 MICROGram(s) Oral daily  meropenem/vaborbactam IVPB 4 Gram(s) IV Intermittent every 8 hours  timolol 0.5% Solution 1 Drop(s) Both EYES two times a day        FAMILY HISTORY:  FH: HTN (hypertension)          Review of Systems: *pt did not participate      Relevant Family History:   n/c    Relevant Social History: n/c      Physical Exam:    Vital Signs:  Vital Signs Last 24 Hrs  T(C): 36.8 (2022 09:17), Max: 37.3 (2022 17:43)  T(F): 98.3 (2022 09:17), Max: 99.2 (2022 17:43)  HR: 81 (:17) (72 - 86)  BP: 122/57 (2022 09:17) (98/50 - 122/57)  BP(mean): --  RR: 18 (2022 09:17) (18 - 19)  SpO2: 99% (:17) (97% - 100%)  Daily     Daily     General:  Appears stated age, well-groomed, nad  HEENT:  NC/AT,  conjunctivae clear and pink, no thyromegaly, nodules, adenopathy, no JVD  Chest:  Full & symmetric excursion, no increased effort, breath sounds clear  Cardiovascular:  Regular rhythm, S1, S2, no murmur/rub/S3/S4, no abdominal bruit, no edema  Abdomen:  Soft, non-tender, non-distended, normoactive bowel sounds,  no masses ,no hepatosplenomeagaly, no signs of chronic liver disease  Extremities:  no cyanosis,clubbing or edema  Skin:  No rash/erythema/ecchymoses/petechiae/wounds/abscess/warm/dry  Neuro/Psych:  A&O  , no asterixis, no tremor, no encephalopathy    Laboratory:                            8.5    9.89  )-----------( 220      ( 2022 07:40 )             27.5     04-05    132<L>  |  98  |  18  ----------------------------<  120<H>  4.4   |  24  |  0.54    Ca    8.7      2022 07:40  Phos  4.0     04-05  Mg     2.10     04-05    TPro  5.7<L>  /  Alb  2.9<L>  /  TBili  0.3  /  DBili  x   /  AST  68<H>  /  ALT  68<H>  /  AlkPhos  105  04-05    LIVER FUNCTIONS - ( 2022 07:40 )  Alb: 2.9 g/dL / Pro: 5.7 g/dL / ALK PHOS: 105 U/L / ALT: 68 U/L / AST: 68 U/L / GGT: x           PT/INR - ( 2022 13:46 )   PT: 18.1 sec;   INR: 1.55 ratio         PTT - ( 2022 13:46 )  PTT:28.7 sec  Urinalysis Basic - ( 2022 01:59 )    Color: Light Yellow / Appearance: Clear / S.012 / pH: x  Gluc: x / Ketone: Negative  / Bili: Negative / Urobili: <2 mg/dL   Blood: x / Protein: Trace / Nitrite: Negative   Leuk Esterase: Negative / RBC: 6 /HPF / WBC 1 /HPF   Sq Epi: x / Non Sq Epi: 1 /HPF / Bacteria: Negative      Amylase Serum--      Lipase serum--       [id number]    Imaging:    < from: CT Abdomen and Pelvis w/ IV Cont (22 @ 14:24) >    ACC: [id number] EXAM:  CT ABDOMEN AND PELVIS IC                        ACC: [id number] EXAM:  CT CHEST IC                          PROCEDURE DATE:  2022          INTERPRETATION:  CLINICAL INFORMATION: Fever and leukocytosis.    COMPARISON: Prior CT abdomen and pelvis dated 3/27/2022 and chest,   abdomen, and pelvis CT dated 2022.    CONTRAST/COMPLICATIONS:  IV Contrast: Omnipaque 350  90 cc administered   10 cc discarded  Oral Contrast: NONE  Complications: None reported at time of studycompletion    PROCEDURE:  CT of the Chest, Abdomen and Pelvis was performed.  Sagittal and coronal reformats were performed.    FINDINGS:  CHEST:  LUNGS AND LARGE AIRWAYS: Tracheostomy tube. Bibasilar atelectasis. Patchy   bilateral groundglass and linear opacities are again noted bilaterally.   Improved aeration is noted in the lower lobes bilaterally since 2022.  PLEURA: Trace bilateral pleural effusions.  VESSELS: Atherosclerotic changes of the aorta and coronary arteries.  HEART: Heart size is normal. No pericardial effusion.  MEDIASTINUM AND SARAH: No lymphadenopathy.  CHEST WALL AND LOWER NECK: Within normal limits.    ABDOMEN AND PELVIS:  LIVER: Within normal limits.  BILE DUCTS: Normal caliber.  GALLBLADDER: Gallbladder is mildly distended with cholelithiasis.  SPLEEN: Within normal limits.  PANCREAS: Within normal limits.  ADRENALS: Within normal limits.  KIDNEYS/URETERS: No hydronephrosis. Lobulated contour of the kidneys is   again noted bilaterally.    BLADDER: Again noted, is deep tendon high attenuation within the urinary   bladder, which may be secondary to small stones and/or debris. An   underlying mass cannot be excluded.  REPRODUCTIVE ORGANS: Uterus and adnexa are within normal limits.    BOWEL: No bowel obstruction. There is enhancement of the wall of the   cecum and ascending colon, with mild infiltration of the adjacent fat in   the right lower quadrant (4:116), similar in appearance to the prior   study dated 3/27/2022. Gastrostomy tube has been replaced, within the   stomach. Small hiatal hernia. Status post Varela's procedure with left   lower quadrant colostomy. Colonic diverticulosis.  PERITONEUM: No ascites. Again noted, is a thick-walled collection in the   cul-de-sac, measuring 3.2 x 2.1 cm (4:137), not significantly changed   since 3/27/2022.   Small collections along the right paracolic gutter are   also unchanged.  VESSELS: Atherosclerotic changes. Retroaortic left renal vein, a normal   anatomic variant.  RETROPERITONEUM/LYMPH NODES: No lymphadenopathy.  ABDOMINAL WALL: Postoperative changes in the anterior abdominal wall.   Left lower quadrant colostomy. Gastrostomy tube.  BONES: Degenerative changes. Grade 1 anterolisthesis of L3 with respect   to L4.    IMPRESSION:  Trace bilateral pleural effusions.    Improved aeration in the lower lobes bilaterally since 2022.   Nonspecific patchy bilateral groundglass and linear opacities are noted.    Enhancement of the wall of the cecum and ascending colon with mild   infiltration of the adjacent fat is similar in appearance to prior study   dated 3/27/2022. An inflammatory or infectious process is considered.    Dependent high attenuation within the urinary bladder, which may   represent small stones and/or debris. An underlying mass cannot be   excluded.    Thick-walled collection in the cul-de-sac and small collections along the   right paracolic gutter, not significantly changed since 3/27/2022.        --- End of Report ---            TENA HOOKER MD; Attending Radiologist  This document has been electronically signed. 2022  3:45PM    < end of copied text >

## 2022-04-06 LAB
-  AMIKACIN: SIGNIFICANT CHANGE UP
-  AMPICILLIN/SULBACTAM: SIGNIFICANT CHANGE UP
-  AMPICILLIN: SIGNIFICANT CHANGE UP
-  AZTREONAM: SIGNIFICANT CHANGE UP
-  CEFAZOLIN: SIGNIFICANT CHANGE UP
-  CEFEPIME: SIGNIFICANT CHANGE UP
-  CEFTAZIDIME/AVIBACTAM: SIGNIFICANT CHANGE UP
-  CEFTOLOZANE/TAZOBACTAM: SIGNIFICANT CHANGE UP
-  CEFTRIAXONE: SIGNIFICANT CHANGE UP
-  CIPROFLOXACIN: SIGNIFICANT CHANGE UP
-  ERTAPENEM: SIGNIFICANT CHANGE UP
-  GENTAMICIN: SIGNIFICANT CHANGE UP
-  IMIPENEM: SIGNIFICANT CHANGE UP
-  LEVOFLOXACIN: SIGNIFICANT CHANGE UP
-  MEROPENEM: SIGNIFICANT CHANGE UP
-  PIPERACILLIN/TAZOBACTAM: SIGNIFICANT CHANGE UP
-  TIGECYCLINE: SIGNIFICANT CHANGE UP
-  TOBRAMYCIN: SIGNIFICANT CHANGE UP
-  TRIMETHOPRIM/SULFAMETHOXAZOLE: SIGNIFICANT CHANGE UP
ANION GAP SERPL CALC-SCNC: 13 MMOL/L — SIGNIFICANT CHANGE UP (ref 7–14)
BUN SERPL-MCNC: 16 MG/DL — SIGNIFICANT CHANGE UP (ref 7–23)
CALCIUM SERPL-MCNC: 8.7 MG/DL — SIGNIFICANT CHANGE UP (ref 8.4–10.5)
CHLORIDE SERPL-SCNC: 101 MMOL/L — SIGNIFICANT CHANGE UP (ref 98–107)
CO2 SERPL-SCNC: 24 MMOL/L — SIGNIFICANT CHANGE UP (ref 22–31)
CREAT SERPL-MCNC: 0.44 MG/DL — LOW (ref 0.5–1.3)
CULTURE RESULTS: SIGNIFICANT CHANGE UP
CULTURE RESULTS: SIGNIFICANT CHANGE UP
EGFR: 98 ML/MIN/1.73M2 — SIGNIFICANT CHANGE UP
GLUCOSE SERPL-MCNC: 133 MG/DL — HIGH (ref 70–99)
HCT VFR BLD CALC: 32.1 % — LOW (ref 34.5–45)
HGB BLD-MCNC: 9.8 G/DL — LOW (ref 11.5–15.5)
MAGNESIUM SERPL-MCNC: 1.9 MG/DL — SIGNIFICANT CHANGE UP (ref 1.6–2.6)
MCHC RBC-ENTMCNC: 27.5 PG — SIGNIFICANT CHANGE UP (ref 27–34)
MCHC RBC-ENTMCNC: 30.5 GM/DL — LOW (ref 32–36)
MCV RBC AUTO: 90.2 FL — SIGNIFICANT CHANGE UP (ref 80–100)
METHOD TYPE: SIGNIFICANT CHANGE UP
NRBC # BLD: 0 /100 WBCS — SIGNIFICANT CHANGE UP
NRBC # FLD: 0 K/UL — SIGNIFICANT CHANGE UP
ORGANISM # SPEC MICROSCOPIC CNT: SIGNIFICANT CHANGE UP
PHOSPHATE SERPL-MCNC: 3.6 MG/DL — SIGNIFICANT CHANGE UP (ref 2.5–4.5)
PLATELET # BLD AUTO: 253 K/UL — SIGNIFICANT CHANGE UP (ref 150–400)
POTASSIUM SERPL-MCNC: 4.2 MMOL/L — SIGNIFICANT CHANGE UP (ref 3.5–5.3)
POTASSIUM SERPL-SCNC: 4.2 MMOL/L — SIGNIFICANT CHANGE UP (ref 3.5–5.3)
RBC # BLD: 3.56 M/UL — LOW (ref 3.8–5.2)
RBC # FLD: 16.5 % — HIGH (ref 10.3–14.5)
SODIUM SERPL-SCNC: 138 MMOL/L — SIGNIFICANT CHANGE UP (ref 135–145)
SPECIMEN SOURCE: SIGNIFICANT CHANGE UP
SPECIMEN SOURCE: SIGNIFICANT CHANGE UP
WBC # BLD: 8.51 K/UL — SIGNIFICANT CHANGE UP (ref 3.8–10.5)
WBC # FLD AUTO: 8.51 K/UL — SIGNIFICANT CHANGE UP (ref 3.8–10.5)

## 2022-04-06 PROCEDURE — 99232 SBSQ HOSP IP/OBS MODERATE 35: CPT

## 2022-04-06 PROCEDURE — 31502 CHANGE OF WINDPIPE AIRWAY: CPT

## 2022-04-06 RX ADMIN — Medication 1 DROP(S): at 05:25

## 2022-04-06 RX ADMIN — Medication 81 MILLIGRAM(S): at 11:33

## 2022-04-06 RX ADMIN — PANTOPRAZOLE SODIUM 40 MILLIGRAM(S): 20 TABLET, DELAYED RELEASE ORAL at 15:06

## 2022-04-06 RX ADMIN — DORZOLAMIDE HYDROCHLORIDE 1 DROP(S): 20 SOLUTION/ DROPS OPHTHALMIC at 18:47

## 2022-04-06 RX ADMIN — APIXABAN 5 MILLIGRAM(S): 2.5 TABLET, FILM COATED ORAL at 05:26

## 2022-04-06 RX ADMIN — APIXABAN 5 MILLIGRAM(S): 2.5 TABLET, FILM COATED ORAL at 17:54

## 2022-04-06 RX ADMIN — Medication 75 MICROGRAM(S): at 05:25

## 2022-04-06 RX ADMIN — AMLODIPINE BESYLATE 10 MILLIGRAM(S): 2.5 TABLET ORAL at 05:25

## 2022-04-06 RX ADMIN — MEROPENEM-VABORBACTAM 83.33 GRAM(S): 1; 1 INJECTION, POWDER, FOR SOLUTION INTRAVENOUS at 05:25

## 2022-04-06 RX ADMIN — CLOPIDOGREL BISULFATE 75 MILLIGRAM(S): 75 TABLET, FILM COATED ORAL at 11:32

## 2022-04-06 RX ADMIN — DORZOLAMIDE HYDROCHLORIDE 1 DROP(S): 20 SOLUTION/ DROPS OPHTHALMIC at 09:16

## 2022-04-06 RX ADMIN — ATORVASTATIN CALCIUM 40 MILLIGRAM(S): 80 TABLET, FILM COATED ORAL at 21:58

## 2022-04-06 RX ADMIN — Medication 1 DROP(S): at 17:55

## 2022-04-06 RX ADMIN — CARVEDILOL PHOSPHATE 3.12 MILLIGRAM(S): 80 CAPSULE, EXTENDED RELEASE ORAL at 05:26

## 2022-04-06 RX ADMIN — CARVEDILOL PHOSPHATE 3.12 MILLIGRAM(S): 80 CAPSULE, EXTENDED RELEASE ORAL at 17:55

## 2022-04-06 RX ADMIN — MEROPENEM-VABORBACTAM 83.33 GRAM(S): 1; 1 INJECTION, POWDER, FOR SOLUTION INTRAVENOUS at 15:06

## 2022-04-06 RX ADMIN — LEVETIRACETAM 400 MILLIGRAM(S): 250 TABLET, FILM COATED ORAL at 21:57

## 2022-04-06 RX ADMIN — LEVETIRACETAM 400 MILLIGRAM(S): 250 TABLET, FILM COATED ORAL at 10:16

## 2022-04-06 RX ADMIN — MEROPENEM-VABORBACTAM 83.33 GRAM(S): 1; 1 INJECTION, POWDER, FOR SOLUTION INTRAVENOUS at 22:30

## 2022-04-06 RX ADMIN — LATANOPROST 1 DROP(S): 0.05 SOLUTION/ DROPS OPHTHALMIC; TOPICAL at 21:58

## 2022-04-06 NOTE — PROGRESS NOTE ADULT - SUBJECTIVE AND OBJECTIVE BOX
Date of Service  : 04-06-22     INTERVAL HPI/OVERNIGHT EVENTS: seen and examined earlier today . Had Dislodged trach +  Vital Signs Last 24 Hrs  T(C): 36.4 (06 Apr 2022 18:14), Max: 37.1 (06 Apr 2022 01:35)  T(F): 97.5 (06 Apr 2022 18:14), Max: 98.7 (06 Apr 2022 01:35)  HR: 85 (06 Apr 2022 18:14) (65 - 85)  BP: 142/68 (06 Apr 2022 18:14) (126/56 - 142/68)  BP(mean): --  RR: 18 (06 Apr 2022 18:14) (18 - 18)  SpO2: 98% (06 Apr 2022 18:14) (98% - 100%)  I&O's Summary    05 Apr 2022 07:01  -  06 Apr 2022 07:00  --------------------------------------------------------  IN: 3330 mL / OUT: 1900 mL / NET: 1430 mL    06 Apr 2022 07:01  -  06 Apr 2022 18:31  --------------------------------------------------------  IN: 1215 mL / OUT: 1000 mL / NET: 215 mL      MEDICATIONS  (STANDING):  amLODIPine   Tablet 10 milliGRAM(s) Oral daily  apixaban 5 milliGRAM(s) Oral every 12 hours  aspirin  chewable 81 milliGRAM(s) Enteral Tube daily  atorvastatin 40 milliGRAM(s) Oral at bedtime  carvedilol 3.125 milliGRAM(s) Oral every 12 hours  clopidogrel Tablet 75 milliGRAM(s) Enteral Tube daily  dorzolamide 2% Ophthalmic Solution 1 Drop(s) Both EYES <User Schedule>  influenza  Vaccine (HIGH DOSE) 0.7 milliLiter(s) IntraMuscular once  lactated ringers. 1000 milliLiter(s) (75 mL/Hr) IV Continuous <Continuous>  latanoprost 0.005% Ophthalmic Solution 1 Drop(s) Both EYES at bedtime  levETIRAcetam  IVPB 500 milliGRAM(s) IV Intermittent every 12 hours  levothyroxine 75 MICROGram(s) Oral daily  meropenem/vaborbactam IVPB 4 Gram(s) IV Intermittent every 8 hours  pantoprazole   Suspension 40 milliGRAM(s) Oral daily  timolol 0.5% Solution 1 Drop(s) Both EYES two times a day    MEDICATIONS  (PRN):  acetaminophen     Tablet .. 650 milliGRAM(s) Oral every 6 hours PRN Mild Pain (1 - 3), Moderate Pain (4 - 6)    LABS:                        9.8    8.51  )-----------( 253      ( 06 Apr 2022 07:18 )             32.1     04-06    138  |  101  |  16  ----------------------------<  133<H>  4.2   |  24  |  0.44<L>    Ca    8.7      06 Apr 2022 07:18  Phos  3.6     04-06  Mg     1.90     04-06    TPro  5.7<L>  /  Alb  2.9<L>  /  TBili  0.3  /  DBili  x   /  AST  68<H>  /  ALT  68<H>  /  AlkPhos  105  04-05        CAPILLARY BLOOD GLUCOSE                  Consultant(s) Notes Reviewed:  [x ] YES  [ ] NO    PHYSICAL EXAM:  GENERAL: NAD,,not in any distress ,  HEAD:  Atraumatic, Normocephalic  NECK: Trach+  NERVOUS SYSTEM:  Alert   CHEST/LUNG: Good air entry bilateral with no  rales, rhonchi, wheezing, or rubs  HEART: Regular rate and rhythm; No murmurs, rubs, or gallops  ABDOMEN: Soft, Nontender, Nondistended; Bowel sounds present  EXTREMITIES:  2+ Peripheral Pulses, No clubbing, cyanosis, or edema    Care Discussed with Consultants/Other Providers [ x] YES  [ ] NO

## 2022-04-06 NOTE — PROGRESS NOTE ADULT - ASSESSMENT
81F with HTN, HLD, Hypothyroidism, Depression, seizure d/o, RLE DVT, Cholelithiasis s/p recent Hospitalization at [facility] 12/22 -> 2/11/22 for acute perforated diverticulitis s/p Utan 12/25, colostomy bag 12/26, hospitalization c/b abd abscess s/p IR drainage 1/3 Cx with E.coli & bacteroides and candida albicans,  trach 1/7, PEG, Acute left MCA infarct, aspiration pneumonia, sent 3/27 from rehab for dislodged PEG, initially afebrile, normal WBc, negative blood and urine cx.    CT 3/27: PEG tube removed/dislodged, no evidence for pneumoperitoneum, 3.5 cm thick-walled fluid collection in the pelvic cul-de-sac, Interval slight improvement in additional small loculated fluid   collections, including significant improvement in inflammatory changes in RLQ and pelvis status post removal of surgical drain. Ill-defined hyperdensity/enhancing nodularity along the right posterior   bladder lumen, cannot exclude a neoplastic process  s/p PEG placement by IR 3/30  on 4/4 pt spiked to 102.4 with tachycardia and new leukocytosis to 16    Recent perforated diverticulitis s/p tuan procedure, c/b abd abscess s/p IR drainage, cx with E-coli, bacteroides and candida now admitted for PEG dislodgement s/p IR placement 3/30 now with new fever, tachycardia, leukocytosis, sepsis due to KPC klebsiella bacteremia    CT: Dependent high attenuation within the urinary bladder, which may represent small stones and/or debris. An underlying mass cannot be excluded. Thick-walled collection in the cul-de-sac and small collections along the right paracolic gutter, not significantly changed since 3/27/2022    * continue vabomere  * f/u repeat blood cx  * IR eval noted, not amenable to drainage

## 2022-04-06 NOTE — PROGRESS NOTE ADULT - SUBJECTIVE AND OBJECTIVE BOX
called to see patient admitted to the medicine service after tracheostomy tube dislodged. Patient had 6 cuffed that was out of the stoma with a loose neck tie. Unable to replace 6 LPC with obturator and jel. Downsized to a 4 LPC and inflated cuff after patient had increased bleeding from the stoma and coughing. Bleeding slowed and then stopped. Asked RN to keep cuff inflated for 2 hours and then deflate if there was no additional bleeding.

## 2022-04-06 NOTE — PROVIDER CONTACT NOTE (OTHER) - ASSESSMENT
Pt trach is dislodged, SHERI Allen was in pt's room and tried to reinsert trach it was unsuccessful, and ENT was called to the bedside. Pt trach is dislodged, SHERI Allen was in pt's room and tried to reinsert trach it was unsuccessful, and ENT was called to the bedside. Blood tinged secretions noted.

## 2022-04-06 NOTE — PROGRESS NOTE ADULT - SUBJECTIVE AND OBJECTIVE BOX
INTERVAL HPI/OVERNIGHT EVENTS:    family bedside this morning   without new gi events   resting comfortably; pain controlled this am    MEDICATIONS  (STANDING):  amLODIPine   Tablet 10 milliGRAM(s) Oral daily  apixaban 5 milliGRAM(s) Oral every 12 hours  aspirin  chewable 81 milliGRAM(s) Enteral Tube daily  atorvastatin 40 milliGRAM(s) Oral at bedtime  carvedilol 3.125 milliGRAM(s) Oral every 12 hours  clopidogrel Tablet 75 milliGRAM(s) Enteral Tube daily  dorzolamide 2% Ophthalmic Solution 1 Drop(s) Both EYES <User Schedule>  influenza  Vaccine (HIGH DOSE) 0.7 milliLiter(s) IntraMuscular once  lactated ringers. 1000 milliLiter(s) (75 mL/Hr) IV Continuous <Continuous>  latanoprost 0.005% Ophthalmic Solution 1 Drop(s) Both EYES at bedtime  levETIRAcetam  IVPB 500 milliGRAM(s) IV Intermittent every 12 hours  levothyroxine 75 MICROGram(s) Oral daily  meropenem/vaborbactam IVPB 4 Gram(s) IV Intermittent every 8 hours  pantoprazole   Suspension 40 milliGRAM(s) Oral daily  timolol 0.5% Solution 1 Drop(s) Both EYES two times a day    MEDICATIONS  (PRN):  acetaminophen     Tablet .. 650 milliGRAM(s) Oral every 6 hours PRN Mild Pain (1 - 3), Moderate Pain (4 - 6)      Allergies    No Known Allergies    Intolerances        Review of Systems:    General:  No wt loss, fevers, chills, night sweats, fatigue   Eyes:  Good vision, no reported pain  ENT:  No sore throat, pain, runny nose, dysphagia  CV:  No pain, palpitations, hypo/hypertension  Resp:  No dyspnea, cough, tachypnea, wheezing  GI:  No pain, No nausea, No vomiting, No diarrhea, No constipation, No weight loss, No fever, No pruritis, No rectal bleeding, No melena, No dysphagia  :  No pain, bleeding, incontinence, nocturia  Muscle:  No pain, weakness  Neuro:  No weakness, tingling, memory problems  Psych:  No fatigue, insomnia, mood problems, depression  Endocrine:  No polyuria, polydypsia, cold/heat intolerance  Heme:  No petechiae, ecchymosis, easy bruisability  Skin:  No rash, tattoos, scars, edema      Vital Signs Last 24 Hrs  T(C): 36.9 (06 Apr 2022 06:10), Max: 37.1 (06 Apr 2022 01:35)  T(F): 98.4 (06 Apr 2022 06:10), Max: 98.7 (06 Apr 2022 01:35)  HR: 72 (06 Apr 2022 09:19) (65 - 85)  BP: 139/64 (06 Apr 2022 06:10) (126/56 - 139/64)  BP(mean): --  RR: 18 (06 Apr 2022 09:38) (18 - 18)  SpO2: 99% (06 Apr 2022 09:38) (98% - 100%)    PHYSICAL EXAM:    Constitutional: NAD  HEENT: EOMI, throat clear  Neck: No LAD, supple  +trach  Respiratory: CTA and P  Cardiovascular: S1 and S2, RRR, no M  Gastrointestinal: BS+, soft, NT/ND, neg HSM, +peg  Extremities: No peripheral edema, neg clubbing, cyanosis  Vascular: 2+ peripheral pulses  Neurological: A/O x 3, no focal deficits  Psychiatric: Normal mood, normal affect  Skin: No rashes      LABS:                        9.8    8.51  )-----------( 253      ( 06 Apr 2022 07:18 )             32.1     04-06    138  |  101  |  16  ----------------------------<  133<H>  4.2   |  24  |  0.44<L>    Ca    8.7      06 Apr 2022 07:18  Phos  3.6     04-06  Mg     1.90     04-06    TPro  5.7<L>  /  Alb  2.9<L>  /  TBili  0.3  /  DBili  x   /  AST  68<H>  /  ALT  68<H>  /  AlkPhos  105  04-05          RADIOLOGY & ADDITIONAL TESTS:

## 2022-04-06 NOTE — PROGRESS NOTE ADULT - SUBJECTIVE AND OBJECTIVE BOX
Follow Up:  sepsis    Interval History:  no further fever.  awake.  denies having any pain.  ROS otherwise negative    Allergies  No Known Allergies    ANTIMICROBIALS:    meropenem/vaborbactam IVPB 4 every 8 hours    MEDICATIONS  (STANDING):  amLODIPine   Tablet 10 daily  apixaban 5 every 12 hours  aspirin  chewable 81 daily  atorvastatin 40 at bedtime  carvedilol 3.125 every 12 hours  clopidogrel Tablet 75 daily  levETIRAcetam  IVPB 500 every 12 hours  levothyroxine 75 daily  pantoprazole   Suspension 40 daily    Vital Signs Last 24 Hrs  T(F): 98.4 (22 @ 06:10), Max: 98.7 (22 @ 01:35)  HR: 72 (22 @ 09:19)  BP: 139/64 (22 @ 06:10)  RR: 18 (22 @ 09:38)  SpO2: 99% (22 @ 09:38) (98% - 100%)    PHYSICAL EXAM:  Constitutional: non-toxic  HEAD/EYES: anicteric  ENT:  supple  Cardiovascular:   normal S1, S2  Respiratory:  clear BS bilaterally  GI:  soft, non-tender, colostomy okay with brown stool  :  no mc  Musculoskeletal:  no synovitis  Neurologic: awake and alert, R weakness  Skin:  left forearm phlebitis, not suppurative  Psychiatric:  awake, alert, appropriate mood                        9.8    8.51  )-----------( 253      ( 2022 07:18 )             32.1 04-    138  |  101  |  16  ----------------------------<  133  4.2   |  24  |  0.44  Ca    8.7      2022 07:18Phos  3.6     04-Mg     1.90     -  TPro  5.7  /  Alb  2.9  /  TBili  0.3  /  DBili  x   /  AST  68  /  ALT  68  /  AlkPhos  105  -    WBC Count: 8.51 (22 @ 07:18)  WBC Count: 9.89 (22 @ 07:40)  WBC Count: 6.25 (22 @ 13:46)  WBC Count: 16.23 (22 @ 07:22)    Urinalysis Basic - ( 2022 01:59 )  Color: Light Yellow / Appearance: Clear / S.012 / pH: x  Gluc: x / Ketone: Negative  / Bili: Negative / Urobili: <2 mg/dL   Blood: x / Protein: Trace / Nitrite: Negative   Leuk Esterase: Negative / RBC: 6 /HPF / WBC 1 /HPF   Sq Epi: x / Non Sq Epi: 1 /HPF / Bacteria: Negative    MICROBIOLOGY:  / BC pending    Culture - Blood (collected 22 @ 06:35)  Source: .Blood Blood-Peripheral  Gram Stain (22 @ 18:10):    Growth in anaerobic bottle: Gram Negative Rods    Growth in aerobic bottle: Gram Negative Rods  Final Report (22 @ 11:25):    Growth in aerobic and anaerobic bottles: Klebsiella pneumoniae (Carbapenem Resistant)  Organism: Blood Culture PCR  Klepne MDRO (22 @ 11:25)  Organism: Klepne MDRO (22 @ 11:25)      -  Amikacin: I 32      -  Ampicillin: R >16 These ampicillin results predict results for amoxicillin      -  Ampicillin/Sulbactam: R >16/8 Enterobacter, Klebsiella aerogenes, Citrobacter, and Serratia may develop resistance during prolonged therapy (3-4 days)      -  Aztreonam: R >16      -  Cefazolin: R >16 Enterobacter, Klebsiella aerogenes, Citrobacter, and Serratia may develop resistance during prolonged therapy (3-4 days)      -  Cefepime: R >16      -  Ceftazidime/Avibactam: S <=4      -  Ceftolozane/tazobactam: R >8      -  Ceftriaxone: R >32 Enterobacter, Klebsiella aerogenes, Citrobacter, and Serratia may develop resistance during prolonged therapy      -  Ciprofloxacin: R >2      -  Ertapenem: R >1      -  Gentamicin: S <=2      -  Imipenem: R >8      -  Levofloxacin: R >4      -  Meropenem: R >8      -  Piperacillin/Tazobactam: R >64      -  Tigecycline: S <=2      -  Tobramycin: R >8      -  Trimethoprim/Sulfamethoxazole: R >2/38      Method Type: TONYA  Organism: Blood Culture PCR (22 @ 11:25)      -  Carbapenem Resistance: Detec      -  Klebsiella pneumoniae: Detec      -  Multidrug (KPC pos) resistant organism: Detec The KPC gene confers resistance to all penicillins, cephalosporins, carbapenems and aztreonam. Additional resistance to fluoroquinolones and aminoglycosides is likely.      Method Type: PCR    Culture - Blood (collected 22 @ 06:35)  Source: .Blood Blood-Peripheral  Gram Stain (22 @ 19:17):    Growth in anaerobic bottle: Gram Negative Rods  Preliminary Report (22 @ 21:28):    Growth in anaerobic bottle: Klebsiella pneumoniae    See previous culture [id number]    Rapid RVP Result: NotDetec ( @ 02:02)    RADIOLOGY:  Images independently visualized and reviewed personally, findings as below    CT Abdomen and Pelvis w/ IV Cont (22 @ 14:24) >  IMPRESSION:  Trace bilateral pleural effusions.  Improved aeration in the lower lobes bilaterally since 2022.  Nonspecific patchy bilateral groundglass and linear opacities are noted.  Enhancement of the wall of the cecum and ascending colon with mild   infiltration of the adjacent fat is similar in appearance to prior study dated 3/27/2022. An inflammatory or infectious process is considered.  Dependent high attenuation within the urinary bladder, which may represent small stones and/or debris. An underlying mass cannot be excluded.  Thick-walled collection in the cul-de-sac and small collections along the right paracolic gutter, not significantly changed since 3/27/2022.

## 2022-04-06 NOTE — PROGRESS NOTE ADULT - ASSESSMENT
82yo female with complicated medical course    Abnormal CT   agree with abx per ID   fluid collections too small for drainage; IR input appreciated  recent IR peg exchange 3/30; reconsult if issues  brown stool and tolerating PEG feeds  d/w attg, no acute gi interventions given multiple medical issues  continue w/supportive care     Sepsis   per primary team and ID     AFib   rate control   protonix daily while on a/c    I reviewed the overnight course of events on the unit, re-confirming the patient history. I discussed the care with the patient and their family. The plan of care was discussed with the physician assistant and modifications were made to the notation where appropriate. Differential diagnosis and plan of care discussed with patient after the evaluation. Advanced care planning was discussed with patient and family.  Advanced care planning forms were reviewed and discussed.  Risks, benefits and alternatives of gastroenterologic procedures were discussed in detail and all questions were answered. 35 minutes spent on total encounter of which more than fifty percent of the encounter was spent counseling and/or coordinating care by the attending physician.

## 2022-04-06 NOTE — PROGRESS NOTE ADULT - ASSESSMENT
82 yo F w/ PMHx of perforated diverticulitis s/p Rosales procedure, colostomy, abdominal abscess with wound s/p IR drainage, Respiratory Failure s/p Trache, CVA w/ functional quadriplegia, aphasia, dysphagia s/p PEG (on Jevity 1.5 @ 70cchr x 18 hrs, & water flushes 325ml q 6 hrs, Afib, GIB, HTN, HLD, Seizure d/o, Hypothyroidism, Glaucoma, Depression p/w PEG tube dislodgement for unknown amount of time     Problem/Plan - 1:  ·  Problem: Sepsis sec to [facility] K Pneumoniae Bacteremia .   ·  Plan:  IV Abxs per ID .   ID help appreciated.   < from: CT Abdomen and Pelvis w/ IV Cont (04.04.22 @ 14:24) >  IMPRESSION:  Trace bilateral pleural effusions.    Improved aeration in the lower lobes bilaterally since 1/30/2022.   Nonspecific patchy bilateral groundglass and linear opacities are noted.    Enhancement of the wall of the cecum and ascending colon with mild   infiltration of the adjacent fat is similar in appearance to prior study   dated 3/27/2022. An inflammatory or infectious process is considered.    Dependent high attenuation within the urinary bladder, which may   represent small stones and/or debris. An underlying mass cannot be   excluded.    Thick-walled collection in the cul-de-sac and small collections along the   right paracolic gutter, not significantly changed since 3/27/2022.    < end of copied text >     Problem/Plan - 2:  ·  Problem: Metabolic Encephalopathy    ·  Plan: Infection VS Seizure . Will consult Neurology and get EEG .     Problem/Plan - 3:  ·  Problem: Stroke.   ·  Plan: Resume ASA, Plavix, statin as PEG tube replaced.     Problem/Plan - 4:  ·  Problem: Chronic respiratory failure with hypoxia.   ·  Plan: Pt on 2l NC via Trache collar. S/P replacement of Tracheostomy Tube.       Problem/Plan - 5:  ·  Problem: Diverticulitis of intestine with perforation and abscess without bleeding.   ·  Plan: Surgery c/s in chart- small fluid collection but nontoxic, if drainage needed would be done by IR.  Wound care eval.     Problem/Plan - 6:  ·  Problem: History of atrial fibrillation.   ·  Plan: Cards helping and now on AC .   D/W Son and okay to start.      Problem/Plan - 7:  ·  Problem: Seizure disorder.   ·  Plan: continue levetiracetam bid.     Problem/Plan - 8:  ·  Problem: DVT, lower extremity.   ·  Plan: ?Subacute vs Chronic, not started on A/C as per chart due to recent GIB.     Problem/Plan - 9:  ·  Problem: HTN (hypertension).   ·  Plan: Resuming carvedilol & amlodipine as PEG replaced.     Problem/Plan - 10:  ·  Problem: Hypothyroidism.   ·  Plan; Resume Levothyroxine when PEG replaced.     Problem/Plan - 11:  ·  Problem: Glaucoma.   ·  Plan: continue Timolol, Dorzolamide, Latanoprost.      Problem/Plan - 12:  ·  Problem: PEG tube malfunction.   ·  Plan:  S/P  PEG . TF started.      Problem/Plan - 13:  ·  Problem: Hypokalemia.   ·  Plan: Corrected.     Disposition : DC  planning pending above.

## 2022-04-06 NOTE — PROGRESS NOTE ADULT - SUBJECTIVE AND OBJECTIVE BOX
Date of service: 4/6/22    chief complaint: dislodged PEG tube     extended hpi: 82 yo F with PMHx HTN, HLD, Hypothyroidism, Depression, seizure d/o, RLE DVT, Cholelithiasis s/p recent Hospitalization in [facility] from 12/22 -> 2/11/22 for acute perforated diverticulitis s/p Rosales 12/25, colostomy bag 12/26, hospitalization c/b IR drainage for abdominal abscess 1/3 Cx grew e.coli & bacteriodes, also required Mechanical Ventilation for Acute Respiratory Failure with Tracheostomy done 1/7.  PEG done 1/18, developed bleeding through ostomy for which she was transfused 1/28, EGD on 2/1 showed gastritis. Developed fevers on 1/30 and CT's showed Acute left MCA infarct- could be 2/2 to carotid occlusion vs afib, new RLL aspiration pneumonia, seen by ID, completed antibiotics, seen by VascSx, not surgical candidate. Pt aphasic sent from [facility] for dislodged Peg tube, after staff noted pt has blood to her abdomen.     S: pt. aphasic but appears more alert today answering questions by nodding or shaking her head; ros limited.     Review of Systems:   Constitutional: [ ] fevers, [ ] chills.   Skin: [ ] dry skin. [ ] rashes.  Psychiatric: [ ] depression, [ ] anxiety.   Gastrointestinal: [ ] BRBPR, [ ] melena.   Neurological: [ ] confusion. [ ] seizures. [ ] shuffling gait.   Ears,Nose,Mouth and Throat: [ ] ear pain [ ] sore throat.   Eyes: [ ] diplopia.   Respiratory: [ ] hemoptysis. [ ] shortness of breath  Cardiovascular: See HPI above  Hematologic/Lymphatic: [ ] anemia. [ ] painful nodes. [ ] prolonged bleeding.   Genitourinary: [ ] hematuria. [ ] flank pain.   Endocrine: [ ] significant change in weight. [ ] intolerance to heat and cold.     Review of systems [x ] otherwise negative, [ ] otherwise unable to obtain    FH: no family history of sudden cardiac death in first degree relatives    SH: [ ] tobacco, [ ] alcohol, [ ] drugs    acetaminophen     Tablet .. 650 milliGRAM(s) Oral every 6 hours PRN  amLODIPine   Tablet 10 milliGRAM(s) Oral daily  apixaban 5 milliGRAM(s) Oral every 12 hours  aspirin  chewable 81 milliGRAM(s) Enteral Tube daily  atorvastatin 40 milliGRAM(s) Oral at bedtime  carvedilol 3.125 milliGRAM(s) Oral every 12 hours  clopidogrel Tablet 75 milliGRAM(s) Enteral Tube daily  dorzolamide 2% Ophthalmic Solution 1 Drop(s) Both EYES <User Schedule>  influenza  Vaccine (HIGH DOSE) 0.7 milliLiter(s) IntraMuscular once  lactated ringers. 1000 milliLiter(s) IV Continuous <Continuous>  latanoprost 0.005% Ophthalmic Solution 1 Drop(s) Both EYES at bedtime  levETIRAcetam  IVPB 500 milliGRAM(s) IV Intermittent every 12 hours  levothyroxine 75 MICROGram(s) Oral daily  meropenem/vaborbactam IVPB 4 Gram(s) IV Intermittent every 8 hours  pantoprazole   Suspension 40 milliGRAM(s) Oral daily  timolol 0.5% Solution 1 Drop(s) Both EYES two times a day                            9.8    8.51  )-----------( 253      ( 06 Apr 2022 07:18 )             32.1       04-06    138  |  101  |  16  ----------------------------<  133<H>  4.2   |  24  |  0.44<L>    Ca    8.7      06 Apr 2022 07:18  Phos  3.6     04-06  Mg     1.90     04-06    TPro  5.7<L>  /  Alb  2.9<L>  /  TBili  0.3  /  DBili  x   /  AST  68<H>  /  ALT  68<H>  /  AlkPhos  105  04-05    CARDIAC MARKERS ( 05 Apr 2022 07:40 )  x     / x     / 145 U/L / x     / x          T(C): 36.9 (04-06-22 @ 06:10), Max: 37.1 (04-06-22 @ 01:35)  HR: 72 (04-06-22 @ 09:19) (65 - 85)  BP: 139/64 (04-06-22 @ 06:10) (126/56 - 139/64)  RR: 18 (04-06-22 @ 09:38) (18 - 18)  SpO2: 99% (04-06-22 @ 09:38) (98% - 100%)  Wt(kg): --    General: no acute distress   Head: Normocephalic and atraumatic.   Neck: No JVD. No bruits. Supple. Does not appear to be enlarged.   Cardiovascular: + S1,S2 ; RRR Soft systolic murmur at the left lower sternal border. No rubs noted.    Lungs: CTA b/l. No rhonchi, rales or wheezes.   Abdomen: + BS, soft. Non tender. Non distended. No rebound. No guarding.   Extremities: No clubbing/cyanosis/edema.   Psychiatric: unable to assess     < from: TTE Echo Complete w/o Contrast w/ Doppler (02.04.22 @ 15:28) >  Summary:   1. Left ventricular ejection fraction, by visual estimation, is 60 to   65%.   2. Normal global left ventricular systolic function.   3. Mild mitral valve regurgitation.   4.Mild thickening of the anterior and posterior mitral valve leaflets.   5. Mild tricuspid regurgitation.   6. Mild to moderate aortic regurgitation.   7. Sclerotic aortic valve with normal opening.  [id number] Colin Butcher MD, [facility]  Electronically signed on 2/5/2022 at 3:03:14 PM  < end of copied text >      < from: CT Angio Head w/ IV Cont (04.04.22 @ 14:19) >  IMPRESSIONS:  CTA neck:  1. Short segment 70% stenosis in diameter at the proximal aspect of the   right internal carotid artery.  2. The left internal carotid artery is occluded at a distance of   approximately 7 mm beyond the origin of this vessel.  3. Bilateral vertebral arteries are appreciated. Left vertebral artery is   dominant.    CTA COW:  1. There is no flow within the petrous, precavernous or cavernous   portions of the left internal carotid artery.  2. Flow is appreciated within the left middle cerebral artery,   significantly decreased in caliber when compared with the right middle   cerebral artery. Furthermore, only an attenuated/hypoplastic flow pattern   is appreciated in association with the left A1 segment as well as the   left posterior communicating artery.  3. Hypoplastic/attenuated pattern of flow within the left A 2 segment of   the anterior cerebral artery.  < end of copied text >    < from: CT Head No Cont (04.04.22 @ 14:19) >  IMPRESSION:    1. Evolution of previously visualized left MCA infarct, which appeared   acute at time of prior study dated 2/5/2022. Cannot exclude an acute on   chronic process. Consider further evaluation via MR imaging as clinically   indicated, provided the patient has no contraindications.    2. Findings also include a new decreased attenuation involving the left   brachium pontis and left cerebellum, possibly representing an acute   ischemic event in this location. MR imaging to include DWI and ADC   mapping techniques may be helpful for further characterization of this   finding.    3. No acute intracranial hemorrhage.    4. Paranasal sinus inflammatory changes, as described above. Cannot   exclude sinusitis.    5. Persistent fluid within right-sided mastoid air cells. Cannot exclude   mastoiditis.    < end of copied text >        A/P:  82 yo F with PMHx HTN, HLD, Hypothyroidism, Depression, seizure d/o, RLE DVT, Cholelithiasis s/p recent Hospitalization in [facility] from 12/22 -> 2/11/22 for acute perforated diverticulitis s/p Rosales 12/25, colostomy bag 12/26, hospitalization c/b IR drainage for abdominal abscess 1/3 Cx grew e.coli & bacteriodes, also required Mechanical Ventilation for Acute Respiratory Failure with Tracheostomy done 1/7.  PEG done 1/18, developed bleeding through ostomy for which she was transfused 1/28, EGD on 2/1 showed gastritis. Developed fevers on 1/30 and CT's showed Acute left MCA infarct- could be 2/2 to carotid occlusion vs afib, new RLL aspiration pneumonia, seen by ID, completed antibiotics, seen by VascSx, not surgical candidate. Pt aphasic sent from [facility] for dislodged Peg tube, after staff noted pt has blood to her abdomen.     --prior chart reviewed (Jan-Feb 2022) Pt with CVA and found with AFib and Carotid occlusion.  AC held initially while monitoring for hemorrhagic conversion and then in the setting of GI bleed req PRBCs.  She was continued on DAPT.  --given elevated [id number], recommend lifelong AC for AF if able to tolerate  - Eliquis started - monitor h/h   - Would consider Neuro eval for APT management/recommendations post CVA, given new ischemic findings on imaging 4/4  --s/p RRT on 04/04/22 for fever - BCx noted - follow up ID  --Vascular surgery eval noted  --Further workup pending clinical course and overall GOC

## 2022-04-06 NOTE — CHART NOTE - NSCHARTNOTEFT_GEN_A_CORE
Notified by RN that trach was dislodged. ENT called to bedside and trach replaced. Patient assessed at bedside with no respiratory distress noted on trach collar O2 sat 96%. Pt. awake and alert, nonverbal nodding head yes and no appropriately to questions. Pt. denies any pain at this time. Case discussed with Dr. Lew.

## 2022-04-06 NOTE — PROGRESS NOTE ADULT - ASSESSMENT
82 yo F with PMHx HTN, HLD, Hypothyroidism, Depression, seizure d/o, RLE DVT, Cholelithiasis s/p recent Hospitalization in [facility] from 12/22 -> 2/11/22 for acute perforated diverticulitis s/p Roslaes 12/25, colostomy bag 12/26, hospitalization c/b IR drainage for abdominal abscess 1/3 Cx grew e.coli & bacteriodes, also required Mechanical Ventilation for Acute Respiratory Failure with Tracheostomy done 1/7.  PEG done 1/18, developed bleeding through ostomy, EGD on 2/1 showed gastritis. T's showed Acute left MCA infarct- could be 2/2 to carotid occlusion vs afib, new RLL aspiration pneumonia, seen by ID, completed antibiotics, seen by VascSx, not surgical candidate. Pt aphasic sent from [facility] for dislodged Peg tube, after staff noted pt has blood to her abdomen. Nephrology consulted for Hypokalemia.     A/P  Hypokalemia   likely 2/2 GI loss   Improved  monitor input and output closely   diarrhea improved   monitor K closely    Hypomagnesia   better today   replete as needed  monitor Mg     hypophosphatemia   better today  replete as needed  monitor     Hypocalcemia   corrected Ca WNL   Monitor    Acidosis with anion gap   etiology?  improved   monitor    HTN   optimal   monitor BP closely

## 2022-04-07 DIAGNOSIS — Z71.89 OTHER SPECIFIED COUNSELING: ICD-10-CM

## 2022-04-07 DIAGNOSIS — R53.2 FUNCTIONAL QUADRIPLEGIA: ICD-10-CM

## 2022-04-07 DIAGNOSIS — R78.81 BACTEREMIA: ICD-10-CM

## 2022-04-07 DIAGNOSIS — G93.49 OTHER ENCEPHALOPATHY: ICD-10-CM

## 2022-04-07 DIAGNOSIS — Z51.5 ENCOUNTER FOR PALLIATIVE CARE: ICD-10-CM

## 2022-04-07 LAB
-  AMOXICILLIN/CLAVULANIC ACID: SIGNIFICANT CHANGE UP
-  CEFOTAXIME: SIGNIFICANT CHANGE UP
-  CEFTAZIDIME: SIGNIFICANT CHANGE UP
-  CEFUROXIME: SIGNIFICANT CHANGE UP
-  MEROPENEM/VABORBACTAM: SIGNIFICANT CHANGE UP
-  MINOCYCLINE: SIGNIFICANT CHANGE UP
-  MOXIFLOXACIN: SIGNIFICANT CHANGE UP
-  TETRACYCLINE: SIGNIFICANT CHANGE UP
ANION GAP SERPL CALC-SCNC: 13 MMOL/L — SIGNIFICANT CHANGE UP (ref 7–14)
BUN SERPL-MCNC: 13 MG/DL — SIGNIFICANT CHANGE UP (ref 7–23)
CALCIUM SERPL-MCNC: 8.7 MG/DL — SIGNIFICANT CHANGE UP (ref 8.4–10.5)
CHLORIDE SERPL-SCNC: 102 MMOL/L — SIGNIFICANT CHANGE UP (ref 98–107)
CO2 SERPL-SCNC: 23 MMOL/L — SIGNIFICANT CHANGE UP (ref 22–31)
CREAT SERPL-MCNC: 0.4 MG/DL — LOW (ref 0.5–1.3)
EGFR: 101 ML/MIN/1.73M2 — SIGNIFICANT CHANGE UP
GLUCOSE SERPL-MCNC: 129 MG/DL — HIGH (ref 70–99)
HCT VFR BLD CALC: 31.1 % — LOW (ref 34.5–45)
HGB BLD-MCNC: 9.6 G/DL — LOW (ref 11.5–15.5)
MAGNESIUM SERPL-MCNC: 1.7 MG/DL — SIGNIFICANT CHANGE UP (ref 1.6–2.6)
MCHC RBC-ENTMCNC: 27.3 PG — SIGNIFICANT CHANGE UP (ref 27–34)
MCHC RBC-ENTMCNC: 30.9 GM/DL — LOW (ref 32–36)
MCV RBC AUTO: 88.4 FL — SIGNIFICANT CHANGE UP (ref 80–100)
NRBC # BLD: 0 /100 WBCS — SIGNIFICANT CHANGE UP
NRBC # FLD: 0 K/UL — SIGNIFICANT CHANGE UP
ORGANISM # SPEC MICROSCOPIC CNT: SIGNIFICANT CHANGE UP
ORGANISM # SPEC MICROSCOPIC CNT: SIGNIFICANT CHANGE UP
PHOSPHATE SERPL-MCNC: 2.8 MG/DL — SIGNIFICANT CHANGE UP (ref 2.5–4.5)
PLATELET # BLD AUTO: 268 K/UL — SIGNIFICANT CHANGE UP (ref 150–400)
POTASSIUM SERPL-MCNC: 3.4 MMOL/L — LOW (ref 3.5–5.3)
POTASSIUM SERPL-SCNC: 3.4 MMOL/L — LOW (ref 3.5–5.3)
RBC # BLD: 3.52 M/UL — LOW (ref 3.8–5.2)
RBC # FLD: 16.3 % — HIGH (ref 10.3–14.5)
SODIUM SERPL-SCNC: 138 MMOL/L — SIGNIFICANT CHANGE UP (ref 135–145)
WBC # BLD: 5.93 K/UL — SIGNIFICANT CHANGE UP (ref 3.8–10.5)
WBC # FLD AUTO: 5.93 K/UL — SIGNIFICANT CHANGE UP (ref 3.8–10.5)

## 2022-04-07 PROCEDURE — 99223 1ST HOSP IP/OBS HIGH 75: CPT

## 2022-04-07 PROCEDURE — 99358 PROLONG SERVICE W/O CONTACT: CPT | Mod: NC

## 2022-04-07 PROCEDURE — 99232 SBSQ HOSP IP/OBS MODERATE 35: CPT

## 2022-04-07 PROCEDURE — 99497 ADVNCD CARE PLAN 30 MIN: CPT | Mod: 25

## 2022-04-07 RX ORDER — POTASSIUM CHLORIDE 20 MEQ
40 PACKET (EA) ORAL ONCE
Refills: 0 | Status: COMPLETED | OUTPATIENT
Start: 2022-04-07 | End: 2022-04-07

## 2022-04-07 RX ADMIN — AMLODIPINE BESYLATE 10 MILLIGRAM(S): 2.5 TABLET ORAL at 06:07

## 2022-04-07 RX ADMIN — Medication 40 MILLIEQUIVALENT(S): at 17:05

## 2022-04-07 RX ADMIN — LATANOPROST 1 DROP(S): 0.05 SOLUTION/ DROPS OPHTHALMIC; TOPICAL at 21:44

## 2022-04-07 RX ADMIN — APIXABAN 5 MILLIGRAM(S): 2.5 TABLET, FILM COATED ORAL at 06:05

## 2022-04-07 RX ADMIN — Medication 1 DROP(S): at 06:06

## 2022-04-07 RX ADMIN — Medication 81 MILLIGRAM(S): at 11:16

## 2022-04-07 RX ADMIN — PANTOPRAZOLE SODIUM 40 MILLIGRAM(S): 20 TABLET, DELAYED RELEASE ORAL at 11:18

## 2022-04-07 RX ADMIN — DORZOLAMIDE HYDROCHLORIDE 1 DROP(S): 20 SOLUTION/ DROPS OPHTHALMIC at 18:59

## 2022-04-07 RX ADMIN — DORZOLAMIDE HYDROCHLORIDE 1 DROP(S): 20 SOLUTION/ DROPS OPHTHALMIC at 11:16

## 2022-04-07 RX ADMIN — ATORVASTATIN CALCIUM 40 MILLIGRAM(S): 80 TABLET, FILM COATED ORAL at 21:44

## 2022-04-07 RX ADMIN — CARVEDILOL PHOSPHATE 3.12 MILLIGRAM(S): 80 CAPSULE, EXTENDED RELEASE ORAL at 17:04

## 2022-04-07 RX ADMIN — APIXABAN 5 MILLIGRAM(S): 2.5 TABLET, FILM COATED ORAL at 17:04

## 2022-04-07 RX ADMIN — LEVETIRACETAM 400 MILLIGRAM(S): 250 TABLET, FILM COATED ORAL at 21:43

## 2022-04-07 RX ADMIN — CARVEDILOL PHOSPHATE 3.12 MILLIGRAM(S): 80 CAPSULE, EXTENDED RELEASE ORAL at 06:05

## 2022-04-07 RX ADMIN — Medication 1 DROP(S): at 17:05

## 2022-04-07 RX ADMIN — MEROPENEM-VABORBACTAM 83.33 GRAM(S): 1; 1 INJECTION, POWDER, FOR SOLUTION INTRAVENOUS at 06:25

## 2022-04-07 RX ADMIN — MEROPENEM-VABORBACTAM 83.33 GRAM(S): 1; 1 INJECTION, POWDER, FOR SOLUTION INTRAVENOUS at 21:44

## 2022-04-07 RX ADMIN — CLOPIDOGREL BISULFATE 75 MILLIGRAM(S): 75 TABLET, FILM COATED ORAL at 11:18

## 2022-04-07 RX ADMIN — LEVETIRACETAM 400 MILLIGRAM(S): 250 TABLET, FILM COATED ORAL at 11:16

## 2022-04-07 RX ADMIN — Medication 75 MICROGRAM(S): at 06:05

## 2022-04-07 NOTE — PROGRESS NOTE ADULT - SUBJECTIVE AND OBJECTIVE BOX
Follow Up:  sepsis    Interval History: pt afebrile, WBC normalized and repeat blood cx negative    ROS:    Unobtainable because of mental status      Allergies  No Known Allergies        ANTIMICROBIALS:  meropenem/vaborbactam IVPB 4 every 8 hours      OTHER MEDS:  acetaminophen     Tablet .. 650 milliGRAM(s) Oral every 6 hours PRN  amLODIPine   Tablet 10 milliGRAM(s) Oral daily  apixaban 5 milliGRAM(s) Oral every 12 hours  aspirin  chewable 81 milliGRAM(s) Enteral Tube daily  atorvastatin 40 milliGRAM(s) Oral at bedtime  carvedilol 3.125 milliGRAM(s) Oral every 12 hours  clopidogrel Tablet 75 milliGRAM(s) Enteral Tube daily  dorzolamide 2% Ophthalmic Solution 1 Drop(s) Both EYES <User Schedule>  influenza  Vaccine (HIGH DOSE) 0.7 milliLiter(s) IntraMuscular once  lactated ringers. 1000 milliLiter(s) IV Continuous <Continuous>  latanoprost 0.005% Ophthalmic Solution 1 Drop(s) Both EYES at bedtime  levETIRAcetam  IVPB 500 milliGRAM(s) IV Intermittent every 12 hours  levothyroxine 75 MICROGram(s) Oral daily  pantoprazole   Suspension 40 milliGRAM(s) Oral daily  potassium chloride   Powder 40 milliEquivalent(s) Enteral Tube once  timolol 0.5% Solution 1 Drop(s) Both EYES two times a day      Vital Signs Last 24 Hrs  T(C): 36.5 (07 Apr 2022 10:50), Max: 37.1 (07 Apr 2022 02:13)  T(F): 97.7 (07 Apr 2022 10:50), Max: 98.7 (07 Apr 2022 02:13)  HR: 78 (07 Apr 2022 12:34) (72 - 85)  BP: 151/66 (07 Apr 2022 10:50) (142/68 - 156/72)  BP(mean): --  RR: 18 (07 Apr 2022 12:34) (17 - 18)  SpO2: 100% (07 Apr 2022 12:34) (98% - 100%)    Physical Exam:  General:    NAD, non toxic  Cardio:    regular S1,S2  Respiratory:   trach  abd:   soft, BS +, not tender  :     no CVAT, no suprapubic tenderness, no mc  Musculoskeletal : no joint swelling, no edema  Skin:    no rash  vascular: no phlebitis                            9.6    5.93  )-----------( 268      ( 07 Apr 2022 07:48 )             31.1       04-07    138  |  102  |  13  ----------------------------<  129<H>  3.4<L>   |  23  |  0.40<L>    Ca    8.7      07 Apr 2022 07:48  Phos  2.8     04-07  Mg     1.70     04-07            MICROBIOLOGY:  v  .Blood Blood  04-05-22   No growth to date.  --  --      .Blood Blood-Peripheral  04-04-22   Growth in anaerobic bottle: Klebsiella pneumoniae (Carbapenem Resistant)  See previous culture [id number]  --  Blood Culture PCR  Klepne MDRO      .Blood Blood-Peripheral  03-26-22   No Growth Final  --  --      .Blood Blood-Peripheral  03-26-22   No Growth Final  --  --          Rapid RVP Result: NotDetec (04-04 @ 02:02)        RADIOLOGY:  Images independently visualized and reviewed personally, findings as below  < from: CT Abdomen and Pelvis w/ IV Cont (04.04.22 @ 14:24) >    IMPRESSION:  Trace bilateral pleural effusions.    Improved aeration in the lower lobes bilaterally since 1/30/2022.   Nonspecific patchy bilateral groundglass and linear opacities are noted.    Enhancement of the wall of the cecum and ascending colon with mild   infiltration of the adjacent fat is similar in appearance to prior study   dated 3/27/2022. An inflammatory or infectious process is considered.    Dependent high attenuation within the urinary bladder, which may   represent small stones and/or debris. An underlying mass cannot be   excluded.    Thick-walled collection in the cul-de-sac and small collections along the   right paracolic gutter, not significantly changed since 3/27/2022.      < end of copied text >

## 2022-04-07 NOTE — GOALS OF CARE CONVERSATION - ADVANCED CARE PLANNING - CONVERSATION DETAILS
Palliative Care consulted for complex decision making  related to goals of care discussions.    Son summarized events of patient's recent hospitalization from late December to February during which patient had an perforated diverticulitis s/p surgery with complicated hospital course including findings of a stroke. He shares that during that hospitalization, the decision was made for a trach/peg placement.     He verbalized understanding of patient's current hospitalization course including sepsis from bacteremia, new finding of stroke again. He is aware that MRI is still pending, but does acknowledge that MRI unlikely to change her overall prognosis.    He shares mom was previously independent living at home, but since that hospitalization patient has been in a skilled nursing facility and requiring support for ADLs. He shares it is difficult for him to see patient like this as she was previously independent and strong individual. He doesn't want patient to suffer.     Addressed advanced directives of code status preferences. Son agrees to allow for a natural passing if patient went into a cardiac arrest; in agreement for DNR.   He states he and family looking into a long term placement for patient and would like hospice services at the NH once medically stable to be discharged to NH.    Emotional support provided.

## 2022-04-07 NOTE — PROGRESS NOTE ADULT - SUBJECTIVE AND OBJECTIVE BOX
INTERVAL HPI/OVERNIGHT EVENTS:    without new gi events   resting comfortably; pain controlled this am  tolerating peg feeds     MEDICATIONS  (STANDING):  amLODIPine   Tablet 10 milliGRAM(s) Oral daily  apixaban 5 milliGRAM(s) Oral every 12 hours  aspirin  chewable 81 milliGRAM(s) Enteral Tube daily  atorvastatin 40 milliGRAM(s) Oral at bedtime  carvedilol 3.125 milliGRAM(s) Oral every 12 hours  clopidogrel Tablet 75 milliGRAM(s) Enteral Tube daily  dorzolamide 2% Ophthalmic Solution 1 Drop(s) Both EYES <User Schedule>  influenza  Vaccine (HIGH DOSE) 0.7 milliLiter(s) IntraMuscular once  lactated ringers. 1000 milliLiter(s) (75 mL/Hr) IV Continuous <Continuous>  latanoprost 0.005% Ophthalmic Solution 1 Drop(s) Both EYES at bedtime  levETIRAcetam  IVPB 500 milliGRAM(s) IV Intermittent every 12 hours  levothyroxine 75 MICROGram(s) Oral daily  meropenem/vaborbactam IVPB 4 Gram(s) IV Intermittent every 8 hours  pantoprazole   Suspension 40 milliGRAM(s) Oral daily  timolol 0.5% Solution 1 Drop(s) Both EYES two times a day    MEDICATIONS  (PRN):  acetaminophen     Tablet .. 650 milliGRAM(s) Oral every 6 hours PRN Mild Pain (1 - 3), Moderate Pain (4 - 6)      Allergies    No Known Allergies    Intolerances        Review of Systems:    General:  No wt loss, fevers, chills, night sweats, fatigue   Eyes:  Good vision, no reported pain  ENT:  No sore throat, pain, runny nose, dysphagia  CV:  No pain, palpitations, hypo/hypertension  Resp:  No dyspnea, cough, tachypnea, wheezing  GI:  No pain, No nausea, No vomiting, No diarrhea, No constipation, No weight loss, No fever, No pruritis, No rectal bleeding, No melena, No dysphagia  :  No pain, bleeding, incontinence, nocturia  Muscle:  No pain, weakness  Neuro:  No weakness, tingling, memory problems  Psych:  No fatigue, insomnia, mood problems, depression  Endocrine:  No polyuria, polydypsia, cold/heat intolerance  Heme:  No petechiae, ecchymosis, easy bruisability  Skin:  No rash, tattoos, scars, edema      Vital Signs Last 24 Hrs  T(C): 36.9 (06 Apr 2022 06:10), Max: 37.1 (06 Apr 2022 01:35)  T(F): 98.4 (06 Apr 2022 06:10), Max: 98.7 (06 Apr 2022 01:35)  HR: 72 (06 Apr 2022 09:19) (65 - 85)  BP: 139/64 (06 Apr 2022 06:10) (126/56 - 139/64)  BP(mean): --  RR: 18 (06 Apr 2022 09:38) (18 - 18)  SpO2: 99% (06 Apr 2022 09:38) (98% - 100%)    PHYSICAL EXAM:    Constitutional: NAD  HEENT: EOMI, throat clear  Neck: No LAD, supple  +trach  Respiratory: CTA and P  Cardiovascular: S1 and S2, RRR, no M  Gastrointestinal: BS+, soft, NT/ND, neg HSM, +peg  Extremities: No peripheral edema, neg clubbing, cyanosis  Vascular: 2+ peripheral pulses  Neurological: A/O x 3, no focal deficits  Psychiatric: Normal mood, normal affect  Skin: No rashes      LABS:                        9.8    8.51  )-----------( 253      ( 06 Apr 2022 07:18 )             32.1     04-06    138  |  101  |  16  ----------------------------<  133<H>  4.2   |  24  |  0.44<L>    Ca    8.7      06 Apr 2022 07:18  Phos  3.6     04-06  Mg     1.90     04-06    TPro  5.7<L>  /  Alb  2.9<L>  /  TBili  0.3  /  DBili  x   /  AST  68<H>  /  ALT  68<H>  /  AlkPhos  105  04-05          RADIOLOGY & ADDITIONAL TESTS:

## 2022-04-07 NOTE — PROGRESS NOTE ADULT - ASSESSMENT
81 f with HTN, HLD, Hypothyroidism, Depression, seizure d/o, RLE DVT, Cholelithiasis s/p recent Hospitalization at [facility] 12/22 -> 2/11/22 for acute perforated diverticulitis s/p [facility] 12/25, colostomy bag 12/26, hospitalization c/b abd abscess s/p IR drainage 1/3 Cx with E.coli & bacteroides and candida albicans,  trach 1/7, PEG, Acute left MCA infarct, aspiration pneumonia, sent 3/27 from rehab for dislodged PEG, initially afebrile, normal WBc, negative blood and urine cx  CT 3/27: PEG tube removed/dislodged, no evidence for pneumoperitoneum, 3.5 cm thick-walled fluid collection in the pelvic cul-de-sac, Interval slight improvement in additional small loculated fluid   collections, including significant improvement in inflammatory changes in RLQ and pelvis status post removal of surgical drain. Ill-defined hyperdensity/enhancing nodularity along the right posterior   bladder lumen, cannot exclude a neoplastic process  s/p PEG placement by IR 3/30  on 4/4 pt spiked to 102.4 with tachycardia and new leukocytosis to 16    recent perforated diverticulitis s/p huitron, c/b abd abscess s/p IR drainage, cx with E-coli, bacteroides and candida now admitted for PEG dislodgement s/p IR placement 3/30 now with new fever, tachycardia, leukocytosis, sepsis due to [facility] klebsiella bacteremia, S to vabomere, acyvaz, tigecyline, minocycline and genta, repeat cx 4/5 negative  CT: Dependent high attenuation within the urinary bladder, which may represent small stones and/or debris. An underlying mass cannot be excluded. Thick-walled collection in the cul-de-sac and small collections along the right paracolic gutter, not significantly changed since 3/27/2022  IR stated that abscess are too small to be drainged    * c/w  vabomere, blood cx cleared 4/5, so day 3  * will have to do week course of antibiotics as the abscess was not drained and then repeat CT for resolution      The above assessment and plan was discussed with the primary team    Ivy Rose MD  contact on teams  After 5pm and on weekends call [phone]   81 f with HTN, HLD, Hypothyroidism, Depression, seizure d/o, RLE DVT, Cholelithiasis s/p recent Hospitalization at [facility] 12/22 -> 2/11/22 for acute perforated diverticulitis s/p Rosales 12/25, colostomy bag 12/26, hospitalization c/b abd abscess s/p IR drainage 1/3 Cx with E.coli & bacteroides and candida albicans,  trach 1/7, PEG, Acute left MCA infarct, aspiration pneumonia, sent 3/27 from rehab for dislodged PEG, initially afebrile, normal WBc, negative blood and urine cx  CT 3/27: PEG tube removed/dislodged, no evidence for pneumoperitoneum, 3.5 cm thick-walled fluid collection in the pelvic cul-de-sac, Interval slight improvement in additional small loculated fluid   collections, including significant improvement in inflammatory changes in RLQ and pelvis status post removal of surgical drain. Ill-defined hyperdensity/enhancing nodularity along the right posterior   bladder lumen, cannot exclude a neoplastic process  s/p PEG placement by IR 3/30  on 4/4 pt spiked to 102.4 with tachycardia and new leukocytosis to 16    recent perforated diverticulitis s/p huitron, c/b abd abscess s/p IR drainage, cx with E-coli, bacteroides and candida now admitted for PEG dislodgement s/p IR placement 3/30 now with new fever, tachycardia, leukocytosis, sepsis due to [facility]PC klebsiella bacteremia, S to vabomere, acyvaz, tigecyline, minocycline and genta, repeat cx 4/5 negative  CT: Dependent high attenuation within the urinary bladder, which may represent small stones and/or debris. An underlying mass cannot be excluded. Thick-walled collection in the cul-de-sac and small collections along the right paracolic gutter, not significantly changed since 3/27/2022  IR stated that abscess are too small to be drainged    * c/w  vabomere, blood cx cleared 4/5, so day 3  * will have to do a 2 week course of antibiotics as the abscess was not drained and then repeat CT for resolution      The above assessment and plan was discussed with the primary team    Ivy Rose MD  contact on teams  After 5pm and on weekends call [phone]

## 2022-04-07 NOTE — CONSULT NOTE ADULT - SUBJECTIVE AND OBJECTIVE BOX
HPI:  80 yo F with PMHx HTN, HLD, Hypothyroidism, Depression, seizure d/o, RLE DVT, Cholelithiasis s/p recent Hospitalization in [facility] from 12/22 -> 2/11/22 for acute perforated diverticulitis s/p Rosales 12/25, colostomy bag 12/26, hospitalization c/b IR drainage for abdominal abscess 1/3 Cx grew e.coli & bacteriodes, also required Mechanical Ventilation for Acute Respiratory Failure with Tracheostomy done 1/7.  PEG done 1/18, developed bleeding through ostomy for which she was transfused 1/28, EGD on 2/1 showed gastritis. Developed fevers on 1/30 and CT's showed Acute left MCA infarct- could be 2/2 to carotid occlusion vs afib, new RLL aspiration pneumonia, seen by ID, completed antibiotics, seen by Lou, not surgical candidate. [facility], after staff noted pt has blood to her abdomen.  (27 Mar 2022 12:33)    Interval History:         PERTINENT PM/SXH:   Seizure    HTN (hypertension)    Glaucoma    Thyroid disease    Blood clot of neck vein    TIA (transient ischemic attack)    Hypothyroidism    Stroke    Chronic respiratory failure with hypoxia and hypercapnia    Atrial fibrillation    MDD (major depressive disorder)    Gastritis      S/P appendectomy    Status post Rosales procedure    S/P colostomy    S/P percutaneous endoscopic gastrostomy (PEG) tube placement    Status post tracheostomy      FAMILY HISTORY:  FH: HTN (hypertension)      ITEMS NOT CHECKED ARE NOT PRESENT    SOCIAL HISTORY:   Significant other/partner[ ]  Children[ ]  Catholic/Spirituality:  Substance hx:  [ ]   Tobacco hx:  [ ]   Alcohol hx: [ ]   Home Opioid hx:  [ ] I-Stop Reference No:  Living Situation: [ ]Home  [ ]Long term care  [ ]Rehab [ ]Other  Home Services: [ ] HHA [ ] Visting RN [ ] Hospice      ADVANCE DIRECTIVES:    MOLST  [ ]  Living Will  [ ]   DECISION MAKER(s):  [ ] Health Care Proxy(s)  [ ] Surrogate(s)  [ ] Guardian           Name(s): Phone Number(s):    BASELINE (I)ADL(s) (prior to admission):  [place]: [ ]Total  [ ] Moderate [ ]Dependent    Allergies    No Known Allergies    Intolerances    MEDICATIONS  (STANDING):  amLODIPine   Tablet 10 milliGRAM(s) Oral daily  apixaban 5 milliGRAM(s) Oral every 12 hours  aspirin  chewable 81 milliGRAM(s) Enteral Tube daily  atorvastatin 40 milliGRAM(s) Oral at bedtime  carvedilol 3.125 milliGRAM(s) Oral every 12 hours  clopidogrel Tablet 75 milliGRAM(s) Enteral Tube daily  dorzolamide 2% Ophthalmic Solution 1 Drop(s) Both EYES <User Schedule>  influenza  Vaccine (HIGH DOSE) 0.7 milliLiter(s) IntraMuscular once  lactated ringers. 1000 milliLiter(s) (75 mL/Hr) IV Continuous <Continuous>  latanoprost 0.005% Ophthalmic Solution 1 Drop(s) Both EYES at bedtime  levETIRAcetam  IVPB 500 milliGRAM(s) IV Intermittent every 12 hours  levothyroxine 75 MICROGram(s) Oral daily  meropenem/vaborbactam IVPB 4 Gram(s) IV Intermittent every 8 hours  pantoprazole   Suspension 40 milliGRAM(s) Oral daily  potassium chloride   Powder 40 milliEquivalent(s) Enteral Tube once  timolol 0.5% Solution 1 Drop(s) Both EYES two times a day    MEDICATIONS  (PRN):  acetaminophen     Tablet .. 650 milliGRAM(s) Oral every 6 hours PRN Mild Pain (1 - 3), Moderate Pain (4 - 6)      PRESENT SYMPTOMS: [ ]Unable to self-report due to altered mental status  [ ] CPOT [ ] PAINADs [ ] RDOS  Source if other than patient:  [ ]Family   [ ]Team     Pain: [ ]yes [ ]no  QOL impact -   Location -                    Aggravating factors -  Quality -  Radiation -  Timing-  Severity (0-10 scale):  Minimal acceptable level (0-10 scale):     CPOT:    https://www.sccm.org/getattachment/[email or web address]-[email or web address]-[email or web address]/Critical-Care-Pain-Observation-Tool-(CPOT)      PAIN AD Score:     [email or web address]://[email or web address]/cog/[email or web address] (press ctrl +  left click to view)    Dyspnea:                           [ ]Mild [ ]Moderate [ ]Severe  RDOS:  0 to 2  minimal or no respiratory distress   3  mild distress  4 to 6 moderate distress  >7 severe distress  https://[email or web address]/hen/Respiratory_Distress_Observation_Scale.pdf (Ctrl +  left click to view)     Anxiety:                             [ ]Mild [ ]Moderate [ ]Severe  Agitation:                          [ ]Mild [ ]Moderate [ ]Severe  Fatigue:                             [ ]Mild [ ]Moderate [ ]Severe  Nausea:                             [ ]Mild [ ]Moderate [ ]Severe  Loss of appetite:              [ ]Mild [ ]Moderate [ ]Severe  Constipation:                   [ ]Mild [ ]Moderate [ ]Severe  Diarrhea:                          [ ]Mild [ ]Moderate [ ]Severe  Pruritus:                            [ ]Mild [ ]Moderate [ ]Severe  Depression:                      [ ]Mild [ ]Moderate [ ]Severe    Other Symptoms:  [ ]All other review of systems negative     Palliative Performance Status Version 2:         %    http://npcrc.org/files/news/palliative_performance_scale_ppsv2.pdf    PHYSICAL EXAM:  Vital Signs Last 24 Hrs  T(C): 36.5 (07 Apr 2022 10:50), Max: 37.1 (07 Apr 2022 02:13)  T(F): 97.7 (07 Apr 2022 10:50), Max: 98.7 (07 Apr 2022 02:13)  HR: 72 (07 Apr 2022 10:50) (72 - 85)  BP: 151/66 (07 Apr 2022 10:50) (142/68 - 156/72)  BP(mean): --  RR: 17 (07 Apr 2022 10:50) (17 - 18)  SpO2: 100% (07 Apr 2022 10:50) (98% - 100%)     I&O's Summary    06 Apr 2022 07:01  -  07 Apr 2022 07:00  --------------------------------------------------------  IN: 2965 mL / OUT: 1800 mL / NET: 1165 mL        GENERAL:  [ ]Alert  [ ]Oriented x   [ ]Lethargic  [ ]Cachexia  [ ]Unarousable  [ ]Verbal  [ ]Non-Verbal  [ ] No Distress  Behavioral:   [ ] Anxiety  [ ] Delirium [ ] Agitation [ ] Calm  [ ] Other  HEENT:  [ ]Normal  [ ] Temporal Wasting  [ ]Dry mouth   [ ]ET Tube/Trach  [ ]Oral lesions  [ ] Mucositis  PULMONARY:   [ ]Clear [ ]Tachypnea  [ ]Audible excessive secretions   [ ]Rhonchi        [ ]Right [ ]Left [ ]Bilateral  [ ]Crackles        [ ]Right [ ]Left [ ]Bilateral  [ ]Wheezing     [ ]Right [ ]Left [ ]Bilateral  [ ]Diminished breath sounds [ ]right [ ]left [ ]bilateral  CARDIOVASCULAR:    [ ]Regular [ ]Irregular [ ]Tachy  [ ]Gabe [ ]Murmur [ ]Other  GASTROINTESTINAL:  [ ]Soft  [ ]Distended   [ ]+BS  [ ]Non tender [ ]Tender  [ ]PEG [ ]OGT/ NGT  Last BM:   GENITOURINARY:  [ ]Normal [ ] Incontinent   [ ]Oliguria/Anuria   [ ]Boucher  MUSCULOSKELETAL:   [ ]Normal   [ ]Weakness  [ ]Bed/Wheelchair bound [ ]Edema  [  ] amputation  [  ] contraction  NEUROLOGIC:   [ ]No focal deficits  [ ]Cognitive impairment  [ ]Dysphagia [ ]Dysarthria [ ]Paresis [ ]Other   SKIN: See Nursing Skin Assessment for further details  [ ]Normal    [ ]Rash  [ ]Pressure ulcer(s)       Present on admission [ ]y [ ]n   [  ]  Wound    [  ] hyperpigmentation    CRITICAL CARE:  [ ] Shock Present  [ ]Septic [ ]Cardiogenic [ ]Neurologic [ ]Hypovolemic  [ ]  Vasopressors [ ]  Inotropes   [ ]Respiratory failure present [ ]Mechanical ventilation [ ]Non-invasive ventilatory support [ ]High flow    [ ]Acute  [ ]Chronic [ ]Hypoxic  [ ]Hypercarbic [ ]Other  [ ]Other organ failure     LABS:                        9.6    5.93  )-----------( 268      ( 07 Apr 2022 07:48 )             31.1   04-07    138  |  102  |  13  ----------------------------<  129<H>  3.4<L>   |  23  |  0.40<L>    Ca    8.7      07 Apr 2022 07:48  Phos  2.8     04-07  Mg     1.70     04-07      RADIOLOGY & ADDITIONAL STUDIES:    PROTEIN CALORIE MALNUTRITION PRESENT: [ ]mild [ ]moderate [ ]severe [ ]underweight [ ]morbid obesity  https://www.andeal.org/vault/1990/web/files/ONC/Table_Clinical%20Characteristics%20to%20Document%20Malnutrition-White%20JV%20et%20al%202012.pdf    Height (cm): 167.6 (03-26-22 @ 22:43), 167.6 (01-18-22 @ 12:49)  Weight (kg): 76.2 (03-27-22 @ 12:33), 84 (01-18-22 @ 12:49)  BMI (kg/m2): 27.1 (03-27-22 @ 12:33), 29.9 (03-26-22 @ 22:43), 29.9 (01-18-22 @ 12:49)    [ ]PPSV2 < or = to 30% [ ]significant weight loss  [ ]poor nutritional intake  [ ]anasarca [ ]Artificial Nutrition      REFERRALS:   [ ]Chaplaincy  [ ]Hospice  [ ]Child Life  [ ]Social Work  [ ]Case management [ ]Holistic Therapy    HPI:  80 yo F with PMHx HTN, HLD, Hypothyroidism, Depression, seizure d/o, RLE DVT, Cholelithiasis s/p recent Hospitalization in [facility] from 12/22 -> 2/11/22 for acute perforated diverticulitis s/p Rosales 12/25, colostomy bag 12/26, hospitalization c/b IR drainage for abdominal abscess 1/3 Cx grew e.coli & bacteriodes, also required Mechanical Ventilation for Acute Respiratory Failure with Tracheostomy done 1/7.  PEG done 1/18, developed bleeding through ostomy for which she was transfused 1/28, EGD on 2/1 showed gastritis. Developed fevers on 1/30 and CT's showed Acute left MCA infarct- could be 2/2 to carotid occlusion vs afib, new RLL aspiration pneumonia, seen by ID, completed antibiotics, seen by [facility], not surgical candidate. [facility] [place] [facility], after staff noted pt has blood to her abdomen.  (27 Mar 2022 12:33)    Interval History:   Patient seen and examined at bedside. Patient awakes to voice. Patient is nonverbal. Nods her head intermittently when asked questions, but unclear if responses are meaningful.   GOC discussion with son.     PERTINENT PM/SXH:   Seizure  HTN (hypertension)  Glaucoma  Thyroid disease  Blood clot of neck vein  TIA (transient ischemic attack)  Hypothyroidism  Stroke  Chronic respiratory failure with hypoxia and hypercapnia  Atrial fibrillation  MDD (major depressive disorder)  Gastritis  S/P appendectomy  Status post Rosales procedure  S/P colostomy  S/P percutaneous endoscopic gastrostomy (PEG) tube placement  Status post tracheostomy    FAMILY HISTORY:  FH: HTN (hypertension)    ITEMS NOT CHECKED ARE NOT PRESENT    SOCIAL HISTORY:   Significant other/partner[ x- partner]  Children[x- son Blake]  Bahai/Spirituality:   Substance hx:  [ ]   Tobacco hx:  [ ]   Alcohol hx: [ ]   Home Opioid hx:  [ ] I-Stop Reference No: [id number]  Living Situation: [ ]Home  [x ]Long term care  [ ]Rehab [ ]Other    ADVANCE DIRECTIVES:    MOLST  [ ]  Living Will  [ ]   DECISION MAKER(s):  [ x] Health Care Proxy(s)  [ ] Surrogate(s)  [ ] Guardian           Name(s): Phone Number(s):  Dino Allred: [phone]    BASELINE (I)ADL(s) (prior to admission):  [place]: [ ]Total  [ ] Moderate [ x]Dependent    Allergies    No Known Allergies    Intolerances    MEDICATIONS  (STANDING):  amLODIPine   Tablet 10 milliGRAM(s) Oral daily  apixaban 5 milliGRAM(s) Oral every 12 hours  aspirin  chewable 81 milliGRAM(s) Enteral Tube daily  atorvastatin 40 milliGRAM(s) Oral at bedtime  carvedilol 3.125 milliGRAM(s) Oral every 12 hours  clopidogrel Tablet 75 milliGRAM(s) Enteral Tube daily  dorzolamide 2% Ophthalmic Solution 1 Drop(s) Both EYES <User Schedule>  influenza  Vaccine (HIGH DOSE) 0.7 milliLiter(s) IntraMuscular once  lactated ringers. 1000 milliLiter(s) (75 mL/Hr) IV Continuous <Continuous>  latanoprost 0.005% Ophthalmic Solution 1 Drop(s) Both EYES at bedtime  levETIRAcetam  IVPB 500 milliGRAM(s) IV Intermittent every 12 hours  levothyroxine 75 MICROGram(s) Oral daily  meropenem/vaborbactam IVPB 4 Gram(s) IV Intermittent every 8 hours  pantoprazole   Suspension 40 milliGRAM(s) Oral daily  potassium chloride   Powder 40 milliEquivalent(s) Enteral Tube once  timolol 0.5% Solution 1 Drop(s) Both EYES two times a day    MEDICATIONS  (PRN):  acetaminophen     Tablet .. 650 milliGRAM(s) Oral every 6 hours PRN Mild Pain (1 - 3), Moderate Pain (4 - 6)      PRESENT SYMPTOMS: [x ]Unable to self-report due to altered mental status  [ ] CPOT [ x] PAINADs [x ] RDOS  Source if other than patient:  [ ]Family   [ ]Team     Pain: [ ]yes [ ]no  QOL impact -   Location -                    Aggravating factors -  Quality -  Radiation -  Timing-  Severity (0-10 scale):  Minimal acceptable level (0-10 scale):     CPOT:    https://www.sccm.org/getattachment/ojk98j04-3i6p-3r4c-5t2b-5128e7938x6z/Critical-Care-Pain-Observation-Tool-(CPOT)      PAIN AD Score: 0    http://[email or web address]/cog/[email or web address] (press ctrl +  left click to view)    Dyspnea:                           [ ]Mild [ ]Moderate [ ]Severe  RDOS: 0  0 to 2  minimal or no respiratory distress   3  mild distress  4 to 6 moderate distress  >7 severe distress  [email or web address]://[email or web address]/handouts/hen/Respiratory_Distress_Observation_Scale.pdf (Ctrl +  left click to view)     Anxiety:                             [ ]Mild [ ]Moderate [ ]Severe  Agitation:                          [ ]Mild [ ]Moderate [ ]Severe  Fatigue:                             [ ]Mild [ ]Moderate [ ]Severe  Nausea:                             [ ]Mild [ ]Moderate [ ]Severe  Loss of appetite:              [ ]Mild [ ]Moderate [ ]Severe  Constipation:                   [ ]Mild [ ]Moderate [ ]Severe  Diarrhea:                          [ ]Mild [ ]Moderate [ ]Severe      Other Symptoms:  [ ]All other review of systems negative - unable to obtain due to being nonverbal and encephalopathy    Palliative Performance Status Version 2:   30      %    [email or web address]://[email or web address]/[email or web address]_performance_scale_[email or web address].pdf    PHYSICAL EXAM:  Vital Signs Last 24 Hrs  T(C): 36.5 (07 Apr 2022 10:50), Max: 37.1 (07 Apr 2022 02:13)  T(F): 97.7 (07 Apr 2022 10:50), Max: 98.7 (07 Apr 2022 02:13)  HR: 72 (07 Apr 2022 10:50) (72 - 85)  BP: 151/66 (07 Apr 2022 10:50) (142/68 - 156/72)  BP(mean): --  RR: 17 (07 Apr 2022 10:50) (17 - 18)  SpO2: 100% (07 Apr 2022 10:50) (98% - 100%)     I&O's Summary    06 Apr 2022 07:01  -  07 Apr 2022 07:00  --------------------------------------------------------  IN: 2965 mL / OUT: 1800 mL / NET: 1165 mL    GENERAL:  [x ]Awake and alert; eyes track [ ]Oriented x   [ ]Lethargic  [ ]Cachexia  [ ]Unarousable  [ ]Verbal  [ x]Non-Verbal  [x ] No Distress  Behavioral:   [ ] Anxiety  [ ] Delirium [ ] Agitation [x ] Calm  [ ] Other  HEENT:  [ ]Normal  [ ] Temporal Wasting  [x ]Dry mouth   [x ] Trach  [ ]Oral lesions  [ ] Mucositis  PULMONARY:   [ ]Clear [ ]Tachypnea  [ ]Audible excessive secretions   [ ]Rhonchi        [ ]Right [ ]Left [ ]Bilateral  [ ]Crackles        [ ]Right [ ]Left [ ]Bilateral  [ ]Wheezing     [ ]Right [ ]Left [ ]Bilateral  [x ]Diminished breath sounds [ ]right [ ]left [x ]bilateral  CARDIOVASCULAR:    [ x]Regular [ ]Irregular [ ]Tachy  [ ]Gabe [x ]Murmur [ ]Other  GASTROINTESTINAL: + Ostomy  [ x]Soft  [ ]Distended   [ ]+BS  [x ]Non tender [ ]Tender  [x ]PEG [ ]OGT/ NGT  Last BM: 4/7/2022  GENITOURINARY:  [ ]Normal [x ] Incontinent   [ ]Oliguria/Anuria   [ ]Boucher  MUSCULOSKELETAL:   [ ]Normal   [ ]Weakness  [ x]Bed/Wheelchair bound [ ]Edema  [  ] amputation  [  ] contraction  NEUROLOGIC: unable to follow commands ; spontaneous movement of LEUE  [ ]No focal deficits  [x ]Cognitive impairment  [x ]Dysphagia [ ]Dysarthria [ ]Paresis [ ]Other   SKIN: Incontinence Associated Dermatitis. Abdominal surgical scar c/d/i. See Nursing Skin Assessment for further details  [ ]Normal    [ ]Rash  [ ]Pressure ulcer(s)       Present on admission [ ]y [ ]n   [  ]  Wound    [  ] hyperpigmentation    CRITICAL CARE:  [ ] Shock Present  [ ]Septic [ ]Cardiogenic [ ]Neurologic [ ]Hypovolemic  [ ]  Vasopressors [ ]  Inotropes   [ ]Respiratory failure present [ ]Mechanical ventilation [ ]Non-invasive ventilatory support [ ]High flow    [ ]Acute  [ ]Chronic [ ]Hypoxic  [ ]Hypercarbic [ ]Other  [ ]Other organ failure     LABS:                        9.6    5.93  )-----------( 268      ( 07 Apr 2022 07:48 )             31.1   04-07    138  |  102  |  13  ----------------------------<  129<H>  3.4<L>   |  23  |  0.40<L>    Ca    8.7      07 Apr 2022 07:48  Phos  2.8     04-07  Mg     1.70     04-07      RADIOLOGY & ADDITIONAL STUDIES:    PROTEIN CALORIE MALNUTRITION PRESENT: [ ]mild [ ]moderate [ ]severe [ ]underweight [ ]morbid obesity  https://www.andeal.org/vault/2440/web/files/ONC/Table_Clinical%20Characteristics%20to%20Document%20Malnutrition-White%20JV%20et%20al%523706.pdf    Height (cm): 167.6 (03-26-22 @ 22:43), 167.6 (01-18-22 @ 12:49)  Weight (kg): 76.2 (03-27-22 @ 12:33), 84 (01-18-22 @ 12:49)  BMI (kg/m2): 27.1 (03-27-22 @ 12:33), 29.9 (03-26-22 @ 22:43), 29.9 (01-18-22 @ 12:49)    [ x]PPSV2 < or = to 30% [ ]significant weight loss  [ ]poor nutritional intake  [ ]anasarca [ ]Artificial Nutrition      REFERRALS:   [ ]Chaplaincy  [ ]Hospice  [ ]Child Life  [ ]Social Work  [ x]Case management [ ]Holistic Therapy    HPI:  80 yo F with PMHx HTN, HLD, Hypothyroidism, Depression, seizure d/o, RLE DVT, Cholelithiasis s/p recent Hospitalization in [facility] from 12/22 -> 2/11/22 for acute perforated diverticulitis s/p Rosales 12/25, colostomy bag 12/26, hospitalization c/b IR drainage for abdominal abscess 1/3 Cx grew e.coli & bacteriodes, also required Mechanical Ventilation for Acute Respiratory Failure with Tracheostomy done 1/7.  PEG done 1/18, developed bleeding through ostomy for which she was transfused 1/28, EGD on 2/1 showed gastritis. Developed fevers on 1/30 and CT's showed Acute left MCA infarct- could be 2/2 to carotid occlusion vs afib, new RLL aspiration pneumonia, seen by ID, completed antibiotics, seen by [facility], not surgical candidate. [facility] [place] [facility] after staff noted pt has blood to her abdomen.  (27 Mar 2022 12:33)    Interval History:   Patient seen and examined at bedside. Patient awakes to voice. Patient is nonverbal. Nods her head intermittently when asked questions, but unclear if responses are meaningful.   GOC discussion with son.     PERTINENT PM/SXH:   Seizure  HTN (hypertension)  Glaucoma  Thyroid disease  Blood clot of neck vein  TIA (transient ischemic attack)  Hypothyroidism  Stroke  Chronic respiratory failure with hypoxia and hypercapnia  Atrial fibrillation  MDD (major depressive disorder)  Gastritis  S/P appendectomy  Status post Rosales procedure  S/P colostomy  S/P percutaneous endoscopic gastrostomy (PEG) tube placement  Status post tracheostomy    FAMILY HISTORY:  FH: HTN (hypertension)    ITEMS NOT CHECKED ARE NOT PRESENT    SOCIAL HISTORY:   Significant other/partner[ x- partner]  Children[x- son Blake]  Hoahaoism/Spirituality:   Substance hx:  [ ]   Tobacco hx:  [ ]   Alcohol hx: [ ]   Home Opioid hx:  [ ] I-Stop Reference No: [id number]  Living Situation: [ ]Home  [x ]Long term care  [ ]Rehab [ ]Other    ADVANCE DIRECTIVES:    MOLST  [ ]  Living Will  [ ]   DECISION MAKER(s):  [ x] Health Care Proxy(s)  [ ] Surrogate(s)  [ ] Guardian           Name(s): Phone Number(s):  Dino Allred: [phone]    BASELINE (I)ADL(s) (prior to admission):  [place]: [ ]Total  [ ] Moderate [ x]Dependent    Allergies    No Known Allergies    Intolerances    MEDICATIONS  (STANDING):  amLODIPine   Tablet 10 milliGRAM(s) Oral daily  apixaban 5 milliGRAM(s) Oral every 12 hours  aspirin  chewable 81 milliGRAM(s) Enteral Tube daily  atorvastatin 40 milliGRAM(s) Oral at bedtime  carvedilol 3.125 milliGRAM(s) Oral every 12 hours  clopidogrel Tablet 75 milliGRAM(s) Enteral Tube daily  dorzolamide 2% Ophthalmic Solution 1 Drop(s) Both EYES <User Schedule>  influenza  Vaccine (HIGH DOSE) 0.7 milliLiter(s) IntraMuscular once  lactated ringers. 1000 milliLiter(s) (75 mL/Hr) IV Continuous <Continuous>  latanoprost 0.005% Ophthalmic Solution 1 Drop(s) Both EYES at bedtime  levETIRAcetam  IVPB 500 milliGRAM(s) IV Intermittent every 12 hours  levothyroxine 75 MICROGram(s) Oral daily  meropenem/vaborbactam IVPB 4 Gram(s) IV Intermittent every 8 hours  pantoprazole   Suspension 40 milliGRAM(s) Oral daily  potassium chloride   Powder 40 milliEquivalent(s) Enteral Tube once  timolol 0.5% Solution 1 Drop(s) Both EYES two times a day    MEDICATIONS  (PRN):  acetaminophen     Tablet .. 650 milliGRAM(s) Oral every 6 hours PRN Mild Pain (1 - 3), Moderate Pain (4 - 6)      PRESENT SYMPTOMS: [x ]Unable to self-report due to altered mental status  [ ] CPOT [ x] PAINADs [x ] RDOS  Source if other than patient:  [ ]Family   [ ]Team     Pain: [ ]yes [ ]no  QOL impact -   Location -                    Aggravating factors -  Quality -  Radiation -  Timing-  Severity (0-10 scale):  Minimal acceptable level (0-10 scale):     CPOT:    https://www.sccm.org/getattachment/faz52e38-8m4e-0q3o-8d2c-0694h8072j1t/Critical-Care-Pain-Observation-Tool-(CPOT)      PAIN AD Score: 0    http://[email or web address].[email or web address]/cog/painad.pdf (press ctrl +  left click to view)    Dyspnea:                           [ ]Mild [ ]Moderate [ ]Severe  RDOS: 0  0 to 2  minimal or no respiratory distress   3  mild distress  4 to 6 moderate distress  >7 severe distress  https://[email or web address]/handouts/hen/Respiratory_Distress_Observation_Scale.pdf (Ctrl +  left click to view)     Anxiety:                             [ ]Mild [ ]Moderate [ ]Severe  Agitation:                          [ ]Mild [ ]Moderate [ ]Severe  Fatigue:                             [ ]Mild [ ]Moderate [ ]Severe  Nausea:                             [ ]Mild [ ]Moderate [ ]Severe  Loss of appetite:              [ ]Mild [ ]Moderate [ ]Severe  Constipation:                   [ ]Mild [ ]Moderate [ ]Severe  Diarrhea:                          [ ]Mild [ ]Moderate [ ]Severe      Other Symptoms:  [ ]All other review of systems negative - unable to obtain due to being nonverbal and encephalopathy    Palliative Performance Status Version 2:   30      %    http://[email or web address]/files/news/palliative_performance_scale_[email or web address].pdf    PHYSICAL EXAM:  Vital Signs Last 24 Hrs  T(C): 36.5 (07 Apr 2022 10:50), Max: 37.1 (07 Apr 2022 02:13)  T(F): 97.7 (07 Apr 2022 10:50), Max: 98.7 (07 Apr 2022 02:13)  HR: 72 (07 Apr 2022 10:50) (72 - 85)  BP: 151/66 (07 Apr 2022 10:50) (142/68 - 156/72)  BP(mean): --  RR: 17 (07 Apr 2022 10:50) (17 - 18)  SpO2: 100% (07 Apr 2022 10:50) (98% - 100%)     I&O's Summary    06 Apr 2022 07:01  -  07 Apr 2022 07:00  --------------------------------------------------------  IN: 2965 mL / OUT: 1800 mL / NET: 1165 mL    GENERAL:  [x ]Awake and alert; eyes track [ ]Oriented x   [ ]Lethargic  [ ]Cachexia  [ ]Unarousable  [ ]Verbal  [ x]Non-Verbal  [x ] No Distress  Behavioral:   [ ] Anxiety  [ ] Delirium [ ] Agitation [x ] Calm  [ ] Other  HEENT:  [ ]Normal  [ ] Temporal Wasting  [x ]Dry mouth   [x ] Trach  [ ]Oral lesions  [ ] Mucositis  PULMONARY:   [ ]Clear [ ]Tachypnea  [ ]Audible excessive secretions   [ ]Rhonchi        [ ]Right [ ]Left [ ]Bilateral  [ ]Crackles        [ ]Right [ ]Left [ ]Bilateral  [ ]Wheezing     [ ]Right [ ]Left [ ]Bilateral  [x ]Diminished breath sounds [ ]right [ ]left [x ]bilateral  CARDIOVASCULAR:    [ x]Regular [ ]Irregular [ ]Tachy  [ ]Gabe [x ]Murmur [ ]Other  GASTROINTESTINAL: + Ostomy  [ x]Soft  [ ]Distended   [ ]+BS  [x ]Non tender [ ]Tender  [x ]PEG [ ]OGT/ NGT  Last BM: 4/7/2022  GENITOURINARY:  [ ]Normal [x ] Incontinent   [ ]Oliguria/Anuria   [ ]Boucher  MUSCULOSKELETAL:   [ ]Normal   [ ]Weakness  [ x]Bed/Wheelchair bound [ ]Edema  [  ] amputation  [  ] contraction  NEUROLOGIC: unable to follow commands ; spontaneous movement of LEUE  [ ]No focal deficits  [x ]Cognitive impairment  [x ]Dysphagia [ ]Dysarthria [ ]Paresis [ ]Other   SKIN: Incontinence Associated Dermatitis. Abdominal surgical scar c/d/i. See Nursing Skin Assessment for further details  [ ]Normal    [ ]Rash  [ ]Pressure ulcer(s)       Present on admission [ ]y [ ]n   [  ]  Wound    [  ] hyperpigmentation    CRITICAL CARE:  [ ] Shock Present  [ ]Septic [ ]Cardiogenic [ ]Neurologic [ ]Hypovolemic  [ ]  Vasopressors [ ]  Inotropes   [ ]Respiratory failure present [ ]Mechanical ventilation [ ]Non-invasive ventilatory support [ ]High flow    [ ]Acute  [ ]Chronic [ ]Hypoxic  [ ]Hypercarbic [ ]Other  [ ]Other organ failure     LABS:                        9.6    5.93  )-----------( 268      ( 07 Apr 2022 07:48 )             31.1   04-07    138  |  102  |  13  ----------------------------<  129<H>  3.4<L>   |  23  |  0.40<L>    Ca    8.7      07 Apr 2022 07:48  Phos  2.8     04-07  Mg     1.70     04-07      RADIOLOGY & ADDITIONAL STUDIES:  CT Chest/ Abd/ Pelvis 4/4/2022  Trace bilateral pleural effusions.  Improved aeration in the lower lobes bilaterally since 1/30/2022.   Nonspecific patchy bilateral groundglass and linear opacities are noted.  Enhancement of the wall of the cecum and ascending colon with mild infiltration of the adjacent fat is similar in appearance to prior study dated 3/27/2022. An inflammatory or infectious process is considered.  Dependent high attenuation within the urinary bladder, which may   represent small stones and/or debris. An underlying mass cannot be excluded.  Thick-walled collection in the cul-de-sac and small collections along the right paracolic gutter, not significantly changed since 3/27/2022.    CTA Head/Neck 4/4/2022  1. There is no flow within the petrous, precavernous or cavernous   portions of the left internal carotid artery.  2. Flow is appreciated within the left middle cerebral artery,   significantly decreased in caliber when compared with the right middle cerebral artery. Furthermore, only an attenuated/hypoplastic flow pattern is appreciated in association with the left A1 segment as well as the left posterior communicating artery. 3. Hypoplastic/attenuated pattern of flow within the left A 2 segment of the anterior cerebral artery.    CT Head 4/4/2022  1. Evolution of previously visualized left MCA infarct, which appeared acute at time of prior study dated 2/5/2022. Cannot exclude an acute on chronic process. Consider further evaluation via MR imaging as clinically indicated, provided the patient has no contraindications.  2. Findings also include a new decreased attenuation involving the left brachium pontis and left cerebellum, possibly representing an acute ischemic event in this location. MR imaging to include DWI and ADC mapping techniques may be helpful for further characterization of this finding.  3. No acute intracranial hemorrhage.  4. Paranasal sinus inflammatory changes, as described above. Cannot exclude sinusitis.  5. Persistent fluid within right-sided mastoid air cells. Cannot exclude mastoiditis.    PROTEIN CALORIE MALNUTRITION PRESENT: [ ]mild [ ]moderate [ ]severe [ ]underweight [ ]morbid obesity  https://www.andeal.org/vault/2440/web/files/ONC/Table_Clinical%20Characteristics%20to%20Document%20Malnutrition-White%20JV%20et%20al%202012.pdf    Height (cm): 167.6 (03-26-22 @ 22:43), 167.6 (01-18-22 @ 12:49)  Weight (kg): 76.2 (03-27-22 @ 12:33), 84 (01-18-22 @ 12:49)  BMI (kg/m2): 27.1 (03-27-22 @ 12:33), 29.9 (03-26-22 @ 22:43), 29.9 (01-18-22 @ 12:49)    [ x]PPSV2 < or = to 30% [ ]significant weight loss  [ ]poor nutritional intake  [ ]anasarca [ ]Artificial Nutrition      REFERRALS:   [ ]Chaplaincy  [ ]Hospice  [ ]Child Life  [ ]Social Work  [ x]Case management [ ]Holistic Therapy     ************************************************************************  PALLIATIVE MEDICINE COORDINATION OF CARE DOCUMENTATION  [x] Inpatient Consult  [ ] Other:  ************************************************************************    HPI reviewed     ------------------------------------------------------------------------    MEDICATION REVIEW:  --- Pls refer to current medicatons in the body of this note  ISTOP REFERENCE: [id number]  Others' Prescriptions  Patient Name: Marina AllredBirth Date: 1940  Address: [address] [place] [id number]: Female  Rx Written	Rx Dispensed	Drug	Quantity	Days Supply	Prescriber Name  02/23/2022	03/09/2022	lorazepam 1 mg tablet	30	15	Misha Oneal  Prescriber Fauzia # [id number]  Payment Method Cash  Dispenser [facility]  --- PRN usage: The patient HAS NOT used PRN's in the last 24h.  ------------------------------------------------------------------------  COORDINATION OF CARE:  --- Palliative Care consulted for: goals of care   --- Patient assessed: 4/7/2022  --- Patient previously seen by Palliative Care service: NO  ADVANCE CARE PLANNING  --- Code status: DNR  --- MOLST reviewed in chart: completed   --- HCP/ Surrogate: Morgan Allred   --- [facility] document found in Alpha: Yes  --- HCP/ Living will/ Other advanced directives in Alpha: NONE  ------------------------------------------------------------------------  CARE PROVIDER DOCUMENTATION:  --- SW/CM notes reviewed   --- Medicine notes reviewed   --- GI notes reviewed   --- Cardiology notes reviewed   --- Infectious Disease notes reviewed   --- Nephrology notes reviewed  --- IR notes reviewed   --- ENT notes reviewed     PLAN OF CARE  --- Known admissions in past year: 2  --- Current admit date: 3/27/2022  --- LOS: 12 days  --- LACE score: 10  --- Current dispo plan: TO BE DETERMINED  ------------------------------------------------------------------------  --- Chart reviewed: 30 Minutes [including time used to gather, review and transfer data to this note]    Prolonged services rendered, as part of this patient's care provided by Palliative Medicine, include: i.chart review for provider and ancillary service documentation, ii.pertinent diagnostics including laboratory and imaging studies,iii. medication review including PRN use, iv. admission history including previous palliative care encounters and GOC notes, v.advance care planning documents including HCP and MOLST forms in Alpha. Part of Palliative Medicine extended evaluation and management also involves coordination of care with our IDT, the primary and consulting karli, and unit CM/SW and Hospice if eligible. Recommendations based on the information gathered and discussed are outline in the AP of our notes.    ************************************************************************

## 2022-04-07 NOTE — PROGRESS NOTE ADULT - ASSESSMENT
80yo female with complicated medical course    Abnormal CT   agree with abx per ID   fluid collections too small for drainage; IR input appreciated  recent IR peg exchange 3/30; reconsult if issues  brown stool and tolerating PEG feeds  d/w attg, no acute gi interventions given multiple medical issues  continue w/supportive care   DNR/DNI; hospice planning    Sepsis   per primary team and ID     AFib   rate control   protonix daily while on a/c    I reviewed the overnight course of events on the unit, re-confirming the patient history. I discussed the care with the patient and their family. The plan of care was discussed with the physician assistant and modifications were made to the notation where appropriate. Differential diagnosis and plan of care discussed with patient after the evaluation. Advanced care planning was discussed with patient and family.  Advanced care planning forms were reviewed and discussed.  Risks, benefits and alternatives of gastroenterologic procedures were discussed in detail and all questions were answered. 35 minutes spent on total encounter of which more than fifty percent of the encounter was spent counseling and/or coordinating care by the attending physician.

## 2022-04-07 NOTE — CONSULT NOTE ADULT - PROBLEM SELECTOR RECOMMENDATION 4
- CT Head - 1. Evolution of previously visualized left MCA infarct, which appeared   acute at time of prior study dated. 2. Findings also include a new decreased attenuation involving the left brachium pontis and left cerebellum, possibly representing an acute ischemic event in this location.  - Neurology recommendations appreciated

## 2022-04-07 NOTE — PROGRESS NOTE ADULT - SUBJECTIVE AND OBJECTIVE BOX
Date of Service  : 04-07-22     INTERVAL HPI/OVERNIGHT EVENTS: No new concerns.   Vital Signs Last 24 Hrs  T(C): 36.8 (07 Apr 2022 17:26), Max: 37.1 (07 Apr 2022 02:13)  T(F): 98.3 (07 Apr 2022 17:26), Max: 98.7 (07 Apr 2022 02:13)  HR: 72 (07 Apr 2022 17:26) (72 - 82)  BP: 146/66 (07 Apr 2022 17:26) (146/66 - 156/72)  BP(mean): --  RR: 18 (07 Apr 2022 17:26) (17 - 18)  SpO2: 99% (07 Apr 2022 17:26) (99% - 100%)  I&O's Summary    06 Apr 2022 07:01  -  07 Apr 2022 07:00  --------------------------------------------------------  IN: 2965 mL / OUT: 1800 mL / NET: 1165 mL    07 Apr 2022 07:01  -  07 Apr 2022 19:19  --------------------------------------------------------  IN: 1215 mL / OUT: 1325 mL / NET: -110 mL      MEDICATIONS  (STANDING):  amLODIPine   Tablet 10 milliGRAM(s) Oral daily  apixaban 5 milliGRAM(s) Oral every 12 hours  aspirin  chewable 81 milliGRAM(s) Enteral Tube daily  atorvastatin 40 milliGRAM(s) Oral at bedtime  carvedilol 3.125 milliGRAM(s) Oral every 12 hours  clopidogrel Tablet 75 milliGRAM(s) Enteral Tube daily  dorzolamide 2% Ophthalmic Solution 1 Drop(s) Both EYES <User Schedule>  influenza  Vaccine (HIGH DOSE) 0.7 milliLiter(s) IntraMuscular once  lactated ringers. 1000 milliLiter(s) (75 mL/Hr) IV Continuous <Continuous>  latanoprost 0.005% Ophthalmic Solution 1 Drop(s) Both EYES at bedtime  levETIRAcetam  IVPB 500 milliGRAM(s) IV Intermittent every 12 hours  levothyroxine 75 MICROGram(s) Oral daily  meropenem/vaborbactam IVPB 4 Gram(s) IV Intermittent every 8 hours  pantoprazole   Suspension 40 milliGRAM(s) Oral daily  timolol 0.5% Solution 1 Drop(s) Both EYES two times a day    MEDICATIONS  (PRN):  acetaminophen     Tablet .. 650 milliGRAM(s) Oral every 6 hours PRN Mild Pain (1 - 3), Moderate Pain (4 - 6)    LABS:                        9.6    5.93  )-----------( 268      ( 07 Apr 2022 07:48 )             31.1     04-07    138  |  102  |  13  ----------------------------<  129<H>  3.4<L>   |  23  |  0.40<L>    Ca    8.7      07 Apr 2022 07:48  Phos  2.8     04-07  Mg     1.70     04-07              Consultant(s) Notes Reviewed:  [x ] YES  [ ] NO    PHYSICAL EXAM:  GENERAL: NAD, well-groomed, well-developed,not in any distress ,  HEAD:  Atraumatic, Normocephalic  NECK: slight bleeding around  trach collar   NERVOUS SYSTEM:  Alert   CHEST/LUNG: Good air entry bilateral with no  rales, rhonchi, wheezing, or rubs  HEART: Regular rate and rhythm; No murmurs, rubs, or gallops  ABDOMEN: Soft, Nontender, Nondistended; Bowel sounds present, PEG and Ostomy .   EXTREMITIES:  2+ Peripheral Pulses, No clubbing, cyanosis, or edema      Care Discussed with Consultants/Other Providers [ x] YES  [ ] NO

## 2022-04-07 NOTE — PROGRESS NOTE ADULT - SUBJECTIVE AND OBJECTIVE BOX
Date of service: 4/7/22    chief complaint: dislodged PEG tube     extended hpi: 80 yo F with PMHx HTN, HLD, Hypothyroidism, Depression, seizure d/o, RLE DVT, Cholelithiasis s/p recent Hospitalization in [facility] from 12/22 -> 2/11/22 for acute perforated diverticulitis s/p Rosales 12/25, colostomy bag 12/26, hospitalization c/b IR drainage for abdominal abscess 1/3 Cx grew e.coli & bacteriodes, also required Mechanical Ventilation for Acute Respiratory Failure with Tracheostomy done 1/7.  PEG done 1/18, developed bleeding through ostomy for which she was transfused 1/28, EGD on 2/1 showed gastritis. Developed fevers on 1/30 and CT's showed Acute left MCA infarct- could be 2/2 to carotid occlusion vs afib, new RLL aspiration pneumonia, seen by ID, completed antibiotics, seen by VascSx, not surgical candidate. Pt aphasic sent from [facility] for dislodged Peg tube, after staff noted pt has blood to her abdomen.     S: resting comfortably today, no acute events.  now DNR. MOLST completed.     Review of Systems:   Constitutional: [ ] fevers, [ ] chills.   Skin: [ ] dry skin. [ ] rashes.  Psychiatric: [ ] depression, [ ] anxiety.   Gastrointestinal: [ ] BRBPR, [ ] melena.   Neurological: [ ] confusion. [ ] seizures. [ ] shuffling gait.   Ears,Nose,Mouth and Throat: [ ] ear pain [ ] sore throat.   Eyes: [ ] diplopia.   Respiratory: [ ] hemoptysis. [ ] shortness of breath  Cardiovascular: See HPI above  Hematologic/Lymphatic: [ ] anemia. [ ] painful nodes. [ ] prolonged bleeding.   Genitourinary: [ ] hematuria. [ ] flank pain.   Endocrine: [ ] significant change in weight. [ ] intolerance to heat and cold.     Review of systems [x ] otherwise negative, [ ] otherwise unable to obtain    FH: no family history of sudden cardiac death in first degree relatives    SH: [ ] tobacco, [ ] alcohol, [ ] drugs    acetaminophen     Tablet .. 650 milliGRAM(s) Oral every 6 hours PRN  amLODIPine   Tablet 10 milliGRAM(s) Oral daily  apixaban 5 milliGRAM(s) Oral every 12 hours  aspirin  chewable 81 milliGRAM(s) Enteral Tube daily  atorvastatin 40 milliGRAM(s) Oral at bedtime  carvedilol 3.125 milliGRAM(s) Oral every 12 hours  clopidogrel Tablet 75 milliGRAM(s) Enteral Tube daily  dorzolamide 2% Ophthalmic Solution 1 Drop(s) Both EYES <User Schedule>  influenza  Vaccine (HIGH DOSE) 0.7 milliLiter(s) IntraMuscular once  lactated ringers. 1000 milliLiter(s) IV Continuous <Continuous>  latanoprost 0.005% Ophthalmic Solution 1 Drop(s) Both EYES at bedtime  levETIRAcetam  IVPB 500 milliGRAM(s) IV Intermittent every 12 hours  levothyroxine 75 MICROGram(s) Oral daily  meropenem/vaborbactam IVPB 4 Gram(s) IV Intermittent every 8 hours  pantoprazole   Suspension 40 milliGRAM(s) Oral daily  potassium chloride   Powder 40 milliEquivalent(s) Enteral Tube once  timolol 0.5% Solution 1 Drop(s) Both EYES two times a day                            9.6    5.93  )-----------( 268      ( 07 Apr 2022 07:48 )             31.1       04-07    138  |  102  |  13  ----------------------------<  129<H>  3.4<L>   |  23  |  0.40<L>    Ca    8.7      07 Apr 2022 07:48  Phos  2.8     04-07  Mg     1.70     04-07    CARDIAC MARKERS ( 05 Apr 2022 07:40 )  x     / x     / 145 U/L / x     / x          T(C): 36.5 (04-07-22 @ 10:50), Max: 37.1 (04-07-22 @ 02:13)  HR: 72 (04-07-22 @ 10:50) (72 - 85)  BP: 151/66 (04-07-22 @ 10:50) (142/68 - 156/72)  RR: 17 (04-07-22 @ 10:50) (17 - 18)  SpO2: 100% (04-07-22 @ 10:50) (98% - 100%)  Wt(kg): --    I&O's Summary    06 Apr 2022 07:01 - 07 Apr 2022 07:00  --------------------------------------------------------  IN: 2965 mL / OUT: 1800 mL / NET: 1165 mL    General: no acute distress   Head: Normocephalic and atraumatic.   Neck: No JVD. No bruits. Supple. Does not appear to be enlarged.   Cardiovascular: + S1,S2 ; RRR Soft systolic murmur at the left lower sternal border. No rubs noted.    Lungs: CTA b/l. No rhonchi, rales or wheezes.   Abdomen: + BS, soft. Non tender. Non distended. No rebound. No guarding.   Extremities: No clubbing/cyanosis/edema.   Psychiatric: unable to assess     < from: TTE Echo Complete w/o Contrast w/ Doppler (02.04.22 @ 15:28) >  Summary:   1. Left ventricular ejection fraction, by visual estimation, is 60 to   65%.   2. Normal global left ventricular systolic function.   3. Mild mitral valve regurgitation.   4.Mild thickening of the anterior and posterior mitral valve leaflets.   5. Mild tricuspid regurgitation.   6. Mild to moderate aortic regurgitation.   7. Sclerotic aortic valve with normal opening.  [id number] Colin Butcher MD, [facility]  Electronically signed on 2/5/2022 at 3:03:14 PM  < end of copied text >      < from: CT Angio Head w/ IV Cont (04.04.22 @ 14:19) >  IMPRESSIONS:  CTA neck:  1. Short segment 70% stenosis in diameter at the proximal aspect of the   right internal carotid artery.  2. The left internal carotid artery is occluded at a distance of   approximately 7 mm beyond the origin of this vessel.  3. Bilateral vertebral arteries are appreciated. Left vertebral artery is   dominant.    CTA COW:  1. There is no flow within the petrous, precavernous or cavernous   portions of the left internal carotid artery.  2. Flow is appreciated within the left middle cerebral artery,   significantly decreased in caliber when compared with the right middle   cerebral artery. Furthermore, only an attenuated/hypoplastic flow pattern   is appreciated in association with the left A1 segment as well as the   left posterior communicating artery.  3. Hypoplastic/attenuated pattern of flow within the left A 2 segment of   the anterior cerebral artery.  < end of copied text >    < from: CT Head No Cont (04.04.22 @ 14:19) >  IMPRESSION:    1. Evolution of previously visualized left MCA infarct, which appeared   acute at time of prior study dated 2/5/2022. Cannot exclude an acute on   chronic process. Consider further evaluation via MR imaging as clinically   indicated, provided the patient has no contraindications.    2. Findings also include a new decreased attenuation involving the left   brachium pontis and left cerebellum, possibly representing an acute   ischemic event in this location. MR imaging to include DWI and ADC   mapping techniques may be helpful for further characterization of this   finding.    3. No acute intracranial hemorrhage.    4. Paranasal sinus inflammatory changes, as described above. Cannot   exclude sinusitis.    5. Persistent fluid within right-sided mastoid air cells. Cannot exclude   mastoiditis.    < end of copied text >        A/P:  80 yo F with PMHx HTN, HLD, Hypothyroidism, Depression, seizure d/o, RLE DVT, Cholelithiasis s/p recent Hospitalization in [facility] from 12/22 -> 2/11/22 for acute perforated diverticulitis s/p Rosales 12/25, colostomy bag 12/26, hospitalization c/b IR drainage for abdominal abscess 1/3 Cx grew e.coli & bacteriodes, also required Mechanical Ventilation for Acute Respiratory Failure with Tracheostomy done 1/7.  PEG done 1/18, developed bleeding through ostomy for which she was transfused 1/28, EGD on 2/1 showed gastritis. Developed fevers on 1/30 and CT's showed Acute left MCA infarct- could be 2/2 to carotid occlusion vs afib, new RLL aspiration pneumonia, seen by ID, completed antibiotics, seen by VascSx, not surgical candidate. Pt aphasic sent from [facility] for dislodged Peg tube, after staff noted pt has blood to her abdomen.     --prior chart reviewed (Jan-Feb 2022) Pt with CVA and found with AFib and Carotid occlusion.  AC held initially while monitoring for hemorrhagic conversion and then in the setting of GI bleed req PRBCs.  She was continued on DAPT.  --given elevated [id number], recommend lifelong AC for AF if able to tolerate  - Eliquis started - monitor h/h   -on antibiotics for klebsiella.  -evolving stroke, worsening mental status.  -goals of care now toward comfort, and she is DNR.  managing all problems medically.      Shamar Anderson M.D.  Cardiac Electrophysiology  [phone]

## 2022-04-07 NOTE — PROGRESS NOTE ADULT - ASSESSMENT
80 yo F w/ PMHx of perforated diverticulitis s/p Rosales procedure, colostomy, abdominal abscess with wound s/p IR drainage, Respiratory Failure s/p Trache, CVA w/ functional quadriplegia, aphasia, dysphagia s/p PEG (on Jevity 1.5 @ 70cchr x 18 hrs, & water flushes 325ml q 6 hrs, Afib, GIB, HTN, HLD, Seizure d/o, Hypothyroidism, Glaucoma, Depression p/w PEG tube dislodgement for unknown amount of time     Problem/Plan - 1:  ·  Problem: Sepsis sec to KPC K Pneumoniae Bacteremia .   ·  Plan:  IV Abxs per ID .   ID help appreciated.   < from: CT Abdomen and Pelvis w/ IV Cont (04.04.22 @ 14:24) >  IMPRESSION:  Trace bilateral pleural effusions.    Improved aeration in the lower lobes bilaterally since 1/30/2022.   Nonspecific patchy bilateral groundglass and linear opacities are noted.    Enhancement of the wall of the cecum and ascending colon with mild   infiltration of the adjacent fat is similar in appearance to prior study   dated 3/27/2022. An inflammatory or infectious process is considered.    Dependent high attenuation within the urinary bladder, which may   represent small stones and/or debris. An underlying mass cannot be   excluded.    Thick-walled collection in the cul-de-sac and small collections along the   right paracolic gutter, not significantly changed since 3/27/2022.    < end of copied text >     Problem/Plan - 2:  ·  Problem: Metabolic Encephalopathy    ·  Plan: Likely sec to Infection  .    Neurology helping  and  EEG showing no Seizure activity  .     Problem/Plan - 3:  ·  Problem: Stroke.   ·  Plan: Resume ASA, Plavix, statin as PEG tube replaced.     Problem/Plan - 4:  ·  Problem: Chronic respiratory failure with hypoxia.   ·  Plan: Pt on 2l NC via Trache collar.   S/P replacement of Tracheostomy Tube.       Problem/Plan - 5:  ·  Problem: Diverticulitis of intestine with perforation and abscess without bleeding.   ·  Plan: Surgery c/s in chart- small fluid collection but nontoxic, if drainage needed would be done by IR.  Wound care eval.     Problem/Plan - 6:  ·  Problem: History of atrial fibrillation.   ·  Plan: Cards helping and now on AC .   D/W Son and okay to start.      Problem/Plan - 7:  ·  Problem: Seizure disorder.   ·  Plan: continue levetiracetam bid.     Problem/Plan - 8:  ·  Problem: DVT, lower extremity.   ·  Plan: ?Subacute vs Chronic, not started on A/C as per chart due to recent GIB.     Problem/Plan - 9:  ·  Problem: HTN (hypertension).   ·  Plan: Resuming carvedilol & amlodipine as PEG replaced.     Problem/Plan - 10:  ·  Problem: Hypothyroidism.   ·  Plan; Resume Levothyroxine when PEG replaced.     Problem/Plan - 11:  ·  Problem: Glaucoma.   ·  Plan: continue Timolol, Dorzolamide, Latanoprost.      Problem/Plan - 12:  ·  Problem: PEG tube malfunction.   ·  Plan:  S/P  PEG . TF started.      Problem/Plan - 13:  ·  Problem: Hypokalemia.   ·  Plan: Corrected.     Disposition : DC  planning pending above.

## 2022-04-07 NOTE — PROGRESS NOTE ADULT - ASSESSMENT
80 yo F with PMHx HTN, HLD, Hypothyroidism, Depression, seizure d/o, RLE DVT, Cholelithiasis s/p recent Hospitalization in [facility] from 12/22 -> 2/11/22 for acute perforated diverticulitis s/p Rosales 12/25, colostomy bag 12/26, hospitalization c/b IR drainage for abdominal abscess 1/3 Cx grew e.coli & bacteriodes, also required Mechanical Ventilation for Acute Respiratory Failure with Tracheostomy done 1/7.  PEG done 1/18, developed bleeding through ostomy, EGD on 2/1 showed gastritis. T's showed Acute left MCA infarct- could be 2/2 to carotid occlusion vs afib, new RLL aspiration pneumonia, seen by ID, completed antibiotics, seen by VascSx, not surgical candidate. Pt aphasic sent from [facility] for dislodged Peg tube, after staff noted pt has blood to her abdomen. Nephrology consulted for Hypokalemia.     A/P  Hypokalemia   likely 2/2 GI loss   s/p kcl 40meq po    monitor input and output closely   monitor K closely    Hypomagnesia   better today   replete as needed  monitor Mg     hypophosphatemia   better today  replete as needed  monitor     Hypocalcemia   corrected Ca WNL   Monitor    Acidosis with anion gap   etiology?  improved   monitor    HTN   acceptable   monitor BP closely

## 2022-04-07 NOTE — PROGRESS NOTE ADULT - SUBJECTIVE AND OBJECTIVE BOX
[facility]   MD ELOISE ALVARADO MD, PA INJGALINDO BUNNY HERNANDEZ    TEL:  OFFICE: [phone]    From 5pm-7am Answering Service [phone]    -- RENAL FOLLOW UP NOTE ---Date of Service 04-07-22 @ 11:55    Patient is a 81y old  Female who presents with a chief complaint of     Patient seen and examined at bedside.     VITALS:  T(F): 97.7 (04-07-22 @ 10:50), Max: 98.7 (04-07-22 @ 02:13)  HR: 72 (04-07-22 @ 10:50)  BP: 151/66 (04-07-22 @ 10:50)  RR: 17 (04-07-22 @ 10:50)  SpO2: 100% (04-07-22 @ 10:50)  Wt(kg): --    04-06 @ 07:01  -  04-07 @ 07:00  --------------------------------------------------------  IN: 2965 mL / OUT: 1800 mL / NET: 1165 mL          PHYSICAL EXAM:  Constitutional: NAD  Neck: No JVD  Respiratory: CTAB, no wheezes, rales or rhonchi  Cardiovascular: S1, S2, RRR  Gastrointestinal: BS+, soft, NT/ND  Extremities: No peripheral edema    Hospital Medications:   MEDICATIONS  (STANDING):  amLODIPine   Tablet 10 milliGRAM(s) Oral daily  apixaban 5 milliGRAM(s) Oral every 12 hours  aspirin  chewable 81 milliGRAM(s) Enteral Tube daily  atorvastatin 40 milliGRAM(s) Oral at bedtime  carvedilol 3.125 milliGRAM(s) Oral every 12 hours  clopidogrel Tablet 75 milliGRAM(s) Enteral Tube daily  dorzolamide 2% Ophthalmic Solution 1 Drop(s) Both EYES <User Schedule>  influenza  Vaccine (HIGH DOSE) 0.7 milliLiter(s) IntraMuscular once  lactated ringers. 1000 milliLiter(s) (75 mL/Hr) IV Continuous <Continuous>  latanoprost 0.005% Ophthalmic Solution 1 Drop(s) Both EYES at bedtime  levETIRAcetam  IVPB 500 milliGRAM(s) IV Intermittent every 12 hours  levothyroxine 75 MICROGram(s) Oral daily  meropenem/vaborbactam IVPB 4 Gram(s) IV Intermittent every 8 hours  pantoprazole   Suspension 40 milliGRAM(s) Oral daily  potassium chloride   Powder 40 milliEquivalent(s) Enteral Tube once  timolol 0.5% Solution 1 Drop(s) Both EYES two times a day      LABS:  04-07    138  |  102  |  13  ----------------------------<  129<H>  3.4<L>   |  23  |  0.40<L>    Ca    8.7      07 Apr 2022 07:48  Phos  2.8     04-07  Mg     1.70     04-07      Creatinine Trend: 0.40 <--, 0.44 <--, 0.54 <--, 0.44 <--, 0.50 <--, 0.44 <--, 0.41 <--, 0.39 <--, 0.40 <--    Phosphorus Level, Serum: 2.8 mg/dL (04-07 @ 07:48)                              9.6    5.93  )-----------( 268      ( 07 Apr 2022 07:48 )             31.1     Urine Studies:  Urinalysis - [04-04-22 @ 01:59]      Color Light Yellow / Appearance Clear / SG 1.012 / pH 7.5      Gluc Negative / Ketone Negative  / Bili Negative / Urobili <2 mg/dL       Blood Negative / Protein Trace / Leuk Est Negative / Nitrite Negative      RBC 6 / WBC 1 / Hyaline  / Gran  / Sq Epi  / Non Sq Epi 1 / Bacteria Negative      PTH -- (Ca --)      [03-30-22 @ 07:35]   32  TSH 7.65      [04-05-22 @ 07:40]  Lipid: chol 158, , HDL 25, LDL --      [02-03-22 @ 15:22]      Syphilis Screen (Treponema Pallidum Ab) Negative      [04-05-22 @ 09:41]    RADIOLOGY & ADDITIONAL STUDIES:

## 2022-04-07 NOTE — CONSULT NOTE ADULT - PROBLEM SELECTOR RECOMMENDATION 5
Patient is DNR. MOLST completed.   Please see separate GOC note.  >16 minutes spent on advanced care planning with family.

## 2022-04-07 NOTE — GOALS OF CARE CONVERSATION - ADVANCED CARE PLANNING - TREATMENT GUIDELINE COMMENT
DNR  Hospice services to be added once discharged to NH DNR  Continue with medical management, including abx and MRI   Hospice services to be added once discharged to NH

## 2022-04-07 NOTE — CONSULT NOTE ADULT - PROBLEM SELECTOR RECOMMENDATION 3
- Patient with  sepsis due to KPC klebsiella bacteremia in setting of recent  perforated diverticulitis s/p tuan procedure, c/b abd abscess s/p IR drainage, cx with E-coli, bacteroides and candida  - Ct - Nonspecific patchy bilateral groundglass and linear opacities are noted.  Enhancement of the wall of the cecum and ascending colon with mild infiltration of the adjacent fat is similar in appearance to prior study dated 3/27/2022. An inflammatory or infectious process is considered. Thick-walled collection in the cul-de-sac and small collections along the right paracolic gutter.   - IR recommendations appreciated: not amenable to drainage   - Infectious Disease recommendations appreciated- on vabomere  - management as per primary and ID team

## 2022-04-07 NOTE — CONSULT NOTE ADULT - PROBLEM SELECTOR RECOMMENDATION 6
The patient's symptoms are well controlled. No acute symptoms at this time.      Thank you for allowing us to participate in your patient's care.  Patient to be discharged from [facility] team consult service today.   Discussed with primary team.  Please re-consult if we can be of further assistance, page [phone].

## 2022-04-07 NOTE — CONSULT NOTE ADULT - PROBLEM SELECTOR PROBLEM 4
Instructions: This plan will send the code [id number] to the PM system.  DO NOT or CHANGE the price.
Detail Level: Simple
Price (Do Not Change): 0.00
Stroke

## 2022-04-07 NOTE — CONSULT NOTE ADULT - ASSESSMENT
80 yo F w/ PMHx of perforated diverticulitis s/p Rosales procedure, colostomy, abdominal abscess with wound s/p IR drainage, Respiratory Failure s/p Trache, CVA w/ functional quadriplegia, aphasia, dysphagia s/p PEG, Afib, GIB, HTN, HLD, Seizure d/o, Hypothyroidism, Glaucoma, Depression p/w PEG tube dislodgement.   Palliative Care consulted for complex decision making  related to goals of care discussions

## 2022-04-07 NOTE — CONSULT NOTE ADULT - PROBLEM SELECTOR RECOMMENDATION 2
- due to underlying infection + underlying stroke  - Please coordinate care with patient's family  - Minimize use of benzodiazepines, opioids, anticholinergics, and other deliriogenic agents whenever possible.

## 2022-04-08 LAB
ANION GAP SERPL CALC-SCNC: 12 MMOL/L — SIGNIFICANT CHANGE UP (ref 7–14)
BUN SERPL-MCNC: 14 MG/DL — SIGNIFICANT CHANGE UP (ref 7–23)
CALCIUM SERPL-MCNC: 9.1 MG/DL — SIGNIFICANT CHANGE UP (ref 8.4–10.5)
CHLORIDE SERPL-SCNC: 104 MMOL/L — SIGNIFICANT CHANGE UP (ref 98–107)
CO2 SERPL-SCNC: 23 MMOL/L — SIGNIFICANT CHANGE UP (ref 22–31)
CREAT SERPL-MCNC: 0.38 MG/DL — LOW (ref 0.5–1.3)
EGFR: 102 ML/MIN/1.73M2 — SIGNIFICANT CHANGE UP
GLUCOSE SERPL-MCNC: 143 MG/DL — HIGH (ref 70–99)
HCT VFR BLD CALC: 30.8 % — LOW (ref 34.5–45)
HGB BLD-MCNC: 9.7 G/DL — LOW (ref 11.5–15.5)
MAGNESIUM SERPL-MCNC: 1.9 MG/DL — SIGNIFICANT CHANGE UP (ref 1.6–2.6)
MCHC RBC-ENTMCNC: 27.6 PG — SIGNIFICANT CHANGE UP (ref 27–34)
MCHC RBC-ENTMCNC: 31.5 GM/DL — LOW (ref 32–36)
MCV RBC AUTO: 87.5 FL — SIGNIFICANT CHANGE UP (ref 80–100)
NRBC # BLD: 0 /100 WBCS — SIGNIFICANT CHANGE UP
NRBC # FLD: 0 K/UL — SIGNIFICANT CHANGE UP
PHOSPHATE SERPL-MCNC: 2.9 MG/DL — SIGNIFICANT CHANGE UP (ref 2.5–4.5)
PLATELET # BLD AUTO: 314 K/UL — SIGNIFICANT CHANGE UP (ref 150–400)
POTASSIUM SERPL-MCNC: 3.7 MMOL/L — SIGNIFICANT CHANGE UP (ref 3.5–5.3)
POTASSIUM SERPL-SCNC: 3.7 MMOL/L — SIGNIFICANT CHANGE UP (ref 3.5–5.3)
RBC # BLD: 3.52 M/UL — LOW (ref 3.8–5.2)
RBC # FLD: 16 % — HIGH (ref 10.3–14.5)
SARS-COV-2 RNA SPEC QL NAA+PROBE: SIGNIFICANT CHANGE UP
SODIUM SERPL-SCNC: 139 MMOL/L — SIGNIFICANT CHANGE UP (ref 135–145)
WBC # BLD: 9.1 K/UL — SIGNIFICANT CHANGE UP (ref 3.8–10.5)
WBC # FLD AUTO: 9.1 K/UL — SIGNIFICANT CHANGE UP (ref 3.8–10.5)

## 2022-04-08 PROCEDURE — 99232 SBSQ HOSP IP/OBS MODERATE 35: CPT

## 2022-04-08 RX ADMIN — DORZOLAMIDE HYDROCHLORIDE 1 DROP(S): 20 SOLUTION/ DROPS OPHTHALMIC at 18:09

## 2022-04-08 RX ADMIN — CLOPIDOGREL BISULFATE 75 MILLIGRAM(S): 75 TABLET, FILM COATED ORAL at 10:23

## 2022-04-08 RX ADMIN — DORZOLAMIDE HYDROCHLORIDE 1 DROP(S): 20 SOLUTION/ DROPS OPHTHALMIC at 09:36

## 2022-04-08 RX ADMIN — AMLODIPINE BESYLATE 10 MILLIGRAM(S): 2.5 TABLET ORAL at 05:44

## 2022-04-08 RX ADMIN — MEROPENEM-VABORBACTAM 83.33 GRAM(S): 1; 1 INJECTION, POWDER, FOR SOLUTION INTRAVENOUS at 16:18

## 2022-04-08 RX ADMIN — LATANOPROST 1 DROP(S): 0.05 SOLUTION/ DROPS OPHTHALMIC; TOPICAL at 23:33

## 2022-04-08 RX ADMIN — MEROPENEM-VABORBACTAM 83.33 GRAM(S): 1; 1 INJECTION, POWDER, FOR SOLUTION INTRAVENOUS at 05:43

## 2022-04-08 RX ADMIN — APIXABAN 5 MILLIGRAM(S): 2.5 TABLET, FILM COATED ORAL at 18:09

## 2022-04-08 RX ADMIN — Medication 75 MICROGRAM(S): at 05:44

## 2022-04-08 RX ADMIN — CARVEDILOL PHOSPHATE 3.12 MILLIGRAM(S): 80 CAPSULE, EXTENDED RELEASE ORAL at 18:09

## 2022-04-08 RX ADMIN — LEVETIRACETAM 400 MILLIGRAM(S): 250 TABLET, FILM COATED ORAL at 09:36

## 2022-04-08 RX ADMIN — Medication 1 DROP(S): at 05:43

## 2022-04-08 RX ADMIN — Medication 1 DROP(S): at 18:09

## 2022-04-08 RX ADMIN — LEVETIRACETAM 400 MILLIGRAM(S): 250 TABLET, FILM COATED ORAL at 23:33

## 2022-04-08 RX ADMIN — Medication 81 MILLIGRAM(S): at 10:24

## 2022-04-08 RX ADMIN — APIXABAN 5 MILLIGRAM(S): 2.5 TABLET, FILM COATED ORAL at 05:44

## 2022-04-08 RX ADMIN — PANTOPRAZOLE SODIUM 40 MILLIGRAM(S): 20 TABLET, DELAYED RELEASE ORAL at 10:23

## 2022-04-08 RX ADMIN — CARVEDILOL PHOSPHATE 3.12 MILLIGRAM(S): 80 CAPSULE, EXTENDED RELEASE ORAL at 05:44

## 2022-04-08 RX ADMIN — ATORVASTATIN CALCIUM 40 MILLIGRAM(S): 80 TABLET, FILM COATED ORAL at 23:33

## 2022-04-08 NOTE — PROGRESS NOTE ADULT - ASSESSMENT
82 yo F with PMHx HTN, HLD, Hypothyroidism, Depression, seizure d/o, RLE DVT, Cholelithiasis s/p recent Hospitalization in [facility] from 12/22 -> 2/11/22 for acute perforated diverticulitis s/p Rosales 12/25, colostomy bag 12/26, hospitalization c/b IR drainage for abdominal abscess 1/3 Cx grew e.coli & bacteriodes, also required Mechanical Ventilation for Acute Respiratory Failure with Tracheostomy done 1/7.  PEG done 1/18, developed bleeding through ostomy, EGD on 2/1 showed gastritis. T's showed Acute left MCA infarct- could be 2/2 to carotid occlusion vs afib, new RLL aspiration pneumonia, seen by ID, completed antibiotics, seen by VascSx, not surgical candidate. Pt aphasic sent from [facility] for dislodged Peg tube, after staff noted pt has blood to her abdomen. Nephrology consulted for Hypokalemia.     A/P  Hypokalemia   likely 2/2 GI loss   s/p kcl 40meq po    no labs today   monitor input and output closely   monitor K closely    Hypomagnesia   replete as needed  monitor Mg     hypophosphatemia   replete as needed  monitor     Hypocalcemia   corrected Ca WNL   Monitor    Acidosis with anion gap   etiology?  improved   monitor    HTN   optimal    monitor BP closely

## 2022-04-08 NOTE — PROGRESS NOTE ADULT - SUBJECTIVE AND OBJECTIVE BOX
Date of Service  : 04-08-22     INTERVAL HPI/OVERNIGHT EVENTS: No new concerns. More awake .  Vital Signs Last 24 Hrs  T(C): 36.9 (08 Apr 2022 17:31), Max: 37.3 (08 Apr 2022 10:25)  T(F): 98.5 (08 Apr 2022 17:31), Max: 99.1 (08 Apr 2022 10:25)  HR: 85 (08 Apr 2022 17:31) (68 - 85)  BP: 142/68 (08 Apr 2022 17:31) (131/53 - 169/87)  BP(mean): --  RR: 18 (08 Apr 2022 17:31) (17 - 18)  SpO2: 100% (08 Apr 2022 17:31) (98% - 100%)  I&O's Summary    07 Apr 2022 07:01  -  08 Apr 2022 07:00  --------------------------------------------------------  IN: 1715 mL / OUT: 1475 mL / NET: 240 mL      MEDICATIONS  (STANDING):  amLODIPine   Tablet 10 milliGRAM(s) Oral daily  apixaban 5 milliGRAM(s) Oral every 12 hours  aspirin  chewable 81 milliGRAM(s) Enteral Tube daily  atorvastatin 40 milliGRAM(s) Oral at bedtime  carvedilol 3.125 milliGRAM(s) Oral every 12 hours  clopidogrel Tablet 75 milliGRAM(s) Enteral Tube daily  dorzolamide 2% Ophthalmic Solution 1 Drop(s) Both EYES <User Schedule>  influenza  Vaccine (HIGH DOSE) 0.7 milliLiter(s) IntraMuscular once  lactated ringers. 1000 milliLiter(s) (75 mL/Hr) IV Continuous <Continuous>  latanoprost 0.005% Ophthalmic Solution 1 Drop(s) Both EYES at bedtime  levETIRAcetam  IVPB 500 milliGRAM(s) IV Intermittent every 12 hours  levothyroxine 75 MICROGram(s) Oral daily  meropenem/vaborbactam IVPB 4 Gram(s) IV Intermittent every 8 hours  pantoprazole   Suspension 40 milliGRAM(s) Oral daily  timolol 0.5% Solution 1 Drop(s) Both EYES two times a day    MEDICATIONS  (PRN):  acetaminophen     Tablet .. 650 milliGRAM(s) Oral every 6 hours PRN Mild Pain (1 - 3), Moderate Pain (4 - 6)    LABS:                        9.7    9.10  )-----------( 314      ( 08 Apr 2022 17:25 )             30.8     04-07    138  |  102  |  13  ----------------------------<  129<H>  3.4<L>   |  23  |  0.40<L>    Ca    8.7      07 Apr 2022 07:48  Phos  2.8     04-07  Mg     1.70     04-07          CAPILLARY BLOOD GLUCOSE                  Consultant(s) Notes Reviewed:  [x ] YES  [ ] NO    PHYSICAL EXAM:  GENERAL: NAD, well-groomed, well-developed,not in any distress ,  HEAD:  Atraumatic, Normocephalic  NECK: Tracdh tube +  NERVOUS SYSTEM:  Alert   CHEST/LUNG: Good air entry bilateral with no  rales, rhonchi, wheezing, or rubs  HEART: Regular rate and rhythm; No murmurs, rubs, or gallops  ABDOMEN: Soft, Nontender, Nondistended; Bowel sounds present, pEG+ ,Ostomy +  EXTREMITIES:  2+ Peripheral Pulses, No clubbing, cyanosis, or edema      Care Discussed with Consultants/Other Providers [ x] YES  [ ] NO

## 2022-04-08 NOTE — CHART NOTE - NSCHARTNOTEFT_GEN_A_CORE
Discussed with primary team. Patient referred to Hospice. Neurology to sign off, please call as needed

## 2022-04-08 NOTE — PROGRESS NOTE ADULT - SUBJECTIVE AND OBJECTIVE BOX
[facility] [place] [facility]   MD ELOISE ALVARADO MD, PA INJGALINDO BUNNY HERNANDEZ    TEL:  OFFICE: [phone]    From 5pm-7am Answering Service [phone]    -- RENAL FOLLOW UP NOTE ---Date of Service 04-08-22 @ 13:37    Patient is a 81y old  Female who presents with a chief complaint of     Patient seen and examined at bedside.   VITALS:  T(F): 99.1 (04-08-22 @ 10:25), Max: 99.1 (04-08-22 @ 10:25)  HR: 72 (04-08-22 @ 10:25)  BP: 131/53 (04-08-22 @ 10:25)  RR: 17 (04-08-22 @ 10:25)  SpO2: 100% (04-08-22 @ 10:25)  Wt(kg): --    04-07 @ 07:01  -  04-08 @ 07:00  --------------------------------------------------------  IN: 1715 mL / OUT: 1475 mL / NET: 240 mL          PHYSICAL EXAM:  Constitutional: NAD  Neck: No JVD  Respiratory: CTAB, no wheezes, rales or rhonchi  Cardiovascular: S1, S2, RRR  Gastrointestinal: BS+, soft, NT/ND  Extremities: No peripheral edema    Hospital Medications:   MEDICATIONS  (STANDING):  amLODIPine   Tablet 10 milliGRAM(s) Oral daily  apixaban 5 milliGRAM(s) Oral every 12 hours  aspirin  chewable 81 milliGRAM(s) Enteral Tube daily  atorvastatin 40 milliGRAM(s) Oral at bedtime  carvedilol 3.125 milliGRAM(s) Oral every 12 hours  clopidogrel Tablet 75 milliGRAM(s) Enteral Tube daily  dorzolamide 2% Ophthalmic Solution 1 Drop(s) Both EYES <User Schedule>  influenza  Vaccine (HIGH DOSE) 0.7 milliLiter(s) IntraMuscular once  lactated ringers. 1000 milliLiter(s) (75 mL/Hr) IV Continuous <Continuous>  latanoprost 0.005% Ophthalmic Solution 1 Drop(s) Both EYES at bedtime  levETIRAcetam  IVPB 500 milliGRAM(s) IV Intermittent every 12 hours  levothyroxine 75 MICROGram(s) Oral daily  meropenem/vaborbactam IVPB 4 Gram(s) IV Intermittent every 8 hours  pantoprazole   Suspension 40 milliGRAM(s) Oral daily  timolol 0.5% Solution 1 Drop(s) Both EYES two times a day      LABS:  04-07    138  |  102  |  13  ----------------------------<  129<H>  3.4<L>   |  23  |  0.40<L>    Ca    8.7      07 Apr 2022 07:48  Phos  2.8     04-07  Mg     1.70     04-07      Creatinine Trend: 0.40 <--, 0.44 <--, 0.54 <--, 0.44 <--, 0.50 <--, 0.44 <--, 0.41 <--, 0.39 <--, 0.40 <--                                9.6    5.93  )-----------( 268      ( 07 Apr 2022 07:48 )             31.1     Urine Studies:  Urinalysis - [04-04-22 @ 01:59]      Color Light Yellow / Appearance Clear / SG 1.012 / pH 7.5      Gluc Negative / Ketone Negative  / Bili Negative / Urobili <2 mg/dL       Blood Negative / Protein Trace / Leuk Est Negative / Nitrite Negative      RBC 6 / WBC 1 / Hyaline  / Gran  / Sq Epi  / Non Sq Epi 1 / Bacteria Negative      PTH -- (Ca --)      [03-30-22 @ 07:35]   32  TSH 7.65      [04-05-22 @ 07:40]  Lipid: chol 158, , HDL 25, LDL --      [02-03-22 @ 15:22]      Syphilis Screen (Treponema Pallidum Ab) Negative      [04-05-22 @ 09:41]    RADIOLOGY & ADDITIONAL STUDIES:

## 2022-04-08 NOTE — PROGRESS NOTE ADULT - SUBJECTIVE AND OBJECTIVE BOX
Date of service: 4/8/22    chief complaint: dislodged PEG tube     extended hpi: 82 yo F with PMHx HTN, HLD, Hypothyroidism, Depression, seizure d/o, RLE DVT, Cholelithiasis s/p recent Hospitalization in [facility] from 12/22 -> 2/11/22 for acute perforated diverticulitis s/p Rosales 12/25, colostomy bag 12/26, hospitalization c/b IR drainage for abdominal abscess 1/3 Cx grew e.coli & bacteriodes, also required Mechanical Ventilation for Acute Respiratory Failure with Tracheostomy done 1/7.  PEG done 1/18, developed bleeding through ostomy for which she was transfused 1/28, EGD on 2/1 showed gastritis. Developed fevers on 1/30 and CT's showed Acute left MCA infarct- could be 2/2 to carotid occlusion vs afib, new RLL aspiration pneumonia, seen by ID, completed antibiotics, seen by VascSx, not surgical candidate. Pt aphasic sent from [facility] for dislodged Peg tube, after staff noted pt has blood to her abdomen.     S: resting comfortably today, no acute events.  now DNR. MOLST completed.     Review of Systems:   Constitutional: [ ] fevers, [ ] chills.   Skin: [ ] dry skin. [ ] rashes.  Psychiatric: [ ] depression, [ ] anxiety.   Gastrointestinal: [ ] BRBPR, [ ] melena.   Neurological: [ ] confusion. [ ] seizures. [ ] shuffling gait.   Ears,Nose,Mouth and Throat: [ ] ear pain [ ] sore throat.   Eyes: [ ] diplopia.   Respiratory: [ ] hemoptysis. [ ] shortness of breath  Cardiovascular: See HPI above  Hematologic/Lymphatic: [ ] anemia. [ ] painful nodes. [ ] prolonged bleeding.   Genitourinary: [ ] hematuria. [ ] flank pain.   Endocrine: [ ] significant change in weight. [ ] intolerance to heat and cold.     Review of systems [x ] otherwise negative, [ ] otherwise unable to obtain    FH: no family history of sudden cardiac death in first degree relatives    SH: [ ] tobacco, [ ] alcohol, [ ] drugs    acetaminophen     Tablet .. 650 milliGRAM(s) Oral every 6 hours PRN  amLODIPine   Tablet 10 milliGRAM(s) Oral daily  apixaban 5 milliGRAM(s) Oral every 12 hours  aspirin  chewable 81 milliGRAM(s) Enteral Tube daily  atorvastatin 40 milliGRAM(s) Oral at bedtime  carvedilol 3.125 milliGRAM(s) Oral every 12 hours  clopidogrel Tablet 75 milliGRAM(s) Enteral Tube daily  dorzolamide 2% Ophthalmic Solution 1 Drop(s) Both EYES <User Schedule>  influenza  Vaccine (HIGH DOSE) 0.7 milliLiter(s) IntraMuscular once  lactated ringers. 1000 milliLiter(s) IV Continuous <Continuous>  latanoprost 0.005% Ophthalmic Solution 1 Drop(s) Both EYES at bedtime  levETIRAcetam  IVPB 500 milliGRAM(s) IV Intermittent every 12 hours  levothyroxine 75 MICROGram(s) Oral daily  meropenem/vaborbactam IVPB 4 Gram(s) IV Intermittent every 8 hours  pantoprazole   Suspension 40 milliGRAM(s) Oral daily  timolol 0.5% Solution 1 Drop(s) Both EYES two times a day                            9.6    5.93  )-----------( 268      ( 07 Apr 2022 07:48 )             31.1       04-07    138  |  102  |  13  ----------------------------<  129<H>  3.4<L>   |  23  |  0.40<L>    Ca    8.7      07 Apr 2022 07:48  Phos  2.8     04-07  Mg     1.70     04-07              T(C): 37.3 (04-08-22 @ 10:25), Max: 37.3 (04-08-22 @ 10:25)  HR: 72 (04-08-22 @ 10:25) (68 - 82)  BP: 131/53 (04-08-22 @ 10:25) (131/53 - 169/87)  RR: 17 (04-08-22 @ 10:25) (17 - 18)  SpO2: 100% (04-08-22 @ 10:25) (98% - 100%)  Wt(kg): --    I&O's Summary    07 Apr 2022 07:01  -  08 Apr 2022 07:00  --------------------------------------------------------  IN: 1715 mL / OUT: 1475 mL / NET: 240 mL      General: no acute distress   Head: Normocephalic and atraumatic.   Neck: No JVD. No bruits. Supple. Does not appear to be enlarged.   Cardiovascular: + S1,S2 ; RRR Soft systolic murmur at the left lower sternal border. No rubs noted.    Lungs: CTA b/l. No rhonchi, rales or wheezes.   Abdomen: + BS, soft. Non tender. Non distended. No rebound. No guarding.   Extremities: No clubbing/cyanosis/edema.   Psychiatric: unable to assess     < from: TTE Echo Complete w/o Contrast w/ Doppler (02.04.22 @ 15:28) >  Summary:   1. Left ventricular ejection fraction, by visual estimation, is 60 to   65%.   2. Normal global left ventricular systolic function.   3. Mild mitral valve regurgitation.   4.Mild thickening of the anterior and posterior mitral valve leaflets.   5. Mild tricuspid regurgitation.   6. Mild to moderate aortic regurgitation.   7. Sclerotic aortic valve with normal opening.  [phone] Colin Butcher MD, [facility]  Electronically signed on 2/5/2022 at 3:03:14 PM  < end of copied text >      < from: CT Angio Head w/ IV Cont (04.04.22 @ 14:19) >  IMPRESSIONS:  CTA neck:  1. Short segment 70% stenosis in diameter at the proximal aspect of the   right internal carotid artery.  2. The left internal carotid artery is occluded at a distance of   approximately 7 mm beyond the origin of this vessel.  3. Bilateral vertebral arteries are appreciated. Left vertebral artery is   dominant.    CTA COW:  1. There is no flow within the petrous, precavernous or cavernous   portions of the left internal carotid artery.  2. Flow is appreciated within the left middle cerebral artery,   significantly decreased in caliber when compared with the right middle   cerebral artery. Furthermore, only an attenuated/hypoplastic flow pattern   is appreciated in association with the left A1 segment as well as the   left posterior communicating artery.  3. Hypoplastic/attenuated pattern of flow within the left A 2 segment of   the anterior cerebral artery.  < end of copied text >    < from: CT Head No Cont (04.04.22 @ 14:19) >  IMPRESSION:    1. Evolution of previously visualized left MCA infarct, which appeared   acute at time of prior study dated 2/5/2022. Cannot exclude an acute on   chronic process. Consider further evaluation via MR imaging as clinically   indicated, provided the patient has no contraindications.    2. Findings also include a new decreased attenuation involving the left   brachium pontis and left cerebellum, possibly representing an acute   ischemic event in this location. MR imaging to include DWI and ADC   mapping techniques may be helpful for further characterization of this   finding.    3. No acute intracranial hemorrhage.    4. Paranasal sinus inflammatory changes, as described above. Cannot   exclude sinusitis.    5. Persistent fluid within right-sided mastoid air cells. Cannot exclude   mastoiditis.    < end of copied text >        A/P:  82 yo F with PMHx HTN, HLD, Hypothyroidism, Depression, seizure d/o, RLE DVT, Cholelithiasis s/p recent Hospitalization in [facility] from 12/22 -> 2/11/22 for acute perforated diverticulitis s/p Rosales 12/25, colostomy bag 12/26, hospitalization c/b IR drainage for abdominal abscess 1/3 Cx grew e.coli & bacteriodes, also required Mechanical Ventilation for Acute Respiratory Failure with Tracheostomy done 1/7.  PEG done 1/18, developed bleeding through ostomy for which she was transfused 1/28, EGD on 2/1 showed gastritis. Developed fevers on 1/30 and CT's showed Acute left MCA infarct- could be 2/2 to carotid occlusion vs afib, new RLL aspiration pneumonia, seen by ID, completed antibiotics, seen by VascSx, not surgical candidate. Pt aphasic [facility] for dislodged Peg tube, after staff noted pt has blood to her abdomen.     --prior chart reviewed (Jan-Feb 2022) Pt with CVA and found with AFib and Carotid occlusion.  AC held initially while monitoring for hemorrhagic conversion and then in the setting of GI bleed req PRBCs.  She was continued on DAPT.  --given elevated [id number], recommend lifelong AC for AF if able to tolerate  - Eliquis started - monitor h/h   -on antibiotics for klebsiella.  -evolving stroke, worsening mental status - neuro eval noted - plans for hospice care   -goals of care now toward comfort, and she is DNR.  managing all problems medically.      Danny Casiano MD

## 2022-04-08 NOTE — PROGRESS NOTE ADULT - ASSESSMENT
81 f with HTN, HLD, Hypothyroidism, Depression, seizure d/o, RLE DVT, Cholelithiasis s/p recent Hospitalization at [facility] 12/22 -> 2/11/22 for acute perforated diverticulitis s/p Rosales 12/25, colostomy bag 12/26, hospitalization c/b abd abscess s/p IR drainage 1/3 Cx with E.coli & bacteroides and candida albicans,  trach 1/7, PEG, Acute left MCA infarct, aspiration pneumonia, sent 3/27 from rehab for dislodged PEG, initially afebrile, normal WBc, negative blood and urine cx  CT 3/27: PEG tube removed/dislodged, no evidence for pneumoperitoneum, 3.5 cm thick-walled fluid collection in the pelvic cul-de-sac, Interval slight improvement in additional small loculated fluid   collections, including significant improvement in inflammatory changes in RLQ and pelvis status post removal of surgical drain. Ill-defined hyperdensity/enhancing nodularity along the right posterior   bladder lumen, cannot exclude a neoplastic process  s/p PEG placement by IR 3/30  on 4/4 pt spiked to 102.4 with tachycardia and new leukocytosis to 16    recent perforated diverticulitis s/p huitron, c/b abd abscess s/p IR drainage, cx with E-coli, bacteroides and candida now admitted for PEG dislodgement s/p IR placement 3/30 now with new fever, tachycardia, leukocytosis, sepsis due to KPC klebsiella bacteremia, S to vabomere, acyvaz, tigecyline, minocycline and genta, repeat cx 4/5 negative  CT: Dependent high attenuation within the urinary bladder, which may represent small stones and/or debris. An underlying mass cannot be excluded. Thick-walled collection in the cul-de-sac and small collections along the right paracolic gutter, not significantly changed since 3/27/2022  IR stated that abscess are too small to be drainged    * c/w  vabomere, blood cx cleared 4/5, so day 4  * will have to do a 2 week course of antibiotics until 4/19 as the abscess was not drained  * pt was referred to hospice and now deciding for comfort care, so if within GOC will continue antibiotics      The above assessment and plan was discussed with the primary team    Ivy Rose MD  contact on teams  After 5pm and on weekends call [phone]

## 2022-04-08 NOTE — PROGRESS NOTE ADULT - ASSESSMENT
82 yo F w/ PMHx of perforated diverticulitis s/p Rosales procedure, colostomy, abdominal abscess with wound s/p IR drainage, Respiratory Failure s/p Trache, CVA w/ functional quadriplegia, aphasia, dysphagia s/p PEG (on Jevity 1.5 @ 70cchr x 18 hrs, & water flushes 325ml q 6 hrs, Afib, GIB, HTN, HLD, Seizure d/o, Hypothyroidism, Glaucoma, Depression p/w PEG tube dislodgement for unknown amount of time     Problem/Plan - 1:  ·  Problem: Sepsis sec to KPC K Pneumoniae Bacteremia .   ·  Plan:  IV Abxs per ID .   ID help appreciated.   < from: CT Abdomen and Pelvis w/ IV Cont (04.04.22 @ 14:24) >  IMPRESSION:  Trace bilateral pleural effusions.    Improved aeration in the lower lobes bilaterally since 1/30/2022.   Nonspecific patchy bilateral groundglass and linear opacities are noted.    Enhancement of the wall of the cecum and ascending colon with mild   infiltration of the adjacent fat is similar in appearance to prior study   dated 3/27/2022. An inflammatory or infectious process is considered.    Dependent high attenuation within the urinary bladder, which may   represent small stones and/or debris. An underlying mass cannot be   excluded.    Thick-walled collection in the cul-de-sac and small collections along the   right paracolic gutter, not significantly changed since 3/27/2022.    < end of copied text >     Problem/Plan - 2:  ·  Problem: Metabolic Encephalopathy    ·  Plan: Likely sec to Infection  .    Neurology helping  and  EEG showing no Seizure activity  .     Problem/Plan - 3:  ·  Problem: Stroke.   ·  Plan: Resume ASA, Plavix, statin as PEG tube replaced.     Problem/Plan - 4:  ·  Problem: Chronic respiratory failure with hypoxia.   ·  Plan: Pt on 2l NC via Trache collar.   S/P replacement of Tracheostomy Tube.       Problem/Plan - 5:  ·  Problem: Diverticulitis of intestine with perforation and abscess without bleeding.   ·  Plan: Surgery c/s in chart- small fluid collection but nontoxic, if drainage needed would be done by IR.  Wound care eval.     Problem/Plan - 6:  ·  Problem: History of atrial fibrillation.   ·  Plan: Cards helping and now on AC .   D/W Son and okay to start.      Problem/Plan - 7:  ·  Problem: Seizure disorder.   ·  Plan: continue levetiracetam bid.     Problem/Plan - 8:  ·  Problem: DVT, lower extremity.   ·  Plan: ?Subacute vs Chronic, not started on A/C as per chart due to recent GIB.     Problem/Plan - 9:  ·  Problem: HTN (hypertension).   ·  Plan: Resuming carvedilol & amlodipine as PEG replaced.     Problem/Plan - 10:  ·  Problem: Hypothyroidism.   ·  Plan; Resume Levothyroxine when PEG replaced.     Problem/Plan - 11:  ·  Problem: Glaucoma.   ·  Plan: continue Timolol, Dorzolamide, Latanoprost.      Problem/Plan - 12:  ·  Problem: PEG tube malfunction.   ·  Plan:  S/P  PEG . TF started.      Problem/Plan - 13:  ·  Problem: Hypokalemia.   ·  Plan: Corrected.     Disposition : DC  planning pending above.

## 2022-04-08 NOTE — PROGRESS NOTE ADULT - SUBJECTIVE AND OBJECTIVE BOX
Follow Up:  sepsis    Interval History: pt afebrile, WBC normal, family decided for hospice and now considering comfort    ROS:    Unobtainable because of mental status          Allergies  No Known Allergies        ANTIMICROBIALS:  meropenem/vaborbactam IVPB 4 every 8 hours      OTHER MEDS:  acetaminophen     Tablet .. 650 milliGRAM(s) Oral every 6 hours PRN  amLODIPine   Tablet 10 milliGRAM(s) Oral daily  apixaban 5 milliGRAM(s) Oral every 12 hours  aspirin  chewable 81 milliGRAM(s) Enteral Tube daily  atorvastatin 40 milliGRAM(s) Oral at bedtime  carvedilol 3.125 milliGRAM(s) Oral every 12 hours  clopidogrel Tablet 75 milliGRAM(s) Enteral Tube daily  dorzolamide 2% Ophthalmic Solution 1 Drop(s) Both EYES <User Schedule>  influenza  Vaccine (HIGH DOSE) 0.7 milliLiter(s) IntraMuscular once  lactated ringers. 1000 milliLiter(s) IV Continuous <Continuous>  latanoprost 0.005% Ophthalmic Solution 1 Drop(s) Both EYES at bedtime  levETIRAcetam  IVPB 500 milliGRAM(s) IV Intermittent every 12 hours  levothyroxine 75 MICROGram(s) Oral daily  pantoprazole   Suspension 40 milliGRAM(s) Oral daily  timolol 0.5% Solution 1 Drop(s) Both EYES two times a day      Vital Signs Last 24 Hrs  T(C): 37.3 (08 Apr 2022 10:25), Max: 37.3 (08 Apr 2022 10:25)  T(F): 99.1 (08 Apr 2022 10:25), Max: 99.1 (08 Apr 2022 10:25)  HR: 72 (08 Apr 2022 10:25) (68 - 82)  BP: 131/53 (08 Apr 2022 10:25) (131/53 - 169/87)  BP(mean): --  RR: 17 (08 Apr 2022 10:25) (17 - 18)  SpO2: 100% (08 Apr 2022 10:25) (98% - 100%)    Physical Exam:  General:    NAD, non toxic  Cardio:    regular S1,S2  Respiratory:   trach  abd:   soft, BS +, not tender  :     no CVAT, no suprapubic tenderness, no mc  Musculoskeletal : no joint swelling, no edema  Skin:    no rash  vascular: no phlebitis                            9.6    5.93  )-----------( 268      ( 07 Apr 2022 07:48 )             31.1       04-07    138  |  102  |  13  ----------------------------<  129<H>  3.4<L>   |  23  |  0.40<L>    Ca    8.7      07 Apr 2022 07:48  Phos  2.8     04-07  Mg     1.70     04-07            MICROBIOLOGY:  v  .Blood Blood  04-05-22   No growth to date.  --  --      .Blood Blood-Peripheral  04-04-22   Growth in anaerobic bottle: Klebsiella pneumoniae (Carbapenem Resistant)  See previous culture [id number]  --  Blood Culture PCR  Klepne MDRO      .Blood Blood-Peripheral  03-26-22   No Growth Final  --  --      .Blood Blood-Peripheral  03-26-22   No Growth Final  --  --          Rapid RVP Result: NotDetec (04-04 @ 02:02)        RADIOLOGY:  Images independently visualized and reviewed personally, findings as below  < from: CT Abdomen and Pelvis w/ IV Cont (04.04.22 @ 14:24) >    IMPRESSION:  Trace bilateral pleural effusions.    Improved aeration in the lower lobes bilaterally since 1/30/2022.   Nonspecific patchy bilateral groundglass and linear opacities are noted.    Enhancement of the wall of the cecum and ascending colon with mild   infiltration of the adjacent fat is similar in appearance to prior study   dated 3/27/2022. An inflammatory or infectious process is considered.    Dependent high attenuation within the urinary bladder, which may   represent small stones and/or debris. An underlying mass cannot be   excluded.    Thick-walled collection in the cul-de-sac and small collections along the   right paracolic gutter, not significantly changed since 3/27/2022.    < end of copied text >

## 2022-04-08 NOTE — PROGRESS NOTE ADULT - SUBJECTIVE AND OBJECTIVE BOX
INTERVAL HPI/OVERNIGHT EVENTS:    resting comfortably when seen this morning   tolerating feeds    MEDICATIONS  (STANDING):  amLODIPine   Tablet 10 milliGRAM(s) Oral daily  apixaban 5 milliGRAM(s) Oral every 12 hours  aspirin  chewable 81 milliGRAM(s) Enteral Tube daily  atorvastatin 40 milliGRAM(s) Oral at bedtime  carvedilol 3.125 milliGRAM(s) Oral every 12 hours  clopidogrel Tablet 75 milliGRAM(s) Enteral Tube daily  dorzolamide 2% Ophthalmic Solution 1 Drop(s) Both EYES <User Schedule>  influenza  Vaccine (HIGH DOSE) 0.7 milliLiter(s) IntraMuscular once  lactated ringers. 1000 milliLiter(s) (75 mL/Hr) IV Continuous <Continuous>  latanoprost 0.005% Ophthalmic Solution 1 Drop(s) Both EYES at bedtime  levETIRAcetam  IVPB 500 milliGRAM(s) IV Intermittent every 12 hours  levothyroxine 75 MICROGram(s) Oral daily  meropenem/vaborbactam IVPB 4 Gram(s) IV Intermittent every 8 hours  pantoprazole   Suspension 40 milliGRAM(s) Oral daily  timolol 0.5% Solution 1 Drop(s) Both EYES two times a day    MEDICATIONS  (PRN):  acetaminophen     Tablet .. 650 milliGRAM(s) Oral every 6 hours PRN Mild Pain (1 - 3), Moderate Pain (4 - 6)      Allergies    No Known Allergies    Intolerances        Review of Systems:    General:  No wt loss, fevers, chills, night sweats, fatigue   Eyes:  Good vision, no reported pain  ENT:  No sore throat, pain, runny nose, dysphagia  CV:  No pain, palpitations, hypo/hypertension  Resp:  No dyspnea, cough, tachypnea, wheezing  GI:  No pain, No nausea, No vomiting, No diarrhea, No constipation, No weight loss, No fever, No pruritis, No rectal bleeding, No melena, No dysphagia  :  No pain, bleeding, incontinence, nocturia  Muscle:  No pain, weakness  Neuro:  No weakness, tingling, memory problems  Psych:  No fatigue, insomnia, mood problems, depression  Endocrine:  No polyuria, polydypsia, cold/heat intolerance  Heme:  No petechiae, ecchymosis, easy bruisability  Skin:  No rash, tattoos, scars, edema      Vital Signs Last 24 Hrs  T(C): 37.3 (08 Apr 2022 10:25), Max: 37.3 (08 Apr 2022 10:25)  T(F): 99.1 (08 Apr 2022 10:25), Max: 99.1 (08 Apr 2022 10:25)  HR: 72 (08 Apr 2022 10:25) (68 - 82)  BP: 131/53 (08 Apr 2022 10:25) (131/53 - 169/87)  BP(mean): --  RR: 17 (08 Apr 2022 10:25) (17 - 18)  SpO2: 100% (08 Apr 2022 10:25) (98% - 100%)    PHYSICAL EXAM:    Constitutional: NAD  HEENT: EOMI, throat clear  Neck: No LAD, supple  +trach  Respiratory: CTA and P  Cardiovascular: S1 and S2, RRR, no M  Gastrointestinal: BS+, soft, NT/ND, neg HSM, +peg  Extremities: No peripheral edema, neg clubbing, cyanosis  Vascular: 2+ peripheral pulses  Neurological: A/O, no focal deficits  Psychiatric: weakened  Skin: No rashes      LABS:                        9.6    5.93  )-----------( 268      ( 07 Apr 2022 07:48 )             31.1     04-07    138  |  102  |  13  ----------------------------<  129<H>  3.4<L>   |  23  |  0.40<L>    Ca    8.7      07 Apr 2022 07:48  Phos  2.8     04-07  Mg     1.70     04-07            RADIOLOGY & ADDITIONAL TESTS:

## 2022-04-09 LAB
ANION GAP SERPL CALC-SCNC: 15 MMOL/L — HIGH (ref 7–14)
BUN SERPL-MCNC: 14 MG/DL — SIGNIFICANT CHANGE UP (ref 7–23)
CALCIUM SERPL-MCNC: 8.4 MG/DL — SIGNIFICANT CHANGE UP (ref 8.4–10.5)
CHLORIDE SERPL-SCNC: 104 MMOL/L — SIGNIFICANT CHANGE UP (ref 98–107)
CO2 SERPL-SCNC: 22 MMOL/L — SIGNIFICANT CHANGE UP (ref 22–31)
CREAT SERPL-MCNC: 0.38 MG/DL — LOW (ref 0.5–1.3)
EGFR: 102 ML/MIN/1.73M2 — SIGNIFICANT CHANGE UP
GLUCOSE SERPL-MCNC: 132 MG/DL — HIGH (ref 70–99)
HCT VFR BLD CALC: 32.6 % — LOW (ref 34.5–45)
HGB BLD-MCNC: 9.9 G/DL — LOW (ref 11.5–15.5)
MAGNESIUM SERPL-MCNC: 2 MG/DL — SIGNIFICANT CHANGE UP (ref 1.6–2.6)
MCHC RBC-ENTMCNC: 27 PG — SIGNIFICANT CHANGE UP (ref 27–34)
MCHC RBC-ENTMCNC: 30.4 GM/DL — LOW (ref 32–36)
MCV RBC AUTO: 88.8 FL — SIGNIFICANT CHANGE UP (ref 80–100)
NRBC # BLD: 0 /100 WBCS — SIGNIFICANT CHANGE UP
NRBC # FLD: 0 K/UL — SIGNIFICANT CHANGE UP
PHOSPHATE SERPL-MCNC: 3.6 MG/DL — SIGNIFICANT CHANGE UP (ref 2.5–4.5)
PLATELET # BLD AUTO: 330 K/UL — SIGNIFICANT CHANGE UP (ref 150–400)
POTASSIUM SERPL-MCNC: 3.6 MMOL/L — SIGNIFICANT CHANGE UP (ref 3.5–5.3)
POTASSIUM SERPL-SCNC: 3.6 MMOL/L — SIGNIFICANT CHANGE UP (ref 3.5–5.3)
RBC # BLD: 3.67 M/UL — LOW (ref 3.8–5.2)
RBC # FLD: 16.2 % — HIGH (ref 10.3–14.5)
SODIUM SERPL-SCNC: 141 MMOL/L — SIGNIFICANT CHANGE UP (ref 135–145)
WBC # BLD: 9.54 K/UL — SIGNIFICANT CHANGE UP (ref 3.8–10.5)
WBC # FLD AUTO: 9.54 K/UL — SIGNIFICANT CHANGE UP (ref 3.8–10.5)

## 2022-04-09 RX ORDER — POTASSIUM CHLORIDE 20 MEQ
20 PACKET (EA) ORAL ONCE
Refills: 0 | Status: COMPLETED | OUTPATIENT
Start: 2022-04-09 | End: 2022-04-09

## 2022-04-09 RX ADMIN — LEVETIRACETAM 400 MILLIGRAM(S): 250 TABLET, FILM COATED ORAL at 09:58

## 2022-04-09 RX ADMIN — AMLODIPINE BESYLATE 10 MILLIGRAM(S): 2.5 TABLET ORAL at 06:52

## 2022-04-09 RX ADMIN — CLOPIDOGREL BISULFATE 75 MILLIGRAM(S): 75 TABLET, FILM COATED ORAL at 10:36

## 2022-04-09 RX ADMIN — CARVEDILOL PHOSPHATE 3.12 MILLIGRAM(S): 80 CAPSULE, EXTENDED RELEASE ORAL at 06:52

## 2022-04-09 RX ADMIN — Medication 650 MILLIGRAM(S): at 14:37

## 2022-04-09 RX ADMIN — APIXABAN 5 MILLIGRAM(S): 2.5 TABLET, FILM COATED ORAL at 17:03

## 2022-04-09 RX ADMIN — DORZOLAMIDE HYDROCHLORIDE 1 DROP(S): 20 SOLUTION/ DROPS OPHTHALMIC at 09:58

## 2022-04-09 RX ADMIN — LEVETIRACETAM 400 MILLIGRAM(S): 250 TABLET, FILM COATED ORAL at 22:41

## 2022-04-09 RX ADMIN — Medication 20 MILLIEQUIVALENT(S): at 10:36

## 2022-04-09 RX ADMIN — MEROPENEM-VABORBACTAM 83.33 GRAM(S): 1; 1 INJECTION, POWDER, FOR SOLUTION INTRAVENOUS at 19:11

## 2022-04-09 RX ADMIN — CARVEDILOL PHOSPHATE 3.12 MILLIGRAM(S): 80 CAPSULE, EXTENDED RELEASE ORAL at 17:03

## 2022-04-09 RX ADMIN — Medication 81 MILLIGRAM(S): at 10:36

## 2022-04-09 RX ADMIN — DORZOLAMIDE HYDROCHLORIDE 1 DROP(S): 20 SOLUTION/ DROPS OPHTHALMIC at 19:12

## 2022-04-09 RX ADMIN — Medication 1 DROP(S): at 06:53

## 2022-04-09 RX ADMIN — Medication 75 MICROGRAM(S): at 06:52

## 2022-04-09 RX ADMIN — Medication 650 MILLIGRAM(S): at 15:07

## 2022-04-09 RX ADMIN — Medication 1 DROP(S): at 17:04

## 2022-04-09 RX ADMIN — MEROPENEM-VABORBACTAM 83.33 GRAM(S): 1; 1 INJECTION, POWDER, FOR SOLUTION INTRAVENOUS at 09:57

## 2022-04-09 RX ADMIN — ATORVASTATIN CALCIUM 40 MILLIGRAM(S): 80 TABLET, FILM COATED ORAL at 22:41

## 2022-04-09 RX ADMIN — PANTOPRAZOLE SODIUM 40 MILLIGRAM(S): 20 TABLET, DELAYED RELEASE ORAL at 10:36

## 2022-04-09 RX ADMIN — APIXABAN 5 MILLIGRAM(S): 2.5 TABLET, FILM COATED ORAL at 06:53

## 2022-04-09 RX ADMIN — LATANOPROST 1 DROP(S): 0.05 SOLUTION/ DROPS OPHTHALMIC; TOPICAL at 22:41

## 2022-04-09 RX ADMIN — MEROPENEM-VABORBACTAM 83.33 GRAM(S): 1; 1 INJECTION, POWDER, FOR SOLUTION INTRAVENOUS at 01:34

## 2022-04-09 NOTE — PROGRESS NOTE ADULT - SUBJECTIVE AND OBJECTIVE BOX
Date of service: 4/9/22    chief complaint: dislodged PEG tube     extended hpi: 82 yo F with PMHx HTN, HLD, Hypothyroidism, Depression, seizure d/o, RLE DVT, Cholelithiasis s/p recent Hospitalization in [facility] from 12/22 -> 2/11/22 for acute perforated diverticulitis s/p Rosales 12/25, colostomy bag 12/26, hospitalization c/b IR drainage for abdominal abscess 1/3 Cx grew e.coli & bacteriodes, also required Mechanical Ventilation for Acute Respiratory Failure with Tracheostomy done 1/7.  PEG done 1/18, developed bleeding through ostomy for which she was transfused 1/28, EGD on 2/1 showed gastritis. Developed fevers on 1/30 and CT's showed Acute left MCA infarct- could be 2/2 to carotid occlusion vs afib, new RLL aspiration pneumonia, seen by ID, completed antibiotics, seen by VascSx, not surgical candidate. Pt aphasic sent from [facility] for dislodged Peg tube, after staff noted pt has blood to her abdomen.     S: resting comfortably today, no acute event    Review of Systems:   Constitutional: [ ] fevers, [ ] chills.   Skin: [ ] dry skin. [ ] rashes.  Psychiatric: [ ] depression, [ ] anxiety.   Gastrointestinal: [ ] BRBPR, [ ] melena.   Neurological: [ ] confusion. [ ] seizures. [ ] shuffling gait.   Ears,Nose,Mouth and Throat: [ ] ear pain [ ] sore throat.   Eyes: [ ] diplopia.   Respiratory: [ ] hemoptysis. [ ] shortness of breath  Cardiovascular: See HPI above  Hematologic/Lymphatic: [ ] anemia. [ ] painful nodes. [ ] prolonged bleeding.   Genitourinary: [ ] hematuria. [ ] flank pain.   Endocrine: [ ] significant change in weight. [ ] intolerance to heat and cold.     Review of systems [ ] otherwise negative, [x ] otherwise unable to obtain    FH: no family history of sudden cardiac death in first degree relatives    SH: [ ] tobacco, [ ] alcohol, [ ] drugs    acetaminophen     Tablet .. 650 milliGRAM(s) Oral every 6 hours PRN  amLODIPine   Tablet 10 milliGRAM(s) Oral daily  apixaban 5 milliGRAM(s) Oral every 12 hours  aspirin  chewable 81 milliGRAM(s) Enteral Tube daily  atorvastatin 40 milliGRAM(s) Oral at bedtime  carvedilol 3.125 milliGRAM(s) Oral every 12 hours  clopidogrel Tablet 75 milliGRAM(s) Enteral Tube daily  dorzolamide 2% Ophthalmic Solution 1 Drop(s) Both EYES <User Schedule>  influenza  Vaccine (HIGH DOSE) 0.7 milliLiter(s) IntraMuscular once  lactated ringers. 1000 milliLiter(s) IV Continuous <Continuous>  latanoprost 0.005% Ophthalmic Solution 1 Drop(s) Both EYES at bedtime  levETIRAcetam  IVPB 500 milliGRAM(s) IV Intermittent every 12 hours  levothyroxine 75 MICROGram(s) Oral daily  meropenem/vaborbactam IVPB 4 Gram(s) IV Intermittent every 8 hours  pantoprazole   Suspension 40 milliGRAM(s) Oral daily  timolol 0.5% Solution 1 Drop(s) Both EYES two times a day                            9.9    9.54  )-----------( 330      ( 09 Apr 2022 07:16 )             32.6       04-09    141  |  104  |  14  ----------------------------<  132<H>  3.6   |  22  |  0.38<L>    Ca    8.4      09 Apr 2022 07:16  Phos  3.6     04-09  Mg     2.00     04-09    T(C): 37.1 (04-09-22 @ 09:50), Max: 37.1 (04-08-22 @ 22:04)  HR: 77 (04-09-22 @ 09:50) (77 - 89)  BP: 145/71 (04-09-22 @ 09:50) (142/68 - 160/77)  RR: 18 (04-09-22 @ 09:50) (18 - 18)  SpO2: 100% (04-09-22 @ 09:50) (100% - 100%)    General: no acute distress   Head: Normocephalic and atraumatic.   Neck: No JVD. No bruits. Supple. Does not appear to be enlarged.   Cardiovascular: + S1,S2 ; RRR Soft systolic murmur at the left lower sternal border. No rubs noted.    Lungs: CTA b/l. No rhonchi, rales or wheezes.   Abdomen: + BS, soft. Non tender. Non distended. No rebound. No guarding.   Extremities: No clubbing/cyanosis/edema.   Psychiatric: unable to assess     < from: TTE Echo Complete w/o Contrast w/ Doppler (02.04.22 @ 15:28) >  Summary:   1. Left ventricular ejection fraction, by visual estimation, is 60 to   65%.   2. Normal global left ventricular systolic function.   3. Mild mitral valve regurgitation.   4.Mild thickening of the anterior and posterior mitral valve leaflets.   5. Mild tricuspid regurgitation.   6. Mild to moderate aortic regurgitation.   7. Sclerotic aortic valve with normal opening.  [id number] Colin Butcher MD, [facility]  Electronically signed on 2/5/2022 at 3:03:14 PM  < end of copied text >      < from: CT Angio Head w/ IV Cont (04.04.22 @ 14:19) >  IMPRESSIONS:  CTA neck:  1. Short segment 70% stenosis in diameter at the proximal aspect of the   right internal carotid artery.  2. The left internal carotid artery is occluded at a distance of   approximately 7 mm beyond the origin of this vessel.  3. Bilateral vertebral arteries are appreciated. Left vertebral artery is   dominant.    CTA COW:  1. There is no flow within the petrous, precavernous or cavernous   portions of the left internal carotid artery.  2. Flow is appreciated within the left middle cerebral artery,   significantly decreased in caliber when compared with the right middle   cerebral artery. Furthermore, only an attenuated/hypoplastic flow pattern   is appreciated in association with the left A1 segment as well as the   left posterior communicating artery.  3. Hypoplastic/attenuated pattern of flow within the left A 2 segment of   the anterior cerebral artery.  < end of copied text >    < from: CT Head No Cont (04.04.22 @ 14:19) >  IMPRESSION:    1. Evolution of previously visualized left MCA infarct, which appeared   acute at time of prior study dated 2/5/2022. Cannot exclude an acute on   chronic process. Consider further evaluation via MR imaging as clinically   indicated, provided the patient has no contraindications.    2. Findings also include a new decreased attenuation involving the left   brachium pontis and left cerebellum, possibly representing an acute   ischemic event in this location. MR imaging to include DWI and ADC   mapping techniques may be helpful for further characterization of this   finding.    3. No acute intracranial hemorrhage.    4. Paranasal sinus inflammatory changes, as described above. Cannot   exclude sinusitis.    5. Persistent fluid within right-sided mastoid air cells. Cannot exclude   mastoiditis.    < end of copied text >        A/P:  82 yo F with PMHx HTN, HLD, Hypothyroidism, Depression, seizure d/o, RLE DVT, Cholelithiasis s/p recent Hospitalization in [facility] from 12/22 -> 2/11/22 for acute perforated diverticulitis s/p Rosales 12/25, colostomy bag 12/26, hospitalization c/b IR drainage for abdominal abscess 1/3 Cx grew e.coli & bacteriodes, also required Mechanical Ventilation for Acute Respiratory Failure with Tracheostomy done 1/7.  PEG done 1/18, developed bleeding through ostomy for which she was transfused 1/28, EGD on 2/1 showed gastritis. Developed fevers on 1/30 and CT's showed Acute left MCA infarct- could be 2/2 to carotid occlusion vs afib, new RLL aspiration pneumonia, seen by ID, completed antibiotics, seen by VascSx, not surgical candidate. Pt aphasic sent from [facility] for dislodged Peg tube, after staff noted pt has blood to her abdomen.     -prior chart reviewed (Jan-Feb 2022) Pt with CVA and found with AFib and Carotid occlusion.  AC held initially while monitoring for hemorrhagic conversion and then in the setting of GI bleed req PRBCs.  She was continued on DAPT.  -given elevated [id number], recommend lifelong AC for AF if able to tolerate--Eliquis started - monitor h/h   -on antibiotics for klebsiella bacteremia.  -evolving stroke, worsening mental status - neuro eval noted - plans for hospice care as goals of care now toward comfort, and she is DNR.

## 2022-04-09 NOTE — PROGRESS NOTE ADULT - SUBJECTIVE AND OBJECTIVE BOX
Date of Service  : 04-09-22     INTERVAL HPI/OVERNIGHT EVENTS: No new concerns.   Vital Signs Last 24 Hrs  T(C): 37.1 (09 Apr 2022 09:50), Max: 37.3 (08 Apr 2022 10:25)  T(F): 98.8 (09 Apr 2022 09:50), Max: 99.1 (08 Apr 2022 10:25)  HR: 77 (09 Apr 2022 09:50) (72 - 89)  BP: 145/71 (09 Apr 2022 09:50) (131/53 - 160/77)  BP(mean): --  RR: 18 (09 Apr 2022 09:50) (17 - 18)  SpO2: 100% (09 Apr 2022 09:50) (100% - 100%)  I&O's Summary    08 Apr 2022 07:01  -  09 Apr 2022 07:00  --------------------------------------------------------  IN: 2335 mL / OUT: 2080 mL / NET: 255 mL      MEDICATIONS  (STANDING):  amLODIPine   Tablet 10 milliGRAM(s) Oral daily  apixaban 5 milliGRAM(s) Oral every 12 hours  aspirin  chewable 81 milliGRAM(s) Enteral Tube daily  atorvastatin 40 milliGRAM(s) Oral at bedtime  carvedilol 3.125 milliGRAM(s) Oral every 12 hours  clopidogrel Tablet 75 milliGRAM(s) Enteral Tube daily  dorzolamide 2% Ophthalmic Solution 1 Drop(s) Both EYES <User Schedule>  influenza  Vaccine (HIGH DOSE) 0.7 milliLiter(s) IntraMuscular once  lactated ringers. 1000 milliLiter(s) (75 mL/Hr) IV Continuous <Continuous>  latanoprost 0.005% Ophthalmic Solution 1 Drop(s) Both EYES at bedtime  levETIRAcetam  IVPB 500 milliGRAM(s) IV Intermittent every 12 hours  levothyroxine 75 MICROGram(s) Oral daily  meropenem/vaborbactam IVPB 4 Gram(s) IV Intermittent every 8 hours  pantoprazole   Suspension 40 milliGRAM(s) Oral daily  potassium chloride   Powder 20 milliEquivalent(s) Oral once  timolol 0.5% Solution 1 Drop(s) Both EYES two times a day    MEDICATIONS  (PRN):  acetaminophen     Tablet .. 650 milliGRAM(s) Oral every 6 hours PRN Mild Pain (1 - 3), Moderate Pain (4 - 6)    LABS:                        9.9    9.54  )-----------( 330      ( 09 Apr 2022 07:16 )             32.6     04-09    141  |  104  |  14  ----------------------------<  132<H>  3.6   |  22  |  0.38<L>    Ca    8.4      09 Apr 2022 07:16  Phos  3.6     04-09  Mg     2.00     04-09              Consultant(s) Notes Reviewed:  [x ] YES  [ ] NO    PHYSICAL EXAM:  GENERAL: NAD, not in any distress ,  HEAD:  Atraumatic, Normocephalic  NECK: Supple, No JVD, Normal thyroid trach +  NERVOUS SYSTEM:  Alert   CHEST/LUNG: Good air entry bilateral with no  rales, rhonchi, wheezing, or rubs  HEART: Regular rate and rhythm; No murmurs, rubs, or gallops  ABDOMEN: Soft, Nontender, Nondistended; Bowel sounds present, PEG and Ostomy +  EXTREMITIES:  2+ Peripheral Pulses, No clubbing, cyanosis, or edema  SKIN: No rashes or lesions    Care Discussed with Consultants/Other Providers [ x] YES  [ ] NO

## 2022-04-09 NOTE — PROGRESS NOTE ADULT - ASSESSMENT
82 yo F with PMHx HTN, HLD, Hypothyroidism, Depression, seizure d/o, RLE DVT, Cholelithiasis s/p recent Hospitalization in [facility] from 12/22 -> 2/11/22 for acute perforated diverticulitis s/p Rosales 12/25, colostomy bag 12/26, hospitalization c/b IR drainage for abdominal abscess 1/3 Cx grew e.coli & bacteriodes, also required Mechanical Ventilation for Acute Respiratory Failure with Tracheostomy done 1/7.  PEG done 1/18, developed bleeding through ostomy, EGD on 2/1 showed gastritis. T's showed Acute left MCA infarct- could be 2/2 to carotid occlusion vs afib, new RLL aspiration pneumonia, seen by ID, completed antibiotics, seen by VascSx, not surgical candidate. Pt aphasic sent from [facility] for dislodged Peg tube, after staff noted pt has blood to her abdomen. Nephrology consulted for Hypokalemia.     A/P  Hypokalemia   likely 2/2 GI loss   s/p kcl 40meq po    no labs today   monitor input and output closely   monitor K closely    Hypomagnesia   replete as needed  monitor Mg     Hypophosphatemia   replete as needed  monitor     Hypocalcemia   corrected Ca WNL   Monitor    Acidosis with anion gap   etiology?  improved   monitor    HTN   optimal    monitor BP closely

## 2022-04-09 NOTE — PROGRESS NOTE ADULT - SUBJECTIVE AND OBJECTIVE BOX
JUAREZ GTZ  81y  Patient is a 81y old  Female who presents with a chief complaint of   HPI:  Followed for Hypokalemia.    HEALTH ISSUES - PROBLEM Dx:  PEG tube malfunction    Chronic respiratory failure with hypoxia    Stroke    Diverticulitis of intestine with perforation and abscess without bleeding    Hypothyroidism    HTN (hypertension)    Seizure disorder    Glaucoma    History of atrial fibrillation    DVT, lower extremity    Hypokalemia    Functional quadriplegia    Other encephalopathy    Bacteremia    Advanced care planning/counseling discussion    Palliative care encounter          MEDICATIONS  (STANDING):  amLODIPine   Tablet 10 milliGRAM(s) Oral daily  apixaban 5 milliGRAM(s) Oral every 12 hours  aspirin  chewable 81 milliGRAM(s) Enteral Tube daily  atorvastatin 40 milliGRAM(s) Oral at bedtime  carvedilol 3.125 milliGRAM(s) Oral every 12 hours  clopidogrel Tablet 75 milliGRAM(s) Enteral Tube daily  dorzolamide 2% Ophthalmic Solution 1 Drop(s) Both EYES <User Schedule>  influenza  Vaccine (HIGH DOSE) 0.7 milliLiter(s) IntraMuscular once  lactated ringers. 1000 milliLiter(s) (75 mL/Hr) IV Continuous <Continuous>  latanoprost 0.005% Ophthalmic Solution 1 Drop(s) Both EYES at bedtime  levETIRAcetam  IVPB 500 milliGRAM(s) IV Intermittent every 12 hours  levothyroxine 75 MICROGram(s) Oral daily  meropenem/vaborbactam IVPB 4 Gram(s) IV Intermittent every 8 hours  pantoprazole   Suspension 40 milliGRAM(s) Oral daily  potassium chloride   Powder 20 milliEquivalent(s) Oral once  timolol 0.5% Solution 1 Drop(s) Both EYES two times a day    MEDICATIONS  (PRN):  acetaminophen     Tablet .. 650 milliGRAM(s) Oral every 6 hours PRN Mild Pain (1 - 3), Moderate Pain (4 - 6)    Vital Signs Last 24 Hrs  T(C): 37.1 (09 Apr 2022 09:50), Max: 37.1 (08 Apr 2022 22:04)  T(F): 98.8 (09 Apr 2022 09:50), Max: 98.8 (09 Apr 2022 09:50)  HR: 77 (09 Apr 2022 09:50) (77 - 89)  BP: 145/71 (09 Apr 2022 09:50) (142/68 - 160/77)  BP(mean): --  RR: 18 (09 Apr 2022 09:50) (18 - 18)  SpO2: 100% (09 Apr 2022 09:50) (100% - 100%)  Daily     Daily     PHYSICAL EXAM:  Constitutional:   appears comfortable and not distressed. Not diaphoretic.    Neck:  The thyroid is normal. Trachea is midline.     Breasts: Normal examination.    Respiratory: The lungs are clear to auscultation. No dullness and expansion is normal.    Cardiovascular: S1 and S2 are normal. No mummurs, rubs or gallops are present.    Gastrointestinal: The abdomen is soft. No tenderness is present. No masses are present. Bowel sounds are normal.    Genitourinary: The bladder is not distended. No CVA tenderness is present.    Extremities: No edema is noted. No deformities are present.    Neurological: Cognition is normal. Tone, power and sensation are normal. Gait is steady.    Skin: No lesions are seen  or palpated.    Lymph Nodes: No lymphadenopathy is present.    Psychiatric: Mood is appropriate. No hallucinations or flight of ideas are noted.                              9.9    9.54  )-----------( 330      ( 09 Apr 2022 07:16 )             32.6     04-09    141  |  104  |  14  ----------------------------<  132<H>  3.6   |  22  |  0.38<L>    Ca    8.4      09 Apr 2022 07:16  Phos  3.6     04-09  Mg     2.00     04-09

## 2022-04-10 LAB
CULTURE RESULTS: SIGNIFICANT CHANGE UP
SPECIMEN SOURCE: SIGNIFICANT CHANGE UP

## 2022-04-10 RX ADMIN — APIXABAN 5 MILLIGRAM(S): 2.5 TABLET, FILM COATED ORAL at 17:56

## 2022-04-10 RX ADMIN — ATORVASTATIN CALCIUM 40 MILLIGRAM(S): 80 TABLET, FILM COATED ORAL at 21:46

## 2022-04-10 RX ADMIN — MEROPENEM-VABORBACTAM 83.33 GRAM(S): 1; 1 INJECTION, POWDER, FOR SOLUTION INTRAVENOUS at 11:00

## 2022-04-10 RX ADMIN — AMLODIPINE BESYLATE 10 MILLIGRAM(S): 2.5 TABLET ORAL at 07:24

## 2022-04-10 RX ADMIN — MEROPENEM-VABORBACTAM 83.33 GRAM(S): 1; 1 INJECTION, POWDER, FOR SOLUTION INTRAVENOUS at 01:48

## 2022-04-10 RX ADMIN — LEVETIRACETAM 400 MILLIGRAM(S): 250 TABLET, FILM COATED ORAL at 10:08

## 2022-04-10 RX ADMIN — LATANOPROST 1 DROP(S): 0.05 SOLUTION/ DROPS OPHTHALMIC; TOPICAL at 21:46

## 2022-04-10 RX ADMIN — Medication 75 MICROGRAM(S): at 07:25

## 2022-04-10 RX ADMIN — CARVEDILOL PHOSPHATE 3.12 MILLIGRAM(S): 80 CAPSULE, EXTENDED RELEASE ORAL at 17:56

## 2022-04-10 RX ADMIN — CLOPIDOGREL BISULFATE 75 MILLIGRAM(S): 75 TABLET, FILM COATED ORAL at 12:51

## 2022-04-10 RX ADMIN — LEVETIRACETAM 400 MILLIGRAM(S): 250 TABLET, FILM COATED ORAL at 21:45

## 2022-04-10 RX ADMIN — PANTOPRAZOLE SODIUM 40 MILLIGRAM(S): 20 TABLET, DELAYED RELEASE ORAL at 12:51

## 2022-04-10 RX ADMIN — Medication 81 MILLIGRAM(S): at 12:50

## 2022-04-10 RX ADMIN — DORZOLAMIDE HYDROCHLORIDE 1 DROP(S): 20 SOLUTION/ DROPS OPHTHALMIC at 20:18

## 2022-04-10 RX ADMIN — DORZOLAMIDE HYDROCHLORIDE 1 DROP(S): 20 SOLUTION/ DROPS OPHTHALMIC at 09:57

## 2022-04-10 RX ADMIN — APIXABAN 5 MILLIGRAM(S): 2.5 TABLET, FILM COATED ORAL at 07:24

## 2022-04-10 RX ADMIN — Medication 1 DROP(S): at 07:23

## 2022-04-10 RX ADMIN — CARVEDILOL PHOSPHATE 3.12 MILLIGRAM(S): 80 CAPSULE, EXTENDED RELEASE ORAL at 07:23

## 2022-04-10 RX ADMIN — MEROPENEM-VABORBACTAM 83.33 GRAM(S): 1; 1 INJECTION, POWDER, FOR SOLUTION INTRAVENOUS at 21:45

## 2022-04-10 RX ADMIN — Medication 1 DROP(S): at 17:56

## 2022-04-10 NOTE — PROGRESS NOTE ADULT - SUBJECTIVE AND OBJECTIVE BOX
Date of Service  : 04-10-22 @ 15:38    INTERVAL HPI/OVERNIGHT EVENTS: No new concerns.   Vital Signs Last 24 Hrs  T(C): 37.5 (10 Apr 2022 14:00), Max: 37.5 (10 Apr 2022 14:00)  T(F): 99.5 (10 Apr 2022 14:00), Max: 99.5 (10 Apr 2022 14:00)  HR: 81 (10 Apr 2022 14:00) (79 - 82)  BP: 154/67 (10 Apr 2022 14:00) (140/70 - 169/70)  BP(mean): --  RR: 18 (10 Apr 2022 14:00) (17 - 18)  SpO2: 100% (10 Apr 2022 14:00) (100% - 100%)  I&O's Summary    09 Apr 2022 07:01  -  10 Apr 2022 07:00  --------------------------------------------------------  IN: 1290 mL / OUT: 1300 mL / NET: -10 mL    10 Apr 2022 07:01  -  10 Apr 2022 15:38  --------------------------------------------------------  IN: 870 mL / OUT: 1050 mL / NET: -180 mL      MEDICATIONS  (STANDING):  amLODIPine   Tablet 10 milliGRAM(s) Oral daily  apixaban 5 milliGRAM(s) Oral every 12 hours  aspirin  chewable 81 milliGRAM(s) Enteral Tube daily  atorvastatin 40 milliGRAM(s) Oral at bedtime  carvedilol 3.125 milliGRAM(s) Oral every 12 hours  clopidogrel Tablet 75 milliGRAM(s) Enteral Tube daily  dorzolamide 2% Ophthalmic Solution 1 Drop(s) Both EYES <User Schedule>  influenza  Vaccine (HIGH DOSE) 0.7 milliLiter(s) IntraMuscular once  lactated ringers. 1000 milliLiter(s) (75 mL/Hr) IV Continuous <Continuous>  latanoprost 0.005% Ophthalmic Solution 1 Drop(s) Both EYES at bedtime  levETIRAcetam  IVPB 500 milliGRAM(s) IV Intermittent every 12 hours  levothyroxine 75 MICROGram(s) Oral daily  meropenem/vaborbactam IVPB 4 Gram(s) IV Intermittent every 8 hours  pantoprazole   Suspension 40 milliGRAM(s) Oral daily  timolol 0.5% Solution 1 Drop(s) Both EYES two times a day    MEDICATIONS  (PRN):  acetaminophen     Tablet .. 650 milliGRAM(s) Oral every 6 hours PRN Mild Pain (1 - 3), Moderate Pain (4 - 6)    LABS:                        9.9    9.54  )-----------( 330      ( 09 Apr 2022 07:16 )             32.6     04-09    141  |  104  |  14  ----------------------------<  132<H>  3.6   |  22  |  0.38<L>    Ca    8.4      09 Apr 2022 07:16  Phos  3.6     04-09  Mg     2.00     04-09          CAPILLARY BLOOD GLUCOSE                Consultant(s) Notes Reviewed:  [x ] YES  [ ] NO    PHYSICAL EXAM:  GENERAL: NAD, well-groomed, well-developed,not in any distress ,  HEAD:  Atraumatic, Normocephalic  NECK: Trach+  NERVOUS SYSTEM:  Alert   CHEST/LUNG: Good air entry bilateral with no  rales, rhonchi, wheezing, or rubs  HEART: Regular rate and rhythm; No murmurs, rubs, or gallops  ABDOMEN: Soft, Nontender, Nondistended; Bowel sounds present, PEG and Ostomy +  EXTREMITIES:  2+ Peripheral Pulses, No clubbing, cyanosis, or edema      Care Discussed with Consultants/Other Providers [ x] YES  [ ] NO

## 2022-04-10 NOTE — PROGRESS NOTE ADULT - SUBJECTIVE AND OBJECTIVE BOX
Date of service: 4/10/22    chief complaint: dislodged PEG tube     extended hpi: 80 yo F with PMHx HTN, HLD, Hypothyroidism, Depression, seizure d/o, RLE DVT, Cholelithiasis s/p recent Hospitalization in [facility] from 12/22 -> 2/11/22 for acute perforated diverticulitis s/p Rosales 12/25, colostomy bag 12/26, hospitalization c/b IR drainage for abdominal abscess 1/3 Cx grew e.coli & bacteriodes, also required Mechanical Ventilation for Acute Respiratory Failure with Tracheostomy done 1/7.  PEG done 1/18, developed bleeding through ostomy for which she was transfused 1/28, EGD on 2/1 showed gastritis. Developed fevers on 1/30 and CT's showed Acute left MCA infarct- could be 2/2 to carotid occlusion vs afib, new RLL aspiration pneumonia, seen by ID, completed antibiotics, seen by VascSx, not surgical candidate. Pt aphasic sent from [facility] for dislodged Peg tube, after staff noted pt has blood to her abdomen.     S: pt seen and examined, no complaints, ROS - .     Review of Systems:   Constitutional: [ ] fevers, [ ] chills.   Skin: [ ] dry skin. [ ] rashes.  Psychiatric: [ ] depression, [ ] anxiety.   Gastrointestinal: [ ] BRBPR, [ ] melena.   Neurological: [ ] confusion. [ ] seizures. [ ] shuffling gait.   Ears,Nose,Mouth and Throat: [ ] ear pain [ ] sore throat.   Eyes: [ ] diplopia.   Respiratory: [ ] hemoptysis. [ ] shortness of breath  Cardiovascular: See HPI above  Hematologic/Lymphatic: [ ] anemia. [ ] painful nodes. [ ] prolonged bleeding.   Genitourinary: [ ] hematuria. [ ] flank pain.   Endocrine: [ ] significant change in weight. [ ] intolerance to heat and cold.     Review of systems [ ] otherwise negative, [x ] otherwise unable to obtain    FH: no family history of sudden cardiac death in first degree relatives    SH: [ ] tobacco, [ ] alcohol, [ ] drugs         acetaminophen     Tablet .. 650 milliGRAM(s) Oral every 6 hours PRN  amLODIPine   Tablet 10 milliGRAM(s) Oral daily  apixaban 5 milliGRAM(s) Oral every 12 hours  aspirin  chewable 81 milliGRAM(s) Enteral Tube daily  atorvastatin 40 milliGRAM(s) Oral at bedtime  carvedilol 3.125 milliGRAM(s) Oral every 12 hours  clopidogrel Tablet 75 milliGRAM(s) Enteral Tube daily  dorzolamide 2% Ophthalmic Solution 1 Drop(s) Both EYES <User Schedule>  influenza  Vaccine (HIGH DOSE) 0.7 milliLiter(s) IntraMuscular once  lactated ringers. 1000 milliLiter(s) IV Continuous <Continuous>  latanoprost 0.005% Ophthalmic Solution 1 Drop(s) Both EYES at bedtime  levETIRAcetam  IVPB 500 milliGRAM(s) IV Intermittent every 12 hours  levothyroxine 75 MICROGram(s) Oral daily  meropenem/vaborbactam IVPB 4 Gram(s) IV Intermittent every 8 hours  pantoprazole   Suspension 40 milliGRAM(s) Oral daily  timolol 0.5% Solution 1 Drop(s) Both EYES two times a day                            9.9    9.54  )-----------( 330      ( 09 Apr 2022 07:16 )             32.6       Hemoglobin: 9.9 g/dL (04-09 @ 07:16)  Hemoglobin: 9.7 g/dL (04-08 @ 17:25)  Hemoglobin: 9.6 g/dL (04-07 @ 07:48)  Hemoglobin: 9.8 g/dL (04-06 @ 07:18)      04-09    141  |  104  |  14  ----------------------------<  132<H>  3.6   |  22  |  0.38<L>    Ca    8.4      09 Apr 2022 07:16  Phos  3.6     04-09  Mg     2.00     04-09      Creatinine Trend: 0.38<--, 0.38<--, 0.40<--, 0.44<--, 0.54<--, 0.44<--    COAGS:           T(C): 37.1 (04-10-22 @ 07:20), Max: 37.1 (04-09-22 @ 09:50)  HR: 79 (04-10-22 @ 07:20) (77 - 82)  BP: 149/69 (04-10-22 @ 07:20) (140/70 - 169/70)  RR: 17 (04-10-22 @ 07:20) (17 - 18)  SpO2: 100% (04-10-22 @ 07:20) (100% - 100%)  Wt(kg): --    I&O's Summary    09 Apr 2022 07:01  -  10 Apr 2022 07:00  --------------------------------------------------------  IN: 1290 mL / OUT: 1300 mL / NET: -10 mL        General: no acute distress   Head: Normocephalic and atraumatic.   Neck: No JVD. No bruits. Supple. Does not appear to be enlarged.   Cardiovascular: + S1,S2 ; RRR Soft systolic murmur at the left lower sternal border. No rubs noted.    Lungs: CTA b/l. No rhonchi, rales or wheezes.   Abdomen: + BS, soft. Non tender. Non distended. No rebound. No guarding.   Extremities: No clubbing/cyanosis/edema.   Psychiatric: unable to assess     < from: TTE Echo Complete w/o Contrast w/ Doppler (02.04.22 @ 15:28) >  Summary:   1. Left ventricular ejection fraction, by visual estimation, is 60 to   65%.   2. Normal global left ventricular systolic function.   3. Mild mitral valve regurgitation.   4.Mild thickening of the anterior and posterior mitral valve leaflets.   5. Mild tricuspid regurgitation.   6. Mild to moderate aortic regurgitation.   7. Sclerotic aortic valve with normal opening.  [id number] Colin Butcher MD, [facility]  Electronically signed on 2/5/2022 at 3:03:14 PM  < end of copied text >      < from: CT Angio Head w/ IV Cont (04.04.22 @ 14:19) >  IMPRESSIONS:  CTA neck:  1. Short segment 70% stenosis in diameter at the proximal aspect of the   right internal carotid artery.  2. The left internal carotid artery is occluded at a distance of   approximately 7 mm beyond the origin of this vessel.  3. Bilateral vertebral arteries are appreciated. Left vertebral artery is   dominant.    CTA COW:  1. There is no flow within the petrous, precavernous or cavernous   portions of the left internal carotid artery.  2. Flow is appreciated within the left middle cerebral artery,   significantly decreased in caliber when compared with the right middle   cerebral artery. Furthermore, only an attenuated/hypoplastic flow pattern   is appreciated in association with the left A1 segment as well as the   left posterior communicating artery.  3. Hypoplastic/attenuated pattern of flow within the left A 2 segment of   the anterior cerebral artery.  < end of copied text >    < from: CT Head No Cont (04.04.22 @ 14:19) >  IMPRESSION:    1. Evolution of previously visualized left MCA infarct, which appeared   acute at time of prior study dated 2/5/2022. Cannot exclude an acute on   chronic process. Consider further evaluation via MR imaging as clinically   indicated, provided the patient has no contraindications.    2. Findings also include a new decreased attenuation involving the left   brachium pontis and left cerebellum, possibly representing an acute   ischemic event in this location. MR imaging to include DWI and ADC   mapping techniques may be helpful for further characterization of this   finding.    3. No acute intracranial hemorrhage.    4. Paranasal sinus inflammatory changes, as described above. Cannot   exclude sinusitis.    5. Persistent fluid within right-sided mastoid air cells. Cannot exclude   mastoiditis.    < end of copied text >        A/P:  80 yo F with PMHx HTN, HLD, Hypothyroidism, Depression, seizure d/o, RLE DVT, Cholelithiasis s/p recent Hospitalization in [facility] from 12/22 -> 2/11/22 for acute perforated diverticulitis s/p Rosales 12/25, colostomy bag 12/26, hospitalization c/b IR drainage for abdominal abscess 1/3 Cx grew e.coli & bacteriodes, also required Mechanical Ventilation for Acute Respiratory Failure with Tracheostomy done 1/7.  PEG done 1/18, developed bleeding through ostomy for which she was transfused 1/28, EGD on 2/1 showed gastritis. Developed fevers on 1/30 and CT's showed Acute left MCA infarct- could be 2/2 to carotid occlusion vs afib, new RLL aspiration pneumonia, seen by ID, completed antibiotics, seen by VascSx, not surgical candidate. Pt aphasic sent from [facility] for dislodged Peg tube, after staff noted pt has blood to her abdomen.     -prior chart reviewed (Jan-Feb 2022) Pt with CVA and found with AFib and Carotid occlusion.  AC held initially while monitoring for hemorrhagic conversion and then in the setting of GI bleed req PRBCs.  She was continued on DAPT.  -given elevated [id number], recommend lifelong AC for AF if able to tolerate--Eliquis started - monitor h/h   -on antibiotics for klebsiella bacteremia.  -evolving stroke, worsening mental status - neuro eval noted - plans for hospice care as goals of care now toward comfort, and she is DNR.

## 2022-04-11 LAB
ANION GAP SERPL CALC-SCNC: 12 MMOL/L — SIGNIFICANT CHANGE UP (ref 7–14)
BUN SERPL-MCNC: 15 MG/DL — SIGNIFICANT CHANGE UP (ref 7–23)
CALCIUM SERPL-MCNC: 9.4 MG/DL — SIGNIFICANT CHANGE UP (ref 8.4–10.5)
CHLORIDE SERPL-SCNC: 106 MMOL/L — SIGNIFICANT CHANGE UP (ref 98–107)
CO2 SERPL-SCNC: 25 MMOL/L — SIGNIFICANT CHANGE UP (ref 22–31)
CREAT SERPL-MCNC: 0.4 MG/DL — LOW (ref 0.5–1.3)
EGFR: 101 ML/MIN/1.73M2 — SIGNIFICANT CHANGE UP
GLUCOSE SERPL-MCNC: 125 MG/DL — HIGH (ref 70–99)
HCT VFR BLD CALC: 31.3 % — LOW (ref 34.5–45)
HGB BLD-MCNC: 9.6 G/DL — LOW (ref 11.5–15.5)
MAGNESIUM SERPL-MCNC: 2.1 MG/DL — SIGNIFICANT CHANGE UP (ref 1.6–2.6)
MCHC RBC-ENTMCNC: 27.3 PG — SIGNIFICANT CHANGE UP (ref 27–34)
MCHC RBC-ENTMCNC: 30.7 GM/DL — LOW (ref 32–36)
MCV RBC AUTO: 88.9 FL — SIGNIFICANT CHANGE UP (ref 80–100)
NRBC # BLD: 0 /100 WBCS — SIGNIFICANT CHANGE UP
NRBC # FLD: 0 K/UL — SIGNIFICANT CHANGE UP
PHOSPHATE SERPL-MCNC: 3.9 MG/DL — SIGNIFICANT CHANGE UP (ref 2.5–4.5)
PLATELET # BLD AUTO: 372 K/UL — SIGNIFICANT CHANGE UP (ref 150–400)
POTASSIUM SERPL-MCNC: 4 MMOL/L — SIGNIFICANT CHANGE UP (ref 3.5–5.3)
POTASSIUM SERPL-SCNC: 4 MMOL/L — SIGNIFICANT CHANGE UP (ref 3.5–5.3)
RBC # BLD: 3.52 M/UL — LOW (ref 3.8–5.2)
RBC # FLD: 16.3 % — HIGH (ref 10.3–14.5)
SODIUM SERPL-SCNC: 143 MMOL/L — SIGNIFICANT CHANGE UP (ref 135–145)
WBC # BLD: 11.54 K/UL — HIGH (ref 3.8–10.5)
WBC # FLD AUTO: 11.54 K/UL — HIGH (ref 3.8–10.5)

## 2022-04-11 PROCEDURE — 99232 SBSQ HOSP IP/OBS MODERATE 35: CPT | Mod: GC

## 2022-04-11 RX ADMIN — LATANOPROST 1 DROP(S): 0.05 SOLUTION/ DROPS OPHTHALMIC; TOPICAL at 21:35

## 2022-04-11 RX ADMIN — Medication 1 DROP(S): at 18:11

## 2022-04-11 RX ADMIN — MEROPENEM-VABORBACTAM 83.33 GRAM(S): 1; 1 INJECTION, POWDER, FOR SOLUTION INTRAVENOUS at 21:34

## 2022-04-11 RX ADMIN — AMLODIPINE BESYLATE 10 MILLIGRAM(S): 2.5 TABLET ORAL at 06:44

## 2022-04-11 RX ADMIN — CARVEDILOL PHOSPHATE 3.12 MILLIGRAM(S): 80 CAPSULE, EXTENDED RELEASE ORAL at 06:44

## 2022-04-11 RX ADMIN — DORZOLAMIDE HYDROCHLORIDE 1 DROP(S): 20 SOLUTION/ DROPS OPHTHALMIC at 18:11

## 2022-04-11 RX ADMIN — Medication 1 DROP(S): at 06:41

## 2022-04-11 RX ADMIN — LEVETIRACETAM 400 MILLIGRAM(S): 250 TABLET, FILM COATED ORAL at 21:33

## 2022-04-11 RX ADMIN — LEVETIRACETAM 400 MILLIGRAM(S): 250 TABLET, FILM COATED ORAL at 10:05

## 2022-04-11 RX ADMIN — ATORVASTATIN CALCIUM 40 MILLIGRAM(S): 80 TABLET, FILM COATED ORAL at 21:34

## 2022-04-11 RX ADMIN — Medication 81 MILLIGRAM(S): at 13:45

## 2022-04-11 RX ADMIN — CARVEDILOL PHOSPHATE 3.12 MILLIGRAM(S): 80 CAPSULE, EXTENDED RELEASE ORAL at 18:11

## 2022-04-11 RX ADMIN — APIXABAN 5 MILLIGRAM(S): 2.5 TABLET, FILM COATED ORAL at 06:44

## 2022-04-11 RX ADMIN — PANTOPRAZOLE SODIUM 40 MILLIGRAM(S): 20 TABLET, DELAYED RELEASE ORAL at 13:46

## 2022-04-11 RX ADMIN — APIXABAN 5 MILLIGRAM(S): 2.5 TABLET, FILM COATED ORAL at 18:11

## 2022-04-11 RX ADMIN — DORZOLAMIDE HYDROCHLORIDE 1 DROP(S): 20 SOLUTION/ DROPS OPHTHALMIC at 10:05

## 2022-04-11 RX ADMIN — Medication 650 MILLIGRAM(S): at 22:04

## 2022-04-11 RX ADMIN — Medication 75 MICROGRAM(S): at 06:45

## 2022-04-11 RX ADMIN — MEROPENEM-VABORBACTAM 83.33 GRAM(S): 1; 1 INJECTION, POWDER, FOR SOLUTION INTRAVENOUS at 13:46

## 2022-04-11 RX ADMIN — CLOPIDOGREL BISULFATE 75 MILLIGRAM(S): 75 TABLET, FILM COATED ORAL at 13:45

## 2022-04-11 RX ADMIN — MEROPENEM-VABORBACTAM 83.33 GRAM(S): 1; 1 INJECTION, POWDER, FOR SOLUTION INTRAVENOUS at 06:44

## 2022-04-11 RX ADMIN — Medication 650 MILLIGRAM(S): at 21:34

## 2022-04-11 NOTE — PROGRESS NOTE ADULT - SUBJECTIVE AND OBJECTIVE BOX
Follow Up:  sepsis    Interval History: pt pulled out the trach and was repalced    ROS:    Unobtainable because of mental status        Allergies  No Known Allergies        ANTIMICROBIALS:  meropenem/vaborbactam IVPB 4 every 8 hours      OTHER MEDS:  acetaminophen     Tablet .. 650 milliGRAM(s) Oral every 6 hours PRN  amLODIPine   Tablet 10 milliGRAM(s) Oral daily  apixaban 5 milliGRAM(s) Oral every 12 hours  aspirin  chewable 81 milliGRAM(s) Enteral Tube daily  atorvastatin 40 milliGRAM(s) Oral at bedtime  carvedilol 3.125 milliGRAM(s) Oral every 12 hours  clopidogrel Tablet 75 milliGRAM(s) Enteral Tube daily  dorzolamide 2% Ophthalmic Solution 1 Drop(s) Both EYES <User Schedule>  influenza  Vaccine (HIGH DOSE) 0.7 milliLiter(s) IntraMuscular once  lactated ringers. 1000 milliLiter(s) IV Continuous <Continuous>  latanoprost 0.005% Ophthalmic Solution 1 Drop(s) Both EYES at bedtime  levETIRAcetam  IVPB 500 milliGRAM(s) IV Intermittent every 12 hours  levothyroxine 75 MICROGram(s) Oral daily  pantoprazole   Suspension 40 milliGRAM(s) Oral daily  timolol 0.5% Solution 1 Drop(s) Both EYES two times a day      Vital Signs Last 24 Hrs  T(C): 37.3 (11 Apr 2022 13:39), Max: 37.3 (11 Apr 2022 06:00)  T(F): 99.1 (11 Apr 2022 13:39), Max: 99.1 (11 Apr 2022 06:00)  HR: 70 (11 Apr 2022 13:39) (69 - 88)  BP: 133/66 (11 Apr 2022 13:39) (133/66 - 165/76)  BP(mean): --  RR: 19 (11 Apr 2022 13:39) (17 - 19)  SpO2: 100% (11 Apr 2022 13:39) (97% - 100%)    Physical Exam:  General:    NAD, non toxic  Cardio:    regular S1,S2  Respiratory:   trach  abd:   soft, BS +, not tender  :     no CVAT, no suprapubic tenderness, no mc  Musculoskeletal : no joint swelling, no edema  Skin:    no rash  vascular: no phlebitis                            9.6    11.54 )-----------( 372      ( 11 Apr 2022 14:00 )             31.3                   MICROBIOLOGY:  v  .Blood Blood  04-05-22   No Growth Final  --  --      .Blood Blood-Peripheral  04-04-22   Growth in anaerobic bottle: Klebsiella pneumoniae (Carbapenem Resistant)  See previous culture [id number]  --  Blood Culture PCR  Klepne MDRO      .Blood Blood-Peripheral  03-26-22   No Growth Final  --  --      .Blood Blood-Peripheral  03-26-22   No Growth Final  --  --                RADIOLOGY:  Images independently visualized and reviewed personally, findings as below  < from: CT Abdomen and Pelvis w/ IV Cont (04.04.22 @ 14:24) >    IMPRESSION:  Trace bilateral pleural effusions.    Improved aeration in the lower lobes bilaterally since 1/30/2022.   Nonspecific patchy bilateral groundglass and linear opacities are noted.    Enhancement of the wall of the cecum and ascending colon with mild   infiltration of the adjacent fat is similar in appearance to prior study   dated 3/27/2022. An inflammatory or infectious process is considered.    Dependent high attenuation within the urinary bladder, which may   represent small stones and/or debris. An underlying mass cannot be   excluded.    Thick-walled collection in the cul-de-sac and small collections along the   right paracolic gutter, not significantly changed since 3/27/2022.      < end of copied text >

## 2022-04-11 NOTE — CHART NOTE - NSCHARTNOTEFT_GEN_A_CORE
[facility].    Notified by RN, patient w/ b/l secured mittens managed to pull out tracheostomy. VSS, O2 stable at 98% on RA.   As per RN, pt remains in NAD, still with  on.   ENT paged @ [phone], discussed above. Recs replacing Restraints.  Will stop by to replace Tracheostomy. RN updated, ENT at bedside.  Will f/u and continue to monitor.   Day team to be made aware.     Kia Truong PA  Medicine [id number]

## 2022-04-11 NOTE — PROGRESS NOTE ADULT - ASSESSMENT
82 yo F with PMHx HTN, HLD, Hypothyroidism, Depression, seizure d/o, RLE DVT, Cholelithiasis s/p recent Hospitalization in [facility] from 12/22 -> 2/11/22 for acute perforated diverticulitis s/p Rosales 12/25, colostomy bag 12/26, hospitalization c/b IR drainage for abdominal abscess 1/3 Cx grew e.coli & bacteriodes, also required Mechanical Ventilation for Acute Respiratory Failure with Tracheostomy done 1/7.  PEG done 1/18, developed bleeding through ostomy, EGD on 2/1 showed gastritis. T's showed Acute left MCA infarct- could be 2/2 to carotid occlusion vs afib, new RLL aspiration pneumonia, seen by ID, completed antibiotics, seen by VascSx, not surgical candidate. Pt aphasic sent from [facility] for dislodged Peg tube, after staff noted pt has blood to her abdomen. Nephrology consulted for Hypokalemia.     A/P  Hypokalemia   likely 2/2 GI loss   s/p kcl 40meq po    no labs today   monitor input and output closely   monitor K closely    Hypomagnesia   replete as needed  monitor Mg     Hypophosphatemia   replete as needed  monitor     Hypocalcemia   corrected Ca WNL   Monitor    Acidosis with anion gap   etiology?  improved   monitor    HTN  acceptable     monitor BP closely

## 2022-04-11 NOTE — PROGRESS NOTE ADULT - ASSESSMENT
81 f with HTN, HLD, Hypothyroidism, Depression, seizure d/o, RLE DVT, Cholelithiasis s/p recent Hospitalization at [facility] 12/22 -> 2/11/22 for acute perforated diverticulitis s/p Rosales 12/25, colostomy bag 12/26, hospitalization c/b abd abscess s/p IR drainage 1/3 Cx with E.coli & bacteroides and candida albicans,  trach 1/7, PEG, Acute left MCA infarct, aspiration pneumonia, sent 3/27 from rehab for dislodged PEG, initially afebrile, normal WBc, negative blood and urine cx  CT 3/27: PEG tube removed/dislodged, no evidence for pneumoperitoneum, 3.5 cm thick-walled fluid collection in the pelvic cul-de-sac, Interval slight improvement in additional small loculated fluid   collections, including significant improvement in inflammatory changes in RLQ and pelvis status post removal of surgical drain. Ill-defined hyperdensity/enhancing nodularity along the right posterior   bladder lumen, cannot exclude a neoplastic process  s/p PEG placement by IR 3/30  on 4/4 pt spiked to 102.4 with tachycardia and new leukocytosis to 16    recent perforated diverticulitis s/p huitron, c/b abd abscess s/p IR drainage, cx with E-coli, bacteroides and candida now admitted for PEG dislodgement s/p IR placement 3/30 now with new fever, tachycardia, leukocytosis, sepsis due to KPC klebsiella bacteremia, S to vabomere, acyvaz, tigecyline, minocycline and genta, repeat cx 4/5 negative  CT: Dependent high attenuation within the urinary bladder, which may represent small stones and/or debris. An underlying mass cannot be excluded. Thick-walled collection in the cul-de-sac and small collections along the right paracolic gutter, not significantly changed since 3/27/2022  IR stated that abscess are too small to be drained    * c/w  vabomere, blood cx cleared 4/5, so day 7  * will have to do a 2 week course of antibiotics until 4/19 as the abscess was not drained  * pt was referred to hospice and now deciding for comfort care, so if within GOC will continue antibiotics      The above assessment and plan was discussed with the primary team    Ivy Rose MD  contact on teams  After 5pm and on weekends call [phone]

## 2022-04-11 NOTE — PROGRESS NOTE ADULT - ASSESSMENT
80 yo F w/ PMHx of perforated diverticulitis s/p Rosales procedure, colostomy, abdominal abscess with wound s/p IR drainage, Respiratory Failure s/p Trache, CVA w/ functional quadriplegia, aphasia, dysphagia s/p PEG (on Jevity 1.5 @ 70cchr x 18 hrs, & water flushes 325ml q 6 hrs, Afib, GIB, HTN, HLD, Seizure d/o, Hypothyroidism, Glaucoma, Depression p/w PEG tube dislodgement for unknown amount of time     Problem/Plan - 1:  ·  Problem: Sepsis sec to KPC K Pneumoniae Bacteremia .   ·  Plan:  IV Abxs per ID till 4/19 .   ID help appreciated.   < from: CT Abdomen and Pelvis w/ IV Cont (04.04.22 @ 14:24) >  IMPRESSION:  Trace bilateral pleural effusions.    Improved aeration in the lower lobes bilaterally since 1/30/2022.   Nonspecific patchy bilateral groundglass and linear opacities are noted.    Enhancement of the wall of the cecum and ascending colon with mild   infiltration of the adjacent fat is similar in appearance to prior study   dated 3/27/2022. An inflammatory or infectious process is considered.    Dependent high attenuation within the urinary bladder, which may   represent small stones and/or debris. An underlying mass cannot be   excluded.    Thick-walled collection in the cul-de-sac and small collections along the   right paracolic gutter, not significantly changed since 3/27/2022.    < end of copied text >     Problem/Plan - 2:  ·  Problem: Metabolic Encephalopathy    ·  Plan: Likely sec to Infection  .    Neurology helping  and  EEG showing no Seizure activity  .     Problem/Plan - 3:  ·  Problem: Stroke.   ·  Plan: Resume ASA, Plavix, statin as PEG tube replaced.     Problem/Plan - 4:  ·  Problem: Chronic respiratory failure with hypoxia.   ·  Plan: Pt on 2l NC via Trache collar.   S/P replacement of Tracheostomy Tube.       Problem/Plan - 5:  ·  Problem: Diverticulitis of intestine with perforation and abscess without bleeding.   ·  Plan: Surgery c/s in chart- small fluid collection but nontoxic, if drainage needed would be done by IR.  Wound care eval.     Problem/Plan - 6:  ·  Problem: History of atrial fibrillation.   ·  Plan: Cards helping and now on AC .   D/W Son and okay to start.      Problem/Plan - 7:  ·  Problem: Seizure disorder.   ·  Plan: continue levetiracetam bid.     Problem/Plan - 8:  ·  Problem: DVT, lower extremity.   ·  Plan: ?Subacute vs Chronic, not started on A/C as per chart due to recent GIB.     Problem/Plan - 9:  ·  Problem: HTN (hypertension).   ·  Plan: Resuming carvedilol & amlodipine as PEG replaced.     Problem/Plan - 10:  ·  Problem: Hypothyroidism.   ·  Plan; Resume Levothyroxine when PEG replaced.     Problem/Plan - 11:  ·  Problem: Glaucoma.   ·  Plan: continue Timolol, Dorzolamide, Latanoprost.      Problem/Plan - 12:  ·  Problem: PEG tube malfunction.   ·  Plan:  S/P  PEG . TF started.      Problem/Plan - 13:  ·  Problem: Hypokalemia.   ·  Plan: Corrected.     Disposition : DC  planning pending above.   D/W son Blake in great detail.

## 2022-04-11 NOTE — PROGRESS NOTE ADULT - SUBJECTIVE AND OBJECTIVE BOX
[facility]ROLOGY [facility]ACT[facility]   MD ELOISE ALVARADO MD, PA INJGALINDO BUNNY HERNANDEZ    TEL:  OFFICE: [phone]    From 5pm-7am Answering Service [phone]    -- RENAL FOLLOW UP NOTE ---Date of Service 04-11-22 @ 13:23    Patient is a 81y old  Female who presents with a chief complaint of     Patient seen and examined at bedside. No chest pain/sob    VITALS:  T(F): 97.8 (04-11-22 @ 10:24), Max: 99.5 (04-10-22 @ 14:00)  HR: 81 (04-11-22 @ 10:24)  BP: 153/82 (04-11-22 @ 10:24)  RR: 17 (04-11-22 @ 10:24)  SpO2: 100% (04-11-22 @ 10:24)  Wt(kg): --    04-10 @ 07:01  -  04-11 @ 07:00  --------------------------------------------------------  IN: 3040 mL / OUT: 2375 mL / NET: 665 mL          PHYSICAL EXAM:  Constitutional: NAD  Neck: No JVD  Respiratory: CTAB, no wheezes, rales or rhonchi  Cardiovascular: S1, S2, RRR  Gastrointestinal: BS+, soft, NT/ND  Extremities: No peripheral edema    Hospital Medications:   MEDICATIONS  (STANDING):  amLODIPine   Tablet 10 milliGRAM(s) Oral daily  apixaban 5 milliGRAM(s) Oral every 12 hours  aspirin  chewable 81 milliGRAM(s) Enteral Tube daily  atorvastatin 40 milliGRAM(s) Oral at bedtime  carvedilol 3.125 milliGRAM(s) Oral every 12 hours  clopidogrel Tablet 75 milliGRAM(s) Enteral Tube daily  dorzolamide 2% Ophthalmic Solution 1 Drop(s) Both EYES <User Schedule>  influenza  Vaccine (HIGH DOSE) 0.7 milliLiter(s) IntraMuscular once  lactated ringers. 1000 milliLiter(s) (75 mL/Hr) IV Continuous <Continuous>  latanoprost 0.005% Ophthalmic Solution 1 Drop(s) Both EYES at bedtime  levETIRAcetam  IVPB 500 milliGRAM(s) IV Intermittent every 12 hours  levothyroxine 75 MICROGram(s) Oral daily  meropenem/vaborbactam IVPB 4 Gram(s) IV Intermittent every 8 hours  pantoprazole   Suspension 40 milliGRAM(s) Oral daily  timolol 0.5% Solution 1 Drop(s) Both EYES two times a day      LABS:        Creatinine Trend: 0.38 <--, 0.38 <--, 0.40 <--, 0.44 <--, 0.54 <--, 0.44 <--            Urine Studies:  Urinalysis - [04-04-22 @ 01:59]      Color Light Yellow / Appearance Clear / SG 1.012 / pH 7.5      Gluc Negative / Ketone Negative  / Bili Negative / Urobili <2 mg/dL       Blood Negative / Protein Trace / Leuk Est Negative / Nitrite Negative      RBC 6 / WBC 1 / Hyaline  / Gran  / Sq Epi  / Non Sq Epi 1 / Bacteria Negative      PTH -- (Ca --)      [03-30-22 @ 07:35]   32  TSH 7.65      [04-05-22 @ 07:40]  Lipid: chol 158, , HDL 25, LDL --      [02-03-22 @ 15:22]      Syphilis Screen (Treponema Pallidum Ab) Negative      [04-05-22 @ 09:41]    RADIOLOGY & ADDITIONAL STUDIES:

## 2022-04-11 NOTE — PROGRESS NOTE ADULT - ASSESSMENT
82yo female with complicated medical course    Abnormal CT   agree with abx per ID   fluid collections too small for drainage; IR input appreciated  recent IR peg exchange 3/30; reconsult if issues  brown stool and tolerating PEG feeds  d/w attg, no acute gi interventions given multiple medical issues  continue w/supportive care   DNR/DNI; hospice planning    Sepsis   per primary team and ID     AFib   rate control   protonix daily while on a/c    I reviewed the overnight course of events on the unit, re-confirming the patient history. I discussed the care with the patient and their family. The plan of care was discussed with the physician assistant and modifications were made to the notation where appropriate. Differential diagnosis and plan of care discussed with patient after the evaluation. Advanced care planning was discussed with patient and family.  Advanced care planning forms were reviewed and discussed.  Risks, benefits and alternatives of gastroenterologic procedures were discussed in detail and all questions were answered. 35 minutes spent on total encounter of which more than fifty percent of the encounter was spent counseling and/or coordinating care by the attending physician.

## 2022-04-11 NOTE — PROGRESS NOTE ADULT - SUBJECTIVE AND OBJECTIVE BOX
INTERVAL HPI/OVERNIGHT EVENTS:    seen this morning, hygiene care being performed   having GI fxn    MEDICATIONS  (STANDING):  amLODIPine   Tablet 10 milliGRAM(s) Oral daily  apixaban 5 milliGRAM(s) Oral every 12 hours  aspirin  chewable 81 milliGRAM(s) Enteral Tube daily  atorvastatin 40 milliGRAM(s) Oral at bedtime  carvedilol 3.125 milliGRAM(s) Oral every 12 hours  clopidogrel Tablet 75 milliGRAM(s) Enteral Tube daily  dorzolamide 2% Ophthalmic Solution 1 Drop(s) Both EYES <User Schedule>  influenza  Vaccine (HIGH DOSE) 0.7 milliLiter(s) IntraMuscular once  lactated ringers. 1000 milliLiter(s) (75 mL/Hr) IV Continuous <Continuous>  latanoprost 0.005% Ophthalmic Solution 1 Drop(s) Both EYES at bedtime  levETIRAcetam  IVPB 500 milliGRAM(s) IV Intermittent every 12 hours  levothyroxine 75 MICROGram(s) Oral daily  meropenem/vaborbactam IVPB 4 Gram(s) IV Intermittent every 8 hours  pantoprazole   Suspension 40 milliGRAM(s) Oral daily  timolol 0.5% Solution 1 Drop(s) Both EYES two times a day    MEDICATIONS  (PRN):  acetaminophen     Tablet .. 650 milliGRAM(s) Oral every 6 hours PRN Mild Pain (1 - 3), Moderate Pain (4 - 6)      Allergies    No Known Allergies    Intolerances        Review of Systems:  *not participating           Vital Signs Last 24 Hrs  T(C): 36.6 (11 Apr 2022 10:24), Max: 37.5 (10 Apr 2022 14:00)  T(F): 97.8 (11 Apr 2022 10:24), Max: 99.5 (10 Apr 2022 14:00)  HR: 81 (11 Apr 2022 10:24) (69 - 88)  BP: 153/82 (11 Apr 2022 10:24) (144/72 - 165/76)  BP(mean): --  RR: 17 (11 Apr 2022 10:24) (17 - 18)  SpO2: 100% (11 Apr 2022 10:24) (97% - 100%)    PHYSICAL EXAM:    Constitutional: NAD  HEENT: EOMI, throat clear  Neck: No LAD, supple  +trch  Respiratory: CTA and P  Cardiovascular: S1 and S2, RRR, no M  Gastrointestinal: BS+, soft, NT/ND, neg HSM, +peg  Extremities: No peripheral edema, neg clubbing, cyanosis  Vascular: 2+ peripheral pulses  Neurological: A/O x 1  Psychiatric: lethargic  Skin: No rashes      LABS:                RADIOLOGY & ADDITIONAL TESTS:

## 2022-04-11 NOTE — PROGRESS NOTE ADULT - SUBJECTIVE AND OBJECTIVE BOX
Date of Service  : 04-11-22     INTERVAL HPI/OVERNIGHT EVENTS: Removed Trach   Vital Signs Last 24 Hrs  T(C): 37.3 (11 Apr 2022 13:39), Max: 37.5 (10 Apr 2022 14:00)  T(F): 99.1 (11 Apr 2022 13:39), Max: 99.5 (10 Apr 2022 14:00)  HR: 70 (11 Apr 2022 13:39) (69 - 88)  BP: 133/66 (11 Apr 2022 13:39) (133/66 - 165/76)  BP(mean): --  RR: 19 (11 Apr 2022 13:39) (17 - 19)  SpO2: 100% (11 Apr 2022 13:39) (97% - 100%)  I&O's Summary    10 Apr 2022 07:01  -  11 Apr 2022 07:00  --------------------------------------------------------  IN: 3040 mL / OUT: 2375 mL / NET: 665 mL      MEDICATIONS  (STANDING):  amLODIPine   Tablet 10 milliGRAM(s) Oral daily  apixaban 5 milliGRAM(s) Oral every 12 hours  aspirin  chewable 81 milliGRAM(s) Enteral Tube daily  atorvastatin 40 milliGRAM(s) Oral at bedtime  carvedilol 3.125 milliGRAM(s) Oral every 12 hours  clopidogrel Tablet 75 milliGRAM(s) Enteral Tube daily  dorzolamide 2% Ophthalmic Solution 1 Drop(s) Both EYES <User Schedule>  influenza  Vaccine (HIGH DOSE) 0.7 milliLiter(s) IntraMuscular once  lactated ringers. 1000 milliLiter(s) (75 mL/Hr) IV Continuous <Continuous>  latanoprost 0.005% Ophthalmic Solution 1 Drop(s) Both EYES at bedtime  levETIRAcetam  IVPB 500 milliGRAM(s) IV Intermittent every 12 hours  levothyroxine 75 MICROGram(s) Oral daily  meropenem/vaborbactam IVPB 4 Gram(s) IV Intermittent every 8 hours  pantoprazole   Suspension 40 milliGRAM(s) Oral daily  timolol 0.5% Solution 1 Drop(s) Both EYES two times a day    MEDICATIONS  (PRN):  acetaminophen     Tablet .. 650 milliGRAM(s) Oral every 6 hours PRN Mild Pain (1 - 3), Moderate Pain (4 - 6)    LABS:              CAPILLARY BLOOD GLUCOSE                  Consultant(s) Notes Reviewed:  [x ] YES  [ ] NO    PHYSICAL EXAM:  GENERAL: NADnot in any distress ,  HEAD:  Atraumatic, Normocephalic  NECK: Trach +  NERVOUS SYSTEM:  Alert   CHEST/LUNG: Good air entry bilateral with no  rales, rhonchi, wheezing, or rubs  HEART: Regular rate and rhythm; No murmurs, rubs, or gallops  ABDOMEN: Soft, Nontender, Nondistended; Bowel sounds present, PEG and Ostomy .  EXTREMITIES:  2+ Peripheral Pulses, No clubbing, cyanosis, or edema      Care Discussed with Consultants/Other Providers [ x] YES  [ ] NO

## 2022-04-11 NOTE — CHART NOTE - NSCHARTNOTEFT_GEN_A_CORE
Source: Patient [ ]    Family [ ]     other [x ] chart review    Patient seen for length of stay nutrition f/u. 81 year old female with a PMH of perforated diverticulitis s/p Rosales procedure, colostomy, abdominal abscess with wound s/p IR drainage, Respiratory Failure s/p Trach, CVA w/ functional quadriplegia, aphasia, dysphagia s/p PEG, GIB, HTN, HLD, Seizure d/o, Hypothyroidism, Depression p/w PEG tube dislodgement, s/p PEG replacement (3/30) and continues on antibiotics for sepsis per chart.    Patient continues on Jevity 1.2 via GT @ 65 mL/hr. x 23 hrs. for total volume 1,495 mL and noted to be at goal rate per bed side observation. Provides 1,794 kcal, 83 g pro and 1,206 mL of fluid (TF free water). No intolerances to tube feed reported. Last bowel movement (4/10) per RN flow sheet. Noted with +1 L/R leg edema and no pressure injuries per RN flow sheet.    Diet : Diet, NPO with Tube Feed:   Tube Feeding Modality: Gastrostomy  Jevity 1.2 Santos (JEVITY1.2RTH)  Total Volume for 24 Hours (mL): 1495  Continuous  Starting Tube Feed Rate {mL per Hour}: 25  Increase Tube Feed Rate by (mL): 10     Every 4 hours  Until Goal Tube Feed Rate (mL per Hour): 65  Tube Feed Duration (in Hours): 23  Tube Feed Start Time: 11:45 (03-31-22 @ 11:43)    Current Weight: no new weight to assess, unable to obtain bed scale weight at this time  79.6 kg (4/1)    Pertinent Medications: MEDICATIONS  (STANDING):  amLODIPine   Tablet 10 milliGRAM(s) Oral daily  apixaban 5 milliGRAM(s) Oral every 12 hours  aspirin  chewable 81 milliGRAM(s) Enteral Tube daily  atorvastatin 40 milliGRAM(s) Oral at bedtime  carvedilol 3.125 milliGRAM(s) Oral every 12 hours  clopidogrel Tablet 75 milliGRAM(s) Enteral Tube daily  dorzolamide 2% Ophthalmic Solution 1 Drop(s) Both EYES <User Schedule>  influenza  Vaccine (HIGH DOSE) 0.7 milliLiter(s) IntraMuscular once  lactated ringers. 1000 milliLiter(s) (75 mL/Hr) IV Continuous <Continuous>  latanoprost 0.005% Ophthalmic Solution 1 Drop(s) Both EYES at bedtime  levETIRAcetam  IVPB 500 milliGRAM(s) IV Intermittent every 12 hours  levothyroxine 75 MICROGram(s) Oral daily  meropenem/vaborbactam IVPB 4 Gram(s) IV Intermittent every 8 hours  pantoprazole   Suspension 40 milliGRAM(s) Oral daily  timolol 0.5% Solution 1 Drop(s) Both EYES two times a day    MEDICATIONS  (PRN):  acetaminophen     Tablet .. 650 milliGRAM(s) Oral every 6 hours PRN Mild Pain (1 - 3), Moderate Pain (4 - 6)    Pertinent Labs:  04-09 Na141 mmol/L Glu 132 mg/dL<H> K+ 3.6 mmol/L Cr  0.38 mg/dL<L> BUN 14 mg/dL 04-09 Phos 3.6 mg/dL 04-05 Alb 2.9 g/dL<L>    Estimated Needs:   [ x] no change since previous assessment  [ ] recalculated:     Previous Nutrition Diagnosis: Inadequate energy intake    Nutrition Diagnosis is [ ] ongoing  [x ] resolved [ ] not applicable     New Nutrition Diagnosis: no active nutrition diagnosis at this time    Recommend  - continue tube feed provision as ordered  - obtain currently body weight then weekly weight to monitor trend    Monitoring and Evaluation:   [x ] Tolerance to diet prescription [x ] weights [x ] follow up per protocol

## 2022-04-11 NOTE — PROGRESS NOTE ADULT - SUBJECTIVE AND OBJECTIVE BOX
Date of service: 4/11/22    chief complaint: dislodged PEG tube     extended hpi: 82 yo F with PMHx HTN, HLD, Hypothyroidism, Depression, seizure d/o, RLE DVT, Cholelithiasis s/p recent Hospitalization in [facility] from 12/22 -> 2/11/22 for acute perforated diverticulitis s/p Rosales 12/25, colostomy bag 12/26, hospitalization c/b IR drainage for abdominal abscess 1/3 Cx grew e.coli & bacteriodes, also required Mechanical Ventilation for Acute Respiratory Failure with Tracheostomy done 1/7.  PEG done 1/18, developed bleeding through ostomy for which she was transfused 1/28, EGD on 2/1 showed gastritis. Developed fevers on 1/30 and CT's showed Acute left MCA infarct- could be 2/2 to carotid occlusion vs afib, new RLL aspiration pneumonia, seen by ID, completed antibiotics, seen by VascSx, not surgical candidate. Pt aphasic sent from [facility] for dislodged Peg tube, after staff noted pt has blood to her abdomen.     S: events noted, Pt pulled trach out despite mittens    Review of Systems:   Constitutional: [ ] fevers, [ ] chills.   Skin: [ ] dry skin. [ ] rashes.  Psychiatric: [ ] depression, [ ] anxiety.   Gastrointestinal: [ ] BRBPR, [ ] melena.   Neurological: [ ] confusion. [ ] seizures. [ ] shuffling gait.   Ears,Nose,Mouth and Throat: [ ] ear pain [ ] sore throat.   Eyes: [ ] diplopia.   Respiratory: [ ] hemoptysis. [ ] shortness of breath  Cardiovascular: See HPI above  Hematologic/Lymphatic: [ ] anemia. [ ] painful nodes. [ ] prolonged bleeding.   Genitourinary: [ ] hematuria. [ ] flank pain.   Endocrine: [ ] significant change in weight. [ ] intolerance to heat and cold.     Review of systems [ ] otherwise negative, [ x] otherwise unable to obtain    FH: no family history of sudden cardiac death in first degree relatives    SH: [ ] tobacco, [ ] alcohol, [ ] drugs    acetaminophen     Tablet .. 650 milliGRAM(s) Oral every 6 hours PRN  amLODIPine   Tablet 10 milliGRAM(s) Oral daily  apixaban 5 milliGRAM(s) Oral every 12 hours  aspirin  chewable 81 milliGRAM(s) Enteral Tube daily  atorvastatin 40 milliGRAM(s) Oral at bedtime  carvedilol 3.125 milliGRAM(s) Oral every 12 hours  clopidogrel Tablet 75 milliGRAM(s) Enteral Tube daily  dorzolamide 2% Ophthalmic Solution 1 Drop(s) Both EYES <User Schedule>  influenza  Vaccine (HIGH DOSE) 0.7 milliLiter(s) IntraMuscular once  lactated ringers. 1000 milliLiter(s) IV Continuous <Continuous>  latanoprost 0.005% Ophthalmic Solution 1 Drop(s) Both EYES at bedtime  levETIRAcetam  IVPB 500 milliGRAM(s) IV Intermittent every 12 hours  levothyroxine 75 MICROGram(s) Oral daily  meropenem/vaborbactam IVPB 4 Gram(s) IV Intermittent every 8 hours  pantoprazole   Suspension 40 milliGRAM(s) Oral daily  timolol 0.5% Solution 1 Drop(s) Both EYES two times a day                            9.6    11.54 )-----------( 372      ( 11 Apr 2022 14:00 )             31.3       T(C): 37.3 (04-11-22 @ 13:39), Max: 37.3 (04-11-22 @ 06:00)  HR: 70 (04-11-22 @ 13:39) (69 - 88)  BP: 133/66 (04-11-22 @ 13:39) (133/66 - 165/76)  RR: 19 (04-11-22 @ 13:39) (17 - 19)  SpO2: 100% (04-11-22 @ 13:39) (97% - 100%)  Wt(kg): --    I&O's Summary    10 Apr 2022 07:01  -  11 Apr 2022 07:00  --------------------------------------------------------  IN: 3040 mL / OUT: 2375 mL / NET: 665 mL    11 Apr 2022 07:01  -  11 Apr 2022 14:54  --------------------------------------------------------  IN: 770 mL / OUT: 605 mL / NET: 165 mL      General: no acute distress   Head: Normocephalic and atraumatic.   Neck: No JVD. No bruits. Supple. Does not appear to be enlarged.   Cardiovascular: + S1,S2 ; RRR Soft systolic murmur at the left lower sternal border. No rubs noted.    Lungs: CTA b/l. No rhonchi, rales or wheezes.   Abdomen: + BS, soft. Non tender. Non distended. No rebound. No guarding.   Extremities: No clubbing/cyanosis/edema.   Psychiatric: unable to assess     < from: TTE Echo Complete w/o Contrast w/ Doppler (02.04.22 @ 15:28) >  Summary:   1. Left ventricular ejection fraction, by visual estimation, is 60 to   65%.   2. Normal global left ventricular systolic function.   3. Mild mitral valve regurgitation.   4.Mild thickening of the anterior and posterior mitral valve leaflets.   5. Mild tricuspid regurgitation.   6. Mild to moderate aortic regurgitation.   7. Sclerotic aortic valve with normal opening.  [phone] Colin Butcher MD, [facility]  Electronically signed on 2/5/2022 at 3:03:14 PM  < end of copied text >      < from: CT Angio Head w/ IV Cont (04.04.22 @ 14:19) >  IMPRESSIONS:  CTA neck:  1. Short segment 70% stenosis in diameter at the proximal aspect of the   right internal carotid artery.  2. The left internal carotid artery is occluded at a distance of   approximately 7 mm beyond the origin of this vessel.  3. Bilateral vertebral arteries are appreciated. Left vertebral artery is   dominant.    CTA COW:  1. There is no flow within the petrous, precavernous or cavernous   portions of the left internal carotid artery.  2. Flow is appreciated within the left middle cerebral artery,   significantly decreased in caliber when compared with the right middle   cerebral artery. Furthermore, only an attenuated/hypoplastic flow pattern   is appreciated in association with the left A1 segment as well as the   left posterior communicating artery.  3. Hypoplastic/attenuated pattern of flow within the left A 2 segment of   the anterior cerebral artery.  < end of copied text >    < from: CT Head No Cont (04.04.22 @ 14:19) >  IMPRESSION:    1. Evolution of previously visualized left MCA infarct, which appeared   acute at time of prior study dated 2/5/2022. Cannot exclude an acute on   chronic process. Consider further evaluation via MR imaging as clinically   indicated, provided the patient has no contraindications.    2. Findings also include a new decreased attenuation involving the left   brachium pontis and left cerebellum, possibly representing an acute   ischemic event in this location. MR imaging to include DWI and ADC   mapping techniques may be helpful for further characterization of this   finding.    3. No acute intracranial hemorrhage.    4. Paranasal sinus inflammatory changes, as described above. Cannot   exclude sinusitis.    5. Persistent fluid within right-sided mastoid air cells. Cannot exclude   mastoiditis.    < end of copied text >        A/P:  82 yo F with PMHx HTN, HLD, Hypothyroidism, Depression, seizure d/o, RLE DVT, Cholelithiasis s/p recent Hospitalization in [facility] from 12/22 -> 2/11/22 for acute perforated diverticulitis s/p Rosales 12/25, colostomy bag 12/26, hospitalization c/b IR drainage for abdominal abscess 1/3 Cx grew e.coli & bacteriodes, also required Mechanical Ventilation for Acute Respiratory Failure with Tracheostomy done 1/7.  PEG done 1/18, developed bleeding through ostomy for which she was transfused 1/28, EGD on 2/1 showed gastritis. Developed fevers on 1/30 and CT's showed Acute left MCA infarct- could be 2/2 to carotid occlusion vs afib, new RLL aspiration pneumonia, seen by ID, completed antibiotics, seen by VascSx, not surgical candidate. Pt aphasic sent from [facility] for dislodged Peg tube, after staff noted pt has blood to her abdomen.     -prior chart reviewed (Jan-Feb 2022) Pt with CVA and found with AFib and Carotid occlusion.  AC held initially while monitoring for hemorrhagic conversion and then in the setting of GI bleed req PRBCs.  She was continued on DAPT.  -given elevated [id number], recommend lifelong AC for AF if able to tolerate--Eliquis started - monitor h/h   -on antibiotics for klebsiella bacteremia.  -evolving stroke, worsening mental status - neuro eval noted - plans for hospice care as goals of care now toward comfort, and she is DNR.

## 2022-04-12 LAB
ANION GAP SERPL CALC-SCNC: 12 MMOL/L — SIGNIFICANT CHANGE UP (ref 7–14)
BUN SERPL-MCNC: 16 MG/DL — SIGNIFICANT CHANGE UP (ref 7–23)
CALCIUM SERPL-MCNC: 8.8 MG/DL — SIGNIFICANT CHANGE UP (ref 8.4–10.5)
CHLORIDE SERPL-SCNC: 104 MMOL/L — SIGNIFICANT CHANGE UP (ref 98–107)
CO2 SERPL-SCNC: 24 MMOL/L — SIGNIFICANT CHANGE UP (ref 22–31)
CREAT SERPL-MCNC: 0.42 MG/DL — LOW (ref 0.5–1.3)
EGFR: 99 ML/MIN/1.73M2 — SIGNIFICANT CHANGE UP
GLUCOSE SERPL-MCNC: 116 MG/DL — HIGH (ref 70–99)
HCT VFR BLD CALC: 28 % — LOW (ref 34.5–45)
HGB BLD-MCNC: 8.8 G/DL — LOW (ref 11.5–15.5)
MAGNESIUM SERPL-MCNC: 2 MG/DL — SIGNIFICANT CHANGE UP (ref 1.6–2.6)
MCHC RBC-ENTMCNC: 27.8 PG — SIGNIFICANT CHANGE UP (ref 27–34)
MCHC RBC-ENTMCNC: 31.4 GM/DL — LOW (ref 32–36)
MCV RBC AUTO: 88.6 FL — SIGNIFICANT CHANGE UP (ref 80–100)
NRBC # BLD: 0 /100 WBCS — SIGNIFICANT CHANGE UP
NRBC # FLD: 0 K/UL — SIGNIFICANT CHANGE UP
PHOSPHATE SERPL-MCNC: 3.1 MG/DL — SIGNIFICANT CHANGE UP (ref 2.5–4.5)
PLATELET # BLD AUTO: 350 K/UL — SIGNIFICANT CHANGE UP (ref 150–400)
POTASSIUM SERPL-MCNC: 3.5 MMOL/L — SIGNIFICANT CHANGE UP (ref 3.5–5.3)
POTASSIUM SERPL-SCNC: 3.5 MMOL/L — SIGNIFICANT CHANGE UP (ref 3.5–5.3)
RBC # BLD: 3.16 M/UL — LOW (ref 3.8–5.2)
RBC # FLD: 16.2 % — HIGH (ref 10.3–14.5)
SODIUM SERPL-SCNC: 140 MMOL/L — SIGNIFICANT CHANGE UP (ref 135–145)
WBC # BLD: 8.71 K/UL — SIGNIFICANT CHANGE UP (ref 3.8–10.5)
WBC # FLD AUTO: 8.71 K/UL — SIGNIFICANT CHANGE UP (ref 3.8–10.5)

## 2022-04-12 PROCEDURE — 99232 SBSQ HOSP IP/OBS MODERATE 35: CPT

## 2022-04-12 RX ADMIN — APIXABAN 5 MILLIGRAM(S): 2.5 TABLET, FILM COATED ORAL at 06:22

## 2022-04-12 RX ADMIN — AMLODIPINE BESYLATE 10 MILLIGRAM(S): 2.5 TABLET ORAL at 06:23

## 2022-04-12 RX ADMIN — ATORVASTATIN CALCIUM 40 MILLIGRAM(S): 80 TABLET, FILM COATED ORAL at 22:05

## 2022-04-12 RX ADMIN — APIXABAN 5 MILLIGRAM(S): 2.5 TABLET, FILM COATED ORAL at 17:51

## 2022-04-12 RX ADMIN — LATANOPROST 1 DROP(S): 0.05 SOLUTION/ DROPS OPHTHALMIC; TOPICAL at 22:14

## 2022-04-12 RX ADMIN — Medication 1 DROP(S): at 17:50

## 2022-04-12 RX ADMIN — Medication 650 MILLIGRAM(S): at 06:53

## 2022-04-12 RX ADMIN — Medication 650 MILLIGRAM(S): at 06:23

## 2022-04-12 RX ADMIN — Medication 1 DROP(S): at 06:24

## 2022-04-12 RX ADMIN — Medication 75 MICROGRAM(S): at 06:23

## 2022-04-12 RX ADMIN — CARVEDILOL PHOSPHATE 3.12 MILLIGRAM(S): 80 CAPSULE, EXTENDED RELEASE ORAL at 17:55

## 2022-04-12 RX ADMIN — LEVETIRACETAM 400 MILLIGRAM(S): 250 TABLET, FILM COATED ORAL at 22:05

## 2022-04-12 RX ADMIN — CARVEDILOL PHOSPHATE 3.12 MILLIGRAM(S): 80 CAPSULE, EXTENDED RELEASE ORAL at 06:23

## 2022-04-12 RX ADMIN — LEVETIRACETAM 400 MILLIGRAM(S): 250 TABLET, FILM COATED ORAL at 10:16

## 2022-04-12 RX ADMIN — MEROPENEM-VABORBACTAM 83.33 GRAM(S): 1; 1 INJECTION, POWDER, FOR SOLUTION INTRAVENOUS at 14:14

## 2022-04-12 RX ADMIN — DORZOLAMIDE HYDROCHLORIDE 1 DROP(S): 20 SOLUTION/ DROPS OPHTHALMIC at 08:52

## 2022-04-12 RX ADMIN — Medication 81 MILLIGRAM(S): at 11:39

## 2022-04-12 RX ADMIN — MEROPENEM-VABORBACTAM 83.33 GRAM(S): 1; 1 INJECTION, POWDER, FOR SOLUTION INTRAVENOUS at 22:05

## 2022-04-12 RX ADMIN — CLOPIDOGREL BISULFATE 75 MILLIGRAM(S): 75 TABLET, FILM COATED ORAL at 11:39

## 2022-04-12 RX ADMIN — MEROPENEM-VABORBACTAM 83.33 GRAM(S): 1; 1 INJECTION, POWDER, FOR SOLUTION INTRAVENOUS at 06:23

## 2022-04-12 RX ADMIN — DORZOLAMIDE HYDROCHLORIDE 1 DROP(S): 20 SOLUTION/ DROPS OPHTHALMIC at 19:40

## 2022-04-12 RX ADMIN — PANTOPRAZOLE SODIUM 40 MILLIGRAM(S): 20 TABLET, DELAYED RELEASE ORAL at 11:39

## 2022-04-12 NOTE — PROGRESS NOTE ADULT - SUBJECTIVE AND OBJECTIVE BOX
[facility] [place] [facility]   MD ELOISE ALVARADO MD, PA INJUNG KO NP    TEL:  OFFICE: [phone]    From 5pm-7am Answering Service [phone]    -- RENAL FOLLOW UP NOTE ---Date of Service 04-12-22 @ 12:02    Patient is a 81y old  Female who presents with a chief complaint of     Patient seen and examined at bedside.     VITALS:  T(F): 99.1 (04-12-22 @ 10:32), Max: 99.2 (04-12-22 @ 06:21)  HR: 74 (04-12-22 @ 10:32)  BP: 147/70 (04-12-22 @ 10:32)  RR: 16 (04-12-22 @ 10:32)  SpO2: 99% (04-12-22 @ 10:32)  Wt(kg): --    04-11 @ 07:01  -  04-12 @ 07:00  --------------------------------------------------------  IN: 2810 mL / OUT: 2060 mL / NET: 750 mL    04-12 @ 07:01  -  04-12 @ 12:02  --------------------------------------------------------  IN: 260 mL / OUT: 0 mL / NET: 260 mL          PHYSICAL EXAM:  Constitutional: NAD  Neck: No JVD  Respiratory: CTAB, no wheezes, rales or rhonchi  Cardiovascular: S1, S2, RRR  Gastrointestinal: BS+, soft, NT/ND  Extremities: No peripheral edema    Hospital Medications:   MEDICATIONS  (STANDING):  amLODIPine   Tablet 10 milliGRAM(s) Oral daily  apixaban 5 milliGRAM(s) Oral every 12 hours  aspirin  chewable 81 milliGRAM(s) Enteral Tube daily  atorvastatin 40 milliGRAM(s) Oral at bedtime  carvedilol 3.125 milliGRAM(s) Oral every 12 hours  clopidogrel Tablet 75 milliGRAM(s) Enteral Tube daily  dorzolamide 2% Ophthalmic Solution 1 Drop(s) Both EYES <User Schedule>  influenza  Vaccine (HIGH DOSE) 0.7 milliLiter(s) IntraMuscular once  lactated ringers. 1000 milliLiter(s) (75 mL/Hr) IV Continuous <Continuous>  latanoprost 0.005% Ophthalmic Solution 1 Drop(s) Both EYES at bedtime  levETIRAcetam  IVPB 500 milliGRAM(s) IV Intermittent every 12 hours  levothyroxine 75 MICROGram(s) Oral daily  meropenem/vaborbactam IVPB 4 Gram(s) IV Intermittent every 8 hours  pantoprazole   Suspension 40 milliGRAM(s) Oral daily  timolol 0.5% Solution 1 Drop(s) Both EYES two times a day      LABS:  04-12    140  |  104  |  16  ----------------------------<  116<H>  3.5   |  24  |  0.42<L>    Ca    8.8      12 Apr 2022 07:57  Phos  3.1     04-12  Mg     2.00     04-12      Creatinine Trend: 0.42 <--, 0.40 <--, 0.38 <--, 0.38 <--, 0.40 <--, 0.44 <--    Phosphorus Level, Serum: 3.1 mg/dL (04-12 @ 07:57)  Phosphorus Level, Serum: 3.9 mg/dL (04-11 @ 14:00)                              8.8    8.71  )-----------( 350      ( 12 Apr 2022 07:57 )             28.0     Urine Studies:  Urinalysis - [04-04-22 @ 01:59]      Color Light Yellow / Appearance Clear / SG 1.012 / pH 7.5      Gluc Negative / Ketone Negative  / Bili Negative / Urobili <2 mg/dL       Blood Negative / Protein Trace / Leuk Est Negative / Nitrite Negative      RBC 6 / WBC 1 / Hyaline  / Gran  / Sq Epi  / Non Sq Epi 1 / Bacteria Negative      PTH -- (Ca --)      [03-30-22 @ 07:35]   32  TSH 7.65      [04-05-22 @ 07:40]  Lipid: chol 158, , HDL 25, LDL --      [02-03-22 @ 15:22]      Syphilis Screen (Treponema Pallidum Ab) Negative      [04-05-22 @ 09:41]    RADIOLOGY & ADDITIONAL STUDIES:

## 2022-04-12 NOTE — CONSULT NOTE ADULT - CONSULT REASON
trach dislodge
Fluid collection drainage
HTN management
sepsis
Gtube replacement
Pelvic fluid collection, dislodged PEG
goals of care
Decreased responsiveness
ICA stenosis
hypokalemia
abnormal CT

## 2022-04-12 NOTE — CONSULT NOTE ADULT - CONSULT REQUESTED DATE/TIME
05-Apr-2022 13:27
27-Mar-2022 09:14
04-Apr-2022 18:36
05-Apr-2022 17:16
28-Mar-2022 12:53
28-Mar-2022 15:12
04-Apr-2022 13:59
12-Apr-2022 21:23
28-Mar-2022 11:58
04-Apr-2022 11:46
07-Apr-2022 11:50

## 2022-04-12 NOTE — CONSULT NOTE ADULT - PROVIDER SPECIALTY LIST ADULT
Nephrology
Surgery
Cardiology
Neurology
Palliative Care
Gastroenterology
ENT
Infectious Disease
Intervent Radiology
Intervent Radiology
Surgery

## 2022-04-12 NOTE — PROGRESS NOTE ADULT - SUBJECTIVE AND OBJECTIVE BOX
Date of Service  : 04-12-22     INTERVAL HPI/OVERNIGHT EVENTS: I feel fine.   Vital Signs Last 24 Hrs  T(C): 36.8 (12 Apr 2022 21:08), Max: 37.3 (12 Apr 2022 01:25)  T(F): 98.2 (12 Apr 2022 21:08), Max: 99.2 (12 Apr 2022 06:21)  HR: 77 (12 Apr 2022 21:08) (72 - 87)  BP: 151/62 (12 Apr 2022 21:08) (140/58 - 156/66)  BP(mean): --  RR: 18 (12 Apr 2022 21:08) (16 - 20)  SpO2: 100% (12 Apr 2022 21:08) (99% - 100%)  I&O's Summary    11 Apr 2022 07:01  -  12 Apr 2022 07:00  --------------------------------------------------------  IN: 2810 mL / OUT: 2060 mL / NET: 750 mL    12 Apr 2022 07:01  -  12 Apr 2022 22:57  --------------------------------------------------------  IN: 1530 mL / OUT: 800 mL / NET: 730 mL      MEDICATIONS  (STANDING):  amLODIPine   Tablet 10 milliGRAM(s) Oral daily  apixaban 5 milliGRAM(s) Oral every 12 hours  aspirin  chewable 81 milliGRAM(s) Enteral Tube daily  atorvastatin 40 milliGRAM(s) Oral at bedtime  carvedilol 3.125 milliGRAM(s) Oral every 12 hours  clopidogrel Tablet 75 milliGRAM(s) Enteral Tube daily  dorzolamide 2% Ophthalmic Solution 1 Drop(s) Both EYES <User Schedule>  influenza  Vaccine (HIGH DOSE) 0.7 milliLiter(s) IntraMuscular once  lactated ringers. 1000 milliLiter(s) (75 mL/Hr) IV Continuous <Continuous>  latanoprost 0.005% Ophthalmic Solution 1 Drop(s) Both EYES at bedtime  levETIRAcetam  IVPB 500 milliGRAM(s) IV Intermittent every 12 hours  levothyroxine 75 MICROGram(s) Oral daily  meropenem/vaborbactam IVPB 4 Gram(s) IV Intermittent every 8 hours  pantoprazole   Suspension 40 milliGRAM(s) Oral daily  timolol 0.5% Solution 1 Drop(s) Both EYES two times a day    MEDICATIONS  (PRN):  acetaminophen     Tablet .. 650 milliGRAM(s) Oral every 6 hours PRN Mild Pain (1 - 3), Moderate Pain (4 - 6)    LABS:                        8.8    8.71  )-----------( 350      ( 12 Apr 2022 07:57 )             28.0     04-12    140  |  104  |  16  ----------------------------<  116<H>  3.5   |  24  |  0.42<L>    Ca    8.8      12 Apr 2022 07:57  Phos  3.1     04-12  Mg     2.00     04-12          CAPILLARY BLOOD GLUCOSE              REVIEW OF SYSTEMS:  CONSTITUTIONAL: No fever, weight loss, or fatigue  EYES: No eye pain, visual disturbances, or discharge  ENMT:  No difficulty hearing, tinnitus, vertigo; No sinus or throat pain  NECK: No pain or stiffness  RESPIRATORY: No cough, wheezing, chills or hemoptysis; No shortness of breath  CARDIOVASCULAR: No chest pain, palpitations, dizziness, or leg swelling  GASTROINTESTINAL: No abdominal or epigastric pain. No nausea, vomiting, or hematemesis; No diarrhea or constipation. No melena or hematochezia.  GENITOURINARY: No dysuria, frequency, hematuria, or incontinence  NEUROLOGICAL: No headaches, memory loss, loss of strength, numbness, or tremors      Consultant(s) Notes Reviewed:  [x ] YES  [ ] NO    PHYSICAL EXAM:  GENERAL: NAD, well-groomed, well-developed, not in any distress ,  HEAD:  Atraumatic, Normocephalic  NECK: Trach+  NERVOUS SYSTEM:  Alert   CHEST/LUNG: Good air entry bilateral with no  rales, rhonchi, wheezing, or rubs  HEART: Regular rate and rhythm; No murmurs, rubs, or gallops  ABDOMEN: Soft, Nontender, Nondistended; Bowel sounds present, Peg and ostomy +  EXTREMITIES:  2+ Peripheral Pulses, No clubbing, cyanosis, or edema    Care Discussed with Consultants/Other Providers [ x] YES  [ ] NO

## 2022-04-12 NOTE — PROGRESS NOTE ADULT - SUBJECTIVE AND OBJECTIVE BOX
Date of service: 4/12/22    chief complaint: dislodged PEG tube     extended hpi: 80 yo F with PMHx HTN, HLD, Hypothyroidism, Depression, seizure d/o, RLE DVT, Cholelithiasis s/p recent Hospitalization in [facility] from 12/22 -> 2/11/22 for acute perforated diverticulitis s/p Rosales 12/25, colostomy bag 12/26, hospitalization c/b IR drainage for abdominal abscess 1/3 Cx grew e.coli & bacteriodes, also required Mechanical Ventilation for Acute Respiratory Failure with Tracheostomy done 1/7.  PEG done 1/18, developed bleeding through ostomy for which she was transfused 1/28, EGD on 2/1 showed gastritis. Developed fevers on 1/30 and CT's showed Acute left MCA infarct- could be 2/2 to carotid occlusion vs afib, new RLL aspiration pneumonia, seen by ID, completed antibiotics, seen by VascSx, not surgical candidate. Pt aphasic sent from [facility] for dislodged Peg tube, after staff noted pt has blood to her abdomen.     S: events noted, Pt pulled trach out despite mittens, remains lethargic    Review of Systems:   Constitutional: [ ] fevers, [ ] chills.   Skin: [ ] dry skin. [ ] rashes.  Psychiatric: [ ] depression, [ ] anxiety.   Gastrointestinal: [ ] BRBPR, [ ] melena.   Neurological: [ ] confusion. [ ] seizures. [ ] shuffling gait.   Ears,Nose,Mouth and Throat: [ ] ear pain [ ] sore throat.   Eyes: [ ] diplopia.   Respiratory: [ ] hemoptysis. [ ] shortness of breath  Cardiovascular: See HPI above  Hematologic/Lymphatic: [ ] anemia. [ ] painful nodes. [ ] prolonged bleeding.   Genitourinary: [ ] hematuria. [ ] flank pain.   Endocrine: [ ] significant change in weight. [ ] intolerance to heat and cold.     Review of systems [ ] otherwise negative, [x ] otherwise unable to obtain    FH: no family history of sudden cardiac death in first degree relatives    SH: [ ] tobacco, [ ] alcohol, [ ] drugs    acetaminophen     Tablet .. 650 milliGRAM(s) Oral every 6 hours PRN  amLODIPine   Tablet 10 milliGRAM(s) Oral daily  apixaban 5 milliGRAM(s) Oral every 12 hours  aspirin  chewable 81 milliGRAM(s) Enteral Tube daily  atorvastatin 40 milliGRAM(s) Oral at bedtime  carvedilol 3.125 milliGRAM(s) Oral every 12 hours  clopidogrel Tablet 75 milliGRAM(s) Enteral Tube daily  dorzolamide 2% Ophthalmic Solution 1 Drop(s) Both EYES <User Schedule>  influenza  Vaccine (HIGH DOSE) 0.7 milliLiter(s) IntraMuscular once  lactated ringers. 1000 milliLiter(s) IV Continuous <Continuous>  latanoprost 0.005% Ophthalmic Solution 1 Drop(s) Both EYES at bedtime  levETIRAcetam  IVPB 500 milliGRAM(s) IV Intermittent every 12 hours  levothyroxine 75 MICROGram(s) Oral daily  meropenem/vaborbactam IVPB 4 Gram(s) IV Intermittent every 8 hours  pantoprazole   Suspension 40 milliGRAM(s) Oral daily  timolol 0.5% Solution 1 Drop(s) Both EYES two times a day                            8.8    8.71  )-----------( 350      ( 12 Apr 2022 07:57 )             28.0     140  |  104  |  16  ----------------------------<  116<H>  3.5   |  24  |  0.42<L>    Ca    8.8      12 Apr 2022 07:57  Phos  3.1     04-12  Mg     2.00     04-12    T(C): 37.2 (04-12-22 @ 14:00), Max: 37.3 (04-11-22 @ 21:26)  HR: 87 (04-12-22 @ 14:00) (72 - 87)  BP: 143/70 (04-12-22 @ 14:00) (126/61 - 156/66)  RR: 16 (04-12-22 @ 14:00) (16 - 19)  SpO2: 100% (04-12-22 @ 14:00) (99% - 100%)    General: no acute distress   Head: Normocephalic and atraumatic.   Neck: No JVD. No bruits. Supple. Does not appear to be enlarged.   Cardiovascular: + S1,S2 ; RRR Soft systolic murmur at the left lower sternal border. No rubs noted.    Lungs: CTA b/l. No rhonchi, rales or wheezes.   Abdomen: + BS, soft. Non tender. Non distended. No rebound. No guarding.   Extremities: No clubbing/cyanosis/edema.   Psychiatric: unable to assess     < from: TTE Echo Complete w/o Contrast w/ Doppler (02.04.22 @ 15:28) >  Summary:   1. Left ventricular ejection fraction, by visual estimation, is 60 to   65%.   2. Normal global left ventricular systolic function.   3. Mild mitral valve regurgitation.   4.Mild thickening of the anterior and posterior mitral valve leaflets.   5. Mild tricuspid regurgitation.   6. Mild to moderate aortic regurgitation.   7. Sclerotic aortic valve with normal opening.  [id number] Colin Butcher MD, [facility]  Electronically signed on 2/5/2022 at 3:03:14 PM  < end of copied text >      < from: CT Angio Head w/ IV Cont (04.04.22 @ 14:19) >  IMPRESSIONS:  CTA neck:  1. Short segment 70% stenosis in diameter at the proximal aspect of the   right internal carotid artery.  2. The left internal carotid artery is occluded at a distance of   approximately 7 mm beyond the origin of this vessel.  3. Bilateral vertebral arteries are appreciated. Left vertebral artery is   dominant.    CTA COW:  1. There is no flow within the petrous, precavernous or cavernous   portions of the left internal carotid artery.  2. Flow is appreciated within the left middle cerebral artery,   significantly decreased in caliber when compared with the right middle   cerebral artery. Furthermore, only an attenuated/hypoplastic flow pattern   is appreciated in association with the left A1 segment as well as the   left posterior communicating artery.  3. Hypoplastic/attenuated pattern of flow within the left A 2 segment of   the anterior cerebral artery.  < end of copied text >    < from: CT Head No Cont (04.04.22 @ 14:19) >  IMPRESSION:    1. Evolution of previously visualized left MCA infarct, which appeared   acute at time of prior study dated 2/5/2022. Cannot exclude an acute on   chronic process. Consider further evaluation via MR imaging as clinically   indicated, provided the patient has no contraindications.    2. Findings also include a new decreased attenuation involving the left   brachium pontis and left cerebellum, possibly representing an acute   ischemic event in this location. MR imaging to include DWI and ADC   mapping techniques may be helpful for further characterization of this   finding.    3. No acute intracranial hemorrhage.    4. Paranasal sinus inflammatory changes, as described above. Cannot   exclude sinusitis.    5. Persistent fluid within right-sided mastoid air cells. Cannot exclude   mastoiditis.    < end of copied text >        A/P:  80 yo F with PMHx HTN, HLD, Hypothyroidism, Depression, seizure d/o, RLE DVT, Cholelithiasis s/p recent Hospitalization in [facility] from 12/22 -> 2/11/22 for acute perforated diverticulitis s/p Rosales 12/25, colostomy bag 12/26, hospitalization c/b IR drainage for abdominal abscess 1/3 Cx grew e.coli & bacteriodes, also required Mechanical Ventilation for Acute Respiratory Failure with Tracheostomy done 1/7.  PEG done 1/18, developed bleeding through ostomy for which she was transfused 1/28, EGD on 2/1 showed gastritis. Developed fevers on 1/30 and CT's showed Acute left MCA infarct- could be 2/2 to carotid occlusion vs afib, new RLL aspiration pneumonia, seen by ID, completed antibiotics, seen by VascSx, not surgical candidate. Pt aphasic sent from [facility] for dislodged Peg tube, after staff noted pt has blood to her abdomen.     -prior chart reviewed (Jan-Feb 2022) Pt with CVA and found with AFib and Carotid occlusion.  AC held initially while monitoring for hemorrhagic conversion and then in the setting of GI bleed req PRBCs.  She was continued on DAPT.  -given elevated [id number], recommend lifelong AC for AF if able to tolerate--Eliquis started - monitor h/h   -on antibiotics for klebsiella bacteremia.  -evolving stroke, worsening mental status - neuro eval noted - plans for hospice care as goals of care now toward comfort, and she is DNR.

## 2022-04-12 NOTE — PROGRESS NOTE ADULT - ASSESSMENT
81 f with HTN, HLD, Hypothyroidism, Depression, seizure d/o, RLE DVT, Cholelithiasis s/p recent Hospitalization at [facility] 12/22 -> 2/11/22 for acute perforated diverticulitis s/p Rosales 12/25, colostomy bag 12/26, hospitalization c/b abd abscess s/p IR drainage 1/3 Cx with E.coli & bacteroides and candida albicans,  trach 1/7, PEG, Acute left MCA infarct, aspiration pneumonia, sent 3/27 from rehab for dislodged PEG, initially afebrile, normal WBc, negative blood and urine cx  CT 3/27: PEG tube removed/dislodged, no evidence for pneumoperitoneum, 3.5 cm thick-walled fluid collection in the pelvic cul-de-sac, Interval slight improvement in additional small loculated fluid   collections, including significant improvement in inflammatory changes in RLQ and pelvis status post removal of surgical drain. Ill-defined hyperdensity/enhancing nodularity along the right posterior   bladder lumen, cannot exclude a neoplastic process  s/p PEG placement by IR 3/30  on 4/4 pt spiked to 102.4 with tachycardia and new leukocytosis to 16    recent perforated diverticulitis s/p huitron, c/b abd abscess s/p IR drainage, cx with E-coli, bacteroides and candida now admitted for PEG dislodgement s/p IR placement 3/30 now with new fever, tachycardia, leukocytosis, sepsis due to KPC klebsiella bacteremia, S to vabomere, acyvaz, tigecyline, minocycline and genta, repeat cx 4/5 negative  CT: Dependent high attenuation within the urinary bladder, which may represent small stones and/or debris. An underlying mass cannot be excluded. Thick-walled collection in the cul-de-sac and small collections along the right paracolic gutter, not significantly changed since 3/27/2022  IR stated that abscess are too small to be drained    * c/w  vabomere, blood cx cleared 4/5, so day 8  * will have to do a 2 week course of antibiotics until 4/19 as the abscess was not drained  * pt was referred to hospice and now deciding for comfort care, so if within GOC will continue antibiotics      The above assessment and plan was discussed with the primary team    Ivy Rose MD  contact on teams  After 5pm and on weekends call [phone]

## 2022-04-12 NOTE — PROVIDER CONTACT NOTE (OTHER) - ASSESSMENT
Patient noted to remove trach when removing her own mittens. PCA placed at bedside, No respiratory distress noted. Continuos pulse ox monitoring.

## 2022-04-12 NOTE — PROGRESS NOTE ADULT - ASSESSMENT
82 yo F w/ PMHx of perforated diverticulitis s/p Rosales procedure, colostomy, abdominal abscess with wound s/p IR drainage, Respiratory Failure s/p Trache, CVA w/ functional quadriplegia, aphasia, dysphagia s/p PEG (on Jevity 1.5 @ 70cchr x 18 hrs, & water flushes 325ml q 6 hrs, Afib, GIB, HTN, HLD, Seizure d/o, Hypothyroidism, Glaucoma, Depression p/w PEG tube dislodgement for unknown amount of time     Problem/Plan - 1:  ·  Problem: Sepsis sec to KPC K Pneumoniae Bacteremia .   ·  Plan:  IV Abxs per ID till 4/19 .   ID help appreciated.   < from: CT Abdomen and Pelvis w/ IV Cont (04.04.22 @ 14:24) >  IMPRESSION:  Trace bilateral pleural effusions.    Improved aeration in the lower lobes bilaterally since 1/30/2022.   Nonspecific patchy bilateral groundglass and linear opacities are noted.    Enhancement of the wall of the cecum and ascending colon with mild   infiltration of the adjacent fat is similar in appearance to prior study   dated 3/27/2022. An inflammatory or infectious process is considered.    Dependent high attenuation within the urinary bladder, which may   represent small stones and/or debris. An underlying mass cannot be   excluded.    Thick-walled collection in the cul-de-sac and small collections along the   right paracolic gutter, not significantly changed since 3/27/2022.    < end of copied text >     Problem/Plan - 2:  ·  Problem: Metabolic Encephalopathy    ·  Plan: Likely sec to Infection  .    Neurology helping  and  EEG showing no Seizure activity  .     Problem/Plan - 3:  ·  Problem: Stroke.   ·  Plan: Resume ASA, Plavix, statin as PEG tube replaced.     Problem/Plan - 4:  ·  Problem: Chronic respiratory failure with hypoxia.   ·  Plan: Pt on 2l NC via Trache collar.   S/P replacement of Tracheostomy Tube.       Problem/Plan - 5:  ·  Problem: Diverticulitis of intestine with perforation and abscess without bleeding.   ·  Plan: Surgery c/s in chart- small fluid collection but nontoxic, if drainage needed would be done by IR.  Wound care eval.     Problem/Plan - 6:  ·  Problem: History of atrial fibrillation.   ·  Plan: Cards helping and now on AC .   D/W Son and okay to start.      Problem/Plan - 7:  ·  Problem: Seizure disorder.   ·  Plan: continue levetiracetam bid.     Problem/Plan - 8:  ·  Problem: DVT, lower extremity.   ·  Plan: ?Subacute vs Chronic, not started on A/C as per chart due to recent GIB.     Problem/Plan - 9:  ·  Problem: HTN (hypertension).   ·  Plan: Resuming carvedilol & amlodipine as PEG replaced.     Problem/Plan - 10:  ·  Problem: Hypothyroidism.   ·  Plan; Resume Levothyroxine when PEG replaced.     Problem/Plan - 11:  ·  Problem: Glaucoma.   ·  Plan: continue Timolol, Dorzolamide, Latanoprost.      Problem/Plan - 12:  ·  Problem: PEG tube malfunction.   ·  Plan:  S/P  PEG . TF started.      Problem/Plan - 13:  ·  Problem: Hypokalemia.   ·  Plan: Corrected.     Disposition : DC  planning pending above.   D/W son Blake in great detail.

## 2022-04-12 NOTE — PROGRESS NOTE ADULT - ASSESSMENT
80 yo F with PMHx HTN, HLD, Hypothyroidism, Depression, seizure d/o, RLE DVT, Cholelithiasis s/p recent Hospitalization in [facility] from 12/22 -> 2/11/22 for acute perforated diverticulitis s/p Rosales 12/25, colostomy bag 12/26, hospitalization c/b IR drainage for abdominal abscess 1/3 Cx grew e.coli & bacteriodes, also required Mechanical Ventilation for Acute Respiratory Failure with Tracheostomy done 1/7.  PEG done 1/18, developed bleeding through ostomy, EGD on 2/1 showed gastritis. T's showed Acute left MCA infarct- could be 2/2 to carotid occlusion vs afib, new RLL aspiration pneumonia, seen by ID, completed antibiotics, seen by VascSx, not surgical candidate. Pt aphasic sent from [facility] for dislodged Peg tube, after staff noted pt has blood to her abdomen. Nephrology consulted for Hypokalemia.     A/P  Hypokalemia   likely 2/2 GI loss   s/p kcl 40meq po    improved   monitor input and output closely   monitor K closely    Hypomagnesia   replete as needed  monitor Mg     Hypophosphatemia   replete as needed  monitor     Hypocalcemia   corrected Ca WNL   Monitor    Acidosis with anion gap   etiology?  improved   monitor    HTN  optimal      monitor BP closely

## 2022-04-12 NOTE — CONSULT NOTE ADULT - CONSULT REQUESTED BY NAME
Dr. Lew
Louann SERRATO
Medicine
medicine
Dr. Brennen Lew
Dr. Lew
Dr Lew
ED
Primary team
Dr. Arredondo
Medicine

## 2022-04-12 NOTE — PROGRESS NOTE ADULT - ASSESSMENT
82yo female with complicated medical course    Abnormal CT   agree with abx per ID   fluid collections too small for drainage; IR input appreciated  no acute gi interventions given multiple medical issues  recent IR peg exchange 3/30; reconsult if issues  brown stool and tolerating PEG feeds  continue w/supportive care   DNR/DNI; hospice planning    Sepsis   per primary team and ID     AFib   rate control   protonix daily while on a/c    I reviewed the overnight course of events on the unit, re-confirming the patient history. I discussed the care with the patient and their family. The plan of care was discussed with the physician assistant and modifications were made to the notation where appropriate. Differential diagnosis and plan of care discussed with patient after the evaluation. Advanced care planning was discussed with patient and family.  Advanced care planning forms were reviewed and discussed.  Risks, benefits and alternatives of gastroenterologic procedures were discussed in detail and all questions were answered. 35 minutes spent on total encounter of which more than fifty percent of the encounter was spent counseling and/or coordinating care by the attending physician.

## 2022-04-12 NOTE — CONSULT NOTE ADULT - SUBJECTIVE AND OBJECTIVE BOX
HPI:  Patient is a 81y Female with PMH significant for HTN, HLD, hypothyroidism, seizure with recent complex hospital course due to acute perforated diverticulitis. 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 ENT consulted due to trach dislodgement - patient took her mittens off and pulled her trach out.   On bedside exam, satting 99% on RA with trach out of place. No respiratory distress       Physical Exam  T(C): 36.8 (04-12-22 @ 21:08), Max: 37.3 (04-11-22 @ 21:26)  HR: 77 (04-12-22 @ 21:08) (72 - 87)  BP: 151/62 (04-12-22 @ 21:08) (140/58 - 156/66)  RR: 18 (04-12-22 @ 21:08) (16 - 20)  SpO2: 100% (04-12-22 @ 21:08) (99% - 100%)    NAD  4CN trach out of stoma   Stoma intact with moderate circumferential granulation tissue.   Minimal peristomal drainage, no active bleeding  Minimal mucoid secretions  Neck soft, flat, no hematoma or crepitus noted    Procedure: trach placement  Patient was placed supine with neck extended. The stoma was inspected with findings as above. The 4CN trach was replaced into the stoma and secured with soft collar. A flexible endoscope was passed through the trach to confirm placement    A/P: 81y Female with trach dislodgement. 4CN replaced at bedside    - routine trach care  - keep trach secured with soft collar with phalanges flush against skin   - page with questions

## 2022-04-12 NOTE — PROGRESS NOTE ADULT - SUBJECTIVE AND OBJECTIVE BOX
Follow Up:  sepsis    Interval History: pt afebrile and normal WBC    ROS:    Unobtainable because of mental status          Allergies  No Known Allergies        ANTIMICROBIALS:  meropenem/vaborbactam IVPB 4 every 8 hours      OTHER MEDS:  acetaminophen     Tablet .. 650 milliGRAM(s) Oral every 6 hours PRN  amLODIPine   Tablet 10 milliGRAM(s) Oral daily  apixaban 5 milliGRAM(s) Oral every 12 hours  aspirin  chewable 81 milliGRAM(s) Enteral Tube daily  atorvastatin 40 milliGRAM(s) Oral at bedtime  carvedilol 3.125 milliGRAM(s) Oral every 12 hours  clopidogrel Tablet 75 milliGRAM(s) Enteral Tube daily  dorzolamide 2% Ophthalmic Solution 1 Drop(s) Both EYES <User Schedule>  influenza  Vaccine (HIGH DOSE) 0.7 milliLiter(s) IntraMuscular once  lactated ringers. 1000 milliLiter(s) IV Continuous <Continuous>  latanoprost 0.005% Ophthalmic Solution 1 Drop(s) Both EYES at bedtime  levETIRAcetam  IVPB 500 milliGRAM(s) IV Intermittent every 12 hours  levothyroxine 75 MICROGram(s) Oral daily  pantoprazole   Suspension 40 milliGRAM(s) Oral daily  timolol 0.5% Solution 1 Drop(s) Both EYES two times a day      Vital Signs Last 24 Hrs  T(C): 37.2 (12 Apr 2022 14:00), Max: 37.3 (11 Apr 2022 21:26)  T(F): 98.9 (12 Apr 2022 14:00), Max: 99.2 (12 Apr 2022 06:21)  HR: 87 (12 Apr 2022 14:00) (72 - 87)  BP: 143/70 (12 Apr 2022 14:00) (126/61 - 156/66)  BP(mean): --  RR: 16 (12 Apr 2022 14:00) (16 - 19)  SpO2: 100% (12 Apr 2022 14:00) (99% - 100%)    Physical Exam:  General:    NAD, non toxic  Cardio:    regular S1,S2  Respiratory:   trach  abd:   soft, BS +, not tender  :     no CVAT, no suprapubic tenderness, no mc  Musculoskeletal : no joint swelling, no edema  Skin:    no rash  vascular: no phlebitis                        8.8    8.71  )-----------( 350      ( 12 Apr 2022 07:57 )             28.0       04-12    140  |  104  |  16  ----------------------------<  116<H>  3.5   |  24  |  0.42<L>    Ca    8.8      12 Apr 2022 07:57  Phos  3.1     04-12  Mg     2.00     04-12            MICROBIOLOGY:  v  .Blood Blood  04-05-22   No Growth Final  --  --      .Blood Blood-Peripheral  04-04-22   Growth in anaerobic bottle: Klebsiella pneumoniae (Carbapenem Resistant)  See previous culture [id number]  --  Blood Culture PCR  Klepne MDRO      .Blood Blood-Peripheral  03-26-22   No Growth Final  --  --      .Blood Blood-Peripheral  03-26-22   No Growth Final  --  --                RADIOLOGY:  Images independently visualized and reviewed personally, findings as below  < from: CT Abdomen and Pelvis w/ IV Cont (04.04.22 @ 14:24) >  IMPRESSION:  Trace bilateral pleural effusions.    Improved aeration in the lower lobes bilaterally since 1/30/2022.   Nonspecific patchy bilateral groundglass and linear opacities are noted.    Enhancement of the wall of the cecum and ascending colon with mild   infiltration of the adjacent fat is similar in appearance to prior study   dated 3/27/2022. An inflammatory or infectious process is considered.    Dependent high attenuation within the urinary bladder, which may   represent small stones and/or debris. An underlying mass cannot be   excluded.    Thick-walled collection in the cul-de-sac and small collections along the   right paracolic gutter, not significantly changed since 3/27/2022.    < end of copied text >

## 2022-04-12 NOTE — PROGRESS NOTE ADULT - SUBJECTIVE AND OBJECTIVE BOX
INTERVAL HPI/OVERNIGHT EVENTS:    appears comfortable     MEDICATIONS  (STANDING):  amLODIPine   Tablet 10 milliGRAM(s) Oral daily  apixaban 5 milliGRAM(s) Oral every 12 hours  aspirin  chewable 81 milliGRAM(s) Enteral Tube daily  atorvastatin 40 milliGRAM(s) Oral at bedtime  carvedilol 3.125 milliGRAM(s) Oral every 12 hours  clopidogrel Tablet 75 milliGRAM(s) Enteral Tube daily  dorzolamide 2% Ophthalmic Solution 1 Drop(s) Both EYES <User Schedule>  influenza  Vaccine (HIGH DOSE) 0.7 milliLiter(s) IntraMuscular once  lactated ringers. 1000 milliLiter(s) (75 mL/Hr) IV Continuous <Continuous>  latanoprost 0.005% Ophthalmic Solution 1 Drop(s) Both EYES at bedtime  levETIRAcetam  IVPB 500 milliGRAM(s) IV Intermittent every 12 hours  levothyroxine 75 MICROGram(s) Oral daily  meropenem/vaborbactam IVPB 4 Gram(s) IV Intermittent every 8 hours  pantoprazole   Suspension 40 milliGRAM(s) Oral daily  timolol 0.5% Solution 1 Drop(s) Both EYES two times a day    MEDICATIONS  (PRN):  acetaminophen     Tablet .. 650 milliGRAM(s) Oral every 6 hours PRN Mild Pain (1 - 3), Moderate Pain (4 - 6)      Allergies    No Known Allergies    Intolerances        Review of Systems: *unobtainable        Vital Signs Last 24 Hrs  T(C): 37.3 (12 Apr 2022 06:21), Max: 37.3 (11 Apr 2022 13:39)  T(F): 99.2 (12 Apr 2022 06:21), Max: 99.2 (12 Apr 2022 06:21)  HR: 82 (12 Apr 2022 06:21) (70 - 83)  BP: 156/66 (12 Apr 2022 06:21) (126/61 - 156/66)  BP(mean): --  RR: 17 (12 Apr 2022 06:21) (17 - 19)  SpO2: 100% (12 Apr 2022 06:21) (99% - 100%)    PHYSICAL EXAM:    Constitutional: NAD  HEENT: EOMI, throat clear  Neck: No LAD, supple  Respiratory: CTA and P  Cardiovascular: S1 and S2, RRR, no M  Gastrointestinal: BS+, soft, NT/ND, neg HSM,  Extremities: No peripheral edema, neg clubbing, cyanosis  Vascular: 2+ peripheral pulses  Neurological: A/O x 0  Psychiatric: confused/weak  Skin: No rashes      LABS:                        8.8    8.71  )-----------( 350      ( 12 Apr 2022 07:57 )             28.0     04-11    143  |  106  |  15  ----------------------------<  125<H>  4.0   |  25  |  0.40<L>    Ca    9.4      11 Apr 2022 14:00  Phos  3.9     04-11  Mg     2.10     04-11            RADIOLOGY & ADDITIONAL TESTS:

## 2022-04-13 LAB
ANION GAP SERPL CALC-SCNC: 11 MMOL/L — SIGNIFICANT CHANGE UP (ref 7–14)
BLD GP AB SCN SERPL QL: NEGATIVE — SIGNIFICANT CHANGE UP
BUN SERPL-MCNC: 15 MG/DL — SIGNIFICANT CHANGE UP (ref 7–23)
CALCIUM SERPL-MCNC: 9.1 MG/DL — SIGNIFICANT CHANGE UP (ref 8.4–10.5)
CHLORIDE SERPL-SCNC: 102 MMOL/L — SIGNIFICANT CHANGE UP (ref 98–107)
CO2 SERPL-SCNC: 25 MMOL/L — SIGNIFICANT CHANGE UP (ref 22–31)
CREAT SERPL-MCNC: 0.45 MG/DL — LOW (ref 0.5–1.3)
EGFR: 98 ML/MIN/1.73M2 — SIGNIFICANT CHANGE UP
GLUCOSE SERPL-MCNC: 131 MG/DL — HIGH (ref 70–99)
HCT VFR BLD CALC: 25.4 % — LOW (ref 34.5–45)
HCT VFR BLD CALC: 25.5 % — LOW (ref 34.5–45)
HCT VFR BLD CALC: 29.2 % — LOW (ref 34.5–45)
HGB BLD-MCNC: 7.7 G/DL — LOW (ref 11.5–15.5)
HGB BLD-MCNC: 7.8 G/DL — LOW (ref 11.5–15.5)
HGB BLD-MCNC: 8.7 G/DL — LOW (ref 11.5–15.5)
MAGNESIUM SERPL-MCNC: 2 MG/DL — SIGNIFICANT CHANGE UP (ref 1.6–2.6)
MCHC RBC-ENTMCNC: 26.9 PG — LOW (ref 27–34)
MCHC RBC-ENTMCNC: 27 PG — SIGNIFICANT CHANGE UP (ref 27–34)
MCHC RBC-ENTMCNC: 27.5 PG — SIGNIFICANT CHANGE UP (ref 27–34)
MCHC RBC-ENTMCNC: 29.8 GM/DL — LOW (ref 32–36)
MCHC RBC-ENTMCNC: 30.3 GM/DL — LOW (ref 32–36)
MCHC RBC-ENTMCNC: 30.6 GM/DL — LOW (ref 32–36)
MCV RBC AUTO: 88.2 FL — SIGNIFICANT CHANGE UP (ref 80–100)
MCV RBC AUTO: 90.4 FL — SIGNIFICANT CHANGE UP (ref 80–100)
MCV RBC AUTO: 90.7 FL — SIGNIFICANT CHANGE UP (ref 80–100)
NRBC # BLD: 0 /100 WBCS — SIGNIFICANT CHANGE UP
NRBC # FLD: 0 K/UL — SIGNIFICANT CHANGE UP
PHOSPHATE SERPL-MCNC: 3.1 MG/DL — SIGNIFICANT CHANGE UP (ref 2.5–4.5)
PLATELET # BLD AUTO: 307 K/UL — SIGNIFICANT CHANGE UP (ref 150–400)
PLATELET # BLD AUTO: 312 K/UL — SIGNIFICANT CHANGE UP (ref 150–400)
PLATELET # BLD AUTO: 315 K/UL — SIGNIFICANT CHANGE UP (ref 150–400)
POTASSIUM SERPL-MCNC: 3.2 MMOL/L — LOW (ref 3.5–5.3)
POTASSIUM SERPL-SCNC: 3.2 MMOL/L — LOW (ref 3.5–5.3)
RBC # BLD: 2.8 M/UL — LOW (ref 3.8–5.2)
RBC # BLD: 2.89 M/UL — LOW (ref 3.8–5.2)
RBC # BLD: 3.23 M/UL — LOW (ref 3.8–5.2)
RBC # FLD: 16.3 % — HIGH (ref 10.3–14.5)
RBC # FLD: 16.3 % — HIGH (ref 10.3–14.5)
RBC # FLD: 16.5 % — HIGH (ref 10.3–14.5)
RH IG SCN BLD-IMP: POSITIVE — SIGNIFICANT CHANGE UP
SODIUM SERPL-SCNC: 138 MMOL/L — SIGNIFICANT CHANGE UP (ref 135–145)
WBC # BLD: 10.66 K/UL — HIGH (ref 3.8–10.5)
WBC # BLD: 7.59 K/UL — SIGNIFICANT CHANGE UP (ref 3.8–10.5)
WBC # BLD: 9.71 K/UL — SIGNIFICANT CHANGE UP (ref 3.8–10.5)
WBC # FLD AUTO: 10.66 K/UL — HIGH (ref 3.8–10.5)
WBC # FLD AUTO: 7.59 K/UL — SIGNIFICANT CHANGE UP (ref 3.8–10.5)
WBC # FLD AUTO: 9.71 K/UL — SIGNIFICANT CHANGE UP (ref 3.8–10.5)

## 2022-04-13 PROCEDURE — 99232 SBSQ HOSP IP/OBS MODERATE 35: CPT

## 2022-04-13 PROCEDURE — 74174 CTA ABD&PLVS W/CONTRAST: CPT | Mod: 26

## 2022-04-13 RX ORDER — POTASSIUM CHLORIDE 20 MEQ
40 PACKET (EA) ORAL EVERY 4 HOURS
Refills: 0 | Status: DISCONTINUED | OUTPATIENT
Start: 2022-04-13 | End: 2022-04-13

## 2022-04-13 RX ORDER — POTASSIUM CHLORIDE 20 MEQ
40 PACKET (EA) ORAL EVERY 4 HOURS
Refills: 0 | Status: COMPLETED | OUTPATIENT
Start: 2022-04-13 | End: 2022-04-13

## 2022-04-13 RX ADMIN — MEROPENEM-VABORBACTAM 83.33 GRAM(S): 1; 1 INJECTION, POWDER, FOR SOLUTION INTRAVENOUS at 17:57

## 2022-04-13 RX ADMIN — LEVETIRACETAM 400 MILLIGRAM(S): 250 TABLET, FILM COATED ORAL at 10:22

## 2022-04-13 RX ADMIN — Medication 650 MILLIGRAM(S): at 11:37

## 2022-04-13 RX ADMIN — APIXABAN 5 MILLIGRAM(S): 2.5 TABLET, FILM COATED ORAL at 06:01

## 2022-04-13 RX ADMIN — Medication 1 DROP(S): at 17:10

## 2022-04-13 RX ADMIN — MEROPENEM-VABORBACTAM 83.33 GRAM(S): 1; 1 INJECTION, POWDER, FOR SOLUTION INTRAVENOUS at 06:02

## 2022-04-13 RX ADMIN — AMLODIPINE BESYLATE 10 MILLIGRAM(S): 2.5 TABLET ORAL at 06:02

## 2022-04-13 RX ADMIN — CARVEDILOL PHOSPHATE 3.12 MILLIGRAM(S): 80 CAPSULE, EXTENDED RELEASE ORAL at 17:09

## 2022-04-13 RX ADMIN — Medication 1 DROP(S): at 06:02

## 2022-04-13 RX ADMIN — LEVETIRACETAM 400 MILLIGRAM(S): 250 TABLET, FILM COATED ORAL at 21:50

## 2022-04-13 RX ADMIN — ATORVASTATIN CALCIUM 40 MILLIGRAM(S): 80 TABLET, FILM COATED ORAL at 21:52

## 2022-04-13 RX ADMIN — PANTOPRAZOLE SODIUM 40 MILLIGRAM(S): 20 TABLET, DELAYED RELEASE ORAL at 13:39

## 2022-04-13 RX ADMIN — Medication 650 MILLIGRAM(S): at 10:51

## 2022-04-13 RX ADMIN — MEROPENEM-VABORBACTAM 83.33 GRAM(S): 1; 1 INJECTION, POWDER, FOR SOLUTION INTRAVENOUS at 10:56

## 2022-04-13 RX ADMIN — Medication 40 MILLIEQUIVALENT(S): at 15:02

## 2022-04-13 RX ADMIN — DORZOLAMIDE HYDROCHLORIDE 1 DROP(S): 20 SOLUTION/ DROPS OPHTHALMIC at 10:21

## 2022-04-13 RX ADMIN — Medication 75 MICROGRAM(S): at 06:01

## 2022-04-13 RX ADMIN — Medication 40 MILLIEQUIVALENT(S): at 17:07

## 2022-04-13 RX ADMIN — CARVEDILOL PHOSPHATE 3.12 MILLIGRAM(S): 80 CAPSULE, EXTENDED RELEASE ORAL at 06:02

## 2022-04-13 RX ADMIN — DORZOLAMIDE HYDROCHLORIDE 1 DROP(S): 20 SOLUTION/ DROPS OPHTHALMIC at 18:05

## 2022-04-13 NOTE — PROGRESS NOTE ADULT - SUBJECTIVE AND OBJECTIVE BOX
Date of Service  : 04-13-22     INTERVAL HPI/OVERNIGHT EVENTS: Having rectal bleeding.   Vital Signs Last 24 Hrs  T(C): 37.3 (13 Apr 2022 17:09), Max: 38 (13 Apr 2022 01:51)  T(F): 99.1 (13 Apr 2022 17:09), Max: 100.4 (13 Apr 2022 01:51)  HR: 70 (13 Apr 2022 17:09) (68 - 85)  BP: 135/58 (13 Apr 2022 17:09) (135/58 - 151/62)  BP(mean): --  RR: 18 (13 Apr 2022 17:09) (18 - 20)  SpO2: 98% (13 Apr 2022 17:09) (95% - 100%)  I&O's Summary    12 Apr 2022 07:01  -  13 Apr 2022 07:00  --------------------------------------------------------  IN: 2810 mL / OUT: 1675 mL / NET: 1135 mL    13 Apr 2022 07:01  -  13 Apr 2022 20:28  --------------------------------------------------------  IN: 1380 mL / OUT: 700 mL / NET: 680 mL      MEDICATIONS  (STANDING):  amLODIPine   Tablet 10 milliGRAM(s) Oral daily  atorvastatin 40 milliGRAM(s) Oral at bedtime  carvedilol 3.125 milliGRAM(s) Oral every 12 hours  dorzolamide 2% Ophthalmic Solution 1 Drop(s) Both EYES <User Schedule>  influenza  Vaccine (HIGH DOSE) 0.7 milliLiter(s) IntraMuscular once  lactated ringers. 1000 milliLiter(s) (75 mL/Hr) IV Continuous <Continuous>  latanoprost 0.005% Ophthalmic Solution 1 Drop(s) Both EYES at bedtime  levETIRAcetam  IVPB 500 milliGRAM(s) IV Intermittent every 12 hours  levothyroxine 75 MICROGram(s) Oral daily  meropenem/vaborbactam IVPB 4 Gram(s) IV Intermittent every 8 hours  pantoprazole   Suspension 40 milliGRAM(s) Oral daily  timolol 0.5% Solution 1 Drop(s) Both EYES two times a day    MEDICATIONS  (PRN):  acetaminophen     Tablet .. 650 milliGRAM(s) Oral every 6 hours PRN Mild Pain (1 - 3), Moderate Pain (4 - 6)    LABS:                        7.8    9.71  )-----------( 307      ( 13 Apr 2022 14:14 )             25.5     04-13    138  |  102  |  15  ----------------------------<  131<H>  3.2<L>   |  25  |  0.45<L>    Ca    9.1      13 Apr 2022 09:25  Phos  3.1     04-13  Mg     2.00     04-13          CAPILLARY BLOOD GLUCOSE                  Consultant(s) Notes Reviewed:  [x ] YES  [ ] NO    PHYSICAL EXAM:  GENERAL: NAD, well-groomed, well-developed,not in any distress ,  HEAD:  Atraumatic, Normocephalic  NECK: Supple, No JVD, Normal thyroid  NERVOUS SYSTEM:  Alert  CHEST/LUNG: Good air entry bilateral with no  rales, rhonchi, wheezing, or rubs  HEART: Regular rate and rhythm; No murmurs, rubs, or gallops  ABDOMEN: Soft, Nontender, Nondistended; Bowel sounds present, PEG and Ostomy  EXTREMITIES:  2+ Peripheral Pulses, No clubbing, cyanosis, or edema      Care Discussed with Consultants/Other Providers [ x] YES  [ ] NO [facility]. 90 day refill given on 03/04/22, appointment needed for further refills.       Requested Prescriptions   Pending Prescriptions Disp Refills    LOSARTAN 48 MG Oral Tab [Pharmacy Med Name: Losartan Potassium 50 MG Oral Tablet] 90 tablet 0     Sig: Take 1 tablet by mouth once daily        Hypertensive Medications Protocol Failed - 3/3/2022 11:48 AM        Failed - Appointment in past 6 or next 3 months        Passed - CMP or BMP in past 12 months        Passed - GFR Non- > 50     Lab Results   Component Value Date    GFRNAA 90 11/03/2021                        Recent Outpatient Visits              11 months ago Anemia, unspecified type    [address] MD    Office Visit    1 year ago Essential hypertension    [address] [facility] MD    Office Visit    2 years ago Anemia, normocytic normochromic    Xenia Anders MD    Office Visit    2 years ago Anemia, unspecified type    [address] [facility] MD    Office Visit    2 years ago Essential hypertension    [address] [facility] MD    Office Visit

## 2022-04-13 NOTE — PROVIDER CONTACT NOTE (OTHER) - RECOMMENDATIONS
Call ENT to replace trach immediately
Pratik with large amount of blood and blood clot saved for provider to see. Provider recommended to come to PT bedside to see.
Given IV push BP medication.
Given Labetalol.
provider aware
Pt needs restraints.
Make Sameera Galvan, [facility] [id number] aware
Continue treatment.
Monitor patient.
Trach needs to be reentered.
Give Labetalol Iv PUSH.

## 2022-04-13 NOTE — PROGRESS NOTE ADULT - SUBJECTIVE AND OBJECTIVE BOX
Follow Up:  sepsis    Interval History: pt afebrile, again dislodged the trach yesterday which was replaced    ROS:    Unobtainable because of mental status        Allergies  No Known Allergies        ANTIMICROBIALS:  meropenem/vaborbactam IVPB 4 every 8 hours      OTHER MEDS:  acetaminophen     Tablet .. 650 milliGRAM(s) Oral every 6 hours PRN  amLODIPine   Tablet 10 milliGRAM(s) Oral daily  apixaban 5 milliGRAM(s) Oral every 12 hours  aspirin  chewable 81 milliGRAM(s) Enteral Tube daily  atorvastatin 40 milliGRAM(s) Oral at bedtime  carvedilol 3.125 milliGRAM(s) Oral every 12 hours  clopidogrel Tablet 75 milliGRAM(s) Enteral Tube daily  dorzolamide 2% Ophthalmic Solution 1 Drop(s) Both EYES <User Schedule>  influenza  Vaccine (HIGH DOSE) 0.7 milliLiter(s) IntraMuscular once  lactated ringers. 1000 milliLiter(s) IV Continuous <Continuous>  latanoprost 0.005% Ophthalmic Solution 1 Drop(s) Both EYES at bedtime  levETIRAcetam  IVPB 500 milliGRAM(s) IV Intermittent every 12 hours  levothyroxine 75 MICROGram(s) Oral daily  pantoprazole   Suspension 40 milliGRAM(s) Oral daily  timolol 0.5% Solution 1 Drop(s) Both EYES two times a day      Vital Signs Last 24 Hrs  T(C): 37.7 (13 Apr 2022 09:32), Max: 38 (13 Apr 2022 01:51)  T(F): 99.8 (13 Apr 2022 09:32), Max: 100.4 (13 Apr 2022 01:51)  HR: 85 (13 Apr 2022 09:32) (74 - 87)  BP: 140/54 (13 Apr 2022 09:32) (140/54 - 154/64)  BP(mean): --  RR: 18 (13 Apr 2022 09:32) (16 - 20)  SpO2: 98% (13 Apr 2022 09:32) (95% - 100%)    Physical Exam:  General:    NAD, non toxic  Cardio:    regular S1,S2  Respiratory:   trach  abd:   soft, BS +, not tender  :     no CVAT, no suprapubic tenderness, no mc  Musculoskeletal : no joint swelling, no edema  Skin:    no rash  vascular: no phlebitis                        8.7    10.66 )-----------( 315      ( 13 Apr 2022 09:25 )             29.2       04-13    138  |  102  |  15  ----------------------------<  131<H>  3.2<L>   |  25  |  0.45<L>    Ca    9.1      13 Apr 2022 09:25  Phos  3.1     04-13  Mg     2.00     04-13            MICROBIOLOGY:  v  .Blood Blood  04-05-22   No Growth Final  --  --      .Blood Blood-Peripheral  04-04-22   Growth in anaerobic bottle: Klebsiella pneumoniae (Carbapenem Resistant)  See previous culture [id number]  --  Blood Culture PCR  Klepne MDRO      .Blood Blood-Peripheral  03-26-22   No Growth Final  --  --      .Blood Blood-Peripheral  03-26-22   No Growth Final  --  --                RADIOLOGY:  Images independently visualized and reviewed personally, findings as below  < from: CT Abdomen and Pelvis w/ IV Cont (04.04.22 @ 14:24) >  IMPRESSION:  Trace bilateral pleural effusions.    Improved aeration in the lower lobes bilaterally since 1/30/2022.   Nonspecific patchy bilateral groundglass and linear opacities are noted.    Enhancement of the wall of the cecum and ascending colon with mild   infiltration of the adjacent fat is similar in appearance to prior study   dated 3/27/2022. An inflammatory or infectious process is considered.    Dependent high attenuation within the urinary bladder, which may   represent small stones and/or debris. An underlying mass cannot be   excluded.    Thick-walled collection in the cul-de-sac and small collections along the   right paracolic gutter, not significantly changed since 3/27/2022.    < end of copied text >

## 2022-04-13 NOTE — PROVIDER CONTACT NOTE (OTHER) - ACTION/TREATMENT ORDERED:
Sameera Galvan, [facility] [id number], made aware. Assessed patient at bedside. GI consulted, CBC repeated. Plan for CT Angio Abdomen/Pelvis. Aspirin, plavix, and eliquis discontinued and not given as per order

## 2022-04-13 NOTE — PROGRESS NOTE ADULT - ASSESSMENT
82yo female with complicated medical course    Rectal Bleeding   repeat CBC this afternoon; transfuse to keep hgb close to 8  remains a poor endoscopic candidate; will d/w Family this afternoon   Check CTA A/P with IV Contrast   no objection to ASA 81mg   reconsider triple therapy in light of rectal bleeding and hospice planning   f/u cardiology     Abnormal CT   agree with abx per ID   fluid collections too small for drainage; IR input appreciated  no acute gi interventions given multiple medical issues  recent IR peg exchange 3/30; reconsult if issues  brown stool and tolerating PEG feeds  continue w/supportive care   DNR/DNI; hospice planning    Sepsis   per primary team and ID     I reviewed the overnight course of events on the unit, re-confirming the patient history. I discussed the care with the patient and their family. The plan of care was discussed with the physician assistant and modifications were made to the notation where appropriate. Differential diagnosis and plan of care discussed with patient after the evaluation. Advanced care planning was discussed with patient and family.  Advanced care planning forms were reviewed and discussed.  Risks, benefits and alternatives of gastroenterologic procedures were discussed in detail and all questions were answered. 35 minutes spent on total encounter of which more than fifty percent of the encounter was spent counseling and/or coordinating care by the attending physician.    82yo female with complicated medical course    Rectal Bleeding   repeat CBC this afternoon; transfuse to keep hgb close to 8  remains a poor endoscopic candidate; will d/w Family this afternoon   Check CTA A/P with IV Contrast   no objection to ASA 81mg   reconsider Eliquis and Plavix in light of rectal bleeding and hospice planning   DNR/DNI; hospice planning  f/u cardiology     Abnormal CT   agree with abx per ID   fluid collections too small for drainage; IR input appreciated  recent IR peg exchange 3/30; reconsult if issues  brown stool and tolerating PEG feeds  continue w/supportive care   DNR/DNI; hospice planning    Sepsis   per primary team and ID     I reviewed the overnight course of events on the unit, re-confirming the patient history. I discussed the care with the patient and their family. The plan of care was discussed with the physician assistant and modifications were made to the notation where appropriate. Differential diagnosis and plan of care discussed with patient after the evaluation. Advanced care planning was discussed with patient and family.  Advanced care planning forms were reviewed and discussed.  Risks, benefits and alternatives of gastroenterologic procedures were discussed in detail and all questions were answered. 35 minutes spent on total encounter of which more than fifty percent of the encounter was spent counseling and/or coordinating care by the attending physician.

## 2022-04-13 NOTE — CHART NOTE - NSCHARTNOTEFT_GEN_A_CORE
[facility]    Received on sign-out that pt has bloody stool during day, Hgb did drop from 8.7 to 7.8 over 5 hours.  GI recommending CT Abd/Pelvis w/ IV contrast.  Contacted pt's son and HCP Blake Allred ([phone]), he spoke w/ GI during the day, expressed he is aware the pt had blood in her stool.  I explained that GI recommended checking a CT scan of the pt's abdomen and pelvis w/ IV contrast agent to look for active GI bleeding.  He provided consent over telephone for pt to receive a CT Abd/Pelvis w/ IV contrast after I explained the risks and benefits of receiving contrasts - benefits: identification of active GI bleeding, risks: allergic reaction (got contrast on 4/4 w/o reactions on chart review), kidney injury (SCr is monitored daily).    I also explained that the pt's blood count did drop during the day, in the setting of rectal bleeding.  Mr. Allred provided consent over telephone for the pt to receive blood products if needed after I explained the risks, benefits, and alternatives of receiving blood products.  Risks: transfusion reactions, explained vital signs are monitored during transfusion, blood type is checked to ensure proper type is given.  Benefits: blood count increases, helps w/ oxygen delivery to tissues and organs.    Consent forms for IV contrast and blood products completed, signed, witnessed, and placed in pt's chart.  Called radiology to protocol CT scan, will expedite tonight, RN made aware of plan.    Case d/w attending, Dr. Lew, he agrees w/ above plan.  Asa is on hold for now, GI said they have no objections to pt receiving Asa, will hold for now pending repeat CBC tonight.  Defer decision to resume in AM based on CT scan and lab results.  Will transfuse if Hgb<7.0.  Pt stable at this time, will continue to monitor. [facility] [employer]    Received on sign-out that pt has bloody stool during day, Hgb did drop from 8.7 to 7.8 over 5 hours.  GI recommending CT Abd/Pelvis w/ IV contrast.  Contacted pt's son and HCP Blake Allred ([phone]), he spoke w/ GI during the day, expressed he is aware the pt had blood in her stool.  I explained that GI recommended checking a CT scan of the pt's abdomen and pelvis w/ IV contrast agent to look for active GI bleeding.  He provided consent over telephone for pt to receive a CT Abd/Pelvis w/ IV contrast after I explained the risks and benefits of receiving contrasts - benefits: identification of active GI bleeding, risks: allergic reaction (got contrast on 4/4 w/o reactions on chart review), kidney injury (SCr is monitored daily).    I also explained that the pt's blood count did drop during the day, in the setting of rectal bleeding.  Mr. Allred provided consent over telephone for the pt to receive blood products if needed after I explained the risks, benefits, and alternatives of receiving blood products.  Risks: transfusion reactions, explained vital signs are monitored during transfusion, blood type is checked to ensure proper type is given.  Benefits: blood count increases, helps w/ oxygen delivery to tissues and organs.    Consent forms for IV contrast and blood products completed, signed, witnessed, and placed in pt's chart.  Called radiology to protocol CT scan, will expedite tonight, RN made aware of plan.    Case d/w attending, Dr. Lew, he agrees w/ above plan.  Asa is on hold for now, GI said they have no objections to pt receiving Asa, will hold for now pending repeat CBC tonight.  Defer decision to resume in AM based on CT scan and lab results.  Will transfuse if Hgb<7.0.  Pt stable at this time, will continue to monitor.    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 Abnormal wall enhancement of the rectum with irregular contour   and increased distention of the rectum with high density material, likely   hemorrhage, suggestive of angiodysplasia.  Small bilateral pleural effusions with associated atelectasis.  Please follow up official report in am.  d/w ROSALINE Harrison    ******PRELIMINARY REPORT******    <end of copied text>    RN notified writer and said pt passed large blood clot from rectum, colostomy output is nonbloody.  Hgb in evening holding @ 7.7.  Dr. Lew made aware of above, recommendation is to transfuse 1 unit of PRBC to goal Hbg>8.0, and call colorectal surgery consult.  Will place order for blood product and call consult.  Pt remains stable at this time, will continue to monitor.    Arnoldo Fish PA-C  Department of Medicine - [facility] [address] [facility]  In-House Pager: #[phone]

## 2022-04-13 NOTE — PROGRESS NOTE ADULT - ASSESSMENT
81 f with HTN, HLD, Hypothyroidism, Depression, seizure d/o, RLE DVT, Cholelithiasis s/p recent Hospitalization at [facility] 12/22 -> 2/11/22 for acute perforated diverticulitis s/p Rosales 12/25, colostomy bag 12/26, hospitalization c/b abd abscess s/p IR drainage 1/3 Cx with E.coli & bacteroides and candida albicans,  trach 1/7, PEG, Acute left MCA infarct, aspiration pneumonia, sent 3/27 from rehab for dislodged PEG, initially afebrile, normal WBc, negative blood and urine cx  CT 3/27: PEG tube removed/dislodged, no evidence for pneumoperitoneum, 3.5 cm thick-walled fluid collection in the pelvic cul-de-sac, Interval slight improvement in additional small loculated fluid   collections, including significant improvement in inflammatory changes in RLQ and pelvis status post removal of surgical drain. Ill-defined hyperdensity/enhancing nodularity along the right posterior   bladder lumen, cannot exclude a neoplastic process  s/p PEG placement by IR 3/30  on 4/4 pt spiked to 102.4 with tachycardia and new leukocytosis to 16    recent perforated diverticulitis s/p huitron, c/b abd abscess s/p IR drainage, cx with E-coli, bacteroides and candida now admitted for PEG dislodgement s/p IR placement 3/30 now with new fever, tachycardia, leukocytosis, sepsis due to KPC klebsiella bacteremia, S to vabomere, acyvaz, tigecyline, minocycline and genta, repeat cx 4/5 negative  CT: Dependent high attenuation within the urinary bladder, which may represent small stones and/or debris. An underlying mass cannot be excluded. Thick-walled collection in the cul-de-sac and small collections along the right paracolic gutter, not significantly changed since 3/27/2022  IR stated that abscess are too small to be drained    * c/w  vabomere, blood cx cleared 4/5, so day 9  * will have to do a 2 week course of antibiotics until 4/19 as the abscess was not drained  * pt was referred to hospice and now deciding for comfort care, so if within GOC will continue antibiotics  * weekly CBC, CMP while on antibiotics      The above assessment and plan was discussed with the primary team    Ivy Rose MD  contact on teams  After 5pm and on weekends call [phone]

## 2022-04-13 NOTE — PROGRESS NOTE ADULT - ASSESSMENT
82 yo F w/ PMHx of perforated diverticulitis s/p Rosales procedure, colostomy, abdominal abscess with wound s/p IR drainage, Respiratory Failure s/p Trache, CVA w/ functional quadriplegia, aphasia, dysphagia s/p PEG (on Jevity 1.5 @ 70cchr x 18 hrs, & water flushes 325ml q 6 hrs, Afib, GIB, HTN, HLD, Seizure d/o, Hypothyroidism, Glaucoma, Depression p/w PEG tube dislodgement for unknown amount of time     Problem/Plan - 1:  ·  Problem: Sepsis sec to KPC K Pneumoniae Bacteremia .   ·  Plan:  IV Abxs per ID till 4/19 .   ID help appreciated.   < from: CT Abdomen and Pelvis w/ IV Cont (04.04.22 @ 14:24) >  IMPRESSION:  Trace bilateral pleural effusions.    Improved aeration in the lower lobes bilaterally since 1/30/2022.   Nonspecific patchy bilateral groundglass and linear opacities are noted.    Enhancement of the wall of the cecum and ascending colon with mild   infiltration of the adjacent fat is similar in appearance to prior study   dated 3/27/2022. An inflammatory or infectious process is considered.    Dependent high attenuation within the urinary bladder, which may   represent small stones and/or debris. An underlying mass cannot be   excluded.    Thick-walled collection in the cul-de-sac and small collections along the   right paracolic gutter, not significantly changed since 3/27/2022.    < end of copied text >     Problem/Plan - 2:  ·  Problem: Metabolic Encephalopathy    ·  Plan: Likely sec to Infection  . Resolved.    Neurology helping  and  EEG showing no Seizure activity  .     Problem/Plan - 3:  ·  Problem: Stroke.   ·  Plan: Holding ASA, Plavix as actively bleeding.Will restart ASA if CTA okay and bleeding stops .    Continue  statin Via PEG tube .     Problem/Plan - 4:  ·  Problem: Chronic respiratory failure with hypoxia.   ·  Plan: Pt on 2l NC via Trache collar.   S/P replacement of Tracheostomy Tube.       Problem/Plan - 5:  ·  Problem: Diverticulitis of intestine with perforation and abscess without bleeding.   ·  Plan: Surgery c/s in chart- small fluid collection but nontoxic, if drainage needed would be done by IR.  Wound care eval.     Problem/Plan - 6:  ·  Problem: History of atrial fibrillation.   ·  Plan: Cards helping and now off AC .   D/W Son and okay to start.      Problem/Plan - 7:  ·  Problem: Seizure disorder.   ·  Plan: continue levetiracetam bid.     Problem/Plan - 8:  ·  Problem: DVT, lower extremity.   ·  Plan: ?Subacute vs Chronic, Off AC.      Problem/Plan - 9:  ·  Problem: HTN (hypertension).   ·  Plan: Resuming carvedilol & amlodipine as PEG replaced.     Problem/Plan - 10:  ·  Problem: Hypothyroidism.   ·  Plan; Resume Levothyroxine when PEG replaced.     Problem/Plan - 11:  ·  Problem: Glaucoma.   ·  Plan: continue Timolol, Dorzolamide, Latanoprost.      Problem/Plan - 12:  ·  Problem: PEG tube malfunction.   ·  Plan:  S/P  PEG . TF started.      Problem/Plan - 13:  ·  Problem: Hypokalemia.   ·  Plan: Corrected.      Problem/Plan - 14:  ·  Problem: GI bleed. .   ·  Plan: PPI . GI following .     PRBC for HGb 7.5 G or less.   CTA A/P pending.   D/W ACP   Disposition : DC  planning on hold.   Over all prognosis very poor.

## 2022-04-13 NOTE — PROGRESS NOTE ADULT - SUBJECTIVE AND OBJECTIVE BOX
Called by nurse that patient had passed a blood clot per rectum.   Blood clot dark in color, no active bleeding currently. GI Dr Sanchez' PA contacted. Will monitor for now.  monitor H/H levels.

## 2022-04-13 NOTE — PROGRESS NOTE ADULT - SUBJECTIVE AND OBJECTIVE BOX
[facility]   MD ELOISE ALVARADO MD, PA INJGALINDO BUNNY HERNANDEZ    TEL:  OFFICE: [phone]    From 5pm-7am Answering Service [phone]    -- RENAL FOLLOW UP NOTE ---Date of Service 04-13-22 @ 13:59    Patient is a 81y old  Female who presents with a chief complaint of     Patient seen and examined at bedside. No chest pain/sob    VITALS:  T(F): 99.8 (04-13-22 @ 13:37), Max: 100.4 (04-13-22 @ 01:51)  HR: 68 (04-13-22 @ 13:37)  BP: 139/55 (04-13-22 @ 13:37)  RR: 20 (04-13-22 @ 13:37)  SpO2: 98% (04-13-22 @ 13:37)  Wt(kg): --    04-12 @ 07:01  -  04-13 @ 07:00  --------------------------------------------------------  IN: 2810 mL / OUT: 1675 mL / NET: 1135 mL          PHYSICAL EXAM:  Constitutional: NAD  Neck: No JVD  Respiratory: CTAB, no wheezes, rales or rhonchi  Cardiovascular: S1, S2, RRR  Gastrointestinal: BS+, soft, NT/ND  Extremities: No peripheral edema    Hospital Medications:   MEDICATIONS  (STANDING):  amLODIPine   Tablet 10 milliGRAM(s) Oral daily  apixaban 5 milliGRAM(s) Oral every 12 hours  aspirin  chewable 81 milliGRAM(s) Enteral Tube daily  atorvastatin 40 milliGRAM(s) Oral at bedtime  carvedilol 3.125 milliGRAM(s) Oral every 12 hours  clopidogrel Tablet 75 milliGRAM(s) Enteral Tube daily  dorzolamide 2% Ophthalmic Solution 1 Drop(s) Both EYES <User Schedule>  influenza  Vaccine (HIGH DOSE) 0.7 milliLiter(s) IntraMuscular once  lactated ringers. 1000 milliLiter(s) (75 mL/Hr) IV Continuous <Continuous>  latanoprost 0.005% Ophthalmic Solution 1 Drop(s) Both EYES at bedtime  levETIRAcetam  IVPB 500 milliGRAM(s) IV Intermittent every 12 hours  levothyroxine 75 MICROGram(s) Oral daily  meropenem/vaborbactam IVPB 4 Gram(s) IV Intermittent every 8 hours  pantoprazole   Suspension 40 milliGRAM(s) Oral daily  timolol 0.5% Solution 1 Drop(s) Both EYES two times a day      LABS:  04-13    138  |  102  |  15  ----------------------------<  131<H>  3.2<L>   |  25  |  0.45<L>    Ca    9.1      13 Apr 2022 09:25  Phos  3.1     04-13  Mg     2.00     04-13      Creatinine Trend: 0.45 <--, 0.42 <--, 0.40 <--, 0.38 <--, 0.38 <--, 0.40 <--    Phosphorus Level, Serum: 3.1 mg/dL (04-13 @ 09:25)                              8.7    10.66 )-----------( 315      ( 13 Apr 2022 09:25 )             29.2     Urine Studies:  Urinalysis - [04-04-22 @ 01:59]      Color Light Yellow / Appearance Clear / SG 1.012 / pH 7.5      Gluc Negative / Ketone Negative  / Bili Negative / Urobili <2 mg/dL       Blood Negative / Protein Trace / Leuk Est Negative / Nitrite Negative      RBC 6 / WBC 1 / Hyaline  / Gran  / Sq Epi  / Non Sq Epi 1 / Bacteria Negative      PTH -- (Ca --)      [03-30-22 @ 07:35]   32  TSH 7.65      [04-05-22 @ 07:40]  Lipid: chol 158, , HDL 25, LDL --      [02-03-22 @ 15:22]      Syphilis Screen (Treponema Pallidum Ab) Negative      [04-05-22 @ 09:41]    RADIOLOGY & ADDITIONAL STUDIES:

## 2022-04-13 NOTE — PROVIDER CONTACT NOTE (OTHER) - ASSESSMENT
Vital Signs Stable. Pt in no signs of distress noted. Pt resting comfortably in bed. Pt denies shortness of breath or chest pain.

## 2022-04-13 NOTE — PROGRESS NOTE ADULT - ASSESSMENT
80 yo F with PMHx HTN, HLD, Hypothyroidism, Depression, seizure d/o, RLE DVT, Cholelithiasis s/p recent Hospitalization in [facility] from 12/22 -> 2/11/22 for acute perforated diverticulitis s/p Rosales 12/25, colostomy bag 12/26, hospitalization c/b IR drainage for abdominal abscess 1/3 Cx grew e.coli & bacteriodes, also required Mechanical Ventilation for Acute Respiratory Failure with Tracheostomy done 1/7.  PEG done 1/18, developed bleeding through ostomy, EGD on 2/1 showed gastritis. T's showed Acute left MCA infarct- could be 2/2 to carotid occlusion vs afib, new RLL aspiration pneumonia, seen by ID, completed antibiotics, seen by VascSx, not surgical candidate. Pt aphasic sent from [email or web address] for dislodged Peg tube, after staff noted pt has blood to her abdomen. Nephrology consulted for Hypokalemia.     A/P  Hypokalemia   likely 2/2 GI loss   give KCL 40meq x2   monitor input and output closely   monitor K closely    Hypomagnesia   replete as needed  monitor Mg     Hypophosphatemia   replete as needed  monitor     Hypocalcemia   corrected Ca WNL   Monitor    Acidosis with anion gap   etiology?  improved   monitor    HTN  optimal      monitor BP closely

## 2022-04-13 NOTE — PROVIDER CONTACT NOTE (MEDICATION) - ACTION/TREATMENT ORDERED:
Sameera Galvan, [facility] [id number] made aware. Give next dose now and reschedule future doses Q8H

## 2022-04-13 NOTE — PROGRESS NOTE ADULT - SUBJECTIVE AND OBJECTIVE BOX
INTERVAL HPI/OVERNIGHT EVENTS:    events noted, pt with rectal bleeding this morning and afternoon   appears comfortable  1:1 bedside, no family present    MEDICATIONS  (STANDING):  amLODIPine   Tablet 10 milliGRAM(s) Oral daily  apixaban 5 milliGRAM(s) Oral every 12 hours  aspirin  chewable 81 milliGRAM(s) Enteral Tube daily  atorvastatin 40 milliGRAM(s) Oral at bedtime  carvedilol 3.125 milliGRAM(s) Oral every 12 hours  clopidogrel Tablet 75 milliGRAM(s) Enteral Tube daily  dorzolamide 2% Ophthalmic Solution 1 Drop(s) Both EYES <User Schedule>  influenza  Vaccine (HIGH DOSE) 0.7 milliLiter(s) IntraMuscular once  lactated ringers. 1000 milliLiter(s) (75 mL/Hr) IV Continuous <Continuous>  latanoprost 0.005% Ophthalmic Solution 1 Drop(s) Both EYES at bedtime  levETIRAcetam  IVPB 500 milliGRAM(s) IV Intermittent every 12 hours  levothyroxine 75 MICROGram(s) Oral daily  meropenem/vaborbactam IVPB 4 Gram(s) IV Intermittent every 8 hours  pantoprazole   Suspension 40 milliGRAM(s) Oral daily  timolol 0.5% Solution 1 Drop(s) Both EYES two times a day    MEDICATIONS  (PRN):  acetaminophen     Tablet .. 650 milliGRAM(s) Oral every 6 hours PRN Mild Pain (1 - 3), Moderate Pain (4 - 6)      Allergies    No Known Allergies    Intolerances        Review of Systems: *pt minimally verbal to nonverbal, unable to obtain ROS           Vital Signs Last 24 Hrs  T(C): 37.7 (13 Apr 2022 09:32), Max: 38 (13 Apr 2022 01:51)  T(F): 99.8 (13 Apr 2022 09:32), Max: 100.4 (13 Apr 2022 01:51)  HR: 85 (13 Apr 2022 09:32) (74 - 87)  BP: 140/54 (13 Apr 2022 09:32) (140/54 - 154/64)  BP(mean): --  RR: 18 (13 Apr 2022 09:32) (16 - 20)  SpO2: 98% (13 Apr 2022 09:32) (95% - 100%)    PHYSICAL EXAM:    Constitutional: NAD  HEENT: EOMI, throat clear  Neck: No LAD, supple  Respiratory: +trach  Cardiovascular: S1 and S2, RRR, no M  Gastrointestinal: BS+, soft, NT/ND, neg HSM, +peg  Extremities: No peripheral edema, neg clubbing, cyanosis  Vascular: 2+ peripheral pulses  Neurological: A/O x 0  Psychiatric: lethargic  Skin: No rashes      LABS:                        8.7    10.66 )-----------( 315      ( 13 Apr 2022 09:25 )             29.2     04-13    138  |  102  |  15  ----------------------------<  131<H>  3.2<L>   |  25  |  0.45<L>    Ca    9.1      13 Apr 2022 09:25  Phos  3.1     04-13  Mg     2.00     04-13            RADIOLOGY & ADDITIONAL TESTS:

## 2022-04-13 NOTE — PROGRESS NOTE ADULT - SUBJECTIVE AND OBJECTIVE BOX
Date of service: 4/13/22    chief complaint: dislodged PEG tube     extended hpi: 82 yo F with PMHx HTN, HLD, Hypothyroidism, Depression, seizure d/o, RLE DVT, Cholelithiasis s/p recent Hospitalization in [facility] from 12/22 -> 2/11/22 for acute perforated diverticulitis s/p Rosales 12/25, colostomy bag 12/26, hospitalization c/b IR drainage for abdominal abscess 1/3 Cx grew e.coli & bacteriodes, also required Mechanical Ventilation for Acute Respiratory Failure with Tracheostomy done 1/7.  PEG done 1/18, developed bleeding through ostomy for which she was transfused 1/28, EGD on 2/1 showed gastritis. Developed fevers on 1/30 and CT's showed Acute left MCA infarct- could be 2/2 to carotid occlusion vs afib, new RLL aspiration pneumonia, seen by ID, completed antibiotics, seen by VascSx, not surgical candidate. Pt aphasic sent from [facility] for dislodged Peg tube, after staff noted pt has blood to her abdomen.     S: events noted,  remains lethargic    Review of Systems:   Constitutional: [ ] fevers, [ ] chills.   Skin: [ ] dry skin. [ ] rashes.  Psychiatric: [ ] depression, [ ] anxiety.   Gastrointestinal: [ ] BRBPR, [ ] melena.   Neurological: [ ] confusion. [ ] seizures. [ ] shuffling gait.   Ears,Nose,Mouth and Throat: [ ] ear pain [ ] sore throat.   Eyes: [ ] diplopia.   Respiratory: [ ] hemoptysis. [ ] shortness of breath  Cardiovascular: See HPI above  Hematologic/Lymphatic: [ ] anemia. [ ] painful nodes. [ ] prolonged bleeding.   Genitourinary: [ ] hematuria. [ ] flank pain.   Endocrine: [ ] significant change in weight. [ ] intolerance to heat and cold.     Review of systems [ ] otherwise negative, [x ] otherwise unable to obtain    FH: no family history of sudden cardiac death in first degree relatives    SH: [ ] tobacco, [ ] alcohol, [ ] drugs    acetaminophen     Tablet .. 650 milliGRAM(s) Oral every 6 hours PRN  amLODIPine   Tablet 10 milliGRAM(s) Oral daily  apixaban 5 milliGRAM(s) Oral every 12 hours  aspirin  chewable 81 milliGRAM(s) Enteral Tube daily  atorvastatin 40 milliGRAM(s) Oral at bedtime  carvedilol 3.125 milliGRAM(s) Oral every 12 hours  clopidogrel Tablet 75 milliGRAM(s) Enteral Tube daily  dorzolamide 2% Ophthalmic Solution 1 Drop(s) Both EYES <User Schedule>  influenza  Vaccine (HIGH DOSE) 0.7 milliLiter(s) IntraMuscular once  lactated ringers. 1000 milliLiter(s) IV Continuous <Continuous>  latanoprost 0.005% Ophthalmic Solution 1 Drop(s) Both EYES at bedtime  levETIRAcetam  IVPB 500 milliGRAM(s) IV Intermittent every 12 hours  levothyroxine 75 MICROGram(s) Oral daily  meropenem/vaborbactam IVPB 4 Gram(s) IV Intermittent every 8 hours  pantoprazole   Suspension 40 milliGRAM(s) Oral daily  timolol 0.5% Solution 1 Drop(s) Both EYES two times a day                            8.7    10.66 )-----------( 315      ( 13 Apr 2022 09:25 )             29.2       04-13    138  |  102  |  15  ----------------------------<  131<H>  3.2<L>   |  25  |  0.45<L>    Ca    9.1      13 Apr 2022 09:25  Phos  3.1     04-13  Mg     2.00     04-13      T(C): 37.7 (04-13-22 @ 09:32), Max: 38 (04-13-22 @ 01:51)  HR: 85 (04-13-22 @ 09:32) (74 - 87)  BP: 140/54 (04-13-22 @ 09:32) (140/54 - 154/64)  RR: 18 (04-13-22 @ 09:32) (16 - 20)  SpO2: 98% (04-13-22 @ 09:32) (95% - 100%)      General: no acute distress   Head: Normocephalic and atraumatic.   Neck: No JVD. No bruits. Supple. Does not appear to be enlarged.   Cardiovascular: + S1,S2 ; RRR Soft systolic murmur at the left lower sternal border. No rubs noted.    Lungs: CTA b/l. No rhonchi, rales or wheezes.   Abdomen: + BS, soft. Non tender. Non distended. No rebound. No guarding.   Extremities: No clubbing/cyanosis/edema.   Psychiatric: unable to assess     < from: TTE Echo Complete w/o Contrast w/ Doppler (02.04.22 @ 15:28) >  Summary:   1. Left ventricular ejection fraction, by visual estimation, is 60 to   65%.   2. Normal global left ventricular systolic function.   3. Mild mitral valve regurgitation.   4.Mild thickening of the anterior and posterior mitral valve leaflets.   5. Mild tricuspid regurgitation.   6. Mild to moderate aortic regurgitation.   7. Sclerotic aortic valve with normal opening.  [id number] Colin Butcher MD, [facility]  Electronically signed on 2/5/2022 at 3:03:14 PM  < end of copied text >      < from: CT Angio Head w/ IV Cont (04.04.22 @ 14:19) >  IMPRESSIONS:  CTA neck:  1. Short segment 70% stenosis in diameter at the proximal aspect of the   right internal carotid artery.  2. The left internal carotid artery is occluded at a distance of   approximately 7 mm beyond the origin of this vessel.  3. Bilateral vertebral arteries are appreciated. Left vertebral artery is   dominant.    CTA COW:  1. There is no flow within the petrous, precavernous or cavernous   portions of the left internal carotid artery.  2. Flow is appreciated within the left middle cerebral artery,   significantly decreased in caliber when compared with the right middle   cerebral artery. Furthermore, only an attenuated/hypoplastic flow pattern   is appreciated in association with the left A1 segment as well as the   left posterior communicating artery.  3. Hypoplastic/attenuated pattern of flow within the left A 2 segment of   the anterior cerebral artery.  < end of copied text >    < from: CT Head No Cont (04.04.22 @ 14:19) >  IMPRESSION:    1. Evolution of previously visualized left MCA infarct, which appeared   acute at time of prior study dated 2/5/2022. Cannot exclude an acute on   chronic process. Consider further evaluation via MR imaging as clinically   indicated, provided the patient has no contraindications.    2. Findings also include a new decreased attenuation involving the left   brachium pontis and left cerebellum, possibly representing an acute   ischemic event in this location. MR imaging to include DWI and ADC   mapping techniques may be helpful for further characterization of this   finding.    3. No acute intracranial hemorrhage.    4. Paranasal sinus inflammatory changes, as described above. Cannot   exclude sinusitis.    5. Persistent fluid within right-sided mastoid air cells. Cannot exclude   mastoiditis.    < end of copied text >        A/P:  82 yo F with PMHx HTN, HLD, Hypothyroidism, Depression, seizure d/o, RLE DVT, Cholelithiasis s/p recent Hospitalization in [facility] from 12/22 -> 2/11/22 for acute perforated diverticulitis s/p Rosales 12/25, colostomy bag 12/26, hospitalization c/b IR drainage for abdominal abscess 1/3 Cx grew e.coli & bacteriodes, also required Mechanical Ventilation for Acute Respiratory Failure with Tracheostomy done 1/7.  PEG done 1/18, developed bleeding through ostomy for which she was transfused 1/28, EGD on 2/1 showed gastritis. Developed fevers on 1/30 and CT's showed Acute left MCA infarct- could be 2/2 to carotid occlusion vs afib, new RLL aspiration pneumonia, seen by ID, completed antibiotics, seen by VascSx, not surgical candidate. Pt aphasic sent from [facility] for dislodged Peg tube, after staff noted pt has blood to her abdomen.     -prior chart reviewed (Jan-Feb 2022) Pt with CVA and found with AFib and Carotid occlusion.  AC held initially while monitoring for hemorrhagic conversion and then in the setting of GI bleed req PRBCs.  She was continued on DAPT.  -now with GIB, consider holding APT and AC and re eval GOC  -given elevated [id number], recommend lifelong AC for AF if able to tolerate--Eliquis started - monitor h/h   -on antibiotics for klebsiella bacteremia.  -evolving stroke, worsening mental status - neuro eval noted - plans for hospice care as goals of care now toward comfort, and she is DNR.

## 2022-04-14 LAB
ANION GAP SERPL CALC-SCNC: 12 MMOL/L — SIGNIFICANT CHANGE UP (ref 7–14)
APTT BLD: 24 SEC — LOW (ref 27–36.3)
BUN SERPL-MCNC: 19 MG/DL — SIGNIFICANT CHANGE UP (ref 7–23)
CALCIUM SERPL-MCNC: 8.5 MG/DL — SIGNIFICANT CHANGE UP (ref 8.4–10.5)
CHLORIDE SERPL-SCNC: 106 MMOL/L — SIGNIFICANT CHANGE UP (ref 98–107)
CO2 SERPL-SCNC: 23 MMOL/L — SIGNIFICANT CHANGE UP (ref 22–31)
CREAT SERPL-MCNC: 0.47 MG/DL — LOW (ref 0.5–1.3)
EGFR: 97 ML/MIN/1.73M2 — SIGNIFICANT CHANGE UP
GLUCOSE BLDC GLUCOMTR-MCNC: 102 MG/DL — HIGH (ref 70–99)
GLUCOSE BLDC GLUCOMTR-MCNC: 103 MG/DL — HIGH (ref 70–99)
GLUCOSE SERPL-MCNC: 106 MG/DL — HIGH (ref 70–99)
HCT VFR BLD CALC: 24.5 % — LOW (ref 34.5–45)
HCT VFR BLD CALC: 26.1 % — LOW (ref 34.5–45)
HCT VFR BLD CALC: 29 % — LOW (ref 34.5–45)
HGB BLD-MCNC: 7.5 G/DL — LOW (ref 11.5–15.5)
HGB BLD-MCNC: 8.1 G/DL — LOW (ref 11.5–15.5)
HGB BLD-MCNC: 9.3 G/DL — LOW (ref 11.5–15.5)
INR BLD: 1.14 RATIO — SIGNIFICANT CHANGE UP (ref 0.88–1.16)
MCHC RBC-ENTMCNC: 26.9 PG — LOW (ref 27–34)
MCHC RBC-ENTMCNC: 27.5 PG — SIGNIFICANT CHANGE UP (ref 27–34)
MCHC RBC-ENTMCNC: 28.2 PG — SIGNIFICANT CHANGE UP (ref 27–34)
MCHC RBC-ENTMCNC: 30.6 GM/DL — LOW (ref 32–36)
MCHC RBC-ENTMCNC: 31 GM/DL — LOW (ref 32–36)
MCHC RBC-ENTMCNC: 32.1 GM/DL — SIGNIFICANT CHANGE UP (ref 32–36)
MCV RBC AUTO: 87.8 FL — SIGNIFICANT CHANGE UP (ref 80–100)
MCV RBC AUTO: 87.9 FL — SIGNIFICANT CHANGE UP (ref 80–100)
MCV RBC AUTO: 88.5 FL — SIGNIFICANT CHANGE UP (ref 80–100)
NRBC # BLD: 0 /100 WBCS — SIGNIFICANT CHANGE UP
NRBC # FLD: 0 K/UL — SIGNIFICANT CHANGE UP
PLATELET # BLD AUTO: 297 K/UL — SIGNIFICANT CHANGE UP (ref 150–400)
PLATELET # BLD AUTO: 300 K/UL — SIGNIFICANT CHANGE UP (ref 150–400)
PLATELET # BLD AUTO: 311 K/UL — SIGNIFICANT CHANGE UP (ref 150–400)
POTASSIUM SERPL-MCNC: 4 MMOL/L — SIGNIFICANT CHANGE UP (ref 3.5–5.3)
POTASSIUM SERPL-SCNC: 4 MMOL/L — SIGNIFICANT CHANGE UP (ref 3.5–5.3)
PROTHROM AB SERPL-ACNC: 13.3 SEC — SIGNIFICANT CHANGE UP (ref 10.5–13.4)
RBC # BLD: 2.79 M/UL — LOW (ref 3.8–5.2)
RBC # BLD: 2.95 M/UL — LOW (ref 3.8–5.2)
RBC # BLD: 3.3 M/UL — LOW (ref 3.8–5.2)
RBC # FLD: 16.2 % — HIGH (ref 10.3–14.5)
RBC # FLD: 17.2 % — HIGH (ref 10.3–14.5)
RBC # FLD: 17.2 % — HIGH (ref 10.3–14.5)
SARS-COV-2 RNA SPEC QL NAA+PROBE: SIGNIFICANT CHANGE UP
SARS-COV-2 RNA SPEC QL NAA+PROBE: SIGNIFICANT CHANGE UP
SODIUM SERPL-SCNC: 141 MMOL/L — SIGNIFICANT CHANGE UP (ref 135–145)
WBC # BLD: 7.32 K/UL — SIGNIFICANT CHANGE UP (ref 3.8–10.5)
WBC # BLD: 8 K/UL — SIGNIFICANT CHANGE UP (ref 3.8–10.5)
WBC # BLD: 8.15 K/UL — SIGNIFICANT CHANGE UP (ref 3.8–10.5)
WBC # FLD AUTO: 7.32 K/UL — SIGNIFICANT CHANGE UP (ref 3.8–10.5)
WBC # FLD AUTO: 8 K/UL — SIGNIFICANT CHANGE UP (ref 3.8–10.5)
WBC # FLD AUTO: 8.15 K/UL — SIGNIFICANT CHANGE UP (ref 3.8–10.5)

## 2022-04-14 PROCEDURE — 99232 SBSQ HOSP IP/OBS MODERATE 35: CPT

## 2022-04-14 DEVICE — CLIP RESOLUTION 360 235CM: Type: IMPLANTABLE DEVICE | Status: FUNCTIONAL

## 2022-04-14 RX ORDER — POLYETHYLENE GLYCOL 3350 17 G/17G
17 POWDER, FOR SOLUTION ORAL DAILY
Refills: 0 | Status: DISCONTINUED | OUTPATIENT
Start: 2022-04-14 | End: 2022-04-22

## 2022-04-14 RX ADMIN — Medication 75 MICROGRAM(S): at 05:28

## 2022-04-14 RX ADMIN — MEROPENEM-VABORBACTAM 83.33 GRAM(S): 1; 1 INJECTION, POWDER, FOR SOLUTION INTRAVENOUS at 11:08

## 2022-04-14 RX ADMIN — PANTOPRAZOLE SODIUM 40 MILLIGRAM(S): 20 TABLET, DELAYED RELEASE ORAL at 18:42

## 2022-04-14 RX ADMIN — CARVEDILOL PHOSPHATE 3.12 MILLIGRAM(S): 80 CAPSULE, EXTENDED RELEASE ORAL at 18:43

## 2022-04-14 RX ADMIN — LATANOPROST 1 DROP(S): 0.05 SOLUTION/ DROPS OPHTHALMIC; TOPICAL at 00:21

## 2022-04-14 RX ADMIN — AMLODIPINE BESYLATE 10 MILLIGRAM(S): 2.5 TABLET ORAL at 05:28

## 2022-04-14 RX ADMIN — MEROPENEM-VABORBACTAM 83.33 GRAM(S): 1; 1 INJECTION, POWDER, FOR SOLUTION INTRAVENOUS at 01:05

## 2022-04-14 RX ADMIN — Medication 650 MILLIGRAM(S): at 04:02

## 2022-04-14 RX ADMIN — Medication 1 DROP(S): at 18:43

## 2022-04-14 RX ADMIN — DORZOLAMIDE HYDROCHLORIDE 1 DROP(S): 20 SOLUTION/ DROPS OPHTHALMIC at 11:15

## 2022-04-14 RX ADMIN — MEROPENEM-VABORBACTAM 83.33 GRAM(S): 1; 1 INJECTION, POWDER, FOR SOLUTION INTRAVENOUS at 19:01

## 2022-04-14 RX ADMIN — Medication 1 ENEMA: at 10:24

## 2022-04-14 RX ADMIN — CARVEDILOL PHOSPHATE 3.12 MILLIGRAM(S): 80 CAPSULE, EXTENDED RELEASE ORAL at 05:28

## 2022-04-14 RX ADMIN — DORZOLAMIDE HYDROCHLORIDE 1 DROP(S): 20 SOLUTION/ DROPS OPHTHALMIC at 18:43

## 2022-04-14 RX ADMIN — LATANOPROST 1 DROP(S): 0.05 SOLUTION/ DROPS OPHTHALMIC; TOPICAL at 22:12

## 2022-04-14 RX ADMIN — LEVETIRACETAM 400 MILLIGRAM(S): 250 TABLET, FILM COATED ORAL at 11:08

## 2022-04-14 RX ADMIN — ATORVASTATIN CALCIUM 40 MILLIGRAM(S): 80 TABLET, FILM COATED ORAL at 22:12

## 2022-04-14 RX ADMIN — Medication 1 DROP(S): at 05:28

## 2022-04-14 RX ADMIN — LEVETIRACETAM 400 MILLIGRAM(S): 250 TABLET, FILM COATED ORAL at 22:12

## 2022-04-14 RX ADMIN — Medication 650 MILLIGRAM(S): at 05:12

## 2022-04-14 NOTE — PROVIDER CONTACT NOTE (OTHER) - ASSESSMENT
Pt has rectal bleed and fleet enema ordered for flex sig. Is fleet enema contraindicated for rectal bleed?

## 2022-04-14 NOTE — PROVIDER CONTACT NOTE (OTHER) - SITUATION
Pt BP is 183/87
Large amount of blood and large blood clot per rectum noted upon assessment
patient removed left mitten and removed trach
Pt was ordered a fleet enema for a flex sig but is currently rectally bleeding
Pt had a BP of 183/83 Lopressor Iv push was given B rechecked 170/83.
Pt blood cultures positive for Klebsiella pneumoniae carbapenem resistant.
Pt found with mepilex in mouth, mepilex was then removed from pt's mouth.
Approximately 0615 patient was noted to have inadvertently removed bilat mitten restraint and her tracheostomy.
Patient noted with blood clots and blood from rectum
patient temperature 102.4 and blood pressure 197/94 and heart rate 130bpm
Pt BP 98/50.
Pt BP is elevated 183/83.
Upon entering pt's room, I noticed that her trach was dislodged.

## 2022-04-14 NOTE — CHART NOTE - NSCHARTNOTEFT_GEN_A_CORE
Spoke with colorectal recommended to call surgery B team given pt last had Rosales from their service  Surgery B team consulted   GI plan for sigmoidoscopy today

## 2022-04-14 NOTE — PROGRESS NOTE ADULT - SUBJECTIVE AND OBJECTIVE BOX
Follow Up:  sepsis    Interval History: pt had rectal bleeding and CTA s/o, small active rectal hemorrhage going for flex sig    ROS:    Unobtainable because of mental status            Allergies  No Known Allergies        ANTIMICROBIALS:  meropenem/vaborbactam IVPB 4 every 8 hours      OTHER MEDS:  acetaminophen     Tablet .. 650 milliGRAM(s) Oral every 6 hours PRN  amLODIPine   Tablet 10 milliGRAM(s) Oral daily  atorvastatin 40 milliGRAM(s) Oral at bedtime  carvedilol 3.125 milliGRAM(s) Oral every 12 hours  dorzolamide 2% Ophthalmic Solution 1 Drop(s) Both EYES <User Schedule>  influenza  Vaccine (HIGH DOSE) 0.7 milliLiter(s) IntraMuscular once  lactated ringers. 1000 milliLiter(s) IV Continuous <Continuous>  latanoprost 0.005% Ophthalmic Solution 1 Drop(s) Both EYES at bedtime  levETIRAcetam  IVPB 500 milliGRAM(s) IV Intermittent every 12 hours  levothyroxine 75 MICROGram(s) Oral daily  pantoprazole   Suspension 40 milliGRAM(s) Oral daily  timolol 0.5% Solution 1 Drop(s) Both EYES two times a day      Vital Signs Last 24 Hrs  T(C): 36.8 (14 Apr 2022 12:55), Max: 37.3 (13 Apr 2022 17:09)  T(F): 98.2 (14 Apr 2022 12:55), Max: 99.1 (13 Apr 2022 17:09)  HR: 76 (14 Apr 2022 12:55) (70 - 81)  BP: 132/61 (14 Apr 2022 12:55) (117/47 - 157/63)  BP(mean): --  RR: 16 (14 Apr 2022 12:55) (16 - 18)  SpO2: 100% (14 Apr 2022 12:55) (98% - 100%)    Physical Exam:  General:    NAD, non toxic  Cardio:    regular S1,S2  Respiratory:   trach  abd:   soft, BS +, not tender  :     no CVAT, no suprapubic tenderness, no mc  Musculoskeletal : no joint swelling, no edema  Skin:    no rash  vascular: no phlebitis                        8.1    7.32  )-----------( 297      ( 14 Apr 2022 10:03 )             26.1       04-14    141  |  106  |  19  ----------------------------<  106<H>  4.0   |  23  |  0.47<L>    Ca    8.5      14 Apr 2022 07:35  Phos  3.1     04-13  Mg     2.00     04-13            MICROBIOLOGY:  v  .Blood Blood  04-05-22   No Growth Final  --  --      .Blood Blood-Peripheral  04-04-22   Growth in anaerobic bottle: Klebsiella pneumoniae (Carbapenem Resistant)  See previous culture [id number]  --  Blood Culture PCR  Klepne MDRO      .Blood Blood-Peripheral  03-26-22   No Growth Final  --  --      .Blood Blood-Peripheral  03-26-22   No Growth Final  --  --                RADIOLOGY:  Images independently visualized and reviewed personally, findings as below  < from: CT Angio Abdomen and Pelvis w/ IV Cont (04.13.22 @ 22:26) >  IMPRESSION:    Small rectal active hemorrhage. Rectal wall enhancement may also reflect   into the lesion.    Thick-walled collection in the cul-de-sac and small collections along the   right paracolic gutter are unchanged.    Bladder calculi reidentified.    < end of copied text >

## 2022-04-14 NOTE — PROGRESS NOTE ADULT - ASSESSMENT
Patient is a 81F HTN, HLD, Depression, Seizures, perforated diverticulitis s/p Varela 12/25/21, washout 12/26/21 course complicated by afib w/ rvr, tracheostoym 1/7/22, PEG 1/18/22, large mca infarct on 1/30 (weakness on right side, also has lle weakness), GI bleed s/p EGD 2/1/22 presenting from [facility] rehab [facility] nursing facility with a dislodged PEG tube now s/p replacement by IR (3/30). Surgery re-consulted for lower GIB.    PLAN:    - CTA results noted: rectum distended with what appears to be hemorrhagic material, possibly from angiodysplasia  - Appreciate GI input: planning for flex sigmoidoscopy today  - Transfuse as needed  - Will follow    B Team Surgery   [id number]

## 2022-04-14 NOTE — CHART NOTE - NSCHARTNOTEFT_GEN_A_CORE
events noted w/ongoing rectal bleeding with clots   -CTA w/?angiodysplasia in rectum   -PEG feeds on hold   -Covid PCR testing   -Fleet Enema x 1 at 9:30am ordered  -NPO for Flex Sigmoidoscopy today if family agreeable events noted w/ongoing rectal bleeding with clots   -CTA w/?angiodysplasia in rectum   -PEG feeds on hold   -Covid PCR testing   -Please hang PRBC if Hbg < 7.5  -Fleet Enema x 1 at 9:30am ordered  -NPO for Flex Sigmoidoscopy today

## 2022-04-14 NOTE — PROGRESS NOTE ADULT - ASSESSMENT
81 f with HTN, HLD, Hypothyroidism, Depression, seizure d/o, RLE DVT, Cholelithiasis s/p recent Hospitalization at [facility] 12/22 -> 2/11/22 for acute perforated diverticulitis s/p Rosales 12/25, colostomy bag 12/26, hospitalization c/b abd abscess s/p IR drainage 1/3 Cx with E.coli & bacteroides and candida albicans,  trach 1/7, PEG, Acute left MCA infarct, aspiration pneumonia, sent 3/27 from rehab for dislodged PEG, initially afebrile, normal WBc, negative blood and urine cx  CT 3/27: PEG tube removed/dislodged, no evidence for pneumoperitoneum, 3.5 cm thick-walled fluid collection in the pelvic cul-de-sac, Interval slight improvement in additional small loculated fluid   collections, including significant improvement in inflammatory changes in RLQ and pelvis status post removal of surgical drain. Ill-defined hyperdensity/enhancing nodularity along the right posterior   bladder lumen, cannot exclude a neoplastic process  s/p PEG placement by IR 3/30  on 4/4 pt spiked to 102.4 with tachycardia and new leukocytosis to 16    recent perforated diverticulitis s/p huitron, c/b abd abscess s/p IR drainage, cx with E-coli, bacteroides and candida now admitted for PEG dislodgement s/p IR placement 3/30 now with new fever, tachycardia, leukocytosis, sepsis due to KPC klebsiella bacteremia, S to vabomere, acyvaz, tigecyline, minocycline and genta, repeat cx 4/5 negative  CT: Dependent high attenuation within the urinary bladder, which may represent small stones and/or debris. An underlying mass cannot be excluded. Thick-walled collection in the cul-de-sac and small collections along the right paracolic gutter, not significantly changed since 3/27/2022  IR stated that abscess are too small to be drained  rectal bleed, CTA showed small active rectal bleed, unchanged collections going for flex sig  * c/w  vabomere, blood cx cleared 4/5, so day 10  * will have to do a 2 week course of antibiotics until 4/19 as the abscess was not drained  * pt was referred to hospice and now deciding for comfort care, so if within GOC will continue antibiotics  * monitor CBC/diff and renal function      The above assessment and plan was discussed with the primary team    Ivy Niknam, MD  contact on teams  After 5pm and on weekends call [phone]

## 2022-04-14 NOTE — PROGRESS NOTE ADULT - SUBJECTIVE AND OBJECTIVE BOX
Date of service: 4/13/22    chief complaint: dislodged PEG tube     extended hpi: 80 yo F with PMHx HTN, HLD, Hypothyroidism, Depression, seizure d/o, RLE DVT, Cholelithiasis s/p recent Hospitalization in [facility] from 12/22 -> 2/11/22 for acute perforated diverticulitis s/p Rosales 12/25, colostomy bag 12/26, hospitalization c/b IR drainage for abdominal abscess 1/3 Cx grew e.coli & bacteriodes, also required Mechanical Ventilation for Acute Respiratory Failure with Tracheostomy done 1/7.  PEG done 1/18, developed bleeding through ostomy for which she was transfused 1/28, EGD on 2/1 showed gastritis. Developed fevers on 1/30 and CT's showed Acute left MCA infarct- could be 2/2 to carotid occlusion vs afib, new RLL aspiration pneumonia, seen by ID, completed antibiotics, seen by VascSx, not surgical candidate. Pt aphasic sent from [facility] for dislodged Peg tube, after staff noted pt has blood to her abdomen.     S: events noted, denies pain or SOB, more alert today    Review of Systems:   Constitutional: [ ] fevers, [ ] chills.   Skin: [ ] dry skin. [ ] rashes.  Psychiatric: [ ] depression, [ ] anxiety.   Gastrointestinal: [ ] BRBPR, [ ] melena.   Neurological: [ ] confusion. [ ] seizures. [ ] shuffling gait.   Ears,Nose,Mouth and Throat: [ ] ear pain [ ] sore throat.   Eyes: [ ] diplopia.   Respiratory: [ ] hemoptysis. [ ] shortness of breath  Cardiovascular: See HPI above  Hematologic/Lymphatic: [ ] anemia. [ ] painful nodes. [ ] prolonged bleeding.   Genitourinary: [ ] hematuria. [ ] flank pain.   Endocrine: [ ] significant change in weight. [ ] intolerance to heat and cold.     Review of systems [x ] otherwise negative, [ ] otherwise unable to obtain    FH: no family history of sudden cardiac death in first degree relatives    SH: [ ] tobacco, [ ] alcohol, [ ] drugs    acetaminophen     Tablet .. 650 milliGRAM(s) Oral every 6 hours PRN  amLODIPine   Tablet 10 milliGRAM(s) Oral daily  atorvastatin 40 milliGRAM(s) Oral at bedtime  carvedilol 3.125 milliGRAM(s) Oral every 12 hours  dorzolamide 2% Ophthalmic Solution 1 Drop(s) Both EYES <User Schedule>  influenza  Vaccine (HIGH DOSE) 0.7 milliLiter(s) IntraMuscular once  lactated ringers. 1000 milliLiter(s) IV Continuous <Continuous>  latanoprost 0.005% Ophthalmic Solution 1 Drop(s) Both EYES at bedtime  levETIRAcetam  IVPB 500 milliGRAM(s) IV Intermittent every 12 hours  levothyroxine 75 MICROGram(s) Oral daily  meropenem/vaborbactam IVPB 4 Gram(s) IV Intermittent every 8 hours  pantoprazole   Suspension 40 milliGRAM(s) Oral daily  timolol 0.5% Solution 1 Drop(s) Both EYES two times a day                          8.1    7.32  )-----------( 297      ( 14 Apr 2022 10:03 )             26.1       141  |  106  |  19  ----------------------------<  106<H>  4.0   |  23  |  0.47<L>    Ca    8.5      14 Apr 2022 07:35  Phos  3.1     04-13  Mg     2.00     04-13    T(C): 36.8 (04-14-22 @ 10:00), Max: 37.7 (04-13-22 @ 13:37)  HR: 76 (04-14-22 @ 10:00) (68 - 81)  BP: 132/61 (04-14-22 @ 10:00) (117/47 - 157/63)  RR: 100 (04-14-22 @ 10:00) (17 - 100)  SpO2: 100% (04-14-22 @ 04:31) (98% - 100%)  Wt(kg): --    General: no acute distress   Head: Normocephalic and atraumatic.   Neck: No JVD. No bruits. Supple. Does not appear to be enlarged.   Cardiovascular: + S1,S2 ; RRR Soft systolic murmur at the left lower sternal border. No rubs noted.    Lungs: CTA b/l. No rhonchi, rales or wheezes.   Abdomen: + BS, soft. Non tender. Non distended. No rebound. No guarding.   Extremities: No clubbing/cyanosis/edema.   Psychiatric: unable to assess     < from: TTE Echo Complete w/o Contrast w/ Doppler (02.04.22 @ 15:28) >  Summary:   1. Left ventricular ejection fraction, by visual estimation, is 60 to   65%.   2. Normal global left ventricular systolic function.   3. Mild mitral valve regurgitation.   4.Mild thickening of the anterior and posterior mitral valve leaflets.   5. Mild tricuspid regurgitation.   6. Mild to moderate aortic regurgitation.   7. Sclerotic aortic valve with normal opening.  [id number] Colin Butcher MD, [facility]  Electronically signed on 2/5/2022 at 3:03:14 PM  < end of copied text >      < from: CT Angio Head w/ IV Cont (04.04.22 @ 14:19) >  IMPRESSIONS:  CTA neck:  1. Short segment 70% stenosis in diameter at the proximal aspect of the   right internal carotid artery.  2. The left internal carotid artery is occluded at a distance of   approximately 7 mm beyond the origin of this vessel.  3. Bilateral vertebral arteries are appreciated. Left vertebral artery is   dominant.    CTA COW:  1. There is no flow within the petrous, precavernous or cavernous   portions of the left internal carotid artery.  2. Flow is appreciated within the left middle cerebral artery,   significantly decreased in caliber when compared with the right middle   cerebral artery. Furthermore, only an attenuated/hypoplastic flow pattern   is appreciated in association with the left A1 segment as well as the   left posterior communicating artery.  3. Hypoplastic/attenuated pattern of flow within the left A 2 segment of   the anterior cerebral artery.  < end of copied text >    < from: CT Head No Cont (04.04.22 @ 14:19) >  IMPRESSION:    1. Evolution of previously visualized left MCA infarct, which appeared   acute at time of prior study dated 2/5/2022. Cannot exclude an acute on   chronic process. Consider further evaluation via MR imaging as clinically   indicated, provided the patient has no contraindications.    2. Findings also include a new decreased attenuation involving the left   brachium pontis and left cerebellum, possibly representing an acute   ischemic event in this location. MR imaging to include DWI and ADC   mapping techniques may be helpful for further characterization of this   finding.    3. No acute intracranial hemorrhage.    4. Paranasal sinus inflammatory changes, as described above. Cannot   exclude sinusitis.    5. Persistent fluid within right-sided mastoid air cells. Cannot exclude   mastoiditis.    < end of copied text >        A/P:  80 yo F with PMHx HTN, HLD, Hypothyroidism, Depression, seizure d/o, RLE DVT, Cholelithiasis s/p recent Hospitalization in [facility] from 12/22 -> 2/11/22 for acute perforated diverticulitis s/p Rosales 12/25, colostomy bag 12/26, hospitalization c/b IR drainage for abdominal abscess 1/3 Cx grew e.coli & bacteriodes, also required Mechanical Ventilation for Acute Respiratory Failure with Tracheostomy done 1/7.  PEG done 1/18, developed bleeding through ostomy for which she was transfused 1/28, EGD on 2/1 showed gastritis. Developed fevers on 1/30 and CT's showed Acute left MCA infarct- could be 2/2 to carotid occlusion vs afib, new RLL aspiration pneumonia, seen by ID, completed antibiotics, seen by VascSx, not surgical candidate. Pt aphasic sent from [facility] for dislodged Peg tube, after staff noted pt has blood to her abdomen.     -prior chart reviewed (Jan-Feb 2022) Pt with CVA and found with AFib and Carotid occlusion.  AC held initially while monitoring for hemorrhagic conversion and then in the setting of GI bleed req PRBCs.  She was continued on DAPT.  -now with GIB, consider holding APT and AC and re eval GOC  -given elevated [id number], recommend lifelong AC for AF if able to tolerate--Eliquis started - monitor h/h   -on antibiotics for klebsiella bacteremia.  -evolving stroke, worsening mental status - neuro eval noted - plans for hospice care as goals of care now toward comfort, and she is DNR.    -not in clinical CHF  -RCRI score is 1-2, optimized from CV perspective for planned procedure (sigmoidoscopy)

## 2022-04-14 NOTE — PROVIDER CONTACT NOTE (OTHER) - BACKGROUND
82 yo F w/ PMHx of perforated diverticulitis s/p Rosales procedure presenting with dislodged PEG TUBE.
Rounded on patient approximately 0500, patient was restless pulling at lines all night despite secured mitten restraints. Patient was found approximately 0615, out of mittens with trach out of stoma
Pt Chalino Gray 82 y/o F admitted for colostomy placement. Pt now found to have GI Bleed. Pt on restraints due to pulling out trach. Pt has past medical history of Seizures, Gastritis and TIA.
Patient admitted for PEG dislodgement
Pt admitted for dislodged PEG TUBE and is awaiting placement.
Pt admitted for dislodged peg. Pt s/p hartsman procedure and trach prior to this  hospital admission/
gastrostomy malfunction
s/p Rosales's procedure, colostomy  patient confused, on mittens as per order for removing IV and tubing. Disruption in medical care
Pt admitted for dislodged peg.
Pt admitted for dislodged peg. Pt is a trach. Trach was found dislodged today, ENT replaced it.
Pt s/p trach and Hartsman procedure. Pt p/w dislodged peg, Pt is awaiting peg placement.
pt here for GI surgery
Pt admitted for dislodged peg.

## 2022-04-14 NOTE — PROVIDER CONTACT NOTE (OTHER) - REASON
Trach removed by patient
patient temperature 102.4 and blood pressure 197/94 and heart rate 130bpm
Elevated BP
Pt getting fleet enema but currently rectally bleeding
Lab report for Blood culture
Large amount of blood and large blood clot per rectum noted upon assessment
Patient inadvertently removed tracheostomy
Patient noted with blood clots and blood from rectum
Pt BP is 98/50
Pt trach is dislodged.
Pt noted to have elevated BP
Pt needs to be on restraints.
Pt with elevated /87

## 2022-04-14 NOTE — PROGRESS NOTE ADULT - SUBJECTIVE AND OBJECTIVE BOX
[facility]   MD ELOISE ALVARADO MD, PA INJUNG KO NP    TEL:  OFFICE: [phone]    From 5pm-7am Answering Service [phone]    -- RENAL FOLLOW UP NOTE ---Date of Service 04-14-22 @ 10:55    Patient is a 81y old  Female who presents with a chief complaint of     Patient seen and examined at bedside.     VITALS:  T(F): 98.5 (04-14-22 @ 04:31), Max: 99.8 (04-13-22 @ 13:37)  HR: 81 (04-14-22 @ 04:31)  BP: 157/63 (04-14-22 @ 04:31)  RR: 18 (04-14-22 @ 04:31)  SpO2: 100% (04-14-22 @ 04:31)  Wt(kg): --    04-13 @ 07:01  -  04-14 @ 07:00  --------------------------------------------------------  IN: 2880 mL / OUT: 700 mL / NET: 2180 mL    04-14 @ 07:01  -  04-14 @ 10:55  --------------------------------------------------------  IN: 0 mL / OUT: 250 mL / NET: -250 mL        PHYSICAL EXAM:  Constitutional: NAD  Neck: No JVD  Respiratory: CTAB, no wheezes, rales or rhonchi  Cardiovascular: S1, S2, RRR  Gastrointestinal: BS+, soft, NT/ND  Extremities: No peripheral edema    Hospital Medications:   MEDICATIONS  (STANDING):  amLODIPine   Tablet 10 milliGRAM(s) Oral daily  atorvastatin 40 milliGRAM(s) Oral at bedtime  carvedilol 3.125 milliGRAM(s) Oral every 12 hours  dorzolamide 2% Ophthalmic Solution 1 Drop(s) Both EYES <User Schedule>  influenza  Vaccine (HIGH DOSE) 0.7 milliLiter(s) IntraMuscular once  lactated ringers. 1000 milliLiter(s) (75 mL/Hr) IV Continuous <Continuous>  latanoprost 0.005% Ophthalmic Solution 1 Drop(s) Both EYES at bedtime  levETIRAcetam  IVPB 500 milliGRAM(s) IV Intermittent every 12 hours  levothyroxine 75 MICROGram(s) Oral daily  meropenem/vaborbactam IVPB 4 Gram(s) IV Intermittent every 8 hours  pantoprazole   Suspension 40 milliGRAM(s) Oral daily  timolol 0.5% Solution 1 Drop(s) Both EYES two times a day      LABS:  04-14    141  |  106  |  19  ----------------------------<  106<H>  4.0   |  23  |  0.47<L>    Ca    8.5      14 Apr 2022 07:35  Phos  3.1     04-13  Mg     2.00     04-13      Creatinine Trend: 0.47 <--, 0.45 <--, 0.42 <--, 0.40 <--, 0.38 <--, 0.38 <--                                8.1    7.32  )-----------( 297      ( 14 Apr 2022 10:03 )             26.1     Urine Studies:  Urinalysis - [04-04-22 @ 01:59]      Color Light Yellow / Appearance Clear / SG 1.012 / pH 7.5      Gluc Negative / Ketone Negative  / Bili Negative / Urobili <2 mg/dL       Blood Negative / Protein Trace / Leuk Est Negative / Nitrite Negative      RBC 6 / WBC 1 / Hyaline  / Gran  / Sq Epi  / Non Sq Epi 1 / Bacteria Negative      PTH -- (Ca --)      [03-30-22 @ 07:35]   32  TSH 7.65      [04-05-22 @ 07:40]  Lipid: chol 158, , HDL 25, LDL --      [02-03-22 @ 15:22]      Syphilis Screen (Treponema Pallidum Ab) Negative      [04-05-22 @ 09:41]    RADIOLOGY & ADDITIONAL STUDIES:

## 2022-04-14 NOTE — PROGRESS NOTE ADULT - ASSESSMENT
80 yo F w/ PMHx of perforated diverticulitis s/p Rosales procedure, colostomy, abdominal abscess with wound s/p IR drainage, Respiratory Failure s/p Trache, CVA w/ functional quadriplegia, aphasia, dysphagia s/p PEG (on Jevity 1.5 @ 70cchr x 18 hrs, & water flushes 325ml q 6 hrs, Afib, GIB, HTN, HLD, Seizure d/o, Hypothyroidism, Glaucoma, Depression p/w PEG tube dislodgement for unknown amount of time     Problem/Plan - 1:  ·  Problem: Sepsis sec to KPC K Pneumoniae Bacteremia .   ·  Plan:  IV Abxs per ID till 4/19 .   ID help appreciated.   < from: CT Abdomen and Pelvis w/ IV Cont (04.04.22 @ 14:24) >  IMPRESSION:  Trace bilateral pleural effusions.    Improved aeration in the lower lobes bilaterally since 1/30/2022.   Nonspecific patchy bilateral groundglass and linear opacities are noted.    Enhancement of the wall of the cecum and ascending colon with mild   infiltration of the adjacent fat is similar in appearance to prior study   dated 3/27/2022. An inflammatory or infectious process is considered.    Dependent high attenuation within the urinary bladder, which may   represent small stones and/or debris. An underlying mass cannot be   excluded.    Thick-walled collection in the cul-de-sac and small collections along the   right paracolic gutter, not significantly changed since 3/27/2022.    < end of copied text >     Problem/Plan - 2:  ·  Problem: Metabolic Encephalopathy    ·  Plan: Likely sec to Infection  . Resolved.    Neurology helping  and  EEG showing no Seizure activity  .     Problem/Plan - 3:  ·  Problem: Stroke.   ·  Plan: Holding ASA, Plavix as actively bleeding.Will restart ASA if CTA okay and bleeding stops .    Continue  statin Via PEG tube .     Problem/Plan - 4:  ·  Problem: Chronic respiratory failure with hypoxia.   ·  Plan: Pt on 2l NC via Trache collar.   S/P replacement of Tracheostomy Tube.       Problem/Plan - 5:  ·  Problem: Diverticulitis of intestine with perforation and abscess without bleeding.   ·  Plan: Surgery c/s in chart- small fluid collection but nontoxic, if drainage needed would be done by IR.  Wound care eval.     Problem/Plan - 6:  ·  Problem: History of atrial fibrillation.   ·  Plan: Cards helping and now off AC .   D/W Son and okay to start.      Problem/Plan - 7:  ·  Problem: Seizure disorder.   ·  Plan: continue levetiracetam bid.     Problem/Plan - 8:  ·  Problem: DVT, lower extremity.   ·  Plan: ?Subacute vs Chronic, Off AC.      Problem/Plan - 9:  ·  Problem: HTN (hypertension).   ·  Plan: Resuming carvedilol & amlodipine as PEG replaced.     Problem/Plan - 10:  ·  Problem: Hypothyroidism.   ·  Plan; Resume Levothyroxine when PEG replaced.     Problem/Plan - 11:  ·  Problem: Glaucoma.   ·  Plan: continue Timolol, Dorzolamide, Latanoprost.      Problem/Plan - 12:  ·  Problem: PEG tube malfunction.   ·  Plan:  S/P  PEG . TF started.      Problem/Plan - 13:  ·  Problem: Hypokalemia.   ·  Plan: Corrected.      Problem/Plan - 14:  ·  Problem: GI bleed. .   ·  Plan: PPI . GI following .     PRBC for HGb 7.5 G or less.     < from: CT Angio Abdomen and Pelvis w/ IV Cont (04.13.22 @ 22:26) >  IMPRESSION:    Small rectal active hemorrhage. Rectal wall enhancement may also reflect   into the lesion.    Thick-walled collection in the cul-de-sac and small collections along the   right paracolic gutter are unchanged.    Bladder calculi reidentified.    Findings discussed with ROSALINE Harrison by Dr. Martina Crystal  on 4/13/2022   at 11:35 PM with readback.    < end of copied text >    Awaiting Flex Sigmoidoscopy.   Disposition : DC  planning on hold.   Over all prognosis very poor.

## 2022-04-14 NOTE — PROGRESS NOTE ADULT - SUBJECTIVE AND OBJECTIVE BOX
Date of Service  : 04-14-22     INTERVAL HPI/OVERNIGHT EVENTS: still bleeding.   Vital Signs Last 24 Hrs  T(C): 36.8 (14 Apr 2022 12:55), Max: 37.3 (13 Apr 2022 17:09)  T(F): 98.2 (14 Apr 2022 12:55), Max: 99.1 (13 Apr 2022 17:09)  HR: 76 (14 Apr 2022 12:55) (70 - 81)  BP: 132/61 (14 Apr 2022 12:55) (117/47 - 157/63)  BP(mean): --  RR: 16 (14 Apr 2022 12:55) (16 - 18)  SpO2: 100% (14 Apr 2022 12:55) (98% - 100%)  I&O's Summary    13 Apr 2022 07:01  -  14 Apr 2022 07:00  --------------------------------------------------------  IN: 2880 mL / OUT: 700 mL / NET: 2180 mL    14 Apr 2022 07:01  -  14 Apr 2022 13:53  --------------------------------------------------------  IN: 0 mL / OUT: 250 mL / NET: -250 mL      MEDICATIONS  (STANDING):  amLODIPine   Tablet 10 milliGRAM(s) Oral daily  atorvastatin 40 milliGRAM(s) Oral at bedtime  carvedilol 3.125 milliGRAM(s) Oral every 12 hours  dorzolamide 2% Ophthalmic Solution 1 Drop(s) Both EYES <User Schedule>  influenza  Vaccine (HIGH DOSE) 0.7 milliLiter(s) IntraMuscular once  lactated ringers. 1000 milliLiter(s) (75 mL/Hr) IV Continuous <Continuous>  latanoprost 0.005% Ophthalmic Solution 1 Drop(s) Both EYES at bedtime  levETIRAcetam  IVPB 500 milliGRAM(s) IV Intermittent every 12 hours  levothyroxine 75 MICROGram(s) Oral daily  meropenem/vaborbactam IVPB 4 Gram(s) IV Intermittent every 8 hours  pantoprazole   Suspension 40 milliGRAM(s) Oral daily  timolol 0.5% Solution 1 Drop(s) Both EYES two times a day    MEDICATIONS  (PRN):  acetaminophen     Tablet .. 650 milliGRAM(s) Oral every 6 hours PRN Mild Pain (1 - 3), Moderate Pain (4 - 6)    LABS:                        8.1    7.32  )-----------( 297      ( 14 Apr 2022 10:03 )             26.1     04-14    141  |  106  |  19  ----------------------------<  106<H>  4.0   |  23  |  0.47<L>    Ca    8.5      14 Apr 2022 07:35  Phos  3.1     04-13  Mg     2.00     04-13      PT/INR - ( 14 Apr 2022 07:35 )   PT: 13.3 sec;   INR: 1.14 ratio         PTT - ( 14 Apr 2022 07:35 )  PTT:24.0 sec    CAPILLARY BLOOD GLUCOSE      POCT Blood Glucose.: 102 mg/dL (14 Apr 2022 12:04)          Consultant(s) Notes Reviewed:  [x ] YES  [ ] NO    PHYSICAL EXAM:  GENERAL: NAD, well-groomed, well-developed,not in any distress ,  HEAD:  Atraumatic, Normocephalic  NECK: Supple, No JVD, Normal thyroid  NERVOUS SYSTEM:  Alert  CHEST/LUNG: Good air entry bilateral with no  rales, rhonchi, wheezing, or rubs  HEART: Regular rate and rhythm; No murmurs, rubs, or gallops  ABDOMEN: Soft, Nontender, Nondistended; Bowel sounds present  EXTREMITIES:  2+ Peripheral Pulses, No clubbing, cyanosis, or edema    Care Discussed with Consultants/Other Providers [ x] YES  [ ] NO

## 2022-04-14 NOTE — PROVIDER CONTACT NOTE (OTHER) - DATE AND TIME:
04-Apr-2022 18:00
06-Apr-2022 15:00
28-Mar-2022 14:48
06-Apr-2022 19:50
07-Apr-2022 13:00
11-Apr-2022 06:22
13-Apr-2022 11:00
12-Apr-2022 19:50
28-Mar-2022 10:30
14-Apr-2022 09:15
27-Mar-2022 19:20
13-Apr-2022 23:05
04-Apr-2022 00:45

## 2022-04-14 NOTE — PROGRESS NOTE ADULT - ASSESSMENT
82 yo F with PMHx HTN, HLD, Hypothyroidism, Depression, seizure d/o, RLE DVT, Cholelithiasis s/p recent Hospitalization in [facility] from 12/22 -> 2/11/22 for acute perforated diverticulitis s/p Rosales 12/25, colostomy bag 12/26, hospitalization c/b IR drainage for abdominal abscess 1/3 Cx grew e.coli & bacteriodes, also required Mechanical Ventilation for Acute Respiratory Failure with Tracheostomy done 1/7.  PEG done 1/18, developed bleeding through ostomy, EGD on 2/1 showed gastritis. T's showed Acute left MCA infarct- could be 2/2 to carotid occlusion vs afib, new RLL aspiration pneumonia, seen by ID, completed antibiotics, seen by VascSx, not surgical candidate. Pt aphasic sent from [facility] for dislodged Peg tube, after staff noted pt has blood to her abdomen. Nephrology consulted for Hypokalemia.     A/P  Hypokalemia   likely 2/2 GI loss   s/p KCL 40meq x2   corrected   monitor input and output closely   monitor K closely    Hypomagnesia   replete as needed  monitor Mg     Hypophosphatemia   replete as needed  monitor     Hypocalcemia   corrected Ca WNL   Monitor    Acidosis with anion gap   etiology?  improved   monitor    HTN  acceptable   monitor BP closely

## 2022-04-14 NOTE — PROVIDER CONTACT NOTE (OTHER) - NAME OF MD/NP/PA/DO NOTIFIED:
Sofi Ren
Tommy Matias
[place] ([facility]) [id number]
[facility] [id number]
Leigh Faulkner
Antoine ([id number])
Fay Madsen
Nohemi Suarez, [id number]
Tirso Calix)
Tirso Calix)
ACP
Jessica Ramirez
Sameera Galvan, [facility] [address]

## 2022-04-15 LAB
ANION GAP SERPL CALC-SCNC: 11 MMOL/L — SIGNIFICANT CHANGE UP (ref 7–14)
BUN SERPL-MCNC: 17 MG/DL — SIGNIFICANT CHANGE UP (ref 7–23)
CALCIUM SERPL-MCNC: 8.8 MG/DL — SIGNIFICANT CHANGE UP (ref 8.4–10.5)
CHLORIDE SERPL-SCNC: 109 MMOL/L — HIGH (ref 98–107)
CO2 SERPL-SCNC: 25 MMOL/L — SIGNIFICANT CHANGE UP (ref 22–31)
CREAT SERPL-MCNC: 0.45 MG/DL — LOW (ref 0.5–1.3)
EGFR: 98 ML/MIN/1.73M2 — SIGNIFICANT CHANGE UP
GLUCOSE BLDC GLUCOMTR-MCNC: 121 MG/DL — HIGH (ref 70–99)
GLUCOSE BLDC GLUCOMTR-MCNC: 128 MG/DL — HIGH (ref 70–99)
GLUCOSE BLDC GLUCOMTR-MCNC: 137 MG/DL — HIGH (ref 70–99)
GLUCOSE SERPL-MCNC: 126 MG/DL — HIGH (ref 70–99)
HCT VFR BLD CALC: 29.1 % — LOW (ref 34.5–45)
HGB BLD-MCNC: 9.3 G/DL — LOW (ref 11.5–15.5)
MAGNESIUM SERPL-MCNC: 2 MG/DL — SIGNIFICANT CHANGE UP (ref 1.6–2.6)
MCHC RBC-ENTMCNC: 27.8 PG — SIGNIFICANT CHANGE UP (ref 27–34)
MCHC RBC-ENTMCNC: 32 GM/DL — SIGNIFICANT CHANGE UP (ref 32–36)
MCV RBC AUTO: 87.1 FL — SIGNIFICANT CHANGE UP (ref 80–100)
NRBC # BLD: 0 /100 WBCS — SIGNIFICANT CHANGE UP
NRBC # FLD: 0 K/UL — SIGNIFICANT CHANGE UP
PHOSPHATE SERPL-MCNC: 2.9 MG/DL — SIGNIFICANT CHANGE UP (ref 2.5–4.5)
PLATELET # BLD AUTO: 300 K/UL — SIGNIFICANT CHANGE UP (ref 150–400)
POTASSIUM SERPL-MCNC: 3.6 MMOL/L — SIGNIFICANT CHANGE UP (ref 3.5–5.3)
POTASSIUM SERPL-SCNC: 3.6 MMOL/L — SIGNIFICANT CHANGE UP (ref 3.5–5.3)
RBC # BLD: 3.34 M/UL — LOW (ref 3.8–5.2)
RBC # FLD: 16.3 % — HIGH (ref 10.3–14.5)
SODIUM SERPL-SCNC: 145 MMOL/L — SIGNIFICANT CHANGE UP (ref 135–145)
WBC # BLD: 7.52 K/UL — SIGNIFICANT CHANGE UP (ref 3.8–10.5)
WBC # FLD AUTO: 7.52 K/UL — SIGNIFICANT CHANGE UP (ref 3.8–10.5)

## 2022-04-15 PROCEDURE — 99232 SBSQ HOSP IP/OBS MODERATE 35: CPT | Mod: GC

## 2022-04-15 PROCEDURE — 93010 ELECTROCARDIOGRAM REPORT: CPT

## 2022-04-15 RX ORDER — ASPIRIN/CALCIUM CARB/MAGNESIUM 324 MG
81 TABLET ORAL DAILY
Refills: 0 | Status: DISCONTINUED | OUTPATIENT
Start: 2022-04-15 | End: 2022-04-22

## 2022-04-15 RX ORDER — OLANZAPINE 15 MG/1
2.5 TABLET, FILM COATED ORAL EVERY 8 HOURS
Refills: 0 | Status: DISCONTINUED | OUTPATIENT
Start: 2022-04-15 | End: 2022-04-22

## 2022-04-15 RX ORDER — LANOLIN ALCOHOL/MO/W.PET/CERES
3 CREAM (GRAM) TOPICAL AT BEDTIME
Refills: 0 | Status: DISCONTINUED | OUTPATIENT
Start: 2022-04-15 | End: 2022-04-18

## 2022-04-15 RX ORDER — OLANZAPINE 15 MG/1
2.5 TABLET, FILM COATED ORAL EVERY 8 HOURS
Refills: 0 | Status: DISCONTINUED | OUTPATIENT
Start: 2022-04-15 | End: 2022-04-15

## 2022-04-15 RX ORDER — POTASSIUM CHLORIDE 20 MEQ
40 PACKET (EA) ORAL ONCE
Refills: 0 | Status: COMPLETED | OUTPATIENT
Start: 2022-04-15 | End: 2022-04-15

## 2022-04-15 RX ADMIN — POLYETHYLENE GLYCOL 3350 17 GRAM(S): 17 POWDER, FOR SOLUTION ORAL at 10:25

## 2022-04-15 RX ADMIN — MEROPENEM-VABORBACTAM 83.33 GRAM(S): 1; 1 INJECTION, POWDER, FOR SOLUTION INTRAVENOUS at 19:38

## 2022-04-15 RX ADMIN — DORZOLAMIDE HYDROCHLORIDE 1 DROP(S): 20 SOLUTION/ DROPS OPHTHALMIC at 18:24

## 2022-04-15 RX ADMIN — PANTOPRAZOLE SODIUM 40 MILLIGRAM(S): 20 TABLET, DELAYED RELEASE ORAL at 10:24

## 2022-04-15 RX ADMIN — Medication 75 MICROGRAM(S): at 07:24

## 2022-04-15 RX ADMIN — MEROPENEM-VABORBACTAM 83.33 GRAM(S): 1; 1 INJECTION, POWDER, FOR SOLUTION INTRAVENOUS at 02:10

## 2022-04-15 RX ADMIN — AMLODIPINE BESYLATE 10 MILLIGRAM(S): 2.5 TABLET ORAL at 07:24

## 2022-04-15 RX ADMIN — Medication 40 MILLIEQUIVALENT(S): at 10:25

## 2022-04-15 RX ADMIN — ATORVASTATIN CALCIUM 40 MILLIGRAM(S): 80 TABLET, FILM COATED ORAL at 22:18

## 2022-04-15 RX ADMIN — Medication 3 MILLIGRAM(S): at 22:19

## 2022-04-15 RX ADMIN — MEROPENEM-VABORBACTAM 83.33 GRAM(S): 1; 1 INJECTION, POWDER, FOR SOLUTION INTRAVENOUS at 12:50

## 2022-04-15 RX ADMIN — Medication 1 DROP(S): at 18:24

## 2022-04-15 RX ADMIN — CARVEDILOL PHOSPHATE 3.12 MILLIGRAM(S): 80 CAPSULE, EXTENDED RELEASE ORAL at 07:24

## 2022-04-15 RX ADMIN — Medication 1 DROP(S): at 07:23

## 2022-04-15 RX ADMIN — LEVETIRACETAM 400 MILLIGRAM(S): 250 TABLET, FILM COATED ORAL at 10:24

## 2022-04-15 RX ADMIN — DORZOLAMIDE HYDROCHLORIDE 1 DROP(S): 20 SOLUTION/ DROPS OPHTHALMIC at 10:24

## 2022-04-15 RX ADMIN — Medication 81 MILLIGRAM(S): at 10:24

## 2022-04-15 RX ADMIN — LATANOPROST 1 DROP(S): 0.05 SOLUTION/ DROPS OPHTHALMIC; TOPICAL at 22:07

## 2022-04-15 RX ADMIN — CARVEDILOL PHOSPHATE 3.12 MILLIGRAM(S): 80 CAPSULE, EXTENDED RELEASE ORAL at 18:25

## 2022-04-15 RX ADMIN — LEVETIRACETAM 400 MILLIGRAM(S): 250 TABLET, FILM COATED ORAL at 22:07

## 2022-04-15 NOTE — CHART NOTE - NSCHARTNOTEFT_GEN_A_CORE
Pt had pulled out trach X2 this admission. Spoke with attending pt unable to be dispo with restraint, psy called for behavior control to wean off restraint. Spoke with pt's Son Mr Lozano who agreed with plan     Psy called will f/u recommendation

## 2022-04-15 NOTE — PROGRESS NOTE ADULT - ASSESSMENT
81 f with HTN, HLD, Hypothyroidism, Depression, seizure d/o, RLE DVT, Cholelithiasis s/p recent Hospitalization at [facility] 12/22 -> 2/11/22 for acute perforated diverticulitis s/p Rosales 12/25, colostomy bag 12/26, hospitalization c/b abd abscess s/p IR drainage 1/3 Cx with E.coli & bacteroides and candida albicans,  trach 1/7, PEG, Acute left MCA infarct, aspiration pneumonia, sent 3/27 from rehab for dislodged PEG, initially afebrile, normal WBc, negative blood and urine cx  CT 3/27: PEG tube removed/dislodged, no evidence for pneumoperitoneum, 3.5 cm thick-walled fluid collection in the pelvic cul-de-sac, Interval slight improvement in additional small loculated fluid   collections, including significant improvement in inflammatory changes in RLQ and pelvis status post removal of surgical drain. Ill-defined hyperdensity/enhancing nodularity along the right posterior   bladder lumen, cannot exclude a neoplastic process  s/p PEG placement by IR 3/30  on 4/4 pt spiked to 102.4 with tachycardia and new leukocytosis to 16    recent perforated diverticulitis s/p huitron, c/b abd abscess s/p IR drainage, cx with E-coli, bacteroides and candida now admitted for PEG dislodgement s/p IR placement 3/30 now with new fever, tachycardia, leukocytosis, sepsis due to KPC klebsiella bacteremia, S to vabomere, acyvaz, tigecyline, minocycline and genta, repeat cx 4/5 negative  CT: Dependent high attenuation within the urinary bladder, which may represent small stones and/or debris. An underlying mass cannot be excluded. Thick-walled collection in the cul-de-sac and small collections along the right paracolic gutter, not significantly changed since 3/27/2022  IR stated that abscess are too small to be drained  rectal bleed, CTA showed small active rectal bleed, unchanged collections, s/p flex sig 4/14, bleeding vessel was cipped  * c/w  vabomere, blood cx cleared 4/5, so day 11  * will have to do a 2 week course of antibiotics until 4/19 as the abscess was not drained  * pt was referred to hospice and now deciding for comfort care, so if within GOC will continue antibiotics  * monitor CBC/diff and renal function      The above assessment and plan was discussed with the primary team    Ivy Rose MD  contact on teams  After 5pm and on weekends call [phone]

## 2022-04-15 NOTE — CHART NOTE - NSCHARTNOTEFT_GEN_A_CORE
Chart reviewed.  Flex sig results noted. Bleeding vessel clipped.  No acute general surgery intervention indicated at this time.    Will sign off. Please call back with any questions or concerns.    B Team Surgery   [id number] Chart reviewed.  Flex sig results noted. Bleeding vessel clipped in the rectum.  No acute general surgery intervention indicated at this time.    Will sign off. Please call back with any questions or concerns.    B Team Surgery   [id number]

## 2022-04-15 NOTE — PROGRESS NOTE ADULT - SUBJECTIVE AND OBJECTIVE BOX
CARDIOLOGY ATTENDING    trached, non-verbal, but alert and comfortable, ROS Unable to be obtained      DATE OF SERVICE - 04-15-22     Review of Systems:   Constitutional: [ ] fevers, [ ] chills.   Skin: [ ] dry skin. [ ] rashes.  Psychiatric: [ ] depression, [ ] anxiety.   Gastrointestinal: [ ] BRBPR, [ ] melena.   Neurological: [ ] confusion. [ ] seizures. [ ] shuffling gait.   Ears,Nose,Mouth and Throat: [ ] ear pain [ ] sore throat.   Eyes: [ ] diplopia.   Respiratory: [ ] hemoptysis. [ ] shortness of breath  Cardiovascular: See HPI above  Hematologic/Lymphatic: [ ] anemia. [ ] painful nodes. [ ] prolonged bleeding.   Genitourinary: [ ] hematuria. [ ] flank pain.   Endocrine: [ ] significant change in weight. [ ] intolerance to heat and cold.     Review of systems [ ] otherwise negative, [x] otherwise unable to obtain    FH: no family history of sudden cardiac death in first degree relatives    SH: [ ] tobacco, [ ] alcohol, [ ] drugs    acetaminophen     Tablet .. 650 milliGRAM(s) Oral every 6 hours PRN  amLODIPine   Tablet 10 milliGRAM(s) Oral daily  atorvastatin 40 milliGRAM(s) Oral at bedtime  carvedilol 3.125 milliGRAM(s) Oral every 12 hours  dorzolamide 2% Ophthalmic Solution 1 Drop(s) Both EYES <User Schedule>  influenza  Vaccine (HIGH DOSE) 0.7 milliLiter(s) IntraMuscular once  lactated ringers. 1000 milliLiter(s) IV Continuous <Continuous>  latanoprost 0.005% Ophthalmic Solution 1 Drop(s) Both EYES at bedtime  levETIRAcetam  IVPB 500 milliGRAM(s) IV Intermittent every 12 hours  levothyroxine 75 MICROGram(s) Oral daily  meropenem/vaborbactam IVPB 4 Gram(s) IV Intermittent every 8 hours  pantoprazole   Suspension 40 milliGRAM(s) Oral daily  polyethylene glycol 3350 17 Gram(s) Oral daily  timolol 0.5% Solution 1 Drop(s) Both EYES two times a day                            9.3    7.52  )-----------( 300      ( 15 Apr 2022 06:28 )             29.1       04-15    145  |  109<H>  |  17  ----------------------------<  126<H>  3.6   |  25  |  0.45<L>    Ca    8.8      15 Apr 2022 06:28  Phos  2.9     04-15  Mg     2.00     04-15              T(C): 36.4 (04-15-22 @ 07:18), Max: 37.1 (04-14-22 @ 17:32)  HR: 80 (04-15-22 @ 07:18) (76 - 93)  BP: 140/52 (04-15-22 @ 07:18) (113/48 - 160/58)  RR: 18 (04-15-22 @ 07:18) (12 - 18)  SpO2: 100% (04-15-22 @ 07:18) (99% - 100%)  Wt(kg): --    I&O's Summary    14 Apr 2022 07:01  -  15 Apr 2022 07:00  --------------------------------------------------------  IN: 1730 mL / OUT: 350 mL / NET: 1380 mL        Head: Normocephalic and atraumatic.   Neck: No JVD. No bruits. Supple. Does not appear to be enlarged.   Cardiovascular: + S1,S2 ; RRR Soft systolic murmur at the left lower sternal border. No rubs noted.    Lungs: CTA b/l. No rhonchi, rales or wheezes.   Abdomen: + BS, soft. Non tender. Non distended. No rebound. No guarding.   Extremities: No clubbing/cyanosis/edema.   Skin: Warm and moist. The patient's skin has normal elasticity and good skin turgor.         A/P) 80 y/o female PMH hypertension, hyperlipidemia, hypothyroidism, depression, seizure disorder, PAF & stroke, who had a prolonged hospitalization from Dec 2021 to Feb 22 for perforated diverticulitis requiring Rosales procedure, colostomy bad, IR drainage for abdominal abscess. She subsequently required trach & PEG, now a/w GI bleeding.    -f/u GI regarding if/when a/c can be restarted for stroke prevention given PAF  -f/u ID  -f/u palliative care  -no further inpatient cardiac workup expected other than a/c for stroke prevention  -continue lipitor for hyperlipidemia  -continue coreg for hypertesion

## 2022-04-15 NOTE — PROGRESS NOTE ADULT - SUBJECTIVE AND OBJECTIVE BOX
[facility]   MD ELOISE ALVARADO MD, PA INJGALINDO BUNNY HERNANDEZ    TEL:  OFFICE: [phone]    From 5pm-7am Answering Service [phone]    -- RENAL FOLLOW UP NOTE ---Date of Service 04-15-22 @ 09:54    Patient is a 81y old  Female who presents with a chief complaint of     Patient seen and examined at bedside.     VITALS:  T(F): 97.5 (04-15-22 @ 07:18), Max: 98.8 (04-14-22 @ 17:32)  HR: 80 (04-15-22 @ 07:18)  BP: 140/52 (04-15-22 @ 07:18)  RR: 18 (04-15-22 @ 07:18)  SpO2: 100% (04-15-22 @ 07:18)  Wt(kg): --    04-14 @ 07:01  -  04-15 @ 07:00  --------------------------------------------------------  IN: 1730 mL / OUT: 350 mL / NET: 1380 mL      Height (cm): 167.6 (04-14 @ 12:55)  Weight (kg): 76.2 (04-14 @ 12:55)  BMI (kg/m2): 27.1 (04-14 @ 12:55)  BSA (m2): 1.86 (04-14 @ 12:55)    PHYSICAL EXAM:  Constitutional: NAD  Neck: No JVD  Respiratory: CTAB, no wheezes, rales or rhonchi  Cardiovascular: S1, S2, RRR  Gastrointestinal: BS+, soft, NT/ND  Extremities: No peripheral edema    Hospital Medications:   MEDICATIONS  (STANDING):  amLODIPine   Tablet 10 milliGRAM(s) Oral daily  aspirin  chewable 81 milliGRAM(s) Oral daily  atorvastatin 40 milliGRAM(s) Oral at bedtime  carvedilol 3.125 milliGRAM(s) Oral every 12 hours  dorzolamide 2% Ophthalmic Solution 1 Drop(s) Both EYES <User Schedule>  influenza  Vaccine (HIGH DOSE) 0.7 milliLiter(s) IntraMuscular once  lactated ringers. 1000 milliLiter(s) (75 mL/Hr) IV Continuous <Continuous>  latanoprost 0.005% Ophthalmic Solution 1 Drop(s) Both EYES at bedtime  levETIRAcetam  IVPB 500 milliGRAM(s) IV Intermittent every 12 hours  levothyroxine 75 MICROGram(s) Oral daily  meropenem/vaborbactam IVPB 4 Gram(s) IV Intermittent every 8 hours  pantoprazole   Suspension 40 milliGRAM(s) Oral daily  polyethylene glycol 3350 17 Gram(s) Oral daily  potassium chloride   Powder 40 milliEquivalent(s) Oral once  timolol 0.5% Solution 1 Drop(s) Both EYES two times a day      LABS:  04-15    145  |  109<H>  |  17  ----------------------------<  126<H>  3.6   |  25  |  0.45<L>    Ca    8.8      15 Apr 2022 06:28  Phos  2.9     04-15  Mg     2.00     04-15      Creatinine Trend: 0.45 <--, 0.47 <--, 0.45 <--, 0.42 <--, 0.40 <--, 0.38 <--, 0.38 <--    Phosphorus Level, Serum: 2.9 mg/dL (04-15 @ 06:28)                              9.3    7.52  )-----------( 300      ( 15 Apr 2022 06:28 )             29.1     Urine Studies:  Urinalysis - [04-04-22 @ 01:59]      Color Light Yellow / Appearance Clear / SG 1.012 / pH 7.5      Gluc Negative / Ketone Negative  / Bili Negative / Urobili <2 mg/dL       Blood Negative / Protein Trace / Leuk Est Negative / Nitrite Negative      RBC 6 / WBC 1 / Hyaline  / Gran  / Sq Epi  / Non Sq Epi 1 / Bacteria Negative      PTH -- (Ca --)      [03-30-22 @ 07:35]   32  TSH 7.65      [04-05-22 @ 07:40]  Lipid: chol 158, , HDL 25, LDL --      [02-03-22 @ 15:22]      Syphilis Screen (Treponema Pallidum Ab) Negative      [04-05-22 @ 09:41]    RADIOLOGY & ADDITIONAL STUDIES:

## 2022-04-15 NOTE — PROGRESS NOTE ADULT - SUBJECTIVE AND OBJECTIVE BOX
Date of Service  : 04-15-22 @ 19:31    INTERVAL HPI/OVERNIGHT EVENTS: No more bleeding.   Vital Signs Last 24 Hrs  T(C): 36.7 (15 Apr 2022 18:30), Max: 36.8 (14 Apr 2022 22:10)  T(F): 98 (15 Apr 2022 18:30), Max: 98.3 (14 Apr 2022 22:10)  HR: 70 (15 Apr 2022 18:30) (70 - 84)  BP: 148/58 (15 Apr 2022 18:30) (135/57 - 159/56)  BP(mean): --  RR: 16 (15 Apr 2022 18:30) (16 - 18)  SpO2: 98% (15 Apr 2022 18:30) (98% - 100%)  I&O's Summary    14 Apr 2022 07:01  -  15 Apr 2022 07:00  --------------------------------------------------------  IN: 1730 mL / OUT: 350 mL / NET: 1380 mL    15 Apr 2022 07:01  -  15 Apr 2022 19:31  --------------------------------------------------------  IN: 1085 mL / OUT: 100 mL / NET: 985 mL      MEDICATIONS  (STANDING):  amLODIPine   Tablet 10 milliGRAM(s) Oral daily  aspirin  chewable 81 milliGRAM(s) Oral daily  atorvastatin 40 milliGRAM(s) Oral at bedtime  carvedilol 3.125 milliGRAM(s) Oral every 12 hours  dorzolamide 2% Ophthalmic Solution 1 Drop(s) Both EYES <User Schedule>  influenza  Vaccine (HIGH DOSE) 0.7 milliLiter(s) IntraMuscular once  lactated ringers. 1000 milliLiter(s) (75 mL/Hr) IV Continuous <Continuous>  latanoprost 0.005% Ophthalmic Solution 1 Drop(s) Both EYES at bedtime  levETIRAcetam  IVPB 500 milliGRAM(s) IV Intermittent every 12 hours  levothyroxine 75 MICROGram(s) Oral daily  melatonin 3 milliGRAM(s) Oral at bedtime  meropenem/vaborbactam IVPB 4 Gram(s) IV Intermittent every 8 hours  pantoprazole   Suspension 40 milliGRAM(s) Oral daily  polyethylene glycol 3350 17 Gram(s) Oral daily  timolol 0.5% Solution 1 Drop(s) Both EYES two times a day    MEDICATIONS  (PRN):  acetaminophen     Tablet .. 650 milliGRAM(s) Oral every 6 hours PRN Mild Pain (1 - 3), Moderate Pain (4 - 6)  OLANZapine 2.5 milliGRAM(s) Oral every 8 hours PRN agitation  OLANZapine Injectable 2.5 milliGRAM(s) IntraMuscular every 8 hours PRN severe agitation    LABS:                        9.3    7.52  )-----------( 300      ( 15 Apr 2022 06:28 )             29.1     04-15    145  |  109<H>  |  17  ----------------------------<  126<H>  3.6   |  25  |  0.45<L>    Ca    8.8      15 Apr 2022 06:28  Phos  2.9     04-15  Mg     2.00     04-15      PT/INR - ( 14 Apr 2022 07:35 )   PT: 13.3 sec;   INR: 1.14 ratio         PTT - ( 14 Apr 2022 07:35 )  PTT:24.0 sec    CAPILLARY BLOOD GLUCOSE      POCT Blood Glucose.: 128 mg/dL (15 Apr 2022 17:24)  POCT Blood Glucose.: 137 mg/dL (15 Apr 2022 11:59)  POCT Blood Glucose.: 121 mg/dL (15 Apr 2022 00:24)              Consultant(s) Notes Reviewed:  [x ] YES  [ ] NO    PHYSICAL EXAM:  GENERAL: NAD, well-groomed, well-developed,not in any distress ,  HEAD:  Atraumatic, Normocephalic  NECK: Supple, No JVD, Normal thyroid  NERVOUS SYSTEM:  Alert   CHEST/LUNG: Good air entry bilateral with no  rales, rhonchi, wheezing, or rubs  HEART: Regular rate and rhythm; No murmurs, rubs, or gallops  ABDOMEN: Soft, Nontender, Nondistended; Bowel sounds present, PEG and Ostomy  EXTREMITIES:  2+ Peripheral Pulses, No clubbing, cyanosis, or edema    Care Discussed with Consultants/Other Providers [ x] YES  [ ] NO

## 2022-04-15 NOTE — PROGRESS NOTE ADULT - SUBJECTIVE AND OBJECTIVE BOX
Follow Up:  sepsis    Interval History: s/p flex sig yesterday and the bleeding vessel was clipped, now  Hgb stable    ROS:    Unobtainable because of mental status          Allergies  No Known Allergies        ANTIMICROBIALS:  meropenem/vaborbactam IVPB 4 every 8 hours      OTHER MEDS:  acetaminophen     Tablet .. 650 milliGRAM(s) Oral every 6 hours PRN  amLODIPine   Tablet 10 milliGRAM(s) Oral daily  aspirin  chewable 81 milliGRAM(s) Oral daily  atorvastatin 40 milliGRAM(s) Oral at bedtime  carvedilol 3.125 milliGRAM(s) Oral every 12 hours  dorzolamide 2% Ophthalmic Solution 1 Drop(s) Both EYES <User Schedule>  influenza  Vaccine (HIGH DOSE) 0.7 milliLiter(s) IntraMuscular once  lactated ringers. 1000 milliLiter(s) IV Continuous <Continuous>  latanoprost 0.005% Ophthalmic Solution 1 Drop(s) Both EYES at bedtime  levETIRAcetam  IVPB 500 milliGRAM(s) IV Intermittent every 12 hours  levothyroxine 75 MICROGram(s) Oral daily  pantoprazole   Suspension 40 milliGRAM(s) Oral daily  polyethylene glycol 3350 17 Gram(s) Oral daily  timolol 0.5% Solution 1 Drop(s) Both EYES two times a day      Vital Signs Last 24 Hrs  T(C): 36.7 (15 Apr 2022 09:59), Max: 37.1 (14 Apr 2022 17:32)  T(F): 98.1 (15 Apr 2022 09:59), Max: 98.8 (14 Apr 2022 17:32)  HR: 75 (15 Apr 2022 09:59) (75 - 93)  BP: 138/53 (15 Apr 2022 09:59) (113/48 - 160/58)  BP(mean): --  RR: 18 (15 Apr 2022 09:59) (12 - 18)  SpO2: 99% (15 Apr 2022 09:59) (99% - 100%)    Physical Exam:  General:    NAD, non toxic  Cardio:    regular S1,S2  Respiratory:   trach  abd:   soft, BS +, not tender  :     no CVAT, no suprapubic tenderness, no mc  Musculoskeletal : no joint swelling, no edema  Skin:    no rash  vascular: no phlebitis                          9.3    7.52  )-----------( 300      ( 15 Apr 2022 06:28 )             29.1       04-15    145  |  109<H>  |  17  ----------------------------<  126<H>  3.6   |  25  |  0.45<L>    Ca    8.8      15 Apr 2022 06:28  Phos  2.9     04-15  Mg     2.00     04-15            MICROBIOLOGY:  v  .Blood Blood  04-05-22   No Growth Final  --  --      .Blood Blood-Peripheral  04-04-22   Growth in anaerobic bottle: Klebsiella pneumoniae (Carbapenem Resistant)  See previous culture [id number]  --  Blood Culture PCR  Klepne MDRO      .Blood Blood-Peripheral  03-26-22   No Growth Final  --  --      .Blood Blood-Peripheral  03-26-22   No Growth Final  --  --                RADIOLOGY:  Images independently visualized and reviewed personally, findings as below  < from: CT Angio Abdomen and Pelvis w/ IV Cont (04.13.22 @ 22:26) >  IMPRESSION:    Small rectal active hemorrhage. Rectal wall enhancement may also reflect   into the lesion.    Thick-walled collection in the cul-de-sac and small collections along the   right paracolic gutter are unchanged.    Bladder calculi reidentified.    Findings discussed with ROSALINE Harrison by Dr. Martina Crysatl  on 4/13/2022   at 11:35 PM with readback.    A preliminary report was provided by Dr. Martina Crystal . I agree with   the interpretation.    < end of copied text >

## 2022-04-15 NOTE — CHART NOTE - NSCHARTNOTEFT_GEN_A_CORE
80 yo F with PMHx HTN, HLD, Hypothyroidism, Depression, seizure d/o, RLE DVT, Cholelithiasis s/p recent Hospitalization in [facility] from 12/22 -> 2/11/22 for acute perforated diverticulitis s/p Rosales 12/25, colostomy bag 12/26, hospitalization c/b IR drainage for abdominal abscess 1/3 Cx grew e.coli & bacteriodes, also required Mechanical Ventilation for Acute Respiratory Failure with Tracheostomy done 1/7.  PEG done 1/18, developed bleeding through ostomy, EGD on 2/1 showed gastritis. T's showed Acute left MCA infarct- could be 2/2 to carotid occlusion vs afib, new RLL aspiration pneumonia, seen by ID, completed antibiotics, seen by VascSx, not surgical candidate. Pt aphasic sent from [facility] for dislodged Peg tube, after staff noted pt has blood to her abdomen. Pt currently on antibiotics on bacteremia. Psych consulted for agitation. Per primary medicine team, pt pulled trach out twice (4/6, 4/12) and has required restraints which impedes disposition for discharge to nursing home with hospice care.    Pt was seen at bedside, CO 1:1 staff present. Soft bilateral wrist restraints with mittens noted. No PRNs utilized during admission as pt has been in restraints. Pt asleep, unable to participate in assessment.    A&P  80 yo F with PMHx L MCA infarct, HTN, HLD, Hypothyroidism, seizure d/o on Keppra, RLE DVT, Cholelithiasis, has PEG and trach & PPHx depression on Lexapro, admitted for dislodged PEG and AMS, currently on antibiotics for bacteremia. Psych consulted for agitation and med rec.    From chart review and primary team's report, pt appears to have motoric agitation (pulling out trach) that may be due to underlying depression (which may manifest as irritability and agitation), adjustment disorder (worsening mood, anxiety, and behavioral outbursts while hospitalized), and/or delirium secondary to general medical illness, change in environment. Pt is currently on max dose of Lexapro for depression. She may benefit from PRN Zyprexa 2.5mg IM for acute agitation for now. We will do a full assessment on Monday 4/18 with more info from PRN utilization.    - C/w CO 1:1 for agitation  - Obtain EKG to ensure QTc < 500ms, replete Mg >2.0 and K >4.0  - If EKG and electrolytes are within above ranges, start Zyprexa 2.5mg IM PRN for acute agitation  - Attempt to observe pt off of restraints while she is in behavioral control and utilize Zyprexa IM and restraints as needed for acute agitation; this will assist us in titrating PRN/potential standing meds on Monday  - Consider starting melatonin 3mg qHS for sleep wake cycle  - Delirium precautions - Minimize use of opioids, anticholinergics, or other deliriogenic agents when possible.  Maintain sleep wake cycle.  Provide frequent reorientation and redirection. Family member at bedside if possible. Assess for need for glasses and hearing aid (if applicable).    Izaiah Miller, PGY2  Psychiatry - [facility] CL service 80 yo F with PMHx HTN, HLD, Hypothyroidism, Depression, seizure d/o, RLE DVT, Cholelithiasis s/p recent Hospitalization in [facility] from 12/22 -> 2/11/22 for acute perforated diverticulitis s/p Rosales 12/25, colostomy bag 12/26, hospitalization c/b IR drainage for abdominal abscess 1/3 Cx grew e.coli & bacteriodes, also required Mechanical Ventilation for Acute Respiratory Failure with Tracheostomy done 1/7.  PEG done 1/18, developed bleeding through ostomy, EGD on 2/1 showed gastritis. T's showed Acute left MCA infarct- could be 2/2 to carotid occlusion vs afib, new RLL aspiration pneumonia, seen by ID, completed antibiotics, seen by VascSx, not surgical candidate. Pt aphasic sent from [facility] for dislodged Peg tube, after staff noted pt has blood to her abdomen. Pt currently on antibiotics on bacteremia. Psych consulted for agitation. Per primary medicine team, pt pulled trach out twice (4/6, 4/12) and has required restraints which impedes disposition for discharge to nursing home with hospice care.    Pt was seen at bedside, CO 1:1 staff present. Soft bilateral wrist restraints with mittens noted. No PRNs utilized during admission as pt has been in restraints. Pt asleep, unable to participate in assessment.    A&P  80 yo F with PMHx L MCA infarct, HTN, HLD, Hypothyroidism, seizure d/o on Keppra, RLE DVT, Cholelithiasis, has PEG and trach & PPHx depression on Lexapro, admitted for dislodged PEG and AMS, currently on antibiotics for bacteremia. Psych consulted for agitation and med rec.    From chart review and primary team's report, pt appears to have motoric agitation (pulling out trach) that may be due to underlying depression (which may manifest as irritability and agitation), adjustment disorder (worsening mood, anxiety, and behavioral outbursts while hospitalized), and/or delirium secondary to general medical illness, change in environment. Pt is currently on max dose of Lexapro for depression. She may benefit from PRN Zyprexa 2.5mg IM for acute agitation for now. We will do a full assessment on Monday 4/18 with more info from PRN utilization.    - Defer obs status to primray team, currently on CO 1:1 for agitation  - Obtain EKG to ensure QTc < 500ms, replete Mg >2.0 and K >4.0 as appropriate  - If qtc <500; start Zyprexa 2.5mg IM q8h PRN for acute agitation  - Attempt to observe pt off of restraints while she is in behavioral control and utilize Zyprexa IM and restraints as needed for acute agitation; this will assist us in titrating PRN/potential standing meds on Monday  - Consider starting melatonin 3mg qHS for sleep wake cycle  - Delirium precautions - Minimize use of opioids, anticholinergics, or other deliriogenic agents when possible.  Maintain sleep wake cycle.  Provide frequent reorientation and redirection. Family member at bedside if possible. Assess for need for glasses and hearing aid (if applicable).    Izaiah Miller, PGY2  Psychiatry - [facility] CL service      Attending addendum:  Chart reviewed, pt. seen, pt. asleep and unable to engage in interview. Plan as above.  Will follow up on Monday 4/18  Call with questions/concerns #[phone] 82 yo F with PMHx HTN, HLD, Hypothyroidism, Depression, seizure d/o, RLE DVT, Cholelithiasis s/p recent Hospitalization in [facility] from 12/22 -> 2/11/22 for acute perforated diverticulitis s/p Rosales 12/25, colostomy bag 12/26, hospitalization c/b IR drainage for abdominal abscess 1/3 Cx grew e.coli & bacteriodes, also required Mechanical Ventilation for Acute Respiratory Failure with Tracheostomy done 1/7.  PEG done 1/18, developed bleeding through ostomy, EGD on 2/1 showed gastritis. T's showed Acute left MCA infarct- could be 2/2 to carotid occlusion vs afib, new RLL aspiration pneumonia, seen by ID, completed antibiotics, seen by VascSx, not surgical candidate. Pt aphasic sent from [facility] [place] for dislodged Peg tube, after staff noted pt has blood to her abdomen. Pt currently on antibiotics on bacteremia. Psych consulted for agitation. Per primary medicine team, pt pulled trach out twice (4/6, 4/12) and has required restraints which impedes disposition for discharge to nursing home with hospice care.    Pt was seen at bedside, CO 1:1 staff present. Soft bilateral wrist restraints with mittens noted. No PRNs utilized during admission as pt has been in restraints. Pt asleep, unable to participate in assessment.    A&P  82 yo F with PMHx L MCA infarct, HTN, HLD, Hypothyroidism, seizure d/o on Keppra, RLE DVT, Cholelithiasis, has PEG and trach & PPHx depression on Lexapro, admitted for dislodged PEG and AMS, currently on antibiotics for bacteremia. Psych consulted for agitation and med rec.    From chart review and primary team's report, pt appears to have motoric agitation (pulling out trach) that may be due to underlying depression (which may manifest as irritability and agitation), adjustment disorder (worsening mood, anxiety, and behavioral outbursts while hospitalized), and/or delirium secondary to general medical illness, change in environment. Pt is currently on max dose of Lexapro for depression. She may benefit from PRN Zyprexa 2.5mg IM for acute agitation for now. We will do a full assessment on Monday 4/18 with more info from PRN utilization.    - Defer obs status to primray team, currently on CO 1:1 for agitation  - Obtain EKG to ensure QTc < 500ms, replete Mg >2.0 and K >4.0 as appropriate  - If qtc <500; start Zyprexa 2.5mg PO (via PEG if okay per pharmacy) q8h PRN for agitation, 2.5mg IM q8h PRN for severe agitation  - Attempt to observe pt off of restraints while she is in behavioral control and utilize Zyprexa IM and restraints as needed for acute agitation; this will assist us in titrating PRN/potential standing meds on Monday  - Consider starting melatonin 3mg qHS for sleep wake cycle  - Delirium precautions - Minimize use of opioids, anticholinergics, or other deliriogenic agents when possible.  Maintain sleep wake cycle.  Provide frequent reorientation and redirection. Family member at bedside if possible. Assess for need for glasses and hearing aid (if applicable).    Izaiah Miller, PGY2  Psychiatry - [facility] CL service      Attending addendum:  Chart reviewed, pt. seen, pt. asleep and unable to engage in interview. Plan as above.  Will follow up on Monday 4/18  Call with questions/concerns #[phone] 82 yo F with PMHx HTN, HLD, Hypothyroidism, Depression, seizure d/o, RLE DVT, Cholelithiasis s/p recent Hospitalization in [facility] from 12/22 -> 2/11/22 for acute perforated diverticulitis s/p Rosales 12/25, colostomy bag 12/26, hospitalization c/b IR drainage for abdominal abscess 1/3 Cx grew e.coli & bacteriodes, also required Mechanical Ventilation for Acute Respiratory Failure with Tracheostomy done 1/7.  PEG done 1/18, developed bleeding through ostomy, EGD on 2/1 showed gastritis. T's showed Acute left MCA infarct- could be 2/2 to carotid occlusion vs afib, new RLL aspiration pneumonia, seen by ID, completed antibiotics, seen by VascSx, not surgical candidate. Pt aphasic sent from [facility] for dislodged Peg tube, after staff noted pt has blood to her abdomen. Pt currently on antibiotics on bacteremia. Psych consulted for agitation. Per primary medicine team, pt pulled trach out twice (4/6, 4/12) and has required restraints which impedes disposition for discharge to nursing home with hospice care.    Pt was seen at bedside, CO 1:1 staff present. Soft bilateral wrist restraints with mittens noted. No PRNs utilized during admission as pt has been in restraints. Pt asleep, unable to participate in assessment.    A&P  82 yo F with PMHx L MCA infarct, HTN, HLD, Hypothyroidism, seizure d/o on Keppra, RLE DVT, Cholelithiasis, has PEG and trach & PPHx depression on Lexapro, admitted for dislodged PEG and AMS, currently on antibiotics for bacteremia. Psych consulted for agitation and med rec.    From chart review and primary team's report, pt appears to have motoric agitation (pulling out trach) that may be due to underlying depression (which may manifest as irritability and agitation), adjustment disorder (worsening mood, anxiety, and behavioral outbursts while hospitalized), and/or delirium secondary to general medical illness, change in environment.  She may benefit from PRN Zyprexa 2.5mg IM for acute agitation for now. We will do a full assessment on Monday 4/18 with more info from PRN utilization.    - Defer obs status to primray team, currently on CO 1:1 for agitation  - Obtain EKG to ensure QTc < 500ms, replete Mg >2.0 and K >4.0 as appropriate  - If qtc <500; start Zyprexa 2.5mg PO (via PEG if okay per pharmacy) q8h PRN for agitation, 2.5mg IM q8h PRN for severe agitation  - Attempt to observe pt off of restraints while she is in behavioral control and utilize Zyprexa IM and restraints as needed for acute agitation; this will assist us in titrating PRN/potential standing meds on Monday  - Consider starting melatonin 3mg qHS for sleep wake cycle  - Delirium precautions - Minimize use of opioids, anticholinergics, or other deliriogenic agents when possible.  Maintain sleep wake cycle.  Provide frequent reorientation and redirection. Family member at bedside if possible. Assess for need for glasses and hearing aid (if applicable).    Izaiah Miller, PGY2  Psychiatry - [facility] CL service      Attending addendum:  Chart reviewed, pt. seen, pt. asleep and unable to engage in interview. Plan as above.  Will follow up on Monday 4/18  Call with questions/concerns #[phone]

## 2022-04-15 NOTE — PROGRESS NOTE ADULT - SUBJECTIVE AND OBJECTIVE BOX
INTERVAL HPI/OVERNIGHT EVENTS:    unobtainable   no rectal bleeding overnight or this am; cbc stable  daughter in room     MEDICATIONS  (STANDING):  amLODIPine   Tablet 10 milliGRAM(s) Oral daily  aspirin  chewable 81 milliGRAM(s) Oral daily  atorvastatin 40 milliGRAM(s) Oral at bedtime  carvedilol 3.125 milliGRAM(s) Oral every 12 hours  dorzolamide 2% Ophthalmic Solution 1 Drop(s) Both EYES <User Schedule>  influenza  Vaccine (HIGH DOSE) 0.7 milliLiter(s) IntraMuscular once  lactated ringers. 1000 milliLiter(s) (75 mL/Hr) IV Continuous <Continuous>  latanoprost 0.005% Ophthalmic Solution 1 Drop(s) Both EYES at bedtime  levETIRAcetam  IVPB 500 milliGRAM(s) IV Intermittent every 12 hours  levothyroxine 75 MICROGram(s) Oral daily  meropenem/vaborbactam IVPB 4 Gram(s) IV Intermittent every 8 hours  pantoprazole   Suspension 40 milliGRAM(s) Oral daily  polyethylene glycol 3350 17 Gram(s) Oral daily  timolol 0.5% Solution 1 Drop(s) Both EYES two times a day    MEDICATIONS  (PRN):  acetaminophen     Tablet .. 650 milliGRAM(s) Oral every 6 hours PRN Mild Pain (1 - 3), Moderate Pain (4 - 6)      Allergies    No Known Allergies    Intolerances        Review of Systems: *pt minimally verbal to nonverbal, unable to obtain ROS         Vital Signs Last 24 Hrs  T(C): 36.7 (15 Apr 2022 09:59), Max: 37.1 (14 Apr 2022 17:32)  T(F): 98.1 (15 Apr 2022 09:59), Max: 98.8 (14 Apr 2022 17:32)  HR: 75 (15 Apr 2022 09:59) (75 - 93)  BP: 138/53 (15 Apr 2022 09:59) (113/48 - 160/58)  BP(mean): --  RR: 18 (15 Apr 2022 09:59) (12 - 18)  SpO2: 99% (15 Apr 2022 09:59) (99% - 100%)    PHYSICAL EXAM:    Constitutional: NAD  HEENT: EOMI, throat clear  Neck: No LAD, supple  Respiratory: +trach  Cardiovascular: S1 and S2, RRR, no M  Gastrointestinal: BS+, soft, NT/ND, neg HSM, +peg  Extremities: No peripheral edema, neg clubbing, cyanosis  Vascular: 2+ peripheral pulses  Neurological: A/O x 0  Psychiatric: ethargic  Skin: No rashes      LABS:                        9.3    7.52  )-----------( 300      ( 15 Apr 2022 06:28 )             29.1     04-15    145  |  109<H>  |  17  ----------------------------<  126<H>  3.6   |  25  |  0.45<L>    Ca    8.8      15 Apr 2022 06:28  Phos  2.9     04-15  Mg     2.00     04-15      PT/INR - ( 14 Apr 2022 07:35 )   PT: 13.3 sec;   INR: 1.14 ratio         PTT - ( 14 Apr 2022 07:35 )  PTT:24.0 sec      RADIOLOGY & ADDITIONAL TESTS:  < from: Flexible Sigmoidoscopy (04.14.22 @ 16:41) >    [facility]  _______________________________________________________________________________  Patient Name: Marina Allred             Procedure Date: 4/14/2022 4:41 PM  MRN: [id number]                     Account Number: [id number]  YOB: 1940              Admit Type: Inpatient  Room: [facility] 04                         Gender: Female  Attending MD: Ace Pierre MD      _______________________________________________________________________________     Procedure:           Flexible Sigmoidoscopy  Indications:         Hematochezia  Providers:           Ace Pierre MD  Medicines:           Monitored Anesthesia Care  Complications:       No immediate complications.  Procedure:           After obtaining informed consent, the endoscope was                        passed under direct vision. Throughout the procedure,                        the patient's blood pressure, pulse, and oxygen                        saturations were monitored continuously. The Endoscope                        was introduced through the anus and advanced to the                        sigmoid colon. The flexible sigmoidoscopy was                        accomplished without difficulty. The patient tolerated      the procedure well. The quality of the bowel preparation                        was poor.                                                                                   Findings:       A visible vessel (immediatly proximal to the dentate line) was found in        the distal rectum. Active bleeding was present. To stop active bleeding,        one hemostatic clip was successfully placed (MR conditional). There was        no bleeding at the end of the procedure.       Diverticulosis in the sigmoid                                                                                   Impression:          - Preparation of the colon was poor.                       - Visible vessel was found in the distal rectum. Clip                        (MR conditional) was placed.                       - No specimens collected.  Recommendation:      - Return patient to hospital [facility] for ongoing care.                       -Restart PEG feeds                       -Hold Ac for Now                       -Stool softeners                       -No enema or rectal meds X 48 hours                                                                                   Attending Participation:       I personally performed the entire procedure.                                                              Ace Pierre  ___________________  Ace Pierre MD  4/14/2022 5:37:11 PM  This report has been signed electronically.  Number of Addenda: 0    Note Initiated On: 4/14/2022 4:41 PM    < end of copied text >

## 2022-04-15 NOTE — PROGRESS NOTE ADULT - ASSESSMENT
82 yo F w/ PMHx of perforated diverticulitis s/p Rosales procedure, colostomy, abdominal abscess with wound s/p IR drainage, Respiratory Failure s/p Trache, CVA w/ functional quadriplegia, aphasia, dysphagia s/p PEG (on Jevity 1.5 @ 70cchr x 18 hrs, & water flushes 325ml q 6 hrs, Afib, GIB, HTN, HLD, Seizure d/o, Hypothyroidism, Glaucoma, Depression p/w PEG tube dislodgement for unknown amount of time     Problem/Plan - 1:  ·  Problem: Sepsis sec to KPC K Pneumoniae Bacteremia .   ·  Plan:  IV Abxs per ID till 4/19 .   ID help appreciated.   < from: CT Abdomen and Pelvis w/ IV Cont (04.04.22 @ 14:24) >  IMPRESSION:  Trace bilateral pleural effusions.    Improved aeration in the lower lobes bilaterally since 1/30/2022.   Nonspecific patchy bilateral groundglass and linear opacities are noted.    Enhancement of the wall of the cecum and ascending colon with mild   infiltration of the adjacent fat is similar in appearance to prior study   dated 3/27/2022. An inflammatory or infectious process is considered.    Dependent high attenuation within the urinary bladder, which may   represent small stones and/or debris. An underlying mass cannot be   excluded.    Thick-walled collection in the cul-de-sac and small collections along the   right paracolic gutter, not significantly changed since 3/27/2022.    < end of copied text >     Problem/Plan - 2:  ·  Problem: Metabolic Encephalopathy    ·  Plan: Likely sec to Infection  . Resolved.    Neurology helping  and  EEG showing no Seizure activity  .     Problem/Plan - 3:  ·  Problem: Stroke.   ·  Plan: Holding ASA, Plavix as actively bleeding.Will restart ASA if CTA okay and bleeding stops .    Continue  statin Via PEG tube .     Problem/Plan - 4:  ·  Problem: Chronic respiratory failure with hypoxia.   ·  Plan: Pt on 2l NC via Trache collar.   S/P replacement of Tracheostomy Tube.       Problem/Plan - 5:  ·  Problem: Diverticulitis of intestine with perforation and abscess without bleeding.   ·  Plan: Surgery c/s in chart- small fluid collection but nontoxic, if drainage needed would be done by IR.  Wound care eval.     Problem/Plan - 6:  ·  Problem: History of atrial fibrillation.   ·  Plan: Cards helping and now off AC .   D/W Son and okay to start.      Problem/Plan - 7:  ·  Problem: Seizure disorder.   ·  Plan: continue levetiracetam bid.     Problem/Plan - 8:  ·  Problem: DVT, lower extremity.   ·  Plan: ?Subacute vs Chronic, Off AC.      Problem/Plan - 9:  ·  Problem: HTN (hypertension).   ·  Plan: Resuming carvedilol & amlodipine as PEG replaced.     Problem/Plan - 10:  ·  Problem: Hypothyroidism.   ·  Plan; Resume Levothyroxine when PEG replaced.     Problem/Plan - 11:  ·  Problem: Glaucoma.   ·  Plan: continue Timolol, Dorzolamide, Latanoprost.      Problem/Plan - 12:  ·  Problem: PEG tube malfunction.   ·  Plan:  S/P  PEG . TF started.      Problem/Plan - 13:  ·  Problem: Hypokalemia.   ·  Plan: Corrected.      Problem/Plan - 14:  ·  Problem: GI bleed. .   ·  Plan: PPI . GI following .     PRBC for HGb 7.5 G or less.     < from: CT Angio Abdomen and Pelvis w/ IV Cont (04.13.22 @ 22:26) >  IMPRESSION:    Small rectal active hemorrhage. Rectal wall enhancement may also reflect   into the lesion.    Thick-walled collection in the cul-de-sac and small collections along the   right paracolic gutter are unchanged.    Bladder calculi reidentified.    Findings discussed with ROSALINE Harrison by Dr. Martina Crystal  on 4/13/2022   at 11:35 PM with readback.    < end of copied text >    Awaiting Flex Sigmoidoscopy.   Disposition : DC  planning on hold.   Over all prognosis very poor.

## 2022-04-15 NOTE — CHART NOTE - NSCHARTNOTEFT_GEN_A_CORE
Spoke with RN and RN manager, per RN Luis he had taken restraint off early at pt attempted to pull on the trach. Per Discussion with RN and RN manager would be best to attempt behavior control with prn zyprexa, once behavior controlled to attempt wean the patient.   Above discussed with consult service

## 2022-04-16 LAB
ANION GAP SERPL CALC-SCNC: 10 MMOL/L — SIGNIFICANT CHANGE UP (ref 7–14)
BLD GP AB SCN SERPL QL: NEGATIVE — SIGNIFICANT CHANGE UP
BUN SERPL-MCNC: 18 MG/DL — SIGNIFICANT CHANGE UP (ref 7–23)
CALCIUM SERPL-MCNC: 8.6 MG/DL — SIGNIFICANT CHANGE UP (ref 8.4–10.5)
CHLORIDE SERPL-SCNC: 109 MMOL/L — HIGH (ref 98–107)
CO2 SERPL-SCNC: 25 MMOL/L — SIGNIFICANT CHANGE UP (ref 22–31)
CREAT SERPL-MCNC: 0.45 MG/DL — LOW (ref 0.5–1.3)
EGFR: 98 ML/MIN/1.73M2 — SIGNIFICANT CHANGE UP
GLUCOSE SERPL-MCNC: 133 MG/DL — HIGH (ref 70–99)
HCT VFR BLD CALC: 24.4 % — LOW (ref 34.5–45)
HCT VFR BLD CALC: 27.3 % — LOW (ref 34.5–45)
HGB BLD-MCNC: 7.8 G/DL — LOW (ref 11.5–15.5)
HGB BLD-MCNC: 8.8 G/DL — LOW (ref 11.5–15.5)
MAGNESIUM SERPL-MCNC: 2 MG/DL — SIGNIFICANT CHANGE UP (ref 1.6–2.6)
MCHC RBC-ENTMCNC: 28.1 PG — SIGNIFICANT CHANGE UP (ref 27–34)
MCHC RBC-ENTMCNC: 28.5 PG — SIGNIFICANT CHANGE UP (ref 27–34)
MCHC RBC-ENTMCNC: 32 GM/DL — SIGNIFICANT CHANGE UP (ref 32–36)
MCHC RBC-ENTMCNC: 32.2 GM/DL — SIGNIFICANT CHANGE UP (ref 32–36)
MCV RBC AUTO: 87.8 FL — SIGNIFICANT CHANGE UP (ref 80–100)
MCV RBC AUTO: 88.3 FL — SIGNIFICANT CHANGE UP (ref 80–100)
NRBC # BLD: 0 /100 WBCS — SIGNIFICANT CHANGE UP
NRBC # BLD: 0 /100 WBCS — SIGNIFICANT CHANGE UP
NRBC # FLD: 0 K/UL — SIGNIFICANT CHANGE UP
NRBC # FLD: 0 K/UL — SIGNIFICANT CHANGE UP
PHOSPHATE SERPL-MCNC: 2.7 MG/DL — SIGNIFICANT CHANGE UP (ref 2.5–4.5)
PLATELET # BLD AUTO: 293 K/UL — SIGNIFICANT CHANGE UP (ref 150–400)
PLATELET # BLD AUTO: 313 K/UL — SIGNIFICANT CHANGE UP (ref 150–400)
POTASSIUM SERPL-MCNC: 3.4 MMOL/L — LOW (ref 3.5–5.3)
POTASSIUM SERPL-SCNC: 3.4 MMOL/L — LOW (ref 3.5–5.3)
RBC # BLD: 2.78 M/UL — LOW (ref 3.8–5.2)
RBC # BLD: 3.09 M/UL — LOW (ref 3.8–5.2)
RBC # FLD: 16.2 % — HIGH (ref 10.3–14.5)
RBC # FLD: 16.5 % — HIGH (ref 10.3–14.5)
RH IG SCN BLD-IMP: POSITIVE — SIGNIFICANT CHANGE UP
SODIUM SERPL-SCNC: 144 MMOL/L — SIGNIFICANT CHANGE UP (ref 135–145)
WBC # BLD: 6.35 K/UL — SIGNIFICANT CHANGE UP (ref 3.8–10.5)
WBC # BLD: 6.67 K/UL — SIGNIFICANT CHANGE UP (ref 3.8–10.5)
WBC # FLD AUTO: 6.35 K/UL — SIGNIFICANT CHANGE UP (ref 3.8–10.5)
WBC # FLD AUTO: 6.67 K/UL — SIGNIFICANT CHANGE UP (ref 3.8–10.5)

## 2022-04-16 RX ORDER — POTASSIUM CHLORIDE 20 MEQ
40 PACKET (EA) ORAL ONCE
Refills: 0 | Status: COMPLETED | OUTPATIENT
Start: 2022-04-16 | End: 2022-04-16

## 2022-04-16 RX ADMIN — MEROPENEM-VABORBACTAM 83.33 GRAM(S): 1; 1 INJECTION, POWDER, FOR SOLUTION INTRAVENOUS at 19:36

## 2022-04-16 RX ADMIN — Medication 1 DROP(S): at 06:21

## 2022-04-16 RX ADMIN — Medication 81 MILLIGRAM(S): at 17:51

## 2022-04-16 RX ADMIN — CARVEDILOL PHOSPHATE 3.12 MILLIGRAM(S): 80 CAPSULE, EXTENDED RELEASE ORAL at 17:52

## 2022-04-16 RX ADMIN — POLYETHYLENE GLYCOL 3350 17 GRAM(S): 17 POWDER, FOR SOLUTION ORAL at 17:56

## 2022-04-16 RX ADMIN — LEVETIRACETAM 400 MILLIGRAM(S): 250 TABLET, FILM COATED ORAL at 10:56

## 2022-04-16 RX ADMIN — ATORVASTATIN CALCIUM 40 MILLIGRAM(S): 80 TABLET, FILM COATED ORAL at 21:25

## 2022-04-16 RX ADMIN — LEVETIRACETAM 400 MILLIGRAM(S): 250 TABLET, FILM COATED ORAL at 21:24

## 2022-04-16 RX ADMIN — AMLODIPINE BESYLATE 10 MILLIGRAM(S): 2.5 TABLET ORAL at 06:22

## 2022-04-16 RX ADMIN — Medication 40 MILLIEQUIVALENT(S): at 17:55

## 2022-04-16 RX ADMIN — MEROPENEM-VABORBACTAM 83.33 GRAM(S): 1; 1 INJECTION, POWDER, FOR SOLUTION INTRAVENOUS at 02:57

## 2022-04-16 RX ADMIN — MEROPENEM-VABORBACTAM 83.33 GRAM(S): 1; 1 INJECTION, POWDER, FOR SOLUTION INTRAVENOUS at 10:55

## 2022-04-16 RX ADMIN — PANTOPRAZOLE SODIUM 40 MILLIGRAM(S): 20 TABLET, DELAYED RELEASE ORAL at 17:51

## 2022-04-16 RX ADMIN — Medication 75 MICROGRAM(S): at 06:23

## 2022-04-16 RX ADMIN — LATANOPROST 1 DROP(S): 0.05 SOLUTION/ DROPS OPHTHALMIC; TOPICAL at 21:25

## 2022-04-16 RX ADMIN — DORZOLAMIDE HYDROCHLORIDE 1 DROP(S): 20 SOLUTION/ DROPS OPHTHALMIC at 10:56

## 2022-04-16 RX ADMIN — Medication 3 MILLIGRAM(S): at 21:36

## 2022-04-16 RX ADMIN — CARVEDILOL PHOSPHATE 3.12 MILLIGRAM(S): 80 CAPSULE, EXTENDED RELEASE ORAL at 06:22

## 2022-04-16 RX ADMIN — Medication 650 MILLIGRAM(S): at 03:49

## 2022-04-16 RX ADMIN — DORZOLAMIDE HYDROCHLORIDE 1 DROP(S): 20 SOLUTION/ DROPS OPHTHALMIC at 19:37

## 2022-04-16 RX ADMIN — Medication 1 DROP(S): at 17:52

## 2022-04-16 NOTE — PROGRESS NOTE ADULT - ASSESSMENT
80 yo F with PMHx HTN, HLD, Hypothyroidism, Depression, seizure d/o, RLE DVT, Cholelithiasis s/p recent Hospitalization in [facility] from 12/22 -> 2/11/22 for acute perforated diverticulitis s/p Rosales 12/25, colostomy bag 12/26, hospitalization c/b IR drainage for abdominal abscess 1/3 Cx grew e.coli & bacteriodes, also required Mechanical Ventilation for Acute Respiratory Failure with Tracheostomy done 1/7.  PEG done 1/18, developed bleeding through ostomy, EGD on 2/1 showed gastritis. T's showed Acute left MCA infarct- could be 2/2 to carotid occlusion vs afib, new RLL aspiration pneumonia, seen by ID, completed antibiotics, seen by VascSx, not surgical candidate. Pt aphasic sent from [facility] for dislodged Peg tube, after staff noted pt has blood to her abdomen. Nephrology consulted for Hypokalemia.     A/P  Hypokalemia   likely 2/2 GI loss   Replete KCL 40mEQ today  monitor input and output closely   monitor K closely    Hypomagnesia   replete as needed  monitor Mg     Hypophosphatemia   replete as needed  monitor     Hypocalcemia   corrected Ca WNL   Monitor    Acidosis with anion gap   etiology?  improved   monitor    HTN  optimal   monitor BP closely

## 2022-04-16 NOTE — PROGRESS NOTE ADULT - SUBJECTIVE AND OBJECTIVE BOX
Date of Service  : 04-16-22     INTERVAL HPI/OVERNIGHT EVENTS: No new concerns.   Vital Signs Last 24 Hrs  T(C): 36.9 (16 Apr 2022 11:17), Max: 36.9 (16 Apr 2022 11:17)  T(F): 98.4 (16 Apr 2022 11:17), Max: 98.4 (16 Apr 2022 11:17)  HR: 84 (16 Apr 2022 11:17) (70 - 84)  BP: 158/58 (16 Apr 2022 11:17) (137/55 - 158/58)  BP(mean): --  RR: 17 (16 Apr 2022 11:17) (16 - 18)  SpO2: 98% (16 Apr 2022 11:17) (98% - 99%)  I&O's Summary    15 Apr 2022 07:01  -  16 Apr 2022 07:00  --------------------------------------------------------  IN: 2415 mL / OUT: 250 mL / NET: 2165 mL    16 Apr 2022 07:01  -  16 Apr 2022 14:05  --------------------------------------------------------  IN: 445 mL / OUT: 20 mL / NET: 425 mL      MEDICATIONS  (STANDING):  amLODIPine   Tablet 10 milliGRAM(s) Oral daily  aspirin  chewable 81 milliGRAM(s) Oral daily  atorvastatin 40 milliGRAM(s) Oral at bedtime  carvedilol 3.125 milliGRAM(s) Oral every 12 hours  dorzolamide 2% Ophthalmic Solution 1 Drop(s) Both EYES <User Schedule>  influenza  Vaccine (HIGH DOSE) 0.7 milliLiter(s) IntraMuscular once  lactated ringers. 1000 milliLiter(s) (75 mL/Hr) IV Continuous <Continuous>  latanoprost 0.005% Ophthalmic Solution 1 Drop(s) Both EYES at bedtime  levETIRAcetam  IVPB 500 milliGRAM(s) IV Intermittent every 12 hours  levothyroxine 75 MICROGram(s) Oral daily  melatonin 3 milliGRAM(s) Oral at bedtime  meropenem/vaborbactam IVPB 4 Gram(s) IV Intermittent every 8 hours  pantoprazole   Suspension 40 milliGRAM(s) Oral daily  polyethylene glycol 3350 17 Gram(s) Oral daily  potassium chloride   Powder 40 milliEquivalent(s) Oral once  timolol 0.5% Solution 1 Drop(s) Both EYES two times a day    MEDICATIONS  (PRN):  acetaminophen     Tablet .. 650 milliGRAM(s) Oral every 6 hours PRN Mild Pain (1 - 3), Moderate Pain (4 - 6)  OLANZapine 2.5 milliGRAM(s) Oral every 8 hours PRN agitation  OLANZapine Injectable 2.5 milliGRAM(s) IntraMuscular every 8 hours PRN severe agitation    LABS:                        7.8    6.35  )-----------( 293      ( 16 Apr 2022 07:34 )             24.4     04-16    144  |  109<H>  |  18  ----------------------------<  133<H>  3.4<L>   |  25  |  0.45<L>    Ca    8.6      16 Apr 2022 07:34  Phos  2.7     04-16  Mg     2.00     04-16          CAPILLARY BLOOD GLUCOSE      POCT Blood Glucose.: 128 mg/dL (15 Apr 2022 17:24)            Consultant(s) Notes Reviewed:  [x ] YES  [ ] NO    PHYSICAL EXAM:  GENERAL: NAD,  HEAD:  Atraumatic, Normocephalic  NECK: Supple, No JVD, Normal thyroid  NERVOUS SYSTEM:  smiling   CHEST/LUNG: Good air entry bilateral with no  rales, rhonchi, wheezing, or rubs  HEART: Regular rate and rhythm; No murmurs, rubs, or gallops  ABDOMEN: Soft, Nontender, Nondistended; Bowel sounds present, PEG and ostomy   EXTREMITIES:  2+ Peripheral Pulses, No clubbing, cyanosis, or edema    Care Discussed with Consultants/Other Providers [ x] YES  [ ] NO

## 2022-04-16 NOTE — PROGRESS NOTE ADULT - SUBJECTIVE AND OBJECTIVE BOX
[facility]   MD ELOISE ALVARADO MD RUORU WONG, PA    TEL:  FROM 9 AM to 5 PM ---OFFICE: [phone]    FROM 5 PM - 9 AM PLEASE CALL ANSWERING SERVICE: [phone]    RENAL FOLLOW UP NOTE--Date of Service 04-16-22 @ 10:09  --------------------------------------------------------------------------------  HPI:      Pt seen and examined at bedside.       PAST HISTORY  --------------------------------------------------------------------------------  No significant changes to PMH, PSH, FHx, SHx, unless otherwise noted    ALLERGIES & MEDICATIONS  --------------------------------------------------------------------------------  Allergies    No Known Allergies    Intolerances      Standing Inpatient Medications  amLODIPine   Tablet 10 milliGRAM(s) Oral daily  aspirin  chewable 81 milliGRAM(s) Oral daily  atorvastatin 40 milliGRAM(s) Oral at bedtime  carvedilol 3.125 milliGRAM(s) Oral every 12 hours  dorzolamide 2% Ophthalmic Solution 1 Drop(s) Both EYES <User Schedule>  influenza  Vaccine (HIGH DOSE) 0.7 milliLiter(s) IntraMuscular once  lactated ringers. 1000 milliLiter(s) IV Continuous <Continuous>  latanoprost 0.005% Ophthalmic Solution 1 Drop(s) Both EYES at bedtime  levETIRAcetam  IVPB 500 milliGRAM(s) IV Intermittent every 12 hours  levothyroxine 75 MICROGram(s) Oral daily  melatonin 3 milliGRAM(s) Oral at bedtime  meropenem/vaborbactam IVPB 4 Gram(s) IV Intermittent every 8 hours  pantoprazole   Suspension 40 milliGRAM(s) Oral daily  polyethylene glycol 3350 17 Gram(s) Oral daily  potassium chloride   Powder 40 milliEquivalent(s) Oral once  timolol 0.5% Solution 1 Drop(s) Both EYES two times a day    PRN Inpatient Medications  acetaminophen     Tablet .. 650 milliGRAM(s) Oral every 6 hours PRN  OLANZapine 2.5 milliGRAM(s) Oral every 8 hours PRN  OLANZapine Injectable 2.5 milliGRAM(s) IntraMuscular every 8 hours PRN      REVIEW OF SYSTEMS  --------------------------------------------------------------------------------  General: no fever  MSK: no edema     VITALS/PHYSICAL EXAM  --------------------------------------------------------------------------------  T(C): 36.7 (04-16-22 @ 06:15), Max: 36.7 (04-15-22 @ 18:30)  HR: 75 (04-16-22 @ 06:15) (70 - 75)  BP: 137/55 (04-16-22 @ 06:15) (137/55 - 148/58)  RR: 18 (04-16-22 @ 06:15) (16 - 18)  SpO2: 99% (04-16-22 @ 06:15) (98% - 99%)  Wt(kg): --  Height (cm): 167.6 (04-14-22 @ 12:55)  Weight (kg): 76.2 (04-14-22 @ 12:55)  BMI (kg/m2): 27.1 (04-14-22 @ 12:55)  BSA (m2): 1.86 (04-14-22 @ 12:55)      04-15-22 @ 07:01  -  04-16-22 @ 07:00  --------------------------------------------------------  IN: 2415 mL / OUT: 250 mL / NET: 2165 mL      Physical Exam:  	Gen: NAD  	HEENT: MMM  	Pulm: CTA B/L  	CV: S1S2  	Abd: Soft, +BS  	Ext: No LE edema B/L                      Neuro: Awake   	Skin: Warm and Dry   	Vascular access: NO HD catheter             no alexandro  LABS/STUDIES  --------------------------------------------------------------------------------              7.8    6.35  >-----------<  293      [04-16-22 @ 07:34]              24.4     144  |  109  |  18  ----------------------------<  133      [04-16-22 @ 07:34]  3.4   |  25  |  0.45        Ca     8.6     [04-16-22 @ 07:34]      Mg     2.00     [04-16-22 @ 07:34]      Phos  2.7     [04-16-22 @ 07:34]            Creatinine Trend:  SCr 0.45 [04-16 @ 07:34]  SCr 0.45 [04-15 @ 06:28]  SCr 0.47 [04-14 @ 07:35]  SCr 0.45 [04-13 @ 09:25]  SCr 0.42 [04-12 @ 07:57]    Urinalysis - [04-04-22 @ 01:59]      Color Light Yellow / Appearance Clear / SG 1.012 / pH 7.5      Gluc Negative / Ketone Negative  / Bili Negative / Urobili <2 mg/dL       Blood Negative / Protein Trace / Leuk Est Negative / Nitrite Negative      RBC 6 / WBC 1 / Hyaline  / Gran  / Sq Epi  / Non Sq Epi 1 / Bacteria Negative      PTH -- (Ca --)      [03-30-22 @ 07:35]   32  TSH 7.65      [04-05-22 @ 07:40]  Lipid: chol 158, , HDL 25, LDL --      [02-03-22 @ 15:22]      Syphilis Screen (Treponema Pallidum Ab) Negative      [04-05-22 @ 09:41]

## 2022-04-16 NOTE — PROGRESS NOTE ADULT - ASSESSMENT
82yo female with complicated medical course    Rectal Bleeding   s/p Flex Sig with clip placed in distal rectum   cbc stable   avoid rectal manipulation, suppositories or enemas   no objection to ASA 81mg  peg feeds w/aspiration precautions  DNR/DNI; hospice planning    Abnormal CT   agree with abx per ID   fluid collections too small for drainage; IR input appreciated  recent IR peg exchange 3/30; reconsult if issues  continue w/supportive care   DNR/DNI; hospice planning    Sepsis   per primary team and ID     I reviewed the overnight course of events on the unit, re-confirming the patient history. I discussed the care with the patient and their family. The plan of care was discussed with the physician assistant and modifications were made to the notation where appropriate. Differential diagnosis and plan of care discussed with patient after the evaluation. Advanced care planning was discussed with patient and family.  Advanced care planning forms were reviewed and discussed.  Risks, benefits and alternatives of gastroenterologic procedures were discussed in detail and all questions were answered. 35 minutes spent on total encounter of which more than fifty percent of the encounter was spent counseling and/or coordinating care by the attending physician.

## 2022-04-16 NOTE — PROGRESS NOTE ADULT - SUBJECTIVE AND OBJECTIVE BOX
INTERVAL HPI/OVERNIGHT EVENTS:    unobtainable   events noted    MEDICATIONS  (STANDING):  amLODIPine   Tablet 10 milliGRAM(s) Oral daily  aspirin  chewable 81 milliGRAM(s) Oral daily  atorvastatin 40 milliGRAM(s) Oral at bedtime  carvedilol 3.125 milliGRAM(s) Oral every 12 hours  dorzolamide 2% Ophthalmic Solution 1 Drop(s) Both EYES <User Schedule>  influenza  Vaccine (HIGH DOSE) 0.7 milliLiter(s) IntraMuscular once  lactated ringers. 1000 milliLiter(s) (75 mL/Hr) IV Continuous <Continuous>  latanoprost 0.005% Ophthalmic Solution 1 Drop(s) Both EYES at bedtime  levETIRAcetam  IVPB 500 milliGRAM(s) IV Intermittent every 12 hours  levothyroxine 75 MICROGram(s) Oral daily  meropenem/vaborbactam IVPB 4 Gram(s) IV Intermittent every 8 hours  pantoprazole   Suspension 40 milliGRAM(s) Oral daily  polyethylene glycol 3350 17 Gram(s) Oral daily  timolol 0.5% Solution 1 Drop(s) Both EYES two times a day    MEDICATIONS  (PRN):  acetaminophen     Tablet .. 650 milliGRAM(s) Oral every 6 hours PRN Mild Pain (1 - 3), Moderate Pain (4 - 6)      Allergies    No Known Allergies    Intolerances        Review of Systems: *pt minimally verbal to nonverbal, unable to obtain ROS         Vital Signs Last 24 Hrs  T(C): 36.7 (15 Apr 2022 09:59), Max: 37.1 (14 Apr 2022 17:32)  T(F): 98.1 (15 Apr 2022 09:59), Max: 98.8 (14 Apr 2022 17:32)  HR: 75 (15 Apr 2022 09:59) (75 - 93)  BP: 138/53 (15 Apr 2022 09:59) (113/48 - 160/58)  BP(mean): --  RR: 18 (15 Apr 2022 09:59) (12 - 18)  SpO2: 99% (15 Apr 2022 09:59) (99% - 100%)    PHYSICAL EXAM:    Constitutional: NAD  HEENT: EOMI, throat clear  Neck: No LAD, supple  Respiratory: +trach  Cardiovascular: S1 and S2, RRR, no M  Gastrointestinal: BS+, soft, NT/ND, neg HSM, +peg  Extremities: No peripheral edema, neg clubbing, cyanosis  Vascular: 2+ peripheral pulses  Neurological: A/O x 0  Psychiatric: ethargic  Skin: No rashes      LABS:                        9.3    7.52  )-----------( 300      ( 15 Apr 2022 06:28 )             29.1     04-15    145  |  109<H>  |  17  ----------------------------<  126<H>  3.6   |  25  |  0.45<L>    Ca    8.8      15 Apr 2022 06:28  Phos  2.9     04-15  Mg     2.00     04-15      PT/INR - ( 14 Apr 2022 07:35 )   PT: 13.3 sec;   INR: 1.14 ratio         PTT - ( 14 Apr 2022 07:35 )  PTT:24.0 sec      RADIOLOGY & ADDITIONAL TESTS:  < from: Flexible Sigmoidoscopy (04.14.22 @ 16:41) >    [facility]  _______________________________________________________________________________  Patient Name: Marina Allred             Procedure Date: 4/14/2022 4:41 PM  MRN: [id number]                     Account Number: [id number]  YOB: 1940              Admit Type: Inpatient  Room: [place] [address]                         Gender: Female  Attending MD: Ace Pierre MD      _______________________________________________________________________________     Procedure:           Flexible Sigmoidoscopy  Indications:         Hematochezia  Providers:           Ace Pierre MD  Medicines:           Monitored Anesthesia Care  Complications:       No immediate complications.  Procedure:           After obtaining informed consent, the endoscope was                        passed under direct vision. Throughout the procedure,                        the patient's blood pressure, pulse, and oxygen                        saturations were monitored continuously. The Endoscope                        was introduced through the anus and advanced to the                        sigmoid colon. The flexible sigmoidoscopy was                        accomplished without difficulty. The patient tolerated      the procedure well. The quality of the bowel preparation                        was poor.                                                                                   Findings:       A visible vessel (immediatly proximal to the dentate line) was found in        the distal rectum. Active bleeding was present. To stop active bleeding,        one hemostatic clip was successfully placed (MR conditional). There was        no bleeding at the end of the procedure.       Diverticulosis in the sigmoid                                                                                   Impression:          - Preparation of the colon was poor.                       - Visible vessel was found in the distal rectum. Clip                        (MR conditional) was placed.                       - No specimens collected.  Recommendation:      - Return patient to [facility] for ongoing care.                       -Restart PEG feeds                       -Hold Ac for Now                       -Stool softeners                       -No enema or rectal meds X 48 hours                                                                                   Attending Participation:       I personally performed the entire procedure.                                                              Ace Pierre  ___________________  Ace Pierre MD  4/14/2022 5:37:11 PM  This report has been signed electronically.  Number of Addenda: 0    Note Initiated On: 4/14/2022 4:41 PM    < end of copied text >

## 2022-04-16 NOTE — PROGRESS NOTE ADULT - SUBJECTIVE AND OBJECTIVE BOX
CARDIOLOGY     Trached, non-verbal, but alert and comfortable, ROS Unable to be obtained      DATE OF SERVICE - 04-16-22     Review of Systems:   Constitutional: [ ] fevers, [ ] chills.   Skin: [ ] dry skin. [ ] rashes.  Psychiatric: [ ] depression, [ ] anxiety.   Gastrointestinal: [ ] BRBPR, [ ] melena.   Neurological: [ ] confusion. [ ] seizures. [ ] shuffling gait.   Ears,Nose,Mouth and Throat: [ ] ear pain [ ] sore throat.   Eyes: [ ] diplopia.   Respiratory: [ ] hemoptysis. [ ] shortness of breath  Cardiovascular: See HPI above  Hematologic/Lymphatic: [ ] anemia. [ ] painful nodes. [ ] prolonged bleeding.   Genitourinary: [ ] hematuria. [ ] flank pain.   Endocrine: [ ] significant change in weight. [ ] intolerance to heat and cold.     Review of systems [ ] otherwise negative, [x] otherwise unable to obtain    FH: no family history of sudden cardiac death in first degree relatives    SH: [ ] tobacco, [ ] alcohol, [ ] drugs           acetaminophen     Tablet .. 650 milliGRAM(s) Oral every 6 hours PRN  amLODIPine   Tablet 10 milliGRAM(s) Oral daily  aspirin  chewable 81 milliGRAM(s) Oral daily  atorvastatin 40 milliGRAM(s) Oral at bedtime  carvedilol 3.125 milliGRAM(s) Oral every 12 hours  dorzolamide 2% Ophthalmic Solution 1 Drop(s) Both EYES <User Schedule>  influenza  Vaccine (HIGH DOSE) 0.7 milliLiter(s) IntraMuscular once  lactated ringers. 1000 milliLiter(s) IV Continuous <Continuous>  latanoprost 0.005% Ophthalmic Solution 1 Drop(s) Both EYES at bedtime  levETIRAcetam  IVPB 500 milliGRAM(s) IV Intermittent every 12 hours  levothyroxine 75 MICROGram(s) Oral daily  melatonin 3 milliGRAM(s) Oral at bedtime  meropenem/vaborbactam IVPB 4 Gram(s) IV Intermittent every 8 hours  OLANZapine 2.5 milliGRAM(s) Oral every 8 hours PRN  OLANZapine Injectable 2.5 milliGRAM(s) IntraMuscular every 8 hours PRN  pantoprazole   Suspension 40 milliGRAM(s) Oral daily  polyethylene glycol 3350 17 Gram(s) Oral daily  potassium chloride   Powder 40 milliEquivalent(s) Oral once  timolol 0.5% Solution 1 Drop(s) Both EYES two times a day                            7.8    6.35  )-----------( 293      ( 16 Apr 2022 07:34 )             24.4       Hemoglobin: 7.8 g/dL (04-16 @ 07:34)  Hemoglobin: 9.3 g/dL (04-15 @ 06:28)  Hemoglobin: 9.3 g/dL (04-14 @ 22:43)  Hemoglobin: 7.5 g/dL (04-14 @ 16:49)  Hemoglobin: 8.1 g/dL (04-14 @ 10:03)      04-16    144  |  109<H>  |  18  ----------------------------<  133<H>  3.4<L>   |  25  |  0.45<L>    Ca    8.6      16 Apr 2022 07:34  Phos  2.7     04-16  Mg     2.00     04-16      Creatinine Trend: 0.45<--, 0.45<--, 0.47<--, 0.45<--, 0.42<--, 0.40<--    COAGS:           T(C): 36.7 (04-16-22 @ 06:15), Max: 36.7 (04-15-22 @ 18:30)  HR: 75 (04-16-22 @ 06:15) (70 - 75)  BP: 137/55 (04-16-22 @ 06:15) (137/55 - 148/58)  RR: 18 (04-16-22 @ 06:15) (16 - 18)  SpO2: 99% (04-16-22 @ 06:15) (98% - 99%)  Wt(kg): --    I&O's Summary    15 Apr 2022 07:01  -  16 Apr 2022 07:00  --------------------------------------------------------  IN: 2415 mL / OUT: 250 mL / NET: 2165 mL      Head: Normocephalic and atraumatic.   Neck: No JVD. No bruits. Supple. Does not appear to be enlarged.   Cardiovascular: + S1,S2 ; RRR Soft systolic murmur at the left lower sternal border. No rubs noted.    Lungs: CTA b/l. No rhonchi, rales or wheezes.   Abdomen: + BS, soft. Non tender. Non distended. No rebound. No guarding.   Extremities: No clubbing/cyanosis/edema.   Skin: Warm and moist. The patient's skin has normal elasticity and good skin turgor.         A/P) 82 y/o female PMH hypertension, hyperlipidemia, hypothyroidism, depression, seizure disorder, PAF & stroke, who had a prolonged hospitalization from Dec 2021 to Feb 22 for perforated diverticulitis requiring Rosales procedure, colostomy bad, IR drainage for abdominal abscess. She subsequently required trach & PEG, now a/w GI bleeding.    -f/u GI regarding if/when a/c can be restarted for stroke prevention given PAF  -f/u ID  -f/u palliative care  -no further inpatient cardiac workup expected other than a/c for stroke prevention  -continue lipitor for hyperlipidemia  -continue coreg for hypertesion

## 2022-04-16 NOTE — PROGRESS NOTE ADULT - ASSESSMENT
80 yo F w/ PMHx of perforated diverticulitis s/p Rosales procedure, colostomy, abdominal abscess with wound s/p IR drainage, Respiratory Failure s/p Trache, CVA w/ functional quadriplegia, aphasia, dysphagia s/p PEG (on Jevity 1.5 @ 70cchr x 18 hrs, & water flushes 325ml q 6 hrs, Afib, GIB, HTN, HLD, Seizure d/o, Hypothyroidism, Glaucoma, Depression p/w PEG tube dislodgement for unknown amount of time     Problem/Plan - 1:  ·  Problem: Sepsis sec to KPC K Pneumoniae Bacteremia .   ·  Plan:  IV Abxs per ID till 4/19 .   ID help appreciated.   < from: CT Abdomen and Pelvis w/ IV Cont (04.04.22 @ 14:24) >  IMPRESSION:  Trace bilateral pleural effusions.    Improved aeration in the lower lobes bilaterally since 1/30/2022.   Nonspecific patchy bilateral groundglass and linear opacities are noted.    Enhancement of the wall of the cecum and ascending colon with mild   infiltration of the adjacent fat is similar in appearance to prior study   dated 3/27/2022. An inflammatory or infectious process is considered.    Dependent high attenuation within the urinary bladder, which may   represent small stones and/or debris. An underlying mass cannot be   excluded.    Thick-walled collection in the cul-de-sac and small collections along the   right paracolic gutter, not significantly changed since 3/27/2022.    < end of copied text >     Problem/Plan - 2:  ·  Problem: Metabolic Encephalopathy    ·  Plan: Likely sec to Infection  . Resolved.    Neurology helping  and  EEG showing no Seizure activity  .     Problem/Plan - 3:  ·  Problem: Stroke.   ·  Plan: Holding ASA, Plavix as actively bleeding.Will restart ASA if CTA okay and bleeding stops .    Continue  statin Via PEG tube .     Problem/Plan - 4:  ·  Problem: Chronic respiratory failure with hypoxia.   ·  Plan: Pt on 2l NC via Trache collar.   S/P replacement of Tracheostomy Tube.       Problem/Plan - 5:  ·  Problem: Diverticulitis of intestine with perforation and abscess without bleeding.   ·  Plan: Surgery c/s in chart- small fluid collection but nontoxic, if drainage needed would be done by IR.  Wound care eval.     Problem/Plan - 6:  ·  Problem: History of atrial fibrillation.   ·  Plan: Cards helping and now off AC .   D/W Son and okay to start.      Problem/Plan - 7:  ·  Problem: Seizure disorder.   ·  Plan: continue levetiracetam bid.     Problem/Plan - 8:  ·  Problem: DVT, lower extremity.   ·  Plan: ?Subacute vs Chronic, Off AC.      Problem/Plan - 9:  ·  Problem: HTN (hypertension).   ·  Plan: Resuming carvedilol & amlodipine as PEG replaced.     Problem/Plan - 10:  ·  Problem: Hypothyroidism.   ·  Plan; Resume Levothyroxine when PEG replaced.     Problem/Plan - 11:  ·  Problem: Glaucoma.   ·  Plan: continue Timolol, Dorzolamide, Latanoprost.      Problem/Plan - 12:  ·  Problem: PEG tube malfunction.   ·  Plan:  S/P  PEG . TF started.      Problem/Plan - 13:  ·  Problem: Hypokalemia.   ·  Plan: Correcting . .      Problem/Plan - 14:  ·  Problem: GI bleed. .   ·  Plan: PPI . GI following .     PRBC for HGb 7.5 G or less. Watching CBC.     < from: CT Angio Abdomen and Pelvis w/ IV Cont (04.13.22 @ 22:26) >  IMPRESSION:    Small rectal active hemorrhage. Rectal wall enhancement may also reflect   into the lesion.    Thick-walled collection in the cul-de-sac and small collections along the   right paracolic gutter are unchanged.    Bladder calculi reidentified.    Findings discussed with ROSALINE Harrison by Dr. Martina Crystal  on 4/13/2022   at 11:35 PM with readback.    < end of copied text >    < from: Flexible Sigmoidoscopy (04.14.22 @ 16:41) >  Findings:       A visible vessel (immediatly proximal to the dentate line) was found in        the distal rectum. Active bleeding was present. To stop active bleeding,        one hemostatic clip was successfully placed (MR conditional). There was        no bleeding at the end of the procedure.       Diverticulosis in the sigmoid                                                                                   Impression:          - Preparation of the colon was poor.                       - Visible vessel was found in the distal rectum. Clip                        (MR conditional) was placed.                       - No specimens collected.  Recommendation:      - Return patient to hospital [facility] for ongoing care.                       -Restart PEG feeds                       -Hold Ac for Now                       -Stool softeners                       -No enema or rectal meds X 48     < end of copied text >    Disposition : DC  planning on hold.   Over all prognosis very poor.

## 2022-04-17 LAB
ANION GAP SERPL CALC-SCNC: 11 MMOL/L — SIGNIFICANT CHANGE UP (ref 7–14)
BUN SERPL-MCNC: 15 MG/DL — SIGNIFICANT CHANGE UP (ref 7–23)
CALCIUM SERPL-MCNC: 9 MG/DL — SIGNIFICANT CHANGE UP (ref 8.4–10.5)
CHLORIDE SERPL-SCNC: 109 MMOL/L — HIGH (ref 98–107)
CO2 SERPL-SCNC: 24 MMOL/L — SIGNIFICANT CHANGE UP (ref 22–31)
CREAT SERPL-MCNC: 0.46 MG/DL — LOW (ref 0.5–1.3)
EGFR: 97 ML/MIN/1.73M2 — SIGNIFICANT CHANGE UP
GLUCOSE SERPL-MCNC: 125 MG/DL — HIGH (ref 70–99)
HCT VFR BLD CALC: 27.8 % — LOW (ref 34.5–45)
HGB BLD-MCNC: 8.7 G/DL — LOW (ref 11.5–15.5)
MAGNESIUM SERPL-MCNC: 2 MG/DL — SIGNIFICANT CHANGE UP (ref 1.6–2.6)
MCHC RBC-ENTMCNC: 28.2 PG — SIGNIFICANT CHANGE UP (ref 27–34)
MCHC RBC-ENTMCNC: 31.3 GM/DL — LOW (ref 32–36)
MCV RBC AUTO: 90 FL — SIGNIFICANT CHANGE UP (ref 80–100)
NRBC # BLD: 0 /100 WBCS — SIGNIFICANT CHANGE UP
NRBC # FLD: 0 K/UL — SIGNIFICANT CHANGE UP
PHOSPHATE SERPL-MCNC: 2.4 MG/DL — LOW (ref 2.5–4.5)
PLATELET # BLD AUTO: 320 K/UL — SIGNIFICANT CHANGE UP (ref 150–400)
POTASSIUM SERPL-MCNC: 3.4 MMOL/L — LOW (ref 3.5–5.3)
POTASSIUM SERPL-SCNC: 3.4 MMOL/L — LOW (ref 3.5–5.3)
RBC # BLD: 3.09 M/UL — LOW (ref 3.8–5.2)
RBC # FLD: 16.3 % — HIGH (ref 10.3–14.5)
SODIUM SERPL-SCNC: 144 MMOL/L — SIGNIFICANT CHANGE UP (ref 135–145)
WBC # BLD: 6.01 K/UL — SIGNIFICANT CHANGE UP (ref 3.8–10.5)
WBC # FLD AUTO: 6.01 K/UL — SIGNIFICANT CHANGE UP (ref 3.8–10.5)

## 2022-04-17 PROCEDURE — 71045 X-RAY EXAM CHEST 1 VIEW: CPT | Mod: 26

## 2022-04-17 RX ORDER — POTASSIUM CHLORIDE 20 MEQ
40 PACKET (EA) ORAL ONCE
Refills: 0 | Status: COMPLETED | OUTPATIENT
Start: 2022-04-17 | End: 2022-04-19

## 2022-04-17 RX ORDER — SODIUM,POTASSIUM PHOSPHATES 278-250MG
1 POWDER IN PACKET (EA) ORAL
Refills: 0 | Status: COMPLETED | OUTPATIENT
Start: 2022-04-17 | End: 2022-04-18

## 2022-04-17 RX ORDER — SODIUM,POTASSIUM PHOSPHATES 278-250MG
1 POWDER IN PACKET (EA) ORAL
Refills: 0 | Status: DISCONTINUED | OUTPATIENT
Start: 2022-04-17 | End: 2022-04-17

## 2022-04-17 RX ADMIN — Medication 1 DROP(S): at 19:49

## 2022-04-17 RX ADMIN — DORZOLAMIDE HYDROCHLORIDE 1 DROP(S): 20 SOLUTION/ DROPS OPHTHALMIC at 19:23

## 2022-04-17 RX ADMIN — MEROPENEM-VABORBACTAM 83.33 GRAM(S): 1; 1 INJECTION, POWDER, FOR SOLUTION INTRAVENOUS at 03:50

## 2022-04-17 RX ADMIN — MEROPENEM-VABORBACTAM 83.33 GRAM(S): 1; 1 INJECTION, POWDER, FOR SOLUTION INTRAVENOUS at 20:07

## 2022-04-17 RX ADMIN — Medication 1 DROP(S): at 07:48

## 2022-04-17 RX ADMIN — PANTOPRAZOLE SODIUM 40 MILLIGRAM(S): 20 TABLET, DELAYED RELEASE ORAL at 11:42

## 2022-04-17 RX ADMIN — POLYETHYLENE GLYCOL 3350 17 GRAM(S): 17 POWDER, FOR SOLUTION ORAL at 11:43

## 2022-04-17 RX ADMIN — Medication 650 MILLIGRAM(S): at 23:02

## 2022-04-17 RX ADMIN — LEVETIRACETAM 400 MILLIGRAM(S): 250 TABLET, FILM COATED ORAL at 11:16

## 2022-04-17 RX ADMIN — CARVEDILOL PHOSPHATE 3.12 MILLIGRAM(S): 80 CAPSULE, EXTENDED RELEASE ORAL at 07:48

## 2022-04-17 RX ADMIN — LEVETIRACETAM 400 MILLIGRAM(S): 250 TABLET, FILM COATED ORAL at 23:01

## 2022-04-17 RX ADMIN — Medication 1 PACKET(S): at 23:17

## 2022-04-17 RX ADMIN — AMLODIPINE BESYLATE 10 MILLIGRAM(S): 2.5 TABLET ORAL at 07:49

## 2022-04-17 RX ADMIN — DORZOLAMIDE HYDROCHLORIDE 1 DROP(S): 20 SOLUTION/ DROPS OPHTHALMIC at 09:12

## 2022-04-17 RX ADMIN — Medication 81 MILLIGRAM(S): at 11:43

## 2022-04-17 RX ADMIN — Medication 75 MICROGRAM(S): at 07:49

## 2022-04-17 RX ADMIN — MEROPENEM-VABORBACTAM 83.33 GRAM(S): 1; 1 INJECTION, POWDER, FOR SOLUTION INTRAVENOUS at 12:54

## 2022-04-17 RX ADMIN — LATANOPROST 1 DROP(S): 0.05 SOLUTION/ DROPS OPHTHALMIC; TOPICAL at 22:48

## 2022-04-17 RX ADMIN — CARVEDILOL PHOSPHATE 3.12 MILLIGRAM(S): 80 CAPSULE, EXTENDED RELEASE ORAL at 23:18

## 2022-04-17 NOTE — PROGRESS NOTE ADULT - SUBJECTIVE AND OBJECTIVE BOX
Date of Service  : 04-17-22     INTERVAL HPI/OVERNIGHT EVENTS: No  new concerns.   Vital Signs Last 24 Hrs  T(C): 37.2 (17 Apr 2022 10:40), Max: 37.3 (17 Apr 2022 07:30)  T(F): 99 (17 Apr 2022 10:40), Max: 99.1 (17 Apr 2022 07:30)  HR: 84 (17 Apr 2022 10:40) (82 - 88)  BP: 147/51 (17 Apr 2022 10:40) (147/51 - 159/51)  BP(mean): --  RR: 18 (17 Apr 2022 10:40) (17 - 18)  SpO2: 97% (17 Apr 2022 10:40) (97% - 100%)  I&O's Summary    16 Apr 2022 07:01  -  17 Apr 2022 07:00  --------------------------------------------------------  IN: 2675 mL / OUT: 70 mL / NET: 2605 mL    17 Apr 2022 07:01  -  17 Apr 2022 13:39  --------------------------------------------------------  IN: 640 mL / OUT: 0 mL / NET: 640 mL      MEDICATIONS  (STANDING):  amLODIPine   Tablet 10 milliGRAM(s) Oral daily  aspirin  chewable 81 milliGRAM(s) Oral daily  atorvastatin 40 milliGRAM(s) Oral at bedtime  carvedilol 3.125 milliGRAM(s) Oral every 12 hours  dorzolamide 2% Ophthalmic Solution 1 Drop(s) Both EYES <User Schedule>  influenza  Vaccine (HIGH DOSE) 0.7 milliLiter(s) IntraMuscular once  lactated ringers. 1000 milliLiter(s) (75 mL/Hr) IV Continuous <Continuous>  latanoprost 0.005% Ophthalmic Solution 1 Drop(s) Both EYES at bedtime  levETIRAcetam  IVPB 500 milliGRAM(s) IV Intermittent every 12 hours  levothyroxine 75 MICROGram(s) Oral daily  melatonin 3 milliGRAM(s) Oral at bedtime  meropenem/vaborbactam IVPB 4 Gram(s) IV Intermittent every 8 hours  pantoprazole   Suspension 40 milliGRAM(s) Oral daily  polyethylene glycol 3350 17 Gram(s) Oral daily  potassium chloride   Powder 40 milliEquivalent(s) Oral once  potassium phosphate / sodium phosphate Powder (PHOS-NaK) 1 Packet(s) Oral two times a day  timolol 0.5% Solution 1 Drop(s) Both EYES two times a day    MEDICATIONS  (PRN):  acetaminophen     Tablet .. 650 milliGRAM(s) Oral every 6 hours PRN Mild Pain (1 - 3), Moderate Pain (4 - 6)  OLANZapine 2.5 milliGRAM(s) Oral every 8 hours PRN agitation  OLANZapine Injectable 2.5 milliGRAM(s) IntraMuscular every 8 hours PRN severe agitation    LABS:                        8.7    6.01  )-----------( 320      ( 17 Apr 2022 07:23 )             27.8     04-17    144  |  109<H>  |  15  ----------------------------<  125<H>  3.4<L>   |  24  |  0.46<L>    Ca    9.0      17 Apr 2022 07:23  Phos  2.4     04-17  Mg     2.00     04-17          CAPILLARY BLOOD GLUCOSE                  Consultant(s) Notes Reviewed:  [x ] YES  [ ] NO    PHYSICAL EXAM:  GENERAL: NAD, well-groomed, well-developed,not in any distress ,  HEAD:  Atraumatic, Normocephalic  NECK: Trach +  NERVOUS SYSTEM:  Alert   CHEST/LUNG: Good air entry bilateral with no  rales, rhonchi, wheezing, or rubs  HEART: Regular rate and rhythm; No murmurs, rubs, or gallops  ABDOMEN: Soft, Nontender, Nondistended; Bowel sounds present, PEG and Ostomy +  EXTREMITIES:  2+ Peripheral Pulses, No clubbing, cyanosis, or edema    Care Discussed with Consultants/Other Providers [ x] YES  [ ] NO

## 2022-04-17 NOTE — PROGRESS NOTE ADULT - ASSESSMENT
80 yo F with PMHx HTN, HLD, Hypothyroidism, Depression, seizure d/o, RLE DVT, Cholelithiasis s/p recent Hospitalization in [facility] from 12/22 -> 2/11/22 for acute perforated diverticulitis s/p Rosales 12/25, colostomy bag 12/26, hospitalization c/b IR drainage for abdominal abscess 1/3 Cx grew e.coli & bacteriodes, also required Mechanical Ventilation for Acute Respiratory Failure with Tracheostomy done 1/7.  PEG done 1/18, developed bleeding through ostomy, EGD on 2/1 showed gastritis. T's showed Acute left MCA infarct- could be 2/2 to carotid occlusion vs afib, new RLL aspiration pneumonia, seen by ID, completed antibiotics, seen by VascSx, not surgical candidate. Pt aphasic sent from [facility] for dislodged Peg tube, after staff noted pt has blood to her abdomen. Nephrology consulted for Hypokalemia.     A/P  Hypokalemia   likely 2/2 GI loss   Replete KCL 40mEQ today  monitor input and output closely   monitor K closely    Hypomagnesia   replete as needed  monitor Mg     Hypophosphatemia   replete as needed  monitor     Hypocalcemia   corrected Ca WNL   Monitor    Acidosis with anion gap   etiology?  improved   monitor    HTN  optimal   monitor BP closely

## 2022-04-17 NOTE — CHART NOTE - NSCHARTNOTEFT_GEN_A_CORE
Notified by RN, pt "abdominally breathing". Pt assessed at bedside. Pt was just cleaned. Pt with stable vitals, O2 98%, non verbal but responding appropriately to questions/directions. Pt with HOB at 30% with continuous tube feeding. Pt with slight audible expiratory wheeze. No trach care done all day.  HOB adjusted to 50%. STAT chest xray ordered. Tube feeds held until chest xray read, bedside trach care done.  Pt with resolution of symptoms, normal respirations, and when asked if she feels better, nodded head. Will continue to monitor    Mary Jean-Baptiste NP  [id number]

## 2022-04-17 NOTE — PROGRESS NOTE ADULT - SUBJECTIVE AND OBJECTIVE BOX
[facility] [place] [facility]   MD ELOISE ALVARADO MD RUORU WONG, PA    TEL:  FROM 9 AM to 5 PM ---OFFICE: [phone]    FROM 5 PM - 9 AM PLEASE CALL ANSWERING SERVICE: [phone]    RENAL FOLLOW UP NOTE--Date of Service 04-17-22 @ 10:18  --------------------------------------------------------------------------------  HPI:      Pt seen and examined at bedside.       PAST HISTORY  --------------------------------------------------------------------------------  No significant changes to PMH, PSH, FHx, SHx, unless otherwise noted    ALLERGIES & MEDICATIONS  --------------------------------------------------------------------------------  Allergies    No Known Allergies    Intolerances      Standing Inpatient Medications  amLODIPine   Tablet 10 milliGRAM(s) Oral daily  aspirin  chewable 81 milliGRAM(s) Oral daily  atorvastatin 40 milliGRAM(s) Oral at bedtime  carvedilol 3.125 milliGRAM(s) Oral every 12 hours  dorzolamide 2% Ophthalmic Solution 1 Drop(s) Both EYES <User Schedule>  influenza  Vaccine (HIGH DOSE) 0.7 milliLiter(s) IntraMuscular once  lactated ringers. 1000 milliLiter(s) IV Continuous <Continuous>  latanoprost 0.005% Ophthalmic Solution 1 Drop(s) Both EYES at bedtime  levETIRAcetam  IVPB 500 milliGRAM(s) IV Intermittent every 12 hours  levothyroxine 75 MICROGram(s) Oral daily  melatonin 3 milliGRAM(s) Oral at bedtime  meropenem/vaborbactam IVPB 4 Gram(s) IV Intermittent every 8 hours  pantoprazole   Suspension 40 milliGRAM(s) Oral daily  polyethylene glycol 3350 17 Gram(s) Oral daily  potassium chloride   Powder 40 milliEquivalent(s) Oral once  potassium phosphate / sodium phosphate Powder (PHOS-NaK) 1 Packet(s) Oral two times a day  timolol 0.5% Solution 1 Drop(s) Both EYES two times a day    PRN Inpatient Medications  acetaminophen     Tablet .. 650 milliGRAM(s) Oral every 6 hours PRN  OLANZapine 2.5 milliGRAM(s) Oral every 8 hours PRN  OLANZapine Injectable 2.5 milliGRAM(s) IntraMuscular every 8 hours PRN      REVIEW OF SYSTEMS  --------------------------------------------------------------------------------  General: no fever  MSK: no edema     VITALS/PHYSICAL EXAM  --------------------------------------------------------------------------------  T(C): 37.3 (04-17-22 @ 07:30), Max: 37.3 (04-17-22 @ 07:30)  HR: 82 (04-17-22 @ 07:30) (82 - 88)  BP: 159/51 (04-17-22 @ 07:30) (150/63 - 159/51)  RR: 18 (04-17-22 @ 07:30) (17 - 18)  SpO2: 100% (04-17-22 @ 07:30) (98% - 100%)  Wt(kg): --        04-16-22 @ 07:01  -  04-17-22 @ 07:00  --------------------------------------------------------  IN: 2675 mL / OUT: 70 mL / NET: 2605 mL      Physical Exam:  	Gen: NAD  	HEENT: MMM  	Pulm: CTA B/L  	CV: S1S2  	Abd: Soft, +BS  	Ext: No LE edema B/L                      Neuro: Awake   	Skin: Warm and Dry   	Vascular access: NO HD catheter            mc  LABS/STUDIES  --------------------------------------------------------------------------------              8.7    6.01  >-----------<  320      [04-17-22 @ 07:23]              27.8     144  |  109  |  15  ----------------------------<  125      [04-17-22 @ 07:23]  3.4   |  24  |  0.46        Ca     9.0     [04-17-22 @ 07:23]      Mg     2.00     [04-17-22 @ 07:23]      Phos  2.4     [04-17-22 @ 07:23]            Creatinine Trend:  SCr 0.46 [04-17 @ 07:23]  SCr 0.45 [04-16 @ 07:34]  SCr 0.45 [04-15 @ 06:28]  SCr 0.47 [04-14 @ 07:35]  SCr 0.45 [04-13 @ 09:25]    Urinalysis - [04-04-22 @ 01:59]      Color Light Yellow / Appearance Clear / SG 1.012 / pH 7.5      Gluc Negative / Ketone Negative  / Bili Negative / Urobili <2 mg/dL       Blood Negative / Protein Trace / Leuk Est Negative / Nitrite Negative      RBC 6 / WBC 1 / Hyaline  / Gran  / Sq Epi  / Non Sq Epi 1 / Bacteria Negative      PTH -- (Ca --)      [03-30-22 @ 07:35]   32  TSH 7.65      [04-05-22 @ 07:40]  Lipid: chol 158, , HDL 25, LDL --      [02-03-22 @ 15:22]      Syphilis Screen (Treponema Pallidum Ab) Negative      [04-05-22 @ 09:41]

## 2022-04-17 NOTE — PROGRESS NOTE ADULT - ASSESSMENT
80 yo F w/ PMHx of perforated diverticulitis s/p Rosales procedure, colostomy, abdominal abscess with wound s/p IR drainage, Respiratory Failure s/p Trache, CVA w/ functional quadriplegia, aphasia, dysphagia s/p PEG (on Jevity 1.5 @ 70cchr x 18 hrs, & water flushes 325ml q 6 hrs, Afib, GIB, HTN, HLD, Seizure d/o, Hypothyroidism, Glaucoma, Depression p/w PEG tube dislodgement for unknown amount of time     Problem/Plan - 1:  ·  Problem: Sepsis sec to KPC K Pneumoniae Bacteremia .   ·  Plan:  IV Abxs per ID till 4/19 .   ID help appreciated.   < from: CT Abdomen and Pelvis w/ IV Cont (04.04.22 @ 14:24) >  IMPRESSION:  Trace bilateral pleural effusions.    Improved aeration in the lower lobes bilaterally since 1/30/2022.   Nonspecific patchy bilateral groundglass and linear opacities are noted.    Enhancement of the wall of the cecum and ascending colon with mild   infiltration of the adjacent fat is similar in appearance to prior study   dated 3/27/2022. An inflammatory or infectious process is considered.    Dependent high attenuation within the urinary bladder, which may   represent small stones and/or debris. An underlying mass cannot be   excluded.    Thick-walled collection in the cul-de-sac and small collections along the   right paracolic gutter, not significantly changed since 3/27/2022.    < end of copied text >     Problem/Plan - 2:  ·  Problem: Metabolic Encephalopathy    ·  Plan: Likely sec to Infection  . Resolved.    Neurology helping  and  EEG showing no Seizure activity  .     Problem/Plan - 3:  ·  Problem: Stroke.   ·  Plan: Holding ASA, Plavix as actively bleeding.Will restart ASA if CTA okay and bleeding stops .    Continue  statin Via PEG tube .     Problem/Plan - 4:  ·  Problem: Chronic respiratory failure with hypoxia.   ·  Plan: Pt on 2l NC via Trache collar.   S/P replacement of Tracheostomy Tube.       Problem/Plan - 5:  ·  Problem: Diverticulitis of intestine with perforation and abscess without bleeding.   ·  Plan: Surgery c/s in chart- small fluid collection but nontoxic, if drainage needed would be done by IR.  Wound care eval.     Problem/Plan - 6:  ·  Problem: History of atrial fibrillation.   ·  Plan: Cards helping and now off AC .   D/W Son and okay to start.      Problem/Plan - 7:  ·  Problem: Seizure disorder.   ·  Plan: continue levetiracetam bid.     Problem/Plan - 8:  ·  Problem: DVT, lower extremity.   ·  Plan: ?Subacute vs Chronic, Off AC.      Problem/Plan - 9:  ·  Problem: HTN (hypertension).   ·  Plan: Resuming carvedilol & amlodipine as PEG replaced.     Problem/Plan - 10:  ·  Problem: Hypothyroidism.   ·  Plan; Resume Levothyroxine when PEG replaced.     Problem/Plan - 11:  ·  Problem: Glaucoma.   ·  Plan: continue Timolol, Dorzolamide, Latanoprost.      Problem/Plan - 12:  ·  Problem: PEG tube malfunction.   ·  Plan:  S/P  PEG . TF started.      Problem/Plan - 13:  ·  Problem: Hypokalemia.   ·  Plan: Correcting . .      Problem/Plan - 14:  ·  Problem: GI bleed. .   ·  Plan: PPI . GI following .     PRBC for HGb 7.5 G or less. Watching CBC.     < from: CT Angio Abdomen and Pelvis w/ IV Cont (04.13.22 @ 22:26) >  IMPRESSION:    Small rectal active hemorrhage. Rectal wall enhancement may also reflect   into the lesion.    Thick-walled collection in the cul-de-sac and small collections along the   right paracolic gutter are unchanged.    Bladder calculi reidentified.    Findings discussed with ROSALINE Harrison by Dr. Martina Crystal  on 4/13/2022   at 11:35 PM with readback.    < end of copied text >    < from: Flexible Sigmoidoscopy (04.14.22 @ 16:41) >  Findings:       A visible vessel (immediatly proximal to the dentate line) was found in        the distal rectum. Active bleeding was present. To stop active bleeding,        one hemostatic clip was successfully placed (MR conditional). There was        no bleeding at the end of the procedure.       Diverticulosis in the sigmoid                                                                                   Impression:          - Preparation of the colon was poor.                       - Visible vessel was found in the distal rectum. Clip                        (MR conditional) was placed.                       - No specimens collected.  Recommendation:      - Return patient to [facility] for [facility] care.                       -Restart PEG feeds                       -Hold Ac for Now                       -Stool softeners                       -No enema or rectal meds X 48     < end of copied text >    Disposition : DC  planning .  Over all prognosis very poor.

## 2022-04-17 NOTE — PROGRESS NOTE ADULT - SUBJECTIVE AND OBJECTIVE BOX
CARDIOLOGY     Trached, non-verbal,     DATE OF SERVICE - 04-17-22     Review of Systems:   Constitutional: [ ] fevers, [ ] chills.   Skin: [ ] dry skin. [ ] rashes.  Psychiatric: [ ] depression, [ ] anxiety.   Gastrointestinal: [ ] BRBPR, [ ] melena.   Neurological: [ ] confusion. [ ] seizures. [ ] shuffling gait.   Ears,Nose,Mouth and Throat: [ ] ear pain [ ] sore throat.   Eyes: [ ] diplopia.   Respiratory: [ ] hemoptysis. [ ] shortness of breath  Cardiovascular: See HPI above  Hematologic/Lymphatic: [ ] anemia. [ ] painful nodes. [ ] prolonged bleeding.   Genitourinary: [ ] hematuria. [ ] flank pain.   Endocrine: [ ] significant change in weight. [ ] intolerance to heat and cold.     Review of systems [ ] otherwise negative, [x] otherwise unable to obtain    FH: no family history of sudden cardiac death in first degree relatives    SH: [ ] tobacco, [ ] alcohol, [ ] drugs             acetaminophen     Tablet .. 650 milliGRAM(s) Oral every 6 hours PRN  amLODIPine   Tablet 10 milliGRAM(s) Oral daily  aspirin  chewable 81 milliGRAM(s) Oral daily  atorvastatin 40 milliGRAM(s) Oral at bedtime  carvedilol 3.125 milliGRAM(s) Oral every 12 hours  dorzolamide 2% Ophthalmic Solution 1 Drop(s) Both EYES <User Schedule>  influenza  Vaccine (HIGH DOSE) 0.7 milliLiter(s) IntraMuscular once  lactated ringers. 1000 milliLiter(s) IV Continuous <Continuous>  latanoprost 0.005% Ophthalmic Solution 1 Drop(s) Both EYES at bedtime  levETIRAcetam  IVPB 500 milliGRAM(s) IV Intermittent every 12 hours  levothyroxine 75 MICROGram(s) Oral daily  melatonin 3 milliGRAM(s) Oral at bedtime  meropenem/vaborbactam IVPB 4 Gram(s) IV Intermittent every 8 hours  OLANZapine 2.5 milliGRAM(s) Oral every 8 hours PRN  OLANZapine Injectable 2.5 milliGRAM(s) IntraMuscular every 8 hours PRN  pantoprazole   Suspension 40 milliGRAM(s) Oral daily  polyethylene glycol 3350 17 Gram(s) Oral daily  potassium chloride   Powder 40 milliEquivalent(s) Oral once  potassium phosphate / sodium phosphate Powder (PHOS-NaK) 1 Packet(s) Oral two times a day  timolol 0.5% Solution 1 Drop(s) Both EYES two times a day                            8.7    6.01  )-----------( 320      ( 17 Apr 2022 07:23 )             27.8       Hemoglobin: 8.7 g/dL (04-17 @ 07:23)  Hemoglobin: 8.8 g/dL (04-16 @ 21:28)  Hemoglobin: 7.8 g/dL (04-16 @ 07:34)  Hemoglobin: 9.3 g/dL (04-15 @ 06:28)  Hemoglobin: 9.3 g/dL (04-14 @ 22:43)      04-17    144  |  109<H>  |  15  ----------------------------<  125<H>  3.4<L>   |  24  |  0.46<L>    Ca    9.0      17 Apr 2022 07:23  Phos  2.4     04-17  Mg     2.00     04-17      Creatinine Trend: 0.46<--, 0.45<--, 0.45<--, 0.47<--, 0.45<--, 0.42<--    COAGS:           T(C): 37 (04-16-22 @ 17:59), Max: 37 (04-16-22 @ 17:59)  HR: 88 (04-16-22 @ 17:59) (84 - 88)  BP: 150/63 (04-16-22 @ 17:59) (150/63 - 158/58)  RR: 17 (04-16-22 @ 17:59) (17 - 17)  SpO2: 100% (04-16-22 @ 17:59) (98% - 100%)  Wt(kg): --    I&O's Summary    16 Apr 2022 07:01  -  17 Apr 2022 07:00  --------------------------------------------------------  IN: 1280 mL / OUT: 70 mL / NET: 1210 mL      Head: Normocephalic and atraumatic.   Neck: No JVD. No bruits. Supple. Does not appear to be enlarged.   Cardiovascular: + S1,S2 ; RRR Soft systolic murmur at the left lower sternal border. No rubs noted.    Lungs: CTA b/l. No rhonchi, rales or wheezes.   Abdomen: + BS, soft. Non tender. Non distended. No rebound. No guarding.   Extremities: No clubbing/cyanosis/edema.   Skin: Warm and moist. The patient's skin has normal elasticity and good skin turgor.         A/P) 80 y/o female PMH hypertension, hyperlipidemia, hypothyroidism, depression, seizure disorder, PAF & stroke, who had a prolonged hospitalization from Dec 2021 to Feb 22 for perforated diverticulitis requiring Rosales procedure, colostomy bad, IR drainage for abdominal abscess. She subsequently required trach & PEG, now a/w GI bleeding.    -f/u GI regarding if/when a/c can be restarted for stroke prevention given PAF  -f/u ID  -f/u palliative care  -no further inpatient cardiac workup expected other than a/c for stroke prevention  -continue lipitor for hyperlipidemia  -continue coreg for hypertesion

## 2022-04-17 NOTE — PROGRESS NOTE ADULT - SUBJECTIVE AND OBJECTIVE BOX
INTERVAL HPI/OVERNIGHT EVENTS:    unobtainable   events noted    MEDICATIONS  (STANDING):  amLODIPine   Tablet 10 milliGRAM(s) Oral daily  aspirin  chewable 81 milliGRAM(s) Oral daily  atorvastatin 40 milliGRAM(s) Oral at bedtime  carvedilol 3.125 milliGRAM(s) Oral every 12 hours  dorzolamide 2% Ophthalmic Solution 1 Drop(s) Both EYES <User Schedule>  influenza  Vaccine (HIGH DOSE) 0.7 milliLiter(s) IntraMuscular once  lactated ringers. 1000 milliLiter(s) (75 mL/Hr) IV Continuous <Continuous>  latanoprost 0.005% Ophthalmic Solution 1 Drop(s) Both EYES at bedtime  levETIRAcetam  IVPB 500 milliGRAM(s) IV Intermittent every 12 hours  levothyroxine 75 MICROGram(s) Oral daily  meropenem/vaborbactam IVPB 4 Gram(s) IV Intermittent every 8 hours  pantoprazole   Suspension 40 milliGRAM(s) Oral daily  polyethylene glycol 3350 17 Gram(s) Oral daily  timolol 0.5% Solution 1 Drop(s) Both EYES two times a day    MEDICATIONS  (PRN):  acetaminophen     Tablet .. 650 milliGRAM(s) Oral every 6 hours PRN Mild Pain (1 - 3), Moderate Pain (4 - 6)      Allergies    No Known Allergies    Intolerances        Review of Systems: *pt minimally verbal to nonverbal, unable to obtain ROS         Vital Signs Last 24 Hrs  T(C): 36.7 (15 Apr 2022 09:59), Max: 37.1 (14 Apr 2022 17:32)  T(F): 98.1 (15 Apr 2022 09:59), Max: 98.8 (14 Apr 2022 17:32)  HR: 75 (15 Apr 2022 09:59) (75 - 93)  BP: 138/53 (15 Apr 2022 09:59) (113/48 - 160/58)  BP(mean): --  RR: 18 (15 Apr 2022 09:59) (12 - 18)  SpO2: 99% (15 Apr 2022 09:59) (99% - 100%)    PHYSICAL EXAM:    Constitutional: NAD  HEENT: EOMI, throat clear  Neck: No LAD, supple  Respiratory: +trach  Cardiovascular: S1 and S2, RRR, no M  Gastrointestinal: BS+, soft, NT/ND, neg HSM, +peg  Extremities: No peripheral edema, neg clubbing, cyanosis  Vascular: 2+ peripheral pulses  Neurological: A/O x 0  Psychiatric: ethargic  Skin: No rashes      LABS:                        9.3    7.52  )-----------( 300      ( 15 Apr 2022 06:28 )             29.1     04-15    145  |  109<H>  |  17  ----------------------------<  126<H>  3.6   |  25  |  0.45<L>    Ca    8.8      15 Apr 2022 06:28  Phos  2.9     04-15  Mg     2.00     04-15      PT/INR - ( 14 Apr 2022 07:35 )   PT: 13.3 sec;   INR: 1.14 ratio         PTT - ( 14 Apr 2022 07:35 )  PTT:24.0 sec      RADIOLOGY & ADDITIONAL TESTS:  < from: Flexible Sigmoidoscopy (04.14.22 @ 16:41) >    [facility]  _______________________________________________________________________________  Patient Name: Marina Allred             Procedure Date: 4/14/2022 4:41 PM  MRN: [id number]                     Account Number: [id number]  YOB: 1940              Admit Type: Inpatient  Room: [place] [address]                         Gender: Female  Attending MD: Ace Pierre MD      _______________________________________________________________________________     Procedure:           Flexible Sigmoidoscopy  Indications:         Hematochezia  Providers:           Ace Pierre MD  Medicines:           Monitored Anesthesia Care  Complications:       No immediate complications.  Procedure:           After obtaining informed consent, the endoscope was                        passed under direct vision. Throughout the procedure,                        the patient's blood pressure, pulse, and oxygen                        saturations were monitored continuously. The Endoscope                        was introduced through the anus and advanced to the                        sigmoid colon. The flexible sigmoidoscopy was                        accomplished without difficulty. The patient tolerated      the procedure well. The quality of the bowel preparation                        was poor.                                                                                   Findings:       A visible vessel (immediatly proximal to the dentate line) was found in        the distal rectum. Active bleeding was present. To stop active bleeding,        one hemostatic clip was successfully placed (MR conditional). There was        no bleeding at the end of the procedure.       Diverticulosis in the sigmoid                                                                                   Impression:          - Preparation of the colon was poor.                       - Visible vessel was found in the distal rectum. Clip                        (MR conditional) was placed.                       - No specimens collected.  Recommendation:      - Return patient to [facility] for ongoing care.                       -Restart PEG feeds                       -Hold Ac for Now                       -Stool softeners                       -No enema or rectal meds X 48 hours                                                                                   Attending Participation:       I personally performed the entire procedure.                                                              Ace Pierre  ___________________  Ace Pierre MD  4/14/2022 5:37:11 PM  This report has been signed electronically.  Number of Addenda: 0    Note Initiated On: 4/14/2022 4:41 PM    < end of copied text >

## 2022-04-17 NOTE — PROGRESS NOTE ADULT - ASSESSMENT
80yo female with complicated medical course    Rectal Bleeding   s/p Flex Sig with clip placed in distal rectum   cbc stable   avoid rectal manipulation, suppositories or enemas   no objection to ASA 81mg  peg feeds w/aspiration precautions  DNR/DNI; hospice planning    Abnormal CT   agree with abx per ID   fluid collections too small for drainage; IR input appreciated  recent IR peg exchange 3/30; reconsult if issues  continue w/supportive care   DNR/DNI; hospice planning    Sepsis   per primary team and ID     I reviewed the overnight course of events on the unit, re-confirming the patient history. I discussed the care with the patient and their family. The plan of care was discussed with the physician assistant and modifications were made to the notation where appropriate. Differential diagnosis and plan of care discussed with patient after the evaluation. Advanced care planning was discussed with patient and family.  Advanced care planning forms were reviewed and discussed.  Risks, benefits and alternatives of gastroenterologic procedures were discussed in detail and all questions were answered. 35 minutes spent on total encounter of which more than fifty percent of the encounter was spent counseling and/or coordinating care by the attending physician.

## 2022-04-18 LAB
ANION GAP SERPL CALC-SCNC: 12 MMOL/L — SIGNIFICANT CHANGE UP (ref 7–14)
BUN SERPL-MCNC: 15 MG/DL — SIGNIFICANT CHANGE UP (ref 7–23)
CALCIUM SERPL-MCNC: 8.9 MG/DL — SIGNIFICANT CHANGE UP (ref 8.4–10.5)
CHLORIDE SERPL-SCNC: 105 MMOL/L — SIGNIFICANT CHANGE UP (ref 98–107)
CO2 SERPL-SCNC: 25 MMOL/L — SIGNIFICANT CHANGE UP (ref 22–31)
CREAT SERPL-MCNC: 0.5 MG/DL — SIGNIFICANT CHANGE UP (ref 0.5–1.3)
EGFR: 95 ML/MIN/1.73M2 — SIGNIFICANT CHANGE UP
GLUCOSE BLDC GLUCOMTR-MCNC: 127 MG/DL — HIGH (ref 70–99)
GLUCOSE BLDC GLUCOMTR-MCNC: 144 MG/DL — HIGH (ref 70–99)
GLUCOSE SERPL-MCNC: 124 MG/DL — HIGH (ref 70–99)
HCT VFR BLD CALC: 28.9 % — LOW (ref 34.5–45)
HGB BLD-MCNC: 9.2 G/DL — LOW (ref 11.5–15.5)
MAGNESIUM SERPL-MCNC: 2.1 MG/DL — SIGNIFICANT CHANGE UP (ref 1.6–2.6)
MCHC RBC-ENTMCNC: 28 PG — SIGNIFICANT CHANGE UP (ref 27–34)
MCHC RBC-ENTMCNC: 31.8 GM/DL — LOW (ref 32–36)
MCV RBC AUTO: 88.1 FL — SIGNIFICANT CHANGE UP (ref 80–100)
NRBC # BLD: 0 /100 WBCS — SIGNIFICANT CHANGE UP
NRBC # FLD: 0 K/UL — SIGNIFICANT CHANGE UP
PHOSPHATE SERPL-MCNC: 2.6 MG/DL — SIGNIFICANT CHANGE UP (ref 2.5–4.5)
PLATELET # BLD AUTO: 369 K/UL — SIGNIFICANT CHANGE UP (ref 150–400)
POTASSIUM SERPL-MCNC: 3.2 MMOL/L — LOW (ref 3.5–5.3)
POTASSIUM SERPL-SCNC: 3.2 MMOL/L — LOW (ref 3.5–5.3)
RBC # BLD: 3.28 M/UL — LOW (ref 3.8–5.2)
RBC # FLD: 16.4 % — HIGH (ref 10.3–14.5)
SODIUM SERPL-SCNC: 142 MMOL/L — SIGNIFICANT CHANGE UP (ref 135–145)
WBC # BLD: 6.5 K/UL — SIGNIFICANT CHANGE UP (ref 3.8–10.5)
WBC # FLD AUTO: 6.5 K/UL — SIGNIFICANT CHANGE UP (ref 3.8–10.5)

## 2022-04-18 PROCEDURE — 90792 PSYCH DIAG EVAL W/MED SRVCS: CPT

## 2022-04-18 PROCEDURE — 99233 SBSQ HOSP IP/OBS HIGH 50: CPT

## 2022-04-18 RX ORDER — POTASSIUM CHLORIDE 20 MEQ
40 PACKET (EA) ORAL EVERY 4 HOURS
Refills: 0 | Status: COMPLETED | OUTPATIENT
Start: 2022-04-18 | End: 2022-04-18

## 2022-04-18 RX ORDER — LANOLIN ALCOHOL/MO/W.PET/CERES
3 CREAM (GRAM) TOPICAL
Refills: 0 | Status: DISCONTINUED | OUTPATIENT
Start: 2022-04-18 | End: 2022-04-22

## 2022-04-18 RX ORDER — TRAZODONE HCL 50 MG
25 TABLET ORAL
Refills: 0 | Status: DISCONTINUED | OUTPATIENT
Start: 2022-04-18 | End: 2022-04-19

## 2022-04-18 RX ADMIN — Medication 1 DROP(S): at 19:56

## 2022-04-18 RX ADMIN — AMLODIPINE BESYLATE 10 MILLIGRAM(S): 2.5 TABLET ORAL at 04:36

## 2022-04-18 RX ADMIN — MEROPENEM-VABORBACTAM 83.33 GRAM(S): 1; 1 INJECTION, POWDER, FOR SOLUTION INTRAVENOUS at 19:56

## 2022-04-18 RX ADMIN — Medication 25 MILLIGRAM(S): at 19:57

## 2022-04-18 RX ADMIN — POLYETHYLENE GLYCOL 3350 17 GRAM(S): 17 POWDER, FOR SOLUTION ORAL at 11:31

## 2022-04-18 RX ADMIN — CARVEDILOL PHOSPHATE 3.12 MILLIGRAM(S): 80 CAPSULE, EXTENDED RELEASE ORAL at 11:28

## 2022-04-18 RX ADMIN — Medication 1 PACKET(S): at 04:36

## 2022-04-18 RX ADMIN — DORZOLAMIDE HYDROCHLORIDE 1 DROP(S): 20 SOLUTION/ DROPS OPHTHALMIC at 19:56

## 2022-04-18 RX ADMIN — DORZOLAMIDE HYDROCHLORIDE 1 DROP(S): 20 SOLUTION/ DROPS OPHTHALMIC at 11:29

## 2022-04-18 RX ADMIN — CARVEDILOL PHOSPHATE 3.12 MILLIGRAM(S): 80 CAPSULE, EXTENDED RELEASE ORAL at 19:55

## 2022-04-18 RX ADMIN — Medication 40 MILLIEQUIVALENT(S): at 11:32

## 2022-04-18 RX ADMIN — Medication 40 MILLIEQUIVALENT(S): at 15:24

## 2022-04-18 RX ADMIN — Medication 3 MILLIGRAM(S): at 19:57

## 2022-04-18 RX ADMIN — OLANZAPINE 2.5 MILLIGRAM(S): 15 TABLET, FILM COATED ORAL at 15:24

## 2022-04-18 RX ADMIN — ATORVASTATIN CALCIUM 40 MILLIGRAM(S): 80 TABLET, FILM COATED ORAL at 22:23

## 2022-04-18 RX ADMIN — LATANOPROST 1 DROP(S): 0.05 SOLUTION/ DROPS OPHTHALMIC; TOPICAL at 22:22

## 2022-04-18 RX ADMIN — LEVETIRACETAM 400 MILLIGRAM(S): 250 TABLET, FILM COATED ORAL at 22:17

## 2022-04-18 RX ADMIN — Medication 75 MICROGRAM(S): at 04:36

## 2022-04-18 RX ADMIN — MEROPENEM-VABORBACTAM 83.33 GRAM(S): 1; 1 INJECTION, POWDER, FOR SOLUTION INTRAVENOUS at 13:11

## 2022-04-18 RX ADMIN — Medication 650 MILLIGRAM(S): at 00:23

## 2022-04-18 RX ADMIN — MEROPENEM-VABORBACTAM 83.33 GRAM(S): 1; 1 INJECTION, POWDER, FOR SOLUTION INTRAVENOUS at 03:00

## 2022-04-18 RX ADMIN — Medication 81 MILLIGRAM(S): at 11:31

## 2022-04-18 RX ADMIN — LEVETIRACETAM 400 MILLIGRAM(S): 250 TABLET, FILM COATED ORAL at 11:29

## 2022-04-18 RX ADMIN — Medication 1 DROP(S): at 04:36

## 2022-04-18 RX ADMIN — PANTOPRAZOLE SODIUM 40 MILLIGRAM(S): 20 TABLET, DELAYED RELEASE ORAL at 11:30

## 2022-04-18 NOTE — BH CONSULTATION LIAISON ASSESSMENT NOTE - MSE UNSTRUCTURED FT
awake, wide eyed stare with reduced blinking. Makes eye contact and has minimal response to her name being called. Patient's moth opens/closes, no sounds comes out. Patient not able to manifest any signs of comprehending what is being spoken to her / unable to manifest any nonverbal modes of communication as well.    awake, wide eyed stare with reduced blinking. Makes eye contact and has minimal response to her name being called. Patient's mouth opens/closes, no sounds comes out. Patient not able to manifest any signs of comprehending what is being spoken to her / unable to manifest any nonverbal modes of communication as well.

## 2022-04-18 NOTE — BH CONSULTATION LIAISON ASSESSMENT NOTE - HPI (INCLUDE ILLNESS QUALITY, SEVERITY, DURATION, TIMING, CONTEXT, MODIFYING FACTORS, ASSOCIATED SIGNS AND SYMPTOMS)
81  F, has a common-law /long term partner, has 2 children (son is proxy, involved), PMHx HTN, HLD, Hypothyroidism, Depression, seizure d/o, RLE DVT, Cholelithiasis with a recent complicated hospitalization in [facility] from 12/22 -> 2/11/22 for acute perforated diverticulitis s/p Rosales 12/25, colostomy bag 12/26, c/b IR drainage for abdominal abscess, required Mechanical Ventilation for Acute Respiratory Failure with Tracheostomy done 1/7, PEG done 1/18 complicated by bleeding through ostomy, acute left MCA infarct in Jan 2022 (possibly 2/2 to carotid occlusion vs afib), RLL aspiration pneumonia. Pt presented to [facility] from [facility] for dislodged Peg tube, required antibiotics for bacteremia here. Psych consulted for agitation. Per primary medicine team, pt pulled trach out twice (4/6, 4/12) and has required restraints which impedes disposition for discharge to nursing home with hospice care.    Pt seen at bedside with 1:1 CO staff at bedside. Pt is awake, responding to writer with nodding yes, shaking head no. Pt denies feeling depressed, denies passive or active SI. She endorses that family is a reason to live for. She endorses that trach is painful. She denies feeling confused or disoriented, endorses being aware that she is in the hospital. She appears to doze and is unable to participate in interview.    CO staff at bedside reports pt slept on and off last night, is dozing in and out of sleep this morning. Reports when pt is awake, she attempts to get out of restraint.    Spoke with pt's son Blake [phone] who reports difficulty with pt constantly trying to pull out trach with left hand for couple of months while even pt was at [facility]. Reports a sedative medication, which he does not know the name of, made pt mostly sleepy so that she would not pull trach. He does not recall pt having side effects with stiffness, restlessness on that medication. He reports pt does not have hx of dementia. He denies pt being psychiatrically admitted in the past, reports he only recently learned of her hx of depression. We discussed starting pt on standing melatonin, trazodone to promote sleep cycle which he agrees with. We discussed use of antipsychotics for agitation and the black box warning of increased mortality in older pt's with dementia; he acknowledges these indications and risks on the use of antipsychotics, is agreeable to Zyprexa PRNs at this time.

## 2022-04-18 NOTE — PROGRESS NOTE ADULT - ASSESSMENT
81 f with HTN, HLD, Hypothyroidism, Depression, seizure d/o, RLE DVT, Cholelithiasis s/p recent Hospitalization at [facility] 12/22 -> 2/11/22 for acute perforated diverticulitis s/p Rosales 12/25, colostomy bag 12/26, hospitalization c/b abd abscess s/p IR drainage 1/3 Cx with E.coli & bacteroides and candida albicans,  trach 1/7, PEG, Acute left MCA infarct, aspiration pneumonia, sent 3/27 from rehab for dislodged PEG, initially afebrile, normal WBc, negative blood and urine cx  CT 3/27: PEG tube removed/dislodged, no evidence for pneumoperitoneum, 3.5 cm thick-walled fluid collection in the pelvic cul-de-sac, Interval slight improvement in additional small loculated fluid   collections, including significant improvement in inflammatory changes in RLQ and pelvis status post removal of surgical drain. Ill-defined hyperdensity/enhancing nodularity along the right posterior   bladder lumen, cannot exclude a neoplastic process  s/p PEG placement by IR 3/30  on 4/4 pt spiked to 102.4 with tachycardia and new leukocytosis to 16    recent perforated diverticulitis s/p huitron, c/b abd abscess s/p IR drainage, cx with E-coli, bacteroides and candida now admitted for PEG dislodgement s/p IR placement 3/30 now with new fever, tachycardia, leukocytosis, sepsis due to KPC klebsiella bacteremia, S to vabomere, acyvaz, tigecyline, minocycline and genta, repeat cx 4/5 negative  CT: Dependent high attenuation within the urinary bladder, which may represent small stones and/or debris. An underlying mass cannot be excluded. Thick-walled collection in the cul-de-sac and small collections along the right paracolic gutter, not significantly changed since 3/27/2022  IR stated that abscess are too small to be drained  rectal bleed, CTA showed small active rectal bleed, unchanged collections, s/p flex sig 4/14, bleeding vessel was clipped   new fever but WBC normal and COVID negative  * c/w  vabomere, blood cx cleared 4/5, so day 14 but pt had a fever overnight, so continue for now  * f/u the blood cx  * RUE doppler  to r/o DVT  * monitor CBC/diff and renal function      The above assessment and plan was discussed with the primary team    Ivy Rose MD  contact on teams  After 5pm and on weekends call [phone]

## 2022-04-18 NOTE — BH CONSULTATION LIAISON ASSESSMENT NOTE - NSBHCHARTREVIEWLAB_PSY_A_CORE FT
9.2    6.50  )-----------( 369      ( 18 Apr 2022 05:04 )             28.9   04-18    142  |  105  |  15  ----------------------------<  124<H>  3.2<L>   |  25  |  0.50    Ca    8.9      18 Apr 2022 05:04  Phos  2.6     04-18  Mg     2.10     04-18

## 2022-04-18 NOTE — BH CONSULTATION LIAISON ASSESSMENT NOTE - CASE SUMMARY
Chart reviewed, pt. seen/evaluated, I agree with above assessment/plan. Patient was sleeping on my exam and unable to engage in interview. Plan as above.

## 2022-04-18 NOTE — PROGRESS NOTE ADULT - ASSESSMENT
82 yo F w/ PMHx of perforated diverticulitis s/p Rosales procedure, colostomy, abdominal abscess with wound s/p IR drainage, Respiratory Failure s/p Trache, CVA w/ functional quadriplegia, aphasia, dysphagia s/p PEG (on Jevity 1.5 @ 70cchr x 18 hrs, & water flushes 325ml q 6 hrs, Afib, GIB, HTN, HLD, Seizure d/o, Hypothyroidism, Glaucoma, Depression p/w PEG tube dislodgement for unknown amount of time     Problem/Plan - 1:  ·  Problem: Sepsis sec to KPC K Pneumoniae Bacteremia .   ·  Plan:  IV Abxs per ID till 4/19 .   ID help appreciated.   < from: CT Abdomen and Pelvis w/ IV Cont (04.04.22 @ 14:24) >  IMPRESSION:  Trace bilateral pleural effusions.    Improved aeration in the lower lobes bilaterally since 1/30/2022.   Nonspecific patchy bilateral groundglass and linear opacities are noted.    Enhancement of the wall of the cecum and ascending colon with mild   infiltration of the adjacent fat is similar in appearance to prior study   dated 3/27/2022. An inflammatory or infectious process is considered.    Dependent high attenuation within the urinary bladder, which may   represent small stones and/or debris. An underlying mass cannot be   excluded.    Thick-walled collection in the cul-de-sac and small collections along the   right paracolic gutter, not significantly changed since 3/27/2022.    < end of copied text >     Problem/Plan - 2:  ·  Problem: Metabolic Encephalopathy    ·  Plan: Likely sec to Infection  . Resolved.    Neurology helping  and  EEG showing no Seizure activity  .     Problem/Plan - 3:  ·  Problem: Stroke.   ·  Plan: Holding ASA, Plavix as actively bleeding. ASA restarted .    Continue  statin Via PEG tube .     Problem/Plan - 4:  ·  Problem: Chronic respiratory failure with hypoxia.   ·  Plan: Pt on 2l NC via Trache collar.   S/P replacement of Tracheostomy Tube.       Problem/Plan - 5:  ·  Problem: Diverticulitis of intestine with perforation and abscess without bleeding.   ·  Plan: Surgery c/s in chart- small fluid collection but nontoxic, if drainage needed would be done by IR.  Wound care eval.     Problem/Plan - 6:  ·  Problem: History of atrial fibrillation.   ·  Plan: Cards helping and now off AC .   D/W Son and okay to start.      Problem/Plan - 7:  ·  Problem: Seizure disorder.   ·  Plan: continue levetiracetam bid.     Problem/Plan - 8:  ·  Problem: DVT, lower extremity.   ·  Plan: ?Subacute vs Chronic, Will restart AC if okay with GI.      Problem/Plan - 9:  ·  Problem: HTN (hypertension).   ·  Plan: Resuming carvedilol & amlodipine as PEG replaced.     Problem/Plan - 10:  ·  Problem: Hypothyroidism.   ·  Plan; Resume Levothyroxine when PEG replaced.     Problem/Plan - 11:  ·  Problem: Glaucoma.   ·  Plan: continue Timolol, Dorzolamide, Latanoprost.      Problem/Plan - 12:  ·  Problem: PEG tube malfunction.   ·  Plan:  S/P  PEG . TF started.      Problem/Plan - 13:  ·  Problem: Hypokalemia.   ·  Plan: Correcting .       Problem/Plan - 14:  ·  Problem: GI bleed. .   ·  Plan: PPI . GI following .     PRBC for HGb 7.5 G or less. Watching CBC. HH stable     < from: CT Angio Abdomen and Pelvis w/ IV Cont (04.13.22 @ 22:26) >  IMPRESSION:    Small rectal active hemorrhage. Rectal wall enhancement may also reflect   into the lesion.    Thick-walled collection in the cul-de-sac and small collections along the   right paracolic gutter are unchanged.    Bladder calculi reidentified.    Findings discussed with ROSALINE Harrison by Dr. Martina Crystal  on 4/13/2022   at 11:35 PM with readback.    < end of copied text >    < from: Flexible Sigmoidoscopy (04.14.22 @ 16:41) >  Findings:       A visible vessel (immediatly proximal to the dentate line) was found in        the distal rectum. Active bleeding was present. To stop active bleeding,        one hemostatic clip was successfully placed (MR conditional). There was        no bleeding at the end of the procedure.       Diverticulosis in the sigmoid                                                                                   Impression:          - Preparation of the colon was poor.                       - Visible vessel was found in the distal rectum. Clip                        (MR conditional) was placed.                       - No specimens collected.  Recommendation:      - Return patient to hospital [facility] for ongoing care.                       -Restart PEG feeds                       -Hold Ac for Now                       -Stool softeners                       -No enema or rectal meds X 48     < end of copied text >    Disposition : DC  planning .  Over all prognosis very poor.

## 2022-04-18 NOTE — BH CONSULTATION LIAISON ASSESSMENT NOTE - NSBHCONSULTFOLLOWAFTERCARE_PSY_A_CORE FT
If needing outpt psychiatry/behavioral health follow up, may refer to [facility] outpatient services [phone] If needing outpt psychiatry/behavioral health follow up, may refer to [facility]iat[facility] outpatient services [phone]  [facility] outpt. walk in clinic : [phone]

## 2022-04-18 NOTE — PROGRESS NOTE ADULT - SUBJECTIVE AND OBJECTIVE BOX
INTERVAL HPI/OVERNIGHT EVENTS:    resting comfortably  1:1 present   tolerating feeds  no rectal bleeding     MEDICATIONS  (STANDING):  amLODIPine   Tablet 10 milliGRAM(s) Oral daily  aspirin  chewable 81 milliGRAM(s) Oral daily  atorvastatin 40 milliGRAM(s) Oral at bedtime  carvedilol 3.125 milliGRAM(s) Oral every 12 hours  dorzolamide 2% Ophthalmic Solution 1 Drop(s) Both EYES <User Schedule>  influenza  Vaccine (HIGH DOSE) 0.7 milliLiter(s) IntraMuscular once  lactated ringers. 1000 milliLiter(s) (75 mL/Hr) IV Continuous <Continuous>  latanoprost 0.005% Ophthalmic Solution 1 Drop(s) Both EYES at bedtime  levETIRAcetam  IVPB 500 milliGRAM(s) IV Intermittent every 12 hours  levothyroxine 75 MICROGram(s) Oral daily  melatonin 3 milliGRAM(s) Oral at bedtime  meropenem/vaborbactam IVPB 4 Gram(s) IV Intermittent every 8 hours  pantoprazole   Suspension 40 milliGRAM(s) Oral daily  polyethylene glycol 3350 17 Gram(s) Oral daily  potassium chloride   Powder 40 milliEquivalent(s) Oral once  potassium chloride   Powder 40 milliEquivalent(s) Oral every 4 hours  timolol 0.5% Solution 1 Drop(s) Both EYES two times a day    MEDICATIONS  (PRN):  acetaminophen     Tablet .. 650 milliGRAM(s) Oral every 6 hours PRN Mild Pain (1 - 3), Moderate Pain (4 - 6)  OLANZapine 2.5 milliGRAM(s) Oral every 8 hours PRN agitation  OLANZapine Injectable 2.5 milliGRAM(s) IntraMuscular every 8 hours PRN severe agitation      Allergies    No Known Allergies    Intolerances        Review of Systems: *pt minimally verbal to nonverbal, unable to obtain ROS         Vital Signs Last 24 Hrs  T(C): 36.7 (18 Apr 2022 10:08), Max: 38.6 (17 Apr 2022 22:14)  T(F): 98 (18 Apr 2022 10:08), Max: 101.5 (17 Apr 2022 22:14)  HR: 85 (18 Apr 2022 10:08) (73 - 95)  BP: 149/67 (18 Apr 2022 10:08) (129/52 - 175/66)  BP(mean): --  RR: 19 (18 Apr 2022 10:08) (18 - 20)  SpO2: 98% (18 Apr 2022 10:08) (97% - 100%)    PHYSICAL EXAM:    Constitutional: NAD  HEENT: EOMI, throat clear  Neck: No LAD, supple  Respiratory: +trach  Cardiovascular: S1 and S2, RRR, no M  Gastrointestinal: BS+, soft, NT/ND, neg HSM, +peg  Extremities: No peripheral edema, neg clubbing, cyanosis  Vascular: 2+ peripheral pulses  Neurological: A/O x 0  Psychiatric: lethargic  Skin: No rashes      LABS:                        9.2    6.50  )-----------( 369      ( 18 Apr 2022 05:04 )             28.9     04-18    142  |  105  |  15  ----------------------------<  124<H>  3.2<L>   |  25  |  0.50    Ca    8.9      18 Apr 2022 05:04  Phos  2.6     04-18  Mg     2.10     04-18            RADIOLOGY & ADDITIONAL TESTS:

## 2022-04-18 NOTE — PROGRESS NOTE ADULT - ASSESSMENT
82 yo F with PMHx HTN, HLD, Hypothyroidism, Depression, seizure d/o, RLE DVT, Cholelithiasis s/p recent Hospitalization in [facility] from 12/22 -> 2/11/22 for acute perforated diverticulitis s/p Rosales 12/25, colostomy bag 12/26, hospitalization c/b IR drainage for abdominal abscess 1/3 Cx grew e.coli & bacteriodes, also required Mechanical Ventilation for Acute Respiratory Failure with Tracheostomy done 1/7.  PEG done 1/18, developed bleeding through ostomy, EGD on 2/1 showed gastritis. T's showed Acute left MCA infarct- could be 2/2 to carotid occlusion vs afib, new RLL aspiration pneumonia, seen by ID, completed antibiotics, seen by VascSx, not surgical candidate. Pt aphasic sent from [facility] for dislodged Peg tube, after staff noted pt has blood to her abdomen. Nephrology consulted for Hypokalemia.     A/P  Hypokalemia   likely 2/2 GI loss   Replete KCL 40mEQ x2 today  monitor input and output closely   monitor K closely    Hypomagnesia   replete as needed  monitor Mg     Hypophosphatemia   replete as needed  monitor     Hypocalcemia   corrected Ca WNL   Monitor    Acidosis with anion gap   etiology?  improved   monitor    HTN  optimal   monitor BP closely

## 2022-04-18 NOTE — PROGRESS NOTE ADULT - SUBJECTIVE AND OBJECTIVE BOX
[facility]   MD ELOISE ALVARADO PA    TEL:  OFFICE: [phone]  DR WARREN CELL: [phone]  DR. BRAUN CELL: [phone]  MIKKI BAILEY CELL: [phone]    From 5pm-7am Answering Service [phone]    -- RENAL FOLLOW UP NOTE ---Date of Service 04-18-22 @ 12:34    Patient is a 81y old  Female who presents with a chief complaint of     Patient seen and examined at bedside.   VITALS:  T(F): 98 (04-18-22 @ 10:08), Max: 101.5 (04-17-22 @ 22:14)  HR: 85 (04-18-22 @ 10:08)  BP: 149/67 (04-18-22 @ 10:08)  RR: 19 (04-18-22 @ 10:08)  SpO2: 98% (04-18-22 @ 10:08)  Wt(kg): --    04-17 @ 07:01  -  04-18 @ 07:00  --------------------------------------------------------  IN: 1890 mL / OUT: 0 mL / NET: 1890 mL    04-18 @ 07:01  -  04-18 @ 12:34  --------------------------------------------------------  IN: 445 mL / OUT: 20 mL / NET: 425 mL          PHYSICAL EXAM:  Constitutional: NAD  Neck: +trach  Respiratory: CTAB, no wheezes, rales or rhonchi  Cardiovascular: S1, S2, RRR  Gastrointestinal: soft, +ostomy  Extremities: No peripheral edema    Hospital Medications:   MEDICATIONS  (STANDING):  amLODIPine   Tablet 10 milliGRAM(s) Oral daily  aspirin  chewable 81 milliGRAM(s) Oral daily  atorvastatin 40 milliGRAM(s) Oral at bedtime  carvedilol 3.125 milliGRAM(s) Oral every 12 hours  dorzolamide 2% Ophthalmic Solution 1 Drop(s) Both EYES <User Schedule>  influenza  Vaccine (HIGH DOSE) 0.7 milliLiter(s) IntraMuscular once  lactated ringers. 1000 milliLiter(s) (75 mL/Hr) IV Continuous <Continuous>  latanoprost 0.005% Ophthalmic Solution 1 Drop(s) Both EYES at bedtime  levETIRAcetam  IVPB 500 milliGRAM(s) IV Intermittent every 12 hours  levothyroxine 75 MICROGram(s) Oral daily  melatonin 3 milliGRAM(s) Oral at bedtime  meropenem/vaborbactam IVPB 4 Gram(s) IV Intermittent every 8 hours  pantoprazole   Suspension 40 milliGRAM(s) Oral daily  polyethylene glycol 3350 17 Gram(s) Oral daily  potassium chloride   Powder 40 milliEquivalent(s) Oral once  potassium chloride   Powder 40 milliEquivalent(s) Oral every 4 hours  timolol 0.5% Solution 1 Drop(s) Both EYES two times a day      LABS:  04-18    142  |  105  |  15  ----------------------------<  124<H>  3.2<L>   |  25  |  0.50    Ca    8.9      18 Apr 2022 05:04  Phos  2.6     04-18  Mg     2.10     04-18      Creatinine Trend: 0.50 <--, 0.46 <--, 0.45 <--, 0.45 <--, 0.47 <--, 0.45 <--, 0.42 <--, 0.40 <--    Phosphorus Level, Serum: 2.6 mg/dL (04-18 @ 05:04)                              9.2    6.50  )-----------( 369      ( 18 Apr 2022 05:04 )             28.9     Urine Studies:  Urinalysis - [04-04-22 @ 01:59]      Color Light Yellow / Appearance Clear / SG 1.012 / pH 7.5      Gluc Negative / Ketone Negative  / Bili Negative / Urobili <2 mg/dL       Blood Negative / Protein Trace / Leuk Est Negative / Nitrite Negative      RBC 6 / WBC 1 / Hyaline  / Gran  / Sq Epi  / Non Sq Epi 1 / Bacteria Negative      PTH -- (Ca --)      [03-30-22 @ 07:35]   32  TSH 7.65      [04-05-22 @ 07:40]  Lipid: chol 158, , HDL 25, LDL --      [02-03-22 @ 15:22]      Syphilis Screen (Treponema Pallidum Ab) Negative      [04-05-22 @ 09:41]    RADIOLOGY & ADDITIONAL STUDIES:

## 2022-04-18 NOTE — PROGRESS NOTE ADULT - SUBJECTIVE AND OBJECTIVE BOX
Follow Up:  sepsis    Interval History: pt spiked last night but normal WBC, COVID negative    ROS:    Unobtainable because of mental status      Allergies  No Known Allergies        ANTIMICROBIALS:  meropenem/vaborbactam IVPB 4 every 8 hours      OTHER MEDS:  acetaminophen     Tablet .. 650 milliGRAM(s) Oral every 6 hours PRN  amLODIPine   Tablet 10 milliGRAM(s) Oral daily  aspirin  chewable 81 milliGRAM(s) Oral daily  atorvastatin 40 milliGRAM(s) Oral at bedtime  carvedilol 3.125 milliGRAM(s) Oral every 12 hours  dorzolamide 2% Ophthalmic Solution 1 Drop(s) Both EYES <User Schedule>  influenza  Vaccine (HIGH DOSE) 0.7 milliLiter(s) IntraMuscular once  lactated ringers. 1000 milliLiter(s) IV Continuous <Continuous>  latanoprost 0.005% Ophthalmic Solution 1 Drop(s) Both EYES at bedtime  levETIRAcetam  IVPB 500 milliGRAM(s) IV Intermittent every 12 hours  levothyroxine 75 MICROGram(s) Oral daily  melatonin 3 milliGRAM(s) Oral <User Schedule>  OLANZapine 2.5 milliGRAM(s) Oral every 8 hours PRN  OLANZapine Injectable 2.5 milliGRAM(s) IntraMuscular every 8 hours PRN  pantoprazole   Suspension 40 milliGRAM(s) Oral daily  polyethylene glycol 3350 17 Gram(s) Oral daily  potassium chloride   Powder 40 milliEquivalent(s) Oral once  timolol 0.5% Solution 1 Drop(s) Both EYES two times a day  traZODone 25 milliGRAM(s) Oral <User Schedule>      Vital Signs Last 24 Hrs  T(C): 37.1 (18 Apr 2022 13:40), Max: 38.6 (17 Apr 2022 22:14)  T(F): 98.7 (18 Apr 2022 13:40), Max: 101.5 (17 Apr 2022 22:14)  HR: 77 (18 Apr 2022 13:40) (73 - 95)  BP: 146/69 (18 Apr 2022 13:40) (129/52 - 175/66)  BP(mean): --  RR: 19 (18 Apr 2022 13:40) (18 - 20)  SpO2: 99% (18 Apr 2022 13:40) (97% - 100%)    Physical Exam:  General:    NAD, non toxic  Cardio:    regular S1,S2  Respiratory:   trach  abd:   soft, BS +, not tender  :     no CVAT, no suprapubic tenderness, no mc  Musculoskeletal : LUE edema  Skin:    no rash  vascular: no phlebitis                          9.2    6.50  )-----------( 369      ( 18 Apr 2022 05:04 )             28.9       04-18    142  |  105  |  15  ----------------------------<  124<H>  3.2<L>   |  25  |  0.50    Ca    8.9      18 Apr 2022 05:04  Phos  2.6     04-18  Mg     2.10     04-18            MICROBIOLOGY:  v  .Blood Blood  04-05-22   No Growth Final  --  --      .Blood Blood-Peripheral  04-04-22   Growth in anaerobic bottle: Klebsiella pneumoniae (Carbapenem Resistant)  See previous culture [id number]  --  Blood Culture PCR  Klepne MDRO      .Blood Blood-Peripheral  03-26-22   No Growth Final  --  --      .Blood Blood-Peripheral  03-26-22   No Growth Final  --  --                RADIOLOGY:  Images independently visualized and reviewed personally, findings as below  < from: Xray Chest 1 View- PORTABLE-Urgent (Xray Chest 1 View- PORTABLE-Urgent .) (04.17.22 @ 19:34) >  IMPRESSION:    Trace bilateral pleural effusions.      < end of copied text >  < from: CT Angio Abdomen and Pelvis w/ IV Cont (04.13.22 @ 22:26) >  IMPRESSION:    Small rectal active hemorrhage. Rectal wall enhancement may also reflect   into the lesion.    Thick-walled collection in the cul-de-sac and small collections along the   right paracolic gutter are unchanged.    Bladder calculi reidentified.      < end of copied text >

## 2022-04-18 NOTE — PROGRESS NOTE ADULT - SUBJECTIVE AND OBJECTIVE BOX
Date of Service  : 04-18-22     INTERVAL HPI/OVERNIGHT EVENTS: i feel fine.   Vital Signs Last 24 Hrs  T(C): 37.1 (18 Apr 2022 13:40), Max: 38.6 (17 Apr 2022 22:14)  T(F): 98.7 (18 Apr 2022 13:40), Max: 101.5 (17 Apr 2022 22:14)  HR: 77 (18 Apr 2022 13:40) (73 - 87)  BP: 146/69 (18 Apr 2022 13:40) (129/52 - 168/65)  BP(mean): --  RR: 19 (18 Apr 2022 13:40) (19 - 20)  SpO2: 99% (18 Apr 2022 13:40) (98% - 100%)  I&O's Summary    17 Apr 2022 07:01  -  18 Apr 2022 07:00  --------------------------------------------------------  IN: 1890 mL / OUT: 0 mL / NET: 1890 mL    18 Apr 2022 07:01  -  18 Apr 2022 19:01  --------------------------------------------------------  IN: 445 mL / OUT: 20 mL / NET: 425 mL      MEDICATIONS  (STANDING):  amLODIPine   Tablet 10 milliGRAM(s) Oral daily  aspirin  chewable 81 milliGRAM(s) Oral daily  atorvastatin 40 milliGRAM(s) Oral at bedtime  carvedilol 3.125 milliGRAM(s) Oral every 12 hours  dorzolamide 2% Ophthalmic Solution 1 Drop(s) Both EYES <User Schedule>  influenza  Vaccine (HIGH DOSE) 0.7 milliLiter(s) IntraMuscular once  lactated ringers. 1000 milliLiter(s) (75 mL/Hr) IV Continuous <Continuous>  latanoprost 0.005% Ophthalmic Solution 1 Drop(s) Both EYES at bedtime  levETIRAcetam  IVPB 500 milliGRAM(s) IV Intermittent every 12 hours  levothyroxine 75 MICROGram(s) Oral daily  melatonin 3 milliGRAM(s) Oral <User Schedule>  meropenem/vaborbactam IVPB 4 Gram(s) IV Intermittent every 8 hours  pantoprazole   Suspension 40 milliGRAM(s) Oral daily  polyethylene glycol 3350 17 Gram(s) Oral daily  potassium chloride   Powder 40 milliEquivalent(s) Oral once  timolol 0.5% Solution 1 Drop(s) Both EYES two times a day  traZODone 25 milliGRAM(s) Oral <User Schedule>    MEDICATIONS  (PRN):  acetaminophen     Tablet .. 650 milliGRAM(s) Oral every 6 hours PRN Mild Pain (1 - 3), Moderate Pain (4 - 6)  OLANZapine 2.5 milliGRAM(s) Oral every 8 hours PRN agitation  OLANZapine Injectable 2.5 milliGRAM(s) IntraMuscular every 8 hours PRN severe agitation    LABS:                        9.2    6.50  )-----------( 369      ( 18 Apr 2022 05:04 )             28.9     04-18    142  |  105  |  15  ----------------------------<  124<H>  3.2<L>   |  25  |  0.50    Ca    8.9      18 Apr 2022 05:04  Phos  2.6     04-18  Mg     2.10     04-18          CAPILLARY BLOOD GLUCOSE      POCT Blood Glucose.: 144 mg/dL (18 Apr 2022 17:49)  POCT Blood Glucose.: 127 mg/dL (18 Apr 2022 12:04)            Consultant(s) Notes Reviewed:  [x ] YES  [ ] NO    PHYSICAL EXAM:  GENERAL: NAD, well-groomed, well-developed, not in any distress ,  HEAD:  Atraumatic, Normocephalic  NECK: Supple, No JVD, Normal thyroid  NERVOUS SYSTEM:  Alert &   CHEST/LUNG: Good air entry bilateral with no  rales, rhonchi, wheezing, or rubs  HEART: Regular rate and rhythm; No murmurs, rubs, or gallops  ABDOMEN: Soft, Nontender, Nondistended; Bowel sounds present, PEG and Ostomy +  EXTREMITIES:  2+ Peripheral Pulses, No clubbing, cyanosis, or edema    Care Discussed with Consultants/Other Providers [ x] YES  [ ] NO

## 2022-04-18 NOTE — BH CONSULTATION LIAISON ASSESSMENT NOTE - NSBHCHARTREVIEWVS_PSY_A_CORE FT
Vital Signs Last 24 Hrs  T(C): 36.7 (18 Apr 2022 10:08), Max: 38.6 (17 Apr 2022 22:14)  T(F): 98 (18 Apr 2022 10:08), Max: 101.5 (17 Apr 2022 22:14)  HR: 85 (18 Apr 2022 10:08) (73 - 95)  BP: 149/67 (18 Apr 2022 10:08) (129/52 - 175/66)  BP(mean): --  RR: 19 (18 Apr 2022 10:08) (18 - 20)  SpO2: 98% (18 Apr 2022 10:08) (97% - 100%)

## 2022-04-18 NOTE — BH CONSULTATION LIAISON ASSESSMENT NOTE - RISK ASSESSMENT
Static risk factors include hx of MDD (per chart), complicated medical history with poor prognosis. Dynamic risk factors include behavioral changes/motoric agitation in context of suspected delirium, pain/discomfort. Protective factors include no known hx of SA/SI/SIB, no hx of psych admissions, identifying family as reason to live, currently denying passive or active SI. Static risk factors include hx of MDD (per chart), complicated medical history. Dynamic risk factors include behavioral changes/motoric agitation in context of suspected delirium, pain/discomfort. Protective factors include no known hx of SA/SI/SIB, no hx of psych admissions, identifying family as reason to live, currently denying passive or active SI.

## 2022-04-18 NOTE — BH CONSULTATION LIAISON ASSESSMENT NOTE - SUMMARY
81  F, has a common-law /long term partner, has 2 children (son is proxy, involved), PMHx HTN, HLD, Hypothyroidism, Depression, seizure d/o, RLE DVT, Cholelithiasis with a recent complicated hospitalization in [facility] from 12/22 -> 2/11/22 for acute perforated diverticulitis s/p Rosales 12/25, colostomy bag 12/26, c/b IR drainage for abdominal abscess, required Mechanical Ventilation for Acute Respiratory Failure with Tracheostomy done 1/7, PEG done 1/18 complicated by bleeding through ostomy, acute left MCA infarct in Jan 2022 (possibly 2/2 to carotid occlusion vs afib), RLL aspiration pneumonia. Pt presented to [facility] from [facility] for dislodged Peg tube, required antibiotics for bacteremia here. Psych consulted for agitation. Per primary medicine team, pt pulled trach out twice (4/6, 4/12) and has required restraints which impedes disposition for discharge to nursing home with hospice care.    Exam limited d/t pt nonverbal status and waxing/waning mental status. Concerning for delirium deviating between hyperactive, motorically agitated and hypoactive states as well as pain/discomfort with trach contributing to trach-pulling behavior. Pt currently denies feeling depressed, denies passive or active SI. No suspicion currently that trach pulling is a self-injurious or suicidal behavior. At this time, pt may benefit from promoting diurnal sleep cycle and with use of PRNs for agitation. Son who is health care proxy is onboard with this approach.    Plan  - CO 1:1 for agitation  - Obtain EKG 4/15 done - NSR, QT 396ms, QTc 454ms  - START trazodone 25mg q 8pm for sleep  - C/w Zyprexa 2.5mg PO (via PEG) q8h PRN for agitation, 2.5mg IM q8h PRN for severe agitation  - Attempt to observe pt off of restraints while she is in behavioral control and utilize Zyprexa IM and restraints as needed for acute agitation; son agreeable to use of Zyprexa  - C/w melatonin 3mg but MODIFY qHS to q 8pm for earlier evening dosing time  - Delirium precautions - Minimize use of opioids, anticholinergics, or other deliriogenic agents when possible.  Maintain sleep wake cycle.  Provide frequent reorientation and redirection. Family member at bedside if possible. Assess for need for glasses and hearing aid (if applicable). 81  F, has a common-law /long term partner, has 2 children (son is proxy, involved), PMHx HTN, HLD, Hypothyroidism, Depression, seizure d/o, RLE DVT, Cholelithiasis with a recent complicated hospitalization in [facility] from 12/22 -> 2/11/22 for acute perforated diverticulitis s/p Rosales 12/25, colostomy bag 12/26, c/b IR drainage for abdominal abscess, required Mechanical Ventilation for Acute Respiratory Failure with Tracheostomy done 1/7, PEG done 1/18 complicated by bleeding through ostomy, acute left MCA infarct in Jan 2022 (possibly 2/2 to carotid occlusion vs afib), RLL aspiration pneumonia. Pt presented to [facility] from [facility] for dislodged Peg tube, required antibiotics for bacteremia here. Psych consulted for agitation. Per primary medicine team, pt pulled trach out twice (4/6, 4/12) and has required restraints which impedes disposition for discharge to nursing home with hospice care.    Exam limited d/t pt nonverbal status and waxing/waning mental status. Concerning for delirium deviating between hyperactive, motorically agitated and hypoactive states as well as pain/discomfort with trach contributing to trach-pulling behavior. Pt currently denies feeling depressed, denies passive or active SI. No suspicion currently that trach pulling is a self-injurious or suicidal behavior. At this time, pt may benefit from promoting diurnal sleep cycle and with use of PRNs for agitation. Son who is health care proxy is onboard with this approach.    Plan  - CO 1:1 for agitation as per primary team,  - Obtain EKG 4/15 done - NSR, QT 396ms, QTc 454ms  - START trazodone 25mg q 8pm for sleep  - C/w Zyprexa 2.5mg PO (via PEG) q8h PRN for agitation, 2.5mg IM q8h PRN for severe agitation  - Attempt to observe pt off of restraints while she is in behavioral control and utilize Zyprexa IM and restraints as needed for acute agitation; son agreeable to use of Zyprexa  - C/w melatonin 3mg but MODIFY qHS to q 8pm for earlier evening dosing time  - Delirium precautions - Minimize use of opioids, anticholinergics, or other deliriogenic agents when possible.  Maintain sleep wake cycle.  Provide frequent reorientation and redirection. Family member at bedside if possible. Assess for need for glasses and hearing aid (if applicable).

## 2022-04-18 NOTE — PROGRESS NOTE ADULT - SUBJECTIVE AND OBJECTIVE BOX
CARDIOLOGY     Trached, non-verbal, ros limited.     DATE OF SERVICE - 04-18-22     Review of Systems:   Constitutional: [ ] fevers, [ ] chills.   Skin: [ ] dry skin. [ ] rashes.  Psychiatric: [ ] depression, [ ] anxiety.   Gastrointestinal: [ ] BRBPR, [ ] melena.   Neurological: [ ] confusion. [ ] seizures. [ ] shuffling gait.   Ears,Nose,Mouth and Throat: [ ] ear pain [ ] sore throat.   Eyes: [ ] diplopia.   Respiratory: [ ] hemoptysis. [ ] shortness of breath  Cardiovascular: See HPI above  Hematologic/Lymphatic: [ ] anemia. [ ] painful nodes. [ ] prolonged bleeding.   Genitourinary: [ ] hematuria. [ ] flank pain.   Endocrine: [ ] significant change in weight. [ ] intolerance to heat and cold.     Review of systems [ ] otherwise negative, [x] otherwise unable to obtain    FH: no family history of sudden cardiac death in first degree relatives    SH: [ ] tobacco, [ ] alcohol, [ ] drugs             acetaminophen     Tablet .. 650 milliGRAM(s) Oral every 6 hours PRN  amLODIPine   Tablet 10 milliGRAM(s) Oral daily  aspirin  chewable 81 milliGRAM(s) Oral daily  atorvastatin 40 milliGRAM(s) Oral at bedtime  carvedilol 3.125 milliGRAM(s) Oral every 12 hours  dorzolamide 2% Ophthalmic Solution 1 Drop(s) Both EYES <User Schedule>  influenza  Vaccine (HIGH DOSE) 0.7 milliLiter(s) IntraMuscular once  lactated ringers. 1000 milliLiter(s) IV Continuous <Continuous>  latanoprost 0.005% Ophthalmic Solution 1 Drop(s) Both EYES at bedtime  levETIRAcetam  IVPB 500 milliGRAM(s) IV Intermittent every 12 hours  levothyroxine 75 MICROGram(s) Oral daily  melatonin 3 milliGRAM(s) Oral at bedtime  meropenem/vaborbactam IVPB 4 Gram(s) IV Intermittent every 8 hours  OLANZapine 2.5 milliGRAM(s) Oral every 8 hours PRN  OLANZapine Injectable 2.5 milliGRAM(s) IntraMuscular every 8 hours PRN  pantoprazole   Suspension 40 milliGRAM(s) Oral daily  polyethylene glycol 3350 17 Gram(s) Oral daily  potassium chloride   Powder 40 milliEquivalent(s) Oral once  potassium chloride   Powder 40 milliEquivalent(s) Oral every 4 hours  timolol 0.5% Solution 1 Drop(s) Both EYES two times a day                            9.2    6.50  )-----------( 369      ( 18 Apr 2022 05:04 )             28.9       04-18    142  |  105  |  15  ----------------------------<  124<H>  3.2<L>   |  25  |  0.50    Ca    8.9      18 Apr 2022 05:04  Phos  2.6     04-18  Mg     2.10     04-18              T(C): 36.7 (04-18-22 @ 10:08), Max: 38.6 (04-17-22 @ 22:14)  HR: 85 (04-18-22 @ 10:08) (73 - 95)  BP: 149/67 (04-18-22 @ 10:08) (129/52 - 175/66)  RR: 19 (04-18-22 @ 10:08) (18 - 20)  SpO2: 98% (04-18-22 @ 10:08) (97% - 100%)  Wt(kg): --    I&O's Summary    17 Apr 2022 07:01  -  18 Apr 2022 07:00  --------------------------------------------------------  IN: 1890 mL / OUT: 0 mL / NET: 1890 mL    18 Apr 2022 07:01  -  18 Apr 2022 11:28  --------------------------------------------------------  IN: 445 mL / OUT: 20 mL / NET: 425 mL        Head: Normocephalic and atraumatic.   Neck: No JVD. No bruits. Supple. Does not appear to be enlarged.   Cardiovascular: + S1,S2 ; RRR Soft systolic murmur at the left lower sternal border. No rubs noted.    Lungs: CTA b/l. No rhonchi, rales or wheezes.   Abdomen: + BS, soft. Non tender. Non distended. No rebound. No guarding.   Extremities: No clubbing/cyanosis/edema.   Skin: Warm and moist. The patient's skin has normal elasticity and good skin turgor.         A/P) 80 y/o female PMH hypertension, hyperlipidemia, hypothyroidism, depression, seizure disorder, PAF & stroke, who had a prolonged hospitalization from Dec 2021 to Feb 22 for perforated diverticulitis requiring Rosales procedure, colostomy bad, IR drainage for abdominal abscess. She subsequently required trach & PEG, now a/w GI bleeding.    -f/u GI regarding if/when a/c can be restarted for stroke prevention given PAF  -f/u ID  -f/u palliative care  -no further inpatient cardiac workup expected other than a/c for stroke prevention as tolerated   -continue lipitor for hyperlipidemia  -continue coreg for hypertension  -plans for dc to NH with hospice per primary team     Danny Casiano MD

## 2022-04-18 NOTE — BH CONSULTATION LIAISON ASSESSMENT NOTE - CURRENT MEDICATION
MEDICATIONS  (STANDING):  amLODIPine   Tablet 10 milliGRAM(s) Oral daily  aspirin  chewable 81 milliGRAM(s) Oral daily  atorvastatin 40 milliGRAM(s) Oral at bedtime  carvedilol 3.125 milliGRAM(s) Oral every 12 hours  dorzolamide 2% Ophthalmic Solution 1 Drop(s) Both EYES <User Schedule>  influenza  Vaccine (HIGH DOSE) 0.7 milliLiter(s) IntraMuscular once  lactated ringers. 1000 milliLiter(s) (75 mL/Hr) IV Continuous <Continuous>  latanoprost 0.005% Ophthalmic Solution 1 Drop(s) Both EYES at bedtime  levETIRAcetam  IVPB 500 milliGRAM(s) IV Intermittent every 12 hours  levothyroxine 75 MICROGram(s) Oral daily  melatonin 3 milliGRAM(s) Oral at bedtime  meropenem/vaborbactam IVPB 4 Gram(s) IV Intermittent every 8 hours  pantoprazole   Suspension 40 milliGRAM(s) Oral daily  polyethylene glycol 3350 17 Gram(s) Oral daily  potassium chloride   Powder 40 milliEquivalent(s) Oral once  potassium chloride   Powder 40 milliEquivalent(s) Oral every 4 hours  timolol 0.5% Solution 1 Drop(s) Both EYES two times a day    MEDICATIONS  (PRN):  acetaminophen     Tablet .. 650 milliGRAM(s) Oral every 6 hours PRN Mild Pain (1 - 3), Moderate Pain (4 - 6)  OLANZapine 2.5 milliGRAM(s) Oral every 8 hours PRN agitation  OLANZapine Injectable 2.5 milliGRAM(s) IntraMuscular every 8 hours PRN severe agitation

## 2022-04-18 NOTE — BH CONSULTATION LIAISON ASSESSMENT NOTE - DESCRIPTION
Per son, pt has common law  who is currently undergoing eval for dementia/cognitive changes. Pt has two adult children, son and daughter. Son who is health care proxy lives in [place]. Daughter is around in [place], tried to visit pt but also taking care of father.

## 2022-04-18 NOTE — CHART NOTE - NSCHARTNOTEFT_GEN_A_CORE
Source: Patient [ ]    Family [ ]     other [x ] PCA, chart review    Patient seen for length of stay nutrition f/u. 81 year old female with a PMH of perforated diverticulitis s/p Rosales procedure, colostomy, abdominal abscess with wound s/p IR drainage, Respiratory Failure s/p Trach, CVA w/ functional quadriplegia, aphasia, dysphagia s/p PEG, GIB, HTN, HLD, Seizure d/o, Hypothyroidism, Depression p/w PEG tube dislodgement, s/p PEG replacement (3/30) and continues on antibiotics for sepsis per chart.    Patient continues on Jevity 1.2 via GT @ 65 mL/hr. x 24 hrs. for total volume 1,495 mL and noted to be at goal rate per bed side observation. No intolerances to tube feed reported. Noted with loose stool, last bowel movement (4/17) per RN flow sheet. Noted with +1 generalized, +2 L arm and +3 R arm edema with no pressure injuries per RN flow sheet.    Diet : Diet, NPO with Tube Feed:   Tube Feeding Modality: Gastrostomy  Jevity 1.2 Santos (JEVITY1.2RTH)  Total Volume for 24 Hours (mL): 1495  Continuous  Starting Tube Feed Rate {mL per Hour}: 25  Increase Tube Feed Rate by (mL): 10     Every 4 hours  Until Goal Tube Feed Rate (mL per Hour): 65  Tube Feed Duration (in Hours): 23  Tube Feed Start Time: 11:45 (04-14-22 @ 17:53)    Current Weight: Weight (kg): 80.1 kg (4/18) per bed scale observation  76.2 (04-14 @ 12:55)  79.6 kg (4/1)  Weight Change: weight fluctuations likely r/t fluid shifts, will continue to monitor.    Pertinent Medications: MEDICATIONS  (STANDING):  amLODIPine   Tablet 10 milliGRAM(s) Oral daily  aspirin  chewable 81 milliGRAM(s) Oral daily  atorvastatin 40 milliGRAM(s) Oral at bedtime  carvedilol 3.125 milliGRAM(s) Oral every 12 hours  dorzolamide 2% Ophthalmic Solution 1 Drop(s) Both EYES <User Schedule>  influenza  Vaccine (HIGH DOSE) 0.7 milliLiter(s) IntraMuscular once  lactated ringers. 1000 milliLiter(s) (75 mL/Hr) IV Continuous <Continuous>  latanoprost 0.005% Ophthalmic Solution 1 Drop(s) Both EYES at bedtime  levETIRAcetam  IVPB 500 milliGRAM(s) IV Intermittent every 12 hours  levothyroxine 75 MICROGram(s) Oral daily  melatonin 3 milliGRAM(s) Oral at bedtime  meropenem/vaborbactam IVPB 4 Gram(s) IV Intermittent every 8 hours  pantoprazole   Suspension 40 milliGRAM(s) Oral daily  polyethylene glycol 3350 17 Gram(s) Oral daily  potassium chloride   Powder 40 milliEquivalent(s) Oral once  potassium chloride   Powder 40 milliEquivalent(s) Oral every 4 hours  timolol 0.5% Solution 1 Drop(s) Both EYES two times a day    MEDICATIONS  (PRN):  acetaminophen     Tablet .. 650 milliGRAM(s) Oral every 6 hours PRN Mild Pain (1 - 3), Moderate Pain (4 - 6)  OLANZapine 2.5 milliGRAM(s) Oral every 8 hours PRN agitation  OLANZapine Injectable 2.5 milliGRAM(s) IntraMuscular every 8 hours PRN severe agitation    Pertinent Labs:  04-18 Na142 mmol/L Glu 124 mg/dL<H> K+ 3.2 mmol/L<L> Cr  0.50 mg/dL BUN 15 mg/dL 04-18 Phos 2.6 mg/dL    Estimated Needs:   [x ] no change since previous assessment: based on weight 76.2 kg  Calories - 8515-4405 kcal (20-25 kcal/kg)  Protein - 76.2-91.4 g pro (1-1.2 g/kg)    Previous Nutrition Diagnosis: no active nutrition diagnosis at this time    Nutrition Diagnosis is [x ] ongoing  [ ] resolved [ ] not applicable     Interventions:   - managed with Jevity 1.2 tube feeds    Recommend  - continue tube feed provision as ordered  - obtain weekly weight to monitor trend  - consider Psyllium powder if loose stools continue     Monitoring and Evaluation:   [x ] Tolerance to diet prescription [x ] weights [x ] follow up per protocol

## 2022-04-19 LAB
ALBUMIN SERPL ELPH-MCNC: 3 G/DL — LOW (ref 3.3–5)
ALP SERPL-CCNC: 84 U/L — SIGNIFICANT CHANGE UP (ref 40–120)
ALT FLD-CCNC: 22 U/L — SIGNIFICANT CHANGE UP (ref 4–33)
ANION GAP SERPL CALC-SCNC: 13 MMOL/L — SIGNIFICANT CHANGE UP (ref 7–14)
AST SERPL-CCNC: 24 U/L — SIGNIFICANT CHANGE UP (ref 4–32)
BILIRUB SERPL-MCNC: 0.4 MG/DL — SIGNIFICANT CHANGE UP (ref 0.2–1.2)
BUN SERPL-MCNC: 16 MG/DL — SIGNIFICANT CHANGE UP (ref 7–23)
CALCIUM SERPL-MCNC: 8.4 MG/DL — SIGNIFICANT CHANGE UP (ref 8.4–10.5)
CHLORIDE SERPL-SCNC: 112 MMOL/L — HIGH (ref 98–107)
CO2 SERPL-SCNC: 22 MMOL/L — SIGNIFICANT CHANGE UP (ref 22–31)
CREAT SERPL-MCNC: 0.47 MG/DL — LOW (ref 0.5–1.3)
EGFR: 97 ML/MIN/1.73M2 — SIGNIFICANT CHANGE UP
GLUCOSE BLDC GLUCOMTR-MCNC: 127 MG/DL — HIGH (ref 70–99)
GLUCOSE BLDC GLUCOMTR-MCNC: 133 MG/DL — HIGH (ref 70–99)
GLUCOSE SERPL-MCNC: 113 MG/DL — HIGH (ref 70–99)
HCT VFR BLD CALC: 25.3 % — LOW (ref 34.5–45)
HGB BLD-MCNC: 7.9 G/DL — LOW (ref 11.5–15.5)
MAGNESIUM SERPL-MCNC: 1.9 MG/DL — SIGNIFICANT CHANGE UP (ref 1.6–2.6)
MCHC RBC-ENTMCNC: 27.9 PG — SIGNIFICANT CHANGE UP (ref 27–34)
MCHC RBC-ENTMCNC: 31.2 GM/DL — LOW (ref 32–36)
MCV RBC AUTO: 89.4 FL — SIGNIFICANT CHANGE UP (ref 80–100)
NRBC # BLD: 0 /100 WBCS — SIGNIFICANT CHANGE UP
NRBC # FLD: 0 K/UL — SIGNIFICANT CHANGE UP
PHOSPHATE SERPL-MCNC: 2.7 MG/DL — SIGNIFICANT CHANGE UP (ref 2.5–4.5)
PLATELET # BLD AUTO: 284 K/UL — SIGNIFICANT CHANGE UP (ref 150–400)
POTASSIUM SERPL-MCNC: 4.5 MMOL/L — SIGNIFICANT CHANGE UP (ref 3.5–5.3)
POTASSIUM SERPL-SCNC: 4.5 MMOL/L — SIGNIFICANT CHANGE UP (ref 3.5–5.3)
PROT SERPL-MCNC: 5.9 G/DL — LOW (ref 6–8.3)
RBC # BLD: 2.83 M/UL — LOW (ref 3.8–5.2)
RBC # FLD: 16.5 % — HIGH (ref 10.3–14.5)
SODIUM SERPL-SCNC: 147 MMOL/L — HIGH (ref 135–145)
WBC # BLD: 5.01 K/UL — SIGNIFICANT CHANGE UP (ref 3.8–10.5)
WBC # FLD AUTO: 5.01 K/UL — SIGNIFICANT CHANGE UP (ref 3.8–10.5)

## 2022-04-19 PROCEDURE — 99232 SBSQ HOSP IP/OBS MODERATE 35: CPT

## 2022-04-19 RX ORDER — TRAZODONE HCL 50 MG
50 TABLET ORAL
Refills: 0 | Status: DISCONTINUED | OUTPATIENT
Start: 2022-04-19 | End: 2022-04-22

## 2022-04-19 RX ORDER — QUETIAPINE FUMARATE 200 MG/1
25 TABLET, FILM COATED ORAL EVERY 6 HOURS
Refills: 0 | Status: DISCONTINUED | OUTPATIENT
Start: 2022-04-19 | End: 2022-04-22

## 2022-04-19 RX ADMIN — LEVETIRACETAM 400 MILLIGRAM(S): 250 TABLET, FILM COATED ORAL at 21:48

## 2022-04-19 RX ADMIN — MEROPENEM-VABORBACTAM 83.33 GRAM(S): 1; 1 INJECTION, POWDER, FOR SOLUTION INTRAVENOUS at 23:01

## 2022-04-19 RX ADMIN — AMLODIPINE BESYLATE 10 MILLIGRAM(S): 2.5 TABLET ORAL at 05:45

## 2022-04-19 RX ADMIN — Medication 75 MICROGRAM(S): at 05:45

## 2022-04-19 RX ADMIN — Medication 1 DROP(S): at 05:44

## 2022-04-19 RX ADMIN — CARVEDILOL PHOSPHATE 3.12 MILLIGRAM(S): 80 CAPSULE, EXTENDED RELEASE ORAL at 17:24

## 2022-04-19 RX ADMIN — Medication 50 MILLIGRAM(S): at 19:41

## 2022-04-19 RX ADMIN — Medication 3 MILLIGRAM(S): at 19:40

## 2022-04-19 RX ADMIN — DORZOLAMIDE HYDROCHLORIDE 1 DROP(S): 20 SOLUTION/ DROPS OPHTHALMIC at 11:36

## 2022-04-19 RX ADMIN — MEROPENEM-VABORBACTAM 83.33 GRAM(S): 1; 1 INJECTION, POWDER, FOR SOLUTION INTRAVENOUS at 12:58

## 2022-04-19 RX ADMIN — PANTOPRAZOLE SODIUM 40 MILLIGRAM(S): 20 TABLET, DELAYED RELEASE ORAL at 11:38

## 2022-04-19 RX ADMIN — ATORVASTATIN CALCIUM 40 MILLIGRAM(S): 80 TABLET, FILM COATED ORAL at 21:48

## 2022-04-19 RX ADMIN — Medication 81 MILLIGRAM(S): at 11:38

## 2022-04-19 RX ADMIN — LEVETIRACETAM 400 MILLIGRAM(S): 250 TABLET, FILM COATED ORAL at 11:36

## 2022-04-19 RX ADMIN — CARVEDILOL PHOSPHATE 3.12 MILLIGRAM(S): 80 CAPSULE, EXTENDED RELEASE ORAL at 05:45

## 2022-04-19 RX ADMIN — MEROPENEM-VABORBACTAM 83.33 GRAM(S): 1; 1 INJECTION, POWDER, FOR SOLUTION INTRAVENOUS at 03:35

## 2022-04-19 RX ADMIN — LATANOPROST 1 DROP(S): 0.05 SOLUTION/ DROPS OPHTHALMIC; TOPICAL at 21:48

## 2022-04-19 RX ADMIN — Medication 1 DROP(S): at 17:23

## 2022-04-19 RX ADMIN — Medication 40 MILLIEQUIVALENT(S): at 05:46

## 2022-04-19 RX ADMIN — DORZOLAMIDE HYDROCHLORIDE 1 DROP(S): 20 SOLUTION/ DROPS OPHTHALMIC at 17:24

## 2022-04-19 NOTE — BH CONSULTATION LIAISON PROGRESS NOTE - CURRENT MEDICATION
MEDICATIONS  (STANDING):  amLODIPine   Tablet 10 milliGRAM(s) Oral daily  aspirin  chewable 81 milliGRAM(s) Oral daily  atorvastatin 40 milliGRAM(s) Oral at bedtime  carvedilol 3.125 milliGRAM(s) Oral every 12 hours  dorzolamide 2% Ophthalmic Solution 1 Drop(s) Both EYES <User Schedule>  influenza  Vaccine (HIGH DOSE) 0.7 milliLiter(s) IntraMuscular once  lactated ringers. 1000 milliLiter(s) (75 mL/Hr) IV Continuous <Continuous>  latanoprost 0.005% Ophthalmic Solution 1 Drop(s) Both EYES at bedtime  levETIRAcetam  IVPB 500 milliGRAM(s) IV Intermittent every 12 hours  levothyroxine 75 MICROGram(s) Oral daily  melatonin 3 milliGRAM(s) Oral <User Schedule>  meropenem/vaborbactam IVPB 4 Gram(s) IV Intermittent every 8 hours  pantoprazole   Suspension 40 milliGRAM(s) Oral daily  polyethylene glycol 3350 17 Gram(s) Oral daily  timolol 0.5% Solution 1 Drop(s) Both EYES two times a day  traZODone 25 milliGRAM(s) Oral <User Schedule>    MEDICATIONS  (PRN):  acetaminophen     Tablet .. 650 milliGRAM(s) Oral every 6 hours PRN Mild Pain (1 - 3), Moderate Pain (4 - 6)  OLANZapine 2.5 milliGRAM(s) Oral every 8 hours PRN agitation  OLANZapine Injectable 2.5 milliGRAM(s) IntraMuscular every 8 hours PRN severe agitation

## 2022-04-19 NOTE — PROGRESS NOTE ADULT - SUBJECTIVE AND OBJECTIVE BOX
INTERVAL HPI/OVERNIGHT EVENTS:    no melena, no hematochezia   tolerating feeds    MEDICATIONS  (STANDING):  amLODIPine   Tablet 10 milliGRAM(s) Oral daily  aspirin  chewable 81 milliGRAM(s) Oral daily  atorvastatin 40 milliGRAM(s) Oral at bedtime  carvedilol 3.125 milliGRAM(s) Oral every 12 hours  dorzolamide 2% Ophthalmic Solution 1 Drop(s) Both EYES <User Schedule>  influenza  Vaccine (HIGH DOSE) 0.7 milliLiter(s) IntraMuscular once  lactated ringers. 1000 milliLiter(s) (75 mL/Hr) IV Continuous <Continuous>  latanoprost 0.005% Ophthalmic Solution 1 Drop(s) Both EYES at bedtime  levETIRAcetam  IVPB 500 milliGRAM(s) IV Intermittent every 12 hours  levothyroxine 75 MICROGram(s) Oral daily  melatonin 3 milliGRAM(s) Oral <User Schedule>  meropenem/vaborbactam IVPB 4 Gram(s) IV Intermittent every 8 hours  pantoprazole   Suspension 40 milliGRAM(s) Oral daily  polyethylene glycol 3350 17 Gram(s) Oral daily  timolol 0.5% Solution 1 Drop(s) Both EYES two times a day  traZODone 25 milliGRAM(s) Oral <User Schedule>    MEDICATIONS  (PRN):  acetaminophen     Tablet .. 650 milliGRAM(s) Oral every 6 hours PRN Mild Pain (1 - 3), Moderate Pain (4 - 6)  OLANZapine 2.5 milliGRAM(s) Oral every 8 hours PRN agitation  OLANZapine Injectable 2.5 milliGRAM(s) IntraMuscular every 8 hours PRN severe agitation      Allergies    No Known Allergies    Intolerances        Review of Systems:    General:  No wt loss, fevers, chills, night sweats, fatigue   Eyes:  Good vision, no reported pain  ENT:  No sore throat, pain, runny nose, dysphagia  CV:  No pain, palpitations, hypo/hypertension  Resp:  No dyspnea, cough, tachypnea, wheezing  GI:  No pain, No nausea, No vomiting, No diarrhea, No constipation, No weight loss, No fever, No pruritis, No rectal bleeding, No melena, No dysphagia  :  No pain, bleeding, incontinence, nocturia  Muscle:  No pain, weakness  Neuro:  No weakness, tingling, memory problems  Psych:  No fatigue, insomnia, mood problems, depression  Endocrine:  No polyuria, polydypsia, cold/heat intolerance  Heme:  No petechiae, ecchymosis, easy bruisability  Skin:  No rash, tattoos, scars, edema      Vital Signs Last 24 Hrs  T(C): 37.2 (19 Apr 2022 05:45), Max: 37.4 (19 Apr 2022 00:10)  T(F): 98.9 (19 Apr 2022 05:45), Max: 99.3 (19 Apr 2022 00:10)  HR: 78 (19 Apr 2022 05:45) (77 - 80)  BP: 147/64 (19 Apr 2022 05:45) (146/69 - 149/67)  BP(mean): --  RR: 18 (19 Apr 2022 05:45) (18 - 19)  SpO2: 99% (19 Apr 2022 05:45) (99% - 100%)    PHYSICAL EXAM:    Constitutional: NAD  HEENT: EOMI, throat clear  Neck: No LAD, supple +trach  Respiratory: CTA and P  Cardiovascular: S1 and S2, RRR, no M  Gastrointestinal: BS+, soft, NT/ND, neg HSM, +peg  Extremities: No peripheral edema, neg clubbing, cyanosis  Vascular: 2+ peripheral pulses  Neurological: A/O x 2 no focal deficits  Psychiatric: Normal mood, normal affect  Skin: No rashes      LABS:                        7.9    5.01  )-----------( 284      ( 19 Apr 2022 09:38 )             25.3     04-19    147<H>  |  112<H>  |  16  ----------------------------<  113<H>  4.5   |  22  |  0.47<L>    Ca    8.4      19 Apr 2022 07:26  Phos  2.7     04-19  Mg     1.90     04-19    TPro  5.9<L>  /  Alb  3.0<L>  /  TBili  0.4  /  DBili  x   /  AST  24  /  ALT  22  /  AlkPhos  84  04-19          RADIOLOGY & ADDITIONAL TESTS:

## 2022-04-19 NOTE — PROGRESS NOTE ADULT - SUBJECTIVE AND OBJECTIVE BOX
Follow Up:  sepsis    Interval History: no more fever and blood cx negative for now    ROS:    Unobtainable because of mental status      Allergies  No Known Allergies        ANTIMICROBIALS:  meropenem/vaborbactam IVPB 4 every 8 hours      OTHER MEDS:  acetaminophen     Tablet .. 650 milliGRAM(s) Oral every 6 hours PRN  amLODIPine   Tablet 10 milliGRAM(s) Oral daily  aspirin  chewable 81 milliGRAM(s) Oral daily  atorvastatin 40 milliGRAM(s) Oral at bedtime  carvedilol 3.125 milliGRAM(s) Oral every 12 hours  dorzolamide 2% Ophthalmic Solution 1 Drop(s) Both EYES <User Schedule>  influenza  Vaccine (HIGH DOSE) 0.7 milliLiter(s) IntraMuscular once  lactated ringers. 1000 milliLiter(s) IV Continuous <Continuous>  latanoprost 0.005% Ophthalmic Solution 1 Drop(s) Both EYES at bedtime  levETIRAcetam  IVPB 500 milliGRAM(s) IV Intermittent every 12 hours  levothyroxine 75 MICROGram(s) Oral daily  melatonin 3 milliGRAM(s) Oral <User Schedule>  OLANZapine 2.5 milliGRAM(s) Oral every 8 hours PRN  OLANZapine Injectable 2.5 milliGRAM(s) IntraMuscular every 8 hours PRN  pantoprazole   Suspension 40 milliGRAM(s) Oral daily  polyethylene glycol 3350 17 Gram(s) Oral daily  QUEtiapine 25 milliGRAM(s) Oral every 6 hours PRN  timolol 0.5% Solution 1 Drop(s) Both EYES two times a day  traZODone 50 milliGRAM(s) Oral <User Schedule>      Vital Signs Last 24 Hrs  T(C): 37.3 (19 Apr 2022 10:27), Max: 37.4 (19 Apr 2022 00:10)  T(F): 99.1 (19 Apr 2022 10:27), Max: 99.3 (19 Apr 2022 00:10)  HR: 80 (19 Apr 2022 10:27) (77 - 80)  BP: 144/60 (19 Apr 2022 10:27) (144/60 - 149/67)  BP(mean): --  RR: 18 (19 Apr 2022 10:27) (18 - 19)  SpO2: 100% (19 Apr 2022 10:27) (99% - 100%)    Physical Exam:  General:    NAD, non toxic  Cardio:    regular S1,S2  Respiratory:   trach  abd:   soft, BS +, not tender  :     no CVAT, no suprapubic tenderness, no mc  Musculoskeletal : LUE edema  Skin:    no rash  vascular: no phlebitis                        7.9    5.01  )-----------( 284      ( 19 Apr 2022 09:38 )             25.3       04-19    147<H>  |  112<H>  |  16  ----------------------------<  113<H>  4.5   |  22  |  0.47<L>    Ca    8.4      19 Apr 2022 07:26  Phos  2.7     04-19  Mg     1.90     04-19    TPro  5.9<L>  /  Alb  3.0<L>  /  TBili  0.4  /  DBili  x   /  AST  24  /  ALT  22  /  AlkPhos  84  04-19          MICROBIOLOGY:  v  .Blood Blood-Peripheral  04-18-22   No growth to date.  --  --      .Blood Blood  04-05-22   No Growth Final  --  --      .Blood Blood-Peripheral  04-04-22   Growth in anaerobic bottle: Klebsiella pneumoniae (Carbapenem Resistant)  See previous culture [id number]  --  Blood Culture PCR  Klepne MDRO      .Blood Blood-Peripheral  03-26-22   No Growth Final  --  --      .Blood Blood-Peripheral  03-26-22   No Growth Final  --  --                RADIOLOGY:  Images independently visualized and reviewed personally, findings as below  < from: Xray Chest 1 View- PORTABLE-Urgent (Xray Chest 1 View- PORTABLE-Urgent .) (04.17.22 @ 19:34) >  IMPRESSION:    Trace bilateral pleural effusions.    < end of copied text >  < from: CT Angio Abdomen and Pelvis w/ IV Cont (04.13.22 @ 22:26) >  IMPRESSION:    Small rectal active hemorrhage. Rectal wall enhancement may also reflect   into the lesion.    Thick-walled collection in the cul-de-sac and small collections along the   right paracolic gutter are unchanged.    Bladder calculi reidentified.    Findings discussed with ROSALINE Harrison by Dr. Martina Crystal  on 4/13/2022   at 11:35 PM with readback.      < end of copied text >

## 2022-04-19 NOTE — PROGRESS NOTE ADULT - SUBJECTIVE AND OBJECTIVE BOX
CARDIOLOGY     Trached, non-verbal but nods yes and no; ros limited.     DATE OF SERVICE - 04-19-22     Review of Systems:   Constitutional: [ ] fevers, [ ] chills.   Skin: [ ] dry skin. [ ] rashes.  Psychiatric: [ ] depression, [ ] anxiety.   Gastrointestinal: [ ] BRBPR, [ ] melena.   Neurological: [ ] confusion. [ ] seizures. [ ] shuffling gait.   Ears,Nose,Mouth and Throat: [ ] ear pain [ ] sore throat.   Eyes: [ ] diplopia.   Respiratory: [ ] hemoptysis. [ ] shortness of breath  Cardiovascular: See HPI above  Hematologic/Lymphatic: [ ] anemia. [ ] painful nodes. [ ] prolonged bleeding.   Genitourinary: [ ] hematuria. [ ] flank pain.   Endocrine: [ ] significant change in weight. [ ] intolerance to heat and cold.     Review of systems [ ] otherwise negative, [x] otherwise unable to obtain    FH: no family history of sudden cardiac death in first degree relatives    SH: [ ] tobacco, [ ] alcohol, [ ] drugs           acetaminophen     Tablet .. 650 milliGRAM(s) Oral every 6 hours PRN  amLODIPine   Tablet 10 milliGRAM(s) Oral daily  aspirin  chewable 81 milliGRAM(s) Oral daily  atorvastatin 40 milliGRAM(s) Oral at bedtime  carvedilol 3.125 milliGRAM(s) Oral every 12 hours  dorzolamide 2% Ophthalmic Solution 1 Drop(s) Both EYES <User Schedule>  influenza  Vaccine (HIGH DOSE) 0.7 milliLiter(s) IntraMuscular once  lactated ringers. 1000 milliLiter(s) IV Continuous <Continuous>  latanoprost 0.005% Ophthalmic Solution 1 Drop(s) Both EYES at bedtime  levETIRAcetam  IVPB 500 milliGRAM(s) IV Intermittent every 12 hours  levothyroxine 75 MICROGram(s) Oral daily  melatonin 3 milliGRAM(s) Oral <User Schedule>  meropenem/vaborbactam IVPB 4 Gram(s) IV Intermittent every 8 hours  OLANZapine 2.5 milliGRAM(s) Oral every 8 hours PRN  OLANZapine Injectable 2.5 milliGRAM(s) IntraMuscular every 8 hours PRN  pantoprazole   Suspension 40 milliGRAM(s) Oral daily  polyethylene glycol 3350 17 Gram(s) Oral daily  QUEtiapine 25 milliGRAM(s) Oral every 6 hours PRN  timolol 0.5% Solution 1 Drop(s) Both EYES two times a day  traZODone 50 milliGRAM(s) Oral <User Schedule>                            7.9    5.01  )-----------( 284      ( 19 Apr 2022 09:38 )             25.3       04-19    147<H>  |  112<H>  |  16  ----------------------------<  113<H>  4.5   |  22  |  0.47<L>    Ca    8.4      19 Apr 2022 07:26  Phos  2.7     04-19  Mg     1.90     04-19    TPro  5.9<L>  /  Alb  3.0<L>  /  TBili  0.4  /  DBili  x   /  AST  24  /  ALT  22  /  AlkPhos  84  04-19            T(C): 37.1 (04-19-22 @ 15:08), Max: 37.4 (04-19-22 @ 00:10)  HR: 76 (04-19-22 @ 15:08) (76 - 80)  BP: 149/70 (04-19-22 @ 15:08) (144/60 - 149/70)  RR: 18 (04-19-22 @ 15:08) (18 - 18)  SpO2: 100% (04-19-22 @ 15:08) (99% - 100%)  Wt(kg): --    I&O's Summary    18 Apr 2022 07:01  -  19 Apr 2022 07:00  --------------------------------------------------------  IN: 2115 mL / OUT: 170 mL / NET: 1945 mL    19 Apr 2022 07:01  -  19 Apr 2022 15:31  --------------------------------------------------------  IN: 0 mL / OUT: 150 mL / NET: -150 mL          Head: Normocephalic and atraumatic.   Neck: No JVD. No bruits. Supple. Does not appear to be enlarged.   Cardiovascular: + S1,S2 ; RRR Soft systolic murmur at the left lower sternal border. No rubs noted.    Lungs: CTA b/l. No rhonchi, rales or wheezes.   Abdomen: + BS, soft. Non tender. Non distended. No rebound. No guarding.   Extremities: No clubbing/cyanosis/edema.   Skin: Warm and moist. The patient's skin has normal elasticity and good skin turgor.         A/P) 80 y/o female PMH hypertension, hyperlipidemia, hypothyroidism, depression, seizure disorder, PAF & stroke, who had a prolonged hospitalization from Dec 2021 to Feb 22 for perforated diverticulitis requiring Rosales procedure, colostomy bad, IR drainage for abdominal abscess. She subsequently required trach & PEG, now a/w GI bleeding.    -f/u GI regarding if/when a/c can be restarted for stroke prevention given PAF - asa restarted   -f/u ID  -f/u palliative care  -no further inpatient cardiac workup expected other than a/c for stroke prevention as tolerated   -continue lipitor for hyperlipidemia  -continue coreg for hypertension  -plans for dc to NH with hospice per primary team     Danny Casiano MD

## 2022-04-19 NOTE — BH CONSULTATION LIAISON PROGRESS NOTE - NSBHFUPINTERVALHXFT_PSY_A_CORE
Chart reviewed. Zyprexa 5mg PO utilized at 3pm yesterday. Pt no longer on restraints.    Pt seen at bedside with CO 1:1 staff present. Pt awake and alert, able to nod yes or shake her head no to respond. Pt denies feeling depressed. She endorses feeling anxious. Pt endorses poor sleep and difficulty initiating sleep. She denies AVH. Pt endorses that she does not feel confused, is aware that she is in the hospital. Chart reviewed. PRN Zyprexa 2.5mg PO utilized around 3pm yesterday. Pt no longer on restraints.    Pt seen at bedside with CO 1:1 staff present. Pt awake and alert, able to nod yes or shake her head no to respond. Pt denies feeling depressed. She endorses feeling anxious. Pt endorses poor sleep and difficulty initiating sleep. She denies AVH. Denies SI and HI. Pt endorses that she does not feel confused, is aware that she is in the hospital.

## 2022-04-19 NOTE — BH CONSULTATION LIAISON PROGRESS NOTE - NSBHCHARTREVIEWVS_PSY_A_CORE FT
Vital Signs Last 24 Hrs  T(C): 37.3 (19 Apr 2022 10:27), Max: 37.4 (19 Apr 2022 00:10)  T(F): 99.1 (19 Apr 2022 10:27), Max: 99.3 (19 Apr 2022 00:10)  HR: 80 (19 Apr 2022 10:27) (77 - 80)  BP: 144/60 (19 Apr 2022 10:27) (144/60 - 149/67)  BP(mean): --  RR: 18 (19 Apr 2022 10:27) (18 - 19)  SpO2: 100% (19 Apr 2022 10:27) (99% - 100%)

## 2022-04-19 NOTE — PROGRESS NOTE ADULT - SUBJECTIVE AND OBJECTIVE BOX
Date of Service  : 04-19-22     INTERVAL HPI/OVERNIGHT EVENTS: Off mittens today .   Vital Signs Last 24 Hrs  T(C): 36.9 (19 Apr 2022 17:20), Max: 37.4 (19 Apr 2022 00:10)  T(F): 98.4 (19 Apr 2022 17:20), Max: 99.3 (19 Apr 2022 00:10)  HR: 79 (19 Apr 2022 17:20) (76 - 80)  BP: 158/63 (19 Apr 2022 17:20) (144/60 - 158/63)  BP(mean): --  RR: 18 (19 Apr 2022 17:20) (18 - 18)  SpO2: 99% (19 Apr 2022 17:20) (99% - 100%)  I&O's Summary    18 Apr 2022 07:01  -  19 Apr 2022 07:00  --------------------------------------------------------  IN: 2115 mL / OUT: 170 mL / NET: 1945 mL    19 Apr 2022 07:01  -  19 Apr 2022 19:36  --------------------------------------------------------  IN: 0 mL / OUT: 150 mL / NET: -150 mL      MEDICATIONS  (STANDING):  amLODIPine   Tablet 10 milliGRAM(s) Oral daily  aspirin  chewable 81 milliGRAM(s) Oral daily  atorvastatin 40 milliGRAM(s) Oral at bedtime  carvedilol 3.125 milliGRAM(s) Oral every 12 hours  dorzolamide 2% Ophthalmic Solution 1 Drop(s) Both EYES <User Schedule>  influenza  Vaccine (HIGH DOSE) 0.7 milliLiter(s) IntraMuscular once  lactated ringers. 1000 milliLiter(s) (75 mL/Hr) IV Continuous <Continuous>  latanoprost 0.005% Ophthalmic Solution 1 Drop(s) Both EYES at bedtime  levETIRAcetam  IVPB 500 milliGRAM(s) IV Intermittent every 12 hours  levothyroxine 75 MICROGram(s) Oral daily  melatonin 3 milliGRAM(s) Oral <User Schedule>  meropenem/vaborbactam IVPB 4 Gram(s) IV Intermittent every 8 hours  pantoprazole   Suspension 40 milliGRAM(s) Oral daily  polyethylene glycol 3350 17 Gram(s) Oral daily  timolol 0.5% Solution 1 Drop(s) Both EYES two times a day  traZODone 50 milliGRAM(s) Oral <User Schedule>    MEDICATIONS  (PRN):  acetaminophen     Tablet .. 650 milliGRAM(s) Oral every 6 hours PRN Mild Pain (1 - 3), Moderate Pain (4 - 6)  OLANZapine 2.5 milliGRAM(s) Oral every 8 hours PRN agitation  OLANZapine Injectable 2.5 milliGRAM(s) IntraMuscular every 8 hours PRN severe agitation  QUEtiapine 25 milliGRAM(s) Oral every 6 hours PRN anxiety    LABS:                        7.9    5.01  )-----------( 284      ( 19 Apr 2022 09:38 )             25.3     04-19    147<H>  |  112<H>  |  16  ----------------------------<  113<H>  4.5   |  22  |  0.47<L>    Ca    8.4      19 Apr 2022 07:26  Phos  2.7     04-19  Mg     1.90     04-19    TPro  5.9<L>  /  Alb  3.0<L>  /  TBili  0.4  /  DBili  x   /  AST  24  /  ALT  22  /  AlkPhos  84  04-19        CAPILLARY BLOOD GLUCOSE      POCT Blood Glucose.: 127 mg/dL (19 Apr 2022 17:39)  POCT Blood Glucose.: 133 mg/dL (19 Apr 2022 11:55)              Consultant(s) Notes Reviewed:  [x ] YES  [ ] NO    PHYSICAL EXAM:  GENERAL: NAD, well-groomed, well-developed,not in any distress ,  HEAD:  Atraumatic, Normocephalic  NECK: Trach +  NERVOUS SYSTEM:  Alert   CHEST/LUNG: Good air entry bilateral with no  rales, rhonchi, wheezing, or rubs  HEART: Regular rate and rhythm; No murmurs, rubs, or gallops  ABDOMEN: Soft, Nontender, Nondistended; Bowel sounds present, PEG and ostomy +  EXTREMITIES:  2+ Peripheral Pulses, No clubbing, cyanosis, or edema      Care Discussed with Consultants/Other Providers [ x] YES  [ ] NO

## 2022-04-19 NOTE — PROGRESS NOTE ADULT - ASSESSMENT
81 f with HTN, HLD, Hypothyroidism, Depression, seizure d/o, RLE DVT, Cholelithiasis s/p recent Hospitalization at [facility] 12/22 -> 2/11/22 for acute perforated diverticulitis s/p Rosales 12/25, colostomy bag 12/26, hospitalization c/b abd abscess s/p IR drainage 1/3 Cx with E.coli & bacteroides and candida albicans,  trach 1/7, PEG, Acute left MCA infarct, aspiration pneumonia, sent 3/27 from rehab for dislodged PEG, initially afebrile, normal WBc, negative blood and urine cx  CT 3/27: PEG tube removed/dislodged, no evidence for pneumoperitoneum, 3.5 cm thick-walled fluid collection in the pelvic cul-de-sac, Interval slight improvement in additional small loculated fluid   collections, including significant improvement in inflammatory changes in RLQ and pelvis status post removal of surgical drain. Ill-defined hyperdensity/enhancing nodularity along the right posterior   bladder lumen, cannot exclude a neoplastic process  s/p PEG placement by IR 3/30  on 4/4 pt spiked to 102.4 with tachycardia and new leukocytosis to 16    recent perforated diverticulitis s/p huitron, c/b abd abscess s/p IR drainage, cx with E-coli, bacteroides and candida now admitted for PEG dislodgement s/p IR placement 3/30 now with new fever, tachycardia, leukocytosis, sepsis due to KPC klebsiella bacteremia, S to vabomere, acyvaz, tigecyline, minocycline and genta, repeat cx 4/5 negative  CT: Dependent high attenuation within the urinary bladder, which may represent small stones and/or debris. An underlying mass cannot be excluded. Thick-walled collection in the cul-de-sac and small collections along the right paracolic gutter, not significantly changed since 3/27/2022  IR stated that abscess are too small to be drained  rectal bleed, CTA showed small active rectal bleed, unchanged collections, s/p flex sig 4/14, bleeding vessel was clipped   new fever but WBC normal, blood cx negative for now  * c/w  vabomere, blood cx cleared 4/5, so day 15 but pt had a fever, so continue for now  * if the blood cx remains negative and no more fever, will discontinue  * RUE doppler  to r/o DVT  * monitor CBC/diff and renal function      The above assessment and plan was discussed with the primary team    Ivy Rose MD  contact on teams  After 5pm and on weekends call [phone]

## 2022-04-19 NOTE — BH CONSULTATION LIAISON PROGRESS NOTE - CASE SUMMARY
Chart reviewed, pt. seen/evaluated, I agree with above assessment/plan. Patient more alert today, able to answer some simple questions by nodding/shaking her head, pt. is aware she is in the hospital, able to follow simple commands (shake my hand, squeeze my fingers), overall interview rather limited. Plan as above.

## 2022-04-19 NOTE — PROGRESS NOTE ADULT - ASSESSMENT
82 yo F with PMHx HTN, HLD, Hypothyroidism, Depression, seizure d/o, RLE DVT, Cholelithiasis s/p recent Hospitalization in [facility] from 12/22 -> 2/11/22 for acute perforated diverticulitis s/p Rosales 12/25, colostomy bag 12/26, hospitalization c/b IR drainage for abdominal abscess 1/3 Cx grew e.coli & bacteriodes, also required Mechanical Ventilation for Acute Respiratory Failure with Tracheostomy done 1/7.  PEG done 1/18, developed bleeding through ostomy, EGD on 2/1 showed gastritis. T's showed Acute left MCA infarct- could be 2/2 to carotid occlusion vs afib, new RLL aspiration pneumonia, seen by ID, completed antibiotics, seen by VascSx, not surgical candidate. Pt aphasic sent from [facility] for dislodged Peg tube, after staff noted pt has blood to her abdomen. Nephrology consulted for Hypokalemia.     A/P  Hypernatremia  Na elevated today  pt on 250cc free water Q6  increase free water to q4 hrs  monitor    Hypokalemia   likely 2/2 GI loss   Repleted  monitor input and output closely   monitor K closely    Hypomagnesia   replete as needed  monitor Mg     Hypophosphatemia   replete as needed  monitor     Hypocalcemia   corrected Ca WNL   Monitor    Acidosis with anion gap   etiology?  improved   monitor    HTN  optimal   monitor BP closely

## 2022-04-19 NOTE — PROGRESS NOTE ADULT - ASSESSMENT
80yo female with complicated medical course    Rectal Bleeding   s/p Flex Sig with clip placed in distal rectum   cbc stable   avoid rectal manipulation, suppositories or enemas   no objection to ASA 81mg  peg feeds w/aspiration precautions    Abnormal CT   agree with abx per ID   fluid collections too small for drainage; IR input appreciated  recent IR peg exchange 3/30; reconsult if issues  continue w/supportive care   DNR/DNI; hospice planning    Sepsis   per primary team and ID     I reviewed the overnight course of events on the unit, re-confirming the patient history. I discussed the care with the patient and their family. The plan of care was discussed with the physician assistant and modifications were made to the notation where appropriate. Differential diagnosis and plan of care discussed with patient after the evaluation. Advanced care planning was discussed with patient and family.  Advanced care planning forms were reviewed and discussed.  Risks, benefits and alternatives of gastroenterologic procedures were discussed in detail and all questions were answered. 35 minutes spent on total encounter of which more than fifty percent of the encounter was spent counseling and/or coordinating care by the attending physician.

## 2022-04-19 NOTE — BH CONSULTATION LIAISON PROGRESS NOTE - NSBHCONSULTFOLLOWAFTERCARE_PSY_A_CORE FT
If needing outpt psychiatry/behavioral health follow up, may refer to [facility] outpatient services [phone]  [facility] outpt. walk in clinic : [phone]

## 2022-04-19 NOTE — PROGRESS NOTE ADULT - ASSESSMENT
82 yo F w/ PMHx of perforated diverticulitis s/p Rosales procedure, colostomy, abdominal abscess with wound s/p IR drainage, Respiratory Failure s/p Trache, CVA w/ functional quadriplegia, aphasia, dysphagia s/p PEG (on Jevity 1.5 @ 70cchr x 18 hrs, & water flushes 325ml q 6 hrs, Afib, GIB, HTN, HLD, Seizure d/o, Hypothyroidism, Glaucoma, Depression p/w PEG tube dislodgement for unknown amount of time     Problem/Plan - 1:  ·  Problem: Sepsis sec to KPC K Pneumoniae Bacteremia .   ·  Plan:  IV Abxs per ID till 4/19 .   ID help appreciated.   < from: CT Abdomen and Pelvis w/ IV Cont (04.04.22 @ 14:24) >  IMPRESSION:  Trace bilateral pleural effusions.    Improved aeration in the lower lobes bilaterally since 1/30/2022.   Nonspecific patchy bilateral groundglass and linear opacities are noted.    Enhancement of the wall of the cecum and ascending colon with mild   infiltration of the adjacent fat is similar in appearance to prior study   dated 3/27/2022. An inflammatory or infectious process is considered.    Dependent high attenuation within the urinary bladder, which may   represent small stones and/or debris. An underlying mass cannot be   excluded.    Thick-walled collection in the cul-de-sac and small collections along the   right paracolic gutter, not significantly changed since 3/27/2022.    < end of copied text >     Problem/Plan - 2:  ·  Problem: Metabolic Encephalopathy    ·  Plan: Likely sec to Infection  . Resolved.    Neurology helping  and  EEG showing no Seizure activity  .  Psychiatry helping and off mittens.      Problem/Plan - 3:  ·  Problem: Stroke.   ·  Plan: Holding ASA, Plavix as actively bleeding. ASA restarted .    Continue  statin Via PEG tube .     Problem/Plan - 4:  ·  Problem: Chronic respiratory failure with hypoxia.   ·  Plan: Pt on 2l NC via Trache collar.   S/P replacement of Tracheostomy Tube.       Problem/Plan - 5:  ·  Problem: Diverticulitis of intestine with perforation and abscess without bleeding.   ·  Plan: Surgery c/s in chart- small fluid collection but nontoxic, if drainage needed would be done by IR.  Wound care eval.     Problem/Plan - 6:  ·  Problem: History of atrial fibrillation.   ·  Plan: Cards helping and now off AC .   D/W Son and okay to start.      Problem/Plan - 7:  ·  Problem: Seizure disorder.   ·  Plan: continue levetiracetam bid.     Problem/Plan - 8:  ·  Problem: DVT, lower extremity.   ·  Plan: ?Subacute vs Chronic, Will restart AC if okay with GI.      Problem/Plan - 9:  ·  Problem: HTN (hypertension).   ·  Plan: Resuming carvedilol & amlodipine as PEG replaced.     Problem/Plan - 10:  ·  Problem: Hypothyroidism.   ·  Plan; Resume Levothyroxine when PEG replaced.     Problem/Plan - 11:  ·  Problem: Glaucoma.   ·  Plan: continue Timolol, Dorzolamide, Latanoprost.      Problem/Plan - 12:  ·  Problem: PEG tube malfunction.   ·  Plan:  S/P  PEG . TF started.      Problem/Plan - 13:  ·  Problem: Hypokalemia.   ·  Plan: Correcting .       Problem/Plan - 14:  ·  Problem: GI bleed. .   ·  Plan: PPI . GI following .     PRBC for HGb 7.5 G or less. Watching CBC. HH stable     < from: CT Angio Abdomen and Pelvis w/ IV Cont (04.13.22 @ 22:26) >  IMPRESSION:    Small rectal active hemorrhage. Rectal wall enhancement may also reflect   into the lesion.    Thick-walled collection in the cul-de-sac and small collections along the   right paracolic gutter are unchanged.    Bladder calculi reidentified.    Findings discussed with ROSALINE Harrison by Dr. Martina Crystal  on 4/13/2022   at 11:35 PM with readback.    < end of copied text >    < from: Flexible Sigmoidoscopy (04.14.22 @ 16:41) >  Findings:       A visible vessel (immediatly proximal to the dentate line) was found in        the distal rectum. Active bleeding was present. To stop active bleeding,        one hemostatic clip was successfully placed (MR conditional). There was        no bleeding at the end of the procedure.       Diverticulosis in the sigmoid                                                                                   Impression:          - Preparation of the colon was poor.                       - Visible vessel was found in the distal rectum. Clip                        (MR conditional) was placed.                       - No specimens collected.  Recommendation:      - Return patient to hospital [facility] for ongoing care.                       -Restart PEG feeds                       -Hold Ac for Now                       -Stool softeners                       -No enema or rectal meds X 48     < end of copied text >    Disposition : DC  planning .  Over all prognosis very poor.

## 2022-04-19 NOTE — BH CONSULTATION LIAISON PROGRESS NOTE - MODIFICATIONS
As below     Burow's Graft Text: The defect edges were debeveled with a #15 scalpel blade.  Given the location of the defect, shape of the defect, the proximity to free margins and the presence of a standing cone deformity a Burow's skin graft was deemed most appropriate. The standing cone was removed and this tissue was then trimmed to the shape of the primary defect. The adipose tissue was also removed until only dermis and epidermis were left.  The skin margins of the secondary defect were undermined to an appropriate distance in all directions utilizing iris scissors.  The secondary defect was closed with interrupted buried subcutaneous sutures.  The skin edges were then re-apposed with running  sutures.  The skin graft was then placed in the primary defect and oriented appropriately.

## 2022-04-19 NOTE — PROGRESS NOTE ADULT - SUBJECTIVE AND OBJECTIVE BOX
[facility] [place], [place] NEPHROLOGY PRACTICE   MD ELOISE ALVARADO PA    TEL:  OFFICE: [phone]  DR WARREN CELL: [phone]  DR. BRAUN CELL: [phone]  MIKKI BAILEY CELL: [phone]    From 5pm-7am Answering Service [phone]    -- RENAL FOLLOW UP NOTE ---Date of Service 04-19-22 @ 09:29    Patient is a 81y old  Female who presents with a chief complaint of     Patient seen and examined at bedside.     VITALS:  T(F): 98.9 (04-19-22 @ 05:45), Max: 99.3 (04-19-22 @ 00:10)  HR: 78 (04-19-22 @ 05:45)  BP: 147/64 (04-19-22 @ 05:45)  RR: 18 (04-19-22 @ 05:45)  SpO2: 99% (04-19-22 @ 05:45)  Wt(kg): --    04-18 @ 07:01  -  04-19 @ 07:00  --------------------------------------------------------  IN: 2115 mL / OUT: 170 mL / NET: 1945 mL          PHYSICAL EXAM:  Constitutional: NAD  Neck: +trach  Respiratory: CTAB, no wheezes, rales or rhonchi  Cardiovascular: S1, S2, RRR  Gastrointestinal: +ostomy  Extremities: No peripheral edema    Hospital Medications:   MEDICATIONS  (STANDING):  amLODIPine   Tablet 10 milliGRAM(s) Oral daily  aspirin  chewable 81 milliGRAM(s) Oral daily  atorvastatin 40 milliGRAM(s) Oral at bedtime  carvedilol 3.125 milliGRAM(s) Oral every 12 hours  dorzolamide 2% Ophthalmic Solution 1 Drop(s) Both EYES <User Schedule>  influenza  Vaccine (HIGH DOSE) 0.7 milliLiter(s) IntraMuscular once  lactated ringers. 1000 milliLiter(s) (75 mL/Hr) IV Continuous <Continuous>  latanoprost 0.005% Ophthalmic Solution 1 Drop(s) Both EYES at bedtime  levETIRAcetam  IVPB 500 milliGRAM(s) IV Intermittent every 12 hours  levothyroxine 75 MICROGram(s) Oral daily  melatonin 3 milliGRAM(s) Oral <User Schedule>  meropenem/vaborbactam IVPB 4 Gram(s) IV Intermittent every 8 hours  pantoprazole   Suspension 40 milliGRAM(s) Oral daily  polyethylene glycol 3350 17 Gram(s) Oral daily  timolol 0.5% Solution 1 Drop(s) Both EYES two times a day  traZODone 25 milliGRAM(s) Oral <User Schedule>      LABS:  04-19    147<H>  |  112<H>  |  16  ----------------------------<  113<H>  4.5   |  22  |  0.47<L>    Ca    8.4      19 Apr 2022 07:26  Phos  2.7     04-19  Mg     1.90     04-19    TPro  5.9<L>  /  Alb  3.0<L>  /  TBili  0.4  /  DBili      /  AST  24  /  ALT  22  /  AlkPhos  84  04-19    Creatinine Trend: 0.47 <--, 0.50 <--, 0.46 <--, 0.45 <--, 0.45 <--, 0.47 <--, 0.45 <--    Albumin, Serum: 3.0 g/dL (04-19 @ 07:26)  Phosphorus Level, Serum: 2.7 mg/dL (04-19 @ 07:26)                              9.2    6.50  )-----------( 369      ( 18 Apr 2022 05:04 )             28.9     Urine Studies:  Urinalysis - [04-04-22 @ 01:59]      Color Light Yellow / Appearance Clear / SG 1.012 / pH 7.5      Gluc Negative / Ketone Negative  / Bili Negative / Urobili <2 mg/dL       Blood Negative / Protein Trace / Leuk Est Negative / Nitrite Negative      RBC 6 / WBC 1 / Hyaline  / Gran  / Sq Epi  / Non Sq Epi 1 / Bacteria Negative      PTH -- (Ca --)      [03-30-22 @ 07:35]   32  TSH 7.65      [04-05-22 @ 07:40]  Lipid: chol 158, , HDL 25, LDL --      [02-03-22 @ 15:22]      Syphilis Screen (Treponema Pallidum Ab) Negative      [04-05-22 @ 09:41]    RADIOLOGY & ADDITIONAL STUDIES:

## 2022-04-19 NOTE — BH CONSULTATION LIAISON PROGRESS NOTE - NSBHASSESSMENTFT_PSY_ALL_CORE
81  F, has a common-law /long term partner, has 2 children (son is proxy, involved), PMHx HTN, HLD, Hypothyroidism, Depression, seizure d/o, RLE DVT, Cholelithiasis with a recent complicated hospitalization in [facility] from 12/22 -> 2/11/22 for acute perforated diverticulitis s/p Rosales 12/25, colostomy bag 12/26, c/b IR drainage for abdominal abscess, required Mechanical Ventilation for Acute Respiratory Failure with Tracheostomy done 1/7, PEG done 1/18 complicated by bleeding through ostomy, acute left MCA infarct in Jan 2022 (possibly 2/2 to carotid occlusion vs afib), RLL aspiration pneumonia. Pt presented to [facility] from [facility] for dislodged Peg tube, required antibiotics for bacteremia here. Psych consulted for agitation. Per primary medicine team, pt pulled trach out twice (4/6, 4/12) and has required restraints which impedes disposition for discharge to nursing home with hospice care.    Exam limited d/t pt nonverbal status and waxing/waning mental status. Concerning for delirium deviating between hyperactive, motorically agitated and hypoactive states as well as pain/discomfort with trach contributing to trach-pulling behavior. Pt currently denies feeling depressed, denies passive or active SI. No suspicion currently that trach pulling is a self-injurious or suicidal behavior. At this time, pt may benefit from promoting diurnal sleep cycle and with use of PRNs for agitation. Son who is health care proxy is onboard with this approach.    4/18: started trazodone 25mg qHS. Utilized Zyprexa 2.5mg PO, then out of restraints.  4/19: appearing more alert, interactive in AM. Endorsing anxiety. Out of restraints. On 1:1 CO.    Plan  - CO 1:1 for agitation as per primary team  - EKG 4/15 done - NSR, QT 396ms, QTc 454ms  - INCREASE trazodone to 50mg q 8pm for sleep  - START Seroquel 25mg q8hr PRN for anxiety  - C/w Zyprexa 2.5mg PO (via PEG) q8h PRN for agitation, 2.5mg IM q8h PRN for severe agitation  - Attempt to observe pt off of restraints while she is in behavioral control and utilize Zyprexa IM and restraints as needed for acute agitation; son agreeable to use of Zyprexa  - C/w melatonin 3mg q 8pm for earlier evening dosing time  - Delirium precautions - Minimize use of opioids, anticholinergics, or other deliriogenic agents when possible.  Maintain sleep wake cycle.  Provide frequent reorientation and redirection. Family member at bedside if possible. Assess for need for glasses and hearing aid (if applicable).

## 2022-04-20 LAB
ANION GAP SERPL CALC-SCNC: 13 MMOL/L — SIGNIFICANT CHANGE UP (ref 7–14)
BUN SERPL-MCNC: 15 MG/DL — SIGNIFICANT CHANGE UP (ref 7–23)
CALCIUM SERPL-MCNC: 8.4 MG/DL — SIGNIFICANT CHANGE UP (ref 8.4–10.5)
CHLORIDE SERPL-SCNC: 105 MMOL/L — SIGNIFICANT CHANGE UP (ref 98–107)
CO2 SERPL-SCNC: 22 MMOL/L — SIGNIFICANT CHANGE UP (ref 22–31)
CREAT SERPL-MCNC: 0.42 MG/DL — LOW (ref 0.5–1.3)
EGFR: 99 ML/MIN/1.73M2 — SIGNIFICANT CHANGE UP
GLUCOSE BLDC GLUCOMTR-MCNC: 120 MG/DL — HIGH (ref 70–99)
GLUCOSE SERPL-MCNC: 115 MG/DL — HIGH (ref 70–99)
HCT VFR BLD CALC: 27.4 % — LOW (ref 34.5–45)
HGB BLD-MCNC: 8.4 G/DL — LOW (ref 11.5–15.5)
MAGNESIUM SERPL-MCNC: 1.8 MG/DL — SIGNIFICANT CHANGE UP (ref 1.6–2.6)
MCHC RBC-ENTMCNC: 28.1 PG — SIGNIFICANT CHANGE UP (ref 27–34)
MCHC RBC-ENTMCNC: 30.7 GM/DL — LOW (ref 32–36)
MCV RBC AUTO: 91.6 FL — SIGNIFICANT CHANGE UP (ref 80–100)
NRBC # BLD: 0 /100 WBCS — SIGNIFICANT CHANGE UP
NRBC # FLD: 0 K/UL — SIGNIFICANT CHANGE UP
PHOSPHATE SERPL-MCNC: 2.7 MG/DL — SIGNIFICANT CHANGE UP (ref 2.5–4.5)
PLATELET # BLD AUTO: 326 K/UL — SIGNIFICANT CHANGE UP (ref 150–400)
POTASSIUM SERPL-MCNC: 3.6 MMOL/L — SIGNIFICANT CHANGE UP (ref 3.5–5.3)
POTASSIUM SERPL-SCNC: 3.6 MMOL/L — SIGNIFICANT CHANGE UP (ref 3.5–5.3)
RBC # BLD: 2.99 M/UL — LOW (ref 3.8–5.2)
RBC # FLD: 16 % — HIGH (ref 10.3–14.5)
SARS-COV-2 RNA SPEC QL NAA+PROBE: SIGNIFICANT CHANGE UP
SODIUM SERPL-SCNC: 140 MMOL/L — SIGNIFICANT CHANGE UP (ref 135–145)
WBC # BLD: 4.68 K/UL — SIGNIFICANT CHANGE UP (ref 3.8–10.5)
WBC # FLD AUTO: 4.68 K/UL — SIGNIFICANT CHANGE UP (ref 3.8–10.5)

## 2022-04-20 PROCEDURE — 99232 SBSQ HOSP IP/OBS MODERATE 35: CPT

## 2022-04-20 RX ADMIN — POLYETHYLENE GLYCOL 3350 17 GRAM(S): 17 POWDER, FOR SOLUTION ORAL at 11:24

## 2022-04-20 RX ADMIN — LATANOPROST 1 DROP(S): 0.05 SOLUTION/ DROPS OPHTHALMIC; TOPICAL at 22:45

## 2022-04-20 RX ADMIN — LEVETIRACETAM 400 MILLIGRAM(S): 250 TABLET, FILM COATED ORAL at 11:24

## 2022-04-20 RX ADMIN — Medication 1 DROP(S): at 06:04

## 2022-04-20 RX ADMIN — ATORVASTATIN CALCIUM 40 MILLIGRAM(S): 80 TABLET, FILM COATED ORAL at 22:46

## 2022-04-20 RX ADMIN — MEROPENEM-VABORBACTAM 83.33 GRAM(S): 1; 1 INJECTION, POWDER, FOR SOLUTION INTRAVENOUS at 12:09

## 2022-04-20 RX ADMIN — DORZOLAMIDE HYDROCHLORIDE 1 DROP(S): 20 SOLUTION/ DROPS OPHTHALMIC at 11:24

## 2022-04-20 RX ADMIN — DORZOLAMIDE HYDROCHLORIDE 1 DROP(S): 20 SOLUTION/ DROPS OPHTHALMIC at 19:03

## 2022-04-20 RX ADMIN — MEROPENEM-VABORBACTAM 83.33 GRAM(S): 1; 1 INJECTION, POWDER, FOR SOLUTION INTRAVENOUS at 06:04

## 2022-04-20 RX ADMIN — CARVEDILOL PHOSPHATE 3.12 MILLIGRAM(S): 80 CAPSULE, EXTENDED RELEASE ORAL at 06:04

## 2022-04-20 RX ADMIN — LEVETIRACETAM 400 MILLIGRAM(S): 250 TABLET, FILM COATED ORAL at 22:45

## 2022-04-20 RX ADMIN — Medication 3 MILLIGRAM(S): at 19:03

## 2022-04-20 RX ADMIN — Medication 75 MICROGRAM(S): at 06:04

## 2022-04-20 RX ADMIN — Medication 650 MILLIGRAM(S): at 03:00

## 2022-04-20 RX ADMIN — Medication 1 DROP(S): at 19:02

## 2022-04-20 RX ADMIN — AMLODIPINE BESYLATE 10 MILLIGRAM(S): 2.5 TABLET ORAL at 06:04

## 2022-04-20 RX ADMIN — Medication 650 MILLIGRAM(S): at 02:01

## 2022-04-20 RX ADMIN — Medication 50 MILLIGRAM(S): at 19:03

## 2022-04-20 RX ADMIN — CARVEDILOL PHOSPHATE 3.12 MILLIGRAM(S): 80 CAPSULE, EXTENDED RELEASE ORAL at 19:03

## 2022-04-20 RX ADMIN — Medication 81 MILLIGRAM(S): at 11:25

## 2022-04-20 RX ADMIN — PANTOPRAZOLE SODIUM 40 MILLIGRAM(S): 20 TABLET, DELAYED RELEASE ORAL at 11:25

## 2022-04-20 NOTE — PROGRESS NOTE ADULT - ASSESSMENT
80 yo F w/ PMHx of perforated diverticulitis s/p Rosales procedure, colostomy, abdominal abscess with wound s/p IR drainage, Respiratory Failure s/p Trache, CVA w/ functional quadriplegia, aphasia, dysphagia s/p PEG (on Jevity 1.5 @ 70cchr x 18 hrs, & water flushes 325ml q 6 hrs, Afib, GIB, HTN, HLD, Seizure d/o, Hypothyroidism, Glaucoma, Depression p/w PEG tube dislodgement for unknown amount of time     Problem/Plan - 1:  ·  Problem: Sepsis sec to KPC K Pneumoniae Bacteremia .   ·  Plan:  IV Abxs per ID till 4/19 .   ID help appreciated.   < from: CT Abdomen and Pelvis w/ IV Cont (04.04.22 @ 14:24) >  IMPRESSION:  Trace bilateral pleural effusions.    Improved aeration in the lower lobes bilaterally since 1/30/2022.   Nonspecific patchy bilateral groundglass and linear opacities are noted.    Enhancement of the wall of the cecum and ascending colon with mild   infiltration of the adjacent fat is similar in appearance to prior study   dated 3/27/2022. An inflammatory or infectious process is considered.    Dependent high attenuation within the urinary bladder, which may   represent small stones and/or debris. An underlying mass cannot be   excluded.    Thick-walled collection in the cul-de-sac and small collections along the   right paracolic gutter, not significantly changed since 3/27/2022.    < end of copied text >     Problem/Plan - 2:  ·  Problem: Metabolic Encephalopathy    ·  Plan: Likely sec to Infection  . Resolved.    Neurology helping  and  EEG showing no Seizure activity  .  Psychiatry helping and off mittens.      Problem/Plan - 3:  ·  Problem: Stroke.   ·  Plan: Holding ASA, Plavix as actively bleeding. ASA restarted .    Continue  statin Via PEG tube .     Problem/Plan - 4:  ·  Problem: Chronic respiratory failure with hypoxia.   ·  Plan: Pt on 2l NC via Trache collar.   S/P replacement of Tracheostomy Tube.       Problem/Plan - 5:  ·  Problem: Diverticulitis of intestine with perforation and abscess without bleeding.   ·  Plan: Surgery c/s in chart- small fluid collection but nontoxic, if drainage needed would be done by IR.  Wound care eval.     Problem/Plan - 6:  ·  Problem: History of atrial fibrillation.   ·  Plan: Cards helping and now off AC .   D/W Son and okay to start.      Problem/Plan - 7:  ·  Problem: Seizure disorder.   ·  Plan: continue levetiracetam bid.     Problem/Plan - 8:  ·  Problem: DVT, lower extremity.   ·  Plan: ?Subacute vs Chronic, Will restart AC if okay with GI.      Problem/Plan - 9:  ·  Problem: HTN (hypertension).   ·  Plan: Resuming carvedilol & amlodipine as PEG replaced.     Problem/Plan - 10:  ·  Problem: Hypothyroidism.   ·  Plan; Resume Levothyroxine when PEG replaced.     Problem/Plan - 11:  ·  Problem: Glaucoma.   ·  Plan: continue Timolol, Dorzolamide, Latanoprost.      Problem/Plan - 12:  ·  Problem: PEG tube malfunction.   ·  Plan:  S/P  PEG . TF started.      Problem/Plan - 13:  ·  Problem: Hypokalemia.   ·  Plan: Correcting .       Problem/Plan - 14:  ·  Problem: GI bleed. .   ·  Plan: PPI . GI following .     PRBC for HGb 7.5 G or less. Watching CBC. HH stable     < from: CT Angio Abdomen and Pelvis w/ IV Cont (04.13.22 @ 22:26) >  IMPRESSION:    Small rectal active hemorrhage. Rectal wall enhancement may also reflect   into the lesion.    Thick-walled collection in the cul-de-sac and small collections along the   right paracolic gutter are unchanged.    Bladder calculi reidentified.    Findings discussed with ROSALINE Harrison by Dr. Martina Crystal  on 4/13/2022   at 11:35 PM with readback.    < end of copied text >    < from: Flexible Sigmoidoscopy (04.14.22 @ 16:41) >  Findings:       A visible vessel (immediatly proximal to the dentate line) was found in        the distal rectum. Active bleeding was present. To stop active bleeding,        one hemostatic clip was successfully placed (MR conditional). There was        no bleeding at the end of the procedure.       Diverticulosis in the sigmoid                                                                                   Impression:          - Preparation of the colon was poor.                       - Visible vessel was found in the distal rectum. Clip                        (MR conditional) was placed.                       - No specimens collected.  Recommendation:      - Return patient to hospital [facility] for ongoing care.                       -Restart PEG feeds                       -Hold Ac for Now                       -Stool softeners                       -No enema or rectal meds X 48     < end of copied text >    Disposition : DC  planning pending placement .  Over all prognosis very poor.

## 2022-04-20 NOTE — PROGRESS NOTE ADULT - ASSESSMENT
81 f with HTN, HLD, Hypothyroidism, Depression, seizure d/o, RLE DVT, Cholelithiasis s/p recent Hospitalization at [facility] 12/22 -> 2/11/22 for acute perforated diverticulitis s/p Rosales 12/25, colostomy bag 12/26, hospitalization c/b abd abscess s/p IR drainage 1/3 Cx with E.coli & bacteroides and candida albicans,  trach 1/7, PEG, Acute left MCA infarct, aspiration pneumonia, sent 3/27 from rehab for dislodged PEG, initially afebrile, normal WBc, negative blood and urine cx  CT 3/27: PEG tube removed/dislodged, no evidence for pneumoperitoneum, 3.5 cm thick-walled fluid collection in the pelvic cul-de-sac, Interval slight improvement in additional small loculated fluid   collections, including significant improvement in inflammatory changes in RLQ and pelvis status post removal of surgical drain. Ill-defined hyperdensity/enhancing nodularity along the right posterior   bladder lumen, cannot exclude a neoplastic process  s/p PEG placement by IR 3/30  on 4/4 pt spiked to 102.4 with tachycardia and new leukocytosis to 16    recent perforated diverticulitis s/p huitron, c/b abd abscess s/p IR drainage, cx with E-coli, bacteroides and candida now admitted for PEG dislodgement s/p IR placement 3/30 now with new fever, tachycardia, leukocytosis, sepsis due to KPC klebsiella bacteremia, S to vabomere, acyvaz, tigecyline, minocycline and genta, repeat cx 4/5 negative  CT: Dependent high attenuation within the urinary bladder, which may represent small stones and/or debris. An underlying mass cannot be excluded. Thick-walled collection in the cul-de-sac and small collections along the right paracolic gutter, not significantly changed since 3/27/2022  IR stated that abscess are too small to be drained  rectal bleed, CTA showed small active rectal bleed, unchanged collections, s/p flex sig 4/14, bleeding vessel was clipped   new fever but WBC normal, blood cx negative   * on vabomere, blood cx cleared 4/5, so day 16, course extended as pt had a fever but repeat blood cx negative and no more fever so discontinue vabomere  * monitor for signs of infection       The above assessment and plan was discussed with the primary team    Ivy Rose MD  contact on teams  After 5pm and on weekends call [phone]       81 f with HTN, HLD, Hypothyroidism, Depression, seizure d/o, RLE DVT, Cholelithiasis s/p recent Hospitalization at [facility] 12/22 -> 2/11/22 for acute perforated diverticulitis s/p Rosales 12/25, colostomy bag 12/26, hospitalization c/b abd abscess s/p IR drainage 1/3 Cx with E.coli & bacteroides and candida albicans,  trach 1/7, PEG, Acute left MCA infarct, aspiration pneumonia, sent 3/27 from rehab for dislodged PEG, initially afebrile, normal WBc, negative blood and urine cx  CT 3/27: PEG tube removed/dislodged, no evidence for pneumoperitoneum, 3.5 cm thick-walled fluid collection in the pelvic cul-de-sac, Interval slight improvement in additional small loculated fluid   collections, including significant improvement in inflammatory changes in RLQ and pelvis status post removal of surgical drain. Ill-defined hyperdensity/enhancing nodularity along the right posterior   bladder lumen, cannot exclude a neoplastic process  s/p PEG placement by IR 3/30  on 4/4 pt spiked to 102.4 with tachycardia and new leukocytosis to 16    recent perforated diverticulitis s/p huitron, c/b abd abscess s/p IR drainage, cx with E-coli, bacteroides and candida now admitted for PEG dislodgement s/p IR placement 3/30 now with new fever, tachycardia, leukocytosis, sepsis due to KPC klebsiella bacteremia, S to vabomere, acyvaz, tigecyline, minocycline and genta, repeat cx 4/5 negative  CT: Dependent high attenuation within the urinary bladder, which may represent small stones and/or debris. An underlying mass cannot be excluded. Thick-walled collection in the cul-de-sac and small collections along the right paracolic gutter, not significantly changed since 3/27/2022  IR stated that abscess are too small to be drained  rectal bleed, CTA showed small active rectal bleed, unchanged collections, s/p flex sig 4/14, bleeding vessel was clipped   new fever but WBC normal, blood cx negative   * on vabomere, blood cx cleared 4/5, so day 16, course extended as pt had a fever but repeat blood cx negative and no more fever so discontinue vabomere  * monitor for signs of infection   * will sign off, please call with questions    The above assessment and plan was discussed with the primary team    Ivy Rose MD  contact on teams  After 5pm and on weekends call [phone]

## 2022-04-20 NOTE — PROGRESS NOTE ADULT - SUBJECTIVE AND OBJECTIVE BOX
Follow Up:  sepsis    Interval History: no more fever and blood cx negative     ROS:    Unobtainable because of mental status        Allergies  No Known Allergies        ANTIMICROBIALS:  meropenem/vaborbactam IVPB 4 every 8 hours      OTHER MEDS:  acetaminophen     Tablet .. 650 milliGRAM(s) Oral every 6 hours PRN  amLODIPine   Tablet 10 milliGRAM(s) Oral daily  aspirin  chewable 81 milliGRAM(s) Oral daily  atorvastatin 40 milliGRAM(s) Oral at bedtime  carvedilol 3.125 milliGRAM(s) Oral every 12 hours  dorzolamide 2% Ophthalmic Solution 1 Drop(s) Both EYES <User Schedule>  influenza  Vaccine (HIGH DOSE) 0.7 milliLiter(s) IntraMuscular once  latanoprost 0.005% Ophthalmic Solution 1 Drop(s) Both EYES at bedtime  levETIRAcetam  IVPB 500 milliGRAM(s) IV Intermittent every 12 hours  levothyroxine 75 MICROGram(s) Oral daily  melatonin 3 milliGRAM(s) Oral <User Schedule>  OLANZapine 2.5 milliGRAM(s) Oral every 8 hours PRN  OLANZapine Injectable 2.5 milliGRAM(s) IntraMuscular every 8 hours PRN  pantoprazole   Suspension 40 milliGRAM(s) Oral daily  polyethylene glycol 3350 17 Gram(s) Oral daily  QUEtiapine 25 milliGRAM(s) Oral every 6 hours PRN  timolol 0.5% Solution 1 Drop(s) Both EYES two times a day  traZODone 50 milliGRAM(s) Oral <User Schedule>      Vital Signs Last 24 Hrs  T(C): 36.3 (20 Apr 2022 10:56), Max: 37.2 (20 Apr 2022 02:01)  T(F): 97.4 (20 Apr 2022 10:56), Max: 99 (20 Apr 2022 02:01)  HR: 73 (20 Apr 2022 10:56) (73 - 80)  BP: 142/70 (20 Apr 2022 10:56) (110/76 - 159/78)  BP(mean): --  RR: 18 (20 Apr 2022 10:56) (17 - 18)  SpO2: 100% (20 Apr 2022 10:56) (99% - 100%)    Physical Exam:  General:    NAD, non toxic  Cardio:    regular S1,S2  Respiratory:   trach  abd:   soft, BS +, not tender  :     no CVAT, no suprapubic tenderness, no mc  Musculoskeletal : LUE edema  Skin:    no rash  vascular: no phlebitis                                     8.4    4.68  )-----------( 326      ( 20 Apr 2022 07:27 )             27.4       04-20    140  |  105  |  15  ----------------------------<  115<H>  3.6   |  22  |  0.42<L>    Ca    8.4      20 Apr 2022 07:27  Phos  2.7     04-20  Mg     1.80     04-20    TPro  5.9<L>  /  Alb  3.0<L>  /  TBili  0.4  /  DBili  x   /  AST  24  /  ALT  22  /  AlkPhos  84  04-19          MICROBIOLOGY:  v  .Blood Blood-Peripheral  04-18-22   No growth to date.  --  --      .Blood Blood  04-05-22   No Growth Final  --  --      .Blood Blood-Peripheral  04-04-22   Growth in anaerobic bottle: Klebsiella pneumoniae (Carbapenem Resistant)  See previous culture [id number]  --  Blood Culture PCR  Klepne MDRO      .Blood Blood-Peripheral  03-26-22   No Growth Final  --  --      .Blood Blood-Peripheral  03-26-22   No Growth Final  --  --                RADIOLOGY:  Images independently visualized and reviewed personally, findings as below  < from: Xray Chest 1 View- PORTABLE-Urgent (Xray Chest 1 View- PORTABLE-Urgent .) (04.17.22 @ 19:34) >  IMPRESSION:    Trace bilateral pleural effusions.      < end of copied text >  < from: CT Angio Abdomen and Pelvis w/ IV Cont (04.13.22 @ 22:26) >  IMPRESSION:    Small rectal active hemorrhage. Rectal wall enhancement may also reflect   into the lesion.    Thick-walled collection in the cul-de-sac and small collections along the   right paracolic gutter are unchanged.    Bladder calculi reidentified.      < end of copied text >

## 2022-04-20 NOTE — PROGRESS NOTE ADULT - ASSESSMENT
80yo female with complicated medical course    Rectal Bleeding   s/p Flex Sig with clip placed in distal rectum   cbc stable   avoid rectal manipulation, suppositories or enemas   no objection to ASA 81mg  peg feeds w/aspiration precautions  DNR/DNI; Hospice planning    Abnormal CT   agree with abx per ID   fluid collections too small for drainage; IR input appreciated  recent IR peg exchange 3/30; reconsult if issues  continue w/supportive care   DNR/DNI; hospice planning    Sepsis   per primary team and ID     I reviewed the overnight course of events on the unit, re-confirming the patient history. I discussed the care with the patient and their family. The plan of care was discussed with the physician assistant and modifications were made to the notation where appropriate. Differential diagnosis and plan of care discussed with patient after the evaluation. Advanced care planning was discussed with patient and family.  Advanced care planning forms were reviewed and discussed.  Risks, benefits and alternatives of gastroenterologic procedures were discussed in detail and all questions were answered. 35 minutes spent on total encounter of which more than fifty percent of the encounter was spent counseling and/or coordinating care by the attending physician.    82yo female with complicated medical course    Rectal Bleeding   s/p Flex Sig with clip placed in distal rectum   cbc stable   avoid rectal manipulation, suppositories or enemas   no objection to ASA 81mg  no objection to A/C with daily Protonix if in line w/family GOC   peg feeds w/aspiration precautions  DNR/DNI; Hospice planning    Sepsis   per primary team and ID     I reviewed the overnight course of events on the unit, re-confirming the patient history. I discussed the care with the patient and their family. The plan of care was discussed with the physician assistant and modifications were made to the notation where appropriate. Differential diagnosis and plan of care discussed with patient after the evaluation. Advanced care planning was discussed with patient and family.  Advanced care planning forms were reviewed and discussed.  Risks, benefits and alternatives of gastroenterologic procedures were discussed in detail and all questions were answered. 35 minutes spent on total encounter of which more than fifty percent of the encounter was spent counseling and/or coordinating care by the attending physician.

## 2022-04-20 NOTE — PROGRESS NOTE ADULT - SUBJECTIVE AND OBJECTIVE BOX
CARDIOLOGY     Trached, non-verbal but nods yes and no; ros limited.     DATE OF SERVICE - 04-20-22     Review of Systems:   Constitutional: [ ] fevers, [ ] chills.   Skin: [ ] dry skin. [ ] rashes.  Psychiatric: [ ] depression, [ ] anxiety.   Gastrointestinal: [ ] BRBPR, [ ] melena.   Neurological: [ ] confusion. [ ] seizures. [ ] shuffling gait.   Ears,Nose,Mouth and Throat: [ ] ear pain [ ] sore throat.   Eyes: [ ] diplopia.   Respiratory: [ ] hemoptysis. [ ] shortness of breath  Cardiovascular: See HPI above  Hematologic/Lymphatic: [ ] anemia. [ ] painful nodes. [ ] prolonged bleeding.   Genitourinary: [ ] hematuria. [ ] flank pain.   Endocrine: [ ] significant change in weight. [ ] intolerance to heat and cold.     Review of systems [ ] otherwise negative, [x] otherwise unable to obtain    FH: no family history of sudden cardiac death in first degree relatives    SH: [ ] tobacco, [ ] alcohol, [ ] drugs             acetaminophen     Tablet .. 650 milliGRAM(s) Oral every 6 hours PRN  amLODIPine   Tablet 10 milliGRAM(s) Oral daily  aspirin  chewable 81 milliGRAM(s) Oral daily  atorvastatin 40 milliGRAM(s) Oral at bedtime  carvedilol 3.125 milliGRAM(s) Oral every 12 hours  dorzolamide 2% Ophthalmic Solution 1 Drop(s) Both EYES <User Schedule>  influenza  Vaccine (HIGH DOSE) 0.7 milliLiter(s) IntraMuscular once  latanoprost 0.005% Ophthalmic Solution 1 Drop(s) Both EYES at bedtime  levETIRAcetam  IVPB 500 milliGRAM(s) IV Intermittent every 12 hours  levothyroxine 75 MICROGram(s) Oral daily  melatonin 3 milliGRAM(s) Oral <User Schedule>  meropenem/vaborbactam IVPB 4 Gram(s) IV Intermittent every 8 hours  OLANZapine 2.5 milliGRAM(s) Oral every 8 hours PRN  OLANZapine Injectable 2.5 milliGRAM(s) IntraMuscular every 8 hours PRN  pantoprazole   Suspension 40 milliGRAM(s) Oral daily  polyethylene glycol 3350 17 Gram(s) Oral daily  QUEtiapine 25 milliGRAM(s) Oral every 6 hours PRN  timolol 0.5% Solution 1 Drop(s) Both EYES two times a day  traZODone 50 milliGRAM(s) Oral <User Schedule>                            8.4    4.68  )-----------( 326      ( 20 Apr 2022 07:27 )             27.4       04-20    140  |  105  |  15  ----------------------------<  115<H>  3.6   |  22  |  0.42<L>    Ca    8.4      20 Apr 2022 07:27  Phos  2.7     04-20  Mg     1.80     04-20    TPro  5.9<L>  /  Alb  3.0<L>  /  TBili  0.4  /  DBili  x   /  AST  24  /  ALT  22  /  AlkPhos  84  04-19            T(C): 36.3 (04-20-22 @ 10:56), Max: 37.2 (04-20-22 @ 02:01)  HR: 73 (04-20-22 @ 10:56) (73 - 80)  BP: 142/70 (04-20-22 @ 10:56) (110/76 - 159/78)  RR: 18 (04-20-22 @ 10:56) (17 - 18)  SpO2: 100% (04-20-22 @ 10:56) (99% - 100%)  Wt(kg): --    I&O's Summary    19 Apr 2022 07:01  -  20 Apr 2022 07:00  --------------------------------------------------------  IN: 1880 mL / OUT: 900 mL / NET: 980 mL    20 Apr 2022 07:01  -  20 Apr 2022 15:43  --------------------------------------------------------  IN: 510 mL / OUT: 300 mL / NET: 210 mL        Head: Normocephalic and atraumatic.   Neck: No JVD. No bruits. Supple. Does not appear to be enlarged.   Cardiovascular: + S1,S2 ; RRR Soft systolic murmur at the left lower sternal border. No rubs noted.    Lungs: CTA b/l. No rhonchi, rales or wheezes.   Abdomen: + BS, soft. Non tender. Non distended. No rebound. No guarding.   Extremities: No clubbing/cyanosis/edema.   Skin: Warm and moist. The patient's skin has normal elasticity and good skin turgor.         A/P) 80 y/o female PMH hypertension, hyperlipidemia, hypothyroidism, depression, seizure disorder, PAF & stroke, who had a prolonged hospitalization from Dec 2021 to Feb 22 for perforated diverticulitis requiring Rosales procedure, colostomy bad, IR drainage for abdominal abscess. She subsequently required trach & PEG, now a/w GI bleeding.    -f/u GI regarding if/when a/c can be restarted for stroke prevention given PAF - asa restarted   -f/u ID  -Abx per ID   -f/u palliative care  -no further inpatient cardiac workup expected other than a/c for stroke prevention as tolerated   -continue lipitor for hyperlipidemia  -continue coreg for hypertension  -plans for dc to NH with hospice per primary team     Danny Casiano MD

## 2022-04-20 NOTE — PROGRESS NOTE ADULT - SUBJECTIVE AND OBJECTIVE BOX
Date of Service  : 04-20-22     INTERVAL HPI/OVERNIGHT EVENTS: No new concerns.   Vital Signs Last 24 Hrs  T(C): 37 (20 Apr 2022 21:00), Max: 37.2 (20 Apr 2022 02:01)  T(F): 98.6 (20 Apr 2022 21:00), Max: 99 (20 Apr 2022 02:01)  HR: 75 (20 Apr 2022 21:00) (73 - 81)  BP: 146/65 (20 Apr 2022 21:00) (110/76 - 159/78)  BP(mean): --  RR: 18 (20 Apr 2022 21:00) (17 - 18)  SpO2: 100% (20 Apr 2022 21:00) (100% - 100%)  I&O's Summary    19 Apr 2022 07:01  -  20 Apr 2022 07:00  --------------------------------------------------------  IN: 1880 mL / OUT: 900 mL / NET: 980 mL    20 Apr 2022 07:01  -  20 Apr 2022 22:07  --------------------------------------------------------  IN: 1215 mL / OUT: 700 mL / NET: 515 mL      MEDICATIONS  (STANDING):  amLODIPine   Tablet 10 milliGRAM(s) Oral daily  aspirin  chewable 81 milliGRAM(s) Oral daily  atorvastatin 40 milliGRAM(s) Oral at bedtime  carvedilol 3.125 milliGRAM(s) Oral every 12 hours  dorzolamide 2% Ophthalmic Solution 1 Drop(s) Both EYES <User Schedule>  influenza  Vaccine (HIGH DOSE) 0.7 milliLiter(s) IntraMuscular once  latanoprost 0.005% Ophthalmic Solution 1 Drop(s) Both EYES at bedtime  levETIRAcetam  IVPB 500 milliGRAM(s) IV Intermittent every 12 hours  levothyroxine 75 MICROGram(s) Oral daily  melatonin 3 milliGRAM(s) Oral <User Schedule>  pantoprazole   Suspension 40 milliGRAM(s) Oral daily  polyethylene glycol 3350 17 Gram(s) Oral daily  timolol 0.5% Solution 1 Drop(s) Both EYES two times a day  traZODone 50 milliGRAM(s) Oral <User Schedule>    MEDICATIONS  (PRN):  acetaminophen     Tablet .. 650 milliGRAM(s) Oral every 6 hours PRN Mild Pain (1 - 3), Moderate Pain (4 - 6)  OLANZapine 2.5 milliGRAM(s) Oral every 8 hours PRN agitation  OLANZapine Injectable 2.5 milliGRAM(s) IntraMuscular every 8 hours PRN severe agitation  QUEtiapine 25 milliGRAM(s) Oral every 6 hours PRN anxiety    LABS:                        8.4    4.68  )-----------( 326      ( 20 Apr 2022 07:27 )             27.4     04-20    140  |  105  |  15  ----------------------------<  115<H>  3.6   |  22  |  0.42<L>    Ca    8.4      20 Apr 2022 07:27  Phos  2.7     04-20  Mg     1.80     04-20    TPro  5.9<L>  /  Alb  3.0<L>  /  TBili  0.4  /  DBili  x   /  AST  24  /  ALT  22  /  AlkPhos  84  04-19        CAPILLARY BLOOD GLUCOSE      POCT Blood Glucose.: 120 mg/dL (20 Apr 2022 17:56)            Consultant(s) Notes Reviewed:  [x ] YES  [ ] NO    PHYSICAL EXAM:  GENERAL: NAD, well-groomed, well-developed,not in any distress ,  HEAD:  Atraumatic, Normocephalic  NECK: Trach+  NERVOUS SYSTEM:  Aler  CHEST/LUNG: Good air entry bilateral with no  rales, rhonchi, wheezing, or rubs  HEART: Regular rate and rhythm; No murmurs, rubs, or gallops  ABDOMEN: Soft, Nontender, Nondistended; Bowel sounds present  EXTREMITIES:  2+ Peripheral Pulses, No clubbing, cyanosis, or edema    Care Discussed with Consultants/Other Providers [ x] YES  [ ] NO

## 2022-04-20 NOTE — PROGRESS NOTE ADULT - ASSESSMENT
80 yo F with PMHx HTN, HLD, Hypothyroidism, Depression, seizure d/o, RLE DVT, Cholelithiasis s/p recent Hospitalization in [facility] from 12/22 -> 2/11/22 for acute perforated diverticulitis s/p Rosales 12/25, colostomy bag 12/26, hospitalization c/b IR drainage for abdominal abscess 1/3 Cx grew e.coli & bacteriodes, also required Mechanical Ventilation for Acute Respiratory Failure with Tracheostomy done 1/7.  PEG done 1/18, developed bleeding through ostomy, EGD on 2/1 showed gastritis. T's showed Acute left MCA infarct- could be 2/2 to carotid occlusion vs afib, new RLL aspiration pneumonia, seen by ID, completed antibiotics, seen by VascSx, not surgical candidate. Pt aphasic sent from [facility] for dislodged Peg tube, after staff noted pt has blood to her abdomen. Nephrology consulted for Hypokalemia.     A/P  Hypernatremia  improved  continue free water to q4 hrs  monitor    Hypokalemia   likely 2/2 GI loss   Repleted  monitor input and output closely   monitor K closely    Hypomagnesia   replete as needed  monitor Mg     Hypophosphatemia   replete as needed  monitor     Hypocalcemia   corrected Ca WNL   Monitor    Acidosis with anion gap   etiology?  improved   monitor    HTN  optimal   monitor BP closely

## 2022-04-20 NOTE — PROGRESS NOTE ADULT - SUBJECTIVE AND OBJECTIVE BOX
[facility] [place] [facility]   MD ELOISE ALVARADO PA    TEL:  OFFICE: [phone]  DR WARREN CELL: [phone]  DR. BRAUN CELL: [phone]  MIKKI BAILEY CELL: [phone]    From 5pm-7am Answering Service [phone]    -- RENAL FOLLOW UP NOTE ---Date of Service 04-20-22 @ 09:59    Patient is a 81y old  Female who presents with a chief complaint of     Patient seen and examined at bedside.     VITALS:  T(F): 98.7 (04-20-22 @ 06:01), Max: 99.1 (04-19-22 @ 10:27)  HR: 80 (04-20-22 @ 06:01)  BP: 159/78 (04-20-22 @ 06:01)  RR: 17 (04-20-22 @ 06:01)  SpO2: 100% (04-20-22 @ 06:01)  Wt(kg): --    04-19 @ 07:01  -  04-20 @ 07:00  --------------------------------------------------------  IN: 1880 mL / OUT: 900 mL / NET: 980 mL          PHYSICAL EXAM:  Constitutional: NAD  Neck: +trach  Respiratory: CTAB, no wheezes, rales or rhonchi  Cardiovascular: S1, S2, RRR  Gastrointestinal: +ostomy  Extremities: No peripheral edema    Hospital Medications:   MEDICATIONS  (STANDING):  amLODIPine   Tablet 10 milliGRAM(s) Oral daily  aspirin  chewable 81 milliGRAM(s) Oral daily  atorvastatin 40 milliGRAM(s) Oral at bedtime  carvedilol 3.125 milliGRAM(s) Oral every 12 hours  dorzolamide 2% Ophthalmic Solution 1 Drop(s) Both EYES <User Schedule>  influenza  Vaccine (HIGH DOSE) 0.7 milliLiter(s) IntraMuscular once  latanoprost 0.005% Ophthalmic Solution 1 Drop(s) Both EYES at bedtime  levETIRAcetam  IVPB 500 milliGRAM(s) IV Intermittent every 12 hours  levothyroxine 75 MICROGram(s) Oral daily  melatonin 3 milliGRAM(s) Oral <User Schedule>  meropenem/vaborbactam IVPB 4 Gram(s) IV Intermittent every 8 hours  pantoprazole   Suspension 40 milliGRAM(s) Oral daily  polyethylene glycol 3350 17 Gram(s) Oral daily  timolol 0.5% Solution 1 Drop(s) Both EYES two times a day  traZODone 50 milliGRAM(s) Oral <User Schedule>      LABS:  04-20    140  |  105  |  15  ----------------------------<  115<H>  3.6   |  22  |  0.42<L>    Ca    8.4      20 Apr 2022 07:27  Phos  2.7     04-20  Mg     1.80     04-20    TPro  5.9<L>  /  Alb  3.0<L>  /  TBili  0.4  /  DBili      /  AST  24  /  ALT  22  /  AlkPhos  84  04-19    Creatinine Trend: 0.42 <--, 0.47 <--, 0.50 <--, 0.46 <--, 0.45 <--, 0.45 <--, 0.47 <--    Phosphorus Level, Serum: 2.7 mg/dL (04-20 @ 07:27)                              8.4    4.68  )-----------( 326      ( 20 Apr 2022 07:27 )             27.4     Urine Studies:  Urinalysis - [04-04-22 @ 01:59]      Color Light Yellow / Appearance Clear / SG 1.012 / pH 7.5      Gluc Negative / Ketone Negative  / Bili Negative / Urobili <2 mg/dL       Blood Negative / Protein Trace / Leuk Est Negative / Nitrite Negative      RBC 6 / WBC 1 / Hyaline  / Gran  / Sq Epi  / Non Sq Epi 1 / Bacteria Negative      PTH -- (Ca --)      [03-30-22 @ 07:35]   32  TSH 7.65      [04-05-22 @ 07:40]  Lipid: chol 158, , HDL 25, LDL --      [02-03-22 @ 15:22]      Syphilis Screen (Treponema Pallidum Ab) Negative      [04-05-22 @ 09:41]    RADIOLOGY & ADDITIONAL STUDIES:

## 2022-04-20 NOTE — PROGRESS NOTE ADULT - SUBJECTIVE AND OBJECTIVE BOX
INTERVAL HPI/OVERNIGHT EVENTS:    on 1:1  no rectal bleeding   tolerating feeds    MEDICATIONS  (STANDING):  amLODIPine   Tablet 10 milliGRAM(s) Oral daily  aspirin  chewable 81 milliGRAM(s) Oral daily  atorvastatin 40 milliGRAM(s) Oral at bedtime  carvedilol 3.125 milliGRAM(s) Oral every 12 hours  dorzolamide 2% Ophthalmic Solution 1 Drop(s) Both EYES <User Schedule>  influenza  Vaccine (HIGH DOSE) 0.7 milliLiter(s) IntraMuscular once  latanoprost 0.005% Ophthalmic Solution 1 Drop(s) Both EYES at bedtime  levETIRAcetam  IVPB 500 milliGRAM(s) IV Intermittent every 12 hours  levothyroxine 75 MICROGram(s) Oral daily  melatonin 3 milliGRAM(s) Oral <User Schedule>  meropenem/vaborbactam IVPB 4 Gram(s) IV Intermittent every 8 hours  pantoprazole   Suspension 40 milliGRAM(s) Oral daily  polyethylene glycol 3350 17 Gram(s) Oral daily  timolol 0.5% Solution 1 Drop(s) Both EYES two times a day  traZODone 50 milliGRAM(s) Oral <User Schedule>    MEDICATIONS  (PRN):  acetaminophen     Tablet .. 650 milliGRAM(s) Oral every 6 hours PRN Mild Pain (1 - 3), Moderate Pain (4 - 6)  OLANZapine 2.5 milliGRAM(s) Oral every 8 hours PRN agitation  OLANZapine Injectable 2.5 milliGRAM(s) IntraMuscular every 8 hours PRN severe agitation  QUEtiapine 25 milliGRAM(s) Oral every 6 hours PRN anxiety      Allergies    No Known Allergies    Intolerances        Review of Systems:    General:  No wt loss, fevers, chills, night sweats, fatigue   Eyes:  Good vision, no reported pain  ENT:  No sore throat, pain, runny nose, dysphagia  CV:  No pain, palpitations, hypo/hypertension  Resp:  No dyspnea, cough, tachypnea, wheezing  GI:  No pain, No nausea, No vomiting, No diarrhea, No constipation, No weight loss, No fever, No pruritis, No rectal bleeding, No melena, No dysphagia  :  No pain, bleeding, incontinence, nocturia  Muscle:  No pain, weakness  Neuro:  No weakness, tingling, memory problems  Psych:  No fatigue, insomnia, mood problems, depression  Endocrine:  No polyuria, polydypsia, cold/heat intolerance  Heme:  No petechiae, ecchymosis, easy bruisability  Skin:  No rash, tattoos, scars, edema      Vital Signs Last 24 Hrs  T(C): 36.3 (20 Apr 2022 10:56), Max: 37.2 (20 Apr 2022 02:01)  T(F): 97.4 (20 Apr 2022 10:56), Max: 99 (20 Apr 2022 02:01)  HR: 73 (20 Apr 2022 10:56) (73 - 80)  BP: 142/70 (20 Apr 2022 10:56) (110/76 - 159/78)  BP(mean): --  RR: 18 (20 Apr 2022 10:56) (17 - 18)  SpO2: 100% (20 Apr 2022 10:56) (99% - 100%)    PHYSICAL EXAM:    Constitutional: NAD  HEENT: EOMI, throat clear  Neck: No LAD, supple  Respiratory: CTA and P  Cardiovascular: S1 and S2, RRR, no M  Gastrointestinal: BS+, soft, NT/ND, neg HSM,  Extremities: No peripheral edema, neg clubbing, cyanosis  Vascular: 2+ peripheral pulses  Neurological: A/O x 2 no focal deficits  Psychiatric: Normal mood, normal affect  Skin: No rashes      LABS:                        8.4    4.68  )-----------( 326      ( 20 Apr 2022 07:27 )             27.4     04-20    140  |  105  |  15  ----------------------------<  115<H>  3.6   |  22  |  0.42<L>    Ca    8.4      20 Apr 2022 07:27  Phos  2.7     04-20  Mg     1.80     04-20    TPro  5.9<L>  /  Alb  3.0<L>  /  TBili  0.4  /  DBili  x   /  AST  24  /  ALT  22  /  AlkPhos  84  04-19          RADIOLOGY & ADDITIONAL TESTS:

## 2022-04-21 LAB
ANION GAP SERPL CALC-SCNC: 11 MMOL/L — SIGNIFICANT CHANGE UP (ref 7–14)
BUN SERPL-MCNC: 17 MG/DL — SIGNIFICANT CHANGE UP (ref 7–23)
CALCIUM SERPL-MCNC: 8.6 MG/DL — SIGNIFICANT CHANGE UP (ref 8.4–10.5)
CHLORIDE SERPL-SCNC: 108 MMOL/L — HIGH (ref 98–107)
CO2 SERPL-SCNC: 23 MMOL/L — SIGNIFICANT CHANGE UP (ref 22–31)
CREAT SERPL-MCNC: 0.46 MG/DL — LOW (ref 0.5–1.3)
EGFR: 97 ML/MIN/1.73M2 — SIGNIFICANT CHANGE UP
GLUCOSE BLDC GLUCOMTR-MCNC: 137 MG/DL — HIGH (ref 70–99)
GLUCOSE SERPL-MCNC: 140 MG/DL — HIGH (ref 70–99)
HCT VFR BLD CALC: 27.2 % — LOW (ref 34.5–45)
HGB BLD-MCNC: 8.6 G/DL — LOW (ref 11.5–15.5)
MCHC RBC-ENTMCNC: 28.3 PG — SIGNIFICANT CHANGE UP (ref 27–34)
MCHC RBC-ENTMCNC: 31.6 GM/DL — LOW (ref 32–36)
MCV RBC AUTO: 89.5 FL — SIGNIFICANT CHANGE UP (ref 80–100)
NRBC # BLD: 0 /100 WBCS — SIGNIFICANT CHANGE UP
NRBC # FLD: 0 K/UL — SIGNIFICANT CHANGE UP
PLATELET # BLD AUTO: 339 K/UL — SIGNIFICANT CHANGE UP (ref 150–400)
POTASSIUM SERPL-MCNC: 3.4 MMOL/L — LOW (ref 3.5–5.3)
POTASSIUM SERPL-SCNC: 3.4 MMOL/L — LOW (ref 3.5–5.3)
RBC # BLD: 3.04 M/UL — LOW (ref 3.8–5.2)
RBC # FLD: 15.8 % — HIGH (ref 10.3–14.5)
SODIUM SERPL-SCNC: 142 MMOL/L — SIGNIFICANT CHANGE UP (ref 135–145)
WBC # BLD: 4.44 K/UL — SIGNIFICANT CHANGE UP (ref 3.8–10.5)
WBC # FLD AUTO: 4.44 K/UL — SIGNIFICANT CHANGE UP (ref 3.8–10.5)

## 2022-04-21 RX ORDER — PANTOPRAZOLE SODIUM 20 MG/1
40 TABLET, DELAYED RELEASE ORAL DAILY
Refills: 0 | Status: DISCONTINUED | OUTPATIENT
Start: 2022-04-21 | End: 2022-04-21

## 2022-04-21 RX ORDER — OLANZAPINE 15 MG/1
1 TABLET, FILM COATED ORAL
Qty: 0 | Refills: 0 | DISCHARGE
Start: 2022-04-21

## 2022-04-21 RX ORDER — ACETAMINOPHEN 500 MG
2 TABLET ORAL
Qty: 0 | Refills: 0 | DISCHARGE
Start: 2022-04-21

## 2022-04-21 RX ORDER — POTASSIUM CHLORIDE 20 MEQ
40 PACKET (EA) ORAL EVERY 4 HOURS
Refills: 0 | Status: COMPLETED | OUTPATIENT
Start: 2022-04-21 | End: 2022-04-21

## 2022-04-21 RX ORDER — POLYETHYLENE GLYCOL 3350 17 G/17G
1 POWDER, FOR SOLUTION ORAL
Qty: 0 | Refills: 0 | DISCHARGE

## 2022-04-21 RX ORDER — APIXABAN 2.5 MG/1
5 TABLET, FILM COATED ORAL EVERY 12 HOURS
Refills: 0 | Status: DISCONTINUED | OUTPATIENT
Start: 2022-04-21 | End: 2022-04-22

## 2022-04-21 RX ORDER — TRAZODONE HCL 50 MG
1 TABLET ORAL
Qty: 0 | Refills: 0 | DISCHARGE
Start: 2022-04-21

## 2022-04-21 RX ORDER — TRAZODONE HCL 50 MG
25 TABLET ORAL
Qty: 0 | Refills: 0 | DISCHARGE

## 2022-04-21 RX ORDER — DOXAZOSIN MESYLATE 4 MG
1 TABLET ORAL
Qty: 0 | Refills: 0 | DISCHARGE

## 2022-04-21 RX ORDER — LANOLIN ALCOHOL/MO/W.PET/CERES
1 CREAM (GRAM) TOPICAL
Qty: 0 | Refills: 0 | DISCHARGE
Start: 2022-04-21

## 2022-04-21 RX ADMIN — ATORVASTATIN CALCIUM 40 MILLIGRAM(S): 80 TABLET, FILM COATED ORAL at 22:40

## 2022-04-21 RX ADMIN — Medication 75 MICROGRAM(S): at 06:54

## 2022-04-21 RX ADMIN — LEVETIRACETAM 400 MILLIGRAM(S): 250 TABLET, FILM COATED ORAL at 22:48

## 2022-04-21 RX ADMIN — POLYETHYLENE GLYCOL 3350 17 GRAM(S): 17 POWDER, FOR SOLUTION ORAL at 12:33

## 2022-04-21 RX ADMIN — Medication 40 MILLIEQUIVALENT(S): at 19:20

## 2022-04-21 RX ADMIN — DORZOLAMIDE HYDROCHLORIDE 1 DROP(S): 20 SOLUTION/ DROPS OPHTHALMIC at 19:20

## 2022-04-21 RX ADMIN — PANTOPRAZOLE SODIUM 40 MILLIGRAM(S): 20 TABLET, DELAYED RELEASE ORAL at 12:33

## 2022-04-21 RX ADMIN — DORZOLAMIDE HYDROCHLORIDE 1 DROP(S): 20 SOLUTION/ DROPS OPHTHALMIC at 12:35

## 2022-04-21 RX ADMIN — Medication 40 MILLIEQUIVALENT(S): at 12:32

## 2022-04-21 RX ADMIN — Medication 50 MILLIGRAM(S): at 19:23

## 2022-04-21 RX ADMIN — LATANOPROST 1 DROP(S): 0.05 SOLUTION/ DROPS OPHTHALMIC; TOPICAL at 22:40

## 2022-04-21 RX ADMIN — CARVEDILOL PHOSPHATE 3.12 MILLIGRAM(S): 80 CAPSULE, EXTENDED RELEASE ORAL at 06:53

## 2022-04-21 RX ADMIN — Medication 3 MILLIGRAM(S): at 19:23

## 2022-04-21 RX ADMIN — Medication 1 DROP(S): at 06:49

## 2022-04-21 RX ADMIN — LEVETIRACETAM 400 MILLIGRAM(S): 250 TABLET, FILM COATED ORAL at 12:31

## 2022-04-21 RX ADMIN — AMLODIPINE BESYLATE 10 MILLIGRAM(S): 2.5 TABLET ORAL at 06:53

## 2022-04-21 RX ADMIN — Medication 650 MILLIGRAM(S): at 17:52

## 2022-04-21 RX ADMIN — APIXABAN 5 MILLIGRAM(S): 2.5 TABLET, FILM COATED ORAL at 19:19

## 2022-04-21 RX ADMIN — Medication 81 MILLIGRAM(S): at 12:32

## 2022-04-21 RX ADMIN — Medication 1 DROP(S): at 19:20

## 2022-04-21 RX ADMIN — Medication 650 MILLIGRAM(S): at 16:52

## 2022-04-21 NOTE — PROGRESS NOTE ADULT - ASSESSMENT
82 yo F w/ PMHx of perforated diverticulitis s/p Rosales procedure, colostomy, abdominal abscess with wound s/p IR drainage, Respiratory Failure s/p Trache, CVA w/ functional quadriplegia, aphasia, dysphagia s/p PEG (on Jevity 1.5 @ 70cchr x 18 hrs, & water flushes 325ml q 6 hrs, Afib, GIB, HTN, HLD, Seizure d/o, Hypothyroidism, Glaucoma, Depression p/w PEG tube dislodgement for unknown amount of time     Problem/Plan - 1:  ·  Problem: Sepsis sec to KPC K Pneumoniae Bacteremia .   ·  Plan:  IV Abxs per ID till 4/19 .   ID help appreciated.   < from: CT Abdomen and Pelvis w/ IV Cont (04.04.22 @ 14:24) >  IMPRESSION:  Trace bilateral pleural effusions.    Improved aeration in the lower lobes bilaterally since 1/30/2022.   Nonspecific patchy bilateral groundglass and linear opacities are noted.    Enhancement of the wall of the cecum and ascending colon with mild   infiltration of the adjacent fat is similar in appearance to prior study   dated 3/27/2022. An inflammatory or infectious process is considered.    Dependent high attenuation within the urinary bladder, which may   represent small stones and/or debris. An underlying mass cannot be   excluded.    Thick-walled collection in the cul-de-sac and small collections along the   right paracolic gutter, not significantly changed since 3/27/2022.    < end of copied text >     Problem/Plan - 2:  ·  Problem: Metabolic Encephalopathy    ·  Plan: Likely sec to Infection  . Resolved.    Neurology helping  and  EEG showing no Seizure activity  .  Psychiatry helping and off mittens.      Problem/Plan - 3:  ·  Problem: Stroke.   ·  Plan: Holding ASA, Plavix as actively bleeding. ASA restarted .    Continue  statin Via PEG tube .     Problem/Plan - 4:  ·  Problem: Chronic respiratory failure with hypoxia.   ·  Plan: Pt on 2l NC via Trache collar.   S/P replacement of Tracheostomy Tube.       Problem/Plan - 5:  ·  Problem: Diverticulitis of intestine with perforation and abscess without bleeding.   ·  Plan: Surgery c/s in chart- small fluid collection but nontoxic, if drainage needed would be done by IR.  Wound care eval.     Problem/Plan - 6:  ·  Problem: History of atrial fibrillation.   ·  Plan: Cards helping and now off AC .   D/W Son and okay to start.      Problem/Plan - 7:  ·  Problem: Seizure disorder.   ·  Plan: continue levetiracetam bid.     Problem/Plan - 8:  ·  Problem: DVT, lower extremity.   ·  Plan: ?Subacute vs Chronic, Will restart AC if okay with GI.      Problem/Plan - 9:  ·  Problem: HTN (hypertension).   ·  Plan: Resuming carvedilol & amlodipine as PEG replaced.     Problem/Plan - 10:  ·  Problem: Hypothyroidism.   ·  Plan; Resume Levothyroxine when PEG replaced.     Problem/Plan - 11:  ·  Problem: Glaucoma.   ·  Plan: continue Timolol, Dorzolamide, Latanoprost.      Problem/Plan - 12:  ·  Problem: PEG tube malfunction.   ·  Plan:  S/P  PEG . TF started.      Problem/Plan - 13:  ·  Problem: Hypokalemia.   ·  Plan: Correcting .       Problem/Plan - 14:  ·  Problem: GI bleed. .   ·  Plan: PPI . GI following .     PRBC for HGb 7.5 G or less. Watching CBC. HH stable     < from: CT Angio Abdomen and Pelvis w/ IV Cont (04.13.22 @ 22:26) >  IMPRESSION:    Small rectal active hemorrhage. Rectal wall enhancement may also reflect   into the lesion.    Thick-walled collection in the cul-de-sac and small collections along the   right paracolic gutter are unchanged.    Bladder calculi reidentified.    Findings discussed with ROSALINE Harrison by Dr. Martina Crystal  on 4/13/2022   at 11:35 PM with readback.    < end of copied text >    < from: Flexible Sigmoidoscopy (04.14.22 @ 16:41) >  Findings:       A visible vessel (immediatly proximal to the dentate line) was found in        the distal rectum. Active bleeding was present. To stop active bleeding,        one hemostatic clip was successfully placed (MR conditional). There was        no bleeding at the end of the procedure.       Diverticulosis in the sigmoid                                                                                   Impression:          - Preparation of the colon was poor.                       - Visible vessel was found in the distal rectum. Clip                        (MR conditional) was placed.                       - No specimens collected.  Recommendation:      - Return patient to hospital [facility] for ongoing care.                       -Restart PEG feeds                       -Hold Ac for Now                       -Stool softeners                       -No enema or rectal meds X 48     < end of copied text >    Disposition : DC  planning pending placement .  Over all prognosis very poor.

## 2022-04-21 NOTE — PROGRESS NOTE ADULT - SUBJECTIVE AND OBJECTIVE BOX
INTERVAL HPI/OVERNIGHT EVENTS:    no rectal bleeding   tolerating feeds     MEDICATIONS  (STANDING):  amLODIPine   Tablet 10 milliGRAM(s) Oral daily  aspirin  chewable 81 milliGRAM(s) Oral daily  atorvastatin 40 milliGRAM(s) Oral at bedtime  carvedilol 3.125 milliGRAM(s) Oral every 12 hours  dorzolamide 2% Ophthalmic Solution 1 Drop(s) Both EYES <User Schedule>  influenza  Vaccine (HIGH DOSE) 0.7 milliLiter(s) IntraMuscular once  latanoprost 0.005% Ophthalmic Solution 1 Drop(s) Both EYES at bedtime  levETIRAcetam  IVPB 500 milliGRAM(s) IV Intermittent every 12 hours  levothyroxine 75 MICROGram(s) Oral daily  melatonin 3 milliGRAM(s) Oral <User Schedule>  pantoprazole   Suspension 40 milliGRAM(s) Oral daily  polyethylene glycol 3350 17 Gram(s) Oral daily  potassium chloride   Powder 40 milliEquivalent(s) Oral every 4 hours  timolol 0.5% Solution 1 Drop(s) Both EYES two times a day  traZODone 50 milliGRAM(s) Oral <User Schedule>    MEDICATIONS  (PRN):  acetaminophen     Tablet .. 650 milliGRAM(s) Oral every 6 hours PRN Mild Pain (1 - 3), Moderate Pain (4 - 6)  OLANZapine 2.5 milliGRAM(s) Oral every 8 hours PRN agitation  OLANZapine Injectable 2.5 milliGRAM(s) IntraMuscular every 8 hours PRN severe agitation  QUEtiapine 25 milliGRAM(s) Oral every 6 hours PRN anxiety      Allergies    No Known Allergies    Intolerances        Review of Systems:  *confused        Vital Signs Last 24 Hrs  T(C): 36.7 (21 Apr 2022 10:33), Max: 37.1 (20 Apr 2022 17:00)  T(F): 98 (21 Apr 2022 10:33), Max: 98.7 (20 Apr 2022 17:00)  HR: 71 (21 Apr 2022 10:33) (71 - 81)  BP: 150/67 (21 Apr 2022 10:33) (142/70 - 155/66)  BP(mean): --  RR: 17 (21 Apr 2022 10:33) (17 - 18)  SpO2: 100% (21 Apr 2022 10:33) (100% - 100%)    PHYSICAL EXAM:    Constitutional: NAD  HEENT: EOMI, throat clear  Neck: No LAD, supple +trach  Respiratory: CTA and P   Cardiovascular: S1 and S2, RRR, no M  Gastrointestinal: BS+, soft, NT/ND, neg HSM, +peg  Extremities: No peripheral edema, neg clubbing, cyanosis  Vascular: 2+ peripheral pulses  Neurological: A/O x 1-2, no focal deficits  Psychiatric: Normal mood, normal affect  Skin: No rashes      LABS:                        8.6    4.44  )-----------( 339      ( 21 Apr 2022 07:22 )             27.2     04-21    142  |  108<H>  |  17  ----------------------------<  140<H>  3.4<L>   |  23  |  0.46<L>    Ca    8.6      21 Apr 2022 07:22  Phos  2.7     04-20  Mg     1.80     04-20            RADIOLOGY & ADDITIONAL TESTS:

## 2022-04-21 NOTE — PROGRESS NOTE ADULT - ASSESSMENT
80 yo F with PMHx HTN, HLD, Hypothyroidism, Depression, seizure d/o, RLE DVT, Cholelithiasis s/p recent Hospitalization in [facility] from 12/22 -> 2/11/22 for acute perforated diverticulitis s/p Rosales 12/25, colostomy bag 12/26, hospitalization c/b IR drainage for abdominal abscess 1/3 Cx grew e.coli & bacteriodes, also required Mechanical Ventilation for Acute Respiratory Failure with Tracheostomy done 1/7.  PEG done 1/18, developed bleeding through ostomy, EGD on 2/1 showed gastritis. T's showed Acute left MCA infarct- could be 2/2 to carotid occlusion vs afib, new RLL aspiration pneumonia, seen by ID, completed antibiotics, seen by VascSx, not surgical candidate. Pt aphasic sent from [facility] for dislodged Peg tube, after staff noted pt has blood to her abdomen. Nephrology consulted for Hypokalemia.     A/P  Hypernatremia  improved  continue free water to q4 hrs  monitor    Hypokalemia   likely 2/2 GI loss   Repleted  monitor input and output closely   monitor K closely    Hypomagnesia   replete as needed  monitor Mg     Hypophosphatemia   replete as needed  monitor     Hypocalcemia   corrected Ca WNL   Monitor    Acidosis with anion gap   etiology?  improved   monitor    HTN  optimal   monitor BP closely

## 2022-04-21 NOTE — PROGRESS NOTE ADULT - SUBJECTIVE AND OBJECTIVE BOX
CARDIOLOGY     Trached, non-verbal but nods yes and no; ros limited.     DATE OF SERVICE - 04-21-22     Review of Systems:   Constitutional: [ ] fevers, [ ] chills.   Skin: [ ] dry skin. [ ] rashes.  Psychiatric: [ ] depression, [ ] anxiety.   Gastrointestinal: [ ] BRBPR, [ ] melena.   Neurological: [ ] confusion. [ ] seizures. [ ] shuffling gait.   Ears,Nose,Mouth and Throat: [ ] ear pain [ ] sore throat.   Eyes: [ ] diplopia.   Respiratory: [ ] hemoptysis. [ ] shortness of breath  Cardiovascular: See HPI above  Hematologic/Lymphatic: [ ] anemia. [ ] painful nodes. [ ] prolonged bleeding.   Genitourinary: [ ] hematuria. [ ] flank pain.   Endocrine: [ ] significant change in weight. [ ] intolerance to heat and cold.     Review of systems [x ] otherwise negative, [ ] otherwise unable to obtain    FH: no family history of sudden cardiac death in first degree relatives    SH: [ ] tobacco, [ ] alcohol, [ ] drugs    acetaminophen     Tablet .. 650 milliGRAM(s) Oral every 6 hours PRN  amLODIPine   Tablet 10 milliGRAM(s) Oral daily  aspirin  chewable 81 milliGRAM(s) Oral daily  atorvastatin 40 milliGRAM(s) Oral at bedtime  carvedilol 3.125 milliGRAM(s) Oral every 12 hours  dorzolamide 2% Ophthalmic Solution 1 Drop(s) Both EYES <User Schedule>  influenza  Vaccine (HIGH DOSE) 0.7 milliLiter(s) IntraMuscular once  latanoprost 0.005% Ophthalmic Solution 1 Drop(s) Both EYES at bedtime  levETIRAcetam  IVPB 500 milliGRAM(s) IV Intermittent every 12 hours  levothyroxine 75 MICROGram(s) Oral daily  melatonin 3 milliGRAM(s) Oral <User Schedule>  OLANZapine 2.5 milliGRAM(s) Oral every 8 hours PRN  OLANZapine Injectable 2.5 milliGRAM(s) IntraMuscular every 8 hours PRN  pantoprazole   Suspension 40 milliGRAM(s) Oral daily  polyethylene glycol 3350 17 Gram(s) Oral daily  potassium chloride   Powder 40 milliEquivalent(s) Oral every 4 hours  QUEtiapine 25 milliGRAM(s) Oral every 6 hours PRN  timolol 0.5% Solution 1 Drop(s) Both EYES two times a day  traZODone 50 milliGRAM(s) Oral <User Schedule>                            8.6    4.44  )-----------( 339      ( 21 Apr 2022 07:22 )             27.2       142  |  108<H>  |  17  ----------------------------<  140<H>  3.4<L>   |  23  |  0.46<L>    Ca    8.6      21 Apr 2022 07:22  Phos  2.7     04-20  Mg     1.80     04-20      T(C): 36.7 (04-21-22 @ 10:33), Max: 37.1 (04-20-22 @ 17:00)  HR: 71 (04-21-22 @ 10:33) (71 - 81)  BP: 150/67 (04-21-22 @ 10:33) (146/65 - 155/66)  RR: 17 (04-21-22 @ 10:33) (17 - 18)  SpO2: 100% (04-21-22 @ 10:33) (100% - 100%)  Wt(kg): --    Head: Normocephalic and atraumatic.   Neck: No JVD. No bruits. Supple. Does not appear to be enlarged.   Cardiovascular: + S1,S2 ; RRR Soft systolic murmur at the left lower sternal border. No rubs noted.    Lungs: CTA b/l. No rhonchi, rales or wheezes.   Abdomen: + BS, soft. Non tender. Non distended. No rebound. No guarding.   Extremities: No clubbing/cyanosis/edema.   Skin: Warm and moist. The patient's skin has normal elasticity and good skin turgor.       A/P) 80 y/o female PMH hypertension, hyperlipidemia, hypothyroidism, depression, seizure disorder, PAF & stroke, who had a prolonged hospitalization from Dec 2021 to Feb 22 for perforated diverticulitis requiring Rosales procedure, colostomy bad, IR drainage for abdominal abscess. She subsequently required trach & PEG, now a/w GI bleeding.    -f/u GI regarding if/when a/c can be restarted for stroke prevention given PAF - asa restarted   -f/u ID  -Abx per ID   -f/u palliative care  -no further inpatient cardiac workup expected other than a/c for stroke prevention as tolerated   -continue lipitor for hyperlipidemia  -continue coreg for hypertension  -plans for dc to NH with hospice per primary team

## 2022-04-21 NOTE — PROGRESS NOTE ADULT - NS ATTEND AMEND GEN_ALL_CORE FT
Agree with above  Not in clinical heart failure   Optimized from cv perspective for necessary procedure  Off ac due to GIB - follow up sigmoidoscopy and GI to see when safe to resume     Danny Casiano MD
Patient seen and examined.  Agree with above.   Check 12 lead ECG  Follow up primary team to see if ac would ever be safe to start in the future    Danny Casiano MD
Patient seen and examined.  Agree with above.   Check 12 lead ECG  Follow up primary team to see if ac would ever be safe to start in the future    Danny Casiano MD
Patient seen and examined.  Agree with above.   MS seems to be improving   Continue with lifelong ac if no contraindications and ok with neuro    Danny Casiano MD
Patient seen and examined.  Agree with above.   No further inpatient cardiac workup expected other than a/c for stroke prevention as tolerated     Danny Casiano MD
Patient seen and examined.  Agree with above.   Plans for hospice care  Given above, no further inpatient cardiac workup needed at this time.     Danny Casiano MD
resting comfortably, no overnight events.
Patient seen and examined this AM with ROSALINE Syed.  Agree with above.   FWU per ID  Would ideally have patient on lifelong ac if no contraindications   Consider neurology eval for apt management  Further cardiac workup pending clinical course    Danny Casiano MD
Patient seen and examined.  Agree with above.   Plans to hold apt and ac per primary team for GIB   Follow up neurology to see if triple therapy indicated in the future  Pt. DNR - follow up palliative care and hospice    Danny Casiano MD
uneventful overnight.  tends to poorly tolerate our invasive interventions (trach and peg).  no cv changes today.

## 2022-04-21 NOTE — PROGRESS NOTE ADULT - SUBJECTIVE AND OBJECTIVE BOX
[facility]   MD ELOISE ALVARADO PA    TEL:  OFFICE: [phone]  DR WARREN CELL: [phone]  DR. BRAUN CELL: [phone]  MIKKI BAILEY CELL: [phone]    From 5pm-7am Answering Service [phone]    -- RENAL FOLLOW UP NOTE ---Date of Service 04-21-22 @ 09:43    Patient is a 81y old  Female who presents with a chief complaint of     Patient seen and examined at bedside.     VITALS:  T(F): 98.6 (04-20-22 @ 21:00), Max: 98.7 (04-20-22 @ 17:00)  HR: 75 (04-20-22 @ 21:00)  BP: 146/65 (04-20-22 @ 21:00)  RR: 18 (04-20-22 @ 21:00)  SpO2: 100% (04-20-22 @ 21:00)  Wt(kg): --    04-20 @ 07:01  -  04-21 @ 07:00  --------------------------------------------------------  IN: 1725 mL / OUT: 1375 mL / NET: 350 mL          PHYSICAL EXAM:  Constitutional: NAD  Neck: +trach  Respiratory: CTAB, no wheezes, rales or rhonchi  Cardiovascular: S1, S2, RRR  Gastrointestinal: +ostomy  Extremities: No peripheral edema    Hospital Medications:   MEDICATIONS  (STANDING):  amLODIPine   Tablet 10 milliGRAM(s) Oral daily  aspirin  chewable 81 milliGRAM(s) Oral daily  atorvastatin 40 milliGRAM(s) Oral at bedtime  carvedilol 3.125 milliGRAM(s) Oral every 12 hours  dorzolamide 2% Ophthalmic Solution 1 Drop(s) Both EYES <User Schedule>  influenza  Vaccine (HIGH DOSE) 0.7 milliLiter(s) IntraMuscular once  latanoprost 0.005% Ophthalmic Solution 1 Drop(s) Both EYES at bedtime  levETIRAcetam  IVPB 500 milliGRAM(s) IV Intermittent every 12 hours  levothyroxine 75 MICROGram(s) Oral daily  melatonin 3 milliGRAM(s) Oral <User Schedule>  pantoprazole   Suspension 40 milliGRAM(s) Oral daily  polyethylene glycol 3350 17 Gram(s) Oral daily  potassium chloride   Powder 40 milliEquivalent(s) Oral every 4 hours  timolol 0.5% Solution 1 Drop(s) Both EYES two times a day  traZODone 50 milliGRAM(s) Oral <User Schedule>      LABS:  04-21    142  |  108<H>  |  17  ----------------------------<  140<H>  3.4<L>   |  23  |  0.46<L>    Ca    8.6      21 Apr 2022 07:22  Phos  2.7     04-20  Mg     1.80     04-20      Creatinine Trend: 0.46 <--, 0.42 <--, 0.47 <--, 0.50 <--, 0.46 <--, 0.45 <--, 0.45 <--                                8.6    4.44  )-----------( 339      ( 21 Apr 2022 07:22 )             27.2     Urine Studies:  Urinalysis - [04-04-22 @ 01:59]      Color Light Yellow / Appearance Clear / SG 1.012 / pH 7.5      Gluc Negative / Ketone Negative  / Bili Negative / Urobili <2 mg/dL       Blood Negative / Protein Trace / Leuk Est Negative / Nitrite Negative      RBC 6 / WBC 1 / Hyaline  / Gran  / Sq Epi  / Non Sq Epi 1 / Bacteria Negative      PTH -- (Ca --)      [03-30-22 @ 07:35]   32  TSH 7.65      [04-05-22 @ 07:40]  Lipid: chol 158, , HDL 25, LDL --      [02-03-22 @ 15:22]      Syphilis Screen (Treponema Pallidum Ab) Negative      [04-05-22 @ 09:41]    RADIOLOGY & ADDITIONAL STUDIES:

## 2022-04-21 NOTE — PROGRESS NOTE ADULT - SUBJECTIVE AND OBJECTIVE BOX
Date of Service  : 04-21-22     INTERVAL HPI/OVERNIGHT EVENTS: I feel fine.   Vital Signs Last 24 Hrs  T(C): 36.2 (21 Apr 2022 17:57), Max: 37 (20 Apr 2022 21:00)  T(F): 97.2 (21 Apr 2022 17:57), Max: 98.6 (20 Apr 2022 21:00)  HR: 73 (21 Apr 2022 17:57) (71 - 75)  BP: 127/60 (21 Apr 2022 17:57) (127/60 - 150/67)  BP(mean): --  RR: 18 (21 Apr 2022 17:57) (17 - 18)  SpO2: 99% (21 Apr 2022 17:57) (99% - 100%)  I&O's Summary    20 Apr 2022 07:01  -  21 Apr 2022 07:00  --------------------------------------------------------  IN: 1725 mL / OUT: 1375 mL / NET: 350 mL    21 Apr 2022 07:01  -  21 Apr 2022 20:05  --------------------------------------------------------  IN: 575 mL / OUT: 650 mL / NET: -75 mL      MEDICATIONS  (STANDING):  amLODIPine   Tablet 10 milliGRAM(s) Oral daily  apixaban 5 milliGRAM(s) Oral every 12 hours  aspirin  chewable 81 milliGRAM(s) Oral daily  atorvastatin 40 milliGRAM(s) Oral at bedtime  carvedilol 3.125 milliGRAM(s) Oral every 12 hours  dorzolamide 2% Ophthalmic Solution 1 Drop(s) Both EYES <User Schedule>  influenza  Vaccine (HIGH DOSE) 0.7 milliLiter(s) IntraMuscular once  latanoprost 0.005% Ophthalmic Solution 1 Drop(s) Both EYES at bedtime  levETIRAcetam  IVPB 500 milliGRAM(s) IV Intermittent every 12 hours  levothyroxine 75 MICROGram(s) Oral daily  melatonin 3 milliGRAM(s) Oral <User Schedule>  pantoprazole   Suspension 40 milliGRAM(s) Oral daily  polyethylene glycol 3350 17 Gram(s) Oral daily  timolol 0.5% Solution 1 Drop(s) Both EYES two times a day  traZODone 50 milliGRAM(s) Oral <User Schedule>    MEDICATIONS  (PRN):  acetaminophen     Tablet .. 650 milliGRAM(s) Oral every 6 hours PRN Mild Pain (1 - 3), Moderate Pain (4 - 6)  OLANZapine 2.5 milliGRAM(s) Oral every 8 hours PRN agitation  OLANZapine Injectable 2.5 milliGRAM(s) IntraMuscular every 8 hours PRN severe agitation  QUEtiapine 25 milliGRAM(s) Oral every 6 hours PRN anxiety    LABS:                        8.6    4.44  )-----------( 339      ( 21 Apr 2022 07:22 )             27.2     04-21    142  |  108<H>  |  17  ----------------------------<  140<H>  3.4<L>   |  23  |  0.46<L>    Ca    8.6      21 Apr 2022 07:22  Phos  2.7     04-20  Mg     1.80     04-20          CAPILLARY BLOOD GLUCOSE      POCT Blood Glucose.: 137 mg/dL (21 Apr 2022 17:19)          REVIEW OF SYSTEMS:  CONSTITUTIONAL: No fever, weight loss, or fatigue  EYES: No eye pain, visual disturbances, or discharge  ENMT:  No difficulty hearing, tinnitus, vertigo; No sinus or throat pain  NECK: No pain or stiffness  RESPIRATORY: No cough, wheezing, chills or hemoptysis; No shortness of breath  CARDIOVASCULAR: No chest pain, palpitations, dizziness, or leg swelling  GASTROINTESTINAL: No abdominal or epigastric pain. No nausea, vomiting, or hematemesis; No diarrhea or constipation. No melena or hematochezia.  GENITOURINARY: No dysuria, frequency, hematuria, or incontinence  NEUROLOGICAL: No headaches, memory loss, loss of strength, numbness, or tremors      Consultant(s) Notes Reviewed:  [x ] YES  [ ] NO    PHYSICAL EXAM:  GENERAL: NAD, well-groomed, well-developed,not in any distress ,  HEAD:  Atraumatic, Normocephalic  NECK: Supple, No JVD, Normal thyroid  NERVOUS SYSTEM:  Alert & Oriented X3, No focal deficit   CHEST/LUNG: Good air entry bilateral with no  rales, rhonchi, wheezing, or rubs  HEART: Regular rate and rhythm; No murmurs, rubs, or gallops  ABDOMEN: Soft, Nontender, Nondistended; Bowel sounds present  EXTREMITIES:  2+ Peripheral Pulses, No clubbing, cyanosis, or edema      Care Discussed with Consultants/Other Providers [ x] YES  [ ] NO

## 2022-04-21 NOTE — PROGRESS NOTE ADULT - NS ATTEND OPT1 GEN_ALL_CORE
I attest my time as attending is greater than 50% of the total combined time spent on qualifying patient care activities by the PA/NP and attending.
I independently performed the documented:
I independently performed the documented:

## 2022-04-21 NOTE — CHART NOTE - NSCHARTNOTEFT_GEN_A_CORE
Medicine NP note    80 yo F w/ PMHx of perforated diverticulitis s/p Rosales procedure, colostomy, abdominal abscess with wound s/p IR drainage, Respiratory Failure s/p Trache, CVA w/ functional quadriplegia, aphasia, dysphagia s/p PEG, Afib, GIB, HTN, HLD, Seizure d/o, Hypothyroidism, Glaucoma, Depression admitted for PEG tube dislodgement , PEG tube replaced 3/30, now cbc stable on ASA 81 mg, no objections from GI to restart A/ C Eliquis 5 mg with daily Protonix in line with family GOC, spoke to son Blake [phone] who is Health Proxy, son is understanding risk of bleeding associated with restarting of Eliquis and is agreeing to restart Eliquis. Patient is DNR/DNI; Hospice planning.     Nan Cook, NP- C  Department of Medicine

## 2022-04-21 NOTE — PROGRESS NOTE ADULT - ASSESSMENT
82yo female with complicated medical course    Rectal Bleeding   s/p Flex Sig with clip placed in distal rectum   cbc stable on ASA 81mg  avoid rectal manipulation, suppositories or enemas   no objection to A/C with daily Protonix if in line w/family GOC   peg feeds w/aspiration precautions  DNR/DNI; Hospice planning    Sepsis   per primary team and ID     I reviewed the overnight course of events on the unit, re-confirming the patient history. I discussed the care with the patient and their family. The plan of care was discussed with the physician assistant and modifications were made to the notation where appropriate. Differential diagnosis and plan of care discussed with patient after the evaluation. Advanced care planning was discussed with patient and family.  Advanced care planning forms were reviewed and discussed.  Risks, benefits and alternatives of gastroenterologic procedures were discussed in detail and all questions were answered. 35 minutes spent on total encounter of which more than fifty percent of the encounter was spent counseling and/or coordinating care by the attending physician.

## 2022-04-22 ENCOUNTER — TRANSCRIPTION ENCOUNTER (OUTPATIENT)
Age: 82
End: 2022-04-22

## 2022-04-22 VITALS
DIASTOLIC BLOOD PRESSURE: 67 MMHG | RESPIRATION RATE: 17 BRPM | TEMPERATURE: 98 F | OXYGEN SATURATION: 100 % | SYSTOLIC BLOOD PRESSURE: 147 MMHG | HEART RATE: 78 BPM

## 2022-04-22 LAB
ANION GAP SERPL CALC-SCNC: 11 MMOL/L — SIGNIFICANT CHANGE UP (ref 7–14)
BUN SERPL-MCNC: 19 MG/DL — SIGNIFICANT CHANGE UP (ref 7–23)
CALCIUM SERPL-MCNC: 8.8 MG/DL — SIGNIFICANT CHANGE UP (ref 8.4–10.5)
CHLORIDE SERPL-SCNC: 107 MMOL/L — SIGNIFICANT CHANGE UP (ref 98–107)
CO2 SERPL-SCNC: 22 MMOL/L — SIGNIFICANT CHANGE UP (ref 22–31)
CREAT SERPL-MCNC: 0.43 MG/DL — LOW (ref 0.5–1.3)
EGFR: 99 ML/MIN/1.73M2 — SIGNIFICANT CHANGE UP
GLUCOSE SERPL-MCNC: 107 MG/DL — HIGH (ref 70–99)
HCT VFR BLD CALC: 27.3 % — LOW (ref 34.5–45)
HGB BLD-MCNC: 8.4 G/DL — LOW (ref 11.5–15.5)
MCHC RBC-ENTMCNC: 27.9 PG — SIGNIFICANT CHANGE UP (ref 27–34)
MCHC RBC-ENTMCNC: 30.8 GM/DL — LOW (ref 32–36)
MCV RBC AUTO: 90.7 FL — SIGNIFICANT CHANGE UP (ref 80–100)
NRBC # BLD: 0 /100 WBCS — SIGNIFICANT CHANGE UP
NRBC # FLD: 0 K/UL — SIGNIFICANT CHANGE UP
PLATELET # BLD AUTO: 377 K/UL — SIGNIFICANT CHANGE UP (ref 150–400)
POTASSIUM SERPL-MCNC: 4 MMOL/L — SIGNIFICANT CHANGE UP (ref 3.5–5.3)
POTASSIUM SERPL-SCNC: 4 MMOL/L — SIGNIFICANT CHANGE UP (ref 3.5–5.3)
RBC # BLD: 3.01 M/UL — LOW (ref 3.8–5.2)
RBC # FLD: 15.9 % — HIGH (ref 10.3–14.5)
SARS-COV-2 RNA SPEC QL NAA+PROBE: SIGNIFICANT CHANGE UP
SODIUM SERPL-SCNC: 140 MMOL/L — SIGNIFICANT CHANGE UP (ref 135–145)
WBC # BLD: 5.82 K/UL — SIGNIFICANT CHANGE UP (ref 3.8–10.5)
WBC # FLD AUTO: 5.82 K/UL — SIGNIFICANT CHANGE UP (ref 3.8–10.5)

## 2022-04-22 RX ORDER — QUETIAPINE FUMARATE 200 MG/1
1 TABLET, FILM COATED ORAL
Qty: 0 | Refills: 0 | DISCHARGE
Start: 2022-04-22

## 2022-04-22 RX ADMIN — Medication 650 MILLIGRAM(S): at 03:30

## 2022-04-22 RX ADMIN — Medication 650 MILLIGRAM(S): at 02:31

## 2022-04-22 RX ADMIN — APIXABAN 5 MILLIGRAM(S): 2.5 TABLET, FILM COATED ORAL at 05:36

## 2022-04-22 RX ADMIN — PANTOPRAZOLE SODIUM 40 MILLIGRAM(S): 20 TABLET, DELAYED RELEASE ORAL at 12:10

## 2022-04-22 RX ADMIN — Medication 650 MILLIGRAM(S): at 13:50

## 2022-04-22 RX ADMIN — LEVETIRACETAM 400 MILLIGRAM(S): 250 TABLET, FILM COATED ORAL at 11:23

## 2022-04-22 RX ADMIN — CARVEDILOL PHOSPHATE 3.12 MILLIGRAM(S): 80 CAPSULE, EXTENDED RELEASE ORAL at 05:37

## 2022-04-22 RX ADMIN — DORZOLAMIDE HYDROCHLORIDE 1 DROP(S): 20 SOLUTION/ DROPS OPHTHALMIC at 11:23

## 2022-04-22 RX ADMIN — Medication 81 MILLIGRAM(S): at 12:10

## 2022-04-22 RX ADMIN — AMLODIPINE BESYLATE 10 MILLIGRAM(S): 2.5 TABLET ORAL at 05:36

## 2022-04-22 RX ADMIN — Medication 1 DROP(S): at 05:50

## 2022-04-22 RX ADMIN — Medication 75 MICROGRAM(S): at 05:37

## 2022-04-22 RX ADMIN — POLYETHYLENE GLYCOL 3350 17 GRAM(S): 17 POWDER, FOR SOLUTION ORAL at 12:10

## 2022-04-22 NOTE — PROGRESS NOTE ADULT - SUBJECTIVE AND OBJECTIVE BOX
CARDIOLOGY     Trached, non-verbal but nods yes and no; ros limited.     DATE OF SERVICE - 04-22-22     Review of Systems:   Constitutional: [ ] fevers, [ ] chills.   Skin: [ ] dry skin. [ ] rashes.  Psychiatric: [ ] depression, [ ] anxiety.   Gastrointestinal: [ ] BRBPR, [ ] melena.   Neurological: [ ] confusion. [ ] seizures. [ ] shuffling gait.   Ears,Nose,Mouth and Throat: [ ] ear pain [ ] sore throat.   Eyes: [ ] diplopia.   Respiratory: [ ] hemoptysis. [ ] shortness of breath  Cardiovascular: See HPI above  Hematologic/Lymphatic: [ ] anemia. [ ] painful nodes. [ ] prolonged bleeding.   Genitourinary: [ ] hematuria. [ ] flank pain.   Endocrine: [ ] significant change in weight. [ ] intolerance to heat and cold.     Review of systems [x ] otherwise negative, [ ] otherwise unable to obtain    FH: no family history of sudden cardiac death in first degree relatives    SH: [ ] tobacco, [ ] alcohol, [ ] drugs    acetaminophen     Tablet .. 650 milliGRAM(s) Oral every 6 hours PRN  amLODIPine   Tablet 10 milliGRAM(s) Oral daily  apixaban 5 milliGRAM(s) Oral every 12 hours  aspirin  chewable 81 milliGRAM(s) Oral daily  atorvastatin 40 milliGRAM(s) Oral at bedtime  carvedilol 3.125 milliGRAM(s) Oral every 12 hours  dorzolamide 2% Ophthalmic Solution 1 Drop(s) Both EYES <User Schedule>  influenza  Vaccine (HIGH DOSE) 0.7 milliLiter(s) IntraMuscular once  latanoprost 0.005% Ophthalmic Solution 1 Drop(s) Both EYES at bedtime  levETIRAcetam  IVPB 500 milliGRAM(s) IV Intermittent every 12 hours  levothyroxine 75 MICROGram(s) Oral daily  melatonin 3 milliGRAM(s) Oral <User Schedule>  OLANZapine 2.5 milliGRAM(s) Oral every 8 hours PRN  OLANZapine Injectable 2.5 milliGRAM(s) IntraMuscular every 8 hours PRN  pantoprazole   Suspension 40 milliGRAM(s) Oral daily  polyethylene glycol 3350 17 Gram(s) Oral daily  QUEtiapine 25 milliGRAM(s) Oral every 6 hours PRN  timolol 0.5% Solution 1 Drop(s) Both EYES two times a day  traZODone 50 milliGRAM(s) Oral <User Schedule>                            8.4    5.82  )-----------( 377      ( 22 Apr 2022 06:59 )             27.3       04-22    140  |  107  |  19  ----------------------------<  107<H>  4.0   |  22  |  0.43<L>    Ca    8.8      22 Apr 2022 06:59              T(C): 36.8 (04-22-22 @ 05:18), Max: 36.8 (04-22-22 @ 01:28)  HR: 66 (04-22-22 @ 05:18) (66 - 73)  BP: 143/71 (04-22-22 @ 05:18) (127/60 - 143/71)  RR: 18 (04-22-22 @ 05:18) (17 - 18)  SpO2: 98% (04-22-22 @ 05:18) (98% - 100%)  Wt(kg): --    I&O's Summary    21 Apr 2022 07:01  -  22 Apr 2022 07:00  --------------------------------------------------------  IN: 2205 mL / OUT: 1550 mL / NET: 655 mL        Head: Normocephalic and atraumatic.   Neck: No JVD. No bruits. Supple. Does not appear to be enlarged.   Cardiovascular: + S1,S2 ; RRR Soft systolic murmur at the left lower sternal border. No rubs noted.    Lungs: CTA b/l. No rhonchi, rales or wheezes.   Abdomen: + BS, soft. Non tender. Non distended. No rebound. No guarding.   Extremities: No clubbing/cyanosis/edema.   Skin: Warm and moist. The patient's skin has normal elasticity and good skin turgor.     TTE: < from: TTE Echo Complete w/o Contrast w/ Doppler (02.04.22 @ 15:28) >   1. Left ventricular ejection fraction, by visual estimation, is 60 to   65%.   2. Normal global left ventricular systolic function.   3. Mild mitral valve regurgitation.   4.Mild thickening of the anterior and posterior mitral valve leaflets.   5. Mild tricuspid regurgitation.   6. Mild to moderate aortic regurgitation.   7. Sclerotic aortic valve with normal opening.    < end of copied text >      A/P) 82 y/o female PMH hypertension, hyperlipidemia, hypothyroidism, depression, seizure disorder, PAF & stroke, who had a prolonged hospitalization from Dec 2021 to Feb 22 for perforated diverticulitis requiring Rosales procedure, colostomy bad, IR drainage for abdominal abscess. She subsequently required trach & PEG, now a/w GI bleeding.    -f/u ID  -Abx per ID   -f/u palliative care  -continue lipitor for hyperlipidemia  -continue coreg for hypertension  -plans for dc to NH with hospice per primary team - therefore no further inpatient cardiac workup expected at this time other than ac as tolerated and if within GOC  -AC restarted - follow up neurology regarding need for apt    Danny Casiano MD

## 2022-04-22 NOTE — PROGRESS NOTE ADULT - ASSESSMENT
82 yo F with PMHx HTN, HLD, Hypothyroidism, Depression, seizure d/o, RLE DVT, Cholelithiasis s/p recent Hospitalization in [facility] from 12/22 -> 2/11/22 for acute perforated diverticulitis s/p Rosales 12/25, colostomy bag 12/26, hospitalization c/b IR drainage for abdominal abscess 1/3 Cx grew e.coli & bacteriodes, also required Mechanical Ventilation for Acute Respiratory Failure with Tracheostomy done 1/7.  PEG done 1/18, developed bleeding through ostomy, EGD on 2/1 showed gastritis. T's showed Acute left MCA infarct- could be 2/2 to carotid occlusion vs afib, new RLL aspiration pneumonia, seen by ID, completed antibiotics, seen by VascSx, not surgical candidate. Pt aphasic sent from [facility] for dislodged Peg tube, after staff noted pt has blood to her abdomen. Nephrology consulted for Hypokalemia.     A/P  Hypernatremia  improved  continue free water to q4 hrs  monitor    Hypokalemia   likely 2/2 GI loss   Repleted  monitor input and output closely   monitor K     Hypomagnesia   replete as needed  monitor Mg     Hypophosphatemia   replete as needed  monitor     Hypocalcemia   corrected Ca WNL   Monitor    Acidosis with anion gap   etiology?  improved   monitor    HTN  optimal   monitor BP

## 2022-04-22 NOTE — PROGRESS NOTE ADULT - ASSESSMENT
80 yo F w/ PMHx of perforated diverticulitis s/p Rosales procedure, colostomy, abdominal abscess with wound s/p IR drainage, Respiratory Failure s/p Trache, CVA w/ functional quadriplegia, aphasia, dysphagia s/p PEG (on Jevity 1.5 @ 70cchr x 18 hrs, & water flushes 325ml q 6 hrs, Afib, GIB, HTN, HLD, Seizure d/o, Hypothyroidism, Glaucoma, Depression p/w PEG tube dislodgement for unknown amount of time     Problem/Plan - 1:  ·  Problem: Sepsis sec to K[facility] K Pneumoniae Bacteremia .   ·  Plan:  IV Abxs per ID till 4/19 .   ID help appreciated.   < from: CT Abdomen and Pelvis w/ IV Cont (04.04.22 @ 14:24) >  IMPRESSION:  Trace bilateral pleural effusions.    Improved aeration in the lower lobes bilaterally since 1/30/2022.   Nonspecific patchy bilateral groundglass and linear opacities are noted.    Enhancement of the wall of the cecum and ascending colon with mild   infiltration of the adjacent fat is similar in appearance to prior study   dated 3/27/2022. An inflammatory or infectious process is considered.    Dependent high attenuation within the urinary bladder, which may   represent small stones and/or debris. An underlying mass cannot be   excluded.    Thick-walled collection in the cul-de-sac and small collections along the   right paracolic gutter, not significantly changed since 3/27/2022.    < end of copied text >     Problem/Plan - 2:  ·  Problem: Metabolic Encephalopathy    ·  Plan: Likely sec to Infection  . Resolved.    Neurology helping  and  EEG showing no Seizure activity  .  Psychiatry helping and off mittens.      Problem/Plan - 3:  ·  Problem: Stroke.   ·  Plan: Holding ASA, Plavix as actively bleeding. ASA restarted .    Continue  statin Via PEG tube .     Problem/Plan - 4:  ·  Problem: Chronic respiratory failure with hypoxia.   ·  Plan: Pt on 2l NC via Trache collar.   S/P replacement of Tracheostomy Tube.       Problem/Plan - 5:  ·  Problem: Diverticulitis of intestine with perforation and abscess without bleeding.   ·  Plan: Surgery c/s in chart- small fluid collection but nontoxic, if drainage needed would be done by IR.  Wound care eval.     Problem/Plan - 6:  ·  Problem: History of atrial fibrillation.   ·  Plan: Cards helping and now off AC .   D/W Son and okay to start.      Problem/Plan - 7:  ·  Problem: Seizure disorder.   ·  Plan: continue levetiracetam bid.     Problem/Plan - 8:  ·  Problem: DVT, lower extremity.   ·  Plan: ?Subacute vs Chronic, Will restart AC if okay with GI.      Problem/Plan - 9:  ·  Problem: HTN (hypertension).   ·  Plan: Resuming carvedilol & amlodipine as PEG replaced.     Problem/Plan - 10:  ·  Problem: Hypothyroidism.   ·  Plan; Resume Levothyroxine when PEG replaced.     Problem/Plan - 11:  ·  Problem: Glaucoma.   ·  Plan: continue Timolol, Dorzolamide, Latanoprost.      Problem/Plan - 12:  ·  Problem: PEG tube malfunction.   ·  Plan:  S/P  PEG . TF started.      Problem/Plan - 13:  ·  Problem: Hypokalemia.   ·  Plan: Correcting .       Problem/Plan - 14:  ·  Problem: GI bleed. .   ·  Plan: PPI . GI following .     PRBC for HGb 7.5 G or less. Watching CBC. HH stable     < from: CT Angio Abdomen and Pelvis w/ IV Cont (04.13.22 @ 22:26) >  IMPRESSION:    Small rectal active hemorrhage. Rectal wall enhancement may also reflect   into the lesion.    Thick-walled collection in the cul-de-sac and small collections along the   right paracolic gutter are unchanged.    Bladder calculi reidentified.    Findings discussed with ROSALINE Harrison by Dr. Martina Crystal  on 4/13/2022   at 11:35 PM with readback.    < end of copied text >    < from: Flexible Sigmoidoscopy (04.14.22 @ 16:41) >  Findings:       A visible vessel (immediatly proximal to the dentate line) was found in        the distal rectum. Active bleeding was present. To stop active bleeding,        one hemostatic clip was successfully placed (MR conditional). There was        no bleeding at the end of the procedure.       Diverticulosis in the sigmoid                                                                                   Impression:          - Preparation of the colon was poor.                       - Visible vessel was found in the distal rectum. Clip                        (MR conditional) was placed.                       - No specimens collected.  Recommendation:      - Return patient to hospital [facility] for ongoing care.                       -Restart PEG feeds                       -Hold Ac for Now                       -Stool softeners                       -No enema or rectal meds X 48     < end of copied text >    Disposition : DC  planning pending placement as medically stable. No daily labs please.   Over all prognosis very poor.

## 2022-04-22 NOTE — PROGRESS NOTE ADULT - SUBJECTIVE AND OBJECTIVE BOX
[facility]   MD ELOISE ALVARADO PA    TEL:  OFFICE: [phone]  DR WARREN CELL: [phone]  DR. BRAUN CELL: [phone]  MIKKI BAILEY CELL: [phone]    From 5pm-7am Answering Service [phone]    -- RENAL FOLLOW UP NOTE ---Date of Service 04-22-22 @ 10:06    Patient is a 81y old  Female who presents with a chief complaint of     Patient seen and examined at bedside.     VITALS:  T(F): 98.2 (04-22-22 @ 05:18), Max: 98.2 (04-22-22 @ 01:28)  HR: 66 (04-22-22 @ 05:18)  BP: 143/71 (04-22-22 @ 05:18)  RR: 18 (04-22-22 @ 05:18)  SpO2: 98% (04-22-22 @ 05:18)  Wt(kg): --    04-21 @ 07:01  -  04-22 @ 07:00  --------------------------------------------------------  IN: 2205 mL / OUT: 1550 mL / NET: 655 mL          PHYSICAL EXAM:  Constitutional: NAD  Neck: +trach  Respiratory: CTAB, no wheezes, rales or rhonchi  Cardiovascular: S1, S2, RRR  Gastrointestinal: +peg, + ostomy  Extremities: No peripheral edema    Hospital Medications:   MEDICATIONS  (STANDING):  amLODIPine   Tablet 10 milliGRAM(s) Oral daily  apixaban 5 milliGRAM(s) Oral every 12 hours  aspirin  chewable 81 milliGRAM(s) Oral daily  atorvastatin 40 milliGRAM(s) Oral at bedtime  carvedilol 3.125 milliGRAM(s) Oral every 12 hours  dorzolamide 2% Ophthalmic Solution 1 Drop(s) Both EYES <User Schedule>  influenza  Vaccine (HIGH DOSE) 0.7 milliLiter(s) IntraMuscular once  latanoprost 0.005% Ophthalmic Solution 1 Drop(s) Both EYES at bedtime  levETIRAcetam  IVPB 500 milliGRAM(s) IV Intermittent every 12 hours  levothyroxine 75 MICROGram(s) Oral daily  melatonin 3 milliGRAM(s) Oral <User Schedule>  pantoprazole   Suspension 40 milliGRAM(s) Oral daily  polyethylene glycol 3350 17 Gram(s) Oral daily  timolol 0.5% Solution 1 Drop(s) Both EYES two times a day  traZODone 50 milliGRAM(s) Oral <User Schedule>      LABS:  04-22    140  |  107  |  19  ----------------------------<  107<H>  4.0   |  22  |  0.43<L>    Ca    8.8      22 Apr 2022 06:59      Creatinine Trend: 0.43 <--, 0.46 <--, 0.42 <--, 0.47 <--, 0.50 <--, 0.46 <--, 0.45 <--                                8.4    5.82  )-----------( 377      ( 22 Apr 2022 06:59 )             27.3     Urine Studies:  Urinalysis - [04-04-22 @ 01:59]      Color Light Yellow / Appearance Clear / SG 1.012 / pH 7.5      Gluc Negative / Ketone Negative  / Bili Negative / Urobili <2 mg/dL       Blood Negative / Protein Trace / Leuk Est Negative / Nitrite Negative      RBC 6 / WBC 1 / Hyaline  / Gran  / Sq Epi  / Non Sq Epi 1 / Bacteria Negative      PTH -- (Ca --)      [03-30-22 @ 07:35]   32  TSH 7.65      [04-05-22 @ 07:40]  Lipid: chol 158, , HDL 25, LDL --      [02-03-22 @ 15:22]      Syphilis Screen (Treponema Pallidum Ab) Negative      [04-05-22 @ 09:41]    RADIOLOGY & ADDITIONAL STUDIES:

## 2022-04-22 NOTE — PROGRESS NOTE ADULT - SUBJECTIVE AND OBJECTIVE BOX
Date of Service  : 04-22-22     INTERVAL HPI/OVERNIGHT EVENTS:  No new concerns.   Vital Signs Last 24 Hrs  T(C): 36.7 (22 Apr 2022 14:14), Max: 36.8 (22 Apr 2022 01:28)  T(F): 98.1 (22 Apr 2022 14:14), Max: 98.2 (22 Apr 2022 01:28)  HR: 78 (22 Apr 2022 14:14) (66 - 78)  BP: 147/67 (22 Apr 2022 14:14) (127/60 - 147/67)  BP(mean): --  RR: 17 (22 Apr 2022 14:14) (17 - 18)  SpO2: 100% (22 Apr 2022 14:14) (98% - 100%)  I&O's Summary    21 Apr 2022 07:01  -  22 Apr 2022 07:00  --------------------------------------------------------  IN: 2205 mL / OUT: 1550 mL / NET: 655 mL      MEDICATIONS  (STANDING):  amLODIPine   Tablet 10 milliGRAM(s) Oral daily  apixaban 5 milliGRAM(s) Oral every 12 hours  aspirin  chewable 81 milliGRAM(s) Oral daily  atorvastatin 40 milliGRAM(s) Oral at bedtime  carvedilol 3.125 milliGRAM(s) Oral every 12 hours  dorzolamide 2% Ophthalmic Solution 1 Drop(s) Both EYES <User Schedule>  influenza  Vaccine (HIGH DOSE) 0.7 milliLiter(s) IntraMuscular once  latanoprost 0.005% Ophthalmic Solution 1 Drop(s) Both EYES at bedtime  levETIRAcetam  IVPB 500 milliGRAM(s) IV Intermittent every 12 hours  levothyroxine 75 MICROGram(s) Oral daily  melatonin 3 milliGRAM(s) Oral <User Schedule>  pantoprazole   Suspension 40 milliGRAM(s) Oral daily  polyethylene glycol 3350 17 Gram(s) Oral daily  timolol 0.5% Solution 1 Drop(s) Both EYES two times a day  traZODone 50 milliGRAM(s) Oral <User Schedule>    MEDICATIONS  (PRN):  acetaminophen     Tablet .. 650 milliGRAM(s) Oral every 6 hours PRN Mild Pain (1 - 3), Moderate Pain (4 - 6)  OLANZapine 2.5 milliGRAM(s) Oral every 8 hours PRN agitation  OLANZapine Injectable 2.5 milliGRAM(s) IntraMuscular every 8 hours PRN severe agitation  QUEtiapine 25 milliGRAM(s) Oral every 6 hours PRN anxiety    LABS:                        8.4    5.82  )-----------( 377      ( 22 Apr 2022 06:59 )             27.3     04-22    140  |  107  |  19  ----------------------------<  107<H>  4.0   |  22  |  0.43<L>    Ca    8.8      22 Apr 2022 06:59          CAPILLARY BLOOD GLUCOSE      POCT Blood Glucose.: 137 mg/dL (21 Apr 2022 17:19)          REVIEW OF SYSTEMS:  CONSTITUTIONAL: No fever, weight loss, or fatigue  EYES: No eye pain, visual disturbances, or discharge  ENMT:  No difficulty hearing, tinnitus, vertigo; No sinus or throat pain  NECK: No pain or stiffness  RESPIRATORY: No cough, wheezing, chills or hemoptysis; No shortness of breath  CARDIOVASCULAR: No chest pain, palpitations, dizziness, or leg swelling  GASTROINTESTINAL: No abdominal or epigastric pain. No nausea, vomiting, or hematemesis; No diarrhea or constipation. No melena or hematochezia.  GENITOURINARY: No dysuria, frequency, hematuria, or incontinence  NEUROLOGICAL: No headaches, memory loss, loss of strength, numbness, or tremors      Consultant(s) Notes Reviewed:  [x ] YES  [ ] NO    PHYSICAL EXAM:  GENERAL: NAD, well-groomed, well-developed ,not in any distress ,  HEAD:  Atraumatic, Normocephalic  NECK: Supple, No JVD, Normal thyroid  NERVOUS SYSTEM:  Alert & Oriented X3, No focal deficit   CHEST/LUNG: Good air entry bilateral with no  rales, rhonchi, wheezing, or rubs  HEART: Regular rate and rhythm; No murmurs, rubs, or gallops  ABDOMEN: Soft, Nontender, Nondistended; Bowel sounds present  EXTREMITIES:  2+ Peripheral Pulses, No clubbing, cyanosis, or edema      Care Discussed with Consultants/Other Providers [ x] YES  [ ] NO

## 2022-04-22 NOTE — PROGRESS NOTE ADULT - ASSESSMENT
82yo female with complicated medical course    Rectal Bleeding   s/p Flex Sig with clip placed in distal rectum   cbc stable on ASA 81mg  avoid rectal manipulation, suppositories or enemas   family wishing for a/c ppx for cva prevention; please keep on PPI daily  peg feeds w/aspiration precautions  DNR/DNI; Hospice planning    Sepsis   per primary team and ID     I reviewed the overnight course of events on the unit, re-confirming the patient history. I discussed the care with the patient and their family. The plan of care was discussed with the physician assistant and modifications were made to the notation where appropriate. Differential diagnosis and plan of care discussed with patient after the evaluation. Advanced care planning was discussed with patient and family.  Advanced care planning forms were reviewed and discussed.  Risks, benefits and alternatives of gastroenterologic procedures were discussed in detail and all questions were answered. 35 minutes spent on total encounter of which more than fifty percent of the encounter was spent counseling and/or coordinating care by the attending physician.

## 2022-04-22 NOTE — DISCHARGE NOTE NURSING/CASE MANAGEMENT/SOCIAL WORK - PATIENT PORTAL LINK FT
You can access the FollowMyHealth Patient Portal offered by [facility] by registering at the following website: [email or web address]. By joining [employer]’s FollowMyHealth portal, you will also be able to view your health information using other applications (apps) compatible with our system.

## 2022-04-22 NOTE — PROGRESS NOTE ADULT - SUBJECTIVE AND OBJECTIVE BOX
INTERVAL HPI/OVERNIGHT EVENTS:    no rectal bleeding   tolerating feeds  cbc stable    MEDICATIONS  (STANDING):  amLODIPine   Tablet 10 milliGRAM(s) Oral daily  apixaban 5 milliGRAM(s) Oral every 12 hours  aspirin  chewable 81 milliGRAM(s) Oral daily  atorvastatin 40 milliGRAM(s) Oral at bedtime  carvedilol 3.125 milliGRAM(s) Oral every 12 hours  dorzolamide 2% Ophthalmic Solution 1 Drop(s) Both EYES <User Schedule>  influenza  Vaccine (HIGH DOSE) 0.7 milliLiter(s) IntraMuscular once  latanoprost 0.005% Ophthalmic Solution 1 Drop(s) Both EYES at bedtime  levETIRAcetam  IVPB 500 milliGRAM(s) IV Intermittent every 12 hours  levothyroxine 75 MICROGram(s) Oral daily  melatonin 3 milliGRAM(s) Oral <User Schedule>  pantoprazole   Suspension 40 milliGRAM(s) Oral daily  polyethylene glycol 3350 17 Gram(s) Oral daily  timolol 0.5% Solution 1 Drop(s) Both EYES two times a day  traZODone 50 milliGRAM(s) Oral <User Schedule>    MEDICATIONS  (PRN):  acetaminophen     Tablet .. 650 milliGRAM(s) Oral every 6 hours PRN Mild Pain (1 - 3), Moderate Pain (4 - 6)  OLANZapine 2.5 milliGRAM(s) Oral every 8 hours PRN agitation  OLANZapine Injectable 2.5 milliGRAM(s) IntraMuscular every 8 hours PRN severe agitation  QUEtiapine 25 milliGRAM(s) Oral every 6 hours PRN anxiety      Allergies    No Known Allergies    Intolerances        Review of Systems:  **nods head    General:  No wt loss, fevers, chills, night sweats, fatigue   Eyes:  Good vision, no reported pain  ENT:  No sore throat, pain, runny nose, dysphagia  CV:  No pain, palpitations, hypo/hypertension  Resp:  No dyspnea, cough, tachypnea, wheezing  GI:  No pain, No nausea, No vomiting, No diarrhea, No constipation, No weight loss, No fever, No pruritis, No rectal bleeding, No melena, No dysphagia  :  No pain, bleeding, incontinence, nocturia  Muscle:  No pain, weakness  Neuro:  No weakness, tingling, memory problems  Psych:  No fatigue, insomnia, mood problems, depression  Endocrine:  No polyuria, polydypsia, cold/heat intolerance  Heme:  No petechiae, ecchymosis, easy bruisability  Skin:  No rash, tattoos, scars, edema      Vital Signs Last 24 Hrs  T(C): 36.8 (22 Apr 2022 05:18), Max: 36.8 (22 Apr 2022 01:28)  T(F): 98.2 (22 Apr 2022 05:18), Max: 98.2 (22 Apr 2022 01:28)  HR: 66 (22 Apr 2022 05:18) (66 - 73)  BP: 143/71 (22 Apr 2022 05:18) (127/60 - 143/71)  BP(mean): --  RR: 18 (22 Apr 2022 05:18) (17 - 18)  SpO2: 98% (22 Apr 2022 05:18) (98% - 100%)    PHYSICAL EXAM:    Constitutional: NAD  HEENT: EOMI, throat clear  Neck: No LAD, supple +trach  Respiratory: CTA and P  Cardiovascular: S1 and S2, RRR, no M  Gastrointestinal: BS+, soft, NT/ND, neg HSM, +peg  Extremities: No peripheral edema, neg clubbing, cyanosis  Vascular: 2+ peripheral pulses  Neurological: A/O x 2, no focal deficits  Psychiatric: Normal mood, normal affect  Skin: No rashes      LABS:                        8.4    5.82  )-----------( 377      ( 22 Apr 2022 06:59 )             27.3     04-22    140  |  107  |  19  ----------------------------<  107<H>  4.0   |  22  |  0.43<L>    Ca    8.8      22 Apr 2022 06:59            RADIOLOGY & ADDITIONAL TESTS:

## 2022-04-22 NOTE — PROGRESS NOTE ADULT - PROVIDER SPECIALTY LIST ADULT
Cardiology
Gastroenterology
Infectious Disease
Infectious Disease
Internal Medicine
Nephrology
Surgery
Cardiology
ENT
Gastroenterology
Gastroenterology
Infectious Disease
Internal Medicine
Nephrology
Gastroenterology
Infectious Disease
Internal Medicine
Nephrology
Surgery

## 2022-04-22 NOTE — DISCHARGE NOTE NURSING/CASE MANAGEMENT/SOCIAL WORK - NSDCPEFALRISK_GEN_ALL_CORE
For information on Fall & Injury Prevention, visit: [email or web address] OR  [email or web address] OR  https://www.cdc.gov/steadi/patient.html

## 2022-04-23 LAB
CULTURE RESULTS: SIGNIFICANT CHANGE UP
CULTURE RESULTS: SIGNIFICANT CHANGE UP
SPECIMEN SOURCE: SIGNIFICANT CHANGE UP
SPECIMEN SOURCE: SIGNIFICANT CHANGE UP

## 2022-08-17 NOTE — PATIENT PROFILE ADULT - NSPROMEDSHERBAL_GEN_A_NUR
Davonte Siegel)  Urology  [address], [address]  [place] [address]  Phone: [phone]  Fax: [phone]  Follow Up Time:   
no

## 2023-02-04 NOTE — PROGRESS NOTE ADULT - SUBJECTIVE AND OBJECTIVE BOX
Chief Complaint   Patient presents with   • Pre-Op Exam         PREOPERATIVE HISTORY AND PHYSICAL     REQUESTING PHYSICIAN    Ning Alonso MD    PRIMARY CARE PHYSICIAN    Karena Cisse MD    HISTORY OF PRESENT ILLNESS    This patient was seen at the request of Dr. Ning Alonso MD for preoperative clearance of       Current activity level:  10 mets    The patient is exercising 2-3 x per week typically treadmill and elliptical.        The patient has history of hypothyroidism.  Last tsh was 7/22.    The patient has history of Takosubo cardiomyopathy.  Echos have been normal since the initial event.  The patient denies chest pain or shortness of breath.    The patient has history of GERD.  This is controlled.      MEDICAL HISTORY    Past Medical History:   Diagnosis Date   • Acquired hypothyroidism    • Allergy    • Broken heart syndrome 11/20/2013   • Cardiomyopathy (CMS/HCC)     Takosubo cardiomyopathy ro Broken Heart Syndrome   • Chronic fatigue syndrome     improved   • Colon polyps    • Diverticulosis    • Duodenal ulcer    • Factor 5 Leiden mutation, heterozygous (CMS/HCC)    • GERD (gastroesophageal reflux disease)    • Hiatal hernia        FAMILY HISTORY    Family History   Problem Relation Age of Onset   • Heart Father    • Hyperlipidemia Father    • Stroke Father    • Hyperlipidemia Mother    • Cancer, Breast Mother         postmenopausal   • Dementia/Alzheimers Mother    • Stroke Brother         x3   • Blood Disorder Sister         clotting disorder   • Blood Disorder Brother         clotting disorder/blood clots   • Allergic Rhinitis Daughter    • Autoimmune Disease Daughter    • Other Daughter         drug abuse   • Cancer, Colon Neg Hx    • Cancer, Ovarian Neg Hx    • Cancer, Endometrial Neg Hx        SOCIAL HISTORY    Social History     Tobacco Use   • Smoking status: Former     Packs/day: 1.00     Years: 30.00     Pack years: 30.00     Types: Cigarettes     Quit date: 11/20/2007     Years  Patient is a 81y old  Female who presents with a chief complaint of abdominal pain(17 Jan 2022 12:20)    PAST MEDICAL & SURGICAL HISTORY:  Seizure    HTN (hypertension)    Glaucoma    Thyroid disease    Blood clot of neck vein    TIA (transient ischemic attack)    S/P appendectomy    INTERVAL HISTORY: resting in bed, more awake today, trach to vent   	  MEDICATIONS:  MEDICATIONS  (STANDING):  carvedilol 3.125 milliGRAM(s) Oral every 12 hours  chlorhexidine 0.12% Liquid 15 milliLiter(s) Oral Mucosa every 12 hours  chlorhexidine 2% Cloths 1 Application(s) Topical <User Schedule>  dorzolamide 2%/timolol 0.5% Ophthalmic Solution 1 Drop(s) Both EYES two times a day  enoxaparin Injectable 40 milliGRAM(s) SubCutaneous daily  escitalopram 20 milliGRAM(s) Oral daily  levETIRAcetam 500 milliGRAM(s) Oral every 12 hours  levothyroxine 50 MICROGram(s) Oral daily  pantoprazole   Suspension 40 milliGRAM(s) Enteral Tube daily  potassium chloride    Tablet ER 20 milliEquivalent(s) Oral every 2 hours    MEDICATIONS  (PRN):  acetaminophen     Tablet .. 650 milliGRAM(s) Oral every 6 hours PRN Temp greater or equal to 38C (100.4F)  sodium chloride 0.9% lock flush 10 milliLiter(s) IV Push every 1 hour PRN Pre/post blood products, medications, blood draw, and to maintain line patency    Vitals:  T(F): 99.3 (01-17-22 @ 13:05), Max: 99.3 (01-17-22 @ 13:05)  HR: 79 (01-17-22 @ 13:05) (63 - 86)  BP: 166/79 (01-17-22 @ 13:05) (124/68 - 166/79)  RR: 20 (01-17-22 @ 13:05) (18 - 20)  SpO2: 97% (01-17-22 @ 13:05) (97% - 100%)    01-16 @ 07:01  -  01-17 @ 07:00  --------------------------------------------------------  IN:    Enteral Tube Flush: 750 mL  Total IN: 750 mL    OUT:    Bulb (mL): 10 mL    Bulb (mL): 25 mL  Total OUT: 35 mL    Total NET: 715 mL    PHYSICAL EXAM:  Neuro: opens eyes to calling her name, doesn't follow commands   CV: S1 S2 RRR   Lungs: diminished to bases, Trach to vent  GI: Soft, BS +, ND, NT, + functioning colostomy, Wound VAC, Multiple drains   Extremities: + upper and lower extremity edema    TELEMETRY: RSR    RADIOLOGY: < from: Xray Chest 1 View- PORTABLE-Urgent (Xray Chest 1 View- PORTABLE-Urgent .) (01.16.22 @ 06:24) >  Impression: Status post tracheostomy. NG tip in stomach. Trace bilateral   pleural effusion.    < end of copied text >  < from: CT Abdomen and Pelvis w/ IV Cont (01.11.22 @ 16:42) >  Small pleural effusion with passive atelectasis at the left lung base is   again seen. Resolutionof previously seen right pleural effusion  Patchy densities in the lung bases partially visualized representing   small areas of linear atelectasis versus infiltrate.  NG tube again seen  Cholelithiasis  Decreased volume of fluid collections in the paracolic gutters.  Fluid collection the posterior pelvis is increased in size from the prior   study  Open wound midline anterior abdominal wall again seen  Left lower quadrant colostomy again appreciated  2. Drains are again seen ending in the pelvis.    < end of copied text >    DIAGNOSTIC TESTING:    [x ] Echocardiogram: < from: TTE Echo Complete w/o Contrast w/ Doppler (12.25.21 @ 10:02) >  Left Ventricle: Normal left ventricular size and wall thicknesses, with   normal systolic and diastolic function. The left ventricle was not well   visualized.  Global LV systolic function was normal. Left ventricular ejection   fraction, by visual estimation, is 55 to 60%. Extremely limited apical   and subcostal views.  Right Ventricle: Normal right ventricular size and function.  Left Atrium: The left atrium is normal in size.  Right Atrium: The right atrium is normal in size.  Pericardium: There is no evidence of pericardial effusion.  Mitral Valve: Structurally normal mitral valve, with normal leaflet   excursion. Trace mitral valve regurgitation is seen.  Tricuspid Valve: Structurally normal tricuspid valve, with normal leaflet   excursion. No tricuspid regurgitation is visualized. Adequate TR velocity   was not obtained to accurately assess RVSP.  Aortic Valve: Normal trileaflet aortic valve with normal opening. Peak   transaortic gradient equals 6.2 mmHg, mean transaortic gradient equals   3.5 mmHg, the calculated aortic valve area equals 1.68 cm² by the   continuity equation consistent with normally opening aortic valve. No   evidence of aortic valve regurgitation is seen.  Pulmonic Valve: The pulmonic valve was not well visualized. No indication   of pulmonic valve regurgitation.  Aorta: The aortic root and ascending aorta are structurally normal, with   no evidence of dilitation.  Pulmonary Artery: The main pulmonary artery is normal in size.      Summary:   1. Left ventricular ejection fraction, by visual estimation, is 55 to   60%.   2. Normal global left ventricular systolic function.   3. Trace mitral valve regurgitation.   4. Consider contrast echocardiogram, if clinically warranted, to better   evaluate left ventricular function and wall motion.    < end of copied text >    LABS:	 	    17 Jan 2022 09:16    143    |  110    |  22     ----------------------------<  141    3.7     |  27     |  0.55   16 Jan 2022 10:47    144    |  111    |  20     ----------------------------<  106    3.4     |  29     |  0.45   15 Nato 2022 10:55    141    |  105    |  19     ----------------------------<  127    3.4     |  26     |  0.43     Ca    8.4        17 Jan 2022 09:16  Phos  3.3       17 Jan 2022 09:16  Mg     2.9       17 Jan 2022 09:16                        9.6    11.05 )-----------( 451      ( 17 Jan 2022 09:16 )             32.3 ,                       10.1   9.84  )-----------( 478      ( 16 Jan 2022 10:47 )             32.9 ,                       9.8    10.89 )-----------( 452      ( 15 Nato 2022 10:55 )             31.8     TSH: Thyroid Stimulating Hormone, Serum: 12.200 uIU/mL (01-14 @ 08:33)                 since quitting: 15.2   • Smokeless tobacco: Never   Substance Use Topics   • Alcohol use: Yes     Alcohol/week: 1.0 standard drink     Types: 1 Standard drinks or equivalent per week   • Drug use: No       SURGICAL HISTORY    Past Surgical History:   Procedure Laterality Date   • Bilateral oophorectomy  2008   • Cardiac catherization  2012    Dr. Renteria   • Colonoscopy  2012    Dr Shaikh   • Colonoscopy  10/10/2017    repeat 5 years/hx polyps   • Hernia repair      abnominal - 2008, 2014 /Dr Goss   • Laparoscopic cholecystectomy  2001    Dr Stephen   • Oral surgery procedure  06/09/2015    tongue biopsy negative   • Upper endoscopy w/ antroduodenal manometry  2010    Dr Iqbal   • Vaginal hysterectomy  1998    Dr. Gutierrez   THE PATIENT HAS BEEN TOLD THAT IT CAN TAKE A LONG TIME TO COME OUT OF SEDATION.      CURRENT MEDICATIONS    Current Outpatient Medications   Medication Sig Dispense Refill   • levothyroxine (Synthroid) 100 MCG tablet Take 1 tablet by mouth daily. 90 tablet 3   • Omega-3 Fatty Acids (FISH OIL PO)      • VITAMIN D-VITAMIN K PO      • ferrous sulfate 325 (65 FE) MG tablet Take 325 mg by mouth 3 days a week.     • Iodine Strong, Lugols, (IODINE STRONG PO) Take 1 tablet by mouth 1 day a week.     • [employer] BETA CAROTENE PO Take 1 tablet by mouth daily.      • aspirin 81 MG tablet Take 81 mg by mouth daily.      • Multiple Minerals-Vitamins CAPS Take 1 tablet by mouth daily.      • ZINC SULFATE PO Take 1 tablet by mouth daily.        No current facility-administered medications for this visit.       ALLERGIES    ALLERGIES:   Allergen Reactions   • Darvocet [Propoxyphene N-Apap] NAUSEA       REVIEW OF SYSTEMS      14 point review of systems is negative except for that noted above and below:      None     PHYSICAL EXAM    VITAL SIGNS:    Visit Vitals  /76 (BP Location: LUE - Left upper extremity, Patient Position: Sitting, Cuff Size: Regular)   Pulse 85   Temp 98.6 °F (37 °C) (Temporal)   Wt 63.4 kg  (139 lb 12.8 oz)   LMP 01/01/1998   SpO2 98%   BMI 22.91 kg/m²      Constitutional:  Alert, no acute distress   HENT:  Normocephalic. Atraumatic. Bilateral external ears normal. Oropharynx moist. No oral exudates. No tonsillar or uvular edema.   Neck: Normal range of motion. No tenderness. Supple. No stridor.    Eyes:  PERRL (Pupils equal, round, reactive to light), EOMI (extraocular movements intact). Conjunctivae normal. No discharge.    Cardiovascular:  Normal rate. Normal rhythm. No murmurs, gallops, or rubs.  2 + DP (dorsalis pedis) pulses with no lower extremity edema.  Respiratory:  No respiratory distress. Normal respiratory effort.  Normal breath sounds. No rales. No wheezing.    Gastrointestinal:  Bowel sounds normal. Soft. No tenderness. No distension.  No masses. No pulsatile masses. No hepatosplenomegaly.   Integument:  Warm. Dry. No erythema. No rash.    Neurologic:  Alert & oriented x 3.         ASSESSMENT    65 year old female with planned surgery as above.      Known risk factors for perioperative complications:  none     Patient is currently stable, medically optimized and ready for surgery.      PLAN      1. Preoperative workup as follows   Orders Placed This Encounter   • OPEN ACCESS COLONOSCOPY       2. Change in medication regimen before surgery:  Avoid any anti-inflammatories such as ibuprofen or advil, aspirin or any supplements 1 week prior to surgery.    3. Prophylaxis for cardiac events with perioperative beta-blockers:  none  4.   Other measures:     1. History of colon polyps     - OPEN ACCESS COLONOSCOPY    2. Preop exam for internal medicine       3. Cataract of both eyes, unspecified cataract type   patient may proceed with surgery    4. Hypothyroidism, unspecified type  Recheck thyroid levels in the summer.    5. Factor 5 Leiden mutation, heterozygous (CMS/HCC)  No history of thrombosis        A copy of this consultation will be sent to the referring physician, Dr. Ning Alonso,  MD

## 2023-03-07 LAB
BLOOD GAS COMMENTS, VENOUS: SIGNIFICANT CHANGE UP
SAO2 % BLDV: SIGNIFICANT CHANGE UP % (ref 94–98)

## 2023-04-24 NOTE — PROGRESS NOTE ADULT - ASSESSMENT
24-Apr-2023 17:35     TRESA PIZARRO 81 f [birth year]2021 1940 DR ANTONIO PATTON     REVIEW OF SYMPTOMS      Able to give (reliable) ROS  NO     PHYSICAL EXAM    HEENT Unremarkable  atraumatic   RESP Fair air entry EXP prolonged    Harsh breath sound Resp distres mild   CARDIAC S1 S2 No S3     NO JVD    ABDOMEN SOFT BS PRESENT NOT DISTENDED No hepatosplenomegaly   PEDAL EDEMA present No calf tenderness  NO rash     ______  DOA/CC.  12/22/2021 81F w/ HTN, HLD, hypothyroidism, depression, seizures. Presented w/ abdominal pain found to have acute sigmoid diverticulitis    PMH.  pmh Hytn  pmh Sz.    PROBLEMS.  Feculent peritonitis poa 12/22  Colostomy 12/26  Initial abio course competed 1/15    R LEFTY removd 1/22/2022  DVT 1/25/2022 r cfv fd lvnx startd   Drop Hb 1/27 Lvnx dced  GI bl peg and ostomy 1/28  NPO 1/28  Zosyn 1/29 1/31 Sp Pseudo  Need for ivcf 1/30/2022 dw Dr Patton  2/1/2022 IR reviewed imaging IR felt that Blood clot is minimal and subaute/chronic and neither IVCF not Anticoagulants are needed   New rll pneum 1/30 ct   Ac L MCA cva 1/30/2022 CT   GI Bleed EGD 2/1/2022 Dr Ness Erosions PEG tube May restart Anticoagulants after 24 h  Elevated LFTS 2/1/2022     MISC ISSUES.  COVID STATUS. 12/24 pcr (-)  ICU STAY. 12/22-1/12/2022   GOC.  1/13/2022 full code       BEST PRACTICE ISSUES.                                                  HEAD OF BED ELEVATION. Yes  DVT PROPHYLAXIS.     1/28/2022 lvnx dced because of active gi bleed    1/25/2022 lvnx 80.2   1/25/2022 V duplx partially occlusive thrombus r cfv ac to subacute   1/7 lvnx 40   LIGHT PROPHYLAXIS.    1/11 protonix 40                                                                                     DIET.    1/28/2022 npo   1/20/2022 jevity 1.5 1200  1/18/2022 npo  1/7 vital af 1200 ngt          INFECTION PROPHYLAXIS.   1/7 Chlorhexidine .12%   1/7 chlorhexidine 2%    SPEECH SWALLOW RECOMMENDATIONS.        ASSESSMENT/RECOMMENDATIONS.    HEMODYNAMICS.   Monitor bp Target MAP 65 (+)    RESP.   Monitor po Target po 90-95%  1/14/2022 15/450/.4/5 747/38/110       DVT  1/25/2022 V duplx partially occlusive thrombus r cfv ac to subacute   1/25 lvnx 80.2   1/28/2022 lvnx 80.2 dced by PA as Hb had dropped requiring transfusion  2/1/2022 IR reviewed imaging IR felt that Blood clot is minimal and subaute/chronic and neither IVCF not Anticoagulants are needed   2/1/2022 Will recheck v duplex in am     OXYGEN REQUIREMENTS.   1/31/2022 fio2 30%  1/24/2022 30%  1/17/2022 40%  1/13/2022 40%    VENT MANAGEMENT.   HOB elevation  Target Pplat 35 (-)  Target PO 90-95%  Target pH 730 (+)    FEVER 1/28/2022.  New rll pneum 1/30 ct  Abdominal infection sec perforated sigmoid div feculent peritonitis poa 12/22.  Pt was taken off abio 1/15  1/28/2021 She has been developing progressive leukocytosis and low gd fever   w 1/27-1/28 -  1/29-1/30-1/31-2/1 s08-00-21 - 20-17 - 16  1/30/2022 ct ch new rll pneu  1/21 ctap showed decreased collections cw 1/11  Sputum 1/31 Sp Pseudo  blod cult. 1/31  bc. (-)  1/29/2022 zosyn started by id    SP ABD SURGERY.  81F admitted with Acute Sigmoid Diverticulitis s/p ex lap, sigmoid resection, peritoneal lavage 12/25 and RTOR 12/26 for irrigation of abdominal cavity with closure and creation of colostomy. S/p bedside IR aspiration of fluid collection 1/3. Tracheostomy 1/7.   Deep pelvic abscess noted on CT,  Local drain/ostomy care per nursing  1/22/2022 r lefty removd     ANEMIA.  Hb 1/27-1/27-1/28-1/28-1/29-1/31-2/1 Hb 9.7-7.7-8.7-9.6 - 8.9-7.3 - 9.6  1/28/2022 lvnx 80.2 dced     TRANSFUSION.  1/28/2022 2u prbc     AMS  1/30/2022 ct h ac l mca cva   suggest neuro eval      GT placed 1/18    GI BLEED   11/28 peg and ostomy with active bleed  EGD 2/1/2022 Dr Ness Erosions PEG tube May restart Anticoagulants after 24 h    ELEVATED LFTS  LFTS 2/1/2022         Follow with GI      Hypernatremia  Na 1/26-1/27-1/28-1/31/2022 Na 147-148 -147 - 143     IV Fluids.  1/31/2022 d5 1/2 ns 20k 42    TIME SPENT   Over 25 minutes aggregate care time spent on encounter; activities included   direct patient care, counseling and/or coordinating care reviewing notes, lab data/ imaging , discussion with multidisciplinary team/ patient  /family and explaining in detail risks, benefits, alternatives  of the recommendations     TRESA PIZARRO 81 f [birth year]2021 1940 DR ANTONIO PATTON      24-Apr-2023 17:30

## 2023-06-30 NOTE — PROCEDURAL SAFETY CHECKLIST WITH OR WITHOUT SEDATION - NSPRESEDATION2FT_GEN_ALL_CORE
Anesthesia confirms case reviewed for anesthesia risk alert.
Anesthesia confirms case reviewed for anesthesia risk alert.
3 = A little assistance

## 2023-09-11 NOTE — PROGRESS NOTE ADULT - SUBJECTIVE AND OBJECTIVE BOX
SUBJECTIVE:  No complaints    OBJECTIVE:  Vital Signs Last 24 Hrs  T(C): 36.8 (14 Apr 2022 10:00), Max: 37.7 (13 Apr 2022 13:37)  T(F): 98.2 (14 Apr 2022 10:00), Max: 99.8 (13 Apr 2022 13:37)  HR: 76 (14 Apr 2022 10:00) (68 - 81)  BP: 132/61 (14 Apr 2022 10:00) (117/47 - 157/63)  BP(mean): --  RR: 100 (14 Apr 2022 10:00) (17 - 100)  SpO2: 100% (14 Apr 2022 04:31) (98% - 100%)      04-13-22 @ 07:01  -  04-14-22 @ 07:00  --------------------------------------------------------  IN: 2880 mL / OUT: 700 mL / NET: 2180 mL    04-14-22 @ 07:01  -  04-14-22 @ 11:21  --------------------------------------------------------  IN: 0 mL / OUT: 250 mL / NET: -250 mL        Physical Examination:  GEN: NAD, resting quietly  PULM: symmetric chest rise bilaterally, no increased WOB, trach collar in place  ABD: soft, nontender, nondistended, no rebound or guarding, colostomy with light brown stool  Rectal: dark liquid stool in rectum with some clots expressed, no active bleeding, no obvious pathology identified  EXTR: no LE erythema      LABS:                        8.1    7.32  )-----------( 297      ( 14 Apr 2022 10:03 )             26.1       04-14    141  |  106  |  19  ----------------------------<  106<H>  4.0   |  23  |  0.47<L>    Ca    8.5      14 Apr 2022 07:35  Phos  3.1     04-13  Mg     2.00     04-13

## 2024-01-16 NOTE — PROGRESS NOTE ADULT - ASSESSMENT
TRESA PIZARRO 81 f [birth year]2021 1940 DR ANTONIO PATTON     REVIEW OF SYMPTOMS      Able to give (reliable) ROS  NO     PHYSICAL EXAM    HEENT Unremarkable  atraumatic   RESP Fair air entry EXP prolonged    Harsh breath sound Resp distres mild   CARDIAC S1 S2 No S3     NO JVD    ABDOMEN SOFT BS PRESENT NOT DISTENDED No hepatosplenomegaly   PEDAL EDEMA present No calf tenderness  NO rash     ______  DOA/CC.  12/22[birth year]2021 81F w/ HTN, HLD, hypothyroidism, depression, seizures. Presented w/ abdominal pain found to have acute sigmoid diverticulitis  ____  PMH.  pmh Hytn  pmh Sz.    _________  PROBLEMS.     VDRF. Since surgery 12/26  TRACH Done 1/7  ABDOMINAL INFECTION.   Feculent peritonitis perf div poa   COLOSTOMY Created 12/26  1/3 ABD ASPIRATE E coli bacteroides Abio dced 1/15  GT 1/18      PAIN 1/10 fent 50   SZ 1/10 keppra 500.2   Woundvac Rx started 1/14  Hypernatremia 1/14/2022 Na 148  Hypothyroid 1/14/2022 tsh 12     _____                            COVID STATUS. 12/24 pcr (-)  ICU STAY. 12/22-1/12/2022   GOC.  1/13/2022 full code     PATIENT DATA   VITALS/PO/IO/VENT/DRIPS.   1/19/2022 afeb 85 160/60   1/19/2022 ac 5/40/450/15      ASSESSMENT/RECOMMENDATIONS.    HEMODYNAMICS.   Monitor bp Target MAP 65 (+)    RESP.   Monitor po Target po 90-95%  1/14/2022 15/450/.4/5 747/38/110     OXYGEN REQUIREMENTS.   1/17/2022 40%  1/13/2022 40%    VENT MANAGEMENT.   HOB elevation  Target Pplat 35 (-)  Target PO 90-95%  Target pH 730 (+)    INFECTION.  Abdominal infection sec perforated sigmoid div feculent peritonitis poa 12/22.  w 1/13-1/14/2022 w 9.7 - 9.1   1/16/2022 Off abio    SP ABD SURGERY.  81F admitted with Acute Sigmoid Diverticulitis s/p ex lap, sigmoid resection, peritoneal lavage 12/25 and RTOR 12/26 for irrigation of abdominal cavity with closure and creation of colostomy. S/p bedside IR aspiration of fluid collection 1/3. Tracheostomy 1/7.   Deep pelvic abscess noted on CT,  Local drain/ostomy care per nursing    PL EFFSN.  1/16/2022 cxr tr bl effs  1/14/2022 CXR residuall pl effs or basal consolidatn  Effsn likely sec to abdominal infkammation       ANEMIA.  Hb 1/13-1/14-1/16-1/17/2022 Hb 9.4 - 8.9 -10 - 9.6   Monitor    GT placed 1/18      TIME SPENT   Over 25 minutes aggregate care time spent on encounter; activities included   direct patient care, counseling and/or coordinating care reviewing notes, lab data/ imaging , discussion with multidisciplinary team/ patient  /family and explaining in detail risks, benefits, alternatives  of the recommendations     TRESA PIZARRO 81 f [birth year]2021 1940 DR ANTONIO PATTON      hide

## 2024-07-18 NOTE — ED ADULT TRIAGE NOTE - IDEAL BODY WEIGHT(KG)
2:16 PM Recheck on patient. Discussed with patient ED findings and plan for discharge. Patient was given ED warnings, discharge instructions, and follow up information to go home with. Patient understands and agrees with plan for discharge. Any questions have been answered.     59

## 2024-11-12 NOTE — PHARMACOTHERAPY INTERVENTION NOTE - INTERVENTION TYPE RECOOMEND
IV to PO
Please call your insurance agency to determine which physical therapy offices are in your network.   To schedule at the [facility] Physical therapy clinic please call [phone].    
IV to PO

## (undated) DEVICE — FACESHIELD FULL VISOR

## (undated) DEVICE — TUBING IV SET GRAVITY 3Y 100" MACRO

## (undated) DEVICE — LINE BREATHE SAMPLNG

## (undated) DEVICE — TUBING SUCTION NONCONDUCTIVE 6MM X 12FT

## (undated) DEVICE — GOWN LG

## (undated) DEVICE — CATH IV SAFE BC 22G X 1" (BLUE)

## (undated) DEVICE — LUBRICATING JELLY HR ONE SHOT 3G

## (undated) DEVICE — CONTAINER FORMALIN 80ML YELLOW

## (undated) DEVICE — DRSG BANDAID 0.75X3"

## (undated) DEVICE — SALIVA EJECTOR (BLUE)

## (undated) DEVICE — BIOPSY FORCEP COLD DISP

## (undated) DEVICE — BIOPSY FORCEP RADIAL JAW 4 STANDARD WITH NEEDLE

## (undated) DEVICE — TUBING MEDI-VAC W MAXIGRIP CONNECTORS 1/4"X6'

## (undated) DEVICE — DRSG CURITY GAUZE SPONGE 4 X 4" 12-PLY NON-STERILE

## (undated) DEVICE — DRSG 2X2

## (undated) DEVICE — BASIN EMESIS 10IN GRADUATED MAUVE

## (undated) DEVICE — PACK IV START WITH CHG

## (undated) DEVICE — ELCTR ECG CONDUCTIVE ADHESIVE

## (undated) DEVICE — ELCTR GROUNDING PAD ADULT COVIDIEN